# Patient Record
Sex: FEMALE | Race: BLACK OR AFRICAN AMERICAN | NOT HISPANIC OR LATINO | Employment: OTHER | URBAN - METROPOLITAN AREA
[De-identification: names, ages, dates, MRNs, and addresses within clinical notes are randomized per-mention and may not be internally consistent; named-entity substitution may affect disease eponyms.]

---

## 2017-02-01 ENCOUNTER — TRANSCRIBE ORDERS (OUTPATIENT)
Dept: ADMINISTRATIVE | Facility: HOSPITAL | Age: 77
End: 2017-02-01

## 2017-02-01 DIAGNOSIS — Z12.31 OTHER SCREENING MAMMOGRAM: Primary | ICD-10-CM

## 2017-02-11 ENCOUNTER — APPOINTMENT (OUTPATIENT)
Dept: LAB | Facility: HOSPITAL | Age: 77
End: 2017-02-11
Payer: MEDICARE

## 2017-02-11 ENCOUNTER — TRANSCRIBE ORDERS (OUTPATIENT)
Dept: LAB | Facility: HOSPITAL | Age: 77
End: 2017-02-11

## 2017-02-11 DIAGNOSIS — E11.9 DIABETES MELLITUS WITHOUT COMPLICATION (HCC): ICD-10-CM

## 2017-02-11 DIAGNOSIS — I10 UNSPECIFIED ESSENTIAL HYPERTENSION: ICD-10-CM

## 2017-02-11 DIAGNOSIS — E11.9 DIABETES MELLITUS WITHOUT COMPLICATION (HCC): Primary | ICD-10-CM

## 2017-02-11 DIAGNOSIS — E78.5 HYPERLIPIDEMIA, UNSPECIFIED HYPERLIPIDEMIA TYPE: ICD-10-CM

## 2017-02-11 LAB
ALBUMIN SERPL BCP-MCNC: 3.7 G/DL (ref 3.5–5)
ALP SERPL-CCNC: 83 U/L (ref 46–116)
ALT SERPL W P-5'-P-CCNC: 23 U/L (ref 12–78)
ANION GAP SERPL CALCULATED.3IONS-SCNC: 7 MMOL/L (ref 4–13)
AST SERPL W P-5'-P-CCNC: 11 U/L (ref 5–45)
BILIRUB SERPL-MCNC: 0.64 MG/DL (ref 0.2–1)
BUN SERPL-MCNC: 29 MG/DL (ref 5–25)
CALCIUM SERPL-MCNC: 9.3 MG/DL (ref 8.3–10.1)
CHLORIDE SERPL-SCNC: 107 MMOL/L (ref 100–108)
CO2 SERPL-SCNC: 27 MMOL/L (ref 21–32)
CREAT SERPL-MCNC: 1.11 MG/DL (ref 0.6–1.3)
EST. AVERAGE GLUCOSE BLD GHB EST-MCNC: 197 MG/DL
GFR SERPL CREATININE-BSD FRML MDRD: 57.9 ML/MIN/1.73SQ M
GLUCOSE SERPL-MCNC: 162 MG/DL (ref 65–140)
HBA1C MFR BLD: 8.5 % (ref 4.2–6.3)
LDLC SERPL DIRECT ASSAY-MCNC: 66 MG/DL (ref 0–100)
POTASSIUM SERPL-SCNC: 3.4 MMOL/L (ref 3.5–5.3)
PROT SERPL-MCNC: 7.8 G/DL (ref 6.4–8.2)
SODIUM SERPL-SCNC: 141 MMOL/L (ref 136–145)

## 2017-02-11 PROCEDURE — 80053 COMPREHEN METABOLIC PANEL: CPT

## 2017-02-11 PROCEDURE — 36415 COLL VENOUS BLD VENIPUNCTURE: CPT

## 2017-02-11 PROCEDURE — 83036 HEMOGLOBIN GLYCOSYLATED A1C: CPT

## 2017-02-11 PROCEDURE — 83721 ASSAY OF BLOOD LIPOPROTEIN: CPT

## 2017-02-28 ENCOUNTER — HOSPITAL ENCOUNTER (OUTPATIENT)
Dept: RADIOLOGY | Facility: HOSPITAL | Age: 77
Discharge: HOME | End: 2017-02-28
Attending: INTERNAL MEDICINE
Payer: MEDICARE

## 2017-02-28 DIAGNOSIS — Z12.31 OTHER SCREENING MAMMOGRAM: ICD-10-CM

## 2017-02-28 PROCEDURE — G0202 SCR MAMMO BI INCL CAD: HCPCS

## 2017-03-06 ENCOUNTER — ALLSCRIPTS OFFICE VISIT (OUTPATIENT)
Dept: OTHER | Facility: OTHER | Age: 77
End: 2017-03-06

## 2017-03-28 ENCOUNTER — HOSPITAL ENCOUNTER (EMERGENCY)
Facility: HOSPITAL | Age: 77
Discharge: HOME | End: 2017-03-28
Attending: EMERGENCY MEDICINE | Admitting: EMERGENCY MEDICINE
Payer: MEDICARE

## 2017-03-28 VITALS
HEART RATE: 53 BPM | OXYGEN SATURATION: 98 % | WEIGHT: 185 LBS | SYSTOLIC BLOOD PRESSURE: 133 MMHG | RESPIRATION RATE: 18 BRPM | TEMPERATURE: 97.6 F | DIASTOLIC BLOOD PRESSURE: 62 MMHG

## 2017-03-28 DIAGNOSIS — T18.128A FOOD IMPACTION OF ESOPHAGUS: Primary | ICD-10-CM

## 2017-03-28 PROCEDURE — 99283 EMERGENCY DEPT VISIT LOW MDM: CPT

## 2017-03-28 RX ORDER — CLOPIDOGREL BISULFATE 75 MG/1
75 TABLET ORAL DAILY
COMMUNITY
End: 2018-02-16 | Stop reason: SDUPTHER

## 2017-03-28 RX ORDER — ATORVASTATIN CALCIUM 10 MG/1
10 TABLET, FILM COATED ORAL DAILY
COMMUNITY
End: 2018-02-16 | Stop reason: SDUPTHER

## 2017-03-28 RX ORDER — GLIPIZIDE 10 MG/1
10 TABLET ORAL
COMMUNITY
End: 2018-02-16 | Stop reason: ALTCHOICE

## 2017-03-29 ENCOUNTER — ALLSCRIPTS OFFICE VISIT (OUTPATIENT)
Dept: OTHER | Facility: OTHER | Age: 77
End: 2017-03-29

## 2017-03-30 ENCOUNTER — GENERIC CONVERSION - ENCOUNTER (OUTPATIENT)
Dept: OTHER | Facility: OTHER | Age: 77
End: 2017-03-30

## 2017-04-04 RX ORDER — HYDROCHLOROTHIAZIDE 25 MG/1
25 TABLET ORAL DAILY
COMMUNITY
End: 2018-02-16 | Stop reason: SDUPTHER

## 2017-04-04 RX ORDER — AMLODIPINE BESYLATE 5 MG/1
5 TABLET ORAL DAILY
COMMUNITY
End: 2018-02-16 | Stop reason: SDUPTHER

## 2017-04-04 RX ORDER — METOPROLOL SUCCINATE 50 MG/1
50 TABLET, EXTENDED RELEASE ORAL DAILY
COMMUNITY
End: 2018-02-16 | Stop reason: ALTCHOICE

## 2017-04-04 RX ORDER — OXYBUTYNIN CHLORIDE 5 MG/1
5 TABLET ORAL DAILY
COMMUNITY
End: 2018-03-02 | Stop reason: SDUPTHER

## 2017-04-05 ENCOUNTER — HOSPITAL ENCOUNTER (OUTPATIENT)
Facility: HOSPITAL | Age: 77
Setting detail: OUTPATIENT SURGERY
Discharge: HOME | End: 2017-04-05
Attending: INTERNAL MEDICINE | Admitting: INTERNAL MEDICINE
Payer: COMMERCIAL

## 2017-04-05 ENCOUNTER — ANESTHESIA EVENT (OUTPATIENT)
Dept: GASTROENTEROLOGY | Facility: HOSPITAL | Age: 77
End: 2017-04-05
Payer: COMMERCIAL

## 2017-04-05 ENCOUNTER — ANESTHESIA (OUTPATIENT)
Dept: GASTROENTEROLOGY | Facility: HOSPITAL | Age: 77
End: 2017-04-05
Payer: COMMERCIAL

## 2017-04-05 ENCOUNTER — GENERIC CONVERSION - ENCOUNTER (OUTPATIENT)
Dept: OTHER | Facility: OTHER | Age: 77
End: 2017-04-05

## 2017-04-05 VITALS
HEIGHT: 65 IN | DIASTOLIC BLOOD PRESSURE: 67 MMHG | RESPIRATION RATE: 16 BRPM | WEIGHT: 182 LBS | SYSTOLIC BLOOD PRESSURE: 131 MMHG | OXYGEN SATURATION: 97 % | HEART RATE: 73 BPM | BODY MASS INDEX: 30.32 KG/M2 | TEMPERATURE: 97.6 F

## 2017-04-05 DIAGNOSIS — R13.19 ESOPHAGEAL DYSPHAGIA: ICD-10-CM

## 2017-04-05 LAB
GLUCOSE SERPL-MCNC: 47 MG/DL (ref 65–140)
GLUCOSE SERPL-MCNC: 51 MG/DL (ref 65–140)
GLUCOSE SERPL-MCNC: 82 MG/DL (ref 65–140)

## 2017-04-05 PROCEDURE — 82948 REAGENT STRIP/BLOOD GLUCOSE: CPT

## 2017-04-05 PROCEDURE — 88305 TISSUE EXAM BY PATHOLOGIST: CPT | Performed by: INTERNAL MEDICINE

## 2017-04-05 RX ORDER — LIDOCAINE HYDROCHLORIDE 10 MG/ML
INJECTION, SOLUTION EPIDURAL; INFILTRATION; INTRACAUDAL; PERINEURAL AS NEEDED
Status: DISCONTINUED | OUTPATIENT
Start: 2017-04-05 | End: 2017-04-05 | Stop reason: SURG

## 2017-04-05 RX ORDER — PROPOFOL 10 MG/ML
INJECTION, EMULSION INTRAVENOUS AS NEEDED
Status: DISCONTINUED | OUTPATIENT
Start: 2017-04-05 | End: 2017-04-05 | Stop reason: SURG

## 2017-04-05 RX ORDER — DEXTROSE MONOHYDRATE 25 G/50ML
INJECTION, SOLUTION INTRAVENOUS AS NEEDED
Status: DISCONTINUED | OUTPATIENT
Start: 2017-04-05 | End: 2017-04-05 | Stop reason: SURG

## 2017-04-05 RX ORDER — PROPOFOL 10 MG/ML
INJECTION, EMULSION INTRAVENOUS CONTINUOUS PRN
Status: DISCONTINUED | OUTPATIENT
Start: 2017-04-05 | End: 2017-04-05 | Stop reason: SURG

## 2017-04-05 RX ORDER — SODIUM CHLORIDE 9 MG/ML
125 INJECTION, SOLUTION INTRAVENOUS CONTINUOUS
Status: DISCONTINUED | OUTPATIENT
Start: 2017-04-05 | End: 2017-04-05 | Stop reason: HOSPADM

## 2017-04-05 RX ADMIN — SODIUM CHLORIDE 125 ML/HR: 0.9 INJECTION, SOLUTION INTRAVENOUS at 07:44

## 2017-04-05 RX ADMIN — LIDOCAINE HYDROCHLORIDE 50 MG: 10 INJECTION, SOLUTION EPIDURAL; INFILTRATION; INTRACAUDAL; PERINEURAL at 07:49

## 2017-04-05 RX ADMIN — DEXTROSE MONOHYDRATE 15 ML: 500 INJECTION PARENTERAL at 07:46

## 2017-04-05 RX ADMIN — PROPOFOL 120 MCG/KG/MIN: 10 INJECTION, EMULSION INTRAVENOUS at 07:51

## 2017-04-05 RX ADMIN — PROPOFOL 80 MG: 10 INJECTION, EMULSION INTRAVENOUS at 07:50

## 2017-04-06 ENCOUNTER — TRANSCRIBE ORDERS (OUTPATIENT)
Dept: ADMINISTRATIVE | Facility: HOSPITAL | Age: 77
End: 2017-04-06

## 2017-04-06 DIAGNOSIS — R13.14 DYSPHAGIA, PHARYNGOESOPHAGEAL PHASE: ICD-10-CM

## 2017-04-13 ENCOUNTER — HOSPITAL ENCOUNTER (OUTPATIENT)
Dept: RADIOLOGY | Facility: HOSPITAL | Age: 77
Discharge: HOME | End: 2017-04-13
Attending: INTERNAL MEDICINE
Payer: COMMERCIAL

## 2017-04-13 DIAGNOSIS — R13.14 DYSPHAGIA, PHARYNGOESOPHAGEAL PHASE: ICD-10-CM

## 2017-04-13 PROCEDURE — 74220 X-RAY XM ESOPHAGUS 1CNTRST: CPT

## 2017-04-24 ENCOUNTER — GENERIC CONVERSION - ENCOUNTER (OUTPATIENT)
Dept: OTHER | Facility: OTHER | Age: 77
End: 2017-04-24

## 2017-06-12 ENCOUNTER — APPOINTMENT (OUTPATIENT)
Dept: LAB | Facility: HOSPITAL | Age: 77
End: 2017-06-12
Attending: INTERNAL MEDICINE
Payer: COMMERCIAL

## 2017-06-12 ENCOUNTER — TRANSCRIBE ORDERS (OUTPATIENT)
Dept: LAB | Facility: HOSPITAL | Age: 77
End: 2017-06-12

## 2017-06-12 DIAGNOSIS — D64.9 ANEMIA, UNSPECIFIED: ICD-10-CM

## 2017-06-12 DIAGNOSIS — E11.9 TYPE 2 DIABETES MELLITUS WITHOUT COMPLICATION, WITHOUT LONG-TERM CURRENT USE OF INSULIN (HCC): ICD-10-CM

## 2017-06-12 DIAGNOSIS — I10 ESSENTIAL HYPERTENSION, MALIGNANT: ICD-10-CM

## 2017-06-12 DIAGNOSIS — E78.2 MIXED HYPERLIPIDEMIA: ICD-10-CM

## 2017-06-12 DIAGNOSIS — E11.9 TYPE 2 DIABETES MELLITUS WITHOUT COMPLICATION, WITHOUT LONG-TERM CURRENT USE OF INSULIN (HCC): Primary | ICD-10-CM

## 2017-06-12 LAB
ALBUMIN SERPL BCP-MCNC: 3.6 G/DL (ref 3.5–5)
ALP SERPL-CCNC: 86 U/L (ref 46–116)
ALT SERPL W P-5'-P-CCNC: 23 U/L (ref 12–78)
ANION GAP SERPL CALCULATED.3IONS-SCNC: 7 MMOL/L (ref 4–13)
AST SERPL W P-5'-P-CCNC: 12 U/L (ref 5–45)
BASOPHILS # BLD AUTO: 0.03 THOUSANDS/ΜL (ref 0–0.1)
BASOPHILS NFR BLD AUTO: 1 % (ref 0–1)
BILIRUB SERPL-MCNC: 0.75 MG/DL (ref 0.2–1)
BUN SERPL-MCNC: 25 MG/DL (ref 5–25)
CALCIUM SERPL-MCNC: 9.4 MG/DL (ref 8.3–10.1)
CHLORIDE SERPL-SCNC: 105 MMOL/L (ref 100–108)
CO2 SERPL-SCNC: 29 MMOL/L (ref 21–32)
CREAT SERPL-MCNC: 0.99 MG/DL (ref 0.6–1.3)
EOSINOPHIL # BLD AUTO: 0.07 THOUSAND/ΜL (ref 0–0.61)
EOSINOPHIL NFR BLD AUTO: 1 % (ref 0–6)
ERYTHROCYTE [DISTWIDTH] IN BLOOD BY AUTOMATED COUNT: 13.9 % (ref 11.6–15.1)
EST. AVERAGE GLUCOSE BLD GHB EST-MCNC: 183 MG/DL
GFR SERPL CREATININE-BSD FRML MDRD: >60 ML/MIN/1.73SQ M
GLUCOSE P FAST SERPL-MCNC: 173 MG/DL (ref 65–99)
HBA1C MFR BLD: 8 % (ref 4.2–6.3)
HCT VFR BLD AUTO: 35.9 % (ref 34.8–46.1)
HGB BLD-MCNC: 11.4 G/DL (ref 11.5–15.4)
LDLC SERPL DIRECT ASSAY-MCNC: 58 MG/DL (ref 0–100)
LYMPHOCYTES # BLD AUTO: 1.53 THOUSANDS/ΜL (ref 0.6–4.47)
LYMPHOCYTES NFR BLD AUTO: 25 % (ref 14–44)
MCH RBC QN AUTO: 25.3 PG (ref 26.8–34.3)
MCHC RBC AUTO-ENTMCNC: 31.8 G/DL (ref 31.4–37.4)
MCV RBC AUTO: 80 FL (ref 82–98)
MONOCYTES # BLD AUTO: 0.27 THOUSAND/ΜL (ref 0.17–1.22)
MONOCYTES NFR BLD AUTO: 4 % (ref 4–12)
NEUTROPHILS # BLD AUTO: 4.22 THOUSANDS/ΜL (ref 1.85–7.62)
NEUTS SEG NFR BLD AUTO: 69 % (ref 43–75)
NRBC BLD AUTO-RTO: 0 /100 WBCS
PLATELET # BLD AUTO: 199 THOUSANDS/UL (ref 149–390)
PMV BLD AUTO: 11.8 FL (ref 8.9–12.7)
POTASSIUM SERPL-SCNC: 3.4 MMOL/L (ref 3.5–5.3)
PROT SERPL-MCNC: 7.7 G/DL (ref 6.4–8.2)
RBC # BLD AUTO: 4.51 MILLION/UL (ref 3.81–5.12)
SODIUM SERPL-SCNC: 141 MMOL/L (ref 136–145)
TRIGL SERPL-MCNC: 83 MG/DL
WBC # BLD AUTO: 6.13 THOUSAND/UL (ref 4.31–10.16)

## 2017-06-12 PROCEDURE — 83721 ASSAY OF BLOOD LIPOPROTEIN: CPT

## 2017-06-12 PROCEDURE — 83036 HEMOGLOBIN GLYCOSYLATED A1C: CPT

## 2017-06-12 PROCEDURE — 36415 COLL VENOUS BLD VENIPUNCTURE: CPT

## 2017-06-12 PROCEDURE — 84478 ASSAY OF TRIGLYCERIDES: CPT

## 2017-06-12 PROCEDURE — 85025 COMPLETE CBC W/AUTO DIFF WBC: CPT

## 2017-06-12 PROCEDURE — 80053 COMPREHEN METABOLIC PANEL: CPT

## 2017-06-28 ENCOUNTER — ALLSCRIPTS OFFICE VISIT (OUTPATIENT)
Dept: OTHER | Facility: OTHER | Age: 77
End: 2017-06-28

## 2017-07-11 ENCOUNTER — HOSPITAL ENCOUNTER (OUTPATIENT)
Dept: GASTROENTEROLOGY | Facility: HOSPITAL | Age: 77
Discharge: HOME | End: 2017-07-11
Payer: COMMERCIAL

## 2017-07-12 ENCOUNTER — HOSPITAL ENCOUNTER (OUTPATIENT)
Dept: GASTROENTEROLOGY | Facility: HOSPITAL | Age: 77
Discharge: HOME | End: 2017-07-12
Payer: COMMERCIAL

## 2017-07-12 VITALS
TEMPERATURE: 98.4 F | SYSTOLIC BLOOD PRESSURE: 177 MMHG | RESPIRATION RATE: 18 BRPM | BODY MASS INDEX: 31.01 KG/M2 | OXYGEN SATURATION: 98 % | HEART RATE: 60 BPM | HEIGHT: 63 IN | WEIGHT: 175 LBS | DIASTOLIC BLOOD PRESSURE: 98 MMHG

## 2017-07-12 PROCEDURE — 91020 GASTRIC MOTILITY STUDIES: CPT

## 2017-08-21 ENCOUNTER — TRANSCRIBE ORDERS (OUTPATIENT)
Dept: ADMINISTRATIVE | Facility: HOSPITAL | Age: 77
End: 2017-08-21

## 2017-08-21 ENCOUNTER — ALLSCRIPTS OFFICE VISIT (OUTPATIENT)
Dept: OTHER | Facility: OTHER | Age: 77
End: 2017-08-21

## 2017-08-21 DIAGNOSIS — R53.83 OTHER FATIGUE: ICD-10-CM

## 2017-08-21 DIAGNOSIS — R01.1 UNDIAGNOSED CARDIAC MURMURS: Primary | ICD-10-CM

## 2017-08-25 ENCOUNTER — HOSPITAL ENCOUNTER (OUTPATIENT)
Dept: NON INVASIVE DIAGNOSTICS | Facility: HOSPITAL | Age: 77
Discharge: HOME | End: 2017-08-25
Payer: COMMERCIAL

## 2017-08-25 DIAGNOSIS — R01.1 UNDIAGNOSED CARDIAC MURMURS: ICD-10-CM

## 2017-08-25 PROCEDURE — 93306 TTE W/DOPPLER COMPLETE: CPT

## 2017-09-07 ENCOUNTER — GENERIC CONVERSION - ENCOUNTER (OUTPATIENT)
Dept: OTHER | Facility: OTHER | Age: 77
End: 2017-09-07

## 2017-09-21 ENCOUNTER — GENERIC CONVERSION - ENCOUNTER (OUTPATIENT)
Dept: OTHER | Facility: OTHER | Age: 77
End: 2017-09-21

## 2017-09-25 ENCOUNTER — APPOINTMENT (OUTPATIENT)
Dept: LAB | Facility: HOSPITAL | Age: 77
End: 2017-09-25
Payer: COMMERCIAL

## 2017-09-25 ENCOUNTER — TRANSCRIBE ORDERS (OUTPATIENT)
Dept: LAB | Facility: HOSPITAL | Age: 77
End: 2017-09-25

## 2017-09-25 DIAGNOSIS — R53.83 OTHER FATIGUE: ICD-10-CM

## 2017-09-25 DIAGNOSIS — E11.65 TYPE 2 DIABETES MELLITUS WITH HYPERGLYCEMIA (HCC): ICD-10-CM

## 2017-09-25 LAB
ALBUMIN SERPL BCP-MCNC: 3.5 G/DL (ref 3.5–5)
ALP SERPL-CCNC: 83 U/L (ref 46–116)
ALT SERPL W P-5'-P-CCNC: 18 U/L (ref 12–78)
ANION GAP SERPL CALCULATED.3IONS-SCNC: 9 MMOL/L (ref 4–13)
AST SERPL W P-5'-P-CCNC: 15 U/L (ref 5–45)
BASOPHILS # BLD AUTO: 0.02 THOUSANDS/ΜL (ref 0–0.1)
BASOPHILS NFR BLD AUTO: 0 % (ref 0–1)
BILIRUB SERPL-MCNC: 0.57 MG/DL (ref 0.2–1)
BUN SERPL-MCNC: 19 MG/DL (ref 5–25)
CALCIUM SERPL-MCNC: 9.4 MG/DL (ref 8.3–10.1)
CHLORIDE SERPL-SCNC: 104 MMOL/L (ref 100–108)
CO2 SERPL-SCNC: 26 MMOL/L (ref 21–32)
CREAT SERPL-MCNC: 1.08 MG/DL (ref 0.6–1.3)
EOSINOPHIL # BLD AUTO: 0.11 THOUSAND/ΜL (ref 0–0.61)
EOSINOPHIL NFR BLD AUTO: 2 % (ref 0–6)
ERYTHROCYTE [DISTWIDTH] IN BLOOD BY AUTOMATED COUNT: 13.8 % (ref 11.6–15.1)
EST. AVERAGE GLUCOSE BLD GHB EST-MCNC: 212 MG/DL
GFR SERPL CREATININE-BSD FRML MDRD: 57 ML/MIN/1.73SQ M
GLUCOSE SERPL-MCNC: 238 MG/DL (ref 65–140)
HBA1C MFR BLD: 9 % (ref 4.2–6.3)
HCT VFR BLD AUTO: 34.9 % (ref 34.8–46.1)
HGB BLD-MCNC: 11.1 G/DL (ref 11.5–15.4)
LYMPHOCYTES # BLD AUTO: 1.27 THOUSANDS/ΜL (ref 0.6–4.47)
LYMPHOCYTES NFR BLD AUTO: 24 % (ref 14–44)
MCH RBC QN AUTO: 25.3 PG (ref 26.8–34.3)
MCHC RBC AUTO-ENTMCNC: 31.8 G/DL (ref 31.4–37.4)
MCV RBC AUTO: 80 FL (ref 82–98)
MONOCYTES # BLD AUTO: 0.28 THOUSAND/ΜL (ref 0.17–1.22)
MONOCYTES NFR BLD AUTO: 5 % (ref 4–12)
NEUTROPHILS # BLD AUTO: 3.7 THOUSANDS/ΜL (ref 1.85–7.62)
NEUTS SEG NFR BLD AUTO: 69 % (ref 43–75)
NRBC BLD AUTO-RTO: 0 /100 WBCS
PLATELET # BLD AUTO: 196 THOUSANDS/UL (ref 149–390)
PMV BLD AUTO: 11.3 FL (ref 8.9–12.7)
POTASSIUM SERPL-SCNC: 3.5 MMOL/L (ref 3.5–5.3)
PROT SERPL-MCNC: 7.4 G/DL (ref 6.4–8.2)
RBC # BLD AUTO: 4.38 MILLION/UL (ref 3.81–5.12)
SODIUM SERPL-SCNC: 139 MMOL/L (ref 136–145)
TSH SERPL DL<=0.05 MIU/L-ACNC: 0.86 UIU/ML (ref 0.36–3.74)
WBC # BLD AUTO: 5.39 THOUSAND/UL (ref 4.31–10.16)

## 2017-09-25 PROCEDURE — 84443 ASSAY THYROID STIM HORMONE: CPT

## 2017-09-25 PROCEDURE — 83036 HEMOGLOBIN GLYCOSYLATED A1C: CPT

## 2017-09-25 PROCEDURE — 85025 COMPLETE CBC W/AUTO DIFF WBC: CPT

## 2017-09-25 PROCEDURE — 36415 COLL VENOUS BLD VENIPUNCTURE: CPT

## 2017-09-25 PROCEDURE — 80053 COMPREHEN METABOLIC PANEL: CPT

## 2017-10-10 ENCOUNTER — GENERIC CONVERSION - ENCOUNTER (OUTPATIENT)
Dept: OTHER | Facility: OTHER | Age: 77
End: 2017-10-10

## 2017-11-09 ENCOUNTER — GENERIC CONVERSION - ENCOUNTER (OUTPATIENT)
Dept: OTHER | Facility: OTHER | Age: 77
End: 2017-11-09

## 2017-11-29 ENCOUNTER — ALLSCRIPTS OFFICE VISIT (OUTPATIENT)
Dept: OTHER | Facility: OTHER | Age: 77
End: 2017-11-29

## 2017-11-29 DIAGNOSIS — I63.9 CEREBRAL INFARCTION (HCC): ICD-10-CM

## 2017-11-29 DIAGNOSIS — E11.9 TYPE 2 DIABETES MELLITUS WITHOUT COMPLICATIONS (HCC): ICD-10-CM

## 2017-11-29 DIAGNOSIS — R05.9 COUGH: ICD-10-CM

## 2017-11-29 DIAGNOSIS — I35.1 NONRHEUMATIC AORTIC VALVE INSUFFICIENCY: ICD-10-CM

## 2017-11-30 NOTE — CONSULTS
Assessment    1  Benign essential hypertension (401 1) (I10)   2  CVA (cerebrovascular accident) (434 91) (I63 9)   3  Diabetes (250 00) (E11 9)    Plan  Aortic valve regurgitation, nonrheumatic, CVA (cerebrovascular accident), Diabetes    · VAS CAROTID COMPLETE STUDY; SIDE:Bilateral; Status:Hold For - Scheduling;Requested for:50Zjh5529 11:00AM;    Perform:[address]; Due:80Cee3204; Last Updated By:Nancy Avitia; 11/29/2017 12:58:33 PM;Ordered;valve regurgitation, nonrheumatic, CVA (cerebrovascular accident), Diabetes; Ordered By:Shu Kapadia;  CVA (cerebrovascular accident), Diabetes    · Follow-up visit in 6 months Evaluation and Treatment  Follow-up  Status: Hold For -Scheduling  Requested for: 60SHM4868   Ordered;CVA (cerebrovascular accident), Diabetes; Ordered Samantha Dumont Performed:  Due: 70RAV2567    Discussion/Summary  Discussion Summary:   Mrs Amaya is a pleasant 67 yo female seen in consultation for history of stroke, subcortical vs posterior circulation per description  I will try to obtain her [facility]imaging records from 2010Neurologic exam today with evidence of mild peripheral neuropathy otherwise non focalWill obtain CUSC/w Plavix and statin for secondary stroke preventionStroke risk factors: Age,HTN, DM (HgbA1C 9), tobacco use in the past, and history stroke  Discussed risk factor modification in detail  Healthy weight loss/ management as directed per PCP  Discussed s/sx of stroke to call 911 immediately  peripheral neuropathyRemains without falls and uses cane as needed  Discussed importance of fall prevention  up in six months with PA or me, and if she remains neurologically stable with no new concerns, can then see as needed going forward  Counseling Documentation With Imm: The patient was counseled regarding     Stroke Patient Family Education [facility]:  The patient was educated regarding: Goals: Exercise: 30 minutes of moderate-intensity physical exercise most days or supervised therapeutic exercise program if disabled, but-- BMI: 18 5 - 24 9  Patient/Family was also educated regarding: Stroke Diagnosis (TIA,Ischemic Stroke, ICH, SAH),-- Medication Adherence,-- Personal Stroke/Cardiovascular Risk Factors,-- Diabetes Education,-- Diet Education (Low Salt, Low Cholesterol, Diabetic,-- Activity/Exercise Guidelines,-- Weight loss/Management Counseling,-- Signs And Symptoms Of Stroke/Call 911-- and-- Patient/Family Received Education Materials  Chief Complaint  Chief Complaint Free Text Note Form: Patient present for consultation regarding stroke      History of Present Illness  HPI: Ms Shukri Mcpherson is a pleasant 67 yo female with flu and or viral URI today per her, presenting here today per her PCP for history of stroke 2010  States she had a popping sensation in her head followed by balance issues then  States left side weaker  Tells me does not recall the location of her stroke  States as far as she knows she was not given tPA and no intracranial bleeding  Denies known personal or family history of aneurysm  me still has residual balance issues and walks with cane  States no falls  any strokes or TIAs since  Is on Plavix and statin for secondary stroke prevention  States was on ASA prior to the stroke  She tells me she used to see a neurologist with [facility] years ago when she had the stroke but cannot recall their name  Tells me last neuroimaging 2010 but not since  no history of heart disease or irregular heart rhythms or blood clots or CA  States HTN well controlled with medication  HgbA1C 9 0 per most recent blood work  of tobacco abuse stopped years ago in 1980s  no significant memory deficits  Does all ADLs and complicated tasks with no difficulty  me she drives with no difficulties  depression or anxietyby herself and family visits her from time to time  tries to eat a healthy diet however the holidays have been bad in this regard   States she used to exercise on her stationary bike regularly but has not been doing so recently  other concerns at this time  sleep apnea symptoms      Review of Systems  Neurological ROS:  Constitutional: no fever, no chills, no recent weight gain, no recent weight loss, no complaints of feeling tired, no changes in appetite  HEENT:  no sinus problems, not feeling congested, no blurred vision, no dryness of the eyes, no eye pain, no hearing loss, no tinnitus, no mouth sores, no sore throat, no hoarseness, no dysphagia, no masses, no bleeding  Cardiovascular:  no chest pain or pressure, no palpitations present, the heart rate was not rapid or irregular, no swelling in the arms or legs, no poor circulation  Respiratory:  no unusual or persistant cough, no shortness of breath with or without exertion  Gastrointestinal:  no nausea, no vomiting, no diarrhea, no abdominal pain, no changes in bowel habits, no melena, no loss of bowel control  Genitourinary:  no incontinence, no feelings of urinary urgency, no increase in frequency, no urinary hesitancy, no dysuria, no hematuria  Musculoskeletal: head/neck/back pain-- and-- pain while walking  Integumentary  no masses, no rash, no skin lesions, no livedo reticularis  Psychiatric:  no anxiety, no depression, no mood swings, no psychiatric hospitalizations, no sleep problems  Endocrine hair loss or gain  Hematologic/Lymphatic:  no unusual bleeding, no tendency for easy bruising, no clotting skin or lumps  Neurological General:  no headache, no nausea or vomiting, no lightheadedness, no convulsions, no blackouts, no syncope, no trauma, no photopsia, no increased sleepiness, no trouble falling asleep, no snoring, no awakening at night  Neurological Mental Status: memory problems    Neurological Cranial Nerves:  no blurry or double vision, no loss of vision, no face drooping, no facial numbness or weakness, no taste or smell loss/changes, no hearing loss or ringing, no vertigo or dizziness, no dysphagia, no slurred speech  Neurological Motor findings include:  no tremor, no twitching, no cramping(pre/post exercise), no atrophy  Neurological Coordination: balance difficulties  Neurological Sensory:  no numbness, no pain, no tingling, does not fall when eyes closed or taking a shower  Neurological Gait: difficulty walking  ROS Reviewed:   ROS reviewed  Active Problems  1  Aortic valve regurgitation, nonrheumatic (424 1) (I35 1)   2  Benign essential hypertension (401 1) (I10)   3  Bilateral edema of lower extremity (782 3) (R60 0)   4  CVA (cerebrovascular accident) (434 91) (I63 9)   5  Cystocele, midline (618 01) (N81 11)   6  Diabetes mellitus type 2, uncontrolled (250 02) (E11 65)   7  Encounter for pessary maintenance (V53 99) (Z46 89)   8  Esophageal dysphagia (787 20) (R13 10)   9  Fatigue (780 79) (R53 83)   10  Loss of pelvic support (618 00) (N81 9)   11  Need for influenza vaccination (V04 81) (Z23)   12  Non-rheumatic mitral regurgitation (424 0) (I34 0)   13  Tinea capitis (110 0) (B35 0)   14  Upper respiratory infection, acute (465 9) (J06 9)   15  Urinary incontinence (788 30) (R32)   16  Vitamin D insufficiency (268 9) (E55 9)    Past Medical History    1  History of Asthma (493 90) (J45 909)   2  History of  8   3  History of cardiac murmur (V12 59) (Z86 79)   4  History of fatigue (V13 89) (Z87 898)   5  History of urinary frequency (V13 09) (Z87 898)   6  History of urinary tract infection (V13 02) (Z87 440)   7  Personal history of other specified conditions presenting hazards to health (V15 89) (Z91 89)   8  History of Prepatellar bursitis of right knee (726 65) (M70 41)   9  History of Spontaneous  (634 90)   10  History of Stroke Syndrome (436)  Active Problems And Past Medical History Reviewed: The active problems and past medical history were reviewed and updated today  Surgical History  1   Denied: History Of Prior Surgery    Family History  Mother 1  Family history of hypertension (V17 49) (Z82 49)   2  Family history of stroke (V17 1) (Z82 3)  Father    3  Family history of heart disease (V17 49) (Z82 49)  Son    4  Family history of diabetes mellitus (V18 0) (Z83 3)   5  Family history of hypertension (V17 49) (Z82 49)   6  Family history of Rheumatic disease  Sister    9  Family history of breast disorder (V19 8) (Z84 2)   8  Family history of epilepsy (V17 2) (Z82 0)   9  Family history of migraine headaches (V17 2) (Z82 0)   10  Family history of osteoporosis (V17 81) (Z82 62)   11  Family history of thyroid disease (V18 19) (Z83 49)   12  Family history of S/P triple vessel bypass  Maternal Grandmother    13  Family history of osteoporosis (V17 81) (Z82 62)  Family History    14  Family history of Heart Disease (V17 49)  Family History Reviewed: The family history was reviewed and updated today  Social History     · Denied: History of Alcohol Use (History)   · Diet needs improvement   · Does not exercise (V69 0) (Z72 3)   · Denied: History of Drug Use   · Eight children   · Former smoker (V15 82) (Q30 184)   · No caffeine use   · Retired   ·  (V61 07) (Z63 4)  Social History Reviewed: The social history was reviewed and updated today  Current Meds   1  AmLODIPine Besylate 5 MG Oral Tablet; TAKE 1 TABLET DAILY  Requested for: 40MAD4272; Last Rx:19Nbq0656 Ordered   2  Atorvastatin Calcium 40 MG Oral Tablet; TAKE 1 TABLET DAILY AS DIRECTED; Therapy: 13CZE3374 to (PFUIZLDZ:70DJT6732)  Requested for: 21QWI6161; Last Rx:15Yxe3270 Ordered   3  Benzonatate 100 MG Oral Capsule; TAKE 1 CAPSULE 3 TIMES DAILY; Therapy: 56GKF0281 to (Evaluate:99Fkm0371)  Requested for: 66TYO5178; Last Rx:14Nov2017 Ordered   4  Clopidogrel Bisulfate 75 MG Oral Tablet; TAKE 1 TABLET DAILY  Requested for: 80GUY8842; Last Rx:20Nov2017 Ordered   5  Coricidin HBP Congestion/Cough  MG Oral Capsule; TAKE 1 CAPSULE EVERY 4 HOURS AS NEEDED;  Therapy: 60BKB0166 to (Evaluate:16Nov2017)  Requested for: 74SCD6077; Last Rx:09Nov2017 Ordered   6  HydroCHLOROthiazide 25 MG Oral Tablet; TAKE 1 TABLET DAILY  Requested for: 89FJQ8596; Last Rx:77Pqh9635 Ordered   7  Lisinopril 20 MG Oral Tablet; TAKE ONE TABLET BY MOUTH EVERY DAY; Therapy: 97TRB7299 to (Evaluate:08Apr2018)  Requested for: 88VEE7352; Last Rx:10Oct2017 Ordered   8  MetFORMIN HCl - 1000 MG Oral Tablet; Take one tablet in am and 1and 1/2 tablets in pm with food for a totoal of 2500 mg  Requested for: 72OQU1860; Last Rx:79Gvt8592 Ordered   9  Terbinafine HCl - 250 MG Oral Tablet; TAKE 1 TABLET DAILY AS DIRECTED; Therapy: 85Eky9290 to (Evaluate:05Oct2017)  Requested for: 30Drm6699; Last Rx:52Khj3006 Ordered   10  Tradjenta 5 MG Oral Tablet; TAKE 1 TABLET DAILY AS DIRECTED; Therapy: 42VFF8347 to (John Davila)  Requested for: 50AIB2188; Last  Rx:10Oct2017 Ordered   11  Ventolin  (90 Base) MCG/ACT Inhalation Aerosol Solution; inhale 1 to 2 puffs  every 4 to 6 hours if needed; Therapy: 55Qnu8464-74Swa1015  Requested for: 22Nov2017; Last Rx:22Nov2017  Ordered   12  Vitamin D3 2000 UNIT Oral Tablet; take 1 tab po daily; Therapy: 62UGQ0244 to [id number])  Requested for: 40CBA2356; Last  Rx:09Qmb4948 Ordered  Medication List Reviewed: The medication list was reviewed and updated today  Allergies  1  No Known Drug Allergies    2  No Known Environmental Allergies   3  No Known Food Allergies    Vitals  Signs   Recorded: 46UBQ9269 12:21PM   Heart Rate: 74  Respiration: 16  Systolic: 238  Diastolic: 80  Height: 5 ft 3 in  Weight: 180 lb 1 oz  BMI Calculated: 31 9  BSA Calculated: 1 85  O2 Saturation: 99    Physical Exam   Constitutional  General appearance: Abnormal   overweight  Eyes  Ophthalmoscopic examination: Vision is grossly normal  Gross visual field testing by confrontation shows no abnormalities  EOMI in both eyes  Conjunctivae clear   Eyelids normal palpebral fissures equal  Orbits exhibit normal position  No discharge from the eyes  PERRL  Inspection of ears, nose and throat: Abnormal   Rhinorrhea    Neck  Neck and thyroid: Normal to inspection and palpation  Pulmonary  Respiratory effort: Lungs are clear bilaterally  Cardiovascular  Auscultation of heart: Rate is regular  Rhythm is regular  Peripheral vascular exam: Normal pulses throughout, no tenderness, erythema or swelling  Musculoskeletal  Gait and station: Abnormal  -- wide based cautious gait  Muscle strength: Normal strength throughout  Muscle tone: No atrophy, abnormal movements, flaccidity, cogwheeling or spasticity  Range of motion: Normal     Stability: Normal      Neurologic  Orientation to person, place, and time: Normal    Attention span and concentration: Normal thought process and attention span  Language: Names objects, able to repeat phrases and speaks spontaneously  Fund of knowledge: Normal vocabulary with appropriate knowledge of current events and past history  Sensation: Abnormal  -- Mild PP stocking glove distribution sensory loss  Reflexes: Abnormal  -- Diffusely hyporeflexic  Coordination: Cerebellum function intact  No involuntary movement or psychomotor activity  Motor System: No pronator drift  Upper Extremities: Normal to inspection  No tenderness over the upper extremities bilaterally  No instability bilaterally  Strength: Motor strength is 5/5 bilaterally  Normal muscle tone bilaterally  Muscle bulk: Muscle bulk is normal bilaterally  Full ROM bilaterally  Lower Extremities: Normal to inspection and palpation  No tenderness of the lower extremities bilaterally  Exhibits no instability bilaterally  Strength: Motor strength is 5/5 bilaterally  Normal muscle tone bilaterally  Muscle Bulk: Muscle bulk is normal bilaterally  Full ROM bilaterally  Cranial Nerve Exam  II: Normal with no deficit  III,IV, VI: Normal with no deficit  V: Normal with no deficit  VII: Normal with no deficit  VIII: Normal with no deficit  IX: Normal with no deficit  X: Normal with no deficit  XI: Normal with no deficit  XII: Normal with no deficit  Recent and remote memory: Intact      Mood and affect: Normal        Signatures   Electronically signed by : Sloan Adams MD; Nov 29 2017  1:16PM EST                       (Author)

## 2017-12-03 ENCOUNTER — HOSPITAL ENCOUNTER (EMERGENCY)
Facility: HOSPITAL | Age: 77
Discharge: HOME | End: 2017-12-03
Attending: EMERGENCY MEDICINE | Admitting: EMERGENCY MEDICINE
Payer: COMMERCIAL

## 2017-12-03 ENCOUNTER — APPOINTMENT (EMERGENCY)
Dept: RADIOLOGY | Facility: HOSPITAL | Age: 77
End: 2017-12-03
Payer: COMMERCIAL

## 2017-12-03 VITALS
WEIGHT: 180 LBS | RESPIRATION RATE: 18 BRPM | TEMPERATURE: 98.7 F | OXYGEN SATURATION: 96 % | HEART RATE: 59 BPM | BODY MASS INDEX: 31.89 KG/M2 | DIASTOLIC BLOOD PRESSURE: 78 MMHG | SYSTOLIC BLOOD PRESSURE: 141 MMHG

## 2017-12-03 DIAGNOSIS — R05.9 COUGH: Primary | ICD-10-CM

## 2017-12-03 LAB
ANION GAP SERPL CALCULATED.3IONS-SCNC: 8 MMOL/L (ref 4–13)
ATRIAL RATE: 65 BPM
BASOPHILS # BLD AUTO: 0.03 THOUSANDS/ΜL (ref 0–0.1)
BASOPHILS NFR BLD AUTO: 1 % (ref 0–1)
BUN SERPL-MCNC: 32 MG/DL (ref 5–25)
CALCIUM SERPL-MCNC: 9 MG/DL (ref 8.3–10.1)
CHLORIDE SERPL-SCNC: 104 MMOL/L (ref 100–108)
CO2 SERPL-SCNC: 26 MMOL/L (ref 21–32)
CREAT SERPL-MCNC: 1.22 MG/DL (ref 0.6–1.3)
EOSINOPHIL # BLD AUTO: 0.11 THOUSAND/ΜL (ref 0–0.61)
EOSINOPHIL NFR BLD AUTO: 2 % (ref 0–6)
ERYTHROCYTE [DISTWIDTH] IN BLOOD BY AUTOMATED COUNT: 13.6 % (ref 11.6–15.1)
GFR SERPL CREATININE-BSD FRML MDRD: 49 ML/MIN/1.73SQ M
GLUCOSE SERPL-MCNC: 146 MG/DL (ref 65–140)
HCT VFR BLD AUTO: 35 % (ref 34.8–46.1)
HGB BLD-MCNC: 11.5 G/DL (ref 11.5–15.4)
LYMPHOCYTES # BLD AUTO: 1.52 THOUSANDS/ΜL (ref 0.6–4.47)
LYMPHOCYTES NFR BLD AUTO: 26 % (ref 14–44)
MCH RBC QN AUTO: 26.1 PG (ref 26.8–34.3)
MCHC RBC AUTO-ENTMCNC: 32.9 G/DL (ref 31.4–37.4)
MCV RBC AUTO: 80 FL (ref 82–98)
MONOCYTES # BLD AUTO: 0.41 THOUSAND/ΜL (ref 0.17–1.22)
MONOCYTES NFR BLD AUTO: 7 % (ref 4–12)
NEUTROPHILS # BLD AUTO: 3.84 THOUSANDS/ΜL (ref 1.85–7.62)
NEUTS SEG NFR BLD AUTO: 64 % (ref 43–75)
NRBC BLD AUTO-RTO: 0 /100 WBCS
NT-PROBNP SERPL-MCNC: 85 PG/ML
P AXIS: 67 DEGREES
PLATELET # BLD AUTO: 173 THOUSANDS/UL (ref 149–390)
PMV BLD AUTO: 11.8 FL (ref 8.9–12.7)
POTASSIUM SERPL-SCNC: 3.8 MMOL/L (ref 3.5–5.3)
PR INTERVAL: 214 MS
QRS AXIS: -34 DEGREES
QRSD INTERVAL: 86 MS
QT INTERVAL: 406 MS
QTC INTERVAL: 422 MS
RBC # BLD AUTO: 4.4 MILLION/UL (ref 3.81–5.12)
SODIUM SERPL-SCNC: 138 MMOL/L (ref 136–145)
SPECIMEN SOURCE: NORMAL
T WAVE AXIS: 90 DEGREES
TROPONIN I BLD-MCNC: 0.01 NG/ML (ref 0–0.08)
VENTRICULAR RATE: 65 BPM
WBC # BLD AUTO: 5.92 THOUSAND/UL (ref 4.31–10.16)

## 2017-12-03 PROCEDURE — 85025 COMPLETE CBC W/AUTO DIFF WBC: CPT | Performed by: EMERGENCY MEDICINE

## 2017-12-03 PROCEDURE — 71020 HB CHEST X-RAY 2VW FRONTAL&LATL: CPT

## 2017-12-03 PROCEDURE — 83880 ASSAY OF NATRIURETIC PEPTIDE: CPT | Performed by: EMERGENCY MEDICINE

## 2017-12-03 PROCEDURE — 80048 BASIC METABOLIC PNL TOTAL CA: CPT | Performed by: EMERGENCY MEDICINE

## 2017-12-03 PROCEDURE — 36415 COLL VENOUS BLD VENIPUNCTURE: CPT | Performed by: EMERGENCY MEDICINE

## 2017-12-03 PROCEDURE — 84484 ASSAY OF TROPONIN QUANT: CPT

## 2017-12-03 PROCEDURE — 93005 ELECTROCARDIOGRAM TRACING: CPT | Performed by: EMERGENCY MEDICINE

## 2017-12-03 PROCEDURE — 99284 EMERGENCY DEPT VISIT MOD MDM: CPT

## 2017-12-03 NOTE — DISCHARGE INSTRUCTIONS
I am concerned that this cough that your having could be a side effect of your medication  Please follow-up with your primary care provider and specifically discuss if the lisinopril is causing your cough  Please return emergency department give any increase in shortness of breath, swelling in your tongue lips, trouble breathing or any other concerns  Acute Cough   WHAT YOU NEED TO KNOW:   An acute cough can last up to 3 weeks  Common causes of an acute cough include a cold, allergies, or a lung infection  DISCHARGE INSTRUCTIONS:   Return to the emergency department if:   · You have trouble breathing or feel short of breath  · You cough up blood, or you see blood in your mucus  · You faint or feel weak or dizzy  · You have chest pain when you cough or take a deep breath  · You have new wheezing  Contact your healthcare provider if:   · You have a fever  · Your cough lasts longer than 4 weeks  · Your symptoms do not improve with treatment  · You have questions or concerns about your condition or care  Medicines:   · Medicines  may be needed to stop the cough, decrease swelling in your airways, or help open your airways  Medicine may also be given to help you cough up mucus  Ask your healthcare provider what over-the-counter medicines you can take  If you have an infection caused by bacteria, you may need antibiotics  · Take your medicine as directed  Contact your healthcare provider if you think your medicine is not helping or if you have side effects  Tell him or her if you are allergic to any medicine  Keep a list of the medicines, vitamins, and herbs you take  Include the amounts, and when and why you take them  Bring the list or the pill bottles to follow-up visits  Carry your medicine list with you in case of an emergency  Manage your symptoms:   · Do not smoke and stay away from others who smoke    Nicotine and other chemicals in cigarettes and cigars can cause lung damage and make your cough worse  Ask your healthcare provider for information if you currently smoke and need help to quit  E-cigarettes or smokeless tobacco still contain nicotine  Talk to your healthcare provider before you use these products  · Drink extra liquids as directed  Liquids will help thin and loosen mucus so you can cough it up  Liquids will also help prevent dehydration  Examples of good liquids to drink include water, fruit juice, and broth  Do not drink liquids that contain caffeine  Caffeine can increase your risk for dehydration  Ask your healthcare provider how much liquid to drink each day  · Rest as directed  Do not do activities that make your cough worse, such as exercise  · Use a humidifier or vaporizer  Use a cool mist humidifier or a vaporizer to increase air moisture in your home  This may make it easier for you to breathe and help decrease your cough  · Eat 2 to 5 mL of honey 2 times each day  Honey can help thin mucus and decrease your cough  · Use cough drops or lozenges  These can help decrease throat irritation and your cough  Follow up with your healthcare provider as directed:  Write down your questions so you remember to ask them during your visits  © 2017 [facility] Information is for End User's use only and may not be sold, redistributed or otherwise used for commercial purposes  All illustrations and images included in CareNotes® are the copyrighted property of A D A M [employer] Inc  or Joe Mercado  The above information is an  only  It is not intended as medical advice for individual conditions or treatments  Talk to your doctor, nurse or pharmacist before following any medical regimen to see if it is safe and effective for you

## 2017-12-03 NOTE — ED ATTENDING ATTESTATION
Mari Young DO, saw and evaluated the patient  I have discussed the patient with the resident/non-physician practitioner and agree with the resident's/non-physician practitioner's findings, Plan of Care, and MDM as documented in the resident's/non-physician practitioner's note, except where noted  All available labs and Radiology studies were reviewed  At this point I agree with the current assessment done in the Emergency Department  I have conducted an independent evaluation of this patient a history and physical is as follows:    68 yof with cough for two months, starting after lisinopril being started  She is suspicious it is due to her flu shot that she received the same timeframe  No PND, sleeps with three pillows  No Cp  +DUNAWAY    /73   Pulse 70   Temp 98 7 °F (37 1 °C) (Oral)   Resp 18   Wt 81 6 kg (180 lb)   SpO2 99%   BMI 31 89 kg/m²   NAD  CTA, no JVD, no HJR  RRR  abd soft, NT  Legs without CCE  Neuro intact    EKG with NSR and no changes    CBC, BMP, BNP, Trop, CXR      Labs was unremarkable  Chest x-ray without infiltrate  differential diagnosis includes reaction to lisinopril which we advised that she stops taking until she follows up with her family doctor tomorrow which she already has scheduled  Antibiotics not prescribed as there is no infiltrate she is afebrile  No definitive evidence of congestive heart failure  The patient's presentation is consistent with an upper respiratory infection        Diagnosis   URI     discharged to follow up tomorrow with her PCP as scheduled    Critical Care Time  CritCare Time

## 2017-12-03 NOTE — ED PROVIDER NOTES
History  Chief Complaint   Patient presents with    Cough     Pt states that she has had a cough since the 10th of last month  Pt PCP told her to come in and get checked a week ago  Pt also has c/o congestion     HPI  70-year-old woman presents for evaluation of cough  Patient states that she got a flu shot on October 10th, and has increasing cough since that time he claims that on that  At the same time, she also was placed on lisinopril to help with her blood pressure  Patient has appointment see her primary care doctor tomorrow and was told that she needed an x-ray  Patient was told last week to get the x-ray and if symptoms did not get better presented emergency department  Patient denies any chest pain, denies any shortness of breath  Does endorse dyspnea on exertion  Denies any PND, denies orthopnea but does endorse having 3 pillows under her at night  Patient does have diabetes, hypertension denies any other symptoms of cardiac disease  Prior to Admission Medications   Prescriptions Last Dose Informant Patient Reported? Taking?    amLODIPine (NORVASC) 5 mg tablet 12/3/2017 at Unknown time  Yes Yes   Sig: Take 5 mg by mouth daily   atorvastatin (LIPITOR) 10 mg tablet 12/3/2017 at Unknown time  Yes Yes   Sig: Take 10 mg by mouth daily   clopidogrel (PLAVIX) 75 mg tablet 12/3/2017 at Unknown time  Yes Yes   Sig: Take 75 mg by mouth daily   glipiZIDE (GLUCOTROL) 10 mg tablet 12/3/2017 at Unknown time  Yes Yes   Sig: Take 10 mg by mouth 2 (two) times a day before meals   hydrochlorothiazide (HYDRODIURIL) 25 mg tablet 12/3/2017 at Unknown time  Yes Yes   Sig: Take 25 mg by mouth daily   metFORMIN (GLUCOPHAGE) 1000 MG tablet 12/3/2017 at Unknown time  Yes Yes   Sig: Take 1,000 mg by mouth 2 (two) times a day with meals   metoprolol succinate (TOPROL-XL) 50 mg 24 hr tablet 12/3/2017 at Unknown time  Yes Yes   Sig: Take 50 mg by mouth daily   oxybutynin (DITROPAN) 5 mg tablet 12/3/2017 at Unknown time  Yes Yes   Sig: Take 5 mg by mouth daily   sitaGLIPtin (JANUVIA) 50 mg tablet 12/3/2017 at Unknown time  Yes Yes   Sig: Take 50 mg by mouth daily      Facility-Administered Medications: None       Past Medical History:   Diagnosis Date    Asthma     Diabetes mellitus ([facility] )     Esophageal dysphagia     Hyperlipidemia     Hypertension     Stroke ([facility] )     Urinary frequency     UTI (urinary tract infection)        Past Surgical History:   Procedure Laterality Date    SD ESOPHAGOGASTRODUODENOSCOPY TRANSORAL DIAGNOSTIC N/A 4/5/2017    Procedure: ESOPHAGOGASTRODUODENOSCOPY (EGD); Surgeon: Jessica Cintron MD;  Location: [facility]; Service: Gastroenterology       History reviewed  No pertinent family history  I have reviewed and agree with the history as documented  Social History   Substance Use Topics    Smoking status: Former Smoker    Smokeless tobacco: Former User      Comment: quit over 30 yrs ago    Alcohol use No        Review of Systems   Constitutional: Negative  HENT: Negative  Eyes: Negative  Respiratory: Positive for cough and shortness of breath  Cardiovascular: Negative  Gastrointestinal: Negative  Endocrine: Negative  Genitourinary: Negative  Musculoskeletal: Negative  Skin: Negative  Allergic/Immunologic: Negative  Neurological: Negative  Hematological: Negative  Psychiatric/Behavioral: Negative          Physical Exam  ED Triage Vitals [12/03/17 1033]   Temperature Pulse Respirations Blood Pressure SpO2   98 7 °F (37 1 °C) 70 18 151/73 99 %      Temp Source Heart Rate Source Patient Position - Orthostatic VS BP Location FiO2 (%)   Oral Monitor Sitting Right arm --      Pain Score       No Pain           Orthostatic Vital Signs  Vitals:    12/03/17 1033 12/03/17 1151 12/03/17 1301   BP: 151/73 156/81 141/78   Pulse: 70 70 59   Patient Position - Orthostatic VS: Sitting Sitting Lying       Physical Exam   Constitutional: She is oriented to person, place, and time  She appears well-developed and well-nourished  No distress  HENT:   Head: Normocephalic and atraumatic  Right Ear: External ear normal    Left Ear: External ear normal    Mouth/Throat: Oropharynx is clear and moist    Eyes: Conjunctivae and EOM are normal  Pupils are equal, round, and reactive to light  Right eye exhibits no discharge  Left eye exhibits no discharge  No scleral icterus  Neck: Normal range of motion  Neck supple  No tracheal deviation present  No thyromegaly present  Cardiovascular: Normal rate, regular rhythm and intact distal pulses  Exam reveals no gallop and no friction rub  No murmur heard  No lower extremity edema, no JVD   Pulmonary/Chest: Effort normal and breath sounds normal  No stridor  No respiratory distress  She has no wheezes  She has no rales  Abdominal: Soft  Bowel sounds are normal  She exhibits no distension  There is no tenderness  There is no rebound and no guarding  Musculoskeletal: Normal range of motion  She exhibits no edema or deformity  Neurological: She is alert and oriented to person, place, and time  No cranial nerve deficit  Skin: Skin is warm and dry  Capillary refill takes less than 2 seconds  No rash noted  She is not diaphoretic  No erythema  Psychiatric: She has a normal mood and affect  Her behavior is normal  Thought content normal    Nursing note and vitals reviewed        ED Medications  Medications - No data to display    Diagnostic Studies  Results Reviewed     Procedure Component Value Units Date/Time    POCT troponin [[id number]]  (Normal) Collected:  12/03/17 1302    Lab Status:  Final result Updated:  12/03/17 1315     POC Troponin I 0 01 ng/ml      Specimen Type VENOUS    Narrative:         [employer] i-Stat handheld analyzer 99% cutoff is > 0 08ng/mL in network Emergency Departments    o cTnI 99% cutoff is useful only when applied to patients in the clinical setting of myocardial ischemia  o cTnI 99% cutoff should be interpreted in the context of clinical history, ECG findings and possibly cardiac imaging to establish correct diagnosis  o cTnI 99% cutoff may be suggestive but clearly not indicative of a coronary event without the clinical setting of myocardial ischemia  Basic metabolic panel [[id number]]  (Abnormal) Collected:  12/03/17 1145    Lab Status:  Final result Specimen:  Blood from Arm, Left Updated:  12/03/17 1218     Sodium 138 mmol/L      Potassium 3 8 mmol/L      Chloride 104 mmol/L      CO2 26 mmol/L      Anion Gap 8 mmol/L      BUN 32 (H) mg/dL      Creatinine 1 22 mg/dL      Glucose 146 (H) mg/dL      Calcium 9 0 mg/dL      eGFR 49 ml/min/1 73sq m     Narrative:         National Kidney Disease Education Program recommendations are as follows:  GFR calculation is accurate only with a steady state creatinine  Chronic Kidney disease less than 60 ml/min/1 73 sq  meters  Kidney failure less than 15 ml/min/1 73 sq  meters      B-type natriuretic peptide [[id number]]  (Normal) Collected:  12/03/17 1145    Lab Status:  Final result Specimen:  Blood from Arm, Left Updated:  12/03/17 1218     NT-proBNP 85 pg/mL     CBC and differential [[id number]]  (Abnormal) Collected:  12/03/17 1145    Lab Status:  Final result Specimen:  Blood from Arm, Left Updated:  12/03/17 1205     WBC 5 92 Thousand/uL      RBC 4 40 Million/uL      Hemoglobin 11 5 g/dL      Hematocrit 35 0 %      MCV 80 (L) fL      MCH 26 1 (L) pg      MCHC 32 9 g/dL      RDW 13 6 %      MPV 11 8 fL      Platelets 452 Thousands/uL      nRBC 0 /100 WBCs      Neutrophils Relative 64 %      Lymphocytes Relative 26 %      Monocytes Relative 7 %      Eosinophils Relative 2 %      Basophils Relative 1 %      Neutrophils Absolute 3 84 Thousands/µL      Lymphocytes Absolute 1 52 Thousands/µL      Monocytes Absolute 0 41 Thousand/µL      Eosinophils Absolute 0 11 Thousand/µL      Basophils Absolute 0 03 Thousands/µL                  X-ray chest 2 views   ED Interpretation by Yair Barakat DO (12/03 1237)   No obvious abnormality       Final Result by Aneudy Rinaldi MD (12/03 6884)      No active pulmonary disease  Workstation performed: [id number]               Procedures  ECG 12 Lead Documentation  Date/Time: 12/3/2017 11:47 AM  Performed by: Syed Vega  Authorized by: Nino Singh     Indications / Diagnosis:  Cough  ECG reviewed by me, the ED Provider: yes    Patient location:  ED  Previous ECG:     Previous ECG:  Compared to current    Similarity:  No change  Interpretation:     Interpretation: non-specific    Rate:     ECG rate:  65    ECG rate assessment: normal    Rhythm:     Rhythm: sinus rhythm and A-V block    Ectopy:     Ectopy: none    QRS:     QRS axis:  Normal  Conduction:     Conduction: normal    ST segments:     ST segments:  Normal  T waves:     T waves: normal            Phone Consults  ED Phone Contact    ED Course  ED Course       no obvious cause for patient's 6 symptoms on exam   Will have her treat symptomatically and have her follow up with her primary care provider  Return precautions were discussed with patient verbalized understanding patient was discharged  Identification of Seniors at [address] [place] [address] Most Recent Value   (ISAR) Identification of Seniors at Risk   Before the illness or injury that brought you to the Emergency, did you need someone to help you on a regular basis? 0 Filed at: 12/03/2017 1035   In the last 24 hours, have you needed more help than usual?  0 Filed at: 12/03/2017 1035   Have you been hospitalized for one or more nights during the past 6 months? 0 Filed at: 12/03/2017 1035   In general, do you see well?  0 Filed at: 12/03/2017 1035   In general, do you have serious problems with your memory? 0 Filed at: 12/03/2017 1035   Do you take more than three different medications every day?   1 Filed at: 12/03/2017 1035   ISAR Score  1 Filed at: 12/03/2017 1035                          MDM  Number of Diagnoses or Management Options  Cough:   Diagnosis management comments: 75-year-old woman comes in for evaluation of cough  With increased dyspnea on exertion, the fact she has with 3 pillows at night, could be CHF exacerbation however she is not fluid overloaded on exam   She has no JVP, no lower extremity edema, no rales on exam   Will get an EKG, chest x-ray  Will get blood work including BNP and troponin  Could be URI verses lisinopril side effect given that cough started when patient was started on the medication  Will treat based on results of the workup  CritCare Time    Disposition  Final diagnoses:   Cough     Time reflects when diagnosis was documented in both MDM as applicable and the Disposition within this note     Time User Action Codes Description Comment    12/3/2017  1:14 PM Rossy Du Add [R05] Cough       ED Disposition     ED Disposition Condition Comment    Discharge  Extension Jabari Urena discharge to home/self care  Condition at discharge: Stable        Follow-up Information     Follow up With Specialties Details Why Contact Info Additional Information    Dinesh Kay PA-C Internal Medicine  continue  [address] Emergency Department Emergency Medicine Go to If symptoms worsen [address] [place] [address]  [phone]  ED, [address] [place] [address], [facility]        Discharge Medication List as of 12/3/2017  1:28 PM      CONTINUE these medications which have NOT CHANGED    Details   amLODIPine (NORVASC) 5 mg tablet Take 5 mg by mouth daily, Until Discontinued, Historical Med      atorvastatin (LIPITOR) 10 mg tablet Take 10 mg by mouth daily, Until Discontinued, Historical Med      clopidogrel (PLAVIX) 75 mg tablet Take 75 mg by mouth daily, Until Discontinued, Historical Med      glipiZIDE (GLUCOTROL) 10 mg tablet Take 10 mg by mouth 2 (two) times a day before meals, Until Discontinued, Historical Med      hydrochlorothiazide (HYDRODIURIL) 25 mg tablet Take 25 mg by mouth daily, Until Discontinued, Historical Med      metFORMIN (GLUCOPHAGE) 1000 MG tablet Take 1,000 mg by mouth 2 (two) times a day with meals, Until Discontinued, Historical Med      metoprolol succinate (TOPROL-XL) 50 mg 24 hr tablet Take 50 mg by mouth daily, Until Discontinued, Historical Med      oxybutynin (DITROPAN) 5 mg tablet Take 5 mg by mouth daily, Until Discontinued, Historical Med      sitaGLIPtin (JANUVIA) 50 mg tablet Take 50 mg by mouth daily, Until Discontinued, Historical Med           No discharge procedures on file  ED Provider  Attending physically available and evaluated [facility] managed the patient along with the ED Attending      Electronically Signed by         Gini Booth MD  Resident  12/08/17 2206

## 2017-12-04 ENCOUNTER — GENERIC CONVERSION - ENCOUNTER (OUTPATIENT)
Dept: OTHER | Facility: OTHER | Age: 77
End: 2017-12-04

## 2017-12-22 ENCOUNTER — GENERIC CONVERSION - ENCOUNTER (OUTPATIENT)
Dept: OTHER | Facility: OTHER | Age: 77
End: 2017-12-22

## 2017-12-22 ENCOUNTER — HOSPITAL ENCOUNTER (OUTPATIENT)
Dept: NON INVASIVE DIAGNOSTICS | Facility: CLINIC | Age: 77
Discharge: HOME | End: 2017-12-22
Payer: COMMERCIAL

## 2017-12-22 DIAGNOSIS — E11.9 TYPE 2 DIABETES MELLITUS WITHOUT COMPLICATIONS (HCC): ICD-10-CM

## 2017-12-22 DIAGNOSIS — I35.1 NONRHEUMATIC AORTIC VALVE INSUFFICIENCY: ICD-10-CM

## 2017-12-22 DIAGNOSIS — I63.9 CEREBRAL INFARCTION (HCC): ICD-10-CM

## 2017-12-22 PROCEDURE — 93880 EXTRACRANIAL BILAT STUDY: CPT

## 2017-12-29 ENCOUNTER — GENERIC CONVERSION - ENCOUNTER (OUTPATIENT)
Dept: OTHER | Facility: OTHER | Age: 77
End: 2017-12-29

## 2018-01-10 NOTE — PROGRESS NOTES
Plan    1  DSMT/MNT Time Record; Status:Complete;   Done: [id number] 03:12PM    Discussion/Summary    PATIENT EDUCATION RECORD   Healthy Eating:   Discussed general nutrition topics: Method: Instruction  Response: Verbalizes Understanding   Discussed importance of meal timing/consistency: Method: Instruction  Response: Verbalizes UnderstandingResponse: Her current weight is 179 75  Discussed weight management/weight loss: Method: Instruction  Response: Verbalizes Understanding   Being Active:   Discussed Initiating exercise  Method:  Chief Complaint  Patient with T2DM seen for diet follow-up visit  History of Present Illness  Weight at today's visit was 179 3/4 pounds  Weight down 7 25 pounds since her initial visit on 12/3/2015  Patient reports the biggest change made to her diet as portion control  Blood glucose is reported as improved  Food logs reveal patient accurately counting carbohydrates  However, she frequently consumes only two meals a day  She states, "I will stay up late and watch movies which makes me sleep later in the morning  When I wake up I have prayer and I read  Many days I don't eat until 11:00AM  It's hard with the timing of things"  Encouraged patient to do her best to consume 3 meals a day 4-5 hours apart to assist with glycemic control  Patient became tired of keep food logs, but did agreed to keep logs if she decides to schedule a follow-up visit  Lizaby Paez has not started exercising  She is having trouble getting motivated to exercise  Encouraged her to set a small goal and work at that as her initial attempt at exercise  She can always increase her frequency or duration as she builds endurance  RD will remain available for further dietary questions/concerns  This is her follow-up assessment   Present at session: patient    Medical Nutrition Therapy Intervention: Meal timing, Exercise Guidelines, Behavior modification strategies and Food record review      Her comprehension was good   Her motivation was good   Her compliance was good   Goals:  1  45 grams CHO per meal and 15-30 grams per HS snack  2  Consume 3 meals a day 4-5 hours apart  3  Initiate exercise        Vitals  Signs [Data Includes: Current Encounter]   Recorded: [id number] 03:14PM   Weight: 179 lb 12 oz  BMI Calculated: 29 91  BSA Calculated: 1 89    Results/Data  Encounter Results   DSMT/MNT Time Record [id number] 03:12PM Johnnie Ramos     Test Name Result Flag Reference   Date of Service 1/14/2016     Start - Stop Time 11:00-11:30AM     Total MInutes 30 minutes     Group Or Individual Instruction MNT-I       Signatures   Electronically signed by : GERTRUDE Crump; Jan 14 2016  3:46PM EST                       (Author)    Electronically signed by : RAMIRO Ace ; Chepe 15 2016  3:06PM EST

## 2018-01-12 VITALS
OXYGEN SATURATION: 99 % | HEART RATE: 74 BPM | DIASTOLIC BLOOD PRESSURE: 80 MMHG | BODY MASS INDEX: 31.9 KG/M2 | RESPIRATION RATE: 16 BRPM | HEIGHT: 63 IN | SYSTOLIC BLOOD PRESSURE: 138 MMHG | WEIGHT: 180.06 LBS

## 2018-01-12 VITALS
DIASTOLIC BLOOD PRESSURE: 80 MMHG | HEIGHT: 64 IN | WEIGHT: 177.69 LBS | HEART RATE: 82 BPM | SYSTOLIC BLOOD PRESSURE: 148 MMHG | TEMPERATURE: 98.1 F | BODY MASS INDEX: 30.34 KG/M2

## 2018-01-12 VITALS
SYSTOLIC BLOOD PRESSURE: 124 MMHG | DIASTOLIC BLOOD PRESSURE: 82 MMHG | WEIGHT: 185 LBS | BODY MASS INDEX: 31.58 KG/M2 | HEIGHT: 64 IN

## 2018-01-14 VITALS
SYSTOLIC BLOOD PRESSURE: 120 MMHG | BODY MASS INDEX: 30.71 KG/M2 | DIASTOLIC BLOOD PRESSURE: 78 MMHG | HEIGHT: 64 IN | TEMPERATURE: 97.9 F | WEIGHT: 179.9 LBS | HEART RATE: 72 BPM

## 2018-01-15 ENCOUNTER — APPOINTMENT (OUTPATIENT)
Dept: LAB | Facility: CLINIC | Age: 78
End: 2018-01-15
Payer: COMMERCIAL

## 2018-01-15 DIAGNOSIS — E11.65 TYPE 2 DIABETES MELLITUS WITH HYPERGLYCEMIA (HCC): ICD-10-CM

## 2018-01-15 LAB
CHOLEST SERPL-MCNC: 105 MG/DL (ref 50–200)
CREAT UR-MCNC: 49.4 MG/DL
EST. AVERAGE GLUCOSE BLD GHB EST-MCNC: 229 MG/DL
HBA1C MFR BLD: 9.6 % (ref 4.2–6.3)
HDLC SERPL-MCNC: 48 MG/DL (ref 40–60)
LDLC SERPL CALC-MCNC: 46 MG/DL (ref 0–100)
MICROALBUMIN UR-MCNC: 324 MG/L (ref 0–20)
MICROALBUMIN/CREAT 24H UR: 656 MG/G CREATININE (ref 0–30)
TRIGL SERPL-MCNC: 55 MG/DL

## 2018-01-15 PROCEDURE — 82043 UR ALBUMIN QUANTITATIVE: CPT

## 2018-01-15 PROCEDURE — 80061 LIPID PANEL: CPT

## 2018-01-15 PROCEDURE — 36415 COLL VENOUS BLD VENIPUNCTURE: CPT

## 2018-01-15 PROCEDURE — 83036 HEMOGLOBIN GLYCOSYLATED A1C: CPT

## 2018-01-15 PROCEDURE — 82570 ASSAY OF URINE CREATININE: CPT

## 2018-01-17 NOTE — RESULT NOTES
Verified Results  [facility] BARIUM SWALLOW 13Apr2017 09:22AM Ladonna Hickey Order Number: [id number]    - Patient Instructions: To schedule this appointment, please contact Central Scheduling at [phone]  Test Name Result Flag Reference   FL ESOPHAGRAM COMPLETE (Report)     BARIUM SWALLOW-ESOPHAGRAM     INDICATION: Dysphagia     COMPARISON: None     IMAGES: 14     FLUOROSCOPY TIME:  3 50 min flt      TECHNIQUE:   The patient was given effervescent granules and barium by mouth and images of the esophagus were obtained  FINDINGS:     The esophagus is normal in caliber  Mild tertiary waves are seen with swallowing  There is normal emptying of contrast from the esophagus  No mucosal lesion, ulceration or evidence of fold thickening is seen  Gastroesophageal reflux was not observed  There is no hiatal hernia  IMPRESSION:     Mild tertiary waves, suggesting some dysmotility  Otherwise unremarkable esophagram        Workstation performed: [id number]     Signed by:   Viktor Neal MD   4/13/17     (1) TISSUE EXAM 05Apr2017 07:57AM Houstonnnette Ast     Test Name Result Flag Reference   LAB AP CASE REPORT (Report)     Surgical Pathology Report             Case: [id number]                   Authorizing Provider: Chepe Sandoval MD      Collected:      04/05/2017 0757        Ordering Location:   [address] [place] [address]   Received:      04/05/2017 [facility]                               Pathologist:      Mehdi Vásquez MD                              Specimens:  A) - Esophagus, Distal esophagus, Rule out Eosinophilic Esophagitis                  B) - Esophagus, Proximal esophagus, Rule out Eosinophilic Esophagitis   LAB AP FINAL DIAGNOSIS (Report)     A  Distal esophagus:  - Benign squamous mucosa with nonspecific hyperplastic change  - Eosinophils number fewer than 2 cells per high power field in squamous   mucosa    - No goblet cell metaplasia to suggest Denise's esophagus; no columnar   epithelium seen  - No epithelial dysplasia and no evidence of malignancy  B  Proximal esophagus:  - Benign squamous mucosa without specific histopathologic abnormality   - Eosinophils number fewer than 2 cells per high power field in squamous   mucosa  - No goblet cell metaplasia to suggest Densie's esophagus; no columnar   epithelium seen  - No epithelial dysplasia and no evidence of malignancy  Interpretation performed at [facility]  Electronically signed by Loyd Monae MD on 4/7/2017 at 3:31 PM   LAB AP SURGICAL ADDITIONAL INFORMATION (Report)     These tests were developed and their performance characteristics   determined by [facility] or [facility]  They may not be cleared or approved by the [place]  Food and   Drug Administration  The FDA has determined that such clearance or   approval is not necessary  These tests are used for clinical purposes  They should not be regarded as investigational or for research  This   laboratory has been approved by CLIA 88, designated as a high-complexity   laboratory and is qualified to perform these tests  LAB AP GROSS DESCRIPTION (Report)     A  The specimen is received in formalin, labeled with the patient's name   and hospital number, and is designated distal esophagus rule out   eosinophilic esophagitis, are two irregularly shaped fragments of tan   soft tissue measuring 0 3 and 0 2 cm in greatest dimension  Entirely   submitted  One cassette  B  The specimen is received in formalin, labeled with the patient's name   and hospital number, and is designated proximal esophagus, rule out   eosinophilic esophagitis, are two irregularly shaped fragments of tan   soft tissue measuring 0 3 and 0 2 cm in greatest dimension  Entirely   submitted  One cassette    Note: The estimated total formalin fixation time based upon information   provided by the submitting clinician and the standard processing schedule   is 20 5 hours  RLR

## 2018-01-22 ENCOUNTER — ALLSCRIPTS OFFICE VISIT (OUTPATIENT)
Dept: OTHER | Facility: OTHER | Age: 78
End: 2018-01-22

## 2018-01-22 VITALS
BODY MASS INDEX: 29.88 KG/M2 | WEIGHT: 175.04 LBS | HEIGHT: 64 IN | TEMPERATURE: 98.4 F | HEART RATE: 74 BPM | SYSTOLIC BLOOD PRESSURE: 146 MMHG | DIASTOLIC BLOOD PRESSURE: 70 MMHG

## 2018-01-22 VITALS
SYSTOLIC BLOOD PRESSURE: 126 MMHG | TEMPERATURE: 98.1 F | DIASTOLIC BLOOD PRESSURE: 80 MMHG | WEIGHT: 180.78 LBS | HEIGHT: 64 IN | BODY MASS INDEX: 30.86 KG/M2 | HEART RATE: 72 BPM

## 2018-01-22 VITALS
WEIGHT: 176.81 LBS | DIASTOLIC BLOOD PRESSURE: 70 MMHG | BODY MASS INDEX: 31.33 KG/M2 | HEIGHT: 63 IN | SYSTOLIC BLOOD PRESSURE: 138 MMHG | TEMPERATURE: 98 F | HEART RATE: 72 BPM

## 2018-01-22 VITALS
HEART RATE: 60 BPM | TEMPERATURE: 97.5 F | HEIGHT: 64 IN | DIASTOLIC BLOOD PRESSURE: 72 MMHG | SYSTOLIC BLOOD PRESSURE: 164 MMHG

## 2018-01-24 VITALS
TEMPERATURE: 98 F | DIASTOLIC BLOOD PRESSURE: 76 MMHG | BODY MASS INDEX: 31.8 KG/M2 | HEIGHT: 63 IN | SYSTOLIC BLOOD PRESSURE: 146 MMHG | WEIGHT: 179.45 LBS | HEART RATE: 84 BPM

## 2018-01-24 VITALS
TEMPERATURE: 98.3 F | BODY MASS INDEX: 31.95 KG/M2 | WEIGHT: 180.34 LBS | SYSTOLIC BLOOD PRESSURE: 138 MMHG | HEIGHT: 63 IN | HEART RATE: 78 BPM | DIASTOLIC BLOOD PRESSURE: 66 MMHG

## 2018-01-24 NOTE — PROGRESS NOTES
History of Present Illness  HPI: Pt see by [facility] - Diabetes BHS/SW this date  Pt here for medical f/u for her uncontrolled DM  Pt's A1C has risen to 9 1  Pt is considering coming to our February DSMT and had been given our monthly Support group info as well  Pt indicates she uses [facility] for some medical appointments and family appear to help as time as well  Pt has completed the [facility] Profile  Please see scanned document  Supportive counseling and encouragement provided  BHS/SW and the Diabetic Team to follow and assist as indicted  Active Problems    1  Aortic valve regurgitation, nonrheumatic (424 1) (I35 1)   2  Benign essential hypertension (401 1) (I10)   3  Chronic cough (786 2) (R05)   4  Chronic rhinitis (472 0) (J31 0)   5  Cutaneous candidiasis (112 3) (B37 2)   6  CVA (cerebrovascular accident) (434 91) (I63 9)   7  Cystocele, midline (618 01) (N81 11)   8  Diabetes mellitus type 2, uncontrolled (250 02) (E11 65)   9  Encounter for pessary maintenance (V53 99) (Z46 89)   10  Esophageal dysphagia (787 20) (R13 10)   11  Loss of pelvic support (618 00) (N81 9)   12  Microalbuminuria (791 0) (R80 9)   13  Need for influenza vaccination (V04 81) (Z23)   14  Non-rheumatic mitral regurgitation (424 0) (I34 0)   15  Pedal edema (782 3) (R60 0)   16  Tinea capitis (110 0) (B35 0)   17  Urinary incontinence (788 30) (R32)   18  Vitamin D insufficiency (268 9) (E55 9)    Current Meds   1  AmLODIPine Besylate 10 MG Oral Tablet; TAKE 1 TABLET DAILY FOR BLOOD   PRESSURE  Requested for: 86MHI4675; Last Rx:26Rvf8280 Ordered   2  Atorvastatin Calcium 40 MG Oral Tablet; TAKE 1 TABLET DAILY AS DIRECTED; Therapy: 59HIQ7405 to (Evaluate:37Gyy5772)  Requested for: 29Dec2017; Last   Rx:05Mxy8337 Ordered   3  Clopidogrel Bisulfate 75 MG Oral Tablet; TAKE 1 TABLET DAILY  Requested for:   07FDC7244; Last Rx:64Oji7278 Ordered   4   Clotrimazole 1 % External Cream; APPLY SPARINGLY TO AFFECTED AREA(S) TWICE DAILY; Therapy: 83LZB8323 to (52-28-62-17)  Requested for: 80Dxr4072; Last   Rx:35Ddi4242 Ordered   5  HydroCHLOROthiazide 25 MG Oral Tablet; TAKE 1 TABLET DAILY  Requested for:   19YTO0871; Last Rx:25Ekh2653 Ordered   6  MetFORMIN HCl - 1000 MG Oral Tablet; Take one tablet in am and 1and 1/2 tablets in   pm with food for a totoal of 2500 mg  Requested for: 88CPW5121; Last Rx:55Cvd6760   Ordered   7  Tradjenta 5 MG Oral Tablet; TAKE 1 TABLET DAILY AS DIRECTED; Therapy: 40BWK9579 to (Evaluate:08Apr2018)  Requested for: 56Tgm4696; Last   Rx:47Dte1166 Ordered   8  Vitamin D3 2000 UNIT Oral Tablet; take 1 tab po daily; Therapy: 23CXW9412 to (Evaluate:20Mar2018)  Requested for: 25Zdp4892; Last   Rx:32Qxm7368 Ordered    Allergies    1  No Known Environmental Allergies   2  No Known Food Allergies    Vitals  Signs    Temperature: 98 1 F  Heart Rate: 80  Systolic: 617  Diastolic: 70  Height: 5 ft 3 in  Weight: 182 lb 8 64 oz  BMI Calculated: 32 34  BSA Calculated: 1 86    Assessment    1  Benign essential hypertension (401 1) (I10)   2  Diabetes mellitus type 2, uncontrolled (250 02) (E11 65)   3  Microalbuminuria (791 0) (R80 9)   4  Pedal edema (782 3) (R60 0)   5  CVA (cerebrovascular accident) (434 91) (I63 9)   6  Chronic rhinitis (472 0) (J31 0)   7  Chronic cough (786 2) (R05)    Plan  Benign essential hypertension    · Lisinopril 10 MG Oral Tablet; TAKE 1 TABLET DAILY   · AmLODIPine Besylate 5 MG Oral Tablet; TAKE 1 TABLET DAILY   · HydroCHLOROthiazide 25 MG Oral Tablet; TAKE 1 TABLET DAILY  Chronic rhinitis    · Loratadine 10 MG Oral Tablet; TAKE 1 TABLET BY MOUTH EVERY DAY  CVA (cerebrovascular accident)    · Clopidogrel Bisulfate 75 MG Oral Tablet (Plavix); TAKE 1 TABLET DAILY  Diabetes mellitus type 2, uncontrolled    · MetFORMIN HCl - 1000 MG Oral Tablet;  Take one tablet in am and 1and 1/2  tablets in pm with food for a totoal of 2500 mg   · Atorvastatin Calcium 40 MG Oral Tablet (Lipitor); TAKE 1 TABLET DAILY AS  DIRECTED   · Tradjenta 5 MG Oral Tablet; TAKE 1 TABLET DAILY AS DIRECTED   · (1) HEMOGLOBIN A1C; Status:Active;  Requested [facility]:[id number];     Signatures   Electronically signed by : Екатерина Ibarra LCSW; Jan 22 2018 12:22PM EST                       (Author)    Electronically signed by : Dai Soto, [facility]; Jan 22 2018 12:27PM EST                       (Author)    Electronically signed by : Juan M Hathaway DO; Jan 22 2018 12:29PM EST                       (Review) (2) very limited

## 2018-01-31 ENCOUNTER — TRANSCRIBE ORDERS (OUTPATIENT)
Dept: ADMINISTRATIVE | Facility: HOSPITAL | Age: 78
End: 2018-01-31

## 2018-01-31 DIAGNOSIS — R05.9 COUGH: Primary | ICD-10-CM

## 2018-02-05 ENCOUNTER — HOSPITAL ENCOUNTER (OUTPATIENT)
Dept: PULMONOLOGY | Facility: HOSPITAL | Age: 78
Discharge: HOME | End: 2018-02-05
Payer: COMMERCIAL

## 2018-02-05 DIAGNOSIS — R05.9 COUGH: ICD-10-CM

## 2018-02-05 PROCEDURE — 94060 EVALUATION OF WHEEZING: CPT | Performed by: INTERNAL MEDICINE

## 2018-02-05 PROCEDURE — 94060 EVALUATION OF WHEEZING: CPT

## 2018-02-05 PROCEDURE — 94726 PLETHYSMOGRAPHY LUNG VOLUMES: CPT

## 2018-02-05 PROCEDURE — 94726 PLETHYSMOGRAPHY LUNG VOLUMES: CPT | Performed by: INTERNAL MEDICINE

## 2018-02-05 PROCEDURE — 94729 DIFFUSING CAPACITY: CPT | Performed by: INTERNAL MEDICINE

## 2018-02-05 PROCEDURE — 94760 N-INVAS EAR/PLS OXIMETRY 1: CPT

## 2018-02-05 PROCEDURE — 94729 DIFFUSING CAPACITY: CPT

## 2018-02-05 RX ORDER — ALBUTEROL SULFATE 2.5 MG/3ML
2.5 SOLUTION RESPIRATORY (INHALATION) ONCE
Status: COMPLETED | OUTPATIENT
Start: 2018-02-05 | End: 2018-02-05

## 2018-02-05 RX ADMIN — ALBUTEROL SULFATE 2.5 MG: 2.5 SOLUTION RESPIRATORY (INHALATION) at 17:38

## 2018-02-16 ENCOUNTER — OFFICE VISIT (OUTPATIENT)
Dept: INTERNAL MEDICINE CLINIC | Facility: CLINIC | Age: 78
End: 2018-02-16
Payer: COMMERCIAL

## 2018-02-16 VITALS
HEART RATE: 92 BPM | DIASTOLIC BLOOD PRESSURE: 80 MMHG | BODY MASS INDEX: 32.73 KG/M2 | TEMPERATURE: 97.8 F | SYSTOLIC BLOOD PRESSURE: 140 MMHG | HEIGHT: 63 IN | WEIGHT: 184.75 LBS

## 2018-02-16 DIAGNOSIS — N81.11 MIDLINE CYSTOCELE: ICD-10-CM

## 2018-02-16 DIAGNOSIS — R05.9 COUGH: Primary | ICD-10-CM

## 2018-02-16 DIAGNOSIS — R09.82 POST-NASAL DRIP: ICD-10-CM

## 2018-02-16 DIAGNOSIS — R60.0 BILATERAL EDEMA OF LOWER EXTREMITY: ICD-10-CM

## 2018-02-16 DIAGNOSIS — I10 BENIGN ESSENTIAL HYPERTENSION: ICD-10-CM

## 2018-02-16 DIAGNOSIS — I35.1 AORTIC VALVE REGURGITATION, NONRHEUMATIC: ICD-10-CM

## 2018-02-16 PROBLEM — J31.0 CHRONIC RHINITIS: Status: ACTIVE | Noted: 2018-01-22

## 2018-02-16 PROBLEM — T46.4X5A ACE-INHIBITOR COUGH: Status: RESOLVED | Noted: 2017-12-04 | Resolved: 2018-02-16

## 2018-02-16 PROBLEM — R05.8 ACE-INHIBITOR COUGH: Status: ACTIVE | Noted: 2017-12-04

## 2018-02-16 PROBLEM — R80.9 MICROALBUMINURIA: Status: ACTIVE | Noted: 2018-01-22

## 2018-02-16 PROBLEM — I63.9 CVA (CEREBROVASCULAR ACCIDENT) (HCC): Status: ACTIVE | Noted: 2017-06-28

## 2018-02-16 PROBLEM — R05.8 ACE-INHIBITOR COUGH: Status: RESOLVED | Noted: 2017-12-04 | Resolved: 2018-02-16

## 2018-02-16 PROBLEM — T46.4X5A ACE-INHIBITOR COUGH: Status: ACTIVE | Noted: 2017-12-04

## 2018-02-16 PROBLEM — I34.0 NON-RHEUMATIC MITRAL REGURGITATION: Status: ACTIVE | Noted: 2017-08-25

## 2018-02-16 PROBLEM — IMO0002 DIABETES MELLITUS TYPE 2, UNCONTROLLED: Status: ACTIVE | Noted: 2017-06-28

## 2018-02-16 PROCEDURE — 99213 OFFICE O/P EST LOW 20 MIN: CPT | Performed by: PHYSICIAN ASSISTANT

## 2018-02-16 RX ORDER — CLOPIDOGREL BISULFATE 75 MG/1
1 TABLET ORAL DAILY
Status: ON HOLD | COMMUNITY
End: 2018-10-04

## 2018-02-16 RX ORDER — LISINOPRIL 10 MG/1
10 TABLET ORAL DAILY
Qty: 90 TABLET | Refills: 0 | Status: SHIPPED | OUTPATIENT
Start: 2018-02-16 | End: 2018-10-01 | Stop reason: ALTCHOICE

## 2018-02-16 RX ORDER — AZELASTINE 1 MG/ML
1 SPRAY, METERED NASAL 2 TIMES DAILY
Qty: 30 ML | Refills: 5 | Status: SHIPPED | OUTPATIENT
Start: 2018-02-16 | End: 2018-10-01 | Stop reason: ALTCHOICE

## 2018-02-16 RX ORDER — HYDROCHLOROTHIAZIDE 25 MG/1
1 TABLET ORAL DAILY
COMMUNITY
End: 2018-10-01 | Stop reason: ALTCHOICE

## 2018-02-16 RX ORDER — ATORVASTATIN CALCIUM 40 MG/1
1 TABLET, FILM COATED ORAL DAILY
COMMUNITY
Start: 2017-07-17 | End: 2018-11-27 | Stop reason: SDUPTHER

## 2018-02-16 RX ORDER — AMLODIPINE BESYLATE 5 MG/1
1 TABLET ORAL DAILY
COMMUNITY
End: 2018-11-21 | Stop reason: SDUPTHER

## 2018-02-16 RX ORDER — ACETAMINOPHEN 160 MG
1 TABLET,DISINTEGRATING ORAL DAILY
COMMUNITY
Start: 2017-09-21 | End: 2019-10-01 | Stop reason: ALTCHOICE

## 2018-02-16 NOTE — PROGRESS NOTES
Assessment/Plan:  Sched  With cardiologist for ongoing swelling   And routine follow-up  Sched with pulmonologist for chronic cough, CXR and PFT are normal    Please use the nasal spray as well  Your cough may be due to the postnasal drip  No change to diabetic medications at this visit  You have lab slip to follow up on your diabetic labs in April  As noted he may need a change in her blood pressure medications including a change in the diuretic pill to help control the swelling in your legs  If he gets any new symptoms shortness of breath chest pain or your leg swelling continues to get worse please return for an appointment  No problem-specific Assessment & Plan notes found for this encounter  Diagnoses and all orders for this visit:    Cough  -     Ambulatory referral to Pulmonology; Future    Aortic valve regurgitation, nonrheumatic  -     Ambulatory referral to Cardiology; Future    Benign essential hypertension  -     Ambulatory referral to Cardiology; Future  -     lisinopril (ZESTRIL) 10 mg tablet; Take 1 tablet (10 mg total) by mouth daily for 90 days    Midline cystocele    Bilateral edema of lower extremity  -     Ambulatory referral to Cardiology; Future    Post-nasal drip  -     azelastine (ASTELIN) 0 1 % nasal spray; 1 spray into each nostril 2 (two) times a day for 180 days Use in each nostril as directed          Subjective:      Patient ID: Donnie Trujillo is a 68 y o  female  Patient is here to follow up and to go over the results of the PFT that was done in January 2018  Discussed that PFT is normal   Patient continues to report that she has a cough and feels that she is coughing up white phlegm  Patient then reports that she is continuously clearing her throat and this may be the source of the white phlegm  Patient reports did not start on the allergy medications or nasal spray to help with her symptoms  At previous visit patient's medications were adjusted again    As patient's cough continued even off of the lisinopril for a few months we discussed that her cough was not due to the lisinopril  Because of the swelling we reduced her amlodipine back to 5 milligrams from the 10 milligrams and restarted her on lisinopril of 10  Patient reports that the swelling in her legs has not improved with the decrease in the amlodipine back to 5 milligrams  Patient reports she stopped taking the lisinopril 5 days ago as thought that was the pill that was causing her swelling  Patient's metoprolol had been discontinued previously due to concern of her fatigue as well as could mask any hypoglycemic events due to her diabetes  Patient had an echocardiogram completed in August of 2017  Echo revealed mild mitral and aortic regurgitation  There was no evidence of diastolic or systolic heart failure  On exam is patient appears euvolemic no JVD  Patient denies having to sleep sitting up or with multiple pillows  Patient denies shortness of breath just ongoing cough clearing throat  Chest x-rays for cough have been negative for any infection  Suspect cough may be more due to her chronic postnasal drip and clearing her throat  Will have patient follow up with Cardiology due to ongoing worsening pedal edema  Will continue same medications amlodipine 5 lisinopril 10 and hydrochlorothiazide 25  If any evidence of new onset heart failure patient may need to be restarted on a cardioselective beta-blocker  Would also consider changing hydrochlorothiazide to a different diuretic  EKG reveals sinus rhythm with AV block  BNP was normal     Patient has not followed up with GI after she the manometry done in July 2017  Denies any acid reflux    Patient will be going to GYN  in 4 days about bladder prolapse   And advised if still issues after that can be tried on some meds            The following portions of the patient's history were reviewed and updated as appropriate: allergies, current medications, past family history, past medical history, past social history, past surgical history and problem list     Review of Systems   Constitutional: Negative for chills, fatigue and fever  HENT: Positive for congestion and postnasal drip  Eyes: Negative  Respiratory: Positive for cough  Negative for chest tightness, shortness of breath and wheezing  Cardiovascular: Positive for leg swelling  Negative for chest pain and palpitations  Gastrointestinal: Negative  Endocrine: Negative for polydipsia and polyphagia  Genitourinary: Positive for frequency  Neurological: Negative  Negative for dizziness, syncope and headaches  Hematological: Negative  Psychiatric/Behavioral: Negative  Objective:      /80 (BP Location: Left arm, Patient Position: Sitting, Cuff Size: Standard)   Pulse 92   Temp 97 8 °F (36 6 °C) (Oral)   Ht 5' 3" (1 6 m)   Wt 83 8 kg (184 lb 11 9 oz)   BMI 32 73 kg/m²          Physical Exam   Constitutional: She is oriented to person, place, and time  She appears well-developed and well-nourished  HENT:   Head: Normocephalic and atraumatic  Mild post nasal drip   Eyes: Conjunctivae are normal  Pupils are equal, round, and reactive to light  Neck: Normal range of motion  Neck supple  Cardiovascular: S1 normal and S2 normal   An irregular rhythm present  Exam reveals no friction rub  Murmur heard  Positive 3+ pedal edema, neg JVD   Pulmonary/Chest: Effort normal and breath sounds normal  She has no wheezes  She has no rales  Neurological: She is alert and oriented to person, place, and time  Skin: Skin is warm and dry

## 2018-02-28 ENCOUNTER — OFFICE VISIT (OUTPATIENT)
Dept: OBGYN CLINIC | Facility: CLINIC | Age: 78
End: 2018-02-28
Payer: COMMERCIAL

## 2018-02-28 VITALS
BODY MASS INDEX: 32.78 KG/M2 | SYSTOLIC BLOOD PRESSURE: 126 MMHG | DIASTOLIC BLOOD PRESSURE: 72 MMHG | WEIGHT: 185 LBS | HEIGHT: 63 IN

## 2018-02-28 DIAGNOSIS — N81.4 UTEROVAGINAL PROLAPSE: ICD-10-CM

## 2018-02-28 DIAGNOSIS — N81.3 UTERINE PROCIDENTIA: Primary | ICD-10-CM

## 2018-02-28 PROCEDURE — 99203 OFFICE O/P NEW LOW 30 MIN: CPT | Performed by: OBSTETRICS & GYNECOLOGY

## 2018-02-28 PROCEDURE — 3725F SCREEN DEPRESSION PERFORMED: CPT | Performed by: OBSTETRICS & GYNECOLOGY

## 2018-02-28 NOTE — PROGRESS NOTES
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  Discouraged pessary is a given that she has failed these in the past   She is aware that this is a surgical correction  Risks of vaginal mucosa exposure reviewed  All questions answered  I did provide names for urogynecologist today  There are no diagnoses linked to this encounter  Subjective:     Patient ID: Nahid Owens is a 68 y o  female  HPI     This is a 26-year-old female  ( x8) presents complaining of bladder prolapse  She states this has been happening for many years now  She was evaluated by a gynecologist approximately 4 years ago  Various pessaries were used which was unsuccessful  Patient does have a history of hypertension, diabetes, stroke, hypercholesterolemia  She has also had a chronic cough and with persistent coughing has resulted in urinary incontinence and increase in pelvic pressure  She has had lower discomfort  She has been able to reduce the prolapse  She denies any vaginal bleeding or spotting  Patient went through menopause at age 48  She was never on hormone replacement therapy  She does follow up with her primary care physician on a regular basis  Patient denies any problems with urinary retention  Review of Systems   Constitutional: Negative for fatigue, fever and unexpected weight change  Respiratory: Positive for cough  Negative for chest tightness, shortness of breath and wheezing  Cardiovascular: Negative  Negative for chest pain and palpitations  Gastrointestinal: Negative  Negative for abdominal distention, abdominal pain, blood in stool, constipation, diarrhea, nausea and vomiting  Genitourinary: Positive for vaginal pain  Negative for difficulty urinating, dyspareunia, dysuria, flank pain, frequency, genital sores, hematuria, pelvic pain, urgency, vaginal bleeding and vaginal discharge  Skin: Negative for rash  Objective:     Physical Exam  on examination external genitalia is evident of complete procidentia  The bladder and uterus was then placed back into the vagina  Cervix is parous there are no lesions seen  Uterus is normal size midline nontender and there are no palpable adnexal masses  Rectovaginal exam is confirmatory  The vaginal tissue was noted to be atrophic  There are no abrasions seen

## 2018-03-02 DIAGNOSIS — N81.11 MIDLINE CYSTOCELE: ICD-10-CM

## 2018-03-02 DIAGNOSIS — N81.3 UTERINE PROCIDENTIA: Primary | ICD-10-CM

## 2018-03-02 RX ORDER — OXYBUTYNIN CHLORIDE 5 MG/1
5 TABLET ORAL DAILY
Qty: 90 TABLET | Refills: 0 | Status: SHIPPED | OUTPATIENT
Start: 2018-03-02 | End: 2018-10-01 | Stop reason: ALTCHOICE

## 2018-03-02 NOTE — TELEPHONE ENCOUNTER
Please advise patient, I sent refill of oxybutynin, I would need name of foot cream as nothing carried over into new system  I did not change her meds and advised her that she would see the cardiologist who would advise on any changes to her meds  Continue all the same meds

## 2018-03-02 NOTE — TELEPHONE ENCOUNTER
Spoke with patient and informed her to continue same meds  Name of foot cream is : CLOTRIMAZOLE 1%    Patient wants to make sure that the Raudel Abdalla is also called in

## 2018-03-04 DIAGNOSIS — B35.3 TINEA PEDIS OF BOTH FEET: Primary | ICD-10-CM

## 2018-03-04 RX ORDER — CLOTRIMAZOLE 1 %
CREAM (GRAM) TOPICAL 2 TIMES DAILY
Qty: 30 G | Refills: 2 | Status: SHIPPED | OUTPATIENT
Start: 2018-03-04 | End: 2018-07-30 | Stop reason: SDUPTHER

## 2018-03-04 NOTE — TELEPHONE ENCOUNTER
Advise patient the cream was sent  I did not send any more tessalon as her lung studies are normal and will have to keep appointment with pulmonologist to further discuss her cough  Cannot keep taking those pills

## 2018-03-05 ENCOUNTER — TELEPHONE (OUTPATIENT)
Dept: INTERNAL MEDICINE CLINIC | Facility: CLINIC | Age: 78
End: 2018-03-05

## 2018-03-05 DIAGNOSIS — IMO0001 UNCONTROLLED TYPE 2 DIABETES MELLITUS WITHOUT COMPLICATION, WITHOUT LONG-TERM CURRENT USE OF INSULIN: Primary | ICD-10-CM

## 2018-03-05 NOTE — TELEPHONE ENCOUNTER
Patient is calling for a podiatry order  It for her yearly Podiatry appointment for DM shoes  She doesn't have an appointment yet  She is waiting for the order

## 2018-03-23 ENCOUNTER — OFFICE VISIT (OUTPATIENT)
Dept: MULTI SPECIALTY CLINIC | Facility: CLINIC | Age: 78
End: 2018-03-23
Payer: COMMERCIAL

## 2018-03-23 VITALS
WEIGHT: 186.51 LBS | TEMPERATURE: 97.8 F | BODY MASS INDEX: 33.05 KG/M2 | HEIGHT: 63 IN | HEART RATE: 76 BPM | SYSTOLIC BLOOD PRESSURE: 122 MMHG | DIASTOLIC BLOOD PRESSURE: 70 MMHG

## 2018-03-23 DIAGNOSIS — R60.0 BILATERAL EDEMA OF LOWER EXTREMITY: ICD-10-CM

## 2018-03-23 DIAGNOSIS — I10 BENIGN ESSENTIAL HYPERTENSION: ICD-10-CM

## 2018-03-23 DIAGNOSIS — I35.1 AORTIC VALVE REGURGITATION, NONRHEUMATIC: ICD-10-CM

## 2018-03-23 DIAGNOSIS — R06.02 SHORTNESS OF BREATH ON EXERTION: Primary | ICD-10-CM

## 2018-03-23 PROCEDURE — 99202 OFFICE O/P NEW SF 15 MIN: CPT | Performed by: INTERNAL MEDICINE

## 2018-03-23 PROCEDURE — 93000 ELECTROCARDIOGRAM COMPLETE: CPT | Performed by: INTERNAL MEDICINE

## 2018-03-23 RX ORDER — FUROSEMIDE 20 MG/1
20 TABLET ORAL DAILY
Qty: 10 TABLET | Refills: 0 | Status: SHIPPED | OUTPATIENT
Start: 2018-03-23 | End: 2018-04-11

## 2018-03-23 RX ORDER — GLIPIZIDE 10 MG/1
10 TABLET ORAL
COMMUNITY
End: 2018-10-08 | Stop reason: SDUPTHER

## 2018-03-23 NOTE — ASSESSMENT & PLAN NOTE
· Echo images from August reviewed  LV systolic function is normal with mild we increased left ventricular wall thickness, grade 1 diastolic dysfunction  His mild aortic and mitral regurgitation  Trace to mild TR with inadequate TR assessment of PA pressure  · She does have exertional shortness of breath, pedal edema and 9 lb weight gain  · NT-Pro BNP in 12/2017 was normal though  · Cr 1 22,   · Normal TSH - 0 8 in 9/2017  · This could possibly be related to her diastolic dysfunction   Her valvular abnormalities are mild, and unlikely to be causing her symptoms  · Given her mild volume overload, we will try her on low dose furosemide 20 mg for 1 week, and recheck a BMP and NT-Pro-BNP in 1 week  · Hold HCTZ in the interim

## 2018-03-23 NOTE — PROGRESS NOTES
[facility] - Cardiology  [address] [facility], [address]  [phone]                                              Cardiology Follow Up    Patient  Nahid Owens  1940  [id number]    Assessment and Plan  Shortness of breath on exertion  · Echo images from August reviewed  LV systolic function is normal with mild we increased left ventricular wall thickness, grade 1 diastolic dysfunction  His mild aortic and mitral regurgitation  Trace to mild TR with inadequate TR assessment of PA pressure  · She does have exertional shortness of breath, pedal edema and 9 lb weight gain  · NT-Pro BNP in 12/2017 was normal though  · Cr 1 22,   · Normal TSH - 0 8 in 9/2017  · This could possibly be related to her diastolic dysfunction  Her valvular abnormalities are mild, and unlikely to be causing her symptoms  · Given her mild volume overload, we will try her on low dose furosemide 20 mg for 1 week, and recheck a BMP and NT-Pro-BNP in 1 week  · Hold HCTZ in the interim    Bilateral edema of lower extremity  · ?diastolic HF related  · Low dose furosemide trial for 1 week    Benign essential hypertension  · On amlodipine 5, lisinopril 10, HCTZ 25  · BP well controlled  · Does have mild LVH on echo  · Switching to furosemide 20 daily temporarily, to try and help with edema    F/u in 1 month    History of Present Illness    40-year-old female comes in as a new patient  She is referred her for evaluation shortness of breath on bilateral pedal edema  She had been doing well up until October 2017 when she developed low like symptoms  At baseline she was able to climb up 1-2 flights of stairs without any shortness of breath  But gradually over the past 6 months he has been feeling more and more short of breath with shorter distances  Currently she has to stop after every few steps when climbing up stairs  She has also noticed bilateral ankle edema   She has gained about 9 lb over the past 9 months  She was noted to have a murmur on exam in August 2017, at which time she did not have any shortness of breath  She underwent an echo at that point, which did not reveal any significant abnormalities  She has no complains of chest pain, palpitations, dizziness or lightheadedness  She has recurrent bladder prolapse, and is currently awaiting surgical repair for the same  She has a remote smoking history and quit in the 1980s  No h/o alcohol intake  Past Medical History:   Diagnosis Date    Asthma     Diabetes mellitus ([facility] )     Esophageal dysphagia     Hyperlipidemia     Hypertension     Stroke ([facility] )     Urinary frequency     UTI (urinary tract infection)      Past Surgical History:   Procedure Laterality Date    [place] ESOPHAGOGASTRODUODENOSCOPY TRANSORAL DIAGNOSTIC N/A 4/5/2017    Procedure: ESOPHAGOGASTRODUODENOSCOPY (EGD); Surgeon: Kaylin Mittal MD;  Location: [facility];   Service: Gastroenterology       Current Outpatient Prescriptions:     amLODIPine (NORVASC) 5 mg tablet, Take 1 tablet by mouth daily, Disp: , Rfl:     atorvastatin (LIPITOR) 40 mg tablet, Take 1 tablet by mouth daily, Disp: , Rfl:     azelastine (ASTELIN) 0 1 % nasal spray, 1 spray into each nostril 2 (two) times a day for 180 days Use in each nostril as directed, Disp: 30 mL, Rfl: 5    Cholecalciferol (VITAMIN D3) 2000 units capsule, Take 1 tablet by mouth daily, Disp: , Rfl:     clopidogrel (PLAVIX) 75 mg tablet, Take 1 tablet by mouth daily, Disp: , Rfl:     clotrimazole (LOTRIMIN) 1 % cream, Apply topically 2 (two) times a day, Disp: 30 g, Rfl: 2    glipiZIDE (GLUCOTROL) 10 mg tablet, Take 10 mg by mouth 2 (two) times a day before meals, Disp: , Rfl:     hydrochlorothiazide (HYDRODIURIL) 25 mg tablet, Take 1 tablet by mouth daily, Disp: , Rfl:     Linagliptin (TRADJENTA) 5 MG TABS, Take 1 tablet by mouth daily, Disp: , Rfl:     metFORMIN (GLUCOPHAGE) 1000 MG tablet, Take 1,000 mg by mouth 2 (two) times a day with meals, Disp: , Rfl:     oxybutynin (DITROPAN) 5 mg tablet, Take 1 tablet (5 mg total) by mouth daily for 90 days, Disp: 90 tablet, Rfl: 0    ciclopirox (PENLAC) 8 % solution, , Disp: , Rfl:     furosemide (LASIX) 20 mg tablet, Take 1 tablet (20 mg total) by mouth daily for 10 days, Disp: 10 tablet, Rfl: 0    lisinopril (ZESTRIL) 10 mg tablet, Take 1 tablet (10 mg total) by mouth daily for 90 days, Disp: 90 tablet, Rfl: 0  Social History     Social History    Marital status:      Spouse name: N/A    Number of children: N/A    Years of education: N/A     Occupational History    Not on file  Social History Main Topics    Smoking status: Former Smoker    Smokeless tobacco: Former User      Comment: quit over 30 yrs ago    Alcohol use No    Drug use: No    Sexual activity: Not on file     Other Topics Concern    Not on file     Social History Narrative    No narrative on file      History reviewed  No pertinent family history  No Known Allergies    Vitals:    03/23/18 1132   BP: 122/70   BP Location: Left arm   Patient Position: Sitting   Cuff Size: Adult   Pulse: 76   Temp: 97 8 °F (36 6 °C)   TempSrc: Oral   Weight: 84 6 kg (186 lb 8 2 oz)   Height: 5' 3" (1 6 m)       Review of Systems   No c/o chest pain, No c/o palpitations, c/o shortness of breath+, No c/o dizziness or light-headedness  C/o pedal edema+  No c/o abdominal pain    All other systems negative    Physical Exam   Constitutional:  Alert, cooperative, no acute distress   HEENT:    Normocephalic, atraumatic   Neck:  No JVD, No stridor   Lungs:  Clear to auscultation bilaterally, respirations unlabored    Chest Wall:  No tenderness or deformity    Heart::  Regular rate, Regular rhythm, S1 and S2 normal, 3/6 systolic murmur+, rub or gallop    Abdomen:  Soft, non-tender, non-distended   Neurological:  Awake, alert, oriented x3   Extremities:  B/L 1+ pedal edema, No calf tenderness         Labs:  Lab Results   Component Value Date     2017     2015    K 3 8 2017    K 3 6 2015     2017     2015    CO2 26 2017    CO2 27 2015    BUN 32 (H) 2017    BUN 29 (H) 2015    CREATININE 1 22 2017    CREATININE 0 96 2015    GLUCOSE 146 (H) 2017    GLUCOSE 170 (H) 2015    CALCIUM 9 0 2017    CALCIUM 9 3 2015     No results found for: CKTOTAL, CKMB, CKMBINDEX, TROPONINI  Lab Results   Component Value Date    WBC 5 92 2017    WBC 5 84 2015    HGB 11 5 2017    HGB 11 6 2015    HCT 35 0 2017    HCT 35 2 2015    MCV 80 (L) 2017    MCV 79 (L) 2015     2017     2015     Lab Results   Component Value Date    CHOL 105 01/15/2018    TRIG 55 01/15/2018    HDL 48 01/15/2018    LDLDIRECT 58 2017    LDLDIRECT 70 2015       Imaging: No results found  Cardiac testing:   Results for orders placed during the hospital encounter of 17   Echo complete with contrast if indicated    No[facility] [address]   [facility], [address] [place] [address]  [phone]    Transthoracic Echocardiogram  2D, M-mode, Doppler, and Color Doppler    Study date:  25-Aug-2017    Patient: James Garrett  MR number: [id number]  Account number: [de-identified]  : 1940  Age: 68 years  Gender: Female  Status: Outpatient  Location: Echo lab  Height: 64 in  Weight: 178 6 lb  BP: 149/ 66 mmHg    Indications: Murmur    Diagnoses: R01 1 - Cardiac murmur, unspecified    Sonographer:  WILIAN Schroeder  Primary Physician:  Emil Juarez PA-C  Group:  [facility]  Interpreting Physician:  Shabbir Canchola MD    SUMMARY    LEFT VENTRICLE:  Systolic function was normal by visual assessment  Ejection fraction was estimated to be 60 %  There were no regional wall motion abnormalities  Wall thickness was mildly increased    Doppler parameters were consistent with abnormal left ventricular relaxation (grade 1 diastolic dysfunction)  MITRAL VALVE:  There was mild regurgitation  AORTIC VALVE:  There was mild regurgitation  TRICUSPID VALVE:  There was mild regurgitation  HISTORY: PRIOR HISTORY: ASTHMA,TIA,HTN,HLD,DM2,Former Smoker    PROCEDURE: The procedure was performed in the echo lab  This was a routine study  The transthoracic approach was used  The study included complete 2D imaging, M-mode, complete spectral Doppler, and color Doppler  Image quality was good  LEFT VENTRICLE: Size was normal  Systolic function was normal by visual assessment  Ejection fraction was estimated to be 60 %  There were no regional wall motion abnormalities  Wall thickness was mildly increased  DOPPLER: There was an  increased relative contribution of atrial contraction to ventricular filling  Doppler parameters were consistent with abnormal left ventricular relaxation (grade 1 diastolic dysfunction)  RIGHT VENTRICLE: The size was normal  Systolic function was normal  DOPPLER: Systolic pressure was not estimated  LEFT ATRIUM: Size was normal     RIGHT ATRIUM: Size was normal     MITRAL VALVE: Valve structure was normal  There was normal leaflet separation  DOPPLER: The transmitral velocity was within the normal range  There was no evidence for stenosis  There was mild regurgitation  AORTIC VALVE: The valve was trileaflet  Leaflets exhibited normal thickness and normal cuspal separation  DOPPLER: Transaortic velocity was within the normal range  There was no evidence for stenosis  There was mild regurgitation  TRICUSPID VALVE: The valve structure was normal  There was normal leaflet separation  DOPPLER: The transtricuspid velocity was within the normal range  There was no evidence for stenosis  There was mild regurgitation  PULMONIC VALVE: Leaflets exhibited normal thickness, no calcification, and normal cuspal separation   DOPPLER: The transpulmonic velocity was within the normal range  There was no regurgitation  PERICARDIUM: There was no pericardial effusion  AORTA: The root exhibited normal size  SYSTEMIC VEINS: IVC: The inferior vena cava was normal in size and course  Respirophasic changes were normal     SYSTEM MEASUREMENT TABLES    2D  %FS: 32 29 %  Ao Diam: 3 08 cm  EDV(Teich): 79 ml  EF(Cube): 68 95 %  EF(Teich): 60 92 %  ESV(Cube): 23 16 ml  ESV(Teich): 30 88 ml  IVSd: 1 16 cm  LA Area: 17 8 cm2  LA Diam: 4 6 cm  LAAd A2C: 21 77 cm2  LAAd A4C: 19 cm2  LAEDV A-L A2C: 67 81 ml  LAEDV A-L A4C: 57 05 ml  LAEDV Index (A-L): 34 94 ml/m2  LAEDV MOD A2C: 65 39 ml  LAEDV MOD A4C: 53 97 ml  LAEDV(A-L): 65 33 ml  LALd A2C: 5 93 cm  LALd A4C: 5 37 cm  LVIDd: 4 21 cm  LVIDs: 2 85 cm  LVPWd: 1 13 cm  RA Area: 14 25 cm2  SI(Cube): 27 51 ml/m2  SI(Teich): 25 74 ml/m2  SV(Cube): 51 44 ml  SV(Teich): 48 13 ml  rv diam: 3 11 cm    CW  AR Dec Williams: 1 03 m/s2  AR Dec Time: 2937 34 ms  AR PHT: 851 83 ms  AR Vmax: 3 02 m/s  AR maxP 44 mmHg  AV Vmax: 1 53 m/s  AV maxP 34 mmHg    MM  AV Cusp: 1 75 cm  Ao Diam: 3 23 cm  LA Diam: 4 85 cm  LA/Ao: 1 5  TAPSE: 1 95 cm    PW  E': 0 05 m/s  E/E': 11 5  LVOT Vmax: 0 9 m/s  LVOT maxPG: 3 26 mmHg  MV A Dur: 162 66 ms  MV A Kofi: 1 21 m/s  MV Dec Williams: 1 94 m/s2  MV DecT: 292 68 ms  MV E Kofi: 0 57 m/s  MV E/A Ratio: 0 47    [employer] Accredited Echocardi[facility]    Prepared and electronically signed by    Jeff Nichole MD  Signed 25-Aug-2017 14:50:52       No results found for this or any previous visit  No results found for this or any previous visit  No results found for this or any previous visit

## 2018-03-23 NOTE — ASSESSMENT & PLAN NOTE
· On amlodipine 5, lisinopril 10, HCTZ 25  · BP well controlled  · Does have mild LVH on echo  · Switching to furosemide 20 daily temporarily, to try and help with edema

## 2018-03-27 ENCOUNTER — APPOINTMENT (OUTPATIENT)
Dept: LAB | Facility: HOSPITAL | Age: 78
End: 2018-03-27
Payer: COMMERCIAL

## 2018-03-27 DIAGNOSIS — R60.0 BILATERAL EDEMA OF LOWER EXTREMITY: ICD-10-CM

## 2018-03-27 LAB
ANION GAP SERPL CALCULATED.3IONS-SCNC: 8 MMOL/L (ref 4–13)
BUN SERPL-MCNC: 25 MG/DL (ref 5–25)
CALCIUM SERPL-MCNC: 9.3 MG/DL (ref 8.3–10.1)
CHLORIDE SERPL-SCNC: 104 MMOL/L (ref 100–108)
CO2 SERPL-SCNC: 29 MMOL/L (ref 21–32)
CREAT SERPL-MCNC: 1.27 MG/DL (ref 0.6–1.3)
GFR SERPL CREATININE-BSD FRML MDRD: 47 ML/MIN/1.73SQ M
GLUCOSE SERPL-MCNC: 209 MG/DL (ref 65–140)
NT-PROBNP SERPL-MCNC: 164 PG/ML
POTASSIUM SERPL-SCNC: 3.4 MMOL/L (ref 3.5–5.3)
SODIUM SERPL-SCNC: 141 MMOL/L (ref 136–145)

## 2018-03-27 PROCEDURE — 83880 ASSAY OF NATRIURETIC PEPTIDE: CPT

## 2018-03-27 PROCEDURE — 80048 BASIC METABOLIC PNL TOTAL CA: CPT

## 2018-03-27 PROCEDURE — 36415 COLL VENOUS BLD VENIPUNCTURE: CPT

## 2018-04-06 ENCOUNTER — HOSPITAL ENCOUNTER (OUTPATIENT)
Dept: NON INVASIVE DIAGNOSTICS | Facility: HOSPITAL | Age: 78
Discharge: HOME | End: 2018-04-06
Payer: COMMERCIAL

## 2018-04-06 DIAGNOSIS — R60.0 BILATERAL EDEMA OF LOWER EXTREMITY: ICD-10-CM

## 2018-04-06 PROCEDURE — 93306 TTE W/DOPPLER COMPLETE: CPT | Performed by: INTERNAL MEDICINE

## 2018-04-06 PROCEDURE — 93306 TTE W/DOPPLER COMPLETE: CPT

## 2018-04-11 ENCOUNTER — APPOINTMENT (EMERGENCY)
Dept: RADIOLOGY | Facility: HOSPITAL | Age: 78
End: 2018-04-11
Payer: COMMERCIAL

## 2018-04-11 ENCOUNTER — HOSPITAL ENCOUNTER (OUTPATIENT)
Facility: HOSPITAL | Age: 78
Setting detail: OBSERVATION
Discharge: HOME | End: 2018-04-12
Attending: EMERGENCY MEDICINE | Admitting: INTERNAL MEDICINE
Payer: COMMERCIAL

## 2018-04-11 ENCOUNTER — OFFICE VISIT (OUTPATIENT)
Dept: PULMONOLOGY | Facility: CLINIC | Age: 78
End: 2018-04-11
Payer: COMMERCIAL

## 2018-04-11 VITALS
TEMPERATURE: 97.5 F | BODY MASS INDEX: 32.78 KG/M2 | OXYGEN SATURATION: 98 % | WEIGHT: 185 LBS | RESPIRATION RATE: 16 BRPM | HEART RATE: 66 BPM | SYSTOLIC BLOOD PRESSURE: 124 MMHG | DIASTOLIC BLOOD PRESSURE: 72 MMHG | HEIGHT: 63 IN

## 2018-04-11 DIAGNOSIS — I63.9 CEREBROVASCULAR ACCIDENT (CVA), UNSPECIFIED MECHANISM (HCC): ICD-10-CM

## 2018-04-11 DIAGNOSIS — I63.9 CVA (CEREBROVASCULAR ACCIDENT) (HCC): ICD-10-CM

## 2018-04-11 DIAGNOSIS — J31.0 CHRONIC RHINITIS: Primary | ICD-10-CM

## 2018-04-11 DIAGNOSIS — R05.9 COUGH: ICD-10-CM
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
## 2018-04-11 DIAGNOSIS — R07.9 CHEST PAIN: ICD-10-CM

## 2018-04-11 PROBLEM — R79.89 CREATININE ELEVATION: Status: ACTIVE | Noted: 2018-04-11

## 2018-04-11 PROBLEM — R26.2 AMBULATORY DYSFUNCTION: Status: ACTIVE | Noted: 2018-04-11

## 2018-04-11 PROBLEM — IMO0002 CREATININE ELEVATION: Status: ACTIVE | Noted: 2018-04-11

## 2018-04-11 PROBLEM — D50.9 MICROCYTIC ANEMIA: Status: ACTIVE | Noted: 2018-04-11

## 2018-04-11 PROBLEM — E78.5 HYPERLIPIDEMIA: Status: ACTIVE | Noted: 2018-04-11

## 2018-04-11 LAB
ALBUMIN SERPL BCP-MCNC: 3.6 G/DL (ref 3.5–5)
ALP SERPL-CCNC: 112 U/L (ref 46–116)
ALT SERPL W P-5'-P-CCNC: 17 U/L (ref 12–78)
ANION GAP SERPL CALCULATED.3IONS-SCNC: 8 MMOL/L (ref 4–13)
AST SERPL W P-5'-P-CCNC: 17 U/L (ref 5–45)
ATRIAL RATE: 67 BPM
BASOPHILS # BLD AUTO: 0.02 THOUSANDS/ΜL (ref 0–0.1)
BASOPHILS NFR BLD AUTO: 0 % (ref 0–1)
BILIRUB SERPL-MCNC: 0.44 MG/DL (ref 0.2–1)
BUN SERPL-MCNC: 49 MG/DL (ref 5–25)
CALCIUM SERPL-MCNC: 9.6 MG/DL
CHLORIDE SERPL-SCNC: 103 MMOL/L (ref 100–108)
CHOLEST SERPL-MCNC: 106 MG/DL (ref 50–200)
CO2 SERPL-SCNC: 30 MMOL/L (ref 21–32)
CREAT SERPL-MCNC: 1.48 MG/DL (ref 0.6–1.3)
EOSINOPHIL # BLD AUTO: 0.09 THOUSAND/ΜL (ref 0–0.61)
EOSINOPHIL NFR BLD AUTO: 1 % (ref 0–6)
ERYTHROCYTE [DISTWIDTH] IN BLOOD BY AUTOMATED COUNT: 13.9 % (ref 11.6–15.1)
EST. AVERAGE GLUCOSE BLD GHB EST-MCNC: 229 MG/DL
GFR SERPL CREATININE-BSD FRML MDRD: 39 ML/MIN/1.73SQ M
GLUCOSE SERPL-MCNC: 146 MG/DL (ref 65–140)
HBA1C MFR BLD: 9.6 % (ref 4.2–6.3)
HCT VFR BLD AUTO: 35 % (ref 34.8–46.1)
HDLC SERPL-MCNC: 31 MG/DL (ref 40–60)
HGB BLD-MCNC: 11.3 G/DL (ref 11.5–15.4)
LDLC SERPL CALC-MCNC: 45 MG/DL (ref 0–100)
LYMPHOCYTES # BLD AUTO: 1.42 THOUSANDS/ΜL (ref 0.6–4.47)
LYMPHOCYTES NFR BLD AUTO: 20 % (ref 14–44)
MCH RBC QN AUTO: 25.2 PG (ref 26.8–34.3)
MCHC RBC AUTO-ENTMCNC: 32.3 G/DL (ref 31.4–37.4)
MCV RBC AUTO: 78 FL (ref 82–98)
MONOCYTES # BLD AUTO: 0.31 THOUSAND/ΜL (ref 0.17–1.22)
MONOCYTES NFR BLD AUTO: 4 % (ref 4–12)
NEUTROPHILS # BLD AUTO: 5.16 THOUSANDS/ΜL (ref 1.85–7.62)
NEUTS SEG NFR BLD AUTO: 75 % (ref 43–75)
NONHDLC SERPL-MCNC: 75 MG/DL
NRBC BLD AUTO-RTO: 0 /100 WBCS
P AXIS: 49 DEGREES
PLATELET # BLD AUTO: 222 THOUSANDS/UL (ref 149–390)
PLATELET # BLD AUTO: 233 THOUSANDS/UL (ref 149–390)
PMV BLD AUTO: 11.4 FL (ref 8.9–12.7)
PMV BLD AUTO: 11.6 FL (ref 8.9–12.7)
POTASSIUM SERPL-SCNC: 3.5 MMOL/L (ref 3.5–5.3)
PR INTERVAL: 192 MS
PROT SERPL-MCNC: 8.5 G/DL (ref 6.4–8.2)
QRS AXIS: 21 DEGREES
QRSD INTERVAL: 84 MS
QT INTERVAL: 418 MS
QTC INTERVAL: 441 MS
RBC # BLD AUTO: 4.48 MILLION/UL (ref 3.81–5.12)
SODIUM SERPL-SCNC: 141 MMOL/L (ref 136–145)
T WAVE AXIS: 81 DEGREES
TRIGL SERPL-MCNC: 152 MG/DL
TROPONIN I SERPL-MCNC: <0.02 NG/ML
TROPONIN I SERPL-MCNC: <0.02 NG/ML
VENTRICULAR RATE: 67 BPM
WBC # BLD AUTO: 7.01 THOUSAND/UL (ref 4.31–10.16)

## 2018-04-11 PROCEDURE — 1111F DSCHRG MED/CURRENT MED MERGE: CPT | Performed by: INTERNAL MEDICINE

## 2018-04-11 PROCEDURE — 85025 COMPLETE CBC W/AUTO DIFF WBC: CPT | Performed by: EMERGENCY MEDICINE

## 2018-04-11 PROCEDURE — 71046 X-RAY EXAM CHEST 2 VIEWS: CPT

## 2018-04-11 PROCEDURE — 36415 COLL VENOUS BLD VENIPUNCTURE: CPT

## 2018-04-11 PROCEDURE — 99203 OFFICE O/P NEW LOW 30 MIN: CPT | Performed by: INTERNAL MEDICINE

## 2018-04-11 PROCEDURE — 99285 EMERGENCY DEPT VISIT HI MDM: CPT

## 2018-04-11 PROCEDURE — 80061 LIPID PANEL: CPT | Performed by: INTERNAL MEDICINE

## 2018-04-11 PROCEDURE — 93010 ELECTROCARDIOGRAM REPORT: CPT | Performed by: INTERNAL MEDICINE

## 2018-04-11 PROCEDURE — 80053 COMPREHEN METABOLIC PANEL: CPT | Performed by: EMERGENCY MEDICINE

## 2018-04-11 PROCEDURE — 83036 HEMOGLOBIN GLYCOSYLATED A1C: CPT | Performed by: INTERNAL MEDICINE

## 2018-04-11 PROCEDURE — 82948 REAGENT STRIP/BLOOD GLUCOSE: CPT

## 2018-04-11 PROCEDURE — 84484 ASSAY OF TROPONIN QUANT: CPT | Performed by: EMERGENCY MEDICINE

## 2018-04-11 PROCEDURE — 70450 CT HEAD/BRAIN W/O DYE: CPT

## 2018-04-11 PROCEDURE — 99235 HOSP IP/OBS SAME DATE MOD 70: CPT | Performed by: INTERNAL MEDICINE

## 2018-04-11 PROCEDURE — 93005 ELECTROCARDIOGRAM TRACING: CPT | Performed by: EMERGENCY MEDICINE

## 2018-04-11 PROCEDURE — 84484 ASSAY OF TROPONIN QUANT: CPT | Performed by: INTERNAL MEDICINE

## 2018-04-11 PROCEDURE — 73502 X-RAY EXAM HIP UNI 2-3 VIEWS: CPT

## 2018-04-11 PROCEDURE — 85049 AUTOMATED PLATELET COUNT: CPT | Performed by: INTERNAL MEDICINE

## 2018-04-11 RX ORDER — HYDROCHLOROTHIAZIDE 25 MG/1
25 TABLET ORAL DAILY
Status: DISCONTINUED | OUTPATIENT
Start: 2018-04-12 | End: 2018-04-12 | Stop reason: HOSPADM

## 2018-04-11 RX ORDER — HEPARIN SODIUM 5000 [USP'U]/ML
5000 INJECTION, SOLUTION INTRAVENOUS; SUBCUTANEOUS EVERY 8 HOURS SCHEDULED
Status: DISCONTINUED | OUTPATIENT
Start: 2018-04-11 | End: 2018-04-12 | Stop reason: HOSPADM

## 2018-04-11 RX ORDER — AZELASTINE 1 MG/ML
1 SPRAY, METERED NASAL 2 TIMES DAILY
Status: DISCONTINUED | OUTPATIENT
Start: 2018-04-11 | End: 2018-04-12 | Stop reason: HOSPADM

## 2018-04-11 RX ORDER — SODIUM CHLORIDE 9 MG/ML
125 INJECTION, SOLUTION INTRAVENOUS CONTINUOUS
Status: DISCONTINUED | OUTPATIENT
Start: 2018-04-11 | End: 2018-04-12

## 2018-04-11 RX ORDER — ACETAMINOPHEN 325 MG/1
650 TABLET ORAL EVERY 6 HOURS PRN
Status: DISCONTINUED | OUTPATIENT
Start: 2018-04-11 | End: 2018-04-12 | Stop reason: HOSPADM

## 2018-04-11 RX ORDER — ATORVASTATIN CALCIUM 40 MG/1
40 TABLET, FILM COATED ORAL DAILY
Status: DISCONTINUED | OUTPATIENT
Start: 2018-04-12 | End: 2018-04-12 | Stop reason: HOSPADM

## 2018-04-11 RX ORDER — LISINOPRIL 10 MG/1
10 TABLET ORAL DAILY
Status: DISCONTINUED | OUTPATIENT
Start: 2018-04-12 | End: 2018-04-12 | Stop reason: HOSPADM

## 2018-04-11 RX ORDER — AMLODIPINE BESYLATE 5 MG/1
5 TABLET ORAL DAILY
Status: DISCONTINUED | OUTPATIENT
Start: 2018-04-12 | End: 2018-04-12 | Stop reason: HOSPADM

## 2018-04-11 RX ORDER — CLOTRIMAZOLE 1 %
CREAM (GRAM) TOPICAL 2 TIMES DAILY
Status: DISCONTINUED | OUTPATIENT
Start: 2018-04-11 | End: 2018-04-12 | Stop reason: HOSPADM

## 2018-04-11 RX ORDER — MELATONIN
1000 DAILY
Status: DISCONTINUED | OUTPATIENT
Start: 2018-04-12 | End: 2018-04-12 | Stop reason: HOSPADM

## 2018-04-11 RX ORDER — OXYBUTYNIN CHLORIDE 5 MG/1
5 TABLET ORAL DAILY
Status: DISCONTINUED | OUTPATIENT
Start: 2018-04-12 | End: 2018-04-12 | Stop reason: HOSPADM

## 2018-04-11 RX ORDER — CLOPIDOGREL BISULFATE 75 MG/1
75 TABLET ORAL DAILY
Status: DISCONTINUED | OUTPATIENT
Start: 2018-04-12 | End: 2018-04-12 | Stop reason: HOSPADM

## 2018-04-11 RX ADMIN — AZELASTINE HYDROCHLORIDE 1 SPRAY: 137 SPRAY, METERED NASAL at 23:13

## 2018-04-11 RX ADMIN — SODIUM CHLORIDE 125 ML/HR: 0.9 INJECTION, SOLUTION INTRAVENOUS at 23:13

## 2018-04-11 RX ADMIN — CLOTRIMAZOLE: 10 CREAM TOPICAL at 23:13

## 2018-04-11 NOTE — ASSESSMENT & PLAN NOTE
Patient cough is resolved I told her to call back her cough becomes a problem again  I reviewed her pulmonary function tests which were within normal limits  She does not appear to have any tobacco related lung disease or any uncontrolled asthma or COPD    At this point she does not appear to have any acid reflux or rhinitis and will evaluate her as needed

## 2018-04-11 NOTE — PROGRESS NOTES
Assessment/Plan:    Cough  Patient cough is resolved I told her to call back her cough becomes a problem again  I reviewed her pulmonary function tests which were within normal limits  She does not appear to have any tobacco related lung disease or any uncontrolled asthma or COPD  At this point she does not appear to have any acid reflux or rhinitis and will evaluate her as needed       Diagnoses and all orders for this visit:    Chronic rhinitis    Cough  -     Ambulatory referral to Pulmonology    Cerebrovascular accident (CVA), unspecified mechanism ([facility] )          Subjective:      Patient ID: Atilio Barreto is a 68 y o  female  Mrs John Hy is here today for a chronic cough but 6 months worth of cough but it actually stopped last month  During the time the cough she she had no productivity of mucus  She was short of breath with stairs denies any wheezing except when she has an upper respiratory infection  She has no history of chronic bronchitis or pneumonia  She was a smoker but quit 30 years ago  Prior to that cough that she had for 6 months she has no history of asthma emphysema or chronic cough  She denies any history of acid reflux but does get episodes were food gets stuck in her esophagus for which she had an esophagram last year  The following portions of the patient's history were reviewed and updated as appropriate: allergies, current medications, past family history, past medical history, past social history, past surgical history and problem list     Review of Systems   Constitutional: Negative  Negative for unexpected weight change  HENT: Negative  Negative for postnasal drip  Eyes: Negative  Respiratory: Negative  Negative for cough, shortness of breath and wheezing  Cardiovascular: Negative  Negative for chest pain and leg swelling  Gastrointestinal: Negative  Endocrine: Negative  Genitourinary: Negative  Musculoskeletal: Negative      Allergic/Immunologic: Negative  Neurological: Negative  Hematological: Negative  Objective:      /72 (BP Location: Left arm, Patient Position: Sitting)   Pulse 66   Temp 97 5 °F (36 4 °C) (Tympanic)   Resp 16   Ht 5' 3" (1 6 m)   Wt 83 9 kg (185 lb)   SpO2 98%   BMI 32 77 kg/m²          Physical Exam   Constitutional: She is oriented to person, place, and time  She appears well-developed and well-nourished  HENT:   Head: Normocephalic  Eyes: Pupils are equal, round, and reactive to light  Neck: Normal range of motion  Neck supple  Cardiovascular: Normal rate  No murmur heard  Pulmonary/Chest: Effort normal and breath sounds normal  No respiratory distress  She has no wheezes  She has no rales  Abdominal: Soft  Musculoskeletal: Normal range of motion  She exhibits no edema  Neurological: She is alert and oriented to person, place, and time  Skin: Skin is warm and dry

## 2018-04-11 NOTE — ED PROVIDER NOTES
History  Chief Complaint   Patient presents with    Chest Pain     Pt c/o chest pain since last night  This morning patient fell, c/o right hip pain    Fall     55-year-old female with a history of hypertension, hyperlipidemia, diabetes, and stroke presenting to the emergency department after a fall that occurred around 7:00 a m  this morning  She says that she had just gotten up from bed to go to the bathroom and fell over her TV landing on her right side  She denies head injury or loss of consciousness, but is unable to describe the mechanism of her fall  She says that after the fall she was able to get up from the floor without help and make her way to the bathroom  Since the fall she has had some pain on her right hip and buttocks that is non radiating and worsens with ambulation  She notes that last night in the middle the night she had 2 episodes of sharp left-sided chest pain radiating to her shoulder  Each episode was brief all, only lasting seconds, and spontaneously resolved  Neither episode was associated shortness of breath or lightheadedness  She notes that she has had at least 1 other episode of similar pain over the last week but is unable to give any further details  She admits to swelling in her ankles on review of systems  She has received Lasix in the past with resolution of her swelling, but was subsequently taken off the Lasix and the swelling has returned  She denies cough, congestion, fevers or chills, abdominal pain, numbness, or weakness  She recently saw her primary care physician who diagnosed her with a systolic murmur and underwent an echocardiogram   The results of this showed a mild mitral valve regurgitation with left atrial dilation  It also identified class 1 diastolic dysfunction  Her ejection fraction was normal              Prior to Admission Medications   Prescriptions Last Dose Informant Patient Reported? Taking?    Cholecalciferol (VITAMIN D3) 2000 units capsule Past Week at Unknown time Self Yes Yes   Sig: Take 1 tablet by mouth daily   Linagliptin (TRADJENTA) 5 MG TABS 2018 at Unknown time Self Yes Yes   Sig: Take 1 tablet by mouth daily   amLODIPine (NORVASC) 5 mg tablet 2018 at Unknown time Self Yes Yes   Sig: Take 1 tablet by mouth daily   atorvastatin (LIPITOR) 40 mg tablet 2018 at Unknown time Self Yes Yes   Sig: Take 1 tablet by mouth daily   azelastine (ASTELIN) 0 1 % nasal spray 2018 at Unknown time Self No Yes   Si spray into each nostril 2 (two) times a day for 180 days Use in each nostril as directed   ciclopirox (PENLAC) 8 % solution 4/10/2018 at Unknown time Self Yes Yes   clopidogrel (PLAVIX) 75 mg tablet 2018 at Unknown time Self Yes Yes   Sig: Take 1 tablet by mouth daily   clotrimazole (LOTRIMIN) 1 % cream Past Week at Unknown time Self No Yes   Sig: Apply topically 2 (two) times a day   glipiZIDE (GLUCOTROL) 10 mg tablet 2018 at Unknown time Self Yes Yes   Sig: Take 10 mg by mouth 2 (two) times a day before meals   hydrochlorothiazide (HYDRODIURIL) 25 mg tablet 2018 at Unknown time Self Yes Yes   Sig: Take 1 tablet by mouth daily   lisinopril (ZESTRIL) 10 mg tablet 2018 at Unknown time Self No Yes   Sig: Take 1 tablet (10 mg total) by mouth daily for 90 days   metFORMIN (GLUCOPHAGE) 1000 MG tablet 2018 at Unknown time Self Yes Yes   Sig: Take 1,000 mg by mouth 2 (two) times a day with meals   oxybutynin (DITROPAN) 5 mg tablet 2018 at Unknown time Self No Yes   Sig: Take 1 tablet (5 mg total) by mouth daily for 90 days      Facility-Administered Medications: None       Past Medical History:   Diagnosis Date    Asthma     Diabetes mellitus ([facility] )     Esophageal dysphagia     Hyperlipidemia     Hypertension     Stroke ([facility] )     Urinary frequency     UTI (urinary tract infection)        Past Surgical History:   Procedure Laterality Date    AK ESOPHAGOGASTRODUODENOSCOPY TRANSORAL DIAGNOSTIC N/A 4/5/2017    Procedure: ESOPHAGOGASTRODUODENOSCOPY (EGD); Surgeon: Pop Jones MD;  Location: [facility]; Service: Gastroenterology       Family History   Problem Relation Age of Onset    Heart disease Father     No Known Problems Mother      I have reviewed and agree with the history as documented  Social History   Substance Use Topics    Smoking status: Former Smoker    Smokeless tobacco: Former User      Comment: quit over 30 yrs ago    Alcohol use No        Review of Systems   Constitutional: Negative for chills and fever  HENT: Negative for congestion and sore throat  Eyes: Negative for visual disturbance  Respiratory: Positive for shortness of breath (Chronic SOB with excertion  )  Negative for cough  Cardiovascular: Positive for chest pain and leg swelling  Negative for palpitations  Gastrointestinal: Negative for abdominal pain, diarrhea, nausea and vomiting  Genitourinary: Negative for difficulty urinating and dysuria  Musculoskeletal: Negative for myalgias  Skin: Negative for rash  Neurological: Negative for weakness, light-headedness, numbness and headaches  Physical Exam  ED Triage Vitals   Temperature Pulse Respirations Blood Pressure SpO2   04/11/18 2150 04/11/18 1634 04/11/18 1634 04/11/18 1634 04/11/18 1634   98 6 °F (37 °C) 72 18 138/63 99 %      Temp Source Heart Rate Source Patient Position - Orthostatic VS BP Location FiO2 (%)   04/11/18 2150 04/11/18 1634 04/11/18 1634 04/11/18 1634 --   Oral Monitor Sitting Right arm       Pain Score       04/11/18 1634       No Pain           Orthostatic Vital Signs  Vitals:    04/11/18 2300 04/12/18 0300 04/12/18 0730 04/12/18 0812   BP: 122/58 139/66 139/65 128/58   Pulse: 66 60 66    Patient Position - Orthostatic VS: Lying Lying Lying        Physical Exam   Constitutional: She is oriented to person, place, and time  She appears well-developed and well-nourished  No distress     HENT:   Head: Normocephalic and atraumatic  Eyes: EOM are normal  Pupils are equal, round, and reactive to light  Neck: Normal range of motion  Neck supple  Cardiovascular: Normal rate, regular rhythm and intact distal pulses  Murmur (2/6 systolic murmur) heard  Pulmonary/Chest: Effort normal and breath sounds normal  No respiratory distress  Abdominal: Soft  Bowel sounds are normal  There is no tenderness  Musculoskeletal: Normal range of motion  She exhibits no edema or tenderness  There is tenderness to palpation over the left buttocks  There is no midline spinal tenderness or tenderness over the bony prominences on the right side or the over the ribs  She has no edema or tenderness around her right knee  Negative Lachman's test   No pain elicited with movement of the patella  Normal range of motion in the right knee  Normal gait  Neurological: She is alert and oriented to person, place, and time  No cranial nerve deficit or sensory deficit  Coordination normal    Normal sensation in the upper and lower extremities  Normal heel to shin  No pronator drift  Skin: Skin is warm and dry  Capillary refill takes less than 2 seconds  There are no external signs of trauma  Psychiatric: She has a normal mood and affect  Nursing note and vitals reviewed  ED Medications  Medications - No data to display    Diagnostic Studies  Results Reviewed     Procedure Component Value Units Date/Time    Troponin I [[id number]]  (Normal) Collected:  04/12/18 0015    Lab Status:  Final result Specimen:  Blood from Arm, Right Updated:  04/12/18 0041     Troponin I <0 02 ng/mL     Narrative:         [employer] Chemistry analyzer 99% cutoff is > 0 04 ng/mL in network labs    o cTnI 99% cutoff is useful only when applied to patients in the clinical setting of myocardial ischemia  o cTnI 99% cutoff should be interpreted in the context of clinical history, ECG findings and possibly cardiac imaging to establish correct diagnosis    o cTnI 99% cutoff may be suggestive but clearly not indicative of a coronary event without the clinical setting of myocardial ischemia  Troponin I [[id number]]  (Normal) Collected:  04/11/18 2046    Lab Status:  Final result Specimen:  Blood from Arm, Left Updated:  04/11/18 2129     Troponin I <0 02 ng/mL     Narrative:         [employer] Chemistry analyzer 99% cutoff is > 0 04 ng/mL in network labs    o cTnI 99% cutoff is useful only when applied to patients in the clinical setting of myocardial ischemia  o cTnI 99% cutoff should be interpreted in the context of clinical history, ECG findings and possibly cardiac imaging to establish correct diagnosis  o cTnI 99% cutoff may be suggestive but clearly not indicative of a coronary event without the clinical setting of myocardial ischemia  Troponin I [[id number]]  (Normal) Collected:  04/11/18 1436    Lab Status:  Final result Specimen:  Blood from Arm, Right Updated:  04/11/18 1521     Troponin I <0 02 ng/mL     Narrative:         [employer] Chemistry analyzer 99% cutoff is > 0 04 ng/mL in network labs    o cTnI 99% cutoff is useful only when applied to patients in the clinical setting of myocardial ischemia  o cTnI 99% cutoff should be interpreted in the context of clinical history, ECG findings and possibly cardiac imaging to establish correct diagnosis  o cTnI 99% cutoff may be suggestive but clearly not indicative of a coronary event without the clinical setting of myocardial ischemia      Comprehensive metabolic panel [[id number]]  (Abnormal) Collected:  04/11/18 1436    Lab Status:  Final result Specimen:  Blood from Arm, Right Updated:  04/11/18 1516     Sodium 141 mmol/L      Potassium 3 5 mmol/L      Chloride 103 mmol/L      CO2 30 mmol/L      Anion Gap 8 mmol/L      BUN 49 (H) mg/dL      Creatinine 1 48 (H) mg/dL      Glucose 146 (H) mg/dL      Calcium 9 6 mg/dL      AST 17 U/L      ALT 17 U/L      Alkaline Phosphatase 112 U/L      Total Protein 8 5 (H) g/dL      Albumin 3 6 g/dL      Total Bilirubin 0 44 mg/dL      eGFR 39 ml/min/1 73sq m     Narrative:         National Kidney Disease Education Program recommendations are as follows:  GFR calculation is accurate only with a steady state creatinine  Chronic Kidney disease less than 60 ml/min/1 73 sq  meters  Kidney failure less than 15 ml/min/1 73 sq  meters  CBC and differential [[id number]]  (Abnormal) Collected:  04/11/18 1436    Lab Status:  Final result Specimen:  Blood from Arm, Right Updated:  04/11/18 1458     WBC 7 01 Thousand/uL      RBC 4 48 Million/uL      Hemoglobin 11 3 (L) g/dL      Hematocrit 35 0 %      MCV 78 (L) fL      MCH 25 2 (L) pg      MCHC 32 3 g/dL      RDW 13 9 %      MPV 11 6 fL      Platelets 583 Thousands/uL      nRBC 0 /100 WBCs      Neutrophils Relative 75 %      Lymphocytes Relative 20 %      Monocytes Relative 4 %      Eosinophils Relative 1 %      Basophils Relative 0 %      Neutrophils Absolute 5 16 Thousands/µL      Lymphocytes Absolute 1 42 Thousands/µL      Monocytes Absolute 0 31 Thousand/µL      Eosinophils Absolute 0 09 Thousand/µL      Basophils Absolute 0 02 Thousands/µL                  CT head without contrast   Final Result by Chaitanya Nolan MD (04/11 1833)      No acute intracranial abnormality  Workstation performed: [id number]         XR hip/pelv 2-3 vws right   Final Result by Sanjana Sandhu DO (04/11 1813)      No fracture  Mild osteoarthritis both hips  Workstation performed: [id number]         X-ray chest 2 views   Final Result by Sanjana Sandhu DO (04/11 1810)      No acute cardiopulmonary disease              Workstation performed: [id number]               Procedures  ECG 12 Lead Documentation  Date/Time: 4/11/2018 5:13 PM  Performed by: Srini Chakraborty  Authorized by: Sundeep Banda     ECG reviewed by me, the ED Provider: yes    Patient location:  ED  Previous ECG:     Previous ECG:  Compared to current    Similarity:  No change Comparison to cardiac monitor: Yes    Interpretation:     Interpretation: normal    Rate:     ECG rate assessment: normal    Rhythm:     Rhythm: sinus rhythm    QRS:     QRS axis:  Normal    QRS intervals:  Normal  ST segments:     ST segments:  Normal  T waves:     T waves: normal            Phone Consults  ED Phone Contact    ED Course  ED Course          HEART Risk Score    Flowsheet Row Most Recent Value   History  0 Filed at: 04/11/2018 1702   ECG  0 Filed at: 04/11/2018 1702   Age  2 Filed at: 04/11/2018 1702   Risk Factors  2 Filed at: 04/11/2018 1702   Troponin  0 Filed at: 04/11/2018 1702   Heart Score Risk Calculator   History  0 Filed at: 04/11/2018 1702   ECG  0 Filed at: 04/11/2018 1702   Age  2 Filed at: 04/11/2018 1702   Risk Factors  2 Filed at: 04/11/2018 1702   Troponin  0 Filed at: 04/11/2018 1702   HEART Score  4 Filed at: 04/11/2018 1702   HEART Score  4 Filed at: 04/11/2018 1702                            MDM  Number of Diagnoses or Management Options  Chest pain:   Fall, initial encounter:   Diagnosis management comments: 59-year-old female with significant cardiac risk factors and a systolic murmur presenting with a fall after getting up from bed this morning  She is unable to provide a consistent history to rule out the cardiogenic syncope or presyncope  She has had chest pain overnight as well as over the last week  She has a heart score of 4 with a normal cardiac evaluation in the emergency department  Mild tenderness on exam over the right hip  Will obtain hip films and CT of the head  If imaging is normal plan to admit for her cardiac evaluation  CritCare Time    Disposition  Final diagnoses:   Fall, initial encounter   Chest pain   CVA (cerebrovascular accident) [facility])     Time reflects when diagnosis was documented in both MDM as applicable and the Disposition within this note     Time User Action Codes Description Comment    4/11/2018  5:02 PM Cem Limon Add [S89  [place] [facility] Fall, initial encounter     4/11/2018  5:03 PM WinfieldRandy corbin [R07 [id number]] Chest pain     4/12/2018 11:51 AM Ailyn Martínez [[id number]] CVA (cerebrovascular accident) [facility])       ED Disposition     ED Disposition Condition Comment    Admit  Case was discussed with SOD and the patient's admission status was agreed to be Admission Status: observation status to the service of Dr Shelley Smith   Follow-up Information     Follow up With Specialties Details Why Mandy Summers MD Internal Medicine Go on 4/16/2018 Please follow up with Dr Nanci Lehman Monday April 16th at 3:00 pm  [address]  Follow up Bedside commode,cane and seated bath chair   [phone]        Discharge Medication List as of 4/12/2018 12:11 PM      CONTINUE these medications which have NOT CHANGED    Details   amLODIPine (NORVASC) 5 mg tablet Take 1 tablet by mouth daily, Historical Med      atorvastatin (LIPITOR) 40 mg tablet Take 1 tablet by mouth daily, Starting Mon 7/17/2017, Historical Med      azelastine (ASTELIN) 0 1 % nasal spray 1 spray into each nostril 2 (two) times a day for 180 days Use in each nostril as directed, Starting Fri 2/16/2018, Until Wed 8/15/2018, Normal      Cholecalciferol (VITAMIN D3) 2000 units capsule Take 1 tablet by mouth daily, Starting Thu 9/21/2017, Historical Med      ciclopirox (PENLAC) 8 % solution Starting Wed 2/28/2018, Historical Med      clopidogrel (PLAVIX) 75 mg tablet Take 1 tablet by mouth daily, Historical Med      clotrimazole (LOTRIMIN) 1 % cream Apply topically 2 (two) times a day, Starting Sun 3/4/2018, Normal      glipiZIDE (GLUCOTROL) 10 mg tablet Take 10 mg by mouth 2 (two) times a day before meals, Historical Med      hydrochlorothiazide (HYDRODIURIL) 25 mg tablet Take 1 tablet by mouth daily, Historical Med      Linagliptin (TRADJENTA) 5 MG TABS Take 1 tablet by mouth daily, Starting Tue 10/10/2017, Historical Med      lisinopril (ZESTRIL) 10 mg tablet Take 1 tablet (10 mg total) by mouth daily for 90 days, Starting Fri 2/16/2018, Until Thu 5/17/2018, Normal      metFORMIN (GLUCOPHAGE) 1000 MG tablet Take 1,000 mg by mouth 2 (two) times a day with meals, Historical Med      oxybutynin (DITROPAN) 5 mg tablet Take 1 tablet (5 mg total) by mouth daily for 90 days, Starting Fri 3/2/2018, Until Thu 5/31/2018, Normal             Outpatient Discharge Orders  Ambulatory Referral to Home Health   Standing Status: Future  Standing Exp  Date: 10/12/18     Discharge Diet     Activity as tolerated         ED Provider  Attending physically available and evaluated Ellie Mueller I managed the patient along with the ED Attending      Electronically Signed by         Nicolette Yuen MD  04/23/18 7763

## 2018-04-11 NOTE — ED ATTENDING ATTESTATION
Chester Pavon MD, saw and evaluated the patient  I have discussed the patient with the resident/non-physician practitioner and agree with the resident's/non-physician practitioner's findings, Plan of Care, and MDM as documented in the resident's/non-physician practitioner's note, except where noted  All available labs and Radiology studies were reviewed  At this point I agree with the current assessment done in the Emergency Department  I have conducted an independent evaluation of this patient including a focused history and a physical exam     66-year-old female presenting to the emergency department for evaluation of 2 complaints  First and primary complaint is that she had a fall earlier this morning where she states that she had gotten up out of bed, was walking to the bathroom, had walked approximately 4-5 steps, and reports that she lost her balance falling to the right striking her right hip against TV and going down to the ground  Patient denies hitting her head and denies loss of consciousness  Patient complains of pain over the right lateral hip and buttocks  Patient's secondary complaint is that she has been having episodic left upper chest pain radiating to the left shoulder  Patient states that she might of had an episode earlier in the week and then had 2 episodes last night  Patient reports that the chest pain is brief in duration  Ten systems reviewed negative except as noted in the history of present illness  The patient is resting comfortably on a stretcher in no acute respiratory distress  The patient appears nontoxic  HEENT reveals moist mucous membranes  Head is normocephalic and atraumatic  Conjunctiva and sclera are normal  Neck is nontender and supple with full range of motion to flexion, extension, lateral rotation  No meningismus appreciated  No masses are appreciated  Lungs are clear to auscultation bilaterally without any wheezes, rales or rhonchi   Heart is regular rate and rhythm without any murmurs, rubs or gallops  Abdomen is soft and nontender without any rebound or guarding  Extremities appear grossly normal without any significant arthropathy  Patient is awake, alert, and oriented x3  The patient has normal interaction  Motor is 5 out of 5  Mildly tender to palpation over the right lateral hip  Full range of motion  No bony crepitance is appreciated  Assessment and plan:  Radiographs to evaluate for fracture  Metabolic and cardiac workup floor loss of balance  Labs Reviewed   COMPREHENSIVE METABOLIC PANEL - Abnormal        Result Value Ref Range Status    Sodium 141  136 - 145 mmol/L Final    Potassium 3 5  3 5 - 5 3 mmol/L Final    Chloride 103  100 - 108 mmol/L Final    CO2 30  21 - 32 mmol/L Final    Anion Gap 8  4 - 13 mmol/L Final    BUN 49 (*) 5 - 25 mg/dL Final    Creatinine 1 48 (*) 0 60 - 1 30 mg/dL Final    Comment: Standardized to IDMS reference method    Glucose 146 (*) 65 - 140 mg/dL Final    Comment:   If the patient is fasting, the ADA then defines impaired fasting glucose as > 100 mg/dL and diabetes as > or equal to 123 mg/dL  Specimen collection should occur prior to Sulfasalazine administration due to the potential for falsely depressed results  Specimen collection should occur prior to Sulfapyridine administration due to the potential for falsely elevated results  Calcium 9 6  mg/dL Final    AST 17  5 - 45 U/L Final    Comment:   Specimen collection should occur prior to Sulfasalazine administration due to the potential for falsely depressed results  ALT 17  12 - 78 U/L Final    Comment:   Specimen collection should occur prior to Sulfasalazine and/or Sulfapyridine administration due to the potential for falsely depressed results       Alkaline Phosphatase 112  46 - 116 U/L Final    Total Protein 8 5 (*) 6 4 - 8 2 g/dL Final    Albumin 3 6  3 5 - 5 0 g/dL Final    Total Bilirubin 0 44  0 20 - 1 00 mg/dL Final    eGFR 39  ml/min/1 73sq m Final    Narrative:     National Kidney Disease Education Program recommendations are as follows:  GFR calculation is accurate only with a steady state creatinine  Chronic Kidney disease less than 60 ml/min/1 73 sq  meters  Kidney failure less than 15 ml/min/1 73 sq  meters  CBC AND DIFFERENTIAL - Abnormal     WBC 7 01  4 31 - 10 16 Thousand/uL Final    RBC 4 48  3 81 - 5 12 Million/uL Final    Hemoglobin 11 3 (*) 11 5 - 15 4 g/dL Final    Hematocrit 35 0  34 8 - 46 1 % Final    MCV 78 (*) 82 - 98 fL Final    MCH 25 2 (*) 26 8 - 34 3 pg Final    MCHC 32 3  31 4 - 37 4 g/dL Final    RDW 13 9  11 6 - 15 1 % Final    MPV 11 6  8 9 - 12 7 fL Final    Platelets 947  643 - 390 Thousands/uL Final    nRBC 0  /100 WBCs Final    Neutrophils Relative 75  43 - 75 % Final    Lymphocytes Relative 20  14 - 44 % Final    Monocytes Relative 4  4 - 12 % Final    Eosinophils Relative 1  0 - 6 % Final    Basophils Relative 0  0 - 1 % Final    Neutrophils Absolute 5 16  1 85 - 7 62 Thousands/µL Final    Lymphocytes Absolute 1 42  0 60 - 4 47 Thousands/µL Final    Monocytes Absolute 0 31  0 17 - 1 22 Thousand/µL Final    Eosinophils Absolute 0 09  0 00 - 0 61 Thousand/µL Final    Basophils Absolute 0 02  0 00 - 0 10 Thousands/µL Final   TROPONIN I - Normal    Troponin I <0 02  <=0 04 ng/mL Final    Narrative:     [employer] Chemistry analyzer 99% cutoff is > 0 04 ng/mL in network labs    o cTnI 99% cutoff is useful only when applied to patients in the clinical setting of myocardial ischemia  o cTnI 99% cutoff should be interpreted in the context of clinical history, ECG findings and possibly cardiac imaging to establish correct diagnosis  o cTnI 99% cutoff may be suggestive but clearly not indicative of a coronary event without the clinical setting of myocardial ischemia  LAVENDER TOP 7 ML ON HOLD     CT head without contrast   Final Result      No acute intracranial abnormality                    Workstation performed: [id number] XR hip/pelv 2-3 vws right   Final Result      No fracture  Mild osteoarthritis both hips  Workstation performed: [id number]         X-ray chest 2 views   Final Result      No acute cardiopulmonary disease              Workstation performed: [id number]

## 2018-04-12 VITALS
WEIGHT: 194 LBS | DIASTOLIC BLOOD PRESSURE: 58 MMHG | BODY MASS INDEX: 34.38 KG/M2 | HEART RATE: 66 BPM | HEIGHT: 63 IN | OXYGEN SATURATION: 97 % | SYSTOLIC BLOOD PRESSURE: 128 MMHG | RESPIRATION RATE: 18 BRPM | TEMPERATURE: 98.2 F

## 2018-04-12 LAB
ANION GAP SERPL CALCULATED.3IONS-SCNC: 8 MMOL/L (ref 4–13)
ATRIAL RATE: 63 BPM
BUN SERPL-MCNC: 50 MG/DL (ref 5–25)
CALCIUM SERPL-MCNC: 8.7 MG/DL
CHLORIDE SERPL-SCNC: 107 MMOL/L (ref 100–108)
CO2 SERPL-SCNC: 27 MMOL/L (ref 21–32)
CREAT SERPL-MCNC: 1.52 MG/DL (ref 0.6–1.3)
ERYTHROCYTE [DISTWIDTH] IN BLOOD BY AUTOMATED COUNT: 14.1 % (ref 11.6–15.1)
FERRITIN SERPL-MCNC: 322 NG/ML (ref 8–388)
GFR SERPL CREATININE-BSD FRML MDRD: 38 ML/MIN/1.73SQ M
GLUCOSE SERPL-MCNC: 117 MG/DL (ref 65–140)
GLUCOSE SERPL-MCNC: 132 MG/DL (ref 65–140)
GLUCOSE SERPL-MCNC: 323 MG/DL (ref 65–140)
GLUCOSE SERPL-MCNC: 330 MG/DL (ref 65–140)
HCT VFR BLD AUTO: 30.9 % (ref 34.8–46.1)
HGB BLD-MCNC: 9.7 G/DL (ref 11.5–15.4)
IRON SATN MFR SERPL: 20 %
IRON SERPL-MCNC: 52 UG/DL (ref 50–170)
MCH RBC QN AUTO: 25.1 PG (ref 26.8–34.3)
MCHC RBC AUTO-ENTMCNC: 31.4 G/DL (ref 31.4–37.4)
MCV RBC AUTO: 80 FL (ref 82–98)
P AXIS: 23 DEGREES
PLATELET # BLD AUTO: 191 THOUSANDS/UL (ref 149–390)
PMV BLD AUTO: 11.8 FL (ref 8.9–12.7)
POTASSIUM SERPL-SCNC: 3.3 MMOL/L (ref 3.5–5.3)
PR INTERVAL: 200 MS
QRS AXIS: 0 DEGREES
QRSD INTERVAL: 90 MS
QT INTERVAL: 432 MS
QTC INTERVAL: 442 MS
RBC # BLD AUTO: 3.86 MILLION/UL (ref 3.81–5.12)
SODIUM SERPL-SCNC: 142 MMOL/L (ref 136–145)
T WAVE AXIS: 82 DEGREES
TIBC SERPL-MCNC: 265 UG/DL (ref 250–450)
TROPONIN I SERPL-MCNC: <0.02 NG/ML
TROPONIN I SERPL-MCNC: <0.02 NG/ML
VENTRICULAR RATE: 63 BPM
WBC # BLD AUTO: 5.47 THOUSAND/UL (ref 4.31–10.16)

## 2018-04-12 PROCEDURE — 93005 ELECTROCARDIOGRAM TRACING: CPT | Performed by: INTERNAL MEDICINE

## 2018-04-12 PROCEDURE — 84484 ASSAY OF TROPONIN QUANT: CPT | Performed by: INTERNAL MEDICINE

## 2018-04-12 PROCEDURE — 82728 ASSAY OF FERRITIN: CPT | Performed by: INTERNAL MEDICINE

## 2018-04-12 PROCEDURE — 82948 REAGENT STRIP/BLOOD GLUCOSE: CPT

## 2018-04-12 PROCEDURE — 83550 IRON BINDING TEST: CPT | Performed by: INTERNAL MEDICINE

## 2018-04-12 PROCEDURE — 85027 COMPLETE CBC AUTOMATED: CPT | Performed by: INTERNAL MEDICINE

## 2018-04-12 PROCEDURE — 93010 ELECTROCARDIOGRAM REPORT: CPT | Performed by: INTERNAL MEDICINE

## 2018-04-12 PROCEDURE — 80048 BASIC METABOLIC PNL TOTAL CA: CPT | Performed by: INTERNAL MEDICINE

## 2018-04-12 PROCEDURE — 83540 ASSAY OF IRON: CPT | Performed by: INTERNAL MEDICINE

## 2018-04-12 RX ADMIN — ACETAMINOPHEN 650 MG: 325 TABLET, FILM COATED ORAL at 01:28

## 2018-04-12 RX ADMIN — CLOPIDOGREL BISULFATE 75 MG: 75 TABLET ORAL at 08:12

## 2018-04-12 RX ADMIN — INSULIN LISPRO 5 UNITS: 100 INJECTION, SOLUTION INTRAVENOUS; SUBCUTANEOUS at 02:16

## 2018-04-12 RX ADMIN — CLOTRIMAZOLE: 10 CREAM TOPICAL at 08:17

## 2018-04-12 RX ADMIN — INSULIN LISPRO 5 UNITS: 100 INJECTION, SOLUTION INTRAVENOUS; SUBCUTANEOUS at 10:54

## 2018-04-12 RX ADMIN — HYDROCHLOROTHIAZIDE 25 MG: 25 TABLET ORAL at 08:16

## 2018-04-12 RX ADMIN — OXYBUTYNIN CHLORIDE 5 MG: 5 TABLET ORAL at 08:12

## 2018-04-12 RX ADMIN — AMLODIPINE BESYLATE 5 MG: 5 TABLET ORAL at 08:12

## 2018-04-12 RX ADMIN — ATORVASTATIN CALCIUM 40 MG: 40 TABLET, FILM COATED ORAL at 08:12

## 2018-04-12 RX ADMIN — LISINOPRIL 10 MG: 10 TABLET ORAL at 08:12

## 2018-04-12 RX ADMIN — VITAMIN D, TAB 1000IU (100/BT) 1000 UNITS: 25 TAB at 08:12

## 2018-04-12 NOTE — SOCIAL WORK
CM met with patient, and 2 family members, explained CM role with introduction  Patient lives alone in [address] [facility] [address] with 1STE and full flight of stairs between floors  Patient has 5 adult sons who check on her frequently  Patient independent PTA, including driving and household chores  Patient has a cane, is requesting a shower chair, and a bedside commode  [facility] referral sent to [facility] for the same, CM made patient aware there may be a cost for the cane and shower chair  [facility] requested patient to have home PT  CM spoke with patient and she did not want home PT, she wanted to continue with her outpt exercise program  CM made Dr Laurent Ospina aware of patient's request   Patient uses [employer] also for transportation  PCP is DR Con Swan  Patient has prescription plan and uses [address]  Patient has no POA, denies MH or drug and alcohol treatment  Primary contact persons are patient's sons Freemanaleixs Bates [phone] and Franklin Vargas [phone]  CM reviewed d/c planning process including the following: identifying help at home, patient preference for d/c planning needs, Discharge Lounge, [facility] Meds to Bed program, availability of treatment team to discuss questions or concerns patient and/or family may have regarding understanding medications and recognizing signs and symptoms once discharged  CM also encouraged patient to follow up with all recommended appointments after discharge  Patient advised of importance for patient and family to participate in managing patients medical well being  CM will follow for discharge needs

## 2018-04-12 NOTE — SOCIAL WORK
Patient discharged, family at nurses station,inquiring about medical equipment  CM called Nile Hawk from [facility], who reported, the order needs to be processed , order should take about an hour, Nile Hawk will deliver as soon as the processing is completed  CM went to patient's room to talk to patient and family, patient and family were not there  CM looked at elevator, and they were no longer at the

## 2018-04-12 NOTE — DISCHARGE SUMMARY
IMR Discharge Summary - Medical Sandy Dennis 68 y o  female MRN: [id number]    [address] [facility] Room / Bed: [place] [address] [id number] Encounter: [id number]    BRIEF OVERVIEW    Admitting Provider: Pancho Pagan MD  Discharge Provider: Pancho Pagan MD  Primary Care Physician at Discharge: Rex Su DO     Discharge To: Home       Admission Date: 4/11/2018     Discharge Date: 4/12/18    Primary Discharge Diagnosis  Principal Problem:    Chest pain  Active Problems:    Benign essential hypertension    Diabetes mellitus type 2, uncontrolled (HCC)    Ambulatory dysfunction    Hyperlipidemia    Microcytic anemia    Creatinine elevation  Resolved Problems:    * No resolved hospital problems  *      Other Problems Addressed: none     Consulting Providers: none        Therapeutic Operative Procedures Performed: none     Diagnostic Procedures Performed  X-ray Chest 2 Views  Result Date: 4/11/2018  Impression: No acute cardiopulmonary disease  Xr Hip/pelv 2-3 Vws Right  Result Date: 4/11/2018  Impression: No fracture  Mild osteoarthritis both hips  Ct Head Without Contrast  Result Date: 4/11/2018  Impression: No acute intracranial abnormality        Results from last 7 days  Lab Units 04/12/18  0432 04/11/18  1436   SODIUM mmol/L 142 141   POTASSIUM mmol/L 3 3* 3 5   CHLORIDE mmol/L 107 103   CO2 mmol/L 27 30   BUN mg/dL 50* 49*   CREATININE mg/dL 1 52* 1 48*   CALCIUM mg/dL 8 7 9 6   TOTAL PROTEIN g/dL  --  8 5*   BILIRUBIN TOTAL mg/dL  --  0 44   ALK PHOS U/L  --  112   ALT U/L  --  17   AST U/L  --  17   GLUCOSE RANDOM mg/dL 132 146*       Discharge Disposition: Home/Self Care  Discharged With Lines: no    Test Results Pending at Discharge: None     Outpatient Follow-Up  Please follow up at the clinic April 16th with Dr Lindsay Bledsoe at 3:00 pm   Follow up with consulting providers  As scheduled   Active Issues Requiring Follow-up   none    Code Status: Level 1 - Full Code  Advance Directive and Living Will: <no information>  Power of :    POLST:      Medications    Morning Afternoon Evening Bedtime As Needed   amLODIPine 5 mg tablet   Commonly known as: NORVASC   Take 1 tablet by mouth daily   Refills: 0          atorvastatin 40 mg tablet   Commonly known as: LIPITOR   Take 1 tablet by mouth daily   Refills: 0          azelastine 0 1 % nasal spray   Commonly known as: ASTELIN   1 spray into each nostril 2 (two) times a day for 180 days Use in each nostril as directed   Refills: 5           ciclopirox 8 % solution   Commonly known as: PENLAC   Refills: 0         clopidogrel 75 mg tablet   Commonly known as: PLAVIX   Take 1 tablet by mouth daily   Refills: 0          clotrimazole 1 % cream   Commonly known as: LOTRIMIN   Apply topically 2 (two) times a day   Refills: 2           glipiZIDE 10 mg tablet   Commonly known as: GLUCOTROL   Take 10 mg by mouth 2 (two) times a day before meals   Refills: 0           hydrochlorothiazide 25 mg tablet   Commonly known as: HYDRODIURIL   Take 1 tablet by mouth daily   Refills: 0          lisinopril 10 mg tablet   Commonly known as: ZESTRIL   Take 1 tablet (10 mg total) by mouth daily for 90 days   Refills: 0          metFORMIN 1000 MG tablet   Commonly known as: GLUCOPHAGE   For: 1 in am and 1 5 in evening   Take 1,000 mg by mouth 2 (two) times a day with meals   Refills: 0           oxybutynin 5 mg tablet   Commonly known as: DITROPAN   Take 1 tablet (5 mg total) by mouth daily for 90 days   Refills: 0          TRADJENTA 5 MG Tabs   Generic drug: Linagliptin   Take 1 tablet by mouth daily   Refills: 0          Vitamin D3 2000 units capsule   Take 1 tablet by mouth daily   Refills: 0                Allergies  No Known Allergies  Discharge Diet: cardiac diet  Activity restrictions: none    [address] [facility] Course  This is a 68year old female with past medical history of HTN, hld, diabetes, stroke in 2010, who presented to [facility] ED with complaint of left sided chest pain  Pt reported that she initially woke up with left sided sharp localized chest pain then she was able to go to sleep, when he woke up again she had the chest pain sharp again  Pt reports that when she was walking to the bathroom she experienced a mechanical fall which is why she decided to come into the ED  She denied syncope  Hospital course:   Pt was admitted for chest pain rule out  Troponin were negative times 3, she was monitored on tele without any acute events overnight, EKG was normal sinus without any ischemia  CT head and XR hip were negative for acute pathology  Pt was walked by nurse and she did well, she walks with a walker and has support at home with family  Her balance problems are not acute and she has experienced them since her stroke  Pt was asked to have home PT but she refused  She will be discharged home, she is amendable to DC  Presenting Problem/History of Present Illness  Principal Problem:    Chest pain  Active Problems:    Benign essential hypertension    Diabetes mellitus type 2, uncontrolled (HCC)    Ambulatory dysfunction    Hyperlipidemia    Microcytic anemia    Creatinine elevation  Resolved Problems:    * No resolved hospital problems  *    Physical Exam   Constitutional: She is oriented to person, place, and time  She appears well-developed and well-nourished  No distress  HENT:   Head: Normocephalic and atraumatic  Eyes: Conjunctivae are normal  Right eye exhibits no discharge  Left eye exhibits no discharge  Neck: Neck supple  No JVD present  Cardiovascular: Normal rate and regular rhythm  No murmur heard  Pulmonary/Chest: Breath sounds normal  No respiratory distress  She has no wheezes  Abdominal: Soft  She exhibits no distension  There is no tenderness  There is no rebound and no guarding  Musculoskeletal: Normal range of motion  She exhibits no edema     Neurological: She is alert and oriented to person, place, and time  No cranial nerve deficit  Skin: Skin is warm and dry  She is not diaphoretic  No erythema  Nursing note and vitals reviewed  Other Pertinent Test Results: none     Discharge Condition: stable    Discharge  Statement   I spent 30 minutes minutes discharging the patient  This time was spent on the day of discharge  I had direct contact with the patient on the day of discharge  Additional documentation is required if more than 30 minutes were spent on discharge

## 2018-04-12 NOTE — DISCHARGE INSTRUCTIONS
Fall Prevention   WHAT YOU NEED TO KNOW:   Fall prevention includes ways to make your home and other areas safer  It also includes ways you can move more carefully to prevent a fall  Health conditions that cause changes in your blood pressure, vision, or muscle strength and coordination may increase your risk for falls  Medicines may also increase your risk for falls if they make you dizzy, weak, or sleepy  DISCHARGE INSTRUCTIONS:   Call 911 or have someone else call if:   · You have fallen and are unconscious  · You have fallen and cannot move part of your body  Contact your healthcare provider if:   · You have fallen and have pain or a headache  · You have questions or concerns about your condition or care  Fall prevention tips:   · Stand or sit up slowly  This may help you keep your balance and prevent falls  · Use assistive devices as directed  Your healthcare provider may suggest that you use a cane or walker to help you keep your balance  You may need to have grab bars put in your bathroom near the toilet or in the shower  · Wear shoes that fit well and have soles that   Wear shoes both inside and outside  Use slippers with good   Do not wear shoes with high heels  · Wear a personal alarm  This is a device that allows you to call 911 if you fall and need help  Ask your healthcare provider for more information  · Stay active  Exercise can help strengthen your muscles and improve your balance  Your healthcare provider may recommend water aerobics or walking  He or she may also recommend physical therapy to improve your coordination  Never start an exercise program without talking to your healthcare provider first      · Manage your medical conditions  Keep all appointments with your healthcare providers  Visit your eye doctor as directed  Home safety tips:   · Add items to prevent falls in the bathroom    Put nonslip strips on your bath or shower floor to prevent you from slipping  Use a bath mat if you do not have carpet in the bathroom  This will prevent you from falling when you step out of the bath or shower  Use a shower seat so you do not need to stand while you shower  Sit on the toilet or a chair in your bathroom to dry yourself and put on clothing  This will prevent you from losing your balance from drying or dressing yourself while you are standing  · Keep paths clear  Remove books, shoes, and other objects from walkways and stairs  Place cords for telephones and lamps out of the way so that you do not need to walk over them  Tape them down if you cannot move them  Remove small rugs  If you cannot remove a rug, secure it with double-sided tape  This will prevent you from tripping  · Install bright lights in your home  Use night lights to help light paths to the bathroom or kitchen  Always turn on the light before you start walking  · Keep items you use often on shelves within reach  Do not use a step stool to help you reach an item  · Paint or place reflective tape on the edges of your stairs  This will help you see the stairs better  Follow up with your healthcare provider as directed:  Write down your questions so you remember to ask them during your visits  © 2017 [employer] Information is for End User's use only and may not be sold, redistributed or otherwise used for commercial purposes  All illustrations and images included in CareNotes® are the copyrighted property of A D A M , Inc  or Joe Mercado  The above information is an  only  It is not intended as medical advice for individual conditions or treatments  Talk to your doctor, nurse or pharmacist before following any medical regimen to see if it is safe and effective for you

## 2018-04-12 NOTE — PROGRESS NOTES
[address] [facility] Senior Admission Note   Unit/Bed # @DBLINK ([facility],13819)@ Encounter: [id number]  SOD Team A          Eduar Nevans 68 y o  female [id number]       Patient seen and examined  Reviewed H&P per Dr Ruiz Son with the assessment and plan except NA  Assessment/Plan: Principal Problem:    Chest pain  Active Problems:    Benign essential hypertension    Diabetes mellitus type 2, uncontrolled (HCC)    Ambulatory dysfunction    Hyperlipidemia    Microcytic anemia    Creatinine elevation     [age] female past medical history hypertension diabetes CVA who presented to night after an episode of left-sided chest pain the last few sec with radiation to the left arm  EKG on admission was negative for acute acute ischemia and her 1st troponin was negative  Chest x-ray was unremarkable for acute pathology    Plan  Admit for acute coronary syndrome rule out since multiple risk factors  Telemetry monitoring  EKG in the morning  Continue to trend troponins  Sliding scale insulin for diabetes    Last A1c 9  Continue hydrochlorothiazide 25 mg lisinopril 10 mg amlodipine 5 mg for blood pressure  Continue Lipitor  Continue Plavix for history of stroke  Physical therapy evaluation due to recent history of mechanical fall          Disposition:  OBSERVATION    Expected LOS: <2 82 Flora Sharpe DO

## 2018-04-12 NOTE — CASE MANAGEMENT
Initial Clinical Review    Admission: Date/Time/Statement: Observation 4/11 @ 1918    Orders Placed This Encounter   Procedures    Place in Observation (expected length of stay for this patient is less than two midnights)     Standing Status:   Standing     Number of Occurrences:   1     Order Specific Question:   Admitting Physician     Answer:   Kim Spears [[id number]]     Order Specific Question:   Level of Care     Answer:   Med Surg [16]       ED: Date/Time/Mode of Arrival:   ED Arrival Information     Expected Arrival Acuity Means of Arrival Escorted By Service Admission Type    - 4/11/2018 14:20 Emergent Walk-In Self General Medicine Emergency    Arrival Complaint    Chest Pain/Fall          Chief Complaint:   Chief Complaint   Patient presents with    Chest Pain     Pt c/o chest pain since last night  This morning patient fell, c/o right hip pain    Fall       History of Illness: 68 y o  female past medical history significant of hypertension, hyperlipidemia, diabetes, stroke 2010 presented to emergency department complaints of left upper chest pain that occurred last night  Patient states that she woke up last night with some intermittent sharp chest pain that lasted a few seconds  Chest pain radiated to the left arm  No alleviating or exacerbating factors  Patient was also noted to have a fall this morning  Patient states she got up out of bed to the bathroom and fell on her right side  She denied any head injury or loss consciousness  She is unsure why she fell  No previous falls according to patient  She was recently seen by her primary care physician and had an echocardiogram performed 7 days ago because she had systolic murmur  Echocardiogram showed mild mitral valve regurg and grade 1 diastolic dysfunction with normal ejection fraction  Notable labs in the ED included creatinine 1 48, glucose 146, hemoglobin of 11 3      ED Vital Signs:   ED Triage Vitals   Temperature Pulse Respirations Blood Pressure SpO2   04/11/18 2150 04/11/18 1634 04/11/18 1634 04/11/18 1634 04/11/18 1634   98 6 °F (37 °C) 72 18 138/63 99 %      Temp Source Heart Rate Source Patient Position - Orthostatic VS BP Location FiO2 (%)   04/11/18 2150 04/11/18 1634 04/11/18 1634 04/11/18 1634 --   Oral Monitor Sitting Right arm       Pain Score       04/11/18 1634       No Pain        Wt Readings from Last 1 Encounters:   04/11/18 88 kg (194 lb)       Vital Signs (abnormal): WNL    Abnormal Labs:   BUN 5 - 25 mg/dL 50     Creatinine 0 60 - 1 30 mg/dL 1 52        04/11/18 1436    Troponin I <=0 04 ng/mL <0 02       7 01     RBC 3 81 - 5 12 Million/uL 4 48    Hemoglobin 11 5 - 15 4 g/dL 11 3     Hematocrit 34 8 - 46 1 % 35 0       04/12/18 0432    WBC 4 31 - 10 16 Thousand/uL 5 47    RBC 3 81 - 5 12 Million/uL 3 86    Hemoglobin 11 5 - 15 4 g/dL 9 7     Hematocrit 34 8 - 46 1 % 30 9        04/11/18 2316    POC Glucose 65 - 140 mg/dl 330         Diagnostic Test Results: CXR - No acute cardiopulmonary disease  CT Head - No acute intracranial abnormality      ED Treatment:   Medication Administration from 04/11/2018 1420 to 04/11/2018 2134     None          Past Medical/Surgical History:    Active Ambulatory Problems     Diagnosis Date Noted    Aortic valve regurgitation, nonrheumatic 09/21/2017    Benign essential hypertension 06/28/2017    Bilateral edema of lower extremity 08/21/2017    Chronic rhinitis 01/22/2018    [facility]A (cerebrovascular accident) ([facility] [address] ) 06/28/2017    Midline cystocele 11/08/2016    Diabetes mellitus type 2, uncontrolled ([facility] ) 06/28/2017    Microalbuminuria 01/22/2018    Non-rheumatic mitral regurgitation 08/25/2017    Post-nasal drip 02/16/2018    Cough 02/16/2018    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/28/2018    Tinea pedis of both feet 03/04/2018    Shortness of breath on exertion 03/23/2018     Resolved Ambulatory Problems     Diagnosis Date Noted    ACE-inhibitor cough 12/04/2017     Past Medical History: Diagnosis Date    Asthma     Diabetes mellitus ([facility] )     Esophageal dysphagia     Hyperlipidemia     Hypertension     Stroke ([facility] )     Urinary frequency     UTI (urinary tract infection)        Admitting Diagnosis: Chest pain [R07 9]  Injury, unspecified, initial encounter [T14 90XA]    Age/Sex: 68 y o  female    Assessment/Plan:          Problem List      Aortic valve regurgitation, nonrheumatic     Benign essential hypertension     Bilateral edema of lower extremity     Chronic rhinitis     CVA (cerebrovascular accident) ([facility] )     Midline cystocele     Diabetes mellitus type 2, uncontrolled (HCC)     Microalbuminuria     Non-rheumatic mitral regurgitation     Post-nasal drip     Cough     Uterine procidentia     Tinea pedis of both feet     Shortness of breath on exertion          1  Chest pain  Does not appear cardiac in nature as not substernal, not worsened by exertion  Patient with no previous heart attacks or known cor coronary artery disease  But does have significant risk factors include hyperlipidemia, hypertension, stroke, diabetes, previous smoking history    Chest pain most likely musculoskeletal  Patient had recent echocardiogram 7 days ago which showed no regional wall abnormalities, normal EF, mild mitral regurg  Given significant risk factors for coronary artery disease will monitor patient overnight  No current Chest pain  EKG showed no evidence of acute ischemia  Troponin x1 negative, will obtain 2 more troponins  Telemetry monitoring     Type 2 diabetes  Last A1c 9 0 in September 2017  Will hold home meds  Will start Sliding scale insulin     Hypertension  BP WNL  Continue home meds of HCTZ 25 mg, lisinopril 10 mg, amlodipine 5 mg     Hyperlipidemia  Continue atorvastatin     CVA  Reports CVA 2010  Continue home Plavix     Microcytic Anemia  Hemoglobin 11 3 and stable with MCV of 78  Patient with no signs of active bleeding  Given microcytic in nature will check iron panel to rule out iron deficiency     Elevated Creatinine  Creatinine 1 48 on admission, previous 1 27  BUN to creatinine ratio elevated  Appears to be pre renal in origin  Will start patient on maintenance fluids at 1:25 a m  normal saline per hour  Will continue ACE-inhibitor at this time as not true CAROL, but if morning BMP shows worsening kidney function would discontinue lisinopril     Ambulatory dysfunction  Patient reports first-time new fall yesterday  Unsure if this has to do with residual weakness from her previous stroke 8 years ago    Will order consult for PT OT     Code Status: Level 1 - Full Code  VTE Pharmacologic Prophylaxis: Heparin   VTE Mechanical Prophylaxis: sequential compression device      Admission Orders:  Tele monitoring  PT/OT eval and treat    Scheduled Meds:   Current Facility-Administered Medications:  amLODIPine 5 mg Oral Daily   atorvastatin 40 mg Oral Daily   azelastine 1 spray Each Nare BID   cholecalciferol 1,000 Units Oral Daily   clopidogrel 75 mg Oral Daily   clotrimazole  Topical BID   heparin (porcine) 5,000 Units Subcutaneous Q8H [address]   hydrochlorothiazide 25 mg Oral Daily   insulin lispro 1-6 Units Subcutaneous TID AC   insulin lispro 1-6 Units Subcutaneous 0200   lisinopril 10 mg Oral Daily   oxybutynin 5 mg Oral Daily     Continuous Infusions:   sodium chloride 125 mL/hr Last Rate: 125 mL/hr (04/11/18 3668)     PRN Meds:     Acetaminophen po x1

## 2018-04-12 NOTE — H&P
INTERNAL MEDICINE HISTORY AND PHYSICAL  ED [facility]    NAME: Jennifer Amaya  AGE: 68 y o  SEX: female  : 1940   MRN: [id number]  ENCOUNTER: [id number]    DATE: 2018  TIME: 8:40 PM    Primary Care Physician: Dax Westfall MD  Admitting Provider: Dede Vázquez MD    Chief complaint: Chest pain    History of Present Illness     Belen Soliz is a 68 y o  female past medical history significant of hypertension, hyperlipidemia, diabetes, stroke  presented to emergency department complaints of left upper chest pain that occurred last night  Patient states that she woke up last night with some intermittent sharp chest pain that lasted a few seconds  Chest pain radiated to the left arm  No alleviating or exacerbating factors  Not associated with exertion, breathing, movement  Patient was also noted to have a fall this morning  Patient states she got up out of bed to the bathroom and fell on her right side  She denied any head injury or loss consciousness  She is unsure why she fell  No previous falls according to patient  She denies any other symptoms at this time  Denies shortness of breath, lightheadedness, fever, chills, cough, abdominal pain, nausea, vomiting, diarrhea, constipation  She was recently seen by her primary care physician and had an echocardiogram performed 7 days ago because she had systolic murmur  Echocardiogram showed mild mitral valve regurg and grade 1 diastolic dysfunction with normal ejection fraction  VS WNL   Notable labs in the ED included creatinine 1 48, glucose 146, hemoglobin of 11 3  EKG showed no evidence of acute ischemia  Troponin x1 negative  Chest x-ray showed no acute cardiopulmonary disease  She also had a chest x-ray of the hip pelvis showed no fracture, mild osteoarthritis of both hips  Review of Systems   Review of Systems   Constitutional: Negative for activity change, appetite change, chills, fever and unexpected weight change     HENT: Negative for congestion, ear pain, sinus pain and sore throat  Eyes: Negative for photophobia, pain and visual disturbance  Respiratory: Negative for cough, shortness of breath and wheezing  Cardiovascular: Positive for chest pain  Negative for palpitations and leg swelling  Gastrointestinal: Negative for abdominal pain, anal bleeding, constipation, diarrhea, nausea and vomiting  Genitourinary: Negative for decreased urine volume, difficulty urinating, dysuria, flank pain, hematuria and urgency  Musculoskeletal: Negative for arthralgias, back pain and joint swelling  Skin: Negative for color change, pallor and rash  Neurological: Negative for dizziness, seizures, syncope, weakness, light-headedness, numbness and headaches  Psychiatric/Behavioral: Negative  Past Medical History     Past Medical History:   Diagnosis Date    Asthma     Diabetes mellitus ([facility] )     Esophageal dysphagia     Hyperlipidemia     Hypertension     Stroke ([facility] )     Urinary frequency     UTI (urinary tract infection)        Past Surgical History     Past Surgical History:   Procedure Laterality Date    NE ESOPHAGOGASTRODUODENOSCOPY TRANSORAL DIAGNOSTIC N/A 4/5/2017    Procedure: ESOPHAGOGASTRODUODENOSCOPY (EGD); Surgeon: Selena Mtz MD;  Location: [facility]; Service: Gastroenterology       Social History     History   Alcohol Use No     History   Drug Use No     History   Smoking Status    Former Smoker   Smokeless Tobacco    Former User     Comment: quit over 30 yrs ago       Family History   History reviewed  No pertinent family history  Medications Prior to Admission     Prior to Admission medications    Medication Sig Start Date End Date Taking?  Authorizing Provider   amLODIPine (NORVASC) 5 mg tablet Take 1 tablet by mouth daily   Yes Historical Provider, MD   atorvastatin (LIPITOR) 40 mg tablet Take 1 tablet by mouth daily 7/17/17  Yes Historical Provider, MD   azelastine (ASTELIN) 0 1 % nasal spray 1 spray into each nostril 2 (two) times a day for 180 days Use in each nostril as directed 2/16/18 8/15/18 Yes Daryl Milan PA-C   Cholecalciferol (VITAMIN D3) 2000 units capsule Take 1 tablet by mouth daily 9/21/17  Yes Historical Provider, MD   ciclopirox (PENLAC) 8 % solution  2/28/18  Yes Historical Provider, MD   clopidogrel (PLAVIX) 75 mg tablet Take 1 tablet by mouth daily   Yes Historical Provider, MD   clotrimazole (LOTRIMIN) 1 % cream Apply topically 2 (two) times a day 3/4/18  Yes Daryl Milan PA-C   glipiZIDE (GLUCOTROL) 10 mg tablet Take 10 mg by mouth 2 (two) times a day before meals   Yes Historical Provider, MD   hydrochlorothiazide (HYDRODIURIL) 25 mg tablet Take 1 tablet by mouth daily   Yes Historical Provider, MD   Linagliptin (TRADJENTA) 5 MG TABS Take 1 tablet by mouth daily 10/10/17  Yes Historical Provider, MD   lisinopril (ZESTRIL) 10 mg tablet Take 1 tablet (10 mg total) by mouth daily for 90 days 2/16/18 5/17/18 Yes Daryl Milan PA-C   metFORMIN (GLUCOPHAGE) 1000 MG tablet Take 1,000 mg by mouth 2 (two) times a day with meals   Yes Historical Provider, MD   oxybutynin (DITROPAN) 5 mg tablet Take 1 tablet (5 mg total) by mouth daily for 90 days 3/2/18 5/31/18 Yes Daryl Milan PA-C   furosemide (LASIX) 20 mg tablet Take 1 tablet (20 mg total) by mouth daily for 10 days 3/23/18 4/11/18  Rajni Zuluaga MD       Allergies   No Known Allergies    Objective     Vitals:    04/11/18 1634 04/11/18 1820 04/11/18 2000   BP: 138/63 152/68 127/92   BP Location: Right arm  Right arm   Pulse: 72 66 74   Resp: 18 (!) 24 18   SpO2: 99% 100% 96%     There is no height or weight on file to calculate BMI  No intake or output data in the 24 hours ending 04/11/18 2040  Invasive Devices     Peripheral Intravenous Line            Peripheral IV 04/11/18 Left Antecubital less than 1 day                Physical Exam  GENERAL: Appears well-developed and well-nourished    Appears in no acute distress   HEENT: Normocephalic and atraumatic  No scleral icterus  PERRLA  EOMI B/L  No oropharyngeal edema  MM moist    NECK: Neck supple with no lymphadenopathy  Trachea midline  No JVD  CARDIOVASCULAR: S1 and S2 are present  Regular rate and rhythm  No murmurs, rubs, or gallops  RESPIRATORY: CTA B/L, no rales, rhonci or wheezes  Normal respiratory expansion  ABDOMINAL: Bowel sounds present in all 4 quadrants, non-tender, soft, non-distended  No organomegaly, rebound, or guarding  EXTREMITIES: 2+ DP and PT pulses bilaterally; no cyanosis, clubbing, edema  ROM intact  MCCOY x4   MUSCULOSKELETAL: No joint tenderness, deformity or swelling, full range of motion without pain  NEUROLOGIC: Patient is alert and oriented to person, place, and time  No sensory or motor deficits  CN 2-12 intact  Plantars downgoing bilaterally  Speech fluent  SKIN: Skin is warm and dry  No skin lesions are present  No rashes  PSYCHIATRIC: Normal mood and affect     Lab Results: I have personally reviewed pertinent reports      CBC:   Results from last 7 days  Lab Units 04/11/18  1436   WBC Thousand/uL 7 01   RBC Million/uL 4 48   HEMOGLOBIN g/dL 11 3*   HEMATOCRIT % 35 0   MCV fL 78*   MCH pg 25 2*   MCHC g/dL 32 3   RDW % 13 9   MPV fL 11 6   PLATELETS Thousands/uL 222   NRBC AUTO /100 WBCs 0   NEUTROS PCT % 75   LYMPHS PCT % 20   MONOS PCT % 4   EOS PCT % 1   BASOS PCT % 0   NEUTROS ABS Thousands/µL 5 16   LYMPHS ABS Thousands/µL 1 42   MONOS ABS Thousand/µL 0 31   EOS ABS Thousand/µL 0 09   , Chemistry Profile:   Results from last 7 days  Lab Units 04/11/18  1436   SODIUM mmol/L 141   POTASSIUM mmol/L 3 5   CHLORIDE mmol/L 103   CO2 mmol/L 30   ANION GAP mmol/L 8   BUN mg/dL 49*   CREATININE mg/dL 1 48*   GLUCOSE RANDOM mg/dL 146*   CALCIUM mg/dL 9 6   AST U/L 17   ALT U/L 17   ALK PHOS U/L 112   TOTAL PROTEIN g/dL 8 5*   BILIRUBIN TOTAL mg/dL 0 44   EGFR ml/min/1 73sq m 39   , Coagulation Studies:   , Cardiac Studies:   Results from last 7 days  Lab Units 04/11/18  1436   TROPONIN I ng/mL <0 02   , Additional Labs:   , iSTAT CHEM 8:   Results from last 7 days  Lab Units 04/11/18  1436   EGFR ml/min/1 73sq m 39   GLUCOSE RANDOM mg/dL 146*   HEMOGLOBIN g/dL 11 3*   , ABG:   , Toxicology:   , Last A1C/Lipid Panel/Thyroid Panel:   Lab Results   Component Value Date    HGBA1C 9 6 (H) 01/15/2018    HGBA1C 9 0 (H) 09/25/2017    TRIG 55 01/15/2018    TRIG 83 06/12/2017    CHOL 105 01/15/2018    HDL 48 01/15/2018    HDL 42 10/06/2016    LDLCALC 46 01/15/2018    [id number] 0 865 09/25/2017    FREET4 1 07 01/22/2016       Imaging: I have personally reviewed pertinent films in PACS  X-ray Chest 2 Views    Result Date: 4/11/2018  Narrative: CHEST INDICATION:   Chest pain  COMPARISON:  12/3/2017 EXAM PERFORMED/VIEWS:  XR CHEST PA & LATERAL FINDINGS: Cardiomediastinal silhouette appears stable, noting calcified uncoiled thoracic aorta  The lungs are clear  No pneumothorax or pleural effusion  Degenerative changes of the spine  Impression: No acute cardiopulmonary disease  Workstation performed: [id number]     Xr Hip/pelv 2-3 Vws Right    Result Date: 4/11/2018  Narrative: RIGHT HIP INDICATION:   Right lateral hip pain status post fall  COMPARISON:  None VIEWS:  XR HIP/PELV 2-3 VWS RIGHT W PELVIS IF PERFORMED Images: 3 FINDINGS: There is no acute fracture or dislocation  Mild bilateral hip osteoarthritis with joint space narrowing  No lytic or blastic osseous lesions  There are atherosclerotic calcifications  Central pelvic calcifications could represent calcified phleboliths, fibroids or possibly lymph nodes  Degenerative changes visualized lower lumbar spine  Impression: No fracture  Mild osteoarthritis both hips  Workstation performed: [id number]     Ct Head Without Contrast    Result Date: 4/11/2018  Narrative: CT BRAIN - WITHOUT CONTRAST INDICATION:   Headache and head trauma  COMPARISON:  11/21/2010   TECHNIQUE:  CT examination of the brain was performed  In addition to axial images, coronal 2D reformatted images were created and submitted for interpretation  Radiation dose length product (DLP) for this visit:  1035 6 mGy-cm   This examination, like all CT scans performed in the [facility], was performed utilizing techniques to minimize radiation dose exposure, including the use of iterative  reconstruction and automated exposure control  IMAGE QUALITY:  Diagnostic  FINDINGS: PARENCHYMA: Periventricular and subcortical hypoattenuating foci, consistent with microangiopathic disease No intracranial mass, mass effect or midline shift  No CT signs of acute infarction  No acute intracranial hemorrhage  VENTRICLES AND EXTRA-AXIAL SPACES:  Normal for the patient's age  VISUALIZED ORBITS AND PARANASAL SINUSES:  Unremarkable  CALVARIUM AND EXTRACRANIAL SOFT TISSUES:  Normal      Impression: No acute intracranial abnormality  Workstation performed: [id number]       Microbiology: cultures obtained in emergency department include     Urinalysis:       Invalid input(s): URIBILINOGEN     Urine Micro:        EKG, Pathology, and Other Studies: I have personally reviewed pertinent reports  Medications given in Emergency Department       Assessment and Plan     Problem List     Aortic valve regurgitation, nonrheumatic    Benign essential hypertension    Bilateral edema of lower extremity    Chronic rhinitis    CVA [facility]cerebrovascular accident) ([facility] [address] )    Midline cystocele    Diabetes mellitus type 2, uncontrolled (HCC)    Microalbuminuria    Non-rheumatic mitral regurgitation    Post-nasal drip    Cough    Uterine procidentia    Tinea pedis of both feet    Shortness of breath on exertion        1  Chest pain  Does not appear cardiac in nature as not substernal, not worsened by exertion  Patient with no previous heart attacks or known cor coronary artery disease    But does have significant risk factors include hyperlipidemia, hypertension, stroke, diabetes, previous smoking history  Chest pain most likely musculoskeletal  Patient had recent echocardiogram 7 days ago which showed no regional wall abnormalities, normal EF, mild mitral regurg  Given significant risk factors for coronary artery disease will monitor patient overnight  No current Chest pain  EKG showed no evidence of acute ischemia  Troponin x1 negative, will obtain 2 more troponins  Telemetry monitoring    Type 2 diabetes  Last A1c 9 0 in September 2017  Will hold home meds  Will start Sliding scale insulin    Hypertension  BP WNL  Continue home meds of HCTZ 25 mg, lisinopril 10 mg, amlodipine 5 mg    Hyperlipidemia  Continue atorvastatin    CVA  Reports CVA 2010  Continue home Plavix    Microcytic Anemia  Hemoglobin 11 3 and stable with MCV of 78  Patient with no signs of active bleeding  Given microcytic in nature will check iron panel to rule out iron deficiency    Elevated Creatinine  Creatinine 1 48 on admission, previous 1 27  BUN to creatinine ratio elevated  Appears to be pre renal in origin  Will start patient on maintenance fluids at 1:25 a m  normal saline per hour  Will continue ACE-inhibitor at this time as not true CAORL, but if morning BMP shows worsening kidney function would discontinue lisinopril    Ambulatory dysfunction  Patient reports first-time new fall yesterday  Unsure if this has to do with residual weakness from her previous stroke 8 years ago  Will order consult for PT OT    Code Status: Level 1 - Full Code  VTE Pharmacologic Prophylaxis: Heparin   VTE Mechanical Prophylaxis: sequential compression device  Admission Status: OBSERVATION    Admission Time  I spent 1 hour admitting the patient  This involved direct patient contact where I performed a full history and physical, reviewing previous records, and reviewing laboratory and other diagnostic studies      Zoë Koch MD  Internal Medicine  PGY-1

## 2018-04-12 NOTE — PLAN OF CARE
Problem: PAIN - ADULT  Goal: Verbalizes/displays adequate comfort level or baseline comfort level  Interventions:  - Encourage patient to monitor pain and request assistance  - Assess pain using appropriate pain scale  - Administer analgesics based on type and severity of pain and evaluate response  - Implement non-pharmacological measures as appropriate and evaluate response  - Consider cultural and social influences on pain and pain management  - Notify physician/advanced practitioner if interventions unsuccessful or patient reports new pain  Outcome: Progressing      Problem: SAFETY ADULT  Goal: Maintain or return to baseline ADL function  INTERVENTIONS:  -  Assess patient's ability to carry out ADLs; assess patient's baseline for ADL function and identify physical deficits which impact ability to perform ADLs (bathing, care of mouth/teeth, toileting, grooming, dressing, etc )  - Assess/evaluate cause of self-care deficits   - Assess range of motion  - Assess patient's mobility; develop plan if impaired  - Assess patient's need for assistive devices and provide as appropriate  - Encourage maximum independence but intervene and supervise when necessary  ¯ Involve family in performance of ADLs  ¯ Assess for home care needs following discharge   ¯ Request OT consult to assist with ADL evaluation and planning for discharge  ¯ Provide patient education as appropriate  Outcome: Progressing    Goal: Maintain or return mobility status to optimal level  INTERVENTIONS:  - Assess patient's baseline mobility status (ambulation, transfers, stairs, etc )    - Identify cognitive and physical deficits and behaviors that affect mobility  - Identify mobility aids required to assist with transfers and/or ambulation (gait belt, sit-to-stand, lift, walker, cane, etc )  - [place] fall precautions as indicated by assessment  - Record patient progress and toleration of activity level on Mobility SBAR; progress patient to next Phase/Stage  - Instruct patient to call for assistance with activity based on assessment  - Request Rehabilitation consult to assist with strengthening/weightbearing, etc   Outcome: Progressing      Problem: DISCHARGE PLANNING  Goal: Discharge to home or other facility with appropriate resources  INTERVENTIONS:  - Identify barriers to discharge w/patient and caregiver  - Arrange for needed discharge resources and transportation as appropriate  - Identify discharge learning needs (meds, wound care, etc )  - Arrange for interpretive services to assist at discharge as needed  - Refer to Case Management Department for coordinating discharge planning if the patient needs post-hospital services based on physician/advanced practitioner order or complex needs related to functional status, cognitive ability, or social support system  Outcome: Progressing

## 2018-04-16 ENCOUNTER — OFFICE VISIT (OUTPATIENT)
Dept: INTERNAL MEDICINE CLINIC | Facility: CLINIC | Age: 78
End: 2018-04-16
Payer: COMMERCIAL

## 2018-04-16 VITALS
SYSTOLIC BLOOD PRESSURE: 132 MMHG | WEIGHT: 182.1 LBS | DIASTOLIC BLOOD PRESSURE: 70 MMHG | HEART RATE: 80 BPM | TEMPERATURE: 98.1 F | BODY MASS INDEX: 32.27 KG/M2 | HEIGHT: 63 IN

## 2018-04-16 DIAGNOSIS — Z23 NEED FOR TDAP VACCINATION: ICD-10-CM

## 2018-04-16 DIAGNOSIS — E11.8 UNCONTROLLED TYPE 2 DIABETES MELLITUS WITH COMPLICATION, UNSPECIFIED WHETHER LONG TERM INSULIN USE: Primary | ICD-10-CM

## 2018-04-16 DIAGNOSIS — E11.65 UNCONTROLLED TYPE 2 DIABETES MELLITUS WITH COMPLICATION, UNSPECIFIED WHETHER LONG TERM INSULIN USE: Primary | ICD-10-CM

## 2018-04-16 DIAGNOSIS — E11.65 TYPE 2 DIABETES MELLITUS WITH HYPERGLYCEMIA (HCC): ICD-10-CM

## 2018-04-16 PROCEDURE — 99496 TRANSJ CARE MGMT HIGH F2F 7D: CPT | Performed by: INTERNAL MEDICINE

## 2018-04-16 RX ORDER — BLOOD-GLUCOSE METER
KIT MISCELLANEOUS
COMMUNITY
Start: 2018-04-13 | End: 2018-06-07 | Stop reason: SDUPTHER

## 2018-04-16 NOTE — PROGRESS NOTES
INTERNAL MEDICINE FOLLOW-UP OFFICE VISIT  [facility]  [address] [place] [address], [address]      NAME: Jovanny English  AGE: 68 y o  SEX: female    DATE OF ENCOUNTER: 4/16/2018    Assessment and Plan     EMMA Appointment  Pt is a 67 y/o F with a PMH of HTN, HLD, Stroke, DM2 who presents for [facility] appointment  She was hospitalized from 4/11-4/12 with chest pain and ACS rule out  Her chest pain resolved spontaneously (lasting only seconds) and ultimately determined to be non-cardiac in nature, but no clear etiology was determined though possibly musculoskeletal as it was reproducible  Currently she reports no chest pain, SOB, diaphoresis, palpitations  She is pleasant and offer no complaints  · Monitor clinically, no further workup needed  · Continue to follow with Cardiology      DMII  Patient last a1C earlier this month at 9 6%  Takes Metformin 1000/1500 in AM/PM, Glipizide 10mg BID, and Linagliptin 5mg daily  However, patient is a bit noncompliant with medications missing doses 3x/week  Does not check blood sugars at home because she ran out of strips but will get them today she states  Up to date on eye/foot exam  Her diet is poor high in carbohydrates (eats excessive bread, sweets, chocolate)  Denies any hypoglycemic episodes  Currently only exercises 1-2x/month for several minutes ("walking around the block one time")  · Counseled on medication compliance (currently missing a lot of PM doses)  Refusing subcutaneous insulin as an option  · Counseled on diet (cutting down carbohydrates/sugar, increasing fruits/veggies)  · Counseled on exercise (goal is 2x/week for 30mins for now)  · Overall, she realizes she needs to make several changes in her life to improve her BG control and she seems willing, but only time will tell, so will follow up next visit to see her compliance        HM  · Patient refusing TDaP      Diagnoses and all orders for this visit:    Uncontrolled type 2 diabetes mellitus with complication, unspecified whether long term insulin use ([facility] [address] )    Need for Tdap vaccination    Other orders  -     Cancel: Tdap vaccine greater than or equal to 8yo IM  -     Cancel: POCT diabetic eye exam        No orders of the defined types were placed in this encounter       - Counseling Documentation: patient was counseled regarding: diagnostic results, instructions for management, risk factor reductions and prognosis    Chief Complaint     No chief complaint on file  History of Present Illness     Pt is a 69 y/o F with a PMH of HTN, HLD, Stroke, Diabetes who presents for [facility] appointment  She was hospitalized from 4/11-4/12 with chest pain for ACS rule out  Troponin's were negative x3, EKG negative, negative CT head and Xray of hip  Overall, chest pain was determined to be non-cardiac but no clear etiology was determined  The chest pain lasted several seconds and resolved spontaneously  She also suffered a mechanical fall and was offered home Pt, which she denied  She has had chronic balance problems since her CVA in 2010 and she walks with a walker  Today she offers no acute complaints  Denies chest pain, SOB, diaphoresis, palpitations  The following portions of the patient's history were reviewed and updated as appropriate: allergies, current medications, past family history, past medical history, past social history, past surgical history and problem list     Review of Systems     Review of Systems   Constitutional: Negative for activity change, appetite change, chills, diaphoresis, fatigue and fever  Respiratory: Negative for cough, chest tightness and shortness of breath  Cardiovascular: Negative for chest pain, palpitations and leg swelling  Gastrointestinal: Negative for abdominal distention, abdominal pain, anal bleeding, blood in stool, constipation, diarrhea, nausea and vomiting     Endocrine: Negative for cold intolerance, heat intolerance, polyphagia and polyuria  Genitourinary: Negative for dysuria, enuresis, frequency, hematuria and urgency  Skin: Negative for color change, pallor and rash  Neurological: Negative for weakness, light-headedness and numbness  Active Problem List     Patient Active Problem List   Diagnosis    Aortic valve regurgitation, nonrheumatic    Benign essential hypertension    Bilateral edema of lower extremity    Chronic rhinitis    CVA [facility]cerebrovascular accident) ([facility] [address] )    Midline cystocele    Diabetes mellitus type 2, uncontrolled ([facility] )    Microalbuminuria    Non-rheumatic mitral regurgitation    Post-nasal drip    Cough    Uterine procidentia    Tinea pedis of both feet    Shortness of breath on exertion    Chest pain    Ambulatory dysfunction    Hyperlipidemia    Microcytic anemia    Creatinine elevation       Objective     There were no vitals taken for this visit  Physical Exam   Constitutional: She is oriented to person, place, and time  She appears well-developed and well-nourished  No distress  HENT:   Head: Normocephalic and atraumatic  Cardiovascular: Normal rate and regular rhythm  Exam reveals no gallop and no friction rub  No murmur heard  Pulmonary/Chest: Effort normal and breath sounds normal  No respiratory distress  She has no wheezes  She has no rales  She exhibits no tenderness  Abdominal: Soft  Bowel sounds are normal  She exhibits no distension  There is no tenderness  There is no rebound  Musculoskeletal: She exhibits no edema, tenderness or deformity  Neurological: She is alert and oriented to person, place, and time  Skin: Skin is warm and dry  No rash noted  She is not diaphoretic  No erythema  No pallor  Nursing note and vitals reviewed        Pertinent Laboratory/Diagnostic Studies:  CBC:   Lab Results   Component Value Date/Time    WBC 5 47 04/12/2018 04:32 AM    WBC 5 84 05/04/2015 09:56 AM    RBC 3 86 04/12/2018 04:32 AM    RBC 4 44 05/04/2015 09:56 AM    HGB 9 7 (L) 04/12/2018 04:32 AM    HGB 11 6 05/04/2015 09:56 AM    HCT 30 9 (L) 04/12/2018 04:32 AM    HCT 35 2 05/04/2015 09:56 AM    MCV 80 (L) 04/12/2018 04:32 AM    MCV 79 (L) 05/04/2015 09:56 AM    MCH 25 1 (L) 04/12/2018 04:32 AM    MCH 26 1 (L) 05/04/2015 09:56 AM    MCHC 31 4 04/12/2018 04:32 AM    MCHC 33 0 05/04/2015 09:56 AM    RDW 14 1 04/12/2018 04:32 AM    RDW 13 6 05/04/2015 09:56 AM    MPV 11 8 04/12/2018 04:32 AM    MPV 11 3 05/04/2015 09:56 AM     04/12/2018 04:32 AM     05/04/2015 09:56 AM    NRBC 0 04/11/2018 02:36 PM    NEUTOPHILPCT 75 04/11/2018 02:36 PM    NEUTOPHILPCT 63 05/04/2015 09:56 AM    LYMPHOPCT 20 04/11/2018 02:36 PM    LYMPHOPCT 28 05/04/2015 09:56 AM    MONOPCT 4 04/11/2018 02:36 PM    MONOPCT 6 05/04/2015 09:56 AM    EOSPCT 1 04/11/2018 02:36 PM    EOSPCT 2 05/04/2015 09:56 AM    BASOPCT 0 04/11/2018 02:36 PM    BASOPCT 1 05/04/2015 09:56 AM    NEUTROABS 5 16 04/11/2018 02:36 PM    NEUTROABS 3 68 05/04/2015 09:56 AM    LYMPHSABS 1 42 04/11/2018 02:36 PM    LYMPHSABS 1 64 05/04/2015 09:56 AM    MONOSABS 0 31 04/11/2018 02:36 PM    MONOSABS 0 35 05/04/2015 09:56 AM    EOSABS 0 09 04/11/2018 02:36 PM    EOSABS 0 12 05/04/2015 09:56 AM     CBC:   Results from last 6 Months  Lab Units 04/12/18  0432  04/11/18  1436   WBC Thousand/uL 5 47  --  7 01   RBC Million/uL 3 86  --  4 48   HEMOGLOBIN g/dL 9 7*  --  11 3*   HEMATOCRIT % 30 9*  --  35 0   MCV fL 80*  --  78*   MCH pg 25 1*  --  25 2*   MCHC g/dL 31 4  --  32 3   RDW % 14 1  --  13 9   MPV fL 11 8  < > 11 6   PLATELETS Thousands/uL 191  < > 222   NRBC AUTO /100 WBCs  --   --  0   NEUTROS PCT %  --   --  75   LYMPHS PCT %  --   --  20   MONOS PCT %  --   --  4   EOS PCT %  --   --  1   BASOS PCT %  --   --  0   NEUTROS ABS Thousands/µL  --   --  5 16   LYMPHS ABS Thousands/µL  --   --  1 42   MONOS ABS Thousand/µL  --   --  0 31   EOS ABS Thousand/µL  --   --  0 09   < > = values in this interval not displayed      Current Medications     Current Outpatient Prescriptions:     amLODIPine (NORVASC) 5 mg tablet, Take 1 tablet by mouth daily, Disp: , Rfl:     atorvastatin (LIPITOR) 40 mg tablet, Take 1 tablet by mouth daily, Disp: , Rfl:     azelastine (ASTELIN) 0 1 % nasal spray, 1 spray into each nostril 2 (two) times a day for 180 days Use in each nostril as directed, Disp: 30 mL, Rfl: 5    Cholecalciferol (VITAMIN D3) 2000 units capsule, Take 1 tablet by mouth daily, Disp: , Rfl:     ciclopirox (PENLAC) 8 % solution, , Disp: , Rfl:     clopidogrel (PLAVIX) 75 mg tablet, Take 1 tablet by mouth daily, Disp: , Rfl:     clotrimazole (LOTRIMIN) 1 % cream, Apply topically 2 (two) times a day, Disp: 30 g, Rfl: 2    glipiZIDE (GLUCOTROL) 10 mg tablet, Take 10 mg by mouth 2 (two) times a day before meals, Disp: , Rfl:     hydrochlorothiazide (HYDRODIURIL) 25 mg tablet, Take 1 tablet by mouth daily, Disp: , Rfl:     Linagliptin (TRADJENTA) 5 MG TABS, Take 1 tablet by mouth daily, Disp: , Rfl:     lisinopril (ZESTRIL) 10 mg tablet, Take 1 tablet (10 mg total) by mouth daily for 90 days, Disp: 90 tablet, Rfl: 0    metFORMIN (GLUCOPHAGE) 1000 MG tablet, Take 1,000 mg by mouth 2 (two) times a day with meals, Disp: , Rfl:     oxybutynin (DITROPAN) 5 mg tablet, Take 1 tablet (5 mg total) by mouth daily for 90 days, Disp: 90 tablet, Rfl: 0    Health Maintenance     Health Maintenance   Topic Date Due    OPHTHALMOLOGY EXAM  07/25/1950    DTaP,Tdap,and Td Vaccines (1 - Tdap) 07/25/1961    Fall Risk  07/25/2005    Urinary Incontinence Screening  07/25/2005    GLAUCOMA SCREENING 67+ YR  07/25/2007    Diabetic Foot Exam  09/21/2018    URINE MICROALBUMIN  01/15/2019    Depression Screening PHQ-9  02/28/2019    INFLUENZA VACCINE  Completed    PNEUMOCOCCAL POLYSACCHARIDE VACCINE AGE 65 AND OVER  Completed     Immunization History   Administered Date(s) Administered    Influenza 10/10/2017    Influenza Quadrivalent, 6-35 Months IM 10/10/2017    Pneumococcal Polysaccharide PPV23 01/01/2008       RAMIRO Salinas    Internal Medicine PGY-1  4/16/2018 2:08 PM

## 2018-04-16 NOTE — PATIENT INSTRUCTIONS
10% - bad control"> 10% - bad control,Hemoglobin A1c (HbA1c) greater than 10% indicating poor diabetic control,Haemoglobin A1c greater than 10% indicating poor diabetic control">   Diabetes Mellitus Type 2 in Adults, Ambulatory Care   GENERAL INFORMATION:   Diabetes mellitus type 2  is a disease that affects how your body uses glucose (sugar)  Insulin helps move sugar out of the blood so it can be used for energy  Normally, when the blood sugar level increases, the pancreas makes more insulin  Type 2 diabetes develops because either the body cannot make enough insulin, or it cannot use the insulin correctly  After many years, your pancreas may stop making insulin  Common symptoms include the following:   · More hunger or thirst than usual     · Frequent urination     · Weight loss without trying     · Blurred vision  Seek immediate care for the following symptoms:   · Severe abdominal pain, or pain that spreads to your back  You may also be vomiting  · Trouble staying awake or focusing    · Shaking or sweating    · Blurred or double vision    · Breath has a fruity, sweet smell    · Breathing is deep and labored, or rapid and shallow    · Heartbeat is fast and weak  Treatment for diabetes mellitus type 2  includes keeping your blood sugar at a normal level  You must eat the right foods, and exercise regularly  You may also need medicine if you cannot control your blood sugar level with nutrition and exercise  Manage diabetes mellitus type 2:   · Check your blood sugar level  You will be taught how to check a small drop of blood in a glucose monitor  Ask your healthcare provider when and how often to check during the day  Ask your healthcare provider what your blood sugar levels should be when you check them  · Keep track of carbohydrates (sugar and starchy foods)  Your blood sugar level can get too high if you eat too many carbohydrates   Your dietitian will help you plan meals and snacks that have the right amount of carbohydrates  · Eat low-fat foods  Some examples are skinless chicken and low-fat milk  · Eat less sodium (salt)  Some examples of high-sodium foods to limit are soy sauce, potato chips, and soup  Do not add salt to food you cook  Limit your use of table salt  · Eat high-fiber foods  Foods that are a good source of fiber include vegetables, whole grain bread, and beans  · Limit alcohol  Alcohol affects your blood sugar level and can make it harder to manage your diabetes  Women should limit alcohol to 1 drink a day  Men should limit alcohol to 2 drinks a day  A drink of alcohol is 12 ounces of beer, 5 ounces of wine, or 1½ ounces of liquor  · Get regular exercise  Exercise can help keep your blood sugar level steady, decrease your risk of heart disease, and help you lose weight  Exercise for at least 30 minutes, 5 days a week  Include muscle strengthening activities 2 days each week  Work with your healthcare provider to create an exercise plan  · Check your feet each day  for injuries or open sores  Ask your healthcare provider for activities you can do if you have an open sore  · Quit smoking  If you smoke, it is never too late to quit  Smoking can worsen the problems that may occur with diabetes  Ask your healthcare provider for information about how to stop smoking if you are having trouble quitting  · Ask about your weight:  Ask healthcare providers if you need to lose weight, and how much to lose  Ask them to help you with a weight loss program  Even a 10 to 15 pound weight loss can help you manage your blood sugar level  · Carry medical alert identification  Wear medical alert jewelry or carry a card that says you have diabetes  Ask your healthcare provider where to get these items  · Ask about vaccines  Diabetes puts you at risk of serious illness if you get the flu, pneumonia, or hepatitis   Ask your healthcare provider if you should get a flu, pneumonia, or hepatitis B vaccine, and when to get the vaccine  Follow up with your healthcare provider as directed:  Write down your questions so you remember to ask them during your visits  CARE AGREEMENT:   You have the right to help plan your care  Learn about your health condition and how it may be treated  Discuss treatment options with your caregivers to decide what care you want to receive  You always have the right to refuse treatment  The above information is an  only  It is not intended as medical advice for individual conditions or treatments  Talk to your doctor, nurse or pharmacist before following any medical regimen to see if it is safe and effective for you  © 2014 [address] is for End User's use only and may not be sold, redistributed or otherwise used for commercial purposes  All illustrations and images included in CareNotes® are the copyrighted property of A D A M [employer]  or [address]

## 2018-04-17 ENCOUNTER — TELEPHONE (OUTPATIENT)
Dept: INTERNAL MEDICINE CLINIC | Facility: CLINIC | Age: 78
End: 2018-04-17

## 2018-04-17 DIAGNOSIS — IMO0001 UNCONTROLLED TYPE 2 DIABETES MELLITUS WITHOUT COMPLICATION, WITHOUT LONG-TERM CURRENT USE OF INSULIN: Primary | ICD-10-CM

## 2018-04-17 RX ORDER — LANCETS 28 GAUGE
EACH MISCELLANEOUS
Qty: 100 EACH | Refills: 0 | Status: SHIPPED | OUTPATIENT
Start: 2018-04-17

## 2018-04-17 RX ORDER — BLOOD-GLUCOSE METER
KIT MISCELLANEOUS DAILY
Qty: 1 EACH | Refills: 0 | Status: SHIPPED | OUTPATIENT
Start: 2018-04-17 | End: 2022-08-09

## 2018-04-17 NOTE — TELEPHONE ENCOUNTER
I sent a new machine but actually when I look at her chart I actually sent freestyle test strips to her pharmacy on April 13th so she either lost machine or can't find it but I did send a new machine to her pharmacy

## 2018-04-17 NOTE — TELEPHONE ENCOUNTER
Pt  CALLED ASKING IF YOU CAN SEND IN A GLUCOMETER AND SUPPLIES TO HER PHARMACY  SHE SAID WHEN SHE WAS RECENTLY HOSPITALIZED THEY ADVISED HER TO START CHECKING HER BLOOD SUGAR   SHE SAID HER INSURANCE Co  COVERS THE FOLLOWING MACHINES:    FREESTYLE  FREEDOM STYLE  PRECISION EXTRA

## 2018-04-20 ENCOUNTER — TELEPHONE (OUTPATIENT)
Dept: INTERNAL MEDICINE CLINIC | Facility: CLINIC | Age: 78
End: 2018-04-20

## 2018-04-20 DIAGNOSIS — K22.9 ESOPHAGEAL ABNORMALITY: Primary | ICD-10-CM

## 2018-04-20 RX ORDER — PANTOPRAZOLE SODIUM 20 MG/1
20 TABLET, DELAYED RELEASE ORAL DAILY
Qty: 90 TABLET | Refills: 0 | Status: SHIPPED | OUTPATIENT
Start: 2018-04-20 | End: 2018-10-01 | Stop reason: ALTCHOICE

## 2018-04-20 NOTE — TELEPHONE ENCOUNTER
Patient is requesting medication Pantoprazole -sod-dr 40 mg tablets for her acid reflex  She states she has spoken to you about her Acid Reflex before

## 2018-04-20 NOTE — TELEPHONE ENCOUNTER
Please call patient and let her know that I have sent a prescription for pantoprazole  Please let her know that this prescription is for 20 mg and not 40 mg  Based on the studies she had an EGD and a barium swallow and neither 1 demonstrated any inflammation or ulcers  She did have some twisting in the esophagus which could cause some of her symptoms therefore I a m  Sending the medication but at a lower dose  These medications can have long-term side effects and should only be used for a shorter period of time  Based on her scope it is appropriate for her to have this medication but at the 20 mg dose     If she does not have any relief of symptoms at this dose then we can get her referred back in with the gastroenterologist

## 2018-04-23 DIAGNOSIS — R05.9 COUGH: ICD-10-CM

## 2018-04-23 DIAGNOSIS — J31.0 CHRONIC RHINITIS: ICD-10-CM

## 2018-04-23 DIAGNOSIS — R09.89 TICKLE IN THROAT: Primary | ICD-10-CM

## 2018-04-23 NOTE — TELEPHONE ENCOUNTER
Please call patient back, continue the med for her stomach  Based on symptoms, exam and extensive pulmonary evaluation likely allergies  Confirm using her nasal spray every day and to get the allergy blood test  Please also sched her with ENT for further evaluation   We will call her if any abnormalities on blood test

## 2018-04-23 NOTE — TELEPHONE ENCOUNTER
Patient called again to clarify this information  She wants you to know she started the medication 4/21/18  She still has the tickle in the back of the throat and is still coughing  She can't sleep at night because of it  Please advise if there is anything else she can do

## 2018-04-26 ENCOUNTER — OFFICE VISIT (OUTPATIENT)
Dept: OBGYN CLINIC | Facility: CLINIC | Age: 78
End: 2018-04-26
Payer: COMMERCIAL

## 2018-04-26 VITALS
SYSTOLIC BLOOD PRESSURE: 130 MMHG | DIASTOLIC BLOOD PRESSURE: 77 MMHG | WEIGHT: 184 LBS | HEIGHT: 64 IN | BODY MASS INDEX: 31.41 KG/M2 | HEART RATE: 76 BPM

## 2018-04-26 DIAGNOSIS — N95.2 VAGINAL ATROPHY: Primary | ICD-10-CM

## 2018-04-26 DIAGNOSIS — N81.10 POP-Q STAGE 3 CYSTOCELE: Primary | ICD-10-CM

## 2018-04-26 DIAGNOSIS — N39.46 MIXED INCONTINENCE: ICD-10-CM

## 2018-04-26 PROCEDURE — 99214 OFFICE O/P EST MOD 30 MIN: CPT | Performed by: OBSTETRICS & GYNECOLOGY

## 2018-04-26 RX ORDER — ESTRADIOL 0.1 MG/G
CREAM VAGINAL
Qty: 42.5 G | Refills: 1 | Status: SHIPPED | OUTPATIENT
Start: 2018-04-26 | End: 2018-05-03

## 2018-04-26 NOTE — PROGRESS NOTES
Urogynecology - New Patient Visit  Jd Diamond   [id number]    French-speaking: no Phone  used: no    Chief complaint: MATTHEW    HPI: 68 y o  presents for symptoms of MATTHEW daily  Reports urinary urgency/frequency but on bladder diary only voiding 4-5x/day  Hx pelvic organ prolapse was managed with #7 ring pessary by Dr Joe Akers in 2014, has been using since  Cleans and reinserts it weekly, has helped with POP symptoms  Pt denies voiding dysfunction  Is on oxybutynin for OAB but unclear if pt has true OAB  PMH notable for history of stroke, obesity, poorly controlled DM  Referred by gynecologist:  no  History of prolapse surgery: no  Primary care doctor: yes    Prolapse symptoms  Bulge: yes   Pressure: yes  Rubbing on clothing: yes  Stenting for urine: no  Stenting for stool: no  Pessary use: yes Type: ring  Duration: 4 year(s)  Hormonal replacement therapy: no     Urinary symptoms  Urgency: yes  Frequency: no 5 / day  Incontinence with stress (exercise, valsalva, laugh, sneeze, cough): yes  Incontinence with urge: yes  Postural incontinence: no  Nocturia: no but pt reports she wets herself overnight  Dysuria: no  Hematuria:no  Incomplete emptying: yes  Slow stream:no  Hesitancy: no  Straining with urination: no    Defecatory symptoms  Constipation:no  Frequency:no  1/day  Urgency: no  Fecal Incontinence: no  Gas incontinence:  yes  Loose stools:  yes    Gynecologic history  Pap history: Normal  Postmenopausal bleeding if applicable: no  Sexually active: no  Dyspareunia: no     Past Medical History:   Diagnosis Date    Asthma     Diabetes mellitus [facility] [address] [facility]     Esophageal dysphagia     Hyperlipidemia     Hypertension     [facility]     Urinary frequency     UTI (urinary tract infection)        Past Surgical History:   Procedure Laterality Date    NC ESOPHAGOGASTRODUODENOSCOPY TRANSORAL DIAGNOSTIC N/A 4/5/2017    Procedure: ESOPHAGOGASTRODUODENOSCOPY (EGD);   Surgeon: Abdullahi Aranda MD;  Location:  GI LAB; Service: Gastroenterology       OB History    Para Term  AB Living   10 8 8   2     SAB TAB Ectopic Multiple Live Births                  # Outcome Date GA Lbr Reynold/2nd Weight Sex Delivery Anes PTL Lv   10 AB            9 AB            8 Term            7 Term            6 Term            5 Term            4 Term            3 Term            2 Term            1 Term                   Family History   Problem Relation Age of Onset    Heart disease Father     No Known Problems Mother        Social History     Social History    Marital status:       Spouse name: N/A    Number of children: N/A    Years of education: N/A     Social History Main Topics    Smoking status: Former Smoker    Smokeless tobacco: Former User      Comment: quit over 30 yrs ago    Alcohol use No    Drug use: No    Sexual activity: No     Other Topics Concern    None     Social History Narrative    None       Current Outpatient Prescriptions on File Prior to Visit   Medication Sig Dispense Refill    amLODIPine (NORVASC) 5 mg tablet Take 1 tablet by mouth daily      atorvastatin (LIPITOR) 40 mg tablet Take 1 tablet by mouth daily      azelastine (ASTELIN) 0 1 % nasal spray 1 spray into each nostril 2 (two) times a day for 180 days Use in each nostril as directed 30 mL 5    Blood Glucose Monitoring Suppl (FREESTYLE LITE) JAQUELIN by Does not apply route daily 1 each 0    Cholecalciferol (VITAMIN D3) 2000 units capsule Take 1 tablet by mouth daily      ciclopirox (PENLAC) 8 % solution       clotrimazole (LOTRIMIN) 1 % cream Apply topically 2 (two) times a day 30 g 2    FREESTYLE LITE test strip       glipiZIDE (GLUCOTROL) 10 mg tablet Take 10 mg by mouth 2 (two) times a day before meals      Lancets (FREESTYLE) lancets Use as instructed 100 each 0    Linagliptin (TRADJENTA) 5 MG TABS Take 1 tablet by mouth daily      lisinopril (ZESTRIL) 10 mg tablet Take 1 tablet (10 mg total) by mouth daily for 90 days 90 tablet 0    metFORMIN (GLUCOPHAGE) 1000 MG tablet Take 1,000 mg by mouth 2 (two) times a day with meals      oxybutynin (DITROPAN) 5 mg tablet Take 1 tablet (5 mg total) by mouth daily for 90 days 90 tablet 0    pantoprazole (PROTONIX) 20 mg tablet Take 1 tablet (20 mg total) by mouth daily for 90 days 90 tablet 0    clopidogrel (PLAVIX) 75 mg tablet Take 1 tablet by mouth daily      hydrochlorothiazide (HYDRODIURIL) 25 mg tablet Take 1 tablet by mouth daily       No current facility-administered medications on file prior to visit  No Known Allergies    Pertinent items are noted in HPI  Physical exam:  Cardiovascular: regular rate and rhythm  Lungs:  clear to auscultation bilaterally  Abdomen: soft, non-tender, without masses or organomegaly  Pelvic: BUS normal  Introitus normal  Normal appearing vaginal epithelium  Extremities: 1+ pitting edema to ankles bilaterally  Neurologic:  alert, oriented, normal speech, no focal findings or movement disorder noted, DTRs normal and symmetrical Clitoral-Bulbocavernosus reflex reduced    Vulvovaginal atrophy:  yes moderate  Urethral caruncle:  no none    POP-Q   POP Q EXAM      Most Recent Value   POP-Q Exam   Aa  2   Ba  6 [marked distension defect and loss of rugae]   C  -4   GH  6   PB  3   VL  10   Ap  -1   Bp  -1   D  -6          Kegel strength: 2/5    Q-tip test: Resting degree of 0 with straining degree of 40    Posterior wall relaxation: yes     Assessment:  68 y o  with Incontinence  Severity = moderate  Prolapse Stage 3 cystocele     Plan:  Surgery: possibly  Anterior VEC and sling pending urodynamics  However, pt may not be ideal candidate for surgery given multiple medical comorbidities particularly uncontrolled diabetes  Will follow up urodynamic testing to determine best course of action     Topical estrogen: yes Type: estradiol cream  Frequency: Other: twice a week at bedtime  OAB medications: advised to discontinue oxybutynin given cognitive issues in the elderly    OAB counseling (timed voids, avoiding triggers, fluid intake management): yes  Kegel exercise handout: yes  Urodynamics: yes  Pessary yes Size # 7 Type ring with support    Follow up in 1 month(s)  After urodynamics  Preoperative visit: yes  Preoperative clearance: yes    Brandy Adorno MD

## 2018-04-27 ENCOUNTER — APPOINTMENT (OUTPATIENT)
Dept: LAB | Facility: HOSPITAL | Age: 78
End: 2018-04-27
Payer: COMMERCIAL

## 2018-04-27 ENCOUNTER — TRANSCRIBE ORDERS (OUTPATIENT)
Dept: INTERNAL MEDICINE CLINIC | Facility: CLINIC | Age: 78
End: 2018-04-27

## 2018-04-27 DIAGNOSIS — E11.65 UNCONTROLLED TYPE 2 DIABETES MELLITUS WITH COMPLICATION, UNSPECIFIED WHETHER LONG TERM INSULIN USE: Primary | ICD-10-CM

## 2018-04-27 DIAGNOSIS — E11.8 UNCONTROLLED TYPE 2 DIABETES MELLITUS WITH COMPLICATION, UNSPECIFIED WHETHER LONG TERM INSULIN USE: Primary | ICD-10-CM

## 2018-04-27 DIAGNOSIS — E11.65 TYPE 2 DIABETES MELLITUS WITH HYPERGLYCEMIA (HCC): ICD-10-CM

## 2018-04-27 LAB
EST. AVERAGE GLUCOSE BLD GHB EST-MCNC: 220 MG/DL
HBA1C MFR BLD: 9.3 % (ref 4.2–6.3)

## 2018-04-27 PROCEDURE — 86003 ALLG SPEC IGE CRUDE XTRC EA: CPT | Performed by: PHYSICIAN ASSISTANT

## 2018-04-27 PROCEDURE — 36415 COLL VENOUS BLD VENIPUNCTURE: CPT | Performed by: PHYSICIAN ASSISTANT

## 2018-04-27 PROCEDURE — 82785 ASSAY OF IGE: CPT | Performed by: PHYSICIAN ASSISTANT

## 2018-04-27 PROCEDURE — 83036 HEMOGLOBIN GLYCOSYLATED A1C: CPT

## 2018-04-30 PROBLEM — R06.02 SHORTNESS OF BREATH ON EXERTION: Status: RESOLVED | Noted: 2018-03-23 | Resolved: 2018-04-30

## 2018-04-30 PROBLEM — R05.9 COUGH: Status: RESOLVED | Noted: 2018-02-16 | Resolved: 2018-04-30

## 2018-04-30 NOTE — PROGRESS NOTES
Assessment/Plan:  Your cough is related to your allergies please continue your allergy meds daily  You are NOT to be taking the protonix twice a day, it is once a day ONLY  As the Lasix did help with your swelling call the cardiologist back and advise them, also you cannot take both the lasix and the HCTZ it is one or the other  You confirm understanding that you cannot take both of the water pills together and we will call the cardiologist back to advise she would like to remain on the Lasix  Continue follow up with Endo as scheduled and have nutritionist appt on 5/14 I have provided you with a script for follow up labs in 6 months  Sign a release so your eye exams from Dr Reese Ochoa can be sent here for your chart  No problem-specific Assessment & Plan notes found for this encounter  Diagnoses and all orders for this visit:    Uncontrolled diabetes mellitus type 2 without complications, unspecified whether long term insulin use ([facility] )  -     POCT diabetic eye exam  -     Comprehensive metabolic panel; Future  -     HEMOGLOBIN A1C W/ EAG ESTIMATION; Future  -     Lipid Panel with Direct LDL reflex; Future    Benign essential hypertension    Non-rheumatic mitral regurgitation    Mixed hyperlipidemia    Creatinine elevation    Chronic rhinitis  -     loratadine (CLARITIN) 10 mg tablet; Take 1 tablet (10 mg total) by mouth daily for 90 days  -     ketotifen (ZADITOR) 0 025 % ophthalmic solution; Administer 1 drop to both eyes 2 (two) times a day          Subjective:      Patient ID: Jovanny English is a 68 y o  female  Patient coming in for 3 month follow-up  Patient was continuing to complain of cough as well as shortness of breath  Patient had extensive testing and was also referred to the pulmonologist as well as the cardiologist       Patient saw the cardiologist on March 23, 2018  As per the cardiologist's note it was felt the mild valvular abnormalities were not causing any of her symptoms  It was noted she did have slight diastolic dysfunction  Patient was advised to not take her hydrochlorothiazide and to do a trial of Lasix 20 mg daily  Patient was to get follow-up blood testing and advised the cardiologist if any improvement in her swelling and symptoms on the Lasix  It is noted patient is on 5 mg of amlodipine and the cardiologist did not stop this medication  Patient states that she was not told to follow up with cardiology regarding her lasix and that she was not told to call her cardiologist to let them know if the lasix was working  Patient followed up with the pulmonologist on April 11, 2018  It was noted in that visit that patient reported she no longer had any cough or shortness of breath  The pulmonologist did review her pulmonary testing and chest x-rays and nothing abnormal was noted  Patient was advised to schedule an appointment p r n  Please note patient did then call back in April reporting a cough and tickle in her throat  Patient was resumed on her acid reflux medications as it is known that on a previous EGD she did have a tortuous esophagus  It was also requested that patient complete a [place] panel to determine if she is having allergy symptoms and to follow up with ENT as well  it is also noted that patient went to the hospital on April 11th reporting left-sided chest pain that had started the night before  Patient also reported a mechanical fall earlier in that same day prompting her to go to the emergency room  Patient was seen by provider here after this hospitalization and noted that no cardiac source was found  Patient was offered physical therapy for her balance issue secondary to stroke however patient declined  At follow-up visit it was also discussed that her diabetes is not well controlled, frequently misses multiple doses of her medications    It was also discussed with patient as was with me on previous visits that patient really should be considering insulin as her glucose continues to increase  Patient is noted to have poor diet with a lot of simple carbs as well as sweets and declines insulin as well  Patient did see Evelyn Melton gyn  It is noted patient continues to use her pessary with some relief  Patient has been taking oxybutynin although Uro gyn did not definitively feel she had overactive bladder  Based on exam it was felt patient may be amenable to surgery for a lift  It was determined that they would do urinary dynamics 1st because based on her elevated poorly controlled diabetes she would not be an ideal candidate for surgery  Please note that patient had an A1c earlier in April that was 9 6% and then did her follow-up A1c for me later in April that was 9 3%  patient does have an endocrinology appointment on May 2nd  I have had multiple discussions with the patient regarding the sulfonylurea  and how this medication is not helping her diabetes, patient has declined to discontinue this medication  Hopefully with Endocrinology intervention patient will possibly be more amenable to switching her medications  Patient did follow up with the endocrinology department no additional changes to her medications were made as patient did not want to make any changes at that visit and admitted to poor diet  Patient will continue to work on a diet plan as well as decreasing weight  Patient is scheduled to follow up with Endo in December  Patient is checking her blood sugar twice per day  She days that the glucose monitor that she has keeps a log, but that she does not write down her numbers  She states that her sugars have not been under 100 and that the highest that she hs seen is 206  Review of hospital discharge it is noted that patient was taking hydrochlorothiazide and no mention of Lasix on her medication list   Will need to verify which of these medications patient has been taking     No record of follow-up visit nor phone call regarding the Lasix is noted in her chart to the cardiologist      Per the patient she was given a 10 day supply of the Lasix that she took and finished  The lasix improved her leg swelling  She was continuing to take her 25 mg HCTZ at the same time  She has continued to take her HCTZ 25 mg daily  As discussed with patient she will need to call her cardiologist to determine if they want to resume her on her Lasix  I am not comfortable resuming this due to the fact that she continues to take both medications at the same time taking Lasix and HCTZ  Patient has an appointment with a podiatrist tomorrow, Dr Mary Kay Damian  Patient reports that she has the cough, runny nose, post nasal drip, and itchy eyes again  Continuous  Since October  The patient reports that she is using her Astelin nasal spray 3-4 times per day which helps  Patient tried Robitussin for her cough that didn't help at all  She tried 50 mg benadryl yesterday that helped for several hours before the coughing started again  She does not take any other allergy medications  Patient reports that she takes her 20 mg Protonix twice per day for her reflux and that she has been taking it every day since she was prescribed the medication  The following portions of the patient's history were reviewed and updated as appropriate: allergies, current medications, past family history, past medical history, past social history, past surgical history and problem list     Review of Systems   Constitutional: Negative for appetite change, chills, diaphoresis and fever  HENT: Positive for postnasal drip and rhinorrhea  Negative for sore throat and trouble swallowing  Eyes: Positive for itching (water  )  Negative for visual disturbance  Respiratory: Positive for cough  Negative for chest tightness, shortness of breath and wheezing  Cardiovascular: Positive for leg swelling  Negative for chest pain and palpitations     Gastrointestinal: Negative for abdominal pain, constipation, diarrhea, nausea and vomiting  Genitourinary: Negative for difficulty urinating, dysuria and hematuria  Musculoskeletal: Negative for gait problem (Patient uses cane at baseline  Gets around well  )  Neurological: Negative for syncope, numbness and headaches  Psychiatric/Behavioral: Negative  Objective:      /60 (BP Location: Left arm, Patient Position: Sitting, Cuff Size: Adult)   Pulse 76   Temp 98 2 °F (36 8 °C) (Oral)   Ht 5' 4" (1 626 m)   Wt 84 2 kg (185 lb 10 oz)   BMI 31 86 kg/m²          Physical Exam   Constitutional: She is oriented to person, place, and time  She appears well-developed and well-nourished  Non-toxic appearance  She does not appear ill  No distress  HENT:   Head: Normocephalic and atraumatic  Right Ear: Tympanic membrane, external ear and ear canal normal    Left Ear: Tympanic membrane, external ear and ear canal normal    Nose: Rhinorrhea present  Mouth/Throat: Oropharynx is clear and moist and mucous membranes are normal  Mucous membranes are not dry  Eyes: Conjunctivae are normal  No scleral icterus  Neck: Carotid bruit is not present  Cardiovascular: Normal rate, regular rhythm, S1 normal, S2 normal and normal heart sounds  No extrasystoles are present  Exam reveals no gallop, no distant heart sounds and no friction rub  No murmur heard  Patient with decreased pulses secondary to edema  Capillary refill less than 2 seconds bilateral lower extremities  Capillary refill less than 2 seconds bilateral upper extremities  Pulmonary/Chest: Effort normal and breath sounds normal  No accessory muscle usage  No tachypnea  No respiratory distress  She has no decreased breath sounds  She has no wheezes  She has no rhonchi  She has no rales  Musculoskeletal:        Right lower leg: She exhibits edema (2+ pitting)  She exhibits no tenderness  Left lower leg: She exhibits swelling and edema (2+ pitting)   She exhibits no tenderness  Right foot: There is swelling  There is normal capillary refill  Left foot: There is swelling  There is normal capillary refill  Neurological: She is alert and oriented to person, place, and time  Skin: Skin is warm and dry  No rash noted  No cyanosis  Nails show no clubbing  Psychiatric: She has a normal mood and affect   Her speech is normal

## 2018-05-01 LAB
A ALTERNATA IGE QN: <0.1 KUA/I
A FUMIGATUS IGE QN: <0.1 KUA/I
ALLERGEN COMMENT: NORMAL
BERMUDA GRASS IGE QN: <0.1 KUA/I
BOXELDER IGE QN: <0.1 KUA/I
C HERBARUM IGE QN: <0.1 KUA/I
CAT DANDER IGE QN: <0.1 KUA/I
CMN PIGWEED IGE QN: <0.1 KUA/I
COMMON RAGWEED IGE QN: <0.1 KUA/I
COTTONWOOD IGE QN: <0.1 KUA/I
D FARINAE IGE QN: <0.1 KUA/I
D PTERONYSS IGE QN: <0.1 KUA/I
DOG DANDER IGE QN: <0.1 KUA/I
LONDON PLANE IGE QN: <0.1 KUA/I
MOUSE URINE PROT IGE QN: <0.1 KUA/I
MT JUNIPER IGE QN: <0.1 KUA/I
MUGWORT IGE QN: <0.1 KUA/I
P NOTATUM IGE QN: <0.1 KUA/I
ROACH IGE QN: <0.1 KUA/I
SHEEP SORREL IGE QN: <0.1 KUA/I
SILVER BIRCH IGE QN: <0.1 KUA/I
TIMOTHY IGE QN: <0.1 KUA/I
TOTAL IGE SMQN RAST: 30.6 KU/L (ref 0–113)
WALNUT IGE QN: <0.1 KUA/I
WHITE ASH IGE QN: <0.1 KUA/I
WHITE ELM IGE QN: <0.1 KUA/I
WHITE MULBERRY IGE QN: <0.1 KUA/I
WHITE OAK IGE QN: <0.1 KUA/I

## 2018-05-02 ENCOUNTER — OFFICE VISIT (OUTPATIENT)
Dept: MULTI SPECIALTY CLINIC | Facility: CLINIC | Age: 78
End: 2018-05-02
Payer: COMMERCIAL

## 2018-05-02 VITALS
HEART RATE: 72 BPM | SYSTOLIC BLOOD PRESSURE: 140 MMHG | WEIGHT: 185.63 LBS | TEMPERATURE: 97.8 F | BODY MASS INDEX: 31.69 KG/M2 | HEIGHT: 64 IN | DIASTOLIC BLOOD PRESSURE: 70 MMHG

## 2018-05-02 DIAGNOSIS — E11.65 UNCONTROLLED TYPE 2 DIABETES MELLITUS WITH COMPLICATION, UNSPECIFIED WHETHER LONG TERM INSULIN USE: ICD-10-CM

## 2018-05-02 DIAGNOSIS — E11.8 UNCONTROLLED TYPE 2 DIABETES MELLITUS WITH COMPLICATION, UNSPECIFIED WHETHER LONG TERM INSULIN USE: ICD-10-CM

## 2018-05-02 PROBLEM — N18.30 CKD STAGE 3 DUE TO TYPE 2 DIABETES MELLITUS (HCC): Status: ACTIVE | Noted: 2018-05-02

## 2018-05-02 PROBLEM — E11.22 CKD STAGE 3 DUE TO TYPE 2 DIABETES MELLITUS (HCC): Status: ACTIVE | Noted: 2018-05-02

## 2018-05-02 PROCEDURE — 99203 OFFICE O/P NEW LOW 30 MIN: CPT | Performed by: INTERNAL MEDICINE

## 2018-05-02 NOTE — ASSESSMENT & PLAN NOTE
Continue current regimen glipizide 10mg BID, Tradjenta 5mg daily, and Metformin twice daily 1000mg/1500mg  Reports 100% compliance  A1c 9 3  Never on insulin therapy - pt extensively counseled on dietary and lifestyle modifications as this is most likely the source of her issues with controlling BG  Patient will be referred to diabetes nutritional counseling  Patient instructed to increase her activity levels as well  We discussed multiple modalities to achieve this  Patient seems receptive at this time and agrees to plan  Monitor renal fxn closely  May need to d/c Metformin if continues to decline  Continue ACEi for microalbuminuria  UTD with podiatry and Ophthalmology - + retinopathy  May consider once daily insulin regimen if dietary and lifestyle modifications fail to achieve target A1c - goal for this patient would be 7-8 given her advanced age and medical comorbid ites    This was discussed with patient as well, she would like to try to avoid this if possible at this time

## 2018-05-02 NOTE — PROGRESS NOTES
ENDOCRINOLOGY OFFICE VISIT  [address] [address] [address]    NAME: Evens Bardales  AGE: 68 y o  SEX: female    DATE OF ENCOUNTER: 5/2/2018    Assessment and Plan     Problem List Items Addressed This Visit        Endocrine    Diabetes mellitus type 2, uncontrolled ([address] )     Continue current regimen glipizide 10mg BID, Tradjenta 5mg daily, and Metformin twice daily 1000mg/1500mg  Reports 100% compliance  A1c 9 3  Never on insulin therapy - pt extensively counseled on dietary and lifestyle modifications as this is most likely the source of her issues with controlling BG  Patient will be referred to diabetes nutritional counseling  Patient instructed to increase her activity levels as well  We discussed multiple modalities to achieve this  Patient seems receptive at this time and agrees to plan  Monitor renal fxn closely  May need to d/c Metformin if continues to decline  Continue ACEi for microalbuminuria  UTD with podiatry and Ophthalmology - + retinopathy  May consider once daily insulin regimen if dietary and lifestyle modifications fail to achieve target A1c - goal for this patient would be 7-8 given her advanced age and medical comorbid ites    This was discussed with patient as well, she would like to try to avoid this if possible at this time         Relevant Orders    Ambulatory referral to medical nutrition therapy for diabetes          Orders Placed This Encounter   Procedures    Ambulatory referral to medical nutrition therapy for diabetes       - Counseling Documentation: patient was counseled regarding: diagnostic results, instructions for management, risk factor reductions, prognosis, patient and family education, impressions, risks and benefits of treatment options and importance of compliance with treatment  - Counseling Time: counseling time more than 50% of visit: 15 minutes  - Barriers to treatment: Dietary compliance  - Medication Side Effects: Adverse side effects of medications were reviewed with the patient/guardian today  - Self Referrals: No    Chief Complaint     Chief Complaint   Patient presents with    Consult     New patient  DM2       History of Present Illness     Diabetes   She presents for her initial diabetic visit  She has type 2 diabetes mellitus  No MedicAlert identification noted  Her disease course has been improving  There are no hypoglycemic associated symptoms  Associated symptoms include blurred vision and visual change  Pertinent negatives for diabetes include no chest pain, no fatigue, no foot paresthesias, no foot ulcerations, no polydipsia, no polyphagia, no polyuria, no weakness and no weight loss  There are no hypoglycemic complications  Symptoms are stable  Diabetic complications include a CVA, nephropathy and retinopathy  Pertinent negatives for diabetic complications include no autonomic neuropathy  Risk factors for coronary artery disease include dyslipidemia, diabetes mellitus, hypertension, sedentary lifestyle and post-menopausal  Current diabetic treatment includes oral agent (triple therapy) (Glipizide 10mg BID, Tradjenta 5mg daily, Metformin 1000mg in AM/1500mg in evening)  She is compliant with treatment all of the time  Her weight is stable  She is following a generally unhealthy diet  When asked about meal planning, she reported none  She has not had a previous visit with a dietitian  She rarely participates in exercise  There is no change in her home blood glucose trend  Her overall blood glucose range is 180-200 mg/dl  An ACE inhibitor/angiotensin II receptor blocker is being taken  She sees a podiatrist Eye exam is current         The following portions of the patient's history were reviewed and updated as appropriate: allergies, current medications, past family history, past medical history, past social history, past surgical history and problem list     Review of Systems     Review of Systems   Constitutional: Negative for appetite change, fatigue and weight loss  Eyes: Positive for blurred vision  Respiratory: Negative for shortness of breath  Cardiovascular: Positive for leg swelling  Negative for chest pain and palpitations  Gastrointestinal: Negative for abdominal distention, constipation, diarrhea, nausea and vomiting  Endocrine: Negative for polydipsia, polyphagia and polyuria  Genitourinary: Negative  Musculoskeletal: Negative for arthralgias  Allergic/Immunologic: Negative  Neurological: Negative for weakness  Hematological: Negative  Psychiatric/Behavioral: Negative  Active Problem List     Patient Active Problem List   Diagnosis    Aortic valve regurgitation, nonrheumatic    Benign essential hypertension    Bilateral edema of lower extremity    Chronic rhinitis    [facility]A [facility]ce[facility]bro[facility]) [facility]    Midline cystocele    Diabetes mellitus type 2, uncontrolled ([facility] )    Microalbuminuria    Non-rheumatic mitral regurgitation    Post-nasal drip    Uterine procidentia    Tinea pedis of both feet    Chest pain    Ambulatory dysfunction    Hyperlipidemia    Microcytic anemia    Creatinine elevation    Esophageal abnormality    Tickle in throat    CKD stage 3 due to type 2 diabetes mellitus (HCC)       Objective     /70 (BP Location: Right arm, Patient Position: Sitting, Cuff Size: Standard)   Pulse 72   Temp 97 8 °F (36 6 °C) (Oral)   Ht 5' 4" (1 626 m)   Wt 84 2 kg (185 lb 10 oz)   BMI 31 86 kg/m²     Physical Exam   Constitutional: She is oriented to person, place, and time  She appears well-developed and well-nourished  HENT:   Head: Normocephalic and atraumatic  Eyes: Pupils are equal, round, and reactive to light  Neck: Normal range of motion  Cardiovascular: Normal rate and regular rhythm  Exam reveals no gallop and no friction rub  No murmur heard  Pulmonary/Chest: Effort normal  She has no wheezes   She has no rales  Abdominal: Soft  She exhibits no distension  There is no tenderness  Musculoskeletal: Normal range of motion  She exhibits edema  Neurological: She is alert and oriented to person, place, and time  Skin: Skin is warm and dry  Nursing note and vitals reviewed        Pertinent Laboratory/Diagnostic Studies:  CBC:   Lab Results   Component Value Date/Time    WBC 5 47 04/12/2018 04:32 AM    WBC 5 84 05/04/2015 09:56 AM    RBC 3 86 04/12/2018 04:32 AM    RBC 4 44 05/04/2015 09:56 AM    HGB 9 7 (L) 04/12/2018 04:32 AM    HGB 11 6 05/04/2015 09:56 AM    HCT 30 9 (L) 04/12/2018 04:32 AM    HCT 35 2 05/04/2015 09:56 AM    MCV 80 (L) 04/12/2018 04:32 AM    MCV 79 (L) 05/04/2015 09:56 AM    MCH 25 1 (L) 04/12/2018 04:32 AM    MCH 26 1 (L) 05/04/2015 09:56 AM    MCHC 31 4 04/12/2018 04:32 AM    MCHC 33 0 05/04/2015 09:56 AM    RDW 14 1 04/12/2018 04:32 AM    RDW 13 6 05/04/2015 09:56 AM    MPV 11 8 04/12/2018 04:32 AM    MPV 11 3 05/04/2015 09:56 AM     04/12/2018 04:32 AM     05/04/2015 09:56 AM    NRBC 0 04/11/2018 02:36 PM    NEUTOPHILPCT 75 04/11/2018 02:36 PM    NEUTOPHILPCT 63 05/04/2015 09:56 AM    LYMPHOPCT 20 04/11/2018 02:36 PM    LYMPHOPCT 28 05/04/2015 09:56 AM    MONOPCT 4 04/11/2018 02:36 PM    MONOPCT 6 05/04/2015 09:56 AM    EOSPCT 1 04/11/2018 02:36 PM    EOSPCT 2 05/04/2015 09:56 AM    BASOPCT 0 04/11/2018 02:36 PM    BASOPCT 1 05/04/2015 09:56 AM    NEUTROABS 5 16 04/11/2018 02:36 PM    NEUTROABS 3 68 05/04/2015 09:56 AM    LYMPHSABS 1 42 04/11/2018 02:36 PM    LYMPHSABS 1 64 05/04/2015 09:56 AM    MONOSABS 0 31 04/11/2018 02:36 PM    MONOSABS 0 35 05/04/2015 09:56 AM    EOSABS 0 09 04/11/2018 02:36 PM    EOSABS 0 12 05/04/2015 09:56 AM     Chemistry Profile:   Lab Results   Component Value Date/Time     04/12/2018 04:32 AM     09/22/2015 12:25 PM    K 3 3 (L) 04/12/2018 04:32 AM    K 3 6 09/22/2015 12:25 PM     04/12/2018 04:32 AM     09/22/2015 12:25 PM    CO2 27 04/12/2018 04:32 AM    CO2 27 09/22/2015 12:25 PM    ANIONGAP 8 04/12/2018 04:32 AM    ANIONGAP 13 09/22/2015 12:25 PM    BUN 50 (H) 04/12/2018 04:32 AM    BUN 29 (H) 09/22/2015 12:25 PM    CREATININE 1 52 (H) 04/12/2018 04:32 AM    CREATININE 0 96 09/22/2015 12:25 PM    GLUCOSE 132 04/12/2018 04:32 AM    GLUCOSE 170 (H) 09/22/2015 12:25 PM    CALCIUM 8 7 04/12/2018 04:32 AM    CALCIUM 9 3 09/22/2015 12:25 PM    AST 17 04/11/2018 02:36 PM    AST 10 09/22/2015 12:25 PM    ALT 17 04/11/2018 02:36 PM    ALT 23 09/22/2015 12:25 PM    ALKPHOS 112 04/11/2018 02:36 PM    ALKPHOS 94 09/22/2015 12:25 PM    PROT 8 5 (H) 04/11/2018 02:36 PM    PROT 8 1 09/22/2015 12:25 PM    BILITOT 0 44 04/11/2018 02:36 PM    BILITOT 0 80 09/22/2015 12:25 PM    EGFR 38 04/12/2018 04:32 AM     Coagulation Studies: No results found for: PROTIME, INR, PTT  Endocrine Studies:   Lab Results   Component Value Date/Time    HGBA1C 9 3 (H) 04/27/2018 01:24 PM    HGBA1C 9 5 (H) 09/22/2015 12:25 PM    QUK2RNTGMVHI 0 865 09/25/2017 11:53 AM    ESG0OGHZLGTT 0 674 09/23/2014 01:04 PM    FREET4 1 07 01/22/2016 01:38 PM    TRIG 152 (H) 04/11/2018 10:22 PM    CHOL 106 04/11/2018 10:22 PM    HDL 31 (L) 04/11/2018 10:22 PM    LDLCALC 45 04/11/2018 10:22 PM    [place] [facility] 04/11/2018 10:22 PM         Current Medications     Current Outpatient Prescriptions:     amLODIPine (NORVASC) 5 mg tablet, Take 1 tablet by mouth daily, Disp: , Rfl:     atorvastatin (LIPITOR) 40 mg tablet, Take 1 tablet by mouth daily, Disp: , Rfl:     azelastine (ASTELIN) 0 1 % nasal spray, 1 spray into each nostril 2 (two) times a day for 180 days Use in each nostril as directed, Disp: 30 mL, Rfl: 5    Blood Glucose Monitoring Suppl (FREESTYLE LITE) JAQUELIN, by Does not apply route daily, Disp: 1 each, Rfl: 0    Cholecalciferol (VITAMIN D3) 2000 units capsule, Take 1 tablet by mouth daily, Disp: , Rfl:     ciclopirox (PENLAC) 8 % solution, , Disp: , Rfl:     clopidogrel (PLAVIX) 75 mg tablet, Take 1 tablet by mouth daily, Disp: , Rfl:     clotrimazole (LOTRIMIN) 1 % cream, Apply topically 2 (two) times a day, Disp: 30 g, Rfl: 2    estradiol (ESTRACE) 0 1 mg/g vaginal cream, Apply a pea-sized amount to the vagina at bedtime twice a week , Disp: 42 5 g, Rfl: 1    FREESTYLE LITE test strip, , Disp: , Rfl:     glipiZIDE (GLUCOTROL) 10 mg tablet, Take 10 mg by mouth 2 (two) times a day before meals, Disp: , Rfl:     hydrochlorothiazide (HYDRODIURIL) 25 mg tablet, Take 1 tablet by mouth daily, Disp: , Rfl:     Lancets (FREESTYLE) lancets, Use as instructed, Disp: 100 each, Rfl: 0    Linagliptin (TRADJENTA) 5 MG TABS, Take 1 tablet by mouth daily, Disp: , Rfl:     lisinopril (ZESTRIL) 10 mg tablet, Take 1 tablet (10 mg total) by mouth daily for 90 days, Disp: 90 tablet, Rfl: 0    metFORMIN (GLUCOPHAGE) 1000 MG tablet, Take 1,000 mg by mouth 2 (two) times a day with meals, Disp: , Rfl:     oxybutynin (DITROPAN) 5 mg tablet, Take 1 tablet (5 mg total) by mouth daily for 90 days, Disp: 90 tablet, Rfl: 0    pantoprazole (PROTONIX) 20 mg tablet, Take 1 tablet (20 mg total) by mouth daily for 90 days, Disp: 90 tablet, Rfl: 0    Health Maintenance     Health Maintenance   Topic Date Due    OPHTHALMOLOGY EXAM  07/25/1950    Fall Risk  07/25/2005    Urinary Incontinence Screening  07/25/2005    GLAUCOMA SCREENING 67+ YR  07/25/2007    DTaP,Tdap,and Td Vaccines (1 - Tdap) 04/16/2019 (Originally 7/25/1961)    INFLUENZA VACCINE  09/01/2018    Diabetic Foot Exam  09/21/2018    HEMOGLOBIN A1C  10/27/2018    URINE MICROALBUMIN  01/15/2019    Depression Screening PHQ-9  02/28/2019    PNEUMOCOCCAL POLYSACCHARIDE VACCINE AGE 65 AND OVER  Completed     Immunization History   Administered Date(s) Administered    Influenza 10/10/2017    Influenza Quadrivalent, 6-35 Months IM 10/10/2017    Pneumococcal Polysaccharide PPV23 01/01/2008       [facility]  5/2/2018 11:32 AM

## 2018-05-03 ENCOUNTER — OFFICE VISIT (OUTPATIENT)
Dept: INTERNAL MEDICINE CLINIC | Facility: CLINIC | Age: 78
End: 2018-05-03
Payer: COMMERCIAL

## 2018-05-03 ENCOUNTER — DOCUMENTATION (OUTPATIENT)
Dept: INTERNAL MEDICINE CLINIC | Facility: OTHER | Age: 78
End: 2018-05-03

## 2018-05-03 VITALS
DIASTOLIC BLOOD PRESSURE: 60 MMHG | BODY MASS INDEX: 31.69 KG/M2 | SYSTOLIC BLOOD PRESSURE: 128 MMHG | HEART RATE: 76 BPM | HEIGHT: 64 IN | TEMPERATURE: 98.2 F | WEIGHT: 185.63 LBS

## 2018-05-03 DIAGNOSIS — E78.2 MIXED HYPERLIPIDEMIA: ICD-10-CM

## 2018-05-03 DIAGNOSIS — J31.0 CHRONIC RHINITIS: ICD-10-CM

## 2018-05-03 DIAGNOSIS — I10 BENIGN ESSENTIAL HYPERTENSION: ICD-10-CM

## 2018-05-03 DIAGNOSIS — IMO0001 UNCONTROLLED DIABETES MELLITUS TYPE 2 WITHOUT COMPLICATIONS, UNSPECIFIED WHETHER LONG TERM INSULIN USE: Primary | ICD-10-CM

## 2018-05-03 DIAGNOSIS — N95.2 VAGINAL ATROPHY: Primary | ICD-10-CM

## 2018-05-03 DIAGNOSIS — I34.0 NON-RHEUMATIC MITRAL REGURGITATION: ICD-10-CM

## 2018-05-03 DIAGNOSIS — R79.89 CREATININE ELEVATION: ICD-10-CM

## 2018-05-03 PROCEDURE — 99214 OFFICE O/P EST MOD 30 MIN: CPT | Performed by: PHYSICIAN ASSISTANT

## 2018-05-03 RX ORDER — LORATADINE 10 MG/1
10 TABLET ORAL DAILY
Qty: 90 TABLET | Refills: 0 | Status: SHIPPED | OUTPATIENT
Start: 2018-05-03 | End: 2018-06-01

## 2018-05-03 RX ORDER — KETOTIFEN FUMARATE 0.35 MG/ML
1 SOLUTION/ DROPS OPHTHALMIC 2 TIMES DAILY
Qty: 5 ML | Refills: 0 | Status: SHIPPED | OUTPATIENT
Start: 2018-05-03 | End: 2018-05-22 | Stop reason: SDUPTHER

## 2018-05-03 NOTE — PROGRESS NOTES
[facility] - Diabetes:  [facility] has met with pt who has had a recent hospitalization due to chest pain  Pt back at home and remaining independent in [place]  She resides alone but her family ( 5 sons ) help as needed  She drives limited distances, has [employer] an also knows about her [employer] transportation  Pt has recently received a new cane with a seat, commode and shower chair  She relates she is planning on walking more now that is warmer and may rejoin the [facility]  Encouragement provided  Pt also agreed to DSMT classes and schedule for June provided  Pt also invited to our Support Group  Supportive counseling rovided  [facility] and the Diabetic Team to remain available to support and assist as indicated

## 2018-05-04 ENCOUNTER — TELEPHONE (OUTPATIENT)
Dept: INTERNAL MEDICINE CLINIC | Facility: CLINIC | Age: 78
End: 2018-05-04

## 2018-05-04 DIAGNOSIS — N18.9 CKD (CHRONIC KIDNEY DISEASE): Primary | ICD-10-CM

## 2018-05-04 NOTE — TELEPHONE ENCOUNTER
Patient has been informed   She wanted to be transferred to schedule with you ahead of time because she wants to remain on the lasix   Phone call was transferred to the appt line   By the way she did not stop taking HCTZ , she was taking both lasix and HCTZ

## 2018-05-04 NOTE — TELEPHONE ENCOUNTER
If you refer to patients last note from PCP Carri Parker , would you suggest patient resuming her lasix ? ? Patient was taking lasix along with her HCTZ, pcp not comfortable with that   Patient called in to verify whether or not she needs another script or not of the lasix ? ? Please inform me

## 2018-05-11 ENCOUNTER — OFFICE VISIT (OUTPATIENT)
Dept: MULTI SPECIALTY CLINIC | Facility: CLINIC | Age: 78
End: 2018-05-11
Payer: COMMERCIAL

## 2018-05-11 ENCOUNTER — TELEPHONE (OUTPATIENT)
Dept: INTERNAL MEDICINE CLINIC | Facility: CLINIC | Age: 78
End: 2018-05-11

## 2018-05-11 VITALS
HEIGHT: 64 IN | TEMPERATURE: 98.5 F | HEART RATE: 76 BPM | WEIGHT: 184.75 LBS | DIASTOLIC BLOOD PRESSURE: 68 MMHG | SYSTOLIC BLOOD PRESSURE: 124 MMHG | BODY MASS INDEX: 31.54 KG/M2

## 2018-05-11 DIAGNOSIS — D64.9 ANEMIA: ICD-10-CM

## 2018-05-11 DIAGNOSIS — N17.9 AKI (ACUTE KIDNEY INJURY) (HCC): ICD-10-CM

## 2018-05-11 DIAGNOSIS — R60.0 BILATERAL EDEMA OF LOWER EXTREMITY: Primary | ICD-10-CM

## 2018-05-11 DIAGNOSIS — E78.5 HYPERLIPIDEMIA: ICD-10-CM

## 2018-05-11 DIAGNOSIS — I10 BENIGN ESSENTIAL HYPERTENSION: ICD-10-CM

## 2018-05-11 PROCEDURE — 99212 OFFICE O/P EST SF 10 MIN: CPT | Performed by: INTERNAL MEDICINE

## 2018-05-11 NOTE — PROGRESS NOTES
[address] [facility] Cardiology  [facility], [address]  [phone]                                              Cardiology Follow Up  Bill Garcia, 68 y o  female  YOB: 1940  MRN: [id number] Encounter: [id number]      Assessment and Plan  Shortness of breath on exertion  · Repeat echo - 4/6/18 - LVEF 55%, Grade 1 DD, mild LVH, mild MR, trace AI, trace TR - inadequate to evaluate pulmonary pressures  · Normal TSH - 0 8 in 9/2017  · Symptoms did improve mildly after furosemide trial for 10 days, but Cr worsened  · No clear evidence of heart failure on clinical exam  · RV size and function appears normal without any evidence of pulmonary hypertension  · Hold off any further lasix for now  · Repeat BMP  · She does have anemia on last CBC  Hb dropped to 9 7 <-- 11 3  No reported history of active bleeding or black stools    · Will repeat a CBC, and get a fecal occult blood test  · If Hb continues to be down trending on follow up CBC, then this may be contributing to her shortness of breath, and will need to be further evaluated by primary     Bilateral edema of lower extremity  · NT-Pro  prior to lasix trial (3/27/18)  · Cr worsened after furosemide trial (1 27 --> 1 48)  · Her edema had apparently improved after furosemide, and is now back  · No clear evidence of heart failure on clinical exam  · Would hold off on further lasix  · Can continue to HCTZ     Benign essential hypertension  · On amlodipine 5, lisinopril 10, HCTZ 25  · BP well controlled  · Does have mild LVH on echo and grade 1 diastolic dysfunction  · Continue same regimen    CAROL  · Cr 1 4, up from baseline ~1  · This was after taking furosemide for 10 days  · Pro-BNp was 164 at start of diuretic therapy  · Likely due to volume depletion  · She has now been off furosemide for 2 weeks --> Recheck BMP now to confirm improvement in Cr    History of Present Illness   70-year-old female comes in as a new patient  She is referred her for evaluation shortness of breath on bilateral pedal edema  She had been doing well up until October 2017 when she developed low like symptoms  At baseline she was able to climb up 1-2 flights of stairs without any shortness of breath  But gradually over the past 6 months he has been feeling more and more short of breath with shorter distances  Currently she has to stop after every few steps when climbing up stairs  She has also noticed bilateral ankle edema  She has gained about 9 lb over the past 9 months       She was noted to have a murmur on exam in August 2017, at which time she did not have any shortness of breath  She underwent an echo at that point, which did not reveal any significant abnormalities  She has no complains of chest pain, palpitations, dizziness or lightheadedness       She has recurrent bladder prolapse, and is currently awaiting surgical repair for the same      She has a remote smoking history and quit in the 1980s  Had smoked for 20+ years with >2packs/day  No h/o alcohol intake  Interval History: Comes back for follow up  She took lasix + HCTZ for 10 days, and noted improvement in her edema  She remains off lasix for the past 2 weeks though  Her shortness of breath is slightly better  Previously she was having to stop after 4 steps, but currently is able to climb up the entire 14 steps without shortness of breath  Historical Information   Past Medical History:   Diagnosis Date    Asthma     Diabetes mellitus [facility] )     Esophageal dysphagia     Hyperlipidemia     Hypertension     [facility]     Urinary frequency     UTI (urinary tract infection)      Past Surgical History:   Procedure Laterality Date    MS ESOPHAGOGASTRODUODENOSCOPY TRANSORAL DIAGNOSTIC N/A 4/5/2017    Procedure: ESOPHAGOGASTRODUODENOSCOPY (EGD); Surgeon: Chepe Sandoval MD;  Location: [facility];   Service: Gastroenterology     Family History   Problem Relation Age of Onset    Heart disease Father     No Known Problems Mother      Current Outpatient Prescriptions on File Prior to Visit   Medication Sig Dispense Refill    amLODIPine (NORVASC) 5 mg tablet Take 1 tablet by mouth daily      atorvastatin (LIPITOR) 40 mg tablet Take 1 tablet by mouth daily      azelastine (ASTELIN) 0 1 % nasal spray 1 spray into each nostril 2 (two) times a day for 180 days Use in each nostril as directed 30 mL 5    Blood Glucose Monitoring Suppl (FREESTYLE LITE) JAQUELIN by Does not apply route daily 1 each 0    Cholecalciferol (VITAMIN D3) 2000 units capsule Take 1 tablet by mouth daily      ciclopirox (PENLAC) 8 % solution       clopidogrel (PLAVIX) 75 mg tablet Take 1 tablet by mouth daily      clotrimazole (LOTRIMIN) 1 % cream Apply topically 2 (two) times a day 30 g 2    conjugated estrogens (PREMARIN) vaginal cream Apply a pea-sized amount to the inside of the vagina twice a week   42 5 g 0    FREESTYLE LITE test strip       glipiZIDE (GLUCOTROL) 10 mg tablet Take 10 mg by mouth 2 (two) times a day before meals      hydrochlorothiazide (HYDRODIURIL) 25 mg tablet Take 1 tablet by mouth daily      ketotifen (ZADITOR) 0 025 % ophthalmic solution Administer 1 drop to both eyes 2 (two) times a day 5 mL 0    Lancets (FREESTYLE) lancets Use as instructed 100 each 0    Linagliptin (TRADJENTA) 5 MG TABS Take 1 tablet by mouth daily      lisinopril (ZESTRIL) 10 mg tablet Take 1 tablet (10 mg total) by mouth daily for 90 days 90 tablet 0    loratadine (CLARITIN) 10 mg tablet Take 1 tablet (10 mg total) by mouth daily for 90 days 90 tablet 0    metFORMIN (GLUCOPHAGE) 1000 MG tablet Take 1,000 mg by mouth 2 (two) times a day with meals      oxybutynin (DITROPAN) 5 mg tablet Take 1 tablet (5 mg total) by mouth daily for 90 days 90 tablet 0    pantoprazole (PROTONIX) 20 mg tablet Take 1 tablet (20 mg total) by mouth daily for 90 days 90 tablet 0     No current facility-administered medications on file prior to visit  No Known Allergies  Social History     Social History    Marital status:      Spouse name: N/A    Number of children: N/A    Years of education: N/A     Social History Main Topics    Smoking status: Former Smoker    Smokeless tobacco: Former User      Comment: quit over 30 yrs ago    Alcohol use No    Drug use: No    Sexual activity: No     Other Topics Concern    Not on file     Social History Narrative    No narrative on file        Review of Systems  No c/o chest pain, No c/o palpitations, c/o shortness of breath+, No c/o dizziness or light-headedness  All other systems negative    Vitals:  Vitals:    05/11/18 1059   BP: 124/68   BP Location: Left arm   Patient Position: Sitting   Cuff Size: Standard   Pulse: 76   Temp: 98 5 °F (36 9 °C)   Weight: 83 8 kg (184 lb 11 9 oz)   Height: 5' 4" (1 626 m)     BMI - Body mass index is 31 71 kg/m²  Wt Readings from Last 7 Encounters:   05/11/18 83 8 kg (184 lb 11 9 oz)   05/03/18 84 2 kg (185 lb 10 oz)   05/02/18 84 2 kg (185 lb 10 oz)   04/26/18 83 5 kg (184 lb)   04/16/18 82 6 kg (182 lb 1 6 oz)   04/11/18 88 kg (194 lb)   04/11/18 83 9 kg (185 lb)       Physical Exam    Constitutional:  Jennifer Amaya appears well, pleasant and cooperative  Alert and oriented x 3  No acute distress   HEENT:    Normocephalic, atraumatic   Neck:  Supple, no JVD, negative AJR   Lungs:  Respirations unlabored, clear to auscultation bilaterally, no rales, no wheezing  Chest Wall:  No tenderness, no pertinent deformity   Heart::  Regular rate, Regular rhythm, S1 and S2 normal, no murmurs, no rubs, no gallops   Abdomen:  Soft, non-tender, non-distended   Neurological:  Awake, alert, oriented x3   Non-focal exam    Extremities:  trace pedal edema+, no calf tenderness       Labs:  CBC:   Lab Results   Component Value Date    WBC 5 47 04/12/2018    RBC 3 86 04/12/2018    HGB 9 7 (L) 04/12/2018    HCT 30 9 (L) 04/12/2018    MCV 80 (L) 04/12/2018     04/12/2018    RDW 14 1 04/12/2018       CMP:   Lab Results   Component Value Date     04/12/2018    K 3 3 (L) 04/12/2018     04/12/2018    CO2 27 04/12/2018    ANIONGAP 8 04/12/2018    BUN 50 (H) 04/12/2018    CREATININE 1 52 (H) 04/12/2018    EGFR 38 04/12/2018    GLUCOSE 132 04/12/2018    CALCIUM 8 7 04/12/2018    AST 17 04/11/2018    ALT 17 04/11/2018    ALKPHOS 112 04/11/2018    PROT 8 5 (H) 04/11/2018    BILITOT 0 44 04/11/2018       Magnesium:  No results found for: MG    Lipid Profile:   Lab Results   Component Value Date    CHOL 106 04/11/2018    HDL 31 (L) 04/11/2018    TRIG 152 (H) 04/11/2018    LDLCALC 45 04/11/2018       Thyroid Studies:   Lab Results   Component Value Date    [id number] 0 865 09/25/2017    FREET4 1 07 01/22/2016       No components found for: HGA1C    No results found for: XHT5    Imaging: X-ray Chest 2 Views    Result Date: 4/11/2018  Narrative: CHEST INDICATION:   Chest pain  COMPARISON:  12/3/2017 EXAM PERFORMED/VIEWS:  XR CHEST PA & LATERAL FINDINGS: Cardiomediastinal silhouette appears stable, noting calcified uncoiled thoracic aorta  The lungs are clear  No pneumothorax or pleural effusion  Degenerative changes of the spine  Impression: No acute cardiopulmonary disease  Workstation performed: [id number]     Xr Hip/pelv 2-3 Vws Right    Result Date: 4/11/2018  Narrative: RIGHT HIP INDICATION:   Right lateral hip pain status post fall  COMPARISON:  None VIEWS:  XR HIP/PELV 2-3 VWS RIGHT W PELVIS IF PERFORMED Images: 3 FINDINGS: There is no acute fracture or dislocation  Mild bilateral hip osteoarthritis with joint space narrowing  No lytic or blastic osseous lesions  There are atherosclerotic calcifications  Central pelvic calcifications could represent calcified phleboliths, fibroids or possibly lymph nodes  Degenerative changes visualized lower lumbar spine  Impression: No fracture  Mild osteoarthritis both hips   Workstation performed: [id number]     Ct Head Without Contrast    Result Date: 2018  Narrative: CT BRAIN - WITHOUT CONTRAST INDICATION:   Headache and head trauma  COMPARISON:  2010  TECHNIQUE:  CT examination of the brain was performed  In addition to axial images, coronal 2D reformatted images were created and submitted for interpretation  Radiation dose length product (DLP) for this visit:  1035 6 mGy-cm   This examination, like all CT scans performed in the [facility], was performed utilizing techniques to minimize radiation dose exposure, including the use of iterative  reconstruction and automated exposure control  IMAGE QUALITY:  Diagnostic  FINDINGS: PARENCHYMA: Periventricular and subcortical hypoattenuating foci, consistent with microangiopathic disease No intracranial mass, mass effect or midline shift  No CT signs of acute infarction  No acute intracranial hemorrhage  VENTRICLES AND EXTRA-AXIAL SPACES:  Normal for the patient's age  VISUALIZED ORBITS AND PARANASAL SINUSES:  Unremarkable  CALVARIUM AND EXTRACRANIAL SOFT TISSUES:  Normal      Impression: No acute intracranial abnormality   Workstation performed: [id number]       Cardiac testing:   Results for orders placed during the hospital encounter of 18   Echo complete with contrast if indicated    Narrative [facility]  [facility], [address] [facility]  [phone]    Transthoracic Echocardiogram  2D, M-mode, Doppler, and Color Doppler    Study date:  2018    Patient: Bret Tovar  MR number: [id number]  Account number: [de-identified]  : 1940  Age: 68 years  Gender: Female  Status: Outpatient  Location: Echo lab  Height: 63 in  Weight: 186 lb  BP: 122/ 70 mmHg    Indications: Dyspnea    Diagnoses: R06 00 - Dyspnea, unspecified    Sonographer:  WILIAN Yin  Primary Physician:  Alfonso Benitez MD  Referring Physician:  Dr [facility]  Interpreting Physician:  Ramírez Croft MD    SUMMARY    LEFT VENTRICLE:  Systolic function was normal by visual assessment  Ejection fraction was estimated to be 55 %  There were no regional wall motion abnormalities  Wall thickness was mildly increased  Doppler parameters were consistent with abnormal left ventricular relaxation (grade 1 diastolic dysfunction)  LEFT ATRIUM:  The atrium was mildly dilated  ATRIAL SEPTUM:  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  MITRAL VALVE:  There was mild regurgitation  AORTIC VALVE:  There was trace regurgitation  TRICUSPID VALVE:  There was trace regurgitation  HISTORY: PRIOR HISTORY: CVA, HTN, HLD, DM2, Asthma    PROCEDURE: The procedure was performed in the echo lab  This was a routine study  The transthoracic approach was used  The study included complete 2D imaging, M-mode, complete spectral Doppler, and color Doppler  The heart rate was 76 bpm,  at the start of the study  Images were obtained from the parasternal, apical, subcostal, and suprasternal notch acoustic windows  Image quality was adequate  LEFT VENTRICLE: Size was normal  Systolic function was normal by visual assessment  Ejection fraction was estimated to be 55 %  There were no regional wall motion abnormalities  Wall thickness was mildly increased  DOPPLER: There was an  increased relative contribution of atrial contraction to ventricular filling  Doppler parameters were consistent with abnormal left ventricular relaxation (grade 1 diastolic dysfunction)  RIGHT VENTRICLE: The size was normal  Systolic function was normal  DOPPLER: The tricuspid jet envelope definition was inadequate for estimation of RV systolic pressure  There are no indirect findings (abnormal RV volume or geometry,  altered pulmonary flow velocity profile, or leftward septal displacement) which would suggest moderate or severe pulmonary hypertension  LEFT ATRIUM: The atrium was mildly dilated      ATRIAL SEPTUM: 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  RIGHT ATRIUM: Size was normal     MITRAL VALVE: Valve structure was normal  There was normal leaflet separation  DOPPLER: The transmitral velocity was within the normal range  There was no evidence for stenosis  There was mild regurgitation  AORTIC VALVE: The valve was trileaflet  Leaflets exhibited normal thickness and normal cuspal separation  DOPPLER: Transaortic velocity was within the normal range  There was no evidence for stenosis  There was trace regurgitation  TRICUSPID VALVE: The valve structure was normal  There was normal leaflet separation  DOPPLER: The transtricuspid velocity was within the normal range  There was no evidence for stenosis  There was trace regurgitation  PULMONIC VALVE: Leaflets exhibited normal thickness, no calcification, and normal cuspal separation  DOPPLER: The transpulmonic velocity was within the normal range  There was no regurgitation  PERICARDIUM: There was no pericardial effusion  AORTA: The root exhibited normal size  SYSTEMIC VEINS: IVC: The inferior vena cava was normal in size and course   Respirophasic changes were normal     SYSTEM MEASUREMENT TABLES    2D  %FS: 25 67 %  Ao Diam: 3 24 cm  EDV(Teich): 64 32 ml  EF(Cube): 58 93 %  EF(Teich): 51 21 %  ESV(Cube): 23 62 ml  ESV(Teich): 31 38 ml  IVSd: 1 17 cm  LA Area: 21 97 cm2  LA Diam: 4 02 cm  LVEDV MOD A4C: 117 46 ml  LVEF MOD A4C: 56 89 %  LVESV MOD A4C: 50 63 ml  LVIDd: 3 86 cm  LVIDs: 2 87 cm  LVLd A4C: 7 98 cm  LVLs A4C: 6 48 cm  LVPWd: 1 07 cm  RA Area: 14 74 cm2  SV MOD A4C: 66 83 ml  SV(Cube): 33 9 ml  SV(Teich): 32 94 ml  rv diam: 3 51 cm    MM  TAPSE: 1 79 cm    [employer] Accredited Echocardiography Laboratory    Prepared and electronically signed by    Raeann Prader, MD  Signed 06-Apr-2018 10:20:37

## 2018-05-11 NOTE — TELEPHONE ENCOUNTER
Please review the ([employer]) form placed in YOUR BIN   If the form requires review by your attending, please review it with them and sign the form  Please place the form in the [facility]) team folder in the Clinical flow station at the [facility]

## 2018-05-14 ENCOUNTER — APPOINTMENT (OUTPATIENT)
Dept: LAB | Facility: HOSPITAL | Age: 78
End: 2018-05-14
Payer: COMMERCIAL

## 2018-05-14 ENCOUNTER — CLINICAL SUPPORT (OUTPATIENT)
Dept: NUTRITION | Facility: HOSPITAL | Age: 78
End: 2018-05-14
Payer: COMMERCIAL

## 2018-05-14 VITALS — WEIGHT: 183.3 LBS | HEIGHT: 64 IN | BODY MASS INDEX: 31.29 KG/M2

## 2018-05-14 DIAGNOSIS — N17.9 AKI (ACUTE KIDNEY INJURY) (HCC): ICD-10-CM

## 2018-05-14 DIAGNOSIS — D64.9 ANEMIA: ICD-10-CM

## 2018-05-14 DIAGNOSIS — IMO0001 UNCONTROLLED TYPE 2 DIABETES MELLITUS WITHOUT COMPLICATION, WITHOUT LONG-TERM CURRENT USE OF INSULIN: ICD-10-CM

## 2018-05-14 LAB
ANION GAP SERPL CALCULATED.3IONS-SCNC: 5 MMOL/L (ref 4–13)
BUN SERPL-MCNC: 20 MG/DL (ref 5–25)
CALCIUM SERPL-MCNC: 9.2 MG/DL (ref 8.3–10.1)
CHLORIDE SERPL-SCNC: 109 MMOL/L (ref 100–108)
CO2 SERPL-SCNC: 28 MMOL/L (ref 21–32)
CREAT SERPL-MCNC: 1.01 MG/DL (ref 0.6–1.3)
ERYTHROCYTE [DISTWIDTH] IN BLOOD BY AUTOMATED COUNT: 15 % (ref 11.6–15.1)
GFR SERPL CREATININE-BSD FRML MDRD: 62 ML/MIN/1.73SQ M
GLUCOSE P FAST SERPL-MCNC: 148 MG/DL (ref 65–99)
HCT VFR BLD AUTO: 35.7 % (ref 34.8–46.1)
HGB BLD-MCNC: 11 G/DL (ref 11.5–15.4)
MCH RBC QN AUTO: 24.6 PG (ref 26.8–34.3)
MCHC RBC AUTO-ENTMCNC: 30.8 G/DL (ref 31.4–37.4)
MCV RBC AUTO: 80 FL (ref 82–98)
PLATELET # BLD AUTO: 200 THOUSANDS/UL (ref 149–390)
PMV BLD AUTO: 11.7 FL (ref 8.9–12.7)
POTASSIUM SERPL-SCNC: 3.7 MMOL/L (ref 3.5–5.3)
RBC # BLD AUTO: 4.47 MILLION/UL (ref 3.81–5.12)
SODIUM SERPL-SCNC: 142 MMOL/L (ref 136–145)
WBC # BLD AUTO: 5.91 THOUSAND/UL (ref 4.31–10.16)

## 2018-05-14 PROCEDURE — 80048 BASIC METABOLIC PNL TOTAL CA: CPT

## 2018-05-14 PROCEDURE — 97802 MEDICAL NUTRITION INDIV IN: CPT

## 2018-05-14 PROCEDURE — 85027 COMPLETE CBC AUTOMATED: CPT

## 2018-05-14 PROCEDURE — 36415 COLL VENOUS BLD VENIPUNCTURE: CPT

## 2018-05-14 NOTE — PROGRESS NOTES
Initial Nutrition Assessment Form    Patient Name: Lupe Cho    YOB: 1940    Sex: Female     Assessment Date: 5/14/2018  Start Time: 12:00pm Stop Time: 12:30pm Total Minutes: 30min     Data:  Present at session: self   Patient Concerns: "I am taking my medicine and I am walking 3 x day, I try and watch my carbohydrates"   Medical Dx/Reason for Referral: E11 8,E11 65   Past Medical History:   Diagnosis Date    Asthma     Diabetes mellitus ([facility] )     Esophageal dysphagia     Hyperlipidemia     Hypertension     Stroke ([facility] )     Urinary frequency     UTI (urinary tract infection)        Current Outpatient Prescriptions   Medication Sig Dispense Refill    amLODIPine (NORVASC) 5 mg tablet Take 1 tablet by mouth daily      atorvastatin (LIPITOR) 40 mg tablet Take 1 tablet by mouth daily      azelastine (ASTELIN) 0 1 % nasal spray 1 spray into each nostril 2 (two) times a day for 180 days Use in each nostril as directed 30 mL 5    Blood Glucose Monitoring Suppl (FREESTYLE LITE) JAQUELIN by Does not apply route daily 1 each 0    Cholecalciferol (VITAMIN D3) 2000 units capsule Take 1 tablet by mouth daily      ciclopirox (PENLAC) 8 % solution       clopidogrel (PLAVIX) 75 mg tablet Take 1 tablet by mouth daily      clotrimazole (LOTRIMIN) 1 % cream Apply topically 2 (two) times a day 30 g 2    conjugated estrogens (PREMARIN) vaginal cream Apply a pea-sized amount to the inside of the vagina twice a week   42 5 g 0    FREESTYLE LITE test strip       glipiZIDE (GLUCOTROL) 10 mg tablet Take 10 mg by mouth 2 (two) times a day before meals      hydrochlorothiazide (HYDRODIURIL) 25 mg tablet Take 1 tablet by mouth daily      ketotifen (ZADITOR) 0 025 % ophthalmic solution Administer 1 drop to both eyes 2 (two) times a day 5 mL 0    Lancets (FREESTYLE) lancets Use as instructed 100 each 0    Linagliptin (TRADJENTA) 5 MG TABS Take 1 tablet by mouth daily      lisinopril (ZESTRIL) 10 mg tablet Take 1 tablet (10 mg total) by mouth daily for 90 days 90 tablet 0    loratadine (CLARITIN) 10 mg tablet Take 1 tablet (10 mg total) by mouth daily for 90 days 90 tablet 0    metFORMIN (GLUCOPHAGE) 1000 MG tablet Take 1,000 mg by mouth 2 (two) times a day with meals      metFORMIN (GLUCOPHAGE) 500 mg tablet       oxybutynin (DITROPAN) 5 mg tablet Take 1 tablet (5 mg total) by mouth daily for 90 days 90 tablet 0    pantoprazole (PROTONIX) 20 mg tablet Take 1 tablet (20 mg total) by mouth daily for 90 days 90 tablet 0     No current facility-administered medications for this visit  Additional Meds/Supplements: reviewed   Special Learning Needs: None   Height:    Weight: Wt Readings from Last 3 Encounters:   05/11/18 83 8 kg (184 lb 11 9 oz)   05/03/18 84 2 kg (185 lb 10 oz)   05/02/18 84 2 kg (185 lb 10 oz)   Estimated body mass index is 31 46 kg/m² as calculated from the following:    Height as of this encounter: 5' 4" (1 626 m)  Weight as of this encounter: 83 1 kg (183 lb 4 8 oz)  Recent Weight Change: []Yes     [x]No  Amount:       Energy Needs: No calculations performed for this visit   No Known Allergies    History   Alcohol Use No       History   Smoking Status    Former Smoker   Smokeless Tobacco    Former User     Comment: quit over 30 yrs ago       Who shops? patient   Who cooks? patient   Exercise: Walking , stationary bike at home   Prior Counseling?  [x]Yes     []No  When: Diabetes education   Why: 4-5 years ago        Diet Hx:  Breakfast:10:30 am-11:00am Cup Tea or Coffee  2 slice of wheat bread   Jelly and butter   Lunch:4:00pm  [place]         Dinner:8:00 pm     Pasta with meatballs  Vegetables  Bread  Water   Soda diet       Snacks:  Cookies at night  Water            Nutrition Diagnosis:   Self-monitoring deficit  related to Food and nutrition related knowledge deficit concerning self monitoring  as evidenced by Diagnosis associated with self-monitoring (i e  diabetes mellitus, obesity), HgbA1c       Medical Nutrition Therapy Intervention:  [x]Individualized Meal Plan CHO Counting 45-60 gram at meals, 15-30 grm at snacks  []Understanding Lab Values   [x]Basic Pathophysiology of Disease []Food/Medication Interactions   [x]Food Diary-handwritten for carb counting and FSBS []Exercise   []Lifestyle/Behavior Modification Techniques []Medication, Mechanism of Action   [x]Label Reading [x]Self Blood Glucose Monitoring States 2 x day vague reporting from patient states" in am is 100 something"   []Weight/BMI Goals [x]Other - RD suggested keep fasting blood sugars 150 mg dl on average daily to achieve HgbA1c 9 6  Other Notes:patient has DSMT Classes scheduled for month of June 2018        Comprehension: []Excellent  []Very Good  []Good  [x]Fair   []Poor    Receptivity: []Excellent  []Very Good  []Good  [x]Fair   []Poor    Expected Compliance: []Excellent  []Very Good  []Good  [x]Fair   []Poor        Goals: 1  Achieve HgbA1C < or =7 0 mg/dl in 6 months   2  Pt will be able to recite carbohydrate counting plan to RD at next encounter   3  Pt will attend DSMT Classes as scheduled with [facility]   4  Pt will be able to present food journal to RD at next encounter       Pt should schedule a follow up after her attendance at [address] [place] [address] in June 2018  Labs:  [facility]  Lab Results   Component Value Date     04/12/2018    K 3 3 (L) 04/12/2018     04/12/2018    CO2 27 04/12/2018    ANIONGAP 8 04/12/2018    BUN 50 (H) 04/12/2018    CREATININE 1 52 (H) 04/12/2018    GLUCOSE 132 04/12/2018    GLUF 173 (H) 06/12/2017    CALCIUM 8 7 04/12/2018    AST 17 04/11/2018    ALT 17 04/11/2018    ALKPHOS 112 04/11/2018    PROT 8 5 (H) 04/11/2018    BILITOT 0 44 04/11/2018    EGFR 38 04/12/2018       BMP  Lab Results   Component Value Date    GLUCOSE 132 04/12/2018    CALCIUM 8 7 04/12/2018     04/12/2018    K 3 3 (L) 04/12/2018    CO2 27 04/12/2018     04/12/2018    BUN 50 (H) 04/12/2018 CREATININE 1 52 (H) 04/12/2018       Lipids  Lab Results   Component Value Date    CHOL 106 04/11/2018    CHOL 105 01/15/2018     Lab Results   Component Value Date    HDL 31 (L) 04/11/2018    HDL 48 01/15/2018    HDL 42 10/06/2016     Lab Results   Component Value Date    LDLCALC 45 04/11/2018    LDLCALC 46 01/15/2018     Lab Results   Component Value Date    TRIG 152 (H) 04/11/2018    TRIG 55 01/15/2018    TRIG 83 06/12/2017     No components found for: CHOLHDL    Hemoglobin A1C  Lab Results   Component Value Date    HGBA1C 9 3 (H) 04/27/2018       Fasting Glucose  Lab Results   Component Value Date    GLUF 173 (H) 06/12/2017       Insulin     Thyroid  No results found for: TSH, P3OZTSG, C1IVTSF, THYROIDAB    Hepatic Function Panel  Lab Results   Component Value Date    ALT 17 04/11/2018    AST 17 04/11/2018    ALKPHOS 112 04/11/2018    BILITOT 0 44 04/11/2018       Celiac Disease Antibody Panel  No results found for: ENDOMYSIAL IGA, GLIADIN IGA, GLIADIN IGG, IGA, TISSUE TRANSGLUT AB, TTG IGA   Iron  Lab Results   Component Value Date    IRON 52 04/12/2018    TIBC 265 04/12/2018    FERRITIN 322 04/12/2018       Vitamins  No results found for: VITAMIN B2   No results found for: NICOTINAMIDE, NICOTINIC ACID   No results found for: VITAMINB6  Lab Results   Component Value Date    MVGHPJIT64 972 (H) 09/23/2014     No results found for: VITB5  No results found for: S0BUUFQP  No results found for: THYROGLB  No results found for: VITAMIN K   No results found for: 25-HYDROXY VIT D   No results found for: [address],LD[address]  [address] [place] [id number]

## 2018-05-17 ENCOUNTER — TELEPHONE (OUTPATIENT)
Dept: INTERNAL MEDICINE CLINIC | Facility: CLINIC | Age: 78
End: 2018-05-17

## 2018-05-17 DIAGNOSIS — J31.0 CHRONIC RHINITIS: Primary | ICD-10-CM

## 2018-05-17 RX ORDER — GLIPIZIDE 10 MG/1
TABLET, FILM COATED, EXTENDED RELEASE ORAL
COMMUNITY
Start: 2018-05-11 | End: 2018-10-01 | Stop reason: SDUPTHER

## 2018-05-17 NOTE — TELEPHONE ENCOUNTER
Please call patient and let her know that an order was placed for a referral to an allergist   Please advise patient that she will need to contact her insurance company to find an allergist that accepts her insurance  We do not have an allergist within [facility]     Please also remind patient that she also has an upcoming appointment in June to see the [facility] doctor here regarding her symptoms

## 2018-05-18 NOTE — TELEPHONE ENCOUNTER
I spoke with the patient and I will mail her the order for the allergist  Patient is aware of her June appointment with ENT

## 2018-05-18 NOTE — TELEPHONE ENCOUNTER
Please call patient to find out if she saw her podiatrist  I placed an order back in March  We have her form for diabetic shoes but we need the notes from her podiatrist to sign off on the order   Thanks

## 2018-05-21 ENCOUNTER — TRANSCRIBE ORDERS (OUTPATIENT)
Dept: LAB | Facility: HOSPITAL | Age: 78
End: 2018-05-21

## 2018-05-21 ENCOUNTER — APPOINTMENT (OUTPATIENT)
Dept: LAB | Facility: HOSPITAL | Age: 78
End: 2018-05-21
Payer: COMMERCIAL

## 2018-05-21 DIAGNOSIS — N17.9 AKI (ACUTE KIDNEY INJURY) (HCC): ICD-10-CM

## 2018-05-21 DIAGNOSIS — D64.9 ANEMIA: ICD-10-CM

## 2018-05-21 LAB
HEMOCCULT STL QL: NEGATIVE

## 2018-05-21 PROCEDURE — 82272 OCCULT BLD FECES 1-3 TESTS: CPT

## 2018-05-22 ENCOUNTER — TRANSCRIBE ORDERS (OUTPATIENT)
Dept: ADMINISTRATIVE | Facility: HOSPITAL | Age: 78
End: 2018-05-22

## 2018-05-22 DIAGNOSIS — J31.0 CHRONIC RHINITIS: ICD-10-CM

## 2018-05-22 RX ORDER — KETOTIFEN FUMARATE 0.35 MG/ML
SOLUTION/ DROPS OPHTHALMIC
Qty: 5 ML | Refills: 0 | Status: SHIPPED | OUTPATIENT
Start: 2018-05-22 | End: 2018-10-01 | Stop reason: ALTCHOICE

## 2018-05-23 ENCOUNTER — APPOINTMENT (OUTPATIENT)
Dept: LAB | Facility: HOSPITAL | Age: 78
End: 2018-05-23
Payer: COMMERCIAL

## 2018-05-23 ENCOUNTER — TRANSCRIBE ORDERS (OUTPATIENT)
Dept: ADMINISTRATIVE | Facility: HOSPITAL | Age: 78
End: 2018-05-23

## 2018-05-23 ENCOUNTER — TRANSCRIBE ORDERS (OUTPATIENT)
Dept: LAB | Facility: HOSPITAL | Age: 78
End: 2018-05-23

## 2018-05-23 DIAGNOSIS — I10 ESSENTIAL HYPERTENSION, MALIGNANT: ICD-10-CM

## 2018-05-23 DIAGNOSIS — R31.9 ESSENTIAL HEMATURIA: ICD-10-CM

## 2018-05-23 DIAGNOSIS — N18.9 CHRONIC KIDNEY DISEASE, UNSPECIFIED CKD STAGE: Primary | ICD-10-CM

## 2018-05-23 DIAGNOSIS — D64.9 ANEMIA, UNSPECIFIED TYPE: Primary | ICD-10-CM

## 2018-05-23 LAB
25(OH)D3 SERPL-MCNC: 40.1 NG/ML (ref 30–100)
ALBUMIN SERPL BCP-MCNC: 3.8 G/DL (ref 3.5–5)
ALP SERPL-CCNC: 79 U/L (ref 46–116)
ALT SERPL W P-5'-P-CCNC: 20 U/L (ref 12–78)
ANION GAP SERPL CALCULATED.3IONS-SCNC: 5 MMOL/L (ref 4–13)
AST SERPL W P-5'-P-CCNC: 11 U/L (ref 5–45)
BACTERIA UR QL AUTO: ABNORMAL /HPF
BASOPHILS # BLD AUTO: 0.01 THOUSANDS/ΜL (ref 0–0.1)
BASOPHILS NFR BLD AUTO: 0 % (ref 0–1)
BILIRUB SERPL-MCNC: 0.57 MG/DL (ref 0.2–1)
BILIRUB UR QL STRIP: NEGATIVE
BUN SERPL-MCNC: 34 MG/DL (ref 5–25)
CALCIUM SERPL-MCNC: 9.7 MG/DL (ref 8.3–10.1)
CHLORIDE SERPL-SCNC: 106 MMOL/L (ref 100–108)
CHOLEST SERPL-MCNC: 107 MG/DL (ref 50–200)
CLARITY UR: ABNORMAL
CO2 SERPL-SCNC: 28 MMOL/L (ref 21–32)
COLOR UR: YELLOW
CREAT SERPL-MCNC: 1.11 MG/DL (ref 0.6–1.3)
EOSINOPHIL # BLD AUTO: 0.09 THOUSAND/ΜL (ref 0–0.61)
EOSINOPHIL NFR BLD AUTO: 2 % (ref 0–6)
ERYTHROCYTE [DISTWIDTH] IN BLOOD BY AUTOMATED COUNT: 15 % (ref 11.6–15.1)
EST. AVERAGE GLUCOSE BLD GHB EST-MCNC: 209 MG/DL
GFR SERPL CREATININE-BSD FRML MDRD: 55 ML/MIN/1.73SQ M
GLUCOSE P FAST SERPL-MCNC: 157 MG/DL (ref 65–99)
GLUCOSE UR STRIP-MCNC: NEGATIVE MG/DL
HBA1C MFR BLD: 8.9 % (ref 4.2–6.3)
HCT VFR BLD AUTO: 34.7 % (ref 34.8–46.1)
HDLC SERPL-MCNC: 39 MG/DL (ref 40–60)
HGB BLD-MCNC: 11.1 G/DL (ref 11.5–15.4)
HGB UR QL STRIP.AUTO: NEGATIVE
HYALINE CASTS #/AREA URNS LPF: ABNORMAL /LPF
KETONES UR STRIP-MCNC: NEGATIVE MG/DL
LDLC SERPL CALC-MCNC: 52 MG/DL (ref 0–100)
LEUKOCYTE ESTERASE UR QL STRIP: ABNORMAL
LYMPHOCYTES # BLD AUTO: 1.42 THOUSANDS/ΜL (ref 0.6–4.47)
LYMPHOCYTES NFR BLD AUTO: 26 % (ref 14–44)
MCH RBC QN AUTO: 24.8 PG (ref 26.8–34.3)
MCHC RBC AUTO-ENTMCNC: 32 G/DL (ref 31.4–37.4)
MCV RBC AUTO: 78 FL (ref 82–98)
MONOCYTES # BLD AUTO: 0.36 THOUSAND/ΜL (ref 0.17–1.22)
MONOCYTES NFR BLD AUTO: 7 % (ref 4–12)
NEUTROPHILS # BLD AUTO: 3.64 THOUSANDS/ΜL (ref 1.85–7.62)
NEUTS SEG NFR BLD AUTO: 66 % (ref 43–75)
NITRITE UR QL STRIP: POSITIVE
NON-SQ EPI CELLS URNS QL MICRO: ABNORMAL /HPF
NONHDLC SERPL-MCNC: 68 MG/DL
NRBC BLD AUTO-RTO: 0 /100 WBCS
NT-PROBNP SERPL-MCNC: 82 PG/ML
PH UR STRIP.AUTO: 5 [PH] (ref 4.5–8)
PHOSPHATE SERPL-MCNC: 3.2 MG/DL (ref 2.3–4.1)
PLATELET # BLD AUTO: 223 THOUSANDS/UL (ref 149–390)
PMV BLD AUTO: 12.2 FL (ref 8.9–12.7)
POTASSIUM SERPL-SCNC: 4 MMOL/L (ref 3.5–5.3)
PROT SERPL-MCNC: 7.8 G/DL (ref 6.4–8.2)
PROT UR STRIP-MCNC: ABNORMAL MG/DL
PTH-INTACT SERPL-MCNC: 48.4 PG/ML (ref 18.4–80.1)
RBC # BLD AUTO: 4.47 MILLION/UL (ref 3.81–5.12)
RBC #/AREA URNS AUTO: ABNORMAL /HPF
SODIUM SERPL-SCNC: 139 MMOL/L (ref 136–145)
SP GR UR STRIP.AUTO: 1.01 (ref 1–1.03)
TRIGL SERPL-MCNC: 82 MG/DL
TSH SERPL DL<=0.05 MIU/L-ACNC: 0.96 UIU/ML (ref 0.36–3.74)
URATE SERPL-MCNC: 7.8 MG/DL (ref 2–6.8)
UROBILINOGEN UR QL STRIP.AUTO: 0.2 E.U./DL
VIT B12 SERPL-MCNC: 500 PG/ML (ref 100–900)
WBC # BLD AUTO: 5.53 THOUSAND/UL (ref 4.31–10.16)
WBC #/AREA URNS AUTO: ABNORMAL /HPF

## 2018-05-23 PROCEDURE — 83036 HEMOGLOBIN GLYCOSYLATED A1C: CPT

## 2018-05-23 PROCEDURE — 82306 VITAMIN D 25 HYDROXY: CPT

## 2018-05-23 PROCEDURE — 80053 COMPREHEN METABOLIC PANEL: CPT

## 2018-05-23 PROCEDURE — 81001 URINALYSIS AUTO W/SCOPE: CPT

## 2018-05-23 PROCEDURE — 83970 ASSAY OF PARATHORMONE: CPT

## 2018-05-23 PROCEDURE — 84550 ASSAY OF BLOOD/URIC ACID: CPT

## 2018-05-23 PROCEDURE — 87186 SC STD MICRODIL/AGAR DIL: CPT

## 2018-05-23 PROCEDURE — 83880 ASSAY OF NATRIURETIC PEPTIDE: CPT

## 2018-05-23 PROCEDURE — 84100 ASSAY OF PHOSPHORUS: CPT

## 2018-05-23 PROCEDURE — 36415 COLL VENOUS BLD VENIPUNCTURE: CPT

## 2018-05-23 PROCEDURE — 85025 COMPLETE CBC W/AUTO DIFF WBC: CPT

## 2018-05-23 PROCEDURE — 82607 VITAMIN B-12: CPT

## 2018-05-23 PROCEDURE — 87077 CULTURE AEROBIC IDENTIFY: CPT

## 2018-05-23 PROCEDURE — 80061 LIPID PANEL: CPT

## 2018-05-23 PROCEDURE — 84443 ASSAY THYROID STIM HORMONE: CPT

## 2018-05-23 PROCEDURE — 87086 URINE CULTURE/COLONY COUNT: CPT

## 2018-05-23 NOTE — TELEPHONE ENCOUNTER
NEEDS TO BE FILLED OUT AND OFFICE NOTE MUST MATCH EXACTLY TO CONDITION THAT IS CIRCLED   PLACED IN [place] PROVIDER FOLDER

## 2018-05-24 ENCOUNTER — TELEPHONE (OUTPATIENT)
Dept: INTERNAL MEDICINE CLINIC | Facility: CLINIC | Age: 78
End: 2018-05-24

## 2018-05-25 LAB
BACTERIA UR CULT: ABNORMAL
BACTERIA UR CULT: ABNORMAL

## 2018-05-26 ENCOUNTER — APPOINTMENT (OUTPATIENT)
Dept: LAB | Facility: HOSPITAL | Age: 78
End: 2018-05-26
Payer: COMMERCIAL

## 2018-05-26 DIAGNOSIS — D64.9 ANEMIA, UNSPECIFIED TYPE: ICD-10-CM

## 2018-05-26 DIAGNOSIS — I10 ESSENTIAL HYPERTENSION, MALIGNANT: Primary | ICD-10-CM

## 2018-05-26 LAB
PROT 24H UR-MCNC: 240 MG/24 HRS (ref 40–150)
SPECIMEN VOL UR: 1000 ML

## 2018-05-26 PROCEDURE — 84156 ASSAY OF PROTEIN URINE: CPT

## 2018-06-01 ENCOUNTER — TELEPHONE (OUTPATIENT)
Dept: FAMILY MEDICINE CLINIC | Facility: CLINIC | Age: 78
End: 2018-06-01

## 2018-06-01 ENCOUNTER — OFFICE VISIT (OUTPATIENT)
Dept: MULTI SPECIALTY CLINIC | Facility: CLINIC | Age: 78
End: 2018-06-01
Payer: COMMERCIAL

## 2018-06-01 VITALS
BODY MASS INDEX: 31.39 KG/M2 | WEIGHT: 183.86 LBS | HEIGHT: 64 IN | SYSTOLIC BLOOD PRESSURE: 160 MMHG | DIASTOLIC BLOOD PRESSURE: 80 MMHG

## 2018-06-01 DIAGNOSIS — R05.9 COUGH: ICD-10-CM

## 2018-06-01 DIAGNOSIS — J31.0 CHRONIC RHINITIS: ICD-10-CM

## 2018-06-01 DIAGNOSIS — R09.89 TICKLE IN THROAT: ICD-10-CM

## 2018-06-01 DIAGNOSIS — J30.9 ALLERGIC RHINITIS, UNSPECIFIED SEASONALITY, UNSPECIFIED TRIGGER: Primary | ICD-10-CM

## 2018-06-01 PROCEDURE — 99203 OFFICE O/P NEW LOW 30 MIN: CPT | Performed by: OTOLARYNGOLOGY

## 2018-06-01 RX ORDER — FEXOFENADINE HCL 180 MG/1
180 TABLET ORAL DAILY
Qty: 30 TABLET | Refills: 4 | Status: SHIPPED | OUTPATIENT
Start: 2018-06-01 | End: 2018-10-01 | Stop reason: ALTCHOICE

## 2018-06-01 NOTE — PROGRESS NOTES
[facility] Otolaryngology New Patient Visit    Blake Toussaint is a 68 y o  who presents with a chief complaint of cough    Pertinent elements of the history include:  She has been bothered by a cough and PND for the past 3 months  This wakes her up at night  Cough is productive of clear/white mucus  She was taking Lisinopril but stopped 3 weeks ago  She has a history of asthma and has used advair and flovent in the past   Denies wheezing or chest tightness  Some DUNAWAY after walking up 5 steps, although this has improved  Some nasal congestion  She was prescribed Astelin, and this was somewhat helpful  Sense of smell is normal   Has ocular allergy symptoms and sneezing  Was treated with eye drops, which helped  Some hoarseness  Overall, her nasal allergy, ocular allergy and post nasal drip/cough symptoms were present concurrently, and these have improved as her cough has improved as well  She has never been allergy tested but has an appointment for testing  Review of systems 10 point review of systems reviewed as documented in the intake form, scanned into the medical record under the media tab  Results reviewed; images from any scan have been personally reviewed: The past medical, surgical, social and family history have been reviewed as documented in today's record  Physical exam: (abnormal findings appear in bold and supercede any conflicting normal findings listed below)    /80 (BP Location: Right arm, Patient Position: Sitting, Cuff Size: Standard)   Ht 5' 4" (1 626 m)   Wt 83 4 kg (183 lb 13 8 oz)   BMI 31 56 kg/m²     Constitutional:  Well developed, well nourished and groomed, in no acute distress  Eyes:  Extra-ocular movements intact, pupils equally round and reactive to light and accommodation, the lids and conjunctivae are normal in appearance      Head: Atraumatic, normocephalic, no visible scalp lesions, bony palpation unremarkable without stepoffs, parotid and submandibular salivary glands non-tender to palpation and without masses bilaterally  Ears:  Auricles normal in appearance bilaterally, mastoid prominence non-tender, external auditory canals clear bilaterally, tympanic membranes intact bilaterally without evidence of middle ear effusion or masses, normal appearing ossicles  Nose/Sinuses:  External appearance unremarkable, no maxillary or frontal sinus tenderness to palpation bilaterally  Anterior rhinoscopy reveals: mild mucosal edema     Oral Cavity:  Moist mucus membranes, gums and dentition unremarkable, no oral mucosal masses or lesions, floor of mouth soft, tongue mobile without masses or lesions  Oropharynx:  Base of tongue soft and without masses, tonsils bilaterally unremarkable, soft palate mucosa unremarkable, laryngeal mirror exam unrevealing  Neck:  No visible or palpable cervical lesions or lymphadenopathy, thyroid gland is normal in size and symmetry and without masses, normal laryngeal elevation with swallowing  Cardiovascular:  Normal rate and rhythm, no palpable thrills, no jugulovenous distension observed  Respiratory:  Normal respiratory effort without evidence of retractions or use of accessory muscles  Integument:  Normal appearing without observed masses or lesions  Neurologic:  Cranial nerves II-XII intact bilaterally  Psychiatric:  Alert and oriented to time, place and person, normal affect  Procedures      Assessment:   1  Allergic rhinitis, unspecified seasonality, unspecified trigger  fexofenadine (ALLEGRA) 180 MG tablet   2  Tickle in throat  Ambulatory Referral to Otolaryngology   3  Cough  Ambulatory Referral to Otolaryngology   4  Chronic rhinitis  Ambulatory Referral to Otolaryngology       Orders  No orders of the defined types were placed in this encounter  Discussion/Plan:    1  Her symptoms are mostly related to allergic rhinitis    I do not believe this is Ace inhibitor related or asthma related as her symptoms have improved on antihistamine therapy  I recommended that she continue as a last the nasal spray on an as-needed basis prior to bedtime  She does plan to follow up with an allergist soon  Additionally I stopped her loratadine and switched her to Guerraview  Thank you for allowing me to participate in the care of your patient

## 2018-06-06 ENCOUNTER — OFFICE VISIT (OUTPATIENT)
Dept: FAMILY MEDICINE CLINIC | Facility: CLINIC | Age: 78
End: 2018-06-06
Payer: COMMERCIAL

## 2018-06-06 DIAGNOSIS — E11.8 TYPE 2 DIABETES MELLITUS WITH COMPLICATION, WITHOUT LONG-TERM CURRENT USE OF INSULIN (HCC): Primary | ICD-10-CM

## 2018-06-06 PROCEDURE — G0109 DIAB MANAGE TRN IND/GROUP: HCPCS | Performed by: DIETITIAN, REGISTERED

## 2018-06-06 NOTE — Clinical Note
Madeline Bliss went to the wrong address for Class 1 on 6/4/18  The employees at the [facility] did not direct her correctly, so she missed Class 1  However, she participated in Class 2, asked great questions

## 2018-06-07 ENCOUNTER — TELEPHONE (OUTPATIENT)
Dept: INTERNAL MEDICINE CLINIC | Facility: CLINIC | Age: 78
End: 2018-06-07

## 2018-06-07 DIAGNOSIS — E11.8 TYPE 2 DIABETES MELLITUS WITH COMPLICATION, WITHOUT LONG-TERM CURRENT USE OF INSULIN (HCC): Primary | ICD-10-CM

## 2018-06-07 DIAGNOSIS — N18.30 CKD STAGE 3 DUE TO TYPE 2 DIABETES MELLITUS (HCC): Primary | ICD-10-CM

## 2018-06-07 DIAGNOSIS — E11.22 CKD STAGE 3 DUE TO TYPE 2 DIABETES MELLITUS (HCC): Primary | ICD-10-CM

## 2018-06-07 RX ORDER — BLOOD-GLUCOSE METER
KIT MISCELLANEOUS
Qty: 100 EACH | Refills: 1 | Status: SHIPPED | OUTPATIENT
Start: 2018-06-07 | End: 2018-10-18 | Stop reason: SDUPTHER

## 2018-06-07 NOTE — TELEPHONE ENCOUNTER
Patient wasn't happy with Dr Shanti Rubio at [facility]  They sent her for a ct scan  She scheduled it but had to cancel it because [facility] never let them know if it was with contrast or not  She wants to know if Lloyd Leiva can refer her to another Doctor

## 2018-06-07 NOTE — PROGRESS NOTES
Living Well with Diabetes Group Class #2    Belen Soliz attended the Living Well with Diabetes Group Class #2  During class, [facility] was instructed on the following topics: Macronutrients, Carbohydrate sources, What one serving of carbohydrate equals, effects of diet on blood glucose levels, effect of carbohydrates on blood glucose levels, basics of meal planning: balance, portions, meal times, measurements, reading food labels to determine carbohydrates  [facility] participated in group activities of reading labels together and completing the meal planning activity  [facility] will follow up with class #3  Will call with any questions or concerns prior to next session  The patient's history was reviewed and updated as appropriate: allergies, current medications  Lab Results   Component Value Date    HGBA1C 8 9 (H) 05/23/2018       Diabetes Education Record  [facility] was provided Living Well with Diabetes Class #2 book, and "My Plate" handout & list of foods  Patient response to instruction    Comprehensionvery good  Motivationgood  Expected Compliancegood    Begin Time: 1:00  End Time: 3:00  Referring Provider: Ktahleen Grimaldo PA-C  Thank you for referring your patient to [facility], it was a pleasure working with them today  Please feel free to call with any questions or concerns      Kevin Valderrama  [address]  [phone]

## 2018-06-07 NOTE — TELEPHONE ENCOUNTER
Patient has been feeling dizzy the past couple days  She states no other symptoms  She hasn't take her sugars since June 3rd because she hasn't had her test strips  Per verbal communication with Jennifer De La Rosa will check with [facility] as long as patient has no other symptoms

## 2018-06-08 NOTE — TELEPHONE ENCOUNTER
Called patient who stated she will stick it through with Dr Klaudia Aguilera office being that the information she was being asked was in her paperwork and she just didn't realize it  (with or Without Contrast)  Patient called Dr Klaudia Aguilera office and they apparently sent us paperwork we need to sign and send back  I didn't see anything yet  Patient Also asked for a refill on her Freestyle Strips  Please Advice

## 2018-06-11 ENCOUNTER — OFFICE VISIT (OUTPATIENT)
Dept: FAMILY MEDICINE CLINIC | Facility: CLINIC | Age: 78
End: 2018-06-11
Payer: COMMERCIAL

## 2018-06-11 DIAGNOSIS — E11.8 TYPE 2 DIABETES MELLITUS WITH COMPLICATION, WITHOUT LONG-TERM CURRENT USE OF INSULIN (HCC): Primary | ICD-10-CM

## 2018-06-11 PROCEDURE — G0109 DIAB MANAGE TRN IND/GROUP: HCPCS | Performed by: DIETITIAN, REGISTERED

## 2018-06-11 NOTE — TELEPHONE ENCOUNTER
PATIENT RETURNED PHONE CALL AND MADE AWARE , STATES SHE HAS NOT FELT DIZZY THESE LAST COUPLE DAYS   REGARDLESS I ADVISED HER IF HER DIZZINESS IS SIGNIFICANT THEN SHE CAN GO TO THE ER, IF MILD SHE CAN SCHEDULE AN APPT HERE AT THE CLINIC  AWARE OF TEST STRIPS BEING SENT IN

## 2018-06-11 NOTE — PROGRESS NOTES
Living Well with Diabetes Group Class #3    Robert Frazier attended the Living Well with Diabetes Group Class #3  During class, Adeline Zhong was instructed on the following topics: Oral and injectable medications, short term complications of diabetes, long term complications of diabetes, prevention of complications, foot care, sick day management, stress management, and traveling with diabetes  Adelinebonnie Zhong participated in group activities  Adeline Zhong will follow up with class #4  Will call with any questions or concerns prior to next session  The patient's history was reviewed and updated as appropriate: allergies, current medications  Lab Results   Component Value Date    HGBA1C 8 9 (H) 05/23/2018       Diabetes Education Record  Adeline Zhong was provided Living Well with Diabetes Class #3 book      Patient response to instruction    Comprehensiongood  Motivationgood  Expected Compliancegood    Begin Time: 1:00  End Time: 3:00  Referring Provider: Elena Hyatt PA-C  Thank you for referring your patient to [facility], it was a pleasure working with them today  Please feel free to call with any questions or concerns      Kevin Valderrama  [phone]

## 2018-06-13 ENCOUNTER — OFFICE VISIT (OUTPATIENT)
Dept: FAMILY MEDICINE CLINIC | Facility: CLINIC | Age: 78
End: 2018-06-13
Payer: COMMERCIAL

## 2018-06-13 DIAGNOSIS — E11.8 TYPE 2 DIABETES MELLITUS WITH COMPLICATION, WITHOUT LONG-TERM CURRENT USE OF INSULIN (HCC): Primary | ICD-10-CM

## 2018-06-13 PROCEDURE — G0109 DIAB MANAGE TRN IND/GROUP: HCPCS | Performed by: DIETITIAN, REGISTERED

## 2018-06-13 NOTE — PROGRESS NOTES
Living Well with Diabetes Group Class #4    Noreen Green Jose Luis attended the Living Well with Diabetes Group Class #4  During class, Adrienne Ponce was instructed on the following topics: Types of cholesterol, dietary sources of cholesterol, types of fat, types of fiber, reading food labels- in depth, healthy choices when dining out  Adrienne Ponce participated in group activities of reading labels together and completed the dining out activity  Adrienne Ponce will follow up with class 1 (since she missed Class 1)  additional DSMT/MNT as desired  Will call with any questions or concerns prior to next session  The patient's history was reviewed and updated as appropriate: allergies, current medications  Lab Results   Component Value Date    HGBA1C 8 9 (H) 05/23/2018       Diabetes Education Record  Adrienne Ponce was provided Living Well with Diabetes Class #4 book  I also  went with Adrienne Ponce up to [place] & she used her $20 voucher for fresh produce  Encouraged her to come back every Wednesday this summer   Patient states she will think about it  Patient response to instruction    Comprehensiongood  Motivationgood  Expected Compliancegood    Begin Time: 1:00  End Time: 3:00  Referring Provider: CLARK Sanchez    Thank you for referring your patient to [facility], it was a pleasure working with them today  Please feel free to call with any questions or concerns      Kevin Valderrama  [address]  [phone]

## 2018-06-19 ENCOUNTER — APPOINTMENT (OUTPATIENT)
Dept: LAB | Facility: HOSPITAL | Age: 78
End: 2018-06-19
Payer: COMMERCIAL

## 2018-06-19 ENCOUNTER — TRANSCRIBE ORDERS (OUTPATIENT)
Dept: LAB | Facility: HOSPITAL | Age: 78
End: 2018-06-19

## 2018-06-19 DIAGNOSIS — R69 DIAGNOSIS UNKNOWN: ICD-10-CM

## 2018-06-19 DIAGNOSIS — R69 DIAGNOSIS UNKNOWN: Primary | ICD-10-CM

## 2018-06-19 LAB — 25(OH)D3 SERPL-MCNC: 45.6 NG/ML (ref 30–100)

## 2018-06-19 PROCEDURE — 86003 ALLG SPEC IGE CRUDE XTRC EA: CPT

## 2018-06-19 PROCEDURE — 82306 VITAMIN D 25 HYDROXY: CPT

## 2018-06-19 PROCEDURE — 36415 COLL VENOUS BLD VENIPUNCTURE: CPT

## 2018-06-19 PROCEDURE — 82785 ASSAY OF IGE: CPT

## 2018-06-20 LAB
A ALTERNATA IGE QN: <0.1 KUA/I
A FUMIGATUS IGE QN: <0.1 KUA/I
ALLERGEN COMMENT: NORMAL
BERMUDA GRASS IGE QN: <0.1 KUA/I
BOXELDER IGE QN: <0.1 KUA/I
C HERBARUM IGE QN: <0.1 KUA/I
CAT DANDER IGE QN: <0.1 KUA/I
COCKSFOOT IGE QN: <0.1 KUA/I
COMMON RAGWEED IGE QN: <0.1 KUA/I
D FARINAE IGE QN: <0.1 KUA/I
D PTERONYSS IGE QN: <0.1 KUA/I
DOG DANDER IGE QN: <0.1 KUA/I
GOOSEFOOT IGE QN: <0.1 KUA/L
ROACH IGE QN: <0.1 KUA/I
SALTWORT IGE QN: <0.1 KUA/I
SILVER BIRCH IGE QN: <0.1 KUA/I
TIMOTHY IGE QN: <0.1 KUA/I
TOTAL IGE SMQN RAST: 39.5 KU/L (ref 0–113)
WHITE ELM IGE QN: <0.1 KUA/I
WHITE OAK IGE QN: <0.1 KUA/I

## 2018-06-21 LAB
ENGL PLANTAIN IGE QN: <0.1 KU/L
KENT BLUE GRASS IGE QN: <0.1 KU/L

## 2018-06-22 LAB
MISCELLANEOUS LAB TEST RESULT: NORMAL
PER RYE GRASS IGE QN: <0.1 KU/L

## 2018-07-19 ENCOUNTER — TRANSCRIBE ORDERS (OUTPATIENT)
Dept: ADMINISTRATIVE | Facility: HOSPITAL | Age: 78
End: 2018-07-19

## 2018-07-19 DIAGNOSIS — R31.9 HEMATURIA SYNDROME: ICD-10-CM

## 2018-07-19 DIAGNOSIS — N18.9 CHRONIC KIDNEY DISEASE, UNSPECIFIED CKD STAGE: Primary | ICD-10-CM

## 2018-07-28 ENCOUNTER — HOSPITAL ENCOUNTER (OUTPATIENT)
Dept: RADIOLOGY | Facility: HOSPITAL | Age: 78
Discharge: HOME | End: 2018-07-28
Payer: COMMERCIAL

## 2018-07-28 DIAGNOSIS — N18.9 CHRONIC KIDNEY DISEASE, UNSPECIFIED CKD STAGE: ICD-10-CM

## 2018-07-28 DIAGNOSIS — R31.9 HEMATURIA SYNDROME: ICD-10-CM

## 2018-07-28 PROCEDURE — 76700 US EXAM ABDOM COMPLETE: CPT

## 2018-07-30 DIAGNOSIS — B35.3 TINEA PEDIS OF BOTH FEET: ICD-10-CM

## 2018-07-30 RX ORDER — CLOTRIMAZOLE 1 %
CREAM (GRAM) TOPICAL
Qty: 60 G | Refills: 0 | Status: SHIPPED | OUTPATIENT
Start: 2018-07-30 | End: 2018-10-01 | Stop reason: ALTCHOICE

## 2018-08-27 ENCOUNTER — APPOINTMENT (OUTPATIENT)
Dept: LAB | Facility: HOSPITAL | Age: 78
End: 2018-08-27
Payer: COMMERCIAL

## 2018-08-27 ENCOUNTER — TRANSCRIBE ORDERS (OUTPATIENT)
Dept: LAB | Facility: HOSPITAL | Age: 78
End: 2018-08-27

## 2018-08-27 DIAGNOSIS — I10 ESSENTIAL HYPERTENSION, MALIGNANT: Primary | ICD-10-CM

## 2018-08-27 DIAGNOSIS — N18.1 STAGE 1 CHRONIC KIDNEY DISEASE: ICD-10-CM

## 2018-08-27 DIAGNOSIS — I10 ESSENTIAL HYPERTENSION, MALIGNANT: ICD-10-CM

## 2018-08-27 DIAGNOSIS — R31.9 HEMATURIA SYNDROME: ICD-10-CM

## 2018-08-27 LAB
ALBUMIN SERPL BCP-MCNC: 3.6 G/DL (ref 3.5–5)
ALP SERPL-CCNC: 83 U/L (ref 46–116)
ALT SERPL W P-5'-P-CCNC: 22 U/L (ref 12–78)
ANION GAP SERPL CALCULATED.3IONS-SCNC: 9 MMOL/L (ref 4–13)
AST SERPL W P-5'-P-CCNC: 12 U/L (ref 5–45)
BASOPHILS # BLD AUTO: 0.02 THOUSANDS/ΜL (ref 0–0.1)
BASOPHILS NFR BLD AUTO: 0 % (ref 0–1)
BILIRUB SERPL-MCNC: 0.43 MG/DL (ref 0.2–1)
BUN SERPL-MCNC: 26 MG/DL (ref 5–25)
CALCIUM SERPL-MCNC: 9 MG/DL (ref 8.3–10.1)
CHLORIDE SERPL-SCNC: 105 MMOL/L (ref 100–108)
CO2 SERPL-SCNC: 25 MMOL/L (ref 21–32)
CREAT SERPL-MCNC: 1.05 MG/DL (ref 0.6–1.3)
EOSINOPHIL # BLD AUTO: 0.07 THOUSAND/ΜL (ref 0–0.61)
EOSINOPHIL NFR BLD AUTO: 1 % (ref 0–6)
ERYTHROCYTE [DISTWIDTH] IN BLOOD BY AUTOMATED COUNT: 14.4 % (ref 11.6–15.1)
EST. AVERAGE GLUCOSE BLD GHB EST-MCNC: 192 MG/DL
GFR SERPL CREATININE-BSD FRML MDRD: 59 ML/MIN/1.73SQ M
GLUCOSE SERPL-MCNC: 147 MG/DL (ref 65–140)
HBA1C MFR BLD: 8.3 % (ref 4.2–6.3)
HCT VFR BLD AUTO: 36.6 % (ref 34.8–46.1)
HGB BLD-MCNC: 11.3 G/DL (ref 11.5–15.4)
IMM GRANULOCYTES # BLD AUTO: 0.01 THOUSAND/UL (ref 0–0.2)
IMM GRANULOCYTES NFR BLD AUTO: 0 % (ref 0–2)
IRON SERPL-MCNC: 59 UG/DL (ref 50–170)
LYMPHOCYTES # BLD AUTO: 1.25 THOUSANDS/ΜL (ref 0.6–4.47)
LYMPHOCYTES NFR BLD AUTO: 20 % (ref 14–44)
MCH RBC QN AUTO: 24.8 PG (ref 26.8–34.3)
MCHC RBC AUTO-ENTMCNC: 30.9 G/DL (ref 31.4–37.4)
MCV RBC AUTO: 80 FL (ref 82–98)
MONOCYTES # BLD AUTO: 0.43 THOUSAND/ΜL (ref 0.17–1.22)
MONOCYTES NFR BLD AUTO: 7 % (ref 4–12)
NEUTROPHILS # BLD AUTO: 4.42 THOUSANDS/ΜL (ref 1.85–7.62)
NEUTS SEG NFR BLD AUTO: 72 % (ref 43–75)
NRBC BLD AUTO-RTO: 0 /100 WBCS
PLATELET # BLD AUTO: 210 THOUSANDS/UL (ref 149–390)
PMV BLD AUTO: 12.2 FL (ref 8.9–12.7)
POTASSIUM SERPL-SCNC: 3.9 MMOL/L (ref 3.5–5.3)
PROT SERPL-MCNC: 7.8 G/DL (ref 6.4–8.2)
RBC # BLD AUTO: 4.56 MILLION/UL (ref 3.81–5.12)
SODIUM SERPL-SCNC: 139 MMOL/L (ref 136–145)
VIT B12 SERPL-MCNC: 1010 PG/ML (ref 100–900)
WBC # BLD AUTO: 6.2 THOUSAND/UL (ref 4.31–10.16)

## 2018-08-27 PROCEDURE — 83036 HEMOGLOBIN GLYCOSYLATED A1C: CPT

## 2018-08-27 PROCEDURE — 36415 COLL VENOUS BLD VENIPUNCTURE: CPT

## 2018-08-27 PROCEDURE — 83540 ASSAY OF IRON: CPT

## 2018-08-27 PROCEDURE — 85025 COMPLETE CBC W/AUTO DIFF WBC: CPT

## 2018-08-27 PROCEDURE — 80053 COMPREHEN METABOLIC PANEL: CPT

## 2018-08-27 PROCEDURE — 82607 VITAMIN B-12: CPT

## 2018-08-28 ENCOUNTER — APPOINTMENT (OUTPATIENT)
Dept: LAB | Facility: HOSPITAL | Age: 78
End: 2018-08-28
Payer: COMMERCIAL

## 2018-08-28 ENCOUNTER — TRANSCRIBE ORDERS (OUTPATIENT)
Dept: ADMINISTRATIVE | Facility: HOSPITAL | Age: 78
End: 2018-08-28

## 2018-08-28 DIAGNOSIS — N18.9 CHRONIC KIDNEY DISEASE, UNSPECIFIED CKD STAGE: ICD-10-CM

## 2018-08-28 DIAGNOSIS — R31.9 HEMATURIA SYNDROME: ICD-10-CM

## 2018-08-28 DIAGNOSIS — I10 ESSENTIAL HYPERTENSION, MALIGNANT: Primary | ICD-10-CM

## 2018-08-28 DIAGNOSIS — I10 ESSENTIAL HYPERTENSION, MALIGNANT: ICD-10-CM

## 2018-08-28 PROCEDURE — 82570 ASSAY OF URINE CREATININE: CPT

## 2018-08-28 PROCEDURE — 84156 ASSAY OF PROTEIN URINE: CPT

## 2018-08-29 LAB
CREAT 24H UR-MRATE: 1.3 G/24HR (ref 0.6–1.8)
PROT 24H UR-MCNC: 624 MG/24 HRS (ref 40–150)
SPECIMEN VOL UR: 2400 ML
SPECIMEN VOL UR: 2400 ML

## 2018-09-20 ENCOUNTER — TELEPHONE (OUTPATIENT)
Dept: UROLOGY | Facility: CLINIC | Age: 78
End: 2018-09-20

## 2018-09-20 NOTE — TELEPHONE ENCOUNTER
I called pt to inquire about her no show appt for her CMG test this morning  There was no answer so I did l/m asking pt to call our office back to get this rescheduled

## 2018-10-01 ENCOUNTER — APPOINTMENT (EMERGENCY)
Dept: RADIOLOGY | Facility: HOSPITAL | Age: 78
DRG: 093 | End: 2018-10-01
Payer: COMMERCIAL

## 2018-10-01 ENCOUNTER — APPOINTMENT (OUTPATIENT)
Dept: NON INVASIVE DIAGNOSTICS | Facility: HOSPITAL | Age: 78
DRG: 093 | End: 2018-10-01
Payer: COMMERCIAL

## 2018-10-01 ENCOUNTER — HOSPITAL ENCOUNTER (INPATIENT)
Facility: HOSPITAL | Age: 78
LOS: 2 days | DRG: 093 | End: 2018-10-04
Attending: EMERGENCY MEDICINE | Admitting: INTERNAL MEDICINE
Payer: COMMERCIAL

## 2018-10-01 DIAGNOSIS — I63.9 CVA (CEREBROVASCULAR ACCIDENT) (HCC): Primary | ICD-10-CM

## 2018-10-01 DIAGNOSIS — R26.2 AMBULATORY DYSFUNCTION: ICD-10-CM

## 2018-10-01 DIAGNOSIS — R42 LIGHTHEADEDNESS: ICD-10-CM

## 2018-10-01 DIAGNOSIS — I63.9 CEREBROVASCULAR ACCIDENT (CVA), UNSPECIFIED MECHANISM (HCC): ICD-10-CM

## 2018-10-01 DIAGNOSIS — R27.0 ATAXIA: ICD-10-CM

## 2018-10-01 PROBLEM — R29.90 STROKE-LIKE SYMPTOMS: Status: ACTIVE | Noted: 2018-10-01

## 2018-10-01 LAB
ANION GAP SERPL CALCULATED.3IONS-SCNC: 5 MMOL/L (ref 4–13)
APTT PPP: 25 SECONDS (ref 24–36)
ATRIAL RATE: 65 BPM
BASOPHILS # BLD AUTO: 0.04 THOUSANDS/ΜL (ref 0–0.1)
BASOPHILS NFR BLD AUTO: 1 % (ref 0–1)
BUN SERPL-MCNC: 17 MG/DL (ref 5–25)
CALCIUM SERPL-MCNC: 9.1 MG/DL (ref 8.3–10.1)
CHLORIDE SERPL-SCNC: 105 MMOL/L (ref 100–108)
CO2 SERPL-SCNC: 26 MMOL/L (ref 21–32)
CREAT SERPL-MCNC: 1.03 MG/DL (ref 0.6–1.3)
EOSINOPHIL # BLD AUTO: 0.05 THOUSAND/ΜL (ref 0–0.61)
EOSINOPHIL NFR BLD AUTO: 1 % (ref 0–6)
ERYTHROCYTE [DISTWIDTH] IN BLOOD BY AUTOMATED COUNT: 14.5 % (ref 11.6–15.1)
GFR SERPL CREATININE-BSD FRML MDRD: 60 ML/MIN/1.73SQ M
GLUCOSE SERPL-MCNC: 111 MG/DL (ref 65–140)
GLUCOSE SERPL-MCNC: 175 MG/DL (ref 65–140)
GLUCOSE SERPL-MCNC: 75 MG/DL (ref 65–140)
HCT VFR BLD AUTO: 36.4 % (ref 34.8–46.1)
HGB BLD-MCNC: 11.6 G/DL (ref 11.5–15.4)
IMM GRANULOCYTES # BLD AUTO: 0.02 THOUSAND/UL (ref 0–0.2)
IMM GRANULOCYTES NFR BLD AUTO: 0 % (ref 0–2)
INR PPP: 1.03 (ref 0.86–1.17)
LYMPHOCYTES # BLD AUTO: 1.09 THOUSANDS/ΜL (ref 0.6–4.47)
LYMPHOCYTES NFR BLD AUTO: 16 % (ref 14–44)
MCH RBC QN AUTO: 25.8 PG (ref 26.8–34.3)
MCHC RBC AUTO-ENTMCNC: 31.9 G/DL (ref 31.4–37.4)
MCV RBC AUTO: 81 FL (ref 82–98)
MONOCYTES # BLD AUTO: 0.41 THOUSAND/ΜL (ref 0.17–1.22)
MONOCYTES NFR BLD AUTO: 6 % (ref 4–12)
NEUTROPHILS # BLD AUTO: 5.09 THOUSANDS/ΜL (ref 1.85–7.62)
NEUTS SEG NFR BLD AUTO: 76 % (ref 43–75)
NRBC BLD AUTO-RTO: 0 /100 WBCS
P AXIS: 70 DEGREES
PLATELET # BLD AUTO: 193 THOUSANDS/UL (ref 149–390)
PMV BLD AUTO: 11.7 FL (ref 8.9–12.7)
POTASSIUM SERPL-SCNC: 4.6 MMOL/L (ref 3.5–5.3)
PR INTERVAL: 210 MS
PROTHROMBIN TIME: 13.6 SECONDS (ref 11.8–14.2)
QRS AXIS: -1 DEGREES
QRSD INTERVAL: 88 MS
QT INTERVAL: 404 MS
QTC INTERVAL: 420 MS
RBC # BLD AUTO: 4.5 MILLION/UL (ref 3.81–5.12)
SODIUM SERPL-SCNC: 136 MMOL/L (ref 136–145)
T WAVE AXIS: 79 DEGREES
TROPONIN I SERPL-MCNC: <0.02 NG/ML
VENTRICULAR RATE: 65 BPM
WBC # BLD AUTO: 6.7 THOUSAND/UL (ref 4.31–10.16)

## 2018-10-01 PROCEDURE — 82948 REAGENT STRIP/BLOOD GLUCOSE: CPT

## 2018-10-01 PROCEDURE — 93005 ELECTROCARDIOGRAM TRACING: CPT

## 2018-10-01 PROCEDURE — 93306 TTE W/DOPPLER COMPLETE: CPT

## 2018-10-01 PROCEDURE — 99285 EMERGENCY DEPT VISIT HI MDM: CPT

## 2018-10-01 PROCEDURE — 93306 TTE W/DOPPLER COMPLETE: CPT | Performed by: INTERNAL MEDICINE

## 2018-10-01 PROCEDURE — 99219 PR INITIAL OBSERVATION CARE/DAY 50 MINUTES: CPT | Performed by: PSYCHIATRY & NEUROLOGY

## 2018-10-01 PROCEDURE — 84484 ASSAY OF TROPONIN QUANT: CPT | Performed by: EMERGENCY MEDICINE

## 2018-10-01 PROCEDURE — 71045 X-RAY EXAM CHEST 1 VIEW: CPT

## 2018-10-01 PROCEDURE — 70496 CT ANGIOGRAPHY HEAD: CPT

## 2018-10-01 PROCEDURE — 85610 PROTHROMBIN TIME: CPT | Performed by: EMERGENCY MEDICINE

## 2018-10-01 PROCEDURE — 85730 THROMBOPLASTIN TIME PARTIAL: CPT | Performed by: EMERGENCY MEDICINE

## 2018-10-01 PROCEDURE — 85025 COMPLETE CBC W/AUTO DIFF WBC: CPT | Performed by: EMERGENCY MEDICINE

## 2018-10-01 PROCEDURE — 36415 COLL VENOUS BLD VENIPUNCTURE: CPT | Performed by: EMERGENCY MEDICINE

## 2018-10-01 PROCEDURE — 70450 CT HEAD/BRAIN W/O DYE: CPT

## 2018-10-01 PROCEDURE — 70498 CT ANGIOGRAPHY NECK: CPT

## 2018-10-01 PROCEDURE — 93010 ELECTROCARDIOGRAM REPORT: CPT | Performed by: INTERNAL MEDICINE

## 2018-10-01 PROCEDURE — 80048 BASIC METABOLIC PNL TOTAL CA: CPT | Performed by: EMERGENCY MEDICINE

## 2018-10-01 RX ORDER — SENNOSIDES 8.6 MG
1 TABLET ORAL DAILY
Status: DISCONTINUED | OUTPATIENT
Start: 2018-10-01 | End: 2018-10-04 | Stop reason: HOSPADM

## 2018-10-01 RX ORDER — ASPIRIN 81 MG/1
81 TABLET, CHEWABLE ORAL DAILY
Status: DISCONTINUED | OUTPATIENT
Start: 2018-10-01 | End: 2018-10-01

## 2018-10-01 RX ORDER — CLOPIDOGREL BISULFATE 75 MG/1
75 TABLET ORAL DAILY
Status: DISCONTINUED | OUTPATIENT
Start: 2018-10-01 | End: 2018-10-02

## 2018-10-01 RX ORDER — ONDANSETRON 2 MG/ML
4 INJECTION INTRAMUSCULAR; INTRAVENOUS EVERY 6 HOURS PRN
Status: DISCONTINUED | OUTPATIENT
Start: 2018-10-01 | End: 2018-10-01

## 2018-10-01 RX ORDER — ATORVASTATIN CALCIUM 80 MG/1
80 TABLET, FILM COATED ORAL
Status: DISCONTINUED | OUTPATIENT
Start: 2018-10-01 | End: 2018-10-02

## 2018-10-01 RX ORDER — DOCUSATE SODIUM 100 MG/1
100 CAPSULE, LIQUID FILLED ORAL 2 TIMES DAILY
Status: DISCONTINUED | OUTPATIENT
Start: 2018-10-01 | End: 2018-10-04 | Stop reason: HOSPADM

## 2018-10-01 RX ORDER — ASPIRIN 325 MG
325 TABLET ORAL DAILY
Status: DISCONTINUED | OUTPATIENT
Start: 2018-10-01 | End: 2018-10-01

## 2018-10-01 RX ORDER — ACETAMINOPHEN 325 MG/1
650 TABLET ORAL EVERY 6 HOURS PRN
Status: DISCONTINUED | OUTPATIENT
Start: 2018-10-01 | End: 2018-10-04 | Stop reason: HOSPADM

## 2018-10-01 RX ORDER — AMLODIPINE BESYLATE 5 MG/1
5 TABLET ORAL DAILY
Status: DISCONTINUED | OUTPATIENT
Start: 2018-10-01 | End: 2018-10-01

## 2018-10-01 RX ADMIN — ATORVASTATIN CALCIUM 80 MG: 80 TABLET, FILM COATED ORAL at 18:30

## 2018-10-01 RX ADMIN — CLOPIDOGREL BISULFATE 75 MG: 75 TABLET ORAL at 18:30

## 2018-10-01 RX ADMIN — ASPIRIN 325 MG: 325 TABLET ORAL at 13:58

## 2018-10-01 RX ADMIN — ENOXAPARIN SODIUM 40 MG: 40 INJECTION SUBCUTANEOUS at 18:29

## 2018-10-01 RX ADMIN — DOCUSATE SODIUM 100 MG: 100 CAPSULE, LIQUID FILLED ORAL at 18:30

## 2018-10-01 RX ADMIN — IOHEXOL 100 ML: 350 INJECTION, SOLUTION INTRAVENOUS at 13:11

## 2018-10-01 NOTE — ED NOTES
I have attempted to call floor for telemetry box but no answer        Justin Thompson, YANNI  [birth year] [id number]

## 2018-10-01 NOTE — PLAN OF CARE
Activity Intolerance/Impaired Mobility     Mobility/activity is maintained at optimum level for patient Progressing        Communication Impairment     Ability to express needs and understand communication [address] Discharge to home or other facility with appropriate resources Progressing        INFECTION - ADULT     Absence or prevention of progression during hospitalization Progressing     Absence of fever/infection during neutropenic period Progressing        Knowledge Deficit     Patient/family/caregiver demonstrates understanding of disease process, treatment plan, medications, and discharge instructions Progressing        Neurological Deficit     Neurological status is stable or improving Progressing        Nutrition     Nutrition/Hydration status is improving Progressing        PAIN - ADULT     Verbalizes/displays adequate comfort level or baseline comfort level Progressing        Potential for Aspiration     Non-ventilated patient's risk of aspiration is minimized Progressing     Ventilated patient's risk of aspiration is minimized Progressing        Potential for Falls     Patient will remain free of falls Progressing        SAFETY ADULT     Maintain or return to baseline ADL function Progressing     Maintain or return mobility status to optimal level Progressing

## 2018-10-01 NOTE — ED PROVIDER NOTES
History  Chief Complaint   Patient presents with    Dizziness     Patient reports sudden onset of dizziness last night, states it feels similar to when she had a stroke previously, slight left facial droop noted in triage     67 yo F presents to ED for evaluation for new onset ataxia, lightheadedness, and L sided facial droop  Pt reports prior stroke that affected her L side but with no residual deficits  At 7 pm last night, pt says she noticed that her gait became different and unstable  She says she normally ambulates without difficulty but now had to hold on to walls and furniture to keep her balance  She denies any falls  She says she also became lightheaded  No vertigo  This morning she says the gait instability is still present, although the lightheadedness has improved a little bit  Her  noticed a few hours prior to arrival that she now also had a slight L sided facial droop  No speech changes, no vision changes  Pt denies any extremity weakness or sensory changes  Pt reports previously on ASA but was changed to plavix  No chest pain, shortness of breath, heart palpitations, headache, neck pain, fever/chills, or recent illness  Prior to Admission Medications   Prescriptions Last Dose Informant Patient Reported? Taking?    Blood Glucose Monitoring Suppl (FREESTYLE LITE) JAQUELIN 10/1/2018 at Unknown time Self No Yes   Sig: by Does not apply route daily   Cholecalciferol (VITAMIN D3) 2000 units capsule 9/30/2018 at Unknown time Self Yes Yes   Sig: Take 1 tablet by mouth daily   FREESTYLE LITE test strip 10/1/2018 at Unknown time  No Yes   Sig: May check blood sugar twice daily   Lancets (FREESTYLE) lancets 9/30/2018 at Unknown time Self No Yes   Sig: Use as instructed   Linagliptin (TRADJENTA) 5 MG TABS 9/30/2018 at Unknown time Self Yes Yes   Sig: Take 1 tablet by mouth daily   amLODIPine (NORVASC) 5 mg tablet 9/30/2018 at Unknown time Self Yes Yes   Sig: Take 1 tablet by mouth daily atorvastatin (LIPITOR) 40 mg tablet 9/30/2018 at Unknown time Self Yes Yes   Sig: Take 1 tablet by mouth daily   clopidogrel (PLAVIX) 75 mg tablet 9/30/2018 at Unknown time Self Yes Yes   Sig: Take 1 tablet by mouth daily   glipiZIDE (GLUCOTROL) 10 mg tablet 9/30/2018 at Unknown time Self Yes Yes   Sig: Take 10 mg by mouth 2 (two) times a day before meals   metFORMIN (GLUCOPHAGE) 1000 MG tablet 9/30/2018 at Unknown time Self Yes Yes   Sig: Take 1,000 mg by mouth 2 (two) times a day with meals      Facility-Administered Medications: None       Past Medical History:   Diagnosis Date    ACE-inhibitor cough     last assessed - [id number]    Asthma     Bilateral edema of lower extremity     last assessed - [id number]    Cardiac murmur     last assessed - [id number]    Diabetes mellitus [facility])     last assessed - [id number]    Esophageal dysphagia     Fatigue     last assessed - [id number]    Hyperlipidemia     Hypertension     Patellar bursitis of right knee     last assessed - [id number]    Personal history of other specified conditions     presenting hazards to health    Stroke [facility])     Stroke syndrome     Urinary frequency     UTI (urinary tract infection)        Past Surgical History:   Procedure Laterality Date    MI ESOPHAGOGASTRODUODENOSCOPY TRANSORAL DIAGNOSTIC N/A 4/5/2017    Procedure: ESOPHAGOGASTRODUODENOSCOPY (EGD); Surgeon: Sherice No MD;  Location: [facility];   Service: Gastroenterology       Family History   Problem Relation Age of Onset    Heart disease Father     Hypertension Mother    Lauren Poonam Stroke Mother     Other Sister         1)Breast disorder; 2)Epilepsy   Lauren Poonam Migraines Sister         Migraine headaches    Osteoporosis Sister     Thyroid disease Sister     Coronary artery disease Sister         S/P triple vessel bypass    Osteoporosis Maternal Grandmother     Diabetes Son         Diabetes mellitus    Hypertension Son     Rheum arthritis Son         Rheumatic disease    Heart disease Family      I have reviewed and agree with the history as documented  Social History   Substance Use Topics    Smoking status: Former Smoker     Packs/day: 3 00    Smokeless tobacco: Former User      Comment: quit over 30 yrs ago; (quit in the 1980's, had smoked 3PPD for 20 years - per [employer])    Alcohol use No      Comment: Denied history of alcohol use        Review of Systems   Constitutional: Negative for activity change, chills and fever  HENT: Negative for congestion, rhinorrhea, sore throat and trouble swallowing  Eyes: Negative for pain, discharge and visual disturbance  Respiratory: Negative for cough, chest tightness and shortness of breath  Cardiovascular: Negative for chest pain, palpitations and leg swelling  Gastrointestinal: Negative for abdominal pain, nausea and vomiting  Genitourinary: Negative for dysuria, frequency and urgency  Musculoskeletal: Positive for gait problem  Negative for neck pain and neck stiffness  Skin: Negative for color change, rash and wound  Neurological: Positive for facial asymmetry and light-headedness  Negative for dizziness, syncope and headaches  Physical Exam  ED Triage Vitals [10/01/18 1226]   Temperature Pulse Respirations Blood Pressure SpO2   98 2 °F (36 8 °C) 71 18 (!) 174/82 99 %      Temp Source Heart Rate Source Patient Position - Orthostatic VS BP Location FiO2 (%)   Oral Monitor Sitting Left arm --      Pain Score       No Pain           Orthostatic Vital Signs  Vitals:    10/01/18 1226 10/01/18 1313 10/01/18 1328 10/01/18 1343   BP: (!) 174/82 (!) 177/74 (!) 177/77 168/82   Pulse: 71 67 67 65   Patient Position - Orthostatic VS: Sitting  Sitting Sitting       Physical Exam   Constitutional: She is oriented to person, place, and time  She appears well-developed and well-nourished  No distress  HENT:   Head: Normocephalic and atraumatic  Mouth/Throat: Oropharynx is clear and moist  No oropharyngeal exudate  Eyes: Pupils are equal, round, and reactive to light  Conjunctivae and EOM are normal  Right eye exhibits no discharge  Left eye exhibits no discharge  Neck: Normal range of motion  Neck supple  Cardiovascular: Normal rate, regular rhythm and intact distal pulses  Pulmonary/Chest: Effort normal and breath sounds normal  No stridor  No respiratory distress  Abdominal: Soft  There is no tenderness  There is no rebound and no guarding  Musculoskeletal: Normal range of motion  She exhibits no edema or tenderness  Neurological: She is alert and oriented to person, place, and time  She exhibits normal muscle tone  Decreased sensation to light touch in distribution of V2 and V3 on L  Slight L sided facial droop (mild flattening)  Otherwise CN intact bilaterally  Strength 5/5 bilateral upper and lower extremities  Decreased sensation to pin prick over LLE compared to RLE  Normal finger to nose, rapid alternating movement  Difficulty with heel to shin bilaterally   Skin: Skin is warm and dry  Nursing note and vitals reviewed        ED Medications  Medications   aspirin tablet 325 mg (325 mg Oral Given 10/1/18 1358)   atorvastatin (LIPITOR) tablet 80 mg (not administered)   iohexol (OMNIPAQUE) 350 MG/ML injection (MULTI-DOSE) 100 mL (100 mL Intravenous Given 10/1/18 1311)       Diagnostic Studies  Results Reviewed     Procedure Component Value Units Date/Time    Protime-INR [[id number]]  (Normal) Collected:  10/01/18 1331    Lab Status:  Final result Specimen:  Blood from Arm, Right Updated:  10/01/18 1409     Protime 13 6 seconds      INR 1 03    APTT [[id number]]  (Normal) Collected:  10/01/18 1331    Lab Status:  Final result Specimen:  Blood from Arm, Right Updated:  10/01/18 1409     PTT 25 seconds     Troponin I [[id number]]  (Normal) Collected:  10/01/18 1331    Lab Status:  Final result Specimen:  Blood from Arm, Right Updated:  10/01/18 1405     Troponin I <0 02 ng/mL     Basic metabolic panel [[id number]] Collected:  10/01/18 1331    Lab Status:  Final result Specimen:  Blood from Arm, Right Updated:  10/01/18 1402     Sodium 136 mmol/L      Potassium 4 6 mmol/L      Chloride 105 mmol/L      CO2 26 mmol/L      ANION GAP 5 mmol/L      BUN 17 mg/dL      Creatinine 1 03 mg/dL      Glucose 111 mg/dL      Calcium 9 1 mg/dL      eGFR 60 ml/min/1 73sq m     Narrative:         National Kidney Disease Education Program recommendations are as follows:  GFR calculation is accurate only with a steady state creatinine  Chronic Kidney disease less than 60 ml/min/1 73 sq  meters  Kidney failure less than 15 ml/min/1 73 sq  meters  P2Y12 Platelet inhibitor [[id number]]     Lab Status:  No result Specimen:  Blood     CBC and differential [[id number]]  (Abnormal) Collected:  10/01/18 1331    Lab Status:  Final result Specimen:  Blood from Arm, Right Updated:  10/01/18 1351     WBC 6 70 Thousand/uL      RBC 4 50 Million/uL      Hemoglobin 11 6 g/dL      Hematocrit 36 4 %      MCV 81 (L) fL      MCH 25 8 (L) pg      MCHC 31 9 g/dL      RDW 14 5 %      MPV 11 7 fL      Platelets 350 Thousands/uL      nRBC 0 /100 WBCs      Neutrophils Relative 76 (H) %      Immat GRANS % 0 %      Lymphocytes Relative 16 %      Monocytes Relative 6 %      Eosinophils Relative 1 %      Basophils Relative 1 %      Neutrophils Absolute 5 09 Thousands/µL      Immature Grans Absolute 0 02 Thousand/uL      Lymphocytes Absolute 1 09 Thousands/µL      Monocytes Absolute 0 41 Thousand/µL      Eosinophils Absolute 0 05 Thousand/µL      Basophils Absolute 0 04 Thousands/µL     POCT urinalysis dipstick [[id number]]     Lab Status:  No result Specimen:  Urine                  XR stroke alert portable chest   Final Result by Susan Ambriz MD (10/01 1402)      Stable cardiomegaly  No active pulmonary disease  Workstation performed: [id number]         CTA stroke alert (head/neck)   Final Result by Gilbert Garza MD (10/01 1326)         1    No hemodynamically significant stenosis in the major arteries of the neck  2   No intracranial aneurysm or major intracranial arterial stenosis  3   Pulmonary arterial hypertension  I tiger texted this study result  to Dr Eric Gordon on 10/1/2018 at 1:25 PM                Workstation performed: [id number]         CT stroke alert brain   Final Result by Corin Khan MD (10/01 1311)         1  Trace high density in the posterior aspect of the left sylvian fissure possibly trace subarachnoid hemorrhage  Differential diagnosis includes artifact from coapted cortex versus slice selection or perhaps a small vessel  Consider follow-up repeat    head CT in 12 to 24 hours to exclude blooming  2   Mild, chronic microangiopathy elsewhere  Findings were directly discussed with Dr Eric Gordon on 10/1/2018 1:03 PM       Workstation performed: [id number]               Procedures  Procedures      Phone Consults  ED Phone Contact    ED Course           Identification of Seniors at Risk      Most Recent Value   (ISAR) Identification of Seniors at Risk   Before the illness or injury that brought you to the Emergency, did you need someone to help you on a regular basis? 1 Filed at: 10/01/2018 1227   In the last 24 hours, have you needed more help than usual?  0 Filed at: 10/01/2018 1227   Have you been hospitalized for one or more nights during the past 6 months? 1 Filed at: 10/01/2018 1227   In general, do you see well?  0 Filed at: 10/01/2018 1227   In general, do you have serious problems with your memory? 0 Filed at: 10/01/2018 1227   Do you take more than three different medications every day? 1 Filed at: 10/01/2018 1227   ISAR Score  3 Filed at: 10/01/2018 1227                          MDM  Number of Diagnoses or Management Options  Ataxia:   Lightheadedness:   Diagnosis management comments: Stroke alert called  Neuro at bedside to evaluate  L sided facial droop resolving    NIHSS 3  CT radiology report with possible trace SAH  Neurology aware  Will admit to SOD for further stroke pathway workup  CritCare Time    Disposition  Final diagnoses:   Ataxia   Lightheadedness     Time reflects when diagnosis was documented in both MDM as applicable and the Disposition within this note     Time User Action Codes Description Comment    10/1/2018 12:56 PM Luann Denver Add [R27 0] Ataxia     10/1/2018  2:34 PM Luann Denver Add [R42] [facility]       ED Disposition     ED Disposition Condition Comment    Admit  Case was discussed with SOD resident and the patient's admission status was agreed to be Admission Status: observation status to the service of Dr Liz Bearden   Follow-up Information    None         Patient's Medications   Discharge Prescriptions    No medications on file     No discharge procedures on file  ED Provider  Attending physically available and evaluated Saurabh Sahu I managed the patient along with the ED Attending      Electronically Signed by         Juan King MD  10/01/18 9692

## 2018-10-01 NOTE — CONSULTS
Consultation - Neurology   Yang Perez 66 y o  female MRN: [id number]  Unit/Bed#: ED 02 Encounter: [id number]      Assessment/Plan   1)  Ataxia with L sided sensory deficit - will rule out acute CVA    -MRI B pending   -CT H without acute intracranial abonormality   -CTA head and neck without hemodynamically significant stenosis, occlusion, or dissection   -ECHO pending    - x1 then continue plavix 75 mg p o  daily for now    -will increase home dose of Lipitor to 80 mg p o  daily   -permissive hypertension   -P2Y12 pending    -HbA1c, Lipid profile pending    -PT/ OT/ Speech     Stroke Alert: 0104  Neurology at Bedside: 1253  NIHSS: 3  tPA: Not administered 2/2 outside of window  Neurovascular intervention: Not indicated 2/2 no large vessel target      History of Present Illness     Reason for Consult / Principal Problem: Stroke Alert   Hx and PE limited by: None   HPI: Yang Perez is a 66 y o   female with a  PMH of L pontine CVA in 2010, on plavix, HTN, DM2,  who presents with ataxia and falling to the right since 7pm yesterday evening  Of note, the pt was evaluated by Dr Bry Carrizales of Neurology as an outpatient in November, 2017 and at that time noted residual balance difficulties and L sided weakness since prior stroke  The pt was also evaluated by ENT in March, 2017 for "dizziness" x 12 months, with plan for referral to PT  The pt additinoally reports that she had an incident in April, 2018 ago wherein she fell to right and "fell into the TV" and was [facility] in [facility] ED (see Dr Vimal Sutherland note from that date)  Prior to admission, the pt reports that she was in her normal state of health until yesterday evening, when she suddenly experienced a sense of being "off balance" while cooking  She reports that she stumbled against the wall but did not fall    Specifically denies vertigo, dizziness at rest,  fever, chills, CP, SOB, abdominal pain, N/V, difficulty with bowel or bladder, HA , changes in vision, numbness, weakness, slurred speech or difficulty with word finding  When symptoms did not improve this morning, the pt was brought to the ED for evaluation by her sons  On exam today, the patient is polite and cooperative and in no acute distress  She is evaluated as a stroke alert, with initial stroke scale of 3 secondary to bilateral lower extremity ataxia and left lower extremity sensory deficit  CT head was completed and demonstrated no acute intracranial abnormality  CTA demonstrated no large vessel occlusion, dissection or stenosis  TPA was deferred secondary to outside of window  Patient to be admitted to stroke pathway  Inpatient consult to Neurology  Consult performed by: Emmanuel Saeed  Consult ordered by: Mary Griggs          Review of Systems   See HPI     Historical Information   Past Medical History:   Diagnosis Date    ACE-inhibitor cough     last assessed - [id number]    Asthma     Bilateral edema of lower extremity     last assessed - 21Aug2017    Cardiac murmur     last assessed - 21Aug2017    Diabetes mellitus [facility])     last assessed - [id number]    Esophageal dysphagia     Fatigue     last assessed - 21Aug2017    Hyperlipidemia     Hypertension     Patellar bursitis of right knee     last assessed - [id number]    Personal history of other specified conditions     presenting hazards to health    Stroke [facility])     Stroke syndrome     Urinary frequency     UTI (urinary tract infection)      Past Surgical History:   Procedure Laterality Date    NH ESOPHAGOGASTRODUODENOSCOPY TRANSORAL DIAGNOSTIC N/A 4/5/2017    Procedure: ESOPHAGOGASTRODUODENOSCOPY (EGD); Surgeon: Elaine Walker MD;  Location: [facility];   Service: Gastroenterology     Social History   History   Alcohol Use No     Comment: Denied history of alcohol use     History   Drug Use No     Comment: Denied history of drug use     History   Smoking Status    Former Smoker    Packs/day: 3 00 Smokeless Tobacco    Former User     Comment: quit over 30 yrs ago; (quit in the 1980's, had smoked 3PPD for 20 years - per [employer])     Family History: non-contributory    Review of previous medical records was completed  Meds/Allergies   Scheduled Meds:  Continuous Infusions:  No current facility-administered medications for this encounter  PRN Meds:  No Known Allergies    Objective   Vitals:Blood pressure 168/82, pulse 65, temperature 98 2 °F (36 8 °C), temperature source Oral, resp  rate (!) 23, weight 81 2 kg (179 lb 0 2 oz), SpO2 96 %, not currently breastfeeding  ,Body mass index is 30 73 kg/m²  No intake or output data in the 24 hours ending 10/01/18 1347    Invasive Devices: Invasive Devices     Peripheral Intravenous Line            Peripheral IV 10/01/18 Right Antecubital less than 1 day                Physical Exam   Constitutional: She is oriented to person, place, and time  She appears well-developed and well-nourished  No distress  HENT:   Head: Normocephalic and atraumatic  Right Ear: External ear normal    Left Ear: External ear normal    Nose: Nose normal    Mouth/Throat: No oropharyngeal exudate  Eyes: Conjunctivae are normal  Right eye exhibits no discharge  Left eye exhibits no discharge  No scleral icterus  Neck: Normal range of motion  Neck supple  No tracheal deviation present  No thyromegaly present  Cardiovascular: Normal rate, regular rhythm and normal heart sounds  Exam reveals no gallop and no friction rub  No murmur heard  Pulmonary/Chest: Effort normal and breath sounds normal  No respiratory distress  She has no wheezes  She has no rales  She exhibits no tenderness  Abdominal: Soft  Bowel sounds are normal  She exhibits no distension  There is no tenderness  There is no rebound and no guarding  Musculoskeletal: Normal range of motion  She exhibits no edema, tenderness or deformity  Neurological: She is oriented to person, place, and time   She has normal strength  She has a normal Finger-Nose-Finger Test  Heel-to-shin test: Grossly ataxic bilateral lower extremities  Reflex Scores:       Tricep reflexes are 1+ on the right side and 1+ on the left side  Bicep reflexes are 1+ on the right side and 1+ on the left side  Brachioradialis reflexes are 1+ on the right side and 1+ on the left side  Patellar reflexes are 1+ on the right side and 1+ on the left side  Skin: Skin is warm and dry  No rash noted  She is not diaphoretic  No erythema  No pallor  Psychiatric: She has a normal mood and affect  Her behavior is normal    Nursing note and vitals reviewed  Neurologic Exam     Mental Status   Oriented to person, place, and time  Follows 2 step commands  Attention: normal  Concentration: normal    Level of consciousness: alert  Knowledge: good  Able to name object  Able to repeat  Cranial Nerves   Cranial nerves II through XII intact  Motor Exam   Muscle bulk: normal  Overall muscle tone: normal    Strength   Strength 5/5 throughout  Sensory Exam   Light touch normal    Reports sensory deficit to pinprick left lower extremity     Gait, Coordination, and Reflexes     Gait  Gait: (Deferred for safety)    Coordination   Finger to nose coordination: normal  Heel-to-shin test: Grossly ataxic bilateral lower extremities  Tremor   Resting tremor: absent    Reflexes   Right brachioradialis: 1+  Left brachioradialis: 1+  Right biceps: 1+  Left biceps: 1+  Right triceps: 1+  Left triceps: 1+  Right patellar: 1+  Left patellar: 1+  Right plantar: normal  Left plantar: normal  Right ankle clonus: absent  Left ankle clonus: absent      Lab Results: I have personally reviewed pertinent reports  No results found for this or any previous visit (from the past 24 hour(s))  ]      Imaging Studies: I have personally reviewed pertinent reports     and I have personally reviewed pertinent films in PACS  EKG, Pathology, and Other Studies: I have personally reviewed pertinent reports      VTE Prophylaxis: Reason for no pharmacologic prophylaxis in ED    Code Status: Prior  Advance Directive and Living Will:      Power of :    POLST:

## 2018-10-01 NOTE — PROGRESS NOTES
63 [facility] Senior Admission Note   Unit/Bed # @DBLINK (RDV,07021)@ Encounter: [id number]  SOD Team B           Lyn Mcgarry 66 y o  female [id number]     Patient seen and examined  Reviewed H&P per Dr Marin Milner  Agree with the assessment and plan unless otherwise noted  Assessment/Plan: Active Problems:     Aortic valve regurgitation, nonrheumatic    Benign essential hypertension    Bilateral edema of lower extremity    CVA (cerebrovascular accident) ([facility] )    Diabetes mellitus type 2, uncontrolled ([facility] )    Ambulatory dysfunction    Hyperlipidemia    Microcytic anemia    CKD stage 3 due to type 2 diabetes mellitus ([facility] )       Disposition:  OBSERVATION    Expected LOS: <2 9 Flora Kwok DO

## 2018-10-01 NOTE — H&P
INTERNAL MEDICINE HISTORY AND PHYSICAL  ED 02 SOD Team B     NAME: Jennifer Amaya  AGE: 66 y o  SEX: female  : 1940   MRN: [id number]  ENCOUNTER: [id number]    DATE: 10/1/2018  TIME: 3:17 PM    Primary Care Physician: Praveen Decker PA-C  Admitting Provider: Bhumi Lauren MD    Chief complaint: dizziness    History of Present Illness     Nicole Izaguirre is a 66 y o  female with a past medical history of left pontine CVA in  on Plavix, hypertension, dm 2 controlled with oral medication who presented with ataxia and falling to the right side since 7:00 p m  yesterday evening  Patient reports she was walking to her for yesterday evening when she began to experience dizziness and unsteady gait along with a right-sided preference  Patient did not have any falls or head trauma  She also denies any numbness, weakness, tingling, nausea, vomiting, slurred speech, change in vision, headache  Patient had a similar incident in 2018 which he fell to the right and fell into the TV and was evaluated in the [facility] ED  Patient was evaluated by Dr Chester Fernando of Neurology as an outpatient 2017 and at that time noted residual balance difficulties left-sided weakness due to the prior stroke in   Patient does report occasional dizziness at rest   Patient was seen by Neurology in no tPA was given at the patient was outside the window and there is no evidence of any acute intracranial abnormality      Review of Systems   Review of Systems    Past Medical History     Past Medical History:   Diagnosis Date    ACE-inhibitor cough     last assessed - [id number]    Asthma     Bilateral edema of lower extremity     last assessed - [id number]    Cardiac murmur     last assessed - [id number]    Diabetes mellitus [facility])     last assessed - [id number]    Esophageal dysphagia     Fatigue     last assessed - [id number]    Hyperlipidemia     Hypertension     Patellar bursitis of right knee     last assessed - [id number]    Personal history of other specified conditions     presenting hazards to health    Stroke [facility])     Stroke syndrome     Urinary frequency     UTI (urinary tract infection)        Past Surgical History     Past Surgical History:   Procedure Laterality Date    MN ESOPHAGOGASTRODUODENOSCOPY TRANSORAL DIAGNOSTIC N/A 4/5/2017    Procedure: ESOPHAGOGASTRODUODENOSCOPY (EGD); Surgeon: Sherice No MD;  Location:  GI LAB; Service: Gastroenterology       Social History     History   Alcohol Use No     Comment: Denied history of alcohol use     History   Drug Use No     Comment: Denied history of drug use     History   Smoking Status    Former Smoker    Packs/day: 3 00   Smokeless Tobacco    Former User     Comment: quit over 30 yrs ago; (quit in the 1980's, had smoked 3PPD for 20 years - per [employer])       Family History     Family History   Problem Relation Age of Onset    Heart disease Father     Hypertension Mother     Stroke Mother     Other Sister         1)Breast disorder; 2)Epilepsy   Lauren Levin Migraines Sister         Migraine headaches    Osteoporosis Sister     Thyroid disease Sister     Coronary artery disease Sister         S/P triple vessel bypass    Osteoporosis Maternal Grandmother     Diabetes Son         Diabetes mellitus    Hypertension Son     Rheum arthritis Son         Rheumatic disease    Heart disease Family        Medications Prior to Admission     Prior to Admission medications    Medication Sig Start Date End Date Taking?  Authorizing Provider   amLODIPine (NORVASC) 5 mg tablet Take 1 tablet by mouth daily   Yes Historical Provider, MD   atorvastatin (LIPITOR) 40 mg tablet Take 1 tablet by mouth daily 7/17/17  Yes Historical Provider, MD   Blood Glucose Monitoring Suppl (FREESTYLE LITE) JAQUELIN by Does not apply route daily 4/17/18 4/17/19 Yes Radha Vazquez PA-C   Cholecalciferol (VITAMIN D3) 2000 units capsule Take 1 tablet by mouth daily 9/21/17  Yes Historical Provider, MD   clopidogrel (PLAVIX) 75 mg tablet Take 1 tablet by mouth daily   Yes Historical Provider, MD   FREESTYLE LITE test strip May check blood sugar twice daily 6/7/18  Yes Aly Chadwick PA-C   glipiZIDE (GLUCOTROL) 10 mg tablet Take 10 mg by mouth 2 (two) times a day before meals   Yes Historical Provider, MD   Lancets (FREESTYLE) lancets Use as instructed 4/17/18  Yes Aly Chadwick PA-C   Linagliptin (TRADJENTA) 5 MG TABS Take 1 tablet by mouth daily 10/10/17  Yes Historical Provider, MD   metFORMIN (GLUCOPHAGE) 1000 MG tablet Take 1,000 mg by mouth 2 (two) times a day with meals   Yes Historical Provider, MD   azelastine (ASTELIN) 0 1 % nasal spray 1 spray into each nostril 2 (two) times a day for 180 days Use in each nostril as directed 2/16/18 10/1/18  Aly Chadwick PA-C   ciclopirox [facility]) 8 % solution  2/28/18 10/1/18  Historical Provider, MD   clotrimazole (LOTRIMIN) 1 % cream APPLY TOPICALLY TWICE A DAY 7/30/18 10/1/18  PEDRO LUIS Ziegler   conjugated estrogens (PREMARIN) vaginal cream Apply a pea-sized amount to the inside of the vagina twice a week   5/3/18 10/1/18  Augustin Becerril MD   fexofenadine (ALLEGRA) 180 MG tablet Take 1 tablet (180 mg total) by mouth daily 6/1/18 10/1/18  Екатерина Soto MD   GLIPIZIDE XL 10 MG 24 hr tablet  5/11/18 10/1/18  Historical Provider, MD   hydrochlorothiazide (HYDRODIURIL) 25 mg tablet Take 1 tablet by mouth daily  10/1/18  Historical Provider, MD   ketotifen (ZADITOR) 0 025 % ophthalmic solution INSTILL 1 DROP INTO BOTH EYES TWICE A DAY 5/22/18 10/1/18  Aly Chadwick PA-C   lisinopril (ZESTRIL) 10 mg tablet Take 1 tablet (10 mg total) by mouth daily for 90 days 2/16/18 10/1/18  Aly Chadwick PA-C   metFORMIN (GLUCOPHAGE) 500 mg tablet 500 mg 2 (two) times a day   5/8/18 10/1/18  Historical Provider, MD   oxybutynin (DITROPAN) 5 mg tablet Take 1 tablet (5 mg total) by mouth daily for 90 days 3/2/18 10/1/18  Aly Chadwick PA-C pantoprazole (PROTONIX) 20 mg tablet Take 1 tablet (20 mg total) by mouth daily for 90 days 4/20/18 10/1/18  Madiha Hawkins PA-C       Allergies   No Known Allergies    Objective     Vitals:    10/01/18 1328 10/01/18 1343 10/01/18 1454 10/01/18 1500   BP: (!) 177/77 168/82 156/88 160/82   BP Location:       Pulse: 67 65 66 64   Resp: (!) 24 (!) 23 22 (!) 26   Temp:       TempSrc:       SpO2: 98% 96% 97% 97%   Weight:         Body mass index is 30 73 kg/m²  No intake or output data in the 24 hours ending 10/01/18 1517  Invasive Devices     Peripheral Intravenous Line            Peripheral IV 10/01/18 Right Antecubital less than 1 day                Physical Exam  GENERAL: Appears well-developed and well-nourished  Appears in no acute distress   HEENT: Normocephalic and atraumatic  No scleral icterus  PERRLA  EOMI B/L  No oropharyngeal edema  MM moist    NECK: Neck supple with no lymphadenopathy  Trachea midline  No JVD  CARDIOVASCULAR: S1 and S2 are present  Regular rate and rhythm  No murmurs, rubs, or gallops  RESPIRATORY: CTA B/L, no rales, rhonci or wheezes  Normal respiratory expansion  ABDOMINAL: Bowel sounds present in all 4 quadrants, non-tender, soft, non-distended  No organomegaly, rebound, or guarding  EXTREMITIES: 2+ DP and PT pulses bilaterally; no cyanosis, clubbing, edema  ROM intact  MCCOY x4   MUSCULOSKELETAL: No joint tenderness, deformity or swelling, full range of motion without pain  NEUROLOGIC: Patient is alert and oriented to person, place, and time  No sensory or motor deficits  CN 2-12 intact  Plantars downgoing bilaterally  Speech fluent  Unsteady gait with cane    SKIN: Skin is warm and dry  No skin lesions are present  No rashes  PSYCHIATRIC: Normal mood and affect     Lab Results: I have personally reviewed pertinent reports      CBC:   Results from last 7 days  Lab Units 10/01/18  1331   WBC Thousand/uL 6 70   RBC Million/uL 4 50   HEMOGLOBIN g/dL 11 6   HEMATOCRIT % 36 4   MCV fL 81*   MCH pg 25 8*   MCHC g/dL 31 9   RDW % 14 5   MPV fL 11 7   PLATELETS Thousands/uL 193   NRBC AUTO /100 WBCs 0   NEUTROS PCT % 76*   LYMPHS PCT % 16   MONOS PCT % 6   EOS PCT % 1   BASOS PCT % 1   NEUTROS ABS Thousands/µL 5 09   LYMPHS ABS Thousands/µL 1 09   MONOS ABS Thousand/µL 0 41   EOS ABS Thousand/µL 0 05   , Chemistry Profile:   Results from last 7 days  Lab Units 10/01/18  1331   SODIUM mmol/L 136   POTASSIUM mmol/L 4 6   CHLORIDE mmol/L 105   CO2 mmol/L 26   BUN mg/dL 17   CREATININE mg/dL 1 03   CALCIUM mg/dL 9 1   EGFR ml/min/1 73sq m 60   , Coagulation Studies:   Results from last 7 days  Lab Units 10/01/18  1331   PROTIME seconds 13 6   INR  1 03   PTT seconds 25   , Cardiac Studies:   Results from last 7 days  Lab Units 10/01/18  1331   TROPONIN I ng/mL <0 02   , Additional Labs:   , iSTAT CHEM 8:   Results from last 7 days  Lab Units 10/01/18  1331   ANION GAP mmol/L 5   EGFR ml/min/1 73sq m 60   HEMOGLOBIN g/dL 11 6       Imaging: I have personally reviewed pertinent films in PACS  Xr Stroke Alert Portable Chest    Result Date: 10/1/2018  Narrative: CHEST INDICATION:   Ataxia  COMPARISON:  Chest x-ray from 4/11/2018 EXAM PERFORMED/VIEWS:  XR STROKE ALERT PORTABLE CHEST FINDINGS: There is stable cardiomegaly  The lungs are clear  No pneumothorax or pleural effusion  Osseous structures appear within normal limits for patient age  Impression: Stable cardiomegaly  No active pulmonary disease  Workstation performed: [id number]     Ct Stroke Alert Brain    Result Date: 10/1/2018  Narrative: CT BRAIN - STROKE ALERT PROTOCOL INDICATION:   gait instability, prior stroke  COMPARISON:  11/22/2010; 4/11/2018 TECHNIQUE:  CT examination of the brain was performed  In addition to axial images, coronal reformatted images were created and submitted for interpretation  Radiation dose length product (DLP) for this visit:  988 03 mGy-cm     This examination, like all CT scans performed in the [facility], was performed utilizing techniques to minimize radiation dose exposure, including the use of iterative  reconstruction and automated exposure control  IMAGE QUALITY:  Diagnostic  FINDINGS:  PARENCHYMA:  Decreased attenuation is noted in the supratentorial white matter demonstrating an appearance most consistent with mild microangiopathic change  On image 20, series 2, correlating to finding on image 55, series 400 there is a small focus of high density possibly coapted cortex  Trace subarachnoid hemorrhage not excluded  Atherosclerotic calcifications of the cavernous segment of the internal carotid artery are mild  VENTRICLES AND EXTRA-AXIAL SPACES:  Normal for patient's age  VISUALIZED ORBITS AND PARANASAL SINUSES:  Bilateral lens implants noted  CALVARIUM AND EXTRACRANIAL SOFT TISSUES:   Normal      Impression: 1  Trace high density in the posterior aspect of the left sylvian fissure possibly trace subarachnoid hemorrhage  Differential diagnosis includes artifact from coapted cortex versus slice selection or perhaps a small vessel  Consider follow-up repeat head CT in 12 to 24 hours to exclude blooming  2   Mild, chronic microangiopathy elsewhere  Findings were directly discussed with Dr Papi Christie on 10/1/2018 1:03 PM  Workstation performed: [id number]     Cta Stroke Alert (head/neck)    Result Date: 10/1/2018  Narrative: CTA NECK AND BRAIN WITH AND WITHOUT CONTRAST INDICATION: gait instability, prior stroke COMPARISON:   Concurrent head CT TECHNIQUE:  Routine CT imaging of the Brain without contrast   Post contrast imaging was performed after administration of iodinated contrast through the neck and brain  Post contrast axial 0 625 mm images timed to opacify the arterial system  3D rendering was performed on an independent workstation  MIP reconstructions performed  Coronal reconstructions were performed of the noncontrast portion of the brain    Radiation dose length product (DLP) for this visit:  501 07 mGy-cm   This examination, like all CT scans performed in the [facility], was performed utilizing techniques to minimize radiation dose exposure, including the use of iterative  reconstruction and automated exposure control  IV Contrast:  100 mL of iohexol (OMNIPAQUE)  IMAGE QUALITY:   Diagnostic FINDINGS: NONCONTRAST BRAIN: Reported separately CERVICAL VASCULATURE AORTIC ARCH AND GREAT VESSELS:  Mild athersclerotic disease of the arch, proximal great vessels and visualized subclavian vessels  No significant stenosis  The visualized portions of the main pulmonary artery are enlarged  Maximal transverse diameter of the main pulmonary artery is 4 2 cm  Adjacent segment of the ascending aorta is 3 8 cm  RIGHT VERTEBRAL ARTERY CERVICAL SEGMENT:  Mild stenosis at the origin  The vessel is normal in caliber throughout the neck  LEFT VERTEBRAL ARTERY CERVICAL SEGMENT:  Normal origin  The vessel is normal in caliber throughout the neck  RIGHT EXTRACRANIAL CAROTID SEGMENT:  Mild atherosclerotic disease of the distal common carotid artery and proximal cervical internal carotid artery without significant stenosis compared to the more distal ICA  LEFT EXTRACRANIAL CAROTID SEGMENT:  Mild atherosclerotic disease of the distal common carotid artery and proximal cervical internal carotid artery without significant stenosis compared to the more distal ICA  NASCET criteria was used to determine the degree of internal carotid artery diameter stenosis  INTRACRANIAL VASCULATURE INTERNAL CAROTID ARTERIES: Atherosclerotic calcifications of the cavernous segment of the internal carotid artery are mild  Normal ophthalmic artery origins  Normal ICA terminus  ANTERIOR CIRCULATION:  Symmetric A1 segments and anterior cerebral arteries with normal enhancement  Normal anterior communicating artery   MIDDLE CEREBRAL ARTERY CIRCULATION:  M1 segment and middle cerebral artery branches demonstrate normal enhancement bilaterally  DISTAL VERTEBRAL ARTERIES:  Normal distal vertebral arteries  Posterior inferior cerebellar artery origins are normal  Normal vertebral basilar junction  BASILAR ARTERY:  Basilar artery is normal in caliber  Normal superior cerebellar arteries  POSTERIOR CEREBRAL ARTERIES: Both posterior cerebral arteries arises from the basilar tip  Both arteries demonstrate normal enhancement  Normal posterior communicating arteries  DURAL VENOUS SINUSES:  Normal  NON VASCULAR ANATOMY BONY STRUCTURES:  Hyperostosis frontalis interna noted  Mild to moderate scattered spondylotic changes noted  SOFT TISSUES OF THE NECK: Heterogeneous appearing thyroid gland which appears enlarged  Scattered subcentimeter nodules noted  Incidental discovery of one or more thyroid nodule(s) measuring less than 1 5 cm and without suspicious features is noted in this patient who is above 28years old; according to guidelines published in the February 2015 white paper on incidental thyroid nodules in the Journal of the [address] of Radiology [facility]), no further evaluation is recommended  THORACIC INLET:  Unremarkable  Impression: 1  No hemodynamically significant stenosis in the major arteries of the neck  2   No intracranial aneurysm or major intracranial arterial stenosis  3   Pulmonary arterial hypertension  I tiger texted this study result  to Dr Papi Christie on 10/1/2018 at 1:25 PM  Workstation performed: [id number]     EKG, Pathology, and Other Studies: I have personally reviewed pertinent reports        Medications given in Emergency Department     Medication Administration - last 24 hours from 09/30/2018 1517 to 10/01/2018 1517       Date/Time Order Dose Route Action Action by     10/01/2018 1311 iohexol (OMNIPAQUE) 350 MG/ML injection (MULTI-DOSE) 100 mL 100 mL Intravenous Given Joselin Hassan     10/01/2018 1358 aspirin tablet 325 mg 325 mg Oral Given Maye Ceja RN Assessment and Plan     1  Ataxia with right-sided preference:  Patient continues to have unsteady gait however CT head was negative and CTA head neck did not have any significant stenosis, occlusion, dissection  2D echo pending  Patient received aspirin 325 mg and will continue Plavix  NIHSS was 3 on admission and tPA was not administered as patient was outside the window  · MRI brain pending  · Echo pending  · Continue p o  Plavix 75 mg daily  · Continue p o  Lipitor 80 mg daily  · Permissive hypertension  · Hemoglobin A1c, lipid profile pending  · PT/OT/speech  · NPO pending speech eval    2  Hypertension  · Hold amlodipine for permissive hypertension    3  Hyperlipidemia  · Continue Lipitor    4  Type 2 diabetes mellitus  · Insulin sliding scale and Accu-Cheks before meals      Code Status: Level 1 - Full Code  VTE Pharmacologic Prophylaxis: Enoxaparin (Lovenox)   VTE Mechanical Prophylaxis: sequential compression device  Admission Status: OBSERVATION    Admission Time  I spent 30 minutes admitting the patient  This involved direct patient contact where I performed a full history and physical, reviewing previous records, and reviewing laboratory and other diagnostic studies      Arabella Segal MD  Internal Medicine  PGY-1

## 2018-10-01 NOTE — ED ATTENDING ATTESTATION
Landon Wilder DO, saw and evaluated the patient  I have discussed the patient with the resident/non-physician practitioner and agree with the resident's/non-physician practitioner's findings, Plan of Care, and MDM as documented in the resident's/non-physician practitioner's note, except where noted  All available labs and Radiology studies were reviewed  At this point I agree with the current assessment done in the Emergency Department  I have conducted an independent evaluation of this patient a history and physical is as follows:    Patient is a 79-year-old female a presenting for stroke-like symptoms  The patient states he has had ataxia, lightheadedness and the family noted a left-sided facial droop  A stroke alert was called upon patient complaints  Patient states at 7:00 p m  Last night she noticed her gait became unstable and different from a normal gait she feels like she had the a a episodes of being off balance  Denies any vertiginous symptoms  The active she woke this morning and states she still had the difficulty with ambulation  Has a the patient is a previous history of CVA  The usual alcohol labs we do patient has a NIH stroke score of 0 upon arrival to the hospital   The the patient is not a tPA candidate given onset of symptoms greater than 12 hours and a resolution of symptoms  The patient was evaluate by Neurology emergency department  Patient was administered aspirin  A CT head the was reviewed:  1  Trace high density in the posterior aspect of the left sylvian fissure possibly trace subarachnoid hemorrhage  Differential diagnosis includes artifact from coapted cortex versus slice selection or perhaps a small vessel  Consider follow-up repeat    head CT in 12 to 24 hours to exclude blooming  2   Mild, chronic microangiopathy elsewhere  Patient be admitted for further neurological evaluation      Critical Care Time  CritCare Time    Procedures

## 2018-10-02 LAB
ALBUMIN SERPL BCP-MCNC: 3.2 G/DL (ref 3.5–5)
ALP SERPL-CCNC: 88 U/L (ref 46–116)
ALT SERPL W P-5'-P-CCNC: 18 U/L (ref 12–78)
ANION GAP SERPL CALCULATED.3IONS-SCNC: 8 MMOL/L (ref 4–13)
AST SERPL W P-5'-P-CCNC: 13 U/L (ref 5–45)
BACTERIA UR QL AUTO: ABNORMAL /HPF
BILIRUB SERPL-MCNC: 0.76 MG/DL (ref 0.2–1)
BILIRUB UR QL STRIP: NEGATIVE
BUN SERPL-MCNC: 20 MG/DL (ref 5–25)
CALCIUM SERPL-MCNC: 9 MG/DL (ref 8.3–10.1)
CHLORIDE SERPL-SCNC: 105 MMOL/L (ref 100–108)
CHOLEST SERPL-MCNC: 90 MG/DL (ref 50–200)
CLARITY UR: ABNORMAL
CO2 SERPL-SCNC: 26 MMOL/L (ref 21–32)
COLOR UR: YELLOW
CREAT SERPL-MCNC: 1.07 MG/DL (ref 0.6–1.3)
ERYTHROCYTE [DISTWIDTH] IN BLOOD BY AUTOMATED COUNT: 14.3 % (ref 11.6–15.1)
EST. AVERAGE GLUCOSE BLD GHB EST-MCNC: 177 MG/DL
GFR SERPL CREATININE-BSD FRML MDRD: 57 ML/MIN/1.73SQ M
GLUCOSE SERPL-MCNC: 153 MG/DL (ref 65–140)
GLUCOSE SERPL-MCNC: 153 MG/DL (ref 65–140)
GLUCOSE SERPL-MCNC: 156 MG/DL (ref 65–140)
GLUCOSE SERPL-MCNC: 157 MG/DL (ref 65–140)
GLUCOSE SERPL-MCNC: 158 MG/DL (ref 65–140)
GLUCOSE UR STRIP-MCNC: ABNORMAL MG/DL
HBA1C MFR BLD: 7.8 % (ref 4.2–6.3)
HCT VFR BLD AUTO: 35.2 % (ref 34.8–46.1)
HDLC SERPL-MCNC: 37 MG/DL (ref 40–60)
HGB BLD-MCNC: 11 G/DL (ref 11.5–15.4)
HGB UR QL STRIP.AUTO: ABNORMAL
HYALINE CASTS #/AREA URNS LPF: ABNORMAL /LPF
KETONES UR STRIP-MCNC: NEGATIVE MG/DL
LDLC SERPL CALC-MCNC: 39 MG/DL (ref 0–100)
LEUKOCYTE ESTERASE UR QL STRIP: ABNORMAL
MAGNESIUM SERPL-MCNC: 2.2 MG/DL (ref 1.6–2.6)
MCH RBC QN AUTO: 25.3 PG (ref 26.8–34.3)
MCHC RBC AUTO-ENTMCNC: 31.3 G/DL (ref 31.4–37.4)
MCV RBC AUTO: 81 FL (ref 82–98)
NITRITE UR QL STRIP: POSITIVE
NON-SQ EPI CELLS URNS QL MICRO: ABNORMAL /HPF
PA ADP BLD-ACNC: 214 PRU (ref 194–418)
PH UR STRIP.AUTO: 5 [PH] (ref 4.5–8)
PHOSPHATE SERPL-MCNC: 4.1 MG/DL (ref 2.3–4.1)
PLATELET # BLD AUTO: 209 THOUSANDS/UL (ref 149–390)
PMV BLD AUTO: 11.3 FL (ref 8.9–12.7)
POTASSIUM SERPL-SCNC: 3.5 MMOL/L (ref 3.5–5.3)
PROT SERPL-MCNC: 7.5 G/DL (ref 6.4–8.2)
PROT UR STRIP-MCNC: ABNORMAL MG/DL
RBC # BLD AUTO: 4.35 MILLION/UL (ref 3.81–5.12)
RBC #/AREA URNS AUTO: ABNORMAL /HPF
SODIUM SERPL-SCNC: 139 MMOL/L (ref 136–145)
SP GR UR STRIP.AUTO: 1.03 (ref 1–1.03)
TRIGL SERPL-MCNC: 71 MG/DL
UROBILINOGEN UR QL STRIP.AUTO: 0.2 E.U./DL
WBC # BLD AUTO: 6.57 THOUSAND/UL (ref 4.31–10.16)
WBC #/AREA URNS AUTO: ABNORMAL /HPF

## 2018-10-02 PROCEDURE — G8979 MOBILITY GOAL STATUS: HCPCS

## 2018-10-02 PROCEDURE — 99222 1ST HOSP IP/OBS MODERATE 55: CPT | Performed by: NURSE PRACTITIONER

## 2018-10-02 PROCEDURE — 80061 LIPID PANEL: CPT | Performed by: INTERNAL MEDICINE

## 2018-10-02 PROCEDURE — G8978 MOBILITY CURRENT STATUS: HCPCS

## 2018-10-02 PROCEDURE — 99232 SBSQ HOSP IP/OBS MODERATE 35: CPT | Performed by: PSYCHIATRY & NEUROLOGY

## 2018-10-02 PROCEDURE — 83036 HEMOGLOBIN GLYCOSYLATED A1C: CPT | Performed by: INTERNAL MEDICINE

## 2018-10-02 PROCEDURE — 82948 REAGENT STRIP/BLOOD GLUCOSE: CPT

## 2018-10-02 PROCEDURE — 83735 ASSAY OF MAGNESIUM: CPT | Performed by: INTERNAL MEDICINE

## 2018-10-02 PROCEDURE — 84100 ASSAY OF PHOSPHORUS: CPT | Performed by: INTERNAL MEDICINE

## 2018-10-02 PROCEDURE — 81001 URINALYSIS AUTO W/SCOPE: CPT | Performed by: INTERNAL MEDICINE

## 2018-10-02 PROCEDURE — 85027 COMPLETE CBC AUTOMATED: CPT | Performed by: INTERNAL MEDICINE

## 2018-10-02 PROCEDURE — 97167 OT EVAL HIGH COMPLEX 60 MIN: CPT

## 2018-10-02 PROCEDURE — 85576 BLOOD PLATELET AGGREGATION: CPT | Performed by: INTERNAL MEDICINE

## 2018-10-02 PROCEDURE — 97163 PT EVAL HIGH COMPLEX 45 MIN: CPT

## 2018-10-02 PROCEDURE — G8987 SELF CARE CURRENT STATUS: HCPCS

## 2018-10-02 PROCEDURE — G8988 SELF CARE GOAL STATUS: HCPCS

## 2018-10-02 PROCEDURE — 80053 COMPREHEN METABOLIC PANEL: CPT | Performed by: INTERNAL MEDICINE

## 2018-10-02 RX ORDER — ATORVASTATIN CALCIUM 40 MG/1
40 TABLET, FILM COATED ORAL
Status: DISCONTINUED | OUTPATIENT
Start: 2018-10-02 | End: 2018-10-04 | Stop reason: HOSPADM

## 2018-10-02 RX ORDER — CLOPIDOGREL BISULFATE 75 MG/1
150 TABLET ORAL DAILY
Status: DISCONTINUED | OUTPATIENT
Start: 2018-10-03 | End: 2018-10-04 | Stop reason: HOSPADM

## 2018-10-02 RX ADMIN — INSULIN LISPRO 1 UNITS: 100 INJECTION, SOLUTION INTRAVENOUS; SUBCUTANEOUS at 11:44

## 2018-10-02 RX ADMIN — ATORVASTATIN CALCIUM 40 MG: 40 TABLET, FILM COATED ORAL at 17:08

## 2018-10-02 RX ADMIN — INSULIN LISPRO 1 UNITS: 100 INJECTION, SOLUTION INTRAVENOUS; SUBCUTANEOUS at 08:31

## 2018-10-02 RX ADMIN — DOCUSATE SODIUM 100 MG: 100 CAPSULE, LIQUID FILLED ORAL at 17:08

## 2018-10-02 RX ADMIN — DOCUSATE SODIUM 100 MG: 100 CAPSULE, LIQUID FILLED ORAL at 08:32

## 2018-10-02 RX ADMIN — INSULIN LISPRO 1 UNITS: 100 INJECTION, SOLUTION INTRAVENOUS; SUBCUTANEOUS at 17:08

## 2018-10-02 RX ADMIN — CLOPIDOGREL BISULFATE 75 MG: 75 TABLET ORAL at 08:32

## 2018-10-02 NOTE — PLAN OF CARE
Problem: PHYSICAL THERAPY ADULT  Goal: Performs mobility at highest level of function for planned discharge setting  See evaluation for individualized goals  Treatment/Interventions: Functional transfer training, LE strengthening/ROM, Therapeutic exercise, Endurance training, Patient/family training, Equipment eval/education, Bed mobility, Gait training          See flowsheet documentation for full assessment, interventions and recommendations  Prognosis: Fair  Problem List: Decreased strength, Decreased endurance, Impaired balance, Decreased mobility, Decreased coordination, Decreased cognition, Impaired judgement, Decreased safety awareness  Assessment: Pt seen for high complexity physical therapy evaluation  Pt is a 67 y/o female w history/comorbidities of CVA w/ no residual weakness, aortic valve regurgitation, BPH, DM II who is now admitted w/ c/o dizziness, mild L facial droop, ataxia  Dx is r/o CVA  CT head showed trace high density  posterior L sylvian fissure, ? suspicious for SAH  Awaiting further imaging  Due to unclear medical dx, pain, fall risk, note unstable clinical picture  PT consulted to assess mobility, d/c needs  Pt presents w/ decreased functional mob, standing balance, endurance, B LE strength and coordination, barriers at home  will benefit from skilled PT to correct for the above problems  recommend rehab at d/c        Recommendation:  (recommend rehab at d/c)     PT - OK to Discharge: (S) Yes (when stable- currently recommend rehab)    See flowsheet documentation for full assessment

## 2018-10-02 NOTE — PHYSICAL THERAPY NOTE
Physical Therapy Evaluation    Patient's Name: [facility]    Admitting Diagnosis  Dizziness [R42]  Lightheadedness [R42]  Ataxia [R27 0]  Ambulatory dysfunction [R26 2]  Cerebrovascular accident (CVA), unspecified mechanism ([facility] ) [I63 9]    Problem List  Patient Active Problem List   Diagnosis    Aortic valve regurgitation, nonrheumatic    Benign essential hypertension    Bilateral edema of lower extremity    Chronic rhinitis    CVA (cerebrovascular accident) ([facility] )    Midline cystocele    Diabetes mellitus type 2, uncontrolled ([place] [id number] )    Microalbuminuria    Non-rheumatic mitral regurgitation    Post-nasal drip    Uterine procidentia    Tinea pedis of both feet    Chest pain    Ambulatory dysfunction    Hyperlipidemia    Microcytic anemia    Creatinine elevation    Esophageal abnormality    Tickle in throat    CKD stage 3 due to type 2 diabetes mellitus ([place] [address] )    Stroke-like symptoms       Past Medical History  Past Medical History:   Diagnosis Date    ACE-inhibitor cough     last assessed - [id number]    Asthma     Bilateral edema of lower extremity     last assessed - [id number]    Cardiac murmur     last assessed - [id number]    Diabetes mellitus [facility])     last assessed - [id number]    Esophageal dysphagia     Fatigue     last assessed - [id number]    Hyperlipidemia     Hypertension     Patellar bursitis of right knee     last assessed - [id number]    Personal history of other specified conditions     presenting hazards to health    Stroke [facility])     Stroke syndrome     Urinary frequency     UTI (urinary tract infection)        Past Surgical History  Past Surgical History:   Procedure Laterality Date    KS ESOPHAGOGASTRODUODENOSCOPY TRANSORAL DIAGNOSTIC N/A 4/5/2017    Procedure: ESOPHAGOGASTRODUODENOSCOPY (EGD); Surgeon: Sandy Nichole MD;  Location: [facility];   Service: Gastroenterology      10/02/18 1240   Note Type   Note type Eval only   Pain Assessment   Pain Assessment 0-10 Pain Score 4   Pain Type Acute pain   Pain Location Head   Patient's Stated Pain Goal No pain   Hospital Pain Intervention(s) Ambulation/increased activity   Response to Interventions unchanged   Home Living   Type of Home House   Additional Comments Resides w/ son in 2 story home  Normally indep , ambulates w/ cane   Prior Function   Level of [place] Independent with ADLs and functional mobility   Falls in the last 6 months 1 to 4   Restrictions/Precautions   Weight Bearing Precautions Per Order No   Other Precautions Cognitive; Chair Alarm; Bed Alarm;Multiple lines; Fall Risk;Pain   General   Family/Caregiver Present No   Cognition   Overall Cognitive Status WFL   Arousal/Participation Responsive   Attention Attends with cues to redirect   Orientation Level Oriented X4   Memory Unable to assess   Following Commands Follows one step commands without difficulty   RLE Assessment   RLE Assessment (strength grossly 4-/5)   LLE Assessment   LLE Assessment (strength grossly 4-/5)   Coordination   Movements are Fluid and Coordinated 0   Coordination and Movement Description B LE ataxia   Transfers   Sit to Stand 5  Supervision   Additional items Assist x 1   Stand to Sit 5  Supervision   Additional items Assist x 1   Ambulation/Elevation   Gait pattern (significant ataxia, B LE scissoring, mult lateral LOB)   Gait Assistance 4  Minimal assist   Additional items Assist x 1   Assistive Device SPC  (start w  RW)   Distance 80'   Balance   Static Sitting Normal   Dynamic Sitting Good   Static Standing Fair -   Dynamic Standing Poor +   Ambulatory Poor +   Endurance Deficit   Endurance Deficit Yes   Endurance Deficit Description fatigue, weakness, pain   Activity Tolerance   Activity Tolerance Patient limited by fatigue;Treatment limited secondary to medical complications (Comment)   Nurse Made Aware yes   Assessment   Prognosis Fair   Problem List Decreased strength;Decreased endurance; Impaired balance;Decreased mobility; Decreased coordination;Decreased cognition; Impaired judgement;Decreased safety awareness   Assessment Pt seen for high complexity physical therapy evaluation  Pt is a 65 y/o female w history/comorbidities of CVA w/ no residual weakness, aortic valve regurgitation, BPH, DM II who is now admitted w/ c/o dizziness, mild L facial droop, ataxia  Dx is r/o CVA  CT head showed trace high density  posterior L sylvian fissure, ? suspicious for SAH  Awaiting further imaging  Due to unclear medical dx, pain, fall risk, note unstable clinical picture  PT consulted to assess mobility, d/c needs  Pt presents w/ decreased functional mob, standing balance, endurance, B LE strength and coordination, barriers at home  will benefit from skilled PT to correct for the above problems  recommend rehab at d/c   Goals   Patient Goals to go home   STG Expiration Date 10/16/18   Short Term Goal #1 1-2 wks: bed mob and transfers w indep, standing balance to good/normal w/ device, ambulate 200-300 ft w/ RW and mod I, increase B LE strength by 1/2 -1 grade, ambulate 1 flight of stairs w/ rail and mod I   Treatment Day 0   Plan   Treatment/Interventions Functional transfer training;LE strengthening/ROM; Therapeutic exercise; Endurance training;Patient/family training;Equipment eval/education; Bed mobility;Gait training   PT Frequency 5x/wk   Recommendation   Recommendation (recommend rehab at d/c)   PT - OK to Discharge Yes  (when stable- currently recommend rehab)   Modified Arsh Scale   Modified Arsh Scale 4   Barthel Index   Feeding 10   Bathing 0   Grooming Score 5   Dressing Score 5   Bladder Score 10   Bowels Score 10   Toilet Use Score 10   Transfers (Bed/Chair) Score 10   Mobility (Level Surface) Score 0   Stairs Score 0   Barthel Index Score 60           Ana Perry PT, DPT, CSRS

## 2018-10-02 NOTE — PLAN OF CARE
INFECTION - ADULT     Absence of fever/infection during neutropenic period Completed        Potential for Aspiration     Ventilated patient's risk of aspiration is minimized Completed          Activity Intolerance/Impaired Mobility     Mobility/activity is maintained at optimum level for patient Progressing        Communication Impairment     Ability to express needs and understand communication [place]     Discharge to home or other facility with appropriate resources Progressing        INFECTION - ADULT     Absence or prevention of progression during hospitalization Progressing        Knowledge Deficit     Patient/family/caregiver demonstrates understanding of disease process, treatment plan, medications, and discharge instructions Progressing        Neurological Deficit     Neurological status is stable or improving Progressing        Nutrition     Nutrition/Hydration status is improving Progressing        PAIN - ADULT     Verbalizes/displays adequate comfort level or baseline comfort level Progressing        Potential for Aspiration     Non-ventilated patient's risk of aspiration is minimized Progressing        Potential for Falls     Patient will remain free of falls Progressing        SAFETY ADULT     Maintain or return to baseline ADL function Progressing     Maintain or return mobility status to optimal level Progressing

## 2018-10-02 NOTE — SPEECH THERAPY NOTE
Speech Language/Pathology  Speech/Language Pathology Screening    Patient Name: Gogo Lux Date: 10/2/2018     Orders received and chart reviewed  Spoke with pt's nurse and pt seen bedside  No s/s dysphagia or communication impairment per screening  Pt tolerating po and meds whole with water  Full ST evaluation is not warranted at this time  Please re-consult if further concerns should arise  Thank you

## 2018-10-02 NOTE — PROGRESS NOTES
IM Residency Progress Note   Unit/Bed#: [facility] 712-01 Encounter: [id number]  SOD Team B       Aly Alcocer 66 y o  female [id number]    Hospital Stay Days: 0      Assessment/Plan:    Principal Problem:    Stroke-like symptoms  Active Problems: Aortic valve regurgitation, nonrheumatic    Benign essential hypertension    Bilateral edema of lower extremity    CVA (cerebrovascular accident) ([facility] )    Diabetes mellitus type 2, uncontrolled ([facility] )    Ambulatory dysfunction    Hyperlipidemia    Microcytic anemia    CKD stage 3 due to type 2 diabetes mellitus ([facility] )    1  Ataxia with right-sided preference:  Patient continues to have unsteady gait however CT head was negative and CTA head neck did not have any significant stenosis, occlusion, dissection  2D echo pending  Patient received aspirin 325 mg and will continue Plavix  NIHSS was 3 on admission and tPA was not administered as patient was outside the window  · MRI brain pending  · Echo read pending  · Continue p o  Plavix 75 mg daily  · Continue p o  Lipitor 80 mg daily  · Permissive hypertension  · Hemoglobin A1c, lipid profile pending  · PT/OT/speech  · Level 1 Carb Diet     2  Hypertension  · Hold amlodipine for permissive hypertension     3  Hyperlipidemia  · Continue Lipitor     4  Type 2 diabetes mellitus  · Insulin sliding scale and Accu-Cheks before meals    Disposition:   Continue inpatient care     Subjective:   Patient seen and examined this morning  No acute events overnight  Denies fevers, chills, sweats, CP, SOB, abdominal pain, palpitations, wheezing, dysuria, bowel complaints         Vitals: Temp (24hrs), Av 2 °F (36 8 °C), Min:98 °F (36 7 °C), Max:98 4 °F (36 9 °C)  Current: Temperature: 98 4 °F (36 9 °C)  Vitals:    10/01/18 1454 10/01/18 1500 10/01/18 1704 10/01/18 2231   BP: 156/88 160/82 (!) 173/82 140/66   BP Location:   Left arm Left arm   Pulse: 66 64 66 68   Resp: 22 (!)    Temp:   98 °F (36 7 °C) 98 4 °F (36 9 °C)   TempSrc: Oral Oral   SpO2: 97% 97% 97% 96%   Weight:   81 2 kg (179 lb 0 2 oz)    Height:   5' 4" (1 626 m)     Body mass index is 30 73 kg/m²  I/O last 24 hours:   In: 240 [P O :240]  Out: -       Physical Exam:   General Appearance:    Alert, cooperative, no distress, appears stated age  Lungs:     Clear to auscultation bilaterally, respirations unlabored  Heart:    Regular rate and rhythm, S1 and S2 normal, no murmur, rub   or gallop  Abdomen:     Soft, non-tender, bowel sounds active all four quadrants,     no masses, no organomegaly  Extremities:   Extremities normal, atraumatic, no cyanosis or edema  Pulses:   2+ and symmetric all extremities  Skin:   Skin color, texture, turgor normal, no rashes or lesions  Neurologic:   AAO x 3, continues to have unsteady gait      Invasive Devices     Peripheral Intravenous Line            Peripheral IV 10/01/18 Right Antecubital less than 1 day                          Labs:   Recent Results (from the past 24 hour(s))   ECG 12 lead    Collection Time: 10/01/18  1:21 PM   Result Value Ref Range    Ventricular Rate 65 BPM    Atrial Rate 65 BPM    HI Interval 210 ms    QRSD Interval 88 ms    QT Interval 404 ms    QTC Interval 420 ms    P Axis 70 degrees    QRS Axis -1 degrees    T Wave Axis 79 degrees   CBC and differential    Collection Time: 10/01/18  1:31 PM   Result Value Ref Range    WBC 6 70 4 31 - 10 16 Thousand/uL    RBC 4 50 3 81 - 5 12 Million/uL    Hemoglobin 11 6 11 5 - 15 4 g/dL    Hematocrit 36 4 34 8 - 46 1 %    MCV 81 (L) 82 - 98 fL    MCH 25 8 (L) 26 8 - 34 3 pg    MCHC 31 9 31 4 - 37 4 g/dL    RDW 14 5 11 6 - 15 1 %    MPV 11 7 8 9 - 12 7 fL    Platelets 858 390 - 399 Thousands/uL    nRBC 0 /100 WBCs    Neutrophils Relative 76 (H) 43 - 75 %    Immat GRANS % 0 0 - 2 %    Lymphocytes Relative 16 14 - 44 %    Monocytes Relative 6 4 - 12 %    Eosinophils Relative 1 0 - 6 %    Basophils Relative 1 0 - 1 %    Neutrophils Absolute 5 09 1 85 - 7 62 Thousands/µL Immature Grans Absolute 0 02 0 00 - 0 20 Thousand/uL    Lymphocytes Absolute 1 09 0 60 - 4 47 Thousands/µL    Monocytes Absolute 0 41 0 17 - 1 22 Thousand/µL    Eosinophils Absolute 0 05 0 00 - 0 61 Thousand/µL    Basophils Absolute 0 04 0 00 - 0 10 Thousands/µL   Basic metabolic panel    Collection Time: 10/01/18  1:31 PM   Result Value Ref Range    Sodium 136 136 - 145 mmol/L    Potassium 4 6 3 5 - 5 3 mmol/L    Chloride 105 100 - 108 mmol/L    CO2 26 21 - 32 mmol/L    ANION GAP 5 4 - 13 mmol/L    BUN 17 5 - 25 mg/dL    Creatinine 1 03 0 60 - 1 30 mg/dL    Glucose 111 65 - 140 mg/dL    Calcium 9 1 8 3 - 10 1 mg/dL    eGFR 60 ml/min/1 73sq m   Troponin I    Collection Time: 10/01/18  1:31 PM   Result Value Ref Range    Troponin I <0 02 <=0 04 ng/mL   Protime-INR    Collection Time: 10/01/18  1:31 PM   Result Value Ref Range    Protime 13 6 11 8 - 14 2 seconds    INR 1 03 0 86 - 1 17   APTT    Collection Time: 10/01/18  1:31 PM   Result Value Ref Range    PTT 25 24 - 36 seconds   Fingerstick Glucose (POCT)    Collection Time: 10/01/18  4:35 PM   Result Value Ref Range    POC Glucose 75 65 - 140 mg/dl   Fingerstick Glucose (POCT)    Collection Time: 10/01/18  8:54 PM   Result Value Ref Range    POC Glucose 175 (H) 65 - 140 mg/dl   Fingerstick Glucose (POCT)    Collection Time: 10/02/18  6:20 AM   Result Value Ref Range    POC Glucose 153 (H) 65 - 140 mg/dl       Radiology Results: I have personally reviewed pertinent reports  Other Diagnostic Testing:   I have personally reviewed pertinent reports          Active Meds:   Current Facility-Administered Medications   Medication Dose Route Frequency    acetaminophen (TYLENOL) tablet 650 mg  650 mg Oral Q6H PRN    atorvastatin (LIPITOR) tablet 80 mg  80 mg Oral Daily With Dinner    clopidogrel (PLAVIX) tablet 75 mg  75 mg Oral Daily    docusate sodium (COLACE) capsule 100 mg  100 mg Oral BID    insulin lispro (HumaLOG) 100 units/mL subcutaneous injection 1-6 Units  1-6 Units Subcutaneous TID AC    senna (SENOKOT) tablet 8 6 mg  1 tablet Oral Daily         VTE Pharmacologic Prophylaxis: Plavix  VTE Mechanical Prophylaxis: sequential compression device    Garry Mejia MD

## 2018-10-02 NOTE — PLAN OF CARE
Problem: OCCUPATIONAL THERAPY ADULT  Goal: Performs self-care activities at highest level of function for planned discharge setting  See evaluation for individualized goals  Treatment Interventions: ADL retraining, Functional transfer training, UE strengthening/ROM, Endurance training, Patient/family training, Compensatory technique education, Energy conservation, Activityengagement          See flowsheet documentation for full assessment, interventions and recommendations  Limitation: Decreased high-level ADLs, Decreased self-care trans, Decreased endurance, Decreased Safe judgement during ADL, Decreased ADL status     Assessment: Pt is a 66 y o  female seen for OT evaluation s/p admit to [facility] on 10/1/2018 w/ Stroke-like symptoms  She did experience dizziness and unsteady gait along with a right-sided preference  Patient did not have any falls or head trauma  Patient had a similar incident in April 2018 which she fell into her TV  Comorbidities affecting pt's functional performance at time of assessment include: DM, HTN and CKD, aortic valve regurgitation, b/l LE edema, CVA  Personal factors affecting pt at time of IE include:steps to enter environment, difficulty performing ADLS, difficulty performing IADLS , environment and second floor bed and bathroom  Prior to admission, pt was fully independent w self care, mobility, home mangment as well as driving  Upon evaluation: Pt requires min assist for completion of LB self care in stance, noted with loss of balance walking to the bathroom  She presents with the following deficits impacting occupational performance: decreased strength, decreased balance and decreased tolerance  Pt to benefit from continued skilled OT tx while in the hospital to address deficits as defined above and maximize level of functional independence w ADL's and functional mobility   Occupational Performance areas to address include: bathing/shower, toilet hygiene, dressing, functional mobility, clothing management and household maintenance  Pt scored 60 /100 on the barthel index  Based on findings from OT evaluation and functional performance deficits, pt has been identified as a high  complexity evaluation  From OT standpoint, recommendation at time of d/c would be inpatient short term rehab as she is currently functioning below her baseline        OT Discharge Recommendation: Short Term Rehab (vs home w home therapies pending progress)

## 2018-10-02 NOTE — PROGRESS NOTES
Progress Note - Neurology   Fabian Stanford 66 y o  female MRN: [id number]  [facility] Encounter: [id number]    Assessment/ Plan:  1)  B/l ataxia with L sided sensory deficit - will rule out acute CVA vs worsening of chronic ataxia 2/2 pontine CVA in 210              -MRI B pending              -CT H without acute intracranial abonormality              -CTA head and neck without hemodynamically significant stenosis, occlusion, or dissection              -ECHO demonstrates mild left atrial dilation, atrial septal aneurysm  No wall motion abnormality noted  - x1 then continue plavix 75 mg p o  daily for now               -will increase home dose of Lipitor to 80 mg p o  Daily   -HbA1c 7 8, Lipid profile WNL with exception of HLD 37   -Recommend maximization of secondary stroke prevention               -Recommend normotension              -P2Y12 214, indicating insufficient platelet inhibition     -Will increase Plavix to 150mg PO QD              -HbA1c, Lipid profile pending               -PT/ OT/ Speech    -will continue to follow, please monitor exam and notify with changes      Subjective:   No acute events overnight  On exam today, the patient reports that she continues to fall to the left, but is much improved from yesterday  A 12 point review systems was completed and is otherwise negative  The patient demonstrates improved ataxia and bilateral lower extremities, however gait is severely ataxic and patient is falling to the right  Remainder of neurologic exam as detailed below      ROS:  See subjective     Medications:  Scheduled Meds:  Current Facility-Administered Medications:  acetaminophen 650 mg Oral Q6H PRN Jerardo Curling, DO   atorvastatin 40 mg Oral After [employer] Dwayne, DO   clopidogrel 75 mg Oral Daily Jerardo Curling, DO   docusate sodium 100 mg Oral BID Jerardo Curling, DO   insulin lispro 1-6 Units Subcutaneous TID ARLYN Lamb, DO   senna 1 tablet Oral Daily Madina Colder, DO     Continuous Infusions:   PRN Meds:   acetaminophen      Vitals: Blood pressure 128/79, pulse 72, temperature 97 8 °F (36 6 °C), temperature source Oral, resp  rate 18, height 5' 4" (1 626 m), weight 81 2 kg (179 lb 0 2 oz), SpO2 97 %, not currently breastfeeding  ,Body mass index is 30 73 kg/m²  Physical Exam:   Physical Exam   Constitutional: She is oriented to person, place, and time  She appears well-developed and well-nourished  No distress  HENT:   Head: Normocephalic and atraumatic  Right Ear: External ear normal    Left Ear: External ear normal    Mouth/Throat: Oropharynx is clear and moist  No oropharyngeal exudate  Eyes: Conjunctivae are normal  Right eye exhibits no discharge  Left eye exhibits no discharge  No scleral icterus  Neck: Normal range of motion  Neck supple  Pulmonary/Chest: Effort normal  No respiratory distress  Musculoskeletal: Normal range of motion  She exhibits no edema, tenderness or deformity  Neurological: She is oriented to person, place, and time  She has normal strength  She has a normal Finger-Nose-Finger Test  Heel-to-shin test: Mildly ataxic but improved from exam yesterday  Skin: Skin is warm and dry  No rash noted  She is not diaphoretic  No erythema  No pallor  Psychiatric: She has a normal mood and affect  Her speech is normal and behavior is normal    Nursing note and vitals reviewed  Neurologic Exam     Mental Status   Oriented to person, place, and time  Follows 2 step commands  Attention: normal  Concentration: normal    Speech: speech is normal   Level of consciousness: alert  Knowledge: good  Normal comprehension  Cranial Nerves   Cranial nerves II through XII intact  Motor Exam   Muscle bulk: normal  Overall muscle tone: normal    Strength   Strength 5/5 throughout       Sensory Exam   Light touch normal      Gait, Coordination, and Reflexes     Gait  Gait: (Ataxic gait with eat crossing over each other and leaning to the right)    Coordination   Finger to nose coordination: normal  Heel-to-shin test: Mildly ataxic but improved from exam yesterday  Tremor   Resting tremor: absent    Reflexes   Right plantar: normal  Left plantar: normal  Right ankle clonus: absent  Left ankle clonus: absent      Lab, Imaging and other studies: I have personally reviewed pertinent reports       Recent Results (from the past 24 hour(s))   Fingerstick Glucose (POCT)    Collection Time: 10/01/18  4:35 PM   Result Value Ref Range    POC Glucose 75 65 - 140 mg/dl   Fingerstick Glucose (POCT)    Collection Time: 10/01/18  8:54 PM   Result Value Ref Range    POC Glucose 175 (H) 65 - 140 mg/dl   Fingerstick Glucose (POCT)    Collection Time: 10/02/18  6:20 AM   Result Value Ref Range    POC Glucose 153 (H) 65 - 140 mg/dl   P2Y12 Platelet inhibitor    Collection Time: 10/02/18  6:43 AM   Result Value Ref Range    P2Y12 214 194 - 418 PRU   Lipid Panel with Direct LDL reflex    Collection Time: 10/02/18  6:50 AM   Result Value Ref Range    Cholesterol 90 50 - 200 mg/dL    Triglycerides 71 <=150 mg/dL    HDL, Direct 37 (L) 40 - 60 mg/dL    LDL Calculated 39 0 - 100 mg/dL   Comprehensive metabolic panel    Collection Time: 10/02/18  6:50 AM   Result Value Ref Range    Sodium 139 136 - 145 mmol/L    Potassium 3 5 3 5 - 5 3 mmol/L    Chloride 105 100 - 108 mmol/L    CO2 26 21 - 32 mmol/L    ANION GAP 8 4 - 13 mmol/L    BUN 20 5 - 25 mg/dL    Creatinine 1 07 0 60 - 1 30 mg/dL    Glucose 153 (H) 65 - 140 mg/dL    Calcium 9 0 8 3 - 10 1 mg/dL    AST 13 5 - 45 U/L    ALT 18 12 - 78 U/L    Alkaline Phosphatase 88 46 - 116 U/L    Total Protein 7 5 6 4 - 8 2 g/dL    Albumin 3 2 (L) 3 5 - 5 0 g/dL    Total Bilirubin 0 76 0 20 - 1 00 mg/dL    eGFR 57 ml/min/1 73sq m   Magnesium    Collection Time: 10/02/18  6:50 AM   Result Value Ref Range    Magnesium 2 2 1 6 - 2 6 mg/dL   Phosphorus    Collection Time: 10/02/18  6:50 AM   Result Value Ref Range    Phosphorus 4 1 2 3 - 4 1 mg/dL   CBC (With Platelets)    Collection Time: 10/02/18  6:50 AM   Result Value Ref Range    WBC 6 57 4 31 - 10 16 Thousand/uL    RBC 4 35 3 81 - 5 12 Million/uL    Hemoglobin 11 0 (L) 11 5 - 15 4 g/dL    Hematocrit 35 2 34 8 - 46 1 %    MCV 81 (L) 82 - 98 fL    MCH 25 3 (L) 26 8 - 34 3 pg    MCHC 31 3 (L) 31 4 - 37 4 g/dL    RDW 14 3 11 6 - 15 1 %    Platelets 855 548 - 888 Thousands/uL    MPV 11 3 8 9 - 12 7 fL   Hemoglobin A1C    Collection Time: 10/02/18  6:50 AM   Result Value Ref Range    Hemoglobin A1C 7 8 (H) 4 2 - 6 3 %     mg/dl   Fingerstick Glucose (POCT)    Collection Time: 10/02/18 11:08 AM   Result Value Ref Range    POC Glucose 157 (H) 65 - 140 mg/dl   ]    VTE Prophylaxis: Sequential compression device (Venodyne)     Counseling / Coordination of Care  Total time spent today 25 minutes  Greater than 50% of total time was spent with the patient and / or family counseling and / or coordination of care  A description of the counseling / coordination of care: The pt was seen and examined by myself and the attending physician  The chart was reviewed thoroughly, including laboratory values and imaging studies  The pt was counseled in the room

## 2018-10-02 NOTE — CONSULTS
PHYSICAL MEDICINE AND REHABILITATION CONSULT NOTE  Deyanira Harper 66 y o  female MRN: [id number]  [facility] Encounter: [id number]    Requested by (Physician/Service): Taqueria Mann MD  Reason for Consultation:  Assessment of rehabilitation needs    Chief complaint: "my balance has been off"     HPI: Deyanira Harper is a 66 y o  female with a PMH of left pontine CVA in 2010 on plavix, HTN, and DM type 2 who presented on 10/1/2018 with ataxia and falling to the right side  She reported feeling dizziness as well  She denied any falls or LOC  CT of the head was negative and CTA showed no significant stenosis, occlusion or dissection  She will need an MRI, ECHO is pending  She continues on plavix at this time       Subjective: The patient was seen in her room  She reports that she has had problems with her balance prior to admission and had received physical therapy in the past as an outpatient  She also follows with neurology as an outpatient for history of stroke and was last seen 11/2017      Review of Systems: A 10-point review of systems was performed  Negative except as listed above      Assessment:   - Ambulatory dysfunction  - Ataxic gait  - HTN  - hx of CVA  - DM type 2  - HLD  - CKD stage 3    Plan:    - Chart reviewed  - Labs reviewed  - Imaging reviewed  - Continue PT/OT while in hospital  - Therapy evaluations are pending  Will continue to follow functional progress at this time  Thank you for allowing the PM&R service to participate in the care of this patient  We will continue to follow Romana Barb progress with you  Please do not hesitate to call with questions or concerns       PT OT   Pending evaluation Pending evaluation     Physical Exam:  General: alert, no apparent distress, cooperative and comfortable  Head: Normal, normocephalic, atraumatic  Eye: Normal external eye, conjunctiva, lids   Ears: Normal external ears  Nose: Normal external nose, mucus membranes    Pharynx: Dental Hygiene adequate  Normal buccal mucosa  Normal pharynx  Neck / Thyroid: Supple, no masses, nodes, nodules or enlargement  Pulmonary: clear to auscultation bilaterally and no crackles, no wheezes, chest expansion normal  Cardiovascular: normal rate, regular rhythm, normal S1, S2, no murmurs, rubs, clicks or gallops  Abdomen: soft, nontender, nondistended, no masses or organomegaly  Skin/Extremity: no rashes, no erythema, no peripheral edema  Neurologic: ataxia bilateral lower extremities  Psych: Appropriate affect, alert and oriented to person, place and time   Musculoskeletal - Strength:   Right  Left  Site  Right  Left  Site    5 5  S Ab: Shoulder Abductors  5  5  HF: Hip Flexors    5 5  EF: Elbow Flexors  5 5 KF: Knee Flexors    5  5  EE: Elbow Extensors  5  5  KE: Knee Extensors    5  5  WE: Wrist Extensors  5  5  DR: Dorsi Flexors    5  5  FF: Finger Flexors  5  5  PF: Plantar Flexors    5  5  HI: Hand Intrinsics  5  5  EHL: Extensor Hallucis Longus     Jennifer PASCUAL Jose Luis Lives with: lives with their family  She lives in [facility]) single family home  The living area: bedroom on 2nd floor and bathroom on 2nd floor  Equipment in home: Com[employer], Shower Chair and [employer]  There 1 step to enter the home  Patient/family's goals: Return to previous home/apartment    The patient will have 24 hour ARC Supervision/physical assistance: supervision/physical assistance available upon discharge      Allergies:   No Known Allergies     Past Medical History:   Past Surgical History:   Family History:   Social history:   Past Medical History:   Diagnosis Date    ACE-inhibitor cough     last assessed - [id number]    Asthma     Bilateral edema of lower extremity     last assessed - 21Aug2017    Cardiac murmur     last assessed - [id number]    Diabetes mellitus [facility])     last assessed - [id number]    Esophageal dysphagia     Fatigue     last assessed - [id number]    Hyperlipidemia     Hypertension     Patellar bursitis of right knee     last assessed - [id number]    Personal history of other specified conditions     presenting hazards to health    Stroke [facility])     Stroke syndrome     Urinary frequency     UTI (urinary tract infection)     Past Surgical History:   Procedure Laterality Date    AL ESOPHAGOGASTRODUODENOSCOPY TRANSORAL DIAGNOSTIC N/A 4/5/2017    Procedure: ESOPHAGOGASTRODUODENOSCOPY (EGD); Surgeon: Brandy Peres MD;  Location: [facility]; Service: Gastroenterology     Family History   Problem Relation Age of Onset    Heart disease Father     Hypertension Mother    Odell Polio Stroke Mother     Other Sister         1)Breast disorder; 2)Epilepsy   Odell Polio Migraines Sister         Migraine headaches    Osteoporosis Sister     Thyroid disease Sister     Coronary artery disease Sister         S/P triple vessel bypass    Osteoporosis Maternal Grandmother     Diabetes Son         Diabetes mellitus    Hypertension Son     Rheum arthritis Son         Rheumatic disease    Heart disease Family       Social History     Social History    Marital status:       Spouse name: N/A    Number of children: 6    Years of education: N/A     Occupational History    Retired      Social History Main Topics    Smoking status: Former Smoker     Packs/day: 3 00    Smokeless tobacco: Former User      Comment: quit over 30 yrs ago; (quit in the 1980's, had smoked 3PPD for 20 years - per [employer])    Alcohol use No      Comment: Denied history of alcohol use    Drug use: No      Comment: Denied history of drug use    Sexual activity: No     Other Topics Concern    None     Social History Narrative    Diet needs improvement    Does not exercise    No caffeine use              Vital Signs: Reviewed    Temp:  [97 8 °F (36 6 °C)-99 2 °F (37 3 °C)] 97 8 °F (36 6 °C)  HR:  [64-72] 72  Resp:  [18-26] 18  BP: (128-177)/(66-88) 128/79   Intake/Output Summary (Last 24 hours) at 10/02/18 1236  Last data filed at 10/02/18 1134   Gross per 24 hour   Intake             1140 ml   Output              200 ml   Net              940 ml        Laboratory: Reviewed    Lab Results   Component Value Date    HGB 11 0 (L) 10/02/2018    HGB 11 6 05/04/2015    HCT 35 2 10/02/2018    HCT 35 2 05/04/2015    WBC 6 57 10/02/2018    WBC 5 84 05/04/2015     Lab Results   Component Value Date    BUN 20 10/02/2018    BUN 29 (H) 09/22/2015     10/02/2018     09/22/2015    K 3 5 10/02/2018    K 3 6 09/22/2015     10/02/2018     09/22/2015    GLUCOSE 170 (H) 09/22/2015    CREATININE 1 07 10/02/2018    CREATININE 0 96 09/22/2015     Lab Results   Component Value Date    PROTIME 13 6 10/01/2018    INR 1 03 10/01/2018        Imaging: Reviewed  Xr Stroke Alert Portable Chest    Result Date: 10/1/2018  Impression: Stable cardiomegaly  No active pulmonary disease  Workstation performed: [id number]     Ct Stroke Alert Brain    Result Date: 10/1/2018  Impression: 1  Trace high density in the posterior aspect of the left sylvian fissure possibly trace subarachnoid hemorrhage  Differential diagnosis includes artifact from coapted cortex versus slice selection or perhaps a small vessel  Consider follow-up repeat head CT in 12 to 24 hours to exclude blooming  2   Mild, chronic microangiopathy elsewhere  Findings were directly discussed with Dr Sandra Sy on 10/1/2018 1:03 PM  Workstation performed: [id number]     Cta Stroke Alert (head/neck)    Result Date: 10/1/2018  Impression: 1  No hemodynamically significant stenosis in the major arteries of the neck  2   No intracranial aneurysm or major intracranial arterial stenosis  3   Pulmonary arterial hypertension    I tiger texted this study result  to Dr Sandra Sy on 10/1/2018 at 1:25 PM  Workstation performed: [id number]       Current Medications:     Current Facility-Administered Medications:     acetaminophen (TYLENOL) tablet 650 mg, 650 mg, Oral, Q6H PRN, Corrine Camps, DO    atorvastatin (LIPITOR) tablet 40 mg, 40 mg, Oral, After Dinner, Ailyn Martínez,     clopidogrel (PLAVIX) tablet 75 mg, 75 mg, Oral, Daily, Gelene Scottsdale, DO, 75 mg at 10/02/18 8720    docusate sodium (COLACE) capsule 100 mg, 100 mg, Oral, BID, Gelene Scottsdale, DO, 100 mg at 10/02/18 0832    insulin lispro (HumaLOG) 100 units/mL subcutaneous injection 1-6 Units, 1-6 Units, Subcutaneous, TID AC, 1 Units at 10/02/18 1144 **AND** Fingerstick Glucose (POCT), , , TID AC, Gelene Scottsdale, DO    senna (SENOKOT) tablet 8 6 mg, 1 tablet, Oral, Daily, Gelene Scottsdale, DO

## 2018-10-02 NOTE — PROGRESS NOTES
Pt resting comfortably  No complaints at this time  VSS  Assessment congruent wit previous charting  Q2 neurochecks benign, as charted  IV C/D/I and flushed  Will continue to monitor

## 2018-10-02 NOTE — DISCHARGE SUMMARY
[facility] Discharge Summary - Medical Joanna custodial 66 y o  female MRN: [id number]    [address] [facility]  Room / Bed: 99 [facility] 712/[facility] 547-10 Encounter: [id number]    BRIEF OVERVIEW    Admitting Provider: Efraín Rivera MD  Discharge Provider: No att  providers found  Primary Care Physician at Discharge: Efraín Rivera MD    Discharge To: Inpatient rehab    Admission Date: 10/1/2018     Discharge Date: 10/4/2018  1:51 PM    Primary Discharge Diagnosis  Principal Problem:    Stroke-like symptoms  Active Problems: Aortic valve regurgitation, nonrheumatic    Benign essential hypertension    Bilateral edema of lower extremity    CVA (cerebrovascular accident) ([facility] [address] )    Diabetes mellitus type 2, uncontrolled ([facility] )    Ambulatory dysfunction    Hyperlipidemia    Microcytic anemia    CKD stage 3 due to type 2 diabetes mellitus ([facility] )  Resolved Problems:    * No resolved hospital problems   *    Consulting Providers   Neurology       Diagnostic Procedures Performed  Lipid Panel with Direct LDL reflex [[id number]] (Abnormal) Collected: 10/02/18 0650   Lab Status: Final result Specimen: Blood from Arm, Left Updated: 10/02/18 0743    Cholesterol 90 mg/dL     Triglycerides 71 mg/dL     HDL, Direct 37 (L) mg/dL     LDL Calculated 39 mg/dL    Comprehensive metabolic panel [[id number]] (Abnormal) Collected: 10/02/18 0650   Lab Status: Final result Specimen: Blood from Arm, Left Updated: 10/02/18 0743    Sodium 139 mmol/L     Potassium 3 5 mmol/L     Chloride 105 mmol/L     CO2 26 mmol/L     ANION GAP 8 mmol/L     BUN 20 mg/dL     Creatinine 1 07 mg/dL     Glucose 153 (H) mg/dL     Calcium 9 0 mg/dL     AST 13 U/L     ALT 18 U/L     Alkaline Phosphatase 88 U/L     Total Protein 7 5 g/dL     Albumin 3 2 (L) g/dL     Total Bilirubin 0 76 mg/dL     eGFR 57 ml/min/1 73sq m    Narrative:       National Kidney Disease Education Program recommendations are as follows:  GFR calculation is accurate only with a steady state creatinine  Chronic Kidney disease less than 60 ml/min/1 73 sq  meters  Kidney failure less than 15 ml/min/1 73 sq  meters     Magnesium [[id number]] (Normal) Collected: 10/02/18 0650   Lab Status: Final result Specimen: Blood from Arm, Left Updated: 10/02/18 0743    Magnesium 2 2 mg/dL    Phosphorus [[id number]] (Normal) Collected: 10/02/18 0650   Lab Status: Final result Specimen: Blood from Arm, Left Updated: 10/02/18 0743    Phosphorus 4 1 mg/dL    CBC (With Platelets) [[id number]] (Abnormal) Collected: 10/02/18 0650   Lab Status: Final result Specimen: Blood from Arm, Left Updated: 10/02/18 0713    WBC 6 57 Thousand/uL     RBC 4 35 Million/uL     Hemoglobin 11 0 (L) g/dL     Hematocrit 35 2 %     MCV 81 (L) fL     MCH 25 3 (L) pg     MCHC 31 3 (L) g/dL     RDW 14 3 %     Platelets 276 Thousands/uL     MPV 11 3 fL    Hemoglobin A1C [[id number]] (Abnormal) Collected: 10/02/18 0650   Lab Status: Final result Specimen: Blood from Arm, Left Updated: 10/02/18 0721    Hemoglobin A1C 7 8 (H) %      mg/dl    P2Y12 Platelet inhibitor [[id number]] (Normal) Collected: 10/02/18 0643   Lab Status: Final result Specimen: Blood from Arm, Left Updated: 10/02/18 0714    P2Y12 214 PRU    Urinalysis with microscopic [[id number]]    Lab Status: No result Specimen: Urine    CBC and differential [[id number]] (Abnormal) Collected: 10/01/18 1331   Lab Status: Final result Specimen: Blood from Arm, Right Updated: 10/01/18 1351    WBC 6 70 Thousand/uL     RBC 4 50 Million/uL     Hemoglobin 11 6 g/dL     Hematocrit 36 4 %     MCV 81 (L) fL     MCH 25 8 (L) pg     MCHC 31 9 g/dL     RDW 14 5 %     MPV 11 7 fL     Platelets 297 Thousands/uL     nRBC 0 /100 WBCs     Neutrophils Relative 76 (H) %     Immat GRANS % 0 %     Lymphocytes Relative 16 %     Monocytes Relative 6 %     Eosinophils Relative 1 %     Basophils Relative 1 %     Neutrophils Absolute 5 09 Thousands/µL     Immature Grans Absolute 0 02 Thousand/uL     Lymphocytes Absolute 1 09 Thousands/µL     Monocytes Absolute 0 41 Thousand/µL     Eosinophils Absolute 0 05 Thousand/µL     Basophils Absolute 0 04 Thousands/µL    Basic metabolic panel [[id number]] Collected: 10/01/18 1331   Lab Status: Final result Specimen: Blood from Arm, Right Updated: 10/01/18 1402    Sodium 136 mmol/L     Potassium 4 6 mmol/L     Chloride 105 mmol/L     CO2 26 mmol/L     ANION GAP 5 mmol/L     BUN 17 mg/dL     Creatinine 1 03 mg/dL     Glucose 111 mg/dL     Calcium 9 1 mg/dL     eGFR 60 ml/min/1 73sq m    Narrative:       National Kidney Disease Education Program recommendations are as follows:  GFR calculation is accurate only with a steady state creatinine  Chronic Kidney disease less than 60 ml/min/1 73 sq  meters  Kidney failure less than 15 ml/min/1 73 sq  meters     Troponin I [[id number]] (Normal) Collected: 10/01/18 1331   Lab Status: Final result Specimen: Blood from Arm, Right Updated: 10/01/18 1405    Troponin I <0 02 ng/mL    Protime-INR [[id number]] (Normal) Collected: 10/01/18 1331   Lab Status: Final result Specimen: Blood from Arm, Right Updated: 10/01/18 1409    Protime 13 6 seconds     INR 1 03   APTT [[id number]] (Normal) Collected: 10/01/18 1331   Lab Status: Final result Specimen: Blood from Arm, Right Updated: 10/01/18 1409    PTT 25 seconds          [facility]  Discharged With Lines: no      Outpatient Follow-Up  Follow up in clinic in 1-2 weeks  Follow up with consulting providers  none required   Active Issues Requiring Follow-up   none    Code Status: Prior  Advance Directive and Living Will: <no information>  Power of :    POLST:      Medications         Allergies  No Known Allergies  Discharge Diet: diabetic diet  Activity restrictions: none    [address] [facility]  Patient is a 72-year-old female with a past medical history of left pontine CVA in 27 on Plavix, hypertension, type 2 diabetes mellitus on oral medication who presented with ataxia and falling to the right side the evening prior to admission  Patient reports that she was walking to her refrigerator the evening prior to admission when she began to experience and unsteady gait and falling to the right side  She did not have any falls or head trauma  She denied any numbness, weakness, tingling, nausea, vomiting, slurred speech, changes in vision, headache  Patient had similar incident in April 2018 when she fell to the right side and fell into the TV and was evaluated in the [facility]  The patient was seen by Neurology in the emergency room and no tPA was given as the patient was outside the window and CT head showed no evidence of any acute intracranial abnormality  CTA head and neck, echocardiogram, hemoglobin A1c, lipid panel were all normal   Her MRI brain showed no new findings  She was continued on her home Plavix and Lipitor with permissive hypertension parameters  Following her medical management, she was recommended to inpatient rehab and was discharged to inpatient rehab  Presenting Problem/History of Present Illness  Principal Problem:    Stroke-like symptoms  Active Problems: Aortic valve regurgitation, nonrheumatic    Benign essential hypertension    Bilateral edema of lower extremity    CVA (cerebrovascular accident) ([facility] [address] )    Diabetes mellitus type 2, uncontrolled ([address] )    Ambulatory dysfunction    Hyperlipidemia    Microcytic anemia    CKD stage 3 due to type 2 diabetes mellitus ([facility] [address] )  Resolved Problems:    * No resolved hospital problems   *    1   Ataxia with right-sided preference:  Patient continues to have unsteady gait however CT head was negative and CTA head neck did not have any significant stenosis, occlusion, dissection   2D echo pending   Patient received aspirin 325 mg and will continue Plavix   NIHSS was 3 on admission and tPA was not administered as patient was outside the window  · MRI brain normal  · Echo normal  · Increased p o  Plavix 150 mg daily due to low P2Y12 level  · Continue p o  Lipitor 80 mg daily  · Permissive hypertension  · Hemoglobin A1c, lipid profile WNL  · Neuro cleared patient for discharge and concluded likely recrudescence of old deficits given normal MRI  · PT/OT recommended inpatient rehab and currently awaiting acceptance to facility  · Speech cleared  · Level 1 Carb Diet     2   Hypertension  · Hold amlodipine for permissive hypertension     3   Hyperlipidemia  · Continue Lipitor     4   Type 2 diabetes mellitus  · Insulin sliding scale and Accu-Cheks before meals    Discharge Condition: good    Discharge  Statement   I spent 30 minutes minutes discharging the patient  This time was spent on the day of discharge  I had direct contact with the patient on the day of discharge  Additional documentation is required if more than 30 minutes were spent on discharge       Joann Crawley MD  PGY-1 Internal Medicine

## 2018-10-02 NOTE — SOCIAL WORK
Met with pt and explained CM role  Pt lives with her son in a 3 story house with 1 SHA and BR and BD on 2nd floor  Pt was independent PTA and was able to drive  Pt's PCP is Brionna Pedersen and Lisa German  Pt has a cane, shower chair, and commode at home  No hx of HHC or rehab  No hx of mental health issues or drug or alcohol use  Pt has a prescription plan and uses [facility] for her prescriptions  Primary contact is pt's son Darryn Coyne, contact # [phone]  Alternate contact is pt's son Arcelia, contact # [phone]  Pt's son will provide pt with transportation home upon discharge  Pt's POA is her 5 sons  CM reviewed d/c planning process including the following: identifying help at home, patient preference for d/c planning needs, Discharge Lounge, [facility] Meds to Bed program, availability of treatment team to discuss questions or concerns patient and/or family may have regarding understanding medications and recognizing signs and symptoms once discharged  CM also encouraged patient to follow up with all recommended appointments after discharge  Patient advised of importance for patient and family to participate in managing patients medical well being  Patient/caregiver received discharge checklist  Content reviewed  Patient/caregiver encouraged to participate in discharge plan of care prior to discharge home

## 2018-10-02 NOTE — CASE MANAGEMENT
YAMILETH UNABLE TO FIND THIS MEMBER       Initial Clinical Review    Admission: Date/Time/Statement: 10/01/18 1437  OBSERVATION CHANGED TO INPATIENT 10-2-18  0956     Ataxia with right-sided preference:  Patient continues to have unsteady gait however CT head was negative and CTA head neck did not have any significant stenosis, occlusion, dissection  2D echo and MRI pending  10/02/18 0956  Inpatient Admission Once     Transfer Service: General Medicine       Question Answer   Admitting Physician Nick Pruitt   Level of Care Med Surg   Estimated length of stay More than 2 Midnights   Certification I certify that inpatient services are medically necessary for this patient for a duration of greater than two midnights  See H&P and MD Progress Notes for additional information about the patient's course of treatment  ED: Date/Time/Mode of Arrival:   ED Arrival Information     Expected Arrival Acuity Means of Arrival Escorted By Service Admission Type    - 10/1/2018 12:15 Emergent Walk-In Self General Medicine Emergency    Arrival Complaint    dizziness          Chief Complaint:   Chief Complaint   Patient presents with    Dizziness     Patient reports sudden onset of dizziness last night, states it feels similar to when she had a stroke previously, slight left facial droop noted in triage       History of Illness:     Marv Mosher is a 66 y o  female with a past medical history of left pontine CVA in 2010 on Plavix, hypertension, dm 2 controlled with oral medication who presented with ataxia and falling to the right side since 7:00 p m  yesterday evening  Patient reports she was walking yesterday evening when she began to experience dizziness and unsteady gait along with a right-sided preference        ED Triage Vitals [10/01/18 1226]   98 2 °F (36 8 °C) 71 18 (!) 174/82 99 %      No Pain       10/01/18 81 2 kg (179 lb 0 2 oz)       Vital Signs (abnormal):     10/01/18 1704  98 °F (36 7 °C)  66 18   173/82  97 %  None (Room air)   10/01/18 1500  --  64   26  160/82  97 %  --   10/01/18 1343  --  65   23  168/82  96 %  --   10/01/18 1328  --  67   24   177/77  98 %  --   10/01/18 1313  --  67  18   177/74  99 %  None (Room air)       Abnormal Labs/Diagnostic Test Results:     Unsteady gait with cane   NHISS =3     Ct head   Impression: 1  Trace high density in the posterior aspect of the left sylvian fissure possibly trace subarachnoid hemorrhage  Differential diagnosis includes artifact from coapted cortex versus slice selection or perhaps a small vessel    Consider follow-up repeat head CT in 12 to 24 hours to exclude blooming    Cta  Head and neck    No acute finding      ED Treatment:   Medication Administration from 10/01/2018 1215 to 10/01/2018 1531       Date/Time Order Dose Route     10/01/2018 1358 aspirin tablet 325 mg 325 mg Oral       Past Medical/Surgical History:     Diagnosis Date Noted    Aortic valve regurgitation, nonrheumatic 09/21/2017    Benign essential hypertension 06/28/2017    Bilateral edema of lower extremity 08/21/2017    Chronic rhinitis 01/22/2018    CVA [facility]cerebrovascular accident) [facility] [address] [facility] 06/28/2017    Midline cystocele 11/08/2016    Diabetes mellitus type 2, uncontrolled ([address] ) 06/28/2017    Microalbuminuria 01/22/2018    Non-rheumatic mitral regurgitation 08/25/2017    Post-nasal drip 02/16/2018    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/28/2018    Tinea pedis of both feet 03/04/2018    Chest pain 04/11/2018    Ambulatory dysfunction 04/11/2018    Hyperlipidemia 04/11/2018    Microcytic anemia 04/11/2018    Creatinine elevation 04/11/2018    Esophageal abnormality 04/20/2018    Tickle in throat 04/23/2018    CKD stage 3 due to type 2 diabetes mellitus ([facility] [address] ) 05/02/2018       PRINCIPLE DIAGNOSIS  R29 90 OTHER DISORDER OF THE NERVOUS SYSTEM     Admitting Diagnosis: Dizziness [R42]  Lightheadedness [R42]  Ataxia [R27 0]  Ambulatory dysfunction [R26 2]  Cerebrovascular accident (CVA), unspecified mechanism ([facility] [address] ) [I63 9]    Age/Sex: 66 y o  female    Assessment/Plan:     66year old female with history of CVA  Experiencing ongoing ataxia  Ct head  Inconclusive  Possible subarachnoid hemorrhage  Radiology recommends repeat Ct to rule out blooming bleed  Admit for continued monitoring of neurologic symptoms and assessment of functional abilities and safe discharge plan  INTERNAL MEDICINE  Ataxia with right-sided preference:  Patient continues to have unsteady gait however CT head was negative and CTA head neck did not have any significant stenosis, occlusion, dissection  2D echo pending  Patient received aspirin 325 mg and will continue Plavix  NIHSS was 3 on admission and tPA was not administered as patient was outside the window  · MRI brain pending  · Echo pending  · Continue p o  Plavix 75 mg daily  · Continue p o  Lipitor 80 mg daily  · Permissive hypertension  · Hemoglobin A1c, lipid profile pending  · PT/OT/speech  · NPO pending speech eval     2  Hypertension  · Hold amlodipine for permissive hypertension   3  Hyperlipidemia  · Continue Lipitor     4  Type 2 diabetes mellitus  Insulin sliding scale and Accu-Cheks before meals    NEUROLOGY CONSULT  1)   Ataxia with L sided sensory deficit - will rule out acute CVA               -MRI B pending              -CT H without acute intracranial abonormality              -CTA head and neck without hemodynamically significant stenosis, occlusion, or dissection              -ECHO pending               - x1 then continue plavix 75 mg p o  daily for now               -will increase home dose of Lipitor to 80 mg p o  daily              -permissive hypertension              -P2Y12 pending               -HbA1c, Lipid profile pending               -PT/ OT/ Speech      Stroke Alert: 0308  Neurology at Bedside: 1253  NIHSS: 3  tPA: Not administered 2/2 outside of window  Neurovascular intervention: Not indicated 2/2 no large vessel target      Admission Orders:    [address] CHECKS  Q4 HR   Reason for not initiating IV thrombolytic Last known well >4 5 hr  or  unable to determine  PT OT AND SPEECH EVAL AND TREAT   CONSULT PHYSICAL MEDICINE AND REHABILITATION   STROKE EDUCATION   NPO  DYSPHAGIA ASSESSMENT PRIOR TO PO  SEQUENTIAL COMPRESSION DEVICE   FINGER STICK GLUCOSE   ECHO  MRI BRAIN   P2Y12 PLATELET INHIBITOR   LIPID PANEL WITH LDL  HBA1C      Scheduled Meds:     acetaminophen 650 mg Oral Q6H PRN   atorvastatin 80 mg Oral Daily With Dinner   clopidogrel 75 mg Oral Daily   docusate sodium 100 mg Oral BID   insulin lispro 1-6 Units Subcutaneous TID AC   senna 1 tablet Oral Daily

## 2018-10-02 NOTE — SOCIAL WORK
Patient identified as HRR per criteria  Call made to DC appointment hotline with information as required for CM support follow up      Outpt Care [facility] referral made

## 2018-10-02 NOTE — OCCUPATIONAL THERAPY NOTE
Occupational Therapy Evaluation      Marv Mosher    10/2/2018    Patient Active Problem List   Diagnosis    Aortic valve regurgitation, nonrheumatic    Benign essential hypertension    Bilateral edema of lower extremity    Chronic rhinitis    [facility] accident) [facility] )    Midline cystocele    Diabetes mellitus type 2, uncontrolled ([facility] )    Microalbuminuria    Non-rheumatic mitral regurgitation    Post-nasal drip    Uterine procidentia    Tinea pedis of both feet    Chest pain    Ambulatory dysfunction    Hyperlipidemia    Microcytic anemia    Creatinine elevation    Esophageal abnormality    Tickle in throat    CKD stage 3 due to type 2 diabetes mellitus ([facility] )    Stroke-like symptoms       Past Medical History:   Diagnosis Date    ACE-inhibitor cough     last assessed - [id number]    Asthma     Bilateral edema of lower extremity     last assessed - [id number]    Cardiac murmur     last assessed - [id number]    Diabetes mellitus [facility])     last assessed - [id number]    Esophageal dysphagia     Fatigue     last assessed - [id number]    Hyperlipidemia     Hypertension     Patellar bursitis of right knee     last assessed - [id number]    Personal history of other specified conditions     presenting hazards to health    Stroke [facility])     Stroke syndrome     Urinary frequency     UTI (urinary tract infection)        Past Surgical History:   Procedure Laterality Date    DE ESOPHAGOGASTRODUODENOSCOPY TRANSORAL DIAGNOSTIC N/A 4/5/2017    Procedure: ESOPHAGOGASTRODUODENOSCOPY (EGD); Surgeon: Fransisco Melton MD;  Location: [facility];   Service: Gastroenterology      10/02/18 1340   Note Type   Note type Eval only   Restrictions/Precautions   Other Precautions Fall Risk   Pain Assessment   Pain Assessment No/denies pain   Pain Score No Pain   Home Living   Type of Home House   Home Layout Two level;Bed/bath upstairs   Bathroom Shower/Tub Tub/shower unit   Bathroom Toilet Standard Bathroom Equipment Shower chair;Grab bars in [address]   Additional Comments Needs to climb FF stairs to get to bathroom   Prior Function   Level of [place] Independent with ADLs and functional mobility   Lives With Son   ADL Assistance Independent   IADLs Independent   Comments admits to several near falls, able to self correct   Lifestyle   Autonomy fully indpendent w self care, home managment, cooking, baking, shopping and driving   Reciprocal Relationships supportive son  works daytime   Intrinsic Gratification cook and bake, take care of herself, go to [facility]   Psychosocial   Psychosocial (WDL) WDL   Subjective   Subjective "I am doing better, thank you"   ADL   Eating Assistance 7  Independent   Grooming Assistance 7  Independent   Grooming Deficit Setup; Increased time to complete   UB Bathing Assistance 5  Supervision/Setup   LB Bathing Assistance 4  Minimal Assistance   UB Dressing Assistance 5  Supervision/Setup   LB [place]  5  Supervision/Setup   Additional Comments Min assist for completion of self care in stance due to decrased balance   Bed Mobility   Rolling R 7  Independent   Rolling L 7  Independent   Supine to Sit 7  Independent   Sit to Supine 7  Independent   Transfers   Sit to Stand 5  Supervision   Stand to Sit 5  Supervision   Stand pivot 4  Minimal assistance   Toilet transfer 4  Minimal assistance   Functional Mobility   Functional Mobility 4  Minimal assistance   Additional Comments pt with 1 loss of balance walking to the bathroom   needed assist to self  correct   Additional items [facility]   Balance   Static Sitting Good   Dynamic Sitting Good   Static Standing Fair -   Dynamic Standing Poor +   Activity Tolerance   Activity Tolerance Patient tolerated treatment well   Antonino Moreno Assessment WFL   LUE Assessment   LUE Assessment WFL   Hand Function   Gross Motor Coordination Functional   Fine Motor Coordination Functional   Sensation   Light Touch No apparent deficits   Vision - Complex Assessment   Tracking Able to track stimulus in all quads without difficulty   Cognition   Overall Cognitive Status [facility]   Arousal/Participation Alert; Cooperative   Attention Within functional limits   Orientation Level Oriented X4   Memory Within functional limits   Following Commands Follows all commands and directions without difficulty   Assessment   Limitation Decreased high-level ADLs; Decreased self-care trans;Decreased endurance;Decreased Safe judgement during ADL;Decreased ADL status   Assessment Pt is a 66 y o  female seen for OT evaluation s/p admit to [facility] on 10/1/2018 w/ Stroke-like symptoms  She did experience dizziness and unsteady gait along with a right-sided preference  Patient did not have any falls or head trauma  Patient had a similar incident in April 2018 which she fell into her TV  Comorbidities affecting pt's functional performance at time of assessment include: DM, HTN and CKD, aortic valve regurgitation, b/l LE edema, CVA  Personal factors affecting pt at time of IE include:steps to enter environment, difficulty performing ADLS, difficulty performing IADLS , environment and second floor bed and bathroom  Prior to admission, pt was fully independent w self care, mobility, home mangment as well as driving  Upon evaluation: Pt requires min assist for completion of LB self care in stance, noted with loss of balance walking to the bathroom  She presents with the following deficits impacting occupational performance: decreased strength, decreased balance and decreased tolerance  Pt to benefit from continued skilled OT tx while in the hospital to address deficits as defined above and maximize level of functional independence w ADL's and functional mobility   Occupational Performance areas to address include: bathing/shower, toilet hygiene, dressing, functional mobility, clothing management and household maintenance  Pt scored 60 /100 on the barthel index  Based on findings from OT evaluation and functional performance deficits, pt has been identified as a high  complexity evaluation  From OT standpoint, recommendation at time of d/c would be inpatient short term rehab as she is currently functioning below her baseline  Goals   Patient Goals to go home   STG Time Frame 3-5   Short Term Goal #1 pt will complete hygiene indpendenlty without loss of balance   Short Term Goal #2 demonstrate good safety awarenss w self care/simulated home managment skills   LTG Time Frame 7-10   Long Term Goal #1 pt will tolerate standing x 10 min at sinkside for completion of hygiene/simulated home management activites   Long Term Goal #2 good balance and good safety in stance for increased independence w self care/mobility   Functional Transfer Goals   Pt Will Perform All Functional Transfers With mod indep; With good judgment/safety   ADL Goals   Pt Will Perform All ADL's Independently; With good judgment/safety   Plan   Treatment Interventions ADL retraining;Functional transfer training;UE strengthening/ROM; Endurance training;Patient/family training; Compensatory technique education; Energy conservation; Activityengagement   Goal Expiration Date 10/12/18   OT Frequency 3-5x/wk   Recommendation   OT Discharge Recommendation Short Term Rehab  (vs home w home therapies pending progress)   Barthel Index   Feeding 10   Bathing 0   Grooming Score 5   Dressing Score 5   Bladder Score 10   Bowels Score 10   Toilet Use Score 10   Transfers (Bed/Chair) Score 10   Mobility (Level Surface) Score 0   Stairs Score 0   Barthel Index Score 60

## 2018-10-02 NOTE — SOCIAL WORK
Met with pt and her son Ac Vines to inform them of therapy's recommendation for inpt rehab  Discussed acute rehab locations and subacute rehab options and list was provided  Pt stated that she prefers a referral to [facility]  She stated that she will review subacute list with her family  [facility] referral sent to [facility]

## 2018-10-03 ENCOUNTER — APPOINTMENT (INPATIENT)
Dept: RADIOLOGY | Facility: HOSPITAL | Age: 78
DRG: 093 | End: 2018-10-03
Payer: COMMERCIAL

## 2018-10-03 LAB
GLUCOSE SERPL-MCNC: 141 MG/DL (ref 65–140)
GLUCOSE SERPL-MCNC: 201 MG/DL (ref 65–140)
GLUCOSE SERPL-MCNC: 251 MG/DL (ref 65–140)

## 2018-10-03 PROCEDURE — 97535 SELF CARE MNGMENT TRAINING: CPT

## 2018-10-03 PROCEDURE — 70551 MRI BRAIN STEM W/O DYE: CPT

## 2018-10-03 PROCEDURE — 97116 GAIT TRAINING THERAPY: CPT

## 2018-10-03 PROCEDURE — 99232 SBSQ HOSP IP/OBS MODERATE 35: CPT | Performed by: PSYCHIATRY & NEUROLOGY

## 2018-10-03 PROCEDURE — 82948 REAGENT STRIP/BLOOD GLUCOSE: CPT

## 2018-10-03 RX ADMIN — INSULIN LISPRO 3 UNITS: 100 INJECTION, SOLUTION INTRAVENOUS; SUBCUTANEOUS at 11:41

## 2018-10-03 RX ADMIN — DOCUSATE SODIUM 100 MG: 100 CAPSULE, LIQUID FILLED ORAL at 11:40

## 2018-10-03 RX ADMIN — DOCUSATE SODIUM 100 MG: 100 CAPSULE, LIQUID FILLED ORAL at 18:17

## 2018-10-03 RX ADMIN — SENNOSIDES 8.6 MG: 8.6 TABLET, FILM COATED ORAL at 11:41

## 2018-10-03 RX ADMIN — CLOPIDOGREL BISULFATE 150 MG: 75 TABLET ORAL at 11:40

## 2018-10-03 RX ADMIN — ATORVASTATIN CALCIUM 40 MG: 40 TABLET, FILM COATED ORAL at 18:17

## 2018-10-03 RX ADMIN — INSULIN LISPRO 2 UNITS: 100 INJECTION, SOLUTION INTRAVENOUS; SUBCUTANEOUS at 16:54

## 2018-10-03 NOTE — SOCIAL WORK
Received phone call from [facility] liaison who stated that pt's insurance has denied pt for inpt acute rehab and they are requesting MD information to do a peer to peer  CM informed Dr Estee Hernandez of same and she was agreeable to complete the peer to peer  This information was provided to Carol at [facility]

## 2018-10-03 NOTE — PLAN OF CARE
Problem: PHYSICAL THERAPY ADULT  Goal: Performs mobility at highest level of function for planned discharge setting  See evaluation for individualized goals  Treatment/Interventions: Functional transfer training, LE strengthening/ROM, Therapeutic exercise, Endurance training, Patient/family training, Equipment eval/education, Bed mobility, Gait training          See flowsheet documentation for full assessment, interventions and recommendations  Outcome: Progressing  Prognosis: Good  Problem List: Decreased strength, Decreased mobility, Decreased endurance, Impaired balance, Decreased coordination  Assessment: Pt seen for session, gait training x 25 min for distances as above  Note increased SOB, weakness and fatigue w/ dizziness during gait today  able to ambulate an increased distance, but continue to note multiple LOB w/ gait, scissoring and needing increased assist   Will use RW next session to provide more support  continue to recommend rehab at d/c        Recommendation:  (continue to recommend rehab at d/c)     PT - OK to Discharge: (S) Yes (to rehab when stable)    See flowsheet documentation for full assessment

## 2018-10-03 NOTE — SOCIAL WORK
Received phone call from Yahir Hoover, [facility] liaison, who stated that she will submit for insurance auth

## 2018-10-03 NOTE — SOCIAL WORK
Received phone call from [facility][1], who stated that she received a phone call from pt's insurance who stated that the Peer to Peer was completed and denial has been upheld  They feel that her rehab course can be completed at a lower level of care

## 2018-10-03 NOTE — PROGRESS NOTES
Progress Note - Neurology   Yang Perez 66 y o  female MRN: [id number]  Unit/Bed#: [facility] 712-01 Encounter: [id number]    Assessment/ Plan:  1)  B/l ataxia with L sided sensory deficit - likely acute on chronic worsening of baseline ataxia 2/2 pontine CVA in 2010              -MRI B without acute intracranial abnormality               -CT H without acute intracranial abonormality              -CTA head and neck without hemodynamically significant stenosis, occlusion, or dissection              -ECHO demonstrates mild left atrial dilation, atrial septal aneurysm  No wall motion abnormality noted  -Lipitor to 80 mg p o  Daily   -HbA1c 7 8, Lipid profile WNL with exception of HLD 37   -Recommend maximization of secondary stroke prevention               -P2Y12 214, indicating insufficient platelet inhibition     -Will increase Plavix to 150mg PO QD              -HbA1c, Lipid profile pending               -PT/ OT/ Speech - PT recommends STR    -No additional acute neurologic recommendations at this time, please call with questions    -Pt to follow up with Dr Ro Justice as an OP, apt requested       Subjective:   No acute events overnight  MRI B completed and is negative for acute intracranial abnormality  On exam today, the pt is pleasant and polite and in no acute distress  Reports that symptoms of feeling off balance continue  PT recommends STR  A 12 point ROS was completed and is otherwise negative        ROS:  See subjective     Medications:  Scheduled Meds:    Current Facility-Administered Medications:  acetaminophen 650 mg Oral Q6H PRN Boneta Pointer, DO   atorvastatin 40 mg Oral After Lockjuanita Rice, DO   clopidogrel 150 mg Oral Daily Mel Butler PA-C   docusate sodium 100 mg Oral BID Boneta Pointer, DO   insulin lispro 1-6 Units Subcutaneous TID AC Stephanie Lamb, DO   senna 1 tablet Oral Daily Stephanie Lamb,      Continuous Infusions:   PRN Meds:   acetaminophen      Vitals: Blood pressure 158/86, pulse 71, temperature 98 6 °F (37 °C), temperature source Oral, resp  rate 18, height 5' 4" (1 626 m), weight 81 2 kg (179 lb 0 2 oz), SpO2 97 %, not currently breastfeeding  ,Body mass index is 30 73 kg/m²  Physical Exam:   Physical Exam   Constitutional: She is oriented to person, place, and time  She appears well-developed and well-nourished  No distress  HENT:   Head: Normocephalic and atraumatic  Mouth/Throat: No oropharyngeal exudate  Eyes: Conjunctivae are normal    Neck: Normal range of motion  Neck supple  Pulmonary/Chest: Effort normal  No respiratory distress  Musculoskeletal: Normal range of motion  She exhibits no tenderness or deformity  Neurological: She is oriented to person, place, and time  She has normal strength  She has a normal Finger-Nose-Finger Test    Skin: Skin is warm and dry  She is not diaphoretic  Psychiatric: She has a normal mood and affect  Her speech is normal and behavior is normal    Nursing note and vitals reviewed  Neurologic Exam     Mental Status   Oriented to person, place, and time  Follows 2 step commands  Attention: normal  Concentration: normal    Speech: speech is normal   Level of consciousness: alert  Knowledge: good  Normal comprehension  Cranial Nerves   Cranial nerves II through XII intact  Motor Exam   Muscle bulk: normal  Overall muscle tone: normal    Strength   Strength 5/5 throughout  Sensory Exam   Light touch normal      Gait, Coordination, and Reflexes     Gait  Gait: (deferred for safety)    Coordination   Finger to nose coordination: normal    Tremor   Resting tremor: absent    Reflexes   Right plantar: normal  Left plantar: normal  Right ankle clonus: absent  Left ankle clonus: absent      Lab, Imaging and other studies: I have personally reviewed pertinent reports       Recent Results (from the past 24 hour(s))   Fingerstick Glucose (POCT)    Collection Time: 10/02/18  4:08 PM   Result Value Ref Range    POC Glucose 156 (H) 65 - 140 mg/dl   Urinalysis with microscopic    Collection Time: 10/02/18  8:23 PM   Result Value Ref Range    Clarity, UA Cloudy     Color, UA Yellow     Specific Gravity, UA 1 029 1 003 - 1 030    pH, UA 5 0 4 5 - 8 0    Glucose, UA >=1000 (1%) (A) Negative mg/dl    Ketones, UA Negative Negative mg/dl    Blood, UA Small (A) Negative    Protein, UA 30 (1+) (A) Negative mg/dl    Nitrite, UA Positive (A) Negative    Bilirubin, UA Negative Negative    Urobilinogen, UA 0 2 0 2, 1 0 E U /dl E U /dl    Leukocytes, UA (A) Negative     Elevated glucose may cause decreased leukocyte values  See urine microscopic for [facility] result/    WBC, UA 10-20 (A) None Seen, 0-5, 5-55, 5-65 /hpf    RBC, UA 2-4 (A) None Seen, 0-5 /hpf    Hyaline Casts, UA None Seen None Seen /lpf    Bacteria, UA Innumerable (A) None Seen, Occasional /hpf    Epithelial Cells None Seen None Seen, Occasional /hpf   Fingerstick Glucose (POCT)    Collection Time: 10/02/18  9:12 PM   Result Value Ref Range    POC Glucose 158 (H) 65 - 140 mg/dl   Fingerstick Glucose (POCT)    Collection Time: 10/03/18  7:06 AM   Result Value Ref Range    POC Glucose 141 (H) 65 - 140 mg/dl   Fingerstick Glucose (POCT)    Collection Time: 10/03/18 11:11 AM   Result Value Ref Range    POC Glucose 251 (H) 65 - 140 mg/dl   ]    VTE Prophylaxis: Sequential compression device (Venodyne)     Counseling / Coordination of Care  Total time spent today 20 minutes  Greater than 50% of total time was spent with the patient and / or family counseling and / or coordination of care  A description of the counseling / coordination of care: The pt was seen and examined by myself and the attending physician  The chart was reviewed thoroughly, including laboratory values and imaging studies  The pt was counseled in the room   The pt was discussed with the primary team

## 2018-10-03 NOTE — PLAN OF CARE
Problem: OCCUPATIONAL THERAPY ADULT  Goal: Performs self-care activities at highest level of function for planned discharge setting  See evaluation for individualized goals  Treatment Interventions: ADL retraining, Functional transfer training, UE strengthening/ROM, Endurance training, Patient/family training, Compensatory technique education, Energy conservation, Activityengagement          See flowsheet documentation for full assessment, interventions and recommendations  Outcome: Progressing  Limitation: Decreased high-level ADLs, Decreased self-care trans, Decreased endurance, Decreased Safe judgement during ADL, Decreased ADL status     Assessment: Pt participated in occupational therapy with focus on activity tolerance, functional transfers/mob, toileting/toilet transfers and light homemaking  Pt cleared by YANNI/Donna for pt participation in occupational  therapy  Pt received sittingedge of pt bed  Pt pleasant and agreeable to therapy following pt Identifiers confirmed  Pt reported her goal today to get stronger and return to home  Pt requires min A for toilet transfers 2* pt balance deficits  Pt reports does homemaking tasks at home however reports use of a chair  Pt required chair setup and seated rest breaks for pt homemaking tasks this session  Pt will benefit from in-pt rehab to continue to address pt noted deficits with balance and activity tolerance  which currently impair pt ADL and functional mob       OT Discharge Recommendation: Short Term Rehab

## 2018-10-03 NOTE — PLAN OF CARE
Activity Intolerance/Impaired Mobility     Mobility/activity is maintained at optimum level for patient Progressing        Communication Impairment     Ability to express needs and understand communication [place]     Discharge to home or other facility with appropriate resources Progressing        DISCHARGE PLANNING - CARE MANAGEMENT     Discharge to post-acute care or home with appropriate resources Progressing        INFECTION - ADULT     Absence or prevention of progression during hospitalization Progressing        Knowledge Deficit     Patient/family/caregiver demonstrates understanding of disease process, treatment plan, medications, and discharge instructions Progressing        Neurological Deficit     Neurological status is stable or improving Progressing        Nutrition     Nutrition/Hydration status is improving Progressing        PAIN - ADULT     Verbalizes/displays adequate comfort level or baseline comfort level Progressing        Potential for Aspiration     Non-ventilated patient's risk of aspiration is minimized Progressing        Potential for Falls     Patient will remain free of falls Progressing        SAFETY ADULT     Maintain or return to baseline ADL function Progressing     Maintain or return mobility status to optimal level Progressing

## 2018-10-03 NOTE — PHYSICAL THERAPY NOTE
Physical Therapy Treatment Note     10/03/18 0845   Pain Assessment   Pain Assessment 0-10   Pain Score No Pain   Restrictions/Precautions   Weight Bearing Precautions Per Order No   Other Precautions Fall Risk   General   Chart Reviewed Yes   Family/Caregiver Present No   Cognition   Overall Cognitive Status WFL   Arousal/Participation Responsive   Attention Within functional limits   Orientation Level Oriented X4   Memory Unable to assess   Following Commands Follows one step commands without difficulty   Subjective   Subjective states she feels OK, but admits to weakness, fatigue, continued dizziness and unsteadiness w/ gait  Pleasant and cooperative   Bed Mobility   Supine to Sit 5  Supervision   Additional items Assist x 1   Transfers   Sit to Stand 5  Supervision   Additional items Assist x 1  (initial posterior LOB)   Stand to Sit 5  Supervision   Additional items Assist x 1   Ambulation/Elevation   Gait pattern Ataxia;Scissoring  (slow, continued ataxai w/ multiple lateral and posterior LOB)   Gait Assistance 4  Minimal assist   Additional items Assist x 1  (2 LOB needing mod A to correct)   Assistive Device SPC  (tried SPC at pt request- but needed SPC/HHA- try RW next ses)   Distance 100'x1, 80'x1, 50'x2 w/ standing rest in between each trial x 60-90 sec each   Balance   Static Sitting Good   Dynamic Sitting Good   Static Standing Fair   Dynamic Standing Poor +   Ambulatory Poor +   Endurance Deficit   Endurance Deficit Yes   Endurance Deficit Description fatigue, weakness   Activity Tolerance   Activity Tolerance Patient limited by fatigue;Treatment limited secondary to medical complications (Comment)   Nurse Made Aware yes   Assessment   Prognosis Good   Problem List Decreased strength;Decreased mobility; Decreased endurance; Impaired balance;Decreased coordination   Assessment Pt seen for session, gait training x 25 min for distances as above    Note increased SOB, weakness and fatigue w/ dizziness during gait today  able to ambulate an increased distance, but continue to note multiple LOB w/ gait, scissoring and needing increased assist   Will use RW next session to provide more support  continue to recommend rehab at d/c   Goals   Patient Goals to go home   STG Expiration Date 10/16/18   Treatment Day 1   Plan   Treatment/Interventions Functional transfer training;LE strengthening/ROM; Therapeutic exercise; Endurance training;Equipment eval/education; Bed mobility;Gait training;Patient/family training   Progress Progressing toward goals   PT Frequency 5x/wk   Recommendation   Recommendation (continue to recommend rehab at d/c)   Equipment Recommended Kaur Hackett   PT - OK to Discharge Yes  (to rehab when stable)   Rosanne Lucas PT, DPT CSRS

## 2018-10-03 NOTE — SOCIAL WORK
Met with pt and her son Wayne Guillen to inform them of denial from insurance company for [facility]  Informed them that - does not have a bed available and -B is able to accept pt and has a bed available for pt  They were agreeable to -B  Discussed transportation with them to the facility  Discussed [facility] and cost   Pt's son Wayne Guillen stated that pt has 5 sons and one of her sons will transport her to the facility  CM left message for Oral Pontiff, Jose Carlos rodriguezison, that pt is agreeable to -B and inquire about needing a prior auth

## 2018-10-03 NOTE — SOCIAL WORK
CM was informed by [facility] liaison that patient is approved for [facility]  Clinical submitted to her insurance; admission to [facility] is pending insurance authorization

## 2018-10-03 NOTE — RESTORATIVE TECHNICIAN NOTE
Restorative Specialist Mobility Note       Activity: Ambulate in meyer, Ambulate in room, Stand at bedside (Educated/encouraged pt to ambulate with assistance 3-4 x/day  Bed alarm on   Pt callbell, phone/tray within reach )     Assistive Device: Truman Liriano

## 2018-10-03 NOTE — PROGRESS NOTES
IM Residency Progress Note   Unit/Bed#: [facility] 712-01 Encounter: [id number]  SOD Team B       Yang Perez 66 y o  female [id number]    Hospital Stay Days: 1      Assessment/Plan:    Principal Problem:    Stroke-like symptoms  Active Problems: Aortic valve regurgitation, nonrheumatic    Benign essential hypertension    Bilateral edema of lower extremity    CVA (cerebrovascular accident) ([facility] )    Diabetes mellitus type 2, uncontrolled ([facility] )    Ambulatory dysfunction    Hyperlipidemia    Microcytic anemia    CKD stage 3 due to type 2 diabetes mellitus ([facility] )       1   Ataxia with right-sided preference:  Patient continues to have unsteady gait however CT head was negative and CTA head neck did not have any significant stenosis, occlusion, dissection   2D echo pending   Patient received aspirin 325 mg and will continue Plavix   NIHSS was 3 on admission and tPA was not administered as patient was outside the window  · MRI brain pending  · Echo normal  · Increased p o  Plavix 150 mg daily due to low P2Y12 level  · Continue p o  Lipitor 80 mg daily  · Permissive hypertension  · Hemoglobin A1c, lipid profile WNL  · PT/OT recommended inpatient rehab and currently awaiting acceptance to facility  · Speech cleared  · Level 1 Carb Diet     2   Hypertension  · Hold amlodipine for permissive hypertension     3   Hyperlipidemia  · Continue Lipitor     4   Type 2 diabetes mellitus  · Insulin sliding scale and Accu-Cheks before meals       Disposition:  Continue inpatient treatment discharged to inpatient rehab once accepted       Subjective:   Patient unable to be examined this morning as patient had gone down for MRI          Vitals: Temp (24hrs), Av 6 °F (37 °C), Min:97 8 °F (36 6 °C), Max:99 5 °F (37 5 °C)  Current: Temperature: 98 6 °F (37 °C)  Vitals:    10/02/18 1900 10/02/18 2233 10/03/18 0400 10/03/18 0727   BP: 139/68 142/79 144/87 158/86   BP Location: Left arm Right arm Left arm Right arm   Pulse: 64 65 68 71 Resp: 18 18 18 18   Temp: 99 5 °F (37 5 °C) 98 7 °F (37 1 °C) 98 8 °F (37 1 °C) 98 6 °F (37 °C)   TempSrc: Oral Oral Oral Oral   SpO2: 97% 98% 98% 97%   Weight:       Height:        Body mass index is 30 73 kg/m²  I/O last 24 hours: In: 900 [P O :900]  Out: 2000 [Urine:2000]      Physical Exam:  Unable to be performed this patient is not in room    Invasive Devices     Peripheral Intravenous Line            Peripheral IV 10/02/18 Left Antecubital less than 1 day                          Labs:   Recent Results (from the past 24 hour(s))   Fingerstick Glucose (POCT)    Collection Time: 10/02/18 11:08 AM   Result Value Ref Range    POC Glucose 157 (H) 65 - 140 mg/dl   Fingerstick Glucose (POCT)    Collection Time: 10/02/18  4:08 PM   Result Value Ref Range    POC Glucose 156 (H) 65 - 140 mg/dl   Urinalysis with microscopic    Collection Time: 10/02/18  8:23 PM   Result Value Ref Range    Clarity, UA Cloudy     Color, UA Yellow     Specific Gravity, UA 1 029 1 003 - 1 030    pH, UA 5 0 4 5 - 8 0    Glucose, UA >=1000 (1%) (A) Negative mg/dl    Ketones, UA Negative Negative mg/dl    Blood, UA Small (A) Negative    Protein, UA 30 (1+) (A) Negative mg/dl    Nitrite, UA Positive (A) Negative    Bilirubin, UA Negative Negative    Urobilinogen, UA 0 2 0 2, 1 0 E U /dl E U /dl    Leukocytes, UA (A) Negative     Elevated glucose may cause decreased leukocyte values   See urine microscopic for [facility] result/    WBC, UA 10-20 (A) None Seen, 0-5, 5-55, 5-65 /hpf    RBC, UA 2-4 (A) None Seen, 0-5 /hpf    Hyaline Casts, UA None Seen None Seen /lpf    Bacteria, UA Innumerable (A) None Seen, Occasional /hpf    Epithelial Cells None Seen None Seen, Occasional /hpf   Fingerstick Glucose (POCT)    Collection Time: 10/02/18  9:12 PM   Result Value Ref Range    POC Glucose 158 (H) 65 - 140 mg/dl   Fingerstick Glucose (POCT)    Collection Time: 10/03/18  7:06 AM   Result Value Ref Range    POC Glucose 141 (H) 65 - 140 mg/dl Radiology Results: I have personally reviewed pertinent reports  Other Diagnostic Testing:   I have personally reviewed pertinent reports          Active Meds:   Current Facility-Administered Medications   Medication Dose Route Frequency    acetaminophen (TYLENOL) tablet 650 mg  650 mg Oral Q6H PRN    atorvastatin (LIPITOR) tablet 40 mg  40 mg Oral After Dinner    clopidogrel (PLAVIX) tablet 150 mg  150 mg Oral Daily    docusate sodium (COLACE) capsule 100 mg  100 mg Oral BID    insulin lispro (HumaLOG) 100 units/mL subcutaneous injection 1-6 Units  1-6 Units Subcutaneous TID AC    senna (SENOKOT) tablet 8 6 mg  1 tablet Oral Daily         VTE Pharmacologic Prophylaxis: Plavix  VTE Mechanical Prophylaxis: sequential compression device    Claudette Conradi, MD

## 2018-10-03 NOTE — SOCIAL WORK
Met with pt to discuss her discharge plan and follow up on SNF rehab choices  She stated that she reviewed the list and would prefer a referral to [facility] and [facility]   [facility] referrals sent  Pt stated that her 1st choice is [facility]

## 2018-10-03 NOTE — OCCUPATIONAL THERAPY NOTE
Occupational Therapy Treatment Note     10/03/18 1248   Restrictions/Precautions   Weight Bearing Precautions Per Order No   Other Precautions Fall Risk   Lifestyle   Autonomy fully indpendent w self care, home managment, cooking, baking, shopping and driving   Reciprocal Relationships supportive son  works daytime   Intrinsic Gratification cook and bake, take care of herself, go to [employer]   Pain Assessment   Pain Assessment 0-10   Pain Score No Pain   ADL   Where Assessed Standing at sink   Grooming Assistance 5  Supervision/Setup   Toileting Assistance  5  Supervision/Setup   Toileting Deficit Increased time to complete   Light Housekeeping   Light Housekeeping Level Other (Comment)   Light Housekeeping Level of Assistance Moderate assistance   Light Housekeeping making bed    Transfers   Sit to Stand 5  Supervision   Additional items Assist x 1   Stand to Sit 5  Supervision   Toilet transfer 4  Minimal assistance   Functional Mobility   Functional Mobility 4  Minimal assistance   Additional items SPC   Cognition   Overall Cognitive Status WFL   Arousal/Participation Responsive   Attention Within functional limits   Orientation Level Oriented X4   Memory Unable to assess   Following Commands Follows one step commands without difficulty   Assessment   Assessment Pt participated in occupational therapy with focus on activity tolerance, functional transfers/mob, toileting/toilet transfers and light homemaking  Pt cleared by YANNI/Donna for pt participation in occupational  therapy  Pt received sittingedge of pt bed  Pt pleasant and agreeable to therapy following pt Identifiers confirmed  Pt reported her goal today to get stronger and return to home  Pt requires min A for toilet transfers 2* pt balance deficits  Pt reports does homemaking tasks at home however reports use of a chair  Pt required chair setup and seated rest breaks for pt homemaking tasks this session    Pt will benefit from in-pt rehab to continue to address pt noted deficits with balance and activity tolerance  which currently impair pt ADL and functional mob     Plan   Treatment Interventions ADL retraining   Goal Expiration Date 10/12/18   Treatment Day 1   OT Frequency 3-5x/wk   Recommendation   OT Discharge Recommendation Short Term Rehab   Barthel Index   Feeding 10   Bathing 0   Grooming Score 5   Dressing Score 5   Bladder Score 10   Bowels Score 10   Toilet Use Score 10   Transfers (Bed/Chair) Score 10   Mobility (Level Surface) Score 0   Stairs Score 0   Barthel Index Score 60   Modified [place] Scale   Modified [place] Scale 4       Tonny ROMANO/L

## 2018-10-04 ENCOUNTER — TRANSITIONAL CARE MANAGEMENT (OUTPATIENT)
Dept: INTERNAL MEDICINE CLINIC | Facility: CLINIC | Age: 78
End: 2018-10-04

## 2018-10-04 VITALS
TEMPERATURE: 98.8 F | BODY MASS INDEX: 30.56 KG/M2 | DIASTOLIC BLOOD PRESSURE: 61 MMHG | OXYGEN SATURATION: 95 % | SYSTOLIC BLOOD PRESSURE: 139 MMHG | WEIGHT: 179.01 LBS | HEIGHT: 64 IN | RESPIRATION RATE: 18 BRPM | HEART RATE: 232 BPM

## 2018-10-04 LAB
GLUCOSE SERPL-MCNC: 190 MG/DL (ref 65–140)
GLUCOSE SERPL-MCNC: 254 MG/DL (ref 65–140)

## 2018-10-04 PROCEDURE — 82948 REAGENT STRIP/BLOOD GLUCOSE: CPT

## 2018-10-04 PROCEDURE — 97116 GAIT TRAINING THERAPY: CPT

## 2018-10-04 PROCEDURE — TCMNV

## 2018-10-04 PROCEDURE — 97110 THERAPEUTIC EXERCISES: CPT

## 2018-10-04 PROCEDURE — 97530 THERAPEUTIC ACTIVITIES: CPT

## 2018-10-04 RX ORDER — CLOPIDOGREL BISULFATE 75 MG/1
150 TABLET ORAL DAILY
Qty: 60 TABLET | Refills: 0 | Status: SHIPPED | OUTPATIENT
Start: 2018-10-04 | End: 2018-11-12 | Stop reason: SDUPTHER

## 2018-10-04 RX ADMIN — DOCUSATE SODIUM 100 MG: 100 CAPSULE, LIQUID FILLED ORAL at 08:53

## 2018-10-04 RX ADMIN — SENNOSIDES 8.6 MG: 8.6 TABLET, FILM COATED ORAL at 08:53

## 2018-10-04 RX ADMIN — CLOPIDOGREL BISULFATE 150 MG: 75 TABLET ORAL at 08:53

## 2018-10-04 RX ADMIN — INSULIN LISPRO 3 UNITS: 100 INJECTION, SOLUTION INTRAVENOUS; SUBCUTANEOUS at 11:18

## 2018-10-04 RX ADMIN — INSULIN LISPRO 2 UNITS: 100 INJECTION, SOLUTION INTRAVENOUS; SUBCUTANEOUS at 06:33

## 2018-10-04 NOTE — SOCIAL WORK
Received phone call from Kelly Ahmadi , [facility] liaison, who stated that insurance [employer] was received and pt is accepted at [facility] and bed available for pt today  CM informed pt and her son Joao Goodwin of same  She stated that her son Joao Goodwin will provide her with transportation to the facility today at 2 pm   Informed pt's bedside YANNI Sanchez and Kelly Ahmadi , [facility] liaison, of same  Facility Agency transfer form completed and chart copy requested

## 2018-10-04 NOTE — NURSING NOTE
Report called to [facility] and all paperwork faxed to them, dressing intact over old IV site, son will be taking her to facility at 2 pm

## 2018-10-04 NOTE — PROGRESS NOTES
IM Residency Progress Note   Unit/Bed#: [facility] 712-01 Encounter: [id number]  SOD Team B       Davy Flank 66 y o  female [id number]    Hospital Stay Days: 2      Assessment/Plan:    Principal Problem:    Stroke-like symptoms  Active Problems: Aortic valve regurgitation, nonrheumatic    Benign essential hypertension    Bilateral edema of lower extremity    CVA (cerebrovascular accident) ([facility] )    Diabetes mellitus type 2, uncontrolled ([facility] )    Ambulatory dysfunction    Hyperlipidemia    Microcytic anemia    CKD stage 3 due to type 2 diabetes mellitus ([facility] )    1   Ataxia with right-sided preference:  Patient continues to have unsteady gait however CT head was negative and CTA head neck did not have any significant stenosis, occlusion, dissection   2D echo pending   Patient received aspirin 325 mg and will continue Plavix   NIHSS was 3 on admission and tPA was not administered as patient was outside the window  · MRI brain normal  · Echo normal  · Increased p o  Plavix 150 mg daily due to low P2Y12 level  · Continue p o  Lipitor 80 mg daily  · Permissive hypertension  · Hemoglobin A1c, lipid profile WNL  · Neuro cleared patient for discharge and concluded likely recrudescence of old deficits given normal MRI  · PT/OT recommended inpatient rehab and currently awaiting acceptance to facility  · Speech cleared  · Level 1 Carb Diet     2   Hypertension  · Hold amlodipine for permissive hypertension     3   Hyperlipidemia  · Continue Lipitor     4   Type 2 diabetes mellitus  · Insulin sliding scale and Accu-Cheks before meals    Disposition: Awaiting approval for [facility]       Subjective:   Patient seen and examined this morning  No acute events overnight  Denies F/C/S, N/V/D, CP, SOB, AP, dysuria  Wheezing, cough         Vitals: Temp (24hrs), Av 6 °F (37 °C), Min:97 7 °F (36 5 °C), Max:100 2 °F (37 9 °C)  Current: Temperature: 98 8 °F (37 1 °C)  Vitals:    10/03/18 1920 10/03/18 2212 10/04/18 0244 10/04/18 0817   BP: 136/68 158/73 149/78 139/61   BP Location: Left arm Left arm Left arm Right arm   Pulse: 70 70 62 61   Resp: 18 18 18 18   Temp: 97 7 °F (36 5 °C) 98 3 °F (36 8 °C) 100 2 °F (37 9 °C) 98 8 °F (37 1 °C)   TempSrc: Oral Oral Oral Oral   SpO2: 97% 96% 98% 95%   Weight:       Height:        Body mass index is 30 73 kg/m²  I/O last 24 hours: In: 360 [P O :360]  Out: 1400 [Urine:1400]      Physical Exam:  General Appearance:    Alert, cooperative, no distress, appears stated age  Lungs:     Clear to auscultation bilaterally, respirations unlabored  Heart:    Regular rate and rhythm, S1 and S2 normal, no murmur, rub   or gallop  Abdomen:     Soft, non-tender, bowel sounds active all four quadrants,     no masses, no organomegaly  Extremities:   Extremities normal, atraumatic, no cyanosis or edema  Pulses:   2+ and symmetric all extremities  Skin:   Skin color, texture, turgor normal, no rashes or lesions  Neurologic:   AAO x 3, continues to have unsteady gait    Invasive Devices     Peripheral Intravenous Line            Peripheral IV 10/02/18 Left Antecubital 1 day                Labs:   Recent Results (from the past 24 hour(s))   Fingerstick Glucose (POCT)    Collection Time: 10/03/18 11:11 AM   Result Value Ref Range    POC Glucose 251 (H) 65 - 140 mg/dl   Fingerstick Glucose (POCT)    Collection Time: 10/03/18  3:41 PM   Result Value Ref Range    POC Glucose 201 (H) 65 - 140 mg/dl   Fingerstick Glucose (POCT)    Collection Time: 10/04/18  6:30 AM   Result Value Ref Range    POC Glucose 190 (H) 65 - 140 mg/dl       Radiology Results: I have personally reviewed pertinent reports  Other Diagnostic Testing:   I have personally reviewed pertinent reports          Active Meds:   Current Facility-Administered Medications   Medication Dose Route Frequency    acetaminophen (TYLENOL) tablet 650 mg  650 mg Oral Q6H PRN    atorvastatin (LIPITOR) tablet 40 mg  40 mg Oral After Dinner    clopidogrel (PLAVIX) tablet 150 mg  150 mg Oral Daily    docusate sodium (COLACE) capsule 100 mg  100 mg Oral BID    insulin lispro (HumaLOG) 100 units/mL subcutaneous injection 1-6 Units  1-6 Units Subcutaneous TID AC    senna (SENOKOT) tablet 8 6 mg  1 tablet Oral Daily         VTE Pharmacologic Prophylaxis: Plavix  VTE Mechanical Prophylaxis: sequential compression device    Asif Mann MD

## 2018-10-04 NOTE — DISCHARGE INSTRUCTIONS
Ischemic Stroke   AMBULATORY CARE:   An ischemic stroke  occurs when blood flow to a part of your brain is blocked  The blockage is usually caused by a blood clot that gets stuck in a narrow blood vessel  When oxygen cannot get to an area of the brain, tissue in that area may get damaged  The damage can cause loss of body functions controlled by that area of the brain  Warning signs of a stroke: The word F A S T  can help you remember and recognize signs of a stroke:  · F = Face:  One side of the face droops  · A = Arms:  One arm starts to drop when both arms are raised  · S = Speech:  Speech is slurred or sounds different than usual     · T = Time:  A person who is having a stroke needs to be seen immediately  A stroke is a medical emergency that needs immediate treatment  Some medicines and treatments work best if given within a few hours of a stroke  Early treatment can decrease the risk of long-term effects of a stroke  ·        Common symptoms include the following:  Signs and symptoms may begin suddenly and worsen quickly  Any of the following may appear minutes or hours after a stroke, and worsen quickly:  · Severe headache    · Blurred or double vision, or vision loss    · Numbness, tingling, weakness, or paralysis on one side of your body    · Trouble walking or speaking    · Dizziness, confusion, or fainting  Call 911 for any of the following:   ·            · You have any of the following signs of a stroke:      ¨ Numbness or drooping on one side of your face     ¨ Weakness in an arm or leg    ¨ Confusion or difficulty speaking    ¨ Dizziness, a severe headache, or vision loss    · You have a seizure  · You feel lightheaded, short of breath, and have chest pain  · You cough up blood  · You have a severe headache, or loss of balance or coordination  Seek care immediately if:   · Your arm or leg feels warm, tender, and painful  It may look swollen and red      · You have double vision or vision loss  · You have unusual or heavy bleeding  Contact your healthcare provider or neurologist if:   · Your blood pressure is higher or lower than you were told it should be  · You have questions or concerns about your condition or care  Treatment:   · Thrombolytics  help break apart clots  · Antiplatelets , such as aspirin, help prevent blood clots  Take your antiplatelet medicine exactly as directed  These medicines make it more likely for you to bleed or bruise  If you are told to take aspirin, do not take acetaminophen or ibuprofen instead  · Blood thinners    help prevent blood clots  Examples of blood thinners include heparin and warfarin  Clots can cause strokes, heart attacks, and death  The following are general safety guidelines to follow while you are taking a blood thinner:    ¨ Watch for bleeding and bruising while you take blood thinners  Watch for bleeding from your gums or nose  Watch for blood in your urine and bowel movements  Use a soft washcloth on your skin, and a soft toothbrush to brush your teeth  This can keep your skin and gums from bleeding  If you shave, use an electric shaver  Do not play contact sports  ¨ Tell your dentist and other healthcare providers that you take anticoagulants  Wear a bracelet or necklace that says you take this medicine  ¨ Do not start or stop any medicines unless your healthcare provider tells you to  Many medicines cannot be used with blood thinners  ¨ Tell your healthcare provider right away if you forget to take the medicine, or if you take too much  ¨ Warfarin  is a blood thinner that you may need to take  The following are things you should be aware of if you take warfarin  § Foods and medicines can affect the amount of warfarin in your blood  Do not make major changes to your diet while you take warfarin  Warfarin works best when you eat about the same amount of vitamin K every day   Vitamin K is found in green leafy vegetables and certain other foods  Ask for more information about what to eat when you are taking warfarin  § You will need to see your healthcare provider for follow-up visits when you are on warfarin  You will need regular blood tests  These tests are used to decide how much medicine you need  · Other medicines  may be given to treat other health conditions such as high cholesterol, high blood pressure, or diabetes  · Surgery  may be needed to remove the clot  You may also need surgery to widen arteries or to place a filter into a blood vessel  This surgery helps to improve blood flow and prevent clots  Self-care:   · Go to rehabilitation (rehab) as directed  Rehab is an important part of treatment  A speech therapist helps you relearn or improve your ability to talk and swallow  You may start slowly and start doing more difficult tasks over time  Physical therapists can help you gain strength and build endurance  Occupational therapists teach you new ways to do daily activities, such as getting dressed  Therapy can help you improve your ability to walk or keep your balance  Your therapy may include tasks or movements you will need to do for everyday activities  An example is being able to raise or lower yourself from a chair  · Make your home safe  Remove anything you might trip over  Tape electrical cords down  Keep paths clear throughout your home  Make sure your home is well lit  Put nonslip materials on surfaces that might be slippery  An example is your bathtub or shower floor  · Use walking devices as directed  A cane or walker may help you keep your balance as you walk  What you need to know about depression:  Depression can happen after a stroke  Talk to your healthcare provider if you have depression that continues or is getting worse  Your provider may be able to help treat your depression  Your provider can also recommend support groups for you to join   A support group is a place to talk with others who have had a stroke  It may also help to talk to friends and family members about how you are feeling  Tell your family and friends that if they see these signs, to let your healthcare provider know  You may show any of the following signs of depression:  · Extreme sadness    · Avoiding social interaction with family or friends    · A lack of interest in things you once enjoyed    · Irritability    · Trouble sleeping    · Low energy levels    · A change in eating habits or sudden weight gain or loss  Decrease your risk for another stroke:   · Manage health conditions  Take your medicine as directed  Check your blood pressure and blood sugar levels as directed  Keep a record and bring it to your follow-up visits  · Eat a variety of healthy foods  Healthy foods include whole-grain breads, low-fat dairy products, beans, fish, and lean meats  Eat at least 5 servings of fruits and vegetables each day  Choose foods that are low in salt, unhealthy fats (saturated and trans fat), salt, and sugar  Eat foods that are high in potassium, such as potatoes and bananas  Ask your dietitian what other nutrition guidelines you should follow  He or she can help you choose foods that are right for you  · Maintain a healthy weight  Ask your healthcare provider how much you should weigh  Ask him or her to help you create a weight loss plan if you are overweight  Ask about the best exercise plan for you  · Do not drink alcohol  Alcohol can increase your risk for a stroke  Alcohol may also increase your blood pressure or thin your blood  Blood thinning can increase your risk for hemorrhagic stroke  · Do not smoke cigarettes or use illegal drugs  Smoking and drugs increase your risk for a stroke  Nicotine and other chemicals in cigarettes and cigars can also cause lung damage  Ask your healthcare provider for information if you currently smoke or use drugs and need help to quit  E-cigarettes or smokeless tobacco still contain nicotine  Talk to your healthcare provider before you use these products  Follow up with your healthcare provider or neurologist as directed: You may need to come in for regular tests of your brain function  You may also need regular blood tests  Write down your questions so you remember to ask them during your visits  © 2017 [address] [employer] Information is for End User's use only and may not be sold, redistributed or otherwise used for commercial purposes  All illustrations and images included in CareNotes® are the copyrighted property of [employer] , [employer]  or Joe Mercado  The above information is an  only  It is not intended as medical advice for individual conditions or treatments  Talk to your doctor, nurse or pharmacist before following any medical regimen to see if it is safe and effective for you

## 2018-10-04 NOTE — PLAN OF CARE
Problem: PHYSICAL THERAPY ADULT  Goal: Performs mobility at highest level of function for planned discharge setting  See evaluation for individualized goals  Treatment/Interventions: Functional transfer training, LE strengthening/ROM, Therapeutic exercise, Endurance training, Patient/family training, Equipment eval/education, Bed mobility, Gait training          See flowsheet documentation for full assessment, interventions and recommendations  Outcome: Progressing  Prognosis: Fair  Problem List: Decreased strength, Decreased endurance, Impaired balance, Decreased mobility, Decreased coordination, Impaired judgement, Decreased safety awareness  Assessment: Pt is making steady progress with functional mobility  Min assist with use of a straight cane with a R lateral loss of balance  Initially resistive to using a rolling walker however with encouragement agreed to use  Overall improvement noted with use of a rolling walker as pt did not experience a loss of balance  Pt also completed 7 steps with contact guard assist   Unsteady while completing stairs however without loss of balance  Completed seated exercises to conclude session  Pt would benefit from continued physical therapy to maximize functional mobility and safety  Recommendation:  (Rehab)     PT - OK to Discharge: (S) Yes (to rehab when stable)    See flowsheet documentation for full assessment

## 2018-10-04 NOTE — PLAN OF CARE
Activity Intolerance/Impaired Mobility     Mobility/activity is maintained at optimum level for patient Adequate for Discharge        Communication Impairment     Ability to express needs and understand communication Adequate for Discharge        DISCHARGE PLANNING     Discharge to home or other facility with appropriate resources Adequate for Discharge        DISCHARGE PLANNING - CARE MANAGEMENT     Discharge to post-acute care or home with appropriate resources Adequate for Discharge        INFECTION - ADULT     Absence or prevention of progression during hospitalization Adequate for Discharge        Knowledge Deficit     Patient/family/caregiver demonstrates understanding of disease process, treatment plan, medications, and discharge instructions Adequate for Discharge        Neurological Deficit     Neurological status is stable or improving Adequate for Discharge        Nutrition     Nutrition/Hydration status is improving Adequate for Discharge        Nutrition/Hydration-ADULT     Nutrient/Hydration intake appropriate for improving, restoring or maintaining nutritional needs Adequate for Discharge        PAIN - ADULT     Verbalizes/displays adequate comfort level or baseline comfort level Adequate for Discharge        Potential for Aspiration     Non-ventilated patient's risk of aspiration is minimized Adequate for Discharge        Potential for Falls     Patient will remain free of falls Adequate for Discharge        SAFETY ADULT     Maintain or return to baseline ADL function Adequate for Discharge     Maintain or return mobility status to optimal level Adequate for Discharge

## 2018-10-04 NOTE — PLAN OF CARE
Activity Intolerance/Impaired Mobility     Mobility/activity is maintained at optimum level for patient Progressing        Communication Impairment     Ability to express needs and understand communication [place]     Discharge to home or other facility with appropriate resources Progressing        DISCHARGE PLANNING - CARE MANAGEMENT     Discharge to post-acute care or home with appropriate resources Progressing        INFECTION - ADULT     Absence or prevention of progression during hospitalization Progressing        Knowledge Deficit     Patient/family/caregiver demonstrates understanding of disease process, treatment plan, medications, and discharge instructions Progressing        Neurological Deficit     Neurological status is stable or improving Progressing        Nutrition     Nutrition/Hydration status is improving Progressing        Nutrition/Hydration-ADULT     Nutrient/Hydration intake appropriate for improving, restoring or maintaining nutritional needs Progressing        PAIN - ADULT     Verbalizes/displays adequate comfort level or baseline comfort level Progressing        Potential for Aspiration     Non-ventilated patient's risk of aspiration is minimized Progressing        Potential for Falls     Patient will remain free of falls Progressing        SAFETY ADULT     Maintain or return to baseline ADL function Progressing     Maintain or return mobility status to optimal level Progressing

## 2018-10-04 NOTE — RESTORATIVE TECHNICIAN NOTE
Restorative Specialist Mobility Note       Activity: Ambulate in meyer, Ambulate in room, Bathroom privileges, Dangle, Stand at bedside (Educated/encouraged pt to ambulate with assistance 3-4 x's/day   Pt callbell, phone/tray within reach )     Assistive Device: Other (Comment) (HHA x1)    Orquidea OSMAN, Restorative Technician,

## 2018-10-04 NOTE — PHYSICAL THERAPY NOTE
Physical Therapy Progress Note     10/04/18 1132   Pain Assessment   Pain Assessment No/denies pain   Pain Score No Pain   Restrictions/Precautions   Weight Bearing Precautions Per Order No   Other Precautions Chair Alarm; Fall Risk   General   Chart Reviewed Yes   Family/Caregiver Present No   Cognition   Overall Cognitive Status WFL   Arousal/Participation Alert; Responsive   Subjective   Subjective Pt denies pain and agrees to participate  Transfers   Sit to Stand 5  Supervision   Additional items Assist x 1   Stand to Sit 5  Supervision   Additional items Assist x 1   Ambulation/Elevation   Gait pattern Narrow KD;Scissoring   Gait Assistance (Contact Guard assist)   Additional items Assist x 1;Verbal cues   Assistive Device Rolling walker   Distance 145 feet   Stair Management Assistance (Contact Guard Assist)   Additional items Assist x 1;Verbal cues   Stair Management Technique One rail R;Alternating pattern   Number of Stairs 7   Balance   Static Sitting Normal   Dynamic Sitting Good   Static Standing Fair -   Dynamic Standing Poor +   Ambulatory Poor +   Endurance Deficit   Endurance Deficit Yes   Endurance Deficit Description fatigue and weakness   Activity Tolerance   Activity Tolerance Patient limited by fatigue   Nurse [facility] to see per YANNI Cabrera   Exercises   Glute Sets Sitting;15 reps   Hip Flexion Sitting;15 reps;AROM; Bilateral   Knee AROM Long Arc Quad Sitting;15 reps;AROM; Bilateral   Ankle Pumps Sitting;15 reps;AROM; Bilateral   Assessment   Prognosis Fair   Problem List Decreased strength;Decreased endurance; Impaired balance;Decreased mobility; Decreased coordination; Impaired judgement;Decreased safety awareness   Assessment Pt is making steady progress with functional mobility  Min assist with use of a straight cane with a R lateral loss of balance  Initially resistive to using a rolling walker however with encouragement agreed to use    Overall improvement noted with use of a rolling walker as pt did not experience a loss of balance  Pt also completed 7 steps with contact guard assist   Unsteady while completing stairs however without loss of balance  Completed seated exercises to conclude session  Pt would benefit from continued physical therapy to maximize functional mobility and safety  Goals   Patient Goals To go home   STG Expiration Date 10/16/18   Treatment Day 1   Plan   Treatment/Interventions Functional transfer training;LE strengthening/ROM; Elevations; Therapeutic exercise; Endurance training;Patient/family training;Bed mobility;Gait training;Spoke to nursing   Progress Progressing toward goals   PT Frequency 5x/wk   Recommendation   Recommendation (Rehab)   Equipment Recommended [address]  (ELIJAH)     Kayleigh Apodaca, PTA

## 2018-10-05 ENCOUNTER — TELEPHONE (OUTPATIENT)
Dept: NEUROLOGY | Facility: CLINIC | Age: 78
End: 2018-10-05

## 2018-10-05 NOTE — TELEPHONE ENCOUNTER
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

## 2018-10-08 DIAGNOSIS — IMO0002 UNCONTROLLED TYPE 2 DIABETES MELLITUS WITH DIABETIC NEPHROPATHY: Primary | ICD-10-CM

## 2018-10-08 DIAGNOSIS — J30.1 NON-SEASONAL ALLERGIC RHINITIS DUE TO POLLEN: Primary | ICD-10-CM

## 2018-10-08 RX ORDER — MONTELUKAST SODIUM 10 MG/1
10 TABLET ORAL
Qty: 90 TABLET | Refills: 0 | Status: SHIPPED | OUTPATIENT
Start: 2018-10-08 | End: 2019-04-01 | Stop reason: SDUPTHER

## 2018-10-08 RX ORDER — LOSARTAN POTASSIUM 25 MG/1
TABLET ORAL
COMMUNITY
Start: 2018-07-18 | End: 2018-10-08 | Stop reason: ALTCHOICE

## 2018-10-08 RX ORDER — GLIPIZIDE 10 MG/1
10 TABLET ORAL
Qty: 180 TABLET | Refills: 0 | Status: SHIPPED | OUTPATIENT
Start: 2018-10-08 | End: 2018-11-27 | Stop reason: ALTCHOICE

## 2018-10-08 RX ORDER — MONTELUKAST SODIUM 10 MG/1
TABLET ORAL
COMMUNITY
Start: 2018-07-17 | End: 2018-10-08 | Stop reason: SDUPTHER

## 2018-10-10 ENCOUNTER — PROCEDURE VISIT (OUTPATIENT)
Dept: UROLOGY | Facility: CLINIC | Age: 78
End: 2018-10-10
Payer: COMMERCIAL

## 2018-10-10 DIAGNOSIS — R39.15 URGENCY OF MICTURITION: ICD-10-CM

## 2018-10-10 DIAGNOSIS — N39.3 STRESS INCONTINENCE OF URINE: Primary | ICD-10-CM

## 2018-10-10 DIAGNOSIS — N81.6 RECTOCELE: ICD-10-CM

## 2018-10-10 DIAGNOSIS — N81.10 CYSTOCELE WITHOUT UTERINE PROLAPSE: ICD-10-CM

## 2018-10-10 LAB
SL AMB  POCT GLUCOSE, UA: NEGATIVE
SL AMB LEUKOCYTE ESTERASE,UA: NEGATIVE
SL AMB POCT BILIRUBIN,UA: NEGATIVE
SL AMB POCT BLOOD,UA: ABNORMAL
SL AMB POCT CLARITY,UA: ABNORMAL
SL AMB POCT COLOR,UA: YELLOW
SL AMB POCT KETONES,UA: NEGATIVE
SL AMB POCT NITRITE,UA: ABNORMAL
SL AMB POCT PH,UA: 5
SL AMB POCT SPECIFIC GRAVITY,UA: 1.01
SL AMB POCT URINE PROTEIN: NEGATIVE
SL AMB POCT UROBILINOGEN: NEGATIVE

## 2018-10-10 PROCEDURE — 87086 URINE CULTURE/COLONY COUNT: CPT | Performed by: PHYSICIAN ASSISTANT

## 2018-10-10 PROCEDURE — 51728 CYSTOMETROGRAM W/VP: CPT

## 2018-10-10 PROCEDURE — 81002 URINALYSIS NONAUTO W/O SCOPE: CPT

## 2018-10-10 PROCEDURE — 51797 INTRAABDOMINAL PRESSURE TEST: CPT

## 2018-10-10 PROCEDURE — 51784 ANAL/URINARY MUSCLE STUDY: CPT

## 2018-10-10 PROCEDURE — 87077 CULTURE AEROBIC IDENTIFY: CPT | Performed by: PHYSICIAN ASSISTANT

## 2018-10-10 PROCEDURE — 87186 SC STD MICRODIL/AGAR DIL: CPT | Performed by: PHYSICIAN ASSISTANT

## 2018-10-10 RX ORDER — SENNOSIDES 8.6 MG
650 CAPSULE ORAL EVERY 6 HOURS PRN
COMMUNITY

## 2018-10-10 RX ORDER — TRAMADOL HYDROCHLORIDE 50 MG/1
50 TABLET ORAL EVERY 8 HOURS PRN
COMMUNITY
End: 2019-04-01 | Stop reason: ALTCHOICE

## 2018-10-10 NOTE — PROGRESS NOTES
CC: "Urgency to urinate and sometimes dribble on the way"      Patient unable to void pre-study, 90cc in bladder    Cystocele prolapsing around pessary, pessary removed prior to testing, cleaned and reinserted post test     CMG:       Position:  sitting           First urge:          82 ml,     pdet   0         Normal urge:    100 ml,     pdet  0          Must urge:         Never reached            Capacity:           500 ml,     pdet     5         Max pdet during void    41 cm H2O       Voided volume:    439 ml         Bladder stability:   stable           Compliance:  normal         Sphincter function:   100cc/200cc/300cc no leakage provoked with and without reduction of prolapse   400cc:  + leak at 115 cm of H2O   400cc   + leak at 162 cm of H2O with reduction   500cc   + leak at 147 cm of H2O   500cc   + leak at 133 cm of H2O with reduction    EMG activity:       Normal during filling,        normal during voiding    Comments:  Discussed dipstick results with Bijal Fabian prior to testing, patient asymptomatic and ok to proceed with testing  Urine culture sent  Cipro 500mg x1 given in office post test Test results scanned into media       Findings:  Cystocele, rectocele, + MATTHEW, sensory urgency with normal urge, large capacity (never had must urge)

## 2018-10-12 DIAGNOSIS — N39.0 URINARY TRACT INFECTION WITHOUT HEMATURIA, SITE UNSPECIFIED: Primary | ICD-10-CM

## 2018-10-12 LAB — BACTERIA UR CULT: ABNORMAL

## 2018-10-12 RX ORDER — CIPROFLOXACIN 500 MG/1
500 TABLET, FILM COATED ORAL EVERY 12 HOURS SCHEDULED
Qty: 14 TABLET | Refills: 0 | Status: SHIPPED | OUTPATIENT
Start: 2018-10-12 | End: 2018-10-19

## 2018-10-18 DIAGNOSIS — E11.8 TYPE 2 DIABETES MELLITUS WITH COMPLICATION, WITHOUT LONG-TERM CURRENT USE OF INSULIN (HCC): ICD-10-CM

## 2018-10-18 RX ORDER — BLOOD-GLUCOSE METER
KIT MISCELLANEOUS
Qty: 100 EACH | Refills: 3 | Status: SHIPPED | OUTPATIENT
Start: 2018-10-18 | End: 2021-03-01

## 2018-10-18 NOTE — TELEPHONE ENCOUNTER
Pt is testing 3x daily per hospital discharge instruction  Only has enough left for today       Please send ASAP

## 2018-10-19 ENCOUNTER — TELEPHONE (OUTPATIENT)
Dept: INTERNAL MEDICINE CLINIC | Facility: CLINIC | Age: 78
End: 2018-10-19

## 2018-10-19 DIAGNOSIS — E13.319 RETINOPATHY DUE TO SECONDARY DIABETES (HCC): Primary | ICD-10-CM

## 2018-10-19 NOTE — TELEPHONE ENCOUNTER
JACK FROM [facility] IS CALLING STATING SHE IS OUT WITH PT  AND REVIEWING HER MED LIST  PT  HAS [facility], [id number] SHE IS NOT SURE IF SHE IS SUPPOSED TO BE TAKING THEM  THEY ARE NOT ON HER DISCHARGE LIST FROM HER RECENT HOSPITAL STAY BUT SHE WAS THEN TRANSFERRED TO REHAB   CAN YOU ADVISE IF SHE SHOULD BE ON THESE

## 2018-10-22 ENCOUNTER — PATIENT OUTREACH (OUTPATIENT)
Dept: INTERNAL MEDICINE CLINIC | Facility: CLINIC | Age: 78
End: 2018-10-22

## 2018-10-22 ENCOUNTER — TELEPHONE (OUTPATIENT)
Dept: INTERNAL MEDICINE CLINIC | Facility: CLINIC | Age: 78
End: 2018-10-22

## 2018-10-22 NOTE — PROGRESS NOTES
Outpatient Care Management Note:    Pt was referred to outpatient nurse care management by inpatient case management  Pt was in [facility] from 10/1 - 10/4/18 for stroke like symptoms and disharged to [facility] for rehab  Pt is now home with [facility]  I called pt at [phone] to f/u with her  No answer, message left that this is [facility] an outpatient nurse care manager with [facility] and I work closely with her PCP office where she sees Paola Hester  I stated I was calling her since she is home now to see if she has any questions, concerns, or needs at this time  I reminded her of her PCP appt on Thursday 10/25 @ 11 am  I requested a call back at [phone]

## 2018-10-24 RX ORDER — GLIPIZIDE 10 MG/1
TABLET, FILM COATED, EXTENDED RELEASE ORAL
COMMUNITY
Start: 2018-10-13 | End: 2018-11-27 | Stop reason: ALTCHOICE

## 2018-10-24 NOTE — TELEPHONE ENCOUNTER
SPOKE TO JACK, SHE IS GOING TO SEE PT  ON Friday 10/26/18 AND SHE WILL DISCUSS WITH HER IF SHE SEES ANY OTHER PROVIDERS OUTSIDE OF [facility]  ADVISED ALSO TO HAVE PT   MAKE APPT

## 2018-10-25 ENCOUNTER — OFFICE VISIT (OUTPATIENT)
Dept: INTERNAL MEDICINE CLINIC | Facility: CLINIC | Age: 78
End: 2018-10-25
Payer: COMMERCIAL

## 2018-10-25 ENCOUNTER — PATIENT OUTREACH (OUTPATIENT)
Dept: INTERNAL MEDICINE CLINIC | Facility: CLINIC | Age: 78
End: 2018-10-25

## 2018-10-25 VITALS
WEIGHT: 179.01 LBS | DIASTOLIC BLOOD PRESSURE: 80 MMHG | TEMPERATURE: 97.8 F | HEART RATE: 72 BPM | BODY MASS INDEX: 30.56 KG/M2 | SYSTOLIC BLOOD PRESSURE: 128 MMHG | HEIGHT: 64 IN

## 2018-10-25 DIAGNOSIS — E78.2 MIXED HYPERLIPIDEMIA: ICD-10-CM

## 2018-10-25 DIAGNOSIS — I10 BENIGN ESSENTIAL HYPERTENSION: ICD-10-CM

## 2018-10-25 DIAGNOSIS — E11.65 UNCONTROLLED TYPE 2 DIABETES MELLITUS WITH HYPERGLYCEMIA (HCC): Primary | ICD-10-CM

## 2018-10-25 DIAGNOSIS — R29.90 STROKE-LIKE SYMPTOMS: ICD-10-CM

## 2018-10-25 DIAGNOSIS — E11.22 CKD STAGE 3 DUE TO TYPE 2 DIABETES MELLITUS (HCC): ICD-10-CM

## 2018-10-25 DIAGNOSIS — N18.30 CKD STAGE 3 DUE TO TYPE 2 DIABETES MELLITUS (HCC): ICD-10-CM

## 2018-10-25 PROCEDURE — 99214 OFFICE O/P EST MOD 30 MIN: CPT | Performed by: PHYSICIAN ASSISTANT

## 2018-10-25 NOTE — PATIENT INSTRUCTIONS
No change to medications today  You are aware that if after PT and nursing has completed in your home to call us if have additional needs  Our Nurse clinical Manager will contact you again to make sure you have all the services you need  Keep follow up appointments as scheduled with Neurology and Endocrinology      As discussed I will review the medical records from Dr Andra Jordan  And we will call you if there are any additional questions or information we need to provide

## 2018-10-25 NOTE — PROGRESS NOTES
Assessment/Plan:    No problem-specific Assessment & Plan notes found for this encounter  Diagnoses and all orders for this visit:    Uncontrolled type 2 diabetes mellitus with hyperglycemia ([address] )    Benign essential hypertension    CKD stage 3 due to type 2 diabetes mellitus (HCC)    Mixed hyperlipidemia    Stroke-like symptoms          Subjective:      Patient ID: Cali King is a 66 y o  female  Pt here for TCM   Was IP and then transferred to rehab on 10/12  Has been receiving PT and nursing visits in the home which will end next week  Feels some improvement with strength    Seeing Dr Kateryna Lawler who was referred to her by the cardiologist  Unsure what type of Dr she is but states that is who gave her all the new DM meds  States is still taking the glipizide and the metformin as well    States does not need any refills today      Patient states overall she is feeling well  She reports that the episode that sent her to the hospital as that she was sitting at her kitchen table having a conversation on the phone  She reports that when she stood up to hang up the phone is when she became lightheaded  Patient reports that she became off balance even more so secondary to the lightheadedness leaning to her right side and falling  Patient denies any loss of consciousness  Patient denies hitting her head  Patient states she was asked in the hospital if she had vertigo  Patient states she did not know what that was but did not think so however after returning home she did look it up and that she had some lightheadedness but denies that the room was spinning  Patient reports since discharge from the hospital as well as from the rehab on October 12th she has not had any additional episodes of lightheadedness        Discussed with patient what medications she is taking regarding there was some discrepancy in the medication list upon discharge from the hospital and what medications patient had in her home when the visiting nurses were there  This is when additional appointments for Dr Vanessa Thomason was identified  record review shows that consultations from May, August and most recently on October 22nd for a TCM office visit were scanned into patient's electronic medical record  Review of these records shows that patient has been going to this doctor not only for her CKD but also as a neither primary care  Discussed concern with multiple providers following this patient for her safety with attending and agreement was made that we will make a phone call to this provider's office as well as the patient to advise her that for her safety in consistency she should be following with 1 primary care location only  It is note dated on Dr Vanessa Thomason most recent hospital follow-up note from October 22nd that she to noted some discrepancies in medications  Patient was discharged on the hospital with amlodipine 5 mg  This was decreased from the previous 10 mg dose to prevent blood pressure from going too low as well as  reduce the bilateral edema  Patient was discharged with an increase of her Plavix from  mg daily due to low P2Y12 level  It is noted on October 22nd follow-up note with Dr Vanessa Thomason  This was decreased back to the 75 mg dose  As discussed with the attending I am not going to make any additional changes to patient's medication until we are able to get in touch with Dr Roger Marrufo office and clarify with patient which primary care she wishes to follow with  Patient also reports that she was started on Uloric however denies any history of gout  Other consult note reports patient was placed on this medication for hyperuricemia            The following portions of the patient's history were reviewed and updated as appropriate: allergies, current medications, past family history, past medical history, past social history, past surgical history and problem list     Review of Systems Constitutional: Negative for chills, fatigue and fever  HENT: Negative  Respiratory: Negative  Negative for cough, chest tightness and shortness of breath  Cardiovascular: Positive for leg swelling  Negative for chest pain and palpitations  Gastrointestinal: Negative for abdominal pain, constipation and diarrhea  Endocrine: Negative for polydipsia, polyphagia and polyuria  Genitourinary: Negative for frequency and urgency  Musculoskeletal: Positive for back pain and gait problem  Negative for myalgias and neck pain  Skin: Negative for rash  Neurological: Negative for dizziness, tremors, syncope, facial asymmetry, speech difficulty, light-headedness (resolved) and headaches  Psychiatric/Behavioral: Negative  Objective:      /80 (BP Location: Left arm, Patient Position: Sitting, Cuff Size: Standard)   Pulse 72   Temp 97 8 °F (36 6 °C) (Oral)   Ht 5' 4" (1 626 m)   Wt 81 2 kg (179 lb 0 2 oz)   BMI 30 73 kg/m²          Physical Exam   Constitutional: She is oriented to person, place, and time  She appears well-developed and well-nourished  HENT:   Head: Normocephalic and atraumatic  Eyes: Pupils are equal, round, and reactive to light  Neck: Normal range of motion  Neck supple  No JVD present  Cardiovascular: Normal rate, regular rhythm and normal heart sounds  Pulmonary/Chest: Effort normal and breath sounds normal  She has no wheezes  Musculoskeletal: She exhibits edema  Patient has bilateral 1+ pitting edema  Have had discussions in the past with the patient about discontinuing the amlodipine as this is likely contributing to her swelling  Patient has declined to discontinue this medication in the past   Patient reports she does have compression stockings but not wearing them on today's visit  Neurological: She is alert and oriented to person, place, and time  Patient continues to walk with assistance of cane    It is noted on inspection of ambulation when patient 1st gets up from sitting patient slight imbalance to R  It is noted that patient was able to self correct very quickly without stumbling  Pt's previous CVA noted with L sided symptoms in 2010  Bilateral lower extremity strength 5/5 equally  Patient able to [facility] and plantar flex  Negative pronator drift   as above with change in position and change in direction with walking patient will lean to the right  Appears more likely this is because patient has dependence of walking and leaning on the cane to her right side  Skin: Skin is warm and dry  Psychiatric: She has a normal mood and affect  Her behavior is normal    Nursing note and vitals reviewed

## 2018-10-25 NOTE — PROGRESS NOTES
Outpatient Care Management Note:    Pt was referred to outpatient nurse care management by inpatient case management  Pt was in [facility] from 10/1 - 10/4/18 for stroke like symptoms and disharged to [facility] for rehab  Pt  is now home with [facility]  I met with pt at her appt today with PCP JOSE DANIEL Martinez  Pt reports she did receive my calls but did not return them  I explained my role to pt, I gave her my card with contact info, and she reports she does not need further outreach but is agreeable to one more call after visiting nurses end  Pt reports ALYSSA will see her next week and then will stop services  Pt reports she is feeling well, managing well at home and does not have any questions, concerns, or complaints for me  Pt lives in a 2 story home with her son  Pt reports she does have other family in the area for support if needed  Pt reports she is independent with all ADL's  Pt is using a single point cane at this time  Pt reports she has a commode, shower chair, rollator, standard walker, and 4 point cane at home  Pt reports she did drive today to her appt and plans on going to [facility] on Sunday  Pt reports she manages her own medication and knows the names of her meds and what they are for  Pt is requesting a refill on her Vit D and provider made aware  Pt reports she is able to get to pharmacy to get medication but pharmacy offers delivery if needed  Pt has completed Diabetes education classes earlier this year  Pt reports she is able to cook for herself and is choosing healthier options  Pt has my # and PCP office # if needed  Pt is aware of her upcoming appt's Neurology and Endo clinic

## 2018-10-26 ENCOUNTER — TELEPHONE (OUTPATIENT)
Dept: INTERNAL MEDICINE CLINIC | Facility: CLINIC | Age: 78
End: 2018-10-26

## 2018-10-26 NOTE — TELEPHONE ENCOUNTER
ROGERS ASKED ME TO REACH OUT TO THE PT  REGARDING A RECENT VISIT SHE HAD WITH ANOTHER PCP  SHE RECENTLY HAD A VISIT THERE AND THEN ANOTHER HERE  I ASKED THE PT  AND SHE SAID SHE DOES NOT WANT TO SEE THAT OTHER PROVIDER AS PCP (DR IRENA WALLACE, [facility]) PT  STATED SHE WAS UNDER THE IMPRESSION SHE WAS ONLY SEEING HER FOR [address] [facility]  ROGERS  ASKED THAT I HAVE PT  STOP HER GLIPIZIDE AND FOR NOW CONTINUE THE PLAVIX AT 76 MG AND WILL DISCUSS POSSIBLE DOSE CHANGE AT NEXT VISIT IN NOV   I CALLED AND MADE PT  AWARE OF THIS

## 2018-11-09 ENCOUNTER — TRANSCRIBE ORDERS (OUTPATIENT)
Dept: LAB | Facility: HOSPITAL | Age: 78
End: 2018-11-09

## 2018-11-09 ENCOUNTER — APPOINTMENT (OUTPATIENT)
Dept: LAB | Facility: HOSPITAL | Age: 78
End: 2018-11-09
Payer: COMMERCIAL

## 2018-11-09 DIAGNOSIS — N18.9 CHRONIC KIDNEY DISEASE, UNSPECIFIED CKD STAGE: Primary | ICD-10-CM

## 2018-11-09 DIAGNOSIS — N18.9 CHRONIC KIDNEY DISEASE, UNSPECIFIED CKD STAGE: ICD-10-CM

## 2018-11-09 DIAGNOSIS — E66.9 OBESITY, UNSPECIFIED CLASSIFICATION, UNSPECIFIED OBESITY TYPE, UNSPECIFIED WHETHER SERIOUS COMORBIDITY PRESENT: ICD-10-CM

## 2018-11-09 DIAGNOSIS — E11.641 UNCONTROLLED TYPE 2 DIABETES MELLITUS WITH HYPOGLYCEMIA AND COMA (HCC): ICD-10-CM

## 2018-11-09 LAB
ALBUMIN SERPL BCP-MCNC: 3.8 G/DL (ref 3.5–5)
ALP SERPL-CCNC: 93 U/L (ref 46–116)
ALT SERPL W P-5'-P-CCNC: 16 U/L (ref 12–78)
ANION GAP SERPL CALCULATED.3IONS-SCNC: 7 MMOL/L (ref 4–13)
AST SERPL W P-5'-P-CCNC: 8 U/L (ref 5–45)
BILIRUB SERPL-MCNC: 0.64 MG/DL (ref 0.2–1)
BUN SERPL-MCNC: 21 MG/DL (ref 5–25)
CALCIUM SERPL-MCNC: 9.9 MG/DL (ref 8.3–10.1)
CHLORIDE SERPL-SCNC: 104 MMOL/L (ref 100–108)
CO2 SERPL-SCNC: 27 MMOL/L (ref 21–32)
CREAT SERPL-MCNC: 1.07 MG/DL (ref 0.6–1.3)
EST. AVERAGE GLUCOSE BLD GHB EST-MCNC: 180 MG/DL
GFR SERPL CREATININE-BSD FRML MDRD: 57 ML/MIN/1.73SQ M
GLUCOSE P FAST SERPL-MCNC: 127 MG/DL (ref 65–99)
HBA1C MFR BLD: 7.9 % (ref 4.2–6.3)
POTASSIUM SERPL-SCNC: 4.1 MMOL/L (ref 3.5–5.3)
PROT SERPL-MCNC: 8 G/DL (ref 6.4–8.2)
SODIUM SERPL-SCNC: 138 MMOL/L (ref 136–145)
URATE SERPL-MCNC: 5.2 MG/DL (ref 2–6.8)

## 2018-11-09 PROCEDURE — 84550 ASSAY OF BLOOD/URIC ACID: CPT

## 2018-11-09 PROCEDURE — 80053 COMPREHEN METABOLIC PANEL: CPT

## 2018-11-09 PROCEDURE — 83036 HEMOGLOBIN GLYCOSYLATED A1C: CPT

## 2018-11-09 PROCEDURE — 36415 COLL VENOUS BLD VENIPUNCTURE: CPT

## 2018-11-12 ENCOUNTER — OFFICE VISIT (OUTPATIENT)
Dept: NEUROLOGY | Facility: CLINIC | Age: 78
End: 2018-11-12
Payer: COMMERCIAL

## 2018-11-12 VITALS
WEIGHT: 172.8 LBS | BODY MASS INDEX: 29.5 KG/M2 | SYSTOLIC BLOOD PRESSURE: 102 MMHG | HEIGHT: 64 IN | RESPIRATION RATE: 12 BRPM | HEART RATE: 76 BPM | DIASTOLIC BLOOD PRESSURE: 64 MMHG

## 2018-11-12 DIAGNOSIS — I63.50 CEREBROVASCULAR ACCIDENT (CVA) OF PONTINE STRUCTURE (HCC): ICD-10-CM

## 2018-11-12 DIAGNOSIS — I34.0 NON-RHEUMATIC MITRAL REGURGITATION: ICD-10-CM

## 2018-11-12 DIAGNOSIS — E78.2 MIXED HYPERLIPIDEMIA: ICD-10-CM

## 2018-11-12 DIAGNOSIS — I10 BENIGN ESSENTIAL HYPERTENSION: ICD-10-CM

## 2018-11-12 DIAGNOSIS — I69.393 ATAXIA DUE TO OLD CEREBROVASCULAR ACCIDENT: ICD-10-CM

## 2018-11-12 PROCEDURE — 99215 OFFICE O/P EST HI 40 MIN: CPT | Performed by: NURSE PRACTITIONER

## 2018-11-12 RX ORDER — CLOPIDOGREL BISULFATE 75 MG/1
150 TABLET ORAL DAILY
Qty: 180 TABLET | Refills: 2 | Status: SHIPPED | OUTPATIENT
Start: 2018-11-12 | End: 2019-12-20 | Stop reason: SDUPTHER

## 2018-11-12 NOTE — PATIENT INSTRUCTIONS
Increase Plavix to 75 2 tabs daily   continue with Lipitor   HgA1c goal <7 0, good blood sugar control   following with PCP   Will refer to cardiology          and

## 2018-11-12 NOTE — PROGRESS NOTES
Patient ID: Aly Alcocer is a 66 y o  female  Assessment/Plan:  1)  B/l ataxia with L sided sensory deficit - likely acute on chronic worsening of baseline ataxia 2/2 pontine CVA in 2010              -MRI B without acute intracranial abnormality               -CTH without acute intracranial abonormality              -CTA head and neck without hemodynamically significant stenosis, occlusion, or dissection              -ECHO demonstrates mild left atrial dilation, atrial septal aneurysm  No wall motion abnormality noted  - will refer to cardiology                -Lipitor to 80 mg p o  Daily              -HbA1c 7 9, Lipid profile WNL with exception of HLD 37              -Recommend maximization of secondary stroke prevention               -P2Y12 214, indicating insufficient platelet inhibition                           -Will increase Plavix to 150mg PO QD              -HbA1c, Lipid profile normal                  No problem-specific Assessment & Plan notes found for this encounter  Diagnoses and all orders for this visit:    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    Cerebrovascular accident (CVA) of pontine structure ([address] )  -     clopidogrel (PLAVIX) 75 mg tablet; Take 2 tablets (150 mg total) by mouth daily    Benign essential hypertension  -     Ambulatory referral to Cardiology; Future    Mixed hyperlipidemia    Non-rheumatic mitral regurgitation  -     Ambulatory referral to Cardiology; Future           Subjective:    HPI   Aly Alcocer is a 66 y o   female with a  PMH of L pontine CVA in 2010, on plavix, HTN, DM2,  who presented to the hospital 10/1 with ataxia and falling to the right since 7pm yesterday evening  Of note, the pt was evaluated by Dr Holly Ferraro of Neurology as an outpatient in November, 2017 and at that time noted residual balance difficulties and L sided weakness since prior stroke    The pt was also evaluated by ENT in March, 2017 for "dizziness" x 12 months, with plan for referral to PT  The pt additinoally reports that she had an incident in April, 2018  wherein she fell to right and "fell into the TV" and was evaluated in the [facility] ED (see Dr Arnol Lambert note from that date)      according to the patient, she was in her normal state of health until yesterday evening, when she suddenly experienced a sense of being "off balance" while cooking  She reports that she stumbled  off to the right against the wall but did not fall  She described a sense of dizziness, but denied vertigo  At that time, she notes having a horrible holocranial headache  She rated at a10/10,  No associated nausea or vomiting  No changes in vision, numbness, weakness, slurred speech or difficulty with word finding  She did not treat the headache but went to sleep  In the a m , she continued with on going symptoms and subsequently was brought to the ED for evaluation by her sons  initial stroke scale of 3 secondary to bilateral lower extremity ataxia and left lower extremity sensory deficit  CT head was completed and demonstrated no acute intracranial abnormality  CTA demonstrated no large vessel occlusion, dissection or stenosis  TPA was deferred secondary to outside of window  She was seen by neurology  She was on PLavix/statin therapy  She was noted to have an ataxic gait  Her Echo did show left atrial dilation, but no clear evidence of A-fib on telemetry  MRI Brain without vidence of any acute ischemia  Her P2Y12 was non therapeutic and her Plvix was increased to 150mg daily  Her ataxia did improve  She did require acute rehab      she is here today for hospital follow-up  Overall, she states she is doing quite well  She did have home physical therapy twice a week for the past 3 weeks  She feels her gait is better, she has had no reported falls  She does use a cane in order to get about  She does have imbalance  She notes some ongoing mild left-sided chronic weakness  She denies any further headaches, no difficulty with speech or swallowing  Of note, she was seen in the interim by her PCP, in review of her medications, she is only taking Plavix 75 mg daily  She continues on statin therapy  She was seen by [facility]  Her Amlodipine was decreasecd to 5mg, she was contniued on  Losartan  her Victoza was increased, she contniued on Tradjenta  Now off Glipizide  She states her blood sugars at home run anywhere from 130-160  The following portions of the patient's history were reviewed and updated as appropriate: allergies, current medications, past family history, past medical history, past social history, past surgical history and problem list          Objective:    Blood pressure 102/64, pulse 76, resp  rate 12, height 5' 4" (1 626 m), weight 78 4 kg (172 lb 12 8 oz), not currently breastfeeding  Physical Exam   Constitutional: She appears well-developed  HENT:   Head: Normocephalic  Eyes: Pupils are equal, round, and reactive to light  Neck: Normal range of motion  Cardiovascular: Normal rate and regular rhythm  No bruit auscultated   Pulmonary/Chest: Effort normal    Musculoskeletal: Normal range of motion  Neurological: She has normal strength  Coordination normal    Reflex Scores:       Tricep reflexes are 1+ on the right side and 1+ on the left side  Bicep reflexes are 1+ on the right side and 1+ on the left side  Patellar reflexes are 0 on the right side and 0 on the left side  Achilles reflexes are 0 on the right side and 0 on the left side  Psychiatric: She has a normal mood and affect  Her speech is normal and behavior is normal  Judgment and thought content normal        Neurological Exam  Mental Status  Drowsy  Oriented to person, place and time  Speech is normal  Able to name objects, name parts of objects and repeat  Follows two-step commands   Attention and concentration are normal     Cranial Nerves  CN II: Visual fields full to confrontation  CN III, IV, VI: Diminished smooth pursuit  Pupils equal round and reactive to light bilaterally  CN V: Facial sensation is normal   CN VII: Full and symmetric facial movement  CN XI: Shoulder shrug strength is normal   CN XII: Tongue midline without atrophy or fasciculations  Motor  Normal muscle bulk throughout  Normal muscle tone  Strength is 5/5 throughout all four extremities  Sensory  Temperature abnormality:  Distal sensory loss to temperature bilateral lower extremities to mid calf  Vibration abnormality:  Absent vibratory perception distal lower extremities to knees  Reflexes                                           Right                      Left  Biceps                                 1+                         1+  Triceps                                1+                         1+  Patellar                                0                         0  Achilles                                0                         0    Coordination  Finger-to-nose, rapid alternating movements and heel-to-shin normal bilaterally without dysmetria  No drift  Gait  Casual gait: Wide stance  Unable to rise from chair without using arms  Ambulated with a cane, evidence of imbalance and disequilibrium noted  ROS:    Review of Systems   Constitutional: Negative  HENT:        Ringing in the ear    Eyes: Positive for visual disturbance  Double vision    Cardiovascular: Negative  Gastrointestinal: Negative  Endocrine: Negative  Genitourinary: Negative  Musculoskeletal:        Muscle pain, Pain when walking, Difficulty walking    Skin: Negative  Allergic/Immunologic: Negative  Neurological: Negative  Hematological: Negative  Psychiatric/Behavioral:        Depression        CT brain 10/01/2018;1  Trace high density in the posterior aspect of the left sylvian fissure possibly trace subarachnoid hemorrhage    Differential diagnosis includes artifact from coapted cortex versus slice selection or perhaps a small vessel  2   Mild, chronic microangiopathy elsewhere  CTA head/ neck:1  No hemodynamically significant stenosis in the major arteries of the neck  2   No intracranial aneurysm or major intracranial arterial stenosis  3   Pulmonary arterial hypertension  MRI brain without contrast 10/01/2018; No acute infarct seen, No acute intracranial hemorrhage seen  Moderate periventricular and subcortical white matter FLAIR and T2 hyperintensities, stable since the previous study probably due to chronic small vessel ischemic changes   No mass effect or midline shift seen   No susceptibility noted in the sylvian cistern area corresponding to the subarachnoid hemorrhage questioned on recent CT of October 1, 2018    ECHO:  He does shin fraction was estimated to be 55%  Grade 1 diastolic dysfunction  VENTRICULAR SEPTUM: There was dyssynergic motion   RIGHT VENTRICLE: The size was normal  Systolic function was normal   LEFT ATRIUM: The atrium was moderately dilated   ATRIAL SEPTUM: 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  MITRAL VALVE: There was mild regurgitation  AORTIC VALVE: There was mild regurgitation    TRICUSPID VALVE: There was mild regurgitation        Labs; hemoglobin A1c = 7 9,  Cholesterol = 90, triglycerides = 71, HDL = 37, LDL = 39,P2Y12= 214

## 2018-11-16 ENCOUNTER — PATIENT OUTREACH (OUTPATIENT)
Dept: INTERNAL MEDICINE CLINIC | Facility: CLINIC | Age: 78
End: 2018-11-16

## 2018-11-16 NOTE — PROGRESS NOTES
Outpatient Care Management Note:    Called pt to follow up with her  Phone did not ring and went directly to voicemail  I left message stating this is Dago Stafford the outpatient nurse care manager at [facility] where she sees Praveen Decker  I stated I was calling to f/u with her and see how she is feeling and review her medication she is taking  I requested a call back at [phone] and that I am available M-F 8 - 4:30 pm      Pt was also following with 2 PCP's before  Pt does have appt with Miriam on 11/27/18 and Endo clinic on 12/5  Pt saw Neurology on 11/12  Cardiology appt was 11/15 but canceled due to weather and needs to be r/s

## 2018-11-20 ENCOUNTER — PATIENT OUTREACH (OUTPATIENT)
Dept: INTERNAL MEDICINE CLINIC | Facility: CLINIC | Age: 78
End: 2018-11-20

## 2018-11-20 NOTE — PROGRESS NOTES
Outpatient Care Management Note:    Called pt  No answer, message left that this is Alyce Valenzuela the outpatient nurse care manager at [facility] calling to follow up with her  I requested a call back at [phone]

## 2018-11-21 DIAGNOSIS — I10 BENIGN ESSENTIAL HYPERTENSION: Primary | ICD-10-CM

## 2018-11-21 RX ORDER — AMLODIPINE BESYLATE 5 MG/1
5 TABLET ORAL DAILY
Refills: 0 | Status: CANCELLED | OUTPATIENT
Start: 2018-11-21

## 2018-11-21 RX ORDER — AMLODIPINE BESYLATE 5 MG/1
5 TABLET ORAL DAILY
Qty: 90 TABLET | Refills: 1 | Status: SHIPPED | OUTPATIENT
Start: 2018-11-21 | End: 2020-02-19

## 2018-11-27 ENCOUNTER — OFFICE VISIT (OUTPATIENT)
Dept: INTERNAL MEDICINE CLINIC | Facility: CLINIC | Age: 78
End: 2018-11-27
Payer: COMMERCIAL

## 2018-11-27 ENCOUNTER — PATIENT OUTREACH (OUTPATIENT)
Dept: INTERNAL MEDICINE CLINIC | Facility: CLINIC | Age: 78
End: 2018-11-27

## 2018-11-27 VITALS
HEART RATE: 72 BPM | SYSTOLIC BLOOD PRESSURE: 124 MMHG | DIASTOLIC BLOOD PRESSURE: 80 MMHG | TEMPERATURE: 97.5 F | WEIGHT: 169.75 LBS | HEIGHT: 64 IN | BODY MASS INDEX: 28.98 KG/M2

## 2018-11-27 DIAGNOSIS — I35.1 AORTIC VALVE REGURGITATION, NONRHEUMATIC: ICD-10-CM

## 2018-11-27 DIAGNOSIS — E78.2 MIXED HYPERLIPIDEMIA: ICD-10-CM

## 2018-11-27 DIAGNOSIS — M79.89 SWELLING OF RIGHT LOWER EXTREMITY: ICD-10-CM

## 2018-11-27 DIAGNOSIS — E11.22 CKD STAGE 3 DUE TO TYPE 2 DIABETES MELLITUS (HCC): ICD-10-CM

## 2018-11-27 DIAGNOSIS — I34.0 NON-RHEUMATIC MITRAL REGURGITATION: ICD-10-CM

## 2018-11-27 DIAGNOSIS — R09.89 DECREASED PEDAL PULSES: Primary | ICD-10-CM

## 2018-11-27 DIAGNOSIS — I10 BENIGN ESSENTIAL HYPERTENSION: ICD-10-CM

## 2018-11-27 DIAGNOSIS — E11.65 UNCONTROLLED TYPE 2 DIABETES MELLITUS WITH HYPERGLYCEMIA (HCC): ICD-10-CM

## 2018-11-27 DIAGNOSIS — R26.2 AMBULATORY DYSFUNCTION: Primary | ICD-10-CM

## 2018-11-27 DIAGNOSIS — H91.93 DECREASED HEARING, BILATERAL: ICD-10-CM

## 2018-11-27 DIAGNOSIS — N18.30 CKD STAGE 3 DUE TO TYPE 2 DIABETES MELLITUS (HCC): ICD-10-CM

## 2018-11-27 PROBLEM — R09.82 POST-NASAL DRIP: Status: RESOLVED | Noted: 2018-02-16 | Resolved: 2018-11-27

## 2018-11-27 PROBLEM — R29.90 STROKE-LIKE SYMPTOMS: Status: RESOLVED | Noted: 2018-10-01 | Resolved: 2018-11-27

## 2018-11-27 PROBLEM — B35.3 TINEA PEDIS OF BOTH FEET: Status: RESOLVED | Noted: 2018-03-04 | Resolved: 2018-11-27

## 2018-11-27 PROBLEM — R07.9 CHEST PAIN: Status: RESOLVED | Noted: 2018-04-11 | Resolved: 2018-11-27

## 2018-11-27 PROCEDURE — 99214 OFFICE O/P EST MOD 30 MIN: CPT | Performed by: PHYSICIAN ASSISTANT

## 2018-11-27 PROCEDURE — 1160F RVW MEDS BY RX/DR IN RCRD: CPT | Performed by: PHYSICIAN ASSISTANT

## 2018-11-27 PROCEDURE — 3074F SYST BP LT 130 MM HG: CPT | Performed by: PHYSICIAN ASSISTANT

## 2018-11-27 PROCEDURE — 3079F DIAST BP 80-89 MM HG: CPT | Performed by: PHYSICIAN ASSISTANT

## 2018-11-27 PROCEDURE — 3008F BODY MASS INDEX DOCD: CPT | Performed by: PHYSICIAN ASSISTANT

## 2018-11-27 RX ORDER — ATORVASTATIN CALCIUM 40 MG/1
40 TABLET, FILM COATED ORAL DAILY
Qty: 90 TABLET | Refills: 1 | Status: SHIPPED | OUTPATIENT
Start: 2018-11-27 | End: 2020-02-19

## 2018-11-27 RX ORDER — LOSARTAN POTASSIUM 25 MG/1
25 TABLET ORAL DAILY
Qty: 90 TABLET | Refills: 1 | Status: SHIPPED | OUTPATIENT
Start: 2018-11-27 | End: 2020-02-19

## 2018-11-27 RX ORDER — LOSARTAN POTASSIUM 25 MG/1
TABLET ORAL
COMMUNITY
Start: 2018-11-27 | End: 2018-11-27 | Stop reason: SDUPTHER

## 2018-11-27 RX ORDER — EMPAGLIFLOZIN 25 MG/1
25 TABLET, FILM COATED ORAL DAILY
Qty: 90 TABLET | Refills: 0 | Status: SHIPPED | OUTPATIENT
Start: 2018-11-27 | End: 2019-04-01 | Stop reason: SDUPTHER

## 2018-11-27 NOTE — PATIENT INSTRUCTIONS
Continue same meds today, as discussed you confirm you are taking to Plavix together once daily you also confirmed that you are no longer taking the glipizide  Sched the ultrasounds of your legs, as discussed on exam today your right lower extremity especially her foot is more swollen as well as being much more cool to the touch as compared to your left  Sched your hearing test  Keep appts with cardio and Endo as scheduled in December    Please schedule a follow-up appointment with me after you complete the ultrasounds for your legs

## 2018-11-27 NOTE — PROGRESS NOTES
Assessment/Plan:    No problem-specific Assessment & Plan notes found for this encounter  Diagnoses and all orders for this visit:    Decreased pedal pulses  -     VAS lower limb arterial duplex, complete bilateral; Future    Swelling of right lower extremity  -     VAS lower limb venous duplex study, complete bilateral; Future    Benign essential hypertension  -     losartan (COZAAR) 25 mg tablet; Take 1 tablet (25 mg total) by mouth daily for 180 days    Uncontrolled type 2 diabetes mellitus with hyperglycemia (HCC)  -     liraglutide (VICTOZA) injection; Inject 0 2 mL (1 2 mg total) under the skin daily for 30 days  -     metFORMIN (GLUCOPHAGE) 1000 MG tablet; Take 1 tablet (1,000 mg total) by mouth 2 (two) times a day with meals for 180 days  -     Linagliptin (TRADJENTA) 5 MG TABS; Take 5 mg by mouth daily for 180 days  -     JARDIANCE 25 MG TABS; Take 1 tablet (25 mg total) by mouth daily for 90 days    CKD stage 3 due to type 2 diabetes mellitus (HCC)    Non-rheumatic mitral regurgitation    Aortic valve regurgitation, nonrheumatic    Mixed hyperlipidemia  -     atorvastatin (LIPITOR) 40 mg tablet; Take 1 tablet (40 mg total) by mouth daily for 180 days    Decreased hearing, bilateral  -     Comprehensive hearing evaluation; Future    Other orders  -     Discontinue: losartan (COZAAR) 25 mg tablet;           Subjective:      Patient ID: Rosie Zepeda is a 66 y o  female  Here for med review  Discussed with patient as per previous phone call patient did not realize that the doctor she was seeing for kidneys was also a primary care provider and was adjusting her medications  This was realized after her most recent hospital admission when we received a phone call from the [facility] today regarding some medications that were not on her discharge summary    We discussed this with the patient directly and patient chose to remain with our office as primary care and that she may continue with the other office for her CKD as they are a Nephrology group as well  As the other provider did not have access to the patient's hospitalization records the patient was inadvertently decreased back down to Plavix 75 mcg after this was increased to 150 mcg by the neurologist while inpatient  Patient confirm she is now taking the 150 mg dose daily  Patient did follow up with the neurologist on November 12th and reports understood all information and importance of the Plavix at 150 mg daily  Inpatient echocardiogram was completed for evaluation of possible atrial fibrillation  No AFib was noted on cardiac testing, 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  Patient reports she has not yet scheduled the follow-up appointment with her cardiologist   We discussed that this is important and she will do so  Patient also confirm she discontinued taking the glipizide 10 mg tablet after my discussion with her back in October and the explanation of concern over low blood sugars with this particular medication considering the number of other medications she was now taking  Patient also confirms that after discussion she did resume the lower dose of amlodipine at 5 mg and stopped taking the 10 mg dose  As was discussed with her she was having lower extremity swelling as well as lower blood pressure and wanted to make sure her blood pressure was not contributing to her weakness or falls  Patient reports that she feels significantly improved since her hospital admission  Patient however does report some ongoing weakness to her left side  With chronic left sided sensory deficit  Patient is wearing her compression stockings and once removed for visualization and her diabetic foot exam it is noted that her right ankle and foot is significantly more edematous than her left  It is also noted on examination that she has significantly decreased pulse on the right versus left pedal pulses    Bilaterally patient's toes are cool to the touch and once again it is noted that right is more so than left  Patient does admit to her feet feeling cold  She does sometimes have pain in her feet but she does not know if this is from her diabetic neuropathy or something new  Patient also reports that she feels well enough after her hospitalization to schedule her hearing test         The following portions of the patient's history were reviewed and updated as appropriate: allergies, current medications, past family history, past medical history, past social history, past surgical history and problem list     Review of Systems   Constitutional: Negative for chills, fatigue and unexpected weight change  HENT: Negative  Negative for congestion  Eyes: Positive for visual disturbance  Wearing her glasses   Respiratory: Negative  Negative for cough, chest tightness and shortness of breath  Cardiovascular: Positive for leg swelling  Negative for chest pain and palpitations  Gastrointestinal: Negative for abdominal pain, constipation and diarrhea  Endocrine: Negative for cold intolerance and heat intolerance  Genitourinary: Negative  Negative for dysuria and urgency  Musculoskeletal: Positive for arthralgias and myalgias  Pt reports these are generalized no unchanged from baseline   Skin: Positive for color change (Bilateral toes right greater than left  )  Negative for rash and wound  Neurological: Positive for weakness (chronic L)  Negative for dizziness, light-headedness and headaches  Psychiatric/Behavioral: Negative  Objective:      /80 (BP Location: Right arm, Patient Position: Sitting, Cuff Size: Standard)   Pulse 72   Temp 97 5 °F (36 4 °C) (Oral)   Ht 5' 4" (1 626 m)   Wt 77 kg (169 lb 12 1 oz)   BMI 29 14 kg/m²          Physical Exam   Constitutional: She is oriented to person, place, and time  She appears well-developed and well-nourished  No distress     HENT: Head: Normocephalic and atraumatic  Eyes: Pupils are equal, round, and reactive to light  Neck: Neck supple  No thyromegaly present  Cardiovascular: Normal rate  A regularly irregular rhythm present  Pulses are weak pulses  Pulses:       Dorsalis pedis pulses are 0 on the right side, and 1+ on the left side  Distinct pause every 4th beat very brief    R DP non palpable L 1+   Pulmonary/Chest: Effort normal and breath sounds normal  No respiratory distress  She has no wheezes  Abdominal: Soft  Bowel sounds are normal  There is no tenderness  There is no rebound  Musculoskeletal: She exhibits edema  Feet:    Positive non pitting edema R LE for ankle to foot   Feet:   Right Foot:   Skin Integrity: Negative for ulcer, skin breakdown, warmth or callus  Left Foot:   Skin Integrity: Negative for ulcer, skin breakdown, warmth or callus  Neurological: She is alert and oriented to person, place, and time  Skin: Skin is warm and dry  No rash noted  Psychiatric: She has a normal mood and affect  Her behavior is normal        Patient's shoes and socks removed  Right Foot/Ankle   Right Foot Inspection  Skin Exam: skin intact and abnormal color no warmth, no callus, no maceration, no pre-ulcer, no ulcer and no callus                            Sensory   Vibration: absent    Monofilament testing: diminished  Vascular    The right DP pulse is 0  Left Foot/Ankle  Left Foot Inspection  Skin Exam: skin intactno warmth, no maceration, normal color, no pre-ulcer, no ulcer and no callus                                         Sensory   Vibration: absent    Monofilament: diminished  Vascular    The left DP pulse is 1+  Assign Risk Category:  Deformity present;  Loss of protective sensation; Weak pulses       Risk: 2

## 2018-11-27 NOTE — PROGRESS NOTES
Outpatient Care Management Note:    Followed up with pt when she came in for PCP appt  Pt reports she is feeling well and managing well at home  Pt is requesting a commode as her only bathroom is on the 2nd floor and has 14 steps  Pt reports sometimes difficult to make it in time and going steps multiple times a day  Pt reports has a commode but it is an over the toilet one and is requesting to have a bedside commode for downstairs  Pt does use a cane and uses a walker at times  Pt in office today with 4 point cane  Pt is still independent with ADL's and no changes from the last time I spoke with her  Pt is managing her own medication and able to get to pharmacy  Pt is inquiring about 90 day supply for her meds  PCP is aware and pt will request as she is due for refills on her meds  Pt is aware of upcoming appt's and has cardiology appt r/s for 12/6  Pt will f/u with Nubia Giron in April or sooner if needed  I spoke with attending Dr Trina Espinoza and he placed order for beside commode  Script was faxed to [facility] Shaq Hernandez  I will f/u with [facility] to confirm they received order and will go through insurance  Pt made aware

## 2018-11-28 ENCOUNTER — PATIENT OUTREACH (OUTPATIENT)
Dept: INTERNAL MEDICINE CLINIC | Facility: CLINIC | Age: 78
End: 2018-11-28

## 2018-11-28 NOTE — PROGRESS NOTES
Called and spoke with pt  She reports she received message from [employer]  I repeated info to her  She does not have the $75 at this time but was thankful for the attempt and the info  Pt made aware she can try going through the [employer] catalog in the new year and see if she can order one that way or checking at a thrift shop  No other needs at this time  Pt is self managing and will close from outpatient nurse care management at this time

## 2018-11-28 NOTE — PROGRESS NOTES
Outpatient Care Management Note:    Spoke with Puja Cash at [facility]  They confirmed they received order for commode and insurance already paid for one 4/12/18 and will not cover another one  Pt would need to pay $75 and would need to call [employer] and provide a credit card # so it can be processed and shipped to her  [facility] report they called pt and left a message with this info for pt

## 2018-12-04 ENCOUNTER — OFFICE VISIT (OUTPATIENT)
Dept: AUDIOLOGY | Age: 78
End: 2018-12-04
Payer: COMMERCIAL

## 2018-12-04 DIAGNOSIS — H90.5 HIGH FREQUENCY SENSORINEURAL HEARING LOSS OF RIGHT EAR: Primary | ICD-10-CM

## 2018-12-04 DIAGNOSIS — H90.A32 MIXED CONDUCTIVE AND SENSORINEURAL HEARING LOSS OF LEFT EAR WITH RESTRICTED HEARING OF RIGHT EAR: ICD-10-CM

## 2018-12-04 PROCEDURE — 92567 TYMPANOMETRY: CPT | Performed by: AUDIOLOGIST

## 2018-12-04 PROCEDURE — 92557 COMPREHENSIVE HEARING TEST: CPT | Performed by: AUDIOLOGIST

## 2018-12-04 NOTE — LETTER
2018     Aly Chadwick PA-C  [address] [facility]    Patient: Yang Perez   YOB: 1940   Date of Visit: 2018       Dear Dr John Schmid: Thank you for referring Carmenza Mcpherson to me for evaluation  Below are my notes for this consultation  If you have questions, please do not hesitate to call me  I look forward to following your patient along with you  Sincerely,        Carlos Celis        CC: No Recipients  [place]  2018  4:16 PM  Sign at close encounter  HEARING EVALUATION    Name:  Yang Perez  :  1940  Age:  66 y o  Date of Evaluation: 18     History: loud TV, difficulty hearing at [facility]  Reason for visit: Yang Perez is being seen today at the request of Dr John Schmid for an evaluation of hearing  Patient reports her son told her she needed her hearing tested because she turns her TV very loud  She reports not fully understanding what her  says at [facility]  The patient denies history of ear infections or family history of hearing loss  She denies tinnitus and reports she had dizziness in October for which she saw her doctor  EVALUATION:    Otoscopic Evaluation:   Right Ear: Minimum cerumen    Left Ear: Minimum cerumen     Tympanometry:   Right: Type A - normal middle ear pressure and compliance   Left: Type A - normal middle ear pressure and compliance    Audiogram Results:  Right:  Normal to moderate sensorineural hearing loss  Left:  Normal to moderate hearing loss, with a conductive component at [facility] only    *see attached audiogram      RECOMMENDATIONS:  Annual hearing eval, Return to [facility]  for F/U, Hearing Aid Evaluation and Copy to Patient/Caregiver    PATIENT EDUCATION:   Discussed results and recommendations with patient  Questions were addressed and the patient was encouraged to contact our department should concerns arise  Discussed patient's insurance benefit   She is interested in acquiring bilateral hearing aids under her Medicare [place]  Verified benefit with [employer]    Ordered binaural set of Oticon Siya 2 miniRITEs, 2-85L and R, 8 open sarah Luna, Audiology Intern  Marta Griggs , CCC-A  Clinical Audiologist

## 2018-12-04 NOTE — LETTER
2018     Lluvia Ruvalcaba PA-C  [address] [facility]    Patient: Aly Alcocer   YOB: 1940   Date of Visit: 2018       Dear Dr Edythe Schilder: Thank you for referring Yadira Menchaca to me for evaluation  Below are my notes for this consultation  If you have questions, please do not hesitate to call me  I look forward to following your patient along with you  Sincerely,        Aleah Zheng        CC: No Recipients  Aleah Zheng  2018  4:05 PM  Sign at close encounter  HEARING EVALUATION    Name:  Aly Alcocer  :  1940  Age:  66 y o  Date of Evaluation: 18     History: loud TV, difficulty hearing at [facility]  Reason for visit: Aly Alcocer is being seen today at the request of Dr Edythe Schilder for an evaluation of hearing  Patient reports her son told her she needed her hearing tested because she turns her TV very loud  She reports not fully understanding what her  says at [facility]  The patient denies history of ear infections or family history of hearing loss  She denies tinnitus and reports she had dizziness in October for which she saw her doctor  EVALUATION:    Otoscopic Evaluation:   Right Ear: Minimum cerumen    Left Ear: Minimum cerumen     Tympanometry:   Right: Type A - normal middle ear pressure and compliance   Left: Type A - normal middle ear pressure and compliance    Audiogram Results:  Right:  Normal to moderate sensorineural hearing loss  Left:  Normal to moderate hearing loss, with a conductive component at [facility] only    *see attached audiogram      RECOMMENDATIONS:  Annual hearing eval, Return to [facility]  for F/U, Hearing Aid Evaluation and Copy to Patient/Caregiver    PATIENT EDUCATION:   Discussed results and recommendations with patient  Questions were addressed and the patient was encouraged to contact our department should concerns arise  Discussed patient's insurance benefit   She is interested in acquiring bilateral hearing aids under her Medicare [facility]  Verified benefit with [employer]              Krysten Patino, {Letters:[id number]}  Clinical Audiologist

## 2018-12-04 NOTE — PROGRESS NOTES
HEARING EVALUATION    Name:  Miles Vines  :  1940  Age:  66 y o  Date of Evaluation: 18     History: loud TV, difficulty hearing at [facility]  Reason for visit: Miles Vines is being seen today at the request of Dr Ash Galvan for an evaluation of hearing  Patient reports her son told her she needed her hearing tested because she turns her TV very loud  She reports not fully understanding what her  says at [facility]  The patient denies history of ear infections or family history of hearing loss  She denies tinnitus and reports she had dizziness in October for which she saw her doctor  EVALUATION:    Otoscopic Evaluation:   Right Ear: Minimum cerumen    Left Ear: Minimum cerumen     Tympanometry:   Right: Type A - normal middle ear pressure and compliance   Left: Type A - normal middle ear pressure and compliance    Audiogram Results:  Right:  Normal to moderate sensorineural hearing loss  Left:  Normal to moderate hearing loss, with a conductive component at [facility] only    *see attached audiogram      RECOMMENDATIONS:  Annual hearing eval, Return to [facility]  for F/U, Hearing Aid Evaluation and Copy to Patient/Caregiver    PATIENT EDUCATION:   Discussed results and recommendations with patient  Questions were addressed and the patient was encouraged to contact our department should concerns arise  Discussed patient's insurance benefit  She is interested in acquiring bilateral hearing aids under her Medicare [place]  Verified benefit with [employer]    Ordered binaural set of Oticon Siya 2 miniRITEs, 2-85L and R, 8 lori Nicole, Audiology Intern  Reyes Brand, [place] , [facility]-A  Clinical Audiologist

## 2018-12-05 ENCOUNTER — OFFICE VISIT (OUTPATIENT)
Dept: MULTI SPECIALTY CLINIC | Facility: CLINIC | Age: 78
End: 2018-12-05
Payer: COMMERCIAL

## 2018-12-05 VITALS
SYSTOLIC BLOOD PRESSURE: 132 MMHG | HEART RATE: 88 BPM | DIASTOLIC BLOOD PRESSURE: 78 MMHG | BODY MASS INDEX: 28.68 KG/M2 | HEIGHT: 64 IN | WEIGHT: 167.99 LBS | TEMPERATURE: 97.7 F

## 2018-12-05 DIAGNOSIS — IMO0002 UNCONTROLLED TYPE 2 DIABETES MELLITUS WITH COMPLICATION: ICD-10-CM

## 2018-12-05 DIAGNOSIS — E11.65 UNCONTROLLED TYPE 2 DIABETES MELLITUS WITH HYPERGLYCEMIA (HCC): ICD-10-CM

## 2018-12-05 PROCEDURE — 99213 OFFICE O/P EST LOW 20 MIN: CPT | Performed by: INTERNAL MEDICINE

## 2018-12-05 PROCEDURE — 4040F PNEUMOC VAC/ADMIN/RCVD: CPT | Performed by: INTERNAL MEDICINE

## 2018-12-05 NOTE — ASSESSMENT & PLAN NOTE
Lab Results   Component Value Date    HGBA1C 7 9 (H) 11/09/2018       No results for input(s): POCGLU in the last 72 hours  Blood Sugar Average: Last 72 hrs:     Patient is currently on Tradjenta 5 mg daily, metformin 1000 mg b i d , Victoza 1 8 mg and Jardiance 25mg daily  Patient recently was started on Jardiance and Victoza by an outside provider who is an internal medicine doctor and nephrologist   She was referred by her cardiologist to this provider  Patient's A1c was 11 9  She has good glycemic control on current regimen as her A1c prior was 9 6 earlier this year  Last couple of days patient's blood sugars are reported as being in the 200 to usually low mid 100s  Patient states she has forgot to take her pills the past day or so and so this is likely in the setting of medication noncompliance  Stressed compliance  Optho UTD    · Continue current medication regimen  At this time will not make any changes as patient appears to have good glycemic control on this regimen as well as CV benefits  · Stressed compliance with patient  Patient verbalized understanding  Blood glucose logs provided  Patient test x1 daily  No episodes of hypoglycemia reported  · Return to clinic in approximately 6 months    Will be happy to see patient in subsequent encounter to ensure current regimen is sufficient, though patient may not need further endocrinology services beyond another visit as her glycemic index is much improved and she is currently being managed by her PCP in another provider at outside facility  · Microalbumin creatinine ratio prior to next visit  · Podiatry referral

## 2018-12-05 NOTE — PROGRESS NOTES
Endocrinology FOLLOW-UP OFFICE VISIT  [facility]  [address] [place] [address] [address]    NAME: Tonny Kirby  AGE: 66 y o  SEX: female    DATE OF ENCOUNTER: 12/5/2018    Assessment and Plan     Problem List Items Addressed This Visit        Endocrine    Diabetes mellitus type 2, uncontrolled ([address] )     Lab Results   Component Value Date    HGBA1C 7 9 (H) 11/09/2018       No results for input(s): POCGLU in the last 72 hours  Blood Sugar Average: Last 72 hrs:     Patient is currently on Tradjenta 5 mg daily, metformin 1000 mg b i d , Victoza 1 8 mg and Jardiance 25mg daily  Patient recently was started on Jardiance and Victoza by an outside provider who is an internal medicine doctor and nephrologist   She was referred by her cardiologist to this provider  Patient's A1c was 11 9  She has good glycemic control on current regimen as her A1c prior was 9 6 earlier this year  Last couple of days patient's blood sugars are reported as being in the 200 to usually low mid 100s  Patient states she has forgot to take her pills the past day or so and so this is likely in the setting of medication noncompliance  Stressed compliance  Optho UTD    · Continue current medication regimen  At this time will not make any changes as patient appears to have good glycemic control on this regimen as well as CV benefits  · Stressed compliance with patient  Patient verbalized understanding  Blood glucose logs provided  Patient test x1 daily  No episodes of hypoglycemia reported  · Return to clinic in approximately 6 months    Will be happy to see patient in subsequent encounter to ensure current regimen is sufficient, though patient may not need further endocrinology services beyond another visit as her glycemic index is much improved and she is currently being managed by her PCP in another provider at outside facility  · Microalbumin creatinine ratio prior to next visit  · Podiatry referral         Relevant Medications    liraglutide (VICTOZA) injection    Other Relevant Orders    Ambulatory referral to Podiatry    Microalbumin / creatinine urine ratio      Other Visit Diagnoses     Uncontrolled type 2 diabetes mellitus with complication ([facility] [address] )        Relevant Medications    liraglutide (VICTOZA) injection    Other Relevant Orders    Ambulatory referral to Podiatry    Microalbumin / creatinine urine ratio          Orders Placed This Encounter   Procedures    Microalbumin / creatinine urine ratio    Ambulatory referral to Podiatry       - Counseling Documentation: patient was counseled regarding: diagnostic results, instructions for management, risk factor reductions, prognosis, patient and family education, impressions, risks and benefits of treatment options and importance of compliance with treatment  - Counseling Time: counseling time more than 50% of visit: 15 minutes  - Barriers to treatment: Compliance  - Medication Side Effects: Adverse side effects of medications were reviewed with the patient/guardian today  - Self Referrals: No    Chief Complaint     Chief Complaint   Patient presents with    Follow-up       History of Present Illness     Diabetes   She presents for her follow-up diabetic visit  She has type 2 diabetes mellitus  Her disease course has been improving  There are no hypoglycemic associated symptoms  Associated symptoms include blurred vision, fatigue and visual change  Pertinent negatives for diabetes include no chest pain, no foot paresthesias, no foot ulcerations, no polydipsia, no polyphagia, no polyuria, no weakness and no weight loss  There are no hypoglycemic complications  Symptoms are stable  Diabetic complications include heart disease, nephropathy and PVD  Pertinent negatives for diabetic complications include no autonomic neuropathy or peripheral neuropathy   Risk factors for coronary artery disease include diabetes mellitus, dyslipidemia, hypertension, post-menopausal, sedentary lifestyle and obesity  Current diabetic treatment includes oral agent (triple therapy) (See HPI)  She is compliant with treatment most of the time (Patient reports missing diabetes medication past 1- 2 days)  Her weight is stable  She is following a diabetic diet  Meal planning includes carbohydrate counting and avoidance of concentrated sweets  She has had a previous visit with a dietitian  She rarely participates in exercise  Her home blood glucose trend is fluctuating minimally  Her breakfast blood glucose is taken between 8-9 am  Her breakfast blood glucose range is generally 140-180 mg/dl  Her overall blood glucose range is 140-180 mg/dl  An ACE inhibitor/angiotensin II receptor blocker is contraindicated  She sees a podiatrist Eye exam is current  The following portions of the patient's history were reviewed and updated as appropriate: allergies, current medications, past family history, past medical history, past social history, past surgical history and problem list     Review of Systems     Review of Systems   Constitutional: Positive for fatigue  Negative for weight loss  Eyes: Positive for blurred vision  Cardiovascular: Negative for chest pain  Endocrine: Negative for polydipsia, polyphagia and polyuria  Neurological: Negative for weakness         Active Problem List     Patient Active Problem List   Diagnosis    Aortic valve regurgitation, nonrheumatic    Benign essential hypertension    Bilateral edema of lower extremity    Chronic rhinitis    CVA (cerebrovascular accident) ([address] )    Midline cystocele    Diabetes mellitus type 2, uncontrolled ([address] )    Microalbuminuria    Non-rheumatic mitral regurgitation    Uterine procidentia    Ambulatory dysfunction    Hyperlipidemia    Microcytic anemia    Creatinine elevation    Esophageal abnormality    CKD stage 3 due to type 2 diabetes mellitus ([address] )    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   Magdy Link Swelling of right lower extremity    Decreased hearing, bilateral    Decreased pedal pulses       Objective     /78 (BP Location: Right arm, Patient Position: Sitting, Cuff Size: Adult)   Pulse 88   Temp 97 7 °F (36 5 °C) (Oral)   Ht 5' 4" (1 626 m)   Wt 76 2 kg (167 lb 15 9 oz)   BMI 28 84 kg/m²     Physical Exam   Constitutional: She appears well-developed and well-nourished  HENT:   Head: Normocephalic and atraumatic  Eyes: Pupils are equal, round, and reactive to light  Neck: Normal range of motion  Cardiovascular: Normal rate and regular rhythm  Exam reveals no gallop and no friction rub  No murmur heard  Pulmonary/Chest: Effort normal  No respiratory distress  She has no wheezes  She has no rales  Abdominal: Soft  Bowel sounds are normal  She exhibits no distension  There is no tenderness  Obese habitus   Musculoskeletal: She exhibits no edema or tenderness  Skin: Skin is warm and dry  Nursing note and vitals reviewed        Pertinent Laboratory/Diagnostic Studies:  CBC:   Lab Results   Component Value Date/Time    WBC 6 57 10/02/2018 06:50 AM    WBC 5 84 05/04/2015 09:56 AM    RBC 4 35 10/02/2018 06:50 AM    RBC 4 44 05/04/2015 09:56 AM    HGB 11 0 (L) 10/02/2018 06:50 AM    HGB 11 6 05/04/2015 09:56 AM    HCT 35 2 10/02/2018 06:50 AM    HCT 35 2 05/04/2015 09:56 AM    MCV 81 (L) 10/02/2018 06:50 AM    MCV 79 (L) 05/04/2015 09:56 AM    MCH 25 3 (L) 10/02/2018 06:50 AM    MCH 26 1 (L) 05/04/2015 09:56 AM    MCHC 31 3 (L) 10/02/2018 06:50 AM    MCHC 33 0 05/04/2015 09:56 AM    RDW 14 3 10/02/2018 06:50 AM    RDW 13 6 05/04/2015 09:56 AM    MPV 11 3 10/02/2018 06:50 AM    MPV 11 3 05/04/2015 09:56 AM     10/02/2018 06:50 AM     05/04/2015 09:56 AM    NRBC 0 10/01/2018 01:31 PM    NEUTOPHILPCT 76 (H) 10/01/2018 01:31 PM    NEUTOPHILPCT 63 05/04/2015 09:56 AM    LYMPHOPCT 16 10/01/2018 01:31 PM    LYMPHOPCT 28 05/04/2015 09:56 AM    MONOPCT 6 10/01/2018 01:31 PM MONOPCT 6 05/04/2015 09:56 AM    EOSPCT 1 10/01/2018 01:31 PM    EOSPCT 2 05/04/2015 09:56 AM    BASOPCT 1 10/01/2018 01:31 PM    BASOPCT 1 05/04/2015 09:56 AM    NEUTROABS 5 09 10/01/2018 01:31 PM    NEUTROABS 3 68 05/04/2015 09:56 AM    LYMPHSABS 1 09 10/01/2018 01:31 PM    LYMPHSABS 1 64 05/04/2015 09:56 AM    MONOSABS 0 41 10/01/2018 01:31 PM    MONOSABS 0 35 05/04/2015 09:56 AM    EOSABS 0 05 10/01/2018 01:31 PM    EOSABS 0 12 05/04/2015 09:56 AM     Chemistry Profile:   Lab Results   Component Value Date/Time     09/22/2015 12:25 PM    K 4 1 11/09/2018 12:13 PM    K 3 6 09/22/2015 12:25 PM     11/09/2018 12:13 PM     09/22/2015 12:25 PM    CO2 27 11/09/2018 12:13 PM    CO2 27 09/22/2015 12:25 PM    ANIONGAP 13 09/22/2015 12:25 PM    BUN 21 11/09/2018 12:13 PM    BUN 29 (H) 09/22/2015 12:25 PM    CREATININE 1 07 11/09/2018 12:13 PM    CREATININE 0 96 09/22/2015 12:25 PM    GLUC 153 (H) 10/02/2018 06:50 AM    GLUF 127 (H) 11/09/2018 12:13 PM    GLUCOSE 170 (H) 09/22/2015 12:25 PM    CALCIUM 9 9 11/09/2018 12:13 PM    CALCIUM 9 3 09/22/2015 12:25 PM    MG 2 2 10/02/2018 06:50 AM    PHOS 4 1 10/02/2018 06:50 AM    AST 8 11/09/2018 12:13 PM    AST 10 09/22/2015 12:25 PM    ALT 16 11/09/2018 12:13 PM    ALT 23 09/22/2015 12:25 PM    ALKPHOS 93 11/09/2018 12:13 PM    ALKPHOS 94 09/22/2015 12:25 PM    PROT 8 1 09/22/2015 12:25 PM    BILITOT 0 80 09/22/2015 12:25 PM    EGFR 57 11/09/2018 12:13 PM         Current Medications     Current Outpatient Prescriptions:     amLODIPine (NORVASC) 5 mg tablet, Take 1 tablet (5 mg total) by mouth daily for 180 days, Disp: 90 tablet, Rfl: 1    atorvastatin (LIPITOR) 40 mg tablet, Take 1 tablet (40 mg total) by mouth daily for 180 days, Disp: 90 tablet, Rfl: 1    Blood Glucose Monitoring Suppl (FREESTYLE LITE) JAQUELIN, by Does not apply route daily, Disp: 1 each, Rfl: 0    Cholecalciferol (VITAMIN D3) 2000 units capsule, Take 1 tablet by mouth daily, Disp: , Rfl:   clopidogrel (PLAVIX) 75 mg tablet, Take 2 tablets (150 mg total) by mouth daily, Disp: 180 tablet, Rfl: 2    FREESTYLE LITE test strip, May check blood sugar twice daily, Disp: 100 each, Rfl: 3    GLOBAL EASE INJECT PEN NEEDLES 31G X 5 MM MISC, , Disp: , Rfl:     hydrocortisone 2 5 % cream, , Disp: , Rfl:     JARDIANCE 25 MG TABS, Take 1 tablet (25 mg total) by mouth daily for 90 days, Disp: 90 tablet, Rfl: 0    Lancets (FREESTYLE) lancets, Use as instructed, Disp: 100 each, Rfl: 0    Linagliptin (TRADJENTA) 5 MG TABS, Take 5 mg by mouth daily for 180 days, Disp: 90 tablet, Rfl: 1    liraglutide (VICTOZA) injection, Inject 0 3 mL (1 8 mg total) under the skin daily for 30 days, Disp: 6 mL, Rfl: 0    losartan (COZAAR) 25 mg tablet, Take 1 tablet (25 mg total) by mouth daily for 180 days, Disp: 90 tablet, Rfl: 1    metFORMIN (GLUCOPHAGE) 1000 MG tablet, Take 1 tablet (1,000 mg total) by mouth 2 (two) times a day with meals for 180 days, Disp: 180 tablet, Rfl: 1    traMADol (ULTRAM) 50 mg tablet, Take 50 mg by mouth every 8 (eight) hours as needed for moderate pain, Disp: , Rfl:     acetaminophen (TYLENOL) 650 mg CR tablet, Take 650 mg by mouth every 6 (six) hours as needed for mild pain, Disp: , Rfl:     montelukast (SINGULAIR) 10 mg tablet, Take 1 tablet (10 mg total) by mouth daily at bedtime for 90 days (Patient not taking: Reported on 12/5/2018 ), Disp: 90 tablet, Rfl: 0    Health Maintenance     Health Maintenance   Topic Date Due    DTaP,Tdap,and Td Vaccines (1 - Tdap) 04/16/2019 (Originally 7/25/1961)    Pneumococcal PPSV23/PCV13 65+ Years / Low and Medium Risk (2 of 2 - PCV13) 10/25/2019 (Originally 1/1/2009)    INFLUENZA VACCINE  12/05/2019 (Originally 7/1/2018)    URINE MICROALBUMIN  01/15/2019    Depression Screening PHQ  02/28/2019    HEMOGLOBIN A1C  05/09/2019    DM Eye Exam  10/10/2019    Fall Risk  11/27/2019    Urinary Incontinence Screening  11/27/2019    Diabetic Foot Exam 11/27/2019     Immunization History   Administered Date(s) Administered    Influenza 10/10/2017    Influenza Quadrivalent, 6-35 Months IM 10/10/2017    Pneumococcal Polysaccharide PPV23 01/01/2008       Fanny Shannon Cardio  12/5/2018 11:01 AM

## 2018-12-06 ENCOUNTER — OFFICE VISIT (OUTPATIENT)
Dept: CARDIOLOGY CLINIC | Facility: CLINIC | Age: 78
End: 2018-12-06
Payer: COMMERCIAL

## 2018-12-06 VITALS
HEIGHT: 64 IN | HEART RATE: 81 BPM | BODY MASS INDEX: 28.85 KG/M2 | DIASTOLIC BLOOD PRESSURE: 60 MMHG | SYSTOLIC BLOOD PRESSURE: 110 MMHG | WEIGHT: 169 LBS

## 2018-12-06 DIAGNOSIS — I63.02 CEREBROVASCULAR ACCIDENT (CVA) DUE TO THROMBOSIS OF BASILAR ARTERY (HCC): ICD-10-CM

## 2018-12-06 DIAGNOSIS — I34.0 NON-RHEUMATIC MITRAL REGURGITATION: ICD-10-CM

## 2018-12-06 DIAGNOSIS — I10 BENIGN ESSENTIAL HYPERTENSION: Primary | ICD-10-CM

## 2018-12-06 DIAGNOSIS — I28.1 PULMONARY ARTERY ANEURYSM (HCC): ICD-10-CM

## 2018-12-06 DIAGNOSIS — E78.2 MIXED HYPERLIPIDEMIA: ICD-10-CM

## 2018-12-06 DIAGNOSIS — I35.1 AORTIC VALVE REGURGITATION, NONRHEUMATIC: ICD-10-CM

## 2018-12-06 PROCEDURE — 93000 ELECTROCARDIOGRAM COMPLETE: CPT | Performed by: INTERNAL MEDICINE

## 2018-12-06 PROCEDURE — 99203 OFFICE O/P NEW LOW 30 MIN: CPT | Performed by: INTERNAL MEDICINE

## 2018-12-06 NOTE — PROGRESS NOTES
7:35a 12/6/18    New hearing aids arrived  [facility] #[id number] #[id number]  2-85, both ears  8mm open domes  Warranty 1/3/21  I will naheed Ayala to schedule HAP with Monserrat Landeros

## 2018-12-06 NOTE — PROGRESS NOTES
Cardiology Follow Up    Farida Garcia  1940  [id number]  [address] [facility]  [address] [facility] [address] [place] [address] Road    1  Benign essential hypertension  POCT ECG   2  Aortic valve regurgitation, nonrheumatic  NM myocardial perfusion spect (rx stress and/or rest)   3  Cerebrovascular accident (CVA) due to thrombosis of basilar artery (HCC)  NM myocardial perfusion spect (rx stress and/or rest)   4  Non-rheumatic mitral regurgitation  NM myocardial perfusion spect (rx stress and/or rest)   5  Mixed hyperlipidemia     6  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[facility])  CT chest without contrast       Interval History:   Cardiology consultation  Pleasant 17-year-old [place] American female, no previous cardiac history  The patient suffer a pontine CVA in 2011, unclear etiology  She has some residual hemiparesis and some ataxia  Admitted to the hospital recently with worsening ataxia, evaluation suggested no CVA  MRI of the brain revealed microvascular disease but no CVA  CT of the head and neck revealed no obstructive disease  He did undergo an echocardiogram that revealed normal left systolic function with left atrial enlargement and evidence of an atrial septal aneurysm  There is no shunting  The size of the aneurysm was not described  There was mild mitral and aortic insufficiency  There was stage I diastolic dysfunction possible septal dyssynergy although she does have a bundle  She does have history of hypertension longstanding as well as diabetes mellitus, she is on high-dose Plavix therapy, recently increased because of a subtherapeutic effect as assessed by a functional test   His symptoms finding of possible pulmonary hypertension with a dilated pulmonary artery at 42 mm  Ascending aorta was 30 mm    The echocardiogram however does not suggest pulmonary hypertension there is no indirect features suggesting pulmonary hypertension  RV function was normal  The patient complains of dyspnea mild to moderate efforts which is a chronic problem  She denies any chest discomfort     She did smoke in the distant past over 40 pack year history, having quit over 2 decades ago  Denies history of lung disease  Patient Active Problem List   Diagnosis    Aortic valve regurgitation, nonrheumatic    Benign essential hypertension    Bilateral edema of lower extremity    Chronic rhinitis    CVA (cerebrovascular accident) ([facility] )    Midline cystocele    Diabetes mellitus type 2, uncontrolled ([facility] )    Microalbuminuria    Non-rheumatic mitral regurgitation    Uterine procidentia    Ambulatory dysfunction    Hyperlipidemia    Microcytic anemia    Creatinine elevation    Esophageal abnormality    CKD stage 3 due to type 2 diabetes mellitus ([facility] )    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    Swelling of right lower extremity    Decreased hearing, bilateral    Decreased pedal pulses    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HCC)     Past Medical History:   Diagnosis Date    ACE-inhibitor cough     last assessed - [id number]    Asthma     Bilateral edema of lower extremity     last assessed - [id number]    Cardiac murmur     last assessed - [id number]    Diabetes mellitus [facility])     last assessed - [id number]    Esophageal dysphagia     Fatigue     last assessed - [id number]    Hyperlipidemia     Hypertension     Patellar bursitis of right knee     last assessed - [id number]    Personal history of other specified conditions     presenting hazards to health    Stroke [facility])     Stroke syndrome     Urinary frequency     UTI (urinary tract infection)      Social History     Social History    Marital status:       Spouse name: N/A    Number of children: 6    Years of education: N/A     Occupational History    Retired      Social History Main Topics    Smoking status: Former Smoker     Packs/day: 3 00    Smokeless tobacco: Former User      Comment: quit over 30 yrs ago; (quit in the 1980's, had smoked 3PPD for 20 years - per [employer])    Alcohol use No      Comment: Denied history of alcohol use    Drug use: No      Comment: Denied history of drug use    Sexual activity: No     Other Topics Concern    Not on file     Social History Narrative    Diet needs improvement    Does not exercise    No caffeine use          Family History   Problem Relation Age of Onset    Heart disease Father     Hypertension Mother     Stroke Mother     Other Sister         1)Breast disorder; 2)Epilepsy   [employer]ie [employer] Migraines Sister         Migraine headaches    Osteoporosis Sister     Thyroid disease Sister     Coronary artery disease Sister         S/P triple vessel bypass    Osteoporosis Maternal Grandmother     Diabetes Son         Diabetes mellitus    Hypertension Son     Rheum arthritis Son         Rheumatic disease    Heart disease Family      Past Surgical History:   Procedure Laterality Date    [place] ESOPHAGOGASTRODUODENOSCOPY TRANSORAL DIAGNOSTIC N/A 4/5/2017    Procedure: ESOPHAGOGASTRODUODENOSCOPY (EGD); Surgeon: Quynh Perez MD;  Location: [facility];   Service: Gastroenterology       Current Outpatient Prescriptions:     acetaminophen (TYLENOL) 650 mg CR tablet, Take 650 mg by mouth every 6 (six) hours as needed for mild pain, Disp: , Rfl:     amLODIPine (NORVASC) 5 mg tablet, Take 1 tablet (5 mg total) by mouth daily for 180 days, Disp: 90 tablet, Rfl: 1    atorvastatin (LIPITOR) 40 mg tablet, Take 1 tablet (40 mg total) by mouth daily for 180 days, Disp: 90 tablet, Rfl: 1    Blood Glucose Monitoring Suppl (FREESTYLE LITE) JAQUELIN, by Does not apply route daily, Disp: 1 each, Rfl: 0    Cholecalciferol (VITAMIN D3) 2000 units capsule, Take 1 tablet by mouth daily, Disp: , Rfl:     clopidogrel (PLAVIX) 75 mg tablet, Take 2 tablets (150 mg total) by mouth daily, Disp: 180 tablet, Rfl: 2    FREESTYLE LITE test strip, May check blood sugar twice daily, Disp: 100 each, Rfl: 3    GLOBAL EASE INJECT PEN NEEDLES 31G X 5 MM MISC, , Disp: , Rfl:     hydrocortisone 2 5 % cream, , Disp: , Rfl:     JARDIANCE 25 MG TABS, Take 1 tablet (25 mg total) by mouth daily for 90 days, Disp: 90 tablet, Rfl: 0    Lancets (FREESTYLE) lancets, Use as instructed, Disp: 100 each, Rfl: 0    Linagliptin (TRADJENTA) 5 MG TABS, Take 5 mg by mouth daily for 180 days, Disp: 90 tablet, Rfl: 1    liraglutide (VICTOZA) injection, Inject 0 3 mL (1 8 mg total) under the skin daily for 30 days, Disp: 6 mL, Rfl: 0    losartan (COZAAR) 25 mg tablet, Take 1 tablet (25 mg total) by mouth daily for 180 days, Disp: 90 tablet, Rfl: 1    metFORMIN (GLUCOPHAGE) 1000 MG tablet, Take 1 tablet (1,000 mg total) by mouth 2 (two) times a day with meals for 180 days, Disp: 180 tablet, Rfl: 1    traMADol (ULTRAM) 50 mg tablet, Take 50 mg by mouth every 8 (eight) hours as needed for moderate pain, Disp: , Rfl:     montelukast (SINGULAIR) 10 mg tablet, Take 1 tablet (10 mg total) by mouth daily at bedtime for 90 days (Patient not taking: Reported on 12/5/2018 ), Disp: 90 tablet, Rfl: 0  No Known Allergies    Labs:  Appointment on 11/09/2018   Component Date Value    Sodium 11/09/2018 138     Potassium 11/09/2018 4 1     Chloride 11/09/2018 104     CO2 11/09/2018 27     ANION GAP 11/09/2018 7     BUN 11/09/2018 21     Creatinine 11/09/2018 1 07     Glucose, Fasting 11/09/2018 127*    Calcium 11/09/2018 9 9     AST 11/09/2018 8     ALT 11/09/2018 16     Alkaline Phosphatase 11/09/2018 93     Total Protein 11/09/2018 8 0     Albumin 11/09/2018 3 8     Total Bilirubin 11/09/2018 0 64     eGFR 11/09/2018 57     Hemoglobin A1C 11/09/2018 7 9*    EAG 11/09/2018 180     Uric Acid 11/09/2018 5 2    Procedure visit on 10/10/2018   Component Date Value    LEUKOCYTE ESTERASE,UA 10/10/2018 negative     NITRITE,UA 10/10/2018 +     SL AMB POCT UROBILINOGEN 10/10/2018 negative  POCT URINE PROTEIN 10/10/2018 negative      PH,UA 10/10/2018 5     BLOOD,UA 10/10/2018 +     SPECIFIC GRAVITY,UA 10/10/2018 1 015     KETONES,UA 10/10/2018 negative     BILIRUBIN,UA 10/10/2018 negative     GLUCOSE, UA 10/10/2018 negative      COLOR,UA 10/10/2018 yellow     CLARITY,UA 10/10/2018 slightly cloudy     Urine Culture 10/12/2018 >100,000 cfu/ml Escherichia coli*   Admission on 10/01/2018, Discharged on 10/04/2018   Component Date Value    WBC 10/01/2018 6 70     RBC 10/01/2018 4 50     Hemoglobin 10/01/2018 11 6     Hematocrit 10/01/2018 36 4     MCV 10/01/2018 81*    MCH 10/01/2018 25 8*    MCHC 10/01/2018 31 9     RDW 10/01/2018 14 5     MPV 10/01/2018 11 7     Platelets 03/80/9821 193     nRBC 10/01/2018 0     Neutrophils Relative 10/01/2018 76*    Immat GRANS % 10/01/2018 0     Lymphocytes Relative 10/01/2018 16     Monocytes Relative 10/01/2018 6     Eosinophils Relative 10/01/2018 1     Basophils Relative 10/01/2018 1     Neutrophils Absolute 10/01/2018 5 09     Immature Grans Absolute 10/01/2018 0 02     Lymphocytes Absolute 10/01/2018 1 09     Monocytes Absolute 10/01/2018 0 41     Eosinophils Absolute 10/01/2018 0 05     Basophils Absolute 10/01/2018 0 04     Sodium 10/01/2018 136     Potassium 10/01/2018 4 6     Chloride 10/01/2018 105     CO2 10/01/2018 26     ANION GAP 10/01/2018 5     BUN 10/01/2018 17     Creatinine 10/01/2018 1 03     Glucose 10/01/2018 111     Calcium 10/01/2018 9 1     eGFR 10/01/2018 60     Troponin I 10/01/2018 <0 02     Protime 10/01/2018 13 6     INR 10/01/2018 1 03     PTT 10/01/2018 25     Ventricular Rate 10/01/2018 65     Atrial Rate 10/01/2018 65     NC Interval 10/01/2018 210     QRSD Interval 10/01/2018 88     QT Interval 10/01/2018 404     QTC Interval 10/01/2018 420     P Axis 10/01/2018 70     QRS Axis 10/01/2018 -1     T Wave Axis 10/01/2018 79     POC Glucose 10/01/2018 75     Clarity, UA 10/02/2018 Cloudy     Color, UA 10/02/2018 Yellow     Specific Gravity, UA 10/02/2018 1 029     pH, UA 10/02/2018 5 0     Glucose, UA 10/02/2018 >=1000 (1%)*    Ketones, UA 10/02/2018 Negative     Blood, UA 10/02/2018 Small*    Protein, UA 10/02/2018 30 (1+)*    Nitrite, UA 10/02/2018 Positive*    Bilirubin, UA 10/02/2018 Negative     Urobilinogen, UA 10/02/2018 0 2     Leukocytes, UA 10/02/2018 Elevated glucose may cause decreased leukocyte values   See urine microscopic for [facility] result/*    WBC, UA 10/02/2018 10-20*    RBC, UA 10/02/2018 2-4*    Hyaline Casts, UA 10/02/2018 None Seen     Bacteria, UA 10/02/2018 Innumerable*    Epithelial Cells 10/02/2018 None Seen     POC Glucose 10/01/2018 175*    Cholesterol 10/02/2018 90     Triglycerides 10/02/2018 71     HDL, Direct 10/02/2018 37*    LDL Calculated 10/02/2018 39     Sodium 10/02/2018 139     Potassium 10/02/2018 3 5     Chloride 10/02/2018 105     CO2 10/02/2018 26     ANION GAP 10/02/2018 8     BUN 10/02/2018 20     Creatinine 10/02/2018 1 07     Glucose 10/02/2018 153*    Calcium 10/02/2018 9 0     AST 10/02/2018 13     ALT 10/02/2018 18     Alkaline Phosphatase 10/02/2018 88     Total Protein 10/02/2018 7 5     Albumin 10/02/2018 3 2*    Total Bilirubin 10/02/2018 0 76     eGFR 10/02/2018 57     Magnesium 10/02/2018 2 2     Phosphorus 10/02/2018 4 1     WBC 10/02/2018 6 57     RBC 10/02/2018 4 35     Hemoglobin 10/02/2018 11 0*    Hematocrit 10/02/2018 35 2     MCV 10/02/2018 81*    MCH 10/02/2018 25 3*    MCHC 10/02/2018 31 3*    RDW 10/02/2018 14 3     Platelets 62/97/3945 209     MPV 10/02/2018 11 3     Hemoglobin A1C 10/02/2018 7 8*    EAG 10/02/2018 177     P2Y12 10/02/2018 214     POC Glucose 10/02/2018 153*    POC Glucose 10/02/2018 157*    POC Glucose 10/02/2018 156*    POC Glucose 10/02/2018 158*    POC Glucose 10/03/2018 141*    POC Glucose 10/03/2018 251*    POC Glucose 10/03/2018 201*  POC Glucose 10/04/2018 190*    POC Glucose 10/04/2018 254*   Appointment on 08/28/2018   Component Date Value    Creatinine, 24H Ur 08/28/2018 1 3     TOTAL URINE VOLUME 08/28/2018 2400    Appointment on 08/28/2018   Component Date Value    24H Urine Volume 08/28/2018 2400     Protein, 24H Urine 08/28/2018 624*   Appointment on 08/27/2018   Component Date Value    WBC 08/27/2018 6 20     RBC 08/27/2018 4 56     Hemoglobin 08/27/2018 11 3*    Hematocrit 08/27/2018 36 6     MCV 08/27/2018 80*    MCH 08/27/2018 24 8*    MCHC 08/27/2018 30 9*    RDW 08/27/2018 14 4     MPV 08/27/2018 12 2     Platelets 61/59/9476 210     nRBC 08/27/2018 0     Neutrophils Relative 08/27/2018 72     Immat GRANS % 08/27/2018 0     Lymphocytes Relative 08/27/2018 20     Monocytes Relative 08/27/2018 7     Eosinophils Relative 08/27/2018 1     Basophils Relative 08/27/2018 0     Neutrophils Absolute 08/27/2018 4 42     Immature Grans Absolute 08/27/2018 0 01     Lymphocytes Absolute 08/27/2018 1 25     Monocytes Absolute 08/27/2018 0 43     Eosinophils Absolute 08/27/2018 0 07     Basophils Absolute 08/27/2018 0 02     Sodium 08/27/2018 139     Potassium 08/27/2018 3 9     Chloride 08/27/2018 105     CO2 08/27/2018 25     ANION GAP 08/27/2018 9     BUN 08/27/2018 26*    Creatinine 08/27/2018 1 05     Glucose 08/27/2018 147*    Calcium 08/27/2018 9 0     AST 08/27/2018 12     ALT 08/27/2018 22     Alkaline Phosphatase 08/27/2018 83     Total Protein 08/27/2018 7 8     Albumin 08/27/2018 3 6     Total Bilirubin 08/27/2018 0 43     eGFR 08/27/2018 59     Vitamin B-12 08/27/2018 1010*    Iron 08/27/2018 59    Transcribe Orders on 08/27/2018   Component Date Value    Hemoglobin A1C 08/27/2018 8 3*    EAG 08/27/2018 192    Appointment on 06/19/2018   Component Date Value    [place]  06/19/2018 <0 10     Allergen Comment 06/19/2018 See Below     Elm IgE 06/19/2018 <0 10     Allergen Comment 06/19/2018 See Below     Lennon 06/19/2018 <0 10     Allergen Comment 06/19/2018 See Below     Common Silver Zully Lush 06/19/2018 <0 10     Allergen Comment 06/19/2018 See Below    Clifm Ramiro Oneil Clarity Grass 06/19/2018 <0 10     Allergen Comment 06/19/2018 See Below     Blue Grass 06/19/2018 <0 10     Cocksfoot/Orchard Grass 06/19/2018 <0 10     Allergen Comment 06/19/2018 See Below     Common Ragweed 06/19/2018 <0 10     Allergen Comment 06/19/2018 See Below     Plantain 06/19/2018 <0 10     Lamb's Quarter 06/19/2018 <0 10     Allergen Comment 06/19/2018 See Below     SALTW THISTLE 06/19/2018 <0 10     Allergen Comment 06/19/2018 See Below     A  ALTERNATA 06/19/2018 <0 10     Allergen Comment 06/19/2018 See Below     A  FUMIGATUS 06/19/2018 <0 10     Allergen Comment 06/19/2018 See Below     C  HERBARUM 06/19/2018 <0 10     Allergen Comment 06/19/2018 See Below     Cat Epithellium-Dander 06/19/2018 <0 10     Allergen Comment 06/19/2018 See Below     Dog Dander 06/19/2018 <0 10     Allergen Comment 06/19/2018 See Below     Cockroach 06/19/2018 <0 10     Allergen Comment 06/19/2018 See Below     D  farinae 06/19/2018 <0 10     Allergen Comment 06/19/2018 See Below     G005 Dennard, Perennial 06/19/2018 <0 10     IgE 06/19/2018 39 5     D  pteronyssinus 06/19/2018 <0 10     Allergen Comment 06/19/2018 See Below    [place] Ma[place]ol Grass 06/19/2018 <0 10     Allergen Comment 06/19/2018 See Below    Transcribe Orders on 06/19/2018   Component Date Value    Vit D, 25-Hydroxy 06/19/2018 45 6     Miscellaneous Lab Test R* 06/19/2018 SEE WRITTEN REPORT FROM [facility]: No results found  Review of Systems:  Review of Systems   Constitutional: Positive for fatigue  Negative for activity change and unexpected weight change  HENT: Negative for hearing loss and nosebleeds  Eyes: Negative for visual disturbance  Respiratory: Positive for shortness of breath  Negative for apnea, wheezing and stridor  Cardiovascular: Negative for chest pain and leg swelling  Gastrointestinal: Negative for abdominal pain and anal bleeding  Endocrine: Negative for cold intolerance  Genitourinary: Negative for difficulty urinating and hematuria  Musculoskeletal: Positive for arthralgias and gait problem  Negative for myalgias  Skin: Negative for pallor and rash  Neurological: Positive for weakness  Negative for dizziness, facial asymmetry, speech difficulty and numbness  Hematological: Does not bruise/bleed easily  Psychiatric/Behavioral: Negative for sleep disturbance  Physical Exam:  Physical Exam   Constitutional: She is oriented to person, place, and time  No distress  Eyes: Conjunctivae are normal  No scleral icterus  Neck: No JVD present  No tracheal deviation present  No thyromegaly present  Cardiovascular: Normal rate, regular rhythm and intact distal pulses  Exam reveals no gallop and no friction rub  Murmur ( soft systolic ejection murmur at the base) heard  Pulmonary/Chest: Effort normal and breath sounds normal  No stridor  No respiratory distress  She has no wheezes  She has no rales  Neurological: She is alert and oriented to person, place, and time  Skin: Skin is warm and dry  She is not diaphoretic  No erythema  Psychiatric: She has a normal mood and affect  Discussion/Summary:  Exertional dyspnea  EKG is mildly abnormal   Patient has multiple risk factors, will do a pharmacological stress test as she will not be able to walk on a treadmill, she uses a cane  Echocardiogram revealed no significant valvular abnormalities, she does have some mild diastolic dysfunction, possibly normal for age and there is no evidence of pulmonary hypertension, will do a CT scan to assess pulmonary arteries and assess for lung pathology  No medications or changes at the present time    Further recommendations pending the results of the testing, she has noted some ectopy today, will do a Holter monitor as well  Previous CVA of uncertain etiology  She does have an atrial septal aneurysm  Which is associated statistically with cardioembolism but there is no intracardiac shunts  Will continue antiplatelet therapy as is  Recent lipid checks are well control with an LDL of 39 and HDL 37 on statin therapy high-intensity  Blood pressure appears to be well control as well

## 2018-12-07 ENCOUNTER — HOSPITAL ENCOUNTER (OUTPATIENT)
Dept: NON INVASIVE DIAGNOSTICS | Facility: CLINIC | Age: 78
Discharge: HOME | End: 2018-12-07
Payer: COMMERCIAL

## 2018-12-07 DIAGNOSIS — M79.89 SWELLING OF RIGHT LOWER EXTREMITY: ICD-10-CM

## 2018-12-07 PROCEDURE — 93970 EXTREMITY STUDY: CPT

## 2018-12-08 PROCEDURE — 93970 EXTREMITY STUDY: CPT | Performed by: SURGERY

## 2018-12-13 ENCOUNTER — OFFICE VISIT (OUTPATIENT)
Dept: AUDIOLOGY | Age: 78
End: 2018-12-13
Payer: COMMERCIAL

## 2018-12-13 DIAGNOSIS — H90.3 SENSORINEURAL HEARING LOSS, BILATERAL: Primary | ICD-10-CM

## 2018-12-13 PROCEDURE — V5257 HEARING AID, DIGIT, MON, BTE: HCPCS | Performed by: AUDIOLOGIST

## 2018-12-13 PROCEDURE — V5241 DISPENSING FEE, MONAURAL: HCPCS | Performed by: AUDIOLOGIST

## 2018-12-13 NOTE — PROGRESS NOTES
Hearing Aid Fitting    Name:  Davy Pham  :  1940  Age:  66 y o  Date of Evaluation: 18     Davy Pham is being see today to be fit with new hearing aids  Patient is being fit with Jaspreet Gram 2 miniRITE hearing aids  The hearing aid(s) were adjusted based on the patient's most recent audiogram and patient comfort  Patient reported good sound quality  Make [address]   [place] [id number])  [id number] (L)   Color Khadra Ranks   Dome/Mold 8 open   /Length 2-85   Battery 312   Warranty 1/3/2021     Care and cleaning of the hearing aids was reviewed  Batteries were reviewed with the patient  Filters and domes will be reviewed at 2 week follow-up  Insertion and removal of the hearing aids was done  The patient practiced insertion and removal of the devices in the office, they demonstrated good ability to manipulate the hearing aids  Telephone use was reviewed with the patient  The patient expressed satisfaction with the hearing aids  Recommendation:   Follow up in two weeks         Faye Gomez, Audiology Intern  Marta Apple , CCC-A  Clinical Audiologist

## 2018-12-17 ENCOUNTER — APPOINTMENT (OUTPATIENT)
Dept: NON INVASIVE DIAGNOSTICS | Facility: CLINIC | Age: 78
End: 2018-12-17
Payer: COMMERCIAL

## 2018-12-17 ENCOUNTER — HOSPITAL ENCOUNTER (OUTPATIENT)
Dept: NON INVASIVE DIAGNOSTICS | Facility: CLINIC | Age: 78
Discharge: HOME | End: 2018-12-17
Payer: COMMERCIAL

## 2018-12-17 ENCOUNTER — HOSPITAL ENCOUNTER (OUTPATIENT)
Dept: NON INVASIVE DIAGNOSTICS | Facility: CLINIC | Age: 78
End: 2018-12-17
Payer: COMMERCIAL

## 2018-12-17 DIAGNOSIS — I35.1 AORTIC VALVE REGURGITATION, NONRHEUMATIC: ICD-10-CM

## 2018-12-17 DIAGNOSIS — I10 BENIGN ESSENTIAL HYPERTENSION: ICD-10-CM

## 2018-12-17 PROCEDURE — 93226 XTRNL ECG REC<48 HR SCAN A/R: CPT

## 2018-12-17 PROCEDURE — 93225 XTRNL ECG REC<48 HRS REC: CPT

## 2018-12-18 ENCOUNTER — TELEPHONE (OUTPATIENT)
Dept: FAMILY MEDICINE CLINIC | Facility: CLINIC | Age: 78
End: 2018-12-18

## 2018-12-23 PROCEDURE — 93227 XTRNL ECG REC<48 HR R&I: CPT | Performed by: INTERNAL MEDICINE

## 2018-12-26 DIAGNOSIS — I63.89 CEREBROVASCULAR ACCIDENT (CVA) DUE TO OTHER MECHANISM (HCC): Primary | ICD-10-CM

## 2018-12-26 DIAGNOSIS — E11.65 UNCONTROLLED TYPE 2 DIABETES MELLITUS WITH HYPERGLYCEMIA (HCC): ICD-10-CM

## 2018-12-26 DIAGNOSIS — E78.2 MIXED HYPERLIPIDEMIA: ICD-10-CM

## 2018-12-26 RX ORDER — CYANOCOBALAMIN (VITAMIN B-12) 250 MCG
250 TABLET ORAL DAILY
Qty: 90 TABLET | Refills: 1 | Status: SHIPPED | OUTPATIENT
Start: 2018-12-26 | End: 2020-07-23 | Stop reason: ALTCHOICE

## 2018-12-27 RX ORDER — ATORVASTATIN CALCIUM 40 MG/1
40 TABLET, FILM COATED ORAL DAILY
Qty: 90 TABLET | Refills: 0 | OUTPATIENT
Start: 2018-12-27 | End: 2019-06-25

## 2018-12-27 NOTE — TELEPHONE ENCOUNTER
Called patient to make her aware as per note  Patient did not answer, left a voicemail for patient to call back

## 2019-01-10 ENCOUNTER — TELEPHONE (OUTPATIENT)
Dept: INTERNAL MEDICINE CLINIC | Facility: CLINIC | Age: 79
End: 2019-01-10

## 2019-01-10 ENCOUNTER — HOSPITAL ENCOUNTER (OUTPATIENT)
Dept: RADIOLOGY | Facility: HOSPITAL | Age: 79
Discharge: HOME | End: 2019-01-10
Attending: INTERNAL MEDICINE
Payer: COMMERCIAL

## 2019-01-10 ENCOUNTER — TRANSCRIBE ORDERS (OUTPATIENT)
Dept: RADIOLOGY | Facility: HOSPITAL | Age: 79
End: 2019-01-10

## 2019-01-10 DIAGNOSIS — I28.1 PULMONARY ARTERY ANEURYSM (HCC): ICD-10-CM

## 2019-01-10 PROCEDURE — 71250 CT THORAX DX C-: CPT

## 2019-01-10 NOTE — TELEPHONE ENCOUNTER
Pt called, she's tired of pricking her finger to take her blood sugar levels  She would like to be changed to the freestyle lite patches instead of the lancets  Can you please send a script to her pharmacy?

## 2019-01-11 ENCOUNTER — HOSPITAL ENCOUNTER (OUTPATIENT)
Dept: NON INVASIVE DIAGNOSTICS | Facility: CLINIC | Age: 79
Discharge: HOME | End: 2019-01-11
Payer: COMMERCIAL

## 2019-01-11 DIAGNOSIS — I34.0 NON-RHEUMATIC MITRAL REGURGITATION: ICD-10-CM

## 2019-01-11 DIAGNOSIS — I35.1 AORTIC VALVE REGURGITATION, NONRHEUMATIC: ICD-10-CM

## 2019-01-11 DIAGNOSIS — I63.02 CEREBROVASCULAR ACCIDENT (CVA) DUE TO THROMBOSIS OF BASILAR ARTERY (HCC): ICD-10-CM

## 2019-01-11 DIAGNOSIS — E11.65 UNCONTROLLED TYPE 2 DIABETES MELLITUS WITH HYPERGLYCEMIA (HCC): Primary | ICD-10-CM

## 2019-01-11 LAB
ARRHY DURING EX: NORMAL
CHEST PAIN STATEMENT: NORMAL
MAX DIASTOLIC BP: 62 MMHG
MAX HEART RATE: 92 BPM
MAX PREDICTED HEART RATE: 142 BPM
MAX. SYSTOLIC BP: 128 MMHG
PROTOCOL NAME: NORMAL
REASON FOR TERMINATION: NORMAL
TARGET HR FORMULA: NORMAL
TEST INDICATION: NORMAL
TIME IN EXERCISE PHASE: NORMAL

## 2019-01-11 PROCEDURE — 93016 CV STRESS TEST SUPVJ ONLY: CPT | Performed by: INTERNAL MEDICINE

## 2019-01-11 PROCEDURE — 93017 CV STRESS TEST TRACING ONLY: CPT

## 2019-01-11 PROCEDURE — 93018 CV STRESS TEST I&R ONLY: CPT | Performed by: INTERNAL MEDICINE

## 2019-01-11 PROCEDURE — 78452 HT MUSCLE IMAGE SPECT MULT: CPT | Performed by: INTERNAL MEDICINE

## 2019-01-11 PROCEDURE — A9502 TC99M TETROFOSMIN: HCPCS

## 2019-01-11 PROCEDURE — 78452 HT MUSCLE IMAGE SPECT MULT: CPT

## 2019-01-11 RX ORDER — FLASH GLUCOSE SENSOR
KIT MISCELLANEOUS
Qty: 28 EACH | Refills: 1 | Status: SHIPPED | OUTPATIENT
Start: 2019-01-11 | End: 2020-03-26 | Stop reason: ALTCHOICE

## 2019-01-11 RX ORDER — FLASH GLUCOSE SCANNING READER
1 EACH MISCELLANEOUS 3 TIMES DAILY
Qty: 1 DEVICE | Refills: 0 | Status: SHIPPED | OUTPATIENT
Start: 2019-01-11 | End: 2020-01-11

## 2019-01-11 RX ADMIN — REGADENOSON 0.4 MG: 0.08 INJECTION, SOLUTION INTRAVENOUS at 13:45

## 2019-01-15 ENCOUNTER — TELEPHONE (OUTPATIENT)
Dept: FAMILY MEDICINE CLINIC | Facility: CLINIC | Age: 79
End: 2019-01-15

## 2019-02-18 ENCOUNTER — TELEPHONE (OUTPATIENT)
Dept: INTERNAL MEDICINE CLINIC | Facility: CLINIC | Age: 79
End: 2019-02-18

## 2019-02-19 DIAGNOSIS — Z77.011 H/O LEAD EXPOSURE: Primary | ICD-10-CM

## 2019-02-19 NOTE — TELEPHONE ENCOUNTER
ATTEMPTED TO CONTACT PATIENT BUT THERE WAS NO RESPONSE, LEFT VOICEMAIL   WILL ATTEMPT AGAIN AT A LATER TIME

## 2019-02-19 NOTE — TELEPHONE ENCOUNTER
Patient states she had someone come in to test her house  It's in her paint  Around the window bing  She would like to be tested for lead

## 2019-02-25 DIAGNOSIS — E55.9 VITAMIN D DEFICIENCY: Primary | ICD-10-CM

## 2019-03-05 ENCOUNTER — APPOINTMENT (OUTPATIENT)
Dept: LAB | Facility: HOSPITAL | Age: 79
End: 2019-03-05
Payer: COMMERCIAL

## 2019-03-05 ENCOUNTER — TRANSCRIBE ORDERS (OUTPATIENT)
Dept: LAB | Facility: HOSPITAL | Age: 79
End: 2019-03-05

## 2019-03-05 DIAGNOSIS — N18.9 CHRONIC KIDNEY DISEASE, UNSPECIFIED CKD STAGE: Primary | ICD-10-CM

## 2019-03-05 DIAGNOSIS — E79.0 HYPERURICEMIA: ICD-10-CM

## 2019-03-05 DIAGNOSIS — E11.649 UNCONTROLLED TYPE 2 DIABETES MELLITUS WITH HYPOGLYCEMIA, UNSPECIFIED HYPOGLYCEMIA COMA STATUS (HCC): ICD-10-CM

## 2019-03-05 DIAGNOSIS — E66.9 OBESITY, UNSPECIFIED CLASSIFICATION, UNSPECIFIED OBESITY TYPE, UNSPECIFIED WHETHER SERIOUS COMORBIDITY PRESENT: ICD-10-CM

## 2019-03-05 DIAGNOSIS — N18.9 CHRONIC KIDNEY DISEASE, UNSPECIFIED CKD STAGE: ICD-10-CM

## 2019-03-05 DIAGNOSIS — IMO0001 UNCONTROLLED DIABETES MELLITUS TYPE 2 WITHOUT COMPLICATIONS: ICD-10-CM

## 2019-03-05 LAB
25(OH)D3 SERPL-MCNC: 48.9 NG/ML (ref 30–100)
ALBUMIN SERPL BCP-MCNC: 4 G/DL (ref 3.5–5)
ALP SERPL-CCNC: 87 U/L (ref 46–116)
ALT SERPL W P-5'-P-CCNC: 24 U/L (ref 12–78)
ANION GAP SERPL CALCULATED.3IONS-SCNC: 9 MMOL/L (ref 4–13)
AST SERPL W P-5'-P-CCNC: 15 U/L (ref 5–45)
BILIRUB SERPL-MCNC: 0.78 MG/DL (ref 0.2–1)
BUN SERPL-MCNC: 25 MG/DL (ref 5–25)
CALCIUM SERPL-MCNC: 9.5 MG/DL (ref 8.3–10.1)
CHLORIDE SERPL-SCNC: 106 MMOL/L (ref 100–108)
CHOLEST SERPL-MCNC: 117 MG/DL (ref 50–200)
CO2 SERPL-SCNC: 25 MMOL/L (ref 21–32)
CREAT SERPL-MCNC: 1 MG/DL (ref 0.6–1.3)
CREAT UR-MCNC: 48.5 MG/DL
EST. AVERAGE GLUCOSE BLD GHB EST-MCNC: 183 MG/DL
GFR SERPL CREATININE-BSD FRML MDRD: 62 ML/MIN/1.73SQ M
GLUCOSE P FAST SERPL-MCNC: 148 MG/DL (ref 65–99)
HBA1C MFR BLD: 8 % (ref 4.2–6.3)
HDLC SERPL-MCNC: 51 MG/DL (ref 40–60)
LDLC SERPL CALC-MCNC: 50 MG/DL (ref 0–100)
MICROALBUMIN UR-MCNC: 263 MG/L (ref 0–20)
MICROALBUMIN/CREAT 24H UR: 542 MG/G CREATININE (ref 0–30)
POTASSIUM SERPL-SCNC: 4 MMOL/L (ref 3.5–5.3)
PROT SERPL-MCNC: 8.1 G/DL (ref 6.4–8.2)
PTH-INTACT SERPL-MCNC: 47.7 PG/ML (ref 18.4–80.1)
SODIUM SERPL-SCNC: 140 MMOL/L (ref 136–145)
TRIGL SERPL-MCNC: 79 MG/DL
URATE SERPL-MCNC: 4.5 MG/DL (ref 2–6.8)
VIT B12 SERPL-MCNC: 4275 PG/ML (ref 100–900)

## 2019-03-05 PROCEDURE — 82607 VITAMIN B-12: CPT

## 2019-03-05 PROCEDURE — 82570 ASSAY OF URINE CREATININE: CPT

## 2019-03-05 PROCEDURE — 80061 LIPID PANEL: CPT

## 2019-03-05 PROCEDURE — 84550 ASSAY OF BLOOD/URIC ACID: CPT

## 2019-03-05 PROCEDURE — 82043 UR ALBUMIN QUANTITATIVE: CPT

## 2019-03-05 PROCEDURE — 80053 COMPREHEN METABOLIC PANEL: CPT

## 2019-03-05 PROCEDURE — 83970 ASSAY OF PARATHORMONE: CPT

## 2019-03-05 PROCEDURE — 83655 ASSAY OF LEAD: CPT | Performed by: PHYSICIAN ASSISTANT

## 2019-03-05 PROCEDURE — 82306 VITAMIN D 25 HYDROXY: CPT

## 2019-03-05 PROCEDURE — 83036 HEMOGLOBIN GLYCOSYLATED A1C: CPT

## 2019-03-05 PROCEDURE — 36415 COLL VENOUS BLD VENIPUNCTURE: CPT | Performed by: PHYSICIAN ASSISTANT

## 2019-03-06 DIAGNOSIS — I63.50 CEREBROVASCULAR ACCIDENT (CVA) OF PONTINE STRUCTURE (HCC): ICD-10-CM

## 2019-03-06 LAB — LEAD BLD-MCNC: 1 UG/DL (ref 0–4)

## 2019-03-06 RX ORDER — CLOPIDOGREL BISULFATE 75 MG/1
TABLET ORAL
Qty: 90 TABLET | OUTPATIENT
Start: 2019-03-06

## 2019-03-07 ENCOUNTER — OFFICE VISIT (OUTPATIENT)
Dept: OBGYN CLINIC | Facility: CLINIC | Age: 79
End: 2019-03-07

## 2019-03-07 ENCOUNTER — TELEPHONE (OUTPATIENT)
Dept: INTERNAL MEDICINE CLINIC | Facility: CLINIC | Age: 79
End: 2019-03-07

## 2019-03-07 VITALS
WEIGHT: 164.4 LBS | DIASTOLIC BLOOD PRESSURE: 79 MMHG | BODY MASS INDEX: 28.22 KG/M2 | HEART RATE: 73 BPM | SYSTOLIC BLOOD PRESSURE: 145 MMHG

## 2019-03-07 DIAGNOSIS — N32.81 OVERACTIVE BLADDER: Primary | ICD-10-CM

## 2019-03-07 PROCEDURE — 99214 OFFICE O/P EST MOD 30 MIN: CPT | Performed by: OBSTETRICS & GYNECOLOGY

## 2019-03-07 RX ORDER — TROSPIUM CHLORIDE ER 60 MG/1
60 CAPSULE ORAL
Qty: 90 CAPSULE | Refills: 2 | Status: SHIPPED | OUTPATIENT
Start: 2019-03-07 | End: 2020-02-19 | Stop reason: ALTCHOICE

## 2019-03-07 NOTE — PROGRESS NOTES
Urogynecology - [id number] [address] [facility]   [id number]    Chief complaint: I am still leaking all the time    HPI: 66 y o  presents for follow-up for mixed incontinence  Pt c/o urge-dominant incontinence, says that she is able to hold her bladder for several hours but sometimes she doesn't make it upstairs to the bathroom, leaks once a day  Is not using diapers at this time because she doesn't have any  Nocturia 2-3 x but pt has bedside commode so no incontinence  Denies positional incontinence or incontinence with ambulation  Rare MATTHEW with cough/laugh but reports this is not bothersome to her  Has significant anterior compartment prolapse but is managing with a #7 pessary with no complaints  Pt reports she cleans it herself, but reports it has been about 2 months since she last cleaned it  Is not taking vaginal estrogen at this time  Not taking any OAB meds  Prolapse symptoms  Pessary use: yes Type: ring    Hormonal replacement therapy: no      Urinary symptoms  Urgency: yes  Frequency: no 7 / day  Incontinence with stress (exercise, valsalva, laugh, sneeze, cough): yes, only with full bladder (also seen on uros)  Incontinence with urge: yes  Postural incontinence: no  Nocturia:yes 3 / night  Dysuria: no  Hematuria:no  Incomplete emptying: no  Slow stream:no  Hesitancy: no  Straining with urination: no    Defecatory symptoms  Constipation:no    Medical history reviewed, no interval changes  Physical exam:  Vulvovaginal atrophy:  no none  Pessary removed, no vaginal erosions noted  Urethral caruncle:  no none    POP-Q   Not done, pt with known hx prolapse  Negative empty supine CST    Posterior wall relaxation: yes     Urodynamics done 10/10/18 with Dept   Urology  Patient unable to void pre-study, 90cc in bladder     Cystocele prolapsing around pessary, pessary removed prior to testing, cleaned and reinserted post test      CMG:       Position:  sitting           First urge:          82 ml, pdet   0         Normal urge:    100 ml,     pdet  0          Must urge:         Never reached            Capacity:           500 ml,     pdet     5          Max pdet during void    41 cm H2O       Voided volume:    439 ml          Bladder stability:   stable           Compliance:  normal          Sphincter function:              100cc/200cc/300cc no leakage provoked with and without reduction of prolapse              400cc:  + leak at 115 cm of H2O              400cc   + leak at 162 cm of H2O with reduction              500cc   + leak at 147 cm of H2O              500cc   + leak at 133 cm of H2O with reduction     EMG activity:       Normal during filling,        normal during voiding     Comments:  Discussed dipstick results with Stefani Isabel prior to testing, patient asymptomatic and ok to proceed with testing  Urine culture sent  Cipro 500mg x1 given in office post test Test results scanned into media       Findings:  Cystocele, rectocele, + MATTHEW, sensory urgency with normal urge, large capacity (never had must urge)    Assessment:  66 y o  with urge-dominant mixed incontinence and Prolapse Stage 3 cystocele anterior compartment managed with pessary  Plan:  1) OAB: Will start Tropsipium 60mg XR, pt advised to take every day  Also advised timed voiding (2 hr intervals) so her bladder doesn't get too full, and Kegel exercises  Discussed adding another agent if still with urgency in the future, or Botox if really no improvement  2)Prolapse: Pessary removed, cleaned and replaced continue conservative management with pessary given pt having no prolapse complaints otherwise and is able to manage it  Advised to continue Premarin cream 2x/week  3) MATTHEW: Per uros pt only leaking at 400+ capacity with cough, neg empty supine CST  On repeated questioning pt really does not report frequent, bothersome MATTHEW at this time as she rarely coughs/sneezes and does not leak with laughing   Therefore, MATTHEW sx not severe enough to warrant intervention  If pt does require intervention bulking would be better with a pessary  A mid-urethral sling would need to be done with a pelvic reconstructive surgery and given her hx of suboptimally controlled diabetes (A1c = 8), hx stroke on Plavix, she is not the best surgical candidate for an elective procedure  Follow up in 3 month(s) for re-eval of incontinence and pessary miki  Advised pt she needs to have it removed and cleaned a minimum of 3-month intervals      Becca Cevallos MD

## 2019-03-25 ENCOUNTER — TELEPHONE (OUTPATIENT)
Dept: OBGYN CLINIC | Facility: CLINIC | Age: 79
End: 2019-03-25

## 2019-03-28 RX ORDER — GLIPIZIDE 10 MG/1
10 TABLET, FILM COATED, EXTENDED RELEASE ORAL
COMMUNITY
Start: 2012-01-24 | End: 2019-04-01 | Stop reason: ALTCHOICE

## 2019-03-28 RX ORDER — METOPROLOL SUCCINATE 50 MG/1
50 TABLET, EXTENDED RELEASE ORAL
COMMUNITY
Start: 2012-01-24 | End: 2020-02-19 | Stop reason: ALTCHOICE

## 2019-03-28 RX ORDER — HYDROCHLOROTHIAZIDE 25 MG/1
25 TABLET ORAL
COMMUNITY
Start: 2012-01-24 | End: 2019-10-01 | Stop reason: ALTCHOICE

## 2019-03-28 RX ORDER — ALLOPURINOL 100 MG/1
100 TABLET ORAL DAILY
Refills: 2 | COMMUNITY
Start: 2019-02-12 | End: 2020-02-19 | Stop reason: ALTCHOICE

## 2019-03-30 DIAGNOSIS — E78.2 MIXED HYPERLIPIDEMIA: ICD-10-CM

## 2019-04-01 ENCOUNTER — OFFICE VISIT (OUTPATIENT)
Dept: INTERNAL MEDICINE CLINIC | Facility: CLINIC | Age: 79
End: 2019-04-01

## 2019-04-01 VITALS
HEART RATE: 72 BPM | HEIGHT: 64 IN | WEIGHT: 161.82 LBS | BODY MASS INDEX: 27.63 KG/M2 | TEMPERATURE: 98.1 F | DIASTOLIC BLOOD PRESSURE: 70 MMHG | SYSTOLIC BLOOD PRESSURE: 120 MMHG

## 2019-04-01 DIAGNOSIS — N18.30 CKD STAGE 3 DUE TO TYPE 2 DIABETES MELLITUS (HCC): ICD-10-CM

## 2019-04-01 DIAGNOSIS — I35.1 AORTIC VALVE REGURGITATION, NONRHEUMATIC: ICD-10-CM

## 2019-04-01 DIAGNOSIS — E11.22 CKD STAGE 3 DUE TO TYPE 2 DIABETES MELLITUS (HCC): ICD-10-CM

## 2019-04-01 DIAGNOSIS — E11.65 UNCONTROLLED TYPE 2 DIABETES MELLITUS WITH HYPERGLYCEMIA (HCC): ICD-10-CM

## 2019-04-01 DIAGNOSIS — E11.65 UNCONTROLLED TYPE 2 DIABETES MELLITUS WITH HYPERGLYCEMIA (HCC): Primary | ICD-10-CM

## 2019-04-01 DIAGNOSIS — I69.30 HISTORY OF CVA WITH RESIDUAL DEFICIT: Chronic | ICD-10-CM

## 2019-04-01 DIAGNOSIS — I10 BENIGN ESSENTIAL HYPERTENSION: Primary | ICD-10-CM

## 2019-04-01 DIAGNOSIS — H91.93 DECREASED HEARING, BILATERAL: ICD-10-CM

## 2019-04-01 DIAGNOSIS — J30.1 NON-SEASONAL ALLERGIC RHINITIS DUE TO POLLEN: ICD-10-CM

## 2019-04-01 DIAGNOSIS — J06.9 VIRAL UPPER RESPIRATORY TRACT INFECTION: ICD-10-CM

## 2019-04-01 DIAGNOSIS — I69.393 ATAXIA DUE TO OLD CEREBROVASCULAR ACCIDENT: ICD-10-CM

## 2019-04-01 DIAGNOSIS — E78.00 PURE HYPERCHOLESTEROLEMIA: Chronic | ICD-10-CM

## 2019-04-01 PROBLEM — M79.89 SWELLING OF RIGHT LOWER EXTREMITY: Status: RESOLVED | Noted: 2018-11-27 | Resolved: 2019-04-01

## 2019-04-01 PROBLEM — Z77.011 H/O LEAD EXPOSURE: Status: RESOLVED | Noted: 2019-02-19 | Resolved: 2019-04-01

## 2019-04-01 PROBLEM — IMO0002 CREATININE ELEVATION: Status: RESOLVED | Noted: 2018-04-11 | Resolved: 2019-04-01

## 2019-04-01 PROBLEM — R60.0 BILATERAL EDEMA OF LOWER EXTREMITY: Status: RESOLVED | Noted: 2017-08-21 | Resolved: 2019-04-01

## 2019-04-01 PROBLEM — R79.89 CREATININE ELEVATION: Status: RESOLVED | Noted: 2018-04-11 | Resolved: 2019-04-01

## 2019-04-01 PROBLEM — R80.9 MICROALBUMINURIA: Status: RESOLVED | Noted: 2018-01-22 | Resolved: 2019-04-01

## 2019-04-01 PROBLEM — D50.9 MICROCYTIC ANEMIA: Status: RESOLVED | Noted: 2018-04-11 | Resolved: 2019-04-01

## 2019-04-01 PROBLEM — R09.89 DECREASED PEDAL PULSES: Status: RESOLVED | Noted: 2018-11-27 | Resolved: 2019-04-01

## 2019-04-01 PROCEDURE — 99214 OFFICE O/P EST MOD 30 MIN: CPT | Performed by: INTERNAL MEDICINE

## 2019-04-01 PROCEDURE — 3725F SCREEN DEPRESSION PERFORMED: CPT | Performed by: INTERNAL MEDICINE

## 2019-04-01 RX ORDER — ATORVASTATIN CALCIUM 40 MG/1
TABLET, FILM COATED ORAL
Qty: 90 TABLET | Refills: 1 | OUTPATIENT
Start: 2019-04-01

## 2019-04-01 RX ORDER — MONTELUKAST SODIUM 10 MG/1
10 TABLET ORAL
Qty: 90 TABLET | Refills: 0 | Status: SHIPPED | OUTPATIENT
Start: 2019-04-01 | End: 2021-03-04 | Stop reason: SDUPTHER

## 2019-04-01 RX ORDER — EMPAGLIFLOZIN 25 MG/1
25 TABLET, FILM COATED ORAL DAILY
Qty: 90 TABLET | Refills: 0 | Status: SHIPPED | OUTPATIENT
Start: 2019-04-01 | End: 2019-10-02 | Stop reason: SDUPTHER

## 2019-04-02 ENCOUNTER — HOSPITAL ENCOUNTER (EMERGENCY)
Facility: HOSPITAL | Age: 79
Discharge: HOME | End: 2019-04-02
Attending: EMERGENCY MEDICINE
Payer: COMMERCIAL

## 2019-04-02 ENCOUNTER — APPOINTMENT (EMERGENCY)
Dept: RADIOLOGY | Facility: HOSPITAL | Age: 79
End: 2019-04-02
Payer: COMMERCIAL

## 2019-04-02 VITALS
TEMPERATURE: 97 F | RESPIRATION RATE: 18 BRPM | BODY MASS INDEX: 27.64 KG/M2 | OXYGEN SATURATION: 98 % | SYSTOLIC BLOOD PRESSURE: 170 MMHG | DIASTOLIC BLOOD PRESSURE: 79 MMHG | WEIGHT: 161 LBS | HEART RATE: 86 BPM

## 2019-04-02 DIAGNOSIS — M54.2 NECK PAIN: ICD-10-CM

## 2019-04-02 DIAGNOSIS — S16.1XXA STRAIN OF NECK MUSCLE, INITIAL ENCOUNTER: Primary | ICD-10-CM

## 2019-04-02 LAB
ANION GAP SERPL CALCULATED.3IONS-SCNC: 9 MMOL/L (ref 4–13)
BUN SERPL-MCNC: 18 MG/DL (ref 5–25)
CALCIUM SERPL-MCNC: 10 MG/DL (ref 8.3–10.1)
CHLORIDE SERPL-SCNC: 104 MMOL/L (ref 100–108)
CO2 SERPL-SCNC: 24 MMOL/L (ref 21–32)
CREAT SERPL-MCNC: 1.03 MG/DL (ref 0.6–1.3)
GFR SERPL CREATININE-BSD FRML MDRD: 60 ML/MIN/1.73SQ M
GLUCOSE SERPL-MCNC: 102 MG/DL (ref 65–140)
POTASSIUM SERPL-SCNC: 3.7 MMOL/L (ref 3.5–5.3)
SODIUM SERPL-SCNC: 137 MMOL/L (ref 136–145)

## 2019-04-02 PROCEDURE — 70496 CT ANGIOGRAPHY HEAD: CPT

## 2019-04-02 PROCEDURE — 36415 COLL VENOUS BLD VENIPUNCTURE: CPT | Performed by: INTERNAL MEDICINE

## 2019-04-02 PROCEDURE — 70498 CT ANGIOGRAPHY NECK: CPT

## 2019-04-02 PROCEDURE — 99284 EMERGENCY DEPT VISIT MOD MDM: CPT | Performed by: EMERGENCY MEDICINE

## 2019-04-02 PROCEDURE — 80048 BASIC METABOLIC PNL TOTAL CA: CPT | Performed by: INTERNAL MEDICINE

## 2019-04-02 PROCEDURE — 99284 EMERGENCY DEPT VISIT MOD MDM: CPT

## 2019-04-02 PROCEDURE — 20552 NJX 1/MLT TRIGGER POINT 1/2: CPT | Performed by: EMERGENCY MEDICINE

## 2019-04-02 RX ORDER — LIDOCAINE 50 MG/G
1 PATCH TOPICAL ONCE
Status: DISCONTINUED | OUTPATIENT
Start: 2019-04-02 | End: 2019-04-02 | Stop reason: HOSPADM

## 2019-04-02 RX ORDER — LIDOCAINE 50 MG/G
1 PATCH TOPICAL DAILY
Qty: 30 PATCH | Refills: 0 | Status: SHIPPED | OUTPATIENT
Start: 2019-04-02 | End: 2019-10-01 | Stop reason: ALTCHOICE

## 2019-04-02 RX ORDER — LIDOCAINE 50 MG/G
1 PATCH TOPICAL DAILY
Qty: 30 PATCH | Refills: 0 | Status: SHIPPED | OUTPATIENT
Start: 2019-04-02 | End: 2019-04-02 | Stop reason: SDUPTHER

## 2019-04-02 RX ORDER — LIDOCAINE HYDROCHLORIDE 10 MG/ML
10 INJECTION, SOLUTION EPIDURAL; INFILTRATION; INTRACAUDAL; PERINEURAL ONCE
Status: COMPLETED | OUTPATIENT
Start: 2019-04-02 | End: 2019-04-02

## 2019-04-02 RX ADMIN — IOHEXOL 85 ML: 350 INJECTION, SOLUTION INTRAVENOUS at 17:43

## 2019-04-02 RX ADMIN — LIDOCAINE 1 PATCH: 50 PATCH CUTANEOUS at 18:24

## 2019-04-02 RX ADMIN — LIDOCAINE HYDROCHLORIDE 10 ML: 10 INJECTION, SOLUTION EPIDURAL; INFILTRATION; INTRACAUDAL; PERINEURAL at 15:44

## 2019-04-04 ENCOUNTER — OFFICE VISIT (OUTPATIENT)
Dept: INTERNAL MEDICINE CLINIC | Facility: CLINIC | Age: 79
End: 2019-04-04

## 2019-04-04 VITALS — SYSTOLIC BLOOD PRESSURE: 124 MMHG | HEART RATE: 80 BPM | DIASTOLIC BLOOD PRESSURE: 96 MMHG | TEMPERATURE: 97.6 F

## 2019-04-04 DIAGNOSIS — N18.30 CKD STAGE 3 DUE TO TYPE 2 DIABETES MELLITUS (HCC): ICD-10-CM

## 2019-04-04 DIAGNOSIS — I69.30 HISTORY OF CVA WITH RESIDUAL DEFICIT: Chronic | ICD-10-CM

## 2019-04-04 DIAGNOSIS — E11.22 CKD STAGE 3 DUE TO TYPE 2 DIABETES MELLITUS (HCC): ICD-10-CM

## 2019-04-04 DIAGNOSIS — I10 BENIGN ESSENTIAL HYPERTENSION: ICD-10-CM

## 2019-04-04 DIAGNOSIS — M54.2 NECK PAIN, ACUTE: Primary | ICD-10-CM

## 2019-04-04 PROCEDURE — 99213 OFFICE O/P EST LOW 20 MIN: CPT | Performed by: INTERNAL MEDICINE

## 2019-04-04 RX ORDER — NAPROXEN 500 MG/1
500 TABLET ORAL 2 TIMES DAILY WITH MEALS
Qty: 10 TABLET | Refills: 0 | Status: SHIPPED | OUTPATIENT
Start: 2019-04-04 | End: 2019-10-01 | Stop reason: ALTCHOICE

## 2019-04-04 RX ORDER — METHOCARBAMOL 750 MG/1
750 TABLET, FILM COATED ORAL EVERY 8 HOURS SCHEDULED
Qty: 21 TABLET | Refills: 0 | Status: SHIPPED | OUTPATIENT
Start: 2019-04-04 | End: 2019-10-01 | Stop reason: ALTCHOICE

## 2019-04-08 ENCOUNTER — HOSPITAL ENCOUNTER (OUTPATIENT)
Dept: RADIOLOGY | Facility: HOSPITAL | Age: 79
Discharge: HOME | End: 2019-04-08
Attending: ORTHOPAEDIC SURGERY
Payer: COMMERCIAL

## 2019-04-08 ENCOUNTER — OFFICE VISIT (OUTPATIENT)
Dept: OBGYN CLINIC | Facility: HOSPITAL | Age: 79
End: 2019-04-08
Payer: COMMERCIAL

## 2019-04-08 VITALS
WEIGHT: 160 LBS | HEIGHT: 64 IN | BODY MASS INDEX: 27.31 KG/M2 | HEART RATE: 69 BPM | DIASTOLIC BLOOD PRESSURE: 70 MMHG | SYSTOLIC BLOOD PRESSURE: 125 MMHG

## 2019-04-08 DIAGNOSIS — M50.30 DDD (DEGENERATIVE DISC DISEASE), CERVICAL: Primary | ICD-10-CM

## 2019-04-08 DIAGNOSIS — M54.2 NECK PAIN, ACUTE: ICD-10-CM

## 2019-04-08 DIAGNOSIS — M54.12 CERVICAL RADICULOPATHY: ICD-10-CM

## 2019-04-08 PROCEDURE — 99213 OFFICE O/P EST LOW 20 MIN: CPT | Performed by: ORTHOPAEDIC SURGERY

## 2019-04-08 PROCEDURE — 72040 X-RAY EXAM NECK SPINE 2-3 VW: CPT

## 2019-04-16 ENCOUNTER — TELEPHONE (OUTPATIENT)
Dept: FAMILY MEDICINE CLINIC | Facility: CLINIC | Age: 79
End: 2019-04-16

## 2019-04-23 ENCOUNTER — OFFICE VISIT (OUTPATIENT)
Dept: CARDIOLOGY CLINIC | Facility: CLINIC | Age: 79
End: 2019-04-23
Payer: COMMERCIAL

## 2019-04-23 VITALS
BODY MASS INDEX: 28.17 KG/M2 | DIASTOLIC BLOOD PRESSURE: 76 MMHG | WEIGHT: 165 LBS | SYSTOLIC BLOOD PRESSURE: 136 MMHG | HEART RATE: 66 BPM | HEIGHT: 64 IN

## 2019-04-23 DIAGNOSIS — I34.0 NON-RHEUMATIC MITRAL REGURGITATION: ICD-10-CM

## 2019-04-23 DIAGNOSIS — I63.89 CEREBROVASCULAR ACCIDENT (CVA) DUE TO OTHER MECHANISM (HCC): ICD-10-CM

## 2019-04-23 DIAGNOSIS — I35.1 AORTIC VALVE REGURGITATION, NONRHEUMATIC: Primary | ICD-10-CM

## 2019-04-23 DIAGNOSIS — I10 BENIGN ESSENTIAL HYPERTENSION: ICD-10-CM

## 2019-04-23 DIAGNOSIS — I28.1 PULMONARY ARTERY ANEURYSM (HCC): ICD-10-CM

## 2019-04-23 PROCEDURE — 99214 OFFICE O/P EST MOD 30 MIN: CPT | Performed by: INTERNAL MEDICINE

## 2019-04-25 ENCOUNTER — APPOINTMENT (OUTPATIENT)
Dept: LAB | Facility: HOSPITAL | Age: 79
End: 2019-04-25
Attending: INTERNAL MEDICINE
Payer: COMMERCIAL

## 2019-04-25 ENCOUNTER — TRANSCRIBE ORDERS (OUTPATIENT)
Dept: ADMINISTRATIVE | Facility: HOSPITAL | Age: 79
End: 2019-04-25

## 2019-04-25 DIAGNOSIS — E66.9 OBESITY, UNSPECIFIED CLASSIFICATION, UNSPECIFIED OBESITY TYPE, UNSPECIFIED WHETHER SERIOUS COMORBIDITY PRESENT: ICD-10-CM

## 2019-04-25 DIAGNOSIS — N18.9 CHRONIC KIDNEY DISEASE, UNSPECIFIED CKD STAGE: Primary | ICD-10-CM

## 2019-04-25 DIAGNOSIS — E11.649 UNCONTROLLED TYPE 2 DIABETES MELLITUS WITH HYPOGLYCEMIA, UNSPECIFIED HYPOGLYCEMIA COMA STATUS (HCC): ICD-10-CM

## 2019-04-25 DIAGNOSIS — I10 ESSENTIAL HYPERTENSION, MALIGNANT: ICD-10-CM

## 2019-04-25 DIAGNOSIS — N18.9 CHRONIC KIDNEY DISEASE, UNSPECIFIED CKD STAGE: ICD-10-CM

## 2019-04-25 LAB
ALBUMIN SERPL BCP-MCNC: 4.4 G/DL (ref 3–5.2)
ALP SERPL-CCNC: 90 U/L (ref 43–122)
ALT SERPL W P-5'-P-CCNC: 10 U/L (ref 9–52)
ANION GAP SERPL CALCULATED.3IONS-SCNC: 10 MMOL/L (ref 5–14)
AST SERPL W P-5'-P-CCNC: 16 U/L (ref 14–36)
BASOPHILS # BLD AUTO: 0.1 THOUSANDS/ΜL (ref 0–0.1)
BASOPHILS NFR BLD AUTO: 1 % (ref 0–1)
BILIRUB SERPL-MCNC: 0.9 MG/DL
BUN SERPL-MCNC: 19 MG/DL (ref 5–25)
CALCIUM SERPL-MCNC: 10.1 MG/DL (ref 8.4–10.2)
CHLORIDE SERPL-SCNC: 105 MMOL/L (ref 97–108)
CO2 SERPL-SCNC: 28 MMOL/L (ref 22–30)
CREAT SERPL-MCNC: 0.86 MG/DL (ref 0.6–1.2)
EOSINOPHIL # BLD AUTO: 0.1 THOUSAND/ΜL (ref 0–0.4)
EOSINOPHIL NFR BLD AUTO: 1 % (ref 0–6)
ERYTHROCYTE [DISTWIDTH] IN BLOOD BY AUTOMATED COUNT: 16.7 %
EST. AVERAGE GLUCOSE BLD GHB EST-MCNC: 183 MG/DL
GFR SERPL CREATININE-BSD FRML MDRD: 75 ML/MIN/1.73SQ M
GLUCOSE SERPL-MCNC: 138 MG/DL (ref 70–99)
HBA1C MFR BLD: 8 % (ref 4.2–6.3)
HCT VFR BLD AUTO: 38.1 % (ref 36–46)
HGB BLD-MCNC: 12.1 G/DL (ref 12–16)
LYMPHOCYTES # BLD AUTO: 0.8 THOUSANDS/ΜL (ref 0.5–4)
LYMPHOCYTES NFR BLD AUTO: 14 % (ref 25–45)
MCH RBC QN AUTO: 25 PG (ref 26–34)
MCHC RBC AUTO-ENTMCNC: 31.8 G/DL (ref 31–36)
MCV RBC AUTO: 79 FL (ref 80–100)
MICROCYTES BLD QL AUTO: PRESENT
MONOCYTES # BLD AUTO: 0.4 THOUSAND/ΜL (ref 0.2–0.9)
MONOCYTES NFR BLD AUTO: 6 % (ref 1–10)
NEUTROPHILS # BLD AUTO: 4.7 THOUSANDS/ΜL (ref 1.8–7.8)
NEUTS SEG NFR BLD AUTO: 78 % (ref 45–65)
PLATELET # BLD AUTO: 202 THOUSANDS/UL (ref 150–450)
PLATELET BLD QL SMEAR: ADEQUATE
PMV BLD AUTO: 10.2 FL (ref 8.9–12.7)
POTASSIUM SERPL-SCNC: 3.9 MMOL/L (ref 3.6–5)
PROT SERPL-MCNC: 7.8 G/DL (ref 5.9–8.4)
RBC # BLD AUTO: 4.83 MILLION/UL (ref 4–5.2)
RBC MORPH BLD: NORMAL
SODIUM SERPL-SCNC: 143 MMOL/L (ref 137–147)
WBC # BLD AUTO: 6.1 THOUSAND/UL (ref 4.5–11)

## 2019-04-25 PROCEDURE — 85025 COMPLETE CBC W/AUTO DIFF WBC: CPT

## 2019-04-25 PROCEDURE — 36415 COLL VENOUS BLD VENIPUNCTURE: CPT

## 2019-04-25 PROCEDURE — 80053 COMPREHEN METABOLIC PANEL: CPT

## 2019-04-25 PROCEDURE — 83036 HEMOGLOBIN GLYCOSYLATED A1C: CPT

## 2019-05-14 ENCOUNTER — TELEPHONE (OUTPATIENT)
Dept: FAMILY MEDICINE CLINIC | Facility: CLINIC | Age: 79
End: 2019-05-14

## 2019-06-04 ENCOUNTER — TELEPHONE (OUTPATIENT)
Dept: MULTI SPECIALTY CLINIC | Facility: CLINIC | Age: 79
End: 2019-06-04

## 2019-06-04 DIAGNOSIS — E11.65 UNCONTROLLED TYPE 2 DIABETES MELLITUS WITH HYPERGLYCEMIA (HCC): Primary | ICD-10-CM

## 2019-06-05 ENCOUNTER — TELEPHONE (OUTPATIENT)
Dept: FAMILY MEDICINE CLINIC | Facility: CLINIC | Age: 79
End: 2019-06-05

## 2019-06-05 ENCOUNTER — OFFICE VISIT (OUTPATIENT)
Dept: MULTI SPECIALTY CLINIC | Facility: CLINIC | Age: 79
End: 2019-06-05

## 2019-06-05 VITALS
HEART RATE: 80 BPM | DIASTOLIC BLOOD PRESSURE: 72 MMHG | BODY MASS INDEX: 26.67 KG/M2 | HEIGHT: 65 IN | TEMPERATURE: 98.1 F | WEIGHT: 160.05 LBS | SYSTOLIC BLOOD PRESSURE: 122 MMHG

## 2019-06-05 DIAGNOSIS — E11.8 TYPE 2 DIABETES MELLITUS WITH COMPLICATION, WITHOUT LONG-TERM CURRENT USE OF INSULIN (HCC): ICD-10-CM

## 2019-06-05 PROCEDURE — 99213 OFFICE O/P EST LOW 20 MIN: CPT | Performed by: INTERNAL MEDICINE

## 2019-06-10 ENCOUNTER — TELEPHONE (OUTPATIENT)
Dept: NEUROLOGY | Facility: CLINIC | Age: 79
End: 2019-06-10

## 2019-06-12 NOTE — PROGRESS NOTES
Called patient and has been reminded that she have a urine test that need to be completed  Patient understood  No question at this moment

## 2019-06-17 ENCOUNTER — TELEPHONE (OUTPATIENT)
Dept: FAMILY MEDICINE CLINIC | Facility: CLINIC | Age: 79
End: 2019-06-17

## 2019-06-26 ENCOUNTER — APPOINTMENT (OUTPATIENT)
Dept: LAB | Facility: HOSPITAL | Age: 79
End: 2019-06-26
Payer: COMMERCIAL

## 2019-06-26 ENCOUNTER — TRANSCRIBE ORDERS (OUTPATIENT)
Dept: LAB | Facility: HOSPITAL | Age: 79
End: 2019-06-26

## 2019-06-26 DIAGNOSIS — E08.21 DIABETES MELLITUS DUE TO UNDERLYING CONDITION WITH DIABETIC NEPHROPATHY, UNSPECIFIED WHETHER LONG TERM INSULIN USE (HCC): ICD-10-CM

## 2019-06-26 DIAGNOSIS — N18.9 CHRONIC KIDNEY DISEASE, UNSPECIFIED CKD STAGE: ICD-10-CM

## 2019-06-26 DIAGNOSIS — E11.65 UNCONTROLLED TYPE 2 DIABETES MELLITUS WITH HYPERGLYCEMIA (HCC): ICD-10-CM

## 2019-06-26 DIAGNOSIS — IMO0002 UNCONTROLLED TYPE 2 DIABETES MELLITUS WITH COMPLICATION: ICD-10-CM

## 2019-06-26 DIAGNOSIS — E66.9 OBESITY, UNSPECIFIED CLASSIFICATION, UNSPECIFIED OBESITY TYPE, UNSPECIFIED WHETHER SERIOUS COMORBIDITY PRESENT: ICD-10-CM

## 2019-06-26 DIAGNOSIS — N18.9 CHRONIC KIDNEY DISEASE, UNSPECIFIED CKD STAGE: Primary | ICD-10-CM

## 2019-06-26 LAB
25(OH)D3 SERPL-MCNC: 37 NG/ML (ref 30–100)
ALBUMIN SERPL BCP-MCNC: 3.8 G/DL (ref 3.5–5)
ALP SERPL-CCNC: 86 U/L (ref 46–116)
ALT SERPL W P-5'-P-CCNC: 22 U/L (ref 12–78)
ANION GAP SERPL CALCULATED.3IONS-SCNC: 5 MMOL/L (ref 4–13)
AST SERPL W P-5'-P-CCNC: 11 U/L (ref 5–45)
BACTERIA UR QL AUTO: ABNORMAL /HPF
BASOPHILS # BLD AUTO: 0.04 THOUSANDS/ΜL (ref 0–0.1)
BASOPHILS NFR BLD AUTO: 1 % (ref 0–1)
BILIRUB SERPL-MCNC: 0.55 MG/DL (ref 0.2–1)
BILIRUB UR QL STRIP: NEGATIVE
BUN SERPL-MCNC: 20 MG/DL (ref 5–25)
CALCIUM SERPL-MCNC: 9.7 MG/DL (ref 8.3–10.1)
CHLORIDE SERPL-SCNC: 108 MMOL/L (ref 100–108)
CHOLEST SERPL-MCNC: 109 MG/DL (ref 50–200)
CLARITY UR: ABNORMAL
CO2 SERPL-SCNC: 28 MMOL/L (ref 21–32)
COLOR UR: YELLOW
CREAT SERPL-MCNC: 0.93 MG/DL (ref 0.6–1.3)
CREAT UR-MCNC: 52.3 MG/DL
EOSINOPHIL # BLD AUTO: 0.06 THOUSAND/ΜL (ref 0–0.61)
EOSINOPHIL NFR BLD AUTO: 1 % (ref 0–6)
ERYTHROCYTE [DISTWIDTH] IN BLOOD BY AUTOMATED COUNT: 15.7 % (ref 11.6–15.1)
EST. AVERAGE GLUCOSE BLD GHB EST-MCNC: 157 MG/DL
GFR SERPL CREATININE-BSD FRML MDRD: 68 ML/MIN/1.73SQ M
GLUCOSE P FAST SERPL-MCNC: 94 MG/DL (ref 65–99)
GLUCOSE UR STRIP-MCNC: ABNORMAL MG/DL
HBA1C MFR BLD: 7.1 % (ref 4.2–6.3)
HCT VFR BLD AUTO: 38.8 % (ref 34.8–46.1)
HDLC SERPL-MCNC: 54 MG/DL (ref 40–60)
HGB BLD-MCNC: 11.9 G/DL (ref 11.5–15.4)
HGB UR QL STRIP.AUTO: ABNORMAL
IMM GRANULOCYTES # BLD AUTO: 0.01 THOUSAND/UL (ref 0–0.2)
IMM GRANULOCYTES NFR BLD AUTO: 0 % (ref 0–2)
KETONES UR STRIP-MCNC: NEGATIVE MG/DL
LDLC SERPL CALC-MCNC: 42 MG/DL (ref 0–100)
LEUKOCYTE ESTERASE UR QL STRIP: ABNORMAL
LYMPHOCYTES # BLD AUTO: 1.11 THOUSANDS/ΜL (ref 0.6–4.47)
LYMPHOCYTES NFR BLD AUTO: 18 % (ref 14–44)
MCH RBC QN AUTO: 25 PG (ref 26.8–34.3)
MCHC RBC AUTO-ENTMCNC: 30.7 G/DL (ref 31.4–37.4)
MCV RBC AUTO: 82 FL (ref 82–98)
MICROALBUMIN UR-MCNC: 565 MG/L (ref 0–20)
MICROALBUMIN/CREAT 24H UR: 1080 MG/G CREATININE (ref 0–30)
MONOCYTES # BLD AUTO: 0.37 THOUSAND/ΜL (ref 0.17–1.22)
MONOCYTES NFR BLD AUTO: 6 % (ref 4–12)
NEUTROPHILS # BLD AUTO: 4.43 THOUSANDS/ΜL (ref 1.85–7.62)
NEUTS SEG NFR BLD AUTO: 74 % (ref 43–75)
NITRITE UR QL STRIP: NEGATIVE
NON-SQ EPI CELLS URNS QL MICRO: ABNORMAL /HPF
NONHDLC SERPL-MCNC: 55 MG/DL
NRBC BLD AUTO-RTO: 0 /100 WBCS
OTHER STN SPEC: ABNORMAL
PH UR STRIP.AUTO: 6.5 [PH]
PLATELET # BLD AUTO: 209 THOUSANDS/UL (ref 149–390)
PMV BLD AUTO: 12 FL (ref 8.9–12.7)
POTASSIUM SERPL-SCNC: 3.9 MMOL/L (ref 3.5–5.3)
PROT SERPL-MCNC: 7.7 G/DL (ref 6.4–8.2)
PROT UR STRIP-MCNC: ABNORMAL MG/DL
RBC # BLD AUTO: 4.76 MILLION/UL (ref 3.81–5.12)
RBC #/AREA URNS AUTO: ABNORMAL /HPF
SODIUM SERPL-SCNC: 141 MMOL/L (ref 136–145)
SP GR UR STRIP.AUTO: 1.02 (ref 1–1.03)
TRIGL SERPL-MCNC: 65 MG/DL
TSH SERPL DL<=0.05 MIU/L-ACNC: 0.69 UIU/ML (ref 0.36–3.74)
URATE SERPL-MCNC: 4.3 MG/DL (ref 2–6.8)
UROBILINOGEN UR QL STRIP.AUTO: 0.2 E.U./DL
VIT B12 SERPL-MCNC: 2274 PG/ML (ref 100–900)
WBC # BLD AUTO: 6.02 THOUSAND/UL (ref 4.31–10.16)
WBC #/AREA URNS AUTO: ABNORMAL /HPF

## 2019-06-26 PROCEDURE — 82306 VITAMIN D 25 HYDROXY: CPT

## 2019-06-26 PROCEDURE — 82043 UR ALBUMIN QUANTITATIVE: CPT

## 2019-06-26 PROCEDURE — 80053 COMPREHEN METABOLIC PANEL: CPT

## 2019-06-26 PROCEDURE — 87186 SC STD MICRODIL/AGAR DIL: CPT

## 2019-06-26 PROCEDURE — 36415 COLL VENOUS BLD VENIPUNCTURE: CPT

## 2019-06-26 PROCEDURE — 87086 URINE CULTURE/COLONY COUNT: CPT

## 2019-06-26 PROCEDURE — 82607 VITAMIN B-12: CPT

## 2019-06-26 PROCEDURE — 85025 COMPLETE CBC W/AUTO DIFF WBC: CPT

## 2019-06-26 PROCEDURE — 84550 ASSAY OF BLOOD/URIC ACID: CPT

## 2019-06-26 PROCEDURE — 82570 ASSAY OF URINE CREATININE: CPT

## 2019-06-26 PROCEDURE — 87077 CULTURE AEROBIC IDENTIFY: CPT

## 2019-06-26 PROCEDURE — 83036 HEMOGLOBIN GLYCOSYLATED A1C: CPT

## 2019-06-26 PROCEDURE — 87106 FUNGI IDENTIFICATION YEAST: CPT

## 2019-06-26 PROCEDURE — 81001 URINALYSIS AUTO W/SCOPE: CPT

## 2019-06-26 PROCEDURE — 80061 LIPID PANEL: CPT

## 2019-06-26 PROCEDURE — 84443 ASSAY THYROID STIM HORMONE: CPT

## 2019-06-29 LAB
BACTERIA UR CULT: ABNORMAL
BACTERIA UR CULT: ABNORMAL

## 2019-07-05 DIAGNOSIS — E11.22 CKD STAGE 3 DUE TO TYPE 2 DIABETES MELLITUS (HCC): ICD-10-CM

## 2019-07-05 DIAGNOSIS — R80.9 MICROALBUMINURIA: Primary | ICD-10-CM

## 2019-07-05 DIAGNOSIS — E11.65 UNCONTROLLED TYPE 2 DIABETES MELLITUS WITH HYPERGLYCEMIA (HCC): ICD-10-CM

## 2019-07-05 DIAGNOSIS — N18.30 CKD STAGE 3 DUE TO TYPE 2 DIABETES MELLITUS (HCC): ICD-10-CM

## 2019-07-08 ENCOUNTER — TELEPHONE (OUTPATIENT)
Dept: FAMILY MEDICINE CLINIC | Facility: CLINIC | Age: 79
End: 2019-07-08

## 2019-07-08 NOTE — TELEPHONE ENCOUNTER
LM w/ patient re: this [facility] for [facility]  Also was calling patient to update her Goals for the year, and to let her know her A1c has improved  Left my call back # if she has ?s    DDS

## 2019-07-16 ENCOUNTER — TELEPHONE (OUTPATIENT)
Dept: INTERNAL MEDICINE CLINIC | Facility: CLINIC | Age: 79
End: 2019-07-16

## 2019-07-16 DIAGNOSIS — N30.00 ACUTE CYSTITIS WITHOUT HEMATURIA: Primary | ICD-10-CM

## 2019-07-16 RX ORDER — NITROFURANTOIN 25; 75 MG/1; MG/1
100 CAPSULE ORAL 2 TIMES DAILY
Qty: 10 CAPSULE | Refills: 0 | Status: SHIPPED | OUTPATIENT
Start: 2019-07-16 | End: 2019-07-21

## 2019-07-16 NOTE — TELEPHONE ENCOUNTER
Patient returned our call in regards to the lab result notes from 6/26/19  Patient was not sure who ordered the urine test and has not received a call with the results of it  I informed patient per note that she does have a urine infection  Patient verbalized understanding  Patient stated she uses a cream, but cannot remember the name and did not have it accessible at the time of call

## 2019-07-18 ENCOUNTER — CONSULT (OUTPATIENT)
Dept: NEUROLOGY | Facility: CLINIC | Age: 79
End: 2019-07-18
Payer: COMMERCIAL

## 2019-07-18 VITALS
SYSTOLIC BLOOD PRESSURE: 130 MMHG | HEART RATE: 69 BPM | WEIGHT: 161.1 LBS | DIASTOLIC BLOOD PRESSURE: 70 MMHG | BODY MASS INDEX: 26.84 KG/M2 | HEIGHT: 65 IN

## 2019-07-18 DIAGNOSIS — I69.30 HISTORY OF CVA WITH RESIDUAL DEFICIT: Chronic | ICD-10-CM

## 2019-07-18 DIAGNOSIS — I69.393 ATAXIA DUE TO OLD CEREBROVASCULAR ACCIDENT: ICD-10-CM

## 2019-07-18 DIAGNOSIS — I63.9 CEREBROVASCULAR ACCIDENT (CVA), UNSPECIFIED MECHANISM (HCC): Primary | ICD-10-CM

## 2019-07-18 DIAGNOSIS — I10 BENIGN ESSENTIAL HYPERTENSION: ICD-10-CM

## 2019-07-18 PROCEDURE — 99214 OFFICE O/P EST MOD 30 MIN: CPT | Performed by: PSYCHIATRY & NEUROLOGY

## 2019-07-18 NOTE — PROGRESS NOTES
Patient ID: Robert Client is a 66 y o  female  Assessment/Plan: This is a 65 y/o F who is here as a follow up of left pontine stroke in 2010, on plavix, hypertension, DM  No new TIA/CVA like symptoms  She is compliant with her medications  She does use a cane to ambulate for balance issues  PLAN:  -c/w with combination of plavix 150mg PO qdaily and lipitor for secondary stroke prevention  She is on plavix 150mg PO qdaily since her P2y12 assay is subtherapeutic    -will check P2Y12 assay  -Continue with BP goal < 130/80, BP today is at goal currently  She does check her BP daily    -Continue to monitor DM and appropriate Euglycemic control  -Defer to PCP regarding DM and BP management  -recommend PCP check LDL levels, goal LDL <70  -does not smoke  -denies any history of snoring  -I advised patient to avoid using NSAIDs for headaches or other pain and instead just stick to tylenol    -I educated regarding mediterranean style diet and regular exercise regimen of brisk walking atleast 4-5 times a week  -I educated patient and family regarding medication compliance, stroke risk factor modifications  I would like to follow up in 1 year  I would be happy to see the patient sooner if any new questions/concerns arise  Patient/Guardian was advised to the call the office if they have any questions and concerns in the meantime  Patient/Guardian does understand that if they have any new stroke like symptoms such as facial droop on one side, weakness/paralysis on either side, speech trouble, numbness on one side, balance issues, any vision changes, extreme dizziness or any new headache, to call 9-1-1 immediately or to proceed to the nearest ER immediately          Problem List Items Addressed This Visit        Cardiovascular and Mediastinum    Benign essential hypertension    CVA (cerebrovascular accident) ([facility] [address] ) - Primary       Other    History of CVA with residual deficit (Chronic)    Relevant Orders    P2Y12 Platelet inhibitor    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             Subjective:    HPI    This is a 65 y/o F who is here as a follow up for left pontine CVA in 2010, on plavix, hypertension, diabetes  She states that her stroke was in 2011  She does have balance issues as her residual deficit of her stroke  She says that she was recently taken off her B12 injections  She says that she is compliant w/ her medications  She denied any new symptoms (such as stroke like symptoms)  She is on plavix for stroke prevention  She was initially on aspirin and was switched to plavix once she had the stroke  She states that she was also on lipitor 40mg PO qdaily for stroke prevention  She is tolerating her medications well without any side effects  She uses a cane for long distances, and she finished PT two years ago and she has been using a cane since then  She does check her BP at home once daily  The following portions of the patient's history were reviewed and updated as appropriate:   She  has a past medical history of ACE-inhibitor cough, Asthma, Bilateral edema of lower extremity, Cardiac murmur, Diabetes mellitus ([facility] [address] ), Esophageal dysphagia, Fatigue, Hyperlipidemia, Hypertension, Patellar bursitis of right knee, Personal history of other specified conditions, Stroke 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 11/12/2018    CKD stage 3 due to type 2 diabetes mellitus ([facility] [address] ) 05/02/2018    Esophageal abnormality 04/20/2018    Ambulatory dysfunction 04/11/2018    Pure hypercholesterolemia 04/11/2018    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/28/2018    Chronic rhinitis 01/22/2018    Aortic valve regurgitation, nonrheumatic 09/21/2017    Non-rheumatic mitral regurgitation 08/25/2017    Benign essential hypertension 06/28/2017    CVA (cerebrovascular accident) ([facility] [address] ) 06/28/2017    Diabetes mellitus type 2, uncontrolled ([facility] ) 06/28/2017    Midline cystocele 11/08/2016     She  has a past surgical history that includes pr esophagogastroduodenoscopy transoral diagnostic (N/A, 4/5/2017)  Her family history includes Coronary artery disease in her sister; Diabetes in her son; Heart disease in her family and father; Hypertension in her mother and son; Migraines in her sister; Osteoporosis in her maternal grandmother and sister; Other in her sister; Rheum arthritis in her son; Stroke in her mother; Thyroid disease in her sister  She  reports that she has quit smoking  She smoked 3 00 packs per day  She has quit using smokeless tobacco  She reports that she does not drink alcohol or use drugs    Current Outpatient Medications   Medication Sig Dispense Refill    amLODIPine (NORVASC) 5 mg tablet Take 1 tablet (5 mg total) by mouth daily for 180 days 90 tablet 1    atorvastatin (LIPITOR) 40 mg tablet Take 1 tablet (40 mg total) by mouth daily for 180 days 90 tablet 1    Blood Glucose Monitoring Suppl (FREESTYLE LITE) JAQUELIN by Does not apply route daily 1 each 0    Cholecalciferol (VITAMIN D3) 2000 units capsule Take 1 tablet by mouth daily      clopidogrel (PLAVIX) 75 mg tablet Take 2 tablets (150 mg total) by mouth daily 180 tablet 2    conjugated estrogens (PREMARIN) vaginal cream Apply pea-sized amount in vagina two nights a week 30 g 2    Continuous Blood Gluc  (FREESTYLE ADAM 14 DAY READER) JAQUELIN 1 Units by Does not apply route 3 (three) times a day 1 Device 0    Continuous Blood Gluc Sensor (FREESTYLE ADAM 14 DAY SENSOR) [facility] Apply 1 sensor to arm every 14 days to monitor blood sugar 3 times daily 28 each 1    D-2000 MAXIMUM STRENGTH 2000 units TABS TAKE 1 TAB DAILY 90 tablet 1    FREESTYLE LITE test strip May check blood sugar twice daily 100 each 3    GLOBAL EASE INJECT PEN NEEDLES 31G X 5 MM MISC       Lancets (FREESTYLE) lancets Use as instructed 100 each 0    liraglutide (VICTOZA) injection Inject 0 3 mL (1 8 mg total) under the skin daily for 90 days 9 mL 2    losartan (COZAAR) 25 mg tablet Take 1 tablet (25 mg total) by mouth daily for 180 days 90 tablet 1    metFORMIN (GLUCOPHAGE) 1000 MG tablet Take 1 tablet (1,000 mg total) by mouth 2 (two) times a day with meals for 180 days 180 tablet 1    montelukast (SINGULAIR) 10 mg tablet Take 1 tablet (10 mg total) by mouth daily at bedtime for 90 days 90 tablet 0    trospium (SANCTURA XR) 60 mg 24 hr capsule Take 1 capsule (60 mg total) by mouth daily before breakfast 90 capsule 2    acetaminophen (TYLENOL) 650 mg CR tablet Take 650 mg by mouth every 6 (six) hours as needed for mild pain      allopurinol (ZYLOPRIM) 100 mg tablet 100 mg daily  2    diclofenac sodium (VOLTAREN) 1 % Apply 2 g topically 4 (four) times a day 100 g 0    hydrochlorothiazide (HYDRODIURIL) 25 mg tablet Take 25 mg by mouth      hydrocortisone 2 5 % cream       JARDIANCE 25 MG TABS Take 1 tablet (25 mg total) by mouth daily for 90 days 90 tablet 0    lidocaine (LIDODERM) 5 % Apply 1 patch topically daily Remove & Discard patch within 12 hours or as directed by MD (Patient not taking: Reported on 6/5/2019) 30 patch 0    methocarbamol (ROBAXIN) 750 mg tablet Take 1 tablet (750 mg total) by mouth every 8 (eight) hours for 7 days (Patient not taking: Reported on 6/5/2019) 21 tablet 0    metoprolol succinate (TOPROL-XL) 50 mg 24 hr tablet Take 50 mg by mouth      naproxen (EC NAPROSYN) 500 MG EC tablet Take 1 tablet (500 mg total) by mouth 2 (two) times a day with meals for 5 days 10 tablet 0    nitrofurantoin (MACROBID) 100 mg capsule Take 1 capsule (100 mg total) by mouth 2 (two) times a day for 5 days (Patient not taking: Reported on 7/18/2019) 10 capsule 0    vitamin B-12 (CYANOCOBALAMIN) 250 MCG TABS Take 1 tablet (250 mcg total) by mouth daily for 180 days (Patient not taking: Reported on 7/18/2019) 90 tablet 1     No current facility-administered medications for this visit        Current Outpatient Medications on File Prior to Visit   Medication Sig    amLODIPine (NORVASC) 5 mg tablet Take 1 tablet (5 mg total) by mouth daily for 180 days    atorvastatin (LIPITOR) 40 mg tablet Take 1 tablet (40 mg total) by mouth daily for 180 days    Blood Glucose Monitoring Suppl (FREESTYLE LITE) JAQUELIN by Does not apply route daily    Cholecalciferol (VITAMIN D3) 2000 units capsule Take 1 tablet by mouth daily    clopidogrel (PLAVIX) 75 mg tablet Take 2 tablets (150 mg total) by mouth daily    conjugated estrogens (PREMARIN) vaginal cream Apply pea-sized amount in vagina two nights a week    Continuous Blood Gluc  (FREESTYLE ADAM 14 DAY READER) JAQUELIN 1 Units by Does not apply route 3 (three) times a day    Continuous Blood Gluc Sensor (FREESTYLE ADAM 14 DAY SENSOR) [facility] [place] Apply 1 sensor to arm every 14 days to monitor blood sugar 3 times daily    D-2000 MAXIMUM STRENGTH 2000 units TABS TAKE 1 TAB DAILY    FREESTYLE LITE test strip May check blood sugar twice daily    GLOBAL EASE INJECT PEN NEEDLES 31G X 5 MM MISC     Lancets (FREESTYLE) lancets Use as instructed    liraglutide (VICTOZA) injection Inject 0 3 mL (1 8 mg total) under the skin daily for 90 days    losartan (COZAAR) 25 mg tablet Take 1 tablet (25 mg total) by mouth daily for 180 days    metFORMIN (GLUCOPHAGE) 1000 MG tablet Take 1 tablet (1,000 mg total) by mouth 2 (two) times a day with meals for 180 days    montelukast (SINGULAIR) 10 mg tablet Take 1 tablet (10 mg total) by mouth daily at bedtime for 90 days    trospium (SANCTURA XR) 60 mg 24 hr capsule Take 1 capsule (60 mg total) by mouth daily before breakfast    acetaminophen (TYLENOL) 650 mg CR tablet Take 650 mg by mouth every 6 (six) hours as needed for mild pain    allopurinol (ZYLOPRIM) 100 mg tablet 100 mg daily    diclofenac sodium (VOLTAREN) 1 % Apply 2 g topically 4 (four) times a day    hydrochlorothiazide (HYDRODIURIL) 25 mg tablet Take 25 mg by mouth    hydrocortisone 2 5 % cream     JARDIANCE 25 MG TABS Take 1 tablet (25 mg total) by mouth daily for 90 days    lidocaine (LIDODERM) 5 % Apply 1 patch topically daily Remove & Discard patch within 12 hours or as directed by MD (Patient not taking: Reported on 6/5/2019)    methocarbamol (ROBAXIN) 750 mg tablet Take 1 tablet (750 mg total) by mouth every 8 (eight) hours for 7 days (Patient not taking: Reported on 6/5/2019)    metoprolol succinate (TOPROL-XL) 50 mg 24 hr tablet Take 50 mg by mouth    naproxen (EC NAPROSYN) 500 MG EC tablet Take 1 tablet (500 mg total) by mouth 2 (two) times a day with meals for 5 days    nitrofurantoin (MACROBID) 100 mg capsule Take 1 capsule (100 mg total) by mouth 2 (two) times a day for 5 days (Patient not taking: Reported on 7/18/2019)    vitamin B-12 (CYANOCOBALAMIN) 250 MCG TABS Take 1 tablet (250 mcg total) by mouth daily for 180 days (Patient not taking: Reported on 7/18/2019)     No current facility-administered medications on file prior to visit  She has No Known Allergies            Objective:    Blood pressure 130/70, pulse 69, height 5' 5" (1 651 m), weight 73 1 kg (161 lb 1 6 oz), not currently breastfeeding  Physical Exam  General - Patient is alert, awake, follows commands  Speech - no dysarthria noted, no aphasia noted  Neuro:   Cranial nerves: PERRL, EOMI, no facial droop noted, facial sensation intact, tongue midline  Motor: 5/5 throughout  Sensory - intact to soft touch throughout  Coordination - no ataxia/dysmetria noted  Gait - ataxic but ok with a cane       ROS:  I personally reviewed ROS  Review of Systems   Constitutional: Negative  Negative for appetite change and fever  HENT: Negative  Negative for hearing loss, tinnitus, trouble swallowing and voice change  Eyes: Negative  Negative for photophobia and pain  Respiratory: Negative  Negative for shortness of breath  Cardiovascular: Negative  Negative for palpitations  Gastrointestinal: Negative  Negative for nausea and vomiting  Endocrine: Negative  Negative for cold intolerance and heat intolerance  Genitourinary: Negative  Negative for dysuria, frequency and urgency  Loss of bladder control   Musculoskeletal: Positive for gait problem (balance problems)  Negative for myalgias and neck pain  Skin: Negative  Negative for rash  Hair loss   Neurological: Negative for dizziness, tremors, seizures, syncope, facial asymmetry, speech difficulty, weakness, light-headedness, numbness and headaches  Memory problems   Hematological: Negative  Does not bruise/bleed easily  Psychiatric/Behavioral: Negative  Negative for confusion, hallucinations and sleep disturbance

## 2019-07-30 ENCOUNTER — TRANSCRIBE ORDERS (OUTPATIENT)
Dept: LAB | Facility: HOSPITAL | Age: 79
End: 2019-07-30

## 2019-07-30 ENCOUNTER — APPOINTMENT (OUTPATIENT)
Dept: LAB | Facility: HOSPITAL | Age: 79
End: 2019-07-30
Attending: PSYCHIATRY & NEUROLOGY
Payer: COMMERCIAL

## 2019-07-30 DIAGNOSIS — I69.30 HISTORY OF CVA WITH RESIDUAL DEFICIT: Chronic | ICD-10-CM

## 2019-07-30 DIAGNOSIS — I69.30 HISTORY OF CVA WITH RESIDUAL DEFICIT: Primary | ICD-10-CM

## 2019-07-30 PROCEDURE — 36415 COLL VENOUS BLD VENIPUNCTURE: CPT

## 2019-08-01 ENCOUNTER — APPOINTMENT (OUTPATIENT)
Dept: LAB | Facility: HOSPITAL | Age: 79
End: 2019-08-01
Attending: PSYCHIATRY & NEUROLOGY
Payer: COMMERCIAL

## 2019-08-01 LAB — PA ADP BLD-ACNC: 176 PRU (ref 194–418)

## 2019-08-01 PROCEDURE — 85576 BLOOD PLATELET AGGREGATION: CPT | Performed by: PSYCHIATRY & NEUROLOGY

## 2019-08-01 PROCEDURE — 36415 COLL VENOUS BLD VENIPUNCTURE: CPT | Performed by: PSYCHIATRY & NEUROLOGY

## 2019-08-02 ENCOUNTER — TELEPHONE (OUTPATIENT)
Dept: NEUROLOGY | Facility: CLINIC | Age: 79
End: 2019-08-02

## 2019-08-02 NOTE — TELEPHONE ENCOUNTER
----- Message from Se Costello RN sent at 8/2/2019 11:19 AM EDT -----      ----- Message -----  From: Trav Kenny MD  Sent: 8/2/2019  10:34 AM EDT  To: [facility]    Please let patient know to continue to take plavix at the current dose, her range is in the therapeutic range  Thanks!

## 2019-08-06 ENCOUNTER — TELEPHONE (OUTPATIENT)
Dept: FAMILY MEDICINE CLINIC | Facility: CLINIC | Age: 79
End: 2019-08-06

## 2019-08-08 ENCOUNTER — OFFICE VISIT (OUTPATIENT)
Dept: OBGYN CLINIC | Facility: CLINIC | Age: 79
End: 2019-08-08

## 2019-08-08 VITALS
BODY MASS INDEX: 27.45 KG/M2 | DIASTOLIC BLOOD PRESSURE: 80 MMHG | WEIGHT: 160.8 LBS | HEART RATE: 82 BPM | SYSTOLIC BLOOD PRESSURE: 150 MMHG | HEIGHT: 64 IN

## 2019-08-08 DIAGNOSIS — R31.9 HEMATURIA: Primary | ICD-10-CM

## 2019-08-08 LAB
SL AMB  POCT GLUCOSE, UA: 1000
SL AMB LEUKOCYTE ESTERASE,UA: POSITIVE
SL AMB POCT BILIRUBIN,UA: NEGATIVE
SL AMB POCT BLOOD,UA: ABNORMAL
SL AMB POCT CLARITY,UA: ABNORMAL
SL AMB POCT COLOR,UA: YELLOW
SL AMB POCT KETONES,UA: NEGATIVE
SL AMB POCT NITRITE,UA: NEGATIVE
SL AMB POCT PH,UA: 5
SL AMB POCT SPECIFIC GRAVITY,UA: 1.02
SL AMB POCT URINE PROTEIN: NEGATIVE
SL AMB POCT UROBILINOGEN: 0.2

## 2019-08-08 PROCEDURE — 87106 FUNGI IDENTIFICATION YEAST: CPT | Performed by: OBSTETRICS & GYNECOLOGY

## 2019-08-08 PROCEDURE — 81002 URINALYSIS NONAUTO W/O SCOPE: CPT | Performed by: STUDENT IN AN ORGANIZED HEALTH CARE EDUCATION/TRAINING PROGRAM

## 2019-08-08 PROCEDURE — 99214 OFFICE O/P EST MOD 30 MIN: CPT | Performed by: STUDENT IN AN ORGANIZED HEALTH CARE EDUCATION/TRAINING PROGRAM

## 2019-08-08 PROCEDURE — 87086 URINE CULTURE/COLONY COUNT: CPT | Performed by: OBSTETRICS & GYNECOLOGY

## 2019-08-08 PROCEDURE — 81001 URINALYSIS AUTO W/SCOPE: CPT | Performed by: OBSTETRICS & GYNECOLOGY

## 2019-08-08 NOTE — PROGRESS NOTES
Urogynecology - Follow-up clinic note  Reed Jeans   [id number]    Lithuanian-speaking: no Phone  used: no    Chief complaint: Urge Incontinence     HPI: 78 y o  [id number] presents as follow-up for symptoms of Urge incontinence and Prolapse Stage 3 Cystocele managed with a Pessary  Patient complains of urge incontinence  States that she is able to hold her urine with rare episodes of incontinence  States that she uses pads however states that she does not have episodes of incontinence and wears them only at night  Complains of Nocturia 2-3x a night but states that she has a bedside commode and also regularly uses the restroom without episodes of incontinence  Denies any MATTHEW  Since her last visit, patient states that she has started the Trospium 60mg XR however also started to drink more carbonated beverages throughout the day  States that her symptoms are worse at night  States that her symptoms of urge have not worsened since starting the Trospium  Patient also states that she has been using Premarin Cream twice weekly  States that she regularly is able to take her pessary out to clean as well as insert it without difficulty  Uses a #7 ring pessary without difficulty  Patient states that she was started on antibiotics earlier this week for suspected UTI  Prolapse symptoms  Pessary use: yes Type: ring    Hormonal replacement therapy: yes  Duration: 3 month(s)    Urinary symptoms  Urgency: yes    Frequency: yes 4 / day  Incontinence with stress (exercise, valsalva, laugh, sneeze, cough): no   Incontinence with urge: no  Postural incontinence: no   Nocturia:yes 3 / night  Dysuria: yes   Hematuria:no   Incomplete emptying: no   Slow stream:no   Hesitancy: no   Straining with urination: no    Defecatory symptoms  Constipation:no     Medical history and medication list reviewed and no significant changes since last visit   yes      Physical exam:  Abdomen: soft, non-tender, without masses or organomegaly  Vulvovaginal atrophy:  no none  Pessary removed, no vaginal erosions noted  Urethral caruncle:  no none    POP Q (if applicable)  Not done, pt with known hx of prolapse    Assessment:  66 y o  with urge-dominant mixed incontinence and Prolapse Stage 3 cystocele anterior compartment managed with pessary  Plan:  1) OAB: Patient advised to continue Trospium 60mg XR daily  Advised to take medication at night instead of early morning as patients urge commonly occurs at night with nocturia  Patient also advised to avoid bladder irritants as they will contribute to OAB symptoms  Once again stressed importance of bladder training  Discussed adding another agent if still with urgency in the future, or Botox if really no improvement     2)Prolapse: Pessary removed, cleaned and replaced continue conservative management with pessary given pt having no prolapse complaints otherwise and is able to manage it  Advised to continue Premarin cream 2x/week     3) MATTHEW: Patient states that MATTHEW very minimal and rare  States that symptoms are not bothersome to her and therefore does not require intervention  4) Suspected UTI: Clean Catch UA and Ucx obtained today will call with results       Follow up in 3 month(s) for re-eval of incontinence and pessary miki  Advised pt she needs to have it removed and cleaned a minimum of 3-month intervals      Zayra Leslie MD

## 2019-08-09 LAB
BACTERIA UR QL AUTO: ABNORMAL /HPF
BILIRUB UR QL STRIP: NEGATIVE
CLARITY UR: ABNORMAL
COLOR UR: YELLOW
GLUCOSE UR STRIP-MCNC: ABNORMAL MG/DL
HGB UR QL STRIP.AUTO: ABNORMAL
KETONES UR STRIP-MCNC: NEGATIVE MG/DL
LEUKOCYTE ESTERASE UR QL STRIP: ABNORMAL
NITRITE UR QL STRIP: NEGATIVE
NON-SQ EPI CELLS URNS QL MICRO: ABNORMAL /HPF
PH UR STRIP.AUTO: 6 [PH]
PROT UR STRIP-MCNC: ABNORMAL MG/DL
RBC #/AREA URNS AUTO: ABNORMAL /HPF
SP GR UR STRIP.AUTO: 1.02 (ref 1–1.03)
UROBILINOGEN UR QL STRIP.AUTO: 0.2 E.U./DL
WBC #/AREA URNS AUTO: ABNORMAL /HPF

## 2019-08-10 DIAGNOSIS — B37.9 YEAST INFECTION: Primary | ICD-10-CM

## 2019-08-10 RX ORDER — FLUCONAZOLE 150 MG/1
150 TABLET ORAL ONCE
Qty: 1 TABLET | Refills: 0 | Status: SHIPPED | OUTPATIENT
Start: 2019-08-10 | End: 2019-08-10

## 2019-08-10 NOTE — ASSESSMENT & PLAN NOTE
UCx resulted candida  Likely vaginal source, Rx of 150mg Diflucan PO x1 sent to pt's pharmacy  If still symptomatic and positive f/u urine Cx then consider treatment of candidal cystitis with 200mg Diflucan daily for 2 weeks

## 2019-08-11 LAB
BACTERIA UR CULT: ABNORMAL
BACTERIA UR CULT: ABNORMAL

## 2019-08-20 ENCOUNTER — TELEPHONE (OUTPATIENT)
Dept: NEUROLOGY | Facility: CLINIC | Age: 79
End: 2019-08-20

## 2019-08-20 NOTE — TELEPHONE ENCOUNTER
salvador  Received fax from [facility] reporting possible drug interaction: Records indicate pt received the following prescriptions clopidogrel and fluconazole  Concomitant administration of these medications should be avoided, as this combination may result in refused clinical efficacy of clopidrogel and an increased risk of cardiovascular events d/t enzyme CYP 2c19 inhibition  It looks like fluconazole 150 mg was only ordered 1 tab once for 1 dose

## 2019-08-27 ENCOUNTER — TELEPHONE (OUTPATIENT)
Dept: FAMILY MEDICINE CLINIC | Facility: CLINIC | Age: 79
End: 2019-08-27

## 2019-09-06 ENCOUNTER — TRANSCRIBE ORDERS (OUTPATIENT)
Dept: ADMINISTRATIVE | Facility: HOSPITAL | Age: 79
End: 2019-09-06

## 2019-09-06 ENCOUNTER — APPOINTMENT (OUTPATIENT)
Dept: LAB | Facility: HOSPITAL | Age: 79
End: 2019-09-06
Attending: INTERNAL MEDICINE
Payer: COMMERCIAL

## 2019-09-06 DIAGNOSIS — E11.649 UNCONTROLLED TYPE 2 DIABETES MELLITUS WITH HYPOGLYCEMIA, UNSPECIFIED HYPOGLYCEMIA COMA STATUS (HCC): ICD-10-CM

## 2019-09-06 DIAGNOSIS — E78.5 DYSLIPIDEMIA: ICD-10-CM

## 2019-09-06 DIAGNOSIS — I10 HYPERTENSION, UNSPECIFIED TYPE: ICD-10-CM

## 2019-09-06 DIAGNOSIS — E11.649 UNCONTROLLED TYPE 2 DIABETES MELLITUS WITH HYPOGLYCEMIA, UNSPECIFIED HYPOGLYCEMIA COMA STATUS (HCC): Primary | ICD-10-CM

## 2019-09-06 LAB
ALBUMIN SERPL BCP-MCNC: 4.2 G/DL (ref 3–5.2)
ALP SERPL-CCNC: 92 U/L (ref 43–122)
ALT SERPL W P-5'-P-CCNC: 16 U/L (ref 9–52)
ANION GAP SERPL CALCULATED.3IONS-SCNC: 8 MMOL/L (ref 5–14)
AST SERPL W P-5'-P-CCNC: 18 U/L (ref 14–36)
BILIRUB SERPL-MCNC: 0.5 MG/DL
BUN SERPL-MCNC: 24 MG/DL (ref 5–25)
CALCIUM SERPL-MCNC: 9.7 MG/DL (ref 8.4–10.2)
CHLORIDE SERPL-SCNC: 103 MMOL/L (ref 97–108)
CO2 SERPL-SCNC: 28 MMOL/L (ref 22–30)
CREAT SERPL-MCNC: 0.96 MG/DL (ref 0.6–1.2)
EST. AVERAGE GLUCOSE BLD GHB EST-MCNC: 169 MG/DL
GFR SERPL CREATININE-BSD FRML MDRD: 65 ML/MIN/1.73SQ M
GLUCOSE SERPL-MCNC: 156 MG/DL (ref 70–99)
HBA1C MFR BLD: 7.5 % (ref 4.2–6.3)
POTASSIUM SERPL-SCNC: 3.9 MMOL/L (ref 3.6–5)
PROT SERPL-MCNC: 7.6 G/DL (ref 5.9–8.4)
SODIUM SERPL-SCNC: 139 MMOL/L (ref 137–147)
URATE SERPL-MCNC: 5.4 MG/DL (ref 2.7–7.5)

## 2019-09-06 PROCEDURE — 80053 COMPREHEN METABOLIC PANEL: CPT

## 2019-09-06 PROCEDURE — 36415 COLL VENOUS BLD VENIPUNCTURE: CPT | Performed by: INTERNAL MEDICINE

## 2019-09-06 PROCEDURE — 83036 HEMOGLOBIN GLYCOSYLATED A1C: CPT | Performed by: INTERNAL MEDICINE

## 2019-09-06 PROCEDURE — 84550 ASSAY OF BLOOD/URIC ACID: CPT

## 2019-10-01 ENCOUNTER — OFFICE VISIT (OUTPATIENT)
Dept: PODIATRY | Facility: CLINIC | Age: 79
End: 2019-10-01
Payer: COMMERCIAL

## 2019-10-01 VITALS
SYSTOLIC BLOOD PRESSURE: 130 MMHG | DIASTOLIC BLOOD PRESSURE: 70 MMHG | WEIGHT: 162 LBS | HEIGHT: 64 IN | BODY MASS INDEX: 27.66 KG/M2 | HEART RATE: 69 BPM

## 2019-10-01 DIAGNOSIS — E11.40 TYPE 2 DIABETES MELLITUS WITH DIABETIC NEUROPATHY, UNSPECIFIED WHETHER LONG TERM INSULIN USE (HCC): Primary | ICD-10-CM

## 2019-10-01 DIAGNOSIS — I73.9 PERIPHERAL VASCULAR DISEASE, UNSPECIFIED (HCC): ICD-10-CM

## 2019-10-01 PROCEDURE — 99213 OFFICE O/P EST LOW 20 MIN: CPT | Performed by: PODIATRIST

## 2019-10-01 RX ORDER — INSULIN GLARGINE 300 U/ML
INJECTION, SOLUTION SUBCUTANEOUS
Refills: 0 | COMMUNITY
Start: 2019-07-10 | End: 2020-03-11

## 2019-10-01 NOTE — PROGRESS NOTES
PATIENT:  Telma La  1940       ASSESSMENT:     1  Type 2 diabetes mellitus with diabetic neuropathy, unspecified whether long term insulin use ([facility] )     2  Peripheral vascular disease, unspecified ([facility] )               PLAN:  Patient was counseled on the condition and diagnosis  Educated disease prevention and risks related to diabetes  Educated proper daily foot care and exam   Instructed proper skin care / protection and footwear  Instructed to identify any signs of infection and related foot problem  The recent blood work was reviewed and the last HbA1c was 7 5  Discussed proper blood glucose control with diet and exercise  All nails were debrided and recommended periodic foot care  The patient will return in 9 weeks for periodic diabetic foot exam       Subjective:       HPI  The patient presents for diabetic foot evaluation  The blood glucose is under control  The patient denied any diabetic foot ulcer or related foot infection  She has some numbness and paresthesia in her feet  She walks with a cane  She denied any acute pedal disorder  The following portions of the patient's history were reviewed and updated as appropriate: allergies, current medications, past family history, past medical history, past social history, past surgical history and problem list   All pertinent labs and images were reviewed        Past Medical History  Past Medical History:   Diagnosis Date    ACE-inhibitor cough     last assessed - [id number]    Asthma     Bilateral edema of lower extremity     last assessed - [id number]    Cardiac murmur     last assessed - [id number]    Diabetes mellitus [facility])     last assessed - [id number]    Esophageal dysphagia     Fatigue     last assessed - [id number]    Hyperlipidemia     Hypertension     Patellar bursitis of right knee     last assessed - [id number]    Personal history of other specified conditions     presenting hazards to health    Stroke ([facility] [address] )     Stroke syndrome     Urinary frequency     UTI (urinary tract infection)        Past Surgical History  Past Surgical History:   Procedure Laterality Date    ID ESOPHAGOGASTRODUODENOSCOPY TRANSORAL DIAGNOSTIC N/A 4/5/2017    Procedure: ESOPHAGOGASTRODUODENOSCOPY (EGD); Surgeon: Bob Mena MD;  Location:  GI LAB; Service: Gastroenterology        Allergies:  Patient has no known allergies      Medications:  Current Outpatient Medications   Medication Sig Dispense Refill    acetaminophen (TYLENOL) 650 mg CR tablet Take 650 mg by mouth every 6 (six) hours as needed for mild pain      allopurinol (ZYLOPRIM) 100 mg tablet 100 mg daily  2    Cholecalciferol (VITAMIN D3) 2000 units capsule Take 1 tablet by mouth daily      clopidogrel (PLAVIX) 75 mg tablet Take 2 tablets (150 mg total) by mouth daily 180 tablet 2    conjugated estrogens (PREMARIN) vaginal cream Apply pea-sized amount in vagina two nights a week 30 g 2    Continuous Blood Gluc  (FREESTYLE ADAM 14 DAY READER) JAQUELIN 1 Units by Does not apply route 3 (three) times a day 1 Device 0    Continuous Blood Gluc Sensor (FREESTYLE ADAM 14 DAY SENSOR) [facility] [place] [place] Apply 1 sensor to arm every 14 days to monitor blood sugar 3 times daily 28 each 1    diclofenac sodium (VOLTAREN) 1 % Apply 2 g topically 4 (four) times a day 100 g 0    FREESTYLE LITE test strip May check blood sugar twice daily 100 each 3    GLOBAL EASE INJECT PEN NEEDLES 31G X 5 MM MISC       hydrochlorothiazide (HYDRODIURIL) 25 mg tablet Take 25 mg by mouth      hydrocortisone 2 5 % cream       Lancets (FREESTYLE) lancets Use as instructed 100 each 0    trospium (SANCTURA XR) 60 mg 24 hr capsule Take 1 capsule (60 mg total) by mouth daily before breakfast 90 capsule 2    amLODIPine (NORVASC) 5 mg tablet Take 1 tablet (5 mg total) by mouth daily for 180 days 90 tablet 1    atorvastatin (LIPITOR) 40 mg tablet Take 1 tablet (40 mg total) by mouth daily for 180 days 90 tablet 1    Blood Glucose Monitoring Suppl (FREESTYLE LITE) JAQUELIN by Does not apply route daily 1 each 0    D-2000 MAXIMUM STRENGTH 2000 units TABS TAKE 1 TAB DAILY 90 tablet 1    JARDIANCE 25 MG TABS Take 1 tablet (25 mg total) by mouth daily for 90 days 90 tablet 0    lidocaine (LIDODERM) 5 % Apply 1 patch topically daily Remove & Discard patch within 12 hours or as directed by MD (Patient not taking: Reported on 6/5/2019) 30 patch 0    liraglutide (VICTOZA) injection Inject 0 3 mL (1 8 mg total) under the skin daily for 90 days 9 mL 2    losartan (COZAAR) 25 mg tablet Take 1 tablet (25 mg total) by mouth daily for 180 days 90 tablet 1    metFORMIN (GLUCOPHAGE) 1000 MG tablet Take 1 tablet (1,000 mg total) by mouth 2 (two) times a day with meals for 180 days 180 tablet 1    methocarbamol (ROBAXIN) 750 mg tablet Take 1 tablet (750 mg total) by mouth every 8 (eight) hours for 7 days (Patient not taking: Reported on 6/5/2019) 21 tablet 0    metoprolol succinate (TOPROL-XL) 50 mg 24 hr tablet Take 50 mg by mouth      montelukast (SINGULAIR) 10 mg tablet Take 1 tablet (10 mg total) by mouth daily at bedtime for 90 days 90 tablet 0    naproxen (EC NAPROSYN) 500 MG EC tablet Take 1 tablet (500 mg total) by mouth 2 (two) times a day with meals for 5 days 10 tablet 0    vitamin B-12 (CYANOCOBALAMIN) 250 MCG TABS Take 1 tablet (250 mcg total) by mouth daily for 180 days (Patient not taking: Reported on 7/18/2019) 90 tablet 1     No current facility-administered medications for this visit  Social History:  Social History     Socioeconomic History    Marital status:       Spouse name: None    Number of children: 6    Years of education: None    Highest education level: None   Occupational History    Occupation: Retired   Social Needs    Financial resource strain: None    Food insecurity:     Worry: None     Inability: None    Transportation needs:     Medical: None     Non-medical: None Tobacco Use    Smoking status: Former Smoker     Packs/day: 3 00    Smokeless tobacco: Former User    Tobacco comment: quit over 30 yrs ago; (quit in the 1980's, had smoked 3PPD for 20 years - per [employer])   Substance and Sexual Activity    Alcohol use: No     Comment: Denied history of alcohol use    Drug use: No     Comment: Denied history of drug use    Sexual activity: Never   Lifestyle    Physical activity:     Days per week: None     Minutes per session: None    Stress: None   Relationships    Social connections:     Talks on phone: None     Gets together: None     Attends [employer] service: None     Active member of club or organization: None     Attends meetings of clubs or organizations: None     Relationship status: None    Intimate partner violence:     Fear of current or ex partner: None     Emotionally abused: None     Physically abused: None     Forced sexual activity: None   Other Topics Concern    None   Social History Narrative    Diet needs improvement    Does not exercise    No caffeine use          Review of Systems   Constitutional: Negative for chills and fever  Respiratory: Negative for cough and shortness of breath  Cardiovascular: Negative for chest pain  Gastrointestinal: Negative for nausea and vomiting  Skin: Negative for wound  Neurological: Positive for numbness  Objective:      /70 Comment: incorrect reading cuff  Pulse 69   Ht 5' 4" (1 626 m)   Wt 73 5 kg (162 lb)   BMI 27 81 kg/m²          Physical Exam   Constitutional: She is oriented to person, place, and time  She appears well-developed and well-nourished  No distress  Cardiovascular: Normal rate and regular rhythm  Pulses are weak pulses  Pulses:       Dorsalis pedis pulses are 0 on the right side, and 0 on the left side  Posterior tibial pulses are 0 on the right side, and 0 on the left side     Pulmonary/Chest: Effort normal and breath sounds normal  No respiratory distress  Musculoskeletal: She exhibits deformity (Hammertoe and bunion noted  )  She exhibits no edema or tenderness  Feet:   Right Foot:   Skin Integrity: Negative for ulcer, skin breakdown, erythema, warmth, callus or dry skin  Left Foot:   Skin Integrity: Negative for ulcer, skin breakdown, erythema, warmth, callus or dry skin  Neurological: She is alert and oriented to person, place, and time  Skin: Capillary refill takes less than 2 seconds  No rash noted  No erythema  Psychiatric: She has a normal mood and affect  Her behavior is normal    Vitals reviewed  Diabetic Foot Exam    Right Foot/Ankle   Right Foot Inspection  Skin Exam: skin normal and skin intact no dry skin, no warmth, no callus, no erythema, no maceration, no abnormal color, no pre-ulcer, no ulcer and no callus                          Toe Exam: right toe deformityno swelling, no tenderness and erythema  Sensory   Vibration: diminished  Proprioception: intact   Monofilament testing: intact  Vascular  Capillary refills: < 3 seconds  The right DP pulse is 0  The right PT pulse is 0  Left Foot/Ankle  Left Foot Inspection  Skin Exam: skin normal and skin intactno dry skin, no warmth, no erythema, no maceration, normal color, no pre-ulcer, no ulcer and no callus                         Toe Exam: left toe deformityno swelling, no tenderness and no erythema                   Sensory   Vibration: diminished  Proprioception: intact  Monofilament: intact  Vascular  Capillary refills: < 3 seconds  The left DP pulse is 0  The left PT pulse is 0  Assign Risk Category:  Deformity present;  No loss of protective sensation; Weak pulses       Risk: 2

## 2019-10-02 ENCOUNTER — OFFICE VISIT (OUTPATIENT)
Dept: INTERNAL MEDICINE CLINIC | Facility: CLINIC | Age: 79
End: 2019-10-02

## 2019-10-02 VITALS
WEIGHT: 160.5 LBS | HEIGHT: 64 IN | TEMPERATURE: 97.7 F | HEART RATE: 72 BPM | SYSTOLIC BLOOD PRESSURE: 136 MMHG | BODY MASS INDEX: 27.4 KG/M2 | DIASTOLIC BLOOD PRESSURE: 82 MMHG

## 2019-10-02 DIAGNOSIS — E11.40 CONTROLLED TYPE 2 DIABETES MELLITUS WITH DIABETIC NEUROPATHY, WITH LONG-TERM CURRENT USE OF INSULIN (HCC): Primary | Chronic | ICD-10-CM

## 2019-10-02 DIAGNOSIS — E78.00 PURE HYPERCHOLESTEROLEMIA: Chronic | ICD-10-CM

## 2019-10-02 DIAGNOSIS — E11.65 UNCONTROLLED TYPE 2 DIABETES MELLITUS WITH HYPERGLYCEMIA (HCC): ICD-10-CM

## 2019-10-02 DIAGNOSIS — Z23 NEED FOR INFLUENZA VACCINATION: ICD-10-CM

## 2019-10-02 DIAGNOSIS — I69.393 ATAXIA DUE TO OLD CEREBROVASCULAR ACCIDENT: ICD-10-CM

## 2019-10-02 DIAGNOSIS — I69.30 HISTORY OF CVA WITH RESIDUAL DEFICIT: Chronic | ICD-10-CM

## 2019-10-02 DIAGNOSIS — I35.1 AORTIC VALVE REGURGITATION, NONRHEUMATIC: ICD-10-CM

## 2019-10-02 DIAGNOSIS — Z79.4 CONTROLLED TYPE 2 DIABETES MELLITUS WITH DIABETIC NEUROPATHY, WITH LONG-TERM CURRENT USE OF INSULIN (HCC): Primary | Chronic | ICD-10-CM

## 2019-10-02 DIAGNOSIS — I10 BENIGN ESSENTIAL HYPERTENSION: ICD-10-CM

## 2019-10-02 PROBLEM — B37.9 YEAST INFECTION: Status: RESOLVED | Noted: 2019-08-10 | Resolved: 2019-10-02

## 2019-10-02 PROBLEM — R26.2 AMBULATORY DYSFUNCTION: Status: RESOLVED | Noted: 2018-04-11 | Resolved: 2019-10-02

## 2019-10-02 PROBLEM — M54.2 NECK PAIN, ACUTE: Status: RESOLVED | Noted: 2019-04-04 | Resolved: 2019-10-02

## 2019-10-02 PROBLEM — E11.49 CONTROLLED TYPE 2 DIABETES MELLITUS WITH NEUROLOGIC COMPLICATION, WITH LONG-TERM CURRENT USE OF INSULIN (HCC): Chronic | Status: ACTIVE | Noted: 2017-06-28

## 2019-10-02 PROBLEM — J06.9 VIRAL UPPER RESPIRATORY TRACT INFECTION: Status: RESOLVED | Noted: 2019-04-01 | Resolved: 2019-10-02

## 2019-10-02 PROBLEM — N30.00 ACUTE CYSTITIS WITHOUT HEMATURIA: Status: RESOLVED | Noted: 2019-07-16 | Resolved: 2019-10-02

## 2019-10-02 PROCEDURE — 1101F PT FALLS ASSESS-DOCD LE1/YR: CPT | Performed by: PHYSICIAN ASSISTANT

## 2019-10-02 PROCEDURE — 1036F TOBACCO NON-USER: CPT | Performed by: PHYSICIAN ASSISTANT

## 2019-10-02 PROCEDURE — 99214 OFFICE O/P EST MOD 30 MIN: CPT | Performed by: PHYSICIAN ASSISTANT

## 2019-10-02 PROCEDURE — 3075F SYST BP GE 130 - 139MM HG: CPT | Performed by: PHYSICIAN ASSISTANT

## 2019-10-02 PROCEDURE — 3079F DIAST BP 80-89 MM HG: CPT | Performed by: PHYSICIAN ASSISTANT

## 2019-10-02 PROCEDURE — 90662 IIV NO PRSV INCREASED AG IM: CPT | Performed by: PHYSICIAN ASSISTANT

## 2019-10-02 PROCEDURE — G0008 ADMIN INFLUENZA VIRUS VAC: HCPCS | Performed by: PHYSICIAN ASSISTANT

## 2019-10-02 RX ORDER — EMPAGLIFLOZIN 25 MG/1
25 TABLET, FILM COATED ORAL DAILY
Qty: 90 TABLET | Refills: 0 | Status: SHIPPED | OUTPATIENT
Start: 2019-10-02 | End: 2020-01-03

## 2019-10-02 NOTE — PATIENT INSTRUCTIONS
As per discussion no changes to meds today  We did review however that you are supposed to be taking the to Toujeo 15 units once a day only  Please do not continue taking this twice daily  This is per your visit with the endocrinologist   Arianna Soares were continued on same medication metformin and Victoza and Jardiance  We also reviewed as per information to make sure that you stay well hydrated with this medication  Blood pressure is well controlled no changes to your medications  We reviewed that you should be taking both pills of Plavix at the same time not twice a day for 150 mg total once daily  We reviewed on her foot exam that you do have decreased sensation to touch and vibration and the importance of wearing closed toed shoes and checking her feet on a daily basis  You are aware of next scheduled appointments with Endocrinology and Podiatry in December    Neurology in July for prior CVA    Cardiologist will call you for appointment in April 2020    Flu vaccine provided today  At this time you declined pneumonia and tetanus as you do not wish to receive all 3 on the same visit but are open to receiving in the future

## 2019-10-02 NOTE — PROGRESS NOTES
Assessment/Plan:  As per discussion no changes to meds today  We did review however that you are supposed to be taking the to Toujeo 15 units once a day only  Please do not continue taking this twice daily  This is per your visit with the endocrinologist   Nasima Sousa were continued on same medication metformin and Victoza and Jardiance  We also reviewed as per information to make sure that you stay well hydrated with this medication  Blood pressure is well controlled no changes to your medications  We reviewed that you should be taking both pills of Plavix at the same time not twice a day for 150 mg total once daily  We reviewed on her foot exam that you do have decreased sensation to touch and vibration and the importance of wearing closed toed shoes and checking her feet on a daily basis  You are aware of next scheduled appointments with Endocrinology and Podiatry in December    Neurology in July for prior CVA    Cardiologist will call you for appointment in April 2020    Flu vaccine provided today  At this time you declined pneumonia and tetanus as you do not wish to receive all 3 on the same visit but are open to receiving in the future  No problem-specific Assessment & Plan notes found for this encounter  Diagnoses and all orders for this visit:    Controlled type 2 diabetes mellitus with diabetic neuropathy, with long-term current use of insulin ([facility] [address] )  -     Comprehensive metabolic panel; Future  -     Hemoglobin A1C; Future  -     Microalbumin / creatinine urine ratio; Future    Benign essential hypertension    History of CVA with residual deficit    Pure hypercholesterolemia  -     Lipid Panel with Direct LDL reflex; Future    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    Need for influenza vaccination  -     influenza vaccine, [id number], high-dose, PF 0 5 mL (FLUZONE HIGH-DOSE)    Uncontrolled type 2 diabetes mellitus with hyperglycemia (HCC)  -     metFORMIN (GLUCOPHAGE) 1000 MG tablet;  Take 1 tablet (1,000 mg total) by mouth 2 (two) times a day with meals  -     liraglutide (VICTOZA) injection; Inject 0 3 mL (1 8 mg total) under the skin daily  -     JARDIANCE 25 MG TABS; Take 1 tablet (25 mg total) by mouth daily    Aortic valve regurgitation, nonrheumatic    Other orders  -     TOUJEO SOLOSTAR 300 units/mL CONCETRATED U-300 injection pen; INJECT 15 UNITS AT BEDTIME ONCE A DAY (HOLD IF <140)          Subjective:      Patient ID: Vasiliy Stephens is a 78 y o  female  Patient here for follow-up of chronic medical conditions  Patient follows with a number of specialists for her care  Patient follows with endocrinology for her diabetes  Last visit June 2019 was reported as A1c controlled  Patient was advised on same medications and to follow up in 6 months  Appointment is scheduled for December 2019  Patient's A1c did increase from 7 1% to 7 5% however remains at goal for her age below 8%  Patient states taking Toujeo 10 in the am and 15 at bedtime  Reviewed this was not advice by Endo and states once again this was changed by the Dr she is supposed to see for Kidneys ONLY  Advised unsafe for others to make changes we are not aware of  Patient admits she does not check her sugars on a regular basis  She does report however that when she does check him in the morning they are 100-120  Patient denies any low sugar readings but as noted she is not checking routinely  Patient denies any episodes of feeling weak shaking nauseous or presyncopal   Patient also admits that while she requested the freestyle sensor for easier monitoring she has not yet picked that up at her pharmacy  Patient follows yearly with Neurology  Patient is status post CVA 2010  Review of note reports patient was stable at that time had no new symptoms concerning for TIA/CVA  Patient is maintained on Plavix 150 mg due to P2Y12 was subtherapeutic     As patient was stable and reported compliant with medications Neurology advised follow-up in 1 year for routine or sooner if any new symptoms  Patient follows with Cardiology  Last visit April 2019  Patient was maintained on same medications as blood pressure was at goal and well controlled  Patient was advised to continue high dose statin atorvastatin 80 mg tablet daily  Patient did have Holter monitor that was normal sinus rhythm with some frequent PVCs  Patient has non rheumatic mitral valve regurgitation mild  Patient's scanning did note atherosclerosis  Based on scanning imaging and current medications cardiologist advise no additional intervention required at this time  Was advised to follow up in 1 year or as needed  Patient does have 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 and hypertension  Patient was following with an out of network nephrologist   Shayy Gastelum when last visit was last scan note was from October of 2018  Patient has been advised to avoid all nephrotoxic medications including the NSAIDs  Review of patient's labs through 2019 show that all of her BUN and creatinines an EGFR have been in the normal range  Patient follows yearly with Podiatry for diabetic foot exam in care  Patient last seen October 1, 2019  Patient was noted to have nonpalpable pulses, no sensory testing completed per patient  Completed today and decreased touch and vibratory  Reviewed with patient the importance of wearing shoes at all times and especially closed toe shoes at all times  Because we reviewed with decreased sensation to touch and vibration she could have an injury or stepped on something in bed in her foot and she would not know it for a fair amount of time  Patient advised if she wears any open shoes such as for Hindu to make sure that she or someone else checks her feet on a regular basis      Patient is due for diabetic eye exam   States goes to a Dr Sandra Sue in [place] states next appt is November was getting drops to [facility] for bleeding behind the eye but states treatment completed    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  Last evaluated by GI in 2018  Patient reports no difficulty swallowing no choking does not have sensation of food getting stuck  Patient denies abdominal pain or burning  The following portions of the patient's history were reviewed and updated as appropriate: allergies, current medications, past family history, past medical history, past social history, past surgical history and problem list     Review of Systems   Constitutional: Negative  Negative for fever and unexpected weight change  HENT: Negative  Eyes: Negative for visual disturbance  Respiratory: Negative  Negative for cough, chest tightness and shortness of breath  Cardiovascular: Negative  Negative for chest pain, palpitations and leg swelling  Gastrointestinal: Negative  Endocrine: Negative  Negative for cold intolerance, heat intolerance, polydipsia, polyphagia and polyuria  Genitourinary: Negative  Negative for frequency and urgency  States urination at night only if drink a lot before bedtime  Has a commode chair by bed if needed    Denies loss of urine with cough / sneeze       Musculoskeletal: Positive for arthralgias (Does have general OA not change or new concerns)  Skin: Negative  Negative for color change and rash  Neurological: Positive for numbness (feet)  Negative for dizziness, syncope and weakness  Psychiatric/Behavioral: Negative  Objective:      /82 (BP Location: Right arm, Patient Position: Sitting, Cuff Size: Standard)   Pulse 72   Temp 97 7 °F (36 5 °C) (Oral)   Ht 5' 4" (1 626 m)   Wt 72 8 kg (160 lb 7 9 oz)   BMI 27 55 kg/m²          Physical Exam   Constitutional: She appears well-developed and well-nourished  No distress  HENT:   Head: Normocephalic and atraumatic  Mouth/Throat: Oropharynx is clear and moist    Eyes: Pupils are equal, round, and reactive to light  Conjunctivae are normal    Neck: Neck supple  No JVD present  No thyromegaly present  Cardiovascular: Normal rate and regular rhythm  Pulses are weak pulses  Murmur heard  Systolic murmur is present with a grade of 2/6  Pulmonary/Chest: Effort normal and breath sounds normal  No respiratory distress  She has no wheezes  Abdominal: Soft  Bowel sounds are normal  She exhibits no mass  There is no tenderness  Musculoskeletal: She exhibits edema (Minimal 1+ nonpitting edema bilateral ankles)  Feet:    Patient does have some ataxia and ambulates with the assistance of a cane  Patient has a slow but steady gait  No  drifting either direction while walking  Feet:   Right Foot:   Skin Integrity: Positive for dry skin  Negative for ulcer, skin breakdown, erythema, warmth or callus  Left Foot:   Skin Integrity: Positive for dry skin  Negative for ulcer, skin breakdown, erythema, warmth or callus  Neurological: She is alert  A sensory deficit is present  No cranial nerve deficit  She exhibits normal muscle tone  Skin: Skin is warm and dry  Psychiatric: She has a normal mood and affect  Her behavior is normal    Nursing note and vitals reviewed  Patient's shoes and socks removed  Right Foot/Ankle   Right Foot Inspection  Skin Exam: skin normal, skin intact and dry skin no warmth, no callus, no erythema, no maceration, no abnormal color, no pre-ulcer, no ulcer and no callus                            Sensory   Vibration: diminished    Monofilament testing: diminished      Left Foot/Ankle  Left Foot Inspection  Skin Exam: skin normal, skin intact and dry skinno warmth, no erythema, no maceration, normal color, no pre-ulcer, no ulcer and no callus                                         Sensory   Vibration: diminished    Monofilament: diminished    Assign Risk Category:  Deformity present;  Loss of protective sensation; Weak pulses       Risk: 3

## 2019-10-16 ENCOUNTER — TELEPHONE (OUTPATIENT)
Dept: FAMILY MEDICINE CLINIC | Facility: CLINIC | Age: 79
End: 2019-10-16

## 2019-11-06 ENCOUNTER — APPOINTMENT (OUTPATIENT)
Dept: LAB | Facility: HOSPITAL | Age: 79
End: 2019-11-06
Payer: COMMERCIAL

## 2019-11-06 ENCOUNTER — TRANSCRIBE ORDERS (OUTPATIENT)
Dept: LAB | Facility: HOSPITAL | Age: 79
End: 2019-11-06

## 2019-11-06 DIAGNOSIS — N18.6 TYPE 2 DIABETES MELLITUS WITH ESRD (END-STAGE RENAL DISEASE) (HCC): Primary | ICD-10-CM

## 2019-11-06 DIAGNOSIS — N18.6 TYPE 2 DIABETES MELLITUS WITH ESRD (END-STAGE RENAL DISEASE) (HCC): ICD-10-CM

## 2019-11-06 DIAGNOSIS — I10 ESSENTIAL HYPERTENSION, MALIGNANT: ICD-10-CM

## 2019-11-06 DIAGNOSIS — E78.5 DYSLIPIDEMIA: ICD-10-CM

## 2019-11-06 DIAGNOSIS — E11.22 TYPE 2 DIABETES MELLITUS WITH ESRD (END-STAGE RENAL DISEASE) (HCC): ICD-10-CM

## 2019-11-06 DIAGNOSIS — E11.22 TYPE 2 DIABETES MELLITUS WITH ESRD (END-STAGE RENAL DISEASE) (HCC): Primary | ICD-10-CM

## 2019-11-06 DIAGNOSIS — N18.30 CHRONIC KIDNEY DISEASE, STAGE III (MODERATE) (HCC): ICD-10-CM

## 2019-11-06 LAB
ALBUMIN SERPL BCP-MCNC: 3.7 G/DL (ref 3.5–5)
ALP SERPL-CCNC: 92 U/L (ref 46–116)
ALT SERPL W P-5'-P-CCNC: 18 U/L (ref 12–78)
ANION GAP SERPL CALCULATED.3IONS-SCNC: 7 MMOL/L (ref 4–13)
AST SERPL W P-5'-P-CCNC: 11 U/L (ref 5–45)
BASOPHILS # BLD AUTO: 0.04 THOUSANDS/ΜL (ref 0–0.1)
BASOPHILS NFR BLD AUTO: 1 % (ref 0–1)
BILIRUB SERPL-MCNC: 0.65 MG/DL (ref 0.2–1)
BUN SERPL-MCNC: 20 MG/DL (ref 5–25)
CALCIUM SERPL-MCNC: 9.3 MG/DL (ref 8.3–10.1)
CHLORIDE SERPL-SCNC: 108 MMOL/L (ref 100–108)
CHOLEST SERPL-MCNC: 118 MG/DL (ref 50–200)
CO2 SERPL-SCNC: 27 MMOL/L (ref 21–32)
CREAT SERPL-MCNC: 1.01 MG/DL (ref 0.6–1.3)
CREAT UR-MCNC: 70.8 MG/DL
EOSINOPHIL # BLD AUTO: 0.05 THOUSAND/ΜL (ref 0–0.61)
EOSINOPHIL NFR BLD AUTO: 1 % (ref 0–6)
ERYTHROCYTE [DISTWIDTH] IN BLOOD BY AUTOMATED COUNT: 14.4 % (ref 11.6–15.1)
EST. AVERAGE GLUCOSE BLD GHB EST-MCNC: 169 MG/DL
GFR SERPL CREATININE-BSD FRML MDRD: 61 ML/MIN/1.73SQ M
GLUCOSE P FAST SERPL-MCNC: 102 MG/DL (ref 65–99)
HBA1C MFR BLD: 7.5 % (ref 4.2–6.3)
HCT VFR BLD AUTO: 39.6 % (ref 34.8–46.1)
HDLC SERPL-MCNC: 54 MG/DL
HGB BLD-MCNC: 12.3 G/DL (ref 11.5–15.4)
IMM GRANULOCYTES # BLD AUTO: 0.02 THOUSAND/UL (ref 0–0.2)
IMM GRANULOCYTES NFR BLD AUTO: 0 % (ref 0–2)
IRON SERPL-MCNC: 80 UG/DL (ref 50–170)
LDLC SERPL CALC-MCNC: 48 MG/DL (ref 0–100)
LYMPHOCYTES # BLD AUTO: 1.34 THOUSANDS/ΜL (ref 0.6–4.47)
LYMPHOCYTES NFR BLD AUTO: 25 % (ref 14–44)
MCH RBC QN AUTO: 25.7 PG (ref 26.8–34.3)
MCHC RBC AUTO-ENTMCNC: 31.1 G/DL (ref 31.4–37.4)
MCV RBC AUTO: 83 FL (ref 82–98)
MICROALBUMIN UR-MCNC: 170 MG/L (ref 0–20)
MICROALBUMIN/CREAT 24H UR: 240 MG/G CREATININE (ref 0–30)
MONOCYTES # BLD AUTO: 0.37 THOUSAND/ΜL (ref 0.17–1.22)
MONOCYTES NFR BLD AUTO: 7 % (ref 4–12)
NEUTROPHILS # BLD AUTO: 3.59 THOUSANDS/ΜL (ref 1.85–7.62)
NEUTS SEG NFR BLD AUTO: 66 % (ref 43–75)
NONHDLC SERPL-MCNC: 64 MG/DL
NRBC BLD AUTO-RTO: 0 /100 WBCS
PLATELET # BLD AUTO: 189 THOUSANDS/UL (ref 149–390)
PMV BLD AUTO: 12.1 FL (ref 8.9–12.7)
POTASSIUM SERPL-SCNC: 3.5 MMOL/L (ref 3.5–5.3)
PROT SERPL-MCNC: 7.7 G/DL (ref 6.4–8.2)
PTH-INTACT SERPL-MCNC: 62.3 PG/ML (ref 18.4–80.1)
RBC # BLD AUTO: 4.79 MILLION/UL (ref 3.81–5.12)
SODIUM SERPL-SCNC: 142 MMOL/L (ref 136–145)
TRIGL SERPL-MCNC: 79 MG/DL
TSH SERPL DL<=0.05 MIU/L-ACNC: 0.75 UIU/ML (ref 0.36–3.74)
WBC # BLD AUTO: 5.41 THOUSAND/UL (ref 4.31–10.16)

## 2019-11-06 PROCEDURE — 82570 ASSAY OF URINE CREATININE: CPT

## 2019-11-06 PROCEDURE — 82043 UR ALBUMIN QUANTITATIVE: CPT

## 2019-11-06 PROCEDURE — 85025 COMPLETE CBC W/AUTO DIFF WBC: CPT

## 2019-11-06 PROCEDURE — 83540 ASSAY OF IRON: CPT

## 2019-11-06 PROCEDURE — 80061 LIPID PANEL: CPT

## 2019-11-06 PROCEDURE — 83970 ASSAY OF PARATHORMONE: CPT

## 2019-11-06 PROCEDURE — 84443 ASSAY THYROID STIM HORMONE: CPT

## 2019-11-06 PROCEDURE — 80053 COMPREHEN METABOLIC PANEL: CPT

## 2019-11-06 PROCEDURE — 83036 HEMOGLOBIN GLYCOSYLATED A1C: CPT

## 2019-11-06 PROCEDURE — 36415 COLL VENOUS BLD VENIPUNCTURE: CPT

## 2019-11-11 ENCOUNTER — TRANSCRIBE ORDERS (OUTPATIENT)
Dept: ADMINISTRATIVE | Facility: HOSPITAL | Age: 79
End: 2019-11-11

## 2019-11-11 DIAGNOSIS — E55.9 AVITAMINOSIS D: ICD-10-CM

## 2019-11-11 DIAGNOSIS — N18.2 CHRONIC KIDNEY DISEASE, STAGE II (MILD): Primary | ICD-10-CM

## 2019-11-18 ENCOUNTER — TELEPHONE (OUTPATIENT)
Dept: FAMILY MEDICINE CLINIC | Facility: CLINIC | Age: 79
End: 2019-11-18

## 2019-11-18 NOTE — TELEPHONE ENCOUNTER
CORINA w/ patient re: [employer]  & Dm support group this Thursday  Left patient my name & phone # to call back if needed    DDS

## 2019-11-20 ENCOUNTER — TELEPHONE (OUTPATIENT)
Dept: FAMILY MEDICINE CLINIC | Facility: CLINIC | Age: 79
End: 2019-11-20

## 2019-12-04 ENCOUNTER — OFFICE VISIT (OUTPATIENT)
Dept: MULTI SPECIALTY CLINIC | Facility: CLINIC | Age: 79
End: 2019-12-04

## 2019-12-04 VITALS
BODY MASS INDEX: 28.3 KG/M2 | DIASTOLIC BLOOD PRESSURE: 62 MMHG | TEMPERATURE: 97.8 F | HEIGHT: 64 IN | WEIGHT: 165.79 LBS | SYSTOLIC BLOOD PRESSURE: 128 MMHG | HEART RATE: 64 BPM

## 2019-12-04 DIAGNOSIS — Z79.4 TYPE 2 DIABETES MELLITUS WITH HYPERGLYCEMIA, WITH LONG-TERM CURRENT USE OF INSULIN (HCC): Primary | ICD-10-CM

## 2019-12-04 DIAGNOSIS — N18.30 CKD STAGE 3 DUE TO TYPE 2 DIABETES MELLITUS (HCC): ICD-10-CM

## 2019-12-04 DIAGNOSIS — E11.22 CKD STAGE 3 DUE TO TYPE 2 DIABETES MELLITUS (HCC): ICD-10-CM

## 2019-12-04 DIAGNOSIS — I10 BENIGN ESSENTIAL HYPERTENSION: ICD-10-CM

## 2019-12-04 DIAGNOSIS — E11.65 TYPE 2 DIABETES MELLITUS WITH HYPERGLYCEMIA, WITH LONG-TERM CURRENT USE OF INSULIN (HCC): Primary | ICD-10-CM

## 2019-12-04 DIAGNOSIS — E78.2 MIXED HYPERLIPIDEMIA: ICD-10-CM

## 2019-12-04 PROCEDURE — 3074F SYST BP LT 130 MM HG: CPT | Performed by: PHYSICIAN ASSISTANT

## 2019-12-04 PROCEDURE — 99214 OFFICE O/P EST MOD 30 MIN: CPT | Performed by: PHYSICIAN ASSISTANT

## 2019-12-04 PROCEDURE — 3078F DIAST BP <80 MM HG: CPT | Performed by: PHYSICIAN ASSISTANT

## 2019-12-04 NOTE — PATIENT INSTRUCTIONS
Hypoglycemia instructions   Dayanna Claudio  12/4/2019  [id number]    Low Blood Sugar    Steps to treat low blood sugar  1  Test blood sugar if you have symptoms of low blood sugar:   Low Blood Sugar Symptoms:  o Sweaty  o Dizzy  o Rapid heartbeat  o Shaky  o Bad mood  o Hungry      2  Treat blood sugar less than 70 with 15 grams of fast-acting carbohydrate:   Examples of 15 grams Fast-Acting Carbohydrate:  o 4 oz juice  o 4 oz regular soda  o 3-4 glucose tablets (chew)  o 3-4 hard candies (chew)          3  Wait 15 minutes and test your blood sugar again     4   If blood sugar is less than 100, repeat steps 2-3     5  When your blood sugar is 100 or more, eat a snack if it will be longer than one hour until your next meal  The snack should be 15 grams of carbohydrate and a protein:   Examples of snacks:  o ½ sandwich  o 6 crackers with cheese  o Piece of fruit with cheese or peanut butter  o 6 crackers with peanut butter

## 2019-12-04 NOTE — PROGRESS NOTES
Patient Progress Note      CC: DM2      Referring Provider  Carolina Max Pa-c  [address] [facility] [id number] [place] [address] 28, 210 [facility]     History of Present Illness:   Devika Spears is a 78 y o  female with a history of type 2 diabetes with long term use of insulin  Diabetes course has been stable  Complications of DM: retinopathy, CVA, CKD  Denies recent illness or hospitalizations  Denies recent severe hypoglycemic or severe hyperglycemic episodes  Denies any issues with her current regimen  Home glucose monitoring: are performed sporadically    Does not have log or meter today  BG this morning was 116 mg/dl per patient  Current regimen:  Jardiance 25 mg daily, Victoza 1 8 mg daily, metformin 1000 mg twice a day, Toujeo 15 units QHS (patient using BID, states she was told to increase it by her nephrologist couple weeks ago)  compliant most of the time, denies any side effects from medications  Injects in: abdomen  Rotates sites: Yes  Hypoglycemic episodes: No, rare  H/o of hypoglycemia causing hospitalization or Intervention such as glucagon injection  or ambulance call :  No  Hypoglycemia symptoms: cannot recall reading less than 70 mg/dl   Treatment of hypoglycemia: candy    Medic alert tag: recommended: Yes    Diet: 3 meals per day, 1 snack per day  Timing of meals is predictable  Diabetic diet compliance:  noncompliant some of the time  Activity: Daily activity is predictable: Yes  Trying to exercise everyday  Further diabetic ROS: no polyuria or polydipsia, no chest pain, dyspnea, no numbness, tingling or pain in extremities        Ophthamology: sees Dr Lesley Lopez, due to go back this month or next month  Podiatry: foot exam UTD, October 2019    Has hypertension: on ACE inhibitor/ARB, compliant most of the time  Has hyperlipidemia: on statin - tolerating well, no myalgias  compliant most of the time, denies any side effects from medications    Thyroid disorders: No  History of pancreatitis: No    Patient Active Problem List   Diagnosis    Aortic valve regurgitation, nonrheumatic    Benign essential hypertension    Chronic rhinitis    [facility]A [facility]cerebrovascular accident) [facility]    Midline cystocele    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, with long-term current use of insulin ([facility] )    Non-rheumatic mitral regurgitation    Uterine procidentia    Pure hypercholesterolemia    Esophageal abnormality    CKD stage 3 due to type 2 diabetes mellitus ([facility] )    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    Decreased hearing, bilateral    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[facility] )    History of CVA with residual deficit    Mixed hyperlipidemia      Past Medical History:   Diagnosis Date    ACE-inhibitor cough     last assessed - [id number]    Asthma     Bilateral edema of lower extremity     last assessed - [id number]    Cardiac murmur     last assessed - [id number]    Diabetes mellitus [facility])     last assessed - [id number]    Esophageal dysphagia     Fatigue     last assessed - [id number]    Hyperlipidemia     Hypertension     Patellar bursitis of right knee     last assessed - [id number]    Personal history of other specified conditions     presenting hazards to health    Stroke [facility])     Stroke syndrome     Urinary frequency     UTI (urinary tract infection)       Past Surgical History:   Procedure Laterality Date    OR ESOPHAGOGASTRODUODENOSCOPY TRANSORAL DIAGNOSTIC N/A 4/5/2017    Procedure: ESOPHAGOGASTRODUODENOSCOPY (EGD); Surgeon: Mary Jane Justice MD;  Location: [facility];   Service: Gastroenterology      Family History   Problem Relation Age of Onset    Heart disease Father     Hypertension Mother    Emile Naeem Stroke Mother     Other Sister         1)Breast disorder; 2)Epilepsy   Emile Naeem Migraines Sister         Migraine headaches    Osteoporosis Sister     Thyroid disease Sister     Coronary artery disease Sister         S/P triple vessel bypass    Osteoporosis Maternal Grandmother     Diabetes Son         Diabetes mellitus    Hypertension Son     Rheum arthritis Son         Rheumatic disease    Heart disease Family      Social History     Tobacco Use    Smoking status: Former Smoker     Packs/day: 3 00    Smokeless tobacco: Former User    Tobacco comment: quit over 30 yrs ago; (quit in the 1980's, had smoked 3PPD for 20 years - per [employer])   Substance Use Topics    Alcohol use: No     Comment: Denied history of alcohol use     No Known Allergies      Current Outpatient Medications:     amLODIPine (NORVASC) 5 mg tablet, Take 1 tablet (5 mg total) by mouth daily for 180 days, Disp: 90 tablet, Rfl: 1    atorvastatin (LIPITOR) 40 mg tablet, Take 1 tablet (40 mg total) by mouth daily for 180 days, Disp: 90 tablet, Rfl: 1    Blood Glucose Monitoring Suppl (FREESTYLE LITE) JAQUELIN, by Does not apply route daily, Disp: 1 each, Rfl: 0    clopidogrel (PLAVIX) 75 mg tablet, Take 2 tablets (150 mg total) by mouth daily, Disp: 180 tablet, Rfl: 2    conjugated estrogens (PREMARIN) vaginal cream, Apply pea-sized amount in vagina two nights a week, Disp: 30 g, Rfl: 2    Continuous Blood Gluc  (FREESTYLE ADAM 14 DAY READER) JAQUELIN, 1 Units by Does not apply route 3 (three) times a day, Disp: 1 Device, Rfl: 0    Continuous Blood Gluc Sensor (FREESTYLE ADAM 14 DAY SENSOR) [facility], Apply 1 sensor to arm every 14 days to monitor blood sugar 3 times daily, Disp: 28 each, Rfl: 1    D-2000 MAXIMUM STRENGTH 2000 units TABS, TAKE 1 TAB DAILY, Disp: 90 tablet, Rfl: 1    diclofenac sodium (VOLTAREN) 1 %, Apply 2 g topically 4 (four) times a day, Disp: 100 g, Rfl: 0    FREESTYLE LITE test strip, May check blood sugar twice daily, Disp: 100 each, Rfl: 3    GLOBAL EASE INJECT PEN NEEDLES 31G X 5 MM MISC, , Disp: , Rfl:     JARDIANCE 25 MG TABS, Take 1 tablet (25 mg total) by mouth daily, Disp: 90 tablet, Rfl: 0    Lancets (FREESTYLE) lancets, Use as instructed, Disp: 100 each, Rfl: 0    liraglutide (VICTOZA) injection, Inject 0 3 mL (1 8 mg total) under the skin daily, Disp: 9 mL, Rfl: 2    losartan (COZAAR) 25 mg tablet, Take 1 tablet (25 mg total) by mouth daily for 180 days, Disp: 90 tablet, Rfl: 1    metFORMIN (GLUCOPHAGE) 1000 MG tablet, Take 1 tablet (1,000 mg total) by mouth 2 (two) times a day with meals, Disp: 180 tablet, Rfl: 1    montelukast (SINGULAIR) 10 mg tablet, Take 1 tablet (10 mg total) by mouth daily at bedtime for 90 days, Disp: 90 tablet, Rfl: 0    TOUJEO SOLOSTAR 300 units/mL CONCETRATED U-300 injection pen, INJECT 15 UNITS AT BEDTIME ONCE A DAY (HOLD IF <140), Disp: , Rfl: 0    acetaminophen (TYLENOL) 650 mg CR tablet, Take 650 mg by mouth every 6 (six) hours as needed for mild pain, Disp: , Rfl:     allopurinol (ZYLOPRIM) 100 mg tablet, 100 mg daily, Disp: , Rfl: 2    metoprolol succinate (TOPROL-XL) 50 mg 24 hr tablet, Take 50 mg by mouth, Disp: , Rfl:     trospium (SANCTURA XR) 60 mg 24 hr capsule, Take 1 capsule (60 mg total) by mouth daily before breakfast (Patient not taking: Reported on 12/4/2019), Disp: 90 capsule, Rfl: 2    vitamin B-12 (CYANOCOBALAMIN) 250 MCG TABS, Take 1 tablet (250 mcg total) by mouth daily for 180 days (Patient not taking: Reported on 7/18/2019), Disp: 90 tablet, Rfl: 1  Review of Systems   Constitutional: Negative for activity change, appetite change, fatigue and unexpected weight change  HENT: Negative for trouble swallowing  Eyes: Negative for visual disturbance  Respiratory: Negative for shortness of breath  Cardiovascular: Negative for chest pain and palpitations  Gastrointestinal: Negative for constipation and diarrhea  Endocrine: Negative for polydipsia and polyuria  Musculoskeletal: Negative  Skin: Negative for wound  Neurological: Negative for numbness  Psychiatric/Behavioral: Negative  Physical Exam:  Body mass index is 28 46 kg/m²    /62 (BP Location: Right arm, Patient Position: Sitting, Cuff Size: Adult)   Pulse 64   Temp 97 8 °F (36 6 °C) (Oral)   Ht 5' 4" (1 626 m)   Wt 75 2 kg (165 lb 12 6 oz)   BMI 28 46 kg/m²    Wt Readings from Last 3 Encounters:   12/04/19 75 2 kg (165 lb 12 6 oz)   10/02/19 72 8 kg (160 lb 7 9 oz)   10/01/19 73 5 kg (162 lb)       Physical Exam   Constitutional: She appears well-developed and well-nourished  HENT:   Head: Normocephalic  Eyes: Pupils are equal, round, and reactive to light  EOM are normal  No scleral icterus  Neck: Neck supple  No thyromegaly present  Cardiovascular: Normal rate and regular rhythm  No murmur heard  Pulses:       Radial pulses are 2+ on the right side, and 2+ on the left side  Pulmonary/Chest: Effort normal and breath sounds normal  No respiratory distress  She has no wheezes  Neurological: She is alert  Skin: Skin is warm and dry  Psychiatric: She has a normal mood and affect  Nursing note and vitals reviewed  Patient's shoes and socks were not removed        Labs:   Component      Latest Ref Rng & Units 9/6/2019 11/6/2019   Sodium      136 - 145 mmol/L 139 142   Potassium      3 5 - 5 3 mmol/L 3 9 3 5   Chloride      100 - 108 mmol/L 103 108   CO2      21 - 32 mmol/L 28 27   Anion Gap      4 - 13 mmol/L 8 7   BUN      5 - 25 mg/dL 24 20   Creatinine      0 60 - 1 30 mg/dL 0 96 1 01   Glucose, Random      70 - 99 mg/dL 156 (H)    Calcium      8 3 - 10 1 mg/dL 9 7 9 3   AST      5 - 45 U/L 18 11   ALT      12 - 78 U/L 16 18   Alkaline Phosphatase      46 - 116 U/L 92 92   Total Protein      6 4 - 8 2 g/dL 7 6 7 7   Albumin      3 5 - 5 0 g/dL 4 2 3 7   TOTAL BILIRUBIN      0 20 - 1 00 mg/dL 0 50 0 65   eGFR      ml/min/1 73sq m 65 61   GLUCOSE FASTING      65 - 99 mg/dL  102 (H)   Cholesterol      50 - 200 mg/dL  118   Triglycerides      <=150 mg/dL  79   HDL      >=40 mg/dL  54   LDL Direct      0 - 100 mg/dL  48   Non-HDL Cholesterol      mg/dl  64   EXT Creatinine Urine      mg/dL  70 8 MICROALBUM ,U,RANDOM      0 0 - 20 0 mg/L  170 0 (H)   MICROALBUMIN/CREATININE RATIO      0 - 30 mg/g creatinine  240 (H)   Hemoglobin A1C      4 2 - 6 3 % 7 5 (H) 7 5 (H)   EAG      mg/dl 169 169   TSH 3RD GENERATON      0 358 - 3 740 uIU/mL  0 750         Plan:    Erin Amador was seen today for follow-up  Diagnoses and all orders for this visit:    Type 2 diabetes mellitus with hyperglycemia, with long-term current use of insulin ([address] )  HGA1C 7 5%  Remained the Same  Treatment regimen: continue current treatment  Advised to check blood glucose twice a day, before each insulin injection and send log in 1 week  May be able to change Toujeo to once a day dosing  Will request records from nephrologist as changes to treatment were advised at her last visit with nephrologist    Discussed intensive insulin regimen does increase risk for hypoglycemia  Episodes of hypoglycemia can lead to permanent disability and death  Discussed risks/complications associated with uncontrolled diabetes  Advised to adhere to diabetic diet, and recommended staying active/exercising routinely as tolerated  Keep carbohydrates consistent to limit blood glucose fluctuations  Advised to call if blood sugars less than 70 mg/dl or over 300 mg/dl  Check blood glucose 2 times a day  Discussed symptoms and treatment of hypoglycemia  Recommended routine follow-up with podiatry and ophthalmology  Send log in 1 week  Ordered blood work to complete prior to next visit  -     Hemoglobin A1C; Future  -     Basic metabolic panel; Future    Benign essential hypertension  Blood pressure controlled, continue current treatment  -     Basic metabolic panel;  Future    Mixed hyperlipidemia  LDL 48, goal  Continue statin therapy    CKD stage 3 due to type 2 diabetes mellitus (HCC)  Creatinine 1 01 and GFR 61  Continue to monitor renal function  Managed by Dr Shanthi Davis (nephrology)       Discussed with the patient diagnosis and treatment and all questions fully answered  She will call me if any problems arise  Counseled patient on diagnostic results, prognosis, risk and benefit of treatment options, instruction for management, importance of treatment compliance, risk factor reduction and impressions      Jacklyn Lock PA-C      [facility] ENDOCRINOLOGY

## 2019-12-05 ENCOUNTER — HOSPITAL ENCOUNTER (OUTPATIENT)
Dept: RADIOLOGY | Age: 79
Discharge: HOME | End: 2019-12-05
Payer: COMMERCIAL

## 2019-12-05 DIAGNOSIS — N18.2 CHRONIC KIDNEY DISEASE, STAGE II (MILD): ICD-10-CM

## 2019-12-05 DIAGNOSIS — E55.9 AVITAMINOSIS D: ICD-10-CM

## 2019-12-05 PROCEDURE — 76770 US EXAM ABDO BACK WALL COMP: CPT

## 2019-12-05 PROCEDURE — 77080 DXA BONE DENSITY AXIAL: CPT

## 2019-12-10 ENCOUNTER — OFFICE VISIT (OUTPATIENT)
Dept: PODIATRY | Facility: CLINIC | Age: 79
End: 2019-12-10
Payer: COMMERCIAL

## 2019-12-10 VITALS
HEART RATE: 60 BPM | SYSTOLIC BLOOD PRESSURE: 143 MMHG | WEIGHT: 164.6 LBS | BODY MASS INDEX: 28.1 KG/M2 | DIASTOLIC BLOOD PRESSURE: 89 MMHG | HEIGHT: 64 IN

## 2019-12-10 DIAGNOSIS — E11.40 TYPE 2 DIABETES MELLITUS WITH DIABETIC NEUROPATHY, UNSPECIFIED WHETHER LONG TERM INSULIN USE (HCC): ICD-10-CM

## 2019-12-10 DIAGNOSIS — B35.1 ONYCHOMYCOSIS: Primary | ICD-10-CM

## 2019-12-10 DIAGNOSIS — L85.1 ACQUIRED KERATODERMA: ICD-10-CM

## 2019-12-10 DIAGNOSIS — I73.9 PERIPHERAL VASCULAR DISEASE, UNSPECIFIED (HCC): ICD-10-CM

## 2019-12-10 PROCEDURE — 11055 PARING/CUTG B9 HYPRKER LES 1: CPT | Performed by: PODIATRIST

## 2019-12-10 PROCEDURE — 11721 DEBRIDE NAIL 6 OR MORE: CPT | Performed by: PODIATRIST

## 2019-12-10 NOTE — PROGRESS NOTES
PATIENT:  Jules Opitz  1940    ASSESSMENT/PLAN:  1  Onychomycosis  Debridement    ciclopirox (PENLAC) 8 % solution   2  Type 2 diabetes mellitus with diabetic neuropathy, unspecified whether long term insulin use ([facility] )     3  Peripheral vascular disease, unspecified ([facility] )     4  Acquired keratoderma  Lesion Destruction         Orders Placed This Encounter   Procedures    Debridement    Lesion Destruction      Disease prevention and related risk factors of diabetes were identified and discussed  The patient was educated in proper foot wear for diabetics  Also educated in daily foot assessment and routine diabetic foot care  Reviewed the last blood work and the HbA1c was 7 5  Discussed the importance of controlling BS through diet and exercise  The patient will follow up in 9 weeks for further diabetic foot exam and care  Renewed [place] for onychomycosis     PROCEDURE:  All mycotic toenails were reduced and debrided in length, width, and girth using a nail nipper and dremel  All hyperkeratotic skin lesion(s) were sharply pared with a scalpel / forcep with no bleeding or evidence of ulceration  Patient tolerated procedure(s) well without complications       HPI:  Jules Opitz is a 78 y  o year old female seen for diabetic foot exam   The patient has class findings  BS is under control  The patient complained of thick toenails  The patient denied any acute pedal disorder, redness, acute swelling, or recent injury            PAST MEDICAL HISTORY:  Past Medical History:   Diagnosis Date    ACE-inhibitor cough     last assessed - [id number]    Asthma     Bilateral edema of lower extremity     last assessed - [id number]    Cardiac murmur     last assessed - [id number]    Diabetes mellitus [facility])     last assessed - [id number]    Esophageal dysphagia     Fatigue     last assessed - [id number]    Hyperlipidemia     Hypertension     Patellar bursitis of right knee     last assessed - [id number]  Personal history of other specified conditions     presenting hazards to health    Stroke [facility])     Stroke syndrome     Urinary frequency     UTI (urinary tract infection)        PAST SURGICAL HISTORY:  Past Surgical History:   Procedure Laterality Date    NM ESOPHAGOGASTRODUODENOSCOPY TRANSORAL DIAGNOSTIC N/A 4/5/2017    Procedure: ESOPHAGOGASTRODUODENOSCOPY (EGD); Surgeon: Lio Leavitt MD;  Location: [facility]; Service: Gastroenterology        ALLERGIES:  Patient has no known allergies      MEDICATIONS:  Current Outpatient Medications   Medication Sig Dispense Refill    acetaminophen (TYLENOL) 650 mg CR tablet Take 650 mg by mouth every 6 (six) hours as needed for mild pain      allopurinol (ZYLOPRIM) 100 mg tablet 100 mg daily  2    clopidogrel (PLAVIX) 75 mg tablet Take 2 tablets (150 mg total) by mouth daily 180 tablet 2    conjugated estrogens (PREMARIN) vaginal cream Apply pea-sized amount in vagina two nights a week 30 g 2    Continuous Blood Gluc  (FREESTYLE ADAM 14 DAY READER) JAQUELIN 1 Units by Does not apply route 3 (three) times a day 1 Device 0    Continuous Blood Gluc Sensor (FREESTYLE ADAM 14 DAY SENSOR) [facility] Apply 1 sensor to arm every 14 days to monitor blood sugar 3 times daily 28 each 1    diclofenac sodium (VOLTAREN) 1 % Apply 2 g topically 4 (four) times a day 100 g 0    FREESTYLE LITE test strip May check blood sugar twice daily 100 each 3    GLOBAL EASE INJECT PEN NEEDLES 31G X 5 MM MISC       JARDIANCE 25 MG TABS Take 1 tablet (25 mg total) by mouth daily 90 tablet 0    Lancets (FREESTYLE) lancets Use as instructed 100 each 0    liraglutide (VICTOZA) injection Inject 0 3 mL (1 8 mg total) under the skin daily 9 mL 2    metFORMIN (GLUCOPHAGE) 1000 MG tablet Take 1 tablet (1,000 mg total) by mouth 2 (two) times a day with meals 180 tablet 1    TOUJEO SOLOSTAR 300 units/mL CONCETRATED U-300 injection pen INJECT 15 UNITS AT BEDTIME ONCE A DAY (HOLD IF <140)  0    amLODIPine (NORVASC) 5 mg tablet Take 1 tablet (5 mg total) by mouth daily for 180 days 90 tablet 1    atorvastatin (LIPITOR) 40 mg tablet Take 1 tablet (40 mg total) by mouth daily for 180 days 90 tablet 1    Blood Glucose Monitoring Suppl (FREESTYLE LITE) JAQUELIN by Does not apply route daily 1 each 0    ciclopirox (PENLAC) 8 % solution Apply topically daily at bedtime 6 6 mL 5    D-2000 MAXIMUM STRENGTH 2000 units TABS TAKE 1 TAB DAILY 90 tablet 1    losartan (COZAAR) 25 mg tablet Take 1 tablet (25 mg total) by mouth daily for 180 days 90 tablet 1    metoprolol succinate (TOPROL-XL) 50 mg 24 hr tablet Take 50 mg by mouth      montelukast (SINGULAIR) 10 mg tablet Take 1 tablet (10 mg total) by mouth daily at bedtime for 90 days 90 tablet 0    trospium (SANCTURA XR) 60 mg 24 hr capsule Take 1 capsule (60 mg total) by mouth daily before breakfast (Patient not taking: Reported on 12/4/2019) 90 capsule 2    vitamin B-12 (CYANOCOBALAMIN) 250 MCG TABS Take 1 tablet (250 mcg total) by mouth daily for 180 days (Patient not taking: Reported on 7/18/2019) 90 tablet 1     No current facility-administered medications for this visit  SOCIAL HISTORY:  Social History     Socioeconomic History    Marital status:       Spouse name: None    Number of children: 6    Years of education: None    Highest education level: None   Occupational History    Occupation: Retired   Social Needs    Financial resource strain: None    Food insecurity:     Worry: None     Inability: None    Transportation needs:     Medical: None     Non-medical: None   Tobacco Use    Smoking status: Former Smoker     Packs/day: 3 00    Smokeless tobacco: Former User    Tobacco comment: quit over 30 yrs ago; (quit in the 1980's, had smoked 3PPD for 20 years - per [employer])   Substance and Sexual Activity    Alcohol use: No     Comment: Denied history of alcohol use    Drug use: No     Comment: Denied history of drug use    Sexual activity: Never   Lifestyle    Physical activity:     Days per week: None     Minutes per session: None    Stress: None   Relationships    Social connections:     Talks on phone: None     Gets together: None     Attends Muslim service: None     Active member of club or organization: None     Attends meetings of clubs or organizations: None     Relationship status: None    Intimate partner violence:     Fear of current or ex partner: None     Emotionally abused: None     Physically abused: None     Forced sexual activity: None   Other Topics Concern    None   Social History Narrative    Diet needs improvement    Does not exercise    No caffeine use        REVIEW OF SYSTEMS:  GENERAL: NAD, afebrile  HEART: No chest pain, or palpitation  RESPIRATORY:  No acute SOB or cough  GI: No Nausea, vomit or diarrhea  NEUROLOGIC: No syncope or acute weakness    PHYSICAL EXAM:  VASCULAR EXAM  Dorsalis pedis  +1, Posterior tibial artery  absent  The patient has class findings with skin atrophy, lack of digital hair, and nail dystrophy  There is +1 lower extremity edema bilaterally  NEUROLOGIC EXAM  Sensation is intact to light touch  Sensation is intact to 10gm monofilament  Decreased vibratory sensation on her feet  No focal neurologic deficit  DERMATOLOGIC EXAM:   No ulcer or cellulitis noted  The patient has hypertrophic toenails X 7 with discoloration, onycholysis, and subungal debris  No notable skin lesion  Patient has hyperkeratosis in left 5th toe  MUSCULOSKELETAL EXAM:   No acute joint pain, edema, or redness  No acute musculoskeletal problem

## 2019-12-13 LAB
LEFT EYE DIABETIC RETINOPATHY: NORMAL
RIGHT EYE DIABETIC RETINOPATHY: NORMAL

## 2019-12-17 ENCOUNTER — TELEPHONE (OUTPATIENT)
Dept: FAMILY MEDICINE CLINIC | Facility: CLINIC | Age: 79
End: 2019-12-17

## 2019-12-17 NOTE — TELEPHONE ENCOUNTER
Spoke w/ patient re: this week's DM support group meeting   Patient states:"I am not sure, maybe I will be coming "  DDS

## 2019-12-20 DIAGNOSIS — I63.50 CEREBROVASCULAR ACCIDENT (CVA) OF PONTINE STRUCTURE (HCC): ICD-10-CM

## 2019-12-20 RX ORDER — CLOPIDOGREL BISULFATE 75 MG/1
150 TABLET ORAL DAILY
Qty: 180 TABLET | Refills: 0 | Status: SHIPPED | OUTPATIENT
Start: 2019-12-20 | End: 2020-04-09

## 2019-12-20 NOTE — TELEPHONE ENCOUNTER
Patient states she is out of medication  This was last ordered 11/12/18 while inpatient by a cardiologist, but patient denies following up outpatient with cardiologist   She is requesting refill  Patient is aware PCP is not in the office today

## 2019-12-20 NOTE — TELEPHONE ENCOUNTER
Please call pt - cardio yearly, last appt April 2019  In note advised continuation of high dose antiplatelet therapy  Pt does seem to have some noncomopliance based on amount of pills and refills compared to when it was filled but appears to need to continue the medication  Also appears should be prescribed by cardiology   Please notify pt we will send in a 3 month supply for her this time in case she cannot get through but will need to notify cardiology when she needs refills

## 2019-12-26 DIAGNOSIS — J30.1 NON-SEASONAL ALLERGIC RHINITIS DUE TO POLLEN: ICD-10-CM

## 2019-12-26 RX ORDER — MONTELUKAST SODIUM 10 MG/1
TABLET ORAL
Qty: 90 TABLET | Refills: 0 | OUTPATIENT
Start: 2019-12-26

## 2020-01-02 ENCOUNTER — OFFICE VISIT (OUTPATIENT)
Dept: OBGYN CLINIC | Facility: CLINIC | Age: 80
End: 2020-01-02

## 2020-01-02 VITALS — HEIGHT: 64 IN | WEIGHT: 165 LBS | BODY MASS INDEX: 28.17 KG/M2

## 2020-01-02 DIAGNOSIS — N81.3 COMPLETE UTEROVAGINAL PROLAPSE: ICD-10-CM

## 2020-01-02 DIAGNOSIS — N32.81 OAB (OVERACTIVE BLADDER): Primary | ICD-10-CM

## 2020-01-02 PROCEDURE — 99214 OFFICE O/P EST MOD 30 MIN: CPT | Performed by: STUDENT IN AN ORGANIZED HEALTH CARE EDUCATION/TRAINING PROGRAM

## 2020-01-02 NOTE — PROGRESS NOTES
Urogynecology - Follow-up clinic note  Leno De Souaz   [id number]    Congolese-speaking: no Phone  used: no    Chief complaint: Prolapse    HPI: 78 y o  [id number] presents as follow-up for symptoms of Overactive Bladder and Pelvic Organ Prolapse  Patient was previously placed on Trospium 60mg XR due to Overactive Bladder symptoms and Nocturia 3-4x per night  Patient reports that she has not taken the medication for the past 3-4 months because she has not had any refills  She reports   Occasional episodes of incontinence only trying to ambulate with her cane to the commode  Patient reports that she has recently stopped most bladder irritants and now restricts her fluid intake as per her nephrologist  Reports 3-4x urinary frequency throughout the day and 1-2 incontinence  Self maintains her #7 Ring Pessary for POP symptoms and uses Premarin twice weekly  Denies any vaginal discharge, bleeding or pain  Denies any UTI symptoms  Prolapse symptoms  Bulge: yes  Pressure: yes    Rubbing on clothing: no    Stenting for urine: no  Stenting for stool: no  Pessary use: yes Type: ring  Duration: year(s)  Hormonal replacement therapy: yes  Duration: year(s)    Urinary symptoms  Urgency: no    Frequency: yes 3 / day  Incontinence with stress (exercise, valsalva, laugh, sneeze, cough): no   Incontinence with urge: no  Postural incontinence: no   Nocturia:yes 2 / night  Dysuria: no   Hematuria:no   Incomplete emptying: no   Slow stream:no   Hesitancy: no   Straining with urination: no      Medical history and medication list reviewed and no significant changes since last visit   yes      Physical exam:    Abdomen: soft, non-tender, without masses or organomegaly  Pelvic: Rectocele evident, Cystocele evident  Extremities: No edema, No calf tenderness and No venous stasis  Neurologic:  alert, oriented, normal speech, no focal findings or movement disorder noted Clitoral-Bulbocavernosus reflex present    Vulvovaginal atrophy:  yes mild  Urethral caruncle:  no none    POP Q (if applicable)  Complete Pelvic Organ Prolapse      Assessment:  78 y  o  with urge-dominant mixed incontinence and Prolapse Stage 3 cystocele anterior compartment managed with pessary  Plan:  1  OAB- Patient states that she has not used Trospium 60mg XR for the past 2-3 months  Reports that she no longer has worrisome symptoms of urgency incontinence and does not want to continue the OAB medication  2  Prolapse- Pessary removed and cleaned, Vagina without excoriations or atrophy  Will continue current conservative management  Advised to use Premarin Cream 2x weekly and to self manage pessary cleanings  3  MATTHEW- Symptoms occasional according to patient and not bothersome       Follow up in 6 month(s)   Patient advised to call for earlier appointment or medication if OAB symptoms worsen or complaints of pessary use with bleeding discharge or pain    Colby Saeed MD

## 2020-01-03 DIAGNOSIS — E11.65 UNCONTROLLED TYPE 2 DIABETES MELLITUS WITH HYPERGLYCEMIA (HCC): ICD-10-CM

## 2020-01-03 RX ORDER — EMPAGLIFLOZIN 25 MG/1
TABLET, FILM COATED ORAL
Qty: 90 TABLET | Refills: 0 | Status: SHIPPED | OUTPATIENT
Start: 2020-01-03 | End: 2020-04-08 | Stop reason: ALTCHOICE

## 2020-01-09 ENCOUNTER — APPOINTMENT (OUTPATIENT)
Dept: LAB | Facility: HOSPITAL | Age: 80
End: 2020-01-09
Payer: COMMERCIAL

## 2020-01-09 ENCOUNTER — TRANSCRIBE ORDERS (OUTPATIENT)
Dept: LAB | Facility: HOSPITAL | Age: 80
End: 2020-01-09

## 2020-01-09 DIAGNOSIS — I10 ESSENTIAL HYPERTENSION, MALIGNANT: ICD-10-CM

## 2020-01-09 DIAGNOSIS — E66.01 MORBID OBESITY (HCC): ICD-10-CM

## 2020-01-09 DIAGNOSIS — N18.6 TYPE 2 DIABETES MELLITUS WITH ESRD (END-STAGE RENAL DISEASE) (HCC): ICD-10-CM

## 2020-01-09 DIAGNOSIS — E11.649 UNCONTROLLED TYPE 2 DIABETES MELLITUS WITH HYPOGLYCEMIA, UNSPECIFIED HYPOGLYCEMIA COMA STATUS (HCC): ICD-10-CM

## 2020-01-09 DIAGNOSIS — E78.5 HYPERLIPIDEMIA, UNSPECIFIED HYPERLIPIDEMIA TYPE: ICD-10-CM

## 2020-01-09 DIAGNOSIS — N18.2 CHRONIC KIDNEY DISEASE, STAGE II (MILD): ICD-10-CM

## 2020-01-09 DIAGNOSIS — E08.21 DIABETES MELLITUS DUE TO UNDERLYING CONDITION WITH DIABETIC NEPHROPATHY, UNSPECIFIED WHETHER LONG TERM INSULIN USE (HCC): Primary | ICD-10-CM

## 2020-01-09 DIAGNOSIS — E11.22 TYPE 2 DIABETES MELLITUS WITH ESRD (END-STAGE RENAL DISEASE) (HCC): ICD-10-CM

## 2020-01-09 DIAGNOSIS — E08.21 DIABETES MELLITUS DUE TO UNDERLYING CONDITION WITH DIABETIC NEPHROPATHY, UNSPECIFIED WHETHER LONG TERM INSULIN USE (HCC): ICD-10-CM

## 2020-01-09 LAB
25(OH)D3 SERPL-MCNC: 36.6 NG/ML (ref 30–100)
URATE SERPL-MCNC: 6.6 MG/DL (ref 2–6.8)

## 2020-01-09 PROCEDURE — 82306 VITAMIN D 25 HYDROXY: CPT

## 2020-01-09 PROCEDURE — 84550 ASSAY OF BLOOD/URIC ACID: CPT

## 2020-01-09 PROCEDURE — 36415 COLL VENOUS BLD VENIPUNCTURE: CPT

## 2020-01-15 NOTE — MISCELLANEOUS
Message  GI Reminder Recall [facility]:   Date: 03/30/2017   Dear Benedict Helton:     Review of our records shows you are due for the following: EGD and EGD IS SCHEDULED FOR THIS WED 4/5  Please call the following office to schedule your appointment:     We look forward to hearing from you!      Sincerely,     SL GASTRO      Signatures   Electronically signed by : Alice Sahu, ; Mar 30 2017 11:24AM EST                       (Author) Nurse Practitioner Progress note:     HPI:  71 year old female with PMHx of HLD, family Hx of heart disease was evaluated for a cardiology work up. Underwent ECHO EF 65-70%, Carotid duplex and nuclear stress test. Nuclear stress test suggestive of LAD ischemia. Referred for cardiac cath with possible PCI     T(C): 36.7 (01-15-20 @ 07:49), Max: 36.7 (01-15-20 @ 07:49)  HR: 74   BP: 143/73   RR: 18   SpO2: 100%       PHYSICAL EXAM:  NEURO: Non-focal, AxOx3.  No neuro deficits NECK: Supple, No JVD, No LAD  CHEST/LUNG: Clear to auscultation bilaterally; No wheeze  HEART: s1 s2 Regular rate and rhythm; No murmurs, rubs, or gallops  ABDOMEN: Soft, Nontender, Nondistended; Bowel sounds present X 4 quadrants   EXTREMITIES:  2+ Peripheral Pulses, No clubbing, cyanosis, or edema   VASCULAR: Peripheral pulses palpable 2+ bilaterally  PROCEDURE SITE: right groin sheath pulled by RN. Site is without hematoma or bleeding. Sensation and SARAH intact. Distal pulses palpable 2+, capillary refill < 2 seconds. Patient denies pain, numbness, tingling, CP or SOB. Clean dry dressing applied     PROCEDURE RESULTS: full report to follow     ASSESSMENT:   71 year old female with PMHx of HLD, family Hx of heart disease was evaluated for a cardiology work up. Underwent ECHO EF 65-70%, Carotid duplex and nuclear stress test. Nuclear stress test suggestive of LAD ischemia. Referred for cardiac cath with possible PCI    s/p [facility] revealing normal coronary arteries       PLAN:  -VS, diet, activity as per post cath orders  -Encourage PO fluids  -Continue current medications including Aspirin 81m and Simvastatin   -Plan of care discussed with patient and Dr. Bullock   -Post cath instructions reviewed, patient verbalizes and understands instructions  - Patient to be discharged home, recommend following up with cardiologist in 2 weeks

## 2020-01-27 ENCOUNTER — TELEPHONE (OUTPATIENT)
Dept: INTERNAL MEDICINE CLINIC | Facility: CLINIC | Age: 80
End: 2020-01-27

## 2020-02-06 ENCOUNTER — TRANSCRIBE ORDERS (OUTPATIENT)
Dept: ADMINISTRATIVE | Facility: HOSPITAL | Age: 80
End: 2020-02-06

## 2020-02-06 DIAGNOSIS — R93.5 ABNORMAL ABDOMINAL ULTRASOUND: ICD-10-CM

## 2020-02-06 DIAGNOSIS — N85.00 ENDOMETRIAL HYPERPLASIA, UNSPECIFIED: Primary | ICD-10-CM

## 2020-02-14 ENCOUNTER — HOSPITAL ENCOUNTER (OUTPATIENT)
Dept: RADIOLOGY | Facility: HOSPITAL | Age: 80
Discharge: HOME | End: 2020-02-14
Payer: COMMERCIAL

## 2020-02-14 DIAGNOSIS — N85.00 ENDOMETRIAL HYPERPLASIA, UNSPECIFIED: ICD-10-CM

## 2020-02-14 DIAGNOSIS — R93.5 ABNORMAL ABDOMINAL ULTRASOUND: ICD-10-CM

## 2020-02-14 PROCEDURE — 76830 TRANSVAGINAL US NON-OB: CPT

## 2020-02-14 PROCEDURE — 74181 MRI ABDOMEN W/O CONTRAST: CPT

## 2020-02-14 PROCEDURE — 76856 US EXAM PELVIC COMPLETE: CPT

## 2020-02-19 ENCOUNTER — OFFICE VISIT (OUTPATIENT)
Dept: INTERNAL MEDICINE CLINIC | Facility: CLINIC | Age: 80
End: 2020-02-19

## 2020-02-19 VITALS
HEIGHT: 64 IN | HEART RATE: 63 BPM | WEIGHT: 164.02 LBS | SYSTOLIC BLOOD PRESSURE: 136 MMHG | BODY MASS INDEX: 28 KG/M2 | DIASTOLIC BLOOD PRESSURE: 64 MMHG | OXYGEN SATURATION: 98 % | TEMPERATURE: 97.8 F

## 2020-02-19 DIAGNOSIS — Z79.4 CONTROLLED TYPE 2 DIABETES MELLITUS WITH DIABETIC NEUROPATHY, WITH LONG-TERM CURRENT USE OF INSULIN (HCC): Chronic | ICD-10-CM

## 2020-02-19 DIAGNOSIS — Z23 NEED FOR PNEUMOCOCCAL VACCINATION: ICD-10-CM

## 2020-02-19 DIAGNOSIS — I10 BENIGN ESSENTIAL HYPERTENSION: ICD-10-CM

## 2020-02-19 DIAGNOSIS — E11.40 CONTROLLED TYPE 2 DIABETES MELLITUS WITH DIABETIC NEUROPATHY, WITH LONG-TERM CURRENT USE OF INSULIN (HCC): Chronic | ICD-10-CM

## 2020-02-19 DIAGNOSIS — Z00.00 MEDICARE ANNUAL WELLNESS VISIT, INITIAL: Primary | ICD-10-CM

## 2020-02-19 DIAGNOSIS — E78.2 MIXED HYPERLIPIDEMIA: ICD-10-CM

## 2020-02-19 PROBLEM — E78.00 PURE HYPERCHOLESTEROLEMIA: Chronic | Status: RESOLVED | Noted: 2018-04-11 | Resolved: 2020-02-19

## 2020-02-19 PROCEDURE — 4040F PNEUMOC VAC/ADMIN/RCVD: CPT | Performed by: PHYSICIAN ASSISTANT

## 2020-02-19 PROCEDURE — G0438 PPPS, INITIAL VISIT: HCPCS | Performed by: PHYSICIAN ASSISTANT

## 2020-02-19 PROCEDURE — 3078F DIAST BP <80 MM HG: CPT | Performed by: PHYSICIAN ASSISTANT

## 2020-02-19 PROCEDURE — 1125F AMNT PAIN NOTED PAIN PRSNT: CPT | Performed by: PHYSICIAN ASSISTANT

## 2020-02-19 PROCEDURE — 90670 PCV13 VACCINE IM: CPT | Performed by: PHYSICIAN ASSISTANT

## 2020-02-19 PROCEDURE — 1036F TOBACCO NON-USER: CPT | Performed by: PHYSICIAN ASSISTANT

## 2020-02-19 PROCEDURE — G0009 ADMIN PNEUMOCOCCAL VACCINE: HCPCS | Performed by: PHYSICIAN ASSISTANT

## 2020-02-19 PROCEDURE — 3075F SYST BP GE 130 - 139MM HG: CPT | Performed by: PHYSICIAN ASSISTANT

## 2020-02-19 PROCEDURE — 3008F BODY MASS INDEX DOCD: CPT | Performed by: PHYSICIAN ASSISTANT

## 2020-02-19 PROCEDURE — 1160F RVW MEDS BY RX/DR IN RCRD: CPT | Performed by: PHYSICIAN ASSISTANT

## 2020-02-19 PROCEDURE — 1170F FXNL STATUS ASSESSED: CPT | Performed by: PHYSICIAN ASSISTANT

## 2020-02-19 RX ORDER — AMLODIPINE BESYLATE 5 MG/1
5 TABLET ORAL DAILY
Qty: 90 TABLET | Refills: 1 | Status: SHIPPED | OUTPATIENT
Start: 2020-02-19 | End: 2020-08-11

## 2020-02-19 RX ORDER — LOSARTAN POTASSIUM 25 MG/1
25 TABLET ORAL DAILY
Qty: 90 TABLET | Refills: 1 | Status: SHIPPED | OUTPATIENT
Start: 2020-02-19 | End: 2020-08-11 | Stop reason: SDUPTHER

## 2020-02-19 RX ORDER — ATORVASTATIN CALCIUM 40 MG/1
40 TABLET, FILM COATED ORAL DAILY
Qty: 90 TABLET | Refills: 1 | Status: SHIPPED | OUTPATIENT
Start: 2020-02-19 | End: 2020-08-07

## 2020-02-19 NOTE — PATIENT INSTRUCTIONS
Medicare Preventive Visit Patient Instructions  Thank you for completing your Welcome to Medicare Visit or Medicare Annual Wellness Visit today  Your next wellness visit will be due in one year (2/19/2021)  The screening/preventive services that you may require over the next 5-10 years are detailed below  Some tests may not apply to you based off risk factors and/or age  Screening tests ordered at today's visit but not completed yet may show as past due  Also, please note that scanned in results may not display below  Preventive Screenings:  Service Recommendations Previous Testing/Comments   Colorectal Cancer Screening  * Colonoscopy    * Fecal Occult Blood Test (FOBT)/Fecal Immunochemical Test (FIT)  * Fecal DNA/Cologuard Test  * Flexible Sigmoidoscopy Age: 54-65 years old   Colonoscopy: every 10 years (may be performed more frequently if at higher risk)  OR  FOBT/FIT: every 1 year  OR  Cologuard: every 3 years  OR  Sigmoidoscopy: every 5 years  Screening may be recommended earlier than age 48 if at higher risk for colorectal cancer  Also, an individualized decision between you and your healthcare provider will decide whether screening between the ages of 74-80 would be appropriate  Colonoscopy: Not on file  FOBT/FIT: 05/21/2018  Cologuard: Not on file  Sigmoidoscopy: Not on file         Breast Cancer Screening Age: 36 years old  Frequency: every 1-2 years  Not required if history of left and right mastectomy Mammogram: 02/28/2017       Cervical Cancer Screening Between the ages of 21-29, pap smear recommended once every 3 years  Between the ages of 33-67, can perform pap smear with HPV co-testing every 5 years     Recommendations may differ for women with a history of total hysterectomy, cervical cancer, or abnormal pap smears in past  Pap Smear: Not on file       Hepatitis C Screening Once for adults born between [facility]  More frequently in patients at high risk for Hepatitis C Hep C Antibody: Not on file       Diabetes Screening 1-2 times per year if you're at risk for diabetes or have pre-diabetes Fasting glucose: 102 mg/dL   A1C: 7 5 %       Cholesterol Screening Once every 5 years if you don't have a lipid disorder  May order more often based on risk factors  Lipid panel: 11/06/2019         Other Preventive Screenings Covered by Medicare:  1  Abdominal Aortic Aneurysm (AAA) Screening: covered once if your at risk  You're considered to be at risk if you have a family history of AAA  2  Lung Cancer Screening: covers low dose CT scan once per year if you meet all of the following conditions: (1) Age 50-69; (2) No signs or symptoms of lung cancer; (3) Current smoker or have quit smoking within the last 15 years; (4) You have a tobacco smoking history of at least 30 pack years (packs per day multiplied by number of years you smoked); (5) You get a written order from a healthcare provider  3  Glaucoma Screening: covered annually if you're considered high risk: (1) You have diabetes OR (2) Family history of glaucoma OR (3)  aged 48 and older OR (3)  American aged 72 and older  3  Osteoporosis Screening: covered every 2 years if you meet one of the following conditions: (1) You're estrogen deficient and at risk for osteoporosis based off medical history and other findings; (2) Have a vertebral abnormality; (3) On glucocorticoid therapy for more than 3 months; (4) Have primary hyperparathyroidism; (5) On osteoporosis medications and need to assess response to drug therapy  · Last bone density test (DXA Scan): 12/05/2019   5  HIV Screening: covered annually if you're between the age of 15-65  Also covered annually if you are younger than 13 and older than 72 with risk factors for HIV infection  For pregnant patients, it is covered up to 3 times per pregnancy      Immunizations:  Immunization Recommendations   Influenza Vaccine Annual influenza vaccination during flu season is recommended for all persons aged >= 6 months who do not have contraindications   Pneumococcal Vaccine (Prevnar and Pneumovax)  * Prevnar = PCV13  * Pneumovax = PPSV23   Adults 25-60 years old: 1-3 doses may be recommended based on certain risk factors  Adults 72 years old: Prevnar (PCV13) vaccine recommended followed by Pneumovax (PPSV23) vaccine  If already received PPSV23 since turning 65, then PCV13 recommended at least one year after PPSV23 dose  Hepatitis B Vaccine 3 dose series if at intermediate or high risk (ex: diabetes, end stage renal disease, liver disease)   Tetanus (Td) Vaccine - COST NOT COVERED BY MEDICARE PART B Following completion of primary series, a booster dose should be given every 10 years to maintain immunity against tetanus  Td may also be given as tetanus wound prophylaxis  Tdap Vaccine - COST NOT COVERED BY MEDICARE PART B Recommended at least once for all adults  For pregnant patients, recommended with each pregnancy  Shingles Vaccine (Shingrix) - COST NOT COVERED BY MEDICARE PART B  2 shot series recommended in those aged 48 and above     Health Maintenance Due:  There are no preventive care reminders to display for this patient  Immunizations Due:  There are no preventive care reminders to display for this patient  Advance Directives   What are advance directives? Advance directives are legal documents that state your wishes and plans for medical care  These plans are made ahead of time in case you lose your ability to make decisions for yourself  Advance directives can apply to any medical decision, such as the treatments you want, and if you want to donate organs  What are the types of advance directives? There are many types of advance directives, and each state has rules about how to use them  You may choose a combination of any of the following:  · Living will: This is a written record of the treatment you want   You can also choose which treatments you do not want, which to limit, and which to stop at a certain time  This includes surgery, medicine, IV fluid, and tube feedings  · Durable power of  for healthcare [facility]): This is a written record that states who you want to make healthcare choices for you when you are unable to make them for yourself  This person, called a proxy, is usually a family member or a friend  You may choose more than 1 proxy  · Do not resuscitate (DNR) order:  A DNR order is used in case your heart stops beating or you stop breathing  It is a request not to have certain forms of treatment, such as CPR  A DNR order may be included in other types of advance directives  · Medical directive: This covers the care that you want if you are in a coma, near death, or unable to make decisions for yourself  You can list the treatments you want for each condition  Treatment may include pain medicine, surgery, blood transfusions, dialysis, IV or tube feedings, and a ventilator (breathing machine)  · Values history: This document has questions about your views, beliefs, and how you feel and think about life  This information can help others choose the care that you would choose  Why are advance directives important? An advance directive helps you control your care  Although spoken wishes may be used, it is better to have your wishes written down  Spoken wishes can be misunderstood, or not followed  Treatments may be given even if you do not want them  An advance directive may make it easier for your family to make difficult choices about your care  Weight Management   Why it is important to manage your weight:  Being overweight increases your risk of health conditions such as heart disease, high blood pressure, type 2 diabetes, and certain types of cancer  It can also increase your risk for osteoarthritis, sleep apnea, and other respiratory problems  Aim for a slow, steady weight loss   Even a small amount of weight loss can lower your risk of health problems  How to lose weight safely:  A safe and healthy way to lose weight is to eat fewer calories and get regular exercise  You can lose up about 1 pound a week by decreasing the number of calories you eat by 500 calories each day  Healthy meal plan for weight management:  A healthy meal plan includes a variety of foods, contains fewer calories, and helps you stay healthy  A healthy meal plan includes the following:  · Eat whole-grain foods more often  A healthy meal plan should contain fiber  Fiber is the part of grains, fruits, and vegetables that is not broken down by your body  Whole-grain foods are healthy and provide extra fiber in your diet  Some examples of whole-grain foods are whole-wheat breads and pastas, oatmeal, brown rice, and bulgur  · Eat a variety of vegetables every day  Include dark, leafy greens such as spinach, kale, yuridia greens, and mustard greens  Eat yellow and orange vegetables such as carrots, sweet potatoes, and winter squash  · Eat a variety of fruits every day  Choose fresh or canned fruit (canned in its own juice or light syrup) instead of juice  Fruit juice has very little or no fiber  · Eat low-fat dairy foods  Drink fat-free (skim) milk or 1% milk  Eat fat-free yogurt and low-fat cottage cheese  Try low-fat cheeses such as mozzarella and other reduced-fat cheeses  · Choose meat and other protein foods that are low in fat  Choose beans or other legumes such as split peas or lentils  Choose fish, skinless poultry (chicken or turkey), or lean cuts of red meat (beef or pork)  Before you cook meat or poultry, cut off any visible fat  · Use less fat and oil  Try baking foods instead of frying them  Add less fat, such as margarine, sour cream, regular salad dressing and mayonnaise to foods  Eat fewer high-fat foods  Some examples of high-fat foods include french fries, doughnuts, ice cream, and cakes  · Eat fewer sweets    Limit foods and drinks that are high in sugar  This includes candy, cookies, regular soda, and sweetened drinks  Exercise:  Exercise at least 30 minutes per day on most days of the week  Some examples of exercise include walking, biking, dancing, and swimming  You can also fit in more physical activity by taking the stairs instead of the elevator or parking farther away from stores  Ask your healthcare provider about the best exercise plan for you  © Copyright [employer] 2018 Information is for End User's use only and may not be sold, redistributed or otherwise used for commercial purposes  All illustrations and images included in CareNotes® are the copyrighted property of [employer]  or [facility] Preventive Visit Patient Instructions  Thank you for completing your Welcome to Medicare Visit or Medicare Annual Wellness Visit today  Your next wellness visit will be due in one year (2/19/2021)  The screening/preventive services that you may require over the next 5-10 years are detailed below  Some tests may not apply to you based off risk factors and/or age  Screening tests ordered at today's visit but not completed yet may show as past due  Also, please note that scanned in results may not display below  Preventive Screenings:  Service Recommendations Previous Testing/Comments   Colorectal Cancer Screening  * Colonoscopy    * Fecal Occult Blood Test (FOBT)/Fecal Immunochemical Test (FIT)  * Fecal DNA/Cologuard Test  * Flexible Sigmoidoscopy Age: 54-65 years old   Colonoscopy: every 10 years (may be performed more frequently if at higher risk)  OR  FOBT/FIT: every 1 year  OR  Cologuard: every 3 years  OR  Sigmoidoscopy: every 5 years  Screening may be recommended earlier than age 48 if at higher risk for colorectal cancer  Also, an individualized decision between you and your healthcare provider will decide whether screening between the ages of 74-80 would be appropriate   Colonoscopy: Not on file  FOBT/FIT: 05/21/2018  Cologuard: Not on file  Sigmoidoscopy: Not on file         Breast Cancer Screening Age: 36 years old  Frequency: every 1-2 years  Not required if history of left and right mastectomy Mammogram: 02/28/2017       Cervical Cancer Screening Between the ages of 21-29, pap smear recommended once every 3 years  Between the ages of 33-67, can perform pap smear with HPV co-testing every 5 years  Recommendations may differ for women with a history of total hysterectomy, cervical cancer, or abnormal pap smears in past  Pap Smear: Not on file       Hepatitis C Screening Once for adults born between 1945 and 1965  More frequently in patients at high risk for Hepatitis C Hep C Antibody: Not on file       Diabetes Screening 1-2 times per year if you're at risk for diabetes or have pre-diabetes Fasting glucose: 102 mg/dL   A1C: 7 5 %       Cholesterol Screening Once every 5 years if you don't have a lipid disorder  May order more often based on risk factors  Lipid panel: 11/06/2019         Other Preventive Screenings Covered by Medicare:  6  Abdominal Aortic Aneurysm (AAA) Screening: covered once if your at risk  You're considered to be at risk if you have a family history of AAA  7  Lung Cancer Screening: covers low dose CT scan once per year if you meet all of the following conditions: (1) Age 50-69; (2) No signs or symptoms of lung cancer; (3) Current smoker or have quit smoking within the last 15 years; (4) You have a tobacco smoking history of at least 30 pack years (packs per day multiplied by number of years you smoked); (5) You get a written order from a healthcare provider  8  Glaucoma Screening: covered annually if you're considered high risk: (1) You have diabetes OR (2) Family history of glaucoma OR (3)  aged 48 and older OR (3)  American aged 72 and older  5   Osteoporosis Screening: covered every 2 years if you meet one of the following conditions: (1) You're estrogen deficient and at risk for osteoporosis based off medical history and other findings; (2) Have a vertebral abnormality; (3) On glucocorticoid therapy for more than 3 months; (4) Have primary hyperparathyroidism; (5) On osteoporosis medications and need to assess response to drug therapy  · Last bone density test (DXA Scan): 12/05/2019   10  HIV Screening: covered annually if you're between the age of 15-65  Also covered annually if you are younger than 13 and older than 72 with risk factors for HIV infection  For pregnant patients, it is covered up to 3 times per pregnancy  Immunizations:  Immunization Recommendations   Influenza Vaccine Annual influenza vaccination during flu season is recommended for all persons aged >= 6 months who do not have contraindications   Pneumococcal Vaccine (Prevnar and Pneumovax)  * Prevnar = PCV13  * Pneumovax = PPSV23   Adults 25-60 years old: 1-3 doses may be recommended based on certain risk factors  Adults 72 years old: Prevnar (PCV13) vaccine recommended followed by Pneumovax (PPSV23) vaccine  If already received PPSV23 since turning 65, then PCV13 recommended at least one year after PPSV23 dose  Hepatitis B Vaccine 3 dose series if at intermediate or high risk (ex: diabetes, end stage renal disease, liver disease)   Tetanus (Td) Vaccine - COST NOT COVERED BY MEDICARE PART B Following completion of primary series, a booster dose should be given every 10 years to maintain immunity against tetanus  Td may also be given as tetanus wound prophylaxis  Tdap Vaccine - COST NOT COVERED BY MEDICARE PART B Recommended at least once for all adults  For pregnant patients, recommended with each pregnancy  Shingles Vaccine (Shingrix) - COST NOT COVERED BY MEDICARE PART B  2 shot series recommended in those aged 48 and above     Health Maintenance Due:  There are no preventive care reminders to display for this patient    Immunizations Due:  There are no preventive care reminders to display for this patient  Advance Directives   What are advance directives? Advance directives are legal documents that state your wishes and plans for medical care  These plans are made ahead of time in case you lose your ability to make decisions for yourself  Advance directives can apply to any medical decision, such as the treatments you want, and if you want to donate organs  What are the types of advance directives? There are many types of advance directives, and each state has rules about how to use them  You may choose a combination of any of the following:  · Living will: This is a written record of the treatment you want  You can also choose which treatments you do not want, which to limit, and which to stop at a certain time  This includes surgery, medicine, IV fluid, and tube feedings  · Durable power of  for healthcare [facility]): This is a written record that states who you want to make healthcare choices for you when you are unable to make them for yourself  This person, called a proxy, is usually a family member or a friend  You may choose more than 1 proxy  · Do not resuscitate (DNR) order:  A DNR order is used in case your heart stops beating or you stop breathing  It is a request not to have certain forms of treatment, such as CPR  A DNR order may be included in other types of advance directives  · Medical directive: This covers the care that you want if you are in a coma, near death, or unable to make decisions for yourself  You can list the treatments you want for each condition  Treatment may include pain medicine, surgery, blood transfusions, dialysis, IV or tube feedings, and a ventilator (breathing machine)  · Values history: This document has questions about your views, beliefs, and how you feel and think about life  This information can help others choose the care that you would choose  Why are advance directives important?   An advance directive helps you control your care  Although spoken wishes may be used, it is better to have your wishes written down  Spoken wishes can be misunderstood, or not followed  Treatments may be given even if you do not want them  An advance directive may make it easier for your family to make difficult choices about your care  Weight Management   Why it is important to manage your weight:  Being overweight increases your risk of health conditions such as heart disease, high blood pressure, type 2 diabetes, and certain types of cancer  It can also increase your risk for osteoarthritis, sleep apnea, and other respiratory problems  Aim for a slow, steady weight loss  Even a small amount of weight loss can lower your risk of health problems  How to lose weight safely:  A safe and healthy way to lose weight is to eat fewer calories and get regular exercise  You can lose up about 1 pound a week by decreasing the number of calories you eat by 500 calories each day  Healthy meal plan for weight management:  A healthy meal plan includes a variety of foods, contains fewer calories, and helps you stay healthy  A healthy meal plan includes the following:  · Eat whole-grain foods more often  A healthy meal plan should contain fiber  Fiber is the part of grains, fruits, and vegetables that is not broken down by your body  Whole-grain foods are healthy and provide extra fiber in your diet  Some examples of whole-grain foods are whole-wheat breads and pastas, oatmeal, brown rice, and bulgur  · Eat a variety of vegetables every day  Include dark, leafy greens such as spinach, kale, yuridia greens, and mustard greens  Eat yellow and orange vegetables such as carrots, sweet potatoes, and winter squash  · Eat a variety of fruits every day  Choose fresh or canned fruit (canned in its own juice or light syrup) instead of juice  Fruit juice has very little or no fiber  · Eat low-fat dairy foods  Drink fat-free (skim) milk or 1% milk   Eat fat-free yogurt and low-fat cottage cheese  Try low-fat cheeses such as mozzarella and other reduced-fat cheeses  · Choose meat and other protein foods that are low in fat  Choose beans or other legumes such as split peas or lentils  Choose fish, skinless poultry (chicken or turkey), or lean cuts of red meat (beef or pork)  Before you cook meat or poultry, cut off any visible fat  · Use less fat and oil  Try baking foods instead of frying them  Add less fat, such as margarine, sour cream, regular salad dressing and mayonnaise to foods  Eat fewer high-fat foods  Some examples of high-fat foods include french fries, doughnuts, ice cream, and cakes  · Eat fewer sweets  Limit foods and drinks that are high in sugar  This includes candy, cookies, regular soda, and sweetened drinks  Exercise:  Exercise at least 30 minutes per day on most days of the week  Some examples of exercise include walking, biking, dancing, and swimming  You can also fit in more physical activity by taking the stairs instead of the elevator or parking farther away from stores  Ask your healthcare provider about the best exercise plan for you  © Copyright [employer] 2018 Information is for End User's use only and may not be sold, redistributed or otherwise used for commercial purposes   All illustrations and images included in CareNotes® are the copyrighted property of [employer]  or [address] [place] [address] [facility]

## 2020-02-19 NOTE — PROGRESS NOTES
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, with long-term current use of insulin (HCC) (Chronic)       Cardiovascular and Mediastinum    Benign essential hypertension    Relevant Medications    losartan (COZAAR) 25 mg tablet    amLODIPine (NORVASC) 5 mg tablet       Other    Mixed hyperlipidemia    Relevant Medications    atorvastatin (LIPITOR) 40 mg tablet      Other Visit Diagnoses     Medicare annual wellness visit, initial    -  Primary    Need for pneumococcal vaccination        Relevant Orders    PNEUMOCOCCAL CONJUGATE VACCINE 13-VALENT GREATER THAN 6 MONTHS        BMI Counseling: Body mass index is 28 15 kg/m²  The BMI is above normal  Nutrition recommendations include decreasing portion sizes, encouraging healthy choices of fruits and vegetables, limiting drinks that contain sugar, moderation in carbohydrate intake, increasing intake of lean protein, reducing intake of saturated and trans fat and reducing intake of cholesterol  Exercise recommendations include exercising 3-5 times per week  No pharmacotherapy was ordered  Patient referred to PCP due to patient being overweight  Preventive health issues were discussed with patient, and age appropriate screening tests were ordered as noted in patient's After Visit Summary  Personalized health advice and appropriate referrals for health education or preventive services given if needed, as noted in patient's After Visit Summary  History of Present Illness:     Patient presents for Welcome to Medicare visit  Patient Care Team:  Radha Vega PA-C as PCP - General (Internal Medicine)     Review of Systems:     Review of Systems   Constitutional: Negative for appetite change, fever and unexpected weight change  Respiratory: Negative  Negative for cough and shortness of breath  Cardiovascular: Negative    Negative for chest pain and palpitations  Gastrointestinal: Negative for abdominal pain, constipation and diarrhea  Musculoskeletal: Positive for arthralgias and myalgias  Generalized OA   Neurological: Negative for dizziness and light-headedness  Psychiatric/Behavioral: Negative  Problem List:     Patient Active Problem List   Diagnosis    Aortic valve regurgitation, nonrheumatic    Benign essential hypertension    Chronic rhinitis    CVA [facility]cerebrovascular accident) ([facility] )    Midline cystocele    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, with long-term current use of insulin ([facility] )    Non-rheumatic mitral regurgitation    Uterine procidentia    Esophageal abnormality    CKD stage 3 due to type 2 diabetes mellitus ([facility] )    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    Decreased hearing, bilateral    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[facility] )    History of CVA with residual deficit    Mixed hyperlipidemia      Past Medical and Surgical History:     Past Medical History:   Diagnosis Date    ACE-inhibitor cough     last assessed - [id number]    Asthma     Bilateral edema of lower extremity     last assessed - [id number]    Cardiac murmur     last assessed - [id number]    Diabetes mellitus [facility])     last assessed - [id number]    Esophageal dysphagia     Fatigue     last assessed - [id number]    Hyperlipidemia     Hypertension     Patellar bursitis of right knee     last assessed - [id number]    Personal history of other specified conditions     presenting hazards to health    Stroke [facility])     Stroke syndrome     Urinary frequency     UTI (urinary tract infection)      Past Surgical History:   Procedure Laterality Date    SC ESOPHAGOGASTRODUODENOSCOPY TRANSORAL DIAGNOSTIC N/A 4/5/2017    Procedure: ESOPHAGOGASTRODUODENOSCOPY (EGD); Surgeon: Deborah Montemayor MD;  Location: [facility];   Service: Gastroenterology      Family History:     Family History   Problem Relation Age of Onset    Heart disease Father     Hypertension Mother     Stroke Mother     Other Sister         1)Breast disorder; 2)Epilepsy   Gustavo Mcwilliams Migraines Sister         Migraine headaches    Osteoporosis Sister     Thyroid disease Sister     Coronary artery disease Sister         S/P triple vessel bypass    Osteoporosis Maternal Grandmother     Diabetes Son         Diabetes mellitus    Hypertension Son     Rheum arthritis Son         Rheumatic disease    Heart disease Family       Social History:        Social History     Socioeconomic History    Marital status:       Spouse name: None    Number of children: 6    Years of education: None    Highest education level: None   Occupational History    Occupation: Retired   Social Needs    Financial resource strain: None    Food insecurity:     Worry: None     Inability: None    Transportation needs:     Medical: None     Non-medical: None   Tobacco Use    Smoking status: Former Smoker     Packs/day: 3 00     Last attempt to quit:      Years since quittin 1    Smokeless tobacco: Former User    Tobacco comment: quit over 30 yrs ago; (quit in the , had smoked 3PPD for 20 years - per [employer])   Substance and Sexual Activity    Alcohol use: No     Comment: Denied history of alcohol use    Drug use: No     Comment: Denied history of drug use    Sexual activity: Never   Lifestyle    Physical activity:     Days per week: None     Minutes per session: None    Stress: None   Relationships    Social connections:     Talks on phone: None     Gets together: None     Attends Protestant service: None     Active member of club or organization: None     Attends meetings of clubs or organizations: None     Relationship status: None    Intimate partner violence:     Fear of current or ex partner: None     Emotionally abused: None     Physically abused: None     Forced sexual activity: None   Other Topics Concern    None   Social History Narrative    Diet needs improvement Does not exercise    No caffeine use      Medications and Allergies:     Current Outpatient Medications   Medication Sig Dispense Refill    ciclopirox (PENLAC) 8 % solution Apply topically daily at bedtime 6 6 mL 5    clopidogrel (PLAVIX) 75 mg tablet Take 2 tablets (150 mg total) by mouth daily 180 tablet 0    conjugated estrogens (PREMARIN) vaginal cream Apply pea-sized amount in vagina two nights a week 30 g 2    Continuous Blood Gluc Sensor (FREESTYLE ADAM 14 DAY SENSOR) MISC Apply 1 sensor to arm every 14 days to monitor blood sugar 3 times daily 28 each 1    D-2000 MAXIMUM STRENGTH 50 MCG (2000 UT) TABS TAKE 1 TAB EVERY OTHER DAY      diclofenac sodium (VOLTAREN) 1 % Apply 2 g topically 4 (four) times a day 100 g 0    FREESTYLE LITE test strip May check blood sugar twice daily 100 each 3    GLOBAL EASE INJECT PEN NEEDLES 31G X 5 MM MISC       Glucose-Vitamin C-Vitamin D (TRUEPLUS GLUCOSE ON THE GO) CHEW CHEW 2 TABS AS NEEDED FOR BLOOD SUGAR LESS THAN 80      JARDIANCE 25 MG TABS TAKE 1 TABLET DAILY 90 tablet 0    metFORMIN (GLUCOPHAGE) 1000 MG tablet Take 1 tablet (1,000 mg total) by mouth 2 (two) times a day with meals 180 tablet 1    TOUJEO SOLOSTAR 300 units/mL CONCETRATED U-300 injection pen INJECT 15 UNITS AT BEDTIME ONCE A DAY (HOLD IF <140)  0    acetaminophen (TYLENOL) 650 mg CR tablet Take 650 mg by mouth every 6 (six) hours as needed for mild pain      amLODIPine (NORVASC) 5 mg tablet Take 1 tablet (5 mg total) by mouth daily 90 tablet 1    atorvastatin (LIPITOR) 40 mg tablet Take 1 tablet (40 mg total) by mouth daily 90 tablet 1    Blood Glucose Monitoring Suppl (FREESTYLE LITE) JAQUELIN by Does not apply route daily 1 each 0    D-2000 MAXIMUM STRENGTH 2000 units TABS TAKE 1 TAB DAILY 90 tablet 1    Lancets (FREESTYLE) lancets Use as instructed 100 each 0    liraglutide (VICTOZA) injection Inject 0 3 mL (1 8 mg total) under the skin daily 9 mL 2    losartan (COZAAR) 25 mg tablet Take 1 tablet (25 mg total) by mouth daily 90 tablet 1    montelukast (SINGULAIR) 10 mg tablet Take 1 tablet (10 mg total) by mouth daily at bedtime for 90 days 90 tablet 0    vitamin B-12 (CYANOCOBALAMIN) 250 MCG TABS Take 1 tablet (250 mcg total) by mouth daily for 180 days (Patient not taking: Reported on 7/18/2019) 90 tablet 1     No current facility-administered medications for this visit  No Known Allergies   Immunizations:     Immunization History   Administered Date(s) Administered    INFLUENZA 10/10/2017    Influenza Quadrivalent, 6-35 Months IM 10/10/2017    Influenza, high dose seasonal 0 5 mL 10/02/2019    Pneumococcal Polysaccharide PPV23 01/01/2008      Health Maintenance: There are no preventive care reminders to display for this patient  There are no preventive care reminders to display for this patient  Medicare Screening Tests and Risk Assessments:     Mahogany Goncalves is here for her Initial Wellness visit  Health Risk Assessment:   Patient rates overall health as good  Patient feels that their physical health rating is same  Eyesight was rated as same  Hearing was rated as same  Patient feels that their emotional and mental health rating is much better  Pain experienced in the last 7 days has been some  Patient's pain rating has been 3/10  Patient states that she has experienced no weight loss or gain in last 6 months  Fall Risk Screening: In the past year, patient has experienced: no history of falling in past year      Urinary Incontinence Screening:   Has history of urinary incontinence, following with urogyn and is improving but still some if has full bladder    Home Safety:  Patient does not have trouble with stairs inside or outside of their home  Patient has working smoke alarms Home safety hazards include: none  Nutrition:   Current diet is Diabetic  Medications:   Patient is not currently taking any over-the-counter supplements   Patient is able to manage medications  We discussed once again the other provider continues to make medication changes for your heart and DM and you already see those specialists, is potentially harmful to you as we cannot see the changes they make  You may choose between us and [facility] for primary care  Activities of Daily Living (ADLs)/Instrumental Activities of Daily Living (IADLs):   Walk and transfer into and out of bed and chair?: Yes  Dress and groom yourself?: Yes    Bathe or shower yourself?: Yes    Do your laundry/housekeeping?: Yes  Manage your money, pay your bills and track your expenses?: Yes  Make your own meals?: Yes    Do your own shopping?: Yes    ADL comments: States remains independent with ADL    Previous Hospitalizations:   Any hospitalizations or ED visits within the last 12 months?: No      Advance Care Planning:   Living will: No    Durable POA for healthcare: No    Advanced directive: No    Advanced directive counseling given: Yes    Five wishes given: Yes      Comments: Patient is planning to speak with family and aware importance of planning      Cognitive Screening:   Provider or family/friend/caregiver concerned regarding cognition?: No    PREVENTIVE SCREENINGS      Cardiovascular Screening:    General: Screening Not Indicated and History Lipid Disorder      Diabetes Screening:     General: Screening Not Indicated and History Diabetes      Colorectal Cancer Screening:     General: Screening Not Indicated      Breast Cancer Screening:     General: Screening Not Indicated      Cervical Cancer Screening:    General: Screening Not Indicated      Osteoporosis Screening:    General: Screening Current      Abdominal Aortic Aneurysm (AAA) Screening:        General: Screening Not Indicated      Lung Cancer Screening:     General: Screening Not Indicated      Hepatitis C Screening:    General: Risks and Benefits Discussed and Patient Declines      Preventive Screening Comments: Agreeable to prevnar 13 does not want tetanus today but will consider at follow up  Declines Hep C states no risk and not feel necessary    Other Counseling Topics:   Car/seat belt/driving safety, skin self-exam, sunscreen and regular weightbearing exercise and calcium and vitamin D intake  Does not drive but has license      No exam data present     Physical Exam:     /64 (BP Location: Right arm, Patient Position: Sitting, Cuff Size: Adult)   Pulse 63   Temp 97 8 °F (36 6 °C) (Temporal)   Ht 5' 4" (1 626 m)   Wt 74 4 kg (164 lb 0 4 oz)   SpO2 98%   BMI 28 15 kg/m²     Physical Exam   Constitutional: She is oriented to person, place, and time  She appears well-nourished  No distress  HENT:   Head: Normocephalic  Eyes: Conjunctivae are normal    Cardiovascular: Normal rate and regular rhythm  Pulmonary/Chest: Effort normal and breath sounds normal    Musculoskeletal: She exhibits no edema  Neurological: She is alert and oriented to person, place, and time  Skin: Skin is warm and dry  No rash noted  Psychiatric: She has a normal mood and affect   Her behavior is normal  Judgment and thought content normal        Marcelina De La Cruz PA-C

## 2020-02-27 ENCOUNTER — TELEPHONE (OUTPATIENT)
Dept: INTERNAL MEDICINE CLINIC | Facility: CLINIC | Age: 80
End: 2020-02-27

## 2020-02-27 NOTE — TELEPHONE ENCOUNTER
Patient and her son Dayton Alfaro called requesting to speak to Dr Geovanni Mesa regarding patients diabetes  They asked if the patient had an endocrinologist doctor, I said yes and confirmed pending appointment  They are concerned as to why is her diabetes being treated by the nephrology   Can someone please call Kristi Hurley and her son and clarify their concerns/

## 2020-03-02 DIAGNOSIS — E11.65 UNCONTROLLED TYPE 2 DIABETES MELLITUS WITH HYPERGLYCEMIA (HCC): ICD-10-CM

## 2020-03-02 NOTE — TELEPHONE ENCOUNTER
Name of medication, dose, quantity and frequency:    Requested Prescriptions     Pending Prescriptions Disp Refills    liraglutide (VICTOZA) injection 9 mL 2     Sig: Inject 0 3 mL (1 8 mg total) under the skin daily       Number of refills left: 0    Amount of medication left:    Pharmacy verified and updated:  yes    Additional information:    Patient called into [facility] requesting refills

## 2020-03-04 NOTE — TELEPHONE ENCOUNTER
Spoke to [facility], [phone] and asked if she can advise the provider there to only change nephro related medication as it is conflicting with her PCP and endo  She advised she will send a message to the provider

## 2020-03-04 NOTE — TELEPHONE ENCOUNTER
Spoke to patient  She said she only sees [facility] for nephro purposes  I advised her they adjusted some of her medications for other things which is not good since she already has PCP and neuro   I will reach out to [facility] to advise them not to change any meds other than nephro related

## 2020-03-11 ENCOUNTER — OFFICE VISIT (OUTPATIENT)
Dept: INTERNAL MEDICINE CLINIC | Facility: CLINIC | Age: 80
End: 2020-03-11

## 2020-03-11 VITALS
WEIGHT: 164.02 LBS | DIASTOLIC BLOOD PRESSURE: 68 MMHG | OXYGEN SATURATION: 98 % | HEART RATE: 64 BPM | BODY MASS INDEX: 28 KG/M2 | TEMPERATURE: 97.8 F | SYSTOLIC BLOOD PRESSURE: 128 MMHG | HEIGHT: 64 IN

## 2020-03-11 DIAGNOSIS — Z23 NEED FOR TDAP VACCINATION: ICD-10-CM

## 2020-03-11 DIAGNOSIS — E11.40 CONTROLLED TYPE 2 DIABETES MELLITUS WITH DIABETIC NEUROPATHY, WITH LONG-TERM CURRENT USE OF INSULIN (HCC): Chronic | ICD-10-CM

## 2020-03-11 DIAGNOSIS — I10 BENIGN ESSENTIAL HYPERTENSION: ICD-10-CM

## 2020-03-11 DIAGNOSIS — I69.393 ATAXIA DUE TO OLD CEREBROVASCULAR ACCIDENT: ICD-10-CM

## 2020-03-11 DIAGNOSIS — Z79.4 CONTROLLED TYPE 2 DIABETES MELLITUS WITH DIABETIC NEUROPATHY, WITH LONG-TERM CURRENT USE OF INSULIN (HCC): Chronic | ICD-10-CM

## 2020-03-11 DIAGNOSIS — S16.1XXA STRAIN OF NECK MUSCLE, INITIAL ENCOUNTER: Primary | ICD-10-CM

## 2020-03-11 PROCEDURE — 1036F TOBACCO NON-USER: CPT | Performed by: PHYSICIAN ASSISTANT

## 2020-03-11 PROCEDURE — 4040F PNEUMOC VAC/ADMIN/RCVD: CPT | Performed by: PHYSICIAN ASSISTANT

## 2020-03-11 PROCEDURE — 3074F SYST BP LT 130 MM HG: CPT | Performed by: PHYSICIAN ASSISTANT

## 2020-03-11 PROCEDURE — 3078F DIAST BP <80 MM HG: CPT | Performed by: PHYSICIAN ASSISTANT

## 2020-03-11 PROCEDURE — 3008F BODY MASS INDEX DOCD: CPT | Performed by: PHYSICIAN ASSISTANT

## 2020-03-11 PROCEDURE — 90471 IMMUNIZATION ADMIN: CPT | Performed by: PHYSICIAN ASSISTANT

## 2020-03-11 PROCEDURE — 90715 TDAP VACCINE 7 YRS/> IM: CPT | Performed by: PHYSICIAN ASSISTANT

## 2020-03-11 PROCEDURE — 99214 OFFICE O/P EST MOD 30 MIN: CPT | Performed by: PHYSICIAN ASSISTANT

## 2020-03-11 PROCEDURE — 1160F RVW MEDS BY RX/DR IN RCRD: CPT | Performed by: PHYSICIAN ASSISTANT

## 2020-03-11 PROCEDURE — 1170F FXNL STATUS ASSESSED: CPT | Performed by: PHYSICIAN ASSISTANT

## 2020-03-11 RX ORDER — INSULIN GLARGINE 300 U/ML
15 INJECTION, SOLUTION SUBCUTANEOUS
Qty: 3 PEN | Refills: 5
Start: 2020-03-11 | End: 2020-03-26

## 2020-03-11 RX ORDER — CALCIUM CARBONATE/VITAMIN D3 600 MG-20
TABLET ORAL
COMMUNITY
Start: 2020-02-18 | End: 2021-03-04 | Stop reason: SDUPTHER

## 2020-03-11 NOTE — PROGRESS NOTES
Assessment/Plan:  As per our discussion today with herself and your family members you would like to continue with conservative measures at this time  This will include your topical pain medication that you may use 2 or 3 times daily as needed  You are able to take Tylenol once or twice daily not just for the pain now but for arthritis as well  Moist heat and gentle stretches as discussed today  We did review however that if you have no improvement or any worsening or change in her symptoms to contact our office and discuss referral to Spine and Pain Management if needed  We also had extensive discussion today regarding your medications including diabetic medications and goals for treatment appropriate to your age  We reviewed all of her medications and proper usage with herself and her family members today  We also confirm that you are scheduled to see the endocrinologist here in this office in June  Please remember to get the labs completed prior to that visit  We did review that if you have any readings in the morning that are less than 70 to contact our office, we also reviewed that if you have any readings consistently less than 100 to also contact our office  At your and your family's request referral has been provided to establish care with 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    Need for Tdap vaccination  -     TDAP VACCINE GREATER THAN OR EQUAL TO 8YO IM    Other orders  -     [employer] D3 48 MCG (2000 UT) CAPS; TAKE 1 CAPSULE BY MOUTH EVERY OTHER DAY          Subjective:      Patient ID: Leila Jung is a 78 y o  female  Pt here with exacerbation of neck pain and gets pain now radiating into R UE   Started 3-4 weeks ago  As noted patient had a similar but much more significant episode April 2019 however at that time she was complaining mostly of left-sided neck pain radiating into her left upper extremity  Patient denies any acute trauma or injury  Patient does confirm she has had some on off neck pain and stiffness for the past year but was not particularly bothersome until this episode  States using the diclofenac from before and helps for about 3 hours then returns    Also some pain in L hand but reports she is not having pain on the left side of her neck radiating into that arm just some pain in her left hand  Has not been taking any tylenol  Confirms has not been taking any NSAIDs  Patient at this time does not feel referral to orthopedist or Spine and Pain management is warranted  And based on examination I agree with current plan to continue the topical medications, moist heat and she may take Tylenol as well  If however she has no improvement in her symptoms or any worse to contact our office  Both patient and family are agreeable to conservative measures at this time  Patient is also accompanied by her son and daughter enlarged to today's visit for further discussion regarding patient seeing and out of network provider for her kidneys that has also been adjusting her diabetic medications    As noted on previous visits have had conversations with patient that there is no reason for her not to see a nephrologist for her kidney condition with diabetes and hypertension but she needs to choose between that provider and endocrinology whom she is already established with for her diabetic care  Had discussed this with patient that it is always her choice of care but for safety we only want medications being adjusted in 1 location  I then received a phone call from the nephrologist Dr Zambrano who reports that they are both primary care as well as kidney care  She reported she was not aware that the patient already had established with endocrinology through our network  Spoke with patient and family members at great length today about the appropriate treatment for diabetes and goals for her age range and conditions  While I certainly agree that having good control of diabetes as well as blood pressure helps maintain and prevent worsening kidney function it is also important that we are not attempting to over correct her sugar causing her to have hypoglycemic events  Both patient and family confirm she has had hypoglycemic events in the past   At 1 point she reports she had a sugar that was around 40 and has had readings in the morning at 70  Patient and family report this morning her reading was 80  At her age likely should be higher as she is at increased risk for hypoglycemic events overnight  Patient also did not fully understand the use of the medications specifically the insulin and Victoza non insulin and proper use  They were under the impression that they should only use these medicines if in when she had higher sugars  For now will keep patient on her Toujeo at 15 units at bedtime  Patient may continue the Victoza 1 8 daily however patient and family has been instructed that if she has readings below 70 at any time to contact our office and if she consistently has readings below 100 in the morning to also contact our office  Patient already has labs ordered and is scheduled to see the endocrinologist the 1st week of June 2020                 The following portions of the patient's history were reviewed and updated as appropriate: allergies, current medications, past family history, past medical history, past social history, past surgical history and problem list     Review of Systems   Constitutional: Negative for chills, fever and unexpected weight change  Eyes: Negative  Respiratory: Negative  Negative for cough and shortness of breath  Cardiovascular: Negative  Negative for chest pain, palpitations and leg swelling  Gastrointestinal: Negative for abdominal pain  Musculoskeletal: Positive for arthralgias, myalgias and neck pain  Negative for joint swelling and neck stiffness  Skin: Negative  Neurological: Negative for dizziness, syncope, weakness and headaches  Psychiatric/Behavioral: Negative  Objective:      /68 (BP Location: Left arm, Patient Position: Sitting, Cuff Size: Standard)   Pulse 64   Temp 97 8 °F (36 6 °C) (Oral)   Ht 5' 3 75" (1 619 m)   Wt 74 4 kg (164 lb 0 4 oz)   SpO2 98%   BMI 28 38 kg/m²          Physical Exam   Constitutional: She appears well-nourished  No distress  HENT:   Head: Normocephalic  Cardiovascular: Normal rate, regular rhythm and normal heart sounds  No murmur heard  Pulmonary/Chest: Effort normal and breath sounds normal  She has no wheezes  Abdominal: Soft  Bowel sounds are normal  There is no tenderness  Musculoskeletal:        Cervical back: She exhibits tenderness  She exhibits no bony tenderness, no swelling, no edema and no deformity  On examination patient does have slightly decreased flexion of right upper extremity but for her age it is actually quite good  No issues with internal rotation but does note some discomfort in right shoulder as well as biceps with external rotation of right upper extremity  Negative empty can sign  Negative apprehension test    4/5 bilateral upper extremities equally  Sensation to gross touch intact distally      On deep palpation of trapezii and cervical paraspinal muscle patient did have point tenderness but denied any radiation of pain into either upper extremity  Neurological: She is alert  No sensory deficit  She exhibits normal muscle tone  Skin: No rash noted  Nursing note and vitals reviewed

## 2020-03-11 NOTE — PATIENT INSTRUCTIONS
As per our discussion today with herself and your family members you would like to continue with conservative measures at this time  This will include your topical pain medication that you may use 2 or 3 times daily as needed  You are able to take Tylenol once or twice daily not just for the pain now but for arthritis as well  Moist heat and gentle stretches as discussed today  We did review however that if you have no improvement or any worsening or change in her symptoms to contact our office and discuss referral to Spine and Pain Management if needed  We also had extensive discussion today regarding your medications including diabetic medications and goals for treatment appropriate to your age  We reviewed all of her medications and proper usage with herself and her family members today  We also confirm that you are scheduled to see the endocrinologist here in this office in June  Please remember to get the labs completed prior to that visit  We did review that if you have any readings in the morning that are less than 70 to contact our office, we also reviewed that if you have any readings consistently less than 100 to also contact our office  At your and your family's request referral has been provided to establish care with [facility]

## 2020-03-13 ENCOUNTER — TELEPHONE (OUTPATIENT)
Dept: INTERNAL MEDICINE CLINIC | Facility: CLINIC | Age: 80
End: 2020-03-13

## 2020-03-13 DIAGNOSIS — S16.1XXA STRAIN OF NECK MUSCLE, INITIAL ENCOUNTER: ICD-10-CM

## 2020-03-13 NOTE — TELEPHONE ENCOUNTER
----- Message from Deonna Amaya sent at 3/13/2020  3:27 AM EDT -----  Regarding: Pre-Op/Post-Op Question  Contact: [phone]  This is Jennifer's son Melissa Lee   Thank you for your personable demeanor when addressing the health and wellness of my mother; after speaking with you concerning my moms    care and treatment, my entire family can rest assure that our mother is being treated professionally; We appreciate all that you have done, greatly appreciated the clarity you provided my wife and I so we can better help her navigate all her needs

## 2020-03-23 ENCOUNTER — TELEPHONE (OUTPATIENT)
Dept: INTERNAL MEDICINE CLINIC | Facility: CLINIC | Age: 80
End: 2020-03-23

## 2020-03-23 NOTE — TELEPHONE ENCOUNTER
----- Message from Shelley Amaya sent at 3/21/2020 11:07 AM EDT -----  Regarding: Non-Urgent Medical Question  Contact: [phone]  Good morning Henny Hassan  This is Dennie Amble; My mother Orly Boggs takes her blood sugar regularly,  watching what she eats, and recording her blood sugar levels, lately her levels are reading between 73-89 morning testing, and evening testing 157-160 how do we address this in accordance with where you said her blood sugar levels should be at

## 2020-03-26 ENCOUNTER — TELEMEDICINE (OUTPATIENT)
Dept: INTERNAL MEDICINE CLINIC | Facility: CLINIC | Age: 80
End: 2020-03-26

## 2020-03-26 DIAGNOSIS — E11.40 CONTROLLED TYPE 2 DIABETES MELLITUS WITH DIABETIC NEUROPATHY, WITH LONG-TERM CURRENT USE OF INSULIN (HCC): Chronic | ICD-10-CM

## 2020-03-26 DIAGNOSIS — E11.65 UNCONTROLLED TYPE 2 DIABETES MELLITUS WITH HYPERGLYCEMIA (HCC): ICD-10-CM

## 2020-03-26 DIAGNOSIS — Z79.4 CONTROLLED TYPE 2 DIABETES MELLITUS WITH DIABETIC NEUROPATHY, WITH LONG-TERM CURRENT USE OF INSULIN (HCC): Chronic | ICD-10-CM

## 2020-03-26 DIAGNOSIS — E11.41 CONTROLLED TYPE 2 DIABETES MELLITUS WITH DIABETIC MONONEUROPATHY, WITH LONG-TERM CURRENT USE OF INSULIN (HCC): Primary | Chronic | ICD-10-CM

## 2020-03-26 DIAGNOSIS — Z79.4 CONTROLLED TYPE 2 DIABETES MELLITUS WITH DIABETIC MONONEUROPATHY, WITH LONG-TERM CURRENT USE OF INSULIN (HCC): Primary | Chronic | ICD-10-CM

## 2020-03-26 PROCEDURE — G2012 BRIEF CHECK IN BY MD/QHP: HCPCS | Performed by: PHYSICIAN ASSISTANT

## 2020-03-26 PROCEDURE — T1015 CLINIC SERVICE: HCPCS | Performed by: PHYSICIAN ASSISTANT

## 2020-03-26 RX ORDER — INSULIN GLARGINE 300 U/ML
10 INJECTION, SOLUTION SUBCUTANEOUS
Qty: 3 PEN | Refills: 5
Start: 2020-03-26 | End: 2020-07-21 | Stop reason: ALTCHOICE

## 2020-03-26 NOTE — PROGRESS NOTES
Virtual Regular Visit  As per our telephone visit today we reviewed her readings over the past to days  Your numbers are showing improvement with the decrease in dose of your Toujeo and Victoza  Please continue same dose and we will get you scheduled for a video visit in about 3 weeks  However you are aware should you have any additional low readings or if you find that your readings are going  high too quickly to not wait and reach out to our office  You also report that you will be getting assistance from your son to install the video on her phone for a video visit in the future  Problem List Items Addressed This Visit        Endocrine    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, with long-term current use of insulin (HCC) - Primary (Chronic)    Relevant Medications    liraglutide (VICTOZA) injection    TOUJEO SOLOSTAR 300 units/mL CONCETRATED U-300 injection pen (1-unit dial)      Other Visit Diagnoses     Uncontrolled type 2 diabetes mellitus with hyperglycemia (HCC)        Relevant Medications    liraglutide (VICTOZA) injection    TOUJEO SOLOSTAR 300 units/mL CONCETRATED U-300 injection pen (1-unit dial)               Reason for visit is review blood sugars after decreasing her Toujeo and victoza as was reporting low readings  Encounter provider John Delarosa PA-C    Provider located at [address] [place] [id number] [phone]      Recent Visits  Date Type Provider Dept   03/23/20 Telephone Laila Herman LPN  [facility] recent visits within past 7 days and meeting all other requirements     Today's Visits  Date Type Provider Dept   03/26/20 Telemedicine John Delarosa, [facility] today's visits and meeting all other requirements     Future Appointments  No visits were found meeting these conditions     Showing future appointments within next 150 days and meeting all other requirements        After connecting through [employer], the patient was identified by name and date of birth  Lieutenant Medrano was informed that this is a telemedicine visit and that the visit is being conducted through telephone which may not be secure and therefore, might not be HIPAA-compliant  My office door was closed  No one else was in the room  She acknowledged consent and understanding of privacy and security of the video platform  The patient has agreed to participate and understands they can discontinue the visit at any time  Subjective  Lieutenant Medrano is a 78 y o  female being contacted to check her sugars  Patient had called here earlier in the week reporting low sugar numbers both a m  Fasting and in the evening before bedtime  Patient was mostly asymptomatic but did feel more tired than usual   Toujeo was decreased from 15 to 10 and Victoza was decreased from 1 8 to 1 2  Patient did have 2 days worth of readings and on the 24th her lunch time was 112 and her 11:00 p m  Reading was 123  On the 25th her a m  Reading was 95 prior to the adjustment her a m  Readings were in the 80s  Also on the 25th her p m  Reading was 176 but she does admit that she had cheese burger and sweet potato pie it dinner  This morning her a m  Reading was 133 and when she reached checked it right before phone call this morning at 9:30 a m  Her reading was 142  Advised patient for now we will keep her on these lower doses  As noted previously in my notes and telephone conversations patient is followed by endocrinology and due to see them in June however she was seeing an out of network provider regarding her kidneys but that provider was also adjusting her diabetic medications    Also as noted previously after in person visit with patient and family present they have decided to discontinue services from outside provider and continue with our endocrinology and will schedule with our network Nephrology  Per guidelines based on patient's age we do not want her to have such low numbers as this puts her at significant risk for hypoglycemic events  Patient and family understand and after calling with low readings I decreased doses as noted above  As it is only been 2 and half days will continue patient on same doses  Patient continues to record her numbers in her log and advised we will schedule her for a video visit in approximately 3 weeks to review however she should contact our office with any questions or concerns  I do suspect in the future we may be able to discontinue at least some of the diabetic medications  Past Medical History:   Diagnosis Date    ACE-inhibitor cough     last assessed - 29Dec2017    Asthma     Bilateral edema of lower extremity     last assessed - 21Aug2017    Cardiac murmur     last assessed - 21Aug2017    Diabetes mellitus [facility])     last assessed - [id number]    Esophageal dysphagia     Fatigue     last assessed - 21Aug2017    Hyperlipidemia     Hypertension     Patellar bursitis of right knee     last assessed - [id number]    Personal history of other specified conditions     presenting hazards to health    Stroke [facility])     Stroke syndrome     Urinary frequency     UTI (urinary tract infection)        Past Surgical History:   Procedure Laterality Date    NV ESOPHAGOGASTRODUODENOSCOPY TRANSORAL DIAGNOSTIC N/A 4/5/2017    Procedure: ESOPHAGOGASTRODUODENOSCOPY (EGD); Surgeon: Yumiko Jara MD;  Location: [facility];   Service: Gastroenterology       Current Outpatient Medications   Medication Sig Dispense Refill    acetaminophen (TYLENOL) 650 mg CR tablet Take 650 mg by mouth every 6 (six) hours as needed for mild pain      amLODIPine (NORVASC) 5 mg tablet Take 1 tablet (5 mg total) by mouth daily 90 tablet 1    atorvastatin (LIPITOR) 40 mg tablet Take 1 tablet (40 mg total) by mouth daily 90 tablet 1    Blood Glucose Monitoring Suppl (FREESTYLE LITE) JAQUELIN by Does not apply route daily 1 each 0    ciclopirox (PENLAC) 8 % solution Apply topically daily at bedtime 6 6 mL 5    clopidogrel (PLAVIX) 75 mg tablet Take 2 tablets (150 mg total) by mouth daily 180 tablet 0    conjugated estrogens (PREMARIN) vaginal cream Apply pea-sized amount in vagina two nights a week 30 g 2    CVS D3 50 MCG (2000 UT) CAPS TAKE 1 CAPSULE BY MOUTH EVERY OTHER DAY      D-2000 MAXIMUM STRENGTH 2000 units TABS TAKE 1 TAB DAILY 90 tablet 1    D-2000 MAXIMUM STRENGTH 50 MCG (2000 UT) TABS TAKE 1 TAB EVERY OTHER DAY      diclofenac sodium (VOLTAREN) 1 % Apply 2 g topically 4 (four) times a day 100 g 1    FREESTYLE LITE test strip May check blood sugar twice daily 100 each 3    GLOBAL EASE INJECT PEN NEEDLES 31G X 5 MM MISC       Glucose-Vitamin C-Vitamin D (TRUEPLUS GLUCOSE ON THE GO) CHEW CHEW 2 TABS AS NEEDED FOR BLOOD SUGAR LESS THAN 80      JARDIANCE 25 MG TABS TAKE 1 TABLET DAILY 90 tablet 0    Lancets (FREESTYLE) lancets Use as instructed 100 each 0    liraglutide (VICTOZA) injection Inject 0 2 mL (1 2 mg total) under the skin daily 9 mL 2    losartan (COZAAR) 25 mg tablet Take 1 tablet (25 mg total) by mouth daily 90 tablet 1    metFORMIN (GLUCOPHAGE) 1000 MG tablet Take 1 tablet (1,000 mg total) by mouth 2 (two) times a day with meals 180 tablet 1    montelukast (SINGULAIR) 10 mg tablet Take 1 tablet (10 mg total) by mouth daily at bedtime for 90 days 90 tablet 0    TOUJEO SOLOSTAR 300 units/mL CONCETRATED U-300 injection pen (1-unit dial) Inject 10 Units under the skin daily at bedtime 3 pen 5    vitamin B-12 (CYANOCOBALAMIN) 250 MCG TABS Take 1 tablet (250 mcg total) by mouth daily for 180 days (Patient not taking: Reported on 7/18/2019) 90 tablet 1     No current facility-administered medications for this visit  No Known Allergies    Review of Systems   Constitutional: Negative for chills and fever     Respiratory: Negative for cough and shortness of breath  Cardiovascular: Negative for chest pain  Neurological: Negative for syncope, light-headedness and headaches  I spent 15 minutes with the patient during this visit

## 2020-03-26 NOTE — PATIENT INSTRUCTIONS
As per our telephone visit today we reviewed her readings over the past to days  Your numbers are showing improvement with the decrease in dose of your Toujeo and Victoza  Please continue same dose and we will get you scheduled for a video visit in about 3 weeks  However you are aware should you have any additional low readings or if you find that your readings are going  high too quickly to not wait and reach out to our office  You also report that you will be getting assistance from your son to install the video on her phone for a video visit in the future

## 2020-03-31 ENCOUNTER — TELEPHONE (OUTPATIENT)
Dept: INTERNAL MEDICINE CLINIC | Facility: CLINIC | Age: 80
End: 2020-03-31

## 2020-03-31 NOTE — TELEPHONE ENCOUNTER
----- Message from Ann Amaya sent at 3/31/2020  2:53 AM EDT -----  Regarding: Test Results Question  Contact: [phone]  I have a question about MRI ABDOMEN WO CONTRAST resulted on 2/20/20 at 11:00 AM  My concern regarding the renal cyst, how concern should I be regarding this

## 2020-04-01 ENCOUNTER — TELEPHONE (OUTPATIENT)
Dept: INTERNAL MEDICINE CLINIC | Facility: CLINIC | Age: 80
End: 2020-04-01

## 2020-04-02 NOTE — TELEPHONE ENCOUNTER
Patient called back with her son on the line and was made aware of MRI results and that these are not new and appear unchanged  Advised patient to call [facility] Nephrology if that's what they choose to schedule and they will determine if she needs sooner imaging or review  Patient expressed understanding and had no other questions

## 2020-04-08 ENCOUNTER — TELEPHONE (OUTPATIENT)
Dept: INTERNAL MEDICINE CLINIC | Facility: CLINIC | Age: 80
End: 2020-04-08

## 2020-04-09 ENCOUNTER — TELEMEDICINE (OUTPATIENT)
Dept: CARDIOLOGY CLINIC | Facility: CLINIC | Age: 80
End: 2020-04-09
Payer: COMMERCIAL

## 2020-04-09 DIAGNOSIS — I35.1 AORTIC VALVE REGURGITATION, NONRHEUMATIC: Primary | ICD-10-CM

## 2020-04-09 DIAGNOSIS — I28.1 PULMONARY ARTERY ANEURYSM (HCC): ICD-10-CM

## 2020-04-09 DIAGNOSIS — I10 BENIGN ESSENTIAL HYPERTENSION: ICD-10-CM

## 2020-04-09 DIAGNOSIS — I34.0 NON-RHEUMATIC MITRAL REGURGITATION: ICD-10-CM

## 2020-04-09 DIAGNOSIS — I63.9 CEREBROVASCULAR ACCIDENT (CVA), UNSPECIFIED MECHANISM (HCC): ICD-10-CM

## 2020-04-09 DIAGNOSIS — I63.50 CEREBROVASCULAR ACCIDENT (CVA) OF PONTINE STRUCTURE (HCC): ICD-10-CM

## 2020-04-09 DIAGNOSIS — E78.2 MIXED HYPERLIPIDEMIA: ICD-10-CM

## 2020-04-09 PROCEDURE — G2012 BRIEF CHECK IN BY MD/QHP: HCPCS | Performed by: INTERNAL MEDICINE

## 2020-04-09 RX ORDER — CLOPIDOGREL BISULFATE 75 MG/1
TABLET ORAL
Qty: 180 TABLET | Refills: 0 | Status: SHIPPED | OUTPATIENT
Start: 2020-04-09 | End: 2020-09-11

## 2020-04-10 DIAGNOSIS — E11.65 UNCONTROLLED TYPE 2 DIABETES MELLITUS WITH HYPERGLYCEMIA (HCC): ICD-10-CM

## 2020-04-10 RX ORDER — EMPAGLIFLOZIN 25 MG/1
TABLET, FILM COATED ORAL
Qty: 90 TABLET | Refills: 0 | Status: SHIPPED | OUTPATIENT
Start: 2020-04-10 | End: 2020-04-17 | Stop reason: ALTCHOICE

## 2020-04-16 ENCOUNTER — TELEPHONE (OUTPATIENT)
Dept: INTERNAL MEDICINE CLINIC | Facility: CLINIC | Age: 80
End: 2020-04-16

## 2020-04-16 DIAGNOSIS — Z79.4 CONTROLLED TYPE 2 DIABETES MELLITUS WITH DIABETIC NEUROPATHY, WITH LONG-TERM CURRENT USE OF INSULIN (HCC): Primary | Chronic | ICD-10-CM

## 2020-04-16 DIAGNOSIS — E11.40 CONTROLLED TYPE 2 DIABETES MELLITUS WITH DIABETIC NEUROPATHY, WITH LONG-TERM CURRENT USE OF INSULIN (HCC): Primary | Chronic | ICD-10-CM

## 2020-04-16 RX ORDER — FLASH GLUCOSE SENSOR
1 KIT MISCELLANEOUS
Qty: 4 EACH | Refills: 1 | Status: SHIPPED | OUTPATIENT
Start: 2020-04-16 | End: 2020-07-16

## 2020-04-16 RX ORDER — FLASH GLUCOSE SENSOR
1 KIT MISCELLANEOUS 4 TIMES DAILY
Qty: 1 DEVICE | Refills: 0 | Status: SHIPPED | OUTPATIENT
Start: 2020-04-16 | End: 2021-04-16

## 2020-04-17 ENCOUNTER — TELEMEDICINE (OUTPATIENT)
Dept: INTERNAL MEDICINE CLINIC | Facility: CLINIC | Age: 80
End: 2020-04-17

## 2020-04-17 DIAGNOSIS — Z79.4 CONTROLLED TYPE 2 DIABETES MELLITUS WITH DIABETIC NEUROPATHY, WITH LONG-TERM CURRENT USE OF INSULIN (HCC): Primary | Chronic | ICD-10-CM

## 2020-04-17 DIAGNOSIS — E11.40 CONTROLLED TYPE 2 DIABETES MELLITUS WITH DIABETIC NEUROPATHY, WITH LONG-TERM CURRENT USE OF INSULIN (HCC): Primary | Chronic | ICD-10-CM

## 2020-04-17 PROCEDURE — 99214 OFFICE O/P EST MOD 30 MIN: CPT | Performed by: PHYSICIAN ASSISTANT

## 2020-04-20 ENCOUNTER — TELEPHONE (OUTPATIENT)
Dept: INTERNAL MEDICINE CLINIC | Facility: CLINIC | Age: 80
End: 2020-04-20

## 2020-04-24 ENCOUNTER — TELEPHONE (OUTPATIENT)
Dept: INTERNAL MEDICINE CLINIC | Facility: CLINIC | Age: 80
End: 2020-04-24

## 2020-05-01 ENCOUNTER — TELEMEDICINE (OUTPATIENT)
Dept: INTERNAL MEDICINE CLINIC | Facility: CLINIC | Age: 80
End: 2020-05-01

## 2020-05-01 DIAGNOSIS — Z79.4 CONTROLLED TYPE 2 DIABETES MELLITUS WITH DIABETIC NEUROPATHY, WITH LONG-TERM CURRENT USE OF INSULIN (HCC): Primary | Chronic | ICD-10-CM

## 2020-05-01 DIAGNOSIS — E11.40 CONTROLLED TYPE 2 DIABETES MELLITUS WITH DIABETIC NEUROPATHY, WITH LONG-TERM CURRENT USE OF INSULIN (HCC): Primary | Chronic | ICD-10-CM

## 2020-05-01 PROCEDURE — 99213 OFFICE O/P EST LOW 20 MIN: CPT | Performed by: PHYSICIAN ASSISTANT

## 2020-05-04 ENCOUNTER — APPOINTMENT (OUTPATIENT)
Dept: LAB | Facility: HOSPITAL | Age: 80
End: 2020-05-04
Payer: COMMERCIAL

## 2020-05-04 LAB
EST. AVERAGE GLUCOSE BLD GHB EST-MCNC: 140 MG/DL
HBA1C MFR BLD: 6.5 %

## 2020-05-04 PROCEDURE — 36415 COLL VENOUS BLD VENIPUNCTURE: CPT | Performed by: PHYSICIAN ASSISTANT

## 2020-05-04 PROCEDURE — 83036 HEMOGLOBIN GLYCOSYLATED A1C: CPT | Performed by: PHYSICIAN ASSISTANT

## 2020-05-07 ENCOUNTER — HOSPITAL ENCOUNTER (OUTPATIENT)
Dept: NON INVASIVE DIAGNOSTICS | Facility: CLINIC | Age: 80
Discharge: HOME | End: 2020-05-07
Payer: COMMERCIAL

## 2020-05-07 DIAGNOSIS — I10 BENIGN ESSENTIAL HYPERTENSION: ICD-10-CM

## 2020-05-07 DIAGNOSIS — I63.9 CEREBROVASCULAR ACCIDENT (CVA), UNSPECIFIED MECHANISM (HCC): ICD-10-CM

## 2020-05-07 PROCEDURE — 93226 XTRNL ECG REC<48 HR SCAN A/R: CPT

## 2020-05-07 PROCEDURE — 93225 XTRNL ECG REC<48 HRS REC: CPT

## 2020-05-15 PROCEDURE — 93227 XTRNL ECG REC<48 HR R&I: CPT | Performed by: INTERNAL MEDICINE

## 2020-06-01 ENCOUNTER — TELEMEDICINE (OUTPATIENT)
Dept: INTERNAL MEDICINE CLINIC | Facility: CLINIC | Age: 80
End: 2020-06-01

## 2020-06-01 ENCOUNTER — TELEPHONE (OUTPATIENT)
Dept: INTERNAL MEDICINE CLINIC | Facility: CLINIC | Age: 80
End: 2020-06-01

## 2020-06-01 DIAGNOSIS — I69.393 ATAXIA DUE TO OLD CEREBROVASCULAR ACCIDENT: ICD-10-CM

## 2020-06-01 DIAGNOSIS — H91.93 DECREASED HEARING, BILATERAL: Primary | ICD-10-CM

## 2020-06-01 DIAGNOSIS — E78.2 MIXED HYPERLIPIDEMIA: ICD-10-CM

## 2020-06-01 PROCEDURE — 99213 OFFICE O/P EST LOW 20 MIN: CPT | Performed by: PHYSICIAN ASSISTANT

## 2020-06-02 ENCOUNTER — APPOINTMENT (OUTPATIENT)
Dept: LAB | Facility: HOSPITAL | Age: 80
End: 2020-06-02
Payer: COMMERCIAL

## 2020-06-02 ENCOUNTER — TELEPHONE (OUTPATIENT)
Dept: INTERNAL MEDICINE CLINIC | Facility: CLINIC | Age: 80
End: 2020-06-02

## 2020-06-02 DIAGNOSIS — I10 PRIMARY HYPERTENSION: ICD-10-CM

## 2020-06-02 DIAGNOSIS — E11.65 TYPE 2 DIABETES MELLITUS WITH HYPERGLYCEMIA, WITH LONG-TERM CURRENT USE OF INSULIN (HCC): ICD-10-CM

## 2020-06-02 DIAGNOSIS — E11.40 CONTROLLED TYPE 2 DIABETES MELLITUS WITH DIABETIC NEUROPATHY, WITH LONG-TERM CURRENT USE OF INSULIN (HCC): Chronic | ICD-10-CM

## 2020-06-02 DIAGNOSIS — E66.01 OBESITIES, MORBID (HCC): ICD-10-CM

## 2020-06-02 DIAGNOSIS — E08.21 DIABETES MELLITUS DUE TO UNDERLYING CONDITION WITH DIABETIC NEPHROPATHY, UNSPECIFIED WHETHER LONG TERM INSULIN USE (HCC): Primary | ICD-10-CM

## 2020-06-02 DIAGNOSIS — I10 BENIGN ESSENTIAL HYPERTENSION: ICD-10-CM

## 2020-06-02 DIAGNOSIS — Z79.4 CONTROLLED TYPE 2 DIABETES MELLITUS WITH DIABETIC NEUROPATHY, WITH LONG-TERM CURRENT USE OF INSULIN (HCC): Chronic | ICD-10-CM

## 2020-06-02 DIAGNOSIS — Z79.4 TYPE 2 DIABETES MELLITUS WITH HYPERGLYCEMIA, WITH LONG-TERM CURRENT USE OF INSULIN (HCC): ICD-10-CM

## 2020-06-02 DIAGNOSIS — E11.22 TYPE 2 DIABETES MELLITUS WITH ESRD (END-STAGE RENAL DISEASE) (HCC): ICD-10-CM

## 2020-06-02 DIAGNOSIS — N18.6 TYPE 2 DIABETES MELLITUS WITH ESRD (END-STAGE RENAL DISEASE) (HCC): ICD-10-CM

## 2020-06-02 DIAGNOSIS — E78.5 HYPERLIPIDEMIA, UNSPECIFIED HYPERLIPIDEMIA TYPE: ICD-10-CM

## 2020-06-02 LAB
25(OH)D3 SERPL-MCNC: 43.6 NG/ML (ref 30–100)
ALBUMIN SERPL BCP-MCNC: 3.7 G/DL (ref 3.5–5)
ALP SERPL-CCNC: 79 U/L (ref 46–116)
ALT SERPL W P-5'-P-CCNC: 23 U/L (ref 12–78)
ANION GAP SERPL CALCULATED.3IONS-SCNC: 6 MMOL/L (ref 4–13)
AST SERPL W P-5'-P-CCNC: 12 U/L (ref 5–45)
BASOPHILS # BLD AUTO: 0.04 THOUSANDS/ΜL (ref 0–0.1)
BASOPHILS NFR BLD AUTO: 1 % (ref 0–1)
BILIRUB SERPL-MCNC: 0.7 MG/DL (ref 0.2–1)
BUN SERPL-MCNC: 20 MG/DL (ref 5–25)
CALCIUM SERPL-MCNC: 9.3 MG/DL (ref 8.3–10.1)
CHLORIDE SERPL-SCNC: 107 MMOL/L (ref 100–108)
CHOLEST SERPL-MCNC: 136 MG/DL (ref 50–200)
CO2 SERPL-SCNC: 26 MMOL/L (ref 21–32)
CREAT SERPL-MCNC: 0.85 MG/DL (ref 0.6–1.3)
EOSINOPHIL # BLD AUTO: 0.19 THOUSAND/ΜL (ref 0–0.61)
EOSINOPHIL NFR BLD AUTO: 4 % (ref 0–6)
ERYTHROCYTE [DISTWIDTH] IN BLOOD BY AUTOMATED COUNT: 14.2 % (ref 11.6–15.1)
GFR SERPL CREATININE-BSD FRML MDRD: 75 ML/MIN/1.73SQ M
GLUCOSE P FAST SERPL-MCNC: 144 MG/DL (ref 65–99)
HCT VFR BLD AUTO: 37.6 % (ref 34.8–46.1)
HDLC SERPL-MCNC: 53 MG/DL
HGB BLD-MCNC: 11.8 G/DL (ref 11.5–15.4)
IMM GRANULOCYTES # BLD AUTO: 0.01 THOUSAND/UL (ref 0–0.2)
IMM GRANULOCYTES NFR BLD AUTO: 0 % (ref 0–2)
LDLC SERPL CALC-MCNC: 74 MG/DL (ref 0–100)
LYMPHOCYTES # BLD AUTO: 1.16 THOUSANDS/ΜL (ref 0.6–4.47)
LYMPHOCYTES NFR BLD AUTO: 25 % (ref 14–44)
MCH RBC QN AUTO: 25.6 PG (ref 26.8–34.3)
MCHC RBC AUTO-ENTMCNC: 31.4 G/DL (ref 31.4–37.4)
MCV RBC AUTO: 82 FL (ref 82–98)
MONOCYTES # BLD AUTO: 0.28 THOUSAND/ΜL (ref 0.17–1.22)
MONOCYTES NFR BLD AUTO: 6 % (ref 4–12)
NEUTROPHILS # BLD AUTO: 2.92 THOUSANDS/ΜL (ref 1.85–7.62)
NEUTS SEG NFR BLD AUTO: 64 % (ref 43–75)
NONHDLC SERPL-MCNC: 83 MG/DL
NRBC BLD AUTO-RTO: 0 /100 WBCS
PLATELET # BLD AUTO: 185 THOUSANDS/UL (ref 149–390)
PMV BLD AUTO: 11.5 FL (ref 8.9–12.7)
POTASSIUM SERPL-SCNC: 3.7 MMOL/L (ref 3.5–5.3)
PROT SERPL-MCNC: 7.4 G/DL (ref 6.4–8.2)
RBC # BLD AUTO: 4.61 MILLION/UL (ref 3.81–5.12)
SODIUM SERPL-SCNC: 139 MMOL/L (ref 136–145)
TRIGL SERPL-MCNC: 45 MG/DL
URATE SERPL-MCNC: 5.1 MG/DL (ref 2–6.8)
WBC # BLD AUTO: 4.6 THOUSAND/UL (ref 4.31–10.16)

## 2020-06-02 PROCEDURE — 36415 COLL VENOUS BLD VENIPUNCTURE: CPT

## 2020-06-02 PROCEDURE — 80061 LIPID PANEL: CPT

## 2020-06-02 PROCEDURE — 84550 ASSAY OF BLOOD/URIC ACID: CPT

## 2020-06-02 PROCEDURE — 80053 COMPREHEN METABOLIC PANEL: CPT

## 2020-06-02 PROCEDURE — 83036 HEMOGLOBIN GLYCOSYLATED A1C: CPT

## 2020-06-02 PROCEDURE — 82306 VITAMIN D 25 HYDROXY: CPT

## 2020-06-02 PROCEDURE — 85025 COMPLETE CBC W/AUTO DIFF WBC: CPT

## 2020-06-03 ENCOUNTER — TELEPHONE (OUTPATIENT)
Dept: INTERNAL MEDICINE CLINIC | Facility: CLINIC | Age: 80
End: 2020-06-03

## 2020-06-03 ENCOUNTER — OFFICE VISIT (OUTPATIENT)
Dept: MULTI SPECIALTY CLINIC | Facility: CLINIC | Age: 80
End: 2020-06-03

## 2020-06-03 VITALS
BODY MASS INDEX: 27.34 KG/M2 | SYSTOLIC BLOOD PRESSURE: 128 MMHG | DIASTOLIC BLOOD PRESSURE: 68 MMHG | TEMPERATURE: 98 F | HEIGHT: 63 IN | WEIGHT: 154.32 LBS

## 2020-06-03 DIAGNOSIS — E11.40 CONTROLLED TYPE 2 DIABETES MELLITUS WITH DIABETIC NEUROPATHY, WITH LONG-TERM CURRENT USE OF INSULIN (HCC): Primary | Chronic | ICD-10-CM

## 2020-06-03 DIAGNOSIS — N18.30 CKD STAGE 3 DUE TO TYPE 2 DIABETES MELLITUS (HCC): ICD-10-CM

## 2020-06-03 DIAGNOSIS — E11.22 CKD STAGE 3 DUE TO TYPE 2 DIABETES MELLITUS (HCC): ICD-10-CM

## 2020-06-03 DIAGNOSIS — E11.40 CONTROLLED TYPE 2 DIABETES MELLITUS WITH DIABETIC NEUROPATHY, WITHOUT LONG-TERM CURRENT USE OF INSULIN (HCC): Primary | ICD-10-CM

## 2020-06-03 DIAGNOSIS — E11.65 UNCONTROLLED TYPE 2 DIABETES MELLITUS WITH HYPERGLYCEMIA (HCC): ICD-10-CM

## 2020-06-03 DIAGNOSIS — Z79.4 CONTROLLED TYPE 2 DIABETES MELLITUS WITH DIABETIC NEUROPATHY, WITH LONG-TERM CURRENT USE OF INSULIN (HCC): Primary | Chronic | ICD-10-CM

## 2020-06-03 PROBLEM — E11.49 CONTROLLED TYPE 2 DIABETES MELLITUS WITH NEUROLOGIC COMPLICATION, WITHOUT LONG-TERM CURRENT USE OF INSULIN (HCC): Status: ACTIVE | Noted: 2017-06-28

## 2020-06-03 LAB
EST. AVERAGE GLUCOSE BLD GHB EST-MCNC: 160 MG/DL
HBA1C MFR BLD: 7.2 %

## 2020-06-03 PROCEDURE — 99204 OFFICE O/P NEW MOD 45 MIN: CPT | Performed by: INTERNAL MEDICINE

## 2020-06-03 PROCEDURE — 95251 CONT GLUC MNTR ANALYSIS I&R: CPT | Performed by: INTERNAL MEDICINE

## 2020-06-03 PROCEDURE — 1160F RVW MEDS BY RX/DR IN RCRD: CPT | Performed by: INTERNAL MEDICINE

## 2020-06-03 PROCEDURE — 4040F PNEUMOC VAC/ADMIN/RCVD: CPT | Performed by: INTERNAL MEDICINE

## 2020-06-23 ENCOUNTER — TELEPHONE (OUTPATIENT)
Dept: FAMILY MEDICINE CLINIC | Facility: CLINIC | Age: 80
End: 2020-06-23

## 2020-07-13 ENCOUNTER — TRANSCRIBE ORDERS (OUTPATIENT)
Dept: INTERNAL MEDICINE CLINIC | Facility: CLINIC | Age: 80
End: 2020-07-13

## 2020-07-13 DIAGNOSIS — I69.393 ATAXIA DUE TO OLD CEREBROVASCULAR ACCIDENT: Primary | ICD-10-CM

## 2020-07-13 DIAGNOSIS — I63.9 CEREBROVASCULAR ACCIDENT (CVA), UNSPECIFIED MECHANISM (HCC): ICD-10-CM

## 2020-07-15 ENCOUNTER — TELEPHONE (OUTPATIENT)
Dept: NEPHROLOGY | Facility: CLINIC | Age: 80
End: 2020-07-15

## 2020-07-15 NOTE — TELEPHONE ENCOUNTER
Left message to confirm patient appointment and demographics  Please verify insurance   Patient needs covid screening questions and office protocols reviewed

## 2020-07-16 DIAGNOSIS — Z79.4 CONTROLLED TYPE 2 DIABETES MELLITUS WITH DIABETIC NEUROPATHY, WITH LONG-TERM CURRENT USE OF INSULIN (HCC): Chronic | ICD-10-CM

## 2020-07-16 DIAGNOSIS — E11.40 CONTROLLED TYPE 2 DIABETES MELLITUS WITH DIABETIC NEUROPATHY, WITH LONG-TERM CURRENT USE OF INSULIN (HCC): Chronic | ICD-10-CM

## 2020-07-16 RX ORDER — FLASH GLUCOSE SENSOR
KIT MISCELLANEOUS
Qty: 4 EACH | Refills: 1 | Status: SHIPPED | OUTPATIENT
Start: 2020-07-16 | End: 2020-09-17 | Stop reason: SDUPTHER

## 2020-07-21 ENCOUNTER — TELEPHONE (OUTPATIENT)
Dept: NEUROLOGY | Facility: CLINIC | Age: 80
End: 2020-07-21

## 2020-07-21 ENCOUNTER — OFFICE VISIT (OUTPATIENT)
Dept: NEUROLOGY | Facility: CLINIC | Age: 80
End: 2020-07-21
Payer: COMMERCIAL

## 2020-07-21 VITALS
TEMPERATURE: 97.1 F | HEART RATE: 63 BPM | HEIGHT: 63 IN | SYSTOLIC BLOOD PRESSURE: 161 MMHG | BODY MASS INDEX: 28.7 KG/M2 | WEIGHT: 162 LBS | DIASTOLIC BLOOD PRESSURE: 69 MMHG

## 2020-07-21 DIAGNOSIS — I63.9 CEREBROVASCULAR ACCIDENT (CVA), UNSPECIFIED MECHANISM (HCC): ICD-10-CM

## 2020-07-21 DIAGNOSIS — I69.393 ATAXIA DUE TO OLD CEREBROVASCULAR ACCIDENT: ICD-10-CM

## 2020-07-21 PROCEDURE — 1160F RVW MEDS BY RX/DR IN RCRD: CPT | Performed by: PSYCHIATRY & NEUROLOGY

## 2020-07-21 PROCEDURE — 3008F BODY MASS INDEX DOCD: CPT | Performed by: PSYCHIATRY & NEUROLOGY

## 2020-07-21 PROCEDURE — 99213 OFFICE O/P EST LOW 20 MIN: CPT | Performed by: PSYCHIATRY & NEUROLOGY

## 2020-07-21 PROCEDURE — 3078F DIAST BP <80 MM HG: CPT | Performed by: PSYCHIATRY & NEUROLOGY

## 2020-07-21 PROCEDURE — 4040F PNEUMOC VAC/ADMIN/RCVD: CPT | Performed by: PSYCHIATRY & NEUROLOGY

## 2020-07-21 PROCEDURE — 1036F TOBACCO NON-USER: CPT | Performed by: PSYCHIATRY & NEUROLOGY

## 2020-07-21 PROCEDURE — 3077F SYST BP >= 140 MM HG: CPT | Performed by: PSYCHIATRY & NEUROLOGY

## 2020-07-21 NOTE — TELEPHONE ENCOUNTER
Patient stated she noticed some med changes on her AVS  Reviewed these with patient and informed her to disregard the changes  She verbalized understanding

## 2020-07-21 NOTE — PROGRESS NOTES
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  -     Ambulatory referral to Neurology    Cerebrovascular accident (CVA), unspecified mechanism ([facility] )  -c/w with combination of Plavix 150mg Po qdaily and lipitor 40mg PO QHS for secondary stroke prevention  -BP goal < 130/80, BP is at goal    -Defer to PCP regarding DM and BP management  -does not smoke at this time   -denies any history of snoring    -I advised patient to avoid using NSAIDs for headaches or other pain  -Recommend mediterranean diet & regular exercise regimen atleast 4-5 times a week for 20-30 minutes  -I educated patient/family regarding medication compliance    -     Ambulatory referral to Neurology       I would be happy to see the patient sooner if any new questions/concerns arise  Patient/Guardian was advised to the call the office if they have any questions and concerns in the meantime  Patient/Guardian does understand that if they have any new stroke like symptoms such as facial droop on one side, weakness/paralysis on either side, speech trouble, numbness on one side, balance issues, any vision changes, extreme dizziness or any new headache, to call 9-1-1 immediately or to proceed to the nearest ER immediately  Subjective:    HPI    This is a 79 y/o Female who is here as a follow up for history of CVA in 2010  She has been maintained on plavix 150mg PO qdaily and atorvastatin for stroke prevention  She denies any bleeding/bruising att this time  She denies any new TIA/CVA like symptoms  She has no other concerns at this time  She is having some kidney issues and is concerned about that, and wants to get that issue resolved   She does take a walk daily and does ambulate w/ a cane  The following portions of the patient's history were reviewed and updated as appropriate:   She  has a past medical history of ACE-inhibitor cough, Asthma, Bilateral edema of lower extremity, Cardiac murmur, CKD (chronic kidney disease), Diabetes mellitus ([facility] ), Esophageal dysphagia, Fatigue, Hyperlipidemia, Hypertension, Patellar bursitis of right knee, Personal history of other specified conditions, Stroke [facility]), Stroke syndrome, Urinary frequency, and UTI (urinary tract infection)  She   Patient Active Problem List    Diagnosis Date Noted    Mixed hyperlipidemia 12/04/2019    History of CVA with residual deficit 04/01/2019    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 ) 12/06/2018    Decreased hearing, bilateral 11/27/2018    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 11/12/2018    CKD stage 3 due to type 2 diabetes mellitus ([facility] ) 05/02/2018    Esophageal abnormality 04/20/2018    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/28/2018    Chronic rhinitis 01/22/2018    Aortic valve regurgitation, nonrheumatic 09/21/2017    Non-rheumatic mitral regurgitation 08/25/2017    Benign essential hypertension 06/28/2017    CVA (cerebrovascular accident) ([facility] ) 06/28/2017    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, without long-term current use of insulin ([facility] ) 06/28/2017    Midline cystocele 11/08/2016     She  has a past surgical history that includes pr esophagogastroduodenoscopy transoral diagnostic (N/A, 4/5/2017)  Her family history includes Coronary artery disease in her sister; Diabetes in her son; Heart disease in her family and father; Hypertension in her mother and son; Migraines in her sister; Osteoporosis in her maternal grandmother and sister; Other in her sister; Rheum arthritis in her son; Stroke in her mother; Thyroid disease in her sister  She  reports that she quit smoking about 40 years ago  She smoked 3 00 packs per day   She has quit using smokeless tobacco  She reports that she does not drink alcohol or use drugs  Current Outpatient Medications   Medication Sig Dispense Refill    acetaminophen (TYLENOL) 650 mg CR tablet Take 650 mg by mouth every 6 (six) hours as needed for mild pain      amLODIPine (NORVASC) 5 mg tablet Take 1 tablet (5 mg total) by mouth daily 90 tablet 1    atorvastatin (LIPITOR) 40 mg tablet Take 1 tablet (40 mg total) by mouth daily 90 tablet 1    ciclopirox (PENLAC) 8 % solution Apply topically daily at bedtime 6 6 mL 5    clopidogrel (PLAVIX) 75 mg tablet TAKE 2 TAB (150MG TOTAL) DAILY 180 tablet 0    Continuous Blood Gluc  (FREESTYLE ADAM READER) JAQUELIN 1 Device by Does not apply route 4 (four) times a day 1 Device 0    Continuous Blood Gluc Sensor (FREESTYLE ADAM 14 DAY SENSOR) MISC USE 1 SENSOR EVERY 14 (FOURTEEN) DAYS 4 each 1    CVS D3 50 MCG (2000 UT) CAPS TAKE 1 CAPSULE BY MOUTH EVERY OTHER DAY      D-2000 MAXIMUM STRENGTH 50 MCG (2000 UT) TABS TAKE 1 TAB EVERY OTHER DAY      diclofenac sodium (VOLTAREN) 1 % Apply 2 g topically 4 (four) times a day 100 g 1    FREESTYLE LITE test strip May check blood sugar twice daily 100 each 3    GLOBAL EASE INJECT PEN NEEDLES 31G X 5 MM MISC       Glucose-Vitamin C-Vitamin D (TRUEPLUS GLUCOSE ON THE GO) CHEW CHEW 2 TABS AS NEEDED FOR BLOOD SUGAR LESS THAN 80      Lancets (FREESTYLE) lancets Use as instructed 100 each 0    losartan (COZAAR) 25 mg tablet Take 1 tablet (25 mg total) by mouth daily 90 tablet 1    metFORMIN (GLUCOPHAGE) 500 mg tablet Take 1 tablet (500 mg total) by mouth 2 (two) times a day with meals 60 tablet 5    Misc   Devices (QUAD CANE) MISC by Does not apply route daily 1 each 0    montelukast (SINGULAIR) 10 mg tablet Take 1 tablet (10 mg total) by mouth daily at bedtime for 90 days 90 tablet 0    Blood Glucose Monitoring Suppl (FREESTYLE LITE) JAQUELIN by Does not apply route daily 1 each 0    conjugated estrogens (PREMARIN) vaginal cream Apply pea-sized amount in vagina two nights a week (Patient not taking: Reported on 6/3/2020) 30 g 2    vitamin B-12 (CYANOCOBALAMIN) 250 MCG TABS Take 1 tablet (250 mcg total) by mouth daily for 180 days (Patient not taking: Reported on 7/18/2019) 90 tablet 1     No current facility-administered medications for this visit  Current Outpatient Medications on File Prior to Visit   Medication Sig    acetaminophen (TYLENOL) 650 mg CR tablet Take 650 mg by mouth every 6 (six) hours as needed for mild pain    amLODIPine (NORVASC) 5 mg tablet Take 1 tablet (5 mg total) by mouth daily    atorvastatin (LIPITOR) 40 mg tablet Take 1 tablet (40 mg total) by mouth daily    ciclopirox (PENLAC) 8 % solution Apply topically daily at bedtime    clopidogrel (PLAVIX) 75 mg tablet TAKE 2 TAB (150MG TOTAL) DAILY    Continuous Blood Gluc  (FREESTYLE ADAM READER) JAQUELIN 1 Device by Does not apply route 4 (four) times a day    Continuous Blood Gluc Sensor (FREESTYLE ADAM 14 DAY SENSOR) MISC USE 1 SENSOR EVERY 14 (FOURTEEN) DAYS    CVS D3 50 MCG (2000 UT) CAPS TAKE 1 CAPSULE BY MOUTH EVERY OTHER DAY    D-2000 MAXIMUM STRENGTH 50 MCG (2000 UT) TABS TAKE 1 TAB EVERY OTHER DAY    diclofenac sodium (VOLTAREN) 1 % Apply 2 g topically 4 (four) times a day    FREESTYLE LITE test strip May check blood sugar twice daily    GLOBAL EASE INJECT PEN NEEDLES 31G X 5 MM MISC     Glucose-Vitamin C-Vitamin D (TRUEPLUS GLUCOSE ON THE GO) CHEW CHEW 2 TABS AS NEEDED FOR BLOOD SUGAR LESS THAN 80    Lancets (FREESTYLE) lancets Use as instructed    losartan (COZAAR) 25 mg tablet Take 1 tablet (25 mg total) by mouth daily    metFORMIN (GLUCOPHAGE) 500 mg tablet Take 1 tablet (500 mg total) by mouth 2 (two) times a day with meals    Misc   Devices (QUAD CANE) MISC by Does not apply route daily    montelukast (SINGULAIR) 10 mg tablet Take 1 tablet (10 mg total) by mouth daily at bedtime for 90 days    [DISCONTINUED] D-2000 MAXIMUM STRENGTH 2000 units TABS TAKE 1 TAB DAILY    Blood Glucose Monitoring Suppl (FREESTYLE LITE) JAQUELIN by Does not apply route daily    conjugated estrogens (PREMARIN) vaginal cream Apply pea-sized amount in vagina two nights a week (Patient not taking: Reported on 6/3/2020)    vitamin B-12 (CYANOCOBALAMIN) 250 MCG TABS Take 1 tablet (250 mcg total) by mouth daily for 180 days (Patient not taking: Reported on 7/18/2019)    [DISCONTINUED] TOUJEO SOLOSTAR 300 units/mL CONCETRATED U-300 injection pen (1-unit dial) Inject 10 Units under the skin daily at bedtime (Patient not taking: Reported on 6/3/2020)     No current facility-administered medications on file prior to visit  She has No Known Allergies            Objective:    Blood pressure 161/69, pulse 63, temperature (!) 97 1 °F (36 2 °C), temperature source Tympanic, height 5' 3" (1 6 m), weight 73 5 kg (162 lb), not currently breastfeeding  Physical Exam  General - Patient is alert, awake, follows commands  Speech - no dysarthria noted, no aphasia noted  Neck - supple    Neuro:   Cranial nerves: PERRL, EOMI, no facial droop noted, facial sensation intact, tongue midline  Motor: 5/5 throughout, normal tone  Sensory - intact to soft touch throughout  Coordination - no ataxia/dysmetria noted  Gait - ambulates with a cane majority of the time  ROS:  I personally reviewed ROS   Review of Systems   Constitutional: Negative  Negative for appetite change and fever  HENT: Negative  Negative for hearing loss, tinnitus, trouble swallowing and voice change  Eyes: Negative  Negative for photophobia and pain  Respiratory: Negative  Negative for shortness of breath  Cardiovascular: Negative  Negative for palpitations  Gastrointestinal: Negative  Negative for nausea and vomiting  Endocrine: Negative  Negative for cold intolerance  Genitourinary: Negative  Negative for dysuria, frequency and urgency  Musculoskeletal: Negative    Negative for myalgias and neck pain  Skin: Negative  Negative for rash  Neurological: Negative  Negative for dizziness, tremors, seizures, syncope, facial asymmetry, speech difficulty, weakness, light-headedness, numbness and headaches  Hematological: Negative  Does not bruise/bleed easily  Psychiatric/Behavioral: Negative  Negative for confusion, hallucinations and sleep disturbance

## 2020-07-23 ENCOUNTER — OFFICE VISIT (OUTPATIENT)
Dept: INTERNAL MEDICINE CLINIC | Facility: CLINIC | Age: 80
End: 2020-07-23

## 2020-07-23 VITALS
HEIGHT: 63 IN | SYSTOLIC BLOOD PRESSURE: 132 MMHG | TEMPERATURE: 96.5 F | BODY MASS INDEX: 29.06 KG/M2 | OXYGEN SATURATION: 99 % | DIASTOLIC BLOOD PRESSURE: 62 MMHG | HEART RATE: 62 BPM | WEIGHT: 164.02 LBS

## 2020-07-23 DIAGNOSIS — N30.01 ACUTE CYSTITIS WITH HEMATURIA: ICD-10-CM

## 2020-07-23 DIAGNOSIS — R39.9 URINARY SYMPTOM OR SIGN: Primary | ICD-10-CM

## 2020-07-23 LAB
SL AMB  POCT GLUCOSE, UA: NEGATIVE
SL AMB LEUKOCYTE ESTERASE,UA: ABNORMAL
SL AMB POCT BILIRUBIN,UA: NEGATIVE
SL AMB POCT BLOOD,UA: ABNORMAL
SL AMB POCT CLARITY,UA: ABNORMAL
SL AMB POCT COLOR,UA: YELLOW
SL AMB POCT KETONES,UA: NEGATIVE
SL AMB POCT NITRITE,UA: POSITIVE
SL AMB POCT PH,UA: 7.5
SL AMB POCT SPECIFIC GRAVITY,UA: 1.01
SL AMB POCT URINE PROTEIN: 100
SL AMB POCT UROBILINOGEN: 0.2

## 2020-07-23 PROCEDURE — 1160F RVW MEDS BY RX/DR IN RCRD: CPT | Performed by: PHYSICIAN ASSISTANT

## 2020-07-23 PROCEDURE — 4040F PNEUMOC VAC/ADMIN/RCVD: CPT | Performed by: PHYSICIAN ASSISTANT

## 2020-07-23 PROCEDURE — 99213 OFFICE O/P EST LOW 20 MIN: CPT | Performed by: PHYSICIAN ASSISTANT

## 2020-07-23 PROCEDURE — 3008F BODY MASS INDEX DOCD: CPT | Performed by: PHYSICIAN ASSISTANT

## 2020-07-23 PROCEDURE — 81002 URINALYSIS NONAUTO W/O SCOPE: CPT | Performed by: PHYSICIAN ASSISTANT

## 2020-07-23 PROCEDURE — 87086 URINE CULTURE/COLONY COUNT: CPT | Performed by: PHYSICIAN ASSISTANT

## 2020-07-23 PROCEDURE — 3075F SYST BP GE 130 - 139MM HG: CPT | Performed by: PHYSICIAN ASSISTANT

## 2020-07-23 PROCEDURE — 1036F TOBACCO NON-USER: CPT | Performed by: PHYSICIAN ASSISTANT

## 2020-07-23 PROCEDURE — 87077 CULTURE AEROBIC IDENTIFY: CPT | Performed by: PHYSICIAN ASSISTANT

## 2020-07-23 PROCEDURE — 87186 SC STD MICRODIL/AGAR DIL: CPT | Performed by: PHYSICIAN ASSISTANT

## 2020-07-23 PROCEDURE — 3078F DIAST BP <80 MM HG: CPT | Performed by: PHYSICIAN ASSISTANT

## 2020-07-23 RX ORDER — SULFAMETHOXAZOLE AND TRIMETHOPRIM 800; 160 MG/1; MG/1
1 TABLET ORAL 2 TIMES DAILY
Qty: 10 TABLET | Refills: 0 | Status: SHIPPED | OUTPATIENT
Start: 2020-07-23 | End: 2020-07-28

## 2020-07-23 RX ORDER — ALLOPURINOL 100 MG/1
TABLET ORAL
COMMUNITY
Start: 2020-06-12 | End: 2020-12-10

## 2020-07-23 NOTE — PATIENT INSTRUCTIONS
As reviewed during your visit today your urine dip in the office does show that you have a urine infection  Antibiotic has been sent to your pharmacy  One tablet twice a day  We are also sending your urine out to the lab and will contact you next week with the results and advise if we need to adjust your medication

## 2020-07-23 NOTE — PROGRESS NOTES
Assessment/Plan:  As reviewed during your visit today your urine dip in the office does show that you have a urine infection  Antibiotic has been sent to your pharmacy  One tablet twice a day  We are also sending your urine out to the lab and will contact you next week with the results and advise if we need to adjust your medication  No problem-specific Assessment & Plan notes found for this encounter  Diagnoses and all orders for this visit:    Urinary symptom or sign  -     Urine culture; Future  -     Urine culture    Acute cystitis with hematuria  -     Urine culture; Future  -     sulfamethoxazole-trimethoprim (BACTRIM DS) 800-160 mg per tablet; Take 1 tablet by mouth 2 (two) times a day for 5 days  -     Urine culture  -     POCT urine dip    Other orders  -     allopurinol (ZYLOPRIM) 100 mg tablet; TAKE 1 TAB ONCE DAILY          Subjective:      Patient ID: Rivera Singh is a 78 y o  female  Patient states has noticed her urine has been darker for past 2-3 weeks, no pain with urination, no pelvic pain  No fever or chills  No back or flank pain  Also reports constipation but that was only yesterday  Patient's urine dip in the office is positive for leukocytes nitrites proteins and some blood  Negative for glucose or ketones  Based on these results will start patient on antibiotic therapy and will send urine out to lab for culture and will advise patient if we need to adjust her medications  Patient is aware that her urine culture results will likely not be available until next week  The following portions of the patient's history were reviewed and updated as appropriate: allergies, current medications, past family history, past medical history, past social history, past surgical history and problem list     Review of Systems   Constitutional: Negative  Negative for chills and fever  Respiratory: Negative  Negative for cough and shortness of breath      Cardiovascular: Negative for chest pain  Gastrointestinal: Positive for constipation  Negative for abdominal pain, diarrhea, nausea and vomiting  Endocrine: Positive for polydipsia  Negative for polyuria  Genitourinary: Negative for dysuria, frequency and urgency  Musculoskeletal: Negative for back pain  Neurological: Negative  Psychiatric/Behavioral: Negative  Objective:      /62 (BP Location: Left arm, Patient Position: Sitting, Cuff Size: Standard)   Pulse 62   Temp (!) 96 5 °F (35 8 °C) (Temporal)   Ht 5' 3" (1 6 m)   Wt 74 4 kg (164 lb 0 4 oz)   SpO2 99%   BMI 29 06 kg/m²          Physical Exam   Constitutional: She appears well-nourished  No distress  Cardiovascular: Normal rate and regular rhythm  Pulmonary/Chest: Effort normal and breath sounds normal    Abdominal: Soft  Bowel sounds are normal  She exhibits no distension  There is no tenderness  There is no CVA tenderness  Neurological: She is alert  Psychiatric: She has a normal mood and affect  Her behavior is normal    Nursing note and vitals reviewed

## 2020-07-25 LAB — BACTERIA UR CULT: ABNORMAL

## 2020-08-07 DIAGNOSIS — E78.2 MIXED HYPERLIPIDEMIA: ICD-10-CM

## 2020-08-07 RX ORDER — ATORVASTATIN CALCIUM 40 MG/1
40 TABLET, FILM COATED ORAL DAILY
Qty: 90 TABLET | Refills: 1 | Status: SHIPPED | OUTPATIENT
Start: 2020-08-07 | End: 2021-02-11 | Stop reason: SDUPTHER

## 2020-08-10 ENCOUNTER — TELEPHONE (OUTPATIENT)
Dept: NEPHROLOGY | Facility: CLINIC | Age: 80
End: 2020-08-10

## 2020-08-11 ENCOUNTER — CONSULT (OUTPATIENT)
Dept: NEPHROLOGY | Facility: CLINIC | Age: 80
End: 2020-08-11
Payer: COMMERCIAL

## 2020-08-11 VITALS
HEIGHT: 63 IN | WEIGHT: 163.8 LBS | BODY MASS INDEX: 29.02 KG/M2 | SYSTOLIC BLOOD PRESSURE: 132 MMHG | DIASTOLIC BLOOD PRESSURE: 68 MMHG | HEART RATE: 66 BPM

## 2020-08-11 DIAGNOSIS — N18.30 CKD STAGE 3 DUE TO TYPE 2 DIABETES MELLITUS (HCC): ICD-10-CM

## 2020-08-11 DIAGNOSIS — R80.1 PERSISTENT PROTEINURIA: ICD-10-CM

## 2020-08-11 DIAGNOSIS — E11.22 CKD STAGE 3 DUE TO TYPE 2 DIABETES MELLITUS (HCC): ICD-10-CM

## 2020-08-11 DIAGNOSIS — M25.473 ANKLE EDEMA: ICD-10-CM

## 2020-08-11 DIAGNOSIS — N28.1 RENAL CYST: ICD-10-CM

## 2020-08-11 DIAGNOSIS — I10 BENIGN ESSENTIAL HYPERTENSION: ICD-10-CM

## 2020-08-11 DIAGNOSIS — N18.30 BENIGN HYPERTENSION WITH CKD (CHRONIC KIDNEY DISEASE) STAGE III (HCC): Primary | ICD-10-CM

## 2020-08-11 DIAGNOSIS — E11.40 CONTROLLED TYPE 2 DIABETES MELLITUS WITH DIABETIC NEUROPATHY, WITH LONG-TERM CURRENT USE OF INSULIN (HCC): Chronic | ICD-10-CM

## 2020-08-11 DIAGNOSIS — I12.9 BENIGN HYPERTENSION WITH CKD (CHRONIC KIDNEY DISEASE) STAGE III (HCC): Primary | ICD-10-CM

## 2020-08-11 DIAGNOSIS — Z79.4 CONTROLLED TYPE 2 DIABETES MELLITUS WITH DIABETIC NEUROPATHY, WITH LONG-TERM CURRENT USE OF INSULIN (HCC): Chronic | ICD-10-CM

## 2020-08-11 PROCEDURE — 1036F TOBACCO NON-USER: CPT | Performed by: INTERNAL MEDICINE

## 2020-08-11 PROCEDURE — 4040F PNEUMOC VAC/ADMIN/RCVD: CPT | Performed by: INTERNAL MEDICINE

## 2020-08-11 PROCEDURE — 99204 OFFICE O/P NEW MOD 45 MIN: CPT | Performed by: INTERNAL MEDICINE

## 2020-08-11 PROCEDURE — 1160F RVW MEDS BY RX/DR IN RCRD: CPT | Performed by: INTERNAL MEDICINE

## 2020-08-11 RX ORDER — LOSARTAN POTASSIUM 50 MG/1
50 TABLET ORAL DAILY
Qty: 30 TABLET | Refills: 5 | Status: SHIPPED | OUTPATIENT
Start: 2020-08-11 | End: 2020-11-19

## 2020-08-11 NOTE — PROGRESS NOTES
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, baseline creatinine 0 8 to 1 0  -last creatinine 0 8 in June 2020 stable at baseline  -CKD suspected to be secondary to long-term hypertension, diabetes, age-related nephron loss  -avoid nephrotoxins or NSAIDs  -renal ultrasound in late 2019 shows normal size kidneys, normal echogenicity, no hydronephrosis or stones  Bilateral renal cyst on ultrasound  -right kidney showed mid to lower pole cyst with thick septation, no definite vascularity noted  -MRI abdomen in February 2020 shows no suspicious renal mass  Again showed 2 4 cm right mid pole cyst with septation, suboptimally evaluated in the absence of IV contrast   -will eventually do repeat renal ultrasound during next office visit  Proteinuria  -last urine microalbumin/creatinine ratio to 40 mg in November 2019 improved from prior value 1 g    -repeat urine microalbumin/creatinine ratio before next visit  -increase losartan from 25 mg to 50 mg daily    Hypertension  -BP overall acceptable in the office today  -will discontinue amlodipine  -increase losartan as above  -she recently about upper arm BP machine although not checking BP 8  Advised her to check BP on a regular basis and call back if remains persistently greater than 135/85   -bring BP machine during next office visit    Trace ankle edema  -could be in the setting of taking amlodipine  Will discontinue amlodipine as above   -keep legs elevated, compression stockings as tolerated  -prior echo in 2018 showed EF 84%, grade 1 diastolic dysfunction      HISTORY OF PRESENT ILLNESS:  Heidi Suero is a [de-identified]y o  year old female with medical issues of hypertension for 10 years, diabetes for 10 years, CVA in 2011, mild diabetic retinopathy in 2019,? Gout, CKD stage 2 to 3A with baseline creatinine 0 8 to 1 0 who presents for initial consultation for CKD, proteinuria, hypertension management  Patient was being followed by Dr Vanessa Soliz for renal care and now she wants to switch provider  Old medical records were reviewed  Patient's baseline creatinine 0 8 to 1 0 with most recent creatinine stable at baseline  Patient denies any nausea, vomiting or diarrhea  She recently but upper arm BP machine although not checking BP on a regular basis  She remains on stable dose of amlodipine and losartan at home for BP issues  She was recently diagnosed with UTI and was treated with Bactrim for five days in July 2020  This has now resolved  Denies any urinary complaint currently  No recent NSAID use  No obvious history of autoimmune conditions  Denies any prior history of kidney stones  REVIEW OF SYSTEMS:    More than 10 point review of systems were obtained and discussed in length with the patient  Complete review of systems were negative / unremarkable except mentioned in the HPI section  PHYSICAL EXAM:  Vitals:    08/11/20 0958 08/11/20 1043   BP: 128/70 132/68   BP Location: Left arm    Patient Position: Sitting    Cuff Size: Adult    Pulse: 66    Weight: 74 3 kg (163 lb 12 8 oz)    Height: 5' 3" (1 6 m)      Body mass index is 29 02 kg/m²  Physical Exam  Vitals signs reviewed  Constitutional:       Appearance: She is well-developed  HENT:      Head: Normocephalic and atraumatic  Right Ear: External ear normal       Left Ear: External ear normal    Eyes:      Conjunctiva/sclera: Conjunctivae normal       Pupils: Pupils are equal, round, and reactive to light  Neck:      Musculoskeletal: Neck supple  Vascular: No JVD  Cardiovascular:      Rate and Rhythm: Normal rate  Heart sounds: Normal heart sounds  Pulmonary:      Effort: Pulmonary effort is normal       Breath sounds: Normal breath sounds  No wheezing or rales  Abdominal:      General: Bowel sounds are normal  There is no distension  Palpations: Abdomen is soft  Tenderness: There is no abdominal tenderness  Musculoskeletal:         General: No tenderness  Comments: Trace ankle edema bilaterally   Skin:     General: Skin is warm and dry  Findings: No rash  Neurological:      Mental Status: She is alert and oriented to person, place, and time  Psychiatric:         Behavior: Behavior normal          PAST MEDICAL HISTORY:  Past Medical History:   Diagnosis Date    ACE-inhibitor cough     last assessed - [id number]    Asthma     Bilateral edema of lower extremity     last assessed - [id number]    Cardiac murmur     last assessed - [id number]    CKD (chronic kidney disease)     Diabetes mellitus ([facility] )     last assessed - [id number]    Esophageal dysphagia     Fatigue     last assessed - 2017    Hyperlipidemia     Hypertension     Patellar bursitis of right knee     last assessed - [id number]    Personal history of other specified conditions     presenting hazards to health    Stroke [facility])     Stroke syndrome     Urinary frequency     UTI (urinary tract infection)        PAST SURGICAL HISTORY:  Past Surgical History:   Procedure Laterality Date    NH ESOPHAGOGASTRODUODENOSCOPY TRANSORAL DIAGNOSTIC N/A 2017    Procedure: ESOPHAGOGASTRODUODENOSCOPY (EGD); Surgeon: Stephanie Cotto MD;  Location: [facility];   Service: Gastroenterology       ALLERGIES:  No Known Allergies    SOCIAL HISTORY:  Social History     Substance and Sexual Activity   Alcohol Use No    Comment: Denied history of alcohol use     Social History     Substance and Sexual Activity   Drug Use No    Comment: Denied history of drug use     Social History     Tobacco Use   Smoking Status Former Smoker    Packs/day: 3 00    Last attempt to quit: Agueda Segura 39 Years since quittin 6   Smokeless Tobacco Former User   Tobacco Comment    quit over 30 yrs ago; (quit in the 1980's, had smoked 3PPD for 20 years - per [employer])       FAMILY HISTORY:  Family History   Problem Relation Age of Onset    Heart disease Father     Hypertension Mother     Stroke Mother     Other Sister         1)Breast disorder; 2)Epilepsy   [employer] Sister         Migraine headaches    Osteoporosis Sister     Thyroid disease Sister     Coronary artery disease Sister         S/P triple vessel bypass    Osteoporosis Maternal Grandmother     Diabetes Son         Diabetes mellitus    Hypertension Son     Rheum arthritis Son         Rheumatic disease    Heart disease Family        MEDICATIONS:    Current Outpatient Medications:     acetaminophen (TYLENOL) 650 mg CR tablet, Take 650 mg by mouth every 6 (six) hours as needed for mild pain, Disp: , Rfl:     allopurinol (ZYLOPRIM) 100 mg tablet, TAKE 1 TAB ONCE DAILY, Disp: , Rfl:     atorvastatin (LIPITOR) 40 mg tablet, TAKE 1 TABLET (40 MG TOTAL) BY MOUTH DAILY, Disp: 90 tablet, Rfl: 1    Blood Glucose Monitoring Suppl (FREESTYLE LITE) JAQUELIN, by Does not apply route daily, Disp: 1 each, Rfl: 0    clopidogrel (PLAVIX) 75 mg tablet, TAKE 2 TAB (150MG TOTAL) DAILY, Disp: 180 tablet, Rfl: 0    Continuous Blood Gluc  (FREESTYLE ADAM READER) JAQUELIN, 1 Device by Does not apply route 4 (four) times a day, Disp: 1 Device, Rfl: 0    Continuous Blood Gluc Sensor (FREESTYLE ADAM 14 DAY SENSOR) MISC, USE 1 SENSOR EVERY 14 (FOURTEEN) DAYS, Disp: 4 each, Rfl: 1    CVS D3 50 MCG (2000 UT) CAPS, TAKE 1 CAPSULE BY MOUTH EVERY OTHER DAY, Disp: , Rfl:     D-2000 MAXIMUM STRENGTH 50 MCG (2000 UT) TABS, TAKE 1 TAB EVERY OTHER DAY, Disp: , Rfl:     FREESTYLE LITE test strip, May check blood sugar twice daily, Disp: 100 each, Rfl: 3    GLOBAL EASE INJECT PEN NEEDLES 31G X 5 MM MISC, , Disp: , Rfl:     Glucose-Vitamin C-Vitamin D (TRUEPLUS GLUCOSE ON THE GO) CHEW, CHEW 2 TABS AS NEEDED FOR BLOOD SUGAR LESS THAN 80, Disp: , Rfl:    Lancets (FREESTYLE) lancets, Use as instructed, Disp: 100 each, Rfl: 0    losartan (COZAAR) 50 mg tablet, Take 1 tablet (50 mg total) by mouth daily, Disp: 30 tablet, Rfl: 5    metFORMIN (GLUCOPHAGE) 500 mg tablet, Take 1 tablet (500 mg total) by mouth 2 (two) times a day with meals, Disp: 60 tablet, Rfl: 5    Misc  Devices (QUAD CANE) MISC, by Does not apply route daily, Disp: 1 each, Rfl: 0    conjugated estrogens (PREMARIN) vaginal cream, Apply pea-sized amount in vagina two nights a week (Patient not taking: Reported on 6/3/2020), Disp: 30 g, Rfl: 2    montelukast (SINGULAIR) 10 mg tablet, Take 1 tablet (10 mg total) by mouth daily at bedtime for 90 days (Patient not taking: Reported on 8/11/2020), Disp: 90 tablet, Rfl: 0    Lab Results:   Serum creatinine 0 8    Portions of the record may have been created with voice recognition software  Occasional wrong word or "sound a like" substitutions may have occurred due to the inherent limitations of voice recognition software  Read the chart carefully and recognize, using context, where substitutions have occurred

## 2020-08-11 NOTE — PATIENT INSTRUCTIONS
-STOP AMLODIPINE  -INCREASE COZAAR FROM 25 MG TO 50 MG DAILY  -CHECK BLOOD PRESSURE AT HOME AND IF >140/90, PLEASE CALL BACK OFFICE  -BRING BLOOD PRESSURE MACHINE DURING NEXT OFFICE VISIT  -CHECK BLOOD AND URINE TEST IN December BEFORE NEXT OFFICE VISIT  -SALT RESTRICTED DIET    Recommend to follow a healthy lifestyle including following a low-salt diet, avoid smoking, do regular physical activity, avoid anti-inflammatory/NSAIDs medication (no ibuprofen, Motrin, Advil, Naproxen, Aleve, Mobic/Meloxicam, etc ), avoid excessive weight gain and continue with regular follow-up as instructed  Hypertension and diabetes are the two most common causes of chronic kidney disease (CKD), if you have any of them, it is important to have a good blood sugar (hemoglobin A1c less than 7 percent) as well as good blood pressure control to slow down the progression of your CKD

## 2020-08-27 ENCOUNTER — OFFICE VISIT (OUTPATIENT)
Dept: AUDIOLOGY | Age: 80
End: 2020-08-27
Payer: COMMERCIAL

## 2020-08-27 DIAGNOSIS — H90.3 SENSORY HEARING LOSS, BILATERAL: Primary | ICD-10-CM

## 2020-08-27 PROCEDURE — 92557 COMPREHENSIVE HEARING TEST: CPT | Performed by: AUDIOLOGIST-HEARING AID FITTER

## 2020-08-27 PROCEDURE — 92567 TYMPANOMETRY: CPT | Performed by: AUDIOLOGIST-HEARING AID FITTER

## 2020-08-27 NOTE — PROGRESS NOTES
HEARING EVALUATION    Name:  Nishant Burrows  :  1940  Age:  [de-identified] y o  Date of Evaluation: 20     History: Known Hearing Loss binaurally  Reason for visit: Nishant Burrows is being seen today at the request of Dr Omid Yoon for an evaluation of hearing  Patient reports no perceived changes to her hearing sensitivity since her last hearing evaluation  Gibran Noriega reported she lost both hearing aids several months ago  Gibran Hari is due for new hearing aids in 2020  No recent colds or ear infections  EVALUATION:    Otoscopic Evaluation:   Right Ear: Clear and healthy ear canal and tympanic membrane   Left Ear: Clear and healthy ear canal and tympanic membrane    Tympanometry:   Right: Type A - normal middle ear pressure and compliance   Left: Type A - normal middle ear pressure and compliance    Audiogram Results:  Pure tone testing revealed a 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  SRT and PTA are in agreement indicating good test reliability  Word recognition scores were excellent bilaterally  *see attached audiogram      RECOMMENDATIONS:  Annual hearing eval, Return to [facility]  for F/U and Copy to Patient/Caregiver    PATIENT EDUCATION:   Discussed results and recommendations with Gibran Noriega  Hearing loss is stable  We will call insurance company in December for hearing aid benefit  Patient would like chestnut brown hearing aids  Questions were addressed and the patient was encouraged to contact our department should concerns arise        Freida Medeiros, Marta NOEL   Clinical Audiologist

## 2020-09-11 DIAGNOSIS — I63.50 CEREBROVASCULAR ACCIDENT (CVA) OF PONTINE STRUCTURE (HCC): ICD-10-CM

## 2020-09-11 DIAGNOSIS — I10 BENIGN ESSENTIAL HYPERTENSION: ICD-10-CM

## 2020-09-11 RX ORDER — CLOPIDOGREL BISULFATE 75 MG/1
TABLET ORAL
Qty: 180 TABLET | Refills: 1 | Status: SHIPPED | OUTPATIENT
Start: 2020-09-11 | End: 2021-02-11 | Stop reason: SDUPTHER

## 2020-09-11 RX ORDER — LOSARTAN POTASSIUM 25 MG/1
25 TABLET ORAL DAILY
Qty: 90 TABLET | Refills: 1 | OUTPATIENT
Start: 2020-09-11 | End: 2021-03-10

## 2020-09-11 RX ORDER — AMLODIPINE BESYLATE 5 MG/1
5 TABLET ORAL DAILY
Qty: 90 TABLET | Refills: 1 | OUTPATIENT
Start: 2020-09-11 | End: 2021-03-10

## 2020-09-17 DIAGNOSIS — Z79.4 CONTROLLED TYPE 2 DIABETES MELLITUS WITH DIABETIC NEUROPATHY, WITH LONG-TERM CURRENT USE OF INSULIN (HCC): Chronic | ICD-10-CM

## 2020-09-17 DIAGNOSIS — E11.40 CONTROLLED TYPE 2 DIABETES MELLITUS WITH DIABETIC NEUROPATHY, WITH LONG-TERM CURRENT USE OF INSULIN (HCC): Chronic | ICD-10-CM

## 2020-09-17 RX ORDER — FLASH GLUCOSE SENSOR
KIT MISCELLANEOUS
Qty: 4 EACH | Refills: 1 | Status: SHIPPED | OUTPATIENT
Start: 2020-09-17 | End: 2020-11-17 | Stop reason: SDUPTHER

## 2020-09-17 NOTE — TELEPHONE ENCOUNTER
Name of medication, dose, quantity and frequency  Requested Prescriptions     Pending Prescriptions Disp Refills    Continuous Blood Gluc Sensor (FreeStyle Naldo 14 Day Sensor) MISC 4 each 1     Number of refills left:0    Amount of medication left:0    Pharmacy verified and updated    Additional information:  PATIENT SHOULD HAVE CHANGED PATCH ON 9/16    PLEASE SEND SCRIPT AS SOON AS POSSIBLE

## 2020-09-18 ENCOUNTER — DOCUMENTATION (OUTPATIENT)
Dept: AUDIOLOGY | Age: 80
End: 2020-09-18

## 2020-09-18 NOTE — PROGRESS NOTES
Progress Note    Name:  Yelitza Chapin  :  1940  Age:  [de-identified] y o  Date of Evaluation: 20     Scanned in HA chart         Marta Ha , CCC-A  Clinical Audiologist

## 2020-09-29 ENCOUNTER — OFFICE VISIT (OUTPATIENT)
Dept: PODIATRY | Facility: CLINIC | Age: 80
End: 2020-09-29
Payer: COMMERCIAL

## 2020-09-29 VITALS — HEIGHT: 63 IN | BODY MASS INDEX: 29.02 KG/M2 | TEMPERATURE: 97.5 F

## 2020-09-29 DIAGNOSIS — I73.9 PERIPHERAL VASCULAR DISEASE, UNSPECIFIED (HCC): ICD-10-CM

## 2020-09-29 DIAGNOSIS — E11.40 TYPE 2 DIABETES MELLITUS WITH DIABETIC NEUROPATHY, UNSPECIFIED WHETHER LONG TERM INSULIN USE (HCC): Primary | ICD-10-CM

## 2020-09-29 DIAGNOSIS — B35.1 ONYCHOMYCOSIS: ICD-10-CM

## 2020-09-29 PROCEDURE — 11721 DEBRIDE NAIL 6 OR MORE: CPT | Performed by: PODIATRIST

## 2020-09-29 PROCEDURE — 1036F TOBACCO NON-USER: CPT | Performed by: PODIATRIST

## 2020-09-29 PROCEDURE — 99213 OFFICE O/P EST LOW 20 MIN: CPT | Performed by: PODIATRIST

## 2020-09-29 PROCEDURE — 1160F RVW MEDS BY RX/DR IN RCRD: CPT | Performed by: PODIATRIST

## 2020-09-29 NOTE — PROGRESS NOTES
PATIENT:  Rosie Zepeda  1940       ASSESSMENT:     1  Type 2 diabetes mellitus with diabetic neuropathy, unspecified whether long term insulin use ([facility] )     2  Peripheral vascular disease, unspecified ([facility] )     3  Onychomycosis  Debridement             PLAN:  Patient was counseled on the condition and diagnosis  Educated disease prevention and risks related to diabetes  Educated proper daily foot care and exam   Instructed proper skin care / protection and footwear  Instructed to identify any signs of infection and related foot problem  The recent blood work was reviewed and the last HbA1c was 7 2  Discussed proper blood glucose control with diet and exercise  Awaits new DM shoes  The patient will return in 9 weeks for periodic diabetic foot exam     Procedure: All mycotic toenails were reduced and debrided in length, width, and girth using a nail nipper and dremel  Patient tolerated procedure(s) well without complications  Subjective:       HPI  The patient presents for diabetic foot evaluation  The blood glucose is under control  The patient denied any diabetic foot ulcer or related foot infection  She has some numbness and paresthesia in her feet  She walks with a cane  She denied any acute pedal disorder  She complained of thick, elongated toenails with pain  The following portions of the patient's history were reviewed and updated as appropriate: allergies, current medications, past family history, past medical history, past social history, past surgical history and problem list   All pertinent labs and images were reviewed        Past Medical History  Past Medical History:   Diagnosis Date    ACE-inhibitor cough     last assessed - 29Dec2017    Asthma     Bilateral edema of lower extremity     last assessed - 21Aug2017    Cardiac murmur     last assessed - 21Aug2017    CKD (chronic kidney disease)     Diabetes mellitus ([facility] )     last assessed - [id number]   Alonso Molina Esophageal dysphagia     Fatigue     last assessed - [id number]    Hyperlipidemia     Hypertension     Patellar bursitis of right knee     last assessed - [id number]    Personal history of other specified conditions     presenting hazards to health    Stroke [facility])     Stroke syndrome     Urinary frequency     UTI (urinary tract infection)        Past Surgical History  Past Surgical History:   Procedure Laterality Date    DE ESOPHAGOGASTRODUODENOSCOPY TRANSORAL DIAGNOSTIC N/A 4/5/2017    Procedure: ESOPHAGOGASTRODUODENOSCOPY (EGD); Surgeon: Chas Maza MD;  Location: [facility]; Service: Gastroenterology        Allergies:  Patient has no known allergies      Medications:  Current Outpatient Medications   Medication Sig Dispense Refill    acetaminophen (TYLENOL) 650 mg CR tablet Take 650 mg by mouth every 6 (six) hours as needed for mild pain      allopurinol (ZYLOPRIM) 100 mg tablet TAKE 1 TAB ONCE DAILY      atorvastatin (LIPITOR) 40 mg tablet TAKE 1 TABLET (40 MG TOTAL) BY MOUTH DAILY 90 tablet 1    clopidogrel (PLAVIX) 75 mg tablet TAKE 2 TABS DAILY 180 tablet 1    Continuous Blood Gluc  (FREESTYLE ADAM READER) JAQUELIN 1 Device by Does not apply route 4 (four) times a day 1 Device 0    Continuous Blood Gluc Sensor (FreeStyle Adam 14 Day Sensor) MISC Use one sensor every 14 days 4 each 1    CVS D3 50 MCG (2000 UT) CAPS TAKE 1 CAPSULE BY MOUTH EVERY OTHER DAY      D-2000 MAXIMUM STRENGTH 50 MCG (2000 UT) TABS TAKE 1 TAB EVERY OTHER DAY      FREESTYLE LITE test strip May check blood sugar twice daily 100 each 3    GLOBAL EASE INJECT PEN NEEDLES 31G X 5 MM MISC       Glucose-Vitamin C-Vitamin D (TRUEPLUS GLUCOSE ON THE GO) CHEW CHEW 2 TABS AS NEEDED FOR BLOOD SUGAR LESS THAN 80      Lancets (FREESTYLE) lancets Use as instructed 100 each 0    losartan (COZAAR) 50 mg tablet Take 1 tablet (50 mg total) by mouth daily 30 tablet 5    metFORMIN (GLUCOPHAGE) 500 mg tablet Take 1 tablet (500 mg total) by mouth 2 (two) times a day with meals 60 tablet 5    Misc  Devices (QUAD CANE) MISC by Does not apply route daily 1 each 0    Blood Glucose Monitoring Suppl (FREESTYLE LITE) JAQUELIN by Does not apply route daily 1 each 0    conjugated estrogens (PREMARIN) vaginal cream Apply pea-sized amount in vagina two nights a week (Patient not taking: Reported on 6/3/2020) 30 g 2    montelukast (SINGULAIR) 10 mg tablet Take 1 tablet (10 mg total) by mouth daily at bedtime for 90 days (Patient not taking: Reported on 2020) 90 tablet 0     No current facility-administered medications for this visit  Social History:  Social History     Socioeconomic History    Marital status:       Spouse name: None    Number of children: 6    Years of education: None    Highest education level: None   Occupational History    Occupation: Retired   Social Needs    Financial resource strain: None    Food insecurity     Worry: None     Inability: None    Transportation needs     Medical: None     Non-medical: None   Tobacco Use    Smoking status: Former Smoker     Packs/day: 3 00     Last attempt to quit:      Years since quittin 7    Smokeless tobacco: Former User    Tobacco comment: quit over 30 yrs ago; (quit in the , had smoked 3PPD for 20 years - per [employer])   Substance and Sexual Activity    Alcohol use: No     Comment: Denied history of alcohol use    Drug use: No     Comment: Denied history of drug use    Sexual activity: Never   Lifestyle    Physical activity     Days per week: None     Minutes per session: None    Stress: None   Relationships    Social connections     Talks on phone: None     Gets together: None     Attends [employer] service: None     Active member of club or organization: None     Attends meetings of clubs or organizations: None     Relationship status: None    Intimate partner violence     Fear of current or ex partner: None     Emotionally abused: None Physically abused: None     Forced sexual activity: None   Other Topics Concern    None   Social History Narrative    Diet needs improvement    Does not exercise    No caffeine use          Review of Systems   Constitutional: Negative for chills and fever  HENT: Negative for sore throat  Respiratory: Negative for shortness of breath  Cardiovascular: Negative for chest pain  Gastrointestinal: Negative for nausea and vomiting  Skin: Negative for wound  Neurological: Positive for numbness  Objective:      Temp 97 5 °F (36 4 °C)   Ht 5' 3" (1 6 m)   BMI 29 02 kg/m²          Physical Exam  Vitals signs reviewed  Constitutional:       General: She is not in acute distress  Appearance: She is well-developed  Cardiovascular:      Rate and Rhythm: Normal rate and regular rhythm  Pulses: Pulses are weak  Dorsalis pedis pulses are 0 on the right side and 0 on the left side  Posterior tibial pulses are 0 on the right side and 0 on the left side  Pulmonary:      Effort: Pulmonary effort is normal  No respiratory distress  Breath sounds: Normal breath sounds  Musculoskeletal:         General: Deformity (Hammertoe and bunion noted ) present  No tenderness  Feet:      Right foot:      Skin integrity: Dry skin present  No ulcer, skin breakdown, erythema, warmth or callus  Toenail Condition: Right toenails are abnormally thick and long  Fungal disease present  Left foot:      Skin integrity: Dry skin present  No ulcer, skin breakdown, erythema, warmth or callus  Toenail Condition: Left toenails are abnormally thick and long  Fungal disease present  Skin:     Capillary Refill: Capillary refill takes less than 2 seconds  Findings: No erythema or rash  Neurological:      Mental Status: She is alert and oriented to person, place, and time     Psychiatric:         Mood and Affect: Mood normal          Behavior: Behavior normal          Thought Content: Thought content normal          Judgment: Judgment normal            Diabetic Foot Exam    Patient's shoes and socks removed  Right Foot/Ankle   Right Foot Inspection  Skin Exam: skin normal, skin intact and dry skin no warmth, no callus, no erythema, no maceration, no abnormal color, no pre-ulcer, no ulcer and no callus                          Toe Exam: right toe deformityno swelling, no tenderness and erythema  Sensory   Vibration: diminished  Proprioception: intact   Monofilament testing: intact  Vascular  Capillary refills: < 3 seconds  The right DP pulse is 0  The right PT pulse is 0  Left Foot/Ankle  Left Foot Inspection  Skin Exam: skin normal, skin intact and dry skinno warmth, no erythema, no maceration, normal color, no pre-ulcer, no ulcer and no callus                         Toe Exam: left toe deformityno swelling, no tenderness and no erythema                   Sensory   Vibration: diminished  Proprioception: intact  Monofilament: intact  Vascular  Capillary refills: < 3 seconds  The left DP pulse is 0  The left PT pulse is 0  Assign Risk Category:  Deformity present;  No loss of protective sensation; Weak pulses       Risk: 2

## 2020-10-02 LAB
LEFT EYE DIABETIC RETINOPATHY: NORMAL
RIGHT EYE DIABETIC RETINOPATHY: NORMAL

## 2020-10-06 ENCOUNTER — CLINICAL SUPPORT (OUTPATIENT)
Dept: INTERNAL MEDICINE CLINIC | Facility: CLINIC | Age: 80
End: 2020-10-06

## 2020-10-06 DIAGNOSIS — Z23 NEED FOR INFLUENZA VACCINATION: Primary | ICD-10-CM

## 2020-10-06 PROCEDURE — 90662 IIV NO PRSV INCREASED AG IM: CPT | Performed by: INTERNAL MEDICINE

## 2020-10-06 PROCEDURE — G0008 ADMIN INFLUENZA VIRUS VAC: HCPCS | Performed by: INTERNAL MEDICINE

## 2020-10-16 ENCOUNTER — TELEPHONE (OUTPATIENT)
Dept: NEPHROLOGY | Facility: CLINIC | Age: 80
End: 2020-10-16

## 2020-10-19 ENCOUNTER — TELEPHONE (OUTPATIENT)
Dept: INTERNAL MEDICINE CLINIC | Facility: CLINIC | Age: 80
End: 2020-10-19

## 2020-10-22 ENCOUNTER — OFFICE VISIT (OUTPATIENT)
Dept: INTERNAL MEDICINE CLINIC | Facility: CLINIC | Age: 80
End: 2020-10-22

## 2020-10-22 ENCOUNTER — TELEPHONE (OUTPATIENT)
Dept: INTERNAL MEDICINE CLINIC | Facility: CLINIC | Age: 80
End: 2020-10-22

## 2020-10-22 VITALS
HEART RATE: 64 BPM | TEMPERATURE: 97.9 F | BODY MASS INDEX: 30.02 KG/M2 | WEIGHT: 169.4 LBS | SYSTOLIC BLOOD PRESSURE: 144 MMHG | DIASTOLIC BLOOD PRESSURE: 62 MMHG | HEIGHT: 63 IN

## 2020-10-22 DIAGNOSIS — M21.969 DEFORMITY OF FOOT, UNSPECIFIED LATERALITY: Primary | ICD-10-CM

## 2020-10-22 DIAGNOSIS — E11.69 TYPE 2 DIABETES MELLITUS WITH OTHER SPECIFIED COMPLICATION, WITHOUT LONG-TERM CURRENT USE OF INSULIN (HCC): ICD-10-CM

## 2020-10-22 PROCEDURE — 1160F RVW MEDS BY RX/DR IN RCRD: CPT | Performed by: INTERNAL MEDICINE

## 2020-10-22 PROCEDURE — 99213 OFFICE O/P EST LOW 20 MIN: CPT | Performed by: INTERNAL MEDICINE

## 2020-10-29 ENCOUNTER — TELEPHONE (OUTPATIENT)
Dept: INTERNAL MEDICINE CLINIC | Facility: CLINIC | Age: 80
End: 2020-10-29

## 2020-10-29 DIAGNOSIS — E11.40 CONTROLLED TYPE 2 DIABETES MELLITUS WITH DIABETIC NEUROPATHY, WITHOUT LONG-TERM CURRENT USE OF INSULIN (HCC): Primary | ICD-10-CM

## 2020-10-29 DIAGNOSIS — E11.69 TYPE 2 DIABETES MELLITUS WITH OTHER SPECIFIED COMPLICATION, WITHOUT LONG-TERM CURRENT USE OF INSULIN (HCC): Primary | ICD-10-CM

## 2020-10-30 DIAGNOSIS — E11.40 CONTROLLED TYPE 2 DIABETES MELLITUS WITH DIABETIC NEUROPATHY, WITHOUT LONG-TERM CURRENT USE OF INSULIN (HCC): Primary | ICD-10-CM

## 2020-11-02 ENCOUNTER — TELEPHONE (OUTPATIENT)
Dept: INTERNAL MEDICINE CLINIC | Facility: CLINIC | Age: 80
End: 2020-11-02

## 2020-11-02 DIAGNOSIS — E11.41 CONTROLLED TYPE 2 DIABETES MELLITUS WITH DIABETIC MONONEUROPATHY, WITHOUT LONG-TERM CURRENT USE OF INSULIN (HCC): Primary | ICD-10-CM

## 2020-11-04 ENCOUNTER — OFFICE VISIT (OUTPATIENT)
Dept: MULTI SPECIALTY CLINIC | Facility: CLINIC | Age: 80
End: 2020-11-04

## 2020-11-04 VITALS
DIASTOLIC BLOOD PRESSURE: 80 MMHG | TEMPERATURE: 96.6 F | HEIGHT: 63 IN | HEART RATE: 64 BPM | WEIGHT: 172 LBS | OXYGEN SATURATION: 100 % | SYSTOLIC BLOOD PRESSURE: 148 MMHG | BODY MASS INDEX: 30.48 KG/M2

## 2020-11-04 DIAGNOSIS — I10 BENIGN ESSENTIAL HYPERTENSION: ICD-10-CM

## 2020-11-04 DIAGNOSIS — E78.2 MIXED HYPERLIPIDEMIA: ICD-10-CM

## 2020-11-04 DIAGNOSIS — E11.41 CONTROLLED TYPE 2 DIABETES MELLITUS WITH DIABETIC MONONEUROPATHY, WITHOUT LONG-TERM CURRENT USE OF INSULIN (HCC): Primary | ICD-10-CM

## 2020-11-04 PROCEDURE — 1160F RVW MEDS BY RX/DR IN RCRD: CPT | Performed by: PHYSICIAN ASSISTANT

## 2020-11-04 PROCEDURE — 99214 OFFICE O/P EST MOD 30 MIN: CPT | Performed by: PHYSICIAN ASSISTANT

## 2020-11-04 PROCEDURE — 1036F TOBACCO NON-USER: CPT | Performed by: PHYSICIAN ASSISTANT

## 2020-11-10 ENCOUNTER — LAB (OUTPATIENT)
Dept: LAB | Facility: HOSPITAL | Age: 80
End: 2020-11-10
Payer: COMMERCIAL

## 2020-11-10 ENCOUNTER — TELEPHONE (OUTPATIENT)
Dept: NEPHROLOGY | Facility: CLINIC | Age: 80
End: 2020-11-10

## 2020-11-10 DIAGNOSIS — E11.65 UNCONTROLLED TYPE 2 DIABETES MELLITUS WITH HYPERGLYCEMIA (HCC): ICD-10-CM

## 2020-11-10 DIAGNOSIS — E11.41 CONTROLLED TYPE 2 DIABETES MELLITUS WITH DIABETIC MONONEUROPATHY, WITHOUT LONG-TERM CURRENT USE OF INSULIN (HCC): ICD-10-CM

## 2020-11-10 DIAGNOSIS — R80.1 PERSISTENT PROTEINURIA: ICD-10-CM

## 2020-11-10 DIAGNOSIS — E78.2 MIXED HYPERLIPIDEMIA: ICD-10-CM

## 2020-11-10 DIAGNOSIS — I10 BENIGN ESSENTIAL HYPERTENSION: ICD-10-CM

## 2020-11-10 DIAGNOSIS — N18.30 CKD STAGE 3 DUE TO TYPE 2 DIABETES MELLITUS (HCC): ICD-10-CM

## 2020-11-10 DIAGNOSIS — E11.22 CKD STAGE 3 DUE TO TYPE 2 DIABETES MELLITUS (HCC): ICD-10-CM

## 2020-11-10 LAB
ALBUMIN SERPL BCP-MCNC: 3.4 G/DL (ref 3.5–5)
ALP SERPL-CCNC: 84 U/L (ref 46–116)
ALT SERPL W P-5'-P-CCNC: 22 U/L (ref 12–78)
ANION GAP SERPL CALCULATED.3IONS-SCNC: 3 MMOL/L (ref 4–13)
AST SERPL W P-5'-P-CCNC: 12 U/L (ref 5–45)
BACTERIA UR QL AUTO: ABNORMAL /HPF
BILIRUB SERPL-MCNC: 0.46 MG/DL (ref 0.2–1)
BILIRUB UR QL STRIP: NEGATIVE
BUN SERPL-MCNC: 25 MG/DL (ref 5–25)
CALCIUM ALBUM COR SERPL-MCNC: 10 MG/DL (ref 8.3–10.1)
CALCIUM SERPL-MCNC: 9.5 MG/DL (ref 8.3–10.1)
CHLORIDE SERPL-SCNC: 111 MMOL/L (ref 100–108)
CHOLEST SERPL-MCNC: 140 MG/DL (ref 50–200)
CLARITY UR: CLEAR
CO2 SERPL-SCNC: 29 MMOL/L (ref 21–32)
COLOR UR: YELLOW
CREAT SERPL-MCNC: 0.95 MG/DL (ref 0.6–1.3)
CREAT UR-MCNC: 26.9 MG/DL
ERYTHROCYTE [DISTWIDTH] IN BLOOD BY AUTOMATED COUNT: 13 % (ref 11.6–15.1)
EST. AVERAGE GLUCOSE BLD GHB EST-MCNC: 180 MG/DL
GFR SERPL CREATININE-BSD FRML MDRD: 65 ML/MIN/1.73SQ M
GLUCOSE P FAST SERPL-MCNC: 163 MG/DL (ref 65–99)
GLUCOSE UR STRIP-MCNC: NEGATIVE MG/DL
HBA1C MFR BLD: 7.9 %
HCT VFR BLD AUTO: 36.2 % (ref 34.8–46.1)
HDLC SERPL-MCNC: 75 MG/DL
HGB BLD-MCNC: 11.4 G/DL (ref 11.5–15.4)
HGB UR QL STRIP.AUTO: ABNORMAL
HYALINE CASTS #/AREA URNS LPF: ABNORMAL /LPF
KETONES UR STRIP-MCNC: NEGATIVE MG/DL
LDLC SERPL CALC-MCNC: 56 MG/DL (ref 0–100)
LEUKOCYTE ESTERASE UR QL STRIP: ABNORMAL
MAGNESIUM SERPL-MCNC: 1.8 MG/DL (ref 1.6–2.6)
MCH RBC QN AUTO: 26.2 PG (ref 26.8–34.3)
MCHC RBC AUTO-ENTMCNC: 31.5 G/DL (ref 31.4–37.4)
MCV RBC AUTO: 83 FL (ref 82–98)
MICROALBUMIN UR-MCNC: 509 MG/L (ref 0–20)
MICROALBUMIN/CREAT 24H UR: 1892 MG/G CREATININE (ref 0–30)
NITRITE UR QL STRIP: POSITIVE
NON-SQ EPI CELLS URNS QL MICRO: ABNORMAL /HPF
PH UR STRIP.AUTO: 5.5 [PH]
PHOSPHATE SERPL-MCNC: 3.9 MG/DL (ref 2.3–4.1)
PLATELET # BLD AUTO: 175 THOUSANDS/UL (ref 149–390)
PMV BLD AUTO: 11.7 FL (ref 8.9–12.7)
POTASSIUM SERPL-SCNC: 3.8 MMOL/L (ref 3.5–5.3)
PROT SERPL-MCNC: 7.3 G/DL (ref 6.4–8.2)
PROT UR STRIP-MCNC: ABNORMAL MG/DL
PTH-INTACT SERPL-MCNC: 52.4 PG/ML (ref 18.4–80.1)
RBC # BLD AUTO: 4.35 MILLION/UL (ref 3.81–5.12)
RBC #/AREA URNS AUTO: ABNORMAL /HPF
SODIUM SERPL-SCNC: 143 MMOL/L (ref 136–145)
SP GR UR STRIP.AUTO: 1.01 (ref 1–1.03)
TRIGL SERPL-MCNC: 44 MG/DL
UROBILINOGEN UR QL STRIP.AUTO: 0.2 E.U./DL
WBC # BLD AUTO: 6.23 THOUSAND/UL (ref 4.31–10.16)
WBC #/AREA URNS AUTO: ABNORMAL /HPF

## 2020-11-10 PROCEDURE — 83735 ASSAY OF MAGNESIUM: CPT

## 2020-11-10 PROCEDURE — 80053 COMPREHEN METABOLIC PANEL: CPT

## 2020-11-10 PROCEDURE — 84100 ASSAY OF PHOSPHORUS: CPT

## 2020-11-10 PROCEDURE — 82570 ASSAY OF URINE CREATININE: CPT

## 2020-11-10 PROCEDURE — 83036 HEMOGLOBIN GLYCOSYLATED A1C: CPT

## 2020-11-10 PROCEDURE — 83970 ASSAY OF PARATHORMONE: CPT

## 2020-11-10 PROCEDURE — 80061 LIPID PANEL: CPT

## 2020-11-10 PROCEDURE — 81001 URINALYSIS AUTO W/SCOPE: CPT

## 2020-11-10 PROCEDURE — 36415 COLL VENOUS BLD VENIPUNCTURE: CPT

## 2020-11-10 PROCEDURE — 82043 UR ALBUMIN QUANTITATIVE: CPT

## 2020-11-10 PROCEDURE — 85027 COMPLETE CBC AUTOMATED: CPT

## 2020-11-12 ENCOUNTER — TELEPHONE (OUTPATIENT)
Dept: INTERNAL MEDICINE CLINIC | Facility: CLINIC | Age: 80
End: 2020-11-12

## 2020-11-13 ENCOUNTER — TELEPHONE (OUTPATIENT)
Dept: NEPHROLOGY | Facility: CLINIC | Age: 80
End: 2020-11-13

## 2020-11-16 ENCOUNTER — TELEPHONE (OUTPATIENT)
Dept: NEPHROLOGY | Facility: CLINIC | Age: 80
End: 2020-11-16

## 2020-11-17 DIAGNOSIS — Z79.4 CONTROLLED TYPE 2 DIABETES MELLITUS WITH DIABETIC NEUROPATHY, WITH LONG-TERM CURRENT USE OF INSULIN (HCC): Chronic | ICD-10-CM

## 2020-11-17 DIAGNOSIS — E11.40 CONTROLLED TYPE 2 DIABETES MELLITUS WITH DIABETIC NEUROPATHY, WITH LONG-TERM CURRENT USE OF INSULIN (HCC): Chronic | ICD-10-CM

## 2020-11-17 RX ORDER — FLASH GLUCOSE SENSOR
KIT MISCELLANEOUS
Qty: 4 EACH | Refills: 1 | Status: SHIPPED | OUTPATIENT
Start: 2020-11-17 | End: 2020-12-31 | Stop reason: SDUPTHER

## 2020-11-19 ENCOUNTER — OFFICE VISIT (OUTPATIENT)
Dept: NEPHROLOGY | Facility: CLINIC | Age: 80
End: 2020-11-19
Payer: COMMERCIAL

## 2020-11-19 ENCOUNTER — TELEPHONE (OUTPATIENT)
Dept: NEPHROLOGY | Facility: CLINIC | Age: 80
End: 2020-11-19

## 2020-11-19 ENCOUNTER — TELEPHONE (OUTPATIENT)
Dept: INTERNAL MEDICINE CLINIC | Facility: CLINIC | Age: 80
End: 2020-11-19

## 2020-11-19 VITALS
SYSTOLIC BLOOD PRESSURE: 148 MMHG | TEMPERATURE: 97.7 F | WEIGHT: 172.8 LBS | HEIGHT: 63 IN | DIASTOLIC BLOOD PRESSURE: 72 MMHG | BODY MASS INDEX: 30.62 KG/M2

## 2020-11-19 DIAGNOSIS — I10 ESSENTIAL HYPERTENSION: Primary | ICD-10-CM

## 2020-11-19 DIAGNOSIS — E11.41 CONTROLLED TYPE 2 DIABETES MELLITUS WITH DIABETIC MONONEUROPATHY, WITHOUT LONG-TERM CURRENT USE OF INSULIN (HCC): ICD-10-CM

## 2020-11-19 DIAGNOSIS — N28.1 RENAL CYST: ICD-10-CM

## 2020-11-19 DIAGNOSIS — E11.22 CKD STAGE 3 DUE TO TYPE 2 DIABETES MELLITUS (HCC): Primary | ICD-10-CM

## 2020-11-19 DIAGNOSIS — N18.30 CKD STAGE 3 DUE TO TYPE 2 DIABETES MELLITUS (HCC): Primary | ICD-10-CM

## 2020-11-19 DIAGNOSIS — R80.1 PERSISTENT PROTEINURIA: ICD-10-CM

## 2020-11-19 DIAGNOSIS — I10 BENIGN ESSENTIAL HYPERTENSION: ICD-10-CM

## 2020-11-19 DIAGNOSIS — E11.69 TYPE 2 DIABETES MELLITUS WITH OTHER SPECIFIED COMPLICATION, WITHOUT LONG-TERM CURRENT USE OF INSULIN (HCC): Primary | ICD-10-CM

## 2020-11-19 PROCEDURE — 1036F TOBACCO NON-USER: CPT | Performed by: NURSE PRACTITIONER

## 2020-11-19 PROCEDURE — 3078F DIAST BP <80 MM HG: CPT | Performed by: NURSE PRACTITIONER

## 2020-11-19 PROCEDURE — 3077F SYST BP >= 140 MM HG: CPT | Performed by: NURSE PRACTITIONER

## 2020-11-19 PROCEDURE — 1160F RVW MEDS BY RX/DR IN RCRD: CPT | Performed by: NURSE PRACTITIONER

## 2020-11-19 PROCEDURE — 99214 OFFICE O/P EST MOD 30 MIN: CPT | Performed by: NURSE PRACTITIONER

## 2020-11-19 RX ORDER — LOSARTAN POTASSIUM 50 MG/1
50 TABLET ORAL DAILY
Qty: 90 TABLET | Refills: 3 | Status: SHIPPED | OUTPATIENT
Start: 2020-11-19 | End: 2021-01-28

## 2020-11-19 RX ORDER — POTASSIUM BICARBONATE 25 MEQ/1
50 TABLET, EFFERVESCENT ORAL DAILY
COMMUNITY
End: 2021-02-04 | Stop reason: ALTCHOICE

## 2020-11-19 RX ORDER — LOSARTAN POTASSIUM 25 MG/1
25 TABLET ORAL 2 TIMES DAILY
Qty: 14 TABLET | Refills: 0 | Status: SHIPPED | OUTPATIENT
Start: 2020-11-19 | End: 2020-12-10

## 2020-12-04 ENCOUNTER — TELEPHONE (OUTPATIENT)
Dept: FAMILY MEDICINE CLINIC | Facility: CLINIC | Age: 80
End: 2020-12-04

## 2020-12-10 ENCOUNTER — OFFICE VISIT (OUTPATIENT)
Dept: NEPHROLOGY | Facility: CLINIC | Age: 80
End: 2020-12-10
Payer: COMMERCIAL

## 2020-12-10 VITALS
SYSTOLIC BLOOD PRESSURE: 152 MMHG | WEIGHT: 169.2 LBS | BODY MASS INDEX: 29.98 KG/M2 | HEIGHT: 63 IN | DIASTOLIC BLOOD PRESSURE: 68 MMHG

## 2020-12-10 DIAGNOSIS — E11.41 CONTROLLED TYPE 2 DIABETES MELLITUS WITH DIABETIC MONONEUROPATHY, WITHOUT LONG-TERM CURRENT USE OF INSULIN (HCC): ICD-10-CM

## 2020-12-10 DIAGNOSIS — N18.30 CKD STAGE 3 DUE TO TYPE 2 DIABETES MELLITUS (HCC): Primary | ICD-10-CM

## 2020-12-10 DIAGNOSIS — E11.22 CKD STAGE 3 DUE TO TYPE 2 DIABETES MELLITUS (HCC): Primary | ICD-10-CM

## 2020-12-10 DIAGNOSIS — I12.9 HYPERTENSIVE CHRONIC KIDNEY DISEASE WITH STAGE 1 THROUGH STAGE 4 CHRONIC KIDNEY DISEASE, OR UNSPECIFIED CHRONIC KIDNEY DISEASE: ICD-10-CM

## 2020-12-10 PROCEDURE — 1160F RVW MEDS BY RX/DR IN RCRD: CPT | Performed by: PHYSICIAN ASSISTANT

## 2020-12-10 PROCEDURE — 1036F TOBACCO NON-USER: CPT | Performed by: PHYSICIAN ASSISTANT

## 2020-12-10 PROCEDURE — 3078F DIAST BP <80 MM HG: CPT | Performed by: PHYSICIAN ASSISTANT

## 2020-12-10 PROCEDURE — 99213 OFFICE O/P EST LOW 20 MIN: CPT | Performed by: PHYSICIAN ASSISTANT

## 2020-12-10 PROCEDURE — 3077F SYST BP >= 140 MM HG: CPT | Performed by: PHYSICIAN ASSISTANT

## 2020-12-11 ENCOUNTER — TELEPHONE (OUTPATIENT)
Dept: NEPHROLOGY | Facility: CLINIC | Age: 80
End: 2020-12-11

## 2020-12-12 ENCOUNTER — TELEPHONE (OUTPATIENT)
Dept: OTHER | Facility: OTHER | Age: 80
End: 2020-12-12

## 2020-12-14 DIAGNOSIS — E11.22 CKD STAGE 3 DUE TO TYPE 2 DIABETES MELLITUS (HCC): Primary | ICD-10-CM

## 2020-12-14 DIAGNOSIS — N18.30 CKD STAGE 3 DUE TO TYPE 2 DIABETES MELLITUS (HCC): Primary | ICD-10-CM

## 2020-12-22 ENCOUNTER — LAB (OUTPATIENT)
Dept: LAB | Facility: HOSPITAL | Age: 80
End: 2020-12-22
Attending: INTERNAL MEDICINE
Payer: COMMERCIAL

## 2020-12-22 ENCOUNTER — TELEPHONE (OUTPATIENT)
Dept: INTERNAL MEDICINE CLINIC | Facility: CLINIC | Age: 80
End: 2020-12-22

## 2020-12-22 DIAGNOSIS — E11.22 CKD STAGE 3 DUE TO TYPE 2 DIABETES MELLITUS (HCC): ICD-10-CM

## 2020-12-22 DIAGNOSIS — N18.30 CKD STAGE 3 DUE TO TYPE 2 DIABETES MELLITUS (HCC): ICD-10-CM

## 2020-12-22 LAB
ANION GAP SERPL CALCULATED.3IONS-SCNC: 6 MMOL/L (ref 4–13)
BUN SERPL-MCNC: 23 MG/DL (ref 5–25)
CALCIUM SERPL-MCNC: 9.5 MG/DL (ref 8.3–10.1)
CHLORIDE SERPL-SCNC: 105 MMOL/L (ref 100–108)
CO2 SERPL-SCNC: 29 MMOL/L (ref 21–32)
CREAT SERPL-MCNC: 1.03 MG/DL (ref 0.6–1.3)
GFR SERPL CREATININE-BSD FRML MDRD: 59 ML/MIN/1.73SQ M
GLUCOSE SERPL-MCNC: 193 MG/DL (ref 65–140)
POTASSIUM SERPL-SCNC: 3.8 MMOL/L (ref 3.5–5.3)
SODIUM SERPL-SCNC: 140 MMOL/L (ref 136–145)

## 2020-12-22 PROCEDURE — 80048 BASIC METABOLIC PNL TOTAL CA: CPT

## 2020-12-22 PROCEDURE — 36415 COLL VENOUS BLD VENIPUNCTURE: CPT

## 2020-12-22 NOTE — PROGRESS NOTES
Progress Note    Name:  Fabian Stanford  :  1940  Age:  [de-identified] y o    Date of Evaluation: 20     Hearing aids ordered, confirmation # [id number]      Marta Hussein   Clinical Audiologist

## 2020-12-23 ENCOUNTER — TELEPHONE (OUTPATIENT)
Dept: NEPHROLOGY | Facility: CLINIC | Age: 80
End: 2020-12-23

## 2020-12-23 NOTE — TELEPHONE ENCOUNTER
----- Message from Ml Khanna PA-C sent at 12/10/2020 12:04 PM EST -----  I saw your patient today for hypertension follow up but she was telling me that since starting the [address] her blood sugar is often 50-70s at home and she feels very weak  It does improve with eating but just wanted to make you aware in case she needed any med adjustments  Thanks!

## 2020-12-23 NOTE — TELEPHONE ENCOUNTER
Called and spoke with patient  Made her aware of why I was calling  Patient seem confused and surprised as to why I was calling  She states that she has been having low blood sugars, but she unable to tell me how often or when  She reports it can happen any time of the day, but when she eats something she feels better  Patient feels certain the cause is from the [address]  Patient states she is out of her home, but will call us once she is home with more information on dates and readings

## 2020-12-28 NOTE — PROGRESS NOTES
Progress Note    Name:  Davy Pham  :  1940  Age:  [de-identified] y o    Date of Evaluation: 20     Delmis Bird [id number]    [employer]ann[employer] [address] [employer]    SN: [id number]     Warranty end: 24    Marta Hester   Clinical Audiologist

## 2020-12-31 ENCOUNTER — TELEPHONE (OUTPATIENT)
Dept: INTERNAL MEDICINE CLINIC | Facility: CLINIC | Age: 80
End: 2020-12-31

## 2020-12-31 DIAGNOSIS — Z79.4 CONTROLLED TYPE 2 DIABETES MELLITUS WITH DIABETIC NEUROPATHY, WITH LONG-TERM CURRENT USE OF INSULIN (HCC): Chronic | ICD-10-CM

## 2020-12-31 DIAGNOSIS — E11.40 CONTROLLED TYPE 2 DIABETES MELLITUS WITH DIABETIC NEUROPATHY, WITH LONG-TERM CURRENT USE OF INSULIN (HCC): Chronic | ICD-10-CM

## 2020-12-31 RX ORDER — FLASH GLUCOSE SENSOR
KIT MISCELLANEOUS
Qty: 4 EACH | Refills: 1 | Status: SHIPPED | OUTPATIENT
Start: 2020-12-31 | End: 2021-04-01 | Stop reason: SDUPTHER

## 2021-01-07 NOTE — PROGRESS NOTES
Progress Note    Name:  Pau Hurt  :  1940  Age:  [de-identified] y o  Date of Evaluation: 21     Hearing aids returned for credit, Mimi Hart contacted about insurance coverage changing         Marta Mae   Clinical Audiologist

## 2021-01-13 DIAGNOSIS — E11.65 UNCONTROLLED TYPE 2 DIABETES MELLITUS WITH HYPERGLYCEMIA (HCC): ICD-10-CM

## 2021-01-27 ENCOUNTER — TELEPHONE (OUTPATIENT)
Dept: NEPHROLOGY | Facility: CLINIC | Age: 81
End: 2021-01-27

## 2021-01-27 NOTE — TELEPHONE ENCOUNTER
Patient called and left a message on the voicemail asking to specifically speak to [place]  Please call her at [phone]

## 2021-01-28 DIAGNOSIS — R80.1 PERSISTENT PROTEINURIA: ICD-10-CM

## 2021-01-28 DIAGNOSIS — I10 BENIGN ESSENTIAL HYPERTENSION: ICD-10-CM

## 2021-01-28 RX ORDER — LOSARTAN POTASSIUM 100 MG/1
50 TABLET ORAL DAILY
Qty: 30 TABLET | Refills: 5 | Status: SHIPPED | OUTPATIENT
Start: 2021-01-28 | End: 2021-02-04

## 2021-01-28 NOTE — TELEPHONE ENCOUNTER
I spoke with pt  She was requesting a refill on her losartan 100 mg qd  A refill request was sent to Cathie Mccarthy

## 2021-02-01 NOTE — TELEPHONE ENCOUNTER
This patient follows with you for DM management but this refill request came to me - please assess and refill if appropriate, thanks!

## 2021-02-03 DIAGNOSIS — E11.65 UNCONTROLLED TYPE 2 DIABETES MELLITUS WITH HYPERGLYCEMIA (HCC): ICD-10-CM

## 2021-02-04 ENCOUNTER — TELEPHONE (OUTPATIENT)
Dept: NEPHROLOGY | Facility: CLINIC | Age: 81
End: 2021-02-04

## 2021-02-04 ENCOUNTER — TELEMEDICINE (OUTPATIENT)
Dept: NEPHROLOGY | Facility: CLINIC | Age: 81
End: 2021-02-04
Payer: COMMERCIAL

## 2021-02-04 VITALS
HEIGHT: 63 IN | SYSTOLIC BLOOD PRESSURE: 156 MMHG | DIASTOLIC BLOOD PRESSURE: 95 MMHG | RESPIRATION RATE: 16 BRPM | BODY MASS INDEX: 29.97 KG/M2 | HEART RATE: 72 BPM

## 2021-02-04 DIAGNOSIS — N18.30 BENIGN HYPERTENSION WITH CKD (CHRONIC KIDNEY DISEASE) STAGE III (HCC): Primary | ICD-10-CM

## 2021-02-04 DIAGNOSIS — N18.31 STAGE 3A CHRONIC KIDNEY DISEASE (HCC): ICD-10-CM

## 2021-02-04 DIAGNOSIS — N28.1 RENAL CYST: ICD-10-CM

## 2021-02-04 DIAGNOSIS — I12.9 BENIGN HYPERTENSION WITH CKD (CHRONIC KIDNEY DISEASE) STAGE III (HCC): Primary | ICD-10-CM

## 2021-02-04 DIAGNOSIS — R80.1 PERSISTENT PROTEINURIA: ICD-10-CM

## 2021-02-04 PROBLEM — E11.22 CKD STAGE 3 DUE TO TYPE 2 DIABETES MELLITUS (HCC): Status: RESOLVED | Noted: 2018-05-02 | Resolved: 2021-02-04

## 2021-02-04 PROCEDURE — 1036F TOBACCO NON-USER: CPT | Performed by: INTERNAL MEDICINE

## 2021-02-04 PROCEDURE — 1160F RVW MEDS BY RX/DR IN RCRD: CPT | Performed by: INTERNAL MEDICINE

## 2021-02-04 PROCEDURE — 99214 OFFICE O/P EST MOD 30 MIN: CPT | Performed by: INTERNAL MEDICINE

## 2021-02-04 RX ORDER — LOSARTAN POTASSIUM 100 MG/1
100 TABLET ORAL DAILY
Qty: 30 TABLET | Refills: 5 | Status: SHIPPED | OUTPATIENT
Start: 2021-02-04 | End: 2021-06-04 | Stop reason: SDUPTHER

## 2021-02-04 NOTE — TELEPHONE ENCOUNTER
Spoke to patient and explained that she should continue to monitor blood pressure and call back if remains greater than 140/90 persistently  All her questions were answered

## 2021-02-04 NOTE — TELEPHONE ENCOUNTER
Patient called the office stating that in her appointment today you brady discussed her BP  She was wondering if you could please give her a call when you get a chance  She said that she forget the range that her BP should be in   The best call back number is [phone]

## 2021-02-04 NOTE — PROGRESS NOTES
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, baseline creatinine 0 8 to 1 0  -last creatinine 1 0 in December 2020 stable at baseline  -CKD suspected to be secondary to long-term hypertension, diabetes, age-related nephron loss  -avoid nephrotoxins or NSAIDs  -renal ultrasound in late 2019 shows normal size kidneys, normal echogenicity, no hydronephrosis or stones  -UA in November 2020 shows 2+ proteinuria, no RBCs, innumerable bacteria, 4 to 10 WBCs  She denies any urinary complaint   -will do repeat UA, BMP before next visit     Bilateral renal cyst on ultrasound  -right kidney showed mid to lower pole cyst with thick septation, no definite vascularity noted  -MRI abdomen in February 2020 shows no suspicious renal mass  Again showed 2 4 cm right mid pole cyst with septation, suboptimally evaluated in the absence of IV contrast   -will repeat renal ultrasound before next visit      Proteinuria  -last urine microalbumin/creatinine ratio 1 8 g worsened in November 2020  (?  In the setting of asymptomatic bacteriuria)   -repeat urine microalbumin/creatinine ratio before next visit  -she not of losartan as below  She will be restarting this 100 mg daily  Repeat urine microalbumin/creatinine ratio before next visit      Hypertension  -BP above goal at home   She ran out of losartan about 1 to 2 weeks ago and not taking this currently  She will be restarting losartan 100 mg daily which I have prescribed again to her pharmacy  -strongly recommended to continue to monitor blood pressure and call back if remains greater than 135/85 after restarting losartan    -bring BP machine during next office visit     Trace ankle edema  -this seems to have much improved after stopping amlodipine in the past     -keep legs elevated, compression stockings as tolerated  -prior echo in 2018 showed EF 15%, grade 1 diastolic dysfunction      Problem List Items Addressed This Visit        Cardiovascular and Mediastinum    Benign hypertension with CKD (chronic kidney disease) stage III - Primary    Relevant Medications    losartan (COZAAR) 100 MG tablet       Genitourinary    Renal cyst    Relevant Orders    US retroperitoneal complete    Stage 3a chronic kidney disease    Relevant Orders    UA (URINE) with reflex to Scope    Microalbumin / creatinine urine ratio    Basic metabolic panel    CBC    Phosphorus    Magnesium       Other    Persistent proteinuria    Relevant Medications    losartan (COZAAR) 100 MG tablet    Other Relevant Orders    UA (URINE) with reflex to Scope    Microalbumin / creatinine urine ratio      Other Visit Diagnoses     Benign essential hypertension        Relevant Medications    losartan (COZAAR) 100 MG tablet        Reason for visit is   Chief Complaint   Patient presents with    Virtual Regular Visit    Chronic Kidney Disease        Encounter provider Natanael Ashford MD    Provider located at [address] [place] [id number]      Recent Visits  No visits were found meeting these conditions  Showing recent visits within past 7 days and meeting all other requirements     Today's Visits  Date Type Provider Dept   02/04/21 Telemedicine Natanael Ashford MD [facility] 22 today's visits and meeting all other requirements     Future Appointments  No visits were found meeting these conditions  Showing future appointments within next 150 days and meeting all other requirements        The patient was identified by name and date of birth  Aliyah Silva was informed that this is a telemedicine visit and that the visit is being conducted through [facility] and patient was informed that this is a secure, HIPAA-compliant platform  She agrees to proceed     My office door was closed  No one else was in the room  She acknowledged consent and understanding of privacy and security of the video platform   The patient has agreed to participate and understands they can discontinue the visit at any time  Patient is aware this is a billable service  Subjective  Daya Glover is a [de-identified]y o  year old female with medical issues of hypertension for 10 years, diabetes for 10 years, CVA in 2011, mild diabetic retinopathy in 2019,? Gout, CKD stage 2 to 3A with baseline creatinine 0 8 to 1 0 who presents for regular follow up for CKD, proteinuria, hypertension management  Patient's baseline creatinine 0 8 to 1 0 with most recent creatinine stable at baseline  Patient denies any nausea, vomiting or diarrhea  She ran out of losartan about 1 to 2 weeks ago and has not been taking this lately  She will be restarting this after getting it from the pharmacy today  blood pressure remains above goal at home      Denies any urinary complaint currently  No recent NSAID use  No obvious history of autoimmune conditions  Denies any prior history of kidney stones  Past Medical History:   Diagnosis Date    ACE-inhibitor cough     last assessed - [id number]    Asthma     Bilateral edema of lower extremity     last assessed - [id number]    Cardiac murmur     last assessed - [id number]    CKD (chronic kidney disease)     [facility] )     last assessed - [id number]    Esophageal dysphagia     Fatigue     last assessed - 37Jvz5907    Hyperlipidemia     Hypertension     Patellar bursitis of right knee     last assessed - [id number]    Personal history of other specified conditions     presenting hazards to health    Stroke [facility])     Stroke syndrome     Urinary frequency     UTI (urinary tract infection)        Past Surgical History:   Procedure Laterality Date    [place] ESOPHAGOGASTRODUODENOSCOPY TRANSORAL DIAGNOSTIC N/A 4/5/2017    Procedure: ESOPHAGOGASTRODUODENOSCOPY (EGD); Surgeon: Erin Dennis MD;  [facility];   Service: Gastroenterology       Current Outpatient Medications   Medication Sig Dispense Refill    acetaminophen (TYLENOL) 650 mg CR tablet Take 650 mg by mouth every 6 (six) hours as needed for mild pain      clopidogrel (PLAVIX) 75 mg tablet TAKE 2 TABS DAILY 180 tablet 1    Continuous Blood Gluc  (FREESTYLE ADAM READER) JAQUELIN 1 Device by Does not apply route 4 (four) times a day 1 Device 0    Continuous Blood Gluc Sensor (FreeStyle Adam 14 Day Sensor) MISC Use one sensor every 14 days 4 each 1    CVS D3 50 MCG (2000 UT) CAPS TAKE 1 CAPSULE BY MOUTH EVERY OTHER DAY      Glucose-Vitamin C-Vitamin D (TRUEPLUS GLUCOSE ON THE GO) CHEW CHEW 2 TABS AS NEEDED FOR BLOOD SUGAR LESS THAN 80      Lancets (FREESTYLE) lancets Use as instructed 100 each 0    metFORMIN (GLUCOPHAGE) 500 mg tablet TAKE 1 TABLET BY MOUTH TWICE A DAY WITH MEALS 180 tablet 1    Misc  Devices (QUAD CANE) MISC by Does not apply route daily 1 each 0    montelukast (SINGULAIR) 10 mg tablet Take 1 tablet (10 mg total) by mouth daily at bedtime for 90 days 90 tablet 0    sitaGLIPtin (JANUVIA) 100 mg tablet Take 1 tablet (100 mg total) by mouth daily 90 tablet 1    atorvastatin (LIPITOR) 40 mg tablet TAKE 1 TABLET (40 MG TOTAL) BY MOUTH DAILY 90 tablet 1    Blood Glucose Monitoring Suppl (FREESTYLE LITE) JAQUELIN by Does not apply route daily 1 each 0    FREESTYLE LITE test strip May check blood sugar twice daily 100 each 3    GLOBAL EASE INJECT PEN NEEDLES 31G X 5 MM MISC       losartan (COZAAR) 100 MG tablet Take 1 tablet (100 mg total) by mouth daily 30 tablet 5     No current facility-administered medications for this visit  No Known Allergies    Review of system:  More than 10 review of system were obtained and no other pertinent positive findings other than mentioned in the note      Video Exam    Vitals:    02/04/21 1046   BP: 156/95   BP Location: Right arm   Patient Position: Sitting   Cuff Size: Standard   Pulse: 72   Resp: 16   Height: 5' 3" (1 6 m)       Physical Exam  Constitutional:       Appearance: She is well-developed  HENT:      Head: Normocephalic and atraumatic  Right Ear: External ear normal       Left Ear: External ear normal    Eyes:      Extraocular Movements: Extraocular movements intact  Cardiovascular:      Comments: No significant edema in legs  Pulmonary:      Effort: No respiratory distress  Comments: No conversational dyspnea noted, breathing pattern seems to be normal  Abdominal:      General: There is no distension  Musculoskeletal:      Right lower leg: No edema  Left lower leg: No edema  Skin:     Findings: No rash  Neurological:      Mental Status: She is alert and oriented to person, place, and time  Psychiatric:         Behavior: Behavior normal           I spent 25 minutes directly with the patient during this visit      [facility] acknowledges that she has consented to an online visit or consultation  She understands that the online visit is based solely on information provided by her, and that, in the absence of a face-to-face physical evaluation by the physician, the diagnosis she receives is both limited and provisional in terms of accuracy and completeness  This is not intended to replace a full medical face-to-face evaluation by the physician  Frantz Momin understands and accepts these terms

## 2021-02-11 ENCOUNTER — TELEMEDICINE (OUTPATIENT)
Dept: INTERNAL MEDICINE CLINIC | Facility: CLINIC | Age: 81
End: 2021-02-11

## 2021-02-11 DIAGNOSIS — I63.50 CEREBROVASCULAR ACCIDENT (CVA) OF PONTINE STRUCTURE (HCC): ICD-10-CM

## 2021-02-11 DIAGNOSIS — N18.30 BENIGN HYPERTENSION WITH CKD (CHRONIC KIDNEY DISEASE) STAGE III (HCC): Primary | ICD-10-CM

## 2021-02-11 DIAGNOSIS — I12.9 BENIGN HYPERTENSION WITH CKD (CHRONIC KIDNEY DISEASE) STAGE III (HCC): Primary | ICD-10-CM

## 2021-02-11 DIAGNOSIS — E78.2 MIXED HYPERLIPIDEMIA: ICD-10-CM

## 2021-02-11 DIAGNOSIS — E11.40 CONTROLLED TYPE 2 DIABETES MELLITUS WITH DIABETIC NEUROPATHY, WITHOUT LONG-TERM CURRENT USE OF INSULIN (HCC): ICD-10-CM

## 2021-02-11 DIAGNOSIS — Z12.31 VISIT FOR SCREENING MAMMOGRAM: ICD-10-CM

## 2021-02-11 PROCEDURE — 99213 OFFICE O/P EST LOW 20 MIN: CPT | Performed by: PHYSICIAN ASSISTANT

## 2021-02-11 RX ORDER — CLOPIDOGREL BISULFATE 75 MG/1
150 TABLET ORAL DAILY
Qty: 180 TABLET | Refills: 1 | Status: SHIPPED | OUTPATIENT
Start: 2021-02-11 | End: 2021-05-14

## 2021-02-11 RX ORDER — ATORVASTATIN CALCIUM 40 MG/1
40 TABLET, FILM COATED ORAL DAILY
Qty: 90 TABLET | Refills: 1 | Status: SHIPPED | OUTPATIENT
Start: 2021-02-11 | End: 2021-06-16 | Stop reason: SDUPTHER

## 2021-02-11 NOTE — PROGRESS NOTES
Virtual Regular Visit      Assessment/Plan: On your virtual visit today we discussed your medications  We reviewed that your nephrologist increased your losartan to take 100 mg tablet once daily as your blood pressure readings at home remained elevated  We also confirm that the most recent refill prescription you picked up was for 100 mg but instructions stated to take 1/2 tab daily  You are going to clarify this with her nephrologist as I reviewed in your electronic medical record that your prescription was sent to take 1 full tablet daily  We also discussed that you continue to follow-up with the endocrinologist for your diabetes  You report you have been taking metformin 500 mg 1 tablet twice daily however we did review based on your lab review in November of 2020 the  Endocrinologist asked you to increase your metformin for 500 mg in the morning and 1000 mg in the evening  You also confirm that your Januvia 100 mg daily was also restarted  There is an order for your follow-up A1c in March and have an appointment scheduled in April  Refills for your  Atorvastatin and Plavix were sent to your pharmacy today  As per our discussion will have staff call you to discuss how to get set up for your COVID-19 vaccine through your my chart account  Will also get you scheduled for an office visit with me to review your medications in the office    Problem List Items Addressed This Visit        Endocrine    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, without long-term current use of insulin (HCC)       Cardiovascular and Mediastinum    Benign hypertension with CKD (chronic kidney disease) stage III - Primary       Other    Mixed hyperlipidemia    Relevant Medications    atorvastatin (LIPITOR) 40 mg tablet      Other Visit Diagnoses     Cerebrovascular accident (CVA) of pontine structure ([address] )        Relevant Medications    clopidogrel (PLAVIX) 75 mg tablet    Visit for screening mammogram        Relevant Orders    Mammo screening bilateral w cad               Reason for visit is   Chief Complaint   Patient presents with    Virtual Regular Visit        Encounter provider Royce Méndez PA-C    Provider located at [facility]  [address]    [address] [address] [phone]      Recent Visits  No visits were found meeting these conditions  Showing recent visits within past 7 days and meeting all other requirements     Today's Visits  Date Type Provider Dept   02/11/21 Telemedicine Royce Méndez PA-C [address] today's visits and meeting all other requirements     Future Appointments  No visits were found meeting these conditions  Showing future appointments within next 150 days and meeting all other requirements        The patient was identified by name and date of birth  Aliyah Silva was informed that this is a telemedicine visit and that the visit is being conducted through [facility] and patient was informed that this is a secure, HIPAA-compliant platform  She agrees to proceed     My office door was closed  No one else was in the room  She acknowledged consent and understanding of privacy and security of the video platform  The patient has agreed to participate and understands they can discontinue the visit at any time  Patient is aware this is a billable service  Subjective  Aliyah Silva is a [de-identified] y o  female   Patient is scheduled for virtual visit per her request     Patient has past medical history of type 2 diabetes currently managed by endocrinology  She had been on multiple medications and after significant hypoglycemic events all of her previous medications were discontinued except for her metformin    At her last visit with endocrinology in November however she did not bring her meter with her but was asked to provided to the office to be able to download her numbers and was given script for follow-up labs prior to next visit  Patient was also contacted regarding her A1c in November from endocrinology and to increase her metformin to take 500 mg in the morning and 1000 mg in the evening  Follow-up labs are ordered for March 3rd and she has a follow-up appointment scheduled April 7th  Pt states soes not recall that conversation but will now increase dose  Patient also continues to follow with nephrology for CKD  Patient's blood pressure was noted to be above goal both during her endocrinology appointment in November and her Nephrology appointment in December  Nephrology advised patient to monitor her blood pressure at home and provide a blood pressure log  Based on these readings patient's losartan was increased from 50 to 100mg  On her most recent virtual visit with Nephrology she was reporting home blood pressure readings were elevated but had stated she ran out of the losartan 100 mg dose 2 weeks prior  Losartan was refilled by the nephrologist and patient was advised to continue to monitor her blood pressure readings at home  Patient states when picked up her med is states take 1/2 tablet  Script states take 100 daily and that is how was sent to the pharmacy  Patient maintained on simvastatin and will need a refill currently  Lipid profile November 2020 stable                       Past Medical History:   Diagnosis Date    ACE-inhibitor cough     last assessed - [id number]    Asthma     Bilateral edema of lower extremity     last assessed - [id number]    Cardiac murmur     last assessed - 68Mcm2710    CKD (chronic kidney disease)     Diabetes mellitus ([facility] )     last assessed - [id number]    Esophageal dysphagia     Fatigue     last assessed - 56Goz1490    Hyperlipidemia     Hypertension     Patellar bursitis of right knee     last assessed - [id number]    Personal history of other specified conditions     presenting hazards to health    Stroke [facility])  Stroke syndrome     Urinary frequency     UTI (urinary tract infection)        Past Surgical History:   Procedure Laterality Date    [place] ESOPHAGOGASTRODUODENOSCOPY TRANSORAL DIAGNOSTIC N/A 4/5/2017    Procedure: ESOPHAGOGASTRODUODENOSCOPY (EGD); Surgeon: Yoli Cramer MD;  [facility]; Service: Gastroenterology       Current Outpatient Medications   Medication Sig Dispense Refill    acetaminophen (TYLENOL) 650 mg CR tablet Take 650 mg by mouth every 6 (six) hours as needed for mild pain      atorvastatin (LIPITOR) 40 mg tablet Take 1 tablet (40 mg total) by mouth daily 90 tablet 1    Blood Glucose Monitoring Suppl (FREESTYLE LITE) JAQUELIN by Does not apply route daily 1 each 0    clopidogrel (PLAVIX) 75 mg tablet Take 2 tablets (150 mg total) by mouth daily 180 tablet 1    Continuous Blood Gluc  (FREESTYLE ADAM READER) JAQUELIN 1 Device by Does not apply route 4 (four) times a day 1 Device 0    Continuous Blood Gluc Sensor (FreeStyle Adam 14 Day Sensor) MISC Use one sensor every 14 days 4 each 1    CVS D3 50 MCG (2000 UT) CAPS TAKE 1 CAPSULE BY MOUTH EVERY OTHER DAY      FREESTYLE LITE test strip May check blood sugar twice daily 100 each 3    GLOBAL EASE INJECT PEN NEEDLES 31G X 5 MM MISC       Glucose-Vitamin C-Vitamin D (TRUEPLUS GLUCOSE ON THE GO) CHEW CHEW 2 TABS AS NEEDED FOR BLOOD SUGAR LESS THAN 80      Lancets (FREESTYLE) lancets Use as instructed 100 each 0    losartan (COZAAR) 100 MG tablet Take 1 tablet (100 mg total) by mouth daily 30 tablet 5    metFORMIN (GLUCOPHAGE) 500 mg tablet TAKE 1 TABLET BY MOUTH TWICE A DAY WITH MEALS 180 tablet 1    Misc   Devices (QUAD CANE) MISC by Does not apply route daily 1 each 0    montelukast (SINGULAIR) 10 mg tablet Take 1 tablet (10 mg total) by mouth daily at bedtime for 90 days 90 tablet 0    sitaGLIPtin (JANUVIA) 100 mg tablet Take 1 tablet (100 mg total) by mouth daily 90 tablet 1     No current facility-administered medications for this visit  No Known Allergies    Review of Systems   Constitutional: Negative for chills and fever  Respiratory: Negative  Negative for cough and shortness of breath  Cardiovascular: Negative  Negative for chest pain and palpitations  Gastrointestinal: Negative for abdominal pain  Endocrine: Negative for cold intolerance, polydipsia, polyphagia and polyuria  Genitourinary: Negative for dysuria  Neurological: Negative for dizziness, light-headedness and headaches  Psychiatric/Behavioral: Negative  Video Exam    There were no vitals filed for this visit  Physical Exam  Constitutional:       General: She is not in acute distress  Appearance: She is not ill-appearing  HENT:      Head: Normocephalic  Eyes:      Conjunctiva/sclera: Conjunctivae normal    Neck:      Musculoskeletal: Neck supple  Pulmonary:      Effort: Pulmonary effort is normal  No respiratory distress  Neurological:      Mental Status: She is alert  Mental status is at baseline  Psychiatric:         Mood and Affect: Mood normal          Thought Content: Thought content normal           I spent 20 minutes with patient today in which greater than 50% of the time was spent in counseling/coordination of care regarding Medications dosage, appointments with specialists      VIRTUAL VISIT [facility] acknowledges that she has consented to an online visit or consultation  She understands that the online visit is based solely on information provided by her, and that, in the absence of a face-to-face physical evaluation by the physician, the diagnosis she receives is both limited and provisional in terms of accuracy and completeness  This is not intended to replace a full medical face-to-face evaluation by the physician  Malik Leonard understands and accepts these terms

## 2021-02-12 ENCOUNTER — TELEPHONE (OUTPATIENT)
Dept: NEPHROLOGY | Facility: CLINIC | Age: 81
End: 2021-02-12

## 2021-02-12 DIAGNOSIS — Z23 ENCOUNTER FOR IMMUNIZATION: ICD-10-CM

## 2021-02-12 NOTE — PATIENT INSTRUCTIONS
On your virtual visit today we discussed your medications  We reviewed that your nephrologist increased your losartan to take 100 mg tablet once daily as your blood pressure readings at home remained elevated  We also confirm that the most recent refill prescription you picked up was for 100 mg but instructions stated to take 1/2 tab daily  You are going to clarify this with your nephrologist as I reviewed in your electronic medical record that your prescription was sent to take 1 full tablet daily  We also discussed that you continue to follow-up with the endocrinologist for your diabetes  You report you have been taking metformin 500 mg 1 tablet twice daily however we did review based on your lab review in November of 2020 the  Endocrinologist asked you to increase your metformin for 500 mg in the morning and 1000 mg in the evening  You also confirm that your Januvia 100 mg daily was also restarted  There is an order for your follow-up A1c in March and have an appointment scheduled in April  Refills for your  Atorvastatin and Plavix were sent to your pharmacy today  As per our discussion will have staff call you to discuss how to get set up for your COVID-19 vaccine through your my chart account  Will also get you scheduled for an office visit with me to review your medications in the office

## 2021-02-12 NOTE — TELEPHONE ENCOUNTER
Patient sees Lashay Ortega  Patient is calling because her PCP increased some of her medications and she would like to discuss it with Lashay Ortega himself  Please contact patient on 02/15/21 at [phone]

## 2021-02-13 NOTE — TELEPHONE ENCOUNTER
Patient restarted taking losartan but has been taking only 1/2 tablet (50 mg) daily currently  Her BP mostly in 140s/70s with occasional SBP in 160s  Recommended her to increase this to full tablet 100 mg daily  Also this was prescribed to her pharmacy on 2/4/21       She will continue to monitor BP with higher dose of losartan and will call us back if BP still remains above goal

## 2021-02-16 RX ORDER — CLOPIDOGREL BISULFATE 75 MG/1
150 TABLET ORAL DAILY
Qty: 180 TABLET | Refills: 1 | OUTPATIENT
Start: 2021-02-16 | End: 2021-08-15

## 2021-02-16 NOTE — TELEPHONE ENCOUNTER
Called Pharmacy Services through [facility] and a recording states they are having system problems and are unable to process call  Will try again later

## 2021-02-16 NOTE — TELEPHONE ENCOUNTER
Called [place] to follow up on prior auth  They said it was dismissed because the medication is formulary and covered  I called [facility] and asked them to run it through, but they stated they are still getting it kicked back to exceeding dosage amount  I called [place] back, and the representative confirmed again that 2 pills per day is covered  She [facility]  [facility] and informed [facility]  She will call [place] herself and fill for patient, but will call us if she runs into difficulty

## 2021-02-17 ENCOUNTER — TELEPHONE (OUTPATIENT)
Dept: INTERNAL MEDICINE CLINIC | Facility: CLINIC | Age: 81
End: 2021-02-17

## 2021-02-17 NOTE — TELEPHONE ENCOUNTER
Patient is calling regarding the Medication for Plavix  It's not covered by her insurance and it needs a prior authorization  [phone]

## 2021-02-17 NOTE — TELEPHONE ENCOUNTER
Called and spoke to pharmacist, as per pharmacist this medication is already filled and went thru and ready for patient to    I informed patient

## 2021-02-24 ENCOUNTER — TELEPHONE (OUTPATIENT)
Dept: INTERNAL MEDICINE CLINIC | Facility: CLINIC | Age: 81
End: 2021-02-24

## 2021-02-24 NOTE — TELEPHONE ENCOUNTER
Patient is calling to ask to speak to Luis Fernando Herman  She says since Gordy Reyes increased her metformin her blood sugar has been running low       Today   4AM   Censor-46  5AM   Censor-48  7AM   Censor-54  Pricking finger was-98  9:18AM Censor-111  11:00AM Censor-49 pricking finger was 89    Please advise

## 2021-02-25 NOTE — TELEPHONE ENCOUNTER
Called patient and made her aware as per note, patient understood  Patient would like last AVS and lab work papers mailed to her  I mailed all paperwork today  Jennifer GOMES of patients sugars and Debs recommendation

## 2021-03-01 DIAGNOSIS — E11.8 TYPE 2 DIABETES MELLITUS WITH COMPLICATION, WITHOUT LONG-TERM CURRENT USE OF INSULIN (HCC): ICD-10-CM

## 2021-03-01 RX ORDER — BLOOD-GLUCOSE METER
KIT MISCELLANEOUS
Qty: 100 EACH | Refills: 3 | Status: SHIPPED | OUTPATIENT
Start: 2021-03-01

## 2021-03-04 ENCOUNTER — OFFICE VISIT (OUTPATIENT)
Dept: INTERNAL MEDICINE CLINIC | Facility: CLINIC | Age: 81
End: 2021-03-04

## 2021-03-04 VITALS
SYSTOLIC BLOOD PRESSURE: 134 MMHG | BODY MASS INDEX: 29.59 KG/M2 | WEIGHT: 167 LBS | DIASTOLIC BLOOD PRESSURE: 81 MMHG | OXYGEN SATURATION: 98 % | HEART RATE: 56 BPM | TEMPERATURE: 98.1 F | HEIGHT: 63 IN

## 2021-03-04 DIAGNOSIS — E55.9 VITAMIN D DEFICIENCY: ICD-10-CM

## 2021-03-04 DIAGNOSIS — J30.1 NON-SEASONAL ALLERGIC RHINITIS DUE TO POLLEN: ICD-10-CM

## 2021-03-04 DIAGNOSIS — E78.2 MIXED HYPERLIPIDEMIA: Primary | ICD-10-CM

## 2021-03-04 DIAGNOSIS — I35.1 AORTIC VALVE REGURGITATION, NONRHEUMATIC: ICD-10-CM

## 2021-03-04 DIAGNOSIS — I69.30 HISTORY OF CVA WITH RESIDUAL DEFICIT: Chronic | ICD-10-CM

## 2021-03-04 DIAGNOSIS — N18.30 BENIGN HYPERTENSION WITH CKD (CHRONIC KIDNEY DISEASE) STAGE III (HCC): ICD-10-CM

## 2021-03-04 DIAGNOSIS — I12.9 BENIGN HYPERTENSION WITH CKD (CHRONIC KIDNEY DISEASE) STAGE III (HCC): ICD-10-CM

## 2021-03-04 DIAGNOSIS — H91.93 DECREASED HEARING, BILATERAL: ICD-10-CM

## 2021-03-04 DIAGNOSIS — M25.473 ANKLE EDEMA: ICD-10-CM

## 2021-03-04 DIAGNOSIS — E11.40 CONTROLLED TYPE 2 DIABETES MELLITUS WITH DIABETIC NEUROPATHY, WITHOUT LONG-TERM CURRENT USE OF INSULIN (HCC): ICD-10-CM

## 2021-03-04 PROCEDURE — 99214 OFFICE O/P EST MOD 30 MIN: CPT | Performed by: PHYSICIAN ASSISTANT

## 2021-03-04 PROCEDURE — 1160F RVW MEDS BY RX/DR IN RCRD: CPT | Performed by: PHYSICIAN ASSISTANT

## 2021-03-04 PROCEDURE — 1036F TOBACCO NON-USER: CPT | Performed by: PHYSICIAN ASSISTANT

## 2021-03-04 RX ORDER — FLUTICASONE FUROATE AND VILANTEROL TRIFENATATE 100; 25 UG/1; UG/1
POWDER RESPIRATORY (INHALATION)
COMMUNITY
Start: 2021-01-18 | End: 2021-06-16 | Stop reason: SDUPTHER

## 2021-03-04 RX ORDER — MONTELUKAST SODIUM 10 MG/1
10 TABLET ORAL
Qty: 90 TABLET | Refills: 0 | Status: SHIPPED | OUTPATIENT
Start: 2021-03-04 | End: 2021-06-25 | Stop reason: SDUPTHER

## 2021-03-04 RX ORDER — CALCIUM CARBONATE/VITAMIN D3 600 MG-20
2000 TABLET ORAL DAILY
Qty: 90 CAPSULE | Refills: 1 | Status: SHIPPED | OUTPATIENT
Start: 2021-03-04 | End: 2022-01-12 | Stop reason: SDUPTHER

## 2021-03-04 RX ORDER — ALBUTEROL SULFATE 90 UG/1
2 AEROSOL, METERED RESPIRATORY (INHALATION) EVERY 4 HOURS PRN
COMMUNITY
Start: 2021-02-15 | End: 2021-06-01 | Stop reason: SDUPTHER

## 2021-03-04 NOTE — PROGRESS NOTES
Assessment/Plan: On your visit today we reviewed and updated your medication list   Confirmed that you are taking metformin 500 mg twice a day and her Januvia once daily  You are scheduled for follow-up with the endocrinologist in April  You do report may have difficulty attending appointments in the clinic as your ride is not available on Wednesdays  We will contact you with the phone numbers to the endocrinologist office and you may be able to schedule directly for more convenient appointments  Please remember to get your blood test completed prior to appointment with the endocrinologist     Murali Hint continue to follow with the nephrologist for your CKD  You have script for labs that are due in June and will be contacted for results and follow-up appointment  Continue to avoid all NSAIDs  We also confirm that you are taking losartan 100 mg daily as your dose was previously increased due to elevated blood pressure readings and shows improvement on your visit today  Also confirmed your scheduled to follow-up with the cardiologist in April  Script provided today for labs that will be due for me in October than appointment here for review  Vitamin-D refill sent to her pharmacy  Singulair for your allergy and 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 sent to her pharmacy  Confirm to make sure you take this medication at bedtime only as it can cause dizziness and sedation  Also discussed your planning to receive your COVID-19 vaccine through your [employer] but also provided information on registering for [facility]  No problem-specific Assessment & Plan notes found for this encounter  Diagnoses and all orders for this visit:    Mixed hyperlipidemia  -     Comprehensive metabolic panel; Future  -     Lipid Panel with Direct LDL reflex;  Future    Benign hypertension with CKD (chronic kidney disease) stage III    Controlled type 2 diabetes mellitus with diabetic neuropathy, without long-term current use of insulin [facility])  -     Ambulatory referral to Endocrinology; Future    Non-seasonal allergic rhinitis due to pollen  -     montelukast (SINGULAIR) 10 mg tablet; Take 1 tablet (10 mg total) by mouth daily at bedtime    Vitamin D deficiency  -     [facility] D3 50 MCG (2000 UT) CAPS; Take 1 capsule (2,000 Units total) by mouth daily    History of CVA with residual deficit    Aortic valve regurgitation, nonrheumatic    Decreased hearing, bilateral    Ankle edema    Other orders  -     Breo Ellipta 100-25 MCG/INH inhaler  -     albuterol (PROVENTIL HFA,VENTOLIN HFA) 90 mcg/act inhaler; Inhale 2 puffs every 4 (four) hours as needed          Subjective:      Patient ID: Misa Biswas is a [de-identified] y o  female  Patient presents to office today for follow-up chronic medical conditions accompanied by her son  On previous virtual visit last month patient had questions regarding medications  Patient follows with endocrinology for her diabetes as well as nephrology for CKD stage 3  On previous evaluation patient's A1c had increased and therefore her metformin had been increased to 500 in the morning and a 1000 in the evening, patient was still on Januvia  Patient was reporting concerns about lower sugar numbers and was only taking metformin 500 mg twice a day  Patient was advised during that phone call to continue the 500 twice daily but to make sure she follows up with endocrinology  Patient did reach out to the endocrinologist and confirmed continue metformin 500 mg twice daily  For her blood pressure and CKD patient had been reported having elevated blood pressure readings  Was elevated on previous visit and therefore nephrologist had increased her losartan to 100 mg daily from 50 mg  When I spoke with patient however she was only taking 50 mg dose once daily patient was advised to take the full dose  Patient also reached out to the nephrologist who confirmed her losartan 100 mg daily    Initial blood pressure reading elevated in the office today with improvement after resting  Patient also follows with cardiology for non rheumatic aortic and mitral valve regurgitation  Last visit April 2020 and is scheduled for follow-up in April  Patient's cholesterol level remains well controlled on atorvastatin no change to medication dose  Patient also has past medical history of CVA 2011 without focal deficit  Per neurologist she is currently on Plavix 150 mg once daily and confirms taking this  Ambulates with cane for balance, could benefit from balance PT will consider once pandemic levels decrease  Patient requesting refill of her vitamin-D as well as her Singulair  Patient does have past medical history of allergy induced asthma  Patient otherwise reports feeling well offers no medical questions today  Did discuss getting her COVID vaccine  She does report her [employer] is offering this and she is more than welcome to receive it through them but also advised on registering for [facility]  The following portions of the patient's history were reviewed and updated as appropriate: allergies, current medications, past family history, past medical history, past social history, past surgical history and problem list     Review of Systems   Constitutional: Negative  Negative for chills and fever  HENT: Positive for hearing loss (follow with audiology)  Respiratory: Negative  Negative for cough and shortness of breath  Cardiovascular: Positive for leg swelling (sometimes)  Negative for chest pain  Gastrointestinal: Negative  Negative for abdominal pain, constipation and nausea  Endocrine: Negative for polydipsia, polyphagia and polyuria  Genitourinary: Negative for frequency and urgency  Musculoskeletal: Positive for arthralgias (mild stable OA)  Negative for joint swelling  Skin: Negative  Negative for rash  Allergic/Immunologic: Positive for environmental allergies     Neurological: Negative for dizziness, tremors, syncope, light-headedness and headaches  Psychiatric/Behavioral: Negative  Objective:      /81 (BP Location: Right arm, Patient Position: Sitting, Cuff Size: Standard)   Pulse 56   Temp 98 1 °F (36 7 °C) (Temporal)   Ht 5' 3" (1 6 m)   Wt 75 8 kg (167 lb)   SpO2 98%   BMI 29 58 kg/m²          Physical Exam  Vitals signs and nursing note reviewed  Constitutional:       General: She is not in acute distress  Appearance: She is not ill-appearing  HENT:      Head: Normocephalic  Eyes:      Extraocular Movements: Extraocular movements intact  Conjunctiva/sclera: Conjunctivae normal    Cardiovascular:      Rate and Rhythm: Normal rate and regular rhythm  Heart sounds: Murmur present  Systolic murmur present with a grade of 2/6  Comments: Patient has very minimal bilateral nonpitting pedal edema  Patient however was seated for extended period of time prior to visit  Pulmonary:      Effort: Pulmonary effort is normal       Breath sounds: Normal breath sounds  No wheezing  Abdominal:      General: Bowel sounds are normal       Tenderness: There is no abdominal tenderness  Musculoskeletal:      Right lower le+ Edema present  Left lower le+ Edema present  Neurological:      General: No focal deficit present  Mental Status: She is alert  Psychiatric:         Mood and Affect: Mood normal          Thought Content:  Thought content normal

## 2021-03-05 NOTE — PATIENT INSTRUCTIONS
On your visit today we reviewed and updated your medication list   Confirmed that you are taking metformin 500 mg twice a day and her Januvia once daily  You are scheduled for follow-up with the endocrinologist in April  You do report may have difficulty attending appointments in the clinic as your ride is not available on Wednesdays  We will contact you with the phone numbers to the endocrinologist office and you may be able to schedule directly for more convenient appointments  Please remember to get your blood test completed prior to appointment with the endocrinologist     Bertin 23 continue to follow with the nephrologist for your CKD  You have script for labs that are due in June and will be contacted for results and follow-up appointment  Continue to avoid all NSAIDs  We also confirm that you are taking losartan 100 mg daily as your dose was previously increased due to elevated blood pressure readings and shows improvement on your visit today  Also confirmed your scheduled to follow-up with the cardiologist in April  Script provided today for labs that will be due for me in October than appointment here for review  Vitamin-D refill sent to her pharmacy  Singulair for your allergy and 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 sent to her pharmacy  Confirm to make sure you take this medication at bedtime only as it can cause dizziness and sedation  Also discussed your planning to receive your COVID-19 vaccine through your [employer] but also provided information on registering for [facility]

## 2021-03-18 DIAGNOSIS — J30.1 NON-SEASONAL ALLERGIC RHINITIS DUE TO POLLEN: ICD-10-CM

## 2021-03-18 RX ORDER — MONTELUKAST SODIUM 10 MG/1
TABLET ORAL
Qty: 90 TABLET | Refills: 0 | OUTPATIENT
Start: 2021-03-18

## 2021-03-23 ENCOUNTER — TELEPHONE (OUTPATIENT)
Dept: INTERNAL MEDICINE CLINIC | Facility: CLINIC | Age: 81
End: 2021-03-23

## 2021-03-23 ENCOUNTER — HOSPITAL ENCOUNTER (INPATIENT)
Facility: HOSPITAL | Age: 81
LOS: 4 days | Discharge: HOME | DRG: 246 | End: 2021-03-27
Attending: EMERGENCY MEDICINE | Admitting: INTERNAL MEDICINE
Payer: COMMERCIAL

## 2021-03-23 ENCOUNTER — APPOINTMENT (EMERGENCY)
Dept: RADIOLOGY | Facility: HOSPITAL | Age: 81
DRG: 246 | End: 2021-03-23
Payer: COMMERCIAL

## 2021-03-23 DIAGNOSIS — I69.30 HISTORY OF CVA WITH RESIDUAL DEFICIT: Chronic | ICD-10-CM

## 2021-03-23 DIAGNOSIS — I50.9 CHF (CONGESTIVE HEART FAILURE) (HCC): Primary | ICD-10-CM

## 2021-03-23 DIAGNOSIS — R77.8 ELEVATED TROPONIN: ICD-10-CM

## 2021-03-23 DIAGNOSIS — E11.69 TYPE 2 DIABETES MELLITUS WITH OTHER SPECIFIED COMPLICATION, WITHOUT LONG-TERM CURRENT USE OF INSULIN (HCC): ICD-10-CM

## 2021-03-23 DIAGNOSIS — I21.4 NSTEMI (NON-ST ELEVATED MYOCARDIAL INFARCTION) (HCC): ICD-10-CM

## 2021-03-23 LAB
ALBUMIN SERPL BCP-MCNC: 3.6 G/DL (ref 3.5–5)
ALP SERPL-CCNC: 89 U/L (ref 46–116)
ALT SERPL W P-5'-P-CCNC: 28 U/L (ref 12–78)
ANION GAP SERPL CALCULATED.3IONS-SCNC: 7 MMOL/L (ref 4–13)
AST SERPL W P-5'-P-CCNC: 11 U/L (ref 5–45)
BASE EX.OXY STD BLDV CALC-SCNC: 78.4 % (ref 60–80)
BASE EXCESS BLDV CALC-SCNC: -0.3 MMOL/L
BASOPHILS # BLD AUTO: 0.04 THOUSANDS/ΜL (ref 0–0.1)
BASOPHILS NFR BLD AUTO: 1 % (ref 0–1)
BILIRUB SERPL-MCNC: 0.67 MG/DL (ref 0.2–1)
BUN SERPL-MCNC: 25 MG/DL (ref 5–25)
CALCIUM SERPL-MCNC: 9.1 MG/DL (ref 8.3–10.1)
CHLORIDE SERPL-SCNC: 108 MMOL/L (ref 100–108)
CO2 SERPL-SCNC: 26 MMOL/L (ref 21–32)
CREAT SERPL-MCNC: 1.27 MG/DL (ref 0.6–1.3)
EOSINOPHIL # BLD AUTO: 0.04 THOUSAND/ΜL (ref 0–0.61)
EOSINOPHIL NFR BLD AUTO: 1 % (ref 0–6)
ERYTHROCYTE [DISTWIDTH] IN BLOOD BY AUTOMATED COUNT: 14.5 % (ref 11.6–15.1)
FLUAV RNA RESP QL NAA+PROBE: NEGATIVE
FLUBV RNA RESP QL NAA+PROBE: NEGATIVE
GFR SERPL CREATININE-BSD FRML MDRD: 46 ML/MIN/1.73SQ M
GLUCOSE SERPL-MCNC: 122 MG/DL (ref 65–140)
GLUCOSE SERPL-MCNC: 213 MG/DL (ref 65–140)
HCO3 BLDV-SCNC: 25 MMOL/L (ref 24–30)
HCT VFR BLD AUTO: 34.3 % (ref 34.8–46.1)
HGB BLD-MCNC: 10.9 G/DL (ref 11.5–15.4)
IMM GRANULOCYTES # BLD AUTO: 0.01 THOUSAND/UL (ref 0–0.2)
IMM GRANULOCYTES NFR BLD AUTO: 0 % (ref 0–2)
LYMPHOCYTES # BLD AUTO: 0.88 THOUSANDS/ΜL (ref 0.6–4.47)
LYMPHOCYTES NFR BLD AUTO: 15 % (ref 14–44)
MCH RBC QN AUTO: 26.1 PG (ref 26.8–34.3)
MCHC RBC AUTO-ENTMCNC: 31.8 G/DL (ref 31.4–37.4)
MCV RBC AUTO: 82 FL (ref 82–98)
MONOCYTES # BLD AUTO: 0.41 THOUSAND/ΜL (ref 0.17–1.22)
MONOCYTES NFR BLD AUTO: 7 % (ref 4–12)
NEUTROPHILS # BLD AUTO: 4.63 THOUSANDS/ΜL (ref 1.85–7.62)
NEUTS SEG NFR BLD AUTO: 76 % (ref 43–75)
NRBC BLD AUTO-RTO: 0 /100 WBCS
NT-PROBNP SERPL-MCNC: 4131 PG/ML
O2 CT BLDV-SCNC: 12.5 ML/DL
PCO2 BLDV: 43.6 MM HG (ref 42–50)
PH BLDV: 7.38 [PH] (ref 7.3–7.4)
PLATELET # BLD AUTO: 209 THOUSANDS/UL (ref 149–390)
PMV BLD AUTO: 12.1 FL (ref 8.9–12.7)
PO2 BLDV: 46.3 MM HG (ref 35–45)
POTASSIUM SERPL-SCNC: 3.7 MMOL/L (ref 3.5–5.3)
PROT SERPL-MCNC: 7.6 G/DL (ref 6.4–8.2)
RBC # BLD AUTO: 4.17 MILLION/UL (ref 3.81–5.12)
RSV RNA RESP QL NAA+PROBE: NEGATIVE
SARS-COV-2 RNA RESP QL NAA+PROBE: NEGATIVE
SODIUM SERPL-SCNC: 141 MMOL/L (ref 136–145)
TROPONIN I SERPL-MCNC: 0.09 NG/ML
TROPONIN I SERPL-MCNC: 0.1 NG/ML
TROPONIN I SERPL-MCNC: 0.11 NG/ML
WBC # BLD AUTO: 6.01 THOUSAND/UL (ref 4.31–10.16)

## 2021-03-23 PROCEDURE — 36415 COLL VENOUS BLD VENIPUNCTURE: CPT | Performed by: EMERGENCY MEDICINE

## 2021-03-23 PROCEDURE — 83880 ASSAY OF NATRIURETIC PEPTIDE: CPT | Performed by: EMERGENCY MEDICINE

## 2021-03-23 PROCEDURE — 82948 REAGENT STRIP/BLOOD GLUCOSE: CPT

## 2021-03-23 PROCEDURE — 82805 BLOOD GASES W/O2 SATURATION: CPT | Performed by: EMERGENCY MEDICINE

## 2021-03-23 PROCEDURE — 99291 CRITICAL CARE FIRST HOUR: CPT | Performed by: EMERGENCY MEDICINE

## 2021-03-23 PROCEDURE — 96374 THER/PROPH/DIAG INJ IV PUSH: CPT

## 2021-03-23 PROCEDURE — 84484 ASSAY OF TROPONIN QUANT: CPT | Performed by: EMERGENCY MEDICINE

## 2021-03-23 PROCEDURE — 99285 EMERGENCY DEPT VISIT HI MDM: CPT

## 2021-03-23 PROCEDURE — 80053 COMPREHEN METABOLIC PANEL: CPT | Performed by: EMERGENCY MEDICINE

## 2021-03-23 PROCEDURE — 85025 COMPLETE CBC W/AUTO DIFF WBC: CPT | Performed by: EMERGENCY MEDICINE

## 2021-03-23 PROCEDURE — 84484 ASSAY OF TROPONIN QUANT: CPT | Performed by: STUDENT IN AN ORGANIZED HEALTH CARE EDUCATION/TRAINING PROGRAM

## 2021-03-23 PROCEDURE — 93005 ELECTROCARDIOGRAM TRACING: CPT

## 2021-03-23 PROCEDURE — 0241U HB NFCT DS VIR RESP RNA 4 TRGT: CPT | Performed by: EMERGENCY MEDICINE

## 2021-03-23 PROCEDURE — 71045 X-RAY EXAM CHEST 1 VIEW: CPT

## 2021-03-23 RX ORDER — FUROSEMIDE 10 MG/ML
40 INJECTION INTRAMUSCULAR; INTRAVENOUS ONCE
Status: COMPLETED | OUTPATIENT
Start: 2021-03-23 | End: 2021-03-23

## 2021-03-23 RX ORDER — LOSARTAN POTASSIUM 50 MG/1
100 TABLET ORAL DAILY
Status: DISCONTINUED | OUTPATIENT
Start: 2021-03-24 | End: 2021-03-27 | Stop reason: HOSPADM

## 2021-03-23 RX ORDER — ALBUTEROL SULFATE 90 UG/1
2 AEROSOL, METERED RESPIRATORY (INHALATION) EVERY 4 HOURS PRN
Status: DISCONTINUED | OUTPATIENT
Start: 2021-03-23 | End: 2021-03-27 | Stop reason: HOSPADM

## 2021-03-23 RX ORDER — ACETAMINOPHEN 325 MG/1
650 TABLET ORAL EVERY 4 HOURS PRN
Status: DISCONTINUED | OUTPATIENT
Start: 2021-03-23 | End: 2021-03-27 | Stop reason: HOSPADM

## 2021-03-23 RX ORDER — INSULIN GLARGINE 100 [IU]/ML
10 INJECTION, SOLUTION SUBCUTANEOUS
Status: DISCONTINUED | OUTPATIENT
Start: 2021-03-23 | End: 2021-03-27 | Stop reason: HOSPADM

## 2021-03-23 RX ORDER — FUROSEMIDE 10 MG/ML
20 INJECTION INTRAMUSCULAR; INTRAVENOUS
Status: DISCONTINUED | OUTPATIENT
Start: 2021-03-24 | End: 2021-03-24

## 2021-03-23 RX ORDER — ATORVASTATIN CALCIUM 20 MG/1
40 TABLET, FILM COATED ORAL DAILY
Status: DISCONTINUED | OUTPATIENT
Start: 2021-03-24 | End: 2021-03-27 | Stop reason: HOSPADM

## 2021-03-23 RX ORDER — MELATONIN
2000 DAILY
Status: DISCONTINUED | OUTPATIENT
Start: 2021-03-24 | End: 2021-03-27 | Stop reason: HOSPADM

## 2021-03-23 RX ORDER — FLUTICASONE FUROATE AND VILANTEROL 100; 25 UG/1; UG/1
1 POWDER RESPIRATORY (INHALATION) DAILY
Status: DISCONTINUED | OUTPATIENT
Start: 2021-03-24 | End: 2021-03-27 | Stop reason: HOSPADM

## 2021-03-23 RX ORDER — ASPIRIN 81 MG/1
325 TABLET, CHEWABLE ORAL DAILY
Status: DISCONTINUED | OUTPATIENT
Start: 2021-03-23 | End: 2021-03-23

## 2021-03-23 RX ORDER — CLOPIDOGREL BISULFATE 75 MG/1
150 TABLET ORAL DAILY
Status: DISCONTINUED | OUTPATIENT
Start: 2021-03-24 | End: 2021-03-27 | Stop reason: HOSPADM

## 2021-03-23 RX ORDER — OMEGA-3S/DHA/EPA/FISH OIL/D3 300MG-1000
400 CAPSULE ORAL DAILY
Status: DISCONTINUED | OUTPATIENT
Start: 2021-03-24 | End: 2021-03-23

## 2021-03-23 RX ORDER — ASPIRIN 81 MG/1
81 TABLET ORAL DAILY
Status: DISCONTINUED | OUTPATIENT
Start: 2021-03-24 | End: 2021-03-27 | Stop reason: HOSPADM

## 2021-03-23 RX ORDER — POTASSIUM CHLORIDE 20 MEQ/1
40 TABLET, EXTENDED RELEASE ORAL ONCE
Status: COMPLETED | OUTPATIENT
Start: 2021-03-23 | End: 2021-03-23

## 2021-03-23 RX ORDER — MONTELUKAST SODIUM 10 MG/1
10 TABLET ORAL
Status: DISCONTINUED | OUTPATIENT
Start: 2021-03-23 | End: 2021-03-27 | Stop reason: HOSPADM

## 2021-03-23 RX ADMIN — INSULIN GLARGINE 10 UNITS: 100 INJECTION, SOLUTION SUBCUTANEOUS at 22:39

## 2021-03-23 RX ADMIN — FUROSEMIDE 40 MG: 10 INJECTION, SOLUTION INTRAMUSCULAR; INTRAVENOUS at 15:29

## 2021-03-23 RX ADMIN — ASPIRIN 325 MG: 81 TABLET, CHEWABLE ORAL at 15:25

## 2021-03-23 RX ADMIN — POTASSIUM CHLORIDE 40 MEQ: 1500 TABLET, EXTENDED RELEASE ORAL at 20:25

## 2021-03-23 RX ADMIN — MONTELUKAST SODIUM 10 MG: 10 TABLET, FILM COATED ORAL at 22:39

## 2021-03-23 NOTE — ASSESSMENT & PLAN NOTE
Wt Readings from Last 3 Encounters:   03/04/21 75 8 kg (167 lb)   12/10/20 76 7 kg (169 lb 3 2 oz)   11/19/20 78 4 kg (172 lb 12 8 oz)     Patient presents with shortness of breath, orthopnea chest heaviness since Friday  Her son convinced her to come in when her inhalers were not relieving her shortness of breath  She does have a diet with some elevated salt including cold cuts and canned tuna fish  She has audible wheezing and conversational dyspnea  Saturating above 95% on room air  Bilateral lower extremity edema, 1+ pitting bilaterally  Diminished breath sounds globally however crackles and wheezes towards bases      BNP >4,000  CXR demonstrates mild pulmonary edema with trace effusions  Echo pending, will follow-up  IV Lasix 20 mg b i d , monitor electrolytes  And TSH, will follow-up  Salt restricted diet, elevate feet  Continue to monitor daily inputs and outputs, daily weights  Troponin slightly elevated at 0 09, which trended follow-up, likely secondary to heart failure as opposed to myocardial infarction, EKGs unremarkable  Goal K>4, mg >2  Heart failure consult, appreciate recommendations  Nutrition consult place, appreciate recommendations

## 2021-03-23 NOTE — ASSESSMENT & PLAN NOTE
Lab Results   Component Value Date    HGBA1C 7 9 (H) 11/10/2020       No results for input(s): POCGLU in the last 72 hours      Blood Sugar Average: Last 72 hrs:     Patient is compliant with metformin and Januvia at home, follows diabetic diet with limited sugar, sodas and carbohydrates    SSI, adjust accordingly  Goal -180  Carb controlled diet  Last A1c 7 9, at goal for age  Will hold home metformin and Januvia  Continue ACE

## 2021-03-23 NOTE — ED ATTENDING ATTESTATION
3/23/2021  IAna MD, saw and evaluated the patient  I have discussed the patient with the resident/non-physician practitioner and agree with the resident's/non-physician practitioner's findings, Plan of Care, and MDM as documented in the resident's/non-physician practitioner's note, except where noted  All available labs and Radiology studies were reviewed  I was present for key portions of any procedure(s) performed by the resident/non-physician practitioner and I was immediately available to provide assistance  At this point I agree with the current assessment done in the Emergency Department  I have conducted an independent evaluation of this patient a history and physical is as follows:  Bilateral chest pain, shortness of breath, limb swelling  No fevers or chills  Pain is nonpleuritic  Patient states that she has significant orthopnea  Pain is a pressure sensation  Patient has had symmetric bilateral lower extremity swelling, much worse than usual   Review of systems otherwise -12 systems reviewed  On exam the patient is apparently dyspneic  She is sitting forward in the bed with her feet hanging off  The patient has some mild try potting  She has increased work of breathing with respiratory rate in the mid 20s  She has adequate oxygen saturation  The patient has adequate blood pressure, normal mentation, and has good color  On secondary survey, there is no conjunctival pallor  Oropharynx is clear  The patient has some mild JVD  Her heart is regular with a soft S1 and a diastolic murmur  She has bibasilar crackles  Her abdomen is soft and nontender with no masses, rebound, guarding  Extremities are intact, but she has +2 pitting edema bilaterally which is symmetric  She is neurologically intact with normal skin and back exam   ED course:  Patient clinically with evidence of congestive heart failure    Patient's blood pressure is systolic 122, and therefore will not begin nitro drip  Will plan to patient diuretics, will consider positive-pressure ventilation if patient is having ongoing difficulty with breathing  Will provide a cannula oxygen support for comfort, to cardiac evaluation  Reassessment:  Patient with positive troponin  Patient given aspirin  Patient is feeling better from a respiratory standpoint  Will be admitted to the hospital for congestive heart failure    ED Course         Critical Care Time  CriticalCare Time  Performed by: Elvie Rivera MD  Authorized by: Elvie Rivera MD     Critical care provider statement:     Critical care time (minutes):  37    Critical care time was exclusive of:  Separately billable procedures and treating other patients and teaching time    Critical care was necessary to treat or prevent imminent or life-threatening deterioration of the following conditions:  Cardiac failure    Critical care was time spent personally by me on the following activities:  Blood draw for specimens, obtaining history from patient or surrogate, development of treatment plan with patient or surrogate, discussions with consultants, discussions with primary provider, evaluation of patient's response to treatment, examination of patient, review of old charts, re-evaluation of patient's condition, ordering and review of radiographic studies, ordering and review of laboratory studies and ordering and performing treatments and interventions

## 2021-03-23 NOTE — ASSESSMENT & PLAN NOTE
Lab Results   Component Value Date    EGFR 46 03/23/2021    EGFR 59 12/22/2020    EGFR 65 11/10/2020    CREATININE 1 27 03/23/2021    CREATININE 1 03 12/22/2020    CREATININE 0 95 11/10/2020       Baseline creatinine 1, currently 1 27  Will watch for elevations of BUN and creatinine in setting of IV Lasix  Will give K-Dur 40 mEq  Continue losartan  Avoid any contrast studies if possible along with NSAIDs, or nephrotoxic agents

## 2021-03-23 NOTE — ED PROVIDER NOTES
History  Chief Complaint   Patient presents with    Chest Pain     reprots chest pressure since friday with SOB and b/l leg swelling  reports symptoms progressively worse prompting her to notify her family and come to ED for eval today     [de-identified] yo F with h/o DM, CKD, HLD/HTN, presenting for evaluation of increased SOB at home since 3/19, gradually worsening  Reports that she has never experienced anything like this before  Feels like she is being "suffocated" when laying flat  She has had trouble sleeping because of this  She has also had worsened DUNAWAY  Reports some SOB at rest that seems very positional  She has also had some intermittent left sided chest pain/pressure  She currently doesn't have any  Does report that the pain is slightly exertional and gets better with rest  Increased lower extremity edema for the past week as well, even under compression stockings  Has had a mild cough  Otherwise, denies any HA, changes in vision, abdominal pain, n/v/d  History provided by:  Patient   used: No        Prior to Admission Medications   Prescriptions Last Dose Informant Patient Reported? Taking?    Blood Glucose Monitoring Suppl (FREESTYLE LITE) JAQUELIN  Self No No   Sig: by Does not apply route daily   Breo Ellipta 100-25 MCG/INH inhaler 3/23/2021 at Unknown time  Yes Yes   [facility]S D3 50 MCG ( [place]) CAPS 3/22/2021 at Unknown time  No Yes   Sig: Take 1 capsule (2,000 Units total) by mouth daily   Continuous Blood Gluc  (FREESTYLE ADAM READER) JAQUELIN 3/23/2021 at Unknown time  No Yes   Si Device by Does not apply route 4 (four) times a day   Continuous Blood Gluc Sensor (FreeStyle Adam 14 Day Sensor) MISC 3/23/2021 at Unknown time  No Yes   Sig: Use one sensor every 14 days   GLOBAL EASE INJECT PEN NEEDLES 31G X 5 MM MISC 3/23/2021 at Unknown time Self Yes Yes   Glucose-Vitamin C-Vitamin D (TRUEPLUS GLUCOSE ON THE GO) CHEW Not Taking at Unknown time Self Yes No   Sig: CHEW 2 TABS AS NEEDED FOR BLOOD SUGAR LESS THAN 80   Lancets (FREESTYLE) lancets 3/23/2021 at Unknown time Self No Yes   Sig: Use as instructed   Misc   Devices (QUAD CANE) MISC 3/23/2021 at Unknown time  No Yes   Sig: by Does not apply route daily   acetaminophen (TYLENOL) 650 mg CR tablet Past Week at Unknown time Self Yes Yes   Sig: Take 650 mg by mouth every 6 (six) hours as needed for mild pain   albuterol (PROVENTIL HFA,VENTOLIN HFA) 90 mcg/act inhaler 3/23/2021 at Unknown time  Yes Yes   Sig: Inhale 2 puffs every 4 (four) hours as needed   atorvastatin (LIPITOR) 40 mg tablet 3/23/2021 at Unknown time  No Yes   Sig: Take 1 tablet (40 mg total) by mouth daily   clopidogrel (PLAVIX) 75 mg tablet 3/23/2021 at Unknown time  No Yes   Sig: Take 2 tablets (150 mg total) by mouth daily   glucose blood (FREESTYLE LITE) test strip 3/23/2021 at Unknown time  No Yes   Sig: TEST AS DIRECTED UP TO FOUR TIMES A DAY   losartan (COZAAR) 100 MG tablet 3/22/2021 at Unknown time  No Yes   Sig: Take 1 tablet (100 mg total) by mouth daily   metFORMIN (GLUCOPHAGE) 500 mg tablet 3/23/2021 at Unknown time  No Yes   Sig: TAKE 1 TABLET BY MOUTH TWICE A DAY WITH MEALS   montelukast (SINGULAIR) 10 mg tablet 3/22/2021 at Unknown time  No Yes   Sig: Take 1 tablet (10 mg total) by mouth daily at bedtime   sitaGLIPtin (JANUVIA) 100 mg tablet 3/22/2021 at Unknown time  No Yes   Sig: Take 1 tablet (100 mg total) by mouth daily      Facility-Administered Medications: None       Past Medical History:   Diagnosis Date    ACE-inhibitor cough     last assessed - [id number]    Asthma     Bilateral edema of lower extremity     last assessed - [id number]    Cardiac murmur     last assessed - [id number]    CKD (chronic kidney disease)     Diabetes mellitus ([facility] [address] )     last assessed - [id number]    Esophageal dysphagia     Fatigue     last assessed - [id number]    Hyperlipidemia     Hypertension     Patellar bursitis of right knee     last assessed - [id number]   Deisy Personal history of other specified conditions     presenting hazards to health    Stroke [facility])     Stroke syndrome     Urinary frequency     UTI (urinary tract infection)        Past Surgical History:   Procedure Laterality Date    NC ESOPHAGOGASTRODUODENOSCOPY TRANSORAL DIAGNOSTIC N/A 2017    Procedure: ESOPHAGOGASTRODUODENOSCOPY (EGD); Surgeon: Daisy Steinberg MD;  Location:  GI LAB; Service: Gastroenterology       Family History   Problem Relation Age of Onset    Heart disease Father     Hypertension Mother     Stroke Mother     Other Sister         1)Breast disorder; 2)Epilepsy   Cathlene Salon Migraines Sister         Migraine headaches    Osteoporosis Sister     Thyroid disease Sister     Coronary artery disease Sister         S/P triple vessel bypass    Osteoporosis Maternal Grandmother     Diabetes Son         Diabetes mellitus    Hypertension Son     Rheum arthritis Son         Rheumatic disease    Heart disease Family      I have reviewed and agree with the history as documented  E-Cigarette/Vaping    E-Cigarette Use Never User      E-Cigarette/Vaping Substances    Nicotine No     THC No     CBD No     Flavoring No     Other No     Unknown No      Social History     Tobacco Use    Smoking status: Former Smoker     Packs/day: 3 00     Quit date:      Years since quittin 2    Smokeless tobacco: Former User    Tobacco comment: quit over 30 yrs ago; (quit in the , had smoked 3PPD for 20 years - per [employer])   Substance Use Topics    Alcohol use: Never     Frequency: Never     Comment: Denied history of alcohol use    Drug use: No     Comment: Denied history of drug use        Review of Systems   Constitutional: Negative for chills, fatigue and fever  HENT: Negative for congestion and sore throat  Eyes: Negative for pain and visual disturbance  Respiratory: Positive for cough and shortness of breath  Negative for wheezing      Cardiovascular: Positive for chest pain and leg swelling  Negative for palpitations  Gastrointestinal: Negative for abdominal pain, diarrhea, nausea and vomiting  Genitourinary: Negative for dysuria and hematuria  Musculoskeletal: Negative for arthralgias and back pain  Skin: Negative for color change and rash  Neurological: Negative for dizziness, seizures, syncope and light-headedness  Psychiatric/Behavioral: Negative for confusion  The patient is not nervous/anxious  All other systems reviewed and are negative  Physical Exam  ED Triage Vitals   Temperature Pulse Respirations Blood Pressure SpO2   03/23/21 1425 03/23/21 1425 03/23/21 1425 03/23/21 1425 03/23/21 1425   98 4 °F (36 9 °C) 92 22 146/86 97 %      Temp Source Heart Rate Source Patient Position - Orthostatic VS BP Location FiO2 (%)   03/23/21 1949 03/23/21 1744 03/23/21 1744 -- --   Oral Monitor Lying        Pain Score       03/23/21 1425       6             Orthostatic Vital Signs  Vitals:    03/25/21 2137 03/25/21 2352 03/26/21 0312 03/26/21 0629   BP: 124/88 127/83 127/68 140/78   Pulse: 60 64 59 66   Patient Position - Orthostatic VS:           Physical Exam  Vitals signs and nursing note reviewed  Constitutional:       General: She is not in acute distress  Appearance: She is well-developed  She is ill-appearing  She is not diaphoretic  Comments: Patient sitting up straight in the bed   HENT:      Head: Normocephalic and atraumatic  Eyes:      Conjunctiva/sclera: Conjunctivae normal    Neck:      Musculoskeletal: Neck supple  Cardiovascular:      Rate and Rhythm: Normal rate and regular rhythm  Heart sounds: No murmur  Pulmonary:      Effort: Pulmonary effort is normal  Tachypnea present  No accessory muscle usage or respiratory distress  Breath sounds: Examination of the right-lower field reveals rales  Examination of the left-lower field reveals rales  Rales present  No wheezing or rhonchi     Abdominal:      Palpations: Abdomen is soft       Tenderness: There is no abdominal tenderness  Musculoskeletal:      Right lower leg: She exhibits no tenderness  Edema present  Left lower leg: She exhibits no tenderness  Edema present  Skin:     General: Skin is warm and dry  Neurological:      General: No focal deficit present  Mental Status: She is alert     Psychiatric:         Mood and Affect: Mood normal          ED Medications  Medications   montelukast (SINGULAIR) tablet 10 mg (10 mg Oral Given 3/25/21 2157)   losartan (COZAAR) tablet 100 mg (100 mg Oral Given 3/25/21 0839)   clopidogrel (PLAVIX) tablet 150 mg (150 mg Oral Given 3/25/21 0839)   fluticasone-vilanterol (BREO ELLIPTA) 100-25 mcg/inh inhaler 1 puff (1 puff Inhalation Given 3/25/21 0843)   atorvastatin (LIPITOR) tablet 40 mg (40 mg Oral Given 3/25/21 0838)   albuterol (PROVENTIL HFA,VENTOLIN HFA) inhaler 2 puff (2 puffs Inhalation Given 3/24/21 0905)   acetaminophen (TYLENOL) tablet 650 mg (650 mg Oral Given 3/24/21 0122)   enoxaparin (LOVENOX) subcutaneous injection 40 mg (40 mg Subcutaneous Given 3/25/21 0842)   aspirin (ECOTRIN LOW STRENGTH) EC tablet 81 mg (81 mg Oral Given 3/25/21 0838)   insulin lispro (HumaLOG) 100 units/mL subcutaneous injection 7 Units (7 Units Subcutaneous Not Given 3/25/21 2151)   insulin glargine (LANTUS) subcutaneous injection 10 Units 0 1 mL (10 Units Subcutaneous Given 3/25/21 2157)   insulin lispro (HumaLOG) 100 units/mL subcutaneous injection 1-6 Units (1 Units Subcutaneous Not Given 3/26/21 0723)   insulin lispro (HumaLOG) 100 units/mL subcutaneous injection 1-5 Units (1 Units Subcutaneous Given 3/25/21 2159)   cholecalciferol (VITAMIN D3) tablet 2,000 Units (2,000 Units Oral Given 3/25/21 0837)   melatonin tablet 3 mg (3 mg Oral Given 3/25/21 2157)   furosemide (LASIX) injection 40 mg (40 mg Intravenous Given 3/25/21 1717)   furosemide (LASIX) injection 40 mg (40 mg Intravenous Given 3/23/21 1529)   potassium chloride (K-DUR,KLOR-CON) CR tablet 40 mEq (40 mEq Oral Given 3/23/21 2025)       Diagnostic Studies  Results Reviewed     Procedure Component Value Units Date/Time    CBC and differential [[id number]]  (Abnormal) Collected: 03/24/21 0450    Lab Status: Final result Specimen: Blood from Arm, Right Updated: 03/24/21 0555     WBC 5 11 Thousand/uL      RBC 3 99 Million/uL      Hemoglobin 10 4 g/dL      Hematocrit 32 8 %      MCV 82 fL      MCH 26 1 pg      MCHC 31 7 g/dL      RDW 14 5 %      MPV 12 5 fL      Platelets 289 Thousands/uL      nRBC 0 /100 WBCs      Neutrophils Relative 63 %      Immat GRANS % 0 %      Lymphocytes Relative 27 %      Monocytes Relative 8 %      Eosinophils Relative 2 %      Basophils Relative 0 %      Neutrophils Absolute 3 21 Thousands/µL      Immature Grans Absolute 0 01 Thousand/uL      Lymphocytes Absolute 1 38 Thousands/µL      Monocytes Absolute 0 41 Thousand/µL      Eosinophils Absolute 0 08 Thousand/µL      Basophils Absolute 0 02 Thousands/µL     Hemoglobin A1c w/EAG Estimation (Orders if not completed within the last 90 days) [[id number]]  (Abnormal) Collected: 03/24/21 0450    Lab Status: Final result Specimen: Blood from Arm, Right Updated: 03/24/21 0540     Hemoglobin A1C 6 7 %       mg/dl     Troponin I [[id number]]  (Abnormal) Collected: 03/23/21 2131    Lab Status: Final result Specimen: Blood from Arm, Right Updated: 03/23/21 2157     Troponin I 0 10 ng/mL     Troponin I [[id number]]  (Abnormal) Collected: 03/23/21 1838    Lab Status: Final result Specimen: Blood from Arm, Left Updated: 03/23/21 1916     Troponin I 0 11 ng/mL     COVID19, Influenza A/B, RSV PCR, [facility] [[id number]]  (Normal) Collected: 03/23/21 1513    Lab Status: Final result Specimen: Nares from Nasopharyngeal Swab Updated: 03/23/21 1636     SARS-CoV-2 Negative     INFLUENZA A PCR Negative     INFLUENZA B PCR Negative     RSV PCR Negative    Narrative:        This test has been authorized by FDA under an EUA (Emergency Use Assay) for use by authorized laboratories  Clinical caution and judgement should be used with the interpretation of these results with consideration of the clinical impression and other laboratory testing  Testing reported as "Positive" or "Negative" has been proven to be accurate according to standard laboratory validation requirements  All testing is performed with control materials showing appropriate reactivity at standard intervals      Blood gas, venous [[id number]]  (Abnormal) Collected: 03/23/21 1513    Lab Status: Final result Specimen: Blood from Arm, Left Updated: 03/23/21 1526     pH, Antoni 7 376     pCO2, Antoni 43 6 mm Hg      pO2, Antoni 46 3 mm Hg      HCO3, Antoni 25 0 mmol/L      Base Excess, Antoni -0 3 mmol/L      O2 Content, Antoni 12 5 ml/dL      O2 HGB, VENOUS 78 4 %     Troponin I [[id number]]  (Abnormal) Collected: 03/23/21 1439    Lab Status: Final result Specimen: Blood from Arm, Left Updated: 03/23/21 1515     Troponin I 0 09 ng/mL     Comprehensive metabolic panel [[id number]]  (Abnormal) Collected: 03/23/21 1434    Lab Status: Final result Specimen: Blood from Arm, Left Updated: 03/23/21 1508     Sodium 141 mmol/L      Potassium 3 7 mmol/L      Chloride 108 mmol/L      CO2 26 mmol/L      ANION GAP 7 mmol/L      BUN 25 mg/dL      Creatinine 1 27 mg/dL      Glucose 213 mg/dL      Calcium 9 1 mg/dL      AST 11 U/L      ALT 28 U/L      Alkaline Phosphatase 89 U/L      Total Protein 7 6 g/dL      Albumin 3 6 g/dL      Total Bilirubin 0 67 mg/dL      eGFR 46 ml/min/1 73sq m     Narrative:      Nicole guidelines for Chronic Kidney Disease (CKD):     Stage 1 with normal or high GFR (GFR > 90 mL/min/1 73 square meters)    Stage 2 Mild CKD (GFR = 60-89 mL/min/1 73 square meters)    Stage 3A Moderate CKD (GFR = 45-59 mL/min/1 73 square meters)    Stage 3B Moderate CKD (GFR = 30-44 mL/min/1 73 square meters)    Stage 4 Severe CKD (GFR = 15-29 mL/min/1 73 square meters)    Stage 5 End Stage CKD (GFR <15 mL/min/1 73 square meters)  Note: GFR calculation is accurate only with a steady state creatinine    NT-BNP PRO [[id number]]  (Abnormal) Collected: 03/23/21 1434    Lab Status: Final result Specimen: Blood from Arm, Left Updated: 03/23/21 1508     NT-proBNP 4,131 pg/mL     CBC and differential [[id number]]  (Abnormal) Collected: 03/23/21 1434    Lab Status: Final result Specimen: Blood from Arm, Left Updated: 03/23/21 1443     WBC 6 01 Thousand/uL      RBC 4 17 Million/uL      Hemoglobin 10 9 g/dL      Hematocrit 34 3 %      MCV 82 fL      MCH 26 1 pg      MCHC 31 8 g/dL      RDW 14 5 %      MPV 12 1 fL      Platelets 775 Thousands/uL      nRBC 0 /100 WBCs      Neutrophils Relative 76 %      Immat GRANS % 0 %      Lymphocytes Relative 15 %      Monocytes Relative 7 %      Eosinophils Relative 1 %      Basophils Relative 1 %      Neutrophils Absolute 4 63 Thousands/µL      Immature Grans Absolute 0 01 Thousand/uL      Lymphocytes Absolute 0 88 Thousands/µL      Monocytes Absolute 0 41 Thousand/µL      Eosinophils Absolute 0 04 Thousand/µL      Basophils Absolute 0 04 Thousands/µL                  XR chest portable   ED Interpretation by Lashawn Ruelas MD (03/23 1539)   Pulmonary edema      Final Result by Rock Rick MD (03/23 1648)      Mild pulmonary edema with trace effusions                  Workstation performed: [id number]               Procedures  ECG 12 Lead Documentation Only    Date/Time: 3/23/2021 3:50 PM  Performed by: Lashawn Ruelas MD  Authorized by: Lashawn Ruelas MD     Indications / Diagnosis:  CP, acute CHF  Patient location:  ED  Previous ECG:     Previous ECG:  Compared to current    Similarity:  Changes noted  Interpretation:     Interpretation: abnormal    Rate:     ECG rate assessment: normal    Rhythm:     Rhythm: sinus rhythm    Ectopy:     Ectopy: PVCs    QRS:     QRS axis:  Normal  Conduction:     Conduction: normal    ST segments:     ST segments:  Non-specific    Elevation: V3  T waves:     T waves: inverted      Inverted:  V3  Comments:      NSR, slight ST elevation with TW flattening/inversion in V3  PVCs           ED Course  ED Course as of Mar 26 0753   Tue Mar 23, 2021   1516 Troponin I(!): 0 09   1537 Patient's breathing is slightly improved at this time  Discussed results of labwork with her  Patient given full dose chewable ASA  1630 SOD to admit                HEART Risk Score      Most Recent Value   Heart Score Risk Calculator   History  2 Filed at: 03/23/2021 1523   ECG  1 Filed at: 03/23/2021 1523   Age  2 Filed at: 03/23/2021 1523   Risk Factors  2 Filed at: 03/23/2021 1523   Troponin  1 Filed at: 03/23/2021 1523   HEART Score  8 Filed at: 03/23/2021 1523        Identification of Seniors at Risk      Most Recent Value   (ISAR) Identification of Seniors at Risk   Before the illness or injury that brought you to the Emergency, did you need someone to help you on a regular basis? 0 Filed at: 03/23/2021 1425   In the last 24 hours, have you needed more help than usual?  1 Filed at: 03/23/2021 1425   Have you been hospitalized for one or more nights during the past 6 months? 0 Filed at: 03/23/2021 1425   In general, do you see well?  0 Filed at: 03/23/2021 1425   In general, do you have serious problems with your memory? 0 Filed at: 03/23/2021 1425   Do you take more than three different medications every day? 1 Filed at: 03/23/2021 1425   ISAR Score  2 Filed at: 03/23/2021 1425                              MDM  Number of Diagnoses or Management Options  CHF (congestive heart failure) [facility]):   Elevated troponin:   NSTEMI (non-ST elevated myocardial infarction) [facility]):   Diagnosis management comments: 51-year-old female presenting with acute CHF exacerbation  Patient with significant orthopnea, dyspnea on exertion, increased lower extremity edema  Currently do maintaining respiratory status, however very tenuous  Patient on room air  VBG is reassuring    Will diurese with 40 of IV Lasix, does not take any diuretics at home  In addition, complaining of intermittent chest pain at home  Some slight changes on EKG in V3, no evidence of STEMI currently  Recurrent EKG in the emergency department is unchanged from initial   Troponin is mildly elevated at 0 09  Will give full-dose aspirin  Will require admission for formal echo, diuresis, and NSTEMI  Admitted in stable condition  Disposition  Final diagnoses:   CHF (congestive heart failure) ([facility])   NSTEMI (non-ST elevated myocardial infarction) ([facility])   Elevated troponin     Time reflects when diagnosis was documented in both MDM as applicable and the Disposition within this note     Time User Action Codes Description Comment    3/23/2021  4:37 PM Maged Vang Add [I50 9] CHF (congestive heart failure) ([facility] )     3/23/2021  4:37 PM Maged Vang Add [I21 4] NSTEMI (non-ST elevated myocardial infarction) ([facility] )     3/23/2021  4:37 PM Maged Vang Add [R77 8] Elevated troponin       ED Disposition     ED Disposition Condition Date/Time Comment    Admit Stable Tue Mar 23, 2021  4:37 PM Case was discussed with admitting resident and the patient's admission status was agreed to be Admission Status: inpatient status to the service of Dr Rita Robledo  Follow-up Information    None         Current Discharge Medication List      CONTINUE these medications which have NOT CHANGED    Details   acetaminophen (TYLENOL) 650 mg CR tablet Take 650 mg by mouth every 6 (six) hours as needed for mild pain      albuterol (PROVENTIL HFA,VENTOLIN HFA) 90 mcg/act inhaler Inhale 2 puffs every 4 (four) hours as needed    Comments: Substitution to a formulary equivalent within the same pharmaceutical class is authorized        atorvastatin (LIPITOR) 40 mg tablet Take 1 tablet (40 mg total) by mouth daily  Qty: 90 tablet, Refills: 1    Associated Diagnoses: Mixed hyperlipidemia      Breo Ellipta 100-25 MCG/INH inhaler       clopidogrel (PLAVIX) 75 mg tablet Take 2 tablets (150 mg total) by mouth daily  Qty: 180 tablet, Refills: 1    Associated Diagnoses: Cerebrovascular accident (CVA) of pontine structure ([address] )      Continuous Blood Gluc  (FREESTYLE ADAM READER) JAQUELIN 1 Device by Does not apply route 4 (four) times a day  Qty: 1 Device, Refills: 0    Associated Diagnoses: Controlled type 2 diabetes mellitus with diabetic neuropathy, with long-term current use of insulin ([facility])      Continuous Blood Gluc Sensor (FreeStyle Adam 14 Day Sensor) MISC Use one sensor every 14 days  Qty: 4 each, Refills: 1    Associated Diagnoses: Controlled type 2 diabetes mellitus with diabetic neuropathy, with long-term current use of insulin ([facility])      CVS D3 50 MCG ([address]) CAPS Take 1 capsule (2,000 Units total) by mouth daily  Qty: 90 capsule, Refills: 1    Associated Diagnoses: Vitamin D deficiency      GLOBAL EASE INJECT PEN NEEDLES 31G X 5 MM MISC       glucose blood (FREESTYLE LITE) test strip TEST AS DIRECTED UP TO FOUR TIMES A DAY  Qty: 100 each, Refills: 3    Associated Diagnoses: Type 2 diabetes mellitus with complication, without long-term current use of insulin ([facility])      Lancets (FREESTYLE) lancets Use as instructed  Qty: 100 each, Refills: 0    Associated Diagnoses: Uncontrolled type 2 diabetes mellitus without complication, without long-term current use of insulin      losartan (COZAAR) 100 MG tablet Take 1 tablet (100 mg total) by mouth daily  Qty: 30 tablet, Refills: 5    Associated Diagnoses: Persistent proteinuria      metFORMIN (GLUCOPHAGE) 500 mg tablet TAKE 1 TABLET BY MOUTH TWICE A DAY WITH MEALS  Qty: 180 tablet, Refills: 1    Associated Diagnoses: Uncontrolled type 2 diabetes mellitus with hyperglycemia ([facility])      Misc   Devices (QUAD CANE) MISC by Does not apply route daily  Qty: 1 each, Refills: 0    Associated Diagnoses: 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      montelukast (SINGULAIR) 10 mg tablet Take 1 tablet (10 mg total) by mouth daily at bedtime  Qty: 90 tablet, Refills: 0    Associated Diagnoses: Non-seasonal allergic rhinitis due to pollen      sitaGLIPtin (JANUVIA) 100 mg tablet Take 1 tablet (100 mg total) by mouth daily  Qty: 90 tablet, Refills: 1    Associated Diagnoses: Type 2 diabetes mellitus with other specified complication, without long-term current use of insulin ([facility])      Blood Glucose Monitoring Suppl (FREESTYLE LITE) JAQUELIN by Does not apply route daily  Qty: 1 each, Refills: 0    Associated Diagnoses: Uncontrolled type 2 diabetes mellitus without complication, without long-term current use of insulin      Glucose-Vitamin C-Vitamin D (TRUEPLUS GLUCOSE ON THE GO) CHEW CHEW 2 TABS AS NEEDED FOR BLOOD SUGAR LESS THAN 80           No discharge procedures on file  PDMP Review     None           ED Provider  Attending physically available and evaluated Evelyn Shirley I managed the patient along with the ED Attending      Electronically Signed by         Julio Higgins MD  03/26/21 4774

## 2021-03-23 NOTE — TELEPHONE ENCOUNTER
Pt called c/o having chest pain and heaviness, sob with wheezing, b/l leg swelling off and on since friday       Call transferred to [address] [place], [place] for triage

## 2021-03-23 NOTE — H&P
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, without long-term current use of insulin ([facility] )    History of CVA with residual deficit    Stage 3a chronic kidney disease      Stage 3a chronic kidney disease  Assessment & Plan  Lab Results   Component Value Date    EGFR 46 03/23/2021    EGFR 59 12/22/2020    EGFR 65 11/10/2020    CREATININE 1 27 03/23/2021    CREATININE 1 03 12/22/2020    CREATININE 0 95 11/10/2020       Baseline creatinine 1, currently 1 27  Will watch for elevations of BUN and creatinine in setting of IV Lasix  Will give K-Dur 40 mEq  Continue losartan  Avoid any contrast studies if possible along with NSAIDs, or nephrotoxic agents    History of CVA with residual deficit  Assessment & Plan  Patient has CVA 2011 currently compliant on Plavix at home some residual left-sided deficits and weakness (4/5) however ambulates well with quad cane at home    Continue Plavix 150 qAM  Ambulate OOB with assistance    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, without long-term current use of insulin ([facility])  Assessment & Plan  Lab Results   Component Value Date    HGBA1C 7 9 (H) 11/10/2020       No results for input(s): POCGLU in the last 72 hours      Blood Sugar Average: Last 72 hrs:     Patient is compliant with metformin and Januvia at home, follows diabetic diet with limited sugar, sodas and carbohydrates    SSI, adjust accordingly  Goal -180  Carb controlled diet  Last A1c 7 9, at goal for age  Will hold home metformin and Januvia  Continue ACE      * Acute heart failure [facility])  Assessment & Plan  Wt Readings from Last 3 Encounters:   03/04/21 75 8 kg (167 lb)   12/10/20 76 7 kg (169 lb 3 2 oz)   11/19/20 78 4 kg (172 lb 12 8 oz)     Patient presents with shortness of breath, orthopnea chest heaviness since Friday  Her son convinced her to come in when her inhalers were not relieving her shortness of breath  She does have a diet with some elevated salt including cold cuts and canned tuna fish  She has audible wheezing and conversational dyspnea  Saturating above 95% on room air  Bilateral lower extremity edema, 1+ pitting bilaterally  Diminished breath sounds globally however crackles and wheezes towards bases  BNP >4,000  CXR demonstrates mild pulmonary edema with trace effusions  Echo pending, will follow-up  IV Lasix 20 mg b i d , monitor electrolytes  And TSH, will follow-up  Salt restricted diet, elevate feet  Continue to monitor daily inputs and outputs, daily weights  Troponin slightly elevated at 0 09, which trended follow-up, likely secondary to heart failure as opposed to myocardial infarction, EKGs unremarkable  Goal K>4, mg >2  Heart failure consult, appreciate recommendations  Nutrition consult place, appreciate recommendations        VTE Pharmacologic Prophylaxis: Enoxaparin (Lovenox)  VTE Mechanical Prophylaxis: sequential compression device    CHIEF COMPLAINT     Chief Complaint   Patient presents with    Chest Pain     reprots chest pressure since friday with SOB and b/l leg swelling  reports symptoms progressively worse prompting her to notify her family and come to ED for eval today      HISTORY OF PRESENT ILLNESS     Patient Is an 54-year-old female past medical history Asthma, DM2 non-insulin, R kidney cyst, CKD stage IIIA, stroke (2011, plavix) with some residual left-sided deficits,  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 with chest pain, shortness of breath and wheezing since Friday    Patient states that she has been coughing and wheezing, and unable to lay down flat  This has never happened to her before  She tried taking her albuterol and Breo inhaler stridor relieved the wheezing and cough however this did not help  She was able to feel better if she sat forward  Her son convinced her to come in  Patient denies any fevers, chills, palpitations, headaches or dizziness  However she does endorse Chest heaviness and leg swelling particularly in last 2 days  Typical meals for her consist of:  Breakfast oatmeal, rice cakes, tea, once in a while turkey cárdenas and eggs (2 days ago)  Lunch: sandwich (tuna fish), turkey/salami --last eaten Monday  grahm crackers and jello for snacks  Dinner chicken string beans, minimal rice  No fast food or canned soups    Patient does have history of tobacco abuse up for over 40 years smoking 3 packs per day, she quit in [facility], patient does not have alcohol or drug history    Upon presentation to the ED, patient was afebrile, slight tachypnea with respiratory rate of 24-26 and blood pressure of 146/86 saturating above 95% on room air  Chest x-ray demonstrated mild pulmonary edema with trace effusions  NT proBNP elevated at over 4000  Son at bedside and confirmed history and medication compliance  Patient is very pleasant and cooperative    REVIEW OF SYSTEMS     Review of Systems   Constitutional: Negative for chills and fever  Respiratory: Positive for cough, chest tightness, shortness of breath and wheezing  Cardiovascular: Negative for chest pain and palpitations  Gastrointestinal: Positive for abdominal pain, nausea and vomiting  Negative for blood in stool, constipation and diarrhea  Neurological: Negative for dizziness, weakness, light-headedness and numbness       OBJECTIVE     Vitals:    03/23/21 1425 03/23/21 1515 03/23/21 1744   BP: 146/86 122/62 148/69   Pulse: 92 80 70   Resp: 22 (!) 26 (!) 24   Temp: 98 4 °F (36 9 °C)     SpO2: 97% 100% 99% Temperature:   Temp (24hrs), Av 4 °F (36 9 °C), Min:98 4 °F (36 9 °C), Max:98 4 °F (36 9 °C)    Temperature: 98 4 °F (36 9 °C)  Intake & Output:  I/O     None        Weights: There is no height or weight on file to calculate BMI  Weight (last 2 days)     None        Physical Exam  Constitutional:       Appearance: Normal appearance  She is not ill-appearing  HENT:      Mouth/Throat:      Mouth: Mucous membranes are moist    Eyes:      Conjunctiva/sclera: Conjunctivae normal    Neck:      Comments: No JVD appreciated at 90°  Pulmonary:      Breath sounds: No stridor  Wheezing (Mid lobes bilaterally) and rales (Bilateral bases) present  No rhonchi  Comments: Conversationally dyspneic with audible expiratory wheezes, diminished breath sounds globally  Abdominal:      General: Bowel sounds are normal       Palpations: Abdomen is soft  Tenderness: There is no abdominal tenderness  There is no guarding  Musculoskeletal:      Right lower leg: Edema (2+ pitting) present  Left lower leg: Edema (2+ pitting) present  Skin:     General: Skin is warm and dry  Findings: No erythema  Neurological:      Mental Status: She is alert and oriented to person, place, and time  Psychiatric:         Mood and Affect: Mood normal          Behavior: Behavior normal          Thought Content:  Thought content normal          Judgment: Judgment normal        PAST MEDICAL HISTORY     Past Medical History:   Diagnosis Date    ACE-inhibitor cough     last assessed - [id number]    Asthma     Bilateral edema of lower extremity     last assessed - [id number]    Cardiac murmur     last assessed - [id number]    CKD (chronic kidney disease)     Diabetes mellitus ([facility] )     last assessed - [id number]    Esophageal dysphagia     Fatigue     last assessed - [id number]    Hyperlipidemia     Hypertension     Patellar bursitis of right knee     last assessed - [id number]    Personal history of other specified conditions     presenting hazards to health    Stroke [facility])     Stroke syndrome     Urinary frequency     UTI (urinary tract infection)      PAST SURGICAL HISTORY     Past Surgical History:   Procedure Laterality Date    CA ESOPHAGOGASTRODUODENOSCOPY TRANSORAL DIAGNOSTIC N/A 2017    Procedure: ESOPHAGOGASTRODUODENOSCOPY (EGD); Surgeon: Yoli Cramer MD;  Location:  GI LAB; Service: Gastroenterology     SOCIAL & FAMILY HISTORY     Social History     Substance and Sexual Activity   Alcohol Use No    Comment: Denied history of alcohol use     Substance and Sexual Activity   Alcohol Use No    Comment: Denied history of alcohol use        Substance and Sexual Activity   Drug Use No    Comment: Denied history of drug use     Social History     Tobacco Use   Smoking Status Former Smoker    Packs/day: 3 00    Quit date: 36    Years since quittin 2   Smokeless Tobacco Former User   Tobacco Comment    quit over 30 yrs ago; (quit in the , had smoked 3PPD for 20 years - per [employer])     Family History   Problem Relation Age of Onset    Heart disease Father     Hypertension Mother     Stroke Mother     Other Sister         1)Breast disorder; 2)Epilepsy   Chanell [place] Migraines Sister         Migraine headaches    Osteoporosis Sister     Thyroid disease Sister     Coronary artery disease Sister         S/P triple vessel bypass    Osteoporosis Maternal Grandmother     Diabetes Son         Diabetes mellitus    Hypertension Son     Rheum arthritis Son         Rheumatic disease    Heart disease Family      LABORATORY DATA     Labs: I have personally reviewed pertinent reports      Results from last 7 days   Lab Units 21  1434   WBC Thousand/uL 6 01   HEMOGLOBIN g/dL 10 9*   HEMATOCRIT % 34 3*   PLATELETS Thousands/uL 209   NEUTROS PCT % 76*   MONOS PCT % 7      Results from last 7 days   Lab Units 21  1434   POTASSIUM mmol/L 3 7   CHLORIDE mmol/L 108   CO2 mmol/L 26   BUN mg/dL 25 CREATININE mg/dL 1 27   CALCIUM mg/dL 9 1   ALK PHOS U/L 89   ALT U/L 28   AST U/L 11                      Results from last 7 days   Lab Units 03/23/21  1838 03/23/21  1439   TROPONIN I ng/mL 0 11* 0 09*     Micro:  Lab Results   Component Value Date    URINECX >100,000 cfu/ml Escherichia coli (A) 07/23/2020    URINECX 80,000-89,000 cfu/ml Candida tropicalis (A) 08/08/2019    URINECX <10,000 cfu/ml  08/08/2019     IMAGING & DIAGNOSTIC TESTS     Imaging: I have personally reviewed pertinent reports  Xr Chest Portable    Result Date: 3/23/2021  Impression: Mild pulmonary edema with trace effusions  Workstation performed: [id number]     EKG, Pathology, and Other Studies: I have personally reviewed pertinent reports  ALLERGIES   No Known Allergies  MEDICATIONS PRIOR TO ARRIVAL     Prior to Admission medications    Medication Sig Start Date End Date Taking?  Authorizing Provider   acetaminophen (TYLENOL) 650 mg CR tablet Take 650 mg by mouth every 6 (six) hours as needed for mild pain    Historical Provider, MD   albuterol (PROVENTIL HFA,VENTOLIN HFA) 90 mcg/act inhaler Inhale 2 puffs every 4 (four) hours as needed 2/15/21   Historical Provider, MD   atorvastatin (LIPITOR) 40 mg tablet Take 1 tablet (40 mg total) by mouth daily 2/11/21 8/10/21  Royce Méndez PA-C   Blood Glucose Monitoring Suppl (FREESTYLE LITE) JAQUELIN by Does not apply route daily 4/17/18 12/10/20  Royce Méndez PA-C   Breo Ellipta 100-25 MCG/INH inhaler  1/18/21   Historical Provider, MD   clopidogrel (PLAVIX) 75 mg tablet Take 2 tablets (150 mg total) by mouth daily 2/11/21 8/10/21  Royce Méndez PA-C   Continuous Blood Gluc  (FREESTYLE NALDO READER) JAQUELIN 1 Device by Does not apply route 4 (four) times a day 4/16/20 4/16/21  Royce Méndez PA-C   Continuous Blood Gluc Sensor (FreeStyle Naldo 14 Day Sensor) MISC Use one sensor every 14 days 12/31/20   Yisel Carpenter DO   CVS D3 50 MCG (2000 UT) CAPS Take 1 capsule (2,000 Units total) by mouth daily 3/4/21   Edyta Alcantara PA-C   GLOBAL EASE INJECT PEN NEEDLES 31G X 5 MM MISC  8/28/18   Historical Provider, MD   glucose blood (FREESTYLE LITE) test strip TEST AS DIRECTED UP TO FOUR TIMES A DAY 3/1/21   Edyta Alcantara PA-C   Glucose-Vitamin C-Vitamin D (TRUEPLUS GLUCOSE ON THE GO) CHEW CHEW 2 TABS AS NEEDED FOR BLOOD SUGAR LESS THAN 80 2/7/20   Historical Provider, MD   Lancets (FREESTYLE) lancets Use as instructed 4/17/18   Edyta Alcantara PA-C   losartan (COZAAR) 100 MG tablet Take 1 tablet (100 mg total) by mouth daily 2/4/21   Tiffani Tipton MD   metFORMIN (GLUCOPHAGE) 500 mg tablet TAKE 1 TABLET BY MOUTH TWICE A DAY WITH MEALS 2/4/21   Rosa Welsh, DO   Misc   Devices (QUAD CANE) MISC by Does not apply route daily 6/1/20   Edyta Alcantara PA-C   montelukast (SINGULAIR) 10 mg tablet Take 1 tablet (10 mg total) by mouth daily at bedtime 3/4/21   Edyta Alcantara PA-C   sitaGLIPtin (JANUVIA) 100 mg tablet Take 1 tablet (100 mg total) by mouth daily 11/24/20   Jacklyn Lock PA-C     MEDICATIONS ADMINISTERED IN LAST 24 HOURS     Medication Administration - last 24 hours from 03/22/2021 1938 to 03/23/2021 1938       Date/Time Order Dose Route Action Action by     03/23/2021 1525 aspirin chewable tablet 325 mg 325 mg Oral Given Hema Lepe RN     03/23/2021 1529 furosemide (LASIX) injection 40 mg 40 mg Intravenous Given Hema Lepe RN        CURRENT MEDICATIONS     Current Facility-Administered Medications   Medication Dose Route Frequency Provider Last Rate    acetaminophen  650 mg Oral Q4H PRN Franca Llanes DO      albuterol  2 puff Inhalation Q4H PRN Franca Llanes DO      [START ON 3/24/2021] aspirin  81 mg Oral Daily Marlysergey Vyas DO      [START ON 3/24/2021] atorvastatin  40 mg Oral Daily Franca Llanes DO      [START ON 3/24/2021] cholecalciferol  400 Units Oral Daily Franca Llanes DO      [START ON 3/24/2021] clopidogrel  150 mg Oral Daily DO Stacy Benjamin ON 3/24/2021] enoxaparin  40 mg Subcutaneous Daily Danny Gouty, DO      [START ON 3/24/2021] fluticasone-vilanterol  1 puff Inhalation Daily Danny Gouty, DO      [START ON 3/24/2021] furosemide  20 mg Intravenous BID (diuretic) Danny Gouty, DO      insulin glargine  10 Units Subcutaneous HS Sakshi Pu, DO      insulin lispro  1-5 Units Subcutaneous HS Sakshi Pu, DO      [START ON 3/24/2021] insulin lispro  1-6 Units Subcutaneous TID [facility] Sakshi Pu, DO      [START ON 3/24/2021] insulin lispro  7 Units Subcutaneous TID With Meals Sakshi Pu, DO      [START ON 3/24/2021] losartan  100 mg Oral Daily Danny Gouty, DO      montelukast  10 mg Oral HS Danny Gouty, DO      potassium chloride  40 mEq Oral Once Danny Gouty, DO          acetaminophen, 650 mg, Q4H PRN  albuterol, 2 puff, Q4H PRN        Admission Time  I spent 30 minutes admitting the patient  This involved direct patient contact where I performed a full history and physical, reviewing previous records, and reviewing laboratory and other diagnostic studies  Portions of the record may have been created with voice recognition software  Occasional wrong word or "sound a like" substitutions may have occurred due to the inherent limitations of voice recognition software    Read the chart carefully and recognize, using context, where substitutions have occurred     ==  Danny Salmon, [facility]  Internal Medicine Residency PGY-1

## 2021-03-23 NOTE — ASSESSMENT & PLAN NOTE
Patient has CVA 2011 currently compliant on Plavix at home some residual left-sided deficits and weakness (4/5) however ambulates well with quad cane at home    Continue Plavix 150 qAM  Ambulate OOB with assistance

## 2021-03-24 LAB
ANION GAP SERPL CALCULATED.3IONS-SCNC: 6 MMOL/L (ref 4–13)
ATRIAL RATE: 66 BPM
ATRIAL RATE: 68 BPM
ATRIAL RATE: 69 BPM
ATRIAL RATE: 72 BPM
ATRIAL RATE: 86 BPM
BASOPHILS # BLD AUTO: 0.02 THOUSANDS/ΜL (ref 0–0.1)
BASOPHILS NFR BLD AUTO: 0 % (ref 0–1)
BUN SERPL-MCNC: 23 MG/DL (ref 5–25)
CALCIUM SERPL-MCNC: 9.2 MG/DL (ref 8.3–10.1)
CHLORIDE SERPL-SCNC: 109 MMOL/L (ref 100–108)
CO2 SERPL-SCNC: 26 MMOL/L (ref 21–32)
CREAT SERPL-MCNC: 1.07 MG/DL (ref 0.6–1.3)
EOSINOPHIL # BLD AUTO: 0.08 THOUSAND/ΜL (ref 0–0.61)
EOSINOPHIL NFR BLD AUTO: 2 % (ref 0–6)
ERYTHROCYTE [DISTWIDTH] IN BLOOD BY AUTOMATED COUNT: 14.5 % (ref 11.6–15.1)
EST. AVERAGE GLUCOSE BLD GHB EST-MCNC: 146 MG/DL
GFR SERPL CREATININE-BSD FRML MDRD: 57 ML/MIN/1.73SQ M
GLUCOSE SERPL-MCNC: 107 MG/DL (ref 65–140)
GLUCOSE SERPL-MCNC: 112 MG/DL (ref 65–140)
GLUCOSE SERPL-MCNC: 113 MG/DL (ref 65–140)
GLUCOSE SERPL-MCNC: 152 MG/DL (ref 65–140)
GLUCOSE SERPL-MCNC: 176 MG/DL (ref 65–140)
HBA1C MFR BLD: 6.7 %
HCT VFR BLD AUTO: 32.8 % (ref 34.8–46.1)
HGB BLD-MCNC: 10.4 G/DL (ref 11.5–15.4)
IMM GRANULOCYTES # BLD AUTO: 0.01 THOUSAND/UL (ref 0–0.2)
IMM GRANULOCYTES NFR BLD AUTO: 0 % (ref 0–2)
LYMPHOCYTES # BLD AUTO: 1.38 THOUSANDS/ΜL (ref 0.6–4.47)
LYMPHOCYTES NFR BLD AUTO: 27 % (ref 14–44)
MAGNESIUM SERPL-MCNC: 1.9 MG/DL (ref 1.6–2.6)
MCH RBC QN AUTO: 26.1 PG (ref 26.8–34.3)
MCHC RBC AUTO-ENTMCNC: 31.7 G/DL (ref 31.4–37.4)
MCV RBC AUTO: 82 FL (ref 82–98)
MONOCYTES # BLD AUTO: 0.41 THOUSAND/ΜL (ref 0.17–1.22)
MONOCYTES NFR BLD AUTO: 8 % (ref 4–12)
NEUTROPHILS # BLD AUTO: 3.21 THOUSANDS/ΜL (ref 1.85–7.62)
NEUTS SEG NFR BLD AUTO: 63 % (ref 43–75)
NRBC BLD AUTO-RTO: 0 /100 WBCS
P AXIS: 62 DEGREES
P AXIS: 64 DEGREES
P AXIS: 64 DEGREES
P AXIS: 65 DEGREES
P AXIS: 65 DEGREES
PLATELET # BLD AUTO: 200 THOUSANDS/UL (ref 149–390)
PMV BLD AUTO: 12.5 FL (ref 8.9–12.7)
POTASSIUM SERPL-SCNC: 3.8 MMOL/L (ref 3.5–5.3)
PR INTERVAL: 194 MS
PR INTERVAL: 196 MS
PR INTERVAL: 200 MS
PR INTERVAL: 204 MS
PR INTERVAL: 204 MS
QRS AXIS: 14 DEGREES
QRS AXIS: 14 DEGREES
QRS AXIS: 15 DEGREES
QRS AXIS: 25 DEGREES
QRS AXIS: 7 DEGREES
QRSD INTERVAL: 82 MS
QRSD INTERVAL: 84 MS
QRSD INTERVAL: 88 MS
QT INTERVAL: 354 MS
QT INTERVAL: 404 MS
QT INTERVAL: 426 MS
QT INTERVAL: 442 MS
QT INTERVAL: 448 MS
QTC INTERVAL: 423 MS
QTC INTERVAL: 442 MS
QTC INTERVAL: 456 MS
QTC INTERVAL: 469 MS
QTC INTERVAL: 469 MS
RBC # BLD AUTO: 3.99 MILLION/UL (ref 3.81–5.12)
SODIUM SERPL-SCNC: 141 MMOL/L (ref 136–145)
T WAVE AXIS: 81 DEGREES
T WAVE AXIS: 87 DEGREES
T WAVE AXIS: 88 DEGREES
T WAVE AXIS: 89 DEGREES
T WAVE AXIS: 92 DEGREES
TSH SERPL DL<=0.05 MIU/L-ACNC: 1.95 UIU/ML (ref 0.36–3.74)
VENTRICULAR RATE: 66 BPM
VENTRICULAR RATE: 68 BPM
VENTRICULAR RATE: 69 BPM
VENTRICULAR RATE: 72 BPM
VENTRICULAR RATE: 86 BPM
WBC # BLD AUTO: 5.11 THOUSAND/UL (ref 4.31–10.16)

## 2021-03-24 PROCEDURE — 85025 COMPLETE CBC W/AUTO DIFF WBC: CPT | Performed by: STUDENT IN AN ORGANIZED HEALTH CARE EDUCATION/TRAINING PROGRAM

## 2021-03-24 PROCEDURE — 83735 ASSAY OF MAGNESIUM: CPT | Performed by: INTERNAL MEDICINE

## 2021-03-24 PROCEDURE — 83036 HEMOGLOBIN GLYCOSYLATED A1C: CPT | Performed by: INTERNAL MEDICINE

## 2021-03-24 PROCEDURE — 99222 1ST HOSP IP/OBS MODERATE 55: CPT | Performed by: INTERNAL MEDICINE

## 2021-03-24 PROCEDURE — 84443 ASSAY THYROID STIM HORMONE: CPT | Performed by: INTERNAL MEDICINE

## 2021-03-24 PROCEDURE — 82948 REAGENT STRIP/BLOOD GLUCOSE: CPT

## 2021-03-24 PROCEDURE — 80048 BASIC METABOLIC PNL TOTAL CA: CPT | Performed by: INTERNAL MEDICINE

## 2021-03-24 PROCEDURE — 93005 ELECTROCARDIOGRAM TRACING: CPT

## 2021-03-24 PROCEDURE — 93010 ELECTROCARDIOGRAM REPORT: CPT | Performed by: INTERNAL MEDICINE

## 2021-03-24 RX ORDER — FUROSEMIDE 10 MG/ML
40 INJECTION INTRAMUSCULAR; INTRAVENOUS
Status: DISCONTINUED | OUTPATIENT
Start: 2021-03-24 | End: 2021-03-26

## 2021-03-24 RX ORDER — LANOLIN ALCOHOL/MO/W.PET/CERES
3 CREAM (GRAM) TOPICAL
Status: DISCONTINUED | OUTPATIENT
Start: 2021-03-24 | End: 2021-03-27 | Stop reason: HOSPADM

## 2021-03-24 RX ADMIN — ENOXAPARIN SODIUM 40 MG: 40 INJECTION SUBCUTANEOUS at 08:49

## 2021-03-24 RX ADMIN — FLUTICASONE FUROATE AND VILANTEROL TRIFENATATE 1 PUFF: 100; 25 POWDER RESPIRATORY (INHALATION) at 09:01

## 2021-03-24 RX ADMIN — MONTELUKAST SODIUM 10 MG: 10 TABLET, FILM COATED ORAL at 22:37

## 2021-03-24 RX ADMIN — Medication 2000 UNITS: at 08:49

## 2021-03-24 RX ADMIN — LOSARTAN POTASSIUM 100 MG: 50 TABLET, FILM COATED ORAL at 08:49

## 2021-03-24 RX ADMIN — ATORVASTATIN CALCIUM 40 MG: 20 TABLET, FILM COATED ORAL at 08:49

## 2021-03-24 RX ADMIN — ALBUTEROL SULFATE 2 PUFF: 90 AEROSOL, METERED RESPIRATORY (INHALATION) at 00:00

## 2021-03-24 RX ADMIN — CLOPIDOGREL BISULFATE 150 MG: 75 TABLET, FILM COATED ORAL at 08:49

## 2021-03-24 RX ADMIN — ASPIRIN 81 MG: 81 TABLET, COATED ORAL at 08:49

## 2021-03-24 RX ADMIN — FUROSEMIDE 20 MG: 10 INJECTION, SOLUTION INTRAMUSCULAR; INTRAVENOUS at 09:25

## 2021-03-24 RX ADMIN — INSULIN GLARGINE 10 UNITS: 100 INJECTION, SOLUTION SUBCUTANEOUS at 22:37

## 2021-03-24 RX ADMIN — INSULIN LISPRO 1 UNITS: 100 INJECTION, SOLUTION INTRAVENOUS; SUBCUTANEOUS at 22:36

## 2021-03-24 RX ADMIN — ALBUTEROL SULFATE 2 PUFF: 90 AEROSOL, METERED RESPIRATORY (INHALATION) at 09:05

## 2021-03-24 RX ADMIN — MELATONIN TAB 3 MG 3 MG: 3 TAB at 01:28

## 2021-03-24 RX ADMIN — INSULIN LISPRO 7 UNITS: 100 INJECTION, SOLUTION INTRAVENOUS; SUBCUTANEOUS at 12:26

## 2021-03-24 RX ADMIN — INSULIN LISPRO 7 UNITS: 100 INJECTION, SOLUTION INTRAVENOUS; SUBCUTANEOUS at 18:05

## 2021-03-24 RX ADMIN — INSULIN LISPRO 7 UNITS: 100 INJECTION, SOLUTION INTRAVENOUS; SUBCUTANEOUS at 09:00

## 2021-03-24 RX ADMIN — ACETAMINOPHEN 650 MG: 325 TABLET, FILM COATED ORAL at 01:22

## 2021-03-24 RX ADMIN — FUROSEMIDE 40 MG: 10 INJECTION, SOLUTION INTRAVENOUS at 17:45

## 2021-03-24 RX ADMIN — INSULIN LISPRO 1 UNITS: 100 INJECTION, SOLUTION INTRAVENOUS; SUBCUTANEOUS at 12:27

## 2021-03-24 RX ADMIN — MELATONIN TAB 3 MG 3 MG: 3 TAB at 22:37

## 2021-03-24 RX ADMIN — FUROSEMIDE 40 MG: 10 INJECTION, SOLUTION INTRAVENOUS at 11:19

## 2021-03-24 NOTE — PROGRESS NOTES
[facility] Senior Admission Note   Unit/Bed # @DBLINK (JOSE,20968)@ Encounter: [id number]  SOD Team Vipul Amaya [de-identified] y o  female [id number]       Patient seen and examined  Reviewed H&P per Dr Sly Lam  Patient is a 35-year-old female past medical history significant for type 2 diabetes without current long-term use of insulin, history of cerebral vascular accident, who presented to [facility] 03/23/2020 secondary to shortness of breath  Patient on presentation was noted to have a BNP significantly elevated, mild pulmonary edema with trace effusion, patient ended to increasing shortness of breath on exertion as well as chest pain  Her last ischemic workup was 1 year ago, she admits to orthopnea, paroxysmal nocturnal dyspnea  Appears to be new onset heart failure feel less likely to be ACS  Will obtain echocardiogram, trend troponins, Cardiology consult, Lasix IV 20 mg b i d  As patient is Lasix naive, prophylactics potassium supplementation, daily weights, sodium and fluid restricted diet, telemetry      Assessment/Plan: Principal Problem:    Acute heart failure (HCC)  Active Problems:    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

## 2021-03-24 NOTE — PLAN OF CARE
Problem: SAFETY ADULT  Goal: Patient will remain free of falls  Description: INTERVENTIONS:  - Assess patient frequently for physical needs  -  Identify cognitive and physical deficits and behaviors that affect risk of falls    -  [place] fall precautions as indicated by assessment   - Educate patient/family on patient safety including physical limitations  - Instruct patient to call for assistance with activity based on assessment  - Modify environment to reduce risk of injury  - Consider OT/PT consult to assist with strengthening/mobility  Outcome: Progressing  Goal: Maintain or return to baseline ADL function  Description: INTERVENTIONS:  -  Assess patient's ability to carry out ADLs; assess patient's baseline for ADL function and identify physical deficits which impact ability to perform ADLs (bathing, care of mouth/teeth, toileting, grooming, dressing, etc )  - Assess/evaluate cause of self-care deficits   - Assess range of motion  - Assess patient's mobility; develop plan if impaired  - Assess patient's need for assistive devices and provide as appropriate  - Encourage maximum independence but intervene and supervise when necessary  - Involve family in performance of ADLs  - Assess for home care needs following discharge   - Consider OT consult to assist with ADL evaluation and planning for discharge  - Provide patient education as appropriate  Outcome: Progressing  Goal: Maintain or return mobility status to optimal level  Description: INTERVENTIONS:  - Assess patient's baseline mobility status (ambulation, transfers, stairs, etc )    - Identify cognitive and physical deficits and behaviors that affect mobility  - Identify mobility aids required to assist with transfers and/or ambulation (gait belt, sit-to-stand, lift, walker, cane, etc )  - [place] fall precautions as indicated by assessment  - Record patient progress and toleration of activity level on Mobility SBAR; progress patient to next Phase/Stage  - Instruct patient to call for assistance with activity based on assessment  - Consider rehabilitation consult to assist with strengthening/weightbearing, etc   Outcome: Progressing     Problem: NEUROSENSORY - ADULT  Goal: Achieves stable or improved neurological status  Description: INTERVENTIONS  - Monitor and report changes in neurological status  - Monitor vital signs such as temperature, blood pressure, glucose, and any other labs ordered   - Initiate measures to prevent increased intracranial pressure  - Monitor for seizure activity and implement precautions if appropriate      Outcome: Progressing  Goal: Remains free of injury related to seizures activity  Description: INTERVENTIONS  - Maintain airway, patient safety  and administer oxygen as ordered  - Monitor patient for seizure activity, document and report duration and description of seizure to physician/advanced practitioner  - If seizure occurs,  ensure patient safety during seizure  - Reorient patient post seizure  - Seizure pads on all 4 side rails  - Instruct patient/family to notify RN of any seizure activity including if an aura is experienced  - Instruct patient/family to call for assistance with activity based on nursing assessment  - Administer anti-seizure medications if ordered    Outcome: Progressing  Goal: Achieves maximal functionality and self care  Description: INTERVENTIONS  - Monitor swallowing and airway patency with patient fatigue and changes in neurological status  - Encourage and assist patient to increase activity and self care     - Encourage visually impaired, hearing impaired and aphasic patients to use assistive/communication devices  Outcome: Progressing     Problem: CARDIOVASCULAR - ADULT  Goal: Maintains optimal cardiac output and hemodynamic stability  Description: INTERVENTIONS:  - Monitor I/O, vital signs and rhythm  - Monitor for S/S and trends of decreased cardiac output  - Administer and titrate ordered vasoactive medications to optimize hemodynamic stability  - Assess quality of pulses, skin color and temperature  - Assess for signs of decreased coronary artery perfusion  - Instruct patient to report change in severity of symptoms  Outcome: Progressing  Goal: Absence of cardiac dysrhythmias or at baseline rhythm  Description: INTERVENTIONS:  - Continuous cardiac monitoring, vital signs, obtain 12 lead EKG if ordered  - Administer antiarrhythmic and heart rate control medications as ordered  - Monitor electrolytes and administer replacement therapy as ordered  Outcome: Progressing     Problem: RESPIRATORY - ADULT  Goal: Achieves optimal ventilation and oxygenation  Description: INTERVENTIONS:  - Assess for changes in respiratory status  - Assess for changes in mentation and behavior  - Position to facilitate oxygenation and minimize respiratory effort  - Oxygen administered by appropriate delivery if ordered  - Initiate smoking cessation education as indicated  - Encourage broncho-pulmonary hygiene including cough, deep breathe, Incentive Spirometry  - Assess the need for suctioning and aspirate as needed  - Assess and instruct to report SOB or any respiratory difficulty  - Respiratory Therapy support as indicated  Outcome: Progressing     Problem: GENITOURINARY - ADULT  Goal: Maintains or returns to baseline urinary function  Description: INTERVENTIONS:  - Assess urinary function  - Encourage oral fluids to ensure adequate hydration if ordered  - Administer IV fluids as ordered to ensure adequate hydration  - Administer ordered medications as needed  - Offer frequent toileting  - Follow urinary retention protocol if ordered  Outcome: Progressing  Goal: Absence of urinary retention  Description: INTERVENTIONS:  - Assess patients ability to void and empty bladder  - Monitor I/O  - Bladder scan as needed  - Discuss with physician/AP medications to alleviate retention as needed  - Discuss catheterization for long term situations as appropriate  Outcome: Progressing  Goal: Urinary catheter remains patent  Description: INTERVENTIONS:  - Assess patency of urinary catheter  - If patient has a chronic novak, consider changing catheter if non-functioning  - Follow guidelines for intermittent irrigation of non-functioning urinary catheter  Outcome: Progressing     Problem: METABOLIC, FLUID AND ELECTROLYTES - ADULT  Goal: Electrolytes maintained within normal limits  Description: INTERVENTIONS:  - Monitor labs and assess patient for signs and symptoms of electrolyte imbalances  - Administer electrolyte replacement as ordered  - Monitor response to electrolyte replacements, including repeat lab results as appropriate  - Instruct patient on fluid and nutrition as appropriate  Outcome: Progressing  Goal: Fluid balance maintained  Description: INTERVENTIONS:  - Monitor labs   - Monitor I/O and WT  - Instruct patient on fluid and nutrition as appropriate  - Assess for signs & symptoms of volume excess or deficit  Outcome: Progressing  Goal: Glucose maintained within target range  Description: INTERVENTIONS:  - Monitor Blood Glucose as ordered  - Assess for signs and symptoms of hyperglycemia and hypoglycemia  - Administer ordered medications to maintain glucose within target range  - Assess nutritional intake and initiate nutrition service referral as needed  Outcome: Progressing

## 2021-03-24 NOTE — PROGRESS NOTES
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, without long-term current use of insulin (HCC)    History of CVA with residual deficit    Stage 3a chronic kidney disease    Assessment/Plan:    1  Acute heart failure - New onset CHF with elevated BNP, evidence of pulmonary vascular congestion, improving with lasix dosage, considerations for ischemic vs  Non ischemic  Will obtain ECHO and perform evaluation for etiologies from there  ·  Pending echo to see etiology of aHF  Satting well, and she is tolerating 20 IV BID of lasix with good urine output - I and O are not accurate from yesterday  Will monitor K/Mg, I/O, weight; 0 010 troponin most likely 2/2 aHF rather than MI, but will continue to monitor  · Pending cards  Consult  · Monitor on tele  · Sodium restricted diet  · Daily weights  2   CKD stage 3a  ·  Continue losartan 100mg  3  Asthma   ·  On room air, no wheezing; Continue albuterol (2 puffs), montelukast 10mg, breo (1 puff)  4  T2DM  ·  Last glucose 113 + A1c 6 7, compliant with her diabetes medications and checks her blood sugars at least once each morning; Continue humalog 7 units, lantus 10u  5  History of CVA with residual deficit  ·  Continue lovenox 40mg, plavix 150mg, atorvastatin 40mg, aspirin 81  6  Hypertension - at goal BP  ·  Cont  Losartan  7  HLD  - Continue atorvastatin    8  Chest pain  - Minimal discomfort, not changing since admission with no radiation/change with exertion; most likely 2/2 to NSTEMI type II from [facility]; continue to monitor troponins,     9   Mild anemia  - Came slightly decreased from baseline of 11 (10 9 on admission, today 10 4); appears clinically stable, but if anemia worsens, will need to see PCP and possibly GI for colonoscopy     Subjective     80 f pmh asthma, dm2 non-insulin (dx 10 years ago, stepped down from taking insulin + metformin to just metformin), ckd 3a, stroke 2011 plavix with some left sided deficits, pulmonary artery aneurysm presenting with sob, orthopnea, chest heaviness since Friday  Came to the ED when inhalers (albuterol, breo) did not relieve SOB  She never had these symptoms before  She has not had any fevers, chills, palpitations, headaches, or dizziness  Endorses chest heaviness and swelling in the past 2 days  She endorses improvements in chest heaviness when she sits forward, and had trouble sleeping last night until she propped the bed up at 70-80 degrees  She had no CP, SOB, was on room air, and was comfortable + not in acute distress  Objective     Vitals: Temp (24hrs), Av 1 °F (36 7 °C), Min:97 3 °F (36 3 °C), Max:98 7 °F (37 1 °C)  Current: Temperature: 98 7 °F (37 1 °C)  Patient Vitals for the past 24 hrs:   BP Temp Temp src Pulse Resp SpO2 Height Weight   21 1034 134/74 98 7 °F (37 1 °C) -- 61 -- 98 % -- --   21 0720 138/75 (!) 97 3 °F (36 3 °C) -- 63 18 97 % -- --   21 0600 -- -- -- -- -- -- -- 76 3 kg (168 lb 3 4 oz)   21 0520 -- -- -- -- -- -- -- 76 3 kg (168 lb 3 4 oz)   21 0247 124/68 97 5 °F (36 4 °C) -- 67 18 94 % -- --   21 0119 137/74 -- -- 68 -- 97 % -- --   21 2253 136/73 97 9 °F (36 6 °C) -- 67 15 96 % -- --   21 1949 143/65 98 6 °F (37 °C) Oral 72 22 97 % 5' 4" (1 626 m) --   21 1906 -- -- -- -- -- -- -- 76 7 kg (169 lb)   21 1744 148/69 -- -- 70 (!) 24 99 % -- --   21 1515 122/62 -- -- 80 (!) 26 100 % -- --   21 1425 146/86 98 4 °F (36 9 °C) -- 92 22 97 % -- --    Body mass index is 28 87 kg/m²    Physical Exam:  GENERAL: NAD  HEENT:  NC/AT, PERRL, EOMI, no scleral icterus  CARDIAC:  RRR, +S1/S2, no S3/S4 heard, no m/g/r; JVD appreciated below right ear  PULMONARY:  CTA B/L, slight wheezing, mary on base  ABDOMEN:  Soft, slight discomfort on light palpitations (Upper quadrants); good bowel sounds  Extremities:  No edema, cyanosis, or clubbing; +1 on bilateral lower extremities   NEUROLOGIC: Grossly intact     SKIN:  No rashes or erythema noted on exposed skin  Psych: Normal affect    Invasive Devices     Peripheral Intravenous Line            Peripheral IV 03/23/21 Left Antecubital less than 1 day                    Labs:   Results from last 7 days   Lab Units 03/24/21 0450 03/23/21  1434   WBC Thousand/uL 5 11 6 01   HEMOGLOBIN g/dL 10 4* 10 9*   HEMATOCRIT % 32 8* 34 3*   PLATELETS Thousands/uL 200 209   NEUTROS PCT % 63 76*   MONOS PCT % 8 7      Results from last 7 days   Lab Units 03/24/21 0450 03/23/21 2131 03/23/21 1838 03/23/21  1439 03/23/21  1434   SODIUM mmol/L 141  --   --   --  141   POTASSIUM mmol/L 3 8  --   --   --  3 7   CHLORIDE mmol/L 109*  --   --   --  108   CO2 mmol/L 26  --   --   --  26   BUN mg/dL 23  --   --   --  25   CREATININE mg/dL 1 07  --   --   --  1 27   CALCIUM mg/dL 9 2  --   --   --  9 1   ALK PHOS U/L  --   --   --   --  89   ALT U/L  --   --   --   --  28   AST U/L  --   --   --   --  11   TROPONIN I ng/mL  --  0 10* 0 11* 0 09*  --    MAGNESIUM mg/dL 1 9  --   --   --   --    EGFR ml/min/1 73sq m 57  --   --   --  46             Results from last 7 days   Lab Units 03/23/21 2131 03/23/21 1838 03/23/21  1439   TROPONIN I ng/mL 0 10* 0 11* 0 09*     No results found for: PHART, XNU8KML, PO2ART, YOW1TEZ, Y4XOFXGV, BEART, SOURCE  No components found for: HIV1X2  No results found for: HAV, HEPAIGM, HEPBIGM, HEPBCAB, HBEAG, HEPCAB  No results found for: SPEP, UPEP   Lab Results   Component Value Date    HGBA1C 6 7 (H) 03/24/2021    HGBA1C 7 9 (H) 11/10/2020    HGBA1C 7 2 (H) 06/02/2020     No results found for: CHOL   Lab Results   Component Value Date    HDL 75 11/10/2020    HDL 53 06/02/2020    HDL 54 11/06/2019      Lab Results   Component Value Date    LDLCALC 56 11/10/2020    LDLCALC 74 06/02/2020    LDLCALC 48 11/06/2019      Lab Results   Component Value Date    TRIG 44 11/10/2020    TRIG 45 06/02/2020    TRIG 79 11/06/2019     No components found for: PROCAL      Micro:      Urinalysis:  No results found for: AMPHETUR, BDZUR, COCAINEUR, OPIATEUR, PCPUR, THCUR, ETOH, ACTMNPHEN, SALICYLATE       Invalid input(s): URIBILINOGEN        Intake and Outputs:  I/O       03/22 0701 - 03/23 0700 03/23 0701 - 03/24 0700 03/24 0701 - 03/25 0700    P  O   960 180    Total Intake(mL/kg)  960 (12 6) 180 (2 4)    Urine (mL/kg/hr)  1150     Total Output  1150     Net  -190 +180               Nutrition:  Diet Momo/CHO Controlled; Consistent Carbohydrate Diet Level 2 (5 carb servings/75 grams CHO/meal); Sodium 2 GM  Radiology Results:   XR chest portable   ED Interpretation by Saundra Porter MD (03/23 1539)   Pulmonary edema      Final Result by Armand Plascencia MD (03/23 1648)      Mild pulmonary edema with trace effusions                  Workstation performed: [id number]           Scheduled Medications:  aspirin, 81 mg, Daily  atorvastatin, 40 mg, Daily  cholecalciferol, 2,000 Units, Daily  clopidogrel, 150 mg, Daily  enoxaparin, 40 mg, Daily  fluticasone-vilanterol, 1 puff, Daily  furosemide, 40 mg, BID (diuretic)  insulin glargine, 10 Units, HS  insulin lispro, 1-5 Units, HS  insulin lispro, 1-6 Units, TID AC  insulin lispro, 7 Units, TID With Meals  losartan, 100 mg, Daily  melatonin, 3 mg, HS  montelukast, 10 mg, HS      PRN MEDS:  acetaminophen, 650 mg, Q4H PRN  albuterol, 2 puff, Q4H PRN      Last 24 Hour Meds: :   Medication Administration - last 24 hours from 03/23/2021 1036 to 03/24/2021 1036       Date/Time Order Dose Route Action Action by     03/23/2021 1525 aspirin chewable tablet 325 mg 325 mg Oral Given Florencia Teran RN     03/23/2021 1529 furosemide (LASIX) injection 40 mg 40 mg Intravenous Given Pearle Glass Bryant Falcon, RN     03/23/2021 2239 montelukast (SINGULAIR) tablet 10 mg 10 mg Oral Given Aneudy Norton     03/24/2021 0849 losartan (COZAAR) tablet 100 mg 100 mg Oral Given Jennifer Lima, YANNI     03/24/2021 9485 clopidogrel (PLAVIX) tablet 150 mg 150 mg Oral Given Jennifer Lima, RN     03/24/2021 0901 fluticasone-vilanterol (BREO ELLIPTA) 100-25 mcg/inh inhaler 1 puff 1 puff Inhalation Given Jennifer Lima, YANNI     03/24/2021 0849 atorvastatin (LIPITOR) tablet 40 mg 40 mg Oral Given Jennifer Lima, RN     03/24/2021 0905 albuterol (PROVENTIL HFA,VENTOLIN HFA) inhaler 2 puff 2 puff Inhalation Given Jennifer Lima, YANNI     03/24/2021 0000 albuterol (PROVENTIL HFA,VENTOLIN HFA) inhaler 2 puff 2 puff Inhalation Given Aneudy Norton     03/24/2021 0122 acetaminophen (TYLENOL) tablet 650 mg 650 mg Oral Given Aneudy Norton     03/23/2021 2025 potassium chloride (K-DUR,KLOR-CON) CR tablet 40 mEq 40 mEq Oral Given Aneudy Norton     03/24/2021 0849 enoxaparin (LOVENOX) subcutaneous injection 40 mg 40 mg Subcutaneous Given Jennifer Lima RN     03/24/2021 0925 furosemide (LASIX) injection 20 mg 20 mg Intravenous Given Jennifer Lima RN     03/24/2021 0849 aspirin (ECOTRIN LOW STRENGTH) EC tablet 81 mg 81 mg Oral Given Jennifer Lima, YANNI     03/24/2021 0900 insulin lispro (HumaLOG) 100 units/mL subcutaneous injection 7 Units 7 Units Subcutaneous Given Jennifer Lima RN     03/23/2021 2239 insulin glargine (LANTUS) subcutaneous injection 10 Units 0 1 mL 10 Units Subcutaneous Given Aneudy Norton     03/24/2021 0848 insulin lispro (HumaLOG) 100 units/mL subcutaneous injection 1-6 Units 1 Units Subcutaneous Not Given Jennifer Lima RN     03/23/2021 2239 insulin lispro (HumaLOG) 100 units/mL subcutaneous injection 1-5 Units 1 Units Subcutaneous Not Given Aneudy Norton     03/24/2021 0849 cholecalciferol (VITAMIN D3) tablet 2,000 Units 2,000 Units Oral Given Jennifer Lima RN     03/24/2021 0128 melatonin tablet 3 mg 3 mg Oral Given Aneudy Norton        VTE Pharmacologic Prophylaxis: None  VTE Mechanical Prophylaxis: None  Deleta Forbes    PLEASE NOTE:  This encounter was completed utilizing the [employer] Direct Speech Voice Recognition Software  Grammatical errors, random word insertions, pronoun errors and incomplete sentences are occasional consequences of the system due to software limitations, ambient noise and hardware issues  These may be missed by proof reading prior to affixing electronic signature  Any questions or concerns about the content, text or information contained within the body of this dictation should be directly addressed to the physician for clarification  Please do not hesitate to call me directly if you have any any questions or concerns

## 2021-03-24 NOTE — CONSULTS
Consultation - Cardiology Team One  Vasiliy Stephens [de-identified] y o  female MRN: [id number]  Unit/Bed#: -01 Encounter: [id number]    Consults    Physician Requesting Consult: Roddy Desir MD  Reason for Consult / Principal Problem: CHF    Assessment:    1  Acute diastolic CHF:  Presents with chest tightness, orthopnea, dyspnea, lower extremity edema likely in the setting of increased sodium in the last week  NT proBNP 4131  CXR mild pulmonary edema with trace effusions  Received 40 mg IV Lasix in the ED and started on 20 IV BID  · Echocardiogram 10/01/2018:  Normal RV systolic function EF 26%, no WMA, G1DD, mild concentric LVH  Normal RV size and function  Moderate LA dilatation  Atrial septal aneurysm  No significant valvular abnormality  · Net output -10 mL  2  Non MI troponin elevation:  In the setting of CHF exacerbation  EKG with no acute ischemic change  Nuclear stress test 02/2019 with no diagnostic perfusion abnormality  3  Essential hypertension:  Average /72 on losartan 100 mg daily  4  Hyperlipidemia:  Lipid panel 11/2020 , TG 44, HDL 75, LDL 56 on atorvastatin 40 mg daily  5  Type 2 DM:  Hemoglobin A1c 7 9 in 11/2020  Management per primary team  6  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  Maintained on Plavix and statin  Plan/Recommendations:  · Follow-up on echocardiogram  · Continue IV diuresis with Lasix 40 mg BID  · Monitor I&Os, renal function, electrolytes and standing weight  · Low-sodium (<2 g), fluid-restricted (<1800 mL) diet  · Maintain potassium >4 and magnesium >2  · Continue remaining medications    ____________________________________________________________    CC:  Chest pain      History of Present Illness   HPI: Vasiliy Stephens is a [de-identified]y o  year old female who has essential hypertension, hyperlipidemia, type 2 DM, CVA in 2011 with residual left-sided deficits who follows with cardiologist Dr Lorena Pettit    Patient had a tele visit on 04/09/2020 for patient denied any cardiac complaints  She is maintained on high dose Plavix therapy because of subtherapeutic affect per P2Y12 testing and denies any adverse bleeding  No medication changes were made  She was advised to continue atorvastatin 40 mg daily, Plavix 150 mg daily, losartan 100 mg daily  She presented to the emergency room at [facility] with chest tightness, shortness of breath, orthopnea and lower extremity edema that started on Friday and hasd progressively worsened  Her family convinced her to seek evaluation  On arrival to the ED patient's vital signs were stable with oxygen saturation 97% on RA  Weight 169 lb  In the ED EKG revealed sinus rhythm with PVCs  CXR revealed mild pulmonary edema with trace effusions  Labs revealed stable CMP, hemoglobin 10 9 otherwise stable CBC, NT proBNP 4131, troponin 0 09--> 0 11--> 0 10  Negative COVID-19/RSV/influenza  Patient received 324 mg of aspirin and 40 mg IV Lasix in the ED and admitted with CHF exacerbation  She was continue on IV Lasix 20 mg BID  An echocardiogram was ordered and cardiology has been consulted for further evaluation management of acute heart failure  Patient resting in bed during consultation with some conversive dyspnea  She states that her symptoms started on Friday with shortness of breath and chest tightness  Over the weekend she started developed some orthopnea and noted lower extremity edema yesterday  Currently her breathing feels a little better but is not quite at baseline  She denies any fever, chills  She continues with orthopnea  Over the last week she has eat saltier food with lunch meats, canned soup and canned tuna  24 hour Holter monitor 05/07/2020:  Sinus rhythm with average heart rate 62 bpm, occasional PVCs, rare PACs, no high degree AVB or pauses  No symptom diary attached  Nuclear stress test 01/11/2019:  Likely normal study after vasodilatation    Image artifact without diagnostic evidence perfusion abnormality  LV function 54%  Echocardiogram 10/01/2018:  Normal RV systolic function EF 99%, no WMA, G1DD, mild concentric LVH  Normal RV size and function  Moderate LA dilatation  Atrial septal aneurysm  No significant valvular abnormality  EKG reviewed personally: 3/24/2021-sinus rhythm at a rate of 66 beats per minute with normal axis, first-degree AVB, nonspecific T-wave abnormality  When compared to prior EKG from 03/23/2021 PVCs were noted otherwise no significant change  Telemetry reviewed personally:  Sinus rhythm        Review of Systems   Constitution: Negative  Negative for chills  Cardiovascular: Positive for leg swelling and orthopnea  Negative for chest pain, dyspnea on exertion, near-syncope, palpitations, paroxysmal nocturnal dyspnea and syncope  Respiratory: Positive for cough and shortness of breath  Negative for wheezing  Endocrine: Negative  Hematologic/Lymphatic: Negative  Skin: Negative  Musculoskeletal: Negative  Gastrointestinal: Negative  Negative for diarrhea, nausea and vomiting  Neurological: Negative for dizziness, light-headedness and weakness  Psychiatric/Behavioral: Negative  Negative for altered mental status  All other systems reviewed and are negative      Historical Information   Past Medical History:   Diagnosis Date    ACE-inhibitor cough     last assessed - [id number]    Asthma     Bilateral edema of lower extremity     last assessed - [id number]    Cardiac murmur     last assessed - [id number]    CKD (chronic kidney disease)     Diabetes mellitus ([facility] [address] )     last assessed - [id number]    Esophageal dysphagia     Fatigue     last assessed - [id number]    Hyperlipidemia     Hypertension     Patellar bursitis of right knee     last assessed - [id number]    Personal history of other specified conditions     presenting hazards to health    Stroke [facility])     Stroke syndrome     Urinary frequency  UTI (urinary tract infection)      Past Surgical History:   Procedure Laterality Date    ID ESOPHAGOGASTRODUODENOSCOPY TRANSORAL DIAGNOSTIC N/A 2017    Procedure: ESOPHAGOGASTRODUODENOSCOPY (EGD); Surgeon: Luda Saldana MD;  Location: [facility];   Service: Gastroenterology     Social History     Substance and Sexual Activity   Alcohol Use Never    Frequency: Never    Comment: Denied history of alcohol use     Social History     Substance and Sexual Activity   Drug Use No    Comment: Denied history of drug use     Social History     Tobacco Use   Smoking Status Former Smoker    Packs/day: 3 00    Quit date: 36    Years since quittin 2   Smokeless Tobacco Former User   Tobacco Comment    quit over 30 yrs ago; (quit in the , had smoked 3PPD for 20 years - per [employer])     Family History:   Family History   Problem Relation Age of Onset    Heart disease Father     Hypertension Mother     Stroke Mother     Other Sister         1)Breast disorder; 2)Epilepsy   Maybeury Santhosh Migraines Sister         Migraine headaches    Osteoporosis Sister     Thyroid disease Sister     Coronary artery disease Sister         S/P triple vessel bypass    Osteoporosis Maternal Grandmother     Diabetes Son         Diabetes mellitus    Hypertension Son     Rheum arthritis Son         Rheumatic disease    Heart disease Family        Meds/Allergies   all current active meds have been reviewed, current meds:   Current Facility-Administered Medications   Medication Dose Route Frequency    acetaminophen (TYLENOL) tablet 650 mg  650 mg Oral Q4H PRN    albuterol (PROVENTIL HFA,VENTOLIN HFA) inhaler 2 puff  2 puff Inhalation Q4H PRN    aspirin (ECOTRIN LOW STRENGTH) EC tablet 81 mg  81 mg Oral Daily    atorvastatin (LIPITOR) tablet 40 mg  40 mg Oral Daily    cholecalciferol (VITAMIN D3) tablet 2,000 Units  2,000 Units Oral Daily    clopidogrel (PLAVIX) tablet 150 mg  150 mg Oral Daily    enoxaparin (LOVENOX) subcutaneous injection 40 mg  40 mg Subcutaneous Daily    fluticasone-vilanterol (BREO ELLIPTA) 100-25 mcg/inh inhaler 1 puff  1 puff Inhalation Daily    furosemide (LASIX) injection 20 mg  20 mg Intravenous BID (diuretic)    insulin glargine (LANTUS) subcutaneous injection 10 Units 0 1 mL  10 Units Subcutaneous HS    insulin lispro (HumaLOG) 100 units/mL subcutaneous injection 1-5 Units  1-5 Units Subcutaneous HS    insulin lispro (HumaLOG) 100 units/mL subcutaneous injection 1-6 Units  1-6 Units Subcutaneous TID AC    insulin lispro (HumaLOG) 100 units/mL subcutaneous injection 7 Units  7 Units Subcutaneous TID With Meals    losartan (COZAAR) tablet 100 mg  100 mg Oral Daily    melatonin tablet 3 mg  3 mg Oral HS    montelukast (SINGULAIR) tablet 10 mg  10 mg Oral HS    and PTA meds:   Prior to Admission Medications   Prescriptions Last Dose Informant Patient Reported? Taking? Blood Glucose Monitoring Suppl (FREESTYLE LITE) JAQUELIN  Self No No   Sig: by Does not apply route daily   Breo Ellipta 100-25 MCG/INH inhaler 3/23/2021 at Unknown time  Yes Yes   [employer] D3 50 MCG ( [place]) CAPS 3/22/2021 at Unknown time  No Yes   Sig: Take 1 capsule (2,000 Units total) by mouth daily   Continuous Blood Gluc  (FREESTYLE ADAM READER) JAQUELIN 3/23/2021 at Unknown time  No Yes   Si Device by Does not apply route 4 (four) times a day   Continuous Blood Gluc Sensor (FreeStyle Adam 14 Day Sensor) MISC 3/23/2021 at Unknown time  No Yes   Sig: Use one sensor every 14 days   GLOBAL EASE INJECT PEN NEEDLES 31G X 5 MM MISC 3/23/2021 at Unknown time Self Yes Yes   Glucose-Vitamin C-Vitamin D (TRUEPLUS GLUCOSE ON THE GO) CHEW Not Taking at Unknown time Self Yes No   Sig: CHEW 2 TABS AS NEEDED FOR BLOOD SUGAR LESS THAN 80   Lancets (FREESTYLE) lancets 3/23/2021 at Unknown time Self No Yes   Sig: Use as instructed   Misc   Devices (QUAD CANE) MISC 3/23/2021 at Unknown time  No Yes   Sig: by Does not apply route daily acetaminophen (TYLENOL) 650 mg CR tablet Past Week at Unknown time Self Yes Yes   Sig: Take 650 mg by mouth every 6 (six) hours as needed for mild pain   albuterol (PROVENTIL HFA,VENTOLIN HFA) 90 mcg/act inhaler 3/23/2021 at Unknown time  Yes Yes   Sig: Inhale 2 puffs every 4 (four) hours as needed   atorvastatin (LIPITOR) 40 mg tablet 3/23/2021 at Unknown time  No Yes   Sig: Take 1 tablet (40 mg total) by mouth daily   clopidogrel (PLAVIX) 75 mg tablet 3/23/2021 at Unknown time  No Yes   Sig: Take 2 tablets (150 mg total) by mouth daily   glucose blood (FREESTYLE LITE) test strip 3/23/2021 at Unknown time  No Yes   Sig: TEST AS DIRECTED UP TO FOUR TIMES A DAY   losartan (COZAAR) 100 MG tablet 3/22/2021 at Unknown time  No Yes   Sig: Take 1 tablet (100 mg total) by mouth daily   metFORMIN (GLUCOPHAGE) 500 mg tablet 3/23/2021 at Unknown time  No Yes   Sig: TAKE 1 TABLET BY MOUTH TWICE A DAY WITH MEALS   montelukast (SINGULAIR) 10 mg tablet 3/22/2021 at Unknown time  No Yes   Sig: Take 1 tablet (10 mg total) by mouth daily at bedtime   sitaGLIPtin (JANUVIA) 100 mg tablet 3/22/2021 at Unknown time  No Yes   Sig: Take 1 tablet (100 mg total) by mouth daily      Facility-Administered Medications: None          No Known Allergies    Objective   Vitals: Blood pressure 138/75, pulse 63, temperature (!) 97 3 °F (36 3 °C), resp  rate 18, height 5' 4" (1 626 m), weight 76 3 kg (168 lb 3 4 oz), SpO2 97 %, not currently breastfeeding ,     Body mass index is 28 87 kg/m²  ,     Systolic (97OKS), DUQ:059 , Min:122 , RYN:740     Diastolic (40TRJ), ZNT:99, Min:62, Max:86    Wt Readings from Last 3 Encounters:   03/24/21 76 3 kg (168 lb 3 4 oz)   03/04/21 75 8 kg (167 lb)   12/10/20 76 7 kg (169 lb 3 2 oz)      Lab Results   Component Value Date    CREATININE 1 07 03/24/2021    CREATININE 1 27 03/23/2021    CREATININE 1 03 12/22/2020             Intake/Output Summary (Last 24 hours) at 3/24/2021 5376  Last data filed at 3/24/2021 8359  Gross per 24 hour   Intake 1140 ml   Output 1150 ml   Net -10 ml     Weight (last 2 days)     Date/Time   Weight    03/24/21 0600   76 3 (168 21)    03/24/21 0520   76 3 (168 21)    03/23/21 1906   76 7 (169)            Invasive Devices     Peripheral Intravenous Line            Peripheral IV 03/23/21 Left Antecubital less than 1 day                  Physical Exam  Vitals signs and nursing note reviewed  Constitutional:       General: She is not in acute distress  Appearance: She is well-developed  Comments: On RA with some conversive dyspnea in NAD   HENT:      Head: Normocephalic and atraumatic  Neck:      Musculoskeletal: Normal range of motion and neck supple  Vascular: No JVD  Cardiovascular:      Rate and Rhythm: Normal rate and regular rhythm  Heart sounds: Normal heart sounds  No murmur  No friction rub  Pulmonary:      Effort: Pulmonary effort is normal  No respiratory distress  Breath sounds: Rales present  No wheezing  Comments: Bibasilar rales  Abdominal:      General: Bowel sounds are normal  There is no distension  Palpations: Abdomen is soft  Tenderness: There is no abdominal tenderness  Musculoskeletal: Normal range of motion  General: No tenderness  Right lower leg: Edema present  Left lower leg: Edema present  Skin:     General: Skin is warm and dry  Findings: No erythema  Neurological:      Mental Status: She is alert and oriented to person, place, and time  Psychiatric:         Mood and Affect: Mood normal          Behavior: Behavior normal          Thought Content:  Thought content normal          Judgment: Judgment normal            LABORATORY RESULTS:  Results from last 7 days   Lab Units 03/23/21  2131 03/23/21  1838 03/23/21  1439   TROPONIN I ng/mL 0 10* 0 11* 0 09*     CBC with diff:   Results from last 7 days   Lab Units 03/24/21  0450 03/23/21  1434   WBC Thousand/uL 5 11 6 01   HEMOGLOBIN g/dL 10 4* 10 9* HEMATOCRIT % 32 8* 34 3*   MCV fL 82 82   PLATELETS Thousands/uL 200 209   MCH pg 26 1* 26 1*   MCHC g/dL 31 7 31 8   RDW % 14 5 14 5   MPV fL 12 5 12 1   NRBC AUTO /100 WBCs 0 0       CMP:  Results from last 7 days   Lab Units 21  0450 21  1434   POTASSIUM mmol/L 3 8 3 7   CHLORIDE mmol/L 109* 108   CO2 mmol/L 26 26   BUN mg/dL 23 25   CREATININE mg/dL 1 07 1 27   CALCIUM mg/dL 9 2 9 1   AST U/L  --  11   ALT U/L  --  28   ALK PHOS U/L  --  89   EGFR ml/min/1 73sq m 57 46       BMP:  Results from last 7 days   Lab Units 21  0450 21  1434   POTASSIUM mmol/L 3 8 3 7   CHLORIDE mmol/L 109* 108   CO2 mmol/L 26 26   BUN mg/dL 23 25   CREATININE mg/dL 1 07 1 27   CALCIUM mg/dL 9 2 9 1          Lab Results   Component Value Date    NTBNP 4,131 (H) 2021    NTBNP 82 2018    NTBNP 164 2018            Results from last 7 days   Lab Units 21  0450   MAGNESIUM mg/dL 1 9          Results from last 7 days   Lab Units 21  0450   HEMOGLOBIN A1C % 6 7*          Results from last 7 days   Lab Units 21  0450   TSH 3RD GENERATON uIU/mL 1 950           Lipid Profile:   No results found for: CHOL  Lab Results   Component Value Date    HDL 75 11/10/2020    HDL 53 2020    HDL 54 2019     Lab Results   Component Value Date    LDLCALC 56 11/10/2020    LDLCALC 74 2020    LDLCALC 48 2019     Lab Results   Component Value Date    TRIG 44 11/10/2020    TRIG 45 2020    TRIG 79 2019         Cardiac testing:   Results for orders placed during the hospital encounter of 10/01/18   Echo complete with contrast if indicated    Noel Flowers [address]  [facility], [address] [place] [address]  [phone]    Transthoracic Echocardiogram  2D, M-mode, Doppler, and Color Doppler    Study date:  01-Oct-2018    Patient: Paula Dove  MR number: [id number]  Account number: [de-identified]  : 1940  Age: 66 years  Gender: Female  Status: Outpatient  Location: Bedside  Height: 64 in  Weight: 179 lb  BP: 160/ 82 mmHg    Indications: Evaluate for suspected cardiac source of embolism  Diagnoses: G45 9 - Transient cerebral ischemic attack, unspecified    Sonographer:  WILIAN Fung  Primary Physician:  Suzanne Rivas PA-C  Referring Physician:  Jenny Caba DO  Group:  [id number]  Interpreting Physician:  Jonnathan Birch DO    SUMMARY    LEFT VENTRICLE:  Systolic function was normal  Ejection fraction was estimated to be 55 %  There were no regional wall motion abnormalities  Wall thickness was mildly to moderately increased  There was mild concentric hypertrophy  Doppler parameters were consistent with abnormal left ventricular relaxation (grade 1 diastolic dysfunction)  VENTRICULAR SEPTUM:  There was dyssynergic motion  RIGHT VENTRICLE:  The size was normal   Systolic function was normal     LEFT ATRIUM:  The atrium was moderately dilated  ATRIAL SEPTUM:  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  MITRAL VALVE:  There was mild regurgitation  AORTIC VALVE:  There was mild regurgitation  TRICUSPID VALVE:  There was mild regurgitation  HISTORY: PRIOR HISTORY: CVA; Diabetes; Edema; Aortic regurgitation; Mitral regurgitation; Hypertension; Hyperlipidemia; Chronic kidney disease    PROCEDURE: The procedure was performed at the bedside  This was a routine study  The transthoracic approach was used  The study included complete 2D imaging, M-mode, complete spectral Doppler, and color Doppler  The heart rate was 59 bpm,  at the start of the study  Images were obtained from the parasternal, apical, subcostal, and suprasternal notch acoustic windows  Echocardiographic views were limited due to lung interference  Image quality was adequate  LEFT VENTRICLE: Size was normal  Systolic function was normal  Ejection fraction was estimated to be 55 %  There were no regional wall motion abnormalities   Wall thickness was mildly to moderately increased  There was mild concentric  hypertrophy  DOPPLER: Doppler parameters were consistent with abnormal left ventricular relaxation (grade 1 diastolic dysfunction)  VENTRICULAR SEPTUM: There was dyssynergic motion  RIGHT VENTRICLE: The size was normal  Systolic function was normal  Wall thickness was normal     LEFT ATRIUM: The atrium was moderately dilated  No mass was present  ATRIAL SEPTUM: 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  RIGHT ATRIUM: Size was normal     MITRAL VALVE: Valve structure was normal  There was normal leaflet separation  DOPPLER: The transmitral velocity was within the normal range  There was no evidence for stenosis  There was mild regurgitation  AORTIC VALVE: The valve was trileaflet  Leaflets exhibited normal cuspal separation and sclerosis  DOPPLER: Transaortic velocity was within the normal range  There was no evidence for stenosis  There was mild regurgitation  TRICUSPID VALVE: The valve structure was normal  There was normal leaflet separation  DOPPLER: The transtricuspid velocity was within the normal range  There was no evidence for stenosis  There was mild regurgitation  The tricuspid jet  envelope definition was inadequate for estimation of RV systolic pressure  There are no indirect findings (abnormal RV volume or geometry, altered pulmonary flow velocity profile, or leftward septal displacement) which would suggest  moderate or severe pulmonary hypertension  PULMONIC VALVE: Leaflets exhibited normal thickness, no calcification, and normal cuspal separation  DOPPLER: The transpulmonic velocity was within the normal range  There was trace regurgitation  PERICARDIUM: There was no pericardial effusion  The pericardium was normal in appearance  AORTA: The root exhibited normal size  SYSTEMIC VEINS: IVC: The inferior vena cava was normal in size and course   Respirophasic changes were normal     SYSTEM MEASUREMENT TABLES    2D  %FS: 21 08 %  Ao Diam: 3 44 cm  EDV(Teich): 59 39 ml  EF(Teich): 43 61 %  ESV(Teich): 33 49 ml  IVSd: 1 42 cm  LA Area: 30 05 cm2  LA Diam: 3 94 cm  LVEDV MOD A4C: 135 45 ml  LVEF MOD A4C: 42 71 %  LVESV MOD A4C: 77 6 ml  LVIDd: 3 73 cm  LVIDs: 2 95 cm  LVLd A4C: 8 3 cm  LVLs A4C: 7 39 cm  LVPWd: 1 44 cm  RA Area: 14 79 cm2  RVIDd: 4 08 cm  SV MOD A4C: 57 85 ml  SV(Teich): 25 9 ml    MM  TAPSE: 1 95 cm    PW  E': 0 02 m/s  E/E': 23 11  MV A Kofi: 0 79 m/s  MV Dec [place]: 2 65 m/s2  MV DecT: 193 84 ms  MV E Kofi: 0 51 m/s  MV E/A Ratio: 0 65  MV PHT: 56 21 ms  MVA By PHT: 3 91 cm2    [facility] Accredited [facility]ch[facility]    Prepared and electronically signed by    Luis Fernando Das DO  Signed 02-Oct-2018 08:25:16       No results found for this or any previous visit  No procedure found  No results found for this or any previous visit  Imaging: I have personally reviewed pertinent reports  Xr Chest Portable    Result Date: 3/23/2021  Narrative: CHEST INDICATION:   cp  COMPARISON:  Chest radiograph from 10/1/2018 and chest CT from 1/10/2019  EXAM PERFORMED/VIEWS:  XR CHEST PORTABLE  FINDINGS: Mild cardiomegaly  Mild pulmonary edema  Trace effusions  No pneumothorax  Osseous structures normal for age  Impression: Mild pulmonary edema with trace effusions  Workstation performed: [id number]       Counseling / Coordination of Care  Total floor / unit time spent today 45 minutes  Greater than 50% of total time was spent with the patient and / or family counseling and / or coordination of care  A description of the counseling / coordination of care: Review of history, current assessment, development of a plan  Code Status: Level 1 - Full Code    ** Please Note: Dragon 360 Dictation voice to text software may have been used in the creation of this document   **

## 2021-03-25 ENCOUNTER — APPOINTMENT (INPATIENT)
Dept: NON INVASIVE DIAGNOSTICS | Facility: HOSPITAL | Age: 81
DRG: 246 | End: 2021-03-25
Payer: COMMERCIAL

## 2021-03-25 LAB
ALBUMIN SERPL BCP-MCNC: 3.4 G/DL (ref 3.5–5)
ALP SERPL-CCNC: 78 U/L (ref 46–116)
ALT SERPL W P-5'-P-CCNC: 28 U/L (ref 12–78)
ANION GAP SERPL CALCULATED.3IONS-SCNC: 4 MMOL/L (ref 4–13)
AST SERPL W P-5'-P-CCNC: 30 U/L (ref 5–45)
BASOPHILS # BLD AUTO: 0.04 THOUSANDS/ΜL (ref 0–0.1)
BASOPHILS NFR BLD AUTO: 1 % (ref 0–1)
BILIRUB SERPL-MCNC: 0.68 MG/DL (ref 0.2–1)
BUN SERPL-MCNC: 33 MG/DL (ref 5–25)
CALCIUM ALBUM COR SERPL-MCNC: 9.8 MG/DL (ref 8.3–10.1)
CALCIUM SERPL-MCNC: 9.3 MG/DL (ref 8.3–10.1)
CHLORIDE SERPL-SCNC: 107 MMOL/L (ref 100–108)
CO2 SERPL-SCNC: 28 MMOL/L (ref 21–32)
CREAT SERPL-MCNC: 1.15 MG/DL (ref 0.6–1.3)
EOSINOPHIL # BLD AUTO: 0.09 THOUSAND/ΜL (ref 0–0.61)
EOSINOPHIL NFR BLD AUTO: 2 % (ref 0–6)
ERYTHROCYTE [DISTWIDTH] IN BLOOD BY AUTOMATED COUNT: 14.5 % (ref 11.6–15.1)
GFR SERPL CREATININE-BSD FRML MDRD: 52 ML/MIN/1.73SQ M
GLUCOSE SERPL-MCNC: 102 MG/DL (ref 65–140)
GLUCOSE SERPL-MCNC: 118 MG/DL (ref 65–140)
GLUCOSE SERPL-MCNC: 150 MG/DL (ref 65–140)
GLUCOSE SERPL-MCNC: 152 MG/DL (ref 65–140)
GLUCOSE SERPL-MCNC: 63 MG/DL (ref 65–140)
HCT VFR BLD AUTO: 34.3 % (ref 34.8–46.1)
HGB BLD-MCNC: 10.8 G/DL (ref 11.5–15.4)
IMM GRANULOCYTES # BLD AUTO: 0.02 THOUSAND/UL (ref 0–0.2)
IMM GRANULOCYTES NFR BLD AUTO: 0 % (ref 0–2)
LYMPHOCYTES # BLD AUTO: 1.13 THOUSANDS/ΜL (ref 0.6–4.47)
LYMPHOCYTES NFR BLD AUTO: 19 % (ref 14–44)
MCH RBC QN AUTO: 25.8 PG (ref 26.8–34.3)
MCHC RBC AUTO-ENTMCNC: 31.5 G/DL (ref 31.4–37.4)
MCV RBC AUTO: 82 FL (ref 82–98)
MONOCYTES # BLD AUTO: 0.45 THOUSAND/ΜL (ref 0.17–1.22)
MONOCYTES NFR BLD AUTO: 8 % (ref 4–12)
NEUTROPHILS # BLD AUTO: 4.15 THOUSANDS/ΜL (ref 1.85–7.62)
NEUTS SEG NFR BLD AUTO: 70 % (ref 43–75)
NRBC BLD AUTO-RTO: 0 /100 WBCS
PLATELET # BLD AUTO: 206 THOUSANDS/UL (ref 149–390)
PMV BLD AUTO: 12.3 FL (ref 8.9–12.7)
POTASSIUM SERPL-SCNC: 4.2 MMOL/L (ref 3.5–5.3)
PROT SERPL-MCNC: 7.4 G/DL (ref 6.4–8.2)
RBC # BLD AUTO: 4.18 MILLION/UL (ref 3.81–5.12)
SODIUM SERPL-SCNC: 139 MMOL/L (ref 136–145)
WBC # BLD AUTO: 5.88 THOUSAND/UL (ref 4.31–10.16)

## 2021-03-25 PROCEDURE — 82948 REAGENT STRIP/BLOOD GLUCOSE: CPT

## 2021-03-25 PROCEDURE — 80053 COMPREHEN METABOLIC PANEL: CPT | Performed by: INTERNAL MEDICINE

## 2021-03-25 PROCEDURE — 93356 MYOCRD STRAIN IMG SPCKL TRCK: CPT

## 2021-03-25 PROCEDURE — 93306 TTE W/DOPPLER COMPLETE: CPT

## 2021-03-25 PROCEDURE — 93306 TTE W/DOPPLER COMPLETE: CPT | Performed by: INTERNAL MEDICINE

## 2021-03-25 PROCEDURE — 99232 SBSQ HOSP IP/OBS MODERATE 35: CPT | Performed by: INTERNAL MEDICINE

## 2021-03-25 PROCEDURE — 85025 COMPLETE CBC W/AUTO DIFF WBC: CPT | Performed by: INTERNAL MEDICINE

## 2021-03-25 RX ADMIN — ASPIRIN 81 MG: 81 TABLET, COATED ORAL at 08:38

## 2021-03-25 RX ADMIN — INSULIN GLARGINE 10 UNITS: 100 INJECTION, SOLUTION SUBCUTANEOUS at 21:57

## 2021-03-25 RX ADMIN — CLOPIDOGREL BISULFATE 150 MG: 75 TABLET, FILM COATED ORAL at 08:39

## 2021-03-25 RX ADMIN — MONTELUKAST SODIUM 10 MG: 10 TABLET, FILM COATED ORAL at 21:57

## 2021-03-25 RX ADMIN — FLUTICASONE FUROATE AND VILANTEROL TRIFENATATE 1 PUFF: 100; 25 POWDER RESPIRATORY (INHALATION) at 08:43

## 2021-03-25 RX ADMIN — ATORVASTATIN CALCIUM 40 MG: 20 TABLET, FILM COATED ORAL at 08:38

## 2021-03-25 RX ADMIN — FUROSEMIDE 40 MG: 10 INJECTION, SOLUTION INTRAVENOUS at 08:43

## 2021-03-25 RX ADMIN — INSULIN LISPRO 7 UNITS: 100 INJECTION, SOLUTION INTRAVENOUS; SUBCUTANEOUS at 12:10

## 2021-03-25 RX ADMIN — INSULIN LISPRO 1 UNITS: 100 INJECTION, SOLUTION INTRAVENOUS; SUBCUTANEOUS at 12:10

## 2021-03-25 RX ADMIN — ENOXAPARIN SODIUM 40 MG: 40 INJECTION SUBCUTANEOUS at 08:42

## 2021-03-25 RX ADMIN — INSULIN LISPRO 7 UNITS: 100 INJECTION, SOLUTION INTRAVENOUS; SUBCUTANEOUS at 08:44

## 2021-03-25 RX ADMIN — Medication 2000 UNITS: at 08:37

## 2021-03-25 RX ADMIN — LOSARTAN POTASSIUM 100 MG: 50 TABLET, FILM COATED ORAL at 08:39

## 2021-03-25 RX ADMIN — FUROSEMIDE 40 MG: 10 INJECTION, SOLUTION INTRAVENOUS at 17:17

## 2021-03-25 RX ADMIN — INSULIN LISPRO 1 UNITS: 100 INJECTION, SOLUTION INTRAVENOUS; SUBCUTANEOUS at 21:59

## 2021-03-25 RX ADMIN — MELATONIN TAB 3 MG 3 MG: 3 TAB at 21:57

## 2021-03-25 NOTE — PROGRESS NOTES
General Cardiology   Progress Note -  Team One   Vasiliy Stephens [de-identified] y o  female MRN: [id number]    Unit/Bed#: -01 Encounter: [id number]    Assessment:    1  Acute diastolic CHF:  Presents with chest tightness, orthopnea, dyspnea, lower extremity edema likely in the setting of increased sodium in the last week  NT proBNP 4131  CXR mild pulmonary edema with trace effusions  Currently on IV Lasix 40 mg BID  · Echocardiogram 10/01/2018:  Normal RV systolic function EF 26%, no WMA, G1DD, mild concentric LVH  Normal RV size and function  Moderate LA dilatation  Atrial septal aneurysm  No significant valvular abnormality  · Weight 169 lb on admission  · Output 24 hours -532 mL (likely inaccurate)  Net output -1 5 L  · Weight 165 lb today  · Continues to appear volume overloaded on exam with edema bibasilar rales  2  Non MI troponin elevation:  In the setting of CHF exacerbation  EKG with no acute ischemic change  Nuclear stress test 02/2019 with no diagnostic perfusion abnormality  3  Essential hypertension:  Average /69 on losartan 100 mg daily and IV Lasix  4  Hyperlipidemia:  Lipid panel 11/2020 , TG 44, HDL 75, LDL 56 on atorvastatin 40 mg daily  5  Type 2 DM:  Hemoglobin A1c 7 9 in 11/2020  Management per primary team  6  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  Maintained on Plavix and statin           Plan/Recommendations:  · Continue IV diuresis with Lasix 40 mg BID today and re-evaluate tomorrow  · Monitor I&Os, renal function, electrolytes and standing weight  · Low-sodium (<2 g), fluid-restricted (<1800 mL) diet  · Maintain potassium >4 and magnesium >2  · Continue remaining medications  · Follow-up on echocardiogram  ____________________________________________________________    Subjective    Patient seen and examined  No acute events overnight  Her breathing feels much better today though still not at baseline  Her edema has also improved    She denies any chest pain or palpitations  Review of Systems   Constitution: Negative  Negative for chills  Cardiovascular: Positive for leg swelling  Negative for chest pain, dyspnea on exertion, near-syncope, orthopnea, palpitations, paroxysmal nocturnal dyspnea and syncope  Respiratory: Positive for shortness of breath  Negative for cough and wheezing  Endocrine: Negative  Hematologic/Lymphatic: Negative  Skin: Negative  Musculoskeletal: Negative  Gastrointestinal: Negative  Negative for diarrhea, nausea and vomiting  Neurological: Negative for dizziness, light-headedness and weakness  Psychiatric/Behavioral: Negative  Negative for altered mental status  All other systems reviewed and are negative  Objective:   Vitals: Blood pressure 152/80, pulse 61, temperature 97 9 °F (36 6 °C), temperature source Oral, resp  rate 18, height 5' 4" (1 626 m), weight 75 kg (165 lb 5 5 oz), SpO2 95 %, not currently breastfeeding ,     Wt Readings from Last 3 Encounters:   03/25/21 75 kg (165 lb 5 5 oz)   03/04/21 75 8 kg (167 lb)   12/10/20 76 7 kg (169 lb 3 2 oz)        Lab Results   Component Value Date    CREATININE 1 15 03/25/2021    CREATININE 1 07 03/24/2021    CREATININE 1 27 03/23/2021         Body mass index is 28 38 kg/m²  ,     Systolic (72SJS), AMY:782 , Min:124 , OPN:664     Diastolic (68JAZ), CBN:99, Min:60, Max:80          Intake/Output Summary (Last 24 hours) at 3/25/2021 1010  Last data filed at 3/25/2021 0735  Gross per 24 hour   Intake 478 ml   Output 2000 ml   Net -1522 ml     Weight (last 2 days)     Date/Time   Weight    03/25/21 0600   75 (165 35)    03/24/21 0600   76 3 (168 21)    03/24/21 0520   76 3 (168 21)    03/23/21 1906   76 7 (169)                Telemetry Review: No significant arrhythmias seen on telemetry review  Physical Exam  Vitals signs and nursing note reviewed  Constitutional:       General: She is not in acute distress  Appearance: She is well-developed  Comments: On RA in NAD   HENT:      Head: Normocephalic and atraumatic  Neck:      Musculoskeletal: Normal range of motion and neck supple  Vascular: JVD present  Cardiovascular:      Rate and Rhythm: Normal rate and regular rhythm  Heart sounds: Normal heart sounds  No murmur  No friction rub  Pulmonary:      Effort: Pulmonary effort is normal  No respiratory distress  Breath sounds: No wheezing or rales  Comments: Fine bibasilar rales  Abdominal:      General: Bowel sounds are normal  There is no distension  Palpations: Abdomen is soft  Tenderness: There is no abdominal tenderness  Musculoskeletal: Normal range of motion  General: No tenderness  Right lower leg: Edema present  Left lower leg: Edema present  Skin:     General: Skin is warm and dry  Findings: No erythema  Neurological:      Mental Status: She is alert and oriented to person, place, and time  Psychiatric:         Mood and Affect: Mood normal          Behavior: Behavior normal          Thought Content:  Thought content normal          Judgment: Judgment normal          LABORATORY RESULTS  Results from last 7 days   Lab Units 03/23/21  2131 03/23/21  1838 03/23/21  1439   TROPONIN I ng/mL 0 10* 0 11* 0 09*     CBC with diff:   Results from last 7 days   Lab Units 03/25/21  0511 03/24/21  0450 03/23/21  1434   WBC Thousand/uL 5 88 5 11 6 01   HEMOGLOBIN g/dL 10 8* 10 4* 10 9*   HEMATOCRIT % 34 3* 32 8* 34 3*   MCV fL 82 82 82   PLATELETS Thousands/uL 206 200 209   MCH pg 25 8* 26 1* 26 1*   MCHC g/dL 31 5 31 7 31 8   RDW % 14 5 14 5 14 5   MPV fL 12 3 12 5 12 1   NRBC AUTO /100 WBCs 0 0 0       CMP:  Results from last 7 days   Lab Units 03/25/21  0511 03/24/21  0450 03/23/21  1434   POTASSIUM mmol/L 4 2 3 8 3 7   CHLORIDE mmol/L 107 109* 108   CO2 mmol/L 28 26 26   BUN mg/dL 33* 23 25   CREATININE mg/dL 1 15 1 07 1 27   CALCIUM mg/dL 9 3 9 2 9 1   AST U/L 30  --  11   ALT U/L 28  --  28 ALK PHOS U/L 78  --  89   EGFR ml/min/1 73sq m 52 57 46       BMP:  Results from last 7 days   Lab Units 21  0511 21  0450 21  1434   POTASSIUM mmol/L 4 2 3 8 3 7   CHLORIDE mmol/L 107 109* 108   CO2 mmol/L 28 26 26   BUN mg/dL 33* 23 25   CREATININE mg/dL 1 15 1 07 1 27   CALCIUM mg/dL 9 3 9 2 9 1       Lab Results   Component Value Date    NTBNP 4,131 (H) 2021    NTBNP 82 2018    NTBNP 164 2018             Results from last 7 days   Lab Units 21  0450   MAGNESIUM mg/dL 1 9          Results from last 7 days   Lab Units 21  0450   HEMOGLOBIN A1C % 6 7*          Results from last 7 days   Lab Units 21  0450   TSH 3RD GENERATON uIU/mL 1 950             Lipid Profile:   No results found for: CHOL  Lab Results   Component Value Date    HDL 75 11/10/2020    HDL 53 2020    HDL 54 2019     Lab Results   Component Value Date    LDLCALC 56 11/10/2020    LDLCALC 74 2020    LDLCALC 48 2019     Lab Results   Component Value Date    TRIG 44 11/10/2020    TRIG 45 2020    TRIG 79 2019       Cardiac testing:   Results for orders placed during the hospital encounter of 10/01/18   Echo complete with contrast if indicated    Narrative Kristie 175  [address] [facility], [address] [place] [address]  [phone]    Transthoracic Echocardiogram  2D, M-mode, Doppler, and Color Doppler    Study date:  01-Oct-2018    Patient: Stella Martinez  MR number: [id number]  Account number: [de-identified]  : 1940  Age: 66 years  Gender: Female  Status: Outpatient  Location: Bedside  Height: 64 in  Weight: 179 lb  BP: 160/ 82 mmHg    Indications: Evaluate for suspected cardiac source of embolism      Diagnoses: G45 9 - Transient cerebral ischemic attack, unspecified    Sonographer:  WILIAN Minor  Primary Physician:  Marcelina De La Cruz PA-C  Referring Physician:  Rosales Verma DO  Group:  [facility]  Interpreting Physician:  Elicia Najjar, DO SUMMARY    LEFT VENTRICLE:  Systolic function was normal  Ejection fraction was estimated to be 55 %  There were no regional wall motion abnormalities  Wall thickness was mildly to moderately increased  There was mild concentric hypertrophy  Doppler parameters were consistent with abnormal left ventricular relaxation (grade 1 diastolic dysfunction)  VENTRICULAR SEPTUM:  There was dyssynergic motion  RIGHT VENTRICLE:  The size was normal   Systolic function was normal     LEFT ATRIUM:  The atrium was moderately dilated  ATRIAL SEPTUM:  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  MITRAL VALVE:  There was mild regurgitation  AORTIC VALVE:  There was mild regurgitation  TRICUSPID VALVE:  There was mild regurgitation  HISTORY: PRIOR HISTORY: CVA; Diabetes; Edema; Aortic regurgitation; Mitral regurgitation; Hypertension; Hyperlipidemia; Chronic kidney disease    PROCEDURE: The procedure was performed at the bedside  This was a routine study  The transthoracic approach was used  The study included complete 2D imaging, M-mode, complete spectral Doppler, and color Doppler  The heart rate was 59 bpm,  at the start of the study  Images were obtained from the parasternal, apical, subcostal, and suprasternal notch acoustic windows  Echocardiographic views were limited due to lung interference  Image quality was adequate  LEFT VENTRICLE: Size was normal  Systolic function was normal  Ejection fraction was estimated to be 55 %  There were no regional wall motion abnormalities  Wall thickness was mildly to moderately increased  There was mild concentric  hypertrophy  DOPPLER: Doppler parameters were consistent with abnormal left ventricular relaxation (grade 1 diastolic dysfunction)  VENTRICULAR SEPTUM: There was dyssynergic motion  RIGHT VENTRICLE: The size was normal  Systolic function was normal  Wall thickness was normal     LEFT ATRIUM: The atrium was moderately dilated   No mass was present  ATRIAL SEPTUM: 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  RIGHT ATRIUM: Size was normal     MITRAL VALVE: Valve structure was normal  There was normal leaflet separation  DOPPLER: The transmitral velocity was within the normal range  There was no evidence for stenosis  There was mild regurgitation  AORTIC VALVE: The valve was trileaflet  Leaflets exhibited normal cuspal separation and sclerosis  DOPPLER: Transaortic velocity was within the normal range  There was no evidence for stenosis  There was mild regurgitation  TRICUSPID VALVE: The valve structure was normal  There was normal leaflet separation  DOPPLER: The transtricuspid velocity was within the normal range  There was no evidence for stenosis  There was mild regurgitation  The tricuspid jet  envelope definition was inadequate for estimation of RV systolic pressure  There are no indirect findings (abnormal RV volume or geometry, altered pulmonary flow velocity profile, or leftward septal displacement) which would suggest  moderate or severe pulmonary hypertension  PULMONIC VALVE: Leaflets exhibited normal thickness, no calcification, and normal cuspal separation  DOPPLER: The transpulmonic velocity was within the normal range  There was trace regurgitation  PERICARDIUM: There was no pericardial effusion  The pericardium was normal in appearance  AORTA: The root exhibited normal size  SYSTEMIC VEINS: IVC: The inferior vena cava was normal in size and course   Respirophasic changes were normal     SYSTEM MEASUREMENT TABLES    2D  %FS: 21 08 %  Ao Diam: 3 44 cm  EDV(Teich): 59 39 ml  EF(Teich): 43 61 %  ESV(Teich): 33 49 ml  IVSd: 1 42 cm  LA Area: 30 05 cm2  LA Diam: 3 94 cm  LVEDV MOD A4C: 135 45 ml  LVEF MOD A4C: 42 71 %  LVESV MOD A4C: 77 6 ml  LVIDd: 3 73 cm  LVIDs: 2 95 cm  LVLd A4C: 8 3 cm  LVLs A4C: 7 39 cm  LVPWd: 1 44 cm  RA Area: 14 79 cm2  RVIDd: 4 08 cm  SV MOD A4C: 57 85 ml  SV(Teich): 25 9 ml    MM  TAPSE: 1 95 cm    PW  E': 0 02 m/s  E/E': 23 11  MV A Kofi: 0 79 m/s  MV Dec Pasco: 2 65 m/s2  MV DecT: 193 84 ms  MV E Kofi: 0 51 m/s  MV E/A Ratio: 0 65  MV PHT: 56 21 ms  MVA By PHT: 3 91 cm2    [employer] Accredited Echocardi[facility]    Prepared and electronically signed by    Payal Mishra DO  Signed 02-Oct-2018 08:25:16       No results found for this or any previous visit  No results found for this or any previous visit  No procedure found  No results found for this or any previous visit        Meds/Allergies   all current active meds have been reviewed, current meds:   Current Facility-Administered Medications   Medication Dose Route Frequency    acetaminophen (TYLENOL) tablet 650 mg  650 mg Oral Q4H PRN    albuterol (PROVENTIL HFA,VENTOLIN HFA) inhaler 2 puff  2 puff Inhalation Q4H PRN    aspirin (ECOTRIN LOW STRENGTH) EC tablet 81 mg  81 mg Oral Daily    atorvastatin (LIPITOR) tablet 40 mg  40 mg Oral Daily    cholecalciferol (VITAMIN D3) tablet 2,000 Units  2,000 Units Oral Daily    clopidogrel (PLAVIX) tablet 150 mg  150 mg Oral Daily    enoxaparin (LOVENOX) subcutaneous injection 40 mg  40 mg Subcutaneous Daily    fluticasone-vilanterol (BREO ELLIPTA) 100-25 mcg/inh inhaler 1 puff  1 puff Inhalation Daily    furosemide (LASIX) injection 40 mg  40 mg Intravenous BID (diuretic)    insulin glargine (LANTUS) subcutaneous injection 10 Units 0 1 mL  10 Units Subcutaneous HS    insulin lispro (HumaLOG) 100 units/mL subcutaneous injection 1-5 Units  1-5 Units Subcutaneous HS    insulin lispro (HumaLOG) 100 units/mL subcutaneous injection 1-6 Units  1-6 Units Subcutaneous TID AC    insulin lispro (HumaLOG) 100 units/mL subcutaneous injection 7 Units  7 Units Subcutaneous TID With Meals    losartan (COZAAR) tablet 100 mg  100 mg Oral Daily    melatonin tablet 3 mg  3 mg Oral HS    montelukast (SINGULAIR) tablet 10 mg  10 mg Oral HS    and PTA meds:   Prior to Admission Medications   Prescriptions Last Dose Informant Patient Reported? Taking? Blood Glucose Monitoring Suppl (FREESTYLE LITE) JAQUELIN  Self No No   Sig: by Does not apply route daily   Breo Ellipta 100-25 MCG/INH inhaler 3/23/2021 at Unknown time  Yes Yes   [employer]S D3 50 MCG (2000 UT) CAPS 3/22/2021 at Unknown time  No Yes   Sig: Take 1 capsule (2,000 Units total) by mouth daily   Continuous Blood Gluc  (FREESTYLE ADAM READER) JAQUELIN 3/23/2021 at Unknown time  No Yes   Si Device by Does not apply route 4 (four) times a day   Continuous Blood Gluc Sensor (FreeStyle Adam 14 Day Sensor) MISC 3/23/2021 at Unknown time  No Yes   Sig: Use one sensor every 14 days   GLOBAL EASE INJECT PEN NEEDLES 31G X 5 MM MISC 3/23/2021 at Unknown time Self Yes Yes   Glucose-Vitamin C-Vitamin D (TRUEPLUS GLUCOSE ON THE GO) CHEW Not Taking at Unknown time Self Yes No   Sig: CHEW 2 TABS AS NEEDED FOR BLOOD SUGAR LESS THAN 80   Lancets (FREESTYLE) lancets 3/23/2021 at Unknown time Self No Yes   Sig: Use as instructed   Misc   Devices (QUAD CANE) MISC 3/23/2021 at Unknown time  No Yes   Sig: by Does not apply route daily   acetaminophen (TYLENOL) 650 mg CR tablet Past Week at Unknown time Self Yes Yes   Sig: Take 650 mg by mouth every 6 (six) hours as needed for mild pain   albuterol (PROVENTIL HFA,VENTOLIN HFA) 90 mcg/act inhaler 3/23/2021 at Unknown time  Yes Yes   Sig: Inhale 2 puffs every 4 (four) hours as needed   atorvastatin (LIPITOR) 40 mg tablet 3/23/2021 at Unknown time  No Yes   Sig: Take 1 tablet (40 mg total) by mouth daily   clopidogrel (PLAVIX) 75 mg tablet 3/23/2021 at Unknown time  No Yes   Sig: Take 2 tablets (150 mg total) by mouth daily   glucose blood (FREESTYLE LITE) test strip 3/23/2021 at Unknown time  No Yes   Sig: TEST AS DIRECTED UP TO FOUR TIMES A DAY   losartan (COZAAR) 100 MG tablet 3/22/2021 at Unknown time  No Yes   Sig: Take 1 tablet (100 mg total) by mouth daily   metFORMIN (GLUCOPHAGE) 500 mg tablet 3/23/2021 at Unknown time  No Yes Sig: TAKE 1 TABLET BY MOUTH TWICE A DAY WITH MEALS   montelukast (SINGULAIR) 10 mg tablet 3/22/2021 at Unknown time  No Yes   Sig: Take 1 tablet (10 mg total) by mouth daily at bedtime   sitaGLIPtin (JANUVIA) 100 mg tablet 3/22/2021 at Unknown time  No Yes   Sig: Take 1 tablet (100 mg total) by mouth daily      Facility-Administered Medications: None     Medications Prior to Admission   Medication    acetaminophen (TYLENOL) 650 mg CR tablet    albuterol (PROVENTIL HFA,VENTOLIN HFA) 90 mcg/act inhaler    atorvastatin (LIPITOR) 40 mg tablet    Breo Ellipta 100-25 MCG/INH inhaler    clopidogrel (PLAVIX) 75 mg tablet    Continuous Blood Gluc  (FREESTYLE ADAM READER) JAQUELIN    Continuous Blood Gluc Sensor (FreeStyle Aadm 14 Day Sensor) MISC    CVS D3 50 MCG (2000 UT) CAPS    GLOBAL EASE INJECT PEN NEEDLES 31G X 5 MM MISC    glucose blood (FREESTYLE LITE) test strip    Lancets (FREESTYLE) lancets    losartan (COZAAR) 100 MG tablet    metFORMIN (GLUCOPHAGE) 500 mg tablet    Misc  Devices (QUAD CANE) MISC    montelukast (SINGULAIR) 10 mg tablet    sitaGLIPtin (JANUVIA) 100 mg tablet    Blood Glucose Monitoring Suppl (FREESTYLE LITE) JAQUELIN    Glucose-Vitamin C-Vitamin D (TRUEPLUS GLUCOSE ON THE GO) CHEW            Counseling / Coordination of Care  Total floor / unit time spent today 20 minutes  Greater than 50% of total time was spent with the patient and / or family counseling and / or coordination of care  ** Please Note: Dragon 360 Dictation voice to text software may have been used in the creation of this document   **

## 2021-03-25 NOTE — UTILIZATION REVIEW
Notification of Inpatient Admission/Inpatient Authorization Request   This is a Notification of Inpatient Admission for [address]  Be advised that this patient was admitted to our facility under Inpatient Status  Contact Spencer Saravia at [phone] for additional admission information  [facility] DEPT  DEDICATED [phone]  Patient Name:   Galilea Luna   YOB: 1940       [address]   [address]:   [place]  Tax ID: [id number]  NPI: [id number] Attending Provider/NPI:  Phone:  Address: Vladimir Kuo [[id number]]   [phone]  Same as Facility   Place of Service Code: 24 Place of Service Name:  [address] [place] [address]   Start Date: 3/23/21 1642 Discharge Date & Time: No discharge date for patient encounter  Type of Admission: Inpatient Status Discharge Disposition (if discharged): Home/Self Care   Patient Diagnoses: CHF (congestive heart failure) ([facility] [place] [address] ) [I50 9]  Chest pain [R07 9]  Elevated troponin [R77 8]  NSTEMI (non-ST elevated myocardial infarction) [facility]) [I21 4]     Orders: Admission Orders (From admission, onward)     Ordered        03/23/21 1642  Inpatient Admission  Once                    Assigned Utilization Review Contact: Spencer Saravia  Utilization   Network Utilization Review Department  Phone: [phone]; Fax [phone]  Email: [email or web address]   ATTENTION PAYERS: Please call the assigned Utilization  directly with any questions or concerns ALL voicemails in the department are confidential  Send all requests for admission clinical reviews, approved or denied determinations and any other requests to dedicated fax number belonging to the campus where the patient is receiving treatment

## 2021-03-25 NOTE — UTILIZATION REVIEW
Initial Clinical Review    Admission: Date/Time/Statement:   Admission Orders (From admission, onward)     Ordered        03/23/21 1642  Inpatient Admission  Once                   Orders Placed This Encounter   Procedures    Inpatient Admission     Standing Status:   Standing     Number of Occurrences:   1     Order Specific Question:   Level of Care     Answer:   Med Surg [16]     Order Specific Question:   Estimated length of stay     Answer:   More than 2 Midnights     Order Specific Question:   Certification     Answer:   I certify that inpatient services are medically necessary for this patient for a duration of greater than two midnights  See H&P and MD Progress Notes for additional information about the patient's course of treatment  ED Arrival Information     Expected Arrival Acuity Means of Arrival Escorted By Service Admission Type    - 3/23/2021 14:12 Emergent Walk-In Family Member General Medicine Emergency    Arrival Complaint    Chest Pain/Swollen feet        Chief Complaint   Patient presents with    Chest Pain     reprots chest pressure since friday with SOB and b/l leg swelling  reports symptoms progressively worse prompting her to notify her family and come to ED for eval today     Assessment/Plan: [de-identified]year old female, presented to the ED @ [facility] from home via walk in  Admitted as Inpatient due to Acute heart Failure  C/o chest pain, SOB and wheezing since Friday  Unable to lay down flat  Worse the last 2 days with chest heaviness and leg swelling   + for cough, chest pain, SOB, wheezing  abd pain, nausea, vomiting  Afebrile, slight tachypnea with respiratory rate of 24-26 and blood pressure of 146/86 saturating above 95% on room air  Chest x-ray demonstrated mild pulmonary edema with trace effusions  NT proBNP elevated at over 4000  IV lasix, monitor electrolytes, Goal K>4, Mg >2     Salt restricted diet / fld restriction 1800ml  Elevate legs    Initial trop elevate, trend trops   Monitor on telemetry  Consult Heart Failure  VTE Pharmacologic Prophylaxis: Enoxaparin (Lovenox)  VTE Mechanical Prophylaxis: sequential compression device    03/24/2021  Consult Cardio/Heart Failure:  1  Acute diastolic CHF:  Presents with chest tightness, orthopnea, dyspnea, lower extremity edema likely in the setting of increased sodium in the last week  NT proBNP 4131  CXR mild pulmonary edema with trace effusions  Received 40 mg IV Lasix in the ED and started on 20 IV BID - continue diuresis  Echocardiogram 10/01/2018:  Normal RV systolic function EF 55%, no WMA, G1DD, mild concentric LVH  Normal RV size and function  Moderate LA dilatation  Atrial septal aneurysm  No significant valvular abnormality  Net output -10 mL  Obtain ECHO  Monitor I&O, Standing castelan  2  Non MI troponin elevation:  In the setting of CHF exacerbation  EKG with no acute ischemic change  Nuclear stress test 02/2019 with no diagnostic perfusion abnormality  3  Essential hypertension:  Average /72 on losartan 100 mg daily  4  Hyperlipidemia:  Lipid panel 11/2020 , TG 44, HDL 75, LDL 56 on atorvastatin 40 mg daily  5  Type 2 DM:  Hemoglobin A1c 7 9 in 11/2020   6  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  Maintained on Plavix and statin    03/25/2021  Continue IV diuresis  Monitor I&O, renal function, and standing weight        ED Triage Vitals   Temperature Pulse Respirations Blood Pressure SpO2   03/23/21 1425 03/23/21 1425 03/23/21 1425 03/23/21 1425 03/23/21 1425   98 4 °F (36 9 °C) 92 22 146/86 97 %      Temp Source Heart Rate Source Patient Position - Orthostatic VS BP Location FiO2 (%)   03/23/21 1949 03/23/21 1744 03/23/21 1744 -- --   Oral Monitor Lying        Pain Score       03/23/21 1425       6          Wt Readings from Last 1 Encounters:   03/25/21 75 kg (165 lb 5 5 oz)     Additional Vital Signs:   Date/Time  Temp  Pulse  Resp  BP  MAP (mmHg)  SpO2  O2 Device Patient Position - Orthostatic VS   21 1053  98 °F (36 7 °C)  60  19  148/74  --  96 %  None (Room air)  --   21 0900  --  --  --  --  --  --  None (Room air)  --   21 07:19:21  97 9 °F (36 6 °C)  61  --  152/80  104  95 %  None (Room air)  --   21 02:44:39  97 8 °F (36 6 °C)  67  18  150/78  102  95 %  --  --   21 23:25:43  97 9 °F (36 6 °C)  71  18  128/60  83  96 %  --  --   21 19:57:10  98 °F (36 7 °C)  75  20  138/65  89  97 %  --  --   21 15:46:46  97 8 °F (36 6 °C)  68  --  124/62  83  95 %  --  --   21 15:46:39  97 8 °F (36 6 °C)  68  --  124/62  83  95 %  --  --   21 10:34:12  98 7 °F (37 1 °C)  61  --  134/74  94  98 %  --  --   21 0845  --  --  --  --  --  --  None (Room air)  --   21 07:20:19  97 3 °F (36 3 °C)Abnormal   63  18  138/75  96  97 %  --  --   21 02:47:42  97 5 °F (36 4 °C)  67  18  124/68  87  94 %  --  --   21 01:19:09  --  68  --  137/74  95  97 %  --  --   21 22:53:38  97 9 °F (36 6 °C)  67  15  136/73  94  96 %  --  --   21 19:49:30  98 6 °F (37 °C)  72  22  143/65  91  97 %  --  --   21 1744  --  70  24Abnormal   148/69  --  99 %  --  Lying   21 1536  --  --  --  --  --  --  None (Room air)  --   21 1515  --  80  26Abnormal   122/62  89  100 %  None (Room air)  --     2021 @ 1648  Chest X:  Mild pulmonary edema with trace effusions       2021 @ 0107  EC, NSR    Pertinent Labs/Diagnostic Test Results:   Results from last 7 days   Lab Units 21  1513   SARS-COV-2  Negative     Results from last 7 days   Lab Units 21  0511 21  0450 21  1434   WBC Thousand/uL 5 88 5 11 6 01   HEMOGLOBIN g/dL 10 8* 10 4* 10 9*   HEMATOCRIT % 34 3* 32 8* 34 3*   PLATELETS Thousands/uL 206 200 209   NEUTROS ABS Thousands/µL 4 15 3 21 4 63     Results from last 7 days   Lab Units 21  0511 21  0450 21  1434   SODIUM mmol/L 139 141 141   POTASSIUM mmol/L 4 2 3 8 3 7   CHLORIDE mmol/L 107 109* 108   CO2 mmol/L 28 26 26   ANION GAP mmol/L 4 6 7   BUN mg/dL 33* 23 25   CREATININE mg/dL 1 15 1 07 1 27   EGFR ml/min/1 73sq m 52 57 46   CALCIUM mg/dL 9 3 9 2 9 1   MAGNESIUM mg/dL  --  1 9  --      Results from last 7 days   Lab Units 03/25/21  0511 03/23/21  1434   AST U/L 30 11   ALT U/L 28 28   ALK PHOS U/L 78 89   TOTAL PROTEIN g/dL 7 4 7 6   ALBUMIN g/dL 3 4* 3 6   TOTAL BILIRUBIN mg/dL 0 68 0 67     Results from last 7 days   Lab Units 03/25/21  0618 03/24/21  2121 03/24/21  1544 03/24/21  1035 03/24/21  0611 03/23/21  2055   POC GLUCOSE mg/dl 118 176* 107 152* 112 122     Results from last 7 days   Lab Units 03/25/21  0511 03/24/21  0450 03/23/21  1434   GLUCOSE RANDOM mg/dL 102 113 213*     Results from last 7 days   Lab Units 03/24/21  0450   HEMOGLOBIN A1C % 6 7*   EAG mg/dl 146     Results from last 7 days   Lab Units 03/23/21  1513   PH RAMIRO  7 376   PCO2 RAMIRO mm Hg 43 6   PO2 RAMIRO mm Hg 46 3*   HCO3 RAMIRO mmol/L 25 0   BASE EXC RAMIRO mmol/L -0 3   O2 CONTENT RAMIRO ml/dL 12 5   O2 HGB, VENOUS % 78 4     Results from last 7 days   Lab Units 03/23/21  2131 03/23/21  1838 03/23/21  1439   TROPONIN I ng/mL 0 10* 0 11* 0 09*     Results from last 7 days   Lab Units 03/24/21  0450   TSH 3RD GENERATON uIU/mL 1 950     Results from last 7 days   Lab Units 03/23/21  1434   NT-PRO BNP pg/mL 4,131*     Results from last 7 days   Lab Units 03/23/21  1513   INFLUENZA A PCR  Negative   INFLUENZA B PCR  Negative   RSV PCR  Negative     ED Treatment:   Medication Administration from 03/23/2021 1411 to 03/23/2021 1935       Date/Time Order Dose Route Action     03/23/2021 1525 aspirin chewable tablet 325 mg 325 mg Oral Given     03/23/2021 1529 furosemide (LASIX) injection 40 mg 40 mg Intravenous Given        Past Medical History:   Diagnosis Date    ACE-inhibitor cough     last assessed - [id number]    Asthma     Bilateral edema of lower extremity     last assessed - 21Aug2017  Cardiac murmur     last assessed - 21Aug2017    CKD (chronic kidney disease)     [facility]     last assessed - 29Nov2017    Esophageal dysphagia     Fatigue     last assessed - 21Aug2017    Hyperlipidemia     Hypertension     Patellar bursitis of right knee     last assessed - [id number]    Personal history of other specified conditions     presenting hazards [facility]    Stroke 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 ) [I21 4]  Age/Sex: [de-identified] y o  female  Admission Orders:  Scheduled Medications:  aspirin, 81 mg, Oral, Daily  atorvastatin, 40 mg, Oral, Daily  cholecalciferol, 2,000 Units, Oral, Daily  clopidogrel, 150 mg, Oral, Daily  enoxaparin, 40 mg, Subcutaneous, Daily  fluticasone-vilanterol, 1 puff, Inhalation, Daily  furosemide, 40 mg, Intravenous, BID (diuretic)  insulin glargine, 10 Units, Subcutaneous, HS  insulin lispro, 1-5 Units, Subcutaneous, HS  insulin lispro, 1-6 Units, Subcutaneous, TID AC  insulin lispro, 7 Units, Subcutaneous, TID With Meals  losartan, 100 mg, Oral, Daily  melatonin, 3 mg, Oral, HS  montelukast, 10 mg, Oral, HS      Continuous IV Infusions:     PRN Meds:  acetaminophen, 650 mg, Oral, Q4H PRN  albuterol, 2 puff, Inhalation, Q4H PRN      Telemetry  [place] [facility]  IP CONSULT TO NUTRITION SERVICES  IP CONSULT TO CARDIOLOGY  IP CONSULT TO CASE MANAGEMENT    Network Utilization Review Department  ATTENTION: Please call with any questions or concerns to [phone] and carefully listen to the prompts so that you are directed to the right person   All voicemails are confidential   Chas Raymond all requests for admission clinical reviews, approved or denied determinations and any other requests to dedicated fax number below belonging to the campus where the patient is receiving treatment   List of dedicated fax numbers for the Facilities:  [address] DENIALS (Administrative/Medical Necessity) [phone]   [address] (Maternity/NICU/Pediatrics) [phone]   [address] Reny Muse 1645 (  Hawk Richards "Jennifer" 103) [address] [address] Elliot Loco 1481 [address] [place] [address] [phone]

## 2021-03-25 NOTE — PROGRESS NOTES
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 Appreciate cards for their recs  ? Pending echo to see etiology of aHF  Satting well, and she is tolerating 20 IV BID of lasix with good urine output - I and O are -190, -342, and -1000 the past 3 days of hospitalization alongside some clinical improvements  Weight has decreased from 169 on admission to 165 lb 5 5 oz   ? Will monitor K/Mg (>4, >2), I/O, weight; 0 010 troponin most likely 2/2 aHF rather than MI, but will continue to monitor  ? Monitor on tele  ? Sodium restricted diet   ? Daily weights     2  Hypertension: Most recent vitals were from most recent: 148/74, 152/80, 150/78  Patient does not report any symptoms from this morning  ?  Cont  Losartan  ? Will continue to monitor; if persists, will look into diet options, medication options     3  Troponin 0 10 most likely 2/2 CHF exacerbation; EKG with no acute ischemic changes, nuclear stress test 02/2019 with no diagnostic perfusion abnormalities    4  CKD stage 3a  ? Continue losartan 100mg    5  Asthma         ? On room air, no wheezing; Continue albuterol (2 puffs), montelukast 10mg, breo (1 puff)  6  T2DM  ?   Last glucose 118 + A1c 6 7, compliant with her diabetes medications and checks her blood sugars at least once each morning; Continue humalog (7U TID with meals, 1-6U TID AC), lantus 10U HS  7  History of CVA with residual deficit  ? Continue lovenox 40mg, plavix 150mg, atorvastatin 40mg, aspirin 81     8  HLD  - Continue atorvastatin     9  Chest pain  - Minimal discomfort, not changing since admission with no radiation/change with exertion; most likely 2/2 to NSTEMI type II from [facility]; continue to monitor troponins,      10  Mild anemia  - Came slightly decreased from baseline of 11 (10 9 on admission, today 10 8); appears clinically stable, but if anemia worsens, will need to see PCP and possibly GI for colonoscopy       Subjective     Patient had no complaints this morning or from overnight  There were no acute events  She says her symptoms of chest heaviness, shortness of breath, and leg swelling have decreased and improved substantially since admission  Was told that her blood pressure was elevated this morning in 3 readings (from most recent: 148/74, 152/80, 150/78); she reported no headaches, vision changes, chest pain, worsening SOB)  Is continuing to tolerate the lasix well, and urinating with no difficulties  When asked about her echo that was performed in 2018 (EF of 55%) because of similar symptoms, she does not recall  Denies chest pain, nausea, vomiting, fevers, or chills     Objective     Vitals: Temp (24hrs), Av 9 °F (36 6 °C), Min:97 8 °F (36 6 °C), Max:98 °F (36 7 °C)  Current: Temperature: 97 9 °F (36 6 °C)  Patient Vitals for the past 24 hrs:   BP Temp Temp src Pulse Resp SpO2 Weight   21 0719 152/80 97 9 °F (36 6 °C) Oral 61 -- 95 % --   21 0600 -- -- -- -- -- -- 75 kg (165 lb 5 5 oz)   21 0244 150/78 97 8 °F (36 6 °C) -- 67 18 95 % --   21 2325 128/60 97 9 °F (36 6 °C) -- 71 18 96 % --   21 1957 138/65 98 °F (36 7 °C) -- 75 20 97 % --   21 97 8 °F (36 6 °C) -- 68 -- 95 % --   21 124 97 8 °F (36 6 °C) -- 68 -- 95 % --    Body mass index is 28 38 kg/m²  Physical Exam:  GENERAL: NAD  HEENT:  NC/AT, PERRL, EOMI, no scleral icterus  CARDIAC:  RRR, no murmurs, no friction rubes, JVD present  PULMONARY:  Fine bibasilar rales  ABDOMEN:  Soft, NT/ND,no rebound/guarding/rigidity  Extremities:  +1 edema present on lower extremities and feet  NEUROLOGIC: Grossly intact  SKIN:  No rashes or erythema noted on exposed skin     Psych: Normal affect    Invasive Devices     Peripheral Intravenous Line            Peripheral IV 03/23/21 Left Antecubital 1 day                    Labs:   Results from last 7 days   Lab Units 03/25/21 0511 03/24/21 0450 03/23/21  1434   WBC Thousand/uL 5 88 5 11 6 01   HEMOGLOBIN g/dL 10 8* 10 4* 10 9*   HEMATOCRIT % 34 3* 32 8* 34 3*   PLATELETS Thousands/uL 206 200 209   NEUTROS PCT % 70 63 76*   MONOS PCT % 8 8 7      Results from last 7 days   Lab Units 03/25/21 0511 03/24/21 0450 03/23/21 2131 03/23/21 1838 03/23/21  1439 03/23/21  1434   SODIUM mmol/L 139 141  --   --   --  141   POTASSIUM mmol/L 4 2 3 8  --   --   --  3 7   CHLORIDE mmol/L 107 109*  --   --   --  108   CO2 mmol/L 28 26  --   --   --  26   BUN mg/dL 33* 23  --   --   --  25   CREATININE mg/dL 1 15 1 07  --   --   --  1 27   CALCIUM mg/dL 9 3 9 2  --   --   --  9 1   ALK PHOS U/L 78  --   --   --   --  89   ALT U/L 28  --   --   --   --  28   AST U/L 30  --   --   --   --  11   TROPONIN I ng/mL  --   --  0 10* 0 11* 0 09*  --    MAGNESIUM mg/dL  --  1 9  --   --   --   --    EGFR ml/min/1 73sq m 52 57  --   --   --  46             Results from last 7 days   Lab Units 03/23/21 2131 03/23/21 1838 03/23/21  1439   TROPONIN I ng/mL 0 10* 0 11* 0 09*     No results found for: PHART, VAB5KGC, PO2ART, EJH9DEP, E5NGQOUE, BEART, SOURCE  No components found for: HIV1X2  No results found for: HAV, HEPAIGM, HEPBIGM, HEPBCAB, HBEAG, HEPCAB  No results found for: SPEP, UPEP   Lab Results   Component Value Date    HGBA1C 6 7 (H) 03/24/2021 HGBA1C 7 9 (H) 11/10/2020    HGBA1C 7 2 (H) 06/02/2020     No results found for: CHOL   Lab Results   Component Value Date    HDL 75 11/10/2020    HDL 53 06/02/2020    HDL 54 11/06/2019      Lab Results   Component Value Date    LDLCALC 56 11/10/2020    LDLCALC 74 06/02/2020    LDLCALC 48 11/06/2019      Lab Results   Component Value Date    TRIG 44 11/10/2020    TRIG 45 06/02/2020    TRIG 79 11/06/2019     No components found for: PROCAL      Micro:      Urinalysis:  No results found for: AMPHETUR, BDZUR, COCAINEUR, OPIATEUR, PCPUR, THCUR, ETOH, ACTMNPHEN, SALICYLATE       Invalid input(s): URIBILINOGEN        Intake and Outputs:  I/O       03/23 0701 - 03/24 0700 03/24 0701 - 03/25 0700 03/25 0701 - 03/26 0700    P  O  960 658     Total Intake(mL/kg) 960 (12 6) 658 (8 8)     Urine (mL/kg/hr) 1150 1000 (0 6) 1000 (3 6)    Total Output 1150 1000 1000    Net -190 -342 -1000           Unmeasured Urine Occurrence  1 x         Nutrition:  Diet Momo/CHO Controlled; Consistent Carbohydrate Diet Level 2 (5 carb servings/75 grams CHO/meal); Sodium 2 GM  Radiology Results:   XR chest portable   ED Interpretation by Emmy Zelaya MD (03/23 1539)   Pulmonary edema      Final Result by Luis Mosher MD (03/23 1648)      Mild pulmonary edema with trace effusions                  Workstation performed: [id number]           Scheduled Medications:  aspirin, 81 mg, Daily  atorvastatin, 40 mg, Daily  cholecalciferol, 2,000 Units, Daily  clopidogrel, 150 mg, Daily  enoxaparin, 40 mg, Daily  fluticasone-vilanterol, 1 puff, Daily  furosemide, 40 mg, BID (diuretic)  insulin glargine, 10 Units, HS  insulin lispro, 1-5 Units, HS  insulin lispro, 1-6 Units, TID AC  insulin lispro, 7 Units, TID With Meals  losartan, 100 mg, Daily  melatonin, 3 mg, HS  montelukast, 10 mg, HS      PRN MEDS:  acetaminophen, 650 mg, Q4H PRN  albuterol, 2 puff, Q4H PRN      Last 24 Hour Meds: :   Medication Administration - last 24 hours from 03/24/2021 1042 to 03/25/2021 1042       Date/Time Order Dose Route Action Action by     03/24/2021 2237 montelukast (SINGULAIR) tablet 10 mg 10 mg Oral Given Jerlyn Leyden     03/25/2021 0839 losartan (COZAAR) tablet 100 mg 100 mg Oral Given Pamela Benitez RN     03/25/2021 2254 clopidogrel (PLAVIX) tablet 150 mg 150 mg Oral Given Pamela Benitez RN     03/25/2021 0843 fluticasone-vilanterol (BREO ELLIPTA) 100-25 mcg/inh inhaler 1 puff 1 puff Inhalation Given Pamela Benitez RN     03/25/2021 0838 atorvastatin (LIPITOR) tablet 40 mg 40 mg Oral Given Pamela Benitez RN     03/25/2021 0842 enoxaparin (LOVENOX) subcutaneous injection 40 mg 40 mg Subcutaneous Given Pamela Benitez RN     03/25/2021 5130 aspirin (ECOTRIN LOW STRENGTH) EC tablet 81 mg 81 mg Oral Given Pamela Benitez RN     03/25/2021 0844 insulin lispro (HumaLOG) 100 units/mL subcutaneous injection 7 Units 7 Units Subcutaneous Given Pamela Benitez RN     03/24/2021 1805 insulin lispro (HumaLOG) 100 units/mL subcutaneous injection 7 Units 7 Units Subcutaneous Given Isidro Thorpe RN     03/24/2021 1226 insulin lispro (HumaLOG) 100 units/mL subcutaneous injection 7 Units 7 Units Subcutaneous Given Isidro Thorpe RN     03/24/2021 2237 insulin glargine (LANTUS) subcutaneous injection 10 Units 0 1 mL 10 Units Subcutaneous Given Jerlyn Leyden     03/25/2021 0837 insulin lispro (HumaLOG) 100 units/mL subcutaneous injection 1-6 Units 1 Units Subcutaneous Not Given Pamela Benitez RN     03/24/2021 1744 insulin lispro (HumaLOG) 100 units/mL subcutaneous injection 1-6 Units 1 Units Subcutaneous Not Given Isidro Thorpe RN     03/24/2021 1227 insulin lispro (HumaLOG) 100 units/mL subcutaneous injection 1-6 Units 1 Units Subcutaneous Given Isidro Thorpe RN     03/24/2021 2236 insulin lispro (HumaLOG) 100 units/mL subcutaneous injection 1-5 Units 1 Units Subcutaneous Given Jerlyn Leyden     03/25/2021 0358 cholecalciferol (VITAMIN D3) tablet 2,000 Units 2,000 Units Oral Given Kasandra Overall, RN     03/24/2021 2237 melatonin tablet 3 mg 3 mg Oral Given Tc Reeve     03/25/2021 0843 furosemide (LASIX) injection 40 mg 40 mg Intravenous Given Kasandra Overall, RN     03/24/2021 1745 furosemide (LASIX) injection 40 mg 40 mg Intravenous Given Dewayne Medina RN     03/24/2021 1119 furosemide (LASIX) injection 40 mg 40 mg Intravenous Given Dewayne Medina, YANNI        Dolton Jakob    PLEASE NOTE:  This encounter was completed utilizing the [employer] network/[employer] Direct Speech Voice Recognition Software  Grammatical errors, random word insertions, pronoun errors and incomplete sentences are occasional consequences of the system due to software limitations, ambient noise and hardware issues  These may be missed by proof reading prior to affixing electronic signature  Any questions or concerns about the content, text or information contained within the body of this dictation should be directly addressed to the physician for clarification  Please do not hesitate to call me directly if you have any any questions or concerns

## 2021-03-25 NOTE — CASE MANAGEMENT
Pt is not a <30 day readmission or a bundle  Risk of unplanned readmission score is 13 (green)  Pt admitted due to acute heart failure  Pt documented as alert and oriented x 4 but appeared forgetful during assessment  CM spoke with pt to introduce CM role and begin discharge planning  Pt lives alone in a 2 story house that has 1 SHA and 14 steps to 2nd floor  Pt states that her son assists with her care as needed  Pt reports being independent with ADLs PTA, pt uses a cane for ambulation and has access to a rolling walker, shower chair, and bedside commode  Pt reports  history of VNA unsure of agency, history of STR at [facility],, no reported history of inpatient [address] treatment or D/A treatment  Pt's children assist with transportation or pt uses [employer]  Pt's family to assist with transportation at time of discharge  CM department to remain available for discharge concerns or needs  CM reviewed d/c planning process including the following: identifying help at home, patient preference for d/c planning needs, Discharge Lounge, [facility] Meds to Bed program, availability of treatment team to discuss questions or concerns patient and/or family may have regarding understanding medications and recognizing signs and symptoms once discharged  CM also encouraged patient to follow up with all recommended appointments after discharge  Patient advised of importance for patient and family to participate in managing patients medical well being

## 2021-03-26 ENCOUNTER — APPOINTMENT (INPATIENT)
Dept: NON INVASIVE DIAGNOSTICS | Facility: HOSPITAL | Age: 81
DRG: 246 | End: 2021-03-26
Payer: COMMERCIAL

## 2021-03-26 LAB
ANION GAP SERPL CALCULATED.3IONS-SCNC: 3 MMOL/L (ref 4–13)
ATRIAL RATE: 58 BPM
BASOPHILS # BLD AUTO: 0.03 THOUSANDS/ΜL (ref 0–0.1)
BASOPHILS NFR BLD AUTO: 1 % (ref 0–1)
BUN SERPL-MCNC: 35 MG/DL (ref 5–25)
CALCIUM SERPL-MCNC: 9.3 MG/DL (ref 8.3–10.1)
CHLORIDE SERPL-SCNC: 107 MMOL/L (ref 100–108)
CO2 SERPL-SCNC: 29 MMOL/L (ref 21–32)
CREAT SERPL-MCNC: 1.18 MG/DL (ref 0.6–1.3)
EOSINOPHIL # BLD AUTO: 0.1 THOUSAND/ΜL (ref 0–0.61)
EOSINOPHIL NFR BLD AUTO: 2 % (ref 0–6)
ERYTHROCYTE [DISTWIDTH] IN BLOOD BY AUTOMATED COUNT: 14.3 % (ref 11.6–15.1)
GFR SERPL CREATININE-BSD FRML MDRD: 50 ML/MIN/1.73SQ M
GLUCOSE SERPL-MCNC: 178 MG/DL (ref 65–140)
GLUCOSE SERPL-MCNC: 217 MG/DL (ref 65–140)
GLUCOSE SERPL-MCNC: 62 MG/DL (ref 65–140)
GLUCOSE SERPL-MCNC: 91 MG/DL (ref 65–140)
GLUCOSE SERPL-MCNC: 97 MG/DL (ref 65–140)
HCT VFR BLD AUTO: 34.5 % (ref 34.8–46.1)
HGB BLD-MCNC: 10.9 G/DL (ref 11.5–15.4)
IMM GRANULOCYTES # BLD AUTO: 0.01 THOUSAND/UL (ref 0–0.2)
IMM GRANULOCYTES NFR BLD AUTO: 0 % (ref 0–2)
KCT BLD-ACNC: 231 SEC (ref 89–137)
LYMPHOCYTES # BLD AUTO: 1.18 THOUSANDS/ΜL (ref 0.6–4.47)
LYMPHOCYTES NFR BLD AUTO: 24 % (ref 14–44)
MCH RBC QN AUTO: 25.6 PG (ref 26.8–34.3)
MCHC RBC AUTO-ENTMCNC: 31.6 G/DL (ref 31.4–37.4)
MCV RBC AUTO: 81 FL (ref 82–98)
MONOCYTES # BLD AUTO: 0.46 THOUSAND/ΜL (ref 0.17–1.22)
MONOCYTES NFR BLD AUTO: 9 % (ref 4–12)
NEUTROPHILS # BLD AUTO: 3.24 THOUSANDS/ΜL (ref 1.85–7.62)
NEUTS SEG NFR BLD AUTO: 64 % (ref 43–75)
NRBC BLD AUTO-RTO: 0 /100 WBCS
P AXIS: 57 DEGREES
PLATELET # BLD AUTO: 213 THOUSANDS/UL (ref 149–390)
PMV BLD AUTO: 11.7 FL (ref 8.9–12.7)
POTASSIUM SERPL-SCNC: 3.7 MMOL/L (ref 3.5–5.3)
PR INTERVAL: 218 MS
QRS AXIS: -5 DEGREES
QRSD INTERVAL: 78 MS
QT INTERVAL: 480 MS
QTC INTERVAL: 471 MS
RBC # BLD AUTO: 4.25 MILLION/UL (ref 3.81–5.12)
SODIUM SERPL-SCNC: 139 MMOL/L (ref 136–145)
SPECIMEN SOURCE: ABNORMAL
T WAVE AXIS: 80 DEGREES
VENTRICULAR RATE: 58 BPM
WBC # BLD AUTO: 5.02 THOUSAND/UL (ref 4.31–10.16)

## 2021-03-26 PROCEDURE — 99153 MOD SED SAME PHYS/QHP EA: CPT | Performed by: PHYSICIAN ASSISTANT

## 2021-03-26 PROCEDURE — 027034Z DILATION OF CORONARY ARTERY, ONE ARTERY WITH DRUG-ELUTING INTRALUMINAL DEVICE, PERCUTANEOUS APPROACH: ICD-10-PCS | Performed by: INTERNAL MEDICINE

## 2021-03-26 PROCEDURE — 93454 CORONARY ARTERY ANGIO S&I: CPT | Performed by: INTERNAL MEDICINE

## 2021-03-26 PROCEDURE — 99152 MOD SED SAME PHYS/QHP 5/>YRS: CPT | Performed by: PHYSICIAN ASSISTANT

## 2021-03-26 PROCEDURE — C1769 GUIDE WIRE: HCPCS | Performed by: PHYSICIAN ASSISTANT

## 2021-03-26 PROCEDURE — 85347 COAGULATION TIME ACTIVATED: CPT

## 2021-03-26 PROCEDURE — C1887 CATHETER, GUIDING: HCPCS | Performed by: PHYSICIAN ASSISTANT

## 2021-03-26 PROCEDURE — C1725 CATH, TRANSLUMIN NON-LASER: HCPCS | Performed by: PHYSICIAN ASSISTANT

## 2021-03-26 PROCEDURE — C9600 PERC DRUG-EL COR STENT SING: HCPCS | Performed by: PHYSICIAN ASSISTANT

## 2021-03-26 PROCEDURE — 99232 SBSQ HOSP IP/OBS MODERATE 35: CPT | Performed by: INTERNAL MEDICINE

## 2021-03-26 PROCEDURE — 85025 COMPLETE CBC W/AUTO DIFF WBC: CPT | Performed by: INTERNAL MEDICINE

## 2021-03-26 PROCEDURE — 82948 REAGENT STRIP/BLOOD GLUCOSE: CPT

## 2021-03-26 PROCEDURE — B2111ZZ FLUOROSCOPY OF MULTIPLE CORONARY ARTERIES USING LOW OSMOLAR CONTRAST: ICD-10-PCS | Performed by: INTERNAL MEDICINE

## 2021-03-26 PROCEDURE — C1894 INTRO/SHEATH, NON-LASER: HCPCS | Performed by: PHYSICIAN ASSISTANT

## 2021-03-26 PROCEDURE — C1874 STENT, COATED/COV W/DEL SYS: HCPCS

## 2021-03-26 PROCEDURE — 93005 ELECTROCARDIOGRAM TRACING: CPT

## 2021-03-26 PROCEDURE — 93010 ELECTROCARDIOGRAM REPORT: CPT | Performed by: INTERNAL MEDICINE

## 2021-03-26 PROCEDURE — 92928 PRQ TCAT PLMT NTRAC ST 1 LES: CPT | Performed by: INTERNAL MEDICINE

## 2021-03-26 PROCEDURE — 93454 CORONARY ARTERY ANGIO S&I: CPT | Performed by: PHYSICIAN ASSISTANT

## 2021-03-26 PROCEDURE — 99152 MOD SED SAME PHYS/QHP 5/>YRS: CPT | Performed by: INTERNAL MEDICINE

## 2021-03-26 PROCEDURE — 80048 BASIC METABOLIC PNL TOTAL CA: CPT | Performed by: INTERNAL MEDICINE

## 2021-03-26 RX ORDER — HEPARIN SODIUM 1000 [USP'U]/ML
INJECTION, SOLUTION INTRAVENOUS; SUBCUTANEOUS CODE/TRAUMA/SEDATION MEDICATION
Status: COMPLETED | OUTPATIENT
Start: 2021-03-26 | End: 2021-03-26

## 2021-03-26 RX ORDER — SODIUM CHLORIDE 9 MG/ML
75 INJECTION, SOLUTION INTRAVENOUS CONTINUOUS
Status: DISPENSED | OUTPATIENT
Start: 2021-03-26 | End: 2021-03-26

## 2021-03-26 RX ORDER — LIDOCAINE HYDROCHLORIDE 10 MG/ML
INJECTION, SOLUTION EPIDURAL; INFILTRATION; INTRACAUDAL; PERINEURAL CODE/TRAUMA/SEDATION MEDICATION
Status: COMPLETED | OUTPATIENT
Start: 2021-03-26 | End: 2021-03-26

## 2021-03-26 RX ORDER — MIDAZOLAM HYDROCHLORIDE 2 MG/2ML
INJECTION, SOLUTION INTRAMUSCULAR; INTRAVENOUS CODE/TRAUMA/SEDATION MEDICATION
Status: COMPLETED | OUTPATIENT
Start: 2021-03-26 | End: 2021-03-26

## 2021-03-26 RX ORDER — ONDANSETRON 2 MG/ML
4 INJECTION INTRAMUSCULAR; INTRAVENOUS EVERY 6 HOURS PRN
Status: DISCONTINUED | OUTPATIENT
Start: 2021-03-26 | End: 2021-03-27 | Stop reason: HOSPADM

## 2021-03-26 RX ORDER — FUROSEMIDE 40 MG/1
40 TABLET ORAL
Status: DISCONTINUED | OUTPATIENT
Start: 2021-03-27 | End: 2021-03-27 | Stop reason: HOSPADM

## 2021-03-26 RX ORDER — ASPIRIN 81 MG/1
324 TABLET, CHEWABLE ORAL ONCE
Status: COMPLETED | OUTPATIENT
Start: 2021-03-26 | End: 2021-03-26

## 2021-03-26 RX ORDER — NITROGLYCERIN 20 MG/100ML
INJECTION INTRAVENOUS CODE/TRAUMA/SEDATION MEDICATION
Status: COMPLETED | OUTPATIENT
Start: 2021-03-26 | End: 2021-03-26

## 2021-03-26 RX ORDER — VERAPAMIL HYDROCHLORIDE 2.5 MG/ML
INJECTION, SOLUTION INTRAVENOUS CODE/TRAUMA/SEDATION MEDICATION
Status: COMPLETED | OUTPATIENT
Start: 2021-03-26 | End: 2021-03-26

## 2021-03-26 RX ORDER — FENTANYL CITRATE 50 UG/ML
INJECTION, SOLUTION INTRAMUSCULAR; INTRAVENOUS CODE/TRAUMA/SEDATION MEDICATION
Status: COMPLETED | OUTPATIENT
Start: 2021-03-26 | End: 2021-03-26

## 2021-03-26 RX ADMIN — FUROSEMIDE 40 MG: 10 INJECTION, SOLUTION INTRAVENOUS at 08:52

## 2021-03-26 RX ADMIN — Medication 2000 UNITS: at 08:51

## 2021-03-26 RX ADMIN — ENOXAPARIN SODIUM 40 MG: 40 INJECTION SUBCUTANEOUS at 08:47

## 2021-03-26 RX ADMIN — ASPIRIN 324 MG: 81 TABLET, CHEWABLE ORAL at 12:21

## 2021-03-26 RX ADMIN — INSULIN LISPRO 7 UNITS: 100 INJECTION, SOLUTION INTRAVENOUS; SUBCUTANEOUS at 08:45

## 2021-03-26 RX ADMIN — IOHEXOL 100 ML: 350 INJECTION, SOLUTION INTRAVENOUS at 14:02

## 2021-03-26 RX ADMIN — INSULIN LISPRO 1 UNITS: 100 INJECTION, SOLUTION INTRAVENOUS; SUBCUTANEOUS at 22:02

## 2021-03-26 RX ADMIN — MONTELUKAST SODIUM 10 MG: 10 TABLET, FILM COATED ORAL at 22:02

## 2021-03-26 RX ADMIN — FLUTICASONE FUROATE AND VILANTEROL TRIFENATATE 1 PUFF: 100; 25 POWDER RESPIRATORY (INHALATION) at 08:51

## 2021-03-26 RX ADMIN — NITROGLYCERIN 200 MCG: 20 INJECTION INTRAVENOUS at 13:23

## 2021-03-26 RX ADMIN — LIDOCAINE HYDROCHLORIDE 1 ML: 10 INJECTION, SOLUTION EPIDURAL; INFILTRATION; INTRACAUDAL; PERINEURAL at 13:17

## 2021-03-26 RX ADMIN — HEPARIN SODIUM 2000 UNITS: 1000 INJECTION INTRAVENOUS; SUBCUTANEOUS at 13:55

## 2021-03-26 RX ADMIN — FENTANYL CITRATE 50 MCG: 50 INJECTION INTRAMUSCULAR; INTRAVENOUS at 13:13

## 2021-03-26 RX ADMIN — MIDAZOLAM 1 MG: 1 INJECTION INTRAMUSCULAR; INTRAVENOUS at 13:14

## 2021-03-26 RX ADMIN — HEPARIN SODIUM 4000 UNITS: 1000 INJECTION INTRAVENOUS; SUBCUTANEOUS at 13:23

## 2021-03-26 RX ADMIN — ASPIRIN 81 MG: 81 TABLET, COATED ORAL at 08:49

## 2021-03-26 RX ADMIN — VERAPAMIL HYDROCHLORIDE 2.5 MG: 2.5 INJECTION, SOLUTION INTRAVENOUS at 13:23

## 2021-03-26 RX ADMIN — ATORVASTATIN CALCIUM 40 MG: 20 TABLET, FILM COATED ORAL at 08:49

## 2021-03-26 RX ADMIN — MELATONIN TAB 3 MG 3 MG: 3 TAB at 22:02

## 2021-03-26 RX ADMIN — INSULIN GLARGINE 10 UNITS: 100 INJECTION, SOLUTION SUBCUTANEOUS at 22:02

## 2021-03-26 RX ADMIN — CLOPIDOGREL BISULFATE 150 MG: 75 TABLET, FILM COATED ORAL at 08:46

## 2021-03-26 RX ADMIN — LOSARTAN POTASSIUM 100 MG: 50 TABLET, FILM COATED ORAL at 08:49

## 2021-03-26 RX ADMIN — SODIUM CHLORIDE 75 ML/HR: 0.9 INJECTION, SOLUTION INTRAVENOUS at 15:38

## 2021-03-26 NOTE — PROGRESS NOTES
General Cardiology   Progress Note -  Team One   Frantz Momin [de-identified] y o  female MRN: [id number]    Unit/Bed#: -01 Encounter: [id number]    Assessment:    1  Acute diastolic CHF:  Presents with chest tightness, orthopnea, dyspnea, lower extremity edema likely in the setting of increased sodium in the last week   NT proBNP 4131  CXR mild pulmonary edema with trace effusions   Currently on IV Lasix 40 mg BID  · Echocardiogram 3/25:  EF 50%, moderate mid and apical hypokinesis, G1DD, mild concentric LVH   Hypokinesis of the free apical RV wall  Moderate LA dilatation  Mild-moderate MR  · Weight 169 lb on admission  · Output 24 hours - 2 7  Net output -3 2 L  · Weight 162 lb today, down 7 lb since admission  · Renal function electrolytes remained stable  2  Non MI troponin elevation:  In the setting of CHF exacerbation   EKG with no acute ischemic change   Nuclear stress test 02/2019 with no diagnostic perfusion abnormality  3  Essential hypertension:  Average /75 on losartan 100 mg daily and IV Lasix  4  Hyperlipidemia:  Lipid panel 11/2020 TC 140, TG 44, HDL 75, LDL 56 on atorvastatin 40 mg daily  5  Type 2 DM:  Hemoglobin A1c 7 9 in 11/2020   Management per primary team  6  History of CVA:  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-sided deficits   Maintained on Plavix and statin           Plan/Recommendations:  · Given new wall motion abnormality would recommend ischemic evaluation with cardiac catheterization today  · The procedure was explained to the patient along with the risks versus benefits after all questions concerns were addressed patient agreeable to proceed    · Transition to oral Lasix 40 mg BID starting tomorrow  · Monitor I&Os, renal function, electrolytes and standing weight  · Low-sodium (<2 g), fluid-restricted (<1800 mL) diet  · Maintain potassium >4 and magnesium >2  · Continue remaining medications    ____________________________________________________________    Subjective    Patient seen and examined  No acute events overnight  Review of Systems   Constitution: Negative  Negative for chills  Cardiovascular: Positive for leg swelling  Negative for chest pain, dyspnea on exertion, near-syncope, orthopnea, palpitations and paroxysmal nocturnal dyspnea  Respiratory: Negative  Negative for shortness of breath  Gastrointestinal: Negative for diarrhea, nausea and vomiting  Neurological: Negative for weakness  Psychiatric/Behavioral: Negative for altered mental status  All other systems reviewed and are negative  Objective:   Vitals: Blood pressure 140/78, pulse 66, temperature 98 °F (36 7 °C), resp  rate 18, height 5' 4" (1 626 m), weight 73 6 kg (162 lb 4 1 oz), SpO2 91 %, not currently breastfeeding ,     Wt Readings from Last 3 Encounters:   03/26/21 73 6 kg (162 lb 4 1 oz)   03/04/21 75 8 kg (167 lb)   12/10/20 76 7 kg (169 lb 3 2 oz)        Lab Results   Component Value Date    CREATININE 1 18 03/26/2021    CREATININE 1 15 03/25/2021    CREATININE 1 07 03/24/2021         Body mass index is 27 85 kg/m²  ,     Systolic (76VUO), UBL:508 , Min:101 , JKF:139     Diastolic (98JQJ), XTK:66, Min:66, Max:88          Intake/Output Summary (Last 24 hours) at 3/26/2021 0843  Last data filed at 3/25/2021 2100  Gross per 24 hour   Intake 350 ml   Output 2050 ml   Net -1700 ml     Weight (last 2 days)     Date/Time   Weight    03/26/21 0557   73 6 (162 26)    03/25/21 0600   75 (165 35)    03/24/21 0600   76 3 (168 21)    03/24/21 0520   76 3 (168 21)              Telemetry Review: No significant arrhythmias seen on telemetry review  Physical Exam  Vitals signs and nursing note reviewed  Constitutional:       General: She is not in acute distress  Appearance: She is well-developed  Comments: On RA in NAD   HENT:      Head: Normocephalic and atraumatic  Neck:      Musculoskeletal: Normal range of motion and neck supple  Vascular: No JVD     Cardiovascular:      Rate and Rhythm: Normal rate and regular rhythm  Heart sounds: Normal heart sounds  No murmur  No friction rub  Pulmonary:      Effort: Pulmonary effort is normal  No respiratory distress  Breath sounds: Normal breath sounds  No wheezing or rales  Abdominal:      General: Bowel sounds are normal  There is no distension  Palpations: Abdomen is soft  Tenderness: There is no abdominal tenderness  Musculoskeletal: Normal range of motion  General: No tenderness  Right lower leg: Edema present  Left lower leg: Edema present  Skin:     General: Skin is warm and dry  Findings: No erythema  Neurological:      Mental Status: She is alert and oriented to person, place, and time  Psychiatric:         Behavior: Behavior normal          Thought Content:  Thought content normal          Judgment: Judgment normal          LABORATORY RESULTS  Results from last 7 days   Lab Units 03/23/21  2131 03/23/21  1838 03/23/21  1439   TROPONIN I ng/mL 0 10* 0 11* 0 09*     CBC with diff:   Results from last 7 days   Lab Units 03/26/21  0454 03/25/21  0511 03/24/21  0450 03/23/21  1434   WBC Thousand/uL 5 02 5 88 5 11 6 01   HEMOGLOBIN g/dL 10 9* 10 8* 10 4* 10 9*   HEMATOCRIT % 34 5* 34 3* 32 8* 34 3*   MCV fL 81* 82 82 82   PLATELETS Thousands/uL 213 206 200 209   MCH pg 25 6* 25 8* 26 1* 26 1*   MCHC g/dL 31 6 31 5 31 7 31 8   RDW % 14 3 14 5 14 5 14 5   MPV fL 11 7 12 3 12 5 12 1   NRBC AUTO /100 WBCs 0 0 0 0       CMP:  Results from last 7 days   Lab Units 03/26/21  0454 03/25/21  0511 03/24/21  0450 03/23/21  1434   POTASSIUM mmol/L 3 7 4 2 3 8 3 7   CHLORIDE mmol/L 107 107 109* 108   CO2 mmol/L 29 28 26 26   BUN mg/dL 35* 33* 23 25   CREATININE mg/dL 1 18 1 15 1 07 1 27   CALCIUM mg/dL 9 3 9 3 9 2 9 1   AST U/L  --  30  --  11   ALT U/L  --  28  --  28   ALK PHOS U/L  --  78  --  89   EGFR ml/min/1 73sq m 50 52 57 46       BMP:  Results from last 7 days   Lab Units 03/26/21  0454 21  0511 21  0450 21  1434   POTASSIUM mmol/L 3 7 4 2 3 8 3 7   CHLORIDE mmol/L 107 107 109* 108   CO2 mmol/L 29 28 26 26   BUN mg/dL 35* 33* 23 25   CREATININE mg/dL 1 18 1 15 1 07 1 27   CALCIUM mg/dL 9 3 9 3 9 2 9 1       Lab Results   Component Value Date    NTBNP 4,131 (H) 2021    NTBNP 82 2018    NTBNP 164 2018             Results from last 7 days   Lab Units 21  0450   MAGNESIUM mg/dL 1 9          Results from last 7 days   Lab Units 21  0450   HEMOGLOBIN A1C % 6 7*          Results from last 7 days   Lab Units 21  0450   TSH 3RD GENERATON uIU/mL 1 950             Lipid Profile:   No results found for: CHOL  Lab Results   Component Value Date    HDL 75 11/10/2020    HDL 53 2020    HDL 54 2019     Lab Results   Component Value Date    LDLCALC 56 11/10/2020    LDLCALC 74 2020    LDLCALC 48 2019     Lab Results   Component Value Date    TRIG 44 11/10/2020    TRIG 45 2020    TRIG 79 2019       Cardiac testing:   Results for orders placed during the hospital encounter of 21   Echo complete with contrast if indicated    Narrative [facility]  [facility], [address] [place] [address]  [phone]    Transthoracic Echocardiogram  2D, M-mode, Doppler, and Color Doppler    Study date:  25-Mar-2021    Patient: Corey Santiago  MR number: [id number]  Account number: [de-identified]  : 1940  Age: [de-identified] years  Gender: Female  Status: Inpatient  Location: Bedside  Height: 64 in  Weight: 167 6 lb  BP: 148/ 74 mmHg    Indications: heart failure  Diagnoses: I50 9 - Heart failure, unspecified    Sonographer:  Daina Foley RDCS  Referring Physician:  Chico Lopez DO  Group:  [id number]  Cardiology Fellow:  Sirena Baum MD  Interpreting Physician:  Chelle Muller MD    SUMMARY    PROCEDURE INFORMATION:  This was a technically difficult study      LEFT VENTRICLE:  Systolic function was at the lower limits of normal  Ejection fraction was estimated to be 50 %  Global longitudinal strain was reduced at -14 5%  There was moderate hypokinesis of the most mid and apical segments of the left ventricle  Findings suggest biventricular stress cardiomyopathy if coronary artery disease is excluded  Wall thickness was mildly increased  There was mild concentric hypertrophy  Doppler parameters were consistent with abnormal left ventricular relaxation (grade 1 diastolic dysfunction)  RIGHT VENTRICLE:  There is hypokinesis of the apical free wall  LEFT ATRIUM:  The atrium was moderately dilated  ATRIAL SEPTUM:  The septum bows from left to right, consistent with increased left atrial pressure  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, predominantly within the right atrial cavity  No thrombus was identified within the aneurysm  The maximal prolapse extent was 15 mm  No defect or patent foramen ovale was identified  RIGHT ATRIUM:  The atrium was mildly dilated  MITRAL VALVE:  There was mild annular calcification  There was mild to moderate regurgitation  AORTIC VALVE:  There was mild regurgitation  TRICUSPID VALVE:  There was trace regurgitation  Estimated peak PA pressure was 50 mmHg  The findings suggest moderate pulmonary hypertension  HISTORY: PRIOR HISTORY: cerebrovascular accident, diabetes mellitus type 2, chronic kidney disease stage 3, hypertension, hyperlipidemia, asthma, former smoker  PROCEDURE: The procedure was performed at the bedside  This was a routine study  The transthoracic approach was used  The study included complete 2D imaging, M-mode, complete spectral Doppler, and color Doppler  The heart rate was 63 bpm,  at the start of the study  This was a technically difficult study  LEFT VENTRICLE: Size was normal  Systolic function was at the lower limits of normal  Ejection fraction was estimated to be 50 %   Global longitudinal strain was reduced at -14  5%  There was moderate hypokinesis of the most mid and apical  segments of the left ventricle  Findings suggest biventricular stress cardiomyopathy if coronary artery disease is excluded  Wall thickness was mildly increased  There was mild concentric hypertrophy  DOPPLER: Doppler parameters were  consistent with abnormal left ventricular relaxation (grade 1 diastolic dysfunction)  RIGHT VENTRICLE: The size was normal  Systolic function was normal  There is hypokinesis of the apical free wall  LEFT ATRIUM: The atrium was moderately dilated  ATRIAL SEPTUM: The septum bows from left to right, consistent with increased left atrial pressure  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, predominantly within the right atrial cavity  No thrombus was identified within the aneurysm  The  maximal prolapse extent was 15 mm  No defect or patent foramen ovale was identified  RIGHT ATRIUM: The atrium was mildly dilated  MITRAL VALVE: There was mild annular calcification  There was normal leaflet separation  DOPPLER: The transmitral velocity was within the normal range  There was no evidence for stenosis  There was mild to moderate regurgitation  AORTIC VALVE: The valve was not well visualized  DOPPLER: Transaortic velocity was within the normal range  There was no evidence for stenosis  There was mild regurgitation  TRICUSPID VALVE: The valve structure was normal  There was normal leaflet separation  DOPPLER: The transtricuspid velocity was within the normal range  There was no evidence for stenosis  There was trace regurgitation  Estimated peak PA  pressure was 50 mmHg  The findings suggest moderate pulmonary hypertension  PULMONIC VALVE: DOPPLER: The transpulmonic velocity was within the normal range  There was no significant regurgitation  PERICARDIUM: There was no pericardial effusion  The pericardium was normal in appearance  AORTA: The root exhibited normal size      SYSTEMIC VEINS: IVC: The inferior vena cava was normal in size and course  Respirophasic changes were normal     SYSTEM MEASUREMENT TABLES    2D  %FS: 28 88 %  Ao Diam: 3 59 cm  EDV(Teich): 123 72 ml  EF(Teich): 55 23 %  ESV(Teich): 55 39 ml  IVSd: 1 05 cm  LA Diam: 3 97 cm  LAAs A2C: 29 63 cm2  LAAs A4C: 32 34 cm2  LAESV A-L A2C: 108 02 ml  LAESV A-L A4C: 138 17 ml  LAESV Index (A-L): 69 56 ml/m2  LAESV MOD A2C: 101 24 ml  LAESV MOD A4C: 128 82 ml  LAESV(A-L): 126 6 ml  LAESV(MOD BP): 118 23 ml  LALs A2C: 6 9 cm  LALs A4C: 6 43 cm  LVEDV MOD A4C: 96 83 ml  LVEF MOD A4C: 49 87 %  LVESV MOD A4C: 48 55 ml  LVIDd: 5 1 cm  LVIDs: 3 63 cm  LVLd A4C: 8 12 cm  LVLs A4C: 7 4 cm  LVPWd: 1 04 cm  RAESV A-L: 37 66 ml  RAESV MOD: 37 45 ml  RALs: 5 86 cm  RVIDd: 2 58 cm  SV MOD A4C: 48 29 ml  SV(Teich): 68 33 ml    CF  MR Rad: 0 24 cm    CW  AR Dec Clinch: 2 97 m/s2  AR Dec Time: 1386 37 ms  AR PHT: 402 05 ms  AR Vmax: 4 08 m/s  AR maxP 64 mmHg  TR Vmax: 3 26 m/s  TR maxP 55 mmHg    MM  TAPSE: 1 77 cm    PW  AVC: 368 55 ms  NEIDA: 364 97 ms  E' Av 07 m/s  E' Lat: 0 08 m/s  E' Sept: 0 06 m/s  E/E' Av  E/E' Lat: 10 58  E/E' Sept: 13 86  MV A Kofi: 0 81 m/s  MV Dec Clinch: 3 91 m/s2  MV DecT: 204 33 ms  MV E Kofi: 0 8 m/s  MV E/A Ratio: 0 99    [employer] Accredited Echocardiography Laboratory    Prepared and electronically signed by    Michael Valero MD  Signed 25-Mar-2021 16:26:39       No results found for this or any previous visit  No results found for this or any previous visit  No procedure found  No results found for this or any previous visit        Meds/Allergies   all current active meds have been reviewed, current meds:   Current Facility-Administered Medications   Medication Dose Route Frequency    acetaminophen (TYLENOL) tablet 650 mg  650 mg Oral Q4H PRN    albuterol (PROVENTIL HFA,VENTOLIN HFA) inhaler 2 puff  2 puff Inhalation Q4H PRN    aspirin (ECOTRIN LOW STRENGTH) EC tablet 81 mg  81 mg Oral Daily    atorvastatin (LIPITOR) tablet 40 mg  40 mg Oral Daily    cholecalciferol (VITAMIN D3) tablet 2,000 Units  2,000 Units Oral Daily    clopidogrel (PLAVIX) tablet 150 mg  150 mg Oral Daily    enoxaparin (LOVENOX) subcutaneous injection 40 mg  40 mg Subcutaneous Daily    fluticasone-vilanterol (BREO ELLIPTA) 100-25 mcg/inh inhaler 1 puff  1 puff Inhalation Daily    furosemide (LASIX) injection 40 mg  40 mg Intravenous BID (diuretic)    insulin glargine (LANTUS) subcutaneous injection 10 Units 0 1 mL  10 Units Subcutaneous HS    insulin lispro (HumaLOG) 100 units/mL subcutaneous injection 1-5 Units  1-5 Units Subcutaneous HS    insulin lispro (HumaLOG) 100 units/mL subcutaneous injection 1-6 Units  1-6 Units Subcutaneous TID AC    insulin lispro (HumaLOG) 100 units/mL subcutaneous injection 7 Units  7 Units Subcutaneous TID With Meals    losartan (COZAAR) tablet 100 mg  100 mg Oral Daily    melatonin tablet 3 mg  3 mg Oral HS    montelukast (SINGULAIR) tablet 10 mg  10 mg Oral HS    and PTA meds:   Prior to Admission Medications   Prescriptions Last Dose Informant Patient Reported? Taking?    Blood Glucose Monitoring Suppl (FREESTYLE LITE) JAQUELIN  Self No No   Sig: by Does not apply route daily   Breo Ellipta 100-25 MCG/INH inhaler 3/23/2021 at Unknown time  Yes Yes   [employer]S D3 50 MCG ( UT) CAPS 3/22/2021 at Unknown time  No Yes   Sig: Take 1 capsule (2,000 Units total) by mouth daily   Continuous Blood Gluc  (FREESTYLE ADAM READER) JAQUELIN 3/23/2021 at Unknown time  No Yes   Si Device by Does not apply route 4 (four) times a day   Continuous Blood Gluc Sensor (FreeStyle Adam 14 Day Sensor) MISC 3/23/2021 at Unknown time  No Yes   Sig: Use one sensor every 14 days   GLOBAL EASE INJECT PEN NEEDLES 31G X 5 MM MISC 3/23/2021 at Unknown time Self Yes Yes   Glucose-Vitamin C-Vitamin D (TRUEPLUS GLUCOSE ON THE GO) CHEW Not Taking at Unknown time Self Yes No   Sig: CHEW 2 TABS AS NEEDED FOR BLOOD SUGAR LESS THAN 80   Lancets (FREESTYLE) lancets 3/23/2021 at Unknown time Self No Yes   Sig: Use as instructed   Misc   Devices (QUAD CANE) MISC 3/23/2021 at Unknown time  No Yes   Sig: by Does not apply route daily   acetaminophen (TYLENOL) 650 mg CR tablet Past Week at Unknown time Self Yes Yes   Sig: Take 650 mg by mouth every 6 (six) hours as needed for mild pain   albuterol (PROVENTIL HFA,VENTOLIN HFA) 90 mcg/act inhaler 3/23/2021 at Unknown time  Yes Yes   Sig: Inhale 2 puffs every 4 (four) hours as needed   atorvastatin (LIPITOR) 40 mg tablet 3/23/2021 at Unknown time  No Yes   Sig: Take 1 tablet (40 mg total) by mouth daily   clopidogrel (PLAVIX) 75 mg tablet 3/23/2021 at Unknown time  No Yes   Sig: Take 2 tablets (150 mg total) by mouth daily   glucose blood (FREESTYLE LITE) test strip 3/23/2021 at Unknown time  No Yes   Sig: TEST AS DIRECTED UP TO FOUR TIMES A DAY   losartan (COZAAR) 100 MG tablet 3/22/2021 at Unknown time  No Yes   Sig: Take 1 tablet (100 mg total) by mouth daily   metFORMIN (GLUCOPHAGE) 500 mg tablet 3/23/2021 at Unknown time  No Yes   Sig: TAKE 1 TABLET BY MOUTH TWICE A DAY WITH MEALS   montelukast (SINGULAIR) 10 mg tablet 3/22/2021 at Unknown time  No Yes   Sig: Take 1 tablet (10 mg total) by mouth daily at bedtime   sitaGLIPtin (JANUVIA) 100 mg tablet 3/22/2021 at Unknown time  No Yes   Sig: Take 1 tablet (100 mg total) by mouth daily      Facility-Administered Medications: None     Medications Prior to Admission   Medication    acetaminophen (TYLENOL) 650 mg CR tablet    albuterol (PROVENTIL HFA,VENTOLIN HFA) 90 mcg/act inhaler    atorvastatin (LIPITOR) 40 mg tablet    Breo Ellipta 100-25 MCG/INH inhaler    clopidogrel (PLAVIX) 75 mg tablet    Continuous Blood Gluc  (FREESTYLE ADAM READER) JAQUELIN    Continuous Blood Gluc Sensor (FreeStyle Adam 14 Day Sensor) MISC    CVS D3 50 MCG (2000 UT) CAPS    GLOBAL EASE INJECT PEN NEEDLES 31G X 5 MM MISC    glucose blood (FREESTYLE LITE) test strip    Lancets (FREESTYLE) lancets    losartan (COZAAR) 100 MG tablet    metFORMIN (GLUCOPHAGE) 500 mg tablet    Misc  Devices (QUAD CANE) MISC    montelukast (SINGULAIR) 10 mg tablet    sitaGLIPtin (JANUVIA) 100 mg tablet    Blood Glucose Monitoring Suppl (FREESTYLE LITE) JAQUELIN    Glucose-Vitamin C-Vitamin D (TRUEPLUS GLUCOSE ON THE GO) CHEW            Counseling / Coordination of Care  Total floor / unit time spent today 20 minutes  Greater than 50% of total time was spent with the patient and / or family counseling and / or coordination of care  ** Please Note: Dragon 360 Dictation voice to text software may have been used in the creation of this document   **

## 2021-03-26 NOTE — PROGRESS NOTES
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  Appreciate cards for their recs  ? Per echo: EF 50% with moderate hypokinesis of most mid and apical segments of left ventricle suggestive of biventricular stress cardiomyopathy if CAD is excluded; also PA pressure 50mm Hg suggestive of pulmonary hypertension with mild aortic, mitral regurgitations   ? Will get cath because of concerns for ischemic changes   ? Transition to 20 oral BID lasix  ? Satting well, good urine output - I and O are -342, -2300, and -350 the past 3 days of hospitalization alongside some clinical improvements  Weight has decreased from 169 on admission to 162 lb 4 1 oz (-7)  ? Will monitor K/Mg (>4, >2), I/O, weight; 0 010 troponin most likely 2/2 aHF rather than MI, but will continue to monitor  ? Monitor on tele  ? Sodium restricted diet   ? Daily weights      2  Hypertension:   ?  Most recent vitals were from most recent:140/78   Patient does not report any symptoms from this morning  ? Cont  Losartan  ? Will continue to monitor; if persists, will look into diet options, medication options      3   Troponin elevations  -0 10 most likely 2/2 CHF exacerbation  - EKG with no acute ischemic changes, nuclear stress test 02/2019 with no diagnostic perfusion abnormalities     4   CKD stage 3a  ?  Continue losartan 100mg     5  Asthma         ?  On room air, no wheezing; Continue albuterol (2 puffs), montelukast 10mg, breo (1 puff)    6  T2DM  ?  Last glucose 91 + A1c 6 7, compliant with her diabetes medications and checks her blood sugars at least once each morning; Continue humalog (7U TID with meals, 1-6U TID AC), lantus 10U HS    7  History of CVA with residual deficit  ?  Continue lovenox 40mg, plavix 150mg, atorvastatin 40mg, aspirin 81     8  HLD  - Continue atorvastatin     9  Chest pain  - Minimal discomfort, not changing since admission with no radiation/change with exertion; most likely 2/2 to NSTEMI type II from [place] [facility]  - Last troponin change was from 0 11 to 0 10     10  Mild anemia  - Came slightly decreased from baseline of 11 (10 9 on admission, today 10 9); appears clinically stable, but if anemia worsens, will need to see PCP and possibly GI for colonoscopy     Subjective     No acute events overnight  Patient says her shortness of breath and chest heaviness are gone, and her leg/feet swelling are much better than they were during admission  Resting comfortably with no issues sleeping overnight  Is tolerating her diet well with no nausea/vomiting  Objective     Vitals: Temp (24hrs), Av 8 °F (36 6 °C), Min:97 3 °F (36 3 °C), Max:98 °F (36 7 °C)  Current: Temperature: 98 °F (36 7 °C)  Patient Vitals for the past 24 hrs:   BP Temp Temp src Pulse Resp SpO2 Weight   21 0629 140/78 98 °F (36 7 °C) -- 66 -- 91 % --   21 0557 -- -- -- -- -- -- 73 6 kg (162 lb 4 1 oz)   21 0312 127/68 -- -- 59 -- 91 % --   21 2352 127/83 (!) 97 3 °F (36 3 °C) -- 64 -- 98 % --   21 2137 124/88 97 9 °F (36 6 °C) -- 60 18 98 % --   21 1712 115/69 -- -- 68 -- (!) 89 % --   21 1614 101/66 98 °F (36 7 °C) -- 64 -- 96 % --   21 1053 148/74 98 °F (36 7 °C) Oral 60 19 96 % --    Body mass index is 27 85 kg/m²    Physical Exam:  GENERAL: NAD  HEENT:  NC/AT, PERRL, EOMI, no scleral icterus  CARDIAC:  RRR, +S1/S2, no S3/S4 heard, no m/g/r; no JVD  PULMONARY:  CTA B/L, no wheezing/rales/rhonci, non-labored breathing  ABDOMEN:  Soft, NT/ND,no rebound/guarding/rigidity  Extremities:  +1 bilateral leg edema; +2 bilateral feet edema  NEUROLOGIC: Grossly intact  SKIN:  No rashes or erythema noted on exposed skin     Psych: Normal affect    Invasive Devices     Peripheral Intravenous Line            Peripheral IV 03/23/21 Left Antecubital 2 days                    Labs:   Results from last 7 days   Lab Units 03/26/21  0454 03/25/21  0511 03/24/21  0450 03/23/21  1434   WBC Thousand/uL 5 02 5 88 5 11 6 01   HEMOGLOBIN g/dL 10 9* 10 8* 10 4* 10 9*   HEMATOCRIT % 34 5* 34 3* 32 8* 34 3*   PLATELETS Thousands/uL 213 206 200 209   NEUTROS PCT % 64 70 63 76*   MONOS PCT % 9 8 8 7      Results from last 7 days   Lab Units 03/26/21  0454 03/25/21  0511 03/24/21  0450 03/23/21  2131 03/23/21  1838 03/23/21  1439 03/23/21  1434   SODIUM mmol/L 139 139 141  --   --   --  141   POTASSIUM mmol/L 3 7 4 2 3 8  --   --   --  3 7   CHLORIDE mmol/L 107 107 109*  --   --   --  108   CO2 mmol/L 29 28 26  --   --   --  26   BUN mg/dL 35* 33* 23  --   --   --  25   CREATININE mg/dL 1 18 1 15 1 07  --   --   --  1 27   CALCIUM mg/dL 9 3 9 3 9 2  --   --   --  9 1   ALK PHOS U/L  --  78  --   --   --   --  89   ALT U/L  --  28  --   --   --   --  28   AST U/L  --  30  --   --   --   --  11   TROPONIN I ng/mL  --   --   --  0 10* 0 11* 0 09*  --    MAGNESIUM mg/dL  --   --  1 9  --   --   --   --    EGFR ml/min/1 73sq m 50 52 57  --   --   --  46             Results from last 7 days   Lab Units 03/23/21  2131 03/23/21  1838 03/23/21  1439   TROPONIN I ng/mL 0 10* 0 11* 0 09*     No results found for: PHART, AGI3KGI, PO2ART, UMW3XXW, V0RKSWEG, BEART, SOURCE  No components found for: HIV1X2  No results found for: HAV, HEPAIGM, HEPBIGM, HEPBCAB, HBEAG, HEPCAB  No results found for: SPEP, UPEP   Lab Results   Component Value Date    HGBA1C 6 7 (H) 03/24/2021    HGBA1C 7 9 (H) 11/10/2020    HGBA1C 7 2 (H) 06/02/2020     No results found for: CHOL   Lab Results   Component Value Date    HDL 75 11/10/2020    HDL 53 06/02/2020    HDL 54 11/06/2019      Lab Results   Component Value Date    LDLCALC 56 11/10/2020    LDLCALC 74 06/02/2020    LDLCALC 48 11/06/2019      Lab Results   Component Value Date    TRIG 44 11/10/2020    TRIG 45 06/02/2020    TRIG 79 11/06/2019     No components found for: PROCAL      Micro:      Urinalysis:  No results found for: AMPHETUR, BDZUR, COCAINEUR, OPIATEUR, PCPUR, THCUR, ETOH, ACTMNPHEN, SALICYLATE       Invalid input(s): URIBILINOGEN        Intake and Outputs:  I/O       03/24 0701 - 03/25 0700 03/25 0701 - 03/26 0700 03/26 0701 - 03/27 0700    P  O  658 350     Total Intake(mL/kg) 658 (8 8) 350 (4 8)     Urine (mL/kg/hr) 1000 (0 6) 3050 (1 7) 350 (1 4)    Total Output 1000 3050 350    Net -342 -2700 -350           Unmeasured Urine Occurrence 1 x          Nutrition:  Diet Momo/CHO Controlled; Consistent Carbohydrate Diet Level 2 (5 carb servings/75 grams CHO/meal); Sodium 2 GM  Radiology Results:   XR chest portable   ED Interpretation by Jaiden Fitzgerald MD (03/23 1539)   Pulmonary edema      Final Result by Nano Hussein MD (03/23 1648)      Mild pulmonary edema with trace effusions                  Workstation performed: [id number]           Scheduled Medications:  aspirin, 81 mg, Daily  atorvastatin, 40 mg, Daily  cholecalciferol, 2,000 Units, Daily  clopidogrel, 150 mg, Daily  enoxaparin, 40 mg, Daily  fluticasone-vilanterol, 1 puff, Daily  furosemide, 40 mg, BID (diuretic)  insulin glargine, 10 Units, HS  insulin lispro, 1-5 Units, HS  insulin lispro, 1-6 Units, TID AC  insulin lispro, 5 Units, TID With Meals  losartan, 100 mg, Daily  melatonin, 3 mg, HS  montelukast, 10 mg, HS      PRN MEDS:  acetaminophen, 650 mg, Q4H PRN  albuterol, 2 puff, Q4H PRN      Last 24 Hour Meds: :   Medication Administration - last 24 hours from 03/25/2021 1032 to 03/26/2021 1032       Date/Time Order Dose Route Action Action by     03/25/2021 2157 montelukast (SINGULAIR) tablet 10 mg 10 mg [facility] 03/26/2021 0849 losartan (COZAAR) tablet 100 mg 100 mg Oral Given Randell Koroma RN     03/26/2021 0846 clopidogrel (PLAVIX) tablet 150 mg 150 mg Oral Given Randell Kormoa RN     03/26/2021 0851 fluticasone-vilanterol (BREO ELLIPTA) 100-25 mcg/inh inhaler 1 puff 1 puff Inhalation Given Randell Koroma RN     03/26/2021 0849 atorvastatin (LIPITOR) tablet 40 mg 40 mg Oral Given Randell Koroma RN     03/26/2021 0847 enoxaparin (LOVENOX) subcutaneous injection 40 mg 40 mg Subcutaneous Given Randell Koroma RN     03/26/2021 0849 aspirin (ECOTRIN LOW STRENGTH) EC tablet 81 mg 81 mg Oral Given Randell Koroma RN     03/26/2021 0845 insulin lispro (HumaLOG) 100 units/mL subcutaneous injection 7 Units 7 Units Subcutaneous Given Randell Koroma RN     03/25/2021 2151 insulin lispro (HumaLOG) 100 units/mL subcutaneous injection 7 Units 7 Units Subcutaneous Not [facility] 03/25/2021 1210 insulin lispro (HumaLOG) 100 units/mL subcutaneous injection 7 Units 7 Units Subcutaneous Given Brandy Nunn RN     03/25/2021 2157 insulin glargine (LANTUS) subcutaneous injection 10 Units 0 1 mL 10 Units Subcutaneous [facility] 03/26/2021 0723 insulin lispro (HumaLOG) 100 units/mL subcutaneous injection 1-6 Units 1 Units Subcutaneous Not Given Randell Koroma RN     03/25/2021 1630 insulin lispro (HumaLOG) 100 units/mL subcutaneous injection 1-6 Units 1 Units Subcutaneous Not Given [facility] 03/25/2021 1210 insulin lispro (HumaLOG) 100 units/mL subcutaneous injection 1-6 Units 1 Units Subcutaneous Given Brandy Nunn RN     03/25/2021 2159 insulin lispro (HumaLOG) 100 units/mL subcutaneous injection 1-5 Units 1 Units Subcutaneous Given Vel[facility]     03/26/2021 0796 cholecalciferol (VITAMIN D3) tablet 2,000 Units 2,000 Units Oral Given Eleanor Mcwilliams RN     03/25/2021 2157 melatonin tablet 3 mg 3 mg Oral Given Josr Prom     03/26/2021 0852 furosemide (LASIX) injection 40 mg 40 mg Intravenous Given Eleanor Mcwilliams RN     03/25/2021 1717 furosemide (LASIX) injection 40 mg 40 mg Intravenous Given Josr Prom          PLEASE NOTE:  This encounter was completed utilizing the [employer] Direct Speech Voice Recognition Software  Grammatical errors, random word insertions, pronoun errors and incomplete sentences are occasional consequences of the system due to software limitations, ambient noise and hardware issues  These may be missed by proof reading prior to affixing electronic signature  Any questions or concerns about the content, text or information contained within the body of this dictation should be directly addressed to the physician for clarification  Please do not hesitate to call me directly if you have any any questions or concerns

## 2021-03-26 NOTE — PLAN OF CARE
Problem: SAFETY ADULT  Goal: Patient will remain free of falls  Description: INTERVENTIONS:  - Assess patient frequently for physical needs  -  Identify cognitive and physical deficits and behaviors that affect risk of falls    -  [place] fall precautions as indicated by assessment   - Educate patient/family on patient safety including physical limitations  - Instruct patient to call for assistance with activity based on assessment  - Modify environment to reduce risk of injury  - Consider OT/PT consult to assist with strengthening/mobility  Outcome: Progressing  Goal: Maintain or return to baseline ADL function  Description: INTERVENTIONS:  -  Assess patient's ability to carry out ADLs; assess patient's baseline for ADL function and identify physical deficits which impact ability to perform ADLs (bathing, care of mouth/teeth, toileting, grooming, dressing, etc )  - Assess/evaluate cause of self-care deficits   - Assess range of motion  - Assess patient's mobility; develop plan if impaired  - Assess patient's need for assistive devices and provide as appropriate  - Encourage maximum independence but intervene and supervise when necessary  - Involve family in performance of ADLs  - Assess for home care needs following discharge   - Consider OT consult to assist with ADL evaluation and planning for discharge  - Provide patient education as appropriate  Outcome: Progressing  Goal: Maintain or return mobility status to optimal level  Description: INTERVENTIONS:  - Assess patient's baseline mobility status (ambulation, transfers, stairs, etc )    - Identify cognitive and physical deficits and behaviors that affect mobility  - Identify mobility aids required to assist with transfers and/or ambulation (gait belt, sit-to-stand, lift, walker, cane, etc )  - [place] fall precautions as indicated by assessment  - Record patient progress and toleration of activity level on Mobility SBAR; progress patient to next Phase/Stage  - Instruct patient to call for assistance with activity based on assessment  - Consider rehabilitation consult to assist with strengthening/weightbearing, etc   Outcome: Progressing     Problem: NEUROSENSORY - ADULT  Goal: Achieves stable or improved neurological status  Description: INTERVENTIONS  - Monitor and report changes in neurological status  - Monitor vital signs such as temperature, blood pressure, glucose, and any other labs ordered   - Initiate measures to prevent increased intracranial pressure  - Monitor for seizure activity and implement precautions if appropriate      Outcome: Progressing  Goal: Remains free of injury related to seizures activity  Description: INTERVENTIONS  - Maintain airway, patient safety  and administer oxygen as ordered  - Monitor patient for seizure activity, document and report duration and description of seizure to physician/advanced practitioner  - If seizure occurs,  ensure patient safety during seizure  - Reorient patient post seizure  - Seizure pads on all 4 side rails  - Instruct patient/family to notify RN of any seizure activity including if an aura is experienced  - Instruct patient/family to call for assistance with activity based on nursing assessment  - Administer anti-seizure medications if ordered    Outcome: Progressing  Goal: Achieves maximal functionality and self care  Description: INTERVENTIONS  - Monitor swallowing and airway patency with patient fatigue and changes in neurological status  - Encourage and assist patient to increase activity and self care     - Encourage visually impaired, hearing impaired and aphasic patients to use assistive/communication devices  Outcome: Progressing     Problem: CARDIOVASCULAR - ADULT  Goal: Maintains optimal cardiac output and hemodynamic stability  Description: INTERVENTIONS:  - Monitor I/O, vital signs and rhythm  - Monitor for S/S and trends of decreased cardiac output  - Administer and titrate ordered vasoactive medications to optimize hemodynamic stability  - Assess quality of pulses, skin color and temperature  - Assess for signs of decreased coronary artery perfusion  - Instruct patient to report change in severity of symptoms  Outcome: Progressing  Goal: Absence of cardiac dysrhythmias or at baseline rhythm  Description: INTERVENTIONS:  - Continuous cardiac monitoring, vital signs, obtain 12 lead EKG if ordered  - Administer antiarrhythmic and heart rate control medications as ordered  - Monitor electrolytes and administer replacement therapy as ordered  Outcome: Progressing     Problem: RESPIRATORY - ADULT  Goal: Achieves optimal ventilation and oxygenation  Description: INTERVENTIONS:  - Assess for changes in respiratory status  - Assess for changes in mentation and behavior  - Position to facilitate oxygenation and minimize respiratory effort  - Oxygen administered by appropriate delivery if ordered  - Initiate smoking cessation education as indicated  - Encourage broncho-pulmonary hygiene including cough, deep breathe, Incentive Spirometry  - Assess the need for suctioning and aspirate as needed  - Assess and instruct to report SOB or any respiratory difficulty  - Respiratory Therapy support as indicated  Outcome: Progressing     Problem: GENITOURINARY - ADULT  Goal: Maintains or returns to baseline urinary function  Description: INTERVENTIONS:  - Assess urinary function  - Encourage oral fluids to ensure adequate hydration if ordered  - Administer IV fluids as ordered to ensure adequate hydration  - Administer ordered medications as needed  - Offer frequent toileting  - Follow urinary retention protocol if ordered  Outcome: Progressing  Goal: Absence of urinary retention  Description: INTERVENTIONS:  - Assess patients ability to void and empty bladder  - Monitor I/O  - Bladder scan as needed  - Discuss with physician/AP medications to alleviate retention as needed  - Discuss catheterization for long term situations as appropriate  Outcome: Progressing  Goal: Urinary catheter remains patent  Description: INTERVENTIONS:  - Assess patency of urinary catheter  - If patient has a chronic novak, consider changing catheter if non-functioning  - Follow guidelines for intermittent irrigation of non-functioning urinary catheter  Outcome: Progressing     Problem: METABOLIC, FLUID AND ELECTROLYTES - ADULT  Goal: Electrolytes maintained within normal limits  Description: INTERVENTIONS:  - Monitor labs and assess patient for signs and symptoms of electrolyte imbalances  - Administer electrolyte replacement as ordered  - Monitor response to electrolyte replacements, including repeat lab results as appropriate  - Instruct patient on fluid and nutrition as appropriate  Outcome: Progressing  Goal: Fluid balance maintained  Description: INTERVENTIONS:  - Monitor labs   - Monitor I/O and WT  - Instruct patient on fluid and nutrition as appropriate  - Assess for signs & symptoms of volume excess or deficit  Outcome: Progressing  Goal: Glucose maintained within target range  Description: INTERVENTIONS:  - Monitor Blood Glucose as ordered  - Assess for signs and symptoms of hyperglycemia and hypoglycemia  - Administer ordered medications to maintain glucose within target range  - Assess nutritional intake and initiate nutrition service referral as needed  Outcome: Progressing     Problem: Potential for Falls  Goal: Patient will remain free of falls  Description: INTERVENTIONS:  - Assess patient frequently for physical needs  -  Identify cognitive and physical deficits and behaviors that affect risk of falls    -  [place] fall precautions as indicated by assessment   - Educate patient/family on patient safety including physical limitations  - Instruct patient to call for assistance with activity based on assessment  - Modify environment to reduce risk of injury  - Consider OT/PT consult to assist with strengthening/mobility  Outcome: Progressing

## 2021-03-27 VITALS
OXYGEN SATURATION: 98 % | BODY MASS INDEX: 27.48 KG/M2 | DIASTOLIC BLOOD PRESSURE: 69 MMHG | HEART RATE: 63 BPM | WEIGHT: 160.94 LBS | RESPIRATION RATE: 14 BRPM | SYSTOLIC BLOOD PRESSURE: 126 MMHG | HEIGHT: 64 IN | TEMPERATURE: 98.2 F

## 2021-03-27 LAB
ANION GAP SERPL CALCULATED.3IONS-SCNC: 6 MMOL/L (ref 4–13)
BASOPHILS # BLD AUTO: 0.04 THOUSANDS/ΜL (ref 0–0.1)
BASOPHILS NFR BLD AUTO: 1 % (ref 0–1)
BUN SERPL-MCNC: 33 MG/DL (ref 5–25)
CALCIUM SERPL-MCNC: 9.2 MG/DL (ref 8.3–10.1)
CHLORIDE SERPL-SCNC: 106 MMOL/L (ref 100–108)
CO2 SERPL-SCNC: 28 MMOL/L (ref 21–32)
CREAT SERPL-MCNC: 1.05 MG/DL (ref 0.6–1.3)
EOSINOPHIL # BLD AUTO: 0.09 THOUSAND/ΜL (ref 0–0.61)
EOSINOPHIL NFR BLD AUTO: 2 % (ref 0–6)
ERYTHROCYTE [DISTWIDTH] IN BLOOD BY AUTOMATED COUNT: 14.2 % (ref 11.6–15.1)
GFR SERPL CREATININE-BSD FRML MDRD: 58 ML/MIN/1.73SQ M
GLUCOSE SERPL-MCNC: 106 MG/DL (ref 65–140)
GLUCOSE SERPL-MCNC: 108 MG/DL (ref 65–140)
GLUCOSE SERPL-MCNC: 80 MG/DL (ref 65–140)
HCT VFR BLD AUTO: 33.9 % (ref 34.8–46.1)
HGB BLD-MCNC: 10.5 G/DL (ref 11.5–15.4)
IMM GRANULOCYTES # BLD AUTO: 0.01 THOUSAND/UL (ref 0–0.2)
IMM GRANULOCYTES NFR BLD AUTO: 0 % (ref 0–2)
LYMPHOCYTES # BLD AUTO: 1.12 THOUSANDS/ΜL (ref 0.6–4.47)
LYMPHOCYTES NFR BLD AUTO: 20 % (ref 14–44)
MCH RBC QN AUTO: 25.4 PG (ref 26.8–34.3)
MCHC RBC AUTO-ENTMCNC: 31 G/DL (ref 31.4–37.4)
MCV RBC AUTO: 82 FL (ref 82–98)
MONOCYTES # BLD AUTO: 0.58 THOUSAND/ΜL (ref 0.17–1.22)
MONOCYTES NFR BLD AUTO: 10 % (ref 4–12)
NEUTROPHILS # BLD AUTO: 3.91 THOUSANDS/ΜL (ref 1.85–7.62)
NEUTS SEG NFR BLD AUTO: 67 % (ref 43–75)
NRBC BLD AUTO-RTO: 0 /100 WBCS
PLATELET # BLD AUTO: 205 THOUSANDS/UL (ref 149–390)
PMV BLD AUTO: 12.3 FL (ref 8.9–12.7)
POTASSIUM SERPL-SCNC: 3.9 MMOL/L (ref 3.5–5.3)
RBC # BLD AUTO: 4.13 MILLION/UL (ref 3.81–5.12)
SODIUM SERPL-SCNC: 140 MMOL/L (ref 136–145)
WBC # BLD AUTO: 5.75 THOUSAND/UL (ref 4.31–10.16)

## 2021-03-27 PROCEDURE — 99238 HOSP IP/OBS DSCHRG MGMT 30/<: CPT | Performed by: INTERNAL MEDICINE

## 2021-03-27 PROCEDURE — 85025 COMPLETE CBC W/AUTO DIFF WBC: CPT | Performed by: INTERNAL MEDICINE

## 2021-03-27 PROCEDURE — 99232 SBSQ HOSP IP/OBS MODERATE 35: CPT | Performed by: INTERNAL MEDICINE

## 2021-03-27 PROCEDURE — 82948 REAGENT STRIP/BLOOD GLUCOSE: CPT

## 2021-03-27 PROCEDURE — 80048 BASIC METABOLIC PNL TOTAL CA: CPT | Performed by: INTERNAL MEDICINE

## 2021-03-27 RX ORDER — CARVEDILOL 3.12 MG/1
3.12 TABLET ORAL 2 TIMES DAILY WITH MEALS
Status: DISCONTINUED | OUTPATIENT
Start: 2021-03-27 | End: 2021-03-27 | Stop reason: HOSPADM

## 2021-03-27 RX ORDER — ASPIRIN 81 MG/1
81 TABLET ORAL DAILY
Qty: 90 TABLET | Refills: 0 | Status: SHIPPED | OUTPATIENT
Start: 2021-03-28 | End: 2021-06-25 | Stop reason: SDUPTHER

## 2021-03-27 RX ORDER — CARVEDILOL 3.12 MG/1
3.12 TABLET ORAL 2 TIMES DAILY WITH MEALS
Qty: 180 TABLET | Refills: 0 | Status: SHIPPED | OUTPATIENT
Start: 2021-03-27 | End: 2021-05-26 | Stop reason: HOSPADM

## 2021-03-27 RX ORDER — FUROSEMIDE 40 MG/1
40 TABLET ORAL 2 TIMES DAILY
Qty: 180 TABLET | Refills: 0 | Status: SHIPPED | OUTPATIENT
Start: 2021-03-27 | End: 2021-06-25 | Stop reason: SDUPTHER

## 2021-03-27 RX ADMIN — ATORVASTATIN CALCIUM 40 MG: 20 TABLET, FILM COATED ORAL at 08:12

## 2021-03-27 RX ADMIN — ENOXAPARIN SODIUM 40 MG: 40 INJECTION SUBCUTANEOUS at 08:12

## 2021-03-27 RX ADMIN — ASPIRIN 81 MG: 81 TABLET, COATED ORAL at 08:10

## 2021-03-27 RX ADMIN — FUROSEMIDE 40 MG: 40 TABLET ORAL at 08:11

## 2021-03-27 RX ADMIN — Medication 2000 UNITS: at 08:11

## 2021-03-27 RX ADMIN — FLUTICASONE FUROATE AND VILANTEROL TRIFENATATE 1 PUFF: 100; 25 POWDER RESPIRATORY (INHALATION) at 08:15

## 2021-03-27 RX ADMIN — LOSARTAN POTASSIUM 100 MG: 50 TABLET, FILM COATED ORAL at 08:11

## 2021-03-27 RX ADMIN — CLOPIDOGREL BISULFATE 150 MG: 75 TABLET, FILM COATED ORAL at 08:11

## 2021-03-27 NOTE — DISCHARGE SUMMARY
[facility] Discharge Summary - Medical Elisabell Shirley [de-identified] y o  female MRN: [id number]    [place] [address] Room / Bed: 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, without long-term current use of insulin (HCC)    History of CVA with residual deficit    Stage 3a chronic kidney disease  Resolved Problems:    * No resolved hospital problems  *      Other Problems Addressed:     Consulting Providers      cardiology  Therapeutic Operative Procedures Performed      Diagnostic Procedures Performed  labs: see labc rec    Discharge Disposition: Home/Self Care  Discharged With Lines: no    Test Results Pending at Discharge: Outpatient Follow-Up  Cardiology and IM  Code Status: Level 1 - Full Code  Advance Directive and Living Will: <no information>  Power of :    POLST:      Medications   See after visit summary for reconciled discharge medications provided to patient and family  Allergies  No Known Allergies  Discharge Diet: cardiac diet      [address] [facility]  This is a 27-year-old female past medical history significant for previous cerebrovascular accident chronically on Plavix 150 mg daily, type 2 diabetes, hyperlipidemia hypertension who presented to [facility]/23/[facility], secondary to new onset orthopnea, proximal nocturnal dyspnea, as well as dyspnea on exertion  Patient troponin was mildly elevated on admission but plateaued    Patient was admitted for new onset heart failure due to elevated BNP, elevated JVP, as well as pulmonary edema on exam   Patient had echocardiogram which revealed regional wall motion abnormalities and EF of 50%  Patient subsequently underwent a cardiac catheterization which was notable for 99% stenosis of the LAD which was subsequently stented  Patient otherwise was hemodynamically stable, afebrile, not on any oxygen  She was started on a beta-blocker, continued on her Plavix dosage per her neurologist, was discharged on aspirin as well as losartan  Patient was given appropriate follow-up with Cardiology  Patient was ambulated on the day of discharge and was able to walk around without difficulty  Patient has good family support at home  All questions were answered  Patient was discharged in stable condition  Physical Exam  HENT:      Head: Normocephalic  Eyes:      Conjunctiva/sclera: Conjunctivae normal    Cardiovascular:      Rate and Rhythm: Normal rate  Pulmonary:      Effort: Pulmonary effort is normal    Abdominal:      General: There is no distension  Skin:     General: Skin is warm  Neurological:      Mental Status: She is alert  Psychiatric:         Mood and Affect: Mood normal            Presenting Problem/History of Present Illness  Principal Problem:    Acute heart failure (HCC)  Active Problems:    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

## 2021-03-27 NOTE — NURSING NOTE
Karl Herrera, patient's son at bedside, expressed concerns regarding his mother's forgetfulness, requesting for home health care to be setup at discharge  Joeece Sue with [facility] made aware

## 2021-03-27 NOTE — QUICK NOTE
Paged by nurse that son does have some concern regarding forgetfulness  While patient does exhibit some forgetfulness, she is A and O X 4, understands her condition and asks appropriate questions  She is able to have a conversation, but does seem to have some mild short term memory loss  She will be further followed up with outpatient, home health will be set up for her to help with administering medications and education  Also, clinic complex care nurse at [facility] aware of patient and also will follow up with patient

## 2021-03-27 NOTE — DISCHARGE INSTRUCTIONS
1  Please see the post cardiac catheterization dishcarge instructions  No heavy lifting, greater than 10 lbs  or strenuous  activity for 48 hrs  2 Remove band aid tomorrow  Shower and wash area- wrist gently with soap and water- beginning tomorrow  Rinse and pat dry  Apply new water seal band aid  Repeat this process for 5 days  No powders, creams lotions or antibiotic ointments  for 5 days  No tub baths, hot tubs or swimming for 5 days  3  Please call our office ([phone]) if you have any fever, redness, swelling, discharge from your wrist access site  4 No driving for 2 days    5  Stent Booklet      Coronary Artery Disease in [address] [facility], Sakina Kaminski al: AACVPR/ACC/AHA 2007 performance measures on cardiac rehabilitation for referral to and delivery of cardiac rehabilitation/secondary prevention services endorsed by the [employer] of Chest Physicians, [employer] of Sports Medicine, American Physical Therapy Association, [address] [facility],  Association for Cardiovascular Prevention and Rehabilitation, [address] [place] [address] of Clinical Nurse Specialists, Preventive Cardiovascular Nurses Association, and the Society of Thoracic Surgeons  J Am Antonietta Cardiol 2007; 50(14):4486-2517  [employer]io[employer]  org: Women and heart disease  [employer] of Cardiology [facility])  Jason Jacobsen  2011  Available from URL: https://meyer info/  aspx?br=0061  As accessed 2012-08-15  [facility] and [address] [place] [address] ([address]): The healthy heart handbook for women  National Heart, Lung, and [address] [place] [address] ([address])  MD Betty  2007  Available from URL: https://www CashYou/  As accessed 2012-08-15    [facility] Nasima PABON, Wilder TRISTAN, et al: AHA/ACCF secondary prevention and risk reduction therapy for patients with coronary and other atherosclerotic vascular disease: 2011 update: a guideline from the [address] [place] [address] and [facility] endorsed by the [place] and the Preventive Cardiovascular Nurses Association  J Am Antonietta Cardiol 2011; 58(23):0100-3845  [place]  Food and Drug Administration (FDA): Heart disease in women  [place]  Food and Drug Administration (FDA)  Brayan John MD  2012  Available from URL: [employer]  As accessed 2012-08-15  Brayan Kincaid [place] et al: Noninvasive diagnostic techniques for coronary disease in women  Clin Cardiol 2012; 35(3):149-155  © 2014 [address] is for End User's use only and may not be sold, redistributed or otherwise used for commercial purposes  All illustrations and images included in CareNotes® are the copyrighted property of A D A M , Inc  or Joe Mercado  The above information is an  only  It is not intended as medical advice for individual conditions or treatments  Talk to your doctor, nurse or pharmacist before following any medical regimen to see if it is safe and effective for you  Coronary Intravascular Stent Placement   WHAT YOU SHOULD KNOW:   Coronary intravascular stent placement is a procedure to place a stent in an artery of your heart that has plaque buildup  Plaque is a mixture of fat and cholesterol  A stent is a small mesh tube made of metal that helps keep your artery open  Your caregiver may place a bare metal stent or a drug-eluting stent (KOJO) in your artery  A KOJO is coated with medicine that is slowly released and helps prevent more plaque buildup in the area where the stent is placed  The stent remains in your artery for life  You may need more than one stent  CARE AGREEMENT:   You have the right to help plan your care  Learn about your health condition and how it may be treated   Discuss treatment options with your caregivers to decide what care you want to receive  You always have the right to refuse treatment  RISKS:   · You may develop a hematoma (swelling caused by collection of blood) or bleed more than expected from your catheter site  The dye used during this procedure may cause an allergic reaction or kidney problems  You may develop an infection  · Your artery may be injured when the catheter is inserted  During or after surgery, blood clots may form, or plaque may break off  The blood clot or plaque may block your artery and cause a heart attack or stroke  The stent could collapse, or a clot could form on the stent  This could cause the artery to become blocked again  You may need another procedure to open your artery  If you do not have this procedure, plaque may continue to build up in your arteries  This can cause a heart attack or stroke  WHILE YOU ARE HERE:   Before the procedure:   · Informed consent  is a legal document that explains the tests, treatments, or procedures that you may need  Informed consent means you understand what will be done and can make decisions about what you want  You give your permission when you sign the consent form  You can have someone sign this form for you if you are not able to sign it  You have the right to understand your medical care in words you know  Before you sign the consent form, understand the risks and benefits of what will be done  Make sure all your questions are answered  · Blood tests  are done to give caregivers information about how your body is working  The blood may be taken from your hand, arm, or IV  · Heart monitoring  is also called an ECG or EKG  Sticky pads placed on your skin record your heart's electrical activity  · An IV  is a small tube placed in your vein that is used to give you medicine or liquids  · A sedative  will be given to you right before the procedure   This medicine may make you feel sleepy and more relaxed  · Blood thinner medicine  will be given to prevent clots from forming during and after the procedure  During the procedure:   · You will receive local anesthesia that will numb the area where the catheter will be placed  You will be awake during the procedure so that your caregivers can give you instructions  You may be asked to cough or hold your breath during the procedure  · A catheter (long, thin tube) is put into an artery, usually in your groin  The catheter is gently guided through this artery to your heart and into the narrowed or blocked artery  Caregivers will use x-rays and dye to find the area where the stent needs to be placed  You may feel warm as the dye is put into the catheter  A guidewire is then placed into the catheter  The balloon catheter is guided into the narrow or blocked artery using the guidewire  Caregivers inflate the balloon several times for short periods  The inflated balloon pushes the plaque against the artery walls  This opens them and allows more blood flow to your heart  Another balloon catheter with a stent is then inserted into the artery  The balloon is inflated  This expands the stent and pushes it into place against the artery wall  The stent will be left in your artery to help keep it open  After the procedure: The catheter and guidewire will be taken out of the artery and a pressure bandage will be put on the area  Your caregiver may apply a collagen plug or other closure device to stop the bleeding  Caregivers will put pressure on the bandaged area to help stop bleeding  They may use their fingers or a mechanical device to apply the pressure  You will be taken to a room to rest  Caregivers will monitor you closely for any problems  When your caregiver sees that you are okay, you will be taken to your hospital room  You may need to lie still and flat for 3 to 6 hours after the procedure to prevent bleeding   Do not get out of bed  until your caregiver says it is okay  © 2014 [address] is for End User's use only and may not be sold, redistributed or otherwise used for commercial purposes  All illustrations and images included in CareNotes® are the copyrighted property of A D A M , Inc  or Joe Mercado  The above information is an  only  It is not intended as medical advice for individual conditions or treatments  Talk to your doctor, nurse or pharmacist before following any medical regimen to see if it is safe and effective for you

## 2021-03-27 NOTE — PROGRESS NOTES
Cardiology Progress Note - Lynette Morrow [de-identified] y o  female MRN: [id number]    Unit/Bed#: -01 Encounter: [id number]      Assessment/Recommendations:  1  Acute diastolic heart failure:  Volume status much improved  Ttransitioned Lasix to 40 mg p o  BID that he she will continue as outpatient as well  Continue on beta-blocker  2  Elevated troponin:  Likely related to myocardial infarction  Although has significant coronary disease and status post drug-eluting stent, the heart failure exacerbation is likely reason for the elevated troponin at this time  3  CAD:  Status post drug-eluting stent to LAD  Continue on aspirin, Plavix, statin, beta-blocker  4  Hypertension:  Continued on ARB and beta-blocker  5  Hyperlipidemia:  Continue on statin  6  Type 2 diabetes mellitus:  Management per primary team   7  History of CVA:  With residual left-sided deficits  Continued on aspirin, Plavix, and statin  8  Stable from cardiac standpoint for discharge home today with outpatient follow-up within 1 week in the Cardiology office  Subjective:   Patient seen and examined  Status post cardiac catheterization overnight   ; pertinent negatives - chest pain, chest pressure/discomfort, dyspnea, irregular heart beat, lower extremity edema and palpitations  Objective:     Vitals: Blood pressure 126/69, pulse 63, temperature 98 2 °F (36 8 °C), resp  rate 14, height 5' 4" (1 626 m), weight 73 kg (160 lb 15 oz), SpO2 98 %, not currently breastfeeding , Body mass index is 27 62 kg/m² ,   Orthostatic Blood Pressures      Most Recent Value   Blood Pressure  126/69 filed at 03/27/2021 0313   Patient Position - Orthostatic VS  Lying filed at 03/23/2021 1744            Intake/Output Summary (Last 24 hours) at 3/27/2021 1016  Last data filed at 3/27/2021 0501  Gross per 24 hour   Intake 577 5 ml   Output 1500 ml   Net -922 5 ml       TELE: No significant arrhythmias seen      Physical Exam:    GEN: Jennifer PASCUAL Jose Luis appears well, alert and oriented x 3, pleasant and cooperative   HEENT: pupils equal, round, and reactive to light; extraocular muscles intact  NECK: supple, no carotid bruits   HEART: regular rhythm, normal S1 and S2, no murmurs, clicks, gallops or rubs   LUNGS: clear to auscultation bilaterally; no wheezes, rales, or rhonchi   ABDOMEN: normal bowel sounds, soft, no tenderness, no distention  EXTREMITIES: peripheral pulses normal; no clubbing, cyanosis, or edema  NEURO: no focal findings   SKIN: normal without suspicious lesions on exposed skin    Medications:      Current Facility-Administered Medications:     acetaminophen (TYLENOL) tablet 650 mg, 650 mg, Oral, Q4H PRN, Porfirio Muscotah, DO, 650 mg at 03/24/21 0122    albuterol (PROVENTIL HFA,VENTOLIN HFA) inhaler 2 puff, 2 puff, Inhalation, Q4H PRN, Porfirio Muscotah, DO, 2 puff at 03/24/21 0905    aspirin (ECOTRIN LOW STRENGTH) EC tablet 81 mg, 81 mg, Oral, Daily, Mona Kill, DO, 81 mg at 03/27/21 0810    atorvastatin (LIPITOR) tablet 40 mg, 40 mg, Oral, Daily, Porfirio Jori, DO, 40 mg at 03/27/21 0293    cholecalciferol (VITAMIN D3) tablet 2,000 Units, 2,000 Units, Oral, Daily, Porfirio Jori, DO, 2,000 Units at 03/27/21 6169    clopidogrel (PLAVIX) tablet 150 mg, 150 mg, Oral, Daily, Porfirio Muscotah, DO, 150 mg at 03/27/21 0811    enoxaparin (LOVENOX) subcutaneous injection 40 mg, 40 mg, Subcutaneous, Daily, Porfirio Muscotah, DO, 40 mg at 03/27/21 5494    fluticasone-vilanterol (BREO ELLIPTA) 100-25 mcg/inh inhaler 1 puff, 1 puff, Inhalation, Daily, Porfirio Jori, DO, 1 puff at 03/27/21 0815    furosemide (LASIX) tablet 40 mg, 40 mg, Oral, BID (diuretic), Abdullahi Castillo PA-C, 40 mg at 03/27/21 0811    insulin glargine (LANTUS) subcutaneous injection 10 Units 0 1 mL, 10 Units, Subcutaneous, Mona POLANCO DO, 10 Units at 03/26/21 2202    insulin lispro (HumaLOG) 100 units/mL subcutaneous injection 1-5 Units, 1-5 Units, Subcutaneous, Mona POLANCO DO, 1 Units at 03/26/21 2202    insulin lispro (HumaLOG) 100 units/mL subcutaneous injection 1-6 Units, 1-6 Units, Subcutaneous, TID AC, 1 Units at 03/25/21 1210 **AND** Fingerstick Glucose (POCT), , , TID AC, Alesia Hicks DO    insulin lispro (HumaLOG) 100 units/mL subcutaneous injection 5 Units, 5 Units, Subcutaneous, TID With Meals, Alesia Hicks DO, 5 Units at 03/27/21 3136    losartan (COZAAR) tablet 100 mg, 100 mg, Oral, Daily, Aiden Reynoso DO, 100 mg at 03/27/21 7170    melatonin tablet 3 mg, 3 mg, Oral, HS, Edwige Cooper MD, 3 mg at 03/26/21 2202    montelukast (SINGULAIR) tablet 10 mg, 10 mg, Oral, HS, Aiden Reynoso DO, 10 mg at 03/26/21 2202    ondansetron (ZOFRAN) injection 4 mg, 4 mg, Intravenous, Q6H PRN, Dante Alcantara DO     Labs & Results:    Results from last 7 days   Lab Units 03/23/21  2131 03/23/21  1838 03/23/21  1439   TROPONIN I ng/mL 0 10* 0 11* 0 09*     Results from last 7 days   Lab Units 03/27/21  0502 03/26/21  0454 03/25/21  0511   WBC Thousand/uL 5 75 5 02 5 88   HEMOGLOBIN g/dL 10 5* 10 9* 10 8*   HEMATOCRIT % 33 9* 34 5* 34 3*   PLATELETS Thousands/uL 205 213 206         Results from last 7 days   Lab Units 03/27/21  0502 03/26/21  0454 03/25/21  0511  03/23/21  1434   POTASSIUM mmol/L 3 9 3 7 4 2   < > 3 7   CHLORIDE mmol/L 106 107 107   < > 108   CO2 mmol/L 28 29 28   < > 26   BUN mg/dL 33* 35* 33*   < > 25   CREATININE mg/dL 1 05 1 18 1 15   < > 1 27   CALCIUM mg/dL 9 2 9 3 9 3   < > 9 1   ALK PHOS U/L  --   --  78  --  89   ALT U/L  --   --  28  --  28   AST U/L  --   --  30  --  11    < > = values in this interval not displayed  Results from last 7 days   Lab Units 03/24/21  0450   MAGNESIUM mg/dL 1 9       Echo: personally reviewed - EF 50% with moderate hypokinesis of apical segments  Grade 1 diastolic dysfunction  Atrial septal aneurysm  Mild-to-moderate mitral regurgitation  Mild aortic regurgitation  Biatrial enlargement    Moderate pulmonary hypertension      EKG personally reviewed by Raimundo Nice MD

## 2021-03-29 ENCOUNTER — TRANSITIONAL CARE MANAGEMENT (OUTPATIENT)
Dept: INTERNAL MEDICINE CLINIC | Facility: CLINIC | Age: 81
End: 2021-03-29

## 2021-03-29 NOTE — UTILIZATION REVIEW
Notification of Discharge  This is a Notification of Discharge from our facility [address]  Please be advised that this patient has been discharge from our facility  Below you will find the admission and discharge date and time including the patients disposition  PRESENTATION DATE: 3/23/2021  2:19 PM  OBS ADMISSION DATE:   IP ADMISSION DATE: 3/23/21 1642   DISCHARGE DATE: 3/27/2021  1:36 PM  DISPOSITION: Home/Self Care Home/Self Care   Admission Orders listed below:  Admission Orders (From admission, onward)     Ordered        03/23/21 1642  Inpatient Admission  Once                   Please contact the UR Department if additional information is required to close this patient's authorization/case  [id number] [employer] Utilization Review Department  Main: [phone] x carefully listen to the prompts  All voicemails are confidential   [email or web address]  Send all requests for admission clinical reviews, approved or denied determinations and any other requests to dedicated fax number below belonging to the campus where the patient is receiving treatment   List of dedicated fax numbers:  [address] DENIALS (Administrative/Medical Necessity) [phone]   [address] (Maternity/NICU/Pediatrics) [phone]   Rosmery List [phone]   Carlena Purchase [phone]   Marya Danny [phone]   [facility] [address] [facility] [phone]   [address] [facility] [phone]   [address] [facility] [address] [facility]  [address] [facility] [address] [facility] [phone]

## 2021-03-30 ENCOUNTER — IMMUNIZATIONS (OUTPATIENT)
Dept: FAMILY MEDICINE CLINIC | Facility: HOSPITAL | Age: 81
End: 2021-03-30

## 2021-03-30 DIAGNOSIS — Z23 ENCOUNTER FOR IMMUNIZATION: Primary | ICD-10-CM

## 2021-03-30 PROCEDURE — 91300 SARS-COV-2 / COVID-19 MRNA VACCINE (PFIZER-BIONTECH) 30 MCG: CPT

## 2021-03-30 PROCEDURE — 0001A SARS-COV-2 / COVID-19 MRNA VACCINE (PFIZER-BIONTECH) 30 MCG: CPT

## 2021-04-01 ENCOUNTER — OFFICE VISIT (OUTPATIENT)
Dept: INTERNAL MEDICINE CLINIC | Facility: CLINIC | Age: 81
End: 2021-04-01

## 2021-04-01 VITALS
WEIGHT: 161 LBS | DIASTOLIC BLOOD PRESSURE: 63 MMHG | HEART RATE: 69 BPM | BODY MASS INDEX: 28.53 KG/M2 | SYSTOLIC BLOOD PRESSURE: 134 MMHG | HEIGHT: 63 IN | TEMPERATURE: 97.6 F | OXYGEN SATURATION: 97 %

## 2021-04-01 DIAGNOSIS — I50.21 ACUTE SYSTOLIC CONGESTIVE HEART FAILURE (HCC): ICD-10-CM

## 2021-04-01 DIAGNOSIS — I12.9 BENIGN HYPERTENSION WITH CKD (CHRONIC KIDNEY DISEASE) STAGE III (HCC): ICD-10-CM

## 2021-04-01 DIAGNOSIS — I69.30 HISTORY OF CVA WITH RESIDUAL DEFICIT: Chronic | ICD-10-CM

## 2021-04-01 DIAGNOSIS — N18.30 BENIGN HYPERTENSION WITH CKD (CHRONIC KIDNEY DISEASE) STAGE III (HCC): ICD-10-CM

## 2021-04-01 DIAGNOSIS — Z09 HOSPITAL DISCHARGE FOLLOW-UP: Primary | ICD-10-CM

## 2021-04-01 DIAGNOSIS — Z79.4 CONTROLLED TYPE 2 DIABETES MELLITUS WITH DIABETIC NEUROPATHY, WITH LONG-TERM CURRENT USE OF INSULIN (HCC): Chronic | ICD-10-CM

## 2021-04-01 DIAGNOSIS — E11.40 CONTROLLED TYPE 2 DIABETES MELLITUS WITH DIABETIC NEUROPATHY, WITH LONG-TERM CURRENT USE OF INSULIN (HCC): Chronic | ICD-10-CM

## 2021-04-01 DIAGNOSIS — Z95.5 STATUS POST INSERTION OF DRUG-ELUTING STENT INTO LEFT ANTERIOR DESCENDING (LAD) ARTERY: ICD-10-CM

## 2021-04-01 PROBLEM — I63.9 CVA (CEREBROVASCULAR ACCIDENT) (HCC): Status: RESOLVED | Noted: 2017-06-28 | Resolved: 2021-04-01

## 2021-04-01 PROCEDURE — 99496 TRANSJ CARE MGMT HIGH F2F 7D: CPT | Performed by: PHYSICIAN ASSISTANT

## 2021-04-01 PROCEDURE — 1111F DSCHRG MED/CURRENT MED MERGE: CPT | Performed by: PHYSICIAN ASSISTANT

## 2021-04-01 RX ORDER — FLASH GLUCOSE SENSOR
KIT MISCELLANEOUS
Qty: 4 EACH | Refills: 3 | Status: SHIPPED | OUTPATIENT
Start: 2021-04-01 | End: 2021-10-27

## 2021-04-01 NOTE — PATIENT INSTRUCTIONS
On your visit today we reviewed your recent hospitalization  We reviewed that your testing confirm that you did have volume overload but did not have a heart attack  You are aware that you had a cardiac catheterization which indicated a 99% blockage in the left anterior descending artery and that a stent with medication was placed  We reviewed her medications you were able to confirm verbally that you are taking them all appropriately  You also admit you are feeling significantly improved  No longer having shortness of breath no chest pressure are back with sleeping with 1 pillow only and no swelling in your legs  We did review the warning signs that normally you sleep with 1 pillow and if you feel the need to reach for a 2nd pillow that is a warning sign  Also if you notice that your legs are getting more swollen that is also an indication that fluid may be building up again  We discussed the importance of checking your weight every day if you gain more than 3 lb in 1 day or 5 lb in 3 days to contact the cardiologist's office for advice regarding your furosemide which is the water pill  We confirmed your upcoming appointments including endocrinologist on April 7th script reprinted to get your labs completed prior to that appointment  Cardiologist is scheduled for April 8th  Refills for your continuous glucose monitoring device was sent to your pharmacy  Also as per your request the advanced directives in 5 wishes paperwork has been provided once again and will be scanned into her medical record when returned

## 2021-04-01 NOTE — PROGRESS NOTES
Assessment/Plan: On your visit today we reviewed your recent hospitalization  We reviewed that your testing confirm that you did have volume overload but did not have a heart attack  You are aware that you had a cardiac catheterization which indicated a 99% blockage in the left anterior descending artery and that a stent with medication was placed  We reviewed her medications you were able to confirm verbally that you are taking them all appropriately  You also admit you are feeling significantly improved  No longer having shortness of breath no chest pressure are back with sleeping with 1 pillow only and no swelling in your legs  We did review the warning signs that normally you sleep with 1 pillow and if you feel the need to reach for a 2nd pillow that is a warning sign  Also if you notice that your legs are getting more swollen that is also an indication that fluid may be building up again  We discussed the importance of checking your weight every day if you gain more than 3 lb in 1 day or 5 lb in 3 days to contact the cardiologist's office for advice regarding your furosemide which is the water pill  We confirmed your upcoming appointments including endocrinologist on April 7th script reprinted to get your labs completed prior to that appointment  Cardiologist is scheduled for April 8th  Refills for your continuous glucose monitoring device was sent to your pharmacy  Also as per your request the advanced directives in [employer] wishes paperwork has been provided once again and will be scanned into her medical record when returned  No problem-specific Assessment & Plan notes found for this encounter         Diagnoses and all orders for this visit:    Hospital discharge follow-up    Controlled type 2 diabetes mellitus with diabetic neuropathy, with long-term current use of insulin (HCC)  -     Continuous Blood Gluc Sensor (FreeStyle Naldo 14 Day Sensor) MISC; Use one sensor every 14 days    Benign hypertension with CKD (chronic kidney disease) stage III    Status post insertion of drug-eluting stent into left anterior descending (LAD) artery    Acute systolic congestive heart failure (HCC)    History of CVA with residual deficit          Subjective:      Patient ID: Frank Martinez is a [de-identified] y o  female  Patient presents today for hospital follow-up  Patient was admitted from March 23rd through March 27th  Patient had presented to the emergency room with chief complaint of new onset shortness of breath especially when lying down  As well as worsening of shortness of breath with walking  On evaluation in the emergency room patient was noted to have elevated BNP and JVP as well as pulmonary edema on examination  Patient did have echocardiogram which showed ejection fraction of 50%  With associated regional wall motion abnormalities  Patient was under consultation with Cardiology and underwent a cardiac catheterization which was notable for a 99% stenosis of the left anterior descending and stented  Patient was continued on her Plavix aspirin and losartan  Patient also on beta-blocker  Patient was euvolemic at the time of discharge and stable to return home  Patient admits feeling a lot better, eating and sleeping well  Can lie down without SOB  Patient states symptoms had started the evening of 3/19, was up to 4 pillows due to PND normally 1 pillow  States did not notice leg swelling until day before ER  Patient was able to confirm taking all of her medications including losartan, Plavix, aspirin 81 mg, Coreg and the furosemide twice daily  Patient does admit that she has been urinating much more frequently  Patient however has not been checking her weight daily  Reviewed upcoming appointments  April 7th with her endocrinologist, April 8th with the cardiologist     Patient otherwise reports feeling significantly improved and offers no new medical concerns today      Patient has not had any falls but she does feel a little bit more unsteady without imbalance  Patient would likely benefit from strength training physical therapy but with multiple appointments upcoming this month will discuss at follow-up  Patient received her 1st COVID vaccine and is scheduled for her 2nd later this month  The following portions of the patient's history were reviewed and updated as appropriate: allergies, current medications, past family history, past medical history, past social history, past surgical history and problem list     Review of Systems   Constitutional: Negative for chills and fever  Respiratory: Negative  Negative for cough, chest tightness and shortness of breath  Cardiovascular: Negative  Negative for chest pain and leg swelling  Gastrointestinal: Negative for abdominal pain  Endocrine: Negative for polydipsia, polyphagia and polyuria  Genitourinary: Positive for frequency  Negative for dysuria and urgency  Musculoskeletal: Positive for arthralgias (known OA)  Skin: Negative for rash  Neurological: Negative  Negative for dizziness, light-headedness and headaches  Psychiatric/Behavioral: Negative  Objective:      /63 (BP Location: Left arm, Patient Position: Sitting, Cuff Size: Standard)   Pulse 69   Temp 97 6 °F (36 4 °C) (Temporal)   Ht 5' 3" (1 6 m)   Wt 73 kg (161 lb)   SpO2 97%   BMI 28 52 kg/m²          Physical Exam  Vitals signs and nursing note reviewed  Constitutional:       General: She is not in acute distress  Appearance: She is not diaphoretic  HENT:      Head: Normocephalic  Eyes:      Conjunctiva/sclera: Conjunctivae normal    Cardiovascular:      Rate and Rhythm: Normal rate and regular rhythm  Heart sounds: Normal heart sounds  Comments: Neg JVD  Pulmonary:      Effort: Pulmonary effort is normal       Breath sounds: Normal breath sounds  No wheezing or rales     Abdominal:      General: Bowel sounds are normal       Tenderness: There is no abdominal tenderness  Musculoskeletal:      Right lower leg: No edema  Left lower leg: No edema  Neurological:      Mental Status: She is alert  Mental status is at baseline  Psychiatric:         Mood and Affect: Mood normal          Thought Content: Thought content normal          BMI Counseling: Body mass index is 28 52 kg/m²  The BMI is above normal  Nutrition recommendations include decreasing overall calorie intake, 3-5 servings of fruits/vegetables daily, moderation in carbohydrate intake, reducing intake of saturated fat and trans fat and reducing intake of cholesterol  Exercise recommendations include strength training exercises

## 2021-04-03 ENCOUNTER — LAB (OUTPATIENT)
Dept: LAB | Facility: HOSPITAL | Age: 81
End: 2021-04-03
Payer: COMMERCIAL

## 2021-04-03 DIAGNOSIS — E11.41 CONTROLLED TYPE 2 DIABETES MELLITUS WITH DIABETIC MONONEUROPATHY, WITHOUT LONG-TERM CURRENT USE OF INSULIN (HCC): ICD-10-CM

## 2021-04-03 DIAGNOSIS — I10 BENIGN ESSENTIAL HYPERTENSION: ICD-10-CM

## 2021-04-03 LAB
ANION GAP SERPL CALCULATED.3IONS-SCNC: 4 MMOL/L (ref 4–13)
BUN SERPL-MCNC: 56 MG/DL (ref 5–25)
CALCIUM SERPL-MCNC: 9.5 MG/DL (ref 8.3–10.1)
CHLORIDE SERPL-SCNC: 108 MMOL/L (ref 100–108)
CO2 SERPL-SCNC: 28 MMOL/L (ref 21–32)
CREAT SERPL-MCNC: 1.47 MG/DL (ref 0.6–1.3)
EST. AVERAGE GLUCOSE BLD GHB EST-MCNC: 146 MG/DL
GFR SERPL CREATININE-BSD FRML MDRD: 39 ML/MIN/1.73SQ M
GLUCOSE P FAST SERPL-MCNC: 141 MG/DL (ref 65–99)
HBA1C MFR BLD: 6.7 %
POTASSIUM SERPL-SCNC: 4 MMOL/L (ref 3.5–5.3)
SODIUM SERPL-SCNC: 140 MMOL/L (ref 136–145)

## 2021-04-03 PROCEDURE — 36415 COLL VENOUS BLD VENIPUNCTURE: CPT

## 2021-04-03 PROCEDURE — 83036 HEMOGLOBIN GLYCOSYLATED A1C: CPT

## 2021-04-03 PROCEDURE — 80048 BASIC METABOLIC PNL TOTAL CA: CPT

## 2021-04-07 ENCOUNTER — OFFICE VISIT (OUTPATIENT)
Dept: MULTI SPECIALTY CLINIC | Facility: CLINIC | Age: 81
End: 2021-04-07

## 2021-04-07 VITALS
HEART RATE: 54 BPM | WEIGHT: 161 LBS | SYSTOLIC BLOOD PRESSURE: 149 MMHG | HEIGHT: 63 IN | DIASTOLIC BLOOD PRESSURE: 76 MMHG | BODY MASS INDEX: 28.53 KG/M2

## 2021-04-07 DIAGNOSIS — E78.2 MIXED HYPERLIPIDEMIA: ICD-10-CM

## 2021-04-07 DIAGNOSIS — E11.40 CONTROLLED TYPE 2 DIABETES MELLITUS WITH DIABETIC NEUROPATHY, WITHOUT LONG-TERM CURRENT USE OF INSULIN (HCC): ICD-10-CM

## 2021-04-07 DIAGNOSIS — N18.30 BENIGN HYPERTENSION WITH CKD (CHRONIC KIDNEY DISEASE) STAGE III (HCC): Primary | ICD-10-CM

## 2021-04-07 DIAGNOSIS — I12.9 BENIGN HYPERTENSION WITH CKD (CHRONIC KIDNEY DISEASE) STAGE III (HCC): Primary | ICD-10-CM

## 2021-04-07 PROCEDURE — 1160F RVW MEDS BY RX/DR IN RCRD: CPT | Performed by: INTERNAL MEDICINE

## 2021-04-07 PROCEDURE — 99213 OFFICE O/P EST LOW 20 MIN: CPT | Performed by: INTERNAL MEDICINE

## 2021-04-07 PROCEDURE — 1111F DSCHRG MED/CURRENT MED MERGE: CPT | Performed by: INTERNAL MEDICINE

## 2021-04-07 PROCEDURE — 1036F TOBACCO NON-USER: CPT | Performed by: INTERNAL MEDICINE

## 2021-04-07 NOTE — PROGRESS NOTES
Follow Up - Frank Martinez [de-identified] y o  female MRN: [id number] @ Encounter: [id number]      Assessment/Plan     Assessment: This is a [de-identified]y o -year-old female with type 2 diabetes with hypertension, hyperlipidemia and vitamin-D deficiency  Plan:  1  Type 2 diabetes:  Her most recent hemoglobin A1c is 6 7  By her report her blood sugars appear to be relatively controlled with some episodes of hypoglycemia over the past few months in the 60s and 70s  After her recent hospitalization, her most recent GFR is 39  after being somewhat stable in the recent past  For now, we will discontinue her Januvia  She will be following up with Nephrology next month, by her report  She is continuing to follow up with Ophthalmology and podiatry  I have asked her to continue checking her blood sugars regularly utilizing the Dympol  Check hemoglobin A1c prior to next visit  2     Hypertension:  Continue current regimen  Check comprehensive metabolic panel prior to next visit  Continue to follow up with Cardiology and Nephrology  3     Hyperlipidemia: Continue atorvastatin  4     Vitamin-D deficiency:  Continue supplementation  CC: Type 2 Diabetes Follow Up    History of Present Illness     HPI: [de-identified] y o  female presents in the office today for follow up of Type 2 Diabetes  she  states he has been diagnosed with Type 2 Diabetes for 30 years and is checking blood glucose readings 3-4 times daily utilizing a freestyle Naldo  She states her blood sugars are typically in the 100 to 160 range throughout the day  Unfortunately, she forgot her glucometer at home today  she denies episodes of polydipsia, polyphagia and polyuria  She does note a couple of recent episodes of hypoglycemia over the past few months with blood sugar readings in the 60s and 70s    Lab Results   Component Value Date    HGBA1C 6 7 (H) 04/03/2021   Current Diabetic medication regimen is as follows:  Metformin 500 mg twice daily  Januvia 100 mg daily  she states she is up to date with his annual diabetic eye exam    She has a 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 and has received laser treatments and injections regularly for treatment  she has  Numbness in her feet associated with diabetic neuropathy and does follow podiatry for regular diabetic foot care  For her hypertension, she is treated with carvedilol 3 125 mg twice daily and losartan 100 mg daily  Her hyperlipidemia is treated with atorvastatin 40 mg daily  For her vitamin-D deficiency, she is supplementing with 2000 units of vitamin D3 daily  Consults    Review of Systems   Constitutional: Positive for fatigue (  At times)  Negative for chills and fever  HENT: Negative  Negative for trouble swallowing and voice change  Eyes: Positive for visual disturbance ( blurry vision to right eye)  Negative for photophobia, pain, discharge, redness and itching  Respiratory: Positive for shortness of breath ( dyspnea on exertion)  Negative for chest tightness  Cardiovascular: Negative  Negative for chest pain  Gastrointestinal: Negative  Negative for abdominal pain, constipation, diarrhea and vomiting  Endocrine: Positive for polyuria ( attributed to diuretics)  Negative for cold intolerance, heat intolerance, polydipsia and polyphagia  Genitourinary: Negative  Musculoskeletal: Positive for arthralgias and gait problem ( utilizes cane)  Skin: Negative  Allergic/Immunologic: Negative  Neurological: Negative for dizziness, syncope, light-headedness and headaches  Hematological: Negative  Psychiatric/Behavioral: Negative  All other systems reviewed and are negative        Historical Information   Past Medical History:   Diagnosis Date    ACE-inhibitor cough     last assessed - 29Dec2017    Asthma     Bilateral edema of lower extremity     last assessed - 21Aug2017    Cardiac murmur     last assessed - 21Aug2017    CKD (chronic kidney disease)     Diabetes mellitus ([facility] [address] )     last assessed - [id number]    Esophageal dysphagia     Fatigue     last assessed - [id number]    Hyperlipidemia     Hypertension     Patellar bursitis of right knee     last assessed - [id number]    Personal history of other specified conditions     presenting hazards to health    Stroke [facility])     Stroke syndrome     Urinary frequency     UTI (urinary tract infection)      Past Surgical History:   Procedure Laterality Date    [place] ESOPHAGOGASTRODUODENOSCOPY TRANSORAL DIAGNOSTIC N/A 2017    Procedure: ESOPHAGOGASTRODUODENOSCOPY (EGD); Surgeon: Stephanie Cotto MD;  Location[facility];   Service: Gastroenterology     Social History   Social History     Substance and Sexual Activity   Alcohol Use Never    Frequency: Never    Binge frequency: Never     Social History     Substance and Sexual Activity   Drug Use Never     Social History     Tobacco Use   Smoking Status Former Smoker    Packs/day: 3 00    Quit date: 36    Years since quittin 2   Smokeless Tobacco Former User   Tobacco Comment    quit over 30 yrs ago; (quit in the , had smoked 3PPD for 20 years - per [employer])     Family History:   Family History   Problem Relation Age of Onset    Heart disease Father     Hypertension Mother     Stroke Mother     Other Sister         1)Breast disorder; 2)Epilepsy   [place]liv[place] Migraines Sister         Migraine headaches    Osteoporosis Sister     Thyroid disease Sister     Coronary artery disease Sister         S/P triple vessel bypass    Osteoporosis Maternal Grandmother     Diabetes Son         Diabetes mellitus    Hypertension Son     Rheum arthritis Son         Rheumatic disease    Heart disease Family        Meds/Allergies   Current Outpatient Medications   Medication Sig Dispense Refill    acetaminophen (TYLENOL) 650 mg CR tablet Take 650 mg by mouth every 6 (six) hours as needed for mild pain      albuterol (PROVENTIL HFA,VENTOLIN HFA) 90 mcg/act inhaler Inhale 2 puffs every 4 (four) hours as needed      aspirin (ECOTRIN LOW STRENGTH) 81 mg EC tablet Take 1 tablet (81 mg total) by mouth daily 90 tablet 0    atorvastatin (LIPITOR) 40 mg tablet Take 1 tablet (40 mg total) by mouth daily 90 tablet 1    Blood Glucose Monitoring Suppl (FREESTYLE LITE) JAQUELIN by Does not apply route daily 1 each 0    Breo Ellipta 100-25 MCG/INH inhaler       carvedilol (COREG) 3 125 mg tablet Take 1 tablet (3 125 mg total) by mouth 2 (two) times a day with meals 180 tablet 0    clopidogrel (PLAVIX) 75 mg tablet Take 2 tablets (150 mg total) by mouth daily 180 tablet 1    Continuous Blood Gluc  (FREESTYLE ADAM READER) JAQUELIN 1 Device by Does not apply route 4 (four) times a day 1 Device 0    Continuous Blood Gluc Sensor (FreeStyle Adam 14 Day Sensor) MISC Use one sensor every 14 days 4 each 3    CVS D3 50 MCG (2000 UT) CAPS Take 1 capsule (2,000 Units total) by mouth daily 90 capsule 1    furosemide (LASIX) 40 mg tablet Take 1 tablet (40 mg total) by mouth 2 (two) times a day 180 tablet 0    GLOBAL EASE INJECT PEN NEEDLES 31G X 5 MM MISC       glucose blood (FREESTYLE LITE) test strip TEST AS DIRECTED UP TO FOUR TIMES A  each 3    Glucose-Vitamin C-Vitamin D (TRUEPLUS GLUCOSE ON THE GO) CHEW CHEW 2 TABS AS NEEDED FOR BLOOD SUGAR LESS THAN 80      Lancets (FREESTYLE) lancets Use as instructed 100 each 0    losartan (COZAAR) 100 MG tablet Take 1 tablet (100 mg total) by mouth daily 30 tablet 5    metFORMIN (GLUCOPHAGE) 500 mg tablet TAKE 1 TABLET BY MOUTH TWICE A DAY WITH MEALS 180 tablet 1    Misc  Devices (QUAD CANE) MISC by Does not apply route daily 1 each 0    montelukast (SINGULAIR) 10 mg tablet Take 1 tablet (10 mg total) by mouth daily at bedtime 90 tablet 0    sitaGLIPtin (JANUVIA) 100 mg tablet Take 1 tablet (100 mg total) by mouth daily 90 tablet 1     No current facility-administered medications for this visit        No Known Allergies    Objective Vitals: Blood pressure 149/76, pulse (!) 54, height 5' 3" (1 6 m), weight 73 kg (161 lb), not currently breastfeeding  Invasive Devices     None                 Physical Exam  Vitals signs reviewed  Constitutional:       Appearance: She is well-developed  HENT:      Head: Normocephalic and atraumatic  Eyes:      Conjunctiva/sclera: Conjunctivae normal       Pupils: Pupils are equal, round, and reactive to light  Neck:      Musculoskeletal: Normal range of motion and neck supple  Cardiovascular:      Rate and Rhythm: Normal rate and regular rhythm  Heart sounds: Normal heart sounds  Pulmonary:      Effort: Pulmonary effort is normal       Breath sounds: Normal breath sounds  Abdominal:      General: Bowel sounds are normal       Palpations: Abdomen is soft  Musculoskeletal: Normal range of motion  Skin:     General: Skin is warm and dry  Neurological:      Mental Status: She is alert and oriented to person, place, and time  Coordination: Coordination abnormal ( utilizes cane)  Psychiatric:         Behavior: Behavior normal          Thought Content:  Thought content normal          Judgment: Judgment normal            Lab Results:   Results from last 7 days   Lab Units 04/03/21  0844   HEMOGLOBIN A1C % 6 7*     Lab Results   Component Value Date    WBC 5 75 03/27/2021    HGB 10 5 (L) 03/27/2021    HCT 33 9 (L) 03/27/2021    MCV 82 03/27/2021     03/27/2021     Lab Results   Component Value Date/Time    BUN 56 (H) 04/03/2021 08:44 AM    BUN 29 (H) 09/22/2015 12:25 PM     09/22/2015 12:25 PM    K 4 0 04/03/2021 08:44 AM    K 3 6 09/22/2015 12:25 PM     04/03/2021 08:44 AM     09/22/2015 12:25 PM    CO2 28 04/03/2021 08:44 AM    CO2 27 09/22/2015 12:25 PM    CREATININE 1 47 (H) 04/03/2021 08:44 AM    CREATININE 0 96 09/22/2015 12:25 PM    AST 30 03/25/2021 05:11 AM    AST 10 09/22/2015 12:25 PM    ALT 28 03/25/2021 05:11 AM    ALT 23 09/22/2015 12:25 PM    ALB 3 4 (L) 03/25/2021 05:11 AM    ALB 3 8 09/22/2015 12:25 PM     No results for input(s): CHOL, HDL, LDL, TRIG, VLDL in the last 72 hours  No results found for: Brayan Shipman  POC Glucose (mg/dl)   Date Value   03/27/2021 106   03/27/2021 108   03/26/2021 178 (H)   03/26/2021 62 (L)   03/26/2021 217 (H)   03/26/2021 97   03/25/2021 152 (H)   03/25/2021 63 (L)   03/25/2021 150 (H)   03/25/2021 118     Portions of the record may have been created with voice recognition software

## 2021-04-07 NOTE — PATIENT INSTRUCTIONS
Be mindful of diet  Stay active and stay hydrated  For now, continue metformin at current dose  Please discontinue Mart Paredes, as discussed  Continue to check your blood sugars regularly with the Freestyle Naldo/Glucometer  Keep a logbook of your blood sugars  Continue carvedilol and losartan for treatment of your hypertension  Continue atorvastatin  Continue Vitamin D3 daily  Continue to follow up with nephrology

## 2021-04-08 ENCOUNTER — OFFICE VISIT (OUTPATIENT)
Dept: CARDIOLOGY CLINIC | Facility: CLINIC | Age: 81
End: 2021-04-08
Payer: COMMERCIAL

## 2021-04-08 VITALS
WEIGHT: 162 LBS | DIASTOLIC BLOOD PRESSURE: 76 MMHG | HEIGHT: 63 IN | HEART RATE: 66 BPM | SYSTOLIC BLOOD PRESSURE: 128 MMHG | BODY MASS INDEX: 28.7 KG/M2

## 2021-04-08 DIAGNOSIS — I34.0 NON-RHEUMATIC MITRAL REGURGITATION: Primary | ICD-10-CM

## 2021-04-08 DIAGNOSIS — Z95.5 STATUS POST INSERTION OF DRUG-ELUTING STENT INTO LEFT ANTERIOR DESCENDING (LAD) ARTERY: ICD-10-CM

## 2021-04-08 DIAGNOSIS — E78.2 MIXED HYPERLIPIDEMIA: ICD-10-CM

## 2021-04-08 DIAGNOSIS — I25.10 CORONARY ARTERY DISEASE INVOLVING NATIVE CORONARY ARTERY OF NATIVE HEART WITHOUT ANGINA PECTORIS: ICD-10-CM

## 2021-04-08 DIAGNOSIS — I50.31 ACUTE DIASTOLIC CONGESTIVE HEART FAILURE (HCC): ICD-10-CM

## 2021-04-08 DIAGNOSIS — I35.1 AORTIC VALVE REGURGITATION, NONRHEUMATIC: ICD-10-CM

## 2021-04-08 PROCEDURE — 1111F DSCHRG MED/CURRENT MED MERGE: CPT | Performed by: INTERNAL MEDICINE

## 2021-04-08 PROCEDURE — 99214 OFFICE O/P EST MOD 30 MIN: CPT | Performed by: INTERNAL MEDICINE

## 2021-04-08 PROCEDURE — 1160F RVW MEDS BY RX/DR IN RCRD: CPT | Performed by: INTERNAL MEDICINE

## 2021-04-08 NOTE — PROGRESS NOTES
Cardiology Follow Up    Tsering Ledesma  1940  [id number]  [address] [facility]  [facility]  [address]  [phone]    1  Non-rheumatic mitral regurgitation     2  Aortic valve regurgitation, nonrheumatic     3  Mixed hyperlipidemia     4  Status post insertion of drug-eluting stent into left anterior descending (LAD) artery     5  Coronary artery disease involving native coronary artery of native heart without angina pectoris     6  Acute diastolic congestive heart failure (HCC)         Interval History:  Cardiology follow-up  Patient was recent hospitalization with acute congestive failure and new wall motion abnormalities on the echocardiogram, it catheterization personally reviewed revealed severe disease in the LAD underwent complex PCI of the LAD and PTCA only of the mid LAD  Heavily calcified coronary arteries  Since discharge, she has been feeling better, she is compliant low-sodium diet, denies any orthopnea or PND, denies any chest pain, compliant with medications  Denies any syncope or presyncope  Compliant with low-sodium diet as well  She ambulates slow pace with a cane, he does have residual hemiparesis from her CVA  She has not certain she wants to do cardiac rehabilitation as she also has transportation problems      Patient Active Problem List   Diagnosis    Aortic valve regurgitation, nonrheumatic    Benign hypertension with CKD (chronic kidney disease) stage III    Chronic rhinitis    Midline cystocele    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, without long-term current use of insulin ([facility])    Non-rheumatic mitral regurgitation    Uterine procidentia    Esophageal abnormality    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    Decreased hearing, bilateral    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[address] )    History of CVA with residual deficit    Mixed hyperlipidemia  Persistent proteinuria    Renal cyst    Ankle edema    Stage 3a chronic kidney disease    Acute diastolic congestive heart failure (HCC)    Status post insertion of drug-eluting stent into left anterior descending (LAD) artery    Coronary artery disease involving native coronary artery of native heart without angina pectoris     Past Medical History:   Diagnosis Date    ACE-inhibitor cough     last assessed - [id number]    Asthma     Bilateral edema of lower extremity     last assessed - [id number]    Cardiac murmur     last assessed - [id number]    CKD (chronic kidney disease)     Diabetes mellitus ([facility] [address] )     last assessed - [id number]    Esophageal dysphagia     Fatigue     last assessed - [id number]    Hyperlipidemia     Hypertension     Patellar bursitis of right knee     last assessed - [id number]    Personal history of other specified conditions     presenting hazards to health    Stroke [facility])     Stroke syndrome     Urinary frequency     UTI (urinary tract infection)      Social History     Socioeconomic History    Marital status:       Spouse name: Not on file    Number of children: 6    Years of education: Not on file    Highest education level: Not on file   Occupational History    Occupation: Retired   Social Needs    Financial resource strain: Not hard at all   [employer] insecurity     Worry: Never true     Inability: Never true   [place] needs     Medical: No     Non-medical: No   Tobacco Use    Smoking status: Former Smoker     Packs/day: 3 00     Quit date:      Years since quittin 2    Smokeless tobacco: Former User    Tobacco comment: quit over 30 yrs ago; (quit in the , had smoked 3PPD for 20 years - per [employer])   Substance and Sexual Activity    Alcohol use: Never     Frequency: Never     Binge frequency: Never    Drug use: Never    Sexual activity: Not Currently   Lifestyle    Physical activity     Days per week: 0 days     Minutes per session: 0 min    Stress: Not at all   Relationships    Social connections     Talks on phone: Once a week     Gets together: Once a week     Attends [employer] service: 1 to 4 times per year     Active member of club or organization: No     Attends meetings of clubs or organizations: Never     Relationship status:     Intimate partner violence     Fear of current or ex partner: No     Emotionally abused: No     Physically abused: No     Forced sexual activity: No   Other Topics Concern    Not on file   Social History Narrative    Diet needs improvement    Does not exercise    No caffeine use      Family History   Problem Relation Age of Onset    Heart disease Father     Hypertension Mother     Stroke Mother     Other Sister         1)Breast disorder; 2)Epilepsy   [place] Migraines Sister         Migraine headaches    Osteoporosis Sister     Thyroid disease Sister     Coronary artery disease Sister         S/P triple vessel bypass    Osteoporosis Maternal Grandmother     Diabetes Son         Diabetes mellitus    Hypertension Son     Rheum arthritis Son         Rheumatic disease    Heart disease Family      Past Surgical History:   Procedure Laterality Date    [place] ESOPHAGOGASTRODUODENOSCOPY TRANSORAL DIAGNOSTIC N/A 4/5/2017    Procedure: ESOPHAGOGASTRODUODENOSCOPY (EGD); Surgeon: Arnulfo Negrete MD;  Location: [facility];   Service: Gastroenterology       Current Outpatient Medications:     acetaminophen (TYLENOL) 650 mg CR tablet, Take 650 mg by mouth every 6 (six) hours as needed for mild pain, Disp: , Rfl:     albuterol (PROVENTIL HFA,VENTOLIN HFA) 90 mcg/act inhaler, Inhale 2 puffs every 4 (four) hours as needed, Disp: , Rfl:     aspirin (ECOTRIN LOW STRENGTH) 81 mg EC tablet, Take 1 tablet (81 mg total) by mouth daily, Disp: 90 tablet, Rfl: 0    atorvastatin (LIPITOR) 40 mg tablet, Take 1 tablet (40 mg total) by mouth daily, Disp: 90 tablet, Rfl: 1    Breo Ellipta 100-25 MCG/INH inhaler, , Disp: , Rfl:     carvedilol (COREG) 3 125 mg tablet, Take 1 tablet (3 125 mg total) by mouth 2 (two) times a day with meals, Disp: 180 tablet, Rfl: 0    clopidogrel (PLAVIX) 75 mg tablet, Take 2 tablets (150 mg total) by mouth daily, Disp: 180 tablet, Rfl: 1    Continuous Blood Gluc  (FREESTYLE ADAM READER) JAQUELIN, 1 Device by Does not apply route 4 (four) times a day, Disp: 1 Device, Rfl: 0    Continuous Blood Gluc Sensor (FreeStyle Adam 14 Day Sensor) MISC, Use one sensor every 14 days, Disp: 4 each, Rfl: 3    CVS D3 50 MCG (2000 UT) CAPS, Take 1 capsule (2,000 Units total) by mouth daily, Disp: 90 capsule, Rfl: 1    furosemide (LASIX) 40 mg tablet, Take 1 tablet (40 mg total) by mouth 2 (two) times a day, Disp: 180 tablet, Rfl: 0    GLOBAL EASE INJECT PEN NEEDLES 31G X 5 MM MISC, , Disp: , Rfl:     glucose blood (FREESTYLE LITE) test strip, TEST AS DIRECTED UP TO FOUR TIMES A DAY, Disp: 100 each, Rfl: 3    Glucose-Vitamin C-Vitamin D (TRUEPLUS GLUCOSE ON THE GO) CHEW, CHEW 2 TABS AS NEEDED FOR BLOOD SUGAR LESS THAN 80, Disp: , Rfl:     Lancets (FREESTYLE) lancets, Use as instructed, Disp: 100 each, Rfl: 0    losartan (COZAAR) 100 MG tablet, Take 1 tablet (100 mg total) by mouth daily, Disp: 30 tablet, Rfl: 5    metFORMIN (GLUCOPHAGE) 500 mg tablet, TAKE 1 TABLET BY MOUTH TWICE A DAY WITH MEALS, Disp: 180 tablet, Rfl: 1    Misc   Devices (QUAD CANE) MISC, by Does not apply route daily, Disp: 1 each, Rfl: 0    montelukast (SINGULAIR) 10 mg tablet, Take 1 tablet (10 mg total) by mouth daily at bedtime, Disp: 90 tablet, Rfl: 0    Blood Glucose Monitoring Suppl (FREESTYLE LITE) JAQUELIN, by Does not apply route daily, Disp: 1 each, Rfl: 0    sitaGLIPtin (JANUVIA) 100 mg tablet, Take 1 tablet (100 mg total) by mouth daily, Disp: 90 tablet, Rfl: 1  No Known Allergies    Labs:  Lab on 04/03/2021   Component Date Value    Hemoglobin A1C 04/03/2021 6 7*    EAG 04/03/2021 146     Sodium 04/03/2021 140  Potassium 04/03/2021 4 0     Chloride 04/03/2021 108     CO2 04/03/2021 28     ANION GAP 04/03/2021 4     BUN 04/03/2021 56*    Creatinine 04/03/2021 1 47*    Glucose, Fasting 04/03/2021 141*    Calcium 04/03/2021 9 5     eGFR 04/03/2021 39    No results displayed because visit has over 200 results  Imaging: Xr Chest Portable    Result Date: 3/23/2021  Narrative: CHEST INDICATION:   cp  COMPARISON:  Chest radiograph from 10/1/2018 and chest CT from 1/10/2019  EXAM PERFORMED/VIEWS:  XR CHEST PORTABLE  FINDINGS: Mild cardiomegaly  Mild pulmonary edema  Trace effusions  No pneumothorax  Osseous structures normal for age  Impression: Mild pulmonary edema with trace effusions  Workstation performed: [id number]       Review of Systems:  Review of Systems   Constitutional: Positive for activity change  Negative for fatigue  HENT: Negative for hearing loss and nosebleeds  Eyes: Negative for visual disturbance  Respiratory: Negative for apnea, shortness of breath, wheezing and stridor  Cardiovascular: Negative for chest pain, palpitations and leg swelling  Gastrointestinal: Negative for abdominal pain, anal bleeding and blood in stool  Endocrine: Negative for cold intolerance  Genitourinary: Negative for hematuria and urgency  Musculoskeletal: Positive for arthralgias and gait problem  Negative for myalgias  Allergic/Immunologic: Negative for immunocompromised state  Neurological: Positive for weakness  Negative for dizziness, syncope and numbness  Hematological: Does not bruise/bleed easily  Psychiatric/Behavioral: Negative for sleep disturbance  The patient is not nervous/anxious  Physical Exam:  Physical Exam  Vitals signs reviewed  Constitutional:       Appearance: Normal appearance  Eyes:      General: No scleral icterus  Neck:      Vascular: No carotid bruit  Cardiovascular:      Rate and Rhythm: Normal rate and regular rhythm  Pulses: Normal pulses  Heart sounds: Normal heart sounds  No murmur  No friction rub  No gallop  Pulmonary:      Effort: Pulmonary effort is normal  No respiratory distress  Breath sounds: Normal breath sounds  No stridor  No wheezing, rhonchi or rales  Skin:     General: Skin is warm and dry  Capillary Refill: Capillary refill takes less than 2 seconds  Findings: No bruising or erythema  Neurological:      Mental Status: She is alert and oriented to person, place, and time  Psychiatric:         Mood and Affect: Mood normal          Discussion/Summary: coronary disease, recent revascularization of the LAD as described above, interestingly so she had a stress test a year ago that suggested breast attenuation artifact but no ischemia  Echocardiogram that time was normal   Recent echocardiogram anterior wall motion abnormalities but overall normal systolic function  Recent admission with acute CHF likely secondary to ischemia  Continue current medications, including dual antiplatelet therapy,  Lipids last check last year total cholesterol 141 HDL 75 and LDL 56, adequate control on high-intensity statin therapy  status post CVA, cryptogenic, left long time  CTA at that time revealed no obstructive arterial disease, pulmonary artery was reported normal, CT 2 years ago revealed pulmonary artery measurements of 42 mm and extensive coronary calcifications  This note was completed in part utilizing [employer] direct voice recognition software  Grammatical errors, random word insertion, spelling mistakes, and incomplete sentences may be an occasional consequence of the system secondary to software limitations, ambient noise and hardware issues  At the time of dictation, efforts were made to edit, clarify and /or correct errors  Please read the chart carefully and recognize, using context, where substitutions have occurred    If you have any questions or concerns about the context, text or information contained within the body of this dictation, please contact myself, the provider, for further clarification

## 2021-04-12 ENCOUNTER — TELEPHONE (OUTPATIENT)
Dept: NEPHROLOGY | Facility: CLINIC | Age: 81
End: 2021-04-12

## 2021-04-12 NOTE — TELEPHONE ENCOUNTER
I called and spoke to patient and tried to get her schedule for her next follow up with Valdez Birmingham in the [place] office but she stated she will call back to schedule  Called patient from the recall list  Please schedule when she returns the call back

## 2021-04-13 ENCOUNTER — TELEPHONE (OUTPATIENT)
Dept: INTERNAL MEDICINE CLINIC | Facility: CLINIC | Age: 81
End: 2021-04-13

## 2021-04-13 NOTE — TELEPHONE ENCOUNTER
Folder Color- blue    Name of [address] [place] [address] Certificate of Medical Necessity    Form to be filled out by- Britt Lorenzo    Form to be Faxed to [phone]

## 2021-04-15 ENCOUNTER — TELEPHONE (OUTPATIENT)
Dept: INTERNAL MEDICINE CLINIC | Facility: CLINIC | Age: 81
End: 2021-04-15

## 2021-04-15 NOTE — TELEPHONE ENCOUNTER
Patient called requesting "conjugated estrogens (PREMARIN) vaginal cream" looks like it was discontinued  Patient states she needs this cream to "help insert donut into self"   Please review

## 2021-04-16 ENCOUNTER — TELEPHONE (OUTPATIENT)
Dept: OBGYN CLINIC | Facility: CLINIC | Age: 81
End: 2021-04-16

## 2021-04-16 NOTE — TELEPHONE ENCOUNTER
Patient made aware, she doesn't remember the name of the doctor that prescribed it but she has the number so she will reach out to them

## 2021-04-16 NOTE — TELEPHONE ENCOUNTER
Patient states she needs a refill on a cream she uses when she needs to put her bladder in position and before doing this she needs to put the cream

## 2021-04-21 ENCOUNTER — IMMUNIZATIONS (OUTPATIENT)
Dept: FAMILY MEDICINE CLINIC | Facility: HOSPITAL | Age: 81
End: 2021-04-21

## 2021-04-21 DIAGNOSIS — Z23 ENCOUNTER FOR IMMUNIZATION: Primary | ICD-10-CM

## 2021-04-21 PROCEDURE — 91300 SARS-COV-2 / COVID-19 MRNA VACCINE (PFIZER-BIONTECH) 30 MCG: CPT

## 2021-04-21 PROCEDURE — 0002A SARS-COV-2 / COVID-19 MRNA VACCINE (PFIZER-BIONTECH) 30 MCG: CPT

## 2021-04-22 ENCOUNTER — OFFICE VISIT (OUTPATIENT)
Dept: PODIATRY | Facility: CLINIC | Age: 81
End: 2021-04-22
Payer: COMMERCIAL

## 2021-04-22 VITALS
BODY MASS INDEX: 28.7 KG/M2 | HEIGHT: 63 IN | DIASTOLIC BLOOD PRESSURE: 78 MMHG | HEART RATE: 52 BPM | SYSTOLIC BLOOD PRESSURE: 167 MMHG

## 2021-04-22 DIAGNOSIS — E11.40 TYPE 2 DIABETES MELLITUS WITH DIABETIC NEUROPATHY, UNSPECIFIED WHETHER LONG TERM INSULIN USE (HCC): Primary | ICD-10-CM

## 2021-04-22 DIAGNOSIS — B35.1 ONYCHOMYCOSIS: ICD-10-CM

## 2021-04-22 DIAGNOSIS — I73.9 PERIPHERAL VASCULAR DISEASE, UNSPECIFIED (HCC): ICD-10-CM

## 2021-04-22 PROCEDURE — 11721 DEBRIDE NAIL 6 OR MORE: CPT | Performed by: PODIATRIST

## 2021-04-22 PROCEDURE — 1111F DSCHRG MED/CURRENT MED MERGE: CPT | Performed by: PODIATRIST

## 2021-04-22 PROCEDURE — 99213 OFFICE O/P EST LOW 20 MIN: CPT | Performed by: PODIATRIST

## 2021-04-22 PROCEDURE — 1036F TOBACCO NON-USER: CPT | Performed by: PODIATRIST

## 2021-04-22 PROCEDURE — 1160F RVW MEDS BY RX/DR IN RCRD: CPT | Performed by: PODIATRIST

## 2021-04-22 NOTE — PATIENT INSTRUCTIONS
Foot Care for People with Diabetes   AMBULATORY CARE:   What you need to know about foot care:   · Foot care helps protect your feet and prevent foot ulcers or sores  Long-term high blood sugar levels can damage the blood vessels and nerves in your legs and feet  This damage makes it hard to feel pressure, pain, temperature, and touch  You may not be able to feel a cut or sore, or shoes that are too tight  Foot care is needed to prevent serious problems, such as an infection or amputation  · Diabetes may cause your toes to become crooked or curved under  These changes may affect the way you walk and can lead to increased pressure on your foot  The pressure can decrease blood flow to your feet  Lack of blood flow increases your risk for a foot ulcer  Do not ignore small problems, such as dry skin or small wounds  These can become life-threatening over time without proper care  Call your care team provider if:   · Your feet become numb, weak, or hard to move  · You have pus draining from a sore on your foot  · You have a wound on your foot that gets bigger, deeper, or does not heal      · You see blisters, cuts, scratches, calluses, or sores on your foot  · You have a fever, and your feet become red, warm, and swollen  · Your toenails become thick, curled, or yellow  · You find it hard to check your feet because your vision is poor  · You have questions or concerns about your condition or care  How to care for your feet:   · Check your feet each day  Look at your whole foot, including the bottom, and between and under your toes  Check for wounds, corns, and calluses  Use a mirror to see the bottom of your feet  The skin on your feet may be shiny, tight, or darker than normal  Your feet may also be cold and pale  Feel your feet by running your hands along the tops, bottoms, sides, and between your toes   Redness, swelling, and warmth are signs of blood flow problems that can lead to a foot ulcer  Do not try to remove corns or calluses yourself  · Wash your feet each day with soap and warm water  Do not use hot water, because this can injure your foot  Dry your feet gently with a towel after you wash them  Dry between and under your toes  · Apply lotion or a moisturizer on your dry feet  Ask your care team provider what lotions are best to use  Do not put lotion or moisturizer between your toes  Moisture between your toes could lead to skin breakdown  · Cut your toenails correctly  File or cut your toenails straight across  Use a soft brush to clean around your toenails  If your toenails are very thick, you may need to have a care team provider or specialist cut them  · Protect your feet  Do not walk barefoot or wear your shoes without socks  Check your shoes for rocks or other objects that can hurt your feet  Wear cotton socks to help keep your feet dry  Wear socks without toe seams, or wear them with the seams inside out  Change your socks each day  Do not wear socks that are dirty or damp  · Wear shoes that fit well  Wear shoes that do not rub against any area of your feet  Your shoes should be ½ to ¾ inch (1 to 2 centimeters) longer than your feet  Your shoes should also have extra space around the widest part of your feet  Walking or athletic shoes with laces or straps that adjust are best  Ask your care team provider for help to choose shoes that fit you best  Ask him or her if you need to wear an insert, orthotic, or bandage on your feet  · Go to your follow-up visits  Your care team provider will do a foot exam at least once a year  You may need a foot exam more often if you have nerve damage, foot deformities, or ulcers  He will check for nerve damage and how well you can feel your feet  He will check your shoes to see if they fit well  · Do not smoke  Smoking can damage your blood vessels and put you at increased risk for foot ulcers   Ask your care team provider for information if you currently smoke and need help to quit  E-cigarettes or smokeless tobacco still contain nicotine  Talk to your care team provider before you use these products  Follow up with your diabetes care team provider or foot specialist as directed: You will need to have your feet checked at least once a year  You may need a foot exam more often if you have nerve damage, foot deformities, or ulcers  Write down your questions so you remember to ask them during your visits  © Copyright [address] Information is for End User's use only and may not be sold, redistributed or otherwise used for commercial purposes  All illustrations and images included in CareNotes® are the copyrighted property of A D A M , Inc  or [address] [place], [place] [address]ebony   The above information is an  only  It is not intended as medical advice for individual conditions or treatments  Talk to your doctor, nurse or pharmacist before following any medical regimen to see if it is safe and effective for you

## 2021-04-22 NOTE — PROGRESS NOTES
PATIENT:  Nishant Burrows  1940    ASSESSMENT/PLAN:  1  Type 2 diabetes mellitus with diabetic neuropathy, unspecified whether long term insulin use (HCC)  VAS lower limb arterial duplex, complete bilateral   2  Peripheral vascular disease, unspecified (HCC)  VAS lower limb arterial duplex, complete bilateral   3  Onychomycosis  Debridement         Orders Placed This Encounter   Procedures    Debridement        Disease prevention and related risk factors of diabetes were identified and discussed  The patient was educated in proper foot wear for diabetics  Also educated in daily foot assessment and routine diabetic foot care  Reviewed the last blood work and the HbA1c was 6 7  Discussed the importance of controlling BS through diet and exercise  Arterial study was ordered in 2018, but she never went  Scheduled her for arterial study concerning decreased pedal pulses  The patient will follow up in 9 weeks for further diabetic foot exam and care  PROCEDURE:  All mycotic toenails were reduced and debrided in length, width, and girth using a nail nipper and dremel  Patient tolerated procedure(s) well without complications       HPI:  Nishant Burrows is a [de-identified] y  o year old female seen for diabetic foot exam   BS is under control  The patient complained of thick toenails  She complained some night cramps in her legs  No foot ulcer  The patient denied any acute pedal disorder           PAST MEDICAL HISTORY:  Past Medical History:   Diagnosis Date    ACE-inhibitor cough     last assessed - [id number]    Asthma     Bilateral edema of lower extremity     last assessed - [id number]    Cardiac murmur     last assessed - [id number]    CKD (chronic kidney disease)     Diabetes mellitus ([facility] [address] )     last assessed - [id number]    Esophageal dysphagia     Fatigue     last assessed - [id number]    Hyperlipidemia     Hypertension     Patellar bursitis of right knee     last assessed - [id number]   [facility] Personal history of other specified conditions     presenting hazards to health    Stroke [facility])     Stroke syndrome     Urinary frequency     UTI (urinary tract infection)        PAST SURGICAL HISTORY:  Past Surgical History:   Procedure Laterality Date    NC ESOPHAGOGASTRODUODENOSCOPY TRANSORAL DIAGNOSTIC N/A 4/5/2017    Procedure: ESOPHAGOGASTRODUODENOSCOPY (EGD); Surgeon: Chas Maza MD;  Location: [facility]; Service: Gastroenterology        ALLERGIES:  Patient has no known allergies      MEDICATIONS:  Current Outpatient Medications   Medication Sig Dispense Refill    acetaminophen (TYLENOL) 650 mg CR tablet Take 650 mg by mouth every 6 (six) hours as needed for mild pain      albuterol (PROVENTIL HFA,VENTOLIN HFA) 90 mcg/act inhaler Inhale 2 puffs every 4 (four) hours as needed      aspirin (ECOTRIN LOW STRENGTH) 81 mg EC tablet Take 1 tablet (81 mg total) by mouth daily 90 tablet 0    atorvastatin (LIPITOR) 40 mg tablet Take 1 tablet (40 mg total) by mouth daily 90 tablet 1    Breo Ellipta 100-25 MCG/INH inhaler       carvedilol (COREG) 3 125 mg tablet Take 1 tablet (3 125 mg total) by mouth 2 (two) times a day with meals 180 tablet 0    clopidogrel (PLAVIX) 75 mg tablet Take 2 tablets (150 mg total) by mouth daily 180 tablet 1    Continuous Blood Gluc Sensor (FreeStyle Naldo 14 Day Sensor) MISC Use one sensor every 14 days 4 each 3    CVS D3 50 MCG (2000 UT) CAPS Take 1 capsule (2,000 Units total) by mouth daily 90 capsule 1    furosemide (LASIX) 40 mg tablet Take 1 tablet (40 mg total) by mouth 2 (two) times a day 180 tablet 0    glucose blood (FREESTYLE LITE) test strip TEST AS DIRECTED UP TO FOUR TIMES A  each 3    Glucose-Vitamin C-Vitamin D (TRUEPLUS GLUCOSE ON THE GO) CHEW CHEW 2 TABS AS NEEDED FOR BLOOD SUGAR LESS THAN 80      Lancets (FREESTYLE) lancets Use as instructed 100 each 0    losartan (COZAAR) 100 MG tablet Take 1 tablet (100 mg total) by mouth daily 30 tablet 5    metFORMIN (GLUCOPHAGE) 500 mg tablet TAKE 1 TABLET BY MOUTH TWICE A DAY WITH MEALS 180 tablet 1    Misc  Devices (QUAD CANE) MISC by Does not apply route daily 1 each 0    montelukast (SINGULAIR) 10 mg tablet Take 1 tablet (10 mg total) by mouth daily at bedtime 90 tablet 0    Blood Glucose Monitoring Suppl (FREESTYLE LITE) JAQUELIN by Does not apply route daily 1 each 0    GLOBAL EASE INJECT PEN NEEDLES 31G X 5 MM MISC       sitaGLIPtin (JANUVIA) 100 mg tablet Take 1 tablet (100 mg total) by mouth daily 90 tablet 1     No current facility-administered medications for this visit  SOCIAL HISTORY:  Social History     Socioeconomic History    Marital status:      Spouse name: None    Number of children: 6    Years of education: None    Highest education level: None   Occupational History    Occupation: Retired   Social Needs    Financial resource strain: Not hard at all   Zenitum insecurity     Worry: Never true     Inability: Never true   Found[employer] needs     Medical: No     Non-medical: No   Tobacco Use    Smoking status: Former Smoker     Packs/day: 3 00     Quit date:      Years since quittin 3    Smokeless tobacco: Former User    Tobacco comment: quit over 30 yrs ago; (quit in the , had smoked 3PPD for 20 years - per [employer])   Substance and Sexual Activity    Alcohol use: Never     Frequency: Never     Binge frequency: Never    Drug use: Never    Sexual activity: Not Currently   Lifestyle    Physical activity     Days per week: 0 days     Minutes per session: 0 min    Stress: Not at all   Relationships    Social connections     Talks on phone: Once a week     Gets together: Once a week     Attends Christian service: 1 to 4 times per year     Active member of club or organization: No     Attends meetings of clubs or organizations: Never     Relationship status:     Intimate partner violence     Fear of current or ex partner: No     Emotionally abused:  No Physically abused: No     Forced sexual activity: No   Other Topics Concern    None   Social History Narrative    Diet needs improvement    Does not exercise    No caffeine use        REVIEW OF SYSTEMS:  GENERAL: NAD, afebrile  HEART: No chest pain, or palpitation  RESPIRATORY:  No acute SOB or cough  GI: No Nausea, vomit or diarrhea  NEUROLOGIC: No syncope or acute weakness    PHYSICAL EXAM:  VASCULAR EXAM  Dorsalis pedis  absent, Posterior tibial artery  absent  The patient has class findings with skin atrophy, lack of digital hair, and nail dystrophy  There is +1 lower extremity edema bilaterally  NEUROLOGIC EXAM  Sensation is intact to light touch  Sensation is intact to 10gm monofilament  Decreased vibratory sensation on her feet  No focal neurologic deficit  DERMATOLOGIC EXAM:   No ulcer or cellulitis noted  The patient has hypertrophic toenails X 7 with discoloration, onycholysis, and subungal debris  No notable skin lesion  MUSCULOSKELETAL EXAM:   No acute joint pain, edema, or redness  No acute musculoskeletal problem  Shirae noted  Diabetic Foot Exam    Patient's shoes and socks removed  Right Foot/Ankle   Right Foot Inspection  Skin Exam: skin intact and dry skin no callus, no erythema, no maceration, no abnormal color, no pre-ulcer, no ulcer and no callus                          Toe Exam: right toe deformityno swelling, no tenderness and erythema  Sensory   Vibration: diminished  Proprioception: intact   Monofilament testing: intact  Vascular  Capillary refills: < 3 seconds  The right DP pulse is 0  The right PT pulse is 0       Left Foot/Ankle  Left Foot Inspection  Skin Exam: skin intact and dry skinno erythema, no maceration, normal color, no pre-ulcer, no ulcer and no callus                         Toe Exam: left toe deformityno swelling, no tenderness and no erythema                   Sensory   Vibration: diminished  Proprioception: intact  Monofilament: intact  Vascular  Capillary refills: < 3 seconds  The left DP pulse is 0  The left PT pulse is 0  Assign Risk Category:  Deformity present;  No loss of protective sensation; Weak pulses       Risk: 2

## 2021-05-10 ENCOUNTER — TRANSCRIBE ORDERS (OUTPATIENT)
Dept: CARDIAC REHAB | Age: 81
End: 2021-05-10

## 2021-05-10 DIAGNOSIS — Z95.5 STENTED CORONARY ARTERY: Primary | ICD-10-CM

## 2021-05-11 ENCOUNTER — CLINICAL SUPPORT (OUTPATIENT)
Dept: CARDIAC REHAB | Age: 81
End: 2021-05-11
Payer: COMMERCIAL

## 2021-05-11 DIAGNOSIS — Z95.5 S/P CORONARY ARTERY STENT PLACEMENT: ICD-10-CM

## 2021-05-11 DIAGNOSIS — Z95.5 STENTED CORONARY ARTERY: ICD-10-CM

## 2021-05-11 DIAGNOSIS — E11.69 TYPE 2 DIABETES MELLITUS WITH OTHER SPECIFIED COMPLICATION, WITHOUT LONG-TERM CURRENT USE OF INSULIN (HCC): ICD-10-CM

## 2021-05-11 PROCEDURE — 93797 PHYS/QHP OP CAR RHAB WO ECG: CPT

## 2021-05-11 NOTE — PROGRESS NOTES
Cardiac Rehabilitation Plan of Care   Initial Care Plan          Today's date: 2021   # of Exercise Sessions Completed: 1- initial eval  Patient name: Yang Perez      : 1940  Age: [de-identified] y o  MRN: [id number]  Referring Physician: Keily Zepeda DO  Cardiologist: Jaun Farias MD  Provider: Carlos Davis  Clinician: Aparna Freedman MS, ACSM CEP    Dx:   Encounter Diagnosis   Name Primary?  Type 2 diabetes mellitus with other specified complication, without long-term current use of insulin [facility])      Date of onset: 3/22/21      SUMMARY OF PROGRESS:  Today is Mrs Amaya's initial evaluation to begin Cardiac Rehab now 7 weeks post KOJO to mid LAD  The patient does not currently follow a formal exercise program at home  She has not resumed all ADLs reporting dyspnea with activity and reporting weakness and fatigue  Depression screening using the PHQ-9 interprets the patient's score 1-4 = Minimal Depression  SHANTHI-7 screening tool for anxiety suggests 0-4  = Not anxious  When addressed, the patient denies having depression/anxiety  Patient reports excellent social/emotional support  Information to begin using [employer] was provided as well as contact information for counseling through [employer]  PHQ-9 score will be reassessed in 30 days  The patient is a former smoker (quit 40 years ago)  Patient admits to 100% medication compliance  Patient reports the following physical limitations: right leg fatigue and weakness  The patient completed an initial submaximal NuStep ETT  The patient completed 6 minutes (2 4 METs) with test termination of RPE 6  Resting  /62 with appropriate hemodynamic response to exercise reaching 166/70  Patient denied symptoms during exercise  Telemetry revealed sinus bradycardia at rest, no ectopy    Patient was counseled on exercise guidelines to achieve a minimum of 150 mins/wk of moderate intensity (RPE 4-6) exercise and encouraged to add 1-2 days of exercise on opposite days of cardiac rehab as tolerated  We discussed current dietary habits and goals of heart healthy eating for lipid management and diabetes management The patient monitors home BG 4-5 times per day reporting average FBG 130s  Patient's goals include: be able to walk around the block, walk up a flight of stairs, not have to rely on her son's so much  The patient's CAD risk factors include:  inactivity, obesity/overweight, hypertension and diabetes  Her education will focus on lifestyle modification/education specific to Her needs  Patient will attend group education classes on heart healthy eating, reading food labels, stress management, risk factor reduction, understanding heart disease and common heart medications    Patient will attend 35 monitored exercise sessions, 3x/wk for 12-18 weeks beginning  at 2pm        Medication compliance: Yes   Comments: Pt reports to be compliant with medications  Fall Risk: Moderate   Comments: Patient uses walking assist device (walker/cane/rollator)    EKG Interpretation: sinus bradycardia at rest, no ectopy      EXERCISE ASSESSMENT and PLAN    Current Exercise Program in Rehab:       Frequency: 3 days/week Supplement with home exercise 2+ days/wk as tolerated       Minutes: 30-40         METS: 1 7-2 2            HR: RHR + 30bpm    RPE: 4-5         Modalities: UBE, NuStep and Recumbent bike      Exercise Progression 30 Day Goals :    Frequency: 3 days/week of cardiac rehab     Supplement with home exercise 2+ days/wk as tolerated    Minutes: 40                            >150 mins/wk of moderate intensity exercise   METS: 2 0-2 5   HR: RHR +30bpm    RPE: 4-6   Modalities: UBE, NuStep, Recumbent bike and Room walking    Strength trainin-3 days / week  12-15 repetitions  1-2 sets per modality   Will be added following 2-3 weeks of monitored exercise sessions   Modalities: Arm Curl, Sit to [employer] and [employer]    Home Exercise: none    Goals: 10% improvement in functional capacity - based on max METs achieved in fitness assessment, Reduced dyspnea with physical activity  0-10, improved DASI score by 10%, Exercise 5 days/wk, >150mins/wk of moderate intensity exercise, Resume ADLs with increased strength, Attend Rehab regularly and Decrease sitting time    Progression Toward Goals:  Reviewed Pt goals and determined plan of care    Education: benefit of exercise for CAD risk factors, AHA guidelines to achieve >150 mins/wk of moderate exercise and RPE scale   Plan:education on home exercise guidelines, home exercise 30+ mins 2 days opposite CR and Education class: Risk Factors for Heart Disease  Readiness to change: Contemplation:  (Acknowledging that there is a problem but not yet ready or sure of wanting to make a change)      NUTRITION ASSESSMENT AND PLAN    Weight control:    Starting weight: 161 2   Current weight:     Waist circumference:    Startin   Current:      Diabetes: T2D, Patient monitors BS 4 times/day, Patient reported fasting BS 130s  A1c: 6 7    last measured: 4/3/2021    Lipid management: Discussed diet and lipid management and Last lipid profile 11/10/2020  Chol 140  TRG 44  HDL 75  LDL 56    Goals:reduced BMI to < 25, reduced waist circumference <35 inches (F), improved A1c  < 7 0%, Improved Rate Your Plate score  >31, increase intake of fish, shellfish, increase intake of meatless meals, use low fat dairy, reduce cheese intake or use reduced-fat, Eat 4-5 cups of fruits and vegetables daily, choose low sodium processed foods, use fat-free dressings/[place] or seldom use, Increase intake of nuts and seeds, seldom eat or choose low fat ice-cream, fruit juice bars or frozen yogurt  and eliminate or choose low-fat sweets    Progression Toward Goals: Reviewed Pt goals and determined plan of care    Education: heart healthy eating  low sodium diet  nutrition for Improved BG control  exercise and diabetes management Plan: Education class: Reading Food Labels, Education Class: Heart Healthy Eating, switch to low fat cheeses, replace butter with soft spreads made with olive oil, canola or yogurt, replace unhealthy snacks with fruits & vegs, switch to skim or 1% milk, eat fewer desserts and sweets, will replace sugar sweetened cereals with whole grain or oatmeal, learn how to read food labels, replace sugar with stevia or truvia and keep added daily sugar <25g/day  Readiness to change: Preparation:  (Getting ready to change)       PSYCHOSOCIAL ASSESSMENT AND PLAN    Emotional:  Depression assessment:  PHQ-9 = 1-4 = Minimal Depression            Anxiety measure:  SHANTHI-7 = 0-4  = Not anxious  Self-reported stress level:  4  Social support: Excellent    Goals:  Reduce perceived stress to 1-3/10, Physical Fitness in [place] Score < 3, Daily Activity in [place] Score < 3, Pain in [place] Score < 3, Overall Health in [place] Score < 3, improved sleep and increased energy    Progression Toward Goals: Reviewed Pt goals and determined plan of care    Education: benefits of a positive support system and depression and CAD  Plan: Class: Stress and Your Health, Class: Relaxation, Spend time outdoors, Enjoy a hobby and Reduce dependence  on family  Readiness to change: Action:  (Changing behavior)      OTHER CORE COMPONENTS     Tobacco:   Social History     Tobacco Use   Smoking Status Former Smoker    Packs/day: 3 00    Quit date: 36    Years since quittin 3   Smokeless Tobacco Former User   Tobacco Comment    quit over 30 yrs ago; (quit in the , had smoked 3PPD for 20 years - per [employer])       Tobacco Use Intervention:   Pt quit 40 years ago   and has abstained    Anginal Symptoms:  chest pressure and DUNAWAY   NTG use: No prescription    Blood pressure:    Restin/62   Exercise: 166/70    Goals: consistent BP < 130/80, reduced dietary sodium <2300mg, moderate intensity exercise >150 mins/wk and reduce number of medications  needed for BP control    Progression Toward Goals: Reviewed Pt goals and determined plan of care    Education:  understanding high blood pressure and it's relationship to CAD and low sodium diet and HTN  Plan: Class: Understanding Heart Disease, Class: Common Heart Medications, Avoid Processed foods, engage in regular exercise, use salt substitutes and check labels for sodium content  Readiness to change: Preparation:  (Getting ready to change)

## 2021-05-11 NOTE — PROGRESS NOTES
CARDIAC REHAB ASSESSMENT    Today's date: May 11, 2021  Patient name: Clide Moritz     : 1940       MRN: [id number]  PCP: Lola Madrigal PA-C  Referring Physician: Hao Leong DO  Cardiologist: Jacek Askew MD  Surgeon: Per uZluaga DO  Dx:   Encounter Diagnosis   Name Primary?     Type 2 diabetes mellitus with other specified complication, without long-term current use of insulin ([facility] )        Date of onset: 3/22/2021  Cultural needs: none    Height:    Wt Readings from Last 1 Encounters:   21 73 5 kg (162 lb)      Weight:   Ht Readings from Last 1 Encounters:   21 5' 3" (1 6 m)     Medical History:   Past Medical History:   Diagnosis Date    ACE-inhibitor cough     last assessed - 2017    Asthma     Bilateral edema of lower extremity     last assessed - 2017    Cardiac murmur     last assessed - 2017    CKD (chronic kidney disease)     [facility] )     last assessed - 2017    Esophageal dysphagia     Fatigue     last assessed - 2017    Hyperlipidemia     Hypertension     Patellar bursitis of right knee     last assessed -     Personal history of other specified conditions     presenting hazards to health    Stroke [facility])     Stroke syndrome     Urinary frequency     UTI (urinary tract infection)          Physical Limitations: Right leg fatigue/ weakness    Fall Risk: Moderate   Comments: Patient uses walking assist device (walker/cane/rollator)    Anginal Equivalent: Chest Pressure, Dyspnea and Chest Pain   NTG use: No prescription    Risk Factors   Cholesterol: No  Smoking: Former user, quit in   HTN: Yes  DM: Type 2   average FBG 130s  Obesity: Yes   Inactivity: Yes  Stress:  perceived  stress: 4/10   Stressors: COVID and not being able to see people, her health and not being georgi to walk   Goals for Stress Management:Read, Keep a positive mindset, Enjoy a hobby and Spend time with family    Family History:  Family History   Problem Relation Age of Onset    Heart disease Father     Hypertension Mother    Amanda Qagan Tayagungin Stroke Mother     Other Sister         1)Breast disorder; 2)Epilepsy   Amanda Qagan Tay[place]ungin Migraines Sister         Migraine headaches    Osteoporosis Sister     Thyroid disease Sister     Coronary artery disease Sister         S/P triple vessel bypass    Osteoporosis Maternal Grandmother     Diabetes Son         Diabetes mellitus    Hypertension Son     Rheum arthritis Son         Rheumatic disease    Heart disease Family        Allergies: Patient has no known allergies    ETOH:   Social History     Substance and Sexual Activity   Alcohol Use Never    Frequency: Never    Binge frequency: Never         Current Medications:   Current Outpatient Medications   Medication Sig Dispense Refill    acetaminophen (TYLENOL) 650 mg CR tablet Take 650 mg by mouth every 6 (six) hours as needed for mild pain      albuterol (PROVENTIL HFA,VENTOLIN HFA) 90 mcg/act inhaler Inhale 2 puffs every 4 (four) hours as needed      aspirin (ECOTRIN LOW STRENGTH) 81 mg EC tablet Take 1 tablet (81 mg total) by mouth daily 90 tablet 0    atorvastatin (LIPITOR) 40 mg tablet Take 1 tablet (40 mg total) by mouth daily 90 tablet 1    Blood Glucose Monitoring Suppl (FREESTYLE LITE) JAQUEILN by Does not apply route daily 1 each 0    Breo Ellipta 100-25 MCG/INH inhaler       carvedilol (COREG) 3 125 mg tablet Take 1 tablet (3 125 mg total) by mouth 2 (two) times a day with meals 180 tablet 0    clopidogrel (PLAVIX) 75 mg tablet Take 2 tablets (150 mg total) by mouth daily 180 tablet 1    Continuous Blood Gluc Sensor (HivelyStyle Naldo 14 Day Sensor) MISC Use one sensor every 14 days 4 each 3    CVS D3 50 MCG (2000 UT) CAPS Take 1 capsule (2,000 Units total) by mouth daily 90 capsule 1    furosemide (LASIX) 40 mg tablet Take 1 tablet (40 mg total) by mouth 2 (two) times a day 180 tablet 0    GLOBAL EASE INJECT PEN NEEDLES 31G X 5 MM MISC       glucose blood (FREESTYLE LITE) test strip TEST AS DIRECTED UP TO FOUR TIMES A  each 3    Glucose-Vitamin C-Vitamin D (TRUEPLUS GLUCOSE ON THE GO) CHEW CHEW 2 TABS AS NEEDED FOR BLOOD SUGAR LESS THAN 80      Lancets (FREESTYLE) lancets Use as instructed 100 each 0    losartan (COZAAR) 100 MG tablet Take 1 tablet (100 mg total) by mouth daily 30 tablet 5    metFORMIN (GLUCOPHAGE) 500 mg tablet TAKE 1 TABLET BY MOUTH TWICE A DAY WITH MEALS 180 tablet 1    Misc  Devices (QUAD CANE) MISC by Does not apply route daily 1 each 0    montelukast (SINGULAIR) 10 mg tablet Take 1 tablet (10 mg total) by mouth daily at bedtime 90 tablet 0    sitaGLIPtin (JANUVIA) 100 mg tablet Take 1 tablet (100 mg total) by mouth daily 90 tablet 1     No current facility-administered medications for this visit  Functional Status Prior to Diagnosis for Treatment   Occupation: retired  Recreation: read  ADLs: able to perform light to moderate  [place]: Capable of performing light to moderate ADLs  Exercise: none    Current Functional Status  Occupation: retired  Recreation: read  ADLs:Capable of performing light ADLs only limited by fatigue  [place]: Capable of performing light ADLs only  Exercise: none      Patient Specific Goals:  Improve strength and stamina, not have to rely on her sons so much, walk around block, walk up a flight of stairs     Short Term Program Goals: dietary modifications increased strength improved energy/stamina with ADLs exercise 120-150 mins/wk    Long Term Goals: increased maximal walking duration  increased intial training workload  Improved [facility] Activity Status score  Improved functional capacity  Improved Quality of Life - [facility] score reduced  Improved A1c    Ability to reach goals/rehabilitation potential:  Very Good     Projected return to function: 8-12 weeks  Objective tests: sub-max NuStep ETT      Nutritional   Reviewed details of Rate your Plate   Discussed key elements of heart healthy eating  Reviewed patient goals for dietary modifications and their clinical implications  Reviewed most recent lipid profile       Goals for dietary modification: choose lean cuts of meat  eliminate processed meats  more meatless meals  low fat dairy   reduced fat cheese  increase fruits and vegetables  improved snack choices  more nuts/seeds  reduce sweets/frozen desserts      Emotional/Social  Patient reports he/she is coping well with good social support and denies depression or anxiety    Marital status:     Domestic Violence Screening: No

## 2021-05-13 ENCOUNTER — TELEPHONE (OUTPATIENT)
Dept: CARDIAC REHAB | Age: 81
End: 2021-05-13

## 2021-05-13 ENCOUNTER — TELEPHONE (OUTPATIENT)
Dept: OBGYN CLINIC | Facility: CLINIC | Age: 81
End: 2021-05-13

## 2021-05-14 DIAGNOSIS — S16.1XXA STRAIN OF NECK MUSCLE, INITIAL ENCOUNTER: ICD-10-CM

## 2021-05-14 DIAGNOSIS — I63.50 CEREBROVASCULAR ACCIDENT (CVA) OF PONTINE STRUCTURE (HCC): ICD-10-CM

## 2021-05-14 DIAGNOSIS — N95.2 ATROPHIC VAGINITIS: Primary | ICD-10-CM

## 2021-05-14 RX ORDER — CHOLECALCIFEROL (VITAMIN D3) 50 MCG
TABLET ORAL
Qty: 30 TABLET | OUTPATIENT
Start: 2021-05-14

## 2021-05-14 RX ORDER — CLOPIDOGREL BISULFATE 75 MG/1
TABLET ORAL
Qty: 180 TABLET | Refills: 1 | Status: SHIPPED | OUTPATIENT
Start: 2021-05-14 | End: 2021-11-22

## 2021-05-17 ENCOUNTER — APPOINTMENT (OUTPATIENT)
Dept: CARDIAC REHAB | Age: 81
End: 2021-05-17
Payer: COMMERCIAL

## 2021-05-17 DIAGNOSIS — E11.65 UNCONTROLLED TYPE 2 DIABETES MELLITUS WITH HYPERGLYCEMIA (HCC): ICD-10-CM

## 2021-05-17 NOTE — TELEPHONE ENCOUNTER
I filled this for the patient in June 2020 with endocrinology, please refill if appropriate  Thanks!

## 2021-05-19 ENCOUNTER — CLINICAL SUPPORT (OUTPATIENT)
Dept: CARDIAC REHAB | Age: 81
End: 2021-05-19
Payer: COMMERCIAL

## 2021-05-19 DIAGNOSIS — Z95.5 S/P CORONARY ARTERY STENT PLACEMENT: ICD-10-CM

## 2021-05-19 PROCEDURE — 93798 PHYS/QHP OP CAR RHAB W/ECG: CPT

## 2021-05-21 ENCOUNTER — CLINICAL SUPPORT (OUTPATIENT)
Dept: CARDIAC REHAB | Age: 81
End: 2021-05-21
Payer: COMMERCIAL

## 2021-05-21 DIAGNOSIS — Z95.5 STENTED CORONARY ARTERY: ICD-10-CM

## 2021-05-21 PROCEDURE — 93798 PHYS/QHP OP CAR RHAB W/ECG: CPT

## 2021-05-24 ENCOUNTER — HOSPITAL ENCOUNTER (INPATIENT)
Facility: HOSPITAL | Age: 81
LOS: 2 days | Discharge: HOME | DRG: 251 | End: 2021-05-26
Attending: EMERGENCY MEDICINE | Admitting: HOSPITALIST
Payer: COMMERCIAL

## 2021-05-24 ENCOUNTER — TELEPHONE (OUTPATIENT)
Dept: NON INVASIVE DIAGNOSTICS | Facility: HOSPITAL | Age: 81
End: 2021-05-24

## 2021-05-24 ENCOUNTER — APPOINTMENT (INPATIENT)
Dept: NON INVASIVE DIAGNOSTICS | Facility: HOSPITAL | Age: 81
DRG: 251 | End: 2021-05-24
Payer: COMMERCIAL

## 2021-05-24 ENCOUNTER — OFFICE VISIT (OUTPATIENT)
Dept: INTERNAL MEDICINE CLINIC | Facility: CLINIC | Age: 81
End: 2021-05-24

## 2021-05-24 ENCOUNTER — APPOINTMENT (OUTPATIENT)
Dept: CARDIAC REHAB | Age: 81
End: 2021-05-24
Payer: COMMERCIAL

## 2021-05-24 ENCOUNTER — APPOINTMENT (EMERGENCY)
Dept: RADIOLOGY | Facility: HOSPITAL | Age: 81
DRG: 251 | End: 2021-05-24
Payer: COMMERCIAL

## 2021-05-24 VITALS
DIASTOLIC BLOOD PRESSURE: 59 MMHG | TEMPERATURE: 97.2 F | SYSTOLIC BLOOD PRESSURE: 118 MMHG | HEART RATE: 75 BPM | WEIGHT: 168.4 LBS | OXYGEN SATURATION: 98 % | BODY MASS INDEX: 29.83 KG/M2

## 2021-05-24 DIAGNOSIS — I25.10 CORONARY ARTERY DISEASE: ICD-10-CM

## 2021-05-24 DIAGNOSIS — R07.89 OTHER CHEST PAIN: Primary | ICD-10-CM

## 2021-05-24 DIAGNOSIS — N17.9 AKI (ACUTE KIDNEY INJURY) (HCC): ICD-10-CM

## 2021-05-24 DIAGNOSIS — I50.40 COMBINED SYSTOLIC AND DIASTOLIC CONGESTIVE HEART FAILURE, UNSPECIFIED HF CHRONICITY (HCC): ICD-10-CM

## 2021-05-24 DIAGNOSIS — R77.8 ELEVATED TROPONIN I LEVEL: ICD-10-CM

## 2021-05-24 DIAGNOSIS — Z95.5 STATUS POST INSERTION OF DRUG-ELUTING STENT INTO LEFT ANTERIOR DESCENDING (LAD) ARTERY FOR CORONARY ARTERY DISEASE: ICD-10-CM

## 2021-05-24 DIAGNOSIS — I21.4 NSTEMI (NON-ST ELEVATED MYOCARDIAL INFARCTION) (HCC): ICD-10-CM

## 2021-05-24 DIAGNOSIS — R07.2 SUBSTERNAL CHEST PAIN: ICD-10-CM

## 2021-05-24 DIAGNOSIS — R94.31 ABNORMAL ELECTROCARDIOGRAM (ECG) (EKG): Primary | ICD-10-CM

## 2021-05-24 PROBLEM — J45.909 ASTHMA: Status: ACTIVE | Noted: 2021-05-24

## 2021-05-24 PROBLEM — D64.9 ANEMIA: Status: ACTIVE | Noted: 2018-04-11

## 2021-05-24 PROBLEM — I10 ESSENTIAL HYPERTENSION: Status: ACTIVE | Noted: 2021-05-24

## 2021-05-24 LAB
ANION GAP SERPL CALCULATED.3IONS-SCNC: 10 MMOL/L (ref 4–13)
APTT PPP: 28 SECONDS (ref 23–37)
ATRIAL RATE: 55 BPM
ATRIAL RATE: 66 BPM
BASOPHILS # BLD AUTO: 0.04 THOUSANDS/ΜL (ref 0–0.1)
BASOPHILS NFR BLD AUTO: 1 % (ref 0–1)
BUN SERPL-MCNC: 50 MG/DL (ref 5–25)
CALCIUM SERPL-MCNC: 9.8 MG/DL (ref 8.3–10.1)
CHLORIDE SERPL-SCNC: 105 MMOL/L (ref 100–108)
CO2 SERPL-SCNC: 26 MMOL/L (ref 21–32)
CREAT SERPL-MCNC: 1.33 MG/DL (ref 0.6–1.3)
EOSINOPHIL # BLD AUTO: 0.06 THOUSAND/ΜL (ref 0–0.61)
EOSINOPHIL NFR BLD AUTO: 1 % (ref 0–6)
ERYTHROCYTE [DISTWIDTH] IN BLOOD BY AUTOMATED COUNT: 14 % (ref 11.6–15.1)
FERRITIN SERPL-MCNC: 295 NG/ML (ref 8–388)
GFR SERPL CREATININE-BSD FRML MDRD: 44 ML/MIN/1.73SQ M
GLUCOSE SERPL-MCNC: 191 MG/DL (ref 65–140)
GLUCOSE SERPL-MCNC: 273 MG/DL (ref 65–140)
GLUCOSE SERPL-MCNC: 99 MG/DL (ref 65–140)
HCT VFR BLD AUTO: 32.2 % (ref 34.8–46.1)
HGB BLD-MCNC: 10.2 G/DL (ref 11.5–15.4)
IMM GRANULOCYTES # BLD AUTO: 0.02 THOUSAND/UL (ref 0–0.2)
IMM GRANULOCYTES NFR BLD AUTO: 0 % (ref 0–2)
INR PPP: 0.97 (ref 0.84–1.19)
IRON SATN MFR SERPL: 10 %
IRON SERPL-MCNC: 31 UG/DL (ref 50–170)
KCT BLD-ACNC: 225 SEC (ref 89–137)
LYMPHOCYTES # BLD AUTO: 1.25 THOUSANDS/ΜL (ref 0.6–4.47)
LYMPHOCYTES NFR BLD AUTO: 18 % (ref 14–44)
MAGNESIUM SERPL-MCNC: 1.9 MG/DL (ref 1.6–2.6)
MCH RBC QN AUTO: 26.4 PG (ref 26.8–34.3)
MCHC RBC AUTO-ENTMCNC: 31.7 G/DL (ref 31.4–37.4)
MCV RBC AUTO: 83 FL (ref 82–98)
MONOCYTES # BLD AUTO: 0.59 THOUSAND/ΜL (ref 0.17–1.22)
MONOCYTES NFR BLD AUTO: 8 % (ref 4–12)
NEUTROPHILS # BLD AUTO: 5.09 THOUSANDS/ΜL (ref 1.85–7.62)
NEUTS SEG NFR BLD AUTO: 72 % (ref 43–75)
NRBC BLD AUTO-RTO: 0 /100 WBCS
P AXIS: 62 DEGREES
P AXIS: 64 DEGREES
PLATELET # BLD AUTO: 173 THOUSANDS/UL (ref 149–390)
PMV BLD AUTO: 12.3 FL (ref 8.9–12.7)
POTASSIUM SERPL-SCNC: 3.6 MMOL/L (ref 3.5–5.3)
PR INTERVAL: 186 MS
PR INTERVAL: 202 MS
PROTHROMBIN TIME: 12.9 SECONDS (ref 11.6–14.5)
QRS AXIS: -14 DEGREES
QRS AXIS: -9 DEGREES
QRSD INTERVAL: 84 MS
QRSD INTERVAL: 90 MS
QT INTERVAL: 382 MS
QT INTERVAL: 438 MS
QTC INTERVAL: 400 MS
QTC INTERVAL: 419 MS
RBC # BLD AUTO: 3.87 MILLION/UL (ref 3.81–5.12)
SODIUM SERPL-SCNC: 141 MMOL/L (ref 136–145)
SPECIMEN SOURCE: ABNORMAL
T WAVE AXIS: 100 DEGREES
T WAVE AXIS: 95 DEGREES
TIBC SERPL-MCNC: 316 UG/DL (ref 250–450)
TROPONIN I SERPL-MCNC: 11.5 NG/ML
TROPONIN I SERPL-MCNC: 13.6 NG/ML
TROPONIN I SERPL-MCNC: 5.21 NG/ML
TROPONIN I SERPL-MCNC: 5.58 NG/ML
VENTRICULAR RATE: 55 BPM
VENTRICULAR RATE: 66 BPM
WBC # BLD AUTO: 7.05 THOUSAND/UL (ref 4.31–10.16)

## 2021-05-24 PROCEDURE — 85730 THROMBOPLASTIN TIME PARTIAL: CPT | Performed by: EMERGENCY MEDICINE

## 2021-05-24 PROCEDURE — NC001 PR NO CHARGE: Performed by: STUDENT IN AN ORGANIZED HEALTH CARE EDUCATION/TRAINING PROGRAM

## 2021-05-24 PROCEDURE — 83735 ASSAY OF MAGNESIUM: CPT | Performed by: STUDENT IN AN ORGANIZED HEALTH CARE EDUCATION/TRAINING PROGRAM

## 2021-05-24 PROCEDURE — C1725 CATH, TRANSLUMIN NON-LASER: HCPCS

## 2021-05-24 PROCEDURE — 84484 ASSAY OF TROPONIN QUANT: CPT | Performed by: STUDENT IN AN ORGANIZED HEALTH CARE EDUCATION/TRAINING PROGRAM

## 2021-05-24 PROCEDURE — 93010 ELECTROCARDIOGRAM REPORT: CPT | Performed by: INTERNAL MEDICINE

## 2021-05-24 PROCEDURE — 93454 CORONARY ARTERY ANGIO S&I: CPT

## 2021-05-24 PROCEDURE — 99214 OFFICE O/P EST MOD 30 MIN: CPT | Performed by: INTERNAL MEDICINE

## 2021-05-24 PROCEDURE — 71046 X-RAY EXAM CHEST 2 VIEWS: CPT

## 2021-05-24 PROCEDURE — 83550 IRON BINDING TEST: CPT | Performed by: STUDENT IN AN ORGANIZED HEALTH CARE EDUCATION/TRAINING PROGRAM

## 2021-05-24 PROCEDURE — 82728 ASSAY OF FERRITIN: CPT | Performed by: STUDENT IN AN ORGANIZED HEALTH CARE EDUCATION/TRAINING PROGRAM

## 2021-05-24 PROCEDURE — 36415 COLL VENOUS BLD VENIPUNCTURE: CPT | Performed by: EMERGENCY MEDICINE

## 2021-05-24 PROCEDURE — NC001 PR NO CHARGE: Performed by: HOSPITALIST

## 2021-05-24 PROCEDURE — 82948 REAGENT STRIP/BLOOD GLUCOSE: CPT

## 2021-05-24 PROCEDURE — 99152 MOD SED SAME PHYS/QHP 5/>YRS: CPT | Performed by: INTERNAL MEDICINE

## 2021-05-24 PROCEDURE — 80048 BASIC METABOLIC PNL TOTAL CA: CPT | Performed by: EMERGENCY MEDICINE

## 2021-05-24 PROCEDURE — C1769 GUIDE WIRE: HCPCS

## 2021-05-24 PROCEDURE — 92920 PRQ TRLUML C ANGIOP 1ART&/BR: CPT

## 2021-05-24 PROCEDURE — 99222 1ST HOSP IP/OBS MODERATE 55: CPT | Performed by: THORACIC SURGERY (CARDIOTHORACIC VASCULAR SURGERY)

## 2021-05-24 PROCEDURE — 02703ZZ DILATION OF CORONARY ARTERY, ONE ARTERY, PERCUTANEOUS APPROACH: ICD-10-PCS | Performed by: INTERNAL MEDICINE

## 2021-05-24 PROCEDURE — B2111ZZ FLUOROSCOPY OF MULTIPLE CORONARY ARTERIES USING LOW OSMOLAR CONTRAST: ICD-10-PCS | Performed by: INTERNAL MEDICINE

## 2021-05-24 PROCEDURE — 99152 MOD SED SAME PHYS/QHP 5/>YRS: CPT

## 2021-05-24 PROCEDURE — 99285 EMERGENCY DEPT VISIT HI MDM: CPT | Performed by: EMERGENCY MEDICINE

## 2021-05-24 PROCEDURE — 99153 MOD SED SAME PHYS/QHP EA: CPT

## 2021-05-24 PROCEDURE — 84484 ASSAY OF TROPONIN QUANT: CPT | Performed by: EMERGENCY MEDICINE

## 2021-05-24 PROCEDURE — 85025 COMPLETE CBC W/AUTO DIFF WBC: CPT | Performed by: EMERGENCY MEDICINE

## 2021-05-24 PROCEDURE — 93454 CORONARY ARTERY ANGIO S&I: CPT | Performed by: INTERNAL MEDICINE

## 2021-05-24 PROCEDURE — C1887 CATHETER, GUIDING: HCPCS

## 2021-05-24 PROCEDURE — 85347 COAGULATION TIME ACTIVATED: CPT

## 2021-05-24 PROCEDURE — 83540 ASSAY OF IRON: CPT | Performed by: STUDENT IN AN ORGANIZED HEALTH CARE EDUCATION/TRAINING PROGRAM

## 2021-05-24 PROCEDURE — 85610 PROTHROMBIN TIME: CPT | Performed by: EMERGENCY MEDICINE

## 2021-05-24 PROCEDURE — 93005 ELECTROCARDIOGRAM TRACING: CPT

## 2021-05-24 PROCEDURE — 92920 PRQ TRLUML C ANGIOP 1ART&/BR: CPT | Performed by: INTERNAL MEDICINE

## 2021-05-24 PROCEDURE — 99285 EMERGENCY DEPT VISIT HI MDM: CPT

## 2021-05-24 PROCEDURE — C1894 INTRO/SHEATH, NON-LASER: HCPCS

## 2021-05-24 RX ORDER — CLOPIDOGREL BISULFATE 75 MG/1
150 TABLET ORAL DAILY
Status: DISCONTINUED | OUTPATIENT
Start: 2021-05-24 | End: 2021-05-26 | Stop reason: HOSPADM

## 2021-05-24 RX ORDER — SODIUM CHLORIDE 9 MG/ML
75 INJECTION, SOLUTION INTRAVENOUS CONTINUOUS
Status: DISCONTINUED | OUTPATIENT
Start: 2021-05-24 | End: 2021-05-25

## 2021-05-24 RX ORDER — VERAPAMIL HYDROCHLORIDE 2.5 MG/ML
INJECTION, SOLUTION INTRAVENOUS CODE/TRAUMA/SEDATION MEDICATION
Status: COMPLETED | OUTPATIENT
Start: 2021-05-24 | End: 2021-05-24

## 2021-05-24 RX ORDER — ASPIRIN 81 MG/1
81 TABLET ORAL DAILY
Status: DISCONTINUED | OUTPATIENT
Start: 2021-05-24 | End: 2021-05-26 | Stop reason: HOSPADM

## 2021-05-24 RX ORDER — FLUTICASONE FUROATE AND VILANTEROL 100; 25 UG/1; UG/1
1 POWDER RESPIRATORY (INHALATION) DAILY
Status: DISCONTINUED | OUTPATIENT
Start: 2021-05-24 | End: 2021-05-26 | Stop reason: HOSPADM

## 2021-05-24 RX ORDER — HEPARIN SODIUM 1000 [USP'U]/ML
4000 INJECTION, SOLUTION INTRAVENOUS; SUBCUTANEOUS
Status: DISCONTINUED | OUTPATIENT
Start: 2021-05-24 | End: 2021-05-24

## 2021-05-24 RX ORDER — HEPARIN SODIUM 1000 [USP'U]/ML
4000 INJECTION, SOLUTION INTRAVENOUS; SUBCUTANEOUS ONCE
Status: COMPLETED | OUTPATIENT
Start: 2021-05-24 | End: 2021-05-24

## 2021-05-24 RX ORDER — ALBUTEROL SULFATE 90 UG/1
2 AEROSOL, METERED RESPIRATORY (INHALATION) EVERY 4 HOURS PRN
Status: DISCONTINUED | OUTPATIENT
Start: 2021-05-24 | End: 2021-05-26 | Stop reason: HOSPADM

## 2021-05-24 RX ORDER — ATORVASTATIN CALCIUM 20 MG/1
40 TABLET, FILM COATED ORAL DAILY
Status: DISCONTINUED | OUTPATIENT
Start: 2021-05-24 | End: 2021-05-26 | Stop reason: HOSPADM

## 2021-05-24 RX ORDER — LIDOCAINE HYDROCHLORIDE 10 MG/ML
INJECTION, SOLUTION EPIDURAL; INFILTRATION; INTRACAUDAL; PERINEURAL CODE/TRAUMA/SEDATION MEDICATION
Status: COMPLETED | OUTPATIENT
Start: 2021-05-24 | End: 2021-05-24

## 2021-05-24 RX ORDER — MAGNESIUM SULFATE HEPTAHYDRATE 40 MG/ML
2 INJECTION, SOLUTION INTRAVENOUS ONCE
Status: COMPLETED | OUTPATIENT
Start: 2021-05-24 | End: 2021-05-25

## 2021-05-24 RX ORDER — SODIUM CHLORIDE 9 MG/ML
75 INJECTION, SOLUTION INTRAVENOUS CONTINUOUS
Status: DISCONTINUED | OUTPATIENT
Start: 2021-05-24 | End: 2021-05-24

## 2021-05-24 RX ORDER — HEPARIN SODIUM 1000 [USP'U]/ML
2000 INJECTION, SOLUTION INTRAVENOUS; SUBCUTANEOUS
Status: DISCONTINUED | OUTPATIENT
Start: 2021-05-24 | End: 2021-05-25

## 2021-05-24 RX ORDER — FUROSEMIDE 40 MG/1
40 TABLET ORAL 2 TIMES DAILY
Status: DISCONTINUED | OUTPATIENT
Start: 2021-05-24 | End: 2021-05-26 | Stop reason: HOSPADM

## 2021-05-24 RX ORDER — NITROGLYCERIN 20 MG/100ML
INJECTION INTRAVENOUS CODE/TRAUMA/SEDATION MEDICATION
Status: COMPLETED | OUTPATIENT
Start: 2021-05-24 | End: 2021-05-24

## 2021-05-24 RX ORDER — HEPARIN SODIUM 1000 [USP'U]/ML
4000 INJECTION, SOLUTION INTRAVENOUS; SUBCUTANEOUS
Status: DISCONTINUED | OUTPATIENT
Start: 2021-05-24 | End: 2021-05-25

## 2021-05-24 RX ORDER — ASPIRIN 81 MG/1
243 TABLET, CHEWABLE ORAL ONCE
Status: COMPLETED | OUTPATIENT
Start: 2021-05-24 | End: 2021-05-24

## 2021-05-24 RX ORDER — HEPARIN SODIUM 1000 [USP'U]/ML
INJECTION, SOLUTION INTRAVENOUS; SUBCUTANEOUS CODE/TRAUMA/SEDATION MEDICATION
Status: COMPLETED | OUTPATIENT
Start: 2021-05-24 | End: 2021-05-24

## 2021-05-24 RX ORDER — POTASSIUM CHLORIDE 20 MEQ/1
40 TABLET, EXTENDED RELEASE ORAL ONCE
Status: COMPLETED | OUTPATIENT
Start: 2021-05-24 | End: 2021-05-24

## 2021-05-24 RX ORDER — HEPARIN SODIUM 1000 [USP'U]/ML
2000 INJECTION, SOLUTION INTRAVENOUS; SUBCUTANEOUS
Status: DISCONTINUED | OUTPATIENT
Start: 2021-05-24 | End: 2021-05-24

## 2021-05-24 RX ORDER — HEPARIN SODIUM 10000 [USP'U]/100ML
3-20 INJECTION, SOLUTION INTRAVENOUS
Status: DISCONTINUED | OUTPATIENT
Start: 2021-05-24 | End: 2021-05-24

## 2021-05-24 RX ORDER — ASPIRIN 81 MG/1
81 TABLET, CHEWABLE ORAL DAILY
Status: CANCELLED | OUTPATIENT
Start: 2021-05-25

## 2021-05-24 RX ORDER — ONDANSETRON 2 MG/ML
4 INJECTION INTRAMUSCULAR; INTRAVENOUS EVERY 8 HOURS PRN
Status: CANCELLED | OUTPATIENT
Start: 2021-05-24

## 2021-05-24 RX ORDER — HEPARIN SODIUM 10000 [USP'U]/100ML
3-20 INJECTION, SOLUTION INTRAVENOUS
Status: DISCONTINUED | OUTPATIENT
Start: 2021-05-24 | End: 2021-05-25

## 2021-05-24 RX ORDER — FENTANYL CITRATE 50 UG/ML
INJECTION, SOLUTION INTRAMUSCULAR; INTRAVENOUS CODE/TRAUMA/SEDATION MEDICATION
Status: COMPLETED | OUTPATIENT
Start: 2021-05-24 | End: 2021-05-24

## 2021-05-24 RX ORDER — ACETAMINOPHEN 325 MG/1
650 TABLET ORAL EVERY 6 HOURS PRN
Status: CANCELLED | OUTPATIENT
Start: 2021-05-24

## 2021-05-24 RX ORDER — MIDAZOLAM HYDROCHLORIDE 2 MG/2ML
INJECTION, SOLUTION INTRAMUSCULAR; INTRAVENOUS CODE/TRAUMA/SEDATION MEDICATION
Status: COMPLETED | OUTPATIENT
Start: 2021-05-24 | End: 2021-05-24

## 2021-05-24 RX ORDER — LOSARTAN POTASSIUM 50 MG/1
100 TABLET ORAL DAILY
Status: DISCONTINUED | OUTPATIENT
Start: 2021-05-24 | End: 2021-05-24

## 2021-05-24 RX ORDER — NITROGLYCERIN 0.4 MG/1
0.4 TABLET SUBLINGUAL
Status: CANCELLED | OUTPATIENT
Start: 2021-05-24

## 2021-05-24 RX ORDER — CARVEDILOL 3.12 MG/1
3.12 TABLET ORAL 2 TIMES DAILY WITH MEALS
Status: DISCONTINUED | OUTPATIENT
Start: 2021-05-24 | End: 2021-05-25

## 2021-05-24 RX ORDER — ATORVASTATIN CALCIUM 80 MG/1
80 TABLET, FILM COATED ORAL EVERY EVENING
Status: CANCELLED | OUTPATIENT
Start: 2021-05-24

## 2021-05-24 RX ORDER — ACETAMINOPHEN 325 MG/1
650 TABLET ORAL EVERY 6 HOURS PRN
Status: DISCONTINUED | OUTPATIENT
Start: 2021-05-24 | End: 2021-05-26 | Stop reason: HOSPADM

## 2021-05-24 RX ORDER — NITROGLYCERIN 0.4 MG/1
0.4 TABLET SUBLINGUAL ONCE
Status: COMPLETED | OUTPATIENT
Start: 2021-05-24 | End: 2021-05-24

## 2021-05-24 RX ORDER — MONTELUKAST SODIUM 10 MG/1
10 TABLET ORAL
Status: DISCONTINUED | OUTPATIENT
Start: 2021-05-24 | End: 2021-05-26 | Stop reason: HOSPADM

## 2021-05-24 RX ADMIN — NITROGLYCERIN 0.4 MG: 0.4 TABLET SUBLINGUAL at 09:42

## 2021-05-24 RX ADMIN — ASPIRIN 243 MG: 81 TABLET, CHEWABLE ORAL at 10:20

## 2021-05-24 RX ADMIN — HEPARIN SODIUM 4000 UNITS: 1000 INJECTION INTRAVENOUS; SUBCUTANEOUS at 16:09

## 2021-05-24 RX ADMIN — MIDAZOLAM 2 MG: 1 INJECTION INTRAMUSCULAR; INTRAVENOUS at 16:04

## 2021-05-24 RX ADMIN — SODIUM CHLORIDE 75 ML/HR: 0.9 INJECTION, SOLUTION INTRAVENOUS at 11:20

## 2021-05-24 RX ADMIN — SODIUM CHLORIDE 75 ML/HR: 0.9 INJECTION, SOLUTION INTRAVENOUS at 17:21

## 2021-05-24 RX ADMIN — LIDOCAINE HYDROCHLORIDE 1 ML: 10 INJECTION, SOLUTION EPIDURAL; INFILTRATION; INTRACAUDAL; PERINEURAL at 16:07

## 2021-05-24 RX ADMIN — POTASSIUM CHLORIDE 40 MEQ: 1500 TABLET, EXTENDED RELEASE ORAL at 22:32

## 2021-05-24 RX ADMIN — MAGNESIUM SULFATE HEPTAHYDRATE 2 G: 40 INJECTION, SOLUTION INTRAVENOUS at 22:22

## 2021-05-24 RX ADMIN — MONTELUKAST 10 MG: 10 TABLET, FILM COATED ORAL at 22:33

## 2021-05-24 RX ADMIN — HEPARIN SODIUM 12 UNITS/KG/HR: 10000 INJECTION, SOLUTION INTRAVENOUS at 11:03

## 2021-05-24 RX ADMIN — CARVEDILOL 3.12 MG: 3.12 TABLET, FILM COATED ORAL at 16:54

## 2021-05-24 RX ADMIN — VERAPAMIL HYDROCHLORIDE 2.5 MG: 2.5 INJECTION, SOLUTION INTRAVENOUS at 16:09

## 2021-05-24 RX ADMIN — NITROGLYCERIN 200 MCG: 20 INJECTION INTRAVENOUS at 16:09

## 2021-05-24 RX ADMIN — FUROSEMIDE 40 MG: 40 TABLET ORAL at 17:21

## 2021-05-24 RX ADMIN — INSULIN LISPRO 1 UNITS: 100 INJECTION, SOLUTION INTRAVENOUS; SUBCUTANEOUS at 22:32

## 2021-05-24 RX ADMIN — POTASSIUM CHLORIDE 40 MEQ: 1500 TABLET, EXTENDED RELEASE ORAL at 16:53

## 2021-05-24 RX ADMIN — HEPARIN SODIUM 4000 UNITS: 1000 INJECTION INTRAVENOUS; SUBCUTANEOUS at 11:02

## 2021-05-24 RX ADMIN — HEPARIN SODIUM 12 UNITS/KG/HR: 10000 INJECTION, SOLUTION INTRAVENOUS at 22:30

## 2021-05-24 RX ADMIN — SODIUM CHLORIDE 225.5 ML: 0.9 INJECTION, SOLUTION INTRAVENOUS at 16:57

## 2021-05-24 RX ADMIN — FENTANYL CITRATE 50 MCG: 50 INJECTION INTRAMUSCULAR; INTRAVENOUS at 16:03

## 2021-05-24 RX ADMIN — IOHEXOL 70 ML: 350 INJECTION, SOLUTION INTRAVENOUS at 16:27

## 2021-05-24 NOTE — CONSULTS
Consultation - Cardiology   Donnie Trujillo [de-identified] y o  female MRN: [id number]  Unit/Bed#: ED 02 Encounter: [id number]      Assessment and Plan:    NSTEMI  -recent left heart catheterization on 03/26/2021 which showed a mid LAD stent 99% stenosis status post KOJO x1  -now presenting with chest pain, troponin elevation and nonspecific EKG changes  -will plan for repeat left heart catheterization this afternoon  -keep NPO, continue with heparin drip  -loaded with aspirin, and took home Plavix this morning  -currently mildly elevated, will give gentle hydration pre cath    Acute diastolic congestive heart failure (HCC)  -low normal EF on echo  -continue with Coreg 3 125 mg b i d , losartan 100 mg daily,  -home diuretic regimen 40 mg p o  B i d , currently appears euvolemic recommend continue with current regiment after left heart catheterization    CAD s/p KOJO x1 to LAD  -continue with aspirin 81 mg daily, Plavix 75 mg daily    Aortic valve regurgitation, nonrheumatic  -TTE 05/23/2021 showed an EF of 50%, with mild AI, mild-to-moderate MR    Benign hypertension with CKD (chronic kidney disease) stage III    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, without long-term current use of insulin (HCC)  -hemoglobin A1c 6 7 on 04/03/2021    History of CVA with residual deficit          History of Present Illness   Physician Requesting Consult: Linda Hsieh MD  Reason for Consult / Principal Problem: NSTEMI  HPI: Donnie Trujillo is a [de-identified]y o  year old female who presents with chest pain  She has past medical history of hypertension, type 2 diabetes, CKD stage 3, and CAD with prior PCI to LAD in   She was recently hospitalized at the end of March 2021 for a heart failure exacerbation  During which time she had an echo showing an EF of 50% and a left heart catheterization showing 99% stenosis of the LAD which was stented    Over the last several days, she reports having intermittent chest pain at rest and with activity  The pain has become more constant since last night  She describes the pain as a discomfort in the middle chest that does not move around radiate  It is 5 or 6/10 in intensity  Due to the persistent chest discomfort, she presented to the emergency department  Initial EKG shows sinus rhythm with nonspecific ST T wave changes  Initial troponins were elevated to 5  21  Currently she also acknowledges a mild headache that is improving, but otherwise denies any other symptoms  Denies any shortness of breath, fever, chills, abdominal pain, nausea, vomiting, lower extremity swelling, orthopnea, or other cardiac symptoms  Denies missing any doses of her medications including aspirin and Plavix  Consults    Review of Systems:  Review of Systems   Constitutional: Negative for chills, diaphoresis, fatigue and fever  HENT: Negative for congestion  Eyes: Negative for photophobia and visual disturbance  Respiratory: Negative for chest tightness and shortness of breath  Cardiovascular: Positive for chest pain  Negative for palpitations and leg swelling  Gastrointestinal: Negative for abdominal distention, nausea and vomiting  Genitourinary: Negative for difficulty urinating  Musculoskeletal: Negative for back pain and joint swelling  Skin: Negative for pallor and rash  Neurological: Positive for headaches  Negative for dizziness and syncope  Psychiatric/Behavioral: Negative for confusion           Historical Information   Past Medical History:   Diagnosis Date    ACE-inhibitor cough     last assessed - [id number]    Asthma     Bilateral edema of lower extremity     last assessed - [id number]    Cardiac murmur     last assessed - [id number]    CKD (chronic kidney disease)     Diabetes mellitus ([email or web address] )     last assessed - [id number]    Esophageal dysphagia     Fatigue     last assessed - [id number]    Hyperlipidemia     Hypertension     Patellar bursitis of right knee     last assessed - [id number]    Personal history of other specified conditions     presenting hazards to health    Stroke [facility])     Stroke syndrome     Urinary frequency     UTI (urinary tract infection)      Past Surgical History:   Procedure Laterality Date    OK ESOPHAGOGASTRODUODENOSCOPY TRANSORAL DIAGNOSTIC N/A 2017    Procedure: ESOPHAGOGASTRODUODENOSCOPY (EGD); Surgeon: Edy Sotomayor MD;  Location: [facility]; Service: Gastroenterology     Social History     Substance and Sexual Activity   Alcohol Use Never    Frequency: Never    Binge frequency: Never     Social History     Substance and Sexual Activity   Drug Use Never     Social History     Tobacco Use   Smoking Status Former Smoker    Packs/day: 3 00    Quit date: 36    Years since quittin 4   Smokeless Tobacco Former User   Tobacco Comment    quit over 30 yrs ago; (quit in the , had smoked 3PPD for 20 years - per [employer])     Family History: non-contributory    Meds/Allergies   all current active meds have been reviewed and current meds:   Current Facility-Administered Medications   Medication Dose Route Frequency    heparin (porcine) 25,000 units in 0 45% NaCl 250 mL infusion (premix)  3-20 Units/kg/hr (Order-Specific) Intravenous Titrated    heparin (porcine) injection 2,000 Units  2,000 Units Intravenous Q1H PRN    heparin (porcine) injection 4,000 Units  4,000 Units Intravenous Q1H PRN     No Known Allergies    Objective   Vitals: Blood pressure 139/67, pulse 79, temperature 98 2 °F (36 8 °C), temperature source Oral, resp   rate 18, height 5' 3" (1 6 m), weight 75 5 kg (166 lb 7 2 oz), SpO2 100 %, not currently breastfeeding , Body mass index is 29 48 kg/m² ,   Orthostatic Blood Pressures      Most Recent Value   Blood Pressure  139/67 filed at 2021 0930   Patient Position - Orthostatic VS  Lying filed at 2021 0930          No intake or output data in the 24 hours ending 21 1055    Invasive Devices Peripheral Intravenous Line            Peripheral IV 05/24/21 Right Forearm less than 1 day                    Physical Exam:  Physical Exam  Constitutional:       General: She is not in acute distress  Appearance: She is well-developed  She is not diaphoretic  Eyes:      Conjunctiva/sclera: Conjunctivae normal       Pupils: Pupils are equal, round, and reactive to light  Neck:      Vascular: No JVD  Cardiovascular:      Rate and Rhythm: Normal rate and regular rhythm  Pulses: Normal pulses  Heart sounds: Murmur present  No friction rub  Pulmonary:      Effort: Pulmonary effort is normal  No respiratory distress  Breath sounds: Normal breath sounds  No wheezing  Abdominal:      General: Bowel sounds are normal  There is no distension  Palpations: Abdomen is soft  Tenderness: There is no abdominal tenderness  There is no guarding  Musculoskeletal: Normal range of motion  Skin:     General: Skin is warm and dry  Neurological:      Mental Status: She is alert and oriented to person, place, and time     Psychiatric:         Behavior: Behavior normal            Lab Results:     Lab Results   Component Value Date    TROPONINI 5 21 (H) 05/24/2021    TROPONINI 0 10 (H) 03/23/2021    TROPONINI 0 11 (H) 03/23/2021       Lab Results   Component Value Date    GLUCOSE 170 (H) 09/22/2015    CALCIUM 9 8 05/24/2021     09/22/2015    K 3 6 05/24/2021    CO2 26 05/24/2021     05/24/2021    BUN 50 (H) 05/24/2021    CREATININE 1 33 (H) 05/24/2021       Lab Results   Component Value Date    WBC 7 05 05/24/2021    HGB 10 2 (L) 05/24/2021    HCT 32 2 (L) 05/24/2021    MCV 83 05/24/2021     05/24/2021       No results found for: CHOL  Lab Results   Component Value Date    HDL 75 11/10/2020    HDL 53 06/02/2020    HDL 54 11/06/2019     Lab Results   Component Value Date    LDLCALC 56 11/10/2020    LDLCALC 74 06/02/2020    LDLCALC 48 11/06/2019     Lab Results   Component Value Date    TRIG 44 11/10/2020    TRIG 45 06/02/2020    TRIG 79 11/06/2019       Lab Results   Component Value Date    ALT 28 03/25/2021    AST 30 03/25/2021               Imaging: I have personally reviewed pertinent reports        EKG: NSR nonspecific ST T wave changes

## 2021-05-24 NOTE — ASSESSMENT & PLAN NOTE
Patient presenting from [facility] [facility] for complaint of chest pain for 2-3 days  Reports that pain is substernal and nagging without radiation  States no associated dizziness, lightheadedness, shortness of breath, nausea, vomiting, leg swelling, or orthopnea  Of note, patient s/p KOJO to mid LAD for 99% stenosis 3/26/21 with peak troponin of 0 11 at that time  At this time, she also underwent balloon angioplasty only to distal LAD stenosis due to small size(80% with improvement to <50%) She reports compliance with all discharge medications at that time  Troponin on presentation 5 21 with EKG significant for mild ST elevations in V2 and V3  CXR at time of presentation with no acute disease  Plan:  · Received SL Nitro in ED with mild improvement  · At time of evaluation pain is resolved s/p Aspirin loading and initiation of heparin drip  · Serial troponins with correlated EKGs  · Cardiology consulted - appreciate recommendations  · Plan for cardiac cath this afternoon  · NPO at this time  · Cardiac diet when patient able to eat  5/25- per discussion w/ Cards and CTS- Pt would like to pursue medical management at this time which includes 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

## 2021-05-24 NOTE — ASSESSMENT & PLAN NOTE
Hx of CVA in 2011 and on Plavix at home   Some residual left-sided deficits (4/5) with mild ataxia, however, ambulates well with cane    Plan:  · Continue plavix 150mg QAM  · Ambulate OOB with assistance

## 2021-05-24 NOTE — ASSESSMENT & PLAN NOTE
Patient managed on Losartan 100mg daily, furosemide 40mg BID, and Carvedilol 3 125 BID at home   Reports took today's dose at 11AM     Plan:  · Continue Losartan and Carvedilol  · Will hold furosemide until after cath  · Cardiology consulted 2/2 NSTEMI - recs appreciated  · Monitor BP closely

## 2021-05-24 NOTE — CONSULTS
Consultation - Cardiothoracic Surgery   Joanna Wilson [de-identified] y o  female MRN: [id number]  Unit/Bed#: -01 Encounter: [id number]    Physician Requesting Consult: Sweetie Escalante MD    Reason for Consult / Principal Problem: CAD    Inpatient consult to Cardiothoracic Surgery  Consult performed by: Samantha Guzman PA-C  Consult ordered by: PEDRO LUIS Ayala        History of Present Illness: Joanna Wilson is a [de-identified]y o  year old female with a PMH of CAD s/p recent stent to LAD March 2021  She was at her PCP office for routine follow-up and mentioned she had been having 2-3 days of intermittent mid chest pain at rest and with activity  She denies radiation of the pain, but reported it was improved with prayer  She denies SOB, nausea, vomiting, LE edema, syncope, palpitations or radiation of the pain  Upon presentation to the ER, her troponin was 5 58 and her EKG showed NSR with nonspecific ST-T wave changes  She underwent cardiac cath, which revealed 100% LAD stenosis at the previous stent  She underwent balloon angioplasty  We have been consulted for cardiac surgical consideration  The patient reports her father had cardiac disease  She quit smoking in 1980 and denies alcohol or drug use       Past Medical History:  Past Medical History:   Diagnosis Date    ACE-inhibitor cough     last assessed - [id number]    Asthma     Bilateral edema of lower extremity     last assessed - [id number]    Cardiac murmur     last assessed - [id number]    CKD (chronic kidney disease)     Diabetes mellitus ([facility] [address] )     last assessed - [id number]    Esophageal dysphagia     Fatigue     last assessed - [id number]    Hyperlipidemia     Hypertension     Patellar bursitis of right knee     last assessed - [id number]    Personal history of other specified conditions     presenting hazards to health    Stroke [facility])     Stroke syndrome     Urinary frequency     UTI (urinary tract infection)          Past Surgical History:   Past Surgical History:   Procedure Laterality Date    VA ESOPHAGOGASTRODUODENOSCOPY TRANSORAL DIAGNOSTIC N/A 2017    Procedure: ESOPHAGOGASTRODUODENOSCOPY (EGD); Surgeon: Mehrdad Alva MD;  Location: [facility]; Service: Gastroenterology         Family History:  Family History   Problem Relation Age of Onset    Heart disease Father     Hypertension Mother    Amanda Tarrant Stroke Mother     Other Sister         1)Breast disorder; 2)Epilepsy   [place] Tarrant Migraines Sister         Migraine headaches    Osteoporosis Sister     Thyroid disease Sister     Coronary artery disease Sister         S/P triple vessel bypass    Osteoporosis Maternal Grandmother     Diabetes Son         Diabetes mellitus    Hypertension Son     Rheum arthritis Son         Rheumatic disease    Heart disease Family          Social History:  Social History     Substance and Sexual Activity   Alcohol Use Never    Frequency: Never    Binge frequency: Never     Social History     Substance and Sexual Activity   Drug Use Never     Social History     Tobacco Use   Smoking Status Former Smoker    Packs/day: 3 00    Quit date: 36    Years since quittin 4   Smokeless Tobacco Former User   Tobacco Comment    quit over 30 yrs ago; (quit in the , had smoked 3PPD for 20 years - per [employer])     Marital Status:       Home Medications:   Prior to Admission medications    Medication Sig Start Date End Date Taking?  Authorizing Provider   acetaminophen (TYLENOL) 650 mg CR tablet Take 650 mg by mouth every 6 (six) hours as needed for mild pain   Yes Historical Provider, MD   albuterol (PROVENTIL HFA,VENTOLIN HFA) 90 mcg/act inhaler Inhale 2 puffs every 4 (four) hours as needed 2/15/21  Yes Historical Provider, MD   aspirin (ECOTRIN LOW STRENGTH) 81 mg EC tablet Take 1 tablet (81 mg total) by mouth daily 3/28/21  Yes Javon Woo DO   atorvastatin (LIPITOR) 40 mg tablet Take 1 tablet (40 mg total) by mouth daily 2/11/21 8/10/21 Yes Bebeto Mas Lina Méndez PA-C   Blood Glucose Monitoring Suppl (FREESTYLE LITE) JAQUELIN by Does not apply route daily 4/17/18 5/24/21 Yes Brad Mancera PA-C   Breo Ellipta 100-25 MCG/INH inhaler  1/18/21  Yes Historical Provider, MD   carvedilol (COREG) 3 125 mg tablet Take 1 tablet (3 125 mg total) by mouth 2 (two) times a day with meals 3/27/21  Yes Galindo Moore DO   clopidogrel (PLAVIX) 75 mg tablet TAKE 2 TABS DAILY 5/14/21  Yes Brad Mancera PA-C   conjugated estrogens (PREMARIN) vaginal cream Insert 0 5 g into the vagina 2 (two) times a week Insert twice weekly at bedtime 5/17/21  Yes Herrera Preciado MD   Continuous Blood Gluc Sensor (FreeStyle Naldo 14 Day Sensor) MISC Use one sensor every 14 days 4/1/21  Yes Brad Mancera PA-C   [employer] D3 50 MCG (2000 UT) CAPS Take 1 capsule (2,000 Units total) by mouth daily 3/4/21  Yes Brad Mancera PA-C   furosemide (LASIX) 40 mg tablet Take 1 tablet (40 mg total) by mouth 2 (two) times a day 3/27/21  Yes Galindo Moore DO   glucose blood (FREESTYLE LITE) test strip TEST AS DIRECTED UP TO FOUR TIMES A DAY 3/1/21  Yes Brad Mancera PA-C   Lancets (FREESTYLE) lancets Use as instructed 4/17/18  Yes Brad Mancera PA-C   losartan (COZAAR) 100 MG tablet Take 1 tablet (100 mg total) by mouth daily 2/4/21  Yes Haylee Lacy MD   metFORMIN (GLUCOPHAGE) 500 mg tablet TAKE 1 TABLET (500 MG TOTAL) BY MOUTH 2 (TWO) TIMES A DAY WITH MEALS 5/17/21  Yes Brad Mancera PA-C   Misc   Devices (QUAD CANE) MISC by Does not apply route daily 6/1/20  Yes Brad Mancera PA-C   montelukast (SINGULAIR) 10 mg tablet Take 1 tablet (10 mg total) by mouth daily at bedtime 3/4/21  Yes Brad Mancera PA-C   GLOBAL EASE INJECT PEN NEEDLES 31G X 5 MM MISC  8/28/18   Historical Provider, MD   Glucose-Vitamin C-Vitamin D (TRUEPLUS GLUCOSE ON THE GO) CHEW CHEW 2 TABS AS NEEDED FOR BLOOD SUGAR LESS THAN 80 2/7/20   Historical Provider, MD   sitaGLIPtin (JANUVIA) 100 mg tablet Take 1 tablet (100 mg total) by mouth daily  Patient not taking: Reported on 5/24/2021 11/24/20   Andra Dao PA-C       Inpatient Medications:  Scheduled Meds:   Current Facility-Administered Medications   Medication Dose Route Frequency Provider Last Rate    acetaminophen  650 mg Oral Q6H PRN Alicia Martines MD      albuterol  2 puff Inhalation Q4H PRN Alicia Martines MD      aspirin  81 mg Oral Daily Alicia Martines MD      atorvastatin  40 mg Oral Daily Alicia Martines MD      carvedilol  3 125 mg Oral BID With Meals Alicia Martines MD      clopidogrel  150 mg Oral Daily Alicia Martines MD      fluticasone-vilanterol  1 puff Inhalation Daily Alicia Martines MD      furosemide  40 mg Oral BID Alicia Martines MD      heparin (porcine)  3-20 Units/kg/hr (Order-Specific) Intravenous Titrated Carole Melgar, CRNP      heparin (porcine)  2,000 Units Intravenous Q1H PRN Carole Melgar, CRNP      heparin (porcine)  4,000 Units Intravenous Q1H PRN Carole Melgar, PEDRO LUIS      insulin lispro  1-5 Units Subcutaneous TID AC Verlon Gifford, DO      insulin lispro  1-5 Units Subcutaneous HS Verlon Gifford, DO      montelukast  10 mg Oral HS Alicia Martines MD      sodium chloride  3 mL/kg Intravenous Once Carole Melgar CRNP 225 5 mL (05/24/21 1657)    Followed by   Max Forrester sodium chloride  75 mL/hr Intravenous Continuous BRYSON LanierNP 75 mL/hr (05/24/21 1721)     Continuous Infusions: heparin (porcine), 3-20 Units/kg/hr (Order-Specific)  sodium chloride, 75 mL/hr, Last Rate: 75 mL/hr (05/24/21 1721)      PRN Meds:  acetaminophen, 650 mg, Q6H PRN  albuterol, 2 puff, Q4H PRN  heparin (porcine), 2,000 Units, Q1H PRN  heparin (porcine), 4,000 Units, Q1H PRN        Allergies:  No Known Allergies    Review of Systems:  Review of Systems   Constitutional: Negative  HENT: Negative for dental problem, facial swelling, sinus pressure, sneezing and trouble swallowing  Eyes: Negative  Respiratory: Negative for chest tightness and shortness of breath      Cardiovascular: Positive for chest pain  Negative for palpitations and leg swelling  Gastrointestinal: Negative  Negative for abdominal pain  Endocrine: Negative  Genitourinary: Negative  Musculoskeletal: Negative  Allergic/Immunologic: Negative  Neurological: Negative  Hematological: Negative for adenopathy  Does not bruise/bleed easily  Psychiatric/Behavioral: Negative  All other systems reviewed and are negative  Vital Signs:     Vitals:    05/24/21 1330 05/24/21 1407 05/24/21 1705 05/24/21 1726   BP: 118/58 122/57 124/81 132/63   BP Location: Left arm      Pulse: 58 58  61   Resp: 18 18 18 18   Temp:  98 7 °F (37 1 °C) 97 8 °F (36 6 °C)    TempSrc:       SpO2: 100% 100% 100% 93%   Weight:       Height:         Invasive Devices     Peripheral Intravenous Line            Peripheral IV 05/24/21 Right Forearm less than 1 day                Physical Exam:  Physical Exam  Vitals signs and nursing note reviewed  Constitutional:       General: She is not in acute distress  Appearance: Normal appearance  She is well-developed and normal weight  She is not diaphoretic  HENT:      Head: Normocephalic and atraumatic  Right Ear: External ear normal       Left Ear: External ear normal       Nose: Nose normal       Mouth/Throat:      Pharynx: No oropharyngeal exudate  Eyes:      General: No scleral icterus  Right eye: No discharge  Left eye: No discharge  Conjunctiva/sclera: Conjunctivae normal       Pupils: Pupils are equal, round, and reactive to light  Neck:      Musculoskeletal: Normal range of motion and neck supple  Vascular: No JVD  Trachea: No tracheal deviation  Cardiovascular:      Rate and Rhythm: Normal rate and regular rhythm  Heart sounds: Normal heart sounds  No murmur  No friction rub  No gallop  Pulmonary:      Effort: Pulmonary effort is normal  No respiratory distress  Breath sounds: Normal breath sounds  No stridor  No wheezing or rales  Abdominal:      General: Bowel sounds are normal  There is no distension  Palpations: Abdomen is soft  Tenderness: There is no abdominal tenderness  There is no guarding or rebound  Musculoskeletal: Normal range of motion  General: No tenderness or deformity  Skin:     General: Skin is warm and dry  Coloration: Skin is not pale  Findings: No erythema or rash  Neurological:      Mental Status: She is alert and oriented to person, place, and time  Cranial Nerves: No cranial nerve deficit  Sensory: No sensory deficit  Motor: No abnormal muscle tone  Coordination: Coordination normal       Deep Tendon Reflexes: Reflexes normal    Psychiatric:         Behavior: Behavior normal          Thought Content: Thought content normal          Judgment: Judgment normal          Lab Results:     Results from last 7 days   Lab Units 05/24/21  0947   WBC Thousand/uL 7 05   HEMOGLOBIN g/dL 10 2*   HEMATOCRIT % 32 2*   PLATELETS Thousands/uL 173     Results from last 7 days   Lab Units 05/24/21  0947   POTASSIUM mmol/L 3 6   CHLORIDE mmol/L 105   CO2 mmol/L 26   BUN mg/dL 50*   CREATININE mg/dL 1 33*   CALCIUM mg/dL 9 8     Results from last 7 days   Lab Units 05/24/21  1044   INR  0 97   PTT seconds 28     Lab Results   Component Value Date    HGBA1C 6 7 (H) 04/03/2021     Lab Results   Component Value Date    TROPONINI 5 58 (H) 05/24/2021       Imaging Studies:     Cardiac Catheterization:    CORONARY CIRCULATION:  Mid LAD: There was a 100 % stenosis      1ST LESION INTERVENTIONS:  A balloon angioplasty procedure was performed on the 100 % lesion in the mid LAD  Echocardiogram: pending    I have personally reviewed pertinent reports  and I have personally reviewed pertinent films in PACS    Assessment:  Principal Problem:    NSTEMI (non-ST elevated myocardial infarction) [facility])  Active Problems:     Aortic valve regurgitation, nonrheumatic    Benign hypertension with CKD (chronic kidney disease) stage III    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, without long-term current use of insulin (HCC)    Anemia    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HCC)    History of CVA with residual deficit    Renal cyst    Coronary artery disease involving native coronary artery of native heart without angina pectoris    Essential hypertension    Asthma    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HCC)    Severe coronary artery disease; Ongoing CABG workup    Plan:  Risks and benefits of coronary artery bypass grafting were discussed in detail today with the patient and her family members  They understand and wish to  Consider her options regarding medical management versus surgical intervention  Pending her decision, we will then order routine preoperative laboratory and vascular studies  Navarroroxie Connelly was comfortable with our recommendations, and their questions were answered to their satisfaction  We will continue to evaluate the patient daily with further recommendations as work up is completed  Thank you for allowing us to participate in the care of this patient  Charline Pantoja  DATE: May 24, 2021  TIME: 5:40 PM    * This note was completed in part utilizing [employer] direct voice recognition software  Grammatical errors, random word insertion, spelling mistakes, and incomplete sentences may be an occasional consequence of the system secondary to software limitations, ambient noise and hardware issues  At the time of dictation, efforts were made to edit, clarify and /or correct errors  Please read the chart carefully and recognize, using context, where substitutions have occurred  If you have any questions or concerns about the context, text or information contained within the body of this dictation, please contact myself, the provider, for further clarification

## 2021-05-24 NOTE — ASSESSMENT & PLAN NOTE
Lab Results   Component Value Date    EGFR 44 05/24/2021    EGFR 39 04/03/2021    EGFR 58 03/27/2021    CREATININE 1 33 (H) 05/24/2021    CREATININE 1 47 (H) 04/03/2021    CREATININE 1 05 03/27/2021

## 2021-05-24 NOTE — H&P
INTERNAL MEDICINE RESIDENCY ADMISSION H&P     Name: Tsering Ledesma   Age & Sex: [de-identified] y o  female   MRN: [id number]  Unit/Bed#: ED 02   Encounter: [id number]  Primary Care Provider: Allen Chaidez PA-C    Code Status: Prior  Admission Status: INPATIENT   Disposition: Patient requires Med/Surg with Telemetry    Admit to team: SOD Team A    ASSESSMENT/PLAN     Principal Problem:    NSTEMI (non-ST elevated myocardial infarction) 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, without long-term current use of insulin (HCC)    Anemia    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HCC)    History of CVA with residual deficit    Renal cyst    Coronary artery disease involving native coronary artery of native heart without angina pectoris    Essential hypertension    Asthma    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HCC)      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HCC)  Assessment & Plan  Wt Readings from Last 3 Encounters:   05/24/21 75 5 kg (166 lb 7 2 oz)   05/24/21 76 4 kg (168 lb 6 4 oz)   04/08/21 73 5 kg (162 lb)     Echo done 3/25/21 with EF 14%, grade 1 diastolic dysfunction with mild hypokinesis of mid and apical segments  Cath showed 99% occlusion of mid LAD, now s/p KOJO  Plan:  Patient presenting with chest pain - cardiology consulted, plan for cath today  Continue home carvedilol and losartan  Restart home furosemide 40mg BID after cath per cards recs  Monitor I/O's and daily weights  Fluid and salt restriction once given diet        Asthma  Assessment & Plan  Continue home inhalers  Essential hypertension  Assessment & Plan  Patient managed on Losartan 100mg daily, furosemide 40mg BID, and Carvedilol 3 125 BID at home   Reports took today's dose at 11AM     Plan:  Continue Losartan and Carvedilol  Will hold furosemide until after cath  Cardiology consulted 2/2 NSTEMI - recs appreciated  Monitor BP closely    Coronary artery disease involving native coronary artery of native heart without angina pectoris  Assessment & Plan  Patient presented with chest heaviness, leg swelling, and orthopnea 3/23/21 and was found to have 99% stenosis of mid LAD now s/p stent placement as well as balloon angioplasty to distal lesion in LAD with improvement from 80% stenosis to <50% stenosis  Presenting with chest pain x2-3 days, described as nagging, with no associated symptoms including shortness of breath, dizziness, lightheadedness, nausea, vomiting, diaphoresis  See plan per NSTEMI  History of CVA with residual deficit  Assessment & Plan  Hx of CVA in 2011 and on Plavix at home  Some residual left-sided deficits (4/5) with mild ataxia, however, ambulates well with cane    Plan:  Continue plavix 150mg QAM  Ambulate OOB with assistance    Anemia  Assessment & Plan  Patient with baseline Hgb 10 5  Currently 10 2  MCV 83  Previously has hx of microcytic anemia  Plan:  No S/S of active bleeding  Will order iron panel  Monitor CBC daily and transfuse Hgb 7 0    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, without long-term current use of insulin ([facility])  Assessment & Plan  Lab Results   Component Value Date    HGBA1C 6 7 (H) 04/03/2021       No results for input(s): POCGLU in the last 72 hours  Blood Sugar Average: Last 72 hrs:     Patient managed on Metformin 500mg BID at home      Plan:  SSC Algorithm 2 ordered  POC Glucose checks TID with meals and at bedtime  Carb control diet    Benign hypertension with CKD (chronic kidney disease) stage III  Assessment & Plan  Lab Results   Component Value Date    EGFR 44 05/24/2021    EGFR 39 04/03/2021    EGFR 58 03/27/2021    CREATININE 1 33 (H) 05/24/2021    CREATININE 1 47 (H) 04/03/2021    CREATININE 1 05 03/27/2021       * NSTEMI (non-ST elevated myocardial infarction) [facility])  Assessment & Plan  Patient presenting from [address] [facility] for complaint of chest pain for 2-3 days  Reports that pain is substernal and nagging without radiation  States no associated dizziness, lightheadedness, shortness of breath, nausea, vomiting, leg swelling, or orthopnea  Of note, patient s/p KOJO to mid LAD for 99% stenosis 3/26/21 with peak troponin of 0 11 at that time  At this time, she also underwent balloon angioplasty only to distal LAD stenosis due to small size(80% with improvement to <50%) She reports compliance with all discharge medications at that time  Troponin on presentation 5 21 with EKG significant for mild ST elevations in V2 and V3  CXR at time of presentation with no acute disease  Plan:  Received SL Nitro in ED with mild improvement  At time of evaluation pain is resolved s/p Aspirin loading and initiation of heparin drip  Serial troponins with correlated EKGs  Cardiology consulted - appreciate recommendations  Plan for cardiac cath this afternoon  NPO at this time  Cardiac diet when patient able to eat      VTE Pharmacologic Prophylaxis: heparin drip  VTE Mechanical Prophylaxis: sequential compression device    CHIEF COMPLAINT     Chief Complaint   Patient presents with    Chest Pain     Patient reports chest pain starting last night; also reports some DUNAWAY; reports that she didn't sleep well      Fabián Chacon     Ms Amaya is an [de-identified] female with PMH significant for asthma, CAD s/p KOJO 3/26/21 to mid LAD, renal cyst, pulmonary artery aneurysm, hx CVA with mild residual L sided deficit, hypertension, combined CHF with EF 50%, anemia, and aortic valve regurg who presents to [facility] today for concern of 2-3 days of substernal chest pain  Patient describes chest pain as nagging sensation without radiation  She denies any other associated symptoms including dizziness, lightheadedness, shortness of breath, diaphoresis, nausea, vomiting   She reports her current symptoms are different from presentation in March  She states that at that time, she was suffering from orthopnea and leg swelling only with mild chest heaviness, but this pain felt different  She states she has been eating/drinking well  In ED, patient afebrile with pulse of 79, RR 18, /67, and SpO2 100% on room air  Labs significant for troponin 5 21 with mild elevations in V2 and V3  Patient was given nitro, aspirin, and started on heparin drip  Cardiology consulted and plan for cardiac cath today  REVIEW OF SYSTEMS     Review of Systems   Constitutional: Negative for activity change, chills, diaphoresis and fever  HENT: Negative for ear pain, rhinorrhea, sore throat and trouble swallowing  Eyes: Negative for pain and visual disturbance  Respiratory: Negative for cough and shortness of breath  Cardiovascular: Positive for chest pain (resolved at time of my evaluation)  Negative for palpitations and leg swelling  Gastrointestinal: Negative for abdominal pain, constipation, diarrhea, nausea and vomiting  Endocrine: Negative for polydipsia and polyuria  Genitourinary: Negative for difficulty urinating and dysuria  Musculoskeletal: Negative for arthralgias and back pain  Neurological: Positive for weakness (2/2 prior CVA)  Negative for dizziness, light-headedness and headaches  Psychiatric/Behavioral: Negative for confusion  The patient is not nervous/anxious  OBJECTIVE     Vitals:    21 0930 21 1139   BP: 139/67 106/66   BP Location: Right arm Left arm   Pulse: 79 66   Resp: 18 18   Temp: 98 2 °F (36 8 °C)    TempSrc: Oral    SpO2: 100% 100%   Weight: 75 5 kg (166 lb 7 2 oz)    Height: 5' 3" (1 6 m)       Temperature:   Temp (24hrs), Av 7 °F (36 5 °C), Min:97 2 °F (36 2 °C), Max:98 2 °F (36 8 °C)    Temperature: 98 2 °F (36 8 °C)  Intake & Output:  I/O     None        Weights:   IBW (Ideal Body Weight): 52 4 kg    Body mass index is 29 48 kg/m²    Weight (last 2 days)     Date/Time Weight    05/24/21 0930   75 5 (166 45)            Physical Exam  Vitals signs reviewed  Constitutional:       General: She is not in acute distress  Appearance: Normal appearance  She is not ill-appearing, toxic-appearing or diaphoretic  HENT:      Head: Normocephalic and atraumatic  Right Ear: External ear normal       Left Ear: External ear normal       Nose: Nose normal       Mouth/Throat:      Mouth: Mucous membranes are moist       Pharynx: Oropharynx is clear  Eyes:      General: No scleral icterus  Right eye: No discharge  Left eye: No discharge  Conjunctiva/sclera: Conjunctivae normal    Neck:      Musculoskeletal: Normal range of motion  Cardiovascular:      Rate and Rhythm: Normal rate and regular rhythm  Heart sounds: Normal heart sounds  No murmur  Pulmonary:      Effort: Pulmonary effort is normal  No respiratory distress  Breath sounds: Normal breath sounds  No wheezing  Abdominal:      General: Bowel sounds are normal  There is no distension  Palpations: Abdomen is soft  Tenderness: There is no abdominal tenderness  Musculoskeletal: Normal range of motion  General: No swelling or tenderness  Right lower leg: No edema  Left lower leg: No edema  Skin:     General: Skin is warm and dry  Coloration: Skin is not jaundiced  Findings: No bruising  Neurological:      General: No focal deficit present  Mental Status: She is alert and oriented to person, place, and time  Cranial Nerves: No cranial nerve deficit  Motor: Weakness (4/5 L side, 5/5 R side) present  Psychiatric:         Mood and Affect: Mood normal          Behavior: Behavior normal          Thought Content:  Thought content normal        PAST MEDICAL HISTORY     Past Medical History:   Diagnosis Date    ACE-inhibitor cough     last assessed - [id number]    Asthma     Bilateral edema of lower extremity     last assessed - [id number]   Amanda De Los Santos Cardiac murmur     last assessed - [id number]    CKD (chronic kidney disease)     Diabetes mellitus ([facility] [address] )     last assessed - [id number]    Esophageal dysphagia     Fatigue     last assessed - [id number]    Hyperlipidemia     Hypertension     Patellar bursitis of right knee     last assessed - [id number]    Personal history of other specified conditions     presenting hazards to health    Stroke [facility])     Stroke syndrome     Urinary frequency     UTI (urinary tract infection)      PAST SURGICAL HISTORY     Past Surgical History:   Procedure Laterality Date    ND ESOPHAGOGASTRODUODENOSCOPY TRANSORAL DIAGNOSTIC N/A 2017    Procedure: ESOPHAGOGASTRODUODENOSCOPY (EGD); Surgeon: Monica Hodge MD;  Location: [facility]; Service: Gastroenterology     SOCIAL & FAMILY HISTORY     Social History     Substance and Sexual Activity   Alcohol Use Never    Frequency: Never    Binge frequency: Never       Social History     Substance and Sexual Activity   Drug Use Never     Social History     Tobacco Use   Smoking Status Former Smoker    Packs/day: 3 00    Quit date: 36    Years since quittin 4   Smokeless Tobacco Former User   Tobacco Comment    quit over 30 yrs ago; (quit in the , had smoked 3PPD for 20 years - per [employer])     Family History   Problem Relation Age of Onset    Heart disease Father     Hypertension Mother     Stroke Mother     Other Sister         1)Breast disorder; 2)Epilepsy   Mary Jane Mare Migraines Sister         Migraine headaches    Osteoporosis Sister     Thyroid disease Sister     Coronary artery disease Sister         S/P triple vessel bypass    Osteoporosis Maternal Grandmother     Diabetes Son         Diabetes mellitus    Hypertension Son     Rheum arthritis Son         Rheumatic disease    Heart disease Family      LABORATORY DATA     Labs: I have personally reviewed pertinent reports      Results from last 7 days   Lab Units 21  0947   WBC Thousand/uL 7 05 HEMOGLOBIN g/dL 10 2*   HEMATOCRIT % 32 2*   PLATELETS Thousands/uL 173   NEUTROS PCT % 72   MONOS PCT % 8      Results from last 7 days   Lab Units 05/24/21  0947   POTASSIUM mmol/L 3 6   CHLORIDE mmol/L 105   CO2 mmol/L 26   BUN mg/dL 50*   CREATININE mg/dL 1 33*   CALCIUM mg/dL 9 8              Results from last 7 days   Lab Units 05/24/21  1044   INR  0 97   PTT seconds 28         Results from last 7 days   Lab Units 05/24/21  0947   TROPONIN I ng/mL 5 21*     Micro:  Lab Results   Component Value Date    URINECX >100,000 cfu/ml Escherichia coli (A) 07/23/2020    URINECX 80,000-89,000 cfu/ml Candida tropicalis (A) 08/08/2019    URINECX <10,000 cfu/ml  08/08/2019     IMAGING & DIAGNOSTIC TESTS     Imaging: I have personally reviewed pertinent reports  Xr Chest 2 Views    Result Date: 5/24/2021  Impression: No acute cardiopulmonary disease  Workstation performed: [id number]     EKG, Pathology, and Other Studies: I have personally reviewed pertinent reports  ALLERGIES   No Known Allergies  MEDICATIONS PRIOR TO ARRIVAL     Prior to Admission medications    Medication Sig Start Date End Date Taking?  Authorizing Provider   acetaminophen (TYLENOL) 650 mg CR tablet Take 650 mg by mouth every 6 (six) hours as needed for mild pain   Yes Historical Provider, MD   albuterol (PROVENTIL HFA,VENTOLIN HFA) 90 mcg/act inhaler Inhale 2 puffs every 4 (four) hours as needed 2/15/21  Yes Historical Provider, MD   aspirin (ECOTRIN LOW STRENGTH) 81 mg EC tablet Take 1 tablet (81 mg total) by mouth daily 3/28/21  Yes Rashi Caballero DO   atorvastatin (LIPITOR) 40 mg tablet Take 1 tablet (40 mg total) by mouth daily 2/11/21 8/10/21 Yes Paola Hester PA-C   Blood Glucose Monitoring Suppl (FREESTYLE LITE) JAQUELIN by Does not apply route daily 4/17/18 5/24/21 Yes Paola Hester PA-C   Breo Ellipta 100-25 MCG/INH inhaler  1/18/21  Yes Historical Provider, MD   carvedilol (COREG) 3 125 mg tablet Take 1 tablet (3 125 mg total) by mouth 2 (two) times a day with meals 3/27/21  Yes Mary Mosley DO   clopidogrel (PLAVIX) 75 mg tablet TAKE 2 TABS DAILY 5/14/21  Yes Alexandrea Pappas PA-C   conjugated estrogens (PREMARIN) vaginal cream Insert 0 5 g into the vagina 2 (two) times a week Insert twice weekly at bedtime 5/17/21  Yes Radha Palacios MD   Continuous Blood Gluc Sensor (FreeStyle Naldo 14 Day Sensor) MISC Use one sensor every 14 days 4/1/21  Yes Alexandrea Pappas PA-C   [employer] D3 50 MCG (2000 UT) CAPS Take 1 capsule (2,000 Units total) by mouth daily 3/4/21  Yes Alexandrea Pappas PA-C   furosemide (LASIX) 40 mg tablet Take 1 tablet (40 mg total) by mouth 2 (two) times a day 3/27/21  Yes Mary Mosley DO   glucose blood (FREESTYLE LITE) test strip TEST AS DIRECTED UP TO FOUR TIMES A DAY 3/1/21  Yes Alexandrea Pappas PA-C   Glucose-Vitamin C-Vitamin D (TRUEPLUS GLUCOSE ON THE GO) CHEW CHEW 2 TABS AS NEEDED FOR BLOOD SUGAR LESS THAN 80 2/7/20  Yes Historical Provider, MD   Lancets (FREESTYLE) lancets Use as instructed 4/17/18  Yes Alexandrea Pappas PA-C   losartan (COZAAR) 100 MG tablet Take 1 tablet (100 mg total) by mouth daily 2/4/21  Yes Leno Umanzor MD   metFORMIN (GLUCOPHAGE) 500 mg tablet TAKE 1 TABLET (500 MG TOTAL) BY MOUTH 2 (TWO) TIMES A DAY WITH MEALS 5/17/21  Yes Alexandrea Pappas PA-C   Misc   Devices (QUAD CANE) MISC by Does not apply route daily 6/1/20  Yes Alexandrea Pappas PA-C   montelukast (SINGULAIR) 10 mg tablet Take 1 tablet (10 mg total) by mouth daily at bedtime 3/4/21  Yes Alexandrea Pappas PA-C   GLOBAL EASE INJECT PEN NEEDLES 31G X 5 MM MISC  8/28/18   Historical Provider, MD   sitaGLIPtin (JANUVIA) 100 mg tablet Take 1 tablet (100 mg total) by mouth daily  Patient not taking: Reported on 5/24/2021 11/24/20   Jacklyn Lock PA-C     MEDICATIONS ADMINISTERED IN LAST 24 HOURS     Medication Administration - last 24 hours from 05/23/2021 1206 to 05/24/2021 1206       Date/Time Order Dose Route Action Action by     05/24/2021 8795 nitroglycerin (NITROSTAT) SL tablet 0 4 mg 0 4 mg Sublingual Given Charna Boas, RN     05/24/2021 1020 aspirin chewable tablet 243 mg 243 mg Oral Given Charna Boas, RN     05/24/2021 1106 heparin (ACS LOW) 0  Intravenous Hold Charna Boas, RN     05/24/2021 1102 heparin (porcine) injection 4,000 Units 4,000 Units Intravenous Given Charna Boas, RN     05/24/2021 1103 heparin (porcine) 25,000 units in 0 45% NaCl 250 mL infusion (premix) 12 Units/kg/hr Intravenous [address] Charna Boas, RN     05/24/2021 1120 sodium chloride 0 9 % infusion 75 mL/hr Intravenous New Bag Charna Boas, RN        CURRENT MEDICATIONS     Current Facility-Administered Medications   Medication Dose Route Frequency Provider Last Rate    heparin (porcine)  3-20 Units/kg/hr (Order-Specific) Intravenous Titrated Geradine Mily, DO 12 Units/kg/hr (05/24/21 1103)    heparin (porcine)  2,000 Units Intravenous Q1H PRN Geradine Mily, DO      heparin (porcine)  4,000 Units Intravenous Q1H PRN Geradine Mily, DO      sodium chloride  75 mL/hr Intravenous Continuous Argelia Hernandez MD 75 mL/hr (05/24/21 1120)     heparin (porcine), 3-20 Units/kg/hr (Order-Specific), Last Rate: 12 Units/kg/hr (05/24/21 1103)  sodium chloride, 75 mL/hr, Last Rate: 75 mL/hr (05/24/21 1120)      heparin (porcine), 2,000 Units, Q1H PRN  heparin (porcine), 4,000 Units, Q1H PRN        Admission Time  I spent 45 minutes admitting the patient  This involved direct patient contact where I performed a full history and physical, reviewing previous records, and reviewing laboratory and other diagnostic studies  Portions of the record may have been created with voice recognition software  Occasional wrong word or "sound a like" substitutions may have occurred due to the inherent limitations of voice recognition software    Read the chart carefully and recognize, using context, where substitutions have occurred     ==  Nicole Wilkinson, DO [facility]  Internal Medicine Residency PGY-1

## 2021-05-24 NOTE — ASSESSMENT & PLAN NOTE
Patient presented with chest heaviness, leg swelling, and orthopnea 3/23/21 and was found to have 99% stenosis of mid LAD now s/p stent placement as well as balloon angioplasty to distal lesion in LAD with improvement from 80% stenosis to <50% stenosis  Presenting with chest pain x2-3 days, described as nagging, with no associated symptoms including shortness of breath, dizziness, lightheadedness, nausea, vomiting, diaphoresis  See plan per NSTEMI

## 2021-05-24 NOTE — ASSESSMENT & PLAN NOTE
Patient with baseline Hgb 10 5  Currently 10 2  MCV 83  Previously has hx of microcytic anemia      Plan:  · No S/S of active bleeding  · Will order iron panel  · Monitor CBC daily and transfuse Hgb 7 0

## 2021-05-24 NOTE — ED NOTES
Messaged pharmacy about daily medications; asked to re-time since patient took all of them today already     Shmuel Soriano RN  05/24/21 3069

## 2021-05-24 NOTE — PROGRESS NOTES
INTERNAL MEDICINE RESIDENCY  SENIOR ADMISSION NOTE     Unit/Bed#: ED 02   Encounter: [id number]  SOD Team A    PATIENT INFORMATION     Tonny Kirby   [de-identified] y o  female   MRN: [id number]  Hospital Stay Days: 0    HISTORY OF PRESENT ILLNESS     Pt is an [de-identified] F with a sig PMH CAD status post stenting in March 2021 (on aspirin statin Plavix 150mg daily), asthma, CKD 3a, diabetes, hyperlipidemia, hypertension, CVA who is presenting with CP, dyspnea  Admit for ACS/ NSTEMI  Cardiology planning for cardiac cath later today  VSS  Labs grossly unremarkable except for hgb 10 2, MC 83,  Bun 50 Cr 1 33 (baseline Cr 1 0-1 1),   on BMP, trop 5 21   CXR wnl    ASSESSMENT/PLAN:   NSTEMI  Trop elevated 5 21--5 58  EKG showing ST elevations in V2-V3 without reciprocal changes   -aspirin and Plavix load given in ED  -hep ggt    -NPO   -tele  -monitor electrolytes: goal K >4, mag >2, Phos >3   -K 3  6  will give 36 Kdur and check Mag level  -cardiology following  -trend trops and ekg  -cardiac cath later today, gentle fluids prior to cath and for mild CAROL with Cr 1 33 (baseline 1 0-1 1)  -ASA, statin, Plavix, BB, ARB daily (hx of acei induced cough)    Reviewed H&P per Dr Mello Lozoya  Agree with the assessment and plan except as noted below  ASSESSMENT/PLAN     Principal Problem:    NSTEMI (non-ST elevated myocardial infarction) [facility])  Active Problems:     Aortic valve regurgitation, nonrheumatic    Benign hypertension with CKD (chronic kidney disease) stage III    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, without long-term current use of insulin (HCC)    Anemia    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HCC)    History of CVA with residual deficit    Renal cyst    Coronary artery disease involving native coronary artery of native heart without angina pectoris    Essential hypertension    Asthma    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[facility] [address] )         Management of remaining chronic medical issues as detailed in H&P by Dr Ruslan Alcocer      Code Status: full coded  Diet: npo  VTE Prophylaxis: hep ggt    Disposition: INPATIENT   Expected LOS: >2 Midnights    ==  Liseth Malin MD  Resident, PGY-2  [facility] Internal Medicine Residency

## 2021-05-24 NOTE — ED ATTENDING ATTESTATION
Final Diagnosis:  1  Abnormal electrocardiogram (ECG) (EKG)    2  Coronary artery disease    3  Status post insertion of drug-eluting stent into left anterior descending (LAD) artery for coronary artery disease    4  Elevated troponin I level    5  Substernal chest pain      ED Course as of May 25 2033   Mon May 24, 2021   1040 Troponin I(!): 5 21       I, Amparo Harrison MD, saw and evaluated the patient  All available labs and X-rays were ordered by me or the resident and have been reviewed by myself  I discussed the patient with the resident / non-physician and agree with the resident's / non-physician practitioner's findings and plan as documented in the resident's / non-physician practicitioner's note, except where noted  At this point, I agree with the current assessment done in the ED  I was present during key portions of all procedures performed unless otherwise stated  Chief Complaint   Patient presents with    Chest Pain     Patient reports chest pain starting last night; also reports some DUNAWAY; reports that she didn't sleep well     This is a [de-identified] y o  female presenting for evaluation of chest pain, dyspnea  The patient at the end of March had a catheterization with stent deployment  She is compliant with all her medications  She says that she has been noticing that since yesterday or maybe day before she has been feeling very short of breath whenever she is exerting herself  She is using a pillow to sleep now, it does not really help her symptoms but maybe her symptoms are a little bit worse when lying flat? Denies any lower extremity edema that is new  No falls no trauma  NTG (given prior to my evaluation) didn't change her symptoms       PMH:   has a past medical history of ACE-inhibitor cough, Asthma, Bilateral edema of lower extremity, Cardiac murmur, CKD (chronic kidney disease), Diabetes mellitus ([facility] [address] ), Esophageal dysphagia, Fatigue, Hyperlipidemia, Hypertension, Patellar bursitis of right knee, Personal history of other specified conditions, Stroke [facility]), Stroke syndrome, Urinary frequency, and UTI (urinary tract infection)  PSH:   has a past surgical history that includes pr esophagogastroduodenoscopy transoral diagnostic (N/A, 2017)  Social:  Social History     Substance and Sexual Activity   Alcohol Use Never    Frequency: Never    Binge frequency: Never     Social History     Tobacco Use   Smoking Status Former Smoker    Packs/day: 3 00    Quit date: 36    Years since quittin 4   Smokeless Tobacco Former Lucia    quit over 30 yrs ago; (quit in the s, had smoked 3PPD for 20 years - per [employer])     Social History     Substance and Sexual Activity   Drug Use Never     PE:  Vitals:    21 0400 21 0844 21 0846 21 1515   BP: 125/69 126/68 126/68 104/54   BP Location:    Left arm   Pulse: 66  67 64   Resp:    18   Temp: 98 2 °F (36 8 °C)   98 °F (36 7 °C)   TempSrc:    Axillary   SpO2: 98%  98% 98%   Weight:       Height:       General: VSS, NAD, awake, alert  Well-nourished, well-developed  Appears stated age  Head: Normocephalic, atraumatic, nontender  Eyes: PERRL, EOM-I  No diplopia  No hyphema  No subconjunctival hemorrhages  Symmetrical lids  ENTAtraumatic external nose and ears  MMM  No stridor  Normal phonation  No drooling  Base of mouth is soft  No mastoid tenderness  Neck: Symmetric, trachea midline  No JVD  CV: Peripheral pulses +2 throughout  No chest wall tenderness  Lungs:   Unlabored   No retractions  No crepitus  No tachypnea  No paradoxical motion  Abd: +BS, soft, NT/ND    MSK:   FROM   No lower extremity edema  Back:   No CVAT  Skin: Dry, intact  Neuro: AAOx3, GCS 15, CN II-XII grossly intact  Motor grossly intact    Psychiatric/Behavioral: Appropriate mood and affect   Exam: deferred    POCUS Heart:  - only one view, couldn't get PSL/PSS   - Obvious RWMA apical/mid-septal region RWMA  - EF looks depressed (40-50%)  Not severe, but not normal    - no pericardial effusion    A:  - Abnormal EKG  - CP/DUNAWAY  - RWMA  P:  - Reviewing previous echo, she had a similar abnormality which I guess is good that its description is stable  - EKG abnormal ? discuss with cardiology  - Cardiac workup  - Admit given story  - ASA  - NTG      - 13 point ROS was performed and all are normal unless stated in the history above  - Nursing note reviewed  Vitals reviewed  - Orders placed by myself and/or advanced practitioner / resident     - Previous chart was reviewed  - No language barrier    - History obtained from patient  - There are no limitations to the history obtained  - Critical care time: 44 minutes  - Critical care time was exclusive of seperately bilable procedures and treating other patients as well as teaching time     - Critical care was necessary to treat or prevent imminent or life-threatening deterioration of the following condition: concern for STEMI  - Critical care time was spent personally by me on the following activities as well as the above as per the ED course and rest of chart: blood draw for specimens, obtaining history from patient / surrogate, developement of a treatment plan, discussions with consultants, evaluation of patient's response to the treatment, examination of the patient, ordering/performing treatements and interventions, re-evaluation of the patient's condition, review of old charts, ordering/reviewing laboratory studies, ordering/reviewing of radiographic studies    Code Status: Level 1 - Full Code  Advance Directive and Living Will:      Power of :    POLST:      Medications   albuterol (PROVENTIL HFA,VENTOLIN HFA) inhaler 2 puff (has no administration in time range)   aspirin (ECOTRIN LOW STRENGTH) EC tablet 81 mg (81 mg Oral Given 5/25/21 0844)   atorvastatin (LIPITOR) tablet 40 mg (40 mg Oral Given 5/25/21 0844) fluticasone-vilanterol (BREO ELLIPTA) 100-25 mcg/inh inhaler 1 puff (1 puff Inhalation Given 5/25/21 0843)   furosemide (LASIX) tablet 40 mg (40 mg Oral Given 5/25/21 1720)   montelukast (SINGULAIR) tablet 10 mg (10 mg Oral Given 5/24/21 2233)   clopidogrel (PLAVIX) tablet 150 mg (150 mg Oral Given 5/25/21 0844)   acetaminophen (TYLENOL) tablet 650 mg (has no administration in time range)   insulin lispro (HumaLOG) 100 units/mL subcutaneous injection 1-5 Units (1 Units Subcutaneous Given 5/25/21 1721)   insulin lispro (HumaLOG) 100 units/mL subcutaneous injection 1-5 Units (1 Units Subcutaneous Given 5/24/21 2232)   carvedilol (COREG) tablet 6 25 mg (6 25 mg Oral Given 5/25/21 1720)   rivaroxaban (XARELTO) tablet 2 5 mg (2 5 mg Oral Given 5/25/21 1722)   nitroglycerin (NITROSTAT) SL tablet 0 4 mg (0 4 mg Sublingual Given 5/24/21 0942)   aspirin chewable tablet 243 mg (243 mg Oral Given 5/24/21 1020)   heparin (porcine) injection 4,000 Units (4,000 Units Intravenous Given 5/24/21 1102)   potassium chloride (K-DUR,KLOR-CON) CR tablet 40 mEq (40 mEq Oral Given 5/24/21 1653)   fentanyl citrate (PF) 100 MCG/2ML (50 mcg Intravenous Given 5/24/21 1603)   midazolam (VERSED) injection (2 mg Intravenous Given 5/24/21 1604)   lidocaine (PF) (XYLOCAINE-MPF) 1 % injection (1 mL Infiltration Given 5/24/21 1607)   nitroGLYcerin (TRIDIL) 50 mg in 250 mL infusion (premix) (200 mcg Intra-arterial Given 5/24/21 1609)   verapamil (ISOPTIN) injection (2 5 mg Intravenous Given 5/24/21 1609)   heparin (porcine) injection (4,000 Units Intravenous Given 5/24/21 1609)   iohexol (OMNIPAQUE) 350 MG/ML injection (SINGLE-DOSE) (70 mL Intravenous Given 5/24/21 1627)   sodium chloride 0 9 % bolus 226 5 mL (225 5 mL Intravenous New Bag 5/24/21 1657)   magnesium sulfate 2 g/50 mL IVPB (premix) 2 g (2 g Intravenous New Bag 5/24/21 2222)   potassium chloride (K-DUR,KLOR-CON) CR tablet 40 mEq (40 mEq Oral Given 5/24/21 2232)     XR chest 2 views   ED Interpretation   No pna      Final Result      No acute cardiopulmonary disease  Workstation performed: [id number]           Orders Placed This Encounter   Procedures    XR chest 2 views    CBC and differential    Basic metabolic panel    APTT six (6) hours after Heparin bolus/drip initiation or dosing change    Protime-INR    Basic metabolic panel    CBC and differential    Iron Saturation %    Ferritin    Magnesium    APTT    Troponin I    APTT    Magnesium    Magnesium    CBC and differential    Basic metabolic panel    Magnesium    Diet Cardiovascular; Cardiac; Consistent Carbohydrate Diet Level 3 (6 carb servings/90 grams CHO/meal)    Heparin: ACS (low) or Straight Infusion Protocol Administration Instructions    Heparin: ACS (Low) or Straight  Infusion Protocol Notify Physician If:    Heparin Infusion and Platelet Monitoring Per Protocol    Vital Signs per unit routine    Notify physician (specify)    Insert peripheral IV    Maintain IV access    I/O    Continuous Pulse Oximetry    Apply SCD or Foot pumps    Continuous ST Segment Monitoring    Provide Patient with Acute Myocardial Infarction Education    Provide patient education materials    Nursing communication ECG 12 lead with chest pain or ST segment change and notify MD  Place an ECG order if performed      48 Hour Telemetry Monitoring    Insulin Subcutaneous Notify Physician    Insulin Subcutaneous Instruction    Hypoglycemia Protocol    Fingerstick Glucose (POCT)    Vital signs    Pulse Oximetry,Spot    Notify Cardiologist (specify)    Head of bed 30 degrees or less    Continuous ST segment monitoring    Intake and Output    Check Cath Site and Distal Pulses With Vital Signs    Puncture site care    Radial Band Management: Remove One ml of Air Every    Nursing communication    Heparin: ACS (low) or Straight Infusion Protocol Administration Instructions    Heparin: ACS (Low) or Straight Infusion Protocol Notify Physician If:    Heparin Infusion and Platelet Monitoring Per Protocol    Level 1-Full Code: all life saving measures are indicated    Consult to cardiology    Inpatient consult to Case Management    Inpatient consult to Cardiothoracic Surgery    OT eval and treat    PT eval and treat    EKG RESULTS    ECG 12 lead    ECG 12 lead with 2nd troponin    ECG 12 lead with 3rd troponin    ECG 12 lead    EKG 12 lead    EKG 12 lead with any chest pain or ST segment change and call Cardiologist    ECG 12 lead    ECG 12 lead    Echo complete with contrast if indicated    Inpatient Admission     Labs Reviewed   CBC AND DIFFERENTIAL - Abnormal       Result Value Ref Range Status    WBC 7 05  4 31 - 10 16 Thousand/uL Final    RBC 3 87  3 81 - 5 12 Million/uL Final    Hemoglobin 10 2 (*) 11 5 - 15 4 g/dL Final    Hematocrit 32 2 (*) 34 8 - 46 1 % Final    MCV 83  82 - 98 fL Final    MCH 26 4 (*) 26 8 - 34 3 pg Final    MCHC 31 7  31 4 - 37 4 g/dL Final    RDW 14 0  11 6 - 15 1 % Final    MPV 12 3  8 9 - 12 7 fL Final    Platelets 610  060 - 390 Thousands/uL Final    nRBC 0  /100 WBCs Final    Neutrophils Relative 72  43 - 75 % Final    Immat GRANS % 0  0 - 2 % Final    Lymphocytes Relative 18  14 - 44 % Final    Monocytes Relative 8  4 - 12 % Final    Eosinophils Relative 1  0 - 6 % Final    Basophils Relative 1  0 - 1 % Final    Neutrophils Absolute 5 09  1 85 - 7 62 Thousands/µL Final    Immature Grans Absolute 0 02  0 00 - 0 20 Thousand/uL Final    Lymphocytes Absolute 1 25  0 60 - 4 47 Thousands/µL Final    Monocytes Absolute 0 59  0 17 - 1 22 Thousand/µL Final    Eosinophils Absolute 0 06  0 00 - 0 61 Thousand/µL Final    Basophils Absolute 0 04  0 00 - 0 10 Thousands/µL Final   BASIC METABOLIC PANEL - Abnormal    Sodium 141  136 - 145 mmol/L Final    Potassium 3 6  3 5 - 5 3 mmol/L Final    Chloride 105  100 - 108 mmol/L Final    CO2 26  21 - 32 mmol/L Final    ANION GAP 10  4 - 13 mmol/L Final    BUN 50 (*) 5 - 25 mg/dL Final    Creatinine 1 33 (*) 0 60 - 1 30 mg/dL Final    Comment: Standardized to IDMS reference method    Glucose 273 (*) 65 - 140 mg/dL Final    Comment: If the patient is fasting, the ADA then defines impaired fasting glucose as > 100 mg/dL and diabetes as > or equal to 123 mg/dL  Specimen collection should occur prior to Sulfasalazine administration due to the potential for falsely depressed results  Specimen collection should occur prior to Sulfapyridine administration due to the potential for falsely elevated results  Calcium 9 8  8 3 - 10 1 mg/dL Final    eGFR 44  ml/min/1 73sq m Final    Narrative:     [place] guidelines for Chronic Kidney Disease (CKD):     Stage 1 with normal or high GFR (GFR > 90 mL/min/1 73 square meters)    Stage 2 Mild CKD (GFR = 60-89 mL/min/1 73 square meters)    Stage 3A Moderate CKD (GFR = 45-59 mL/min/1 73 square meters)    Stage 3B Moderate CKD (GFR = 30-44 mL/min/1 73 square meters)    Stage 4 Severe CKD (GFR = 15-29 mL/min/1 73 square meters)    Stage 5 End Stage CKD (GFR <15 mL/min/1 73 square meters)  Note: GFR calculation is accurate only with a steady state creatinine   TROPONIN I - Abnormal    Troponin I 5 21 (*) <=0 04 ng/mL Final    Comment: Siemens Chemistry analyzer 99% cutoff is > 0 04 ng/mL in network labs     o cTnI 99% cutoff is useful only when applied to patients in the clinical setting of myocardial ischemia   o cTnI 99% cutoff should be interpreted in the context of clinical history, ECG findings and possibly cardiac imaging to establish correct diagnosis  o cTnI 99% cutoff may be suggestive but clearly not indicative of a coronary event without the clinical setting of myocardial ischemia       TROPONIN I - Abnormal    Troponin I 5 58 (*) <=0 04 ng/mL Final    Comment: Siemens Chemistry analyzer 99% cutoff is > 0 04 ng/mL in network labs     o cTnI 99% cutoff is useful only when applied to patients in the clinical setting of myocardial ischemia   o cTnI 99% cutoff should be interpreted in the context of clinical history, ECG findings and possibly cardiac imaging to establish correct diagnosis  o cTnI 99% cutoff may be suggestive but clearly not indicative of a coronary event without the clinical setting of myocardial ischemia  APTT - Normal    PTT 28  23 - 37 seconds Final    Comment: Therapeutic Heparin Range =  60-90 seconds   PROTIME-INR - Normal    Protime 12 9  11 6 - 14 5 seconds Final    INR 0 97  0 84 - 1 19 Final     Time reflects when diagnosis was documented in both MDM as applicable and the Disposition within this note     Time User Action Codes Description Comment    5/24/2021 10:07 AM Cherry Pickles Add [R94 31] Abnormal electrocardiogram (ECG) (EKG)     5/24/2021 10:07 AM Cherry Pickles Add [I25 10] Coronary artery disease     5/24/2021 10:07 AM Cherry Pickles Add [Z95 5] Status post insertion of drug-eluting stent into left anterior descending (LAD) artery for coronary artery disease     5/24/2021 10:30 AM Cherry Pickles Add [R77 8] Elevated troponin I level     5/24/2021 10:30 AM Cherry Pickles Add [R07 2] Substernal chest pain       ED Disposition     None      Follow-up Information    None       Current Discharge Medication List      CONTINUE these medications which have NOT CHANGED    Details   acetaminophen (TYLENOL) 650 mg CR tablet Take 650 mg by mouth every 6 (six) hours as needed for mild pain      albuterol (PROVENTIL HFA,VENTOLIN HFA) 90 mcg/act inhaler Inhale 2 puffs every 4 (four) hours as needed    Comments: Substitution to a formulary equivalent within the same pharmaceutical class is authorized        aspirin (ECOTRIN LOW STRENGTH) 81 mg EC tablet Take 1 tablet (81 mg total) by mouth daily  Qty: 90 tablet, Refills: 0    Associated Diagnoses: NSTEMI (non-ST elevated myocardial infarction) ([facility])      atorvastatin (LIPITOR) 40 mg tablet Take 1 tablet (40 mg total) by mouth daily  Qty: 90 tablet, Refills: 1    Associated Diagnoses: Mixed hyperlipidemia      Blood Glucose Monitoring Suppl (FREESTYLE LITE) JAQUELIN by Does not apply route daily  Qty: 1 each, Refills: 0    Associated Diagnoses: Uncontrolled type 2 diabetes mellitus without complication, without long-term current use of insulin      Breo Ellipta 100-25 MCG/INH inhaler       carvedilol (COREG) 3 125 mg tablet Take 1 tablet (3 125 mg total) by mouth 2 (two) times a day with meals  Qty: 180 tablet, Refills: 0    Associated Diagnoses: NSTEMI (non-ST elevated myocardial infarction) ([facility])      clopidogrel (PLAVIX) 75 mg tablet TAKE 2 TABS DAILY  Qty: 180 tablet, Refills: 1    Associated Diagnoses: Cerebrovascular accident (CVA) of pontine structure ([facility])      conjugated estrogens (PREMARIN) vaginal cream Insert 0 5 g into the vagina 2 (two) times a week Insert twice weekly at bedtime  Qty: 30 g, Refills: 1    Associated Diagnoses: Atrophic vaginitis      Continuous Blood Gluc Sensor (FreeStyle Naldo 14 Day Sensor) MISC Use one sensor every 14 days  Qty: 4 each, Refills: 3    Associated Diagnoses: Controlled type 2 diabetes mellitus with diabetic neuropathy, with long-term current use of insulin ([facility])      CVS D3 50 MCG (2000 UT) CAPS Take 1 capsule (2,000 Units total) by mouth daily  Qty: 90 capsule, Refills: 1    Associated Diagnoses: Vitamin D deficiency      furosemide (LASIX) 40 mg tablet Take 1 tablet (40 mg total) by mouth 2 (two) times a day  Qty: 180 tablet, Refills: 0    Associated Diagnoses: CHF (congestive heart failure) ([facility])      glucose blood (FREESTYLE LITE) test strip TEST AS DIRECTED UP TO FOUR TIMES A DAY  Qty: 100 each, Refills: 3    Associated Diagnoses: Type 2 diabetes mellitus with complication, without long-term current use of insulin ([facility])      Lancets (FREESTYLE) lancets Use as instructed  Qty: 100 each, Refills: 0    Associated Diagnoses: Uncontrolled type 2 diabetes mellitus without complication, without long-term current use of insulin      losartan (COZAAR) 100 MG tablet Take 1 tablet (100 mg total) by mouth daily  Qty: 30 tablet, Refills: 5    Associated Diagnoses: Persistent proteinuria      metFORMIN (GLUCOPHAGE) 500 mg tablet TAKE 1 TABLET (500 MG TOTAL) BY MOUTH 2 (TWO) TIMES A DAY WITH MEALS  Qty: 180 tablet, Refills: 1    Associated Diagnoses: Uncontrolled type 2 diabetes mellitus with hyperglycemia (HCC)      Misc  Devices (QUAD CANE) MISC by Does not apply route daily  Qty: 1 each, Refills: 0    Associated Diagnoses: 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      montelukast (SINGULAIR) 10 mg tablet Take 1 tablet (10 mg total) by mouth daily at bedtime  Qty: 90 tablet, Refills: 0    Associated Diagnoses: Non-seasonal allergic rhinitis due to pollen      GLOBAL EASE INJECT PEN NEEDLES 31G X 5 MM MISC       Glucose-Vitamin C-Vitamin D (TRUEPLUS GLUCOSE ON THE GO) CHEW CHEW 2 TABS AS NEEDED FOR BLOOD SUGAR LESS THAN 80      sitaGLIPtin (JANUVIA) 100 mg tablet Take 1 tablet (100 mg total) by mouth daily  Qty: 90 tablet, Refills: 1    Associated Diagnoses: Type 2 diabetes mellitus with other specified complication, without long-term current use of insulin (HCC)           No discharge procedures on file  Prior to Admission Medications   Prescriptions Last Dose Informant Patient Reported? Taking?    Blood Glucose Monitoring Suppl (FREESTYLE LITE) JAQUELIN 5/24/2021 at Unknown time Self No Yes   Sig: by Does not apply route daily   Breo Ellipta 100-25 MCG/INH inhaler 5/24/2021 at Unknown time Self Yes Yes   CVS D3 50 MCG (2000 UT) CAPS 5/23/2021 at Unknown time Self No Yes   Sig: Take 1 capsule (2,000 Units total) by mouth daily   Continuous Blood Gluc Sensor (FreeStyle Naldo 14 Day Sensor) MISC 5/24/2021 at Unknown time Self No Yes   Sig: Use one sensor every 14 days   GLOBAL EASE INJECT PEN NEEDLES 31G X 5 MM MISC  Self Yes No   Glucose-Vitamin C-Vitamin D (TRUEPLUS GLUCOSE ON THE GO) CHEW Unknown at Unknown time Self Yes No   Sig: CHEW 2 TABS AS NEEDED FOR BLOOD SUGAR LESS THAN 80   Lancets (FREESTYLE) lancets 5/24/2021 at Unknown time Self No Yes   Sig: Use as instructed   Misc   Devices (QUAD CANE) MISC 5/24/2021 at Unknown time Self No Yes   Sig: by Does not apply route daily   acetaminophen (TYLENOL) 650 mg CR tablet Past Week at Unknown time Self Yes Yes   Sig: Take 650 mg by mouth every 6 (six) hours as needed for mild pain   albuterol (PROVENTIL HFA,VENTOLIN HFA) 90 mcg/act inhaler Past Week at Unknown time Self Yes Yes   Sig: Inhale 2 puffs every 4 (four) hours as needed   aspirin (ECOTRIN LOW STRENGTH) 81 mg EC tablet 5/24/2021 at Unknown time Self No Yes   Sig: Take 1 tablet (81 mg total) by mouth daily   atorvastatin (LIPITOR) 40 mg tablet 5/24/2021 at Unknown time Self No Yes   Sig: Take 1 tablet (40 mg total) by mouth daily   carvedilol (COREG) 3 125 mg tablet 5/24/2021 at Unknown time Self No Yes   Sig: Take 1 tablet (3 125 mg total) by mouth 2 (two) times a day with meals   clopidogrel (PLAVIX) 75 mg tablet 5/24/2021 at Unknown time  No Yes   Sig: TAKE 2 TABS DAILY   conjugated estrogens (PREMARIN) vaginal cream Past Week at Unknown time  No Yes   Sig: Insert 0 5 g into the vagina 2 (two) times a week Insert twice weekly at bedtime   furosemide (LASIX) 40 mg tablet 5/24/2021 at Unknown time Self No Yes   Sig: Take 1 tablet (40 mg total) by mouth 2 (two) times a day   glucose blood (FREESTYLE LITE) test strip 5/24/2021 at Unknown time Self No Yes   Sig: TEST AS DIRECTED UP TO FOUR TIMES A DAY   losartan (COZAAR) 100 MG tablet 5/24/2021 at Unknown time Self No Yes   Sig: Take 1 tablet (100 mg total) by mouth daily   metFORMIN (GLUCOPHAGE) 500 mg tablet 5/24/2021 at Unknown time  No Yes   Sig: TAKE 1 TABLET (500 MG TOTAL) BY MOUTH 2 (TWO) TIMES A DAY WITH MEALS   montelukast (SINGULAIR) 10 mg tablet 5/23/2021 at Unknown time Self No Yes   Sig: Take 1 tablet (10 mg total) by mouth daily at bedtime   sitaGLIPtin (JANUVIA) 100 mg tablet  Self No No   Sig: Take 1 tablet (100 mg total) by mouth daily   Patient not taking: Reported on 5/24/2021      Facility-Administered Medications: None       Portions of the record may have been created with voice recognition software  Occasional wrong word or "sound a like" substitutions may have occurred due to the inherent limitations of voice recognition software  Read the chart carefully and recognize, using context, where substitutions have occurred      Electronically signed by:  Tj Huerta

## 2021-05-24 NOTE — PROGRESS NOTES
INTERNAL MEDICINE FOLLOW-UP OFFICE VISIT  [facility]  [address] [place] [address], [address]    NAME: Aly Alcocer  AGE: [de-identified] y o  SEX: female    DATE OF ENCOUNTER: 5/24/2021    Assessment and Plan     Chest pain patient has a history of CAD s/p stent on 03/21  She complained of  Sudden onset substernal chest pain of 2-3 days duration associated with dyspnea on exertion  She states is intermittent, could not describe the pain rather  reports pain is "just annoying", pain is relieved with prayers, no aggravating factor, patient states she has not had similar pain in the past and has not taking any medication for her chest pain  She denies headaches, lightheadedness, diaphoresis, leg swelling  Patient admits to medication/diet compliance  On examination patient had normal heart sounds, no  leg swelling, no JVD  Pt  Initially planed with son to go to the ED from the clinic  I encouraged her to go for further workup for her symptoms  she  Reports no chest pain at this time and looks stable  - Will hold off on further work up here in the clinic as she is going to the ED now  History of CVA with residual deficit  Patient has CVA 2011 currently compliant on Plavix at home some residual left-sided deficits and weakness (4/5) however ambulates well with quad cane  Plan   Continue Plavix 150 qAM      - Counseling Documentation: patient was counseled regarding: importance of compliance with treatment    Chief Complaint     Chief Complaint   Patient presents with    Follow-up     needs diabetic shoes      BMI Counseling: Body mass index is 29 83 kg/m²  The BMI is above normal  Nutrition recommendations include reducing portion sizes, decreasing overall calorie intake and 3-5 servings of fruits/vegetables daily      History of Present Illness     HPI    patient is an  59-year-old female past medical history significant for previous cerebrovascular accident chronically on Plavix 150 mg daily, type 2 diabetes, hyperlipidemia hypertension,CHF, CAD s/p stent 3/21  Is here for follow-up of her form for diabetes  Shoes  Upon review of system patient  Admits to  Sudden onset substernal chest pain of 2-3 days duration associated with dyspnea on exertion  She states is intermittent, could not describe the pain rather  reports pain is "just annoying", pain is relieved with prayers, no aggravating factor, patient states she has not had similar pain in the past and has not taking any medication for her chest pain  She denies headaches, lightheadedness, diaphoresis, leg swelling  Patient admits to medication/diet compliance  Patient is here with her son  Who said  They   Discussed last night and have made plans to go to the ED from the clinic  All further workup here in the clinic is held as patient is heading to the  ED  For further evaluation of her symptoms  Patient looks stable on room air, blood pressure 118/59, she  Reports no chest pain at this time      The following portions of the patient's history were reviewed and updated as appropriate: allergies, current medications, past family history, past medical history, past social history, past surgical history and problem list     Review of Systems     Review of Systems  Twelve point review of system remarkable except that listed in my HPI above    Active Problem List     Patient Active Problem List   Diagnosis    Aortic valve regurgitation, nonrheumatic    Benign hypertension with CKD (chronic kidney disease) stage III    Chronic rhinitis    Midline cystocele    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, without long-term current use of insulin ([address] )    Non-rheumatic mitral regurgitation    Uterine procidentia    Esophageal abnormality    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    Decreased hearing, bilateral    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[address] )    History of CVA with residual deficit    Mixed hyperlipidemia  Persistent proteinuria    Renal cyst    Ankle edema    Stage 3a chronic kidney disease (HCC)    Acute diastolic congestive heart failure (HCC)    Status post insertion of drug-eluting stent into left anterior descending (LAD) artery    Coronary artery disease involving native coronary artery of native heart without angina pectoris       Objective     /59 (BP Location: Right arm, Patient Position: Sitting, Cuff Size: Standard)   Pulse 75   Temp (!) 97 2 °F (36 2 °C) (Temporal)   Wt 76 4 kg (168 lb 6 4 oz)   SpO2 98%   BMI 29 83 kg/m²     Physical Exam  Constitutional:       General: She is not in acute distress  Appearance: Normal appearance  HENT:      Head: Normocephalic and atraumatic  Mouth/Throat:      Mouth: Mucous membranes are moist    Eyes:      Extraocular Movements: Extraocular movements intact  Neck:      Musculoskeletal: Normal range of motion and neck supple  No neck rigidity or muscular tenderness  Cardiovascular:      Rate and Rhythm: Normal rate and regular rhythm  Pulses: Normal pulses  Heart sounds: Normal heart sounds  Comments: No JVD  Pulmonary:      Effort: Pulmonary effort is normal  No respiratory distress  Breath sounds: Normal breath sounds  No stridor  No wheezing  Abdominal:      General: Bowel sounds are normal  There is no distension  Palpations: Abdomen is soft  Musculoskeletal:         General: No swelling or tenderness  Skin:     General: Skin is warm and dry  Capillary Refill: Capillary refill takes less than 2 seconds  Neurological:      General: No focal deficit present  Mental Status: She is alert and oriented to person, place, and time  Current Medications   No current facility-administered medications for this visit       Current Outpatient Medications:     acetaminophen (TYLENOL) 650 mg CR tablet, Take 650 mg by mouth every 6 (six) hours as needed for mild pain, Disp: , Rfl:     albuterol (PROVENTIL HFA,VENTOLIN HFA) 90 mcg/act inhaler, Inhale 2 puffs every 4 (four) hours as needed, Disp: , Rfl:     aspirin (ECOTRIN LOW STRENGTH) 81 mg EC tablet, Take 1 tablet (81 mg total) by mouth daily, Disp: 90 tablet, Rfl: 0    atorvastatin (LIPITOR) 40 mg tablet, Take 1 tablet (40 mg total) by mouth daily, Disp: 90 tablet, Rfl: 1    Blood Glucose Monitoring Suppl (FREESTYLE LITE) JAQUELIN, by Does not apply route daily, Disp: 1 each, Rfl: 0    Breo Ellipta 100-25 MCG/INH inhaler, , Disp: , Rfl:     carvedilol (COREG) 3 125 mg tablet, Take 1 tablet (3 125 mg total) by mouth 2 (two) times a day with meals, Disp: 180 tablet, Rfl: 0    clopidogrel (PLAVIX) 75 mg tablet, TAKE 2 TABS DAILY, Disp: 180 tablet, Rfl: 1    conjugated estrogens (PREMARIN) vaginal cream, Insert 0 5 g into the vagina 2 (two) times a week Insert twice weekly at bedtime, Disp: 30 g, Rfl: 1    Continuous Blood Gluc Sensor (FreeStyle Naldo 14 Day Sensor) MISC, Use one sensor every 14 days, Disp: 4 each, Rfl: 3    CVS D3 50 MCG (2000 UT) CAPS, Take 1 capsule (2,000 Units total) by mouth daily, Disp: 90 capsule, Rfl: 1    furosemide (LASIX) 40 mg tablet, Take 1 tablet (40 mg total) by mouth 2 (two) times a day, Disp: 180 tablet, Rfl: 0    glucose blood (FREESTYLE LITE) test strip, TEST AS DIRECTED UP TO FOUR TIMES A DAY, Disp: 100 each, Rfl: 3    Glucose-Vitamin C-Vitamin D (TRUEPLUS GLUCOSE ON THE GO) CHEW, CHEW 2 TABS AS NEEDED FOR BLOOD SUGAR LESS THAN 80, Disp: , Rfl:     Lancets (FREESTYLE) lancets, Use as instructed, Disp: 100 each, Rfl: 0    losartan (COZAAR) 100 MG tablet, Take 1 tablet (100 mg total) by mouth daily, Disp: 30 tablet, Rfl: 5    metFORMIN (GLUCOPHAGE) 500 mg tablet, TAKE 1 TABLET (500 MG TOTAL) BY MOUTH 2 (TWO) TIMES A DAY WITH MEALS, Disp: 180 tablet, Rfl: 1    Misc   Devices (QUAD CANE) MISC, by Does not apply route daily, Disp: 1 each, Rfl: 0    montelukast (SINGULAIR) 10 mg tablet, Take 1 tablet (10 mg total) by mouth daily at bedtime, Disp: 90 tablet, Rfl: 0    GLOBAL EASE INJECT PEN NEEDLES 31G X 5 MM MISC, , Disp: , Rfl:     sitaGLIPtin (JANUVIA) 100 mg tablet, Take 1 tablet (100 mg total) by mouth daily (Patient not taking: Reported on 5/24/2021), Disp: 90 tablet, Rfl: 1    Health Maintenance     Health Maintenance   Topic Date Due    Fall Risk  02/19/2021   [facility] [employer] Annual Wellness Visit (AWV)  02/19/2021    Depression Screening PHQ  04/17/2021    DM Eye Exam  10/02/2021    HEMOGLOBIN A1C  10/03/2021    BMI: Followup Plan  04/01/2022    Diabetic Foot Exam  04/22/2022    BMI: Adult  05/24/2022    DTaP,Tdap,and Td Vaccines (2 - Td) 03/11/2030    Pneumococcal Vaccine: 65+ Years  Completed    Influenza Vaccine  Completed    COVID-19 Vaccine  Completed    HIB Vaccine  Aged Out    Hepatitis B Vaccine  Aged Out    IPV Vaccine  Aged Out    Hepatitis A Vaccine  Aged Out    Meningococcal ACWY Vaccine  Aged Out    HPV Vaccine  Aged Out     Immunization History   Administered Date(s) Administered    INFLUENZA 10/10/2017    Influenza Quadrivalent, 6-35 Months IM 10/10/2017    Influenza, high dose seasonal 0 7 mL 10/02/2019, 10/06/2020    Pneumococcal Conjugate 13-Valent 02/19/2020    Pneumococcal Polysaccharide PPV23 01/01/2008    SARS-CoV-2 / COVID-19 mRNA IM (Pfizer-BioNTech) 03/30/2021, 04/21/2021    Tdap 03/11/2020       Yuval LO    Internal Medicine PGY-2  5/24/2021 10:36 AM

## 2021-05-24 NOTE — ED PROVIDER NOTES
History  Chief Complaint   Patient presents with    Chest Pain     Patient reports chest pain starting last night; also reports some DUNAWAY; reports that she didn't sleep well     The patient is an 80-year-old female with past medical history of coronary artery disease, recent stent placement on 03/26 KOJO to LAD, type 2 diabetes mellitus, hypertension, hyperlipidemia, CHF with an EF of 50%, who presents the ED for evaluation of chest pain that has been off and on since last night  Her pain occurred at rest, describes it is a dull annoying" sensation that is substernal   Feels similar to prior episode of chest pain  She denies any exacerbating or alleviating factors  She is short of breath as well that is exertional and new for her, denies cough, fever, chills, no lower extremity edema or calf pain  No nausea, vomiting, diaphoresis  Pain is nonradiating, has been intermittent since onset but became constant at around 2:00 a m  This morning  She took 81 mg of aspirin this morning, and she took her dose of Plavix this morning as well  She has been compliant with all of her medications  No history of pulmonary embolism  History provided by:  Patient   used: No    Chest Pain  Pain location:  Substernal area  Pain radiates to:  Does not radiate  Pain radiates to the back: no    Onset quality:  Sudden  Timing:  Constant  Progression:  Unchanged  Chronicity:  Recurrent  Context: at rest    Context: not breathing    Relieved by:  Nothing  Worsened by:  Nothing tried  Associated symptoms: shortness of breath    Associated symptoms: no abdominal pain, no cough, no fever, no nausea and not vomiting        Prior to Admission Medications   Prescriptions Last Dose Informant Patient Reported? Taking?    Blood Glucose Monitoring Suppl (FREESTYLE LITE) JAQUELIN 5/24/2021 at Unknown time Self No Yes   Sig: by Does not apply route daily   Breo Ellipta 100-25 MCG/INH inhaler 5/24/2021 at Unknown time Self Yes Yes   [facility]S D3 50 MCG (2000 UT) CAPS 5/23/2021 at Unknown time Self No Yes   Sig: Take 1 capsule (2,000 Units total) by mouth daily   Continuous Blood Gluc Sensor (FreeStyle Naldo 14 Day Sensor) MISC 5/24/2021 at Unknown time Self No Yes   Sig: Use one sensor every 14 days   GLOBAL EASE INJECT PEN NEEDLES 31G X 5 MM MISC  Self Yes No   Glucose-Vitamin C-Vitamin D (TRUEPLUS GLUCOSE ON THE GO) CHEW Unknown at Unknown time Self Yes No   Sig: CHEW 2 TABS AS NEEDED FOR BLOOD SUGAR LESS THAN 80   Lancets (FREESTYLE) lancets 5/24/2021 at Unknown time Self No Yes   Sig: Use as instructed   Misc   Devices (QUAD CANE) MISC 5/24/2021 at Unknown time Self No Yes   Sig: by Does not apply route daily   acetaminophen (TYLENOL) 650 mg CR tablet Past Week at Unknown time Self Yes Yes   Sig: Take 650 mg by mouth every 6 (six) hours as needed for mild pain   albuterol (PROVENTIL HFA,VENTOLIN HFA) 90 mcg/act inhaler Past Week at Unknown time Self Yes Yes   Sig: Inhale 2 puffs every 4 (four) hours as needed   aspirin (ECOTRIN LOW STRENGTH) 81 mg EC tablet 5/24/2021 at Unknown time Self No Yes   Sig: Take 1 tablet (81 mg total) by mouth daily   atorvastatin (LIPITOR) 40 mg tablet 5/24/2021 at Unknown time Self No Yes   Sig: Take 1 tablet (40 mg total) by mouth daily   carvedilol (COREG) 3 125 mg tablet 5/24/2021 at Unknown time Self No Yes   Sig: Take 1 tablet (3 125 mg total) by mouth 2 (two) times a day with meals   clopidogrel (PLAVIX) 75 mg tablet 5/24/2021 at Unknown time  No Yes   Sig: TAKE 2 TABS DAILY   conjugated estrogens (PREMARIN) vaginal cream Past Week at Unknown time  No Yes   Sig: Insert 0 5 g into the vagina 2 (two) times a week Insert twice weekly at bedtime   furosemide (LASIX) 40 mg tablet 5/24/2021 at Unknown time Self No Yes   Sig: Take 1 tablet (40 mg total) by mouth 2 (two) times a day   glucose blood (FREESTYLE LITE) test strip 5/24/2021 at Unknown time Self No Yes   Sig: TEST AS DIRECTED UP TO FOUR TIMES A DAY losartan (COZAAR) 100 MG tablet 5/24/2021 at Unknown time Self No Yes   Sig: Take 1 tablet (100 mg total) by mouth daily   metFORMIN (GLUCOPHAGE) 500 mg tablet 5/24/2021 at Unknown time  No Yes   Sig: TAKE 1 TABLET (500 MG TOTAL) BY MOUTH 2 (TWO) TIMES A DAY WITH MEALS   montelukast (SINGULAIR) 10 mg tablet 5/23/2021 at Unknown time Self No Yes   Sig: Take 1 tablet (10 mg total) by mouth daily at bedtime   sitaGLIPtin (JANUVIA) 100 mg tablet  Self No No   Sig: Take 1 tablet (100 mg total) by mouth daily   Patient not taking: Reported on 5/24/2021      Facility-Administered Medications: None       Past Medical History:   Diagnosis Date    ACE-inhibitor cough     last assessed - [id number]    Asthma     Bilateral edema of lower extremity     last assessed - [id number]    Cardiac murmur     last assessed - [id number]    CKD (chronic kidney disease)     Diabetes mellitus ([facility] [address] )     last assessed - [id number]    Esophageal dysphagia     Fatigue     last assessed - [id number]    Hyperlipidemia     Hypertension     Patellar bursitis of right knee     last assessed - [id number]    Personal history of other specified conditions     presenting hazards to health    Stroke [facility])     Stroke syndrome     Urinary frequency     UTI (urinary tract infection)        Past Surgical History:   Procedure Laterality Date    [place] ESOPHAGOGASTRODUODENOSCOPY TRANSORAL DIAGNOSTIC N/A 4/5/2017    Procedure: ESOPHAGOGASTRODUODENOSCOPY (EGD); Surgeon: Edy Sotomayor MD;  Location: [facility];   Service: Gastroenterology       Family History   Problem Relation Age of Onset    Heart disease Father     Hypertension Mother    Magdy Mcclureclaus Stroke Mother     Other Sister         1)Breast disorder; 2)Epilepsy   Magdy Link Migraines Sister         Migraine headaches    Osteoporosis Sister     Thyroid disease Sister     Coronary artery disease Sister         S/P triple vessel bypass    Osteoporosis Maternal Grandmother     Diabetes Son         Diabetes mellitus    Hypertension Son     Rheum arthritis Son         Rheumatic disease    Heart disease Family      I have reviewed and agree with the history as documented  E-Cigarette/Vaping    E-Cigarette Use Never User      E-Cigarette/Vaping Substances    Nicotine No     THC No     CBD No     Flavoring No     Other No     Unknown No      Social History     Tobacco Use    Smoking status: Former Smoker     Packs/day: 3 00     Quit date:      Years since quittin 4    Smokeless tobacco: Former User    Tobacco comment: quit over 30 yrs ago; (quit in the , had smoked 3PPD for 20 years - per [employer])   Substance Use Topics    Alcohol use: Never     Frequency: Never     Binge frequency: Never    Drug use: Never        Review of Systems   Constitutional: Negative  Negative for chills and fever  Respiratory: Positive for shortness of breath  Negative for cough  Cardiovascular: Positive for chest pain  Negative for leg swelling  Gastrointestinal: Negative  Negative for abdominal pain, blood in stool, nausea and vomiting  All other systems reviewed and are negative  Physical Exam  ED Triage Vitals [21 0930]   Temperature Pulse Respirations Blood Pressure SpO2   98 2 °F (36 8 °C) 79 18 139/67 100 %      Temp Source Heart Rate Source Patient Position - Orthostatic VS BP Location FiO2 (%)   Oral Monitor Lying Right arm --      Pain Score       2             Orthostatic Vital Signs  Vitals:    21 0930 21 1139 21 1330   BP: 139/67 106/66 118/58   Pulse: 79 66 58   Patient Position - Orthostatic VS: Lying Lying Lying       Physical Exam  Vitals signs reviewed  Constitutional:       Appearance: She is well-developed  She is not diaphoretic  Comments: Conversationally dyspneic   HENT:      Head: Normocephalic and atraumatic        Right Ear: External ear normal       Left Ear: External ear normal       Nose: Nose normal    Eyes:      General: No scleral icterus  Conjunctiva/sclera: Conjunctivae normal    Neck:      Musculoskeletal: Normal range of motion  Vascular: No JVD  Trachea: No tracheal deviation  Cardiovascular:      Rate and Rhythm: Normal rate and regular rhythm  Heart sounds: Normal heart sounds  No murmur  Pulmonary:      Effort: Pulmonary effort is normal  No respiratory distress  Breath sounds: Examination of the left-lower field reveals rales  Rales present  Chest:      Chest wall: No swelling, tenderness or crepitus  Abdominal:      General: Bowel sounds are normal  There is no distension  Palpations: Abdomen is soft  Tenderness: There is no abdominal tenderness  There is no guarding  Musculoskeletal: Normal range of motion  Skin:     General: Skin is warm and dry  Capillary Refill: Capillary refill takes less than 2 seconds  Neurological:      Mental Status: She is alert and oriented to person, place, and time  Motor: No abnormal muscle tone  Psychiatric:         Mood and Affect: Mood normal          Behavior: Behavior normal          Thought Content:  Thought content normal          Judgment: Judgment normal          ED Medications  Medications   heparin (porcine) 25,000 units in 0 45% NaCl 250 mL infusion (premix) (12 Units/kg/hr × 75 kg (Order-Specific) Intravenous New Bag 5/24/21 1103)   heparin (porcine) injection 4,000 Units (has no administration in time range)   heparin (porcine) injection 2,000 Units (has no administration in time range)   albuterol (PROVENTIL HFA,VENTOLIN HFA) inhaler 2 puff (has no administration in time range)   aspirin (ECOTRIN LOW STRENGTH) EC tablet 81 mg (0 mg Oral Hold 5/24/21 1324)   atorvastatin (LIPITOR) tablet 40 mg (0 mg Oral Hold 5/24/21 1324)   fluticasone-vilanterol (BREO ELLIPTA) 100-25 mcg/inh inhaler 1 puff (0 puffs Inhalation Hold 5/24/21 1324)   carvedilol (COREG) tablet 3 125 mg (has no administration in time range)   losartan (COZAAR) tablet 100 mg (0 mg Oral Hold 5/24/21 1324)   furosemide (LASIX) tablet 40 mg (0 mg Oral Hold 5/24/21 1324)   montelukast (SINGULAIR) tablet 10 mg (has no administration in time range)   clopidogrel (PLAVIX) tablet 150 mg (0 mg Oral Hold 5/24/21 1324)   acetaminophen (TYLENOL) tablet 650 mg (has no administration in time range)   sodium chloride 0 9 % infusion (75 mL/hr Intravenous New Bag 5/24/21 1120)   insulin lispro (HumaLOG) 100 units/mL subcutaneous injection 1-5 Units (0 Units Subcutaneous Hold 5/24/21 1325)   insulin lispro (HumaLOG) 100 units/mL subcutaneous injection 1-5 Units (has no administration in time range)   nitroglycerin (NITROSTAT) SL tablet 0 4 mg (0 4 mg Sublingual Given 5/24/21 0942)   aspirin chewable tablet 243 mg (243 mg Oral Given 5/24/21 1020)   heparin (porcine) injection 4,000 Units (4,000 Units Intravenous Given 5/24/21 1102)       Diagnostic Studies  Results Reviewed     Procedure Component Value Units Date/Time    Ferritin [[id number]] Collected: 05/24/21 0947    Lab Status: In process Specimen: Blood from Arm, Right Updated: 05/24/21 1347    Iron Saturation % [[id number]] Collected: 05/24/21 0947    Lab Status:  In process Specimen: Blood from Arm, Right Updated: 05/24/21 1347    Troponin I [[id number]]  (Abnormal) Collected: 05/24/21 1243    Lab Status: Final result Specimen: Blood from Arm, Right Updated: 05/24/21 1314     Troponin I 5 58 ng/mL     Troponin I [[id number]]     Lab Status: No result Specimen: Blood     APTT six (6) hours after Heparin bolus/drip initiation or dosing change [[id number]]  (Normal) Collected: 05/24/21 1044    Lab Status: Final result Specimen: Blood from Arm, Right Updated: 05/24/21 1115     PTT 28 seconds     Protime-INR [[id number]]  (Normal) Collected: 05/24/21 1044    Lab Status: Final result Specimen: Blood from Arm, Right Updated: 05/24/21 1115     Protime 12 9 seconds      INR 0 97    Troponin I [[id number]]  (Abnormal) Collected: 05/24/21 0983    Lab Status: Final result Specimen: Blood from Arm, Right Updated: 05/24/21 1021     Troponin I 5 21 ng/mL     Basic metabolic panel [[id number]]  (Abnormal) Collected: 05/24/21 0947    Lab Status: Final result Specimen: Blood from Arm, Right Updated: 05/24/21 1018     Sodium 141 mmol/L      Potassium 3 6 mmol/L      Chloride 105 mmol/L      CO2 26 mmol/L      ANION GAP 10 mmol/L      BUN 50 mg/dL      Creatinine 1 33 mg/dL      Glucose 273 mg/dL      Calcium 9 8 mg/dL      eGFR 44 ml/min/1 73sq m     Narrative:      [facility] guidelines for Chronic Kidney Disease (CKD):     Stage 1 with normal or high GFR (GFR > 90 mL/min/1 73 square meters)    Stage 2 Mild CKD (GFR = 60-89 mL/min/1 73 square meters)    Stage 3A Moderate CKD (GFR = 45-59 mL/min/1 73 square meters)    Stage 3B Moderate CKD (GFR = 30-44 mL/min/1 73 square meters)    Stage 4 Severe CKD (GFR = 15-29 mL/min/1 73 square meters)    Stage 5 End Stage CKD (GFR <15 mL/min/1 73 square meters)  Note: GFR calculation is accurate only with a steady state creatinine    CBC and differential [[id number]]  (Abnormal) Collected: 05/24/21 0947    Lab Status: Final result Specimen: Blood from Arm, Right Updated: 05/24/21 0955     WBC 7 05 Thousand/uL      RBC 3 87 Million/uL      Hemoglobin 10 2 g/dL      Hematocrit 32 2 %      MCV 83 fL      MCH 26 4 pg      MCHC 31 7 g/dL      RDW 14 0 %      MPV 12 3 fL      Platelets 748 Thousands/uL      nRBC 0 /100 WBCs      Neutrophils Relative 72 %      Immat GRANS % 0 %      Lymphocytes Relative 18 %      Monocytes Relative 8 %      Eosinophils Relative 1 %      Basophils Relative 1 %      Neutrophils Absolute 5 09 Thousands/µL      Immature Grans Absolute 0 02 Thousand/uL      Lymphocytes Absolute 1 25 Thousands/µL      Monocytes Absolute 0 59 Thousand/µL      Eosinophils Absolute 0 06 Thousand/µL      Basophils Absolute 0 04 Thousands/µL                  XR chest 2 views   ED Interpretation by [facility] Parul Lane MD (05/24 1051)   No pna      Final Result by Antoni Marx MD (05/24 1019)      No acute cardiopulmonary disease  Workstation performed: [id number]               Procedures  Procedures      ED Course  ED Course as of May 24 1400   Mon May 24, 2021   9152 TT Dr Chasity Posada Necessary reviewed ekg, would not activate cath lab now  Will admit, serial ekg/trop      0947 Procedure Note: EKG  Date/Time: 05/24/21 9:47 AM   Interpreted by: Antonella Melissa DO  Indications / Diagnosis: CP  ECG reviewed by me, the ED Physician: yes   The EKG demonstrates:  Rhythm: rate 66, normal sinus  Intervals: normal intervals  Axis: normal axis  QRS/Blocks: normal QRS  ST Changes: SHA V2/V3  No reciprocal changes  1242 INR: 0 97             HEART Risk Score      Most Recent Value   Heart Score Risk Calculator   History  2 Filed at: 05/24/2021 1349   ECG  2 Filed at: 05/24/2021 1349   Age  2 Filed at: 05/24/2021 1349   Risk Factors  2 Filed at: 05/24/2021 1349   Troponin  2 Filed at: 05/24/2021 1349   HEART Score  10 Filed at: 05/24/2021 1349        Identification of Seniors at Risk      Most Recent Value   (ISAR) Identification of Seniors at Risk   Before the illness or injury that brought you to the Emergency, did you need someone to help you on a regular basis? 0 Filed at: 05/24/2021 0932   In the last 24 hours, have you needed more help than usual?  0 Filed at: 05/24/2021 3152   Have you been hospitalized for one or more nights during the past 6 months? 0 Filed at: 05/24/2021 0932   In general, do you see well?  0 Filed at: 05/24/2021 0932   In general, do you have serious problems with your memory? 0 Filed at: 05/24/2021 0932   Do you take more than three different medications every day?   1 Filed at: 05/24/2021 0932   ISAR Score  1 Filed at: 05/24/2021 0932                    SBIRT 22yo+      Most Recent Value   SBIRT (25 yo +)   In order to provide better care to our patients, we are screening all of our patients for alcohol and drug use  Would it be okay to ask you these screening questions? No Filed at: 05/24/2021 5570                [facility]  Number of Diagnoses or Management Options  Abnormal electrocardiogram (ECG) (EKG): new and requires workup  Coronary artery disease: new and does not require workup  Elevated troponin I level: new and requires workup  Status post insertion of drug-eluting stent into left anterior descending (LAD) artery for coronary artery disease: new and does not require workup  Substernal chest pain: new and requires workup  Diagnosis management comments: 68-year-old female with past medical history of coronary artery disease, recent stent placement in March who presents the ED for sudden onset substernal chest pressure/pain  Her EKG reveals ST elevation in V2 and V3, inverted T-waves  EKG was sent to interventional cardiologist for review, no plan for cath lab at this time  Troponin resulted at greater than 5, tiger text Interventional Cardiology who sent cardiology fellow to evaluate the patient  Plan for cardiac catheterization later today    Given sublingual nitro, total of 324 mg of aspirin, heparin, admit to Medicine       Amount and/or Complexity of Data Reviewed  Clinical lab tests: ordered and reviewed  Tests in the radiology section of CPT®: ordered and reviewed  Review and summarize past medical records: yes  Discuss the patient with other providers: yes  Independent visualization of images, tracings, or specimens: yes        Disposition  Final diagnoses:   Abnormal electrocardiogram (ECG) (EKG)   Coronary artery disease   Status post insertion of drug-eluting stent into left anterior descending (LAD) artery for coronary artery disease   Elevated troponin I level   Substernal chest pain     Time reflects when diagnosis was documented in both MDM as applicable and the Disposition within this note     Time User Action Codes Description Comment    5/24/2021 10:07 AM Carolyn Brantley Add [R94 31] Abnormal electrocardiogram (ECG) (EKG)     5/24/2021 10:07 AM Carolyn Brantley Add [I25 10] Coronary artery disease     5/24/2021 10:07 AM Carolyn Brantley Add [Z95 5] Status post insertion of drug-eluting stent into left anterior descending (LAD) artery for coronary artery disease     5/24/2021 10:30 AM Carolyn Brantley Add [R77 8] Elevated troponin I level     5/24/2021 10:30 AM Carolyn Brantley Add [R07 2] Substernal chest pain       ED Disposition     None      Follow-up Information    None         Current Discharge Medication List      CONTINUE these medications which have NOT CHANGED    Details   acetaminophen (TYLENOL) 650 mg CR tablet Take 650 mg by mouth every 6 (six) hours as needed for mild pain      albuterol (PROVENTIL HFA,VENTOLIN HFA) 90 mcg/act inhaler Inhale 2 puffs every 4 (four) hours as needed    Comments: Substitution to a formulary equivalent within the same pharmaceutical class is authorized        aspirin (ECOTRIN LOW STRENGTH) 81 mg EC tablet Take 1 tablet (81 mg total) by mouth daily  Qty: 90 tablet, Refills: 0    Associated Diagnoses: NSTEMI (non-ST elevated myocardial infarction) (HCC)      atorvastatin (LIPITOR) 40 mg tablet Take 1 tablet (40 mg total) by mouth daily  Qty: 90 tablet, Refills: 1    Associated Diagnoses: Mixed hyperlipidemia      Blood Glucose Monitoring Suppl (FREESTYLE LITE) JAQUELIN by Does not apply route daily  Qty: 1 each, Refills: 0    Associated Diagnoses: Uncontrolled type 2 diabetes mellitus without complication, without long-term current use of insulin      Breo Ellipta 100-25 MCG/INH inhaler       carvedilol (COREG) 3 125 mg tablet Take 1 tablet (3 125 mg total) by mouth 2 (two) times a day with meals  Qty: 180 tablet, Refills: 0    Associated Diagnoses: NSTEMI (non-ST elevated myocardial infarction) (HCC)      clopidogrel (PLAVIX) 75 mg tablet TAKE 2 TABS DAILY  Qty: 180 tablet, Refills: 1    Associated Diagnoses: Cerebrovascular accident (CVA) of pontine structure ([address] )      conjugated estrogens (PREMARIN) vaginal cream Insert 0 5 g into the vagina 2 (two) times a week Insert twice weekly at bedtime  Qty: 30 g, Refills: 1    Associated Diagnoses: Atrophic vaginitis      Continuous Blood Gluc Sensor (FreeStyle Naldo 14 Day Sensor) MISC Use one sensor every 14 days  Qty: 4 each, Refills: 3    Associated Diagnoses: Controlled type 2 diabetes mellitus with diabetic neuropathy, with long-term current use of insulin ([facility])      CVS D3 50 MCG (2000 UT) CAPS Take 1 capsule (2,000 Units total) by mouth daily  Qty: 90 capsule, Refills: 1    Associated Diagnoses: Vitamin D deficiency      furosemide (LASIX) 40 mg tablet Take 1 tablet (40 mg total) by mouth 2 (two) times a day  Qty: 180 tablet, Refills: 0    Associated Diagnoses: CHF (congestive heart failure) ([facility])      glucose blood (FREESTYLE LITE) test strip TEST AS DIRECTED UP TO FOUR TIMES A DAY  Qty: 100 each, Refills: 3    Associated Diagnoses: Type 2 diabetes mellitus with complication, without long-term current use of insulin ([facility])      Lancets (FREESTYLE) lancets Use as instructed  Qty: 100 each, Refills: 0    Associated Diagnoses: Uncontrolled type 2 diabetes mellitus without complication, without long-term current use of insulin      losartan (COZAAR) 100 MG tablet Take 1 tablet (100 mg total) by mouth daily  Qty: 30 tablet, Refills: 5    Associated Diagnoses: Persistent proteinuria      metFORMIN (GLUCOPHAGE) 500 mg tablet TAKE 1 TABLET (500 MG TOTAL) BY MOUTH 2 (TWO) TIMES A DAY WITH MEALS  Qty: 180 tablet, Refills: 1    Associated Diagnoses: Uncontrolled type 2 diabetes mellitus with hyperglycemia ([facility])      Misc   Devices (QUAD CANE) MISC by Does not apply route daily  Qty: 1 each, Refills: 0    Associated Diagnoses: 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      montelukast (SINGULAIR) 10 mg tablet Take 1 tablet (10 mg total) by mouth daily at bedtime  Qty: 90 tablet, Refills: 0 Associated Diagnoses: Non-seasonal allergic rhinitis due to pollen      GLOBAL EASE INJECT PEN NEEDLES 31G X 5 MM MISC       Glucose-Vitamin C-Vitamin D (TRUEPLUS GLUCOSE ON THE GO) CHEW CHEW 2 TABS AS NEEDED FOR BLOOD SUGAR LESS THAN 80      sitaGLIPtin (JANUVIA) 100 mg tablet Take 1 tablet (100 mg total) by mouth daily  Qty: 90 tablet, Refills: 1    Associated Diagnoses: Type 2 diabetes mellitus with other specified complication, without long-term current use of insulin (HCC)           No discharge procedures on file  PDMP Review     None           ED Provider  Attending physically available and evaluated Samuel Heredia I managed the patient along with the ED Attending      Electronically Signed by         Suleiman Caputo DO  05/24/21 1400

## 2021-05-24 NOTE — ASSESSMENT & PLAN NOTE
Wt Readings from Last 3 Encounters:   05/24/21 75 5 kg (166 lb 7 2 oz)   05/24/21 76 4 kg (168 lb 6 4 oz)   04/08/21 73 5 kg (162 lb)     Echo done 3/25/21 with EF 29%, grade 1 diastolic dysfunction with mild hypokinesis of mid and apical segments  Cath showed 99% occlusion of mid LAD, now s/p KOJO  -5/24 Repeat Echo EF 55% moderate MR, Mild AR   Apical dyskinesis           Clinically euvolemic  Plan:  · Patient presenting with chest pain - cardiology consulted, plan for cath today  · Continue home carvedilol and losartan  · Restart home furosemide 40mg BID after cath per cards recs  · Monitor I/O's and daily weights  · Fluid and salt restriction once given diet

## 2021-05-24 NOTE — ASSESSMENT & PLAN NOTE
Lab Results   Component Value Date    HGBA1C 6 7 (H) 04/03/2021       No results for input(s): POCGLU in the last 72 hours  Blood Sugar Average: Last 72 hrs:     Patient managed on Metformin 500mg BID at home      Plan:  · SSC Algorithm 2 ordered  · POC Glucose checks TID with meals and at bedtime  · Carb control diet

## 2021-05-25 ENCOUNTER — APPOINTMENT (INPATIENT)
Dept: NON INVASIVE DIAGNOSTICS | Facility: HOSPITAL | Age: 81
DRG: 251 | End: 2021-05-25
Payer: COMMERCIAL

## 2021-05-25 LAB
ANION GAP SERPL CALCULATED.3IONS-SCNC: 5 MMOL/L (ref 4–13)
APTT PPP: 55 SECONDS (ref 23–37)
APTT PPP: 60 SECONDS (ref 23–37)
BASOPHILS # BLD AUTO: 0.03 THOUSANDS/ΜL (ref 0–0.1)
BASOPHILS NFR BLD AUTO: 1 % (ref 0–1)
BUN SERPL-MCNC: 42 MG/DL (ref 5–25)
CALCIUM SERPL-MCNC: 9.5 MG/DL (ref 8.3–10.1)
CHLORIDE SERPL-SCNC: 111 MMOL/L (ref 100–108)
CO2 SERPL-SCNC: 25 MMOL/L (ref 21–32)
CREAT SERPL-MCNC: 1.14 MG/DL (ref 0.6–1.3)
EOSINOPHIL # BLD AUTO: 0.08 THOUSAND/ΜL (ref 0–0.61)
EOSINOPHIL NFR BLD AUTO: 1 % (ref 0–6)
ERYTHROCYTE [DISTWIDTH] IN BLOOD BY AUTOMATED COUNT: 14.2 % (ref 11.6–15.1)
GFR SERPL CREATININE-BSD FRML MDRD: 52 ML/MIN/1.73SQ M
GLUCOSE SERPL-MCNC: 126 MG/DL (ref 65–140)
GLUCOSE SERPL-MCNC: 126 MG/DL (ref 65–140)
GLUCOSE SERPL-MCNC: 191 MG/DL (ref 65–140)
GLUCOSE SERPL-MCNC: 240 MG/DL (ref 65–140)
GLUCOSE SERPL-MCNC: 314 MG/DL (ref 65–140)
HCT VFR BLD AUTO: 32.1 % (ref 34.8–46.1)
HGB BLD-MCNC: 9.9 G/DL (ref 11.5–15.4)
IMM GRANULOCYTES # BLD AUTO: 0.01 THOUSAND/UL (ref 0–0.2)
IMM GRANULOCYTES NFR BLD AUTO: 0 % (ref 0–2)
LYMPHOCYTES # BLD AUTO: 1.29 THOUSANDS/ΜL (ref 0.6–4.47)
LYMPHOCYTES NFR BLD AUTO: 22 % (ref 14–44)
MAGNESIUM SERPL-MCNC: 2.8 MG/DL (ref 1.6–2.6)
MCH RBC QN AUTO: 25.6 PG (ref 26.8–34.3)
MCHC RBC AUTO-ENTMCNC: 30.8 G/DL (ref 31.4–37.4)
MCV RBC AUTO: 83 FL (ref 82–98)
MONOCYTES # BLD AUTO: 0.51 THOUSAND/ΜL (ref 0.17–1.22)
MONOCYTES NFR BLD AUTO: 9 % (ref 4–12)
NEUTROPHILS # BLD AUTO: 3.84 THOUSANDS/ΜL (ref 1.85–7.62)
NEUTS SEG NFR BLD AUTO: 67 % (ref 43–75)
NRBC BLD AUTO-RTO: 0 /100 WBCS
PLATELET # BLD AUTO: 156 THOUSANDS/UL (ref 149–390)
PMV BLD AUTO: 12.5 FL (ref 8.9–12.7)
POTASSIUM SERPL-SCNC: 4.3 MMOL/L (ref 3.5–5.3)
RBC # BLD AUTO: 3.86 MILLION/UL (ref 3.81–5.12)
SODIUM SERPL-SCNC: 141 MMOL/L (ref 136–145)
TROPONIN I SERPL-MCNC: 10.3 NG/ML
WBC # BLD AUTO: 5.76 THOUSAND/UL (ref 4.31–10.16)

## 2021-05-25 PROCEDURE — 99223 1ST HOSP IP/OBS HIGH 75: CPT | Performed by: HOSPITALIST

## 2021-05-25 PROCEDURE — 85025 COMPLETE CBC W/AUTO DIFF WBC: CPT | Performed by: STUDENT IN AN ORGANIZED HEALTH CARE EDUCATION/TRAINING PROGRAM

## 2021-05-25 PROCEDURE — 83735 ASSAY OF MAGNESIUM: CPT | Performed by: STUDENT IN AN ORGANIZED HEALTH CARE EDUCATION/TRAINING PROGRAM

## 2021-05-25 PROCEDURE — 84484 ASSAY OF TROPONIN QUANT: CPT | Performed by: STUDENT IN AN ORGANIZED HEALTH CARE EDUCATION/TRAINING PROGRAM

## 2021-05-25 PROCEDURE — 80048 BASIC METABOLIC PNL TOTAL CA: CPT | Performed by: STUDENT IN AN ORGANIZED HEALTH CARE EDUCATION/TRAINING PROGRAM

## 2021-05-25 PROCEDURE — 93306 TTE W/DOPPLER COMPLETE: CPT | Performed by: INTERNAL MEDICINE

## 2021-05-25 PROCEDURE — 93306 TTE W/DOPPLER COMPLETE: CPT

## 2021-05-25 PROCEDURE — 85730 THROMBOPLASTIN TIME PARTIAL: CPT | Performed by: HOSPITALIST

## 2021-05-25 PROCEDURE — 99232 SBSQ HOSP IP/OBS MODERATE 35: CPT | Performed by: INTERNAL MEDICINE

## 2021-05-25 PROCEDURE — 82948 REAGENT STRIP/BLOOD GLUCOSE: CPT

## 2021-05-25 PROCEDURE — NC001 PR NO CHARGE: Performed by: HOSPITALIST

## 2021-05-25 RX ORDER — CARVEDILOL 6.25 MG/1
6.25 TABLET ORAL 2 TIMES DAILY WITH MEALS
Status: DISCONTINUED | OUTPATIENT
Start: 2021-05-25 | End: 2021-05-26 | Stop reason: HOSPADM

## 2021-05-25 RX ADMIN — INSULIN LISPRO 2 UNITS: 100 INJECTION, SOLUTION INTRAVENOUS; SUBCUTANEOUS at 21:25

## 2021-05-25 RX ADMIN — CLOPIDOGREL BISULFATE 150 MG: 75 TABLET ORAL at 08:44

## 2021-05-25 RX ADMIN — CARVEDILOL 3.12 MG: 3.12 TABLET, FILM COATED ORAL at 08:44

## 2021-05-25 RX ADMIN — RIVAROXABAN 2.5 MG: 2.5 TABLET, FILM COATED ORAL at 17:22

## 2021-05-25 RX ADMIN — CARVEDILOL 6.25 MG: 6.25 TABLET, FILM COATED ORAL at 17:20

## 2021-05-25 RX ADMIN — ATORVASTATIN CALCIUM 40 MG: 20 TABLET, FILM COATED ORAL at 08:44

## 2021-05-25 RX ADMIN — SODIUM CHLORIDE 75 ML/HR: 0.9 INJECTION, SOLUTION INTRAVENOUS at 09:18

## 2021-05-25 RX ADMIN — INSULIN LISPRO 3 UNITS: 100 INJECTION, SOLUTION INTRAVENOUS; SUBCUTANEOUS at 13:21

## 2021-05-25 RX ADMIN — RIVAROXABAN 2.5 MG: 2.5 TABLET, FILM COATED ORAL at 13:21

## 2021-05-25 RX ADMIN — FUROSEMIDE 40 MG: 40 TABLET ORAL at 17:20

## 2021-05-25 RX ADMIN — FLUTICASONE FUROATE AND VILANTEROL TRIFENATATE 1 PUFF: 100; 25 POWDER RESPIRATORY (INHALATION) at 08:43

## 2021-05-25 RX ADMIN — ASPIRIN 81 MG: 81 TABLET, COATED ORAL at 08:44

## 2021-05-25 RX ADMIN — INSULIN LISPRO 1 UNITS: 100 INJECTION, SOLUTION INTRAVENOUS; SUBCUTANEOUS at 17:21

## 2021-05-25 RX ADMIN — MONTELUKAST 10 MG: 10 TABLET, FILM COATED ORAL at 21:25

## 2021-05-25 RX ADMIN — FUROSEMIDE 40 MG: 40 TABLET ORAL at 08:44

## 2021-05-25 NOTE — CASE MANAGEMENT
Pt is not a <30 day readmission or current bundle  Risk of unplanned readmission score is 17 (green)  Pt admitted due to NSTEMI  CM met with pt at bedside to introduce CM role and begin discharge planning  Pt lives alone in a 2 story house that has 1 SHA and 14 steps to 2nd floor  Pt reports that her son, Kole Eckert ([phone]) is staying with her indefinitely  Pt reports being independent with ADLs PTA, pt uses a single point cane for ambulation and has access to a rolling walker, shower chair, and bedside commode  Pt reports history of VNA through [facility] in 2018, history of STR at [facility]  No indicated history of inpatient MH treatment or D/A treatment  Pt's family provides transportation or sometimes pt reports taking [employer] Leep, pt is retired  PCP is JANETH Chaidez ([phone]) and pt uses [employer] in [facility] for prescriptions  Pt's family to assist with transportation at time of discharge  CM department to remain available for discharge concerns or needs  CM reviewed d/c planning process including the following: identifying help at home, patient preference for d/c planning needs, Discharge Lounge, [facility] Meds to Bed program, availability of treatment team to discuss questions or concerns patient and/or family may have regarding understanding medications and recognizing signs and symptoms once discharged  CM also encouraged patient to follow up with all recommended appointments after discharge  Patient advised of importance for patient and family to participate in managing patients medical well being

## 2021-05-25 NOTE — PROGRESS NOTES
General Cardiology Progress Note   Maria Teresa Porter [de-identified] y o  female MRN: [id number]  Unit/Bed#: -01 Encounter: [id number]      Assessment:  Principal Problem:    NSTEMI (non-ST elevated myocardial infarction) 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, without long-term current use of insulin (HCC)    Anemia    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HCC)    History of CVA with residual deficit    Renal cyst    Coronary artery disease involving native coronary artery of native heart without angina pectoris    Essential hypertension    Asthma    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HCC)      Impression:     Anterior wall NSTEMI  o In stent thrombosis/stenosis despite aspirin/clopidogrel 150 mg daily  o High risk for reocclusion  o CT surgery advised against LIMA to LAD  o EF 55% with anterior wall hypokinesis   Chronic diastolic CHF   Hypertension  o Blood pressure is controlled   Hyperlipidemia  o Controlled on high-intensity atorvastatin   Type 2 diabetes  o Controlled with an A1c of 6 7   History of CVA with left-sided deficits, ambulates with a cane    Plan:     Discussed with her primary cardiologist   Effient in place of clopidogrel is an option, but does have the black box warning of contraindication in patients with prior stroke and advanced age  Dee Dee Hernandez Per JUAN, low-dose Xarelto is an option, but clearly with a small increased risk of bleeding   Discussed with the patient and family in detail   Discussed with interventional cardiology and CT surgery as well   Will discontinue heparin drip and initiate Xarelto 2 5 mg p o  B i d  I have discussed her case in detail with both her and her son, the difficulty in our current situation with her recurrent thrombosis despite aspirin and Xarelto 150 mg daily    Bleeding risk was discussed with the current escalation in medical therapy as well   Both her and her son are in agreement   Will continue inpatient observation for another 24 hours to assess symptoms before deciding on appropriateness of discharge    Subjective:   Patient seen and examined  She has no complaints for me today  She has been minimally ambulatory around the room  She does walk with a cane  She denies palpitations  Her blood pressure is stable  She continues on a heparin drip currently  Review of Systems   Constitution: Negative for diaphoresis, fever, malaise/fatigue and night sweats  Cardiovascular: Negative for chest pain, dyspnea on exertion, leg swelling, orthopnea, palpitations and syncope  Respiratory: Negative for cough, shortness of breath, sputum production and wheezing  Skin: Negative for itching and rash  Gastrointestinal: Negative for abdominal pain, change in bowel habit and diarrhea  Neurological: Negative for dizziness, focal weakness, light-headedness and weakness  All other systems reviewed and are negative  Objective   Vitals: Blood pressure 126/68, pulse 67, temperature 98 2 °F (36 8 °C), resp  rate 18, height 5' 3" (1 6 m), weight 75 5 kg (166 lb 7 2 oz), SpO2 98 %, not currently breastfeeding , Body mass index is 29 48 kg/m² , I/O last 3 completed shifts: In: 994 [P O :240; I V :387 5; IV Piggyback:225 5]  Out: 200 [Urine:200]  I/O this shift:  In: 250 [P O :240; I V :10]  Out: 0   Wt Readings from Last 3 Encounters:   05/24/21 75 5 kg (166 lb 7 2 oz)   05/24/21 76 4 kg (168 lb 6 4 oz)   04/08/21 73 5 kg (162 lb)       Intake/Output Summary (Last 24 hours) at 5/25/2021 0917  Last data filed at 5/25/2021 0848  Gross per 24 hour   Intake 1103 ml   Output 200 ml   Net 903 ml     I/O last 3 completed shifts: In: 399 [P O :240; I V :387 5; IV Piggyback:225 5]  Out: 200 [Urine:200]    No significant arrhythmias seen on telemetry review  Physical Exam  Vitals signs reviewed  Constitutional:       General: She is not in acute distress  Appearance: She is well-developed  She is not diaphoretic  HENT:      Head: Normocephalic and atraumatic  Nose: Nose normal    Eyes:      General: No scleral icterus  Conjunctiva/sclera: Conjunctivae normal       Pupils: Pupils are equal, round, and reactive to light  Neck:      Musculoskeletal: Normal range of motion and neck supple  Thyroid: No thyromegaly  Vascular: No JVD  Cardiovascular:      Rate and Rhythm: Normal rate and regular rhythm  Heart sounds: Normal heart sounds  No murmur  No gallop  Pulmonary:      Effort: Pulmonary effort is normal  No respiratory distress  Breath sounds: Normal breath sounds  No wheezing or rales  Abdominal:      General: Bowel sounds are normal  There is no distension  Palpations: Abdomen is soft  Tenderness: There is no abdominal tenderness  Musculoskeletal: Normal range of motion  General: No tenderness or deformity  Right lower leg: No edema  Left lower leg: No edema  Skin:     General: Skin is warm and dry  Capillary Refill: Capillary refill takes less than 2 seconds  Findings: No erythema or rash  Neurological:      General: No focal deficit present  Mental Status: She is alert and oriented to person, place, and time  Cranial Nerves: No cranial nerve deficit        Coordination: Coordination normal    Psychiatric:         Behavior: Behavior normal          Judgment: Judgment normal          Meds/Allergies   No Known Allergies    Current Facility-Administered Medications:     acetaminophen (TYLENOL) tablet 650 mg, 650 mg, Oral, Q6H PRN, Anastasia Olivas MD    albuterol (PROVENTIL HFA,VENTOLIN HFA) inhaler 2 puff, 2 puff, Inhalation, Q4H PRN, Anastasia Olivas MD    aspirin (ECOTRIN LOW STRENGTH) EC tablet 81 mg, 81 mg, Oral, Daily, Anastasia Olivas MD, 81 mg at 05/25/21 0844    atorvastatin (LIPITOR) tablet 40 mg, 40 mg, Oral, Daily, Marcio Coley MD, 40 mg at 05/25/21 0844    carvedilol (COREG) tablet 6 25 mg, 6 25 mg, Oral, BID With Meals, Obey Pavon MD    clopidogrel (PLAVIX) tablet 150 mg, 150 mg, Oral, Daily, Marcio Coley MD, 150 mg at 05/25/21 0844    fluticasone-vilanterol (BREO ELLIPTA) 100-25 mcg/inh inhaler 1 puff, 1 puff, Inhalation, Daily, Marcio Coley MD, 1 puff at 05/25/21 0843    furosemide (LASIX) tablet 40 mg, 40 mg, Oral, BID, Marcio Coley MD, 40 mg at 05/25/21 0844    heparin (porcine) 25,000 units in 0 45% NaCl 250 mL infusion (premix), 3-20 Units/kg/hr (Order-Specific), Intravenous, Titrated, PEDRO LUIS Barron, Last Rate: 9 mL/hr at 05/24/21 2230, 12 Units/kg/hr at 05/24/21 2230    heparin (porcine) injection 2,000 Units, 2,000 Units, Intravenous, Q1H PRN, PEDRO LUIS Barron    heparin (porcine) injection 4,000 Units, 4,000 Units, Intravenous, Q1H PRN, PEDRO LUIS Barron    insulin lispro (HumaLOG) 100 units/mL subcutaneous injection 1-5 Units, 1-5 Units, Subcutaneous, TID AC, Stopped at 05/24/21 1325 **AND** Fingerstick Glucose (POCT), , , TID AC, Denton Peralta DO    insulin lispro (HumaLOG) 100 units/mL subcutaneous injection 1-5 Units, 1-5 Units, Subcutaneous, HS, Denton Peralta DO, 1 Units at 05/24/21 2232    montelukast (SINGULAIR) tablet 10 mg, 10 mg, Oral, HS, Marcio Coley MD, 10 mg at 05/24/21 2233    [COMPLETED] sodium chloride 0 9 % bolus 226 5 mL, 3 mL/kg, Intravenous, Once, Last Rate: 113 3 mL/hr at 05/24/21 1657, 225 5 mL at 05/24/21 1657 **FOLLOWED BY** sodium chloride 0 9 % infusion, 75 mL/hr, Intravenous, Continuous, Marrie Miami, CRNP, Last Rate: 75 mL/hr at 05/24/21 1721, 75 mL/hr at 05/24/21 1721    Laboratory Results:  Results from last 7 days   Lab Units 05/25/21  0448 05/24/21  2313 05/24/21  1839   TROPONIN I ng/mL 10 30* 13 60* 11 50*       CBC with diff:   Results from last 7 days   Lab Units 05/25/21  0448 05/24/21  0947   WBC Thousand/uL 5 76 7 05   HEMOGLOBIN g/dL 9 9* 10 2*   HEMATOCRIT % 32 1* 32 2*   MCV fL 83 83   PLATELETS Thousands/uL 156 173   MCH pg 25 6* 26 4*   MCHC g/dL 30 8* 31 7   RDW % 14 2 14 0   MPV fL 12 5 12 3   NRBC AUTO /100 WBCs 0 0       CMP:  Results from last 7 days   Lab Units 05/25/21  0448 05/24/21  0947   SODIUM mmol/L 141 141   POTASSIUM mmol/L 4 3 3 6   CHLORIDE mmol/L 111* 105   CO2 mmol/L 25 26   BUN mg/dL 42* 50*   CREATININE mg/dL 1 14 1 33*   CALCIUM mg/dL 9 5 9 8   EGFR ml/min/1 73sq m 52 44       BMP:  Results from last 7 days   Lab Units 05/25/21  0448 05/24/21  0947   SODIUM mmol/L 141 141   POTASSIUM mmol/L 4 3 3 6   CHLORIDE mmol/L 111* 105   CO2 mmol/L 25 26   BUN mg/dL 42* 50*   CREATININE mg/dL 1 14 1 33*   CALCIUM mg/dL 9 5 9 8       NT-proBNP: No results for input(s): NTBNP in the last 72 hours       Magnesium:   Results from last 7 days   Lab Units 05/25/21  0448 05/24/21  0947   MAGNESIUM mg/dL 2 8* 1 9       Coags:   Results from last 7 days   Lab Units 05/25/21  0448 05/24/21  1044   PTT seconds 60* 28   INR   --  0 97       TSH:        Hemoglobin A1C )      Lipid Profile:   No results found for: CHOL  Lab Results   Component Value Date    HDL 75 11/10/2020    HDL 53 06/02/2020    HDL 54 11/06/2019     Lab Results   Component Value Date    LDLCALC 56 11/10/2020    LDLCALC 74 06/02/2020    LDLCALC 48 11/06/2019     Lab Results   Component Value Date    LDLDIRECT 58 06/12/2017    LDLDIRECT 66 02/11/2017    LDLDIRECT 56 10/06/2016     Lab Results   Component Value Date    TRIG 44 11/10/2020    TRIG 45 06/02/2020    TRIG 79 11/06/2019       Cardiac testing:   EKG personally reviewed by Everett Johnson MD      Results for orders placed during the hospital encounter of 05/24/21   Echo complete with contrast if indicated    Narrative [address]  [address]  [phone]    Transthoracic Echocardiogram  2D, M-mode, Doppler, and Color Doppler    Study date:  25-May-2021    Patient: Ana Merino SAMARA JOHNSON  MR number: [id number]  Account number: [de-identified]  : 1940  Age: [de-identified] years  Gender: Female  Status: Inpatient  Location: Bedside  Height: 63 in  Weight: 166 lb  BP: 125/ 69 mmHg    Indications: CAD  Diagnoses: I25 119 - Atherosclerotic heart disease of native coronary artery with unspecified angina pectoris    Sonographer:  Camryn Saini RDCS  Primary Physician:  Ilene Zamorano PA-C  Referring Physician:  PEDRO LUIS Matthews  Group:  [facility]  Interpreting Physician:  Lucetta Bosworth, MD    SUMMARY    LEFT VENTRICLE:  Systolic function was normal  Ejection fraction was estimated to be 55 %  GLS was noted to be significantly decreased at -10 9%  There was dyskinesis of the apical anterior, mid inferoseptal, apical septal, apical lateral, and apical wall(s)  Features were consistent with a pseudonormal left ventricular filling pattern, with concomitant abnormal relaxation and increased filling pressure (grade 2 diastolic dysfunction)  Doppler parameters were consistent with high ventricular filling pressure  LEFT ATRIUM:  The atrium was dilated  ATRIAL SEPTUM:  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, predominantly within the right atrial cavity  RIGHT ATRIUM:  The atrium was dilated  MITRAL VALVE:  There was moderate regurgitation  AORTIC VALVE:  There was mild regurgitation  TRICUSPID VALVE:  There was mild regurgitation  Estimated peak PA pressure was 55 mmHg  The findings suggest moderate pulmonary hypertension  HISTORY: PRIOR HISTORY: CVA  Hypertension  CKD  Diabetes  NSTEMI  Asthma  Former smoker  HLD  PROCEDURE: The procedure was performed at the bedside  This was a routine study  The transthoracic approach was used  The study included complete 2D imaging, M-mode, complete spectral Doppler, and color Doppler  The heart rate was 68 bpm,  at the start of the study   Images were obtained from the parasternal, apical, subcostal, and suprasternal notch acoustic windows  Echocardiographic views were limited due to poor acoustic window availability and lung interference  This was  a technically difficult study  LEFT VENTRICLE: Size was normal  Systolic function was normal  Ejection fraction was estimated to be 55 %  GLS was noted to be significantly decreased at -10 9%  There was dyskinesis of the apical anterior, mid inferoseptal, apical septal,  apical lateral, and apical wall(s)  Wall thickness was normal  DOPPLER: The transmitral flow pattern was normal  Features were consistent with a pseudonormal left ventricular filling pattern, with concomitant abnormal relaxation and  increased filling pressure (grade 2 diastolic dysfunction)  Doppler parameters were consistent with high ventricular filling pressure  RIGHT VENTRICLE: The size was normal  Systolic function was normal  Wall thickness was normal     LEFT ATRIUM: The atrium was dilated  ATRIAL SEPTUM: 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, predominantly within the right atrial cavity  RIGHT ATRIUM: The atrium was dilated  MITRAL VALVE: Valve structure was normal  There was normal leaflet separation  DOPPLER: The transmitral velocity was within the normal range  There was no evidence for stenosis  There was moderate regurgitation  AORTIC VALVE: The valve was trileaflet  Leaflets exhibited normal thickness and normal cuspal separation  DOPPLER: Transaortic velocity was within the normal range  There was no evidence for stenosis  There was mild regurgitation  TRICUSPID VALVE: The valve structure was normal  There was normal leaflet separation  DOPPLER: The transtricuspid velocity was within the normal range  There was no evidence for stenosis  There was mild regurgitation  Estimated peak PA  pressure was 55 mmHg  The findings suggest moderate pulmonary hypertension  PULMONIC VALVE: Leaflets exhibited normal thickness, no calcification, and normal cuspal separation   DOPPLER: The transpulmonic velocity was within the normal range  There was trace regurgitation  PERICARDIUM: There was no pericardial effusion  The pericardium was normal in appearance  AORTA: The root exhibited normal size  SYSTEMIC VEINS: IVC: The inferior vena cava was normal in size  Respirophasic changes were normal     SYSTEM MEASUREMENT TABLES    2D  %FS: 26 99 %  AVC: 459 17 ms  Ao Diam: 2 42 cm  EDV(Teich): 79 6 ml  EF(Teich): 52 99 %  ESV(Teich): 37 42 ml  HR_2Ch_Q: 63 38 BPM  IVSd: 1 34 cm  LA Area: 29 42 cm2  LA Diam: 4 05 cm  LVCO_2Ch_Q: 5 14 L/min  LVEDV MOD A4C: 129 77 ml  LVEF MOD A4C: 50 73 %  LVEF_2Ch_Q: 53 06 %  LVESV MOD A4C: 63 94 ml  LVIDd: 4 22 cm  LVIDs: 3 08 cm  LVLd A4C: 8 77 cm  LVLd_2Ch_Q: 8 32 cm  LVLs A4C: 8 14 cm  LVLs_2Ch_Q: 7 4 cm  LVPWd: 1 18 cm  LVSV_2Ch_Q: 81 13 ml  LVVED_2Ch_Q: 152 92 ml  LVVES_2Ch_Q: 71 79 ml  RA Area: 20 99 cm2  RVIDd: 3 71 cm  SV MOD A4C: 65 83 ml  SV(Teich): 42 17 ml    CW  AR Dec [place]: 2 2 m/s2  AR Dec Time: 1639 55 ms  AR PHT: 475 47 ms  AR Vmax: 3 6 m/s  AR maxP 87 mmHg  AV Env  Ti: 312 08 ms  AV VTI: 34 42 cm  AV Vmax: 1 46 m/s  AV Vmean: 1 1 m/s  AV maxP 58 mmHg  AV meanP 34 mmHg  TR Vmax: 3 7 m/s  TR maxP 75 mmHg    MM  TAPSE: 2 04 cm    PW  E' Sept: 0 06 m/s  E/E' Sept: 16 45  LVOT Env  Ti: 430 07 ms  LVOT VTI: 22 09 cm  LVOT Vmax: 0 87 m/s  LVOT Vmean: 0 51 m/s  LVOT maxPG: 3 03 mmHg  LVOT meanP 32 mmHg  MV A Kofi: 0 67 m/s  MV Dec [place]: 7 12 m/s2  MV DecT: 144 92 ms  MV E Kofi: 1 03 m/s  MV E/A Ratio: 1 54    [facility] Accredited Echocardiography Laboratory    Prepared and electronically signed by    Artem Ventura MD  Signed [id number] 08:57:40       No results found for this or any previous visit    Results for orders placed during the hospital encounter of 21   Cardiac catheterization    [address] [address]  [phone]    [facility]    Invasive Cardiovascular Lab Complete Report    Patient: Jolynn Darnell  MR number: [id number]  Account number: [de-identified]  Study date: 2021  Gender: Female  : 1940  Height: 63 in  Weight: 166 1 lb  BSA: 1 79 mï¾²    Allergies: NO KNOWN ALLERGIES    Diagnostic Cardiologist:  Klever Nolasco DO  Interventional Cardiologist:  Klever Nolasco DO  Primary Physician:  Dai Soto PA-C    SUMMARY    CORONARY CIRCULATION:  Mid LAD: There was a 100 % stenosis  1ST LESION INTERVENTIONS:  A balloon angioplasty procedure was performed on the 100 % lesion in the mid LAD  INDICATIONS:  --  elev t    PROCEDURES PERFORMED    --  Left coronary angiography  --  Right coronary angiography  --  Inpatient  --  Mod Sedation Same Physician Initial 15min  --  Coronary Catheterization (w/o [facility])  --  Mod Sedation Same Physician Add 15min  --  PTCA, Single Coronary Artery  --  Intervention on mid LAD: balloon angioplasty  PROCEDURE: The risks and alternatives of the procedures and conscious sedation were explained to the patient and informed consent was obtained  The patient was brought to the cath lab and placed on the table  The planned puncture sites  were prepped and draped in the usual sterile fashion     --  Left coronary artery angiography  A catheter was advanced over a guidewire into the aorta and positioned in the left coronary artery ostium under fluoroscopic guidance  Angiography was performed  --  Right coronary artery angiography  A catheter was advanced over a guidewire into the aorta and positioned in the right coronary artery ostium under fluoroscopic guidance  Angiography was performed  --  Inpatient  --  Mod Sedation Same Physician Initial 15min  --  Coronary Catheterization (w/o [facility])  --  Mod Sedation Same Physician Add 15min      LESION INTERVENTION: A balloon angioplasty procedure was performed on the 100 % lesion in the mid LAD  There was MISBAH 3 flow before the procedure and MISBAH 3 flow after the procedure  --  Vessel setup was performed  A 6Fr  Launcher Ebu 3 5 guiding catheter was used to cannulate the vessel  --  Vessel setup was performed  A Runthrough NS 180cm wire was used to cross the lesion  --  Balloon angioplasty was performed, using a Mini Trek Rx 2 0 x 15mm balloon, with 3 inflations and a maximum inflation pressure of 12 raj  INTERVENTIONS:  --  PTCA, Single Coronary Artery  PROCEDURE COMPLETION: The patient tolerated the procedure well and was discharged from the cath lab  TIMING: Test started at 16:07  Test concluded at 16:30  HEMOSTASIS: The sheath was removed  The site was compressed with a Hemoband  device  Hemostasis was obtained  MEDICATIONS GIVEN: Fentanyl (1OOmcg/2 ml), 50 mcg, IV, at 16:03  Versed (2mg/2ml), 2 mg, IV, at 16:03  1% Lidocaine, 1 ml, subcutaneously, at 16:07  Nitroglycerin (200mcg/ml), 200 mcg, at 16:08  Verapamil  (5mg/2ml), 2 5 mg, IV, at 16:08  Heparin 1000 units/ml, 4,000 units, IV, at 16:08  Heparin 1000 units/ml, 4,000 units, IV, at 16:13  CONTRAST GIVEN: 70 ml Omnipaque (350mg I /ml)  RADIATION EXPOSURE: Fluoroscopy time: 7 72 min  CORONARY VESSELS:   --  Mid LAD: There was a 100 % stenosis  IMPRESSIONS:  Occluded stent  Disease beyond stented area into diagonal and mid LAD not amenable to PCI    RECOMMENDATIONS  cabg consult, if not candidate med rx  DISPOSITION:  The patient left the catheterization laboratory in stable condition  Prepared and signed by    Juana Olsen DO  Signed 05/24/2021 16:57:47    Study diagram    Angiographic findings  Native coronary lesions:  ï¾·Mid LAD: Lesion 1: 100 % stenosis  Intervention results  Native coronary lesions:  ï¾·balloon angioplasty of the 100 % stenosis in mid LAD      Hemodynamic tables    Pressures:  Baseline  Pressures:  - HR: 60  Pressures:  - Rhythm:  Pressures:  -- Aortic Pressure (S/D/M): 116/53/77    Outputs:  Baseline  Outputs:  -- CALCULATIONS: [age]  Outputs:  -- CALCULATIONS: Body Surface Area: 1 79  Outputs:  -- CALCULATIONS: Height in cm: 160 00  Outputs:  -- CALCULATIONS: Sex: Female  Outputs:  -- CALCULATIONS: Weight in k 50       No results found for this or any previous visit  No results found for this or any previous visit  Results for orders placed during the hospital encounter of 19   NM myocardial perfusion spect (rx stress and/or rest)    [facility]  [facility] [place] [facility]  [phone]    Stress Gated SPECT Myocardial Perfusion Imaging after Regadenoson    Patient: Elsie Stone  MR number: [id number]  Account number: [de-identified]  : 1940  Age: 66 years  Gender: Female  Status: Outpatient  Location: [address] [place] [address] [facility] [place]  Height: 64 in  Weight: 167 lb  BP: 128/ 62 mmHg    Allergies: NO KNOWN ALLERGIES    Diagnosis: I35 1 - Nonrheumatic aortic (valve) insufficiency    Interpreting Physician:  Lobito Bush MD  Referring Physician:  Steffany Lipscomb MD  Primary Physician:  Leeann Jonas PA-C  Technician:  RUTH ANN Duarte  RN:  Surekha Beckett RN  Group:  [facility]  Cardiology Fellow:  Harish Yu MD  Report Prepared by[de-identified]  Surekha Beckett RN    INDICATIONS: Detection for coronary artery disease  HISTORY: The patient is a 66year old  female  Patient using cane to ambulate due to ataxia Other symptoms: dyspnea  Coronary artery disease risk factors: dyslipidemia, hypertension, former smoking, diabetes mellitus, and post-menopausal state  Cardiovascular history: congestive heart  failure and stroke  Co-morbidity: history of lung disease (asthma) Medications: a calcium channel blocker, an ARB, clopidogrel, a lipid lowering agent, and diabetic medications  PHYSICAL EXAM: Baseline physical exam screening: no wheezes audible      REST ECG: Normal sinus rhythm  Normal baseline ECG  PROCEDURE: The study was performed in the the [address] [facility]  A sitting regadenoson infusion pharmacologic stress test was performed  Gated SPECT myocardial perfusion imaging was performed during stress  Systolic blood  pressure was 128 mmHg, at the start of the study  Diastolic blood pressure was 62 mmHg, at the start of the study  The heart rate was 61 bpm, at the start of the study  IV double checked  Regadenoson protocol:  HR bpm SBP mmHg DBP mmHg Symptoms  Baseline 61 128 62 none  Immediate 92 110 62 mild dyspnea, nausea  1 min 79 128 60 subsiding    STRESS SUMMARY: Duration of pharmacologic stress was 3 min  Functional capacity could not be adequately assessed  Maximal heart rate during stress was 92 bpm  There was normal resting blood pressure with an appropriate response to stress  The rate-pressure product for the peak heart rate and blood pressure was 03650  There was no chest pain during stress  The stress test was terminated due to protocol completion  Pre oxygen saturation: 98 %  Peak oxygen saturation: 99 %  The stress ECG was negative for ischemia and normal  Arrhythmia during stress: isolated atrial premature beats and isolated premature ventricular beats  ISOTOPE ADMINISTRATION:  Resting isotope administration Stress isotope administration  Agent Tetrofosmin Tetrofosmin  Dose 10 13 mCi 31 1 mCi  Date 01/11/2019 01/11/2019  Injection time 12:30 13:45  Imaging time 13:10 14:38  Injection-image interval 40 min 53 min    The radiopharmaceutical was injected at the peak effect of pharmacologic stress  MYOCARDIAL PERFUSION IMAGING:  The image quality was fair  Rotating projection images reveal mild breast attenuation  Left ventricular size was normal  The TID ratio was 0 95      PERFUSION DEFECTS:  -  There was a small, mildly severe, partially reversible myocardial perfusion defect of the basal anterolateral wall likely due to attenuation from breast tissue  There was normal thickening and motion in the area of perfusion defect  PERFUSION QUANTITATION:  The summed perfusion score measured 8 during stress, 2 at rest, with a difference of 6  GATED SPECT:  The calculated left ventricular ejection fraction was 54 %  Left ventricular ejection fraction was within normal limits by visual estimate  There was no left ventricular regional abnormality  SUMMARY:  -  Stress results: There was no chest pain during stress  -  ECG conclusions: The stress ECG was negative for ischemia and normal   -  Perfusion imaging: There was a small, mildly severe, partially reversible myocardial perfusion defect of the basal anterolateral wall likely due to attenuation from breast tissue  There was normal thickening and motion in the area of  perfusion defect  -  Gated SPECT: The calculated left ventricular ejection fraction was 54 %  Left ventricular ejection fraction was within normal limits by visual estimate  There was no left ventricular regional abnormality  IMPRESSIONS: Likely Normal study after pharmacologic vasodilation  There was image artifact, without diagnostic evidence for perfusion abnormality  Left ventricular systolic function was normal     Prepared and signed by    Betty Villalpando MD  Signed 01/11/2019 15:50:53             Josiane Floyd MD    Portions of the record may have been created with voice recognition software  Occasional wrong word or "sound a like" substitutions may have occurred due to the inherent limitations of voice recognition software  Read the chart carefully and recognize, using context, where substitutions have occurred

## 2021-05-25 NOTE — PLAN OF CARE
Problem: Potential for Falls  Goal: Patient will remain free of falls  Description: INTERVENTIONS:  - Assess patient frequently for physical needs  -  Identify cognitive and physical deficits and behaviors that affect risk of falls    -  [place] fall precautions as indicated by assessment   - Educate patient/family on patient safety including physical limitations  - Instruct patient to call for assistance with activity based on assessment  - Modify environment to reduce risk of injury  - Consider OT/PT consult to assist with strengthening/mobility  Outcome: Progressing     Problem: CARDIOVASCULAR - ADULT  Goal: Maintains optimal cardiac output and hemodynamic stability  Description: INTERVENTIONS:  - Monitor I/O, vital signs and rhythm  - Monitor for S/S and trends of decreased cardiac output  - Administer and titrate ordered vasoactive medications to optimize hemodynamic stability  - Assess quality of pulses, skin color and temperature  - Assess for signs of decreased coronary artery perfusion  - Instruct patient to report change in severity of symptoms  Outcome: Progressing  Goal: Absence of cardiac dysrhythmias or at baseline rhythm  Description: INTERVENTIONS:  - Continuous cardiac monitoring, vital signs, obtain 12 lead EKG if ordered  - Administer antiarrhythmic and heart rate control medications as ordered  - Monitor electrolytes and administer replacement therapy as ordered  Outcome: Progressing     Problem: RESPIRATORY - ADULT  Goal: Achieves optimal ventilation and oxygenation  Description: INTERVENTIONS:  - Assess for changes in respiratory status  - Assess for changes in mentation and behavior  - Position to facilitate oxygenation and minimize respiratory effort  - Oxygen administered by appropriate delivery if ordered  - Initiate smoking cessation education as indicated  - Encourage broncho-pulmonary hygiene including cough, deep breathe, Incentive Spirometry  - Assess the need for suctioning and aspirate as needed  - Assess and instruct to report SOB or any respiratory difficulty  - Respiratory Therapy support as indicated  Outcome: Progressing     Problem: METABOLIC, FLUID AND ELECTROLYTES - ADULT  Goal: Electrolytes maintained within normal limits  Description: INTERVENTIONS:  - Monitor labs and assess patient for signs and symptoms of electrolyte imbalances  - Administer electrolyte replacement as ordered  - Monitor response to electrolyte replacements, including repeat lab results as appropriate  - Instruct patient on fluid and nutrition as appropriate  Outcome: Progressing  Goal: Glucose maintained within target range  Description: INTERVENTIONS:  - Monitor Blood Glucose as ordered  - Assess for signs and symptoms of hyperglycemia and hypoglycemia  - Administer ordered medications to maintain glucose within target range  - Assess nutritional intake and initiate nutrition service referral as needed  Outcome: Progressing     Problem: SKIN/TISSUE INTEGRITY - ADULT  Goal: Incision(s), wounds(s) or drain site(s) healing without S/S of infection  Description: INTERVENTIONS  - Assess and document risk factors for skin impairment   - Assess and document dressing, incision, wound bed, drain sites and surrounding tissue  - Consider nutrition services referral as needed  - Oral mucous membranes remain intact  - Provide patient/ family education  Outcome: Progressing

## 2021-05-25 NOTE — PROGRESS NOTES
INTERNAL MEDICINE RESIDENCY PROGRESS NOTE     Name: Letitia Dwyer   Age & Sex: [de-identified] y o  female   MRN: [id number]  Unit/Bed#: -01   Encounter: [id number]  Team: SOD Team A    PATIENT INFORMATION     Name: Letitia Dwyer   Age & Sex: [de-identified] y o  female   MRN: [id number]  Hospital Stay Days: 1    ASSESSMENT/PLAN     Principal Problem:    NSTEMI (non-ST elevated myocardial infarction) 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, without long-term current use of insulin (HCC)    Anemia    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HCC)    History of CVA with residual deficit    Renal cyst    Coronary artery disease involving native coronary artery of native heart without angina pectoris    Essential hypertension    Asthma    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HCC)      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HCC)  Assessment & Plan  Wt Readings from Last 3 Encounters:   05/24/21 75 5 kg (166 lb 7 2 oz)   05/24/21 76 4 kg (168 lb 6 4 oz)   04/08/21 73 5 kg (162 lb)     Echo done 3/25/21 with EF 26%, grade 1 diastolic dysfunction with mild hypokinesis of mid and apical segments  Cath showed 99% occlusion of mid LAD, now s/p KOJO  -5/24 Repeat Echo EF 55% moderate MR, Mild AR  Apical dyskinesis           Clinically euvolemic  Plan:  Patient presenting with chest pain - cardiology consulted, plan for cath today  Continue home carvedilol and losartan  Restart home furosemide 40mg BID after cath per cards recs  Monitor I/O's and daily weights  Fluid and salt restriction once given diet        Asthma  Assessment & Plan  Continue home inhalers  Essential hypertension  Assessment & Plan  Patient managed on Losartan 100mg daily, furosemide 40mg BID, and Carvedilol 3 125 BID at home   Reports took today's dose at 11AM     Plan:  Continue Losartan and Carvedilol  Will hold furosemide until after cath  Cardiology consulted 2/2 NSTEMI - recs appreciated  Monitor BP closely    Coronary artery disease involving native coronary artery of native heart without angina pectoris  Assessment & Plan  Patient presented with chest heaviness, leg swelling, and orthopnea 3/23/21 and was found to have 99% stenosis of mid LAD now s/p stent placement as well as balloon angioplasty to distal lesion in LAD with improvement from 80% stenosis to <50% stenosis  Presenting with chest pain x2-3 days, described as nagging, with no associated symptoms including shortness of breath, dizziness, lightheadedness, nausea, vomiting, diaphoresis  See plan per NSTEMI  History of CVA with residual deficit  Assessment & Plan  Hx of CVA in 2011 and on Plavix at home  Some residual left-sided deficits (4/5) with mild ataxia, however, ambulates well with cane    Plan:  Continue plavix 150mg QAM  Ambulate OOB with assistance    Anemia  Assessment & Plan  Patient with baseline Hgb 10 5  Currently 10 2  MCV 83  Previously has hx of microcytic anemia  Plan:  No S/S of active bleeding  Will order iron panel  Monitor CBC daily and transfuse Hgb 7 0    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, without long-term current use of insulin (HCC)  Assessment & Plan  Lab Results   Component Value Date    HGBA1C 6 7 (H) 04/03/2021       No results for input(s): POCGLU in the last 72 hours  Blood Sugar Average: Last 72 hrs:     Patient managed on Metformin 500mg BID at home      Plan:  SSC Algorithm 2 ordered  POC Glucose checks TID with meals and at bedtime  Carb control diet    Benign hypertension with CKD (chronic kidney disease) stage III  Assessment & Plan  Lab Results   Component Value Date    EGFR 44 05/24/2021    EGFR 39 04/03/2021    EGFR 58 03/27/2021    CREATININE 1 33 (H) 05/24/2021    CREATININE 1 47 (H) 04/03/2021    CREATININE 1 05 03/27/2021       * NSTEMI (non-ST elevated myocardial infarction) [facility])  Assessment & Plan  Patient presenting from [address] for complaint of chest pain for 2-3 days  Reports that pain is substernal and nagging without radiation  States no associated dizziness, lightheadedness, shortness of breath, nausea, vomiting, leg swelling, or orthopnea  Of note, patient s/p KOJO to mid LAD for 99% stenosis 3/26/21 with peak troponin of 0 11 at that time  At this time, she also underwent balloon angioplasty only to distal LAD stenosis due to small size(80% with improvement to <50%) She reports compliance with all discharge medications at that time  Troponin on presentation  with EKG significant for mild ST elevations in V2 and V3  CXR at time of presentation with no acute disease  Plan:  Received SL Nitro in ED with mild improvement  At time of evaluation pain is resolved s/p Aspirin loading and initiation of heparin drip  Serial troponins with correlated EKGs  Cardiology consulted - appreciate recommendations  Plan for cardiac cath this afternoon  NPO at this time  Cardiac diet when patient able to eat  - per discussion w/ Cards and CTS- Pt would like to pursue medical management at this time which includes 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  24 hr obs then d/c likely  Disposition: 24 Hr observation then D/C likely    SUBJECTIVE     Patient seen and examined  Had a 13 beat Vtach  Asymptomatics  Denies any chest pain, SOB, abdominal pain, nausea, vomiting, fever or chills  Slept comfortably  Tolerating Po diet      OBJECTIVE     Vitals:    21 2223 21 0400 21 0844 21 0846   BP: 124/72 125/69 126/68 126/68   BP Location:       Pulse: 70 66  67   Resp: 18      Temp: 99 1 °F (37 3 °C) 98 2 °F (36 8 °C)     TempSrc:       SpO2: 98% 98%  98%   Weight:       Height:          Temperature:   Temp (24hrs), Av 3 °F (36 8 °C), Min:97 8 °F (36 6 °C), Max:99 1 °F (37 3 °C)    Temperature: 98 2 °F (36 8 °C)  Intake & Output:  I/O       05/23 0701 - 05/24 0700 05/24 0701 - 05/25 0700 05/25 0701 - 05/26 0700    P  O   240 240    I V  (mL/kg)  387 5 (5 1) 10 (0 1)    IV Piggyback  225 5     Total Intake(mL/kg)  853 (11 3) 250 (3 3)    Urine (mL/kg/hr)  200 0 (0)    Total Output  200 0    Net  +653 +250           Unmeasured Urine Occurrence  2 x 1 x    Unmeasured Stool Occurrence  1 x         Weights:   IBW (Ideal Body Weight): 52 4 kg    Body mass index is 29 48 kg/m²  Weight (last 2 days)     Date/Time   Weight    05/24/21 0930   75 5 (166 45)            Physical Exam  Constitutional:       General: She is not in acute distress  Appearance: She is obese  She is not ill-appearing, toxic-appearing or diaphoretic  HENT:      Head: Normocephalic and atraumatic  Nose: No congestion or rhinorrhea  Cardiovascular:      Rate and Rhythm: Normal rate and regular rhythm  Pulses: Normal pulses  Heart sounds: Normal heart sounds  No murmur  No friction rub  No gallop  Pulmonary:      Effort: Pulmonary effort is normal  No respiratory distress  Breath sounds: Normal breath sounds  No stridor  No wheezing, rhonchi or rales  Chest:      Chest wall: No tenderness  Abdominal:      General: Abdomen is flat  Bowel sounds are normal  There is no distension  Palpations: Abdomen is soft  There is no mass  Tenderness: There is no abdominal tenderness  There is no right CVA tenderness, left CVA tenderness, guarding or rebound  Hernia: No hernia is present  Musculoskeletal:      Right lower leg: No edema  Left lower leg: No edema  Neurological:      General: No focal deficit present  Mental Status: She is alert  Psychiatric:         Mood and Affect: Mood normal          Behavior: Behavior normal        LABORATORY DATA     Labs: I have personally reviewed pertinent reports    Results from last 7 days   Lab Units 05/25/21  8353 05/24/21  0947   WBC Thousand/uL 5 76 7 05   HEMOGLOBIN g/dL 9 9* 10 2*   HEMATOCRIT % 32 1* 32 2*   PLATELETS Thousands/uL 156 173   NEUTROS PCT % 67 72   MONOS PCT % 9 8      Results from last 7 days   Lab Units 05/25/21  0448 05/24/21  0947   POTASSIUM mmol/L 4 3 3 6   CHLORIDE mmol/L 111* 105   CO2 mmol/L 25 26   BUN mg/dL 42* 50*   CREATININE mg/dL 1 14 1 33*   CALCIUM mg/dL 9 5 9 8     Results from last 7 days   Lab Units 05/25/21  0448 05/24/21  0947   MAGNESIUM mg/dL 2 8* 1 9          Results from last 7 days   Lab Units 05/25/21  0448 05/24/21  1044   INR   --  0 97   PTT seconds 60* 28         Results from last 7 days   Lab Units 05/25/21  0448 05/24/21  2313 05/24/21  1839   TROPONIN I ng/mL 10 30* 13 60* 11 50*       IMAGING & DIAGNOSTIC TESTING     Radiology Results: I have personally reviewed pertinent reports  Xr Chest 2 Views    Result Date: 5/24/2021  Impression: No acute cardiopulmonary disease  Workstation performed: [id number]     Other Diagnostic Testing: I have personally reviewed pertinent reports      ACTIVE MEDICATIONS     Current Facility-Administered Medications   Medication Dose Route Frequency    acetaminophen (TYLENOL) tablet 650 mg  650 mg Oral Q6H PRN    albuterol (PROVENTIL HFA,VENTOLIN HFA) inhaler 2 puff  2 puff Inhalation Q4H PRN    aspirin (ECOTRIN LOW STRENGTH) EC tablet 81 mg  81 mg Oral Daily    atorvastatin (LIPITOR) tablet 40 mg  40 mg Oral Daily    carvedilol (COREG) tablet 6 25 mg  6 25 mg Oral BID With Meals    clopidogrel (PLAVIX) tablet 150 mg  150 mg Oral Daily    fluticasone-vilanterol (BREO ELLIPTA) 100-25 mcg/inh inhaler 1 puff  1 puff Inhalation Daily    furosemide (LASIX) tablet 40 mg  40 mg Oral BID    insulin lispro (HumaLOG) 100 units/mL subcutaneous injection 1-5 Units  1-5 Units Subcutaneous TID AC    insulin lispro (HumaLOG) 100 units/mL subcutaneous injection 1-5 Units  1-5 Units Subcutaneous HS    montelukast (SINGULAIR) tablet 10 mg  10 mg Oral HS    rivaroxaban (XARELTO) tablet 2 5 mg  2 5 mg Oral BID With Meals       VTE Pharmacologic Prophylaxis: Xarelto   VTE Mechanical Prophylaxis: sequential compression device    Portions of the record may have been created with voice recognition software  Occasional wrong word or "sound a like" substitutions may have occurred due to the inherent limitations of voice recognition software    Read the chart carefully and recognize, using context, where substitutions have occurred   ==  Carolina Garza MD  [address]  Internal Medicine Residency PGY-1

## 2021-05-26 ENCOUNTER — TRANSITIONAL CARE MANAGEMENT (OUTPATIENT)
Dept: INTERNAL MEDICINE CLINIC | Facility: CLINIC | Age: 81
End: 2021-05-26

## 2021-05-26 ENCOUNTER — APPOINTMENT (OUTPATIENT)
Dept: CARDIAC REHAB | Age: 81
End: 2021-05-26
Payer: COMMERCIAL

## 2021-05-26 VITALS
SYSTOLIC BLOOD PRESSURE: 114 MMHG | OXYGEN SATURATION: 99 % | RESPIRATION RATE: 23 BRPM | BODY MASS INDEX: 29.49 KG/M2 | WEIGHT: 166.45 LBS | DIASTOLIC BLOOD PRESSURE: 59 MMHG | HEART RATE: 54 BPM | TEMPERATURE: 96.8 F | HEIGHT: 63 IN

## 2021-05-26 LAB
ANION GAP SERPL CALCULATED.3IONS-SCNC: 5 MMOL/L (ref 4–13)
BASOPHILS # BLD AUTO: 0.03 THOUSANDS/ΜL (ref 0–0.1)
BASOPHILS NFR BLD AUTO: 1 % (ref 0–1)
BUN SERPL-MCNC: 46 MG/DL (ref 5–25)
CALCIUM SERPL-MCNC: 9.2 MG/DL (ref 8.3–10.1)
CHLORIDE SERPL-SCNC: 107 MMOL/L (ref 100–108)
CO2 SERPL-SCNC: 27 MMOL/L (ref 21–32)
CREAT SERPL-MCNC: 1.18 MG/DL (ref 0.6–1.3)
EOSINOPHIL # BLD AUTO: 0.09 THOUSAND/ΜL (ref 0–0.61)
EOSINOPHIL NFR BLD AUTO: 2 % (ref 0–6)
ERYTHROCYTE [DISTWIDTH] IN BLOOD BY AUTOMATED COUNT: 14.3 % (ref 11.6–15.1)
GFR SERPL CREATININE-BSD FRML MDRD: 50 ML/MIN/1.73SQ M
GLUCOSE SERPL-MCNC: 153 MG/DL (ref 65–140)
GLUCOSE SERPL-MCNC: 164 MG/DL (ref 65–140)
GLUCOSE SERPL-MCNC: 164 MG/DL (ref 65–140)
GLUCOSE SERPL-MCNC: 260 MG/DL (ref 65–140)
HCT VFR BLD AUTO: 30.1 % (ref 34.8–46.1)
HGB BLD-MCNC: 9.4 G/DL (ref 11.5–15.4)
IMM GRANULOCYTES # BLD AUTO: 0.01 THOUSAND/UL (ref 0–0.2)
IMM GRANULOCYTES NFR BLD AUTO: 0 % (ref 0–2)
LYMPHOCYTES # BLD AUTO: 1.23 THOUSANDS/ΜL (ref 0.6–4.47)
LYMPHOCYTES NFR BLD AUTO: 25 % (ref 14–44)
MAGNESIUM SERPL-MCNC: 2.5 MG/DL (ref 1.6–2.6)
MCH RBC QN AUTO: 25.6 PG (ref 26.8–34.3)
MCHC RBC AUTO-ENTMCNC: 31.2 G/DL (ref 31.4–37.4)
MCV RBC AUTO: 82 FL (ref 82–98)
MONOCYTES # BLD AUTO: 0.42 THOUSAND/ΜL (ref 0.17–1.22)
MONOCYTES NFR BLD AUTO: 9 % (ref 4–12)
NEUTROPHILS # BLD AUTO: 3.15 THOUSANDS/ΜL (ref 1.85–7.62)
NEUTS SEG NFR BLD AUTO: 63 % (ref 43–75)
NRBC BLD AUTO-RTO: 0 /100 WBCS
PLATELET # BLD AUTO: 144 THOUSANDS/UL (ref 149–390)
PMV BLD AUTO: 12.6 FL (ref 8.9–12.7)
POTASSIUM SERPL-SCNC: 4.3 MMOL/L (ref 3.5–5.3)
RBC # BLD AUTO: 3.67 MILLION/UL (ref 3.81–5.12)
SODIUM SERPL-SCNC: 139 MMOL/L (ref 136–145)
WBC # BLD AUTO: 4.93 THOUSAND/UL (ref 4.31–10.16)

## 2021-05-26 PROCEDURE — 97166 OT EVAL MOD COMPLEX 45 MIN: CPT

## 2021-05-26 PROCEDURE — 82948 REAGENT STRIP/BLOOD GLUCOSE: CPT

## 2021-05-26 PROCEDURE — 99238 HOSP IP/OBS DSCHRG MGMT 30/<: CPT | Performed by: HOSPITALIST

## 2021-05-26 PROCEDURE — 97163 PT EVAL HIGH COMPLEX 45 MIN: CPT

## 2021-05-26 PROCEDURE — 83735 ASSAY OF MAGNESIUM: CPT | Performed by: STUDENT IN AN ORGANIZED HEALTH CARE EDUCATION/TRAINING PROGRAM

## 2021-05-26 PROCEDURE — 99232 SBSQ HOSP IP/OBS MODERATE 35: CPT | Performed by: INTERNAL MEDICINE

## 2021-05-26 PROCEDURE — NC001 PR NO CHARGE: Performed by: HOSPITALIST

## 2021-05-26 PROCEDURE — 80048 BASIC METABOLIC PNL TOTAL CA: CPT | Performed by: STUDENT IN AN ORGANIZED HEALTH CARE EDUCATION/TRAINING PROGRAM

## 2021-05-26 PROCEDURE — 85025 COMPLETE CBC W/AUTO DIFF WBC: CPT | Performed by: STUDENT IN AN ORGANIZED HEALTH CARE EDUCATION/TRAINING PROGRAM

## 2021-05-26 RX ORDER — RANOLAZINE 500 MG/1
500 TABLET, EXTENDED RELEASE ORAL EVERY 12 HOURS SCHEDULED
Status: DISCONTINUED | OUTPATIENT
Start: 2021-05-26 | End: 2021-05-26 | Stop reason: HOSPADM

## 2021-05-26 RX ORDER — RANOLAZINE 500 MG/1
500 TABLET, EXTENDED RELEASE ORAL EVERY 12 HOURS SCHEDULED
Qty: 60 TABLET | Refills: 0 | Status: SHIPPED | OUTPATIENT
Start: 2021-05-26 | End: 2022-08-09

## 2021-05-26 RX ORDER — CARVEDILOL 6.25 MG/1
6.25 TABLET ORAL 2 TIMES DAILY WITH MEALS
Qty: 60 TABLET | Refills: 0 | Status: SHIPPED | OUTPATIENT
Start: 2021-05-26 | End: 2021-06-29 | Stop reason: SDUPTHER

## 2021-05-26 RX ADMIN — FLUTICASONE FUROATE AND VILANTEROL TRIFENATATE 1 PUFF: 100; 25 POWDER RESPIRATORY (INHALATION) at 08:48

## 2021-05-26 RX ADMIN — INSULIN LISPRO 1 UNITS: 100 INJECTION, SOLUTION INTRAVENOUS; SUBCUTANEOUS at 03:20

## 2021-05-26 RX ADMIN — FUROSEMIDE 40 MG: 40 TABLET ORAL at 08:47

## 2021-05-26 RX ADMIN — CLOPIDOGREL BISULFATE 150 MG: 75 TABLET ORAL at 08:46

## 2021-05-26 RX ADMIN — RIVAROXABAN 2.5 MG: 2.5 TABLET, FILM COATED ORAL at 08:46

## 2021-05-26 RX ADMIN — ATORVASTATIN CALCIUM 40 MG: 20 TABLET, FILM COATED ORAL at 08:47

## 2021-05-26 RX ADMIN — INSULIN LISPRO 3 UNITS: 100 INJECTION, SOLUTION INTRAVENOUS; SUBCUTANEOUS at 12:09

## 2021-05-26 RX ADMIN — ASPIRIN 81 MG: 81 TABLET, COATED ORAL at 08:47

## 2021-05-26 RX ADMIN — INSULIN LISPRO 1 UNITS: 100 INJECTION, SOLUTION INTRAVENOUS; SUBCUTANEOUS at 06:36

## 2021-05-26 RX ADMIN — CARVEDILOL 6.25 MG: 6.25 TABLET, FILM COATED ORAL at 08:48

## 2021-05-26 NOTE — PROGRESS NOTES
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, without long-term current use of insulin (HCC)    Anemia    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HCC)    History of CVA with residual deficit    Renal cyst    Coronary artery disease involving native coronary artery of native heart without angina pectoris    Essential hypertension    Asthma    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HCC)      Impression:     Anterior wall NSTEMI  o In stent thrombosis/stenosis despite aspirin/clopidogrel 150 mg daily  o High risk for reocclusion  o CT surgery advised against LIMA to LAD at the current time considering lack of any symptoms and preserved LV function  o EF 55% with anterior wall hypokinesis   Chronic diastolic CHF  o Euvolemic   Hypertension  o Blood pressure is controlled   Hyperlipidemia  o On appropriate high-intensity statin   Type 2 diabetes  o Controlled A1c of 6 7   History of CVA with left-sided deficits, ambulates with a cane    Plan:     Trial of medical management status post angioplasty of in stent restenosis, with the addition of Xarelto 2 5 mg p o  B i d    Would also empirically add Ranexa 500 mg p o  B i d    If significant anginal symptoms recur, could reinvestigate Ace appropriateness of CABG, but with preserved left ventricular function, and only single-vessel disease of the LAD   Stable for discharge from my standpoint   She was in agreement with my plan as above    Subjective:   Patient seen and examined        She offers me no cardiac complaints today  She is ambulatory around the room  No arrhythmias  Blood pressure stayed      Review of Systems   Constitution: Negative for diaphoresis, fever, malaise/fatigue and night sweats  Cardiovascular: Negative for chest pain, dyspnea on exertion, leg swelling, orthopnea, palpitations and syncope  Respiratory: Negative for cough, shortness of breath, sputum production and wheezing  Skin: Negative for itching and rash  Gastrointestinal: Negative for abdominal pain, change in bowel habit and diarrhea  Neurological: Negative for dizziness, focal weakness, light-headedness and weakness  Objective   Vitals: Blood pressure 113/58, pulse 67, temperature (!) 97 4 °F (36 3 °C), resp  rate 15, height 5' 3" (1 6 m), weight 75 5 kg (166 lb 7 2 oz), SpO2 98 %, not currently breastfeeding , Body mass index is 29 48 kg/m² , I/O last 3 completed shifts: In: 1070 8 [P O :720; I V :350 8]  Out: 1350 [Urine:1350]  I/O this shift:  In: -   Out: 800 [Urine:800]  Wt Readings from Last 3 Encounters:   05/24/21 75 5 kg (166 lb 7 2 oz)   05/24/21 76 4 kg (168 lb 6 4 oz)   04/08/21 73 5 kg (162 lb)       Intake/Output Summary (Last 24 hours) at 5/26/2021 0933  Last data filed at 5/26/2021 0916  Gross per 24 hour   Intake 520 8 ml   Output 2150 ml   Net -1629 2 ml     I/O last 3 completed shifts: In: 1070 8 [P O :720; I V :350 8]  Out: 1350 [Urine:1350]    No significant arrhythmias on telemetry    Physical Exam  Constitutional:       General: She is not in acute distress  Appearance: She is not diaphoretic  HENT:      Head: Normocephalic  Eyes:      Conjunctiva/sclera: Conjunctivae normal    Neck:      Musculoskeletal: Normal range of motion and neck supple  Vascular: No JVD  Cardiovascular:      Rate and Rhythm: Normal rate and regular rhythm  Heart sounds: Normal heart sounds  No murmur  No gallop  Pulmonary:      Effort: Pulmonary effort is normal  No respiratory distress  Breath sounds: Normal breath sounds  No wheezing or rales     Abdominal:      General: Bowel sounds are normal  There is no distension  Palpations: Abdomen is soft  Tenderness: There is no abdominal tenderness  Musculoskeletal: Normal range of motion  Right lower leg: No edema  Left lower leg: No edema  Skin:     General: Skin is warm and dry  Neurological:      Mental Status: She is alert and oriented to person, place, and time           Meds/Allergies   No Known Allergies    Current Facility-Administered Medications:     acetaminophen (TYLENOL) tablet 650 mg, 650 mg, Oral, Q6H PRN, Marion Beltre MD    albuterol (PROVENTIL HFA,VENTOLIN HFA) inhaler 2 puff, 2 puff, Inhalation, Q4H PRN, Marion Beltre MD    aspirin (ECOTRIN LOW STRENGTH) EC tablet 81 mg, 81 mg, Oral, Daily, Marion Beltre MD, 81 mg at 05/26/21 0847    atorvastatin (LIPITOR) tablet 40 mg, 40 mg, Oral, Daily, Marion Beltre MD, 40 mg at 05/26/21 0847    carvedilol (COREG) tablet 6 25 mg, 6 25 mg, Oral, BID With Meals, Estephania Blanco MD, 6 25 mg at 05/26/21 0848    clopidogrel (PLAVIX) tablet 150 mg, 150 mg, Oral, Daily, Marion Beltre MD, 150 mg at 05/26/21 0846    fluticasone-vilanterol (BREO ELLIPTA) 100-25 mcg/inh inhaler 1 puff, 1 puff, Inhalation, Daily, Marion Beltre MD, 1 puff at 05/26/21 0848    furosemide (LASIX) tablet 40 mg, 40 mg, Oral, BID, Marion Beltre MD, 40 mg at 05/26/21 0847    insulin lispro (HumaLOG) 100 units/mL subcutaneous injection 1-5 Units, 1-5 Units, Subcutaneous, 0200, Aylin Mishra MD, 1 Units at 05/26/21 0320    insulin lispro (HumaLOG) 100 units/mL subcutaneous injection 1-6 Units, 1-6 Units, Subcutaneous, TID AC, 1 Units at 05/26/21 0636 **AND** Fingerstick Glucose (POCT), , , TID AC, Aylin Mishra MD    insulin lispro (HumaLOG) 100 units/mL subcutaneous injection 1-6 Units, 1-6 Units, Subcutaneous, HS, Aylin Mishra MD    montelukast (SINGULAIR) tablet 10 mg, 10 mg, Oral, HS, Marion Beltre MD, 10 mg at 05/25/21 2125    rivaroxaban (XARELTO) tablet 2 5 mg, 2 5 mg, Oral, BID With Meals, Nabil Hastings MD, 2 5 mg at 05/26/21 0998    Laboratory Results:  Results from last 7 days   Lab Units 05/25/21  0448 05/24/21  2313 05/24/21  1839   TROPONIN I ng/mL 10 30* 13 60* 11 50*       CBC with diff:   Results from last 7 days   Lab Units 05/26/21 0453 05/25/21  0448 05/24/21  0947   WBC Thousand/uL 4 93 5 76 7 05   HEMOGLOBIN g/dL 9 4* 9 9* 10 2*   HEMATOCRIT % 30 1* 32 1* 32 2*   MCV fL 82 83 83   PLATELETS Thousands/uL 144* 156 173   MCH pg 25 6* 25 6* 26 4*   MCHC g/dL 31 2* 30 8* 31 7   RDW % 14 3 14 2 14 0   MPV fL 12 6 12 5 12 3   NRBC AUTO /100 WBCs 0 0 0       CMP:  Results from last 7 days   Lab Units 05/26/21 0453 05/25/21  0448 05/24/21  0947   SODIUM mmol/L 139 141 141   POTASSIUM mmol/L 4 3 4 3 3 6   CHLORIDE mmol/L 107 111* 105   CO2 mmol/L 27 25 26   BUN mg/dL 46* 42* 50*   CREATININE mg/dL 1 18 1 14 1 33*   CALCIUM mg/dL 9 2 9 5 9 8   EGFR ml/min/1 73sq m 50 52 44       BMP:  Results from last 7 days   Lab Units 05/26/21 0453 05/25/21  0448 05/24/21  0947   SODIUM mmol/L 139 141 141   POTASSIUM mmol/L 4 3 4 3 3 6   CHLORIDE mmol/L 107 111* 105   CO2 mmol/L 27 25 26   BUN mg/dL 46* 42* 50*   CREATININE mg/dL 1 18 1 14 1 33*   CALCIUM mg/dL 9 2 9 5 9 8       NT-proBNP: No results for input(s): NTBNP in the last 72 hours       Magnesium:   Results from last 7 days   Lab Units 05/26/21 0453 05/25/21  0448 05/24/21  0947   MAGNESIUM mg/dL 2 5 2 8* 1 9       Coags:   Results from last 7 days   Lab Units 05/25/21  1152 05/25/21  0448 05/24/21  1044   PTT seconds 55* 60* 28   INR   --   --  0 97       TSH:        Hemoglobin A1C )      Lipid Profile:   No results found for: CHOL  Lab Results   Component Value Date    HDL 75 11/10/2020    HDL 53 06/02/2020    HDL 54 11/06/2019     Lab Results   Component Value Date    LDLCALC 56 11/10/2020    LDLCALC 74 06/02/2020    LDLCALC 48 11/06/2019     Lab Results   Component Value Date    LDLDIRECT 58 06/12/2017    LDLDIRECT 66 02/11/2017    LDLDIRECT 56 10/06/2016     Lab Results   Component Value Date    TRIG 44 11/10/2020    TRIG 45 2020    TRIG 79 2019       Cardiac testing:   EKG personally reviewed by Kim Can MD      Results for orders placed during the hospital encounter of 21   Echo complete with contrast if indicated    Noel Flowers [address]  [facility], [address] [facility]  [phone]    Transthoracic Echocardiogram  2D, M-mode, Doppler, and Color Doppler    Study date:  25-May-2021    Patient: Harvinder Livingston  MR number: [id number]  Account number: [de-identified]  : 1940  Age: [de-identified] years  Gender: Female  Status: Inpatient  Location: Bedside  Height: 63 in  Weight: 166 lb  BP: 125/ 69 mmHg    Indications: CAD  Diagnoses: I25 119 - Atherosclerotic heart disease of native coronary artery with unspecified angina pectoris    Sonographer:  Lev Landin RDCS  Primary Physician:  Bhavna Cho PA-C  Referring Physician:  PEDRO LUIS Medrano  Group:  [facility]  Interpreting Physician:  Justin Robles MD    SUMMARY    LEFT VENTRICLE:  Systolic function was normal  Ejection fraction was estimated to be 55 %  GLS was noted to be significantly decreased at -10 9%  There was dyskinesis of the apical anterior, mid inferoseptal, apical septal, apical lateral, and apical wall(s)  Features were consistent with a pseudonormal left ventricular filling pattern, with concomitant abnormal relaxation and increased filling pressure (grade 2 diastolic dysfunction)  Doppler parameters were consistent with high ventricular filling pressure  LEFT ATRIUM:  The atrium was dilated  ATRIAL SEPTUM:  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, predominantly within the right atrial cavity  RIGHT ATRIUM:  The atrium was dilated  MITRAL VALVE:  There was moderate regurgitation  AORTIC VALVE:  There was mild regurgitation  TRICUSPID VALVE:  There was mild regurgitation    Estimated peak PA pressure was 55 mmHg  The findings suggest moderate pulmonary hypertension  HISTORY: PRIOR HISTORY: CVA  Hypertension  CKD  Diabetes  NSTEMI  Asthma  Former smoker  HLD  PROCEDURE: The procedure was performed at the bedside  This was a routine study  The transthoracic approach was used  The study included complete 2D imaging, M-mode, complete spectral Doppler, and color Doppler  The heart rate was 68 bpm,  at the start of the study  Images were obtained from the parasternal, apical, subcostal, and suprasternal notch acoustic windows  Echocardiographic views were limited due to poor acoustic window availability and lung interference  This was  a technically difficult study  LEFT VENTRICLE: Size was normal  Systolic function was normal  Ejection fraction was estimated to be 55 %  GLS was noted to be significantly decreased at -10 9%  There was dyskinesis of the apical anterior, mid inferoseptal, apical septal,  apical lateral, and apical wall(s)  Wall thickness was normal  DOPPLER: The transmitral flow pattern was normal  Features were consistent with a pseudonormal left ventricular filling pattern, with concomitant abnormal relaxation and  increased filling pressure (grade 2 diastolic dysfunction)  Doppler parameters were consistent with high ventricular filling pressure  RIGHT VENTRICLE: The size was normal  Systolic function was normal  Wall thickness was normal     LEFT ATRIUM: The atrium was dilated  ATRIAL SEPTUM: 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, predominantly within the right atrial cavity  RIGHT ATRIUM: The atrium was dilated  MITRAL VALVE: Valve structure was normal  There was normal leaflet separation  DOPPLER: The transmitral velocity was within the normal range  There was no evidence for stenosis  There was moderate regurgitation  AORTIC VALVE: The valve was trileaflet  Leaflets exhibited normal thickness and normal cuspal separation   DOPPLER: Transaortic velocity was within the normal range  There was no evidence for stenosis  There was mild regurgitation  TRICUSPID VALVE: The valve structure was normal  There was normal leaflet separation  DOPPLER: The transtricuspid velocity was within the normal range  There was no evidence for stenosis  There was mild regurgitation  Estimated peak PA  pressure was 55 mmHg  The findings suggest moderate pulmonary hypertension  PULMONIC VALVE: Leaflets exhibited normal thickness, no calcification, and normal cuspal separation  DOPPLER: The transpulmonic velocity was within the normal range  There was trace regurgitation  PERICARDIUM: There was no pericardial effusion  The pericardium was normal in appearance  AORTA: The root exhibited normal size  SYSTEMIC VEINS: IVC: The inferior vena cava was normal in size  Respirophasic changes were normal     SYSTEM MEASUREMENT TABLES    2D  %FS: 26 99 %  AVC: 459 17 ms  Ao Diam: 2 42 cm  EDV(Teich): 79 6 ml  EF(Teich): 52 99 %  ESV(Teich): 37 42 ml  HR_2Ch_Q: 63 38 BPM  IVSd: 1 34 cm  LA Area: 29 42 cm2  LA Diam: 4 05 cm  LVCO_2Ch_Q: 5 14 L/min  LVEDV MOD A4C: 129 77 ml  LVEF MOD A4C: 50 73 %  LVEF_2Ch_Q: 53 06 %  LVESV MOD A4C: 63 94 ml  LVIDd: 4 22 cm  LVIDs: 3 08 cm  LVLd A4C: 8 77 cm  LVLd_2Ch_Q: 8 32 cm  LVLs A4C: 8 14 cm  LVLs_2Ch_Q: 7 4 cm  LVPWd: 1 18 cm  LVSV_2Ch_Q: 81 13 ml  LVVED_2Ch_Q: 152 92 ml  LVVES_2Ch_Q: 71 79 ml  RA Area: 20 99 cm2  RVIDd: 3 71 cm  SV MOD A4C: 65 83 ml  SV(Teich): 42 17 ml    CW  AR Dec Stanton: 2 2 m/s2  AR Dec Time: 1639 55 ms  AR PHT: 475 47 ms  AR Vmax: 3 6 m/s  AR maxP 87 mmHg  AV Env  Ti: 312 08 ms  AV VTI: 34 42 cm  AV Vmax: 1 46 m/s  AV Vmean: 1 1 m/s  AV maxP 58 mmHg  AV meanP 34 mmHg  TR Vmax: 3 7 m/s  TR maxP 75 mmHg    MM  TAPSE: 2 04 cm    PW  E' Sept: 0 06 m/s  E/E' Sept: 16 45  LVOT Env  Ti: 430 07 ms  LVOT VTI: 22 09 cm  LVOT Vmax: 0 87 m/s  LVOT Vmean: 0 51 m/s  LVOT maxPG: 3 03 mmHg  LVOT meanP 32 mmHg  MV A Kofi: 0 67 m/s  MV Dec Archuleta: 7 12 m/s2  MV DecT: 144 92 ms  MV E Kofi: 1 03 m/s  MV E/A Ratio: 1 54    [facility] Accredited Echocardiography Laboratory    Prepared and electronically signed by    Nazario Davila MD  Signed [id number] 08:57:40       No results found for this or any previous visit  Results for orders placed during the hospital encounter of 21   Cardiac catheterization    Narrative [address]  [phone]    [facility]    Invasive Cardiovascular Lab Complete Report    Patient: Helio Cruz  MR number: [id number]  Account number: [de-identified]  Study date: 2021  Gender: Female  : 1940  Height: 63 in  Weight: 166 1 lb  BSA: 1 79 mï¾²    Allergies: NO KNOWN ALLERGIES    Diagnostic Cardiologist:  Nava Rush DO  Interventional Cardiologist:  Nava Rush DO  Primary Physician:  Thresa Gosselin, PA-C    SUMMARY    CORONARY CIRCULATION:  Mid LAD: There was a 100 % stenosis  1ST LESION INTERVENTIONS:  A balloon angioplasty procedure was performed on the 100 % lesion in the mid LAD  INDICATIONS:  --  elev t    PROCEDURES PERFORMED    --  Left coronary angiography  --  Right coronary angiography  --  Inpatient  --  Mod Sedation Same Physician Initial 15min  --  Coronary Catheterization (w/o [facility])  --  Mod Sedation Same Physician Add 15min  --  PTCA, Single Coronary Artery  --  Intervention on mid LAD: balloon angioplasty  PROCEDURE: The risks and alternatives of the procedures and conscious sedation were explained to the patient and informed consent was obtained  The patient was brought to the cath lab and placed on the table  The planned puncture sites  were prepped and draped in the usual sterile fashion     --  Left coronary artery angiography  A catheter was advanced over a guidewire into the aorta and positioned in the left coronary artery ostium under fluoroscopic guidance   Angiography was performed  --  Right coronary artery angiography  A catheter was advanced over a guidewire into the aorta and positioned in the right coronary artery ostium under fluoroscopic guidance  Angiography was performed  --  Inpatient  --  Mod Sedation Same Physician Initial 15min  --  Coronary Catheterization (w/o [facility])  --  Mod Sedation Same Physician Add 15min  LESION INTERVENTION: A balloon angioplasty procedure was performed on the 100 % lesion in the mid LAD  There was MISBAH 3 flow before the procedure and MISBAH 3 flow after the procedure  --  Vessel setup was performed  A 6Fr  Launcher Ebu 3 5 guiding catheter was used to cannulate the vessel  --  Vessel setup was performed  A Runthrough NS 180cm wire was used to cross the lesion  --  Balloon angioplasty was performed, using a Mini Trek Rx 2 0 x 15mm balloon, with 3 inflations and a maximum inflation pressure of 12 raj  INTERVENTIONS:  --  PTCA, Single Coronary Artery  PROCEDURE COMPLETION: The patient tolerated the procedure well and was discharged from the cath lab  TIMING: Test started at 16:07  Test concluded at 16:30  HEMOSTASIS: The sheath was removed  The site was compressed with a Hemoband  device  Hemostasis was obtained  MEDICATIONS GIVEN: Fentanyl (1OOmcg/2 ml), 50 mcg, IV, at 16:03  Versed (2mg/2ml), 2 mg, IV, at 16:03  1% Lidocaine, 1 ml, subcutaneously, at 16:07  Nitroglycerin (200mcg/ml), 200 mcg, at 16:08  Verapamil  (5mg/2ml), 2 5 mg, IV, at 16:08  Heparin 1000 units/ml, 4,000 units, IV, at 16:08  Heparin 1000 units/ml, 4,000 units, IV, at 16:13  CONTRAST GIVEN: 70 ml Omnipaque (350mg I /ml)  RADIATION EXPOSURE: Fluoroscopy time: 7 72 min  CORONARY VESSELS:   --  Mid LAD: There was a 100 % stenosis  IMPRESSIONS:  Occluded stent  Disease beyond stented area into diagonal and mid LAD not amenable to PCI    RECOMMENDATIONS  cabg consult, if not candidate med rx      DISPOSITION:  The patient left the catheterization laboratory in stable condition  Prepared and signed by    Destiny Coppola DO  Signed 2021 16:57:47    Study diagram    Angiographic findings  Native coronary lesions:  ï¾·Mid LAD: Lesion 1: 100 % stenosis  Intervention results  Native coronary lesions:  ï¾·balloon angioplasty of the 100 % stenosis in mid LAD  Hemodynamic tables    Pressures:  Baseline  Pressures:  - HR: 60  Pressures:  - Rhythm:  Pressures:  -- Aortic Pressure (S/D/M): 116/53/77    Outputs:  Baseline  Outputs:  -- CALCULATIONS: [age]  Outputs:  -- CALCULATIONS: Body Surface Area: 1 79  Outputs:  -- CALCULATIONS: Height in cm: 160 00  Outputs:  -- CALCULATIONS: Sex: Female  Outputs:  -- CALCULATIONS: Weight in k 50       No results found for this or any previous visit  No results found for this or any previous visit  Results for orders placed during the hospital encounter of 19   NM myocardial perfusion spect (rx stress and/or rest)    [facility] [address]  [address]  [phone]    Stress Gated SPECT Myocardial Perfusion Imaging after Regadenoson    Patient: Mary Jaimes  MR number: [id number]  Account number: [de-identified]  : 1940  Age: 66 years  Gender: Female  Status: Outpatient  Location: [address] [place] [address] [facility] [place]  Height: 64 in  Weight: 167 lb  BP: 128/ 62 mmHg    Allergies: NO KNOWN ALLERGIES    Diagnosis: I35 1 - Nonrheumatic aortic (valve) insufficiency    Interpreting Physician:  Hardik Easley MD  Referring Physician:  Jenean Osler, MD  Primary Physician:  Denise Mcnulty PA-C  Technician:  RUTH ANN Willis  RN:  Cynthia Miles RN  Group:  [facility]  Cardiology Fellow:  Meg Zafar MD  Report Prepared by[de-identified]  Cynthia Miles RN    INDICATIONS: Detection for coronary artery disease  HISTORY: The patient is a 66year old  female    Patient using cane to ambulate due to ataxia Other symptoms: dyspnea  Coronary artery disease risk factors: dyslipidemia, hypertension, former smoking, diabetes mellitus, and post-menopausal state  Cardiovascular history: congestive heart  failure and stroke  Co-morbidity: history of lung disease (asthma) Medications: a calcium channel blocker, an ARB, clopidogrel, a lipid lowering agent, and diabetic medications  PHYSICAL EXAM: Baseline physical exam screening: no wheezes audible  REST ECG: Normal sinus rhythm  Normal baseline ECG  PROCEDURE: The study was performed in the the [address] [facility]  A sitting regadenoson infusion pharmacologic stress test was performed  Gated SPECT myocardial perfusion imaging was performed during stress  Systolic blood  pressure was 128 mmHg, at the start of the study  Diastolic blood pressure was 62 mmHg, at the start of the study  The heart rate was 61 bpm, at the start of the study  IV double checked  Regadenoson protocol:  HR bpm SBP mmHg DBP mmHg Symptoms  Baseline 61 128 62 none  Immediate 92 110 62 mild dyspnea, nausea  1 min 79 128 60 subsiding    STRESS SUMMARY: Duration of pharmacologic stress was 3 min  Functional capacity could not be adequately assessed  Maximal heart rate during stress was 92 bpm  There was normal resting blood pressure with an appropriate response to stress  The rate-pressure product for the peak heart rate and blood pressure was 23289  There was no chest pain during stress  The stress test was terminated due to protocol completion  Pre oxygen saturation: 98 %  Peak oxygen saturation: 99 %  The stress ECG was negative for ischemia and normal  Arrhythmia during stress: isolated atrial premature beats and isolated premature ventricular beats      ISOTOPE ADMINISTRATION:  Resting isotope administration Stress isotope administration  Agent Tetrofosmin Tetrofosmin  Dose 10 13 mCi 31 1 mCi  Date 01/11/2019 01/11/2019  Injection time 12:30 13:45  Imaging time 13:10 14: 38  Injection-image interval 40 min 53 min    The radiopharmaceutical was injected at the peak effect of pharmacologic stress  MYOCARDIAL PERFUSION IMAGING:  The image quality was fair  Rotating projection images reveal mild breast attenuation  Left ventricular size was normal  The TID ratio was 0 95  PERFUSION DEFECTS:  -  There was a small, mildly severe, partially reversible myocardial perfusion defect of the basal anterolateral wall likely due to attenuation from breast tissue  There was normal thickening and motion in the area of perfusion defect  PERFUSION QUANTITATION:  The summed perfusion score measured 8 during stress, 2 at rest, with a difference of 6  GATED SPECT:  The calculated left ventricular ejection fraction was 54 %  Left ventricular ejection fraction was within normal limits by visual estimate  There was no left ventricular regional abnormality  SUMMARY:  -  Stress results: There was no chest pain during stress  -  ECG conclusions: The stress ECG was negative for ischemia and normal   -  Perfusion imaging: There was a small, mildly severe, partially reversible myocardial perfusion defect of the basal anterolateral wall likely due to attenuation from breast tissue  There was normal thickening and motion in the area of  perfusion defect  -  Gated SPECT: The calculated left ventricular ejection fraction was 54 %  Left ventricular ejection fraction was within normal limits by visual estimate  There was no left ventricular regional abnormality  IMPRESSIONS: Likely Normal study after pharmacologic vasodilation  There was image artifact, without diagnostic evidence for perfusion abnormality  Left ventricular systolic function was normal     Prepared and signed by    Geronimo Cantu MD  Signed 01/11/2019 15:50:53             Robert Bergeron MD    Portions of the record may have been created with voice recognition software    Occasional wrong word or "sound a like" substitutions may have occurred due to the inherent limitations of voice recognition software  Read the chart carefully and recognize, using context, where substitutions have occurred

## 2021-05-26 NOTE — PHYSICAL THERAPY NOTE
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, without long-term current use of insulin ([facility])    Non-rheumatic mitral regurgitation    Uterine procidentia    Anemia    Esophageal abnormality    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    Decreased hearing, bilateral    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[facility] [address] )    History of CVA with residual deficit    Mixed hyperlipidemia    Persistent proteinuria    Renal cyst    Ankle edema    Stage 3a chronic kidney disease (HCC)    Acute diastolic congestive heart failure ([facility] [address] )    Status post insertion of drug-eluting stent into left anterior descending (LAD) artery    Coronary artery disease involving native coronary artery of native heart without angina pectoris    NSTEMI (non-ST elevated myocardial infarction) ([facility] [address] )    Essential hypertension    Asthma    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[address] )       Past Medical History  Past Medical History:   Diagnosis Date    ACE-inhibitor cough     last assessed - [id number]    Asthma     Bilateral edema of lower extremity     last assessed - [id number]    Cardiac murmur     last assessed - [id number]    CKD (chronic kidney disease)     Diabetes mellitus ([facility] [address] [facility] )     last assessed - [id number]    Esophageal dysphagia     Fatigue     last assessed - [id number]    Hyperlipidemia     Hypertension  Patellar bursitis of right knee     last assessed - [id number]    Personal history of other specified conditions     presenting hazards to health    Stroke [facility])     Stroke syndrome     Urinary frequency     UTI (urinary tract infection)        Past Surgical History  Past Surgical History:   Procedure Laterality Date    [place] ESOPHAGOGASTRODUODENOSCOPY TRANSORAL DIAGNOSTIC N/A 4/5/2017    Procedure: ESOPHAGOGASTRODUODENOSCOPY (EGD); Surgeon: Keyur Zamorano MD;  [facility]; Service: Gastroenterology          05/26/21 1020   PT Last Visit   PT Visit Date 05/26/21   Note Type   Note type Evaluation   Pain Assessment   Pain Assessment Tool 0-10   Pain Score 2   Pain Location/Orientation Location: Generalized   Patient's Stated Pain Goal No pain   Hospital Pain Intervention(s) Ambulation/increased activity   Home Living   Type of Home House   Additional Comments Resides alone in 2 story home, 1 SHA  Son is staying w/ pt indefinetly  Indep self care, ambulates w/ cane   Prior Function   Level of [place] Independent with ADLs and functional mobility   Restrictions/Precautions   Weight Bearing Precautions Per Order No   Other Precautions Multiple lines; Fall Risk   General   Family/Caregiver Present No   Cognition   Overall Cognitive Status WFL   Arousal/Participation Responsive   Orientation Level Oriented X4   Memory Unable to assess   Following Commands Follows one step commands without difficulty   Transfers   Sit to Stand 5  Supervision   Stand to Sit 5  Supervision   Ambulation/Elevation   Gait pattern   (slow, mild ataxia)   Gait Assistance 5  Supervision   Additional items Assist x 1   Assistive Device [facility]   Distance 120'   Balance   Static Sitting Normal   Dynamic Sitting Good   Static Standing Fair +   Dynamic Standing Fair   Ambulatory Fair   Endurance Deficit   Endurance Deficit No   Activity Tolerance   Activity Tolerance Patient tolerated treatment well   Nurse Made Aware yes   Assessment Prognosis Good   Assessment Pt seen for high complexity physical therapy evaluation  Pt is an [de-identified] y/o female w/ history/comorbidities of asthma, CAD, CVA w/ mild L residual weakness, HTN, CHF, who is now admitted w/ c/o chest pain x 2-3 days, DUNAWAY  Dx is NSTEMI  Due to acute medical issues, pain, fall risk, note unstable clinical picture  PT consulted to assess mobility, d/c needs  At present time, despite acute medical issues, note pt to be functioning close to or at mobility baseline  No continued skilled acute care PT needs identified  will sign off    Pt may mobilize w/ nursing or restirative while here, and may d/c home when stable   Goals   PT Treatment Day 0   Plan   PT Frequency   (d/c PT)   Recommendation   PT Discharge Recommendation No rehabilitation needs   PT - OK to Discharge Yes   AM-PAC Basic Mobility Inpatient   Turning in Bed Without Bedrails 4   Lying on Back to Sitting on Edge of Flat Bed 4   Moving Bed to Chair 4   Standing Up From Chair 4   Walk in Room 3   Climb 3-5 Stairs 3   Basic Mobility Inpatient Raw Score 22   Basic Mobility Standardized Score 47 4         Krystal Carlos PT, DPT, CSRS

## 2021-05-26 NOTE — DISCHARGE INSTRUCTIONS
1  Please see the post cardiac catheterization dishcarge instructions  No heavy lifting, greater than 10 lbs  or strenuous  activity for 48 hrs  2 Remove band aid tomorrow  Shower and wash area- wrist gently with soap and water- beginning tomorrow  Rinse and pat dry  Apply new water seal band aid  Repeat this process for 5 days  No powders, creams lotions or antibiotic ointments  for 5 days  No tub baths, hot tubs or swimming for 5 days  3  Please call our office ([phone]) if you have any fever, redness, swelling, discharge from your wrist access site  4 No driving for 1 day    5  Please make an appointment with your Cardiologist within 1 week of discharge  Chest Pain   WHAT YOU NEED TO KNOW:   Chest pain can be caused by a range of conditions, from not serious to life-threatening  Chest pain can be a symptom of a digestive problem, such as acid reflux or a stomach ulcer  An anxiety attack or a strong emotion, such as anger, can also cause chest pain  Infection, inflammation, or a fracture in the bones or cartilage in your chest can cause pain or discomfort  Sometimes chest pain or pressure is caused by poor blood flow to your heart (angina)  Chest pain may also be caused by life-threatening conditions such as a heart attack or blood clot in your lungs  DISCHARGE INSTRUCTIONS:   Call your local emergency number (911 in the [address] [place] [address],[address]) or have someone call if:   · You have any of the following signs of a heart attack:      ? Squeezing, pressure, or pain in your chest    ? You may  also have any of the following:     § Discomfort or pain in your back, neck, jaw, stomach, or arm    § Shortness of breath    § Nausea or vomiting    § Lightheadedness or a sudden cold sweat      Return to the emergency department if:   · You have chest discomfort that gets worse, even with medicine  · You cough or vomit blood  · Your bowel movements are black or bloody      · You cannot stop vomiting, or it hurts to swallow  Call your doctor if:   · You have questions or concerns about your condition or care  Medicines:   · Medicines  may be given to treat the cause of your chest pain  Examples include pain medicine, anxiety medicine, or medicines to increase blood flow to your heart  · Do not take certain medicines without asking your healthcare provider first   These include NSAIDs, herbal or vitamin supplements, or hormones (estrogen or progestin)  · Take your medicine as directed  Contact your healthcare provider if you think your medicine is not helping or if you have side effects  Tell him or her if you are allergic to any medicine  Keep a list of the medicines, vitamins, and herbs you take  Include the amounts, and when and why you take them  Bring the list or the pill bottles to follow-up visits  Carry your medicine list with you in case of an emergency  Healthy living tips: The following are general healthy guidelines  If the cause of your chest pain is known, your healthcare provider will give you specific guidelines to follow  · Do not smoke  Nicotine and other chemicals in cigarettes and cigars can cause lung and heart damage  Ask your healthcare provider for information if you currently smoke and need help to quit  E-cigarettes or smokeless tobacco still contain nicotine  Talk to your healthcare provider before you use these products  · Choose a variety of healthy foods as often as possible  Include fresh, frozen, or canned fruits and vegetables  Also include low-fat dairy products, fish, chicken (without skin), and lean meats  Your healthcare provider or a dietitian can help you create meal plans  You may need to avoid certain foods or drinks if your pain is caused by a digestion problem  · Lower your sodium (salt) intake  Limit foods that are high in sodium, such as canned foods, salty snacks, and cold cuts  If you add salt when you cook food, do not add more at the table   Choose low-sodium canned foods as much as possible  · Drink plenty of water every day  Water helps your body to control your temperature and blood pressure  Ask your healthcare provider how much water you should drink every day  · Ask about activity  Your healthcare provider will tell you which activities to limit or avoid  Ask when you can drive, return to work, and have sex  Ask about the best exercise plan for you  · Maintain a healthy weight  Ask your healthcare provider what a healthy weight is for you  Ask him or her to help you create a safe weight loss plan if you are overweight  · Ask about vaccines you may need  Get the influenza (flu) vaccine every year as soon as recommended, usually in September or October  You may also need a pneumococcal vaccine to prevent pneumonia  The vaccine is usually given every 5 years, starting at age 72  Your healthcare provider can tell you if should get other vaccines, and when to get them  Follow up with your healthcare provider within 72 hours, or as directed: You may need to return for more tests to find the cause of your chest pain  You may be referred to a specialist, such as a cardiologist or gastroenterologist  Write down your questions so you remember to ask them during your visits  © Copyright [employer] 2021 Information is for End User's use only and may not be sold, redistributed or otherwise used for commercial purposes  All illustrations and images included in CareNotes® are the copyrighted property of [employer]  or Juliette Diaz  The above information is an  only  It is not intended as medical advice for individual conditions or treatments  Talk to your doctor, nurse or pharmacist before following any medical regimen to see if it is safe and effective for you

## 2021-05-26 NOTE — OCCUPATIONAL THERAPY NOTE
Occupational Therapy Evaluation     Patient Name: Khang Soares Date: 5/26/2021  Problem List  Principal Problem:    NSTEMI (non-ST elevated myocardial infarction) 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)    Past Medical History  Past Medical History:   Diagnosis Date    ACE-inhibitor cough     last assessed - [id number]    Asthma     Bilateral edema of lower extremity     last assessed - [id number]    Cardiac murmur     last assessed - [id number]    CKD (chronic kidney disease)     Diabetes mellitus ([facility] [address] )     last assessed - 50WYR3220    Esophageal dysphagia     Fatigue     last assessed - 76Kya0130    Hyperlipidemia     Hypertension     Patellar bursitis of right knee     last assessed - 36KLB9386    Personal history of other specified conditions     presenting hazards to health    Stroke [facility])     Stroke syndrome     Urinary frequency     UTI (urinary tract infection)      Past Surgical History  Past Surgical History:   Procedure Laterality Date    [place] ESOPHAGOGASTRODUODENOSCOPY TRANSORAL DIAGNOSTIC N/A 4/5/2017    Procedure: ESOPHAGOGASTRODUODENOSCOPY (EGD); Surgeon: Rony Armenta MD;  [facility]; Service: Gastroenterology        05/26/21 1325   OT Last Visit   OT Visit Date 05/26/21   Note Type   Note type Evaluation   Restrictions/Precautions   Weight Bearing Precautions Per Order No   Other Precautions Chair Alarm;Telemetry; Fall Risk   Pain Assessment   Pain Assessment Tool 0-10   Pain Score No Pain   Home Living   Type of Home House   Home Layout Two level   Bathroom Shower/Tub Tub/shower unit   Bathroom Toilet Standard   Bathroom Equipment Grab bars in shower; Shower chair;Commode   Bathroom Accessibility Accessible   Home Equipment Walker;Cane  (use of SPC PTA;)   Additional Comments Pt resides in a [facility] w/ 2STE  Pt reports use of a commode downstairs to avoid having to use stairs upon needing to use the bathroom, a shower chair when showering, and a SPC for functional mobility  Prior Function   Level of [place] Independent with ADLs and functional mobility   Lives With Alone  (reports son is staying w/ her- does not know how long)   Receives Help From Family   ADL Assistance Independent   IADLs Independent   Falls in the last 6 months 0   Vocational Retired   Lifestyle   Autonomy Pt reports IND in all ADL/IADL tasks PTA  Pt reports her son provides transportation as needed and she completes tasks  Pt was attending Cardiac Rehab PTA  Reciprocal Relationships Pt resides alone  Pt reports son will be staying w/ her upon d/c  Unclear how long he will be staying w/ her  Service to Others Retired   Intrinsic Gratification Pt enjoys word searches and her prayer warrior group   Psychosocial   Psychosocial (WDL) WDL   Subjective   Subjective "My walking has been like this since my stroke"   ADL   Where Assessed Chair   Eating Assistance 7  [address] 5  Supervision/Setup   [address] 5  [address] 5  Supervision/Setup    [facility] 5  [address]  5  [address] [facility] 5  Supervision/Setup   Bed Mobility   Additional Comments Pt seated in bedside chair upon OT arrival  Pt left in bedside chair w/ alarm activated and all needed items within reach     Transfers   Sit to Stand 5  Supervision   Additional items Increased time required   Stand to Sit 5  Supervision   Additional items Increased time required;Verbal cues   Additional Comments Pt completed functional sit<>stand transfers w/ [facility] w/ S  Pt benefits from increased time to complete and VC for safe sequencing t/o  Functional Mobility   Functional Mobility 4  Minimal assistance   Additional Comments Pt completed long-distance functional mobility using SPC w/ Min A  Pt demonstrated ataxic movements of LE t/o functional mobility and would take standing rest breaks as needed using the wall and SPC as support  Pt stated that this is how she typically performs this task and not a change from baseline  Additional items SPC   Balance   Static Sitting Good   Dynamic Sitting Fair +   Static Standing Fair   Dynamic Standing Fair -   Ambulatory Poor +   Activity Tolerance   Activity Tolerance Patient tolerated treatment well; Other (Comment)  (SOB;)   Medical Staff Made Aware OT B[place];   Nurse Made Aware RN cleared pt for OT evaluation   RUE Assessment   RUE Assessment WFL   LUE Assessment   LUE Assessment X  (LUE residual weakness s/p past CVA)   Hand Function   Gross Motor Coordination Impaired  (LUE residual weakness s/p past CVA)   Fine Motor Coordination Impaired  (LUE residual weakness s/p past CVA)   Cognition   Overall Cognitive Status WFL   Arousal/Participation Cooperative   Attention Within functional limits   Orientation Level Oriented X4   Memory Decreased recall of precautions   Following Commands Follows one step commands with increased time or repetition   Comments Pt cooperative and agreeable to OT evaluation  Pt demonstrates good safety awareness and took rest breaks as needed t/o session  Pt benefits from increased time to process/retrieve information and one-step direction      Assessment   Limitation Decreased UE strength;Decreased endurance;Decreased high-level ADLs   Prognosis Good   Assessment Pt is a [de-identified] y o  female who participated in an OT evaluation on 5/26/2021 after being admitted to [facility] on 5/24/2021 w/ NSTEMI  Pt has a past medical history of ACE-inhibitor cough, Asthma, Bilateral edema of lower extremity, Cardiac murmur, CKD (chronic kidney disease), Diabetes mellitus ([address] ), Esophageal dysphagia, Fatigue, Hyperlipidemia, Hypertension, Patellar bursitis of right knee, Stroke [facility]), Stroke syndrome, Urinary frequency, and UTI (urinary tract infection)  Pt with active OT orders and activity as tolerated orders  Pt resides in a [facility] w/ 2STE alone  Pt reports son will be staying w/ her upon d/c  Unclear of how long son will be staying  Pt was I w/ ADL's and IADL's, does not drive, & required the use of a SPC PTA  Currently pt completes UB and LB ADL tasks w/ S/set-up assistance  Pt completed functional transfers using [facility] w/ S and functional mobility using SPC w/ Min Ax1 and standing rest breaks as needed  Pt is limited at this time 2' endurance, activity tolerance, balance and ataxia  Pt reports she is currently attending Cardiac Rehab and that there has been no change to her functional status PTA  The patient's raw score on the AM-PAC Daily Activity inpatient short form is 21, standardized score is 44 27, greater than 39 4  Patients at this level are likely to benefit from discharge to home w/ increased social supports as needed  Please refer to the recommendation of the Occupational Therapist for safe discharge planning ) Based on the aforementioned OT evaluation, functional performance deficits, and assessments; pt has been identified as moderate complexity evaluation  From OT standpoint, anticipate d/c home w/ increased social supports as needed  Pt functioning close to baseline and no further OT needs indicated at this time, d/c OT  Please re-consult as found appropriate  Pt will benefit from continued participation in meaningful daily tasks w/ hospital staff t/o stay     Goals   Patient Goals To feel better   Recommendation   OT Discharge Recommendation No rehabilitation needs  (home w/ increased supports)   OT - OK to Discharge Yes  (home w/ increased supports when medically cleared)   AM-PAC Daily Activity Inpatient   Lower Body Dressing 3   Bathing 3   Toileting 3   Upper Body Dressing 4   Grooming 4   Eating 4   Daily Activity Raw Score 21   Daily Activity Standardized Score (Calc for Raw Score >=11) 44 27   AM-PAC Applied Cognition Inpatient   Following a Speech/Presentation 4   Understanding Ordinary Conversation 4   Taking Medications 3   Remembering Where Things Are Placed or Put Away 3   Remembering List of 4-5 Errands 3   Taking Care of Complicated Tasks 2   Applied Cognition Raw Score 19   Applied Cognition Standardized Score 39 77   Modified [place] Scale   Modified [place] Scale 4          Bonnie Harper, OTS

## 2021-05-26 NOTE — PROGRESS NOTES
INTERNAL MEDICINE RESIDENCY PROGRESS NOTE     Name: Ivan Hall   Age & Sex: [de-identified] y o  female   MRN: [id number]  Unit/Bed#: -01   Encounter: [id number]  Team: SOD Team A    PATIENT INFORMATION     Name: Ivan Hall   Age & Sex: [de-identified] y o  female   MRN: [id number]  Hospital Stay Days: 2    ASSESSMENT/PLAN     Principal Problem:    NSTEMI (non-ST elevated myocardial infarction) 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, without long-term current use of insulin (HCC)    Anemia    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HCC)    History of CVA with residual deficit    Renal cyst    Coronary artery disease involving native coronary artery of native heart without angina pectoris    Essential hypertension    Asthma    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HCC)      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HCC)  Assessment & Plan  Wt Readings from Last 3 Encounters:   05/24/21 75 5 kg (166 lb 7 2 oz)   05/24/21 76 4 kg (168 lb 6 4 oz)   04/08/21 73 5 kg (162 lb)     Echo done 3/25/21 with EF 14%, grade 1 diastolic dysfunction with mild hypokinesis of mid and apical segments  Cath showed 99% occlusion of mid LAD, now s/p KOJO  -5/24 Repeat Echo EF 55% moderate MR, Mild AR  Apical dyskinesis           Clinically euvolemic  Plan:  Patient presenting with chest pain - cardiology consulted, plan for cath today  Continue home carvedilol and losartan  Restart home furosemide 40mg BID after cath per cards recs  Monitor I/O's and daily weights  Fluid and salt restriction once given diet        Asthma  Assessment & Plan  Continue home inhalers  Essential hypertension  Assessment & Plan  Patient managed on Losartan 100mg daily, furosemide 40mg BID, and Carvedilol 3 125 BID at home   Reports took today's dose at 11AM     Plan:  Continue Losartan and Carvedilol  Will hold furosemide until after cath  Cardiology consulted 2/2 NSTEMI - recs appreciated  Monitor BP closely    Coronary artery disease involving native coronary artery of native heart without angina pectoris  Assessment & Plan  Patient presented with chest heaviness, leg swelling, and orthopnea 3/23/21 and was found to have 99% stenosis of mid LAD now s/p stent placement as well as balloon angioplasty to distal lesion in LAD with improvement from 80% stenosis to <50% stenosis  Presenting with chest pain x2-3 days, described as nagging, with no associated symptoms including shortness of breath, dizziness, lightheadedness, nausea, vomiting, diaphoresis  See plan per NSTEMI  History of CVA with residual deficit  Assessment & Plan  Hx of CVA in 2011 and on Plavix at home  Some residual left-sided deficits (4/5) with mild ataxia, however, ambulates well with cane    Plan:  Continue plavix 150mg QAM  Ambulate OOB with assistance    Anemia  Assessment & Plan  Patient with baseline Hgb 10 5  Currently 10 2  MCV 83  Previously has hx of microcytic anemia  Plan:  No S/S of active bleeding  Will order iron panel  Monitor CBC daily and transfuse Hgb 7 0    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, without long-term current use of insulin (HCC)  Assessment & Plan  Lab Results   Component Value Date    HGBA1C 6 7 (H) 04/03/2021       No results for input(s): POCGLU in the last 72 hours  Blood Sugar Average: Last 72 hrs:     Patient managed on Metformin 500mg BID at home      Plan:  SSC Algorithm 2 ordered  POC Glucose checks TID with meals and at bedtime  Carb control diet    Benign hypertension with CKD (chronic kidney disease) stage III  Assessment & Plan  Lab Results   Component Value Date    EGFR 44 05/24/2021    EGFR 39 04/03/2021    EGFR 58 03/27/2021    CREATININE 1 33 (H) 05/24/2021    CREATININE 1 47 (H) 04/03/2021    CREATININE 1 05 03/27/2021       * NSTEMI (non-ST elevated myocardial infarction) [facility])  Assessment & Plan  Patient presenting from [address] for complaint of chest pain for 2-3 days  Reports that pain is substernal and nagging without radiation  States no associated dizziness, lightheadedness, shortness of breath, nausea, vomiting, leg swelling, or orthopnea  Of note, patient s/p KOJO to mid LAD for 99% stenosis 3/26/21 with peak troponin of 0 11 at that time  At this time, she also underwent balloon angioplasty only to distal LAD stenosis due to small size(80% with improvement to <50%) She reports compliance with all discharge medications at that time  Troponin on presentation  with EKG significant for mild ST elevations in V2 and V3  CXR at time of presentation with no acute disease  Plan:  Received SL Nitro in ED with mild improvement  At time of evaluation pain is resolved s/p Aspirin loading and initiation of heparin drip  Serial troponins with correlated EKGs  Cardiology consulted - appreciate recommendations  Plan for cardiac cath this afternoon  NPO at this time  Cardiac diet when patient able to eat  - per discussion w/ Cards and CTS- Pt would like to pursue medical management at this time which includes 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  24 hr obs then d/c likely  21- No events overnight per TELE, no chest pain / SOB  DC today  Disposition: D/C today     SUBJECTIVE     Patient seen and examined  No acute events overnight  Denies any chest pain, SOB, nausea, vomiting, fever / chills  Tolerating PO diet       OBJECTIVE     Vitals:    21 0246 21 0247 21 0810 21 1145   BP: 110/57 110/57 113/58 114/59   BP Location:    Left arm   Pulse: 61 65 67 (!) 54   Resp: 17 18 15 (!) 23   Temp:   (!) 97 4 °F (36 3 °C) (!) 96 8 °F (36 °C)   TempSrc:    Oral   SpO2: 99% 97% 98% 99%   Weight:       Height:          Temperature:   Temp (24hrs), Av 6 °F (36 4 °C), Min:96 8 °F (36 °C), Max:98 2 °F (36 8 °C)    Temperature: (!) 96 8 °F (36 °C)(96 8)  Intake & Output:  I/O       05/24 0701 - 05/25 0700 05/25 0701 - 05/26 0700 05/26 0701 - 05/27 0700    P  O  240 720     I V  (mL/kg) 387 5 (5 1) 350 8 (4 6)     IV Piggyback 225 5      Total Intake(mL/kg) 853 (11 3) 1070 8 (14 2)     Urine (mL/kg/hr) 200 1350 (0 7) 800 (1 9)    Total Output 200 1350 800    Net +653 -279 2 -800           Unmeasured Urine Occurrence 2 x 1 x     Unmeasured Stool Occurrence 1 x          Weights:   IBW (Ideal Body Weight): 52 4 kg    Body mass index is 29 48 kg/m²  Weight (last 2 days)     Date/Time   Weight    05/24/21 0930   75 5 (166 45)            Physical Exam  Constitutional:       General: She is not in acute distress  Appearance: She is not ill-appearing, toxic-appearing or diaphoretic  HENT:      Head: Normocephalic and atraumatic  Nose: No congestion or rhinorrhea  Cardiovascular:      Rate and Rhythm: Normal rate and regular rhythm  Pulses: Normal pulses  Heart sounds: Normal heart sounds  No murmur  No friction rub  No gallop  Pulmonary:      Effort: Pulmonary effort is normal  No respiratory distress  Breath sounds: Normal breath sounds  No stridor  No wheezing, rhonchi or rales  Chest:      Chest wall: No tenderness  Abdominal:      General: Bowel sounds are normal  There is no distension  Palpations: Abdomen is soft  There is no mass  Tenderness: There is no abdominal tenderness  There is no right CVA tenderness, left CVA tenderness, guarding or rebound  Hernia: No hernia is present  Musculoskeletal:      Right lower leg: No edema  Left lower leg: No edema  Skin:     General: Skin is warm and dry  Findings: No bruising  Neurological:      General: No focal deficit present  Mental Status: She is alert     Psychiatric:         Mood and Affect: Mood normal          Behavior: Behavior normal        LABORATORY DATA Labs: I have personally reviewed pertinent reports  Results from last 7 days   Lab Units 05/26/21  0453 05/25/21  0448 05/24/21  0947   WBC Thousand/uL 4 93 5 76 7 05   HEMOGLOBIN g/dL 9 4* 9 9* 10 2*   HEMATOCRIT % 30 1* 32 1* 32 2*   PLATELETS Thousands/uL 144* 156 173   NEUTROS PCT % 63 67 72   MONOS PCT % 9 9 8      Results from last 7 days   Lab Units 05/26/21  0453 05/25/21  0448 05/24/21  0947   POTASSIUM mmol/L 4 3 4 3 3 6   CHLORIDE mmol/L 107 111* 105   CO2 mmol/L 27 25 26   BUN mg/dL 46* 42* 50*   CREATININE mg/dL 1 18 1 14 1 33*   CALCIUM mg/dL 9 2 9 5 9 8     Results from last 7 days   Lab Units 05/26/21  0453 05/25/21  0448 05/24/21  0947   MAGNESIUM mg/dL 2 5 2 8* 1 9          Results from last 7 days   Lab Units 05/25/21  1152 05/25/21  0448 05/24/21  1044   INR   --   --  0 97   PTT seconds 55* 60* 28         Results from last 7 days   Lab Units 05/25/21  0448 05/24/21  2313 05/24/21  1839   TROPONIN I ng/mL 10 30* 13 60* 11 50*       IMAGING & DIAGNOSTIC TESTING     Radiology Results: I have personally reviewed pertinent reports  Xr Chest 2 Views    Result Date: 5/24/2021  Impression: No acute cardiopulmonary disease  Workstation performed: [id number]     Other Diagnostic Testing: I have personally reviewed pertinent reports      ACTIVE MEDICATIONS     Current Facility-Administered Medications   Medication Dose Route Frequency    acetaminophen (TYLENOL) tablet 650 mg  650 mg Oral Q6H PRN    albuterol (PROVENTIL HFA,VENTOLIN HFA) inhaler 2 puff  2 puff Inhalation Q4H PRN    aspirin (ECOTRIN LOW STRENGTH) EC tablet 81 mg  81 mg Oral Daily    atorvastatin (LIPITOR) tablet 40 mg  40 mg Oral Daily    carvedilol (COREG) tablet 6 25 mg  6 25 mg Oral BID With Meals    clopidogrel (PLAVIX) tablet 150 mg  150 mg Oral Daily    fluticasone-vilanterol (BREO ELLIPTA) 100-25 mcg/inh inhaler 1 puff  1 puff Inhalation Daily    furosemide (LASIX) tablet 40 mg  40 mg Oral BID    insulin lispro (HumaLOG) 100 units/mL subcutaneous injection 1-5 Units  1-5 Units Subcutaneous 0200    insulin lispro (HumaLOG) 100 units/mL subcutaneous injection 1-6 Units  1-6 Units Subcutaneous TID AC    insulin lispro (HumaLOG) 100 units/mL subcutaneous injection 1-6 Units  1-6 Units Subcutaneous HS    montelukast (SINGULAIR) tablet 10 mg  10 mg Oral HS    ranolazine (RANEXA) 12 hr tablet 500 mg  500 mg Oral Q12H [address]    rivaroxaban (XARELTO) tablet 2 5 mg  2 5 mg Oral BID With Meals       VTE Pharmacologic Prophylaxis: Xarelto  VTE Mechanical Prophylaxis: sequential compression device    Portions of the record may have been created with voice recognition software  Occasional wrong word or "sound a like" substitutions may have occurred due to the inherent limitations of voice recognition software    Read the chart carefully and recognize, using context, where substitutions have occurred   ==  Nancy Parker MD  [address]  Internal Medicine Residency PGY-1

## 2021-05-26 NOTE — DISCHARGE SUMMARY
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, without long-term current use of insulin (HCC)    Anemia    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HCC)    History of CVA with residual deficit    Renal cyst    Coronary artery disease involving native coronary artery of native heart without angina pectoris    Essential hypertension    Asthma    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HCC)      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HCC)  Assessment & Plan  Wt Readings from Last 3 Encounters:   05/24/21 75 5 kg (166 lb 7 2 oz)   05/24/21 76 4 kg (168 lb 6 4 oz)   04/08/21 73 5 kg (162 lb)     Echo done 3/25/21 with EF 38%, grade 1 diastolic dysfunction with mild hypokinesis of mid and apical segments  Cath showed 99% occlusion of mid LAD, now s/p KOJO  -5/24 Repeat Echo EF 55% moderate MR, Mild AR  Apical dyskinesis           Clinically euvolemic  Plan:  Patient presenting with chest pain - cardiology consulted, plan for cath today  Continue home carvedilol and losartan  Restart home furosemide 40mg BID after cath per cards recs  Monitor I/O's and daily weights  Fluid and salt restriction once given diet        Asthma  Assessment & Plan  Continue home inhalers  Essential hypertension  Assessment & Plan  Patient managed on Losartan 100mg daily, furosemide 40mg BID, and Carvedilol 3 125 BID at home   Reports took today's dose at 11AM     Plan:  Continue Losartan and Carvedilol  Will hold furosemide until after cath  Cardiology consulted 2/2 NSTEMI - recs appreciated  Monitor BP closely    Coronary artery disease involving native coronary artery of native heart without angina pectoris  Assessment & Plan  Patient presented with chest heaviness, leg swelling, and orthopnea 3/23/21 and was found to have 99% stenosis of mid LAD now s/p stent placement as well as balloon angioplasty to distal lesion in LAD with improvement from 80% stenosis to <50% stenosis  Presenting with chest pain x2-3 days, described as nagging, with no associated symptoms including shortness of breath, dizziness, lightheadedness, nausea, vomiting, diaphoresis  See plan per NSTEMI  History of CVA with residual deficit  Assessment & Plan  Hx of CVA in 2011 and on Plavix at home  Some residual left-sided deficits (4/5) with mild ataxia, however, ambulates well with cane    Plan:  Continue plavix 150mg QAM  Ambulate OOB with assistance    Anemia  Assessment & Plan  Patient with baseline Hgb 10 5  Currently 10 2  MCV 83  Previously has hx of microcytic anemia  Plan:  No S/S of active bleeding  Will order iron panel  Monitor CBC daily and transfuse Hgb 7 0    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, without long-term current use of insulin ([facility])  Assessment & Plan  Lab Results   Component Value Date    HGBA1C 6 7 (H) 04/03/2021       No results for input(s): POCGLU in the last 72 hours  Blood Sugar Average: Last 72 hrs:     Patient managed on Metformin 500mg BID at home      Plan:  SSC Algorithm 2 ordered  POC Glucose checks TID with meals and at bedtime  Carb control diet    Benign hypertension with CKD (chronic kidney disease) stage III  Assessment & Plan  Lab Results   Component Value Date    EGFR 44 05/24/2021    EGFR 39 04/03/2021    EGFR 58 03/27/2021    CREATININE 1 33 (H) 05/24/2021    CREATININE 1 47 (H) 04/03/2021    CREATININE 1 05 03/27/2021       * NSTEMI (non-ST elevated myocardial infarction) [facility])  Assessment & Plan  Patient presenting from [place] [address] [facility] for complaint of chest pain for 2-3 days  Reports that pain is substernal and nagging without radiation  States no associated dizziness, lightheadedness, shortness of breath, nausea, vomiting, leg swelling, or orthopnea  Of note, patient s/p KOJO to mid LAD for 99% stenosis 3/26/21 with peak troponin of 0 11 at that time  At this time, she also underwent balloon angioplasty only to distal LAD stenosis due to small size(80% with improvement to <50%) She reports compliance with all discharge medications at that time  Troponin on presentation 5 21 with EKG significant for mild ST elevations in V2 and V3  CXR at time of presentation with no acute disease  Plan:  Received SL Nitro in ED with mild improvement  At time of evaluation pain is resolved s/p Aspirin loading and initiation of heparin drip  Serial troponins with correlated EKGs  Cardiology consulted - appreciate recommendations  Plan for cardiac cath this afternoon  NPO at this time  Cardiac diet when patient able to eat  5/25- per discussion w/ Cards and CTS- Pt would like to pursue medical management at this time which includes 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  [address]        Ms Amaya is an [de-identified] female with PMH significant for asthma, CAD s/p KOJO 3/26/21 to mid LAD, renal cyst, pulmonary artery aneurysm, hx CVA with mild residual L sided deficit, hypertension, combined CHF with EF 50%, anemia, and aortic valve regurg who presents to B today for concern of 2-3 days of substernal chest pain  Patient describes chest pain as nagging sensation without radiation  She denies any other associated symptoms including dizziness, lightheadedness, shortness of breath, diaphoresis, nausea, vomiting  She reports her current symptoms are different from presentation in March   She states that at that time, she was suffering from orthopnea and leg swelling only with mild chest heaviness, but this pain felt different  She states she has been eating/drinking well       In ED, patient afebrile with pulse of 79, RR 18, /67, and SpO2 100% on room air  Labs significant for troponin 5 21 with mild elevations in V2 and V3  Patient was given nitro, aspirin, and started on heparin drip  Trops increased to 13 and trended down afterwards  Cath demonstrated demonstrated 100% stenosis of stent in the mid lad s/p balloon angioplasty  Echo showed EF of 55% dyskinesis of the apical anterior, mid inferoseptal, apical septal, apical lateral, and apical wall    Recommended CABG consult  Per discussion between the patient and CTS / Cardiology CABG is deferred and will proceed with medical management  Recommended Xarelto 2 5mg BID and Ranexa 500mg BID per ATLAS ACS xarelto research  The risk and benefits were discussed with the patient regarding a potential major bleed w/ being on Dual antiplatelet therapy  During the hospitalization she did have an episode of nonsustained v-tach and recommended 24 hour observation before discharge  She did not have any subsequent events on tele, remained pain free, denied any respiratory distress  PT/OT evaluated and recommended home discharge      1) Recommended to follow up in clinic with us in a week  2) Follow up with primary cardiologist  3) BMP within a week for the CAROL  4) Compliance with meds and diet    DISCHARGE INFORMATION     PCP at Discharge: Cely Frederick    Admitting Provider: Greta Vieira MD  Admission Date: 5/24/2021    Discharge Provider: Greta Vieira MD  Discharge Date: 5/26/2021    Discharge Disposition: Home/Self Care  Discharge Condition: fair  Discharge with Lines: no    Discharge Diet: cardiac diet  Activity Restrictions: as tolerated  Test Results Pending at Discharge: none    Discharge Diagnoses:  Principal Problem:    NSTEMI (non-ST elevated myocardial infarction) [facility])  Active Problems: Aortic valve regurgitation, nonrheumatic    Benign hypertension with CKD (chronic kidney disease) stage III    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, without long-term current use of insulin (HCC)    Anemia    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HCC)    History of CVA with residual deficit    Renal cyst    Coronary artery disease involving native coronary artery of native heart without angina pectoris    Essential hypertension    Asthma    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[address] )  Resolved Problems:    * No resolved hospital problems  *      Consulting Providers:      Diagnostic & Therapeutic Procedures Performed:  Xr Chest 2 Views    Result Date: 5/24/2021  Impression: No acute cardiopulmonary disease  Workstation performed: [id number]       Code Status: Level 1 - Full Code  Advance Directive & Living Will: <no information>  Power of :    POLST:      Medications:  Current Discharge Medication List        Current Discharge Medication List        Current Discharge Medication List      CONTINUE these medications which have NOT CHANGED    Details   acetaminophen (TYLENOL) 650 mg CR tablet Take 650 mg by mouth every 6 (six) hours as needed for mild pain      albuterol (PROVENTIL HFA,VENTOLIN HFA) 90 mcg/act inhaler Inhale 2 puffs every 4 (four) hours as needed    Comments: Substitution to a formulary equivalent within the same pharmaceutical class is authorized        aspirin (ECOTRIN LOW STRENGTH) 81 mg EC tablet Take 1 tablet (81 mg total) by mouth daily  Qty: 90 tablet, Refills: 0    Associated Diagnoses: NSTEMI (non-ST elevated myocardial infarction) ([facility])      atorvastatin (LIPITOR) 40 mg tablet Take 1 tablet (40 mg total) by mouth daily  Qty: 90 tablet, Refills: 1    Associated Diagnoses: Mixed hyperlipidemia      Blood Glucose Monitoring Suppl (FREESTYLE LITE) JAQUELIN by Does not apply route daily  Qty: 1 each, Refills: 0    Associated Diagnoses: Uncontrolled type 2 diabetes mellitus without complication, without long-term current use of insulin      Breo Ellipta 100-25 MCG/INH inhaler       carvedilol (COREG) 3 125 mg tablet Take 1 tablet (3 125 mg total) by mouth 2 (two) times a day with meals  Qty: 180 tablet, Refills: 0    Associated Diagnoses: NSTEMI (non-ST elevated myocardial infarction) ([facility])      clopidogrel (PLAVIX) 75 mg tablet TAKE 2 TABS DAILY  Qty: 180 tablet, Refills: 1    Associated Diagnoses: Cerebrovascular accident (CVA) of pontine structure ([address] )      conjugated estrogens (PREMARIN) vaginal cream Insert 0 5 g into the vagina 2 (two) times a week Insert twice weekly at bedtime  Qty: 30 g, Refills: 1    Associated Diagnoses: Atrophic vaginitis      Continuous Blood Gluc Sensor (FreeStyle Naldo 14 Day Sensor) MISC Use one sensor every 14 days  Qty: 4 each, Refills: 3    Associated Diagnoses: Controlled type 2 diabetes mellitus with diabetic neuropathy, with long-term current use of insulin ([facility])      CVS D3 50 MCG (2000 UT) CAPS Take 1 capsule (2,000 Units total) by mouth daily  Qty: 90 capsule, Refills: 1    Associated Diagnoses: Vitamin D deficiency      furosemide (LASIX) 40 mg tablet Take 1 tablet (40 mg total) by mouth 2 (two) times a day  Qty: 180 tablet, Refills: 0    Associated Diagnoses: CHF (congestive heart failure) ([facility])      glucose blood (FREESTYLE LITE) test strip TEST AS DIRECTED UP TO FOUR TIMES A DAY  Qty: 100 each, Refills: 3    Associated Diagnoses: Type 2 diabetes mellitus with complication, without long-term current use of insulin ([facility])      Lancets (FREESTYLE) lancets Use as instructed  Qty: 100 each, Refills: 0    Associated Diagnoses: Uncontrolled type 2 diabetes mellitus without complication, without long-term current use of insulin      losartan (COZAAR) 100 MG tablet Take 1 tablet (100 mg total) by mouth daily  Qty: 30 tablet, Refills: 5    Associated Diagnoses: Persistent proteinuria      metFORMIN (GLUCOPHAGE) 500 mg tablet TAKE 1 TABLET (500 MG TOTAL) BY MOUTH 2 (TWO) TIMES A DAY WITH MEALS  Qty: 180 tablet, Refills: 1    Associated Diagnoses: Uncontrolled type 2 diabetes mellitus with hyperglycemia (HCC)      Misc  Devices (QUAD CANE) MISC by Does not apply route daily  Qty: 1 each, Refills: 0    Associated Diagnoses: 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      montelukast (SINGULAIR) 10 mg tablet Take 1 tablet (10 mg total) by mouth daily at bedtime  Qty: 90 tablet, Refills: 0    Associated Diagnoses: Non-seasonal allergic rhinitis due to pollen      GLOBAL EASE INJECT PEN NEEDLES 31G X 5 MM MISC       Glucose-Vitamin C-Vitamin D (TRUEPLUS GLUCOSE ON THE GO) CHEW CHEW 2 TABS AS NEEDED FOR BLOOD SUGAR LESS THAN 80      sitaGLIPtin (JANUVIA) 100 mg tablet Take 1 tablet (100 mg total) by mouth daily  Qty: 90 tablet, Refills: 1    Associated Diagnoses: Type 2 diabetes mellitus with other specified complication, without long-term current use of insulin (HCC)             Allergies:  No Known Allergies    FOLLOW-UP     PCP Outpatient Follow-up:  yes      Follow up: 1 week    Consulting Providers Follow-up:  yes      Physician name: Saundra      Active Issues Requiring Follow-up:   none    Discharge Statement:   I spent 30 minutes minutes discharging the patient  This time was spent on the day of discharge  I had direct contact with the patient on the day of discharge  Additional documentation is required if more than 30 minutes were spent on discharge  Portions of the record may have been created with voice recognition software  Occasional wrong word or "sound a like" substitutions may have occurred due to the inherent limitations of voice recognition software    Read the chart carefully and recognize, using context, where substitutions have occurred     ==  Dixon Peralta MD  [address]  Internal Medicine Resident PGY-1

## 2021-05-26 NOTE — PLAN OF CARE
Problem: Potential for Falls  Goal: Patient will remain free of falls  Description: INTERVENTIONS:  - Assess patient frequently for physical needs  -  Identify cognitive and physical deficits and behaviors that affect risk of falls    -  [place] fall precautions as indicated by assessment   - Educate patient/family on patient safety including physical limitations  - Instruct patient to call for assistance with activity based on assessment  - Modify environment to reduce risk of injury  - Consider OT/PT consult to assist with strengthening/mobility  Outcome: Progressing

## 2021-05-27 ENCOUNTER — TELEPHONE (OUTPATIENT)
Dept: INTERNAL MEDICINE CLINIC | Facility: CLINIC | Age: 81
End: 2021-05-27

## 2021-05-27 ENCOUNTER — PATIENT OUTREACH (OUTPATIENT)
Dept: INTERNAL MEDICINE CLINIC | Facility: CLINIC | Age: 81
End: 2021-05-27

## 2021-05-27 LAB
ATRIAL RATE: 61 BPM
P AXIS: 57 DEGREES
PR INTERVAL: 200 MS
QRS AXIS: -11 DEGREES
QRSD INTERVAL: 90 MS
QT INTERVAL: 418 MS
QTC INTERVAL: 420 MS
T WAVE AXIS: 97 DEGREES
VENTRICULAR RATE: 61 BPM

## 2021-05-27 PROCEDURE — 93010 ELECTROCARDIOGRAM REPORT: CPT | Performed by: INTERNAL MEDICINE

## 2021-05-27 NOTE — TELEPHONE ENCOUNTER
Received a prior auth request from [facility] stating the Ranolozine  mg is not covered under insurance  You saw her for appt 2 days ago then she went to the ER and was given this at discharge  It was given by Dr Raymon Cat and you are covering his tasks this week  Would you like to try to change it to something else before we start a prio auth?

## 2021-05-27 NOTE — UTILIZATION REVIEW
Initial Clinical Review    Admission: Date/Time/Statement:   Admission Orders (From admission, onward)     Ordered        05/24/21 1054  Inpatient Admission  Once                   Orders Placed This Encounter   Procedures    Inpatient Admission     Standing Status:   Standing     Number of Occurrences:   1     Order Specific Question:   Level of Care     Answer:   Med Surg [16]     Order Specific Question:   Estimated length of stay     Answer:   More than 2 Midnights     Order Specific Question:   Certification     Answer:   I certify that inpatient services are medically necessary for this patient for a duration of greater than two midnights  See H&P and MD Progress Notes for additional information about the patient's course of treatment  ED Arrival Information     Expected Arrival Acuity Means of Arrival Escorted By Service Admission Type    - 5/24/2021 09:23 Urgent Walk-In Family Member General Medicine Urgent    Arrival Complaint    chest pain        Chief Complaint   Patient presents with    Chest Pain     Patient reports chest pain starting last night; also reports some DUNAWAY; reports that she didn't sleep well       Initial Presentation:    [de-identified] yo female,  To ER from home,  Admitted IP status to  [facility] level of care for treatment of  Anterior Wall  NSTEMI (In stent thrombosis/stenosis despite aspirin/clopidogrel 150 mg daily)    H/o CAD s/p Stent on 3/21  Pt went to PCP today w/c/o 2-3 days intermittent substernal CP assoc w/DUNAWAY;  Pain more constant since 0200 today  Compliant w/ASA And plavix  PCP sent pt to ER for further evaluation  IN ER,  Conversationally dyspneic  Lung sounds w/Rales in left lower field  Troponin on presentation 5 21 with EKG significant for mild ST elevations in V2 and V3  CXR at time of presentation with no acute disease  Received SL Nitro in ED with mild improvement,  Given ASA And started Heparin gtt    Will trend trops w/EKGs;  Consult cardiology;  Plan for heart cath this afternoon;  Keep NPO    5/24   She underwent cardiac cath, which revealed 100% LAD stenosis at the previous stent  She underwent balloon angioplasty  Consult Cardiothoracic surgery to discuss option   5/25  CARDIOTHORACIC discussed option w/patient:  surgery with LIMA to LAD, or to proceed with her daily activities and determine if she  develops any angina with this occluded vessel  She is anginal free in her daily activities, medical management (Xarelto BID)   would be sufficient     5/25  CARDIOLOGY:   Discussed case w/Interventional cardiology,  CT Surgery,  Patient  Ok to dc heparin gtt at this time w/initiation of Xarelto   Will observe for another 24 hrs to assess for any symptoms of re-stenosis of stent and/or bleeding       5/26  Dc home       ED Triage Vitals [05/24/21 0930]   Temperature Pulse Respirations Blood Pressure SpO2   98 2 °F (36 8 °C) 79 18 139/67 100 %      Temp Source Heart Rate Source Patient Position - Orthostatic VS BP Location FiO2 (%)   Oral Monitor Lying Right arm --      Pain Score       2          Wt Readings from Last 1 Encounters:   05/24/21 75 5 kg (166 lb 7 2 oz)     Additional Vital Signs:   05/24/21 17:05:33  97 8 °F (36 6 °C)  --  18  124/81  95  100 %  --  --  --  --   05/24/21 16:05:46  --  --  --  --  --  --  28  2 L/min   Nasal cannula  --   Nasal Cannula O2 Flow Rate (L/min): with sedation at 05/24/21 1605   05/24/21 14:07:59  98 7 °F (37 1 °C)  58  18  122/57  79  100 %  --  --  --  --   05/24/21 1330  --  58  18  118/58  83  100 %               Pertinent Labs/Diagnostic Test Results:   5/24  CXR -  nad        Results from last 7 days   Lab Units 05/26/21 0453 05/25/21  0448 05/24/21  0947   WBC Thousand/uL 4 93 5 76 7 05   HEMOGLOBIN g/dL 9 4* 9 9* 10 2*   HEMATOCRIT % 30 1* 32 1* 32 2*   PLATELETS Thousands/uL 144* 156 173   NEUTROS ABS Thousands/µL 3 15 3 84 5 09         Results from last 7 days   Lab Units 05/26/21 0453 05/25/21 0448 05/24/21  0947   SODIUM mmol/L 139 141 141   POTASSIUM mmol/L 4 3 4 3 3 6   CHLORIDE mmol/L 107 111* 105   CO2 mmol/L 27 25 26   ANION GAP mmol/L 5 5 10   BUN mg/dL 46* 42* 50*   CREATININE mg/dL 1 18 1 14 1 33*   EGFR ml/min/1 73sq m 50 52 44   CALCIUM mg/dL 9 2 9 5 9 8   MAGNESIUM mg/dL 2 5 2 8* 1 9         Results from last 7 days   Lab Units 05/26/21  1141 05/26/21  0620 05/26/21  0247 05/25/21  2048 05/25/21  1612 05/25/21  1112 05/25/21  0612 05/24/21  2118 05/24/21  1706   POC GLUCOSE mg/dl 260* 164* 164* 240* 191* 314* 126 191* 99     Results from last 7 days   Lab Units 05/26/21  0453 05/25/21  0448 05/24/21  0947   GLUCOSE RANDOM mg/dL 153* 126 273*     Results from last 7 days   Lab Units 05/25/21  0448 05/24/21  2313 05/24/21  1839 05/24/21  1243 05/24/21  0947   TROPONIN I ng/mL 10 30* 13 60* 11 50* 5 58* 5 21*         Results from last 7 days   Lab Units 05/25/21  1152 05/25/21  0448 05/24/21  1044   PROTIME seconds  --   --  12 9   INR   --   --  0 97   PTT seconds 55* 60* 28     Results from last 7 days   Lab Units 05/24/21  0947   FERRITIN ng/mL 295     ED Treatment:   Medication Administration from 05/24/2021 0923 to 05/24/2021 1354       Date/Time Order Dose Route Action     05/24/2021 0942 nitroglycerin (NITROSTAT) SL tablet 0 4 mg 0 4 mg Sublingual Given     05/24/2021 1020 aspirin chewable tablet 243 mg 243 mg Oral Given     05/24/2021 1102 heparin (porcine) injection 4,000 Units 4,000 Units Intravenous Given     05/24/2021 1103 heparin (porcine) 25,000 units in 0 45% NaCl 250 mL infusion (premix) 12 Units/kg/hr Intravenous New Bag     05/24/2021 1120 sodium chloride 0 9 % infusion 75 mL/hr Intravenous New Bag        Past Medical History:   Diagnosis Date    ACE-inhibitor cough     last assessed - 29Dec2017    Asthma     Bilateral edema of lower extremity     last assessed - 21Aug2017    Cardiac murmur     last assessed - 21Aug2017    CKD (chronic kidney disease)     Diabetes mellitus [facility])     last assessed - [id number]    Esophageal dysphagia     Fatigue     last assessed - [id number]    Hyperlipidemia     Hypertension     Patellar bursitis of right knee     last assessed - [id number]    Personal history of other specified conditions     presenting hazards to health    Stroke [facility])     Stroke syndrome     Urinary frequency     UTI (urinary tract infection)      Present on Admission:   Aortic valve regurgitation, nonrheumatic   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, without long-term current use of insulin ([address] )   Coronary artery disease involving native coronary artery of native heart without angina pectoris   NSTEMI (non-ST elevated myocardial infarction) ([facility])   Benign hypertension with CKD (chronic kidney disease) stage III   Essential hypertension   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HCC)   Asthma   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HCC)   Anemia      Admitting Diagnosis: Coronary artery disease [I25 10]  Chest pain [R07 9]  Substernal chest pain [R07 2]  Elevated troponin I level [R77 8]  Status post insertion of drug-eluting stent into left anterior descending (LAD) artery for coronary artery disease [Z95 5]  Abnormal electrocardiogram (ECG) (EKG) [R94 31]  Age/Sex: [de-identified] y o  female  Admission Orders:  NPO:  IV Heparin gtt; Serial trops w/EKG's;  Consult cardiology and cardiothoracic surgery      Network Utilization Review Department  ATTENTION: Please call with any questions or concerns to [phone] and carefully listen to the prompts so that you are directed to the right person  All voicemails are confidential   Sophie Juan Alberto all requests for admission clinical reviews, approved or denied determinations and any other requests to dedicated fax number below belonging to the campus where the patient is receiving treatment   List of dedicated fax numbers for the Facilities:  FACILITY NAME UR FAX NUMBER   ADMISSION DENIALS (Administrative/Medical Necessity) [phone]   [address] (Maternity/NICU/Pediatrics) [address] [facility] [facility] [address] [place] [address]  [phone]   Jason Muse [id number] [place] [address] [place] [address] [facility] [address] [facility] [phone]

## 2021-05-27 NOTE — PROGRESS NOTES
Outpatient Care Management Note:    Patient was at [facility] from 5/24-5/26/21 for NSTEMI  Patient was started on Xarelto 2 5 mg twice a day and Ranexa 500 mg every 12 hours  Patient's Januvia was stopped  Patient has CKD3 and was identified for the SOD CKD/CAROL   Patient's creatinine on admission was 1 33 and at discharged was 1 18  Patient was ordered to have BMP checked post discharge  Leonor Patel RN at PCP office spoke with patient yesterday and did TCM call  Patient was managing well and declined further outreach from [facility]  Patient has TCM appointment with PCP on 6/1 and will see Nephrology on 6/30

## 2021-05-28 ENCOUNTER — APPOINTMENT (OUTPATIENT)
Dept: CARDIAC REHAB | Age: 81
End: 2021-05-28
Payer: COMMERCIAL

## 2021-05-28 NOTE — TELEPHONE ENCOUNTER
Good afternoon Dr Juan Woodard, I believe Ms Amaya is a patient of yours and I wanted to send this to you to see if you have an alternative that she should try  She has a hospital follow up in our office next week but I wanted to see if this is something you wanted to address with the patient  She will be seeing Dr Licha Carlson if you want to discuss with him as well as cc'd him on this message   Thank you

## 2021-05-28 NOTE — TELEPHONE ENCOUNTER
Pt  had restenosis of her stent S/P cardiac cath few days ago  Please inform patient to schedule an appointment with cardiology to determine best alternative  She was started empirically with Ranolazine during her recent admission  Thanks

## 2021-06-01 ENCOUNTER — TELEPHONE (OUTPATIENT)
Dept: FAMILY MEDICINE CLINIC | Facility: CLINIC | Age: 81
End: 2021-06-01

## 2021-06-01 ENCOUNTER — OFFICE VISIT (OUTPATIENT)
Dept: INTERNAL MEDICINE CLINIC | Facility: CLINIC | Age: 81
End: 2021-06-01

## 2021-06-01 VITALS
DIASTOLIC BLOOD PRESSURE: 63 MMHG | SYSTOLIC BLOOD PRESSURE: 130 MMHG | WEIGHT: 164.2 LBS | HEART RATE: 64 BPM | TEMPERATURE: 96.9 F | BODY MASS INDEX: 29.09 KG/M2

## 2021-06-01 DIAGNOSIS — J30.1 NON-SEASONAL ALLERGIC RHINITIS DUE TO POLLEN: ICD-10-CM

## 2021-06-01 DIAGNOSIS — Z76.89 ENCOUNTER FOR SUPPORT AND COORDINATION OF TRANSITION OF CARE: Primary | ICD-10-CM

## 2021-06-01 DIAGNOSIS — N18.31 STAGE 3A CHRONIC KIDNEY DISEASE (HCC): ICD-10-CM

## 2021-06-01 DIAGNOSIS — E11.40 CONTROLLED TYPE 2 DIABETES MELLITUS WITH DIABETIC NEUROPATHY, WITHOUT LONG-TERM CURRENT USE OF INSULIN (HCC): ICD-10-CM

## 2021-06-01 DIAGNOSIS — D50.9 IRON DEFICIENCY ANEMIA, UNSPECIFIED IRON DEFICIENCY ANEMIA TYPE: ICD-10-CM

## 2021-06-01 DIAGNOSIS — I50.40 COMBINED SYSTOLIC AND DIASTOLIC CONGESTIVE HEART FAILURE, UNSPECIFIED HF CHRONICITY (HCC): ICD-10-CM

## 2021-06-01 DIAGNOSIS — I21.4 NSTEMI (NON-ST ELEVATED MYOCARDIAL INFARCTION) (HCC): ICD-10-CM

## 2021-06-01 DIAGNOSIS — I10 ESSENTIAL HYPERTENSION: ICD-10-CM

## 2021-06-01 PROCEDURE — 99496 TRANSJ CARE MGMT HIGH F2F 7D: CPT | Performed by: INTERNAL MEDICINE

## 2021-06-01 PROCEDURE — 1111F DSCHRG MED/CURRENT MED MERGE: CPT | Performed by: INTERNAL MEDICINE

## 2021-06-01 RX ORDER — ALBUTEROL SULFATE 90 UG/1
2 AEROSOL, METERED RESPIRATORY (INHALATION) EVERY 4 HOURS PRN
Qty: 6.7 G | Refills: 1 | Status: SHIPPED | OUTPATIENT
Start: 2021-06-01 | End: 2021-12-07

## 2021-06-01 RX ORDER — FERROUS SULFATE TAB EC 324 MG (65 MG FE EQUIVALENT) 324 (65 FE) MG
324 TABLET DELAYED RESPONSE ORAL
Qty: 60 TABLET | Refills: 2 | Status: SHIPPED | OUTPATIENT
Start: 2021-06-01

## 2021-06-01 NOTE — PROGRESS NOTES
INTERNAL MEDICINE FOLLOW-UP OFFICE VISIT  [facility]  [address] [place] [address], [address]    NAME: Yang Perez  AGE: [de-identified] y o  SEX: female    DATE OF ENCOUNTER: 6/1/2021    Assessment and Plan     1  Encounter for support and coordination of transition of care  Recent hospitalization discharged on 05/26 for NSTEMI with occluded stent  Management as described below   Follow-up in 3 months for annual wellness visit  2  NSTEMI (non-ST elevated myocardial infarction) [facility])    Recent hospitalization found to have occlusion of her LAD stent placed in March of 2021  Reported compliance with aspirin and Plavix at the time  Given anatomy she was offered a CABG which she elected not to have in favor of medical therapy  She was discharged on triple therapy including aspirin, Plavix, Xarelto which she is compliant with  No episodes of bleeding currently  · Continue aspirin, Plavix, Xarelto  · Follow-up with cardiology   · Continue cardiac rehabilitation   · Management as heart failure as described below    3  Combined systolic and diastolic congestive heart failure, unspecified HF chronicity (HCC)   ejection fraction 55%, ischemic cardiomyopathy   Euvolemic on exam today   · Continue goal-directed medical therapy with Coreg, Cozaar  · Continue Lasix 40 mg b i d   · Follow-up with cardiology    4  Iron deficiency anemia, unspecified iron deficiency anemia type   hemoglobin of 9 4, normocytic   no overt signs of bleeding currently   May be related to her chronic kidney disease,  Had previous colonoscpy in 1990s, no report available the patient states she had polyps at the time  · Instructed patient to take iron supplementation, with ferrous sulfate b i d  with meals  · She should inform office with any bleeding at home  Given  High risk of bleeding   · Continue to monitor with CBC  - ferrous sulfate 324 (65 Fe) mg;  Take 1 tablet (324 mg total) by mouth 2 (two) times a day before meals  Dispense: 60 tablet; Refill: 2  - CBC and Platelet; Future    5  Stage 3a chronic kidney disease (HCC)   GFR currently 50  ·  follow-up nephrology    6  Essential hypertension   stable   · Continue medications as described above    7  Controlled type 2 diabetes mellitus with diabetic neuropathy, without long-term current use of insulin ([facility])   A1c of 6 7   · Continue metformin  · Continue Lipitor  ·  follow-up at next visit    8  Non-seasonal allergic rhinitis due to pollen   refill for albuterol inhaler given  - albuterol (PROVENTIL HFA,VENTOLIN HFA) 90 mcg/act inhaler; Inhale 2 puffs every 4 (four) hours as needed for wheezing  Dispense: 6 7 g; Refill: 1      Fall Risk Assesment      Most Recent Value   Fall Risk   One or more falls in the last year? No   How many times? --   Feels unsteady when standing or walking? No   Worried about falling? No        Orders Placed This Encounter   Procedures    CBC and Platelet       TCM Call (since 5/1/2021)     Date and time call was made  5/26/2021  3:42 PM    Hospital care reviewed  Records reviewed    Patient was hospitialized at  [facility]        Date of Admission  05/24/21    Date of discharge  05/26/21    Diagnosis  NSTEMI (non-ST elevated myocardial infarction) I21 4    Disposition  Home    Were the patients medications reviewed and updated  Yes    Current Symptoms  None      TCM Call (since 5/1/2021)     Post hospital issues  None    Should patient be enrolled in anticoag monitoring? No    Scheduled for follow up? Yes (Comment)  6/1/21 with Dr Scott Kennedy    Patients specialists  Cardiologist    Cardiologist name  [facility]    Cardiologist contact #  [phone]    Did you obtain your prescribed medications  No    Why were you unable to obtain your medications  Patient was discharged today, she will be going to the pharmacy this afternoon      Do you need help managing your prescriptions or medications  No    Is transportation to your appointment needed  No    I have advised the patient to call PCP with any new or worsening symptoms  Ignacio Buchanan RN    Are you recieving any outpatient services  No    Are you recieving home care services  No    Are you using any community resources  No    Current waiver services  No    Have you fallen in the last 12 months  No    Interperter language line needed  No    Counseling  Patient    Counseling topics  instructions for management; patient and family education; Importance of RX compliance          Chief Complaint     Chief Complaint   Patient presents with    Transition of Care Management       History of Present Illness       51-year-old female with past medical history of CAD status post LAD stent placed in March of 2021 on aspirin Plavix, diabetes mellitus type 2, asthma, CKD 3, systolic and diastolic heart failure, CVA in 2011 who is presenting to office for transition of care  She was recently hospitalized for chest pain  /NSTEMI found to have occluded LAD stent  She reports compliance with her medications  She was evaluated for CABG but elected to pursue medical therapy at this time  She was placed on Xarelto 2 5 mg b i d  At discharge on 5/26  She reports no chest pain since discharge  She also reports no shortness of breath, lower extremity edema, orthopnea  She is independent in all of her ADLs and ambulates with a cane  She has not been able to obtain ranolazine due to insurance issues  She reports no episodes of bleeding including melena, hematochezia, hematemesis, coffee-ground emesis,  Hematuria  The following portions of the patient's history were reviewed and updated as appropriate: allergies, current medications, past family history, past medical history, past social history, past surgical history and problem list     Review of Systems       ROS  CONSTITUTIONAL: Denies any fever, chills, rigors, and weight loss  HEENT: No earache or tinnitus  Denies hearing loss  CARDIOVASCULAR: No chest pain or palpitations  RESPIRATORY: Denies any cough, hemoptysis, shortness of breath or dyspnea on exertion  GASTROINTESTINAL: Denies abdominal pain, nausea, vomitng   NEUROLOGIC: No dizziness or vertigo, denies headaches  MUSCULOSKELETAL: Denies any muscle or joint pain  SKIN: Denies skin rashes or itching  All other systems reviewed and were negative  Medical History     Past Medical History:   Diagnosis Date    ACE-inhibitor cough     last assessed - [id number]    Asthma     Bilateral edema of lower extremity     last assessed - [id number]    Cardiac murmur     last assessed - [id number]    CKD (chronic kidney disease)     Diabetes mellitus ([facility] [address] )     last assessed - [id number]    Esophageal dysphagia     Fatigue     last assessed - [id number]    Hyperlipidemia     Hypertension     Patellar bursitis of right knee     last assessed - [id number]    Personal history of other specified conditions     presenting hazards to health    Stroke [facility])     Stroke syndrome     Urinary frequency     UTI (urinary tract infection)      Past Surgical History:   Procedure Laterality Date    SC ESOPHAGOGASTRODUODENOSCOPY TRANSORAL DIAGNOSTIC N/A 4/5/2017    Procedure: ESOPHAGOGASTRODUODENOSCOPY (EGD); Surgeon: Rony Armenta MD;  Location: [facility];   Service: Gastroenterology       Current Outpatient Medications:     acetaminophen (TYLENOL) 650 mg CR tablet, Take 650 mg by mouth every 6 (six) hours as needed for mild pain, Disp: , Rfl:     albuterol (PROVENTIL HFA,VENTOLIN HFA) 90 mcg/act inhaler, Inhale 2 puffs every 4 (four) hours as needed for wheezing, Disp: 6 7 g, Rfl: 1    aspirin (ECOTRIN LOW STRENGTH) 81 mg EC tablet, Take 1 tablet (81 mg total) by mouth daily, Disp: 90 tablet, Rfl: 0    atorvastatin (LIPITOR) 40 mg tablet, Take 1 tablet (40 mg total) by mouth daily, Disp: 90 tablet, Rfl: 1    Blood Glucose Monitoring Suppl (FREESTYLE LITE) JAQUELIN by Does not apply route daily, Disp: 1 each, Rfl: 0    Breo Ellipta 100-25 MCG/INH inhaler, , Disp: , Rfl:     carvedilol (COREG) 6 25 mg tablet, Take 1 tablet (6 25 mg total) by mouth 2 (two) times a day with meals, Disp: 60 tablet, Rfl: 0    clopidogrel (PLAVIX) 75 mg tablet, TAKE 2 TABS DAILY, Disp: 180 tablet, Rfl: 1    conjugated estrogens (PREMARIN) vaginal cream, Insert 0 5 g into the vagina 2 (two) times a week Insert twice weekly at bedtime, Disp: 30 g, Rfl: 1    Continuous Blood Gluc Sensor (FreeStyle Naldo 14 Day Sensor) MISC, Use one sensor every 14 days, Disp: 4 each, Rfl: 3    CVS D3 50 MCG (2000 UT) CAPS, Take 1 capsule (2,000 Units total) by mouth daily, Disp: 90 capsule, Rfl: 1    furosemide (LASIX) 40 mg tablet, Take 1 tablet (40 mg total) by mouth 2 (two) times a day, Disp: 180 tablet, Rfl: 0    glucose blood (FREESTYLE LITE) test strip, TEST AS DIRECTED UP TO FOUR TIMES A DAY, Disp: 100 each, Rfl: 3    Lancets (FREESTYLE) lancets, Use as instructed, Disp: 100 each, Rfl: 0    losartan (COZAAR) 100 MG tablet, Take 1 tablet (100 mg total) by mouth daily, Disp: 30 tablet, Rfl: 5    metFORMIN (GLUCOPHAGE) 500 mg tablet, TAKE 1 TABLET (500 MG TOTAL) BY MOUTH 2 (TWO) TIMES A DAY WITH MEALS, Disp: 180 tablet, Rfl: 1    Misc   Devices (QUAD CANE) MISC, by Does not apply route daily, Disp: 1 each, Rfl: 0    montelukast (SINGULAIR) 10 mg tablet, Take 1 tablet (10 mg total) by mouth daily at bedtime, Disp: 90 tablet, Rfl: 0    ranolazine (RANEXA) 500 mg 12 hr tablet, Take 1 tablet (500 mg total) by mouth every 12 (twelve) hours, Disp: 60 tablet, Rfl: 0    rivaroxaban (XARELTO) 2 5 mg tablet, Take 1 tablet (2 5 mg total) by mouth 2 (two) times a day with meals, Disp: 60 tablet, Rfl: 0    ferrous sulfate 324 (65 Fe) mg, Take 1 tablet (324 mg total) by mouth 2 (two) times a day before meals, Disp: 60 tablet, Rfl: 2    Glucose-Vitamin C-Vitamin D (TRUEPLUS GLUCOSE ON THE GO) CHEW, CHEW 2 TABS AS NEEDED FOR BLOOD SUGAR LESS THAN [de-identified], Disp: , Rfl:    Family History   Problem Relation Age of Onset    Heart disease Father     Hypertension Mother     Stroke Mother     Other Sister         1)Breast disorder; 2)Epilepsy   [facility] Migraines Sister         Migraine headaches    Osteoporosis Sister     Thyroid disease Sister     Coronary artery disease Sister         S/P triple vessel bypass    Osteoporosis Maternal Grandmother     Diabetes Son         Diabetes mellitus    Hypertension Son     Rheum arthritis Son         Rheumatic disease    Heart disease Family       Social History     Socioeconomic History    Marital status:      Spouse name: None    Number of children: 6    Years of education: None    Highest education level: None   Occupational History    Occupation: Retired   Social Needs    Financial resource strain: Not hard at all   its learning insecurity     Worry: Never true     Inability: Never true   dooub needs     Medical: No     Non-medical: No   Tobacco Use    Smoking status: Former Smoker     Packs/day: 3 00     Quit date:      Years since quittin 4    Smokeless tobacco: Former User    Tobacco comment: quit over 30 yrs ago; (quit in the , had smoked 3PPD for 20 years - per [employer])   Substance and Sexual Activity    Alcohol use: Never     Frequency: Never     Binge frequency: Never    Drug use: Never    Sexual activity: Not Currently   Lifestyle    Physical activity     Days per week: 0 days     Minutes per session: 0 min    Stress: Not at all   Relationships    Social connections     Talks on phone: Once a week     Gets together: Once a week     Attends Taoism service: 1 to 4 times per year     Active member of club or organization: No     Attends meetings of clubs or organizations: Never     Relationship status:     Intimate partner violence     Fear of current or ex partner: No     Emotionally abused: No     Physically abused:  No Forced sexual activity: No   Other Topics Concern    None   Social History Narrative    Diet needs improvement    Does not exercise    No caffeine use      No Known Allergies     Active Problem List     Patient Active Problem List   Diagnosis    Aortic valve regurgitation, nonrheumatic    Benign hypertension with CKD (chronic kidney disease) stage III    Chronic rhinitis    Midline cystocele    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, without long-term current use of insulin ([facility])    Non-rheumatic mitral regurgitation    Uterine procidentia    Anemia    Esophageal abnormality    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    Decreased hearing, bilateral    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[address] )    History of CVA with residual deficit    Mixed hyperlipidemia    Persistent proteinuria    Renal cyst    Ankle edema    Stage 3a chronic kidney disease (HCC)    Acute diastolic congestive heart failure ([facility])    Status post insertion of drug-eluting stent into left anterior descending (LAD) artery    Coronary artery disease involving native coronary artery of native heart without angina pectoris    NSTEMI (non-ST elevated myocardial infarction) ([facility])    Essential hypertension    Asthma    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[facility])       Objective     /63 (BP Location: Right arm, Patient Position: Sitting, Cuff Size: Standard)   Pulse 64   Temp (!) 96 9 °F (36 1 °C) (Temporal)   Wt 74 5 kg (164 lb 3 2 oz)   BMI 29 09 kg/m²     Physical Exam  Constitutional:       General: She is not in acute distress  Appearance: Normal appearance  She is not ill-appearing  HENT:      Head: Normocephalic and atraumatic  Mouth/Throat:      Mouth: Mucous membranes are moist    Eyes:      Extraocular Movements: Extraocular movements intact  Conjunctiva/sclera: Conjunctivae normal       Pupils: Pupils are equal, round, and reactive to light     Cardiovascular: Rate and Rhythm: Normal rate and regular rhythm  Pulses: Normal pulses  Heart sounds: Murmur present  No friction rub  No gallop  Pulmonary:      Effort: Pulmonary effort is normal  No respiratory distress  Breath sounds: Normal breath sounds  No wheezing or rales  Abdominal:      General: Abdomen is flat  Bowel sounds are normal  There is no distension  Palpations: Abdomen is soft  Tenderness: There is no abdominal tenderness  There is no guarding  Musculoskeletal:      Right lower leg: No edema  Left lower leg: No edema  Skin:     General: Skin is warm and dry  Neurological:      General: No focal deficit present  Mental Status: She is alert and oriented to person, place, and time     Psychiatric:         Mood and Affect: Mood normal          Behavior: Behavior normal           Pertinent Laboratory/Diagnostic Studies:  CBC:   Lab Results   Component Value Date/Time    WBC 4 93 05/26/2021 04:53 AM    WBC 5 84 05/04/2015 09:56 AM    RBC 3 67 (L) 05/26/2021 04:53 AM    RBC 4 44 05/04/2015 09:56 AM    HGB 9 4 (L) 05/26/2021 04:53 AM    HGB 11 6 05/04/2015 09:56 AM    HCT 30 1 (L) 05/26/2021 04:53 AM    HCT 35 2 05/04/2015 09:56 AM    MCV 82 05/26/2021 04:53 AM    MCV 79 (L) 05/04/2015 09:56 AM    MCH 25 6 (L) 05/26/2021 04:53 AM    MCH 26 1 (L) 05/04/2015 09:56 AM    MCHC 31 2 (L) 05/26/2021 04:53 AM    MCHC 33 0 05/04/2015 09:56 AM    RDW 14 3 05/26/2021 04:53 AM    RDW 13 6 05/04/2015 09:56 AM    MPV 12 6 05/26/2021 04:53 AM    MPV 11 3 05/04/2015 09:56 AM     (L) 05/26/2021 04:53 AM     05/04/2015 09:56 AM    NRBC 0 05/26/2021 04:53 AM    NEUTOPHILPCT 63 05/26/2021 04:53 AM    NEUTOPHILPCT 63 05/04/2015 09:56 AM    LYMPHOPCT 25 05/26/2021 04:53 AM    LYMPHOPCT 28 05/04/2015 09:56 AM    MONOPCT 9 05/26/2021 04:53 AM    MONOPCT 6 05/04/2015 09:56 AM    EOSPCT 2 05/26/2021 04:53 AM    EOSPCT 2 05/04/2015 09:56 AM    BASOPCT 1 05/26/2021 04:53 AM BASOPCT 1 05/04/2015 09:56 AM    NEUTROABS 3 15 05/26/2021 04:53 AM    NEUTROABS 3 68 05/04/2015 09:56 AM    LYMPHSABS 1 23 05/26/2021 04:53 AM    LYMPHSABS 1 64 05/04/2015 09:56 AM    MONOSABS 0 42 05/26/2021 04:53 AM    MONOSABS 0 35 05/04/2015 09:56 AM    EOSABS 0 09 05/26/2021 04:53 AM    EOSABS 0 12 05/04/2015 09:56 AM     Chemistry Profile:   Lab Results   Component Value Date/Time     09/22/2015 12:25 PM    K 4 3 05/26/2021 04:53 AM    K 3 6 09/22/2015 12:25 PM     05/26/2021 04:53 AM     09/22/2015 12:25 PM    CO2 27 05/26/2021 04:53 AM    CO2 27 09/22/2015 12:25 PM    ANIONGAP 13 09/22/2015 12:25 PM    BUN 46 (H) 05/26/2021 04:53 AM    BUN 29 (H) 09/22/2015 12:25 PM    CREATININE 1 18 05/26/2021 04:53 AM    CREATININE 0 96 09/22/2015 12:25 PM    GLUC 153 (H) 05/26/2021 04:53 AM    GLUF 141 (H) 04/03/2021 08:44 AM    GLUCOSE 170 (H) 09/22/2015 12:25 PM    CALCIUM 9 2 05/26/2021 04:53 AM    CALCIUM 9 3 09/22/2015 12:25 PM    CORRECTEDCA 9 8 03/25/2021 05:11 AM    MG 2 5 05/26/2021 04:53 AM    PHOS 3 9 11/10/2020 08:23 AM    AST 30 03/25/2021 05:11 AM    AST 10 09/22/2015 12:25 PM    ALT 28 03/25/2021 05:11 AM    ALT 23 09/22/2015 12:25 PM    ALKPHOS 78 03/25/2021 05:11 AM    ALKPHOS 94 09/22/2015 12:25 PM    PROT 8 1 09/22/2015 12:25 PM    BILITOT 0 80 09/22/2015 12:25 PM    EGFR 50 05/26/2021 04:53 AM     Endocrine Studies:   Lab Results   Component Value Date/Time    HGBA1C 6 7 (H) 04/03/2021 08:44 AM    HGBA1C 9 5 (H) 09/22/2015 12:25 PM    YUI5SNVUJBXH 1 950 03/24/2021 04:50 AM    IVH0DSICTTZU 0 674 09/23/2014 01:04 PM    FREET4 1 07 01/22/2016 01:38 PM    TRIG 44 11/10/2020 08:23 AM    CHOLESTEROL 140 11/10/2020 08:23 AM    HDL 75 11/10/2020 08:23 AM    LDLCALC 56 11/10/2020 08:23 AM    LDLDIRECT 58 06/12/2017 11:12 AM    LDLDIRECT 70 09/22/2015 12:25 PM    [place] [address] 06/02/2020 12:18 PM    [id number] 43 6 06/02/2020 12:18 PM    PTH 52 4 11/10/2020 08:23 AM       Current Medications Current Outpatient Medications:     acetaminophen (TYLENOL) 650 mg CR tablet, Take 650 mg by mouth every 6 (six) hours as needed for mild pain, Disp: , Rfl:     albuterol (PROVENTIL HFA,VENTOLIN HFA) 90 mcg/act inhaler, Inhale 2 puffs every 4 (four) hours as needed for wheezing, Disp: 6 7 g, Rfl: 1    aspirin (ECOTRIN LOW STRENGTH) 81 mg EC tablet, Take 1 tablet (81 mg total) by mouth daily, Disp: 90 tablet, Rfl: 0    atorvastatin (LIPITOR) 40 mg tablet, Take 1 tablet (40 mg total) by mouth daily, Disp: 90 tablet, Rfl: 1    Blood Glucose Monitoring Suppl (FREESTYLE LITE) JAQUELIN, by Does not apply route daily, Disp: 1 each, Rfl: 0    Breo Ellipta 100-25 MCG/INH inhaler, , Disp: , Rfl:     carvedilol (COREG) 6 25 mg tablet, Take 1 tablet (6 25 mg total) by mouth 2 (two) times a day with meals, Disp: 60 tablet, Rfl: 0    clopidogrel (PLAVIX) 75 mg tablet, TAKE 2 TABS DAILY, Disp: 180 tablet, Rfl: 1    conjugated estrogens (PREMARIN) vaginal cream, Insert 0 5 g into the vagina 2 (two) times a week Insert twice weekly at bedtime, Disp: 30 g, Rfl: 1    Continuous Blood Gluc Sensor (FreeStyle Naldo 14 Day Sensor) MISC, Use one sensor every 14 days, Disp: 4 each, Rfl: 3    CVS D3 50 MCG (2000 UT) CAPS, Take 1 capsule (2,000 Units total) by mouth daily, Disp: 90 capsule, Rfl: 1    furosemide (LASIX) 40 mg tablet, Take 1 tablet (40 mg total) by mouth 2 (two) times a day, Disp: 180 tablet, Rfl: 0    glucose blood (FREESTYLE LITE) test strip, TEST AS DIRECTED UP TO FOUR TIMES A DAY, Disp: 100 each, Rfl: 3    Lancets (FREESTYLE) lancets, Use as instructed, Disp: 100 each, Rfl: 0    losartan (COZAAR) 100 MG tablet, Take 1 tablet (100 mg total) by mouth daily, Disp: 30 tablet, Rfl: 5    metFORMIN (GLUCOPHAGE) 500 mg tablet, TAKE 1 TABLET (500 MG TOTAL) BY MOUTH 2 (TWO) TIMES A DAY WITH MEALS, Disp: 180 tablet, Rfl: 1    Misc   Devices (QUAD CANE) MISC, by Does not apply route daily, Disp: 1 each, Rfl: 0   montelukast (SINGULAIR) 10 mg tablet, Take 1 tablet (10 mg total) by mouth daily at bedtime, Disp: 90 tablet, Rfl: 0    ranolazine (RANEXA) 500 mg 12 hr tablet, Take 1 tablet (500 mg total) by mouth every 12 (twelve) hours, Disp: 60 tablet, Rfl: 0    rivaroxaban (XARELTO) 2 5 mg tablet, Take 1 tablet (2 5 mg total) by mouth 2 (two) times a day with meals, Disp: 60 tablet, Rfl: 0    ferrous sulfate 324 (65 Fe) mg, Take 1 tablet (324 mg total) by mouth 2 (two) times a day before meals, Disp: 60 tablet, Rfl: 2    Glucose-Vitamin C-Vitamin D (TRUEPLUS GLUCOSE ON THE GO) CHEW, CHEW 2 TABS AS NEEDED FOR BLOOD SUGAR LESS THAN 80, Disp: , Rfl:     Health Maintenance     Health Maintenance   Topic Date Due    Medicare Annual Wellness Visit (AWV)  02/19/2021    Depression Screening PHQ  04/17/2021    DM Eye Exam  10/02/2021    HEMOGLOBIN A1C  10/03/2021    Diabetic Foot Exam  04/22/2022    BMI: Followup Plan  05/24/2022    Fall Risk  06/01/2022    BMI: Adult  06/01/2022    DTaP,Tdap,and Td Vaccines (2 - Td) 03/11/2030    Pneumococcal Vaccine: 65+ Years  Completed    Influenza Vaccine  Completed    COVID-19 Vaccine  Completed    HIB Vaccine  Aged Out    Hepatitis B Vaccine  Aged Out    IPV Vaccine  Aged Out    Hepatitis A Vaccine  Aged Out    Meningococcal ACWY Vaccine  Aged Out    HPV Vaccine  Aged Out     Immunization History   Administered Date(s) Administered    INFLUENZA 10/10/2017    Influenza Quadrivalent, 6-35 Months IM 10/10/2017    Influenza, high dose seasonal 0 7 mL 10/02/2019, 10/06/2020    Pneumococcal Conjugate 13-Valent 02/19/2020    Pneumococcal Polysaccharide PPV23 01/01/2008    SARS-CoV-2 / COVID-19 mRNA IM (Pfizer-BioNTech) 03/30/2021, 04/21/2021    Tdap 03/11/2020       Christian Ariana LO    Internal Medicine PGY-2  6/1/2021 3:41 PM

## 2021-06-01 NOTE — PATIENT INSTRUCTIONS
Follow-up with cardiology, Nephrology  Take iron supplement  Please report if he developed any bloody stools or any other signs of bleeding  Follow-up in 3 months for Medicare annual wellness visit  Healthy Lifestyle After a Heart Attack   AMBULATORY CARE:   A healthy lifestyle  after a heart attack can help you recover and also prevent another heart attack  A healthy lifestyle includes managing health conditions that increase your risk for another heart attack  It also includes eating healthy foods and exercising to give you more energy to do the things you enjoy in your life  Go to cardiac rehabilitation as directed: This is a program run by specialists who will help you safely strengthen your heart and prevent more heart disease  This plan includes exercise, relaxation, stress management, and heart-healthy nutrition  Healthcare providers will also check to make sure any medicines you are taking are working  The plan may also include instructions for when you can drive, return to work, and do other normal daily activities  Do not smoke:  Nicotine and other chemicals in cigarettes and cigars can cause lung and heart damage  Ask your healthcare provider for information if you currently smoke and need help to quit  E-cigarettes or smokeless tobacco still contain nicotine  Talk to your healthcare provider before you use these products  Manage other medical conditions:  Diabetes and high cholesterol increases your risk for another heart attack and stroke  Talk to your healthcare provider about your management plan  He or she will make a plan that helps you manage your conditions  Follow a heart-healthy diet:  A heart-healthy diet is an eating plan low in total fat, unhealthy fats, and sodium (salt)  A heart-healthy diet helps decrease your risk for heart disease and stroke  Limit the amount of fat you eat to 25% to 35% of your total daily calories   Your healthcare provider may recommend the DASH (Dietary Approaches to Stop Hypertension) Eating Plan to help lower high blood pressure and LDL (bad) cholesterol  The plan is low in sodium, sugar, unhealthy fats, and total fat  It is high in potassium, calcium, magnesium, and fiber  Ask for more information about this plan  Limit sodium (salt) as directed: Too much sodium can affect your fluid balance  Check labels to find low-sodium or no-salt-added foods  Some low-sodium foods use potassium salts for flavor  Too much potassium can also cause health problems  Your healthcare provider will tell you how much sodium and potassium are safe for you to have in a day  He or she may recommend that you limit sodium to 2,000 to 2,300 mg a day  Exercise as directed:  Ask your healthcare provider about the best exercise plan for you  Exercise makes your heart stronger, lowers blood pressure, and helps prevent a heart attack  The goal is 30 to 60 minutes a day, 5 to 7 days a week  You may have to work up to this goal  Healthcare providers can help you reach this goal, starting in cardiac rehab sessions  Maintain a healthy weight:  Extra weight puts stress on your heart and makes it work harder  Ask your healthcare provider how much you should weigh  He or she can help you create a safe weight loss plan if you are overweight  Manage stress:  Stress may increase your risk for another heart attack  Learn ways to control stress, such as relaxation, deep breathing, and music  Talk to someone about things that upset you  Check your blood pressure at home:  A high blood pressure can increase your risk for another heart attack  Sit and rest for 5 minutes before you take your blood pressure  Extend your arm and support it on a flat surface  Your arm should be at the same level as your heart  Follow the directions that came with your monitor  If possible, take at least 2 readings each time   Take your blood pressure at least 2 times each day at the same times, such as mornings and evenings  Keep a record of your readings and bring it to your follow-up visits  Ask your healthcare provider what your blood pressure should be  Get a flu vaccine every year as soon as it is available: The vaccine will help prevent the flu  A heart attack will make it harder for you to fight off the flu virus on your own  The flu may also be worse for you than for a person who has not had a heart attack  Ask about other vaccinations you may need  Do not take any medicines without talking to your healthcare provider first:  Some medicines, such as NSAIDs and hormones, may cause problems if taken with your heart medicines  Always ask your healthcare provider before you take any other medicines  Follow up with your doctor or cardiologist as directed:  Write down your questions so you remember to ask them during your visits  © Copyright [address] Information is for End User's use only and may not be sold, redistributed or otherwise used for commercial purposes  All illustrations and images included in CareNotes® are the copyrighted property of A D A M , Inc  or [facility] Millicent Rosa   The above information is an  only  It is not intended as medical advice for individual conditions or treatments  Talk to your doctor, nurse or pharmacist before following any medical regimen to see if it is safe and effective for you  Damage to your kidney increases your chance of going back to the hospital for any reason  You have recently experienced acute kidney injury or have chronic kidney disease  Please read the following recommendations from your doctor  (1) Monitor and manage your other health conditions, including diabetes, high blood pressure or heart failure  Check your weights and blood pressure daily   Please call if you notice a change in your weight or if you have 2 or more blood pressure readings in a day less than 130/80       (2) Talk to your doctor before taking over-the-counter medicine, including pain medicine (NSAIDs): Ibuprofen (Advil or Motrin), and Naproxen (Aleve)  (3) Review all your prescribed medications with your health care provider including blood pressure medicines and diuretics "water pills" and do not take more than directed  (4) Tell other health care providers if you have had Acute Kidney Injury, especially before you get contrast/dye for x-ray or CT scan  (5) Follow up with healthcare provider(s) as directed

## 2021-06-01 NOTE — TELEPHONE ENCOUNTER
[place] w/ patient about this season's [employer] Voucher program   Kevin Valderrama, DESEANN, LDN, Charisma Ryan

## 2021-06-02 ENCOUNTER — APPOINTMENT (OUTPATIENT)
Dept: CARDIAC REHAB | Age: 81
End: 2021-06-02
Payer: COMMERCIAL

## 2021-06-02 ENCOUNTER — TELEPHONE (OUTPATIENT)
Dept: CARDIAC REHAB | Age: 81
End: 2021-06-02

## 2021-06-03 ENCOUNTER — APPOINTMENT (OUTPATIENT)
Dept: LAB | Facility: HOSPITAL | Age: 81
End: 2021-06-03
Attending: INTERNAL MEDICINE
Payer: COMMERCIAL

## 2021-06-03 ENCOUNTER — TELEPHONE (OUTPATIENT)
Dept: INTERNAL MEDICINE CLINIC | Facility: CLINIC | Age: 81
End: 2021-06-03

## 2021-06-03 DIAGNOSIS — R80.1 PERSISTENT PROTEINURIA: ICD-10-CM

## 2021-06-03 DIAGNOSIS — N17.9 AKI (ACUTE KIDNEY INJURY) (HCC): ICD-10-CM

## 2021-06-03 DIAGNOSIS — I21.4 NSTEMI (NON-ST ELEVATED MYOCARDIAL INFARCTION) (HCC): Primary | ICD-10-CM

## 2021-06-03 DIAGNOSIS — D50.9 IRON DEFICIENCY ANEMIA, UNSPECIFIED IRON DEFICIENCY ANEMIA TYPE: ICD-10-CM

## 2021-06-03 DIAGNOSIS — N18.31 STAGE 3A CHRONIC KIDNEY DISEASE (HCC): ICD-10-CM

## 2021-06-03 LAB
ANION GAP SERPL CALCULATED.3IONS-SCNC: 7 MMOL/L (ref 4–13)
BUN SERPL-MCNC: 78 MG/DL (ref 5–25)
CALCIUM SERPL-MCNC: 10.1 MG/DL (ref 8.3–10.1)
CHLORIDE SERPL-SCNC: 104 MMOL/L (ref 100–108)
CO2 SERPL-SCNC: 28 MMOL/L (ref 21–32)
CREAT SERPL-MCNC: 1.76 MG/DL (ref 0.6–1.3)
ERYTHROCYTE [DISTWIDTH] IN BLOOD BY AUTOMATED COUNT: 13.9 % (ref 11.6–15.1)
GFR SERPL CREATININE-BSD FRML MDRD: 31 ML/MIN/1.73SQ M
GLUCOSE P FAST SERPL-MCNC: 135 MG/DL (ref 65–99)
HCT VFR BLD AUTO: 34.2 % (ref 34.8–46.1)
HGB BLD-MCNC: 10.6 G/DL (ref 11.5–15.4)
MAGNESIUM SERPL-MCNC: 2.5 MG/DL (ref 1.6–2.6)
MCH RBC QN AUTO: 25.5 PG (ref 26.8–34.3)
MCHC RBC AUTO-ENTMCNC: 31 G/DL (ref 31.4–37.4)
MCV RBC AUTO: 82 FL (ref 82–98)
PHOSPHATE SERPL-MCNC: 3.8 MG/DL (ref 2.3–4.1)
PLATELET # BLD AUTO: 218 THOUSANDS/UL (ref 149–390)
PMV BLD AUTO: 12.3 FL (ref 8.9–12.7)
POTASSIUM SERPL-SCNC: 3.6 MMOL/L (ref 3.5–5.3)
RBC # BLD AUTO: 4.16 MILLION/UL (ref 3.81–5.12)
SODIUM SERPL-SCNC: 139 MMOL/L (ref 136–145)
WBC # BLD AUTO: 5.61 THOUSAND/UL (ref 4.31–10.16)

## 2021-06-03 PROCEDURE — 84100 ASSAY OF PHOSPHORUS: CPT

## 2021-06-03 PROCEDURE — 80048 BASIC METABOLIC PNL TOTAL CA: CPT

## 2021-06-03 PROCEDURE — 36415 COLL VENOUS BLD VENIPUNCTURE: CPT

## 2021-06-03 PROCEDURE — 85027 COMPLETE CBC AUTOMATED: CPT

## 2021-06-03 PROCEDURE — 83735 ASSAY OF MAGNESIUM: CPT

## 2021-06-03 RX ORDER — NITROGLYCERIN 0.4 MG/1
0.4 TABLET SUBLINGUAL
Qty: 3 TABLET | Refills: 0 | Status: SHIPPED | OUTPATIENT
Start: 2021-06-03 | End: 2021-12-07

## 2021-06-03 NOTE — TELEPHONE ENCOUNTER
Insurance is declining Ranexa at this time  Would like the pt to try 2 anti anginal for atleast 2 months before approval  Will add PRN NTG and re-evaluate in few weeks

## 2021-06-03 NOTE — TELEPHONE ENCOUNTER
Received a physician response required form for Dr Iraj Wadsworth to complete  After reviewing form, he stated patient has not tried any other medications  (see other task placed by Dr Iraj Wadsworth) Dr Iraj Wadsworth placed patient on nitroglycerin and we will trial and see how patient does on this first and reevaluate in a few months  I called patient and advised her, patient verbalized understanding  No further questions at this time

## 2021-06-04 ENCOUNTER — TELEPHONE (OUTPATIENT)
Dept: OTHER | Facility: HOSPITAL | Age: 81
End: 2021-06-04

## 2021-06-04 ENCOUNTER — APPOINTMENT (OUTPATIENT)
Dept: CARDIAC REHAB | Age: 81
End: 2021-06-04
Payer: COMMERCIAL

## 2021-06-04 DIAGNOSIS — R80.1 PERSISTENT PROTEINURIA: ICD-10-CM

## 2021-06-04 RX ORDER — LOSARTAN POTASSIUM 100 MG/1
50 TABLET ORAL DAILY
Qty: 30 TABLET | Refills: 0 | Status: SHIPPED | OUTPATIENT
Start: 2021-06-04 | End: 2021-10-11 | Stop reason: SDUPTHER

## 2021-06-04 NOTE — TELEPHONE ENCOUNTER
Please let her know that creatinine has significantly worsened up to 1 7  She had cardiac catheterization on 5/24/21 although creatinine was stable two days after  Please have her reduce losartan from 100 mg to 50 mg daily  She should have repeat BMP early next week  Drink adequate water to stay hydrated

## 2021-06-04 NOTE — TELEPHONE ENCOUNTER
Left a very detailed message for pt with the above instructions, asked that she calls office to confirm that message was received and if any questions

## 2021-06-07 ENCOUNTER — TELEPHONE (OUTPATIENT)
Dept: OTHER | Facility: HOSPITAL | Age: 81
End: 2021-06-07

## 2021-06-07 ENCOUNTER — CLINICAL SUPPORT (OUTPATIENT)
Dept: CARDIAC REHAB | Age: 81
End: 2021-06-07
Payer: COMMERCIAL

## 2021-06-07 ENCOUNTER — APPOINTMENT (OUTPATIENT)
Dept: CARDIAC REHAB | Age: 81
End: 2021-06-07
Payer: COMMERCIAL

## 2021-06-07 ENCOUNTER — APPOINTMENT (OUTPATIENT)
Dept: LAB | Facility: HOSPITAL | Age: 81
End: 2021-06-07
Attending: INTERNAL MEDICINE
Payer: COMMERCIAL

## 2021-06-07 DIAGNOSIS — I21.4 NSTEMI (NON-ST ELEVATED MYOCARDIAL INFARCTION) (HCC): Primary | ICD-10-CM

## 2021-06-07 DIAGNOSIS — R80.1 PERSISTENT PROTEINURIA: ICD-10-CM

## 2021-06-07 DIAGNOSIS — Z95.5 STENTED CORONARY ARTERY: ICD-10-CM

## 2021-06-07 LAB
ANION GAP SERPL CALCULATED.3IONS-SCNC: 4 MMOL/L (ref 4–13)
BACTERIA UR QL AUTO: ABNORMAL /HPF
BILIRUB UR QL STRIP: NEGATIVE
BUN SERPL-MCNC: 62 MG/DL (ref 5–25)
CALCIUM SERPL-MCNC: 9.7 MG/DL (ref 8.3–10.1)
CHLORIDE SERPL-SCNC: 106 MMOL/L (ref 100–108)
CLARITY UR: ABNORMAL
CO2 SERPL-SCNC: 29 MMOL/L (ref 21–32)
COLOR UR: YELLOW
CREAT SERPL-MCNC: 1.51 MG/DL (ref 0.6–1.3)
CREAT UR-MCNC: 33.5 MG/DL
GFR SERPL CREATININE-BSD FRML MDRD: 37 ML/MIN/1.73SQ M
GLUCOSE SERPL-MCNC: 135 MG/DL (ref 65–140)
GLUCOSE UR STRIP-MCNC: NEGATIVE MG/DL
HGB UR QL STRIP.AUTO: ABNORMAL
HYALINE CASTS #/AREA URNS LPF: ABNORMAL /LPF
KETONES UR STRIP-MCNC: NEGATIVE MG/DL
LEUKOCYTE ESTERASE UR QL STRIP: ABNORMAL
MICROALBUMIN UR-MCNC: 33.7 MG/L (ref 0–20)
MICROALBUMIN/CREAT 24H UR: 101 MG/G CREATININE (ref 0–30)
NITRITE UR QL STRIP: NEGATIVE
NON-SQ EPI CELLS URNS QL MICRO: ABNORMAL /HPF
PH UR STRIP.AUTO: 7.5 [PH]
POTASSIUM SERPL-SCNC: 4.1 MMOL/L (ref 3.5–5.3)
PROT UR STRIP-MCNC: NEGATIVE MG/DL
RBC #/AREA URNS AUTO: ABNORMAL /HPF
SODIUM SERPL-SCNC: 139 MMOL/L (ref 136–145)
SP GR UR STRIP.AUTO: 1.01 (ref 1–1.03)
UROBILINOGEN UR QL STRIP.AUTO: 0.2 E.U./DL
WBC #/AREA URNS AUTO: ABNORMAL /HPF

## 2021-06-07 PROCEDURE — 82570 ASSAY OF URINE CREATININE: CPT

## 2021-06-07 PROCEDURE — 80048 BASIC METABOLIC PNL TOTAL CA: CPT

## 2021-06-07 PROCEDURE — 93798 PHYS/QHP OP CAR RHAB W/ECG: CPT

## 2021-06-07 PROCEDURE — 36415 COLL VENOUS BLD VENIPUNCTURE: CPT

## 2021-06-07 PROCEDURE — 81001 URINALYSIS AUTO W/SCOPE: CPT

## 2021-06-07 PROCEDURE — 82043 UR ALBUMIN QUANTITATIVE: CPT

## 2021-06-07 NOTE — TELEPHONE ENCOUNTER
Creatinine slightly improved 1 5  Still not back to baseline  Please have her do repeat BMP in 10 days  UA shows signs of infection  Does patient have fever, chills, lower abdominal pain, urinary symptoms like dysuria, urinary frequency/urgency, foul smell in urine, cloudy urine, etc ? If has no symptoms, will monitor without any need for antibiotics

## 2021-06-07 NOTE — PROGRESS NOTES
Cardiac Rehabilitation Plan of Care   30 Day Reassessment           Today's date: 2021   # of Exercise Sessions Completed: 2 sessions following initial evaluation  Patient name: Yang Perez      : 1940  Age: [de-identified] y o  MRN: [id number]  Referring Physician: Keily Zepeda DO  Cardiologist: Jaun Farias MD  Provider: Jessica  Clinician: Mary Mendoza, MS, CEP, CCRP      Dx:   Encounter Diagnoses   Name Primary?  NSTEMI (non-ST elevated myocardial infarction) ([facility] [address] ) Yes    Stented coronary artery      Date of onset: 3/22/21      SUMMARY OF PROGRESS:  Darion Fernandez completed 2 exercise sessions prior to a hospitalization on 21 with NSTEMI  She plans to return to cardiac rehab  today, 2021 and has a f/u ov with Cardiology on [address]  In her two sessions prior to her hospitalization,  She tolerated 28-30 mins at 2 3-3 0 METs  Resting BP  118/68 with appropriate response to exercise reaching 122/60  NSR on tele with occ PVCs observed  RHR 69 ExHR 84  Jennifer's initial goals include: be able to walk around the block, walk up a flight of stairs, not have to rely on her son's so much  The patient's CAD risk factors include:  inactivity, obesity/overweight, hypertension and diabetes  Her education will focus on lifestyle modification/education specific to Her needs  Patient will attend group education classes on heart healthy eating, reading food labels, stress management, risk factor reduction, understanding heart disease and common heart medications    Patient will attend 33 monitored exercise sessions, 3x/wk for 12-18 weeks beginning  at 2pm           Medication compliance: Yes   Comments: Pt reports to be compliant with medications  Fall Risk: Moderate   Comments: Patient uses walking assist device (walker/cane/rollator)    EKG Interpretation: sinus bradycardia at rest, no ectopy      EXERCISE ASSESSMENT and PLAN    Current Exercise Program in Rehab:       Frequency: 3 days/week Supplement with home exercise 2+ days/wk as tolerated       Minutes: 30-40         METS: 1 7-2 2            HR: RHR + 30bpm    RPE: 4-5         Modalities: UBE, NuStep and Recumbent bike      Exercise Progression 30 Day Goals :    Frequency: 3 days/week of cardiac rehab     Supplement with home exercise 2+ days/wk as tolerated    Minutes: 40                            >150 mins/wk of moderate intensity exercise   METS: 2 0-2 5   HR: RHR +30bpm    RPE: 4-6   Modalities: UBE, NuStep, Recumbent bike and Room walking    Strength trainin-3 days / week  12-15 repetitions  1-2 sets per modality   Will be added following 2-3 weeks of monitored exercise sessions   Modalities: Arm Curl, Sit to [employer] and Shoulder Shrugs    Home Exercise: none    Goals: 10% improvement in functional capacity - based on max METs achieved in fitness assessment, Reduced dyspnea with physical activity  0-1/10, improved DASI score by 10%, Exercise 5 days/wk, >150mins/wk of moderate intensity exercise, Resume ADLs with increased strength, Attend Rehab regularly and Decrease sitting time    Progression Toward Goals:  Reviewed Pt goals and determined plan of care    Education: benefit of exercise for CAD risk factors, AHA guidelines to achieve >150 mins/wk of moderate exercise and RPE scale   Plan:education on home exercise guidelines, home exercise 30+ mins 2 days opposite CR and Education class: Risk Factors for Heart Disease  Readiness to change: Contemplation:  (Acknowledging that there is a problem but not yet ready or sure of wanting to make a change)      NUTRITION ASSESSMENT AND PLAN    Weight control:    Starting weight: 161 2   Current weight:     Waist circumference:    Startin   Current:      Diabetes: T2D, Patient monitors BS 4 times/day, Patient reported fasting BS 130s  A1c: 6 7    last measured: 4/3/2021    Lipid management: Discussed diet and lipid management and Last lipid profile 11/10/2020  Chol 140  TRG 44  HDL 75  LDL 56    Goals:reduced BMI to < 25, reduced waist circumference <35 inches (F), improved A1c  < 7 0%, Improved Rate Your Plate score  >74, increase intake of fish, shellfish, increase intake of meatless meals, use low fat dairy, reduce cheese intake or use reduced-fat, Eat 4-5 cups of fruits and vegetables daily, choose low sodium processed foods, use fat-free dressings/[place] or seldom use, Increase intake of nuts and seeds, seldom eat or choose low fat ice-cream, fruit juice bars or frozen yogurt  and eliminate or choose low-fat sweets    Progression Toward Goals: Reviewed Pt goals and determined plan of care    Education: heart healthy eating  low sodium diet  nutrition for Improved BG control  exercise and diabetes management   Plan: Education class: Reading Food Labels, Education Class: Heart Healthy Eating, switch to low fat cheeses, replace butter with soft spreads made with olive oil, canola or yogurt, replace unhealthy snacks with fruits & vegs, switch to skim or 1% milk, eat fewer desserts and sweets, will replace sugar sweetened cereals with whole grain or oatmeal, learn how to read food labels, replace sugar with stevia or truvia and keep added daily sugar <25g/day  Readiness to change: Preparation:  (Getting ready to change)       PSYCHOSOCIAL ASSESSMENT AND PLAN    Emotional:  Depression assessment:  PHQ-9 = 1-4 = Minimal Depression            Anxiety measure:  SHANTHI-7 = 0-4  = Not anxious  Self-reported stress level:  4  Social support: Excellent    Goals:  Reduce perceived stress to 1-3/10, Physical Fitness in [facility] Score < 3, Daily Activity in [facility] Score < 3, Pain in [place] Score < 3, Overall Health in [facility] Score < 3, improved sleep and increased energy    Progression Toward Goals: Reviewed Pt goals and determined plan of care    Education: benefits of a positive support system and depression and CAD  Plan: Class: Stress and Your Health, Class: Relaxation, Spend time outdoors, Enjoy a hobby and Reduce dependence  on family  Readiness to change: Action:  (Changing behavior)      OTHER CORE COMPONENTS     Tobacco:   Social History     Tobacco Use   Smoking Status Former Smoker    Packs/day: 3 00    Quit date: 36    Years since quittin 4   Smokeless Tobacco Former User   Tobacco Comment    quit over 30 yrs ago; (quit in the , had smoked 3PPD for 20 years - per [employer])       Tobacco Use Intervention:   Pt quit 40 years ago   and has abstained    Anginal Symptoms:  chest pressure and DUNAWAY   NTG use: No prescription    Blood pressure:    Restin/62   Exercise: 166/70    Goals: consistent BP < 130/80, reduced dietary sodium <2300mg, moderate intensity exercise >150 mins/wk and reduce number of medications  needed for BP control    Progression Toward Goals: Reviewed Pt goals and determined plan of care    Education:  understanding high blood pressure and it's relationship to CAD and low sodium diet and HTN  Plan: Class: Understanding Heart Disease, Class: Common Heart Medications, Avoid Processed foods, engage in regular exercise, use salt substitutes and check labels for sodium content  Readiness to change: Preparation:  (Getting ready to change)

## 2021-06-07 NOTE — PROGRESS NOTES
Cardiac Rehabilitation Plan of Care   30 Day Reassessment           Today's date: 2021   # of Exercise Sessions Completed: 2 sessions following initial evaluation  Patient name: Ivan Hall      : 1940  Age: [de-identified] y o  MRN: [id number]  Referring Physician: Dyana Burnett DO  Cardiologist: Nikos Manrique MD  Provider: Sonal Vargas  Clinician: Aryan Dodd, MS, CEP, CCRP      Dx:   Encounter Diagnoses   Name Primary?  NSTEMI (non-ST elevated myocardial infarction) ([facility] [address] ) Yes    Stented coronary artery      Date of onset: 3/22/21      SUMMARY OF PROGRESS:  [facility] completed 2 exercise sessions prior to a hospitalization on 21 with NSTEMI  She plans to return to cardiac rehab  today, 2021 and has a f/u ov with Cardiology on [address]  In her two sessions prior to her hospitalization,  She tolerated 28-30 mins at 2 3-3 0 METs  Resting BP  118/68 with appropriate response to exercise reaching 122/60  NSR on tele with occ PVCs observed  RHR 69 ExHR 84  Jennifer's initial goals include: be able to walk around the block, walk up a flight of stairs, not have to rely on her son's so much  The patient's CAD risk factors include:  inactivity, obesity/overweight, hypertension and diabetes  Her education will focus on lifestyle modification/education specific to Her needs  Patient will attend group education classes on heart healthy eating, reading food labels, stress management, risk factor reduction, understanding heart disease and common heart medications    Patient will attend 33 monitored exercise sessions, 3x/wk for 12-18 weeks beginning  at 2pm           Medication compliance: Yes   Comments: Pt reports to be compliant with medications  Fall Risk: Moderate   Comments: Patient uses walking assist device (walker/cane/rollator)    EKG Interpretation: sinus bradycardia at rest, no ectopy      EXERCISE ASSESSMENT and PLAN    Current Exercise Program in Rehab:       Frequency: 3 days/week Supplement with home exercise 2+ days/wk as tolerated       Minutes: 30-40         METS: 1 7-2 2            HR: RHR + 30bpm    RPE: 4-5         Modalities: UBE, NuStep and Recumbent bike      Exercise Progression 30 Day Goals :    Frequency: 3 days/week of cardiac rehab     Supplement with home exercise 2+ days/wk as tolerated    Minutes: 40                            >150 mins/wk of moderate intensity exercise   METS: 2 0-2 5   HR: RHR +30bpm    RPE: 4-6   Modalities: UBE, NuStep, Recumbent bike and Room walking    Strength trainin-3 days / week  12-15 repetitions  1-2 sets per modality   Will be added following 2-3 weeks of monitored exercise sessions   Modalities: Arm Curl, Sit to [employer], [employer] and Shoulder Shrugs    Home Exercise: none    Goals: 10% improvement in functional capacity - based on max METs achieved in fitness assessment, Reduced dyspnea with physical activity  0-1/10, improved DASI score by 10%, Exercise 5 days/wk, >150mins/wk of moderate intensity exercise, Resume ADLs with increased strength, Attend Rehab regularly and Decrease sitting time    Progression Toward Goals:  Reviewed Pt goals and determined plan of care    Education: benefit of exercise for CAD risk factors, AHA guidelines to achieve >150 mins/wk of moderate exercise and RPE scale   Plan:education on home exercise guidelines, home exercise 30+ mins 2 days opposite CR and Education class: Risk Factors for Heart Disease  Readiness to change: Contemplation:  (Acknowledging that there is a problem but not yet ready or sure of wanting to make a change)      NUTRITION ASSESSMENT AND PLAN    Weight control:    Starting weight: 161 2   Current weight:     Waist circumference:    Startin   Current:      Diabetes: T2D, Patient monitors BS 4 times/day, Patient reported fasting BS 130s  A1c: 6 7    last measured: 4/3/2021    Lipid management: Discussed diet and lipid management and Last lipid profile 11/10/2020  Chol 140  TRG 44  HDL 75  LDL 56    Goals:reduced BMI to < 25, reduced waist circumference <35 inches (F), improved A1c  < 7 0%, Improved Rate Your Plate score  >07, increase intake of fish, shellfish, increase intake of meatless meals, use low fat dairy, reduce cheese intake or use reduced-fat, Eat 4-5 cups of fruits and vegetables daily, choose low sodium processed foods, use fat-free dressings/[place] or seldom use, Increase intake of nuts and seeds, seldom eat or choose low fat ice-cream, fruit juice bars or frozen yogurt  and eliminate or choose low-fat sweets    Progression Toward Goals: Reviewed Pt goals and determined plan of care    Education: heart healthy eating  low sodium diet  nutrition for Improved BG control  exercise and diabetes management   Plan: Education class: Reading Food Labels, Education Class: Heart Healthy Eating, switch to low fat cheeses, replace butter with soft spreads made with olive oil, canola or yogurt, replace unhealthy snacks with fruits & vegs, switch to skim or 1% milk, eat fewer desserts and sweets, will replace sugar sweetened cereals with whole grain or oatmeal, learn how to read food labels, replace sugar with stevia or truvia and keep added daily sugar <25g/day  Readiness to change: Preparation:  (Getting ready to change)       PSYCHOSOCIAL ASSESSMENT AND PLAN    Emotional:  Depression assessment:  PHQ-9 = 1-4 = Minimal Depression            Anxiety measure:  SHANTHI-7 = 0-4  = Not anxious  Self-reported stress level:  4  Social support: Excellent    Goals:  Reduce perceived stress to 1-3/10, Physical Fitness in [facility] Score < 3, Daily Activity in [facility] Score < 3, Pain in [facility] Score < 3, Overall Health in [facility] Score < 3, improved sleep and increased energy    Progression Toward Goals: Reviewed Pt goals and determined plan of care    Education: benefits of a positive support system and depression and CAD  Plan: Class: Stress and Your Health, Class: Relaxation, Spend time outdoors, Enjoy a hobby and Reduce dependence  on family  Readiness to change: Action:  (Changing behavior)      OTHER CORE COMPONENTS     Tobacco:   Social History     Tobacco Use   Smoking Status Former Smoker    Packs/day: 3 00    Quit date: 36    Years since quittin 4   Smokeless Tobacco Former User   Tobacco Comment    quit over 30 yrs ago; (quit in the , had smoked 3PPD for 20 years - per [employer])       Tobacco Use Intervention:   Pt quit 40 years ago   and has abstained    Anginal Symptoms:  chest pressure and DUNAWAY   NTG use: No prescription    Blood pressure:    Restin/62   Exercise: 166/70    Goals: consistent BP < 130/80, reduced dietary sodium <2300mg, moderate intensity exercise >150 mins/wk and reduce number of medications  needed for BP control    Progression Toward Goals: Reviewed Pt goals and determined plan of care    Education:  understanding high blood pressure and it's relationship to CAD and low sodium diet and HTN  Plan: Class: Understanding Heart Disease, Class: Common Heart Medications, Avoid Processed foods, engage in regular exercise, use salt substitutes and check labels for sodium content  Readiness to change: Preparation:  (Getting ready to change)

## 2021-06-08 NOTE — TELEPHONE ENCOUNTER
I left detailed message for patient asking to call our office back regarding the following:  Creatinine slightly improved 1 5  Still not back to baseline  Please repeat BMP in 10 days  UA shows signs of infection   Does patient have fever, chills, lower abdominal pain, urinary symptoms like dysuria, urinary frequency/urgency, foul smell in urine, cloudy urine, etc ? If has no symptoms, will monitor without any need for antibiotics

## 2021-06-09 ENCOUNTER — CLINICAL SUPPORT (OUTPATIENT)
Dept: CARDIAC REHAB | Age: 81
End: 2021-06-09
Payer: COMMERCIAL

## 2021-06-09 ENCOUNTER — APPOINTMENT (OUTPATIENT)
Dept: CARDIAC REHAB | Age: 81
End: 2021-06-09
Payer: COMMERCIAL

## 2021-06-09 DIAGNOSIS — Z95.5 STENTED CORONARY ARTERY: ICD-10-CM

## 2021-06-09 PROCEDURE — 93798 PHYS/QHP OP CAR RHAB W/ECG: CPT

## 2021-06-10 ENCOUNTER — TELEPHONE (OUTPATIENT)
Dept: OTHER | Facility: OTHER | Age: 81
End: 2021-06-10

## 2021-06-10 NOTE — TELEPHONE ENCOUNTER
Forms sent back to be cosigned by attending Dr Stephie Henley highlighted      Folder Color- red    Name of [address] [place] [address] Certificate of Medical Necessity    Form to be filled out by- Joe    Form to be Faxed to [phone]

## 2021-06-10 NOTE — TELEPHONE ENCOUNTER
Patient needs to have an appointment for diabetic foot exam with a RESIDENT  We will keep form in RED clinical folder until appt    Form cannot be signed off on by Dr Maurisio Zepeda as this note is a podiatry office visit   I spoke with [employer] at [facility] and she said the patient will then need to come in for another appt to have foot exam

## 2021-06-11 ENCOUNTER — CLINICAL SUPPORT (OUTPATIENT)
Dept: CARDIAC REHAB | Age: 81
End: 2021-06-11
Payer: COMMERCIAL

## 2021-06-11 ENCOUNTER — APPOINTMENT (OUTPATIENT)
Dept: CARDIAC REHAB | Age: 81
End: 2021-06-11
Payer: COMMERCIAL

## 2021-06-11 DIAGNOSIS — I21.4 NSTEMI (NON-ST ELEVATED MYOCARDIAL INFARCTION) (HCC): ICD-10-CM

## 2021-06-11 PROCEDURE — 99283 EMERGENCY DEPT VISIT LOW MDM: CPT

## 2021-06-11 PROCEDURE — 99284 EMERGENCY DEPT VISIT MOD MDM: CPT | Performed by: EMERGENCY MEDICINE

## 2021-06-11 PROCEDURE — 93798 PHYS/QHP OP CAR RHAB W/ECG: CPT

## 2021-06-12 ENCOUNTER — HOSPITAL ENCOUNTER (EMERGENCY)
Facility: HOSPITAL | Age: 81
Discharge: HOME | End: 2021-06-12
Attending: EMERGENCY MEDICINE
Payer: COMMERCIAL

## 2021-06-12 VITALS
RESPIRATION RATE: 18 BRPM | WEIGHT: 163 LBS | SYSTOLIC BLOOD PRESSURE: 136 MMHG | TEMPERATURE: 98.2 F | OXYGEN SATURATION: 100 % | BODY MASS INDEX: 28.87 KG/M2 | HEART RATE: 58 BPM | DIASTOLIC BLOOD PRESSURE: 61 MMHG

## 2021-06-12 DIAGNOSIS — N30.91 HEMORRHAGIC CYSTITIS: Primary | ICD-10-CM

## 2021-06-12 LAB
BACTERIA UR QL AUTO: ABNORMAL /HPF
BILIRUB UR QL STRIP: NEGATIVE
CLARITY UR: CLEAR
COLOR UR: YELLOW
GLUCOSE UR STRIP-MCNC: NEGATIVE MG/DL
HGB UR QL STRIP.AUTO: ABNORMAL
HYALINE CASTS #/AREA URNS LPF: ABNORMAL /LPF
KETONES UR STRIP-MCNC: NEGATIVE MG/DL
LEUKOCYTE ESTERASE UR QL STRIP: ABNORMAL
NITRITE UR QL STRIP: NEGATIVE
NON-SQ EPI CELLS URNS QL MICRO: ABNORMAL /HPF
PH UR STRIP.AUTO: 7.5 [PH] (ref 4.5–8)
PROT UR STRIP-MCNC: NEGATIVE MG/DL
RBC #/AREA URNS AUTO: ABNORMAL /HPF
SP GR UR STRIP.AUTO: 1.02 (ref 1–1.03)
UROBILINOGEN UR QL STRIP.AUTO: 0.2 E.U./DL
WBC #/AREA URNS AUTO: ABNORMAL /HPF

## 2021-06-12 PROCEDURE — 87186 SC STD MICRODIL/AGAR DIL: CPT

## 2021-06-12 PROCEDURE — 87077 CULTURE AEROBIC IDENTIFY: CPT

## 2021-06-12 PROCEDURE — 87086 URINE CULTURE/COLONY COUNT: CPT

## 2021-06-12 PROCEDURE — 81001 URINALYSIS AUTO W/SCOPE: CPT

## 2021-06-12 RX ORDER — CEPHALEXIN 250 MG/1
500 CAPSULE ORAL ONCE
Status: COMPLETED | OUTPATIENT
Start: 2021-06-12 | End: 2021-06-12

## 2021-06-12 RX ORDER — CEPHALEXIN 500 MG/1
500 CAPSULE ORAL EVERY 8 HOURS SCHEDULED
Qty: 15 CAPSULE | Refills: 0 | Status: SHIPPED | OUTPATIENT
Start: 2021-06-12 | End: 2021-06-17

## 2021-06-12 RX ADMIN — CEPHALEXIN 500 MG: 250 CAPSULE ORAL at 04:06

## 2021-06-12 NOTE — ED PROVIDER NOTES
Final Diagnosis:  1  Hemorrhagic cystitis        Chief Complaint   Patient presents with    Blood in Urine     Pt report bright red urine for 2 hours     HPI  Patient presents with hematuria for 2 hours  No clots  No difficulty urinating  No other urinary symptoms  No fevers  No nausea  No flank pain  She's on xarelto  Distant but no recent smoking hisotry  No current flank pain  Yesterday started iron supplementation for anemia discovered 2 weeks ago on admission      - No language barrier    - History obtained from patient  - There are no limitations to the history obtained  - Previous charting underwent limited review with attention to labs, ekgs, and prior imaging  PMH:   has a past medical history of ACE-inhibitor cough, Asthma, Bilateral edema of lower extremity, Cardiac murmur, CKD (chronic kidney disease), Diabetes mellitus ([facility] [address] ), Esophageal dysphagia, Fatigue, Hyperlipidemia, Hypertension, Patellar bursitis of right knee, Personal history of other specified conditions, Stroke [facility]), Stroke syndrome, Urinary frequency, and UTI (urinary tract infection)  PSH:   has a past surgical history that includes pr esophagogastroduodenoscopy transoral diagnostic (N/A, 4/5/2017)  ROS:  Review of Systems   Constitutional: Negative for activity change, appetite change, chills, diaphoresis, fatigue and fever  HENT: Negative for congestion, facial swelling, mouth sores, rhinorrhea, sinus pain, sneezing, sore throat, trouble swallowing and voice change  Eyes: Negative for photophobia and visual disturbance  Respiratory: Negative for cough, chest tightness, shortness of breath, wheezing and stridor  Cardiovascular: Negative for chest pain, palpitations and leg swelling  Gastrointestinal: Negative for abdominal distention, abdominal pain, blood in stool, constipation, diarrhea, nausea and vomiting  Genitourinary: Positive for hematuria   Negative for decreased urine volume, difficulty urinating, dysuria, flank pain, frequency, menstrual problem, pelvic pain, vaginal bleeding and vaginal discharge  Musculoskeletal: Negative for arthralgias, gait problem, neck pain and neck stiffness  Skin: Negative for color change, rash and wound  Neurological: Negative for dizziness, syncope, facial asymmetry, speech difficulty, weakness, light-headedness, numbness and headaches  Psychiatric/Behavioral: Negative for confusion, self-injury and suicidal ideas  The patient is not nervous/anxious  PE:   Vitals:    06/11/21 2238 06/12/21 0408   BP: 147/76 136/61   BP Location:  Right arm   Pulse: 58 58   Resp: 18 18   Temp: 98 2 °F (36 8 °C)    TempSrc: Tympanic    SpO2: 99% 100%   Weight: 73 9 kg (163 lb)      Vitals reviewed by me  Physical Exam  Vitals signs and nursing note reviewed  Constitutional:       General: She is not in acute distress  Appearance: She is well-developed  She is not diaphoretic  HENT:      Head: Normocephalic and atraumatic  Right Ear: External ear normal       Left Ear: External ear normal       Nose: Nose normal    Eyes:      General: No scleral icterus  Conjunctiva/sclera: Conjunctivae normal       Pupils: Pupils are equal, round, and reactive to light  Neck:      Musculoskeletal: Normal range of motion and neck supple  Cardiovascular:      Rate and Rhythm: Normal rate and regular rhythm  Heart sounds: Normal heart sounds  No murmur  Pulmonary:      Effort: Pulmonary effort is normal  No respiratory distress  Breath sounds: Normal breath sounds  No stridor  No wheezing or rales  Abdominal:      General: Bowel sounds are normal  There is no distension  Palpations: Abdomen is soft  There is no mass  Tenderness: There is no abdominal tenderness  There is no guarding or rebound  Musculoskeletal: Normal range of motion  General: No tenderness or deformity  Lymphadenopathy:      Cervical: No cervical adenopathy     Skin: General: Skin is warm and dry  Capillary Refill: Capillary refill takes less than 2 seconds  Findings: No rash  Neurological:      Mental Status: She is alert and oriented to person, place, and time  Mental status is at baseline  Cranial Nerves: No cranial nerve deficit  Sensory: No sensory deficit  Psychiatric:         Behavior: Behavior normal          Thought Content: Thought content normal          Judgment: Judgment normal           A:  - Nursing note reviewed  Ddx  Iron supplementation discoloring urine  Hemorrhagic cystitis  assessment                      No orders to display     Orders Placed This Encounter   Procedures    Urine culture    Urine Microscopic    Urine dip analyzer     Labs Reviewed   URINE MICROSCOPIC - Abnormal       Result Value Ref Range Status    RBC, UA 2-4  None Seen, 2-4 /hpf Final    WBC, UA Innumerable (*) None Seen, 2-4 /hpf Final    Epithelial Cells None Seen  None Seen, Occasional /hpf Final    Bacteria, UA Innumerable (*) None Seen, Occasional /hpf Final    Hyaline Casts, UA None Seen  None Seen /lpf Final   URINE MACROSCOPIC, POC - Abnormal    Color, UA Yellow   Final    Clarity, UA Clear   Final    pH, UA 7 5  4 5 - 8 0 Final    Leukocytes, UA Moderate (*) Negative Final    Nitrite, UA Negative  Negative Final    Protein, UA Negative  Negative mg/dl Final    Glucose, UA Negative  Negative mg/dl Final    Ketones, UA Negative  Negative mg/dl Final    Urobilinogen, UA 0 2  0 2, 1 0 E U /dl E U /dl Final    Bilirubin, UA Negative  Negative Final    Blood, UA Small (*) Negative Final    Specific Gravity, UA 1 020  1 003 - 1 030 Final    Narrative:     CLINITEK RESULT   URINE CULTURE       Final Diagnosis:  1   Hemorrhagic cystitis        P:  - hospital tx includes keflex  - home tx should include keflex  - patient to follow withPCP    Medications   cephalexin (KEFLEX) capsule 500 mg (500 mg Oral Given 6/12/21 7326)     Time reflects when diagnosis was documented in both MDM as applicable and the Disposition within this note     Time User Action Codes Description Comment    6/12/2021  3:59 AM Hattie Westbrook Add [[address]] Hemorrhagic cystitis       ED Disposition     ED Disposition Condition Date/Time Comment    Discharge Stable Sat Jun 12, 2021  3:59 AM Aly Alcocer discharge to home/self care  Follow-up Information     Follow up With Specialties Details Why Contact Info    Lluvia Ruvalcaba PA-C Internal Medicine, Physician Assistant Schedule an appointment as soon as possible for a visit   [phone] [address]   [address] [facility]  [phone]          Discharge Medication List as of 6/12/2021  3:59 AM      START taking these medications    Details   cephalexin (KEFLEX) 500 mg capsule Take 1 capsule (500 mg total) by mouth every 8 (eight) hours for 5 days, Starting Sat 6/12/2021, Until Thu 6/17/2021, Print         CONTINUE these medications which have NOT CHANGED    Details   acetaminophen (TYLENOL) 650 mg CR tablet Take 650 mg by mouth every 6 (six) hours as needed for mild pain, Historical Med      albuterol (PROVENTIL HFA,VENTOLIN HFA) 90 mcg/act inhaler Inhale 2 puffs every 4 (four) hours as needed for wheezing, Starting Tue 6/1/2021, Normal      aspirin (ECOTRIN LOW STRENGTH) 81 mg EC tablet Take 1 tablet (81 mg total) by mouth daily, Starting Sun 3/28/2021, Normal      atorvastatin (LIPITOR) 40 mg tablet Take 1 tablet (40 mg total) by mouth daily, Starting Thu 2/11/2021, Until Tue 8/10/2021, Normal      Blood Glucose Monitoring Suppl (FREESTYLE LITE) JAQUELIN by Does not apply route daily, Starting Tue 4/17/2018, Until Tue 6/1/2021, Normal      Breo Ellipta 100-25 MCG/INH inhaler Starting Mon 1/18/2021, Historical Med      carvedilol (COREG) 6 25 mg tablet Take 1 tablet (6 25 mg total) by mouth 2 (two) times a day with meals, Starting Wed 5/26/2021, Until Fri 6/25/2021, Normal      clopidogrel (PLAVIX) 75 mg tablet TAKE 2 TABS DAILY, Normal      conjugated estrogens (PREMARIN) vaginal cream Insert 0 5 g into the vagina 2 (two) times a week Insert twice weekly at bedtime, Starting Mon 5/17/2021, Normal      Continuous Blood Gluc Sensor (FreeStyle Naldo 14 Day Sensor) MISC Use one sensor every 14 days, Normal      CVS D3 50 MCG (2000 UT) CAPS Take 1 capsule (2,000 Units total) by mouth daily, Starting Thu 3/4/2021, Normal      ferrous sulfate 324 (65 Fe) mg Take 1 tablet (324 mg total) by mouth 2 (two) times a day before meals, Starting Tue 6/1/2021, Normal      furosemide (LASIX) 40 mg tablet Take 1 tablet (40 mg total) by mouth 2 (two) times a day, Starting Sat 3/27/2021, Normal      glucose blood (FREESTYLE LITE) test strip TEST AS DIRECTED UP TO FOUR TIMES A DAY, Normal      Glucose-Vitamin C-Vitamin D (TRUEPLUS GLUCOSE ON THE GO) CHEW CHEW 2 TABS AS NEEDED FOR BLOOD SUGAR LESS THAN 80, Historical Med      Lancets (FREESTYLE) lancets Use as instructed, Normal      losartan (COZAAR) 100 MG tablet Take 0 5 tablets (50 mg total) by mouth daily, Starting Fri 6/4/2021, Print      metFORMIN (GLUCOPHAGE) 500 mg tablet TAKE 1 TABLET (500 MG TOTAL) BY MOUTH 2 (TWO) TIMES A DAY WITH MEALS, Normal      Misc  Devices (QUAD CANE) MISC by Does not apply route daily, Starting Mon 6/1/2020, Print      montelukast (SINGULAIR) 10 mg tablet Take 1 tablet (10 mg total) by mouth daily at bedtime, Starting Thu 3/4/2021, Normal      nitroglycerin (NITROSTAT) 0 4 mg SL tablet Place 1 tablet (0 4 mg total) under the tongue every 5 (five) minutes as needed for chest pain, Starting Thu 6/3/2021, Normal      ranolazine (RANEXA) 500 mg 12 hr tablet Take 1 tablet (500 mg total) by mouth every 12 (twelve) hours, Starting Wed 5/26/2021, Until Fri 6/25/2021, Normal      rivaroxaban (XARELTO) 2 5 mg tablet Take 1 tablet (2 5 mg total) by mouth 2 (two) times a day with meals, Starting Wed 5/26/2021, Until Fri 6/25/2021, Normal           No discharge procedures on file    Prior to Admission Medications   Prescriptions Last Dose Informant Patient Reported? Taking? Blood Glucose Monitoring Suppl (FREESTYLE LITE) JAQUELIN  Self No No   Sig: by Does not apply route daily   Breo Ellipta 100-25 MCG/INH inhaler  Self Yes No   CVS D3 50 MCG (2000 UT) CAPS  Self No No   Sig: Take 1 capsule (2,000 Units total) by mouth daily   Continuous Blood Gluc Sensor (FreeStyle Naldo 14 Day Sensor) MISC  Self No No   Sig: Use one sensor every 14 days   Glucose-Vitamin C-Vitamin D (TRUEPLUS GLUCOSE ON THE GO) CHEW  Self Yes No   Sig: CHEW 2 TABS AS NEEDED FOR BLOOD SUGAR LESS THAN 80   Lancets (FREESTYLE) lancets  Self No No   Sig: Use as instructed   Misc   Devices (QUAD CANE) MISC  Self No No   Sig: by Does not apply route daily   acetaminophen (TYLENOL) 650 mg CR tablet  Self Yes No   Sig: Take 650 mg by mouth every 6 (six) hours as needed for mild pain   albuterol (PROVENTIL HFA,VENTOLIN HFA) 90 mcg/act inhaler   No No   Sig: Inhale 2 puffs every 4 (four) hours as needed for wheezing   aspirin (ECOTRIN LOW STRENGTH) 81 mg EC tablet  Self No No   Sig: Take 1 tablet (81 mg total) by mouth daily   atorvastatin (LIPITOR) 40 mg tablet  Self No No   Sig: Take 1 tablet (40 mg total) by mouth daily   carvedilol (COREG) 6 25 mg tablet   No No   Sig: Take 1 tablet (6 25 mg total) by mouth 2 (two) times a day with meals   clopidogrel (PLAVIX) 75 mg tablet   No No   Sig: TAKE 2 TABS DAILY   conjugated estrogens (PREMARIN) vaginal cream   No No   Sig: Insert 0 5 g into the vagina 2 (two) times a week Insert twice weekly at bedtime   ferrous sulfate 324 (65 Fe) mg   No No   Sig: Take 1 tablet (324 mg total) by mouth 2 (two) times a day before meals   furosemide (LASIX) 40 mg tablet  Self No No   Sig: Take 1 tablet (40 mg total) by mouth 2 (two) times a day   glucose blood (FREESTYLE LITE) test strip  Self No No   Sig: TEST AS DIRECTED UP TO FOUR TIMES A DAY   losartan (COZAAR) 100 MG tablet   No No   Sig: Take 0 5 tablets (50 mg total) by mouth daily   metFORMIN (GLUCOPHAGE) 500 mg tablet   No No   Sig: TAKE 1 TABLET (500 MG TOTAL) BY MOUTH 2 (TWO) TIMES A DAY WITH MEALS   montelukast (SINGULAIR) 10 mg tablet  Self No No   Sig: Take 1 tablet (10 mg total) by mouth daily at bedtime   nitroglycerin (NITROSTAT) 0 4 mg SL tablet   No No   Sig: Place 1 tablet (0 4 mg total) under the tongue every 5 (five) minutes as needed for chest pain   ranolazine (RANEXA) 500 mg 12 hr tablet   No No   Sig: Take 1 tablet (500 mg total) by mouth every 12 (twelve) hours   rivaroxaban (XARELTO) 2 5 mg tablet   No No   Sig: Take 1 tablet (2 5 mg total) by mouth 2 (two) times a day with meals      Facility-Administered Medications: None       Portions of the record may have been created with voice recognition software  Occasional wrong word or "sound a like" substitutions may have occurred due to the inherent limitations of voice recognition software  Read the chart carefully and recognize, using context, where substitutions have occurred      Electronically signed by:  MD Marisa Fournier MD  06/12/21 8315

## 2021-06-12 NOTE — ED ATTENDING ATTESTATION
6/11/2021  IMechelle DO, saw and evaluated the patient  I have discussed the patient with the resident/non-physician practitioner and agree with the resident's/non-physician practitioner's findings, Plan of Care, and MDM as documented in the resident's/non-physician practitioner's note, except where noted  All available labs and Radiology studies were reviewed  I was present for key portions of any procedure(s) performed by the resident/non-physician practitioner and I was immediately available to provide assistance  At this point I agree with the current assessment done in the Emergency Department  I have conducted an independent evaluation of this patient a history and physical is as follows:    Patient is an 63-year-old female accompanied by her son  She is currently on Xarelto for previous strokes, on the evening of June 11th, several hours ago, she noticed some bright red urine, no flank pain, no abdominal pain no fever, no chills, occasional trace dysuria  No clots  No trauma  Otherwise feeling okay    General:  Patient is well-appearing  Head:  Atraumatic  Eyes:  Conjunctiva pink  ENT:  Mucous membranes are moist  Neck:  Supple  Cardiac:  S1-S2, without murmurs  Lungs:  Clear to auscultation bilaterally  Abdomen:  Soft, nontender, normal bowel sounds, no CVA tenderness, no tympany, no rigidity, no guarding  Extremities:  Normal range of motion  Neurologic:  Awake, fluent speech, normal comprehension  AAOx3     Skin:  Pink warm and dry  Psychiatric:  Alert, pleasant, cooperative            ED Course     Labs Reviewed   URINE MICROSCOPIC - Abnormal       Result Value Ref Range Status    RBC, UA 2-4  None Seen, 2-4 /hpf Final    WBC, UA Innumerable (*) None Seen, 2-4 /hpf Final    Epithelial Cells None Seen  None Seen, Occasional /hpf Final    Bacteria, UA Innumerable (*) None Seen, Occasional /hpf Final    Hyaline Casts, UA None Seen  None Seen /lpf Final   URINE MACROSCOPIC, POC - Abnormal Color, UA Yellow   Final    Clarity, UA Clear   Final    pH, UA 7 5  4 5 - 8 0 Final    Leukocytes, UA Moderate (*) Negative Final    Nitrite, UA Negative  Negative Final    Protein, UA Negative  Negative mg/dl Final    Glucose, UA Negative  Negative mg/dl Final    Ketones, UA Negative  Negative mg/dl Final    Urobilinogen, UA 0 2  0 2, 1 0 E U /dl E U /dl Final    Bilirubin, UA Negative  Negative Final    Blood, UA Small (*) Negative Final    Specific Gravity, UA 1 020  1 003 - 1 030 Final    Narrative:     CLINITEK RESULT   URINE CULTURE       Urine culture pending  Suspect patient has a mild hemorrhagic cystitis  Will treat with Keflex  Supportive care, importance of follow-up and return precautions were discussed with the patient, who expressed understanding        Critical Care Time  Procedures

## 2021-06-14 ENCOUNTER — APPOINTMENT (OUTPATIENT)
Dept: CARDIAC REHAB | Age: 81
End: 2021-06-14
Payer: COMMERCIAL

## 2021-06-14 ENCOUNTER — CLINICAL SUPPORT (OUTPATIENT)
Dept: CARDIAC REHAB | Age: 81
End: 2021-06-14
Payer: COMMERCIAL

## 2021-06-14 ENCOUNTER — TELEPHONE (OUTPATIENT)
Dept: INTERNAL MEDICINE CLINIC | Facility: CLINIC | Age: 81
End: 2021-06-14

## 2021-06-14 DIAGNOSIS — I21.4 NSTEMI (NON-ST ELEVATED MYOCARDIAL INFARCTION) (HCC): ICD-10-CM

## 2021-06-14 LAB — BACTERIA UR CULT: ABNORMAL

## 2021-06-14 PROCEDURE — 93798 PHYS/QHP OP CAR RHAB W/ECG: CPT

## 2021-06-14 NOTE — TELEPHONE ENCOUNTER
Folder Color-Blue     Name of Form-[facility]    Form to be filled out by-Chanel Ennis    Form to be Faxed [phone]    Patient made aware of 10 business day policy

## 2021-06-15 NOTE — RESULT ENCOUNTER NOTE
Spoke with patient she is completely asymptomatic  The cephalexin is intermediate and does appear to be working  At this point will not be changed because her basically resolution of symptoms    She is advised to fill up with family doctor as planned return worsening symptoms she verbalized understanding she also stated that she did not want another antibiotic

## 2021-06-16 ENCOUNTER — CLINICAL SUPPORT (OUTPATIENT)
Dept: CARDIAC REHAB | Age: 81
End: 2021-06-16
Payer: COMMERCIAL

## 2021-06-16 ENCOUNTER — OFFICE VISIT (OUTPATIENT)
Dept: INTERNAL MEDICINE CLINIC | Facility: CLINIC | Age: 81
End: 2021-06-16

## 2021-06-16 ENCOUNTER — APPOINTMENT (OUTPATIENT)
Dept: CARDIAC REHAB | Age: 81
End: 2021-06-16
Payer: COMMERCIAL

## 2021-06-16 VITALS
HEART RATE: 62 BPM | WEIGHT: 165 LBS | SYSTOLIC BLOOD PRESSURE: 114 MMHG | TEMPERATURE: 97.9 F | DIASTOLIC BLOOD PRESSURE: 66 MMHG | BODY MASS INDEX: 29.23 KG/M2

## 2021-06-16 DIAGNOSIS — I10 ESSENTIAL HYPERTENSION: ICD-10-CM

## 2021-06-16 DIAGNOSIS — I21.4 NSTEMI (NON-ST ELEVATED MYOCARDIAL INFARCTION) (HCC): ICD-10-CM

## 2021-06-16 DIAGNOSIS — N30.91 CYSTITIS WITH HEMATURIA: Primary | ICD-10-CM

## 2021-06-16 DIAGNOSIS — E78.2 MIXED HYPERLIPIDEMIA: ICD-10-CM

## 2021-06-16 DIAGNOSIS — J45.30 MILD PERSISTENT ASTHMA WITHOUT COMPLICATION: ICD-10-CM

## 2021-06-16 PROCEDURE — 3074F SYST BP LT 130 MM HG: CPT | Performed by: PHYSICIAN ASSISTANT

## 2021-06-16 PROCEDURE — 1111F DSCHRG MED/CURRENT MED MERGE: CPT | Performed by: PHYSICIAN ASSISTANT

## 2021-06-16 PROCEDURE — 3078F DIAST BP <80 MM HG: CPT | Performed by: PHYSICIAN ASSISTANT

## 2021-06-16 PROCEDURE — 99213 OFFICE O/P EST LOW 20 MIN: CPT | Performed by: PHYSICIAN ASSISTANT

## 2021-06-16 PROCEDURE — 1160F RVW MEDS BY RX/DR IN RCRD: CPT | Performed by: PHYSICIAN ASSISTANT

## 2021-06-16 PROCEDURE — 1036F TOBACCO NON-USER: CPT | Performed by: PHYSICIAN ASSISTANT

## 2021-06-16 PROCEDURE — 93798 PHYS/QHP OP CAR RHAB W/ECG: CPT

## 2021-06-16 RX ORDER — FLUTICASONE FUROATE AND VILANTEROL TRIFENATATE 100; 25 UG/1; UG/1
1 POWDER RESPIRATORY (INHALATION) DAILY
Qty: 60 BLISTER | Refills: 5 | Status: SHIPPED | OUTPATIENT
Start: 2021-06-16

## 2021-06-16 RX ORDER — ATORVASTATIN CALCIUM 40 MG/1
40 TABLET, FILM COATED ORAL DAILY
Qty: 90 TABLET | Refills: 1 | Status: SHIPPED | OUTPATIENT
Start: 2021-06-16 | End: 2021-12-21

## 2021-06-16 NOTE — PROGRESS NOTES
Assessment/Plan: On her visit today we reviewed your recent emergency room visit  Your urine test did show positive for infection as well as some blood in her urine  You do confirm that you are no longer noticing any urinary symptoms and no blood or darker urine at this time  We did review however that you have only been taking 1 antibiotic tablet daily and should be taking this 3 times a day  You will take the remainder of the prescription as prescribed  I provided you with a script to get a follow-up urine test next week to make sure that the infection is cleared and also that you no longer have any blood in your urine  You deny any chest pain today and are following up with cardiac rehab as scheduled  You are also scheduled to follow-up with the nephrologist for your chronic kidney disease as well on 6/30  Appt with Endocrinology for your DM 7/8  No problem-specific Assessment & Plan notes found for this encounter  Diagnoses and all orders for this visit:    Cystitis with hematuria  -     Urinalysis with microscopic; Future  -     Urine culture; Future    Essential hypertension    Mixed hyperlipidemia  -     atorvastatin (LIPITOR) 40 mg tablet; Take 1 tablet (40 mg total) by mouth daily    Mild persistent asthma without complication  -     Breo Ellipta 100-25 MCG/INH inhaler; Inhale 1 puff daily          Subjective:      Patient ID: Cali King is a [de-identified] y o  female  Patient presents today for ER follow-up  Patient presented to the emergency room on June 12th with complaint of bright red blood in her urine that had started right before visit  Patient was denying any urinary symptoms but is on Xarelto and concern for bleeding  Patient however was found to have positive urine infection  Was sent home on Keflex  Patient was contacted by emergency room after urine culture was completed as Keflex was not resistant but may not fully treat      Per note in chart and discussion with patient she was advised of this information but as she was asymptomatic was advised to complete the full course of antibiotics  States no longer having pain when urinate  States urine is no longer dark in color    When asked patient about taking the antibiotic she states she is taking it but when clarified she has only been taking 1 pill a day instead of 3 times daily  Patient however does admit that the pharmacist told her to take it 3 times daily  Unsure why she is only taking it once a day  Reviewed with patient that I want her to complete the full course but she has to take an additional 2 doses today and then 3 times a day after that  Was already planning on getting a follow-up test to make sure that both the infection had resolved and no were blood in her urine as there was a trace amount but will definitely need the follow-up urine test as she did not take the antibiotic course as directed initially  On today's visit patient reports that she has not experienced any chest pain  She is attending cardiac rehab as scheduled  Patient reports cardiology is working with her insurance attempting to get the Ranexa approved  Is still on Plavix and asa and Xarelto at this time      As noted patient is on Xarelto 2 5 mg twice a day but denies any adr abnormal bleeding no blood in her stool no bleeding from her nose  Patient does admit since she has started the iron tablets her stool is a bit darker but has never had blood in her stool previously  per patient's recent hospitalization anemia felt to be likely secondary to CKD and is taking iron supplements now  Patient is scheduled for follow-up with nephrologist June 30th  Patient is also scheduled to follow-up with the endocrinologist July 8th for her diabetes                      The following portions of the patient's history were reviewed and updated as appropriate: allergies, current medications, past family history, past medical history, past social history, past surgical history and problem list     Review of Systems   Constitutional: Negative for chills and fever  Respiratory: Negative for cough, chest tightness and shortness of breath  Cardiovascular: Negative  Negative for chest pain  Gastrointestinal: Negative for blood in stool, constipation and diarrhea  Genitourinary: Negative for decreased urine volume, dysuria, frequency, hematuria and urgency  Skin: Negative for rash and wound  Neurological: Negative for headaches  Hematological: Bruises/bleeds easily  Objective:      /66 (BP Location: Right arm, Patient Position: Sitting, Cuff Size: Large)   Pulse 62   Temp 97 9 °F (36 6 °C) (Temporal)   Wt 74 8 kg (165 lb)   BMI 29 23 kg/m²          Physical Exam  Vitals and nursing note reviewed  Constitutional:       General: She is not in acute distress  Appearance: Normal appearance  HENT:      Head: Normocephalic  Nose: Nose normal       Mouth/Throat:      Mouth: Mucous membranes are moist       Pharynx: Oropharynx is clear  Eyes:      Conjunctiva/sclera: Conjunctivae normal    Cardiovascular:      Rate and Rhythm: Normal rate and regular rhythm  Heart sounds: Normal heart sounds  Pulmonary:      Effort: Pulmonary effort is normal       Breath sounds: Normal breath sounds  Abdominal:      General: Bowel sounds are normal    Musculoskeletal:      Right lower leg: No edema  Left lower leg: No edema  Neurological:      Mental Status: She is alert

## 2021-06-18 ENCOUNTER — CLINICAL SUPPORT (OUTPATIENT)
Dept: CARDIAC REHAB | Age: 81
End: 2021-06-18
Payer: COMMERCIAL

## 2021-06-18 ENCOUNTER — APPOINTMENT (OUTPATIENT)
Dept: CARDIAC REHAB | Age: 81
End: 2021-06-18
Payer: COMMERCIAL

## 2021-06-18 DIAGNOSIS — I21.4 NSTEMI (NON-ST ELEVATED MYOCARDIAL INFARCTION) (HCC): ICD-10-CM

## 2021-06-18 PROCEDURE — 93798 PHYS/QHP OP CAR RHAB W/ECG: CPT

## 2021-06-21 ENCOUNTER — APPOINTMENT (OUTPATIENT)
Dept: CARDIAC REHAB | Age: 81
End: 2021-06-21
Payer: COMMERCIAL

## 2021-06-21 ENCOUNTER — CLINICAL SUPPORT (OUTPATIENT)
Dept: CARDIAC REHAB | Age: 81
End: 2021-06-21
Payer: COMMERCIAL

## 2021-06-21 DIAGNOSIS — I21.4 NSTEMI (NON-ST ELEVATED MYOCARDIAL INFARCTION) (HCC): ICD-10-CM

## 2021-06-21 PROCEDURE — 93798 PHYS/QHP OP CAR RHAB W/ECG: CPT

## 2021-06-23 ENCOUNTER — CLINICAL SUPPORT (OUTPATIENT)
Dept: CARDIAC REHAB | Age: 81
End: 2021-06-23
Payer: COMMERCIAL

## 2021-06-23 ENCOUNTER — APPOINTMENT (OUTPATIENT)
Dept: CARDIAC REHAB | Age: 81
End: 2021-06-23
Payer: COMMERCIAL

## 2021-06-23 DIAGNOSIS — I50.40 COMBINED SYSTOLIC AND DIASTOLIC CONGESTIVE HEART FAILURE, UNSPECIFIED HF CHRONICITY (HCC): ICD-10-CM

## 2021-06-23 DIAGNOSIS — I21.4 NSTEMI (NON-ST ELEVATED MYOCARDIAL INFARCTION) (HCC): ICD-10-CM

## 2021-06-23 PROCEDURE — 93798 PHYS/QHP OP CAR RHAB W/ECG: CPT

## 2021-06-23 NOTE — TELEPHONE ENCOUNTER
Form completed by Fauzia Johnson and signed by attending Dr Donna Gotti  Placed in [place] [id number] clerical folder

## 2021-06-25 ENCOUNTER — APPOINTMENT (OUTPATIENT)
Dept: CARDIAC REHAB | Age: 81
End: 2021-06-25
Payer: COMMERCIAL

## 2021-06-25 ENCOUNTER — CLINICAL SUPPORT (OUTPATIENT)
Dept: CARDIAC REHAB | Age: 81
End: 2021-06-25
Payer: COMMERCIAL

## 2021-06-25 DIAGNOSIS — I21.4 NSTEMI (NON-ST ELEVATED MYOCARDIAL INFARCTION) (HCC): ICD-10-CM

## 2021-06-25 DIAGNOSIS — J30.1 NON-SEASONAL ALLERGIC RHINITIS DUE TO POLLEN: ICD-10-CM

## 2021-06-25 DIAGNOSIS — I50.9 CHF (CONGESTIVE HEART FAILURE) (HCC): ICD-10-CM

## 2021-06-25 PROCEDURE — 93798 PHYS/QHP OP CAR RHAB W/ECG: CPT

## 2021-06-26 ENCOUNTER — APPOINTMENT (OUTPATIENT)
Dept: LAB | Facility: HOSPITAL | Age: 81
End: 2021-06-26
Payer: COMMERCIAL

## 2021-06-26 DIAGNOSIS — E11.40 CONTROLLED TYPE 2 DIABETES MELLITUS WITH DIABETIC NEUROPATHY, WITHOUT LONG-TERM CURRENT USE OF INSULIN (HCC): ICD-10-CM

## 2021-06-26 DIAGNOSIS — N30.91 CYSTITIS WITH HEMATURIA: ICD-10-CM

## 2021-06-26 DIAGNOSIS — N18.30 BENIGN HYPERTENSION WITH CKD (CHRONIC KIDNEY DISEASE) STAGE III (HCC): ICD-10-CM

## 2021-06-26 DIAGNOSIS — I12.9 BENIGN HYPERTENSION WITH CKD (CHRONIC KIDNEY DISEASE) STAGE III (HCC): ICD-10-CM

## 2021-06-26 LAB
ALBUMIN SERPL BCP-MCNC: 3.7 G/DL (ref 3.5–5)
ALP SERPL-CCNC: 74 U/L (ref 46–116)
ALT SERPL W P-5'-P-CCNC: 32 U/L (ref 12–78)
ANION GAP SERPL CALCULATED.3IONS-SCNC: 7 MMOL/L (ref 4–13)
AST SERPL W P-5'-P-CCNC: 16 U/L (ref 5–45)
BACTERIA UR QL AUTO: ABNORMAL /HPF
BILIRUB SERPL-MCNC: 0.6 MG/DL (ref 0.2–1)
BILIRUB UR QL STRIP: NEGATIVE
BUN SERPL-MCNC: 51 MG/DL (ref 5–25)
CALCIUM SERPL-MCNC: 10 MG/DL (ref 8.3–10.1)
CHLORIDE SERPL-SCNC: 105 MMOL/L (ref 100–108)
CLARITY UR: CLEAR
CO2 SERPL-SCNC: 27 MMOL/L (ref 21–32)
COLOR UR: YELLOW
CREAT SERPL-MCNC: 1.47 MG/DL (ref 0.6–1.3)
EST. AVERAGE GLUCOSE BLD GHB EST-MCNC: 192 MG/DL
GFR SERPL CREATININE-BSD FRML MDRD: 39 ML/MIN/1.73SQ M
GLUCOSE SERPL-MCNC: 154 MG/DL (ref 65–140)
GLUCOSE UR STRIP-MCNC: NEGATIVE MG/DL
HBA1C MFR BLD: 8.3 %
HGB UR QL STRIP.AUTO: NEGATIVE
HYALINE CASTS #/AREA URNS LPF: ABNORMAL /LPF
KETONES UR STRIP-MCNC: NEGATIVE MG/DL
LEUKOCYTE ESTERASE UR QL STRIP: ABNORMAL
NITRITE UR QL STRIP: NEGATIVE
NON-SQ EPI CELLS URNS QL MICRO: ABNORMAL /HPF
PH UR STRIP.AUTO: 6.5 [PH]
POTASSIUM SERPL-SCNC: 4.1 MMOL/L (ref 3.5–5.3)
PROT SERPL-MCNC: 7.8 G/DL (ref 6.4–8.2)
PROT UR STRIP-MCNC: NEGATIVE MG/DL
RBC #/AREA URNS AUTO: ABNORMAL /HPF
SODIUM SERPL-SCNC: 139 MMOL/L (ref 136–145)
SP GR UR STRIP.AUTO: 1.01 (ref 1–1.03)
UROBILINOGEN UR QL STRIP.AUTO: 0.2 E.U./DL
WBC #/AREA URNS AUTO: ABNORMAL /HPF

## 2021-06-26 PROCEDURE — 83036 HEMOGLOBIN GLYCOSYLATED A1C: CPT

## 2021-06-26 PROCEDURE — 80053 COMPREHEN METABOLIC PANEL: CPT

## 2021-06-26 PROCEDURE — 87186 SC STD MICRODIL/AGAR DIL: CPT

## 2021-06-26 PROCEDURE — 87077 CULTURE AEROBIC IDENTIFY: CPT

## 2021-06-26 PROCEDURE — 36415 COLL VENOUS BLD VENIPUNCTURE: CPT

## 2021-06-26 PROCEDURE — 81001 URINALYSIS AUTO W/SCOPE: CPT

## 2021-06-26 PROCEDURE — 87086 URINE CULTURE/COLONY COUNT: CPT

## 2021-06-28 ENCOUNTER — CLINICAL SUPPORT (OUTPATIENT)
Dept: CARDIAC REHAB | Age: 81
End: 2021-06-28
Payer: COMMERCIAL

## 2021-06-28 ENCOUNTER — APPOINTMENT (OUTPATIENT)
Dept: CARDIAC REHAB | Age: 81
End: 2021-06-28
Payer: COMMERCIAL

## 2021-06-28 DIAGNOSIS — I21.4 NSTEMI (NON-ST ELEVATED MYOCARDIAL INFARCTION) (HCC): ICD-10-CM

## 2021-06-28 PROCEDURE — 93798 PHYS/QHP OP CAR RHAB W/ECG: CPT

## 2021-06-28 RX ORDER — MONTELUKAST SODIUM 10 MG/1
10 TABLET ORAL
Qty: 90 TABLET | Refills: 1 | Status: SHIPPED | OUTPATIENT
Start: 2021-06-28 | End: 2022-01-10

## 2021-06-28 RX ORDER — FUROSEMIDE 40 MG/1
40 TABLET ORAL 2 TIMES DAILY
Qty: 180 TABLET | Refills: 0 | Status: SHIPPED | OUTPATIENT
Start: 2021-06-28 | End: 2021-10-05

## 2021-06-28 RX ORDER — ASPIRIN 81 MG/1
81 TABLET ORAL DAILY
Qty: 90 TABLET | Refills: 0 | Status: SHIPPED | OUTPATIENT
Start: 2021-06-28

## 2021-06-29 DIAGNOSIS — I21.4 NSTEMI (NON-ST ELEVATED MYOCARDIAL INFARCTION) (HCC): ICD-10-CM

## 2021-06-29 LAB
BACTERIA UR CULT: ABNORMAL
BACTERIA UR CULT: ABNORMAL

## 2021-06-29 NOTE — TELEPHONE ENCOUNTER
Name of medication, dose, quantity and frequency    Requested Prescriptions     Pending Prescriptions Disp Refills    carvedilol (COREG) 6 25 mg tablet 60 tablet 0     Sig: Take 1 tablet (6 25 mg total) by mouth 2 (two) times a day with meals     Number of refills left:  0    Amount of medication left:  0    Pharmacy verified and updated  yes    Additional information:    Medication was prescribed while admitted in the hospital 5/24/21 to 5/26/21

## 2021-06-30 ENCOUNTER — OFFICE VISIT (OUTPATIENT)
Dept: NEPHROLOGY | Facility: CLINIC | Age: 81
End: 2021-06-30
Payer: COMMERCIAL

## 2021-06-30 ENCOUNTER — APPOINTMENT (OUTPATIENT)
Dept: CARDIAC REHAB | Age: 81
End: 2021-06-30
Payer: COMMERCIAL

## 2021-06-30 VITALS
HEIGHT: 63 IN | WEIGHT: 163 LBS | DIASTOLIC BLOOD PRESSURE: 68 MMHG | RESPIRATION RATE: 18 BRPM | HEART RATE: 58 BPM | BODY MASS INDEX: 28.88 KG/M2 | SYSTOLIC BLOOD PRESSURE: 132 MMHG

## 2021-06-30 DIAGNOSIS — N18.30 BENIGN HYPERTENSION WITH CKD (CHRONIC KIDNEY DISEASE) STAGE III (HCC): ICD-10-CM

## 2021-06-30 DIAGNOSIS — R80.1 PERSISTENT PROTEINURIA: ICD-10-CM

## 2021-06-30 DIAGNOSIS — I12.9 BENIGN HYPERTENSION WITH CKD (CHRONIC KIDNEY DISEASE) STAGE III (HCC): ICD-10-CM

## 2021-06-30 DIAGNOSIS — N28.1 RENAL CYST: ICD-10-CM

## 2021-06-30 DIAGNOSIS — N18.31 STAGE 3A CHRONIC KIDNEY DISEASE (HCC): ICD-10-CM

## 2021-06-30 DIAGNOSIS — N17.9 AKI (ACUTE KIDNEY INJURY) (HCC): Primary | ICD-10-CM

## 2021-06-30 PROCEDURE — 1160F RVW MEDS BY RX/DR IN RCRD: CPT | Performed by: INTERNAL MEDICINE

## 2021-06-30 PROCEDURE — 1036F TOBACCO NON-USER: CPT | Performed by: INTERNAL MEDICINE

## 2021-06-30 PROCEDURE — 99214 OFFICE O/P EST MOD 30 MIN: CPT | Performed by: INTERNAL MEDICINE

## 2021-06-30 RX ORDER — CARVEDILOL 6.25 MG/1
6.25 TABLET ORAL 2 TIMES DAILY WITH MEALS
Qty: 60 TABLET | Refills: 0 | Status: SHIPPED | OUTPATIENT
Start: 2021-06-30 | End: 2021-06-30 | Stop reason: SDUPTHER

## 2021-06-30 RX ORDER — CARVEDILOL 6.25 MG/1
6.25 TABLET ORAL 2 TIMES DAILY WITH MEALS
Qty: 60 TABLET | Refills: 5 | Status: SHIPPED | OUTPATIENT
Start: 2021-06-30 | End: 2022-03-08

## 2021-06-30 NOTE — PROGRESS NOTES
NEPHROLOGY OUTPATIENT PROGRESS NOTE   Aly Alcocer [de-identified] y o  female MRN: [id number]  DATE: 6/30/2021  Reason for visit:   Chief Complaint   Patient presents with    Follow-up    Chronic Kidney Disease    Acute Renal Failure     ASSESSMENT and PLAN:  CAROL on CKD stage 3A, baseline creatinine 1 to 1 2 since March 2021, prior 0 8 to 1 0  -recently had an episode of CAROL with peak creatinine 1 7 now seems to be improving to 1 4 on most recent labs  -?  Exact etiology for CAROL, losartan was recently reduced from 100 mg to 50 mg daily  -repeat BMP in one month  If creatinine worsening further, may consider reducing Lasix/losartan  -CKD suspected to be secondary to long-term hypertension, diabetes, age-related nephron loss  -avoid nephrotoxins or NSAIDs  -renal ultrasound in late 2019 shows normal size kidneys, normal echogenicity, no hydronephrosis or stones  -UA in June 2021 shows no proteinuria or hematuria, bland      Bilateral renal cyst on ultrasound  -right kidney showed mid to lower pole cyst with thick septation, no definite vascularity noted  -MRI abdomen in February 2020 shows no suspicious renal mass   Again showed 2 4 cm right mid pole cyst with septation, suboptimally evaluated in the absence of IV contrast   -she has not done repeat renal ultrasound prescribed during last office visit  Check renal ultrasound      Proteinuria  -last urine microalbumin/creatinine ratio  101 mg in June 2021   -repeat urine microalbumin/creatinine ratio before next visit  -currently on losartan as above  -suspect in the setting of uncontrolled diabetes, hemoglobin A1c 8 3 in June 2021      Hypertension  -blood pressure overall acceptable in the office today  Continue losartan 50 mg daily, Coreg, Lasix   -she has not brought BP machine to the office today   -salt restricted diet recommended     Trace ankle edema, resolved  -currently seems euvolemic  Remains on Lasix 40 mg b i d     Weight seems to be stable   -echo in May 2021 shows EF 17%, grade 2 diastolic dysfunction, moderate pulmonary hypertension  Diagnoses and all orders for this visit:    Benign hypertension with CKD (chronic kidney disease) stage III  -     carvedilol (COREG) 6 25 mg tablet; Take 1 tablet (6 25 mg total) by mouth 2 (two) times a day with meals  -     Basic metabolic panel; Future  -     CBC; Future    CAROL (acute kidney injury) [facility])  -     Ambulatory referral to Nephrology  -     Basic metabolic panel; Future  -     Basic metabolic panel; Future    Stage 3a chronic kidney disease (HCC)  -     Basic metabolic panel; Future  -     Basic metabolic panel; Future  -     CBC; Future  -     Microalbumin / creatinine urine ratio; Future  -     Phosphorus; Future  -     PTH, intact; Future    Persistent proteinuria  -     Microalbumin / creatinine urine ratio; Future    Renal cyst    NSTEMI (non-ST elevated myocardial infarction) ([facility] [address] )        SUBJECTIVE / HPI:  Nickie SHULTZ 89 y o  year old female with medical issues of hypertension for 10 years, diabetes for 10 years, CVA in 2011, mild diabetic retinopathy in 2019,?  Gout, CKD stage 3A with baseline 1 to 1 2 since March 2021, prior 0 8 to 1 0 who presents for regular follow up for CKD, proteinuria, hypertension management   Recently had an episode of CAROL with peak creatinine 1 7 when losartan was reduced to 50 mg daily and since then creatinine has improved 1 4 on most recent labs  Patient recently visited ER earlier this month with episode of cystitis and was given Keflex  Currently denies any urinary complaint except occasional irritation  denies any nausea, vomiting or diarrhea   No recent NSAID use  No obvious history of autoimmune conditions   Denies any prior history of kidney stones  REVIEW OF SYSTEMS:  More than 10 point review of systems were obtained and discussed in length with the patient  Complete review of systems were negative / unremarkable except mentioned above       PHYSICAL EXAM:  Vitals:    06/30/21 1422 06/30/21 1444   BP: 140/74 132/68   BP Location: Right arm    Patient Position: Sitting    Cuff Size: Standard    Pulse: 58    Resp: 18    Weight: 73 9 kg (163 lb)    Height: 5' 3" (1 6 m)      Body mass index is 28 87 kg/m²  Physical Exam  Vitals reviewed  Constitutional:       Appearance: She is well-developed  HENT:      Head: Normocephalic and atraumatic  Right Ear: External ear normal       Left Ear: External ear normal    Eyes:      Conjunctiva/sclera: Conjunctivae normal    Cardiovascular:      Comments: S1, S2 present  Pulmonary:      Effort: Pulmonary effort is normal       Breath sounds: Normal breath sounds  No wheezing or rales  Abdominal:      General: Bowel sounds are normal  There is no distension  Palpations: Abdomen is soft  Tenderness: There is no abdominal tenderness  Musculoskeletal:         General: No tenderness  Right lower leg: No edema  Left lower leg: No edema  Lymphadenopathy:      Cervical: No cervical adenopathy  Skin:     Findings: No rash  Neurological:      Mental Status: She is alert and oriented to person, place, and time     Psychiatric:         Behavior: Behavior normal          PAST MEDICAL HISTORY:  Past Medical History:   Diagnosis Date    ACE-inhibitor cough     last assessed - [id number]    Asthma     Bilateral edema of lower extremity     last assessed - [id number]    Cardiac murmur     last assessed - [id number]    CKD (chronic kidney disease)     Diabetes mellitus ([facility] [address] )     last assessed - [id number]    Esophageal dysphagia     Fatigue     last assessed - [id number]    Hyperlipidemia     Hypertension     Patellar bursitis of right knee     last assessed - [id number]    Personal history of other specified conditions     presenting hazards to health    Stroke [facility])     Stroke syndrome     Urinary frequency     UTI (urinary tract infection)        PAST SURGICAL HISTORY:  Past Surgical History: Procedure Laterality Date    NM ESOPHAGOGASTRODUODENOSCOPY TRANSORAL DIAGNOSTIC N/A 2017    Procedure: ESOPHAGOGASTRODUODENOSCOPY (EGD); Surgeon: Lilli Busby MD;  Location: [facility];   Service: Gastroenterology       SOCIAL HISTORY:  Social History     Substance and Sexual Activity   Alcohol Use Never     Social History     Substance and Sexual Activity   Drug Use Never     Social History     Tobacco Use   Smoking Status Former Smoker    Packs/day: 3 00    Quit date: 36    Years since quittin 5   Smokeless Tobacco Former User   Tobacco Comment    quit over 30 yrs ago; (quit in the , had smoked 3PPD for 20 years - per [employer])       FAMILY HISTORY:  Family History   Problem Relation Age of Onset    Heart disease Father     Hypertension Mother     Stroke Mother     Other Sister         1)Breast disorder; 2)Epilepsy   Mavis Cousin Migraines Sister         Migraine headaches    Osteoporosis Sister     Thyroid disease Sister     Coronary artery disease Sister         S/P triple vessel bypass    Cancer Sister         breast CA    Osteoporosis Maternal Grandmother     Diabetes Son         Diabetes mellitus    Hypertension Son     Rheum arthritis Son         Rheumatic disease    Heart disease Family        MEDICATIONS:    Current Outpatient Medications:     acetaminophen (TYLENOL) 650 mg CR tablet, Take 650 mg by mouth every 6 (six) hours as needed for mild pain, Disp: , Rfl:     albuterol (PROVENTIL HFA,VENTOLIN HFA) 90 mcg/act inhaler, Inhale 2 puffs every 4 (four) hours as needed for wheezing, Disp: 6 7 g, Rfl: 1    aspirin (ECOTRIN LOW STRENGTH) 81 mg EC tablet, Take 1 tablet (81 mg total) by mouth daily, Disp: 90 tablet, Rfl: 0    atorvastatin (LIPITOR) 40 mg tablet, Take 1 tablet (40 mg total) by mouth daily, Disp: 90 tablet, Rfl: 1    Blood Glucose Monitoring Suppl (FREESTYLE LITE) JAQUELIN, by Does not apply route daily, Disp: 1 each, Rfl: 0    Breo Ellipta 100-25 MCG/INH inhaler, Inhale 1 puff daily, Disp: 60 blister, Rfl: 5    carvedilol (COREG) 6 25 mg tablet, Take 1 tablet (6 25 mg total) by mouth 2 (two) times a day with meals, Disp: 60 tablet, Rfl: 5    clopidogrel (PLAVIX) 75 mg tablet, TAKE 2 TABS DAILY, Disp: 180 tablet, Rfl: 1    conjugated estrogens (PREMARIN) vaginal cream, Insert 0 5 g into the vagina 2 (two) times a week Insert twice weekly at bedtime, Disp: 30 g, Rfl: 1    Continuous Blood Gluc Sensor (FreeStyle Naldo 14 Day Sensor) MISC, Use one sensor every 14 days, Disp: 4 each, Rfl: 3    CVS D3 50 MCG (2000 UT) CAPS, Take 1 capsule (2,000 Units total) by mouth daily, Disp: 90 capsule, Rfl: 1    ferrous sulfate 324 (65 Fe) mg, Take 1 tablet (324 mg total) by mouth 2 (two) times a day before meals, Disp: 60 tablet, Rfl: 2    furosemide (LASIX) 40 mg tablet, Take 1 tablet (40 mg total) by mouth 2 (two) times a day, Disp: 180 tablet, Rfl: 0    glucose blood (FREESTYLE LITE) test strip, TEST AS DIRECTED UP TO FOUR TIMES A DAY, Disp: 100 each, Rfl: 3    Lancets (FREESTYLE) lancets, Use as instructed, Disp: 100 each, Rfl: 0    losartan (COZAAR) 100 MG tablet, Take 0 5 tablets (50 mg total) by mouth daily, Disp: 30 tablet, Rfl: 0    metFORMIN (GLUCOPHAGE) 500 mg tablet, TAKE 1 TABLET (500 MG TOTAL) BY MOUTH 2 (TWO) TIMES A DAY WITH MEALS, Disp: 180 tablet, Rfl: 1    Misc   Devices (QUAD CANE) MISC, by Does not apply route daily, Disp: 1 each, Rfl: 0    montelukast (SINGULAIR) 10 mg tablet, Take 1 tablet (10 mg total) by mouth daily at bedtime, Disp: 90 tablet, Rfl: 1    nitroglycerin (NITROSTAT) 0 4 mg SL tablet, Place 1 tablet (0 4 mg total) under the tongue every 5 (five) minutes as needed for chest pain, Disp: 3 tablet, Rfl: 0    rivaroxaban (XARELTO) 2 5 mg tablet, Take 1 tablet (2 5 mg total) by mouth 2 (two) times a day with meals, Disp: 60 tablet, Rfl: 0    Glucose-Vitamin C-Vitamin D (TRUEPLUS GLUCOSE ON THE GO) CHEW, CHEW 2 TABS AS NEEDED FOR BLOOD SUGAR LESS THAN 80 (Patient not taking: Reported on 6/30/2021), Disp: , Rfl:     ranolazine (RANEXA) 500 mg 12 hr tablet, Take 1 tablet (500 mg total) by mouth every 12 (twelve) hours (Patient not taking: Reported on 6/16/2021), Disp: 60 tablet, Rfl: 0    Lab Results:   Creatinine 1 4 in June 2021

## 2021-07-01 ENCOUNTER — TELEPHONE (OUTPATIENT)
Dept: INTERNAL MEDICINE CLINIC | Facility: CLINIC | Age: 81
End: 2021-07-01

## 2021-07-01 NOTE — TELEPHONE ENCOUNTER
Folder Color-Blue    Name of [facility] [address]    Form to be filled out by-Chanel Swartz Jj    Form to be Faxed [phone]    Patient made aware of 10 business day policy

## 2021-07-02 ENCOUNTER — APPOINTMENT (OUTPATIENT)
Dept: CARDIAC REHAB | Age: 81
End: 2021-07-02
Payer: COMMERCIAL

## 2021-07-02 ENCOUNTER — CLINICAL SUPPORT (OUTPATIENT)
Dept: CARDIAC REHAB | Age: 81
End: 2021-07-02
Payer: COMMERCIAL

## 2021-07-02 DIAGNOSIS — I21.4 NSTEMI (NON-ST ELEVATED MYOCARDIAL INFARCTION) (HCC): ICD-10-CM

## 2021-07-02 PROCEDURE — 93798 PHYS/QHP OP CAR RHAB W/ECG: CPT

## 2021-07-05 ENCOUNTER — APPOINTMENT (OUTPATIENT)
Dept: CARDIAC REHAB | Age: 81
End: 2021-07-05
Payer: COMMERCIAL

## 2021-07-06 ENCOUNTER — CLINICAL SUPPORT (OUTPATIENT)
Dept: CARDIAC REHAB | Age: 81
End: 2021-07-06
Payer: COMMERCIAL

## 2021-07-06 ENCOUNTER — HOSPITAL ENCOUNTER (OUTPATIENT)
Dept: RADIOLOGY | Facility: HOSPITAL | Age: 81
Discharge: HOME | End: 2021-07-06
Attending: INTERNAL MEDICINE
Payer: COMMERCIAL

## 2021-07-06 DIAGNOSIS — N28.1 RENAL CYST: ICD-10-CM

## 2021-07-06 DIAGNOSIS — I21.4 NSTEMI (NON-ST ELEVATED MYOCARDIAL INFARCTION) (HCC): ICD-10-CM

## 2021-07-06 PROCEDURE — 93798 PHYS/QHP OP CAR RHAB W/ECG: CPT

## 2021-07-06 PROCEDURE — 76770 US EXAM ABDO BACK WALL COMP: CPT

## 2021-07-06 NOTE — PROGRESS NOTES
Cardiac Rehabilitation Plan of Care   60 Day Reassessment          Today's date: 2021   # of Exercise Sessions Completed: 15  Patient name: Robert Frazier      : 1940  Age: [address] y o  MRN: [id number]  Referring Physician: Cordell Sanchez DO  Cardiologist: [place] MD  Provider: [facility]  Clinician: Anastasiia Pruitt, RN    Dx:   Encounter Diagnosis   Name Primary?  NSTEMI (non-ST elevated myocardial infarction) [facility])      Date of onset: 3/22/21      SUMMARY OF PROGRESS:  Adeline Zhong is compliant attending cardiac rehab exercise sessions 3x/wk  She tolerates 36 mins at 2 2 - 3 0 METs  Resting BP always well controlled, 102/62 - 132/64 with appropriate response to exercise reaching 126/60- 138/70  NSR on tele with rare PVC observed  RHR 51 - 72 ExHR 79 - 92  She is tolerating progression of intensity levels to maintain RPE 4-6  No cardiac complaints  She is not progressing toward wt loss goals with a gain of 4 pounds  Patient has been working on  dietary modifications with the goal of rare red/processed meats, low fat dairy, reduced added sugars and refined flours  The patient monitors BG 3-4 times per day and reports avg  FBG of   Depression screening using the PHQ-9 was reassessed  The patient's score was 0 =No Depression showing an IMPROVEMENT   SHANTHI-7 was reassessed  The patient's score was 0-4  = Not anxious showing an IMPROVEMENT  When addressed, the patient denies  feelings of depression/anxiety  Patient reports excellent social/emotional support  Patient attends group educational classes on cardiac risk factor modification  She has not added home exercise  Her exercise program will be progressed as tolerated to maintain RPE 4-6  Pt will continue to be educated on lifestyle modifications and encouraged to supplement with a home exercise program to reach the following goals: start home walking program, smaller food portions, healthier snack choices in the next 30 days        Medication compliance: Yes   Comments: Pt reports to be compliant with medications  Fall Risk: Moderate   Comments: Patient uses walking assist device (walker/cane/rollator)    EKG Interpretation: NSR, rare PVC      EXERCISE ASSESSMENT and PLAN    Current Exercise Program in Rehab:       Frequency: 3 days/week Supplement with home exercise 2+ days/wk as tolerated       Minutes: 30-36        METS: 2 2-3 0           HR: RHR + 30bpm    RPE: 4-5         Modalities: UBE, NuStep and Recumbent bike      Exercise Progression 30 Day Goals :    Frequency: 3 days/week of cardiac rehab     Supplement with home exercise 2+ days/wk as tolerated    Minutes: 40                            >150 mins/wk of moderate intensity exercise   METS: 2 5-3 5   HR: RHR +30bpm    RPE: 4-6   Modalities: UBE, NuStep, Recumbent bike and Room walking    Strength trainin-3 days / week  12-15 repetitions  1-2 sets per modality   Will be added following 2-3 weeks of monitored exercise sessions   Modalities: Arm Curl, Sit to [employer] and Shoulder Shrugs    Home Exercise: none    Goals: 10% improvement in functional capacity - based on max METs achieved in fitness assessment, Reduced dyspnea with physical activity  0-1/10, improved DASI score by 10%, Exercise 5 days/wk, >150mins/wk of moderate intensity exercise, Resume ADLs with increased strength, Attend Rehab regularly and Decrease sitting time    Progression Toward Goals:  Pt is progressing and showing improvement  toward the following goals:  Exercise 5 days/wk, >150mins/wk of moderate intensity exercise, Resume ADLs with increased strength, Attend Rehab regularly and Decrease sitting time    , Pt has not made progress toward the following goals: 10% improvement in functional capacity - based on max METs achieved in fitness assessment, Reduced dyspnea with physical activity  0-1/10, improved DASI score by 10%, Exercise 5 days/wk, >150mins/wk of moderate intensity exercise, Resume ADLs with increased strength, Attend Rehab regularly and Decrease sitting time  , Patient will add home walking, move more- more steps in home with cane in the next 30 days, Will continue to educate and progress as tolerated      Education: benefit of exercise for CAD risk factors, AHA guidelines to achieve >150 mins/wk of moderate exercise and RPE scale   Plan:education on home exercise guidelines, home exercise 30+ mins 2 days opposite CR and Education class: Risk Factors for Heart Disease  Readiness to change: Contemplation:  (Acknowledging that there is a problem but not yet ready or sure of wanting to make a change)      NUTRITION ASSESSMENT AND PLAN    Weight control:    Starting weight: 161 2   Current weight:   165  Waist circumference:    Startin   Current:      Diabetes: T2D, Patient monitors BS 4 times/day, Patient reported fasting BS   A1c: 6 7    last measured: 4/3/2021    Lipid management: Discussed diet and lipid management and Last lipid profile 11/10/2020  Chol 140  TRG 44  HDL 75  LDL 56    Goals:reduced BMI to < 25, reduced waist circumference <35 inches (F), improved A1c  < 7 0%, Improved Rate Your Plate score  >68, increase intake of fish, shellfish, increase intake of meatless meals, use low fat dairy, reduce cheese intake or use reduced-fat, Eat 4-5 cups of fruits and vegetables daily, choose low sodium processed foods, use fat-free dressings/[place] or seldom use, Increase intake of nuts and seeds, seldom eat or choose low fat ice-cream, fruit juice bars or frozen yogurt  and eliminate or choose low-fat sweets    Progression Toward Goals: Pt is progressing and showing improvement  toward the following goals:  reduced BMI to < 25, reduced waist circumference <35 inches (F), improved A1c  < 7 0%, Improved Rate Your Plate score  >92, increase intake of fish, shellfish, increase intake of meatless meals, use low fat dairy, reduce cheese intake or use reduced-fat, Eat 4-5 cups of fruits and vegetables daily, choose low sodium processed foods, use fat-free dressings/[place] or seldom use, Increase intake of nuts and seeds, seldom eat or choose low fat ice-cream, fruit juice bars or frozen yogurt  and eliminate or choose low-fat sweets  , Patient will make healthier snack choices in the next 30 days, Will continue to educate and progress as tolerated  Education: heart healthy eating  low sodium diet  nutrition for Improved BG control  exercise and diabetes management   Plan: Education class: Reading Food Labels, Education Class: Heart Healthy Eating, switch to low fat cheeses, replace butter with soft spreads made with olive oil, canola or yogurt, replace unhealthy snacks with fruits & vegs, switch to skim or 1% milk, eat fewer desserts and sweets, will replace sugar sweetened cereals with whole grain or oatmeal, learn how to read food labels, replace sugar with stevia or truvia and keep added daily sugar <25g/day  Readiness to change: Preparation:  (Getting ready to change)       PSYCHOSOCIAL ASSESSMENT AND PLAN    Emotional:  Depression assessment:  PHQ-9 = 0 =No Depression            Anxiety measure:  SHANTHI-7 = 0-4  = Not anxious  Self-reported stress level:  4  Social support: Excellent    Goals:  Reduce perceived stress to 1-3/10, Physical Fitness in [place] Score < 3, Daily Activity in [place] Score < 3, Pain in [place] Score < 3, Overall Health in [place] Score < 3, improved sleep and increased energy    Progression Toward Goals: Pt is progressing and showing improvement  toward the following goals:  Reduce perceived stress to 1-3/10, Physical Fitness in [place] Score < 3, Daily Activity in [place] Score < 3, Pain in [place] Score < 3, Overall Health in [place] Score < 3, improved sleep and increased energy  , Patient will attend Stress and relaxation class in the next 30 days, Will continue to educate and progress as tolerated      Education: benefits of a positive support system and depression and CAD  Plan: Class: Stress and Your Health, Class: Relaxation, Spend time outdoors, Enjoy a hobby and Reduce dependence  on family  Readiness to change: Action:  (Changing behavior)      OTHER CORE COMPONENTS     Tobacco:   Social History     Tobacco Use   Smoking Status Former Smoker    Packs/day: 3 00    Quit date: 36    Years since quittin 5   Smokeless Tobacco Former User   Tobacco Comment    quit over 30 yrs ago; (quit in the , had smoked 3PPD for 20 years - per [employer])       Tobacco Use Intervention:   Pt quit 40 years ago   and has abstained    Anginal Symptoms:  chest pressure and DUNAWAY   NTG use: No prescription    Blood pressure:    Restin/62 - 132/64   Exercise: 126/60- 138/70    Goals: consistent BP < 130/80, reduced dietary sodium <2300mg, moderate intensity exercise >150 mins/wk and reduce number of medications  needed for BP control    Progression Toward Goals: Pt is progressing and showing improvement  toward the following goals:  consistent BP < 130/80, reduced dietary sodium <2300mg, moderate intensity exercise >150 mins/wk and reduce number of medications  needed for BP control   , Patient will continue low sodium diet, medication compliance in the next 30 days, Will continue to educate and progress as tolerated      Education:  understanding high blood pressure and it's relationship to CAD and low sodium diet and HTN  Plan: Class: Understanding Heart Disease, Class: Common Heart Medications, Avoid Processed foods, engage in regular exercise, use salt substitutes and check labels for sodium content  Readiness to change: Preparation:  (Getting ready to change)

## 2021-07-07 ENCOUNTER — CLINICAL SUPPORT (OUTPATIENT)
Dept: CARDIAC REHAB | Age: 81
End: 2021-07-07
Payer: COMMERCIAL

## 2021-07-07 ENCOUNTER — APPOINTMENT (OUTPATIENT)
Dept: CARDIAC REHAB | Age: 81
End: 2021-07-07
Payer: COMMERCIAL

## 2021-07-07 DIAGNOSIS — I21.4 NSTEMI (NON-ST ELEVATED MYOCARDIAL INFARCTION) (HCC): ICD-10-CM

## 2021-07-07 PROCEDURE — 93798 PHYS/QHP OP CAR RHAB W/ECG: CPT

## 2021-07-08 ENCOUNTER — TELEPHONE (OUTPATIENT)
Dept: INTERNAL MEDICINE CLINIC | Facility: CLINIC | Age: 81
End: 2021-07-08

## 2021-07-08 NOTE — TELEPHONE ENCOUNTER
Folder Color-Red    Name of [address] [place] [address]     Form to be filled out by-Dr Kaleb Correia    Form to be Faxed [phone]    Patient made aware of 10 business day policy

## 2021-07-09 ENCOUNTER — APPOINTMENT (OUTPATIENT)
Dept: CARDIAC REHAB | Age: 81
End: 2021-07-09
Payer: COMMERCIAL

## 2021-07-12 ENCOUNTER — APPOINTMENT (OUTPATIENT)
Dept: CARDIAC REHAB | Age: 81
End: 2021-07-12
Payer: COMMERCIAL

## 2021-07-14 ENCOUNTER — TELEPHONE (OUTPATIENT)
Dept: INTERNAL MEDICINE CLINIC | Facility: CLINIC | Age: 81
End: 2021-07-14

## 2021-07-14 ENCOUNTER — APPOINTMENT (OUTPATIENT)
Dept: CARDIAC REHAB | Age: 81
End: 2021-07-14
Payer: COMMERCIAL

## 2021-07-14 NOTE — TELEPHONE ENCOUNTER
Patient called requesting to speak w/ Bronwyn or MA about the theresa horses she's been getting on her arm  Patient is concerned & in pain  She has an appt with Dr Logan Soares tomorrow but also wants to see Johnnie Handy at the same time  I told her I can schedule another appt another day w/ Johnnie Handy but she refused  She's requesting a call back as soon as possible regarding this matter as far as what she should do   Please review

## 2021-07-15 ENCOUNTER — APPOINTMENT (OUTPATIENT)
Dept: LAB | Facility: HOSPITAL | Age: 81
End: 2021-07-15
Payer: COMMERCIAL

## 2021-07-15 ENCOUNTER — OFFICE VISIT (OUTPATIENT)
Dept: INTERNAL MEDICINE CLINIC | Facility: CLINIC | Age: 81
End: 2021-07-15

## 2021-07-15 VITALS
HEIGHT: 63 IN | TEMPERATURE: 97.1 F | DIASTOLIC BLOOD PRESSURE: 67 MMHG | HEART RATE: 66 BPM | BODY MASS INDEX: 29.59 KG/M2 | WEIGHT: 167 LBS | SYSTOLIC BLOOD PRESSURE: 106 MMHG

## 2021-07-15 DIAGNOSIS — N18.31 STAGE 3A CHRONIC KIDNEY DISEASE (HCC): ICD-10-CM

## 2021-07-15 DIAGNOSIS — N17.9 AKI (ACUTE KIDNEY INJURY) (HCC): ICD-10-CM

## 2021-07-15 DIAGNOSIS — E11.40 CONTROLLED TYPE 2 DIABETES MELLITUS WITH DIABETIC NEUROPATHY, WITHOUT LONG-TERM CURRENT USE OF INSULIN (HCC): Primary | ICD-10-CM

## 2021-07-15 DIAGNOSIS — E11.40 CONTROLLED TYPE 2 DIABETES MELLITUS WITH DIABETIC NEUROPATHY, WITHOUT LONG-TERM CURRENT USE OF INSULIN (HCC): ICD-10-CM

## 2021-07-15 LAB
ANION GAP SERPL CALCULATED.3IONS-SCNC: 8 MMOL/L (ref 4–13)
BUN SERPL-MCNC: 48 MG/DL (ref 5–25)
CALCIUM SERPL-MCNC: 9.5 MG/DL (ref 8.3–10.1)
CHLORIDE SERPL-SCNC: 106 MMOL/L (ref 100–108)
CO2 SERPL-SCNC: 25 MMOL/L (ref 21–32)
CREAT SERPL-MCNC: 1.35 MG/DL (ref 0.6–1.3)
GFR SERPL CREATININE-BSD FRML MDRD: 43 ML/MIN/1.73SQ M
GLUCOSE SERPL-MCNC: 199 MG/DL (ref 65–140)
POTASSIUM SERPL-SCNC: 3.7 MMOL/L (ref 3.5–5.3)
SODIUM SERPL-SCNC: 139 MMOL/L (ref 136–145)

## 2021-07-15 PROCEDURE — 3078F DIAST BP <80 MM HG: CPT | Performed by: INTERNAL MEDICINE

## 2021-07-15 PROCEDURE — 1036F TOBACCO NON-USER: CPT | Performed by: INTERNAL MEDICINE

## 2021-07-15 PROCEDURE — 1160F RVW MEDS BY RX/DR IN RCRD: CPT | Performed by: INTERNAL MEDICINE

## 2021-07-15 PROCEDURE — 99213 OFFICE O/P EST LOW 20 MIN: CPT | Performed by: INTERNAL MEDICINE

## 2021-07-15 PROCEDURE — 3074F SYST BP LT 130 MM HG: CPT | Performed by: INTERNAL MEDICINE

## 2021-07-15 PROCEDURE — 36415 COLL VENOUS BLD VENIPUNCTURE: CPT

## 2021-07-15 PROCEDURE — 80048 BASIC METABOLIC PNL TOTAL CA: CPT

## 2021-07-15 NOTE — TELEPHONE ENCOUNTER
Just seeing this message now, Patient has appointment today at 11:00am, will address with patient at that time

## 2021-07-15 NOTE — PROGRESS NOTES
Patient's shoes and socks removed  Right Foot/Ankle   Right Foot Inspection  Skin Exam: skin normal, skin intact and warmth no dry skin, no callus, no erythema, no maceration, no abnormal color, no pre-ulcer, no ulcer and no callus                          Toe Exam: ROM and strength within normal limits, swelling and right toe deformity  Sensory   Vibration: diminished  Proprioception: diminished   Monofilament testing: absent  Vascular  Capillary refills: < 3 seconds  The right DP pulse is 2+  The right PT pulse is 2+  Left Foot/Ankle  Left Foot Inspection  Skin Exam: skin normal, skin intact and warmthno dry skin, no erythema, no maceration, normal color, no pre-ulcer, no ulcer and no callus                         Toe Exam: ROM and strength within normal limits, swelling and left toe deformity                   Sensory   Vibration: diminished  Proprioception: diminished  Monofilament: diminished  Vascular  Capillary refills: < 3 seconds  The left DP pulse is 2+  The left PT pulse is 2+  Assign Risk Category:  Deformity present; ;        Risk: 1                [address] [facility]  INTERNAL MEDICINE OFFICE VISIT     PATIENT INFORMATION     Sushila Dominguez   [de-identified] y o  female   MRN: [id number]    ASSESSMENT/PLAN       1  Diabetic foot exam for eligibility for diabetic shoes/sneakers:    diabetic foot exam was perform in the clinic today  Patient has bilateral foot deformity:  Charcot foot present on both sides due to her diabetes  No visible ulcers although bilateral erythema present without signs of infection  decreased sensation to light touch in bilateral lower extremity  Patient's eligibility form was filled out and will fax to appropriate place  2    Type 2 diabetes   recent increase in her A1c from close to 8 3  Given recent decrease in GFR, will order BNP  If her GFR improves then will increase the dose of metformin and consider adding Januvia      Patient agrees with plan  Patient was asked to get her BMP lab work done today  Diagnoses and all orders for this visit:    Controlled type 2 diabetes mellitus with diabetic neuropathy, without long-term current use of insulin ([facility] [address] )  -     Basic metabolic panel; Future      Schedule a follow-up appointment in   Two month follow-up for annual wellness visit  HEALTH MAINTENANCE     Immunization History   Administered Date(s) Administered    INFLUENZA 10/10/2017    Influenza Quadrivalent, 6-35 Months IM 10/10/2017    Influenza, high dose seasonal 0 7 mL 10/02/2019, 10/06/2020    Pneumococcal Conjugate 13-Valent 02/19/2020    Pneumococcal Polysaccharide PPV23 01/01/2008    SARS-CoV-2 / COVID-19 mRNA IM (Pfizer-BioNTech) 03/30/2021, 04/21/2021    Tdap 03/11/2020     CHIEF COMPLAINT     Chief Complaint   Patient presents with    Follow-up     needs diabetic foot exam and paperwork completed  patient also complainging of right arm pain after falling out of bed      HISTORY OF PRESENT ILLNESS       This is [address] year female with past medical history of CAD, recent NSTEMI,  hyperlipidemia, type 2 diabetes, aortic regurgitation, mitral regurgitation, CVA, asthma, heart failure preserved EF is here to fill out form for Eligibility for her diabetic Shoes  Form was filled out and will fax to the appropriate place  Patient currently has no active complaints  Patient recently had a NSTEMI status post balloon angioplasty  Patient currently going through cardiac rehab  Activity denies any chest pain or shortness of breath  She had a recent increase in her hemoglobin A1c to 8 3  REVIEW OF SYSTEMS     Review of Systems   Constitutional: Negative for activity change, appetite change, diaphoresis, fatigue and fever  HENT: Negative for congestion, sinus pressure, sinus pain and sore throat  Respiratory: Negative for apnea, chest tightness, shortness of breath and stridor      Cardiovascular: Negative for chest pain, palpitations and leg swelling  Gastrointestinal: Negative for abdominal pain, constipation and diarrhea  Endocrine: Negative for cold intolerance, heat intolerance, polydipsia and polyphagia  OBJECTIVE     Vitals:    07/15/21 1120   BP: 106/67   BP Location: Right arm   Patient Position: Sitting   Cuff Size: Large   Pulse: 66   Temp: (!) 97 1 °F (36 2 °C)   TempSrc: Temporal   Weight: 75 8 kg (167 lb)   Height: 5' 3" (1 6 m)     Physical Exam  Vitals reviewed  Constitutional:       General: She is not in acute distress  Appearance: She is well-developed  She is not diaphoretic  Cardiovascular:      Rate and Rhythm: Normal rate and regular rhythm  Pulses:           Dorsalis pedis pulses are 2+ on the right side and 2+ on the left side  Posterior tibial pulses are 2+ on the right side and 2+ on the left side  Heart sounds: Normal heart sounds  No murmur heard  No friction rub  Pulmonary:      Effort: Pulmonary effort is normal  No respiratory distress  Breath sounds: Normal breath sounds  No stridor  Abdominal:      General: Bowel sounds are normal  There is no distension  Palpations: Abdomen is soft  Tenderness: There is no abdominal tenderness  There is no rebound  Musculoskeletal:        Feet:       Comments: Bilateral Charcot's  Foot deformity present  Feet:      Right foot:      Skin integrity: Warmth present  No ulcer, skin breakdown, erythema, callus or dry skin  Left foot:      Skin integrity: Warmth present  No ulcer, skin breakdown, erythema, callus or dry skin  Psychiatric:         Behavior: Behavior normal          Thought Content:  Thought content normal        CURRENT MEDICATIONS     Current Outpatient Medications:     acetaminophen (TYLENOL) 650 mg CR tablet, Take 650 mg by mouth every 6 (six) hours as needed for mild pain, Disp: , Rfl:     albuterol (PROVENTIL HFA,VENTOLIN HFA) 90 mcg/act inhaler, Inhale 2 puffs every 4 (four) hours as needed for wheezing, Disp: 6 7 g, Rfl: 1    aspirin (ECOTRIN LOW STRENGTH) 81 mg EC tablet, Take 1 tablet (81 mg total) by mouth daily, Disp: 90 tablet, Rfl: 0    atorvastatin (LIPITOR) 40 mg tablet, Take 1 tablet (40 mg total) by mouth daily, Disp: 90 tablet, Rfl: 1    Breo Ellipta 100-25 MCG/INH inhaler, Inhale 1 puff daily, Disp: 60 blister, Rfl: 5    carvedilol (COREG) 6 25 mg tablet, Take 1 tablet (6 25 mg total) by mouth 2 (two) times a day with meals, Disp: 60 tablet, Rfl: 5    clopidogrel (PLAVIX) 75 mg tablet, TAKE 2 TABS DAILY, Disp: 180 tablet, Rfl: 1    conjugated estrogens (PREMARIN) vaginal cream, Insert 0 5 g into the vagina 2 (two) times a week Insert twice weekly at bedtime, Disp: 30 g, Rfl: 1    Continuous Blood Gluc Sensor (griddigyle Naldo 14 Day Sensor) MISC, Use one sensor every 14 days, Disp: 4 each, Rfl: 3    CVS D3 50 MCG (2000 UT) CAPS, Take 1 capsule (2,000 Units total) by mouth daily, Disp: 90 capsule, Rfl: 1    ferrous sulfate 324 (65 Fe) mg, Take 1 tablet (324 mg total) by mouth 2 (two) times a day before meals, Disp: 60 tablet, Rfl: 2    furosemide (LASIX) 40 mg tablet, Take 1 tablet (40 mg total) by mouth 2 (two) times a day, Disp: 180 tablet, Rfl: 0    metFORMIN (GLUCOPHAGE) 500 mg tablet, TAKE 1 TABLET (500 MG TOTAL) BY MOUTH 2 (TWO) TIMES A DAY WITH MEALS, Disp: 180 tablet, Rfl: 1    Misc   Devices (QUAD CANE) MISC, by Does not apply route daily, Disp: 1 each, Rfl: 0    montelukast (SINGULAIR) 10 mg tablet, Take 1 tablet (10 mg total) by mouth daily at bedtime, Disp: 90 tablet, Rfl: 1    nitroglycerin (NITROSTAT) 0 4 mg SL tablet, Place 1 tablet (0 4 mg total) under the tongue every 5 (five) minutes as needed for chest pain, Disp: 3 tablet, Rfl: 0    rivaroxaban (XARELTO) 2 5 mg tablet, Take 1 tablet (2 5 mg total) by mouth 2 (two) times a day with meals, Disp: 60 tablet, Rfl: 0    Blood Glucose Monitoring Suppl (FREESTYLE LITE) JAQUELIN, by Does not apply route daily (Patient not taking: Reported on 7/15/2021), Disp: 1 each, Rfl: 0    glucose blood (FREESTYLE LITE) test strip, TEST AS DIRECTED UP TO FOUR TIMES A DAY (Patient not taking: Reported on 7/15/2021), Disp: 100 each, Rfl: 3    Glucose-Vitamin C-Vitamin D (TRUEPLUS GLUCOSE ON THE GO) CHEW, CHEW 2 TABS AS NEEDED FOR BLOOD SUGAR LESS THAN 80 (Patient not taking: Reported on 6/30/2021), Disp: , Rfl:     Lancets (FREESTYLE) lancets, Use as instructed (Patient not taking: Reported on 7/15/2021), Disp: 100 each, Rfl: 0    losartan (COZAAR) 100 MG tablet, Take 0 5 tablets (50 mg total) by mouth daily (Patient not taking: Reported on 7/15/2021), Disp: 30 tablet, Rfl: 0    ranolazine (RANEXA) 500 mg 12 hr tablet, Take 1 tablet (500 mg total) by mouth every 12 (twelve) hours (Patient not taking: Reported on 6/16/2021), Disp: 60 tablet, Rfl: 0    PAST MEDICAL & SURGICAL HISTORY     Past Medical History:   Diagnosis Date    ACE-inhibitor cough     last assessed - [id number]    Asthma     Bilateral edema of lower extremity     last assessed - [id number]    Cardiac murmur     last assessed - [id number]    CKD (chronic kidney disease)     Diabetes mellitus ([facility] [address] )     last assessed - [id number]    Esophageal dysphagia     Fatigue     last assessed - [id number]    Hyperlipidemia     Hypertension     Patellar bursitis of right knee     last assessed - [id number]    Personal history of other specified conditions     presenting hazards to health    Stroke [facility])     Stroke syndrome     Urinary frequency     UTI (urinary tract infection)      Past Surgical History:   Procedure Laterality Date    UT ESOPHAGOGASTRODUODENOSCOPY TRANSORAL DIAGNOSTIC N/A 4/5/2017    Procedure: ESOPHAGOGASTRODUODENOSCOPY (EGD); Surgeon: Anita Bustamante MD;  Location: [facility]; Service: Gastroenterology     SOCIAL & FAMILY HISTORY     Social History     Socioeconomic History    Marital status:       Spouse name: Not on file    Number of children: 6    Years of education: Not on file    Highest education level: Not on file   Occupational History    Occupation: Retired   Tobacco Use    Smoking status: Former Smoker     Packs/day: 3 00     Quit date:      Years since quittin 5    Smokeless tobacco: Former User    Tobacco comment: quit over 30 yrs ago; (quit in the , had smoked 3PPD for 20 years - per [employer])   Vaping Use    Vaping Use: Never used   Substance and Sexual Activity    Alcohol use: Never    Drug use: Never    Sexual activity: Not Currently   Other Topics Concern    Not on file   Social History Narrative    Diet needs improvement    Does not exercise    No caffeine use     Social Determinants of Health     Financial Resource Strain: Low Risk     Difficulty of Paying Living Expenses: Not hard at all   Food Insecurity: No Food Insecurity    Worried About [address] [employer] in the Last Year: Never true    [address]UNIT[facility] in the Last Year: Never true   Transportation Needs: No Transportation Needs    Lack of Transportation (Medical): No    Lack of Transportation (Non-Medical): No   Physical Activity: Inactive    Days of Exercise per Week: 0 days    Minutes of Exercise per Session: 0 min   Stress: No Stress Concern Present    Feeling of Stress : Not at all   Social Connections: Socially Isolated    Frequency of Communication with Friends and Family: Once a week    Frequency of Social Gatherings with Friends and Family: Once a week    Attends Judaism Services: 1 to 4 times per year    Active Member of [employer] or Organizations: No    Attends Club or Organization Meetings: Never    Marital Status:     Intimate Partner Violence: Not At Risk    Fear of Current or Ex-Partner: No    Emotionally Abused: No    Physically Abused: No    Sexually Abused: No     Social History     Substance and Sexual Activity   Alcohol Use Never     Social History     Substance and Sexual Activity   Drug Use Never Social History     Tobacco Use   Smoking Status Former Smoker    Packs/day: 3 00    Quit date: 36    Years since quittin 5   Smokeless Tobacco Former User   Tobacco Comment    quit over 30 yrs ago; (quit in the , had smoked 3PPD for 20 years - per [employer])     Family History   Problem Relation Age of Onset    Heart disease Father     Hypertension Mother     Stroke Mother     Other Sister         1)Breast disorder; 2)Epilepsy   Deadra Davis Migraines Sister         Migraine headaches    Osteoporosis Sister     Thyroid disease Sister     Coronary artery disease Sister         S/P triple vessel bypass    Cancer Sister         breast CA    Osteoporosis Maternal Grandmother     Diabetes Son         Diabetes mellitus    Hypertension Son     Rheum arthritis Son         Rheumatic disease    Heart disease Family      ==  Jalen Christiansen,   PGY-3  [facility] [address]  [address]   [facility] , [facility] [address] [facility] [address], [address] [facility]  Office: [phone]  Fax: [phone]

## 2021-07-16 ENCOUNTER — APPOINTMENT (OUTPATIENT)
Dept: CARDIAC REHAB | Age: 81
End: 2021-07-16
Payer: COMMERCIAL

## 2021-07-16 NOTE — TELEPHONE ENCOUNTER
Form completed by Dr Coleman Lundberg and Dr Yocasta Diaz  Form placed in RED clerical folder   To be faxed along with office notes   Please fax

## 2021-07-17 ENCOUNTER — TELEPHONE (OUTPATIENT)
Dept: NEPHROLOGY | Facility: CLINIC | Age: 81
End: 2021-07-17

## 2021-07-19 ENCOUNTER — APPOINTMENT (OUTPATIENT)
Dept: CARDIAC REHAB | Age: 81
End: 2021-07-19
Payer: COMMERCIAL

## 2021-07-21 ENCOUNTER — CLINICAL SUPPORT (OUTPATIENT)
Dept: CARDIAC REHAB | Age: 81
End: 2021-07-21
Payer: COMMERCIAL

## 2021-07-21 ENCOUNTER — APPOINTMENT (OUTPATIENT)
Dept: CARDIAC REHAB | Age: 81
End: 2021-07-21
Payer: COMMERCIAL

## 2021-07-21 DIAGNOSIS — I21.4 NSTEMI (NON-ST ELEVATED MYOCARDIAL INFARCTION) (HCC): ICD-10-CM

## 2021-07-21 PROCEDURE — 93798 PHYS/QHP OP CAR RHAB W/ECG: CPT

## 2021-07-23 ENCOUNTER — CLINICAL SUPPORT (OUTPATIENT)
Dept: CARDIAC REHAB | Age: 81
End: 2021-07-23
Payer: COMMERCIAL

## 2021-07-23 ENCOUNTER — APPOINTMENT (OUTPATIENT)
Dept: CARDIAC REHAB | Age: 81
End: 2021-07-23
Payer: COMMERCIAL

## 2021-07-23 DIAGNOSIS — I21.4 NSTEMI (NON-ST ELEVATED MYOCARDIAL INFARCTION) (HCC): ICD-10-CM

## 2021-07-23 PROCEDURE — 93798 PHYS/QHP OP CAR RHAB W/ECG: CPT

## 2021-07-26 ENCOUNTER — APPOINTMENT (OUTPATIENT)
Dept: CARDIAC REHAB | Age: 81
End: 2021-07-26
Payer: COMMERCIAL

## 2021-07-26 ENCOUNTER — TELEPHONE (OUTPATIENT)
Dept: CARDIAC REHAB | Age: 81
End: 2021-07-26

## 2021-07-28 ENCOUNTER — APPOINTMENT (OUTPATIENT)
Dept: CARDIAC REHAB | Age: 81
End: 2021-07-28
Payer: COMMERCIAL

## 2021-07-28 ENCOUNTER — CLINICAL SUPPORT (OUTPATIENT)
Dept: CARDIAC REHAB | Age: 81
End: 2021-07-28
Payer: COMMERCIAL

## 2021-07-28 DIAGNOSIS — I21.4 NSTEMI (NON-ST ELEVATED MYOCARDIAL INFARCTION) (HCC): ICD-10-CM

## 2021-07-28 PROCEDURE — 93798 PHYS/QHP OP CAR RHAB W/ECG: CPT

## 2021-07-30 ENCOUNTER — CLINICAL SUPPORT (OUTPATIENT)
Dept: CARDIAC REHAB | Age: 81
End: 2021-07-30
Payer: COMMERCIAL

## 2021-07-30 ENCOUNTER — APPOINTMENT (OUTPATIENT)
Dept: CARDIAC REHAB | Age: 81
End: 2021-07-30
Payer: COMMERCIAL

## 2021-07-30 DIAGNOSIS — I21.4 NSTEMI (NON-ST ELEVATED MYOCARDIAL INFARCTION) (HCC): ICD-10-CM

## 2021-07-30 PROCEDURE — 93798 PHYS/QHP OP CAR RHAB W/ECG: CPT

## 2021-08-02 ENCOUNTER — APPOINTMENT (OUTPATIENT)
Dept: CARDIAC REHAB | Age: 81
End: 2021-08-02
Payer: COMMERCIAL

## 2021-08-04 ENCOUNTER — APPOINTMENT (OUTPATIENT)
Dept: CARDIAC REHAB | Age: 81
End: 2021-08-04
Payer: COMMERCIAL

## 2021-08-04 ENCOUNTER — CLINICAL SUPPORT (OUTPATIENT)
Dept: CARDIAC REHAB | Age: 81
End: 2021-08-04
Payer: COMMERCIAL

## 2021-08-04 DIAGNOSIS — I21.4 NSTEMI (NON-ST ELEVATED MYOCARDIAL INFARCTION) (HCC): ICD-10-CM

## 2021-08-04 PROCEDURE — 93798 PHYS/QHP OP CAR RHAB W/ECG: CPT

## 2021-08-04 NOTE — PROGRESS NOTES
Cardiac Rehabilitation Plan of Care   90 Day Reassessment          Today's date: 2021   # of Exercise Sessions Completed: 20  Patient name: Viry Freeman      : 1940  Age: 80 y o  MRN: [id number]  Referring Physician: Leonor Guzman DO  Cardiologist: Flora Feldman MD  Provider: Nader  Clinician: Rylie Stafford, MS, CEP, CCRP      Dx:   Encounter Diagnosis   Name Primary?  NSTEMI (non-ST elevated myocardial infarction) [facility])      Date of onset: 3/22/21      SUMMARY OF PROGRESS:  Iman Bush has missed a few sessions this past 30 day period d/t a family death  She  Completed 5 sessions since 60 day ITP  She tolerates 36 mins at 2 2 - 3 0 METs  Resting BP always well controlled, 106/64 - 124/62 with appropriate response to exercise reaching 122/60 - 130/70  NSR on tele with rare PVC observed  RHR 51 - 72 ExHR 79 - 92  Progression of intensity has been slow d/t pt's absence and intolerance to higher workloads  Iman Bush does not feel improvement in her balance or energy with ADLs  No cardiac complaints in cardiac rehab  However, she reports that she takes NTG a couple times per week at home which resolves her pain  She is not progressing toward wt loss goals with a gain of 11 pounds since her initial eval on May 11  Patient states she has been working on  dietary modifications with the goal of rare red/processed meats, low fat dairy, reduced added sugars and refined flours  Heart healthy eating was reinforced  The patient monitors BG 3-4 times per day and reports avg  FBG of 120-130  Iman Bush denies  feelings of depression/anxiety  Patient reports excellent social/emotional support  Patient attends group educational classes on cardiac risk factor modification  She has not added home exercise but states she moves around the house throughout the day  She has tolerated short [facility] block walks [facility]     Her exercise program will be progressed as tolerated to maintain RPE 4-6    Pt will continue to be educated on lifestyle modifications and encouraged to supplement with a home exercise program (move more and sit less during the day)  to reach the following goals: start home walking program, smaller food portions, healthier snack choices in the next 30 days  Duration of exercise will be increased to 40 mins then intensity will be progressed as tolerated  Medication compliance: Yes   Comments: Pt reports to be compliant with medications  Fall Risk: Moderate   Comments: Patient uses walking assist device (walker/cane/rollator)    EKG Interpretation: NSR, rare PVC      EXERCISE ASSESSMENT and PLAN    Current Exercise Program in Rehab:       Frequency: 3 days/week Supplement with home exercise 2+ days/wk as tolerated       Minutes: 30-36        METS: 2 2-3 0           HR: RHR + 30bpm    RPE: 4-5         Modalities: UBE, NuStep and Recumbent bike      Exercise Progression 30 Day Goals :    Frequency: 3 days/week of cardiac rehab     Supplement with home exercise 2+ days/wk as tolerated    Minutes: 40                            >150 mins/wk of moderate intensity exercise   METS: 2 5-3 5   HR: RHR +30bpm    RPE: 4-6   Modalities: UBE, NuStep, Recumbent bike and Room walking    Strength trainin-3 days / week  12-15 repetitions  1-2 sets per modality   was provided instruction for home DB exercises     Modalities: Arm Curl, Sit to [employer] and Shoulder Shrugs, wall push-ups    Home Exercise: none    Goals: 10% improvement in functional capacity - based on max METs achieved in fitness assessment, Reduced dyspnea with physical activity  0-1/10, improved DASI score by 10%, Exercise 5 days/wk, >150mins/wk of moderate intensity exercise, Resume ADLs with increased strength, Attend Rehab regularly and Decrease sitting time    Progression Toward Goals:  Pt is progressing and showing improvement  toward the following goals:  Exercise 5 days/wk, >150mins/wk of moderate intensity exercise, Resume ADLs with increased strength, Attend Rehab regularly and Decrease sitting time  , Pt has not made progress toward the following goals: 10% improvement in functional capacity - based on max METs achieved in fitness assessment, Reduced dyspnea with physical activity  0-1/10, improved DASI score by 10%, Exercise 5 days/wk, >150mins/wk of moderate intensity exercise, Resume ADLs with increased strength, Attend Rehab regularly and Decrease sitting time  , Patient will add home walking, move more- more steps in home with cane in the next 30 days, Will continue to educate and progress as tolerated      Education: benefit of exercise for CAD risk factors, AHA guidelines to achieve >150 mins/wk of moderate exercise and RPE scale   Plan:education on home exercise guidelines, home exercise 30+ mins 2 days opposite CR and Education class: Risk Factors for Heart Disease  Readiness to change: Contemplation:  (Acknowledging that there is a problem but not yet ready or sure of wanting to make a change)      NUTRITION ASSESSMENT AND PLAN    Weight control:    Starting weight: 161 2   Current weight:    Waist circumference:    Startin   Current:      Diabetes: T2D, Patient monitors BS 4 times/day, Patient reported fasting -130  A1c: 6 7    last measured: 4/3/2021    Lipid management: Discussed diet and lipid management and Last lipid profile 11/10/2020  Chol 140  TRG 44  HDL 75  LDL 56    Goals:reduced BMI to < 25, reduced waist circumference <35 inches (F), improved A1c  < 7 0%, Improved Rate Your Plate score  >27, increase intake of fish, shellfish, increase intake of meatless meals, use low fat dairy, reduce cheese intake or use reduced-fat, Eat 4-5 cups of fruits and vegetables daily, choose low sodium processed foods, use fat-free dressings/[place] or seldom use, Increase intake of nuts and seeds, seldom eat or choose low fat ice-cream, fruit juice bars or frozen yogurt  and eliminate or choose low-fat sweets    Progression Toward Goals: Pt is progressing and showing improvement  toward the following goals:  reduced BMI to < 25, reduced waist circumference <35 inches (F), improved A1c  < 7 0%, Improved Rate Your Plate score  >32, increase intake of fish, shellfish, increase intake of meatless meals, use low fat dairy, reduce cheese intake or use reduced-fat, Eat 4-5 cups of fruits and vegetables daily, choose low sodium processed foods, use fat-free dressings/[place] or seldom use, Increase intake of nuts and seeds, seldom eat or choose low fat ice-cream, fruit juice bars or frozen yogurt  and eliminate or choose low-fat sweets  , Patient will make healthier snack choices in the next 30 days, Will continue to educate and progress as tolerated      Education: heart healthy eating  low sodium diet  nutrition for Improved BG control  exercise and diabetes management   Plan: Education class: Reading Food Labels, Education Class: Heart Healthy Eating, switch to low fat cheeses, replace butter with soft spreads made with olive oil, canola or yogurt, replace unhealthy snacks with fruits & vegs, switch to skim or 1% milk, eat fewer desserts and sweets, will replace sugar sweetened cereals with whole grain or oatmeal, learn how to read food labels, replace sugar with stevia or truvia and keep added daily sugar <25g/day  Readiness to change: Preparation:  (Getting ready to change)       PSYCHOSOCIAL ASSESSMENT AND PLAN    Emotional:  Depression assessment:  PHQ-9 = 0 =No Depression            Anxiety measure:  SHANTHI-7 = 0-4  = Not anxious  Self-reported stress level:  4  Social support: Excellent    Goals:  Reduce perceived stress to 1-3/10, Physical Fitness in [place] Score < 3, Daily Activity in [place] Score < 3, Pain in [place] Score < 3, [facility] in [facility] Score < 3, improved sleep and increased energy    Progression Toward Goals: Pt is progressing and showing improvement  toward the following goals:  Reduce perceived stress to 1-3/10, Physical Fitness in [facility] Score < 3, Daily Activity in [facility] Score < 3, Pain in [facility] Score < 3, Overall Health in [facility] Score < 3, improved sleep and increased energy  , Patient will attend Stress and relaxation class in the next 30 days, Will continue to educate and progress as tolerated  Education: benefits of a positive support system and depression and CAD  Plan: Class: Stress and Your Health, Class: Relaxation, Spend time outdoors, Enjoy a hobby and Reduce dependence  on family  Readiness to change: Action:  (Changing behavior)      OTHER CORE COMPONENTS     Tobacco:   Social History     Tobacco Use   Smoking Status Former Smoker    Packs/day: 3 00    Quit date: 36    Years since quittin 6   Smokeless Tobacco Former User   Tobacco Comment    quit over 30 yrs ago; (quit in the , had smoked 3PPD for 20 years - per [employer])       Tobacco Use Intervention:   Pt quit 40 years ago   and has abstained    Anginal Symptoms:  chest pressure and DUNAWAY   NTG use: No prescription    Blood pressure:    Restin/64 - 124/62   Exercise: 122/60 - 130/70    Goals: consistent BP < 130/80, reduced dietary sodium <2300mg, moderate intensity exercise >150 mins/wk and reduce number of medications  needed for BP control    Progression Toward Goals: Pt is progressing and showing improvement  toward the following goals:  consistent BP < 130/80, reduced dietary sodium <2300mg, moderate intensity exercise >150 mins/wk and reduce number of medications  needed for BP control   , Patient will continue low sodium diet, medication compliance in the next 30 days, Will continue to educate and progress as tolerated      Education:  understanding high blood pressure and it's relationship to CAD and low sodium diet and HTN  Plan: Class: Understanding Heart Disease, Class: Common Heart Medications, Avoid Processed foods, engage in regular exercise, use salt substitutes and check labels for sodium content  Readiness to change: Preparation:  (Getting ready to change)

## 2021-08-06 ENCOUNTER — APPOINTMENT (OUTPATIENT)
Dept: CARDIAC REHAB | Age: 81
End: 2021-08-06
Payer: COMMERCIAL

## 2021-08-09 ENCOUNTER — APPOINTMENT (OUTPATIENT)
Dept: CARDIAC REHAB | Age: 81
End: 2021-08-09
Payer: COMMERCIAL

## 2021-08-10 ENCOUNTER — OFFICE VISIT (OUTPATIENT)
Dept: MULTI SPECIALTY CLINIC | Facility: CLINIC | Age: 81
End: 2021-08-10

## 2021-08-10 VITALS
HEART RATE: 61 BPM | HEIGHT: 63 IN | BODY MASS INDEX: 29.77 KG/M2 | TEMPERATURE: 97.9 F | DIASTOLIC BLOOD PRESSURE: 67 MMHG | WEIGHT: 168 LBS | SYSTOLIC BLOOD PRESSURE: 110 MMHG

## 2021-08-10 DIAGNOSIS — E78.2 MIXED HYPERLIPIDEMIA: ICD-10-CM

## 2021-08-10 DIAGNOSIS — I12.9 BENIGN HYPERTENSION WITH CKD (CHRONIC KIDNEY DISEASE) STAGE III (HCC): ICD-10-CM

## 2021-08-10 DIAGNOSIS — E11.65 UNCONTROLLED TYPE 2 DIABETES MELLITUS WITH HYPERGLYCEMIA (HCC): Primary | ICD-10-CM

## 2021-08-10 DIAGNOSIS — N18.30 BENIGN HYPERTENSION WITH CKD (CHRONIC KIDNEY DISEASE) STAGE III (HCC): ICD-10-CM

## 2021-08-10 PROCEDURE — 99214 OFFICE O/P EST MOD 30 MIN: CPT | Performed by: PHYSICIAN ASSISTANT

## 2021-08-10 NOTE — PATIENT INSTRUCTIONS
Hypoglycemia instructions   Blake Toussaint  8/10/2021  [id number]    Low Blood Sugar    Steps to treat low blood sugar  1  Test blood sugar if you have symptoms of low blood sugar:   Low Blood Sugar Symptoms:  o Sweaty  o Dizzy  o Rapid heartbeat  o Shaky  o Bad mood  o Hungry      2  Treat blood sugar less than 70 with 15 grams of fast-acting carbohydrate:   Examples of 15 grams Fast-Acting Carbohydrate:  o 4 oz juice  o 4 oz regular soda  o 3-4 glucose tablets (chew)  o 3-4 hard candies (chew)          3  Wait 15 minutes and test your blood sugar again     4   If blood sugar is less than 100, repeat steps 2-3     5  When your blood sugar is 100 or more, eat a snack if it will be longer than one hour until your next meal  The snack should be 15 grams of carbohydrate and a protein:   Examples of snacks:  o ½ sandwich  o 6 crackers with cheese  o Piece of fruit with cheese or peanut butter  o 6 crackers with peanut butter

## 2021-08-10 NOTE — PROGRESS NOTES
Patient Progress Note      Chief Complaint   Patient presents with    Follow-up      Referring Provider  Justin Malik Pa-c  [address] [facility] [id number] [place] [address] 28,  210 [facility]     History of Present Illness:   Yarely Angelo is a 80 y o  female with a history of type 2 diabetes with long term use of insulin  Diabetes course has been stable  Complications of DM: retinopathy, CVA, CKD  Denies recent illness or hospitalizations  Denies recent severe hypoglycemic or severe hyperglycemic episodes  Denies any issues with her current regimen  Home glucose monitoring: are performed regularly  Using [place]     She is wearing a [place] but forgot to bring her  to download  She reports this morning it was 178 mg/dl after a cup of coffee and a muffin  She is unable to provide a range  Current regimen: Metformin 500 mg twice a day, Tradjenta 5 mg daily (reports she just recently restarted it)  Berl Demetrio and Toujeo were stopped by her PCP in the past per patient  compliant most of the time, denies any side effects from medications  Hypoglycemic episodes: No, rare  H/o of hypoglycemia causing hospitalization or Intervention such as glucagon injection or ambulance call : No  Hypoglycemia symptoms: cannot recall reading less than 70 mg/dl   Treatment of hypoglycemia: glucose tablets     Medic alert tag: recommended: Yes     Diet: 3 meals per day, 1 snack per day  Timing of meals is predictable  Diabetic diet compliance: she tries to follow a diet sometimes  Activity: Daily activity is predictable: Yes  She goes for cardiac rehab 3 times week     Ophthamology: October 2020  Podiatry: foot exam UTD, July 2021     Has hypertension: on ACE inhibitor/ARB, compliant most of the time  Has hyperlipidemia: on statin - tolerating well, no myalgias  compliant most of the time, denies any side effects from medications    Thyroid disorders: No  History of pancreatitis: No    Patient Active Problem List   Diagnosis    Aortic valve regurgitation, nonrheumatic    Benign hypertension with CKD (chronic kidney disease) stage III    Chronic rhinitis    Midline cystocele    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, without long-term current use of insulin ([facility])    Non-rheumatic mitral regurgitation    Uterine procidentia    Anemia    Esophageal abnormality    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    Decreased hearing, bilateral    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[facility] )    History of CVA with residual deficit    Mixed hyperlipidemia    Persistent proteinuria    Renal cyst    Ankle edema    Stage 3a chronic kidney disease (HCC)    Acute diastolic congestive heart failure ([facility])    Status post insertion of drug-eluting stent into left anterior descending (LAD) artery    Coronary artery disease involving native coronary artery of native heart without angina pectoris    NSTEMI (non-ST elevated myocardial infarction) ([facility] )    Essential hypertension    Asthma    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[facility] )    Uncontrolled type 2 diabetes mellitus with hyperglycemia ([facility] )      Past Medical History:   Diagnosis Date    ACE-inhibitor cough     last assessed - [id number]    Asthma     Bilateral edema of lower extremity     last assessed - [id number]    Cardiac murmur     last assessed - [id number]    CKD (chronic kidney disease)     Diabetes mellitus ([facility] )     last assessed - [id number]    Esophageal dysphagia     Fatigue     last assessed - [id number]    Hyperlipidemia     Hypertension     Patellar bursitis of right knee     last assessed - [id number]    Personal history of other specified conditions     presenting hazards to health    Stroke [facility])     Stroke syndrome     Urinary frequency     UTI (urinary tract infection)       Past Surgical History:   Procedure Laterality Date    CT ESOPHAGOGASTRODUODENOSCOPY TRANSORAL DIAGNOSTIC N/A 4/5/2017 Procedure: ESOPHAGOGASTRODUODENOSCOPY (EGD); Surgeon: Lissette Armstrong MD;  Location: [facility];   Service: Gastroenterology      Family History   Problem Relation Age of Onset    Heart disease Father     Hypertension Mother     Stroke Mother     Other Sister         1)Breast disorder; 2)Epilepsy   Tejal Graff Migraines Sister         Migraine headaches    Osteoporosis Sister     Thyroid disease Sister     Coronary artery disease Sister         S/P triple vessel bypass    Cancer Sister         breast CA    Osteoporosis Maternal Grandmother     Diabetes Son         Diabetes mellitus    Hypertension Son     Rheum arthritis Son         Rheumatic disease    Heart disease Family      Social History     Tobacco Use    Smoking status: Former Smoker     Packs/day: 3 00     Quit date:      Years since quittin 6    Smokeless tobacco: Former User    Tobacco comment: quit over 30 yrs ago; (quit in the , had smoked 3PPD for 20 years - per [employer])   Substance Use Topics    Alcohol use: Never     No Known Allergies      Current Outpatient Medications:     acetaminophen (TYLENOL) 650 mg CR tablet, Take 650 mg by mouth every 6 (six) hours as needed for mild pain, Disp: , Rfl:     albuterol (PROVENTIL HFA,VENTOLIN HFA) 90 mcg/act inhaler, Inhale 2 puffs every 4 (four) hours as needed for wheezing, Disp: 6 7 g, Rfl: 1    aspirin (ECOTRIN LOW STRENGTH) 81 mg EC tablet, Take 1 tablet (81 mg total) by mouth daily, Disp: 90 tablet, Rfl: 0    Breo Ellipta 100-25 MCG/INH inhaler, Inhale 1 puff daily, Disp: 60 blister, Rfl: 5    carvedilol (COREG) 6 25 mg tablet, Take 1 tablet (6 25 mg total) by mouth 2 (two) times a day with meals, Disp: 60 tablet, Rfl: 5    clopidogrel (PLAVIX) 75 mg tablet, TAKE 2 TABS DAILY, Disp: 180 tablet, Rfl: 1    conjugated estrogens (PREMARIN) vaginal cream, Insert 0 5 g into the vagina 2 (two) times a week Insert twice weekly at bedtime, Disp: 30 g, Rfl: 1    Continuous Blood Gluc Sensor (FreeStyle Naldo 14 Day Sensor) MISC, Use one sensor every 14 days, Disp: 4 each, Rfl: 3    CVS D3 50 MCG (2000 UT) CAPS, Take 1 capsule (2,000 Units total) by mouth daily, Disp: 90 capsule, Rfl: 1    ferrous sulfate 324 (65 Fe) mg, Take 1 tablet (324 mg total) by mouth 2 (two) times a day before meals, Disp: 60 tablet, Rfl: 2    furosemide (LASIX) 40 mg tablet, Take 1 tablet (40 mg total) by mouth 2 (two) times a day, Disp: 180 tablet, Rfl: 0    glucose blood (FREESTYLE LITE) test strip, TEST AS DIRECTED UP TO FOUR TIMES A DAY, Disp: 100 each, Rfl: 3    Glucose-Vitamin C-Vitamin D (TRUEPLUS GLUCOSE ON THE GO) CHEW, CHEW 2 TABS AS NEEDED FOR BLOOD SUGAR LESS THAN 80, Disp: , Rfl:     Lancets (FREESTYLE) lancets, Use as instructed, Disp: 100 each, Rfl: 0    losartan (COZAAR) 100 MG tablet, Take 0 5 tablets (50 mg total) by mouth daily, Disp: 30 tablet, Rfl: 0    metFORMIN (GLUCOPHAGE) 500 mg tablet, TAKE 1 TABLET (500 MG TOTAL) BY MOUTH 2 (TWO) TIMES A DAY WITH MEALS, Disp: 180 tablet, Rfl: 1    Misc   Devices (QUAD CANE) MISC, by Does not apply route daily, Disp: 1 each, Rfl: 0    montelukast (SINGULAIR) 10 mg tablet, Take 1 tablet (10 mg total) by mouth daily at bedtime, Disp: 90 tablet, Rfl: 1    nitroglycerin (NITROSTAT) 0 4 mg SL tablet, Place 1 tablet (0 4 mg total) under the tongue every 5 (five) minutes as needed for chest pain, Disp: 3 tablet, Rfl: 0    rivaroxaban (XARELTO) 2 5 mg tablet, Take 1 tablet (2 5 mg total) by mouth 2 (two) times a day with meals, Disp: 60 tablet, Rfl: 0    atorvastatin (LIPITOR) 40 mg tablet, Take 1 tablet (40 mg total) by mouth daily, Disp: 90 tablet, Rfl: 1    Blood Glucose Monitoring Suppl (FREESTYLE LITE) JAQUELIN, by Does not apply route daily (Patient not taking: Reported on 7/15/2021), Disp: 1 each, Rfl: 0    linaGLIPtin 5 MG TABS, Take 5 mg by mouth daily, Disp: 60 tablet, Rfl: 0    ranolazine (RANEXA) 500 mg 12 hr tablet, Take 1 tablet (500 mg total) by mouth every 12 (twelve) hours (Patient not taking: Reported on 6/16/2021), Disp: 60 tablet, Rfl: 0  Review of Systems   Constitutional: Negative for activity change, appetite change, fatigue and unexpected weight change  HENT: Negative for trouble swallowing  Eyes: Negative for visual disturbance  Respiratory: Positive for shortness of breath  Cardiovascular: Negative for chest pain and palpitations  Gastrointestinal: Negative for constipation and diarrhea  Endocrine: Negative for polydipsia and polyuria  Musculoskeletal: Negative  Skin: Negative for wound  Neurological: Negative for numbness  Psychiatric/Behavioral: Negative  Physical Exam:  Body mass index is 29 76 kg/m²  /67 (BP Location: Left arm, Patient Position: Sitting, Cuff Size: Adult)   Pulse 61   Temp 97 9 °F (36 6 °C) (Temporal)   Ht 5' 3" (1 6 m)   Wt 76 2 kg (168 lb)   BMI 29 76 kg/m²    Wt Readings from Last 3 Encounters:   08/10/21 76 2 kg (168 lb)   07/15/21 75 8 kg (167 lb)   06/30/21 73 9 kg (163 lb)       Physical Exam  Vitals and nursing note reviewed  Constitutional:       Appearance: She is well-developed  HENT:      Head: Normocephalic  Eyes:      General: No scleral icterus  Pupils: Pupils are equal, round, and reactive to light  Neck:      Thyroid: No thyromegaly  Cardiovascular:      Rate and Rhythm: Normal rate and regular rhythm  Pulses:           Radial pulses are 2+ on the right side and 2+ on the left side  Heart sounds: No murmur heard  Pulmonary:      Effort: Pulmonary effort is normal  No respiratory distress  Breath sounds: Normal breath sounds  No wheezing  Musculoskeletal:      Cervical back: Neck supple  Skin:     General: Skin is warm and dry  Neurological:      Mental Status: She is alert  Patient's shoes and socks were not removed        Labs:   Component      Latest Ref Rng & Units 4/3/2021 6/7/2021 6/26/2021   Sodium      136 - 145 mmol/L 140 139 139   Potassium      3 5 - 5 3 mmol/L 4 0 4 1 4 1   Chloride      100 - 108 mmol/L 108 106 105   CO2      21 - 32 mmol/L 28 29 27   Anion Gap      4 - 13 mmol/L 4 4 7   BUN      5 - 25 mg/dL 56 (H) 62 (H) 51 (H)   Creatinine      0 60 - 1 30 mg/dL 1 47 (H) 1 51 (H) 1 47 (H)   Glucose, Random      65 - 140 mg/dL  135 154 (H)   Calcium      8 3 - 10 1 mg/dL 9 5 9 7 10 0   AST      5 - 45 U/L   16   ALT      12 - 78 U/L   32   Alkaline Phosphatase      46 - 116 U/L   74   Total Protein      6 4 - 8 2 g/dL   7 8   Albumin      3 5 - 5 0 g/dL   3 7   TOTAL BILIRUBIN      0 20 - 1 00 mg/dL   0 60   eGFR      ml/min/1 73sq m 39 37 39   GLUCOSE FASTING      65 - 99 mg/dL 141 (H)     EXT Creatinine Urine      mg/dL  33 5    MICROALBUM ,U,RANDOM      0 0 - 20 0 mg/L  33 7 (H)    MICROALBUMIN/CREATININE RATIO      0 - 30 mg/g creatinine  101 (H)    Hemoglobin A1C      Normal 3 8-5 6%; PreDiabetic 5 7-6 4%; Diabetic >=6 5%; Glycemic control for adults with diabetes <7 0% % 6 7 (H)  8 3 (H)   eAG, EST AVG Glucose      mg/dl 146  192     Component      Latest Ref Rng & Units 7/15/2021   Sodium      136 - 145 mmol/L 139   Potassium      3 5 - 5 3 mmol/L 3 7   Chloride      100 - 108 mmol/L 106   CO2      21 - 32 mmol/L 25   Anion Gap      4 - 13 mmol/L 8   BUN      5 - 25 mg/dL 48 (H)   Creatinine      0 60 - 1 30 mg/dL 1 35 (H)   Glucose, Random      65 - 140 mg/dL 199 (H)   Calcium      8 3 - 10 1 mg/dL 9 5   AST      5 - 45 U/L    ALT      12 - 78 U/L    Alkaline Phosphatase      46 - 116 U/L    Total Protein      6 4 - 8 2 g/dL    Albumin      3 5 - 5 0 g/dL    TOTAL BILIRUBIN      0 20 - 1 00 mg/dL    eGFR      ml/min/1 73sq m 43   GLUCOSE FASTING      65 - 99 mg/dL    EXT Creatinine Urine      mg/dL    MICROALBUM ,U,RANDOM      0 0 - 20 0 mg/L    MICROALBUMIN/CREATININE RATIO      0 - 30 mg/g creatinine    Hemoglobin A1C      Normal 3 8-5 6%; PreDiabetic 5 7-6 4%;  Diabetic >=6 5%; Glycemic control for adults with diabetes <7 0% %    eAG, EST AVG Glucose      mg/dl      Plan:    Nikolas Elias was seen today for follow-up  Diagnoses and all orders for this visit:    Uncontrolled type 2 diabetes mellitus with hyperglycemia ([address] )  HGA1C 8 3%  Worsened  Treatment regimen: A1C likely increased as Januvia was previously stopped  Dr Mckeon Chickasha started Halina Gallery about 1 month ago  Continue current treatment   Discussed risks/complications associated with uncontrolled diabetes  Advised to adhere to diabetic diet, and recommended staying active/exercising routinely as tolerated  Keep carbohydrates consistent to limit blood glucose fluctuations  Advised to call if blood sugars less than 70 mg/dl or over 300 mg/dl  Check blood glucose 1-2 times a day  Discussed symptoms and treatment of hypoglycemia  Recommended routine follow-up with podiatry and ophthalmology  Advised to stop by with CGM  so it can be downloaded and reviewed  Ordered blood work to complete prior to next visit  -     Hemoglobin A1C; Future  -     Basic metabolic panel; Future    Benign hypertension with CKD (chronic kidney disease) stage III (HCC)  Blood pressure well controlled, continue current treatment  -     Basic metabolic panel; Future    Mixed hyperlipidemia  Continue statin therapy   LDL previously 56  Managed by PCP      Discussed with the patient diagnosis and treatment and all questions fully answered  She will call me if any problems arise  Counseled patient on diagnostic results, prognosis, risk and benefit of treatment options, instruction for management, importance of treatment compliance, risk factor reduction and impressions      Jacklyn Lock PA-C      [facility] ENDOCRINOLOGY

## 2021-08-11 ENCOUNTER — CLINICAL SUPPORT (OUTPATIENT)
Dept: CARDIAC REHAB | Age: 81
End: 2021-08-11
Payer: COMMERCIAL

## 2021-08-11 DIAGNOSIS — I21.4 NSTEMI (NON-ST ELEVATED MYOCARDIAL INFARCTION) (HCC): ICD-10-CM

## 2021-08-11 PROCEDURE — 93798 PHYS/QHP OP CAR RHAB W/ECG: CPT

## 2021-08-13 ENCOUNTER — CLINICAL SUPPORT (OUTPATIENT)
Dept: CARDIAC REHAB | Age: 81
End: 2021-08-13
Payer: COMMERCIAL

## 2021-08-13 DIAGNOSIS — I21.4 NSTEMI (NON-ST ELEVATED MYOCARDIAL INFARCTION) (HCC): ICD-10-CM

## 2021-08-13 PROCEDURE — 93798 PHYS/QHP OP CAR RHAB W/ECG: CPT

## 2021-08-16 ENCOUNTER — CLINICAL SUPPORT (OUTPATIENT)
Dept: CARDIAC REHAB | Age: 81
End: 2021-08-16
Payer: COMMERCIAL

## 2021-08-16 DIAGNOSIS — I21.4 NSTEMI (NON-ST ELEVATED MYOCARDIAL INFARCTION) (HCC): ICD-10-CM

## 2021-08-16 PROCEDURE — 93798 PHYS/QHP OP CAR RHAB W/ECG: CPT

## 2021-08-18 ENCOUNTER — CLINICAL SUPPORT (OUTPATIENT)
Dept: CARDIAC REHAB | Age: 81
End: 2021-08-18
Payer: COMMERCIAL

## 2021-08-18 DIAGNOSIS — I21.4 NSTEMI (NON-ST ELEVATED MYOCARDIAL INFARCTION) (HCC): ICD-10-CM

## 2021-08-18 PROCEDURE — 93798 PHYS/QHP OP CAR RHAB W/ECG: CPT

## 2021-08-20 ENCOUNTER — CLINICAL SUPPORT (OUTPATIENT)
Dept: CARDIAC REHAB | Age: 81
End: 2021-08-20
Payer: COMMERCIAL

## 2021-08-20 DIAGNOSIS — I21.4 NSTEMI (NON-ST ELEVATED MYOCARDIAL INFARCTION) (HCC): ICD-10-CM

## 2021-08-20 PROCEDURE — 93798 PHYS/QHP OP CAR RHAB W/ECG: CPT

## 2021-08-23 ENCOUNTER — CLINICAL SUPPORT (OUTPATIENT)
Dept: CARDIAC REHAB | Age: 81
End: 2021-08-23
Payer: COMMERCIAL

## 2021-08-23 DIAGNOSIS — I21.4 NSTEMI (NON-ST ELEVATED MYOCARDIAL INFARCTION) (HCC): ICD-10-CM

## 2021-08-23 PROCEDURE — 93798 PHYS/QHP OP CAR RHAB W/ECG: CPT

## 2021-08-24 ENCOUNTER — CLINICAL SUPPORT (OUTPATIENT)
Dept: CARDIAC REHAB | Age: 81
End: 2021-08-24
Payer: COMMERCIAL

## 2021-08-24 DIAGNOSIS — I21.4 NSTEMI (NON-ST ELEVATED MYOCARDIAL INFARCTION) (HCC): ICD-10-CM

## 2021-08-24 PROCEDURE — 93798 PHYS/QHP OP CAR RHAB W/ECG: CPT

## 2021-08-26 ENCOUNTER — APPOINTMENT (OUTPATIENT)
Dept: CARDIAC REHAB | Age: 81
End: 2021-08-26
Payer: COMMERCIAL

## 2021-08-30 ENCOUNTER — CLINICAL SUPPORT (OUTPATIENT)
Dept: CARDIAC REHAB | Age: 81
End: 2021-08-30
Payer: COMMERCIAL

## 2021-08-30 DIAGNOSIS — I21.4 NSTEMI (NON-ST ELEVATED MYOCARDIAL INFARCTION) (HCC): ICD-10-CM

## 2021-08-30 PROCEDURE — 93798 PHYS/QHP OP CAR RHAB W/ECG: CPT

## 2021-08-31 ENCOUNTER — TELEPHONE (OUTPATIENT)
Dept: INTERNAL MEDICINE CLINIC | Facility: CLINIC | Age: 81
End: 2021-08-31

## 2021-08-31 NOTE — TELEPHONE ENCOUNTER
Patient called requesting an order for a Lalitha Joe chair  Her care coordinator said we had to order for it her  She will be using [employer]  She asks that we call her when we fax over the order

## 2021-09-01 ENCOUNTER — CLINICAL SUPPORT (OUTPATIENT)
Dept: CARDIAC REHAB | Age: 81
End: 2021-09-01
Payer: COMMERCIAL

## 2021-09-01 DIAGNOSIS — I21.4 NSTEMI (NON-ST ELEVATED MYOCARDIAL INFARCTION) (HCC): ICD-10-CM

## 2021-09-01 PROCEDURE — 93798 PHYS/QHP OP CAR RHAB W/ECG: CPT

## 2021-09-01 NOTE — TELEPHONE ENCOUNTER
DME order signed by Dr FELIPE [facility] and faxed to [facility] on 9/1/2021  Called patient- left a detailed message advising the patient that the script has been sent to [facility] pharmacy on 9/1/21  Patient is to call our office if anything additional is needed

## 2021-09-01 NOTE — TELEPHONE ENCOUNTER
PA's cannot sign for DME  I placed order in pt chart for shower chair   Please have attending print and sign order

## 2021-09-03 NOTE — PROGRESS NOTES
Cardiac Rehabilitation Plan of Care   120 Day Reassessment          Today's date: 9/3/2021   # of Exercise Sessions Completed: 30  Patient name: Jv Hutchison      : 1940  Age: 80 y o  MRN: [id number]  Referring Physician: Darcy Caceres DO  Cardiologist: Reina Pope MD  Provider: Nader  Clinician: Eliel Calixto RN      Dx:   Encounter Diagnosis   Name Primary?  NSTEMI (non-ST elevated myocardial infarction) [facility])      Date of onset: 3/22/21      SUMMARY OF PROGRESS:  Benson Good attends CR 3x/week  She tolerates 36-40 mins at 1 8 - 3 0 METs  Resting BP always well controlled, 92/60 - 122/70 with appropriate response to exercise reaching 114/60 - 136/80  NSR on tele with occ PVC observed  RHR 60 - 70 ExHR 96 - 97  Progression of intensity has been slow d/t intolerance to higher workloads  Benson Good does not feel improvement in her balance or energy with ADLs  No cardiac complaints in cardiac rehab  She is not progressing toward wt loss goals with a gain of 8 pounds since her initial eval on May 11  Patient states she has been working on  dietary modifications with the goal of rare red/processed meats, low fat dairy, reduced added sugars and refined flours  Heart healthy eating was reinforced  The patient monitors BG 3-4 times per day and reports avg  FBG of 120-130  Benson Good denies  feelings of depression/anxiety  Patient reports excellent social/emotional support  Patient does not attend group educational classes on cardiac risk factor modification   She has not added home exercise but states she moves around the house throughout the day  She has tolerated short 1 block walks to [facility]     Her exercise program will be progressed as tolerated to maintain RPE 4-6    Pt will continue to be educated on lifestyle modifications and encouraged to supplement with a home exercise program (move more and sit less during the day)  to reach the following goals: start home walking program, smaller food portions, healthier snack choices in the next 30 days  Intensity will be progressed as tolerated  Medication compliance: Yes   Comments: Pt reports to be compliant with medications  Fall Risk: Moderate   Comments: Patient uses walking assist device (walker/cane/rollator)    EKG Interpretation: NSR, occ PVC      EXERCISE ASSESSMENT and PLAN    Current Exercise Program in Rehab:       Frequency: 3 days/week Supplement with home exercise 2+ days/wk as tolerated       Minutes: 38-40        METS: 1 8-3 0           HR: RHR + 30bpm    RPE: 4-5         Modalities: UBE, NuStep and Recumbent bike      Exercise Progression 30 Day Goals :    Frequency: 3 days/week of cardiac rehab     Supplement with home exercise 2+ days/wk as tolerated    Minutes: 40                            >150 mins/wk of moderate intensity exercise   METS: 2 3-3 5   HR: RHR +30bpm    RPE: 4-6   Modalities: UBE, NuStep, Recumbent bike and Room walking    Strength trainin-3 days / week  12-15 repetitions  1-2 sets per modality   was provided instruction for home DB exercises  Modalities: Arm Curl, Sit to [employer] and Shoulder Shrugs, wall push-ups    Home Exercise: none    Goals: 10% improvement in functional capacity - based on max METs achieved in fitness assessment, Reduced dyspnea with physical activity  0-1/10, improved DASI score by 10%, Exercise 5 days/wk, >150mins/wk of moderate intensity exercise, Resume ADLs with increased strength, Attend Rehab regularly and Decrease sitting time    Progression Toward Goals:  Pt is progressing and showing improvement  toward the following goals:  Exercise 5 days/wk, >150mins/wk of moderate intensity exercise, Resume ADLs with increased strength, Attend Rehab regularly and Decrease sitting time    , Pt has not made progress toward the following goals: 10% improvement in functional capacity - based on max METs achieved in fitness assessment, Reduced dyspnea with physical activity  0-1/10, improved DASI score by 10%, Exercise 5 days/wk, >150mins/wk of moderate intensity exercise, Resume ADLs with increased strength, Attend Rehab regularly and Decrease sitting time  , Patient will add home walking, move more- more steps in home with cane in the next 30 days, Will continue to educate and progress as tolerated      Education: benefit of exercise for CAD risk factors, AHA guidelines to achieve >150 mins/wk of moderate exercise and RPE scale   Plan:education on home exercise guidelines, home exercise 30+ mins 2 days opposite CR and Education class: Risk Factors for Heart Disease  Readiness to change: Contemplation:  (Acknowledging that there is a problem but not yet ready or sure of wanting to make a change)      NUTRITION ASSESSMENT AND PLAN    Weight control:    Starting weight: 161 2   Current weight:  169  Waist circumference:    Startin   Current:      Diabetes: T2D, Patient monitors BS 4 times/day, Patient reported fasting -130  A1c: 6 7    last measured: 4/3/2021    Lipid management: Discussed diet and lipid management and Last lipid profile 11/10/2020  Chol 140  TRG 44  HDL 75  LDL 56    Goals:reduced BMI to < 25, reduced waist circumference <35 inches (F), improved A1c  < 7 0%, Improved Rate Your Plate score  >09, increase intake of fish, shellfish, increase intake of meatless meals, use low fat dairy, reduce cheese intake or use reduced-fat, Eat 4-5 cups of fruits and vegetables daily, choose low sodium processed foods, use fat-free dressings/[place] or seldom use, Increase intake of nuts and seeds, seldom eat or choose low fat ice-cream, fruit juice bars or frozen yogurt  and eliminate or choose low-fat sweets    Progression Toward Goals: Pt is progressing and showing improvement  toward the following goals:  reduced BMI to < 25, reduced waist circumference <35 inches (F), improved A1c  < 7 0%, Improved Rate Your Plate score  >99, increase intake of fish, shellfish, increase intake of meatless meals, use low fat dairy, reduce cheese intake or use reduced-fat, Eat 4-5 cups of fruits and vegetables daily, choose low sodium processed foods, use fat-free dressings/[place] or seldom use, Increase intake of nuts and seeds, seldom eat or choose low fat ice-cream, fruit juice bars or frozen yogurt  and eliminate or choose low-fat sweets  , Patient will make healthier snack choices in the next 30 days, Will continue to educate and progress as tolerated      Education: heart healthy eating  low sodium diet  nutrition for Improved BG control  exercise and diabetes management   Plan: Education class: Reading Food Labels, Education Class: Heart Healthy Eating, switch to low fat cheeses, replace butter with soft spreads made with olive oil, canola or yogurt, replace unhealthy snacks with fruits & vegs, switch to skim or 1% milk, eat fewer desserts and sweets, will replace sugar sweetened cereals with whole grain or oatmeal, learn how to read food labels, replace sugar with stevia or truvia and keep added daily sugar <25g/day  Readiness to change: Preparation:  (Getting ready to change)       PSYCHOSOCIAL ASSESSMENT AND PLAN    Emotional:  Depression assessment:  PHQ-9 = 0 =No Depression  9/3/21 Not assessed today, pt did not show for appt            Anxiety measure:  SHANTHI-7 = 0-4  = Not anxious  9/3/21  Not assessed today, pt did not show for appt  Self-reported stress level:  4  Social support: Excellent    Goals:  Reduce perceived stress to 1-3/10, Physical Fitness in [place] Score < 3, Daily Activity in [place] Score < 3, Pain in [place] Score < 3, Overall Health in [place] Score < 3, improved sleep and increased energy    Progression Toward Goals: Pt is progressing and showing improvement  toward the following goals:  Reduce perceived stress to 1-3/10, Physical Fitness in [place] Score < 3, Daily Activity in [place] Score < 3, Pain in [place] Score < 3, Overall Health in [place] Score < 3, improved sleep and increased energy  , Patient will attend Stress and relaxation class in the next 30 days, Will continue to educate and progress as tolerated  Education: benefits of a positive support system and depression and CAD  Plan: Class: Stress and Your Health, Class: Relaxation, Spend time outdoors, Enjoy a hobby and Reduce dependence  on family  Readiness to change: Action:  (Changing behavior)      OTHER CORE COMPONENTS     Tobacco:   Social History     Tobacco Use   Smoking Status Former Smoker    Packs/day: 3 00    Quit date: 36    Years since quittin 7   Smokeless Tobacco Former User   Tobacco Comment    quit over 30 yrs ago; (quit in the , had smoked 3PPD for 20 years - per [employer])       Tobacco Use Intervention:   Pt quit 40 years ago   and has abstained    Anginal Symptoms:  chest pressure and DUNAWAY   NTG use: No prescription    Blood pressure:    Restin/60 - 122/70   Exercise: 114/60 - 136/80    Goals: consistent BP < 130/80, reduced dietary sodium <2300mg, moderate intensity exercise >150 mins/wk and reduce number of medications  needed for BP control    Progression Toward Goals: Pt is progressing and showing improvement  toward the following goals:  consistent BP < 130/80, reduced dietary sodium <2300mg, moderate intensity exercise >150 mins/wk and reduce number of medications  needed for BP control   , Patient will continue low sodium diet, medication compliance in the next 30 days, Will continue to educate and progress as tolerated      Education:  understanding high blood pressure and it's relationship to CAD, low sodium diet and HTN and Education class: Understanding Heart Disease  Plan: Class: Common Heart Medications, Avoid Processed foods, engage in regular exercise, use salt substitutes and check labels for sodium content  Readiness to change: Preparation:  (Getting ready to change)

## 2021-09-08 ENCOUNTER — CLINICAL SUPPORT (OUTPATIENT)
Dept: CARDIAC REHAB | Age: 81
End: 2021-09-08
Payer: COMMERCIAL

## 2021-09-08 DIAGNOSIS — I21.4 NSTEMI (NON-ST ELEVATED MYOCARDIAL INFARCTION) (HCC): ICD-10-CM

## 2021-09-08 PROCEDURE — 93798 PHYS/QHP OP CAR RHAB W/ECG: CPT

## 2021-09-10 ENCOUNTER — CLINICAL SUPPORT (OUTPATIENT)
Dept: CARDIAC REHAB | Age: 81
End: 2021-09-10
Payer: COMMERCIAL

## 2021-09-10 DIAGNOSIS — I21.4 NSTEMI (NON-ST ELEVATED MYOCARDIAL INFARCTION) (HCC): ICD-10-CM

## 2021-09-10 PROCEDURE — 93798 PHYS/QHP OP CAR RHAB W/ECG: CPT

## 2021-09-13 ENCOUNTER — CLINICAL SUPPORT (OUTPATIENT)
Dept: CARDIAC REHAB | Age: 81
End: 2021-09-13
Payer: COMMERCIAL

## 2021-09-13 DIAGNOSIS — I21.4 NSTEMI (NON-ST ELEVATED MYOCARDIAL INFARCTION) (HCC): ICD-10-CM

## 2021-09-13 PROCEDURE — 93798 PHYS/QHP OP CAR RHAB W/ECG: CPT

## 2021-09-15 ENCOUNTER — CLINICAL SUPPORT (OUTPATIENT)
Dept: CARDIAC REHAB | Age: 81
End: 2021-09-15
Payer: COMMERCIAL

## 2021-09-15 DIAGNOSIS — I21.4 NSTEMI (NON-ST ELEVATED MYOCARDIAL INFARCTION) (HCC): ICD-10-CM

## 2021-09-15 PROCEDURE — 93798 PHYS/QHP OP CAR RHAB W/ECG: CPT

## 2021-09-16 ENCOUNTER — OFFICE VISIT (OUTPATIENT)
Dept: INTERNAL MEDICINE CLINIC | Facility: CLINIC | Age: 81
End: 2021-09-16

## 2021-09-16 ENCOUNTER — TELEPHONE (OUTPATIENT)
Dept: INTERNAL MEDICINE CLINIC | Facility: CLINIC | Age: 81
End: 2021-09-16

## 2021-09-16 VITALS
SYSTOLIC BLOOD PRESSURE: 101 MMHG | BODY MASS INDEX: 29.84 KG/M2 | HEART RATE: 64 BPM | OXYGEN SATURATION: 96 % | DIASTOLIC BLOOD PRESSURE: 62 MMHG | TEMPERATURE: 97.6 F | HEIGHT: 63 IN | WEIGHT: 168.4 LBS

## 2021-09-16 DIAGNOSIS — M81.0 OSTEOPOROSIS, POSTMENOPAUSAL: ICD-10-CM

## 2021-09-16 DIAGNOSIS — N18.30 BENIGN HYPERTENSION WITH CKD (CHRONIC KIDNEY DISEASE) STAGE III (HCC): ICD-10-CM

## 2021-09-16 DIAGNOSIS — E78.2 MIXED HYPERLIPIDEMIA: ICD-10-CM

## 2021-09-16 DIAGNOSIS — Z95.5 STATUS POST INSERTION OF DRUG-ELUTING STENT INTO LEFT ANTERIOR DESCENDING (LAD) ARTERY: ICD-10-CM

## 2021-09-16 DIAGNOSIS — E11.65 UNCONTROLLED TYPE 2 DIABETES MELLITUS WITH HYPERGLYCEMIA (HCC): ICD-10-CM

## 2021-09-16 DIAGNOSIS — I10 ESSENTIAL HYPERTENSION: ICD-10-CM

## 2021-09-16 DIAGNOSIS — Z00.00 MEDICARE ANNUAL WELLNESS VISIT, SUBSEQUENT: Primary | ICD-10-CM

## 2021-09-16 DIAGNOSIS — I12.9 BENIGN HYPERTENSION WITH CKD (CHRONIC KIDNEY DISEASE) STAGE III (HCC): ICD-10-CM

## 2021-09-16 PROCEDURE — 3288F FALL RISK ASSESSMENT DOCD: CPT | Performed by: PHYSICIAN ASSISTANT

## 2021-09-16 PROCEDURE — 1160F RVW MEDS BY RX/DR IN RCRD: CPT | Performed by: PHYSICIAN ASSISTANT

## 2021-09-16 PROCEDURE — 1125F AMNT PAIN NOTED PAIN PRSNT: CPT | Performed by: PHYSICIAN ASSISTANT

## 2021-09-16 PROCEDURE — 1036F TOBACCO NON-USER: CPT | Performed by: PHYSICIAN ASSISTANT

## 2021-09-16 PROCEDURE — G0439 PPPS, SUBSEQ VISIT: HCPCS | Performed by: PHYSICIAN ASSISTANT

## 2021-09-16 PROCEDURE — 3725F SCREEN DEPRESSION PERFORMED: CPT | Performed by: PHYSICIAN ASSISTANT

## 2021-09-16 PROCEDURE — 3074F SYST BP LT 130 MM HG: CPT | Performed by: PHYSICIAN ASSISTANT

## 2021-09-16 PROCEDURE — 1170F FXNL STATUS ASSESSED: CPT | Performed by: PHYSICIAN ASSISTANT

## 2021-09-16 PROCEDURE — 3078F DIAST BP <80 MM HG: CPT | Performed by: PHYSICIAN ASSISTANT

## 2021-09-16 RX ORDER — LATANOPROST 50 UG/ML
SOLUTION/ DROPS OPHTHALMIC
COMMUNITY
Start: 2021-09-14

## 2021-09-16 RX ORDER — LOSARTAN POTASSIUM 50 MG/1
TABLET ORAL
COMMUNITY
Start: 2021-07-08 | End: 2021-10-11

## 2021-09-16 NOTE — PATIENT INSTRUCTIONS
On your visit today we completed your Medicare wellness visit and updated your health prevention  You are currently up-to-date with immunizations other than receiving your flu vaccine this fall  You are aware once decision is made will be contacted regarding scheduling for a COVID booster shot  Script provided today to call and schedule your DEXA bone scan for follow-up on your osteoporosis  We did review your upcoming labs in appointments as scheduled  Labs for me for cholesterol due in October you have your script we will call you with that results  You have labs due in December both for the endocrinologist for diabetes and the nephrologist for your kidney function  You do have an appointment with your cardiologist in November  Endocrinology is in December  You will be contacted by your Nephrology/ kidney doctor once labs are completed in December to schedule your appointment  You have 1 or 2 more appointments to complete your cardiac rehabilitation and once that has been completed will look to get you scheduled for strength and balance training to help maintain your independence  Continue heart healthy diet and activities of daily living as tolerated  Medicare Preventive Visit Patient Instructions  Thank you for completing your Welcome to Medicare Visit or Medicare Annual Wellness Visit today  Your next wellness visit will be due in one year (9/17/2022)  The screening/preventive services that you may require over the next 5-10 years are detailed below  Some tests may not apply to you based off risk factors and/or age  Screening tests ordered at today's visit but not completed yet may show as past due  Also, please note that scanned in results may not display below    Preventive Screenings:  Service Recommendations Previous Testing/Comments   Colorectal Cancer Screening  * Colonoscopy    * Fecal Occult Blood Test (FOBT)/Fecal Immunochemical Test (FIT)  * Fecal DNA/Cologuard Test  * Flexible Sigmoidoscopy Age: 54-65 years old   Colonoscopy: every 10 years (may be performed more frequently if at higher risk)  OR  FOBT/FIT: every 1 year  OR  Cologuard: every 3 years  OR  Sigmoidoscopy: every 5 years  Screening may be recommended earlier than age 48 if at higher risk for colorectal cancer  Also, an individualized decision between you and your healthcare provider will decide whether screening between the ages of 74-80 would be appropriate  Colonoscopy: Not on file  FOBT/FIT: Not on file  Cologuard: Not on file  Sigmoidoscopy: Not on file          Breast Cancer Screening Age: 36 years old  Frequency: every 1-2 years  Not required if history of left and right mastectomy Mammogram: 02/28/2017        Cervical Cancer Screening Between the ages of 21-29, pap smear recommended once every 3 years  Between the ages of 33-67, can perform pap smear with HPV co-testing every 5 years  Recommendations may differ for women with a history of total hysterectomy, cervical cancer, or abnormal pap smears in past  Pap Smear: Not on file    Screening Not Indicated   Hepatitis C Screening Once for adults born between 1945 and 1965  More frequently in patients at high risk for Hepatitis C Hep C Antibody: Not on file        Diabetes Screening 1-2 times per year if you're at risk for diabetes or have pre-diabetes Fasting glucose: 135 mg/dL   A1C: 8 3 %    Screening Not Indicated  History Diabetes   Cholesterol Screening Once every 5 years if you don't have a lipid disorder  May order more often based on risk factors  Lipid panel: 11/10/2020    Screening Not Indicated  History Lipid Disorder     Other Preventive Screenings Covered by Medicare:  1  Abdominal Aortic Aneurysm (AAA) Screening: covered once if your at risk  You're considered to be at risk if you have a family history of AAA    2  Lung Cancer Screening: covers low dose CT scan once per year if you meet all of the following conditions: (1) Age 50-69; (2) No signs or symptoms of lung cancer; (3) Current smoker or have quit smoking within the last 15 years; (4) You have a tobacco smoking history of at least 30 pack years (packs per day multiplied by number of years you smoked); (5) You get a written order from a healthcare provider  3  Glaucoma Screening: covered annually if you're considered high risk: (1) You have diabetes OR (2) Family history of glaucoma OR (3)  aged 48 and older OR (3)  American aged 72 and older  3  Osteoporosis Screening: covered every 2 years if you meet one of the following conditions: (1) You're estrogen deficient and at risk for osteoporosis based off medical history and other findings; (2) Have a vertebral abnormality; (3) On glucocorticoid therapy for more than 3 months; (4) Have primary hyperparathyroidism; (5) On osteoporosis medications and need to assess response to drug therapy  · Last bone density test (DXA Scan): 12/05/2019   5  HIV Screening: covered annually if you're between the age of 15-65  Also covered annually if you are younger than 13 and older than 72 with risk factors for HIV infection  For pregnant patients, it is covered up to 3 times per pregnancy  Immunizations:  Immunization Recommendations   Influenza Vaccine Annual influenza vaccination during flu season is recommended for all persons aged >= 6 months who do not have contraindications   Pneumococcal Vaccine (Prevnar and Pneumovax)  * Prevnar = PCV13  * Pneumovax = PPSV23   Adults 25-60 years old: 1-3 doses may be recommended based on certain risk factors  Adults 72 years old: Prevnar (PCV13) vaccine recommended followed by Pneumovax (PPSV23) vaccine  If already received PPSV23 since turning 65, then PCV13 recommended at least one year after PPSV23 dose     Hepatitis B Vaccine 3 dose series if at intermediate or high risk (ex: diabetes, end stage renal disease, liver disease)   Tetanus (Td) Vaccine - COST NOT COVERED BY MEDICARE PART B Following completion of primary series, a booster dose should be given every 10 years to maintain immunity against tetanus  Td may also be given as tetanus wound prophylaxis  Tdap Vaccine - COST NOT COVERED BY MEDICARE PART B Recommended at least once for all adults  For pregnant patients, recommended with each pregnancy  Shingles Vaccine (Shingrix) - COST NOT COVERED BY MEDICARE PART B  2 shot series recommended in those aged 48 and above     Health Maintenance Due:  There are no preventive care reminders to display for this patient  Immunizations Due:      Topic Date Due    Influenza Vaccine (1) 09/01/2021     Advance Directives   What are advance directives? Advance directives are legal documents that state your wishes and plans for medical care  These plans are made ahead of time in case you lose your ability to make decisions for yourself  Advance directives can apply to any medical decision, such as the treatments you want, and if you want to donate organs  What are the types of advance directives? There are many types of advance directives, and each state has rules about how to use them  You may choose a combination of any of the following:  · Living will: This is a written record of the treatment you want  You can also choose which treatments you do not want, which to limit, and which to stop at a certain time  This includes surgery, medicine, IV fluid, and tube feedings  · Durable power of  for healthcare [facility]): This is a written record that states who you want to make healthcare choices for you when you are unable to make them for yourself  This person, called a proxy, is usually a family member or a friend  You may choose more than 1 proxy  · Do not resuscitate (DNR) order:  A DNR order is used in case your heart stops beating or you stop breathing  It is a request not to have certain forms of treatment, such as CPR   A DNR order may be included in other types of advance directives  · Medical directive: This covers the care that you want if you are in a coma, near death, or unable to make decisions for yourself  You can list the treatments you want for each condition  Treatment may include pain medicine, surgery, blood transfusions, dialysis, IV or tube feedings, and a ventilator (breathing machine)  · Values history: This document has questions about your views, beliefs, and how you feel and think about life  This information can help others choose the care that you would choose  Why are advance directives important? An advance directive helps you control your care  Although spoken wishes may be used, it is better to have your wishes written down  Spoken wishes can be misunderstood, or not followed  Treatments may be given even if you do not want them  An advance directive may make it easier for your family to make difficult choices about your care  Weight Management   Why it is important to manage your weight:  Being overweight increases your risk of health conditions such as heart disease, high blood pressure, type 2 diabetes, and certain types of cancer  It can also increase your risk for osteoarthritis, sleep apnea, and other respiratory problems  Aim for a slow, steady weight loss  Even a small amount of weight loss can lower your risk of health problems  How to lose weight safely:  A safe and healthy way to lose weight is to eat fewer calories and get regular exercise  You can lose up about 1 pound a week by decreasing the number of calories you eat by 500 calories each day  Healthy meal plan for weight management:  A healthy meal plan includes a variety of foods, contains fewer calories, and helps you stay healthy  A healthy meal plan includes the following:  · Eat whole-grain foods more often  A healthy meal plan should contain fiber  Fiber is the part of grains, fruits, and vegetables that is not broken down by your body   Whole-grain foods are healthy and provide extra fiber in your diet  Some examples of whole-grain foods are whole-wheat breads and pastas, oatmeal, brown rice, and bulgur  · Eat a variety of vegetables every day  Include dark, leafy greens such as spinach, kale, yuridia greens, and mustard greens  Eat yellow and orange vegetables such as carrots, sweet potatoes, and winter squash  · Eat a variety of fruits every day  Choose fresh or canned fruit (canned in its own juice or light syrup) instead of juice  Fruit juice has very little or no fiber  · Eat low-fat dairy foods  Drink fat-free (skim) milk or 1% milk  Eat fat-free yogurt and low-fat cottage cheese  Try low-fat cheeses such as mozzarella and other reduced-fat cheeses  · Choose meat and other protein foods that are low in fat  Choose beans or other legumes such as split peas or lentils  Choose fish, skinless poultry (chicken or turkey), or lean cuts of red meat (beef or pork)  Before you cook meat or poultry, cut off any visible fat  · Use less fat and oil  Try baking foods instead of frying them  Add less fat, such as margarine, sour cream, regular salad dressing and mayonnaise to foods  Eat fewer high-fat foods  Some examples of high-fat foods include french fries, doughnuts, ice cream, and cakes  · Eat fewer sweets  Limit foods and drinks that are high in sugar  This includes candy, cookies, regular soda, and sweetened drinks  Exercise:  Exercise at least 30 minutes per day on most days of the week  Some examples of exercise include walking, biking, dancing, and swimming  You can also fit in more physical activity by taking the stairs instead of the elevator or parking farther away from stores  Ask your healthcare provider about the best exercise plan for you  © Copyright [address] 2018 Information is for End User's use only and may not be sold, redistributed or otherwise used for commercial purposes   All illustrations and images included in CareNotes® are the copyrighted property of A D A M , Inc  or [address] [place], [place] [address]

## 2021-09-16 NOTE — PROGRESS NOTES
Assessment and Plan: On your visit today we completed your Medicare wellness visit and updated your health prevention  You are currently up-to-date with immunizations other than receiving your flu vaccine this fall  You are aware once decision is made will be contacted regarding scheduling for a COVID booster shot  Script provided today to call and schedule your DEXA bone scan for follow-up on your osteoporosis  We did review your upcoming labs in appointments as scheduled  Labs for me for cholesterol due in October you have your script we will call you with that results  You have labs due in December both for the endocrinologist for diabetes and the nephrologist for your kidney function  You do have an appointment with your cardiologist in November  Endocrinology is in December  You will be contacted by your Nephrology/ kidney doctor once labs are completed in December to schedule your appointment  You have 1 or 2 more appointments to complete your cardiac rehabilitation and once that has been completed will look to get you scheduled for strength and balance training to help maintain your independence  Continue heart healthy diet and activities of daily living as tolerated      Problem List Items Addressed This Visit        Endocrine    Uncontrolled type 2 diabetes mellitus with hyperglycemia ([facility] [address] )       Cardiovascular and Mediastinum    Benign hypertension with CKD (chronic kidney disease) stage III    Relevant Medications    losartan (COZAAR) 50 mg tablet    Essential hypertension    Relevant Medications    losartan (COZAAR) 50 mg tablet       Other    Mixed hyperlipidemia    Status post insertion of drug-eluting stent into left anterior descending (LAD) artery      Other Visit Diagnoses     Medicare annual wellness visit, subsequent    -  Primary    Osteoporosis, postmenopausal        Relevant Orders    DXA bone density spine hip and pelvis          Depression Screening and Follow-up Plan:   Patient was screened for depression during today's encounter  They screened negative with a PHQ-2 score of 0  Preventive health issues were discussed with patient, and age appropriate screening tests were ordered as noted in patient's After Visit Summary  Personalized health advice and appropriate referrals for health education or preventive services given if needed, as noted in patient's After Visit Summary  History of Present Illness:     Patient presents for Welcome to Medicare visit  Patient Care Team:  Justin Malik PA-C as PCP - General (Internal Medicine)     Review of Systems:     Review of Systems   Constitutional: Negative  Respiratory: Negative  Cardiovascular: Negative  Gastrointestinal: Negative for abdominal pain  Endocrine: Negative for polydipsia, polyphagia and polyuria  Neurological: Negative for dizziness, light-headedness and headaches        Problem List:     Patient Active Problem List   Diagnosis    Aortic valve regurgitation, nonrheumatic    Benign hypertension with CKD (chronic kidney disease) stage III    Chronic rhinitis    Midline cystocele    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, without long-term current use of insulin (HCC)    Non-rheumatic mitral regurgitation    Uterine procidentia    Anemia    Esophageal abnormality    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    Decreased hearing, bilateral    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[facility] [address] )    History of CVA with residual deficit    Mixed hyperlipidemia    Persistent proteinuria    Renal cyst    Ankle edema    Stage 3a chronic kidney disease (HCC)    Acute diastolic congestive heart failure (HCC)    Status post insertion of drug-eluting stent into left anterior descending (LAD) artery    Coronary artery disease involving native coronary artery of native heart without angina pectoris    NSTEMI (non-ST elevated myocardial infarction) ([facility] [address] )    Essential hypertension    Asthma    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HCC)    Uncontrolled type 2 diabetes mellitus with hyperglycemia [facility])      Past Medical and Surgical History:     Past Medical History:   Diagnosis Date    ACE-inhibitor cough     last assessed - 29Dec2017    Asthma     Bilateral edema of lower extremity     last assessed - 21Aug2017    Cardiac murmur     last assessed - 21Aug2017    CKD (chronic kidney disease)     Diabetes mellitus ([address] )     last assessed - [id number]    Esophageal dysphagia     Fatigue     last assessed - 21Aug2017    Hyperlipidemia     Hypertension     Patellar bursitis of right knee     last assessed - [id number]    Personal history of other specified conditions     presenting hazards to health    Stroke [facility])     Stroke syndrome     Urinary frequency     UTI (urinary tract infection)      Past Surgical History:   Procedure Laterality Date    [place] ESOPHAGOGASTRODUODENOSCOPY TRANSORAL DIAGNOSTIC N/A 4/5/2017    Procedure: ESOPHAGOGASTRODUODENOSCOPY (EGD); Surgeon: Pop Jones MD;  Location: [facility]; Service: Gastroenterology      Family History:     Family History   Problem Relation Age of Onset    Heart disease Father     Hypertension Mother    Harjinder Nichole Stroke Mother     Other Sister         1)Breast disorder; 2)Epilepsy   Harjinder Nichole Migraines Sister         Migraine headaches    Osteoporosis Sister     Thyroid disease Sister     Coronary artery disease Sister         S/P triple vessel bypass    Cancer Sister         breast CA    Osteoporosis Maternal Grandmother     Diabetes Son         Diabetes mellitus    Hypertension Son     Rheum arthritis Son         Rheumatic disease    Heart disease Family       Social History:     Social History     Socioeconomic History    Marital status:       Spouse name: None    Number of children: 8    Years of education: None    Highest education level: None   Occupational History    Occupation: Retired   Tobacco Use    Smoking status: Former Smoker     Packs/day: 3 00     Quit date:      Years since quittin 7    Smokeless tobacco: Former User    Tobacco comment: quit over 30 yrs ago; (quit in the , had smoked 3PPD for 20 years - per [employer])   Vaping Use    Vaping Use: Never used   Substance and Sexual Activity    Alcohol use: Never    Drug use: Never    Sexual activity: Not Currently   Other Topics Concern    None   Social History Narrative    Diet needs improvement    Does not exercise    No caffeine use     Social Determinants of Health     Financial Resource Strain: Low Risk     Difficulty of Paying Living Expenses: Not hard at all   Food Insecurity: No Food Insecurity    Worried About [address] [employer] in the Last Year: Never true    [employer] in the Last Year: Never true   Transportation Needs: No Transportation Needs    Lack of Transportation (Medical): No    Lack of Transportation (Non-Medical): No   Physical Activity: Inactive    Days of Exercise per Week: 0 days    Minutes of Exercise per Session: 0 min   Stress: No Stress Concern Present    Feeling of Stress : Not at all   Social Connections: Socially Isolated    Frequency of Communication with Friends and Family: Once a week    Frequency of Social Gatherings with Friends and Family: Once a week    Attends Anglican Services: 1 to 4 times per year    Active Member of [employer] or Organizations: No    Attends Club or Organization Meetings: Never    Marital Status:     Intimate Partner Violence: Not At Risk    Fear of Current or Ex-Partner: No    Emotionally Abused: No    Physically Abused: No    Sexually Abused: No      Medications and Allergies:     Current Outpatient Medications   Medication Sig Dispense Refill    acetaminophen (TYLENOL) 650 mg CR tablet Take 650 mg by mouth every 6 (six) hours as needed for mild pain      albuterol (PROVENTIL HFA,VENTOLIN HFA) 90 mcg/act inhaler Inhale 2 puffs every 4 (four) hours as needed for wheezing 6 7 g 1    aspirin (ECOTRIN LOW STRENGTH) 81 mg EC tablet Take 1 tablet (81 mg total) by mouth daily 90 tablet 0    atorvastatin (LIPITOR) 40 mg tablet Take 1 tablet (40 mg total) by mouth daily 90 tablet 1    Blood Glucose Monitoring Suppl (FREESTYLE LITE) JAQUELIN by Does not apply route daily 1 each 0    Breo Ellipta 100-25 MCG/INH inhaler Inhale 1 puff daily 60 blister 5    carvedilol (COREG) 6 25 mg tablet Take 1 tablet (6 25 mg total) by mouth 2 (two) times a day with meals 60 tablet 5    clopidogrel (PLAVIX) 75 mg tablet TAKE 2 TABS DAILY 180 tablet 1    conjugated estrogens (PREMARIN) vaginal cream Insert 0 5 g into the vagina 2 (two) times a week Insert twice weekly at bedtime 30 g 1    Continuous Blood Gluc Sensor (FreeStyle Naldo 14 Day Sensor) MISC Use one sensor every 14 days 4 each 3    CVS D3 50 MCG (2000 UT) CAPS Take 1 capsule (2,000 Units total) by mouth daily 90 capsule 1    ferrous sulfate 324 (65 Fe) mg Take 1 tablet (324 mg total) by mouth 2 (two) times a day before meals 60 tablet 2    furosemide (LASIX) 40 mg tablet Take 1 tablet (40 mg total) by mouth 2 (two) times a day 180 tablet 0    glucose blood (FREESTYLE LITE) test strip TEST AS DIRECTED UP TO FOUR TIMES A  each 3    Glucose-Vitamin C-Vitamin D (TRUEPLUS GLUCOSE ON THE GO) CHEW CHEW 2 TABS AS NEEDED FOR BLOOD SUGAR LESS THAN 80      Lancets (FREESTYLE) lancets Use as instructed 100 each 0    latanoprost (XALATAN) 0 005 % ophthalmic solution       linaGLIPtin 5 MG TABS Take 5 mg by mouth daily 60 tablet 0    metFORMIN (GLUCOPHAGE) 500 mg tablet TAKE 1 TABLET (500 MG TOTAL) BY MOUTH 2 (TWO) TIMES A DAY WITH MEALS 180 tablet 1    Misc   Devices (QUAD CANE) MISC by Does not apply route daily 1 each 0    montelukast (SINGULAIR) 10 mg tablet Take 1 tablet (10 mg total) by mouth daily at bedtime 90 tablet 1    nitroglycerin (NITROSTAT) 0 4 mg SL tablet Place 1 tablet (0 4 mg total) under the tongue every 5 (five) minutes as needed for chest pain 3 tablet 0    ranolazine (RANEXA) 500 mg 12 hr tablet Take 1 tablet (500 mg total) by mouth every 12 (twelve) hours 60 tablet 0    rivaroxaban (XARELTO) 2 5 mg tablet Take 1 tablet (2 5 mg total) by mouth 2 (two) times a day with meals 60 tablet 0    losartan (COZAAR) 100 MG tablet Take 0 5 tablets (50 mg total) by mouth daily 30 tablet 0    losartan (COZAAR) 50 mg tablet        No current facility-administered medications for this visit  No Known Allergies   Immunizations:     Immunization History   Administered Date(s) Administered    INFLUENZA 10/10/2017    Influenza Quadrivalent, 6-35 Months IM 10/10/2017    Influenza, high dose seasonal 0 7 mL 10/02/2019, 10/06/2020    Pneumococcal Conjugate 13-Valent 02/19/2020    Pneumococcal Polysaccharide PPV23 01/01/2008    SARS-CoV-2 / COVID-19 mRNA IM (Pfizer-BioNTech) 03/30/2021, 04/21/2021    Tdap 03/11/2020      Health Maintenance: There are no preventive care reminders to display for this patient  Topic Date Due    Influenza Vaccine (1) 09/01/2021      Medicare Screening Tests and Risk Assessments:     Sharri Mejia is here for her Subsequent Wellness visit  Last Medicare Wellness visit information reviewed, patient interviewed and updates made to the record today  Health Risk Assessment:   Patient rates overall health as good  Patient feels that their physical health rating is same  Patient is very satisfied with their life  Eyesight was rated as slightly worse  Hearing was rated as slightly worse  Patient feels that their emotional and mental health rating is same  Patients states they are never, rarely angry  Patient states they are never, rarely unusually tired/fatigued  Pain experienced in the last 7 days has been some  Patient's pain rating has been 5/10  Patient states that she has experienced no weight loss or gain in last 6 months   Reports saw  [facility] for DM and was advised start with glaucoma and placed on eye drops  Depression Screening:   PHQ-2 Score: 0      Fall Risk Screening: In the past year, patient has experienced: no history of falling in past year      Urinary Incontinence Screening:   Patient has not leaked urine accidently in the last six months  Home Safety:  Patient does not have trouble with stairs inside or outside of their home  Patient has working smoke alarms and has working carbon monoxide detector  Home safety hazards include: none  Patient lives with family but is independent with her ADL  Has shower chair and grab bars    No carpet or loose rugs in the home  Nutrition:   Current diet is Regular  Medications:   Patient is currently taking over-the-counter supplements  OTC medications include: see medication list  Patient is able to manage medications  Activities of Daily Living (ADLs)/Instrumental Activities of Daily Living (IADLs):   Walk and transfer into and out of bed and chair?: Yes  Dress and groom yourself?: Yes    Bathe or shower yourself?: Yes    Feed yourself? Yes  Do your laundry/housekeeping?: Yes  Manage your money, pay your bills and track your expenses?: Yes  Make your own meals?: Yes    Do your own shopping?: Yes    Previous Hospitalizations:   Any hospitalizations or ED visits within the last 12 months?: Yes    How many hospitalizations have you had in the last year?: 3-4    Hospitalization Comments:   Please also see chart for full description  Most significantly patient was admitted in March for exacerbation of congestive heart failure mild to moderate responded well to treatment  More significant may 2021 for non-STEMI with balloon and stent to LAD placed  Patient has been attending cardiac rehabilitation since that event and does note improvement  Patient denies chest pain she does have increased physical abilities        And most recently in June patient was in ER for UTI   Responded to antibiotics  Advance Care Planning:   Living will: Yes    Advanced directive: Yes    Five wishes given: No      Cognitive Screening:   Provider or family/friend/caregiver concerned regarding cognition?: No    PREVENTIVE SCREENINGS      Cardiovascular Screening:    General: Screening Not Indicated and History Lipid Disorder      Diabetes Screening:     General: Screening Not Indicated and History Diabetes      Colorectal Cancer Screening:     General: Screening Not Indicated      Cervical Cancer Screening:    General: Screening Not Indicated      Osteoporosis Screening:    General: Risks and Benefits Discussed and History Osteoporosis    Due for: DXA Axial      Abdominal Aortic Aneurysm (AAA) Screening:        General: Screening Not Indicated      Lung Cancer Screening:     General: Screening Not Indicated      Hepatitis C Screening:    General: Risks and Benefits Discussed and Patient Declines      Preventive Screening Comments:   Patient is currently up-to-date with most of her health maintenance  She does however need to schedule her DEXA bone scan as it is due this year script provided today  Patient has received full series of COVID vaccine will get her flu vaccine this fall other immunizations are up-to-date  Patient continues to follow with her eye doctor for routine care as well as diabetes and now early stages of glaucoma  Did discuss with patient that fortunately she has not had any falls but she does admit to some imbalance  Once patient completes the cardiac rehab we did review possible referral to strength and balance training as well  Screening, Brief Intervention, and Referral to Treatment (SBIRT)    Screening  Typical number of drinks in a day: 0  Typical number of drinks in a week: 0  Interpretation: Low risk drinking behavior      Single Item Drug Screening:  How often have you used an illegal drug (including marijuana) or a prescription medication for non-medical reasons in the past year? never    Single Item Drug Screen Score: 0  Interpretation: Negative screen for possible drug use disorder    Brief Intervention  Alcohol & drug use screenings were reviewed  No concerns regarding substance use disorder identified  Other Counseling Topics:   Car/seat belt/driving safety, skin self-exam, sunscreen and calcium and vitamin D intake and regular weightbearing exercise  No exam data present     Physical Exam:     /62 (BP Location: Left arm, Patient Position: Sitting, Cuff Size: Standard)   Pulse 64   Temp 97 6 °F (36 4 °C) (Temporal)   Ht 5' 3" (1 6 m)   Wt 76 4 kg (168 lb 6 4 oz)   SpO2 96%   BMI 29 83 kg/m²     Physical Exam  Vitals and nursing note reviewed  Constitutional:       General: She is not in acute distress  Cardiovascular:      Rate and Rhythm: Normal rate and regular rhythm  Heart sounds: Normal heart sounds  Pulmonary:      Effort: Pulmonary effort is normal       Breath sounds: Normal breath sounds  Musculoskeletal:      Right lower leg: No edema  Left lower leg: No edema  Comments: Patient does have antalgic gait combination of previous deficit on left side from a previous CVA and osteoarthritis  Neurological:      Mental Status: She is alert  Mental status is at baseline     Psychiatric:         Mood and Affect: Mood normal           Eddie Calvo PA-C

## 2021-09-17 ENCOUNTER — CLINICAL SUPPORT (OUTPATIENT)
Dept: CARDIAC REHAB | Age: 81
End: 2021-09-17
Payer: COMMERCIAL

## 2021-09-17 DIAGNOSIS — I21.4 NSTEMI (NON-ST ELEVATED MYOCARDIAL INFARCTION) (HCC): ICD-10-CM

## 2021-09-17 PROCEDURE — 93798 PHYS/QHP OP CAR RHAB W/ECG: CPT

## 2021-09-20 ENCOUNTER — CLINICAL SUPPORT (OUTPATIENT)
Dept: CARDIAC REHAB | Age: 81
End: 2021-09-20
Payer: COMMERCIAL

## 2021-09-20 DIAGNOSIS — I21.4 NSTEMI (NON-ST ELEVATED MYOCARDIAL INFARCTION) (HCC): Primary | ICD-10-CM

## 2021-09-20 PROCEDURE — 93798 PHYS/QHP OP CAR RHAB W/ECG: CPT

## 2021-09-21 NOTE — PROGRESS NOTES
Cardiac Rehabilitation Plan of Care   Discharge          Today's date: 2021   # of Exercise Sessions Completed: 36  Patient name: Donna Peacock      : 1940  Age: 80 y o  MRN: [id number]  Referring Physician: Cachorro Barton DO  Cardiologist: Danae Franks MD  Provider: Jessica  Clinician:   Montana Harper, MS, CEP, CCRP    Dx:   Encounter Diagnosis   Name Primary?  NSTEMI (non-ST elevated [facility] infarction) [facility] ) Yes     Date of onset: 3/22/21      SUMMARY OF PROGRESS:  Discharge note for Rafal Meredith  She had 17% improvement in functional capacity increasing Her max METs in the submaximal NuStep ETT increased from 2 4 to 2 8 with test termination of RPE 6  Her max METs in tolerated in cardiac rehab did not increase remaining at 2 4  She had a 40% improvement in the DUKE activity estimated MET level with ADLs and physical activity  Her [facility] QOL improved by 13%  PHQ-9 score decreased from 4 to 1  SHANTHI-7 decreased from 2 to 1  Her weight increased by 6 pounds  Waist circumference increased by 1 inches  Rate Your Plate score improved from 53 to 61  She reports avg FBG of 126  Rafal Meredith has not been supplementing CR sessions with home exercise but reports that she tries to remain active at home  She reports increased stamina, strength and reduced SOB with activity  Jennifer tolerates 40 mins at 2 0 - 2 4 METs  NSR on telemetry with occ PVCs  RHR 63 - 85, ExHR 90 - 105  Resting /68 - 124/68 with appropriate response to exercise reaching 128/58 - 140/60  All group education classes on cardiac risk factor modification were attended by the patient  Discharge plans include frequent short walks in her home, making an effort to move more during the day and decrease sitting time, home DB training  Encouraged Pt to continue exercise  Frequency: 4-6 days/wk, Intensity: RPE 4-5, Time: 40-50 mins daily, 150-200 mins/wk  Pt was encouraged to continue eating heart healthy    Pt was encouraged to remain compliant with medications and f/u with cardiologist with any cardiac symptoms, medication management and updated lipid profile  Marcela Blount has orders for updated lipid profile in EPIC and plans to have blood work completed in early October  Medication compliance: Yes   Comments: Pt reports to be compliant with medications  Fall Risk: Moderate   Comments: Patient uses walking assist device (walker/cane/rollator)    EKG Interpretation: NSR, occ PVC      EXERCISE ASSESSMENT and PLAN    Current Exercise Program in Rehab:       Frequency: 3 days/week Supplement with home exercise 2+ days/wk as tolerated       Minutes: 38-40        METS: 1 8-3 0           HR: RHR + 30bpm    RPE: 4-5         Modalities: UBE, NuStep and Recumbent bike      Exercise Goals :    Frequency: 4-6 days/week     Minutes: 40                            >150 mins/wk of moderate intensity exercise   METS: 2 3-3 5   HR: RHR +30bpm    RPE: 4-6   Modalities: home walking    Strength trainin-3 days / week  12-15 repetitions  1-2 sets per modality   was provided instruction for home DB exercises  Modalities: Arm Curl, Sit to [employer] and Shoulder Shrugs, wall push-ups    Home Exercise: none    Goals:  Exercise 5 days/wk, >150mins/wk of moderate intensity exercise, Decrease sitting time    Progression Toward Goals:  Goals not met: Exercise 5 days/wk, >150mins/wk of moderate intensity exercise,   , Goals met: 10% improvement in functional capacity - based on max METs achieved in fitness assessment, Reduced dyspnea with physical activity  0-1/10, improved DASI score by 10%, Resume ADLs with increased strength, Attend Rehab regularly and Decrease sitting time Patient will be encouraged to focus on lifestyle modifications following discharge      Education: benefit of exercise for CAD risk factors, home exercise guidelines, AHA guidelines to achieve >150 mins/wk of moderate exercise, RPE scale, class: Risk Factors for Heart Disease and exercise instructions/guidelines for discharge    Plan: move more and sit less at home, frequent daily walks in the home, DB exercises  Readiness to change: Action:  (Changing behavior) and Maintenance: (Maintaining the behavior change)      NUTRITION ASSESSMENT AND PLAN    Weight control:    Starting weight: 161 2   Current weight:  167  Waist circumference:    Startin   Current:  39    Diabetes: T2D, Patient reported fasting   using freestyle meter  A1c: 6 7    last measured: 4/3/2021    Lipid management: Discussed diet and lipid management and Last lipid profile 11/10/2020  Chol 140  TRG 44  HDL 75  LDL 56    Goals:reduced BMI to < 25, reduced waist circumference <35 inches (F), improved A1c  < 7 0%, Improved Rate Your Plate score  >37, increase intake of fish, shellfish, increase intake of meatless meals, use low fat dairy, reduce cheese intake or use reduced-fat, Eat 4-5 cups of fruits and vegetables daily, choose low sodium processed foods, use fat-free dressings/[place] or seldom use, Increase intake of nuts and seeds, seldom eat or choose low fat ice-cream, fruit juice bars or frozen yogurt  and eliminate or choose low-fat sweets    Progression Toward Goals: Goals not met: reduced BMI to < 25, reduced waist circumference <35 inches (F), improved A1c  < 7 0%,    , Goals met:  Improved Rate Your Plate score  >33, increase intake of fish, shellfish, increase intake of meatless meals, use low fat dairy, reduce cheese intake or use reduced-fat, Eat 4-5 cups of fruits and vegetables daily, choose low sodium processed foods, use fat-free dressings/[place] or seldom use, Increase intake of nuts and seeds, seldom eat or choose low fat ice-cream, fruit juice bars or frozen yogurt  and eliminate or choose low-fat sweets  , Patient will be encouraged to focus on lifestyle modifications following discharge      Education: heart healthy eating  low sodium diet  nutrition for  lipid management  nutrition for Improved BG Restin/60 - 122/70   Exercise: 114/60 - 136/80    Goals: consistent BP < 130/80, reduced dietary sodium <2300mg, moderate intensity exercise >150 mins/wk and reduce number of medications  needed for BP control    Progression Toward Goals: Goals met: consistent BP < 130/80, reduced dietary sodium <2300mg, moderate intensity exercise >150 mins/wk and reduce number of medications  needed for BP control , Patient will be encouraged to focus on lifestyle modifications following discharge      Education:  understanding high blood pressure and it's relationship to CAD, low sodium diet and HTN, Education class:  Common Heart Medications and Education class: Understanding Heart Disease  Plan: Avoid Processed foods, engage in regular exercise, use salt substitutes and check labels for sodium content  Readiness to change: Action:  (Changing behavior)

## 2021-09-24 DIAGNOSIS — E11.40 CONTROLLED TYPE 2 DIABETES MELLITUS WITH DIABETIC NEUROPATHY, WITHOUT LONG-TERM CURRENT USE OF INSULIN (HCC): ICD-10-CM

## 2021-09-24 RX ORDER — LINAGLIPTIN 5 MG/1
TABLET, FILM COATED ORAL
Qty: 60 TABLET | Refills: 0 | Status: SHIPPED | OUTPATIENT
Start: 2021-09-24 | End: 2021-11-29

## 2021-10-01 DIAGNOSIS — I50.9 CHF (CONGESTIVE HEART FAILURE) (HCC): ICD-10-CM

## 2021-10-05 RX ORDER — FUROSEMIDE 40 MG/1
40 TABLET ORAL 2 TIMES DAILY
Qty: 180 TABLET | Refills: 0 | Status: SHIPPED | OUTPATIENT
Start: 2021-10-05 | End: 2022-01-10

## 2021-10-20 ENCOUNTER — TELEPHONE (OUTPATIENT)
Dept: NEPHROLOGY | Facility: CLINIC | Age: 81
End: 2021-10-20

## 2021-10-22 ENCOUNTER — OFFICE VISIT (OUTPATIENT)
Dept: OBGYN CLINIC | Facility: CLINIC | Age: 81
End: 2021-10-22

## 2021-10-22 VITALS
DIASTOLIC BLOOD PRESSURE: 78 MMHG | BODY MASS INDEX: 29.58 KG/M2 | SYSTOLIC BLOOD PRESSURE: 142 MMHG | WEIGHT: 167 LBS | HEART RATE: 72 BPM

## 2021-10-22 DIAGNOSIS — N81.3 UTERINE PROCIDENTIA: ICD-10-CM

## 2021-10-22 DIAGNOSIS — R35.1 NOCTURIA: ICD-10-CM

## 2021-10-22 DIAGNOSIS — Z46.89 ENCOUNTER FOR PESSARY MAINTENANCE: ICD-10-CM

## 2021-10-22 DIAGNOSIS — N95.2 ATROPHIC VAGINITIS: ICD-10-CM

## 2021-10-22 DIAGNOSIS — N39.41 URGE URINARY INCONTINENCE: ICD-10-CM

## 2021-10-22 DIAGNOSIS — N81.11 MIDLINE CYSTOCELE: Primary | ICD-10-CM

## 2021-10-22 PROCEDURE — 99213 OFFICE O/P EST LOW 20 MIN: CPT | Performed by: OBSTETRICS & GYNECOLOGY

## 2021-10-26 DIAGNOSIS — Z79.4 CONTROLLED TYPE 2 DIABETES MELLITUS WITH DIABETIC NEUROPATHY, WITH LONG-TERM CURRENT USE OF INSULIN (HCC): Chronic | ICD-10-CM

## 2021-10-26 DIAGNOSIS — E11.40 CONTROLLED TYPE 2 DIABETES MELLITUS WITH DIABETIC NEUROPATHY, WITH LONG-TERM CURRENT USE OF INSULIN (HCC): Chronic | ICD-10-CM

## 2021-10-27 RX ORDER — FLASH GLUCOSE SENSOR
KIT MISCELLANEOUS
Qty: 4 EACH | Refills: 3 | Status: SHIPPED | OUTPATIENT
Start: 2021-10-27 | End: 2022-04-12 | Stop reason: SDUPTHER

## 2021-11-02 ENCOUNTER — APPOINTMENT (OUTPATIENT)
Dept: LAB | Facility: CLINIC | Age: 81
End: 2021-11-02
Payer: COMMERCIAL

## 2021-11-02 ENCOUNTER — OFFICE VISIT (OUTPATIENT)
Dept: INTERNAL MEDICINE CLINIC | Facility: CLINIC | Age: 81
End: 2021-11-02

## 2021-11-02 VITALS
BODY MASS INDEX: 29.59 KG/M2 | WEIGHT: 167 LBS | HEART RATE: 83 BPM | DIASTOLIC BLOOD PRESSURE: 77 MMHG | HEIGHT: 63 IN | OXYGEN SATURATION: 99 % | SYSTOLIC BLOOD PRESSURE: 132 MMHG | TEMPERATURE: 97.8 F

## 2021-11-02 DIAGNOSIS — I12.9 BENIGN HYPERTENSION WITH CKD (CHRONIC KIDNEY DISEASE) STAGE III (HCC): ICD-10-CM

## 2021-11-02 DIAGNOSIS — E78.2 MIXED HYPERLIPIDEMIA: ICD-10-CM

## 2021-11-02 DIAGNOSIS — N18.30 BENIGN HYPERTENSION WITH CKD (CHRONIC KIDNEY DISEASE) STAGE III (HCC): ICD-10-CM

## 2021-11-02 DIAGNOSIS — H91.93 DECREASED HEARING, BILATERAL: Primary | ICD-10-CM

## 2021-11-02 DIAGNOSIS — Z23 NEED FOR INFLUENZA VACCINATION: ICD-10-CM

## 2021-11-02 LAB
ALBUMIN SERPL BCP-MCNC: 3.7 G/DL (ref 3.5–5)
ALP SERPL-CCNC: 71 U/L (ref 46–116)
ALT SERPL W P-5'-P-CCNC: 33 U/L (ref 12–78)
ANION GAP SERPL CALCULATED.3IONS-SCNC: 3 MMOL/L (ref 4–13)
AST SERPL W P-5'-P-CCNC: 16 U/L (ref 5–45)
BILIRUB SERPL-MCNC: 0.44 MG/DL (ref 0.2–1)
BUN SERPL-MCNC: 52 MG/DL (ref 5–25)
CALCIUM SERPL-MCNC: 9.8 MG/DL (ref 8.3–10.1)
CHLORIDE SERPL-SCNC: 107 MMOL/L (ref 100–108)
CHOLEST SERPL-MCNC: 126 MG/DL (ref 50–200)
CO2 SERPL-SCNC: 30 MMOL/L (ref 21–32)
CREAT SERPL-MCNC: 1.49 MG/DL (ref 0.6–1.3)
GFR SERPL CREATININE-BSD FRML MDRD: 38 ML/MIN/1.73SQ M
GLUCOSE P FAST SERPL-MCNC: 161 MG/DL (ref 65–99)
HDLC SERPL-MCNC: 56 MG/DL
LDLC SERPL CALC-MCNC: 56 MG/DL (ref 0–100)
POTASSIUM SERPL-SCNC: 3.7 MMOL/L (ref 3.5–5.3)
PROT SERPL-MCNC: 8.1 G/DL (ref 6.4–8.2)
SODIUM SERPL-SCNC: 140 MMOL/L (ref 136–145)
TRIGL SERPL-MCNC: 70 MG/DL

## 2021-11-02 PROCEDURE — 1160F RVW MEDS BY RX/DR IN RCRD: CPT | Performed by: PHYSICIAN ASSISTANT

## 2021-11-02 PROCEDURE — 1036F TOBACCO NON-USER: CPT | Performed by: PHYSICIAN ASSISTANT

## 2021-11-02 PROCEDURE — 36415 COLL VENOUS BLD VENIPUNCTURE: CPT

## 2021-11-02 PROCEDURE — G0008 ADMIN INFLUENZA VIRUS VAC: HCPCS | Performed by: PHYSICIAN ASSISTANT

## 2021-11-02 PROCEDURE — 80061 LIPID PANEL: CPT

## 2021-11-02 PROCEDURE — 99213 OFFICE O/P EST LOW 20 MIN: CPT | Performed by: PHYSICIAN ASSISTANT

## 2021-11-02 PROCEDURE — 90662 IIV NO PRSV INCREASED AG IM: CPT | Performed by: PHYSICIAN ASSISTANT

## 2021-11-02 PROCEDURE — 80053 COMPREHEN METABOLIC PANEL: CPT

## 2021-11-18 ENCOUNTER — OFFICE VISIT (OUTPATIENT)
Dept: PODIATRY | Facility: CLINIC | Age: 81
End: 2021-11-18
Payer: COMMERCIAL

## 2021-11-18 ENCOUNTER — TELEPHONE (OUTPATIENT)
Dept: PODIATRY | Facility: CLINIC | Age: 81
End: 2021-11-18

## 2021-11-18 VITALS
HEIGHT: 63 IN | WEIGHT: 173 LBS | SYSTOLIC BLOOD PRESSURE: 135 MMHG | HEART RATE: 68 BPM | BODY MASS INDEX: 30.65 KG/M2 | DIASTOLIC BLOOD PRESSURE: 69 MMHG

## 2021-11-18 DIAGNOSIS — B35.1 ONYCHOMYCOSIS: ICD-10-CM

## 2021-11-18 DIAGNOSIS — E11.40 TYPE 2 DIABETES MELLITUS WITH DIABETIC NEUROPATHY, UNSPECIFIED WHETHER LONG TERM INSULIN USE (HCC): Primary | ICD-10-CM

## 2021-11-18 DIAGNOSIS — I73.9 PERIPHERAL VASCULAR DISEASE, UNSPECIFIED (HCC): ICD-10-CM

## 2021-11-18 PROCEDURE — 11721 DEBRIDE NAIL 6 OR MORE: CPT | Performed by: PODIATRIST

## 2021-11-18 PROCEDURE — 99213 OFFICE O/P EST LOW 20 MIN: CPT | Performed by: PODIATRIST

## 2021-11-18 PROCEDURE — 3075F SYST BP GE 130 - 139MM HG: CPT | Performed by: PODIATRIST

## 2021-11-18 PROCEDURE — 1160F RVW MEDS BY RX/DR IN RCRD: CPT | Performed by: PODIATRIST

## 2021-11-18 PROCEDURE — 3078F DIAST BP <80 MM HG: CPT | Performed by: PODIATRIST

## 2021-11-22 ENCOUNTER — OFFICE VISIT (OUTPATIENT)
Dept: CARDIOLOGY CLINIC | Facility: CLINIC | Age: 81
End: 2021-11-22
Payer: COMMERCIAL

## 2021-11-22 VITALS
BODY MASS INDEX: 30.33 KG/M2 | HEART RATE: 72 BPM | WEIGHT: 171.2 LBS | SYSTOLIC BLOOD PRESSURE: 130 MMHG | DIASTOLIC BLOOD PRESSURE: 68 MMHG | OXYGEN SATURATION: 100 % | HEIGHT: 63 IN

## 2021-11-22 DIAGNOSIS — I25.10 CORONARY ARTERY DISEASE INVOLVING NATIVE CORONARY ARTERY OF NATIVE HEART WITHOUT ANGINA PECTORIS: ICD-10-CM

## 2021-11-22 DIAGNOSIS — I12.9 BENIGN HYPERTENSION WITH CKD (CHRONIC KIDNEY DISEASE) STAGE III (HCC): ICD-10-CM

## 2021-11-22 DIAGNOSIS — E78.2 MIXED HYPERLIPIDEMIA: ICD-10-CM

## 2021-11-22 DIAGNOSIS — I35.1 AORTIC VALVE REGURGITATION, NONRHEUMATIC: Primary | ICD-10-CM

## 2021-11-22 DIAGNOSIS — N18.30 BENIGN HYPERTENSION WITH CKD (CHRONIC KIDNEY DISEASE) STAGE III (HCC): ICD-10-CM

## 2021-11-22 DIAGNOSIS — I34.0 NON-RHEUMATIC MITRAL REGURGITATION: ICD-10-CM

## 2021-11-22 PROCEDURE — 1160F RVW MEDS BY RX/DR IN RCRD: CPT | Performed by: INTERNAL MEDICINE

## 2021-11-22 PROCEDURE — 1036F TOBACCO NON-USER: CPT | Performed by: INTERNAL MEDICINE

## 2021-11-22 PROCEDURE — 99214 OFFICE O/P EST MOD 30 MIN: CPT | Performed by: INTERNAL MEDICINE

## 2021-11-29 ENCOUNTER — LAB (OUTPATIENT)
Dept: LAB | Facility: HOSPITAL | Age: 81
End: 2021-11-29
Attending: INTERNAL MEDICINE
Payer: COMMERCIAL

## 2021-11-29 DIAGNOSIS — E11.65 UNCONTROLLED TYPE 2 DIABETES MELLITUS WITH HYPERGLYCEMIA (HCC): ICD-10-CM

## 2021-11-29 DIAGNOSIS — I12.9 BENIGN HYPERTENSION WITH CKD (CHRONIC KIDNEY DISEASE) STAGE III (HCC): ICD-10-CM

## 2021-11-29 DIAGNOSIS — N17.9 AKI (ACUTE KIDNEY INJURY) (HCC): ICD-10-CM

## 2021-11-29 DIAGNOSIS — N18.30 BENIGN HYPERTENSION WITH CKD (CHRONIC KIDNEY DISEASE) STAGE III (HCC): ICD-10-CM

## 2021-11-29 DIAGNOSIS — R80.1 PERSISTENT PROTEINURIA: ICD-10-CM

## 2021-11-29 DIAGNOSIS — N18.31 STAGE 3A CHRONIC KIDNEY DISEASE (HCC): ICD-10-CM

## 2021-11-29 LAB
ANION GAP SERPL CALCULATED.3IONS-SCNC: 9 MMOL/L (ref 4–13)
BUN SERPL-MCNC: 44 MG/DL (ref 5–25)
CALCIUM SERPL-MCNC: 9.4 MG/DL (ref 8.3–10.1)
CHLORIDE SERPL-SCNC: 107 MMOL/L (ref 100–108)
CO2 SERPL-SCNC: 25 MMOL/L (ref 21–32)
CREAT SERPL-MCNC: 1.45 MG/DL (ref 0.6–1.3)
ERYTHROCYTE [DISTWIDTH] IN BLOOD BY AUTOMATED COUNT: 14.3 % (ref 11.6–15.1)
EST. AVERAGE GLUCOSE BLD GHB EST-MCNC: 192 MG/DL
GFR SERPL CREATININE-BSD FRML MDRD: 39 ML/MIN/1.73SQ M
GLUCOSE P FAST SERPL-MCNC: 201 MG/DL (ref 65–99)
HBA1C MFR BLD: 8.3 %
HCT VFR BLD AUTO: 33.2 % (ref 34.8–46.1)
HGB BLD-MCNC: 10.2 G/DL (ref 11.5–15.4)
MCH RBC QN AUTO: 25.2 PG (ref 26.8–34.3)
MCHC RBC AUTO-ENTMCNC: 30.7 G/DL (ref 31.4–37.4)
MCV RBC AUTO: 82 FL (ref 82–98)
PHOSPHATE SERPL-MCNC: 3.6 MG/DL (ref 2.3–4.1)
PLATELET # BLD AUTO: 193 THOUSANDS/UL (ref 149–390)
PMV BLD AUTO: 12.5 FL (ref 8.9–12.7)
POTASSIUM SERPL-SCNC: 3.7 MMOL/L (ref 3.5–5.3)
PTH-INTACT SERPL-MCNC: 166.4 PG/ML (ref 18.4–80.1)
RBC # BLD AUTO: 4.05 MILLION/UL (ref 3.81–5.12)
SODIUM SERPL-SCNC: 141 MMOL/L (ref 136–145)
WBC # BLD AUTO: 4.51 THOUSAND/UL (ref 4.31–10.16)

## 2021-11-29 PROCEDURE — 84100 ASSAY OF PHOSPHORUS: CPT

## 2021-11-29 PROCEDURE — 83036 HEMOGLOBIN GLYCOSYLATED A1C: CPT

## 2021-11-29 PROCEDURE — 85027 COMPLETE CBC AUTOMATED: CPT

## 2021-11-29 PROCEDURE — 83970 ASSAY OF PARATHORMONE: CPT

## 2021-11-29 PROCEDURE — 36415 COLL VENOUS BLD VENIPUNCTURE: CPT

## 2021-11-29 PROCEDURE — 80048 BASIC METABOLIC PNL TOTAL CA: CPT

## 2021-12-04 ENCOUNTER — TELEPHONE (OUTPATIENT)
Dept: NEPHROLOGY | Facility: CLINIC | Age: 81
End: 2021-12-04

## 2021-12-07 DIAGNOSIS — I21.4 NSTEMI (NON-ST ELEVATED MYOCARDIAL INFARCTION) (HCC): ICD-10-CM

## 2021-12-07 RX ORDER — NITROGLYCERIN 0.4 MG/1
0.4 TABLET SUBLINGUAL
Qty: 25 TABLET | Refills: 1 | Status: SHIPPED | OUTPATIENT
Start: 2021-12-07

## 2021-12-14 ENCOUNTER — OFFICE VISIT (OUTPATIENT)
Dept: MULTI SPECIALTY CLINIC | Facility: CLINIC | Age: 81
End: 2021-12-14

## 2021-12-14 VITALS
SYSTOLIC BLOOD PRESSURE: 128 MMHG | BODY MASS INDEX: 29.95 KG/M2 | DIASTOLIC BLOOD PRESSURE: 72 MMHG | WEIGHT: 169 LBS | HEART RATE: 79 BPM | HEIGHT: 63 IN | TEMPERATURE: 97.5 F

## 2021-12-14 DIAGNOSIS — E11.65 UNCONTROLLED TYPE 2 DIABETES MELLITUS WITH HYPERGLYCEMIA (HCC): Primary | ICD-10-CM

## 2021-12-14 DIAGNOSIS — N18.32 STAGE 3B CHRONIC KIDNEY DISEASE (HCC): ICD-10-CM

## 2021-12-14 DIAGNOSIS — I10 ESSENTIAL HYPERTENSION: ICD-10-CM

## 2021-12-14 DIAGNOSIS — E78.2 MIXED HYPERLIPIDEMIA: ICD-10-CM

## 2021-12-14 PROCEDURE — 3078F DIAST BP <80 MM HG: CPT | Performed by: PHYSICIAN ASSISTANT

## 2021-12-14 PROCEDURE — 1036F TOBACCO NON-USER: CPT | Performed by: PHYSICIAN ASSISTANT

## 2021-12-14 PROCEDURE — 3074F SYST BP LT 130 MM HG: CPT | Performed by: PHYSICIAN ASSISTANT

## 2021-12-14 PROCEDURE — 99214 OFFICE O/P EST MOD 30 MIN: CPT | Performed by: PHYSICIAN ASSISTANT

## 2021-12-14 PROCEDURE — 1160F RVW MEDS BY RX/DR IN RCRD: CPT | Performed by: PHYSICIAN ASSISTANT

## 2021-12-17 ENCOUNTER — HOSPITAL ENCOUNTER (OUTPATIENT)
Dept: NON INVASIVE DIAGNOSTICS | Facility: CLINIC | Age: 81
Discharge: HOME | End: 2021-12-17
Payer: COMMERCIAL

## 2021-12-17 DIAGNOSIS — E11.40 TYPE 2 DIABETES MELLITUS WITH DIABETIC NEUROPATHY, UNSPECIFIED WHETHER LONG TERM INSULIN USE (HCC): ICD-10-CM

## 2021-12-17 DIAGNOSIS — I73.9 PERIPHERAL VASCULAR DISEASE, UNSPECIFIED (HCC): ICD-10-CM

## 2021-12-17 PROCEDURE — 93922 UPR/L XTREMITY ART 2 LEVELS: CPT | Performed by: SURGERY

## 2021-12-17 PROCEDURE — 93925 LOWER EXTREMITY STUDY: CPT

## 2021-12-17 PROCEDURE — 93923 UPR/LXTR ART STDY 3+ LVLS: CPT

## 2021-12-17 PROCEDURE — 93925 LOWER EXTREMITY STUDY: CPT | Performed by: SURGERY

## 2021-12-21 DIAGNOSIS — E78.2 MIXED HYPERLIPIDEMIA: ICD-10-CM

## 2021-12-21 RX ORDER — ATORVASTATIN CALCIUM 40 MG/1
40 TABLET, FILM COATED ORAL DAILY
Qty: 90 TABLET | Refills: 1 | Status: SHIPPED | OUTPATIENT
Start: 2021-12-21 | End: 2022-08-09

## 2022-01-12 ENCOUNTER — OFFICE VISIT (OUTPATIENT)
Dept: NEPHROLOGY | Facility: CLINIC | Age: 82
End: 2022-01-12
Payer: COMMERCIAL

## 2022-01-12 VITALS
BODY MASS INDEX: 30.12 KG/M2 | SYSTOLIC BLOOD PRESSURE: 128 MMHG | WEIGHT: 170 LBS | HEIGHT: 63 IN | HEART RATE: 69 BPM | DIASTOLIC BLOOD PRESSURE: 74 MMHG

## 2022-01-12 DIAGNOSIS — R80.1 PERSISTENT PROTEINURIA: ICD-10-CM

## 2022-01-12 DIAGNOSIS — N18.31 STAGE 3A CHRONIC KIDNEY DISEASE (HCC): ICD-10-CM

## 2022-01-12 DIAGNOSIS — I12.9 BENIGN HYPERTENSION WITH CKD (CHRONIC KIDNEY DISEASE) STAGE III (HCC): Primary | ICD-10-CM

## 2022-01-12 DIAGNOSIS — N18.30 BENIGN HYPERTENSION WITH CKD (CHRONIC KIDNEY DISEASE) STAGE III (HCC): Primary | ICD-10-CM

## 2022-01-12 DIAGNOSIS — N28.1 RENAL CYST: ICD-10-CM

## 2022-01-12 PROCEDURE — 99214 OFFICE O/P EST MOD 30 MIN: CPT | Performed by: INTERNAL MEDICINE

## 2022-01-12 NOTE — PROGRESS NOTES
NEPHROLOGY OUTPATIENT PROGRESS NOTE   Heidi Suero 80 y o  female MRN: [id number]  DATE: 1/12/2022  Reason for visit:   Chief Complaint   Patient presents with    Follow-up    Chronic Kidney Disease     ASSESSMENT and PLAN:  CKD stage 3, baseline creatinine lately 1 4 to 1 5 since June 2021, 1 to 1 2 since March 2021, prior 0 8 to 1 0  -last serum creatinine 1 4 relatively stable in November 2021  Suspect some progression of CKD  -CKD suspected to be secondary to long-term hypertension, uncontrolled diabetes, age-related nephron loss  -avoid nephrotoxins or NSAIDs  -renal ultrasound in 2021 shows normal size kidneys, normal echogenicity, no hydronephrosis or stones  -UA in June 2021 shows no proteinuria or hematuria, bland      Bilateral renal cyst on ultrasound  -right kidney showed mid to lower pole cyst with thick septation, no definite vascularity noted  -MRI abdomen in February 2020 shows no suspicious renal mass  Deborha Aamir showed 2 4 cm right mid pole cyst with septation, suboptimally evaluated in the absence of IV contrast   -repeat renal ultrasound in July 2021 shows stable right-sided 2 cm septated cyst, stable simple cyst in left kidney  Continue to monitor, repeat renal ultrasound during next visit      Proteinuria  -last urine microalbumin/creatinine ratio  101 mg in June 2021  She has not done repeat urine studies since then  -repeat urine microalbumin/creatinine ratio before next visit  -currently on losartan as below    -suspect in the setting of uncontrolled diabetes, hemoglobin A1c 8 3 in November 2021  Needs better control of blood glucose   -also has mild diabetic retinopathy in September 2021  Secondary hyperparathyroidism, last  in November 2021  Recently vitamin-D supplements increase from 2000 to 4000 units daily  Check vitamin-D, PTH level before next visit      Hypertension  -blood pressure overall acceptable in the office today    Continue losartan 50 mg daily, Coreg, Lasix   -she has not brought BP machine to the office again today   -salt restricted diet recommended     Trace ankle edema, resolved  -currently seems euvolemic  Remains on Lasix 40 mg b i d  She has gained about 7 lb since last visit  -echo in May 2021 shows EF 54%, grade 2 diastolic dysfunction, moderate pulmonary hypertension  Diagnoses and all orders for this visit:    Benign hypertension with CKD (chronic kidney disease) stage III  -     Basic metabolic panel; Future  -     CBC; Future  -     Phosphorus; Future  -     PTH, intact; Future  -     Microalbumin / creatinine urine ratio; Future  -     Vitamin D 25 hydroxy; Future  -     Magnesium; Future    Stage 3a chronic kidney disease (HCC)  -     Basic metabolic panel; Future  -     CBC; Future  -     Phosphorus; Future  -     PTH, intact; Future  -     Microalbumin / creatinine urine ratio; Future  -     Vitamin D 25 hydroxy; Future  -     Magnesium; Future    Renal cyst    Persistent proteinuria  -     Microalbumin / creatinine urine ratio; Future          SUBJECTIVE / HPI:  Clau POTTER 72 y o  year old female with medical issues of hypertension for 11 years, diabetes for 11 years, CVA in 2011, mild diabetic retinopathy,?  Gout, CKD stage 3 with baseline  lately 1 4 to 1 5 since mid 2021, 1 to 1 2 since March 2021, prior 0 8 to 1 0 who presents for regular follow up for CKD, proteinuria, hypertension management   Last serum creatinine overall stable at baseline although has overall progression of CKD  Currently denies any urinary complaint  denies any nausea, vomiting or diarrhea   No recent NSAID use  She has gained about 7 lb since last visit although denies any dyspnea  No obvious history of autoimmune conditions   Denies any prior history of kidney stones  REVIEW OF SYSTEMS:  More than 10 point review of systems were obtained and discussed in length with the patient   Complete review of systems were negative / unremarkable except mentioned above  PHYSICAL EXAM:  Vitals:    01/12/22 1427 01/12/22 1449   BP: 140/70 128/74   BP Location: Left arm    Patient Position: Sitting    Cuff Size: Adult    Pulse: 69    Weight: 77 1 kg (170 lb)    Height: 5' 3" (1 6 m)      Body mass index is 30 11 kg/m²  Physical Exam  Vitals reviewed  Constitutional:       Appearance: She is well-developed  HENT:      Head: Normocephalic and atraumatic  Right Ear: External ear normal       Left Ear: External ear normal    Eyes:      Comments: Mild conjunctival pallor present   Cardiovascular:      Comments: S1, S2 present  Pulmonary:      Effort: Pulmonary effort is normal       Breath sounds: Normal breath sounds  No wheezing or rales  Abdominal:      General: Bowel sounds are normal  There is no distension  Palpations: Abdomen is soft  Tenderness: There is no abdominal tenderness  Musculoskeletal:         General: No tenderness  Right lower leg: No edema  Left lower leg: No edema  Lymphadenopathy:      Cervical: No cervical adenopathy  Skin:     Findings: No rash  Neurological:      Mental Status: She is alert and oriented to person, place, and time     Psychiatric:         Behavior: Behavior normal          PAST MEDICAL HISTORY:  Past Medical History:   Diagnosis Date    ACE-inhibitor cough     last assessed - [id number]    Asthma     Bilateral edema of lower extremity     last assessed - [id number]    Cardiac murmur     last assessed - [id number]    CKD (chronic kidney disease)     Diabetes mellitus ([facility] [address] )     last assessed - [id number]    Esophageal dysphagia     Fatigue     last assessed - [id number]    Hyperlipidemia     Hypertension     Patellar bursitis of right knee     last assessed - [id number]    Personal history of other specified conditions     presenting hazards to health    Stroke [facility])     Stroke syndrome     Urinary frequency     UTI (urinary tract infection)        PAST SURGICAL HISTORY:  Past Surgical History:   Procedure Laterality Date    MO ESOPHAGOGASTRODUODENOSCOPY TRANSORAL DIAGNOSTIC N/A 2017    Procedure: ESOPHAGOGASTRODUODENOSCOPY (EGD); Surgeon: Emily Romero MD;  Location: [facility];   Service: Gastroenterology       SOCIAL HISTORY:  Social History     Substance and Sexual Activity   Alcohol Use Never     Social History     Substance and Sexual Activity   Drug Use Never     Social History     Tobacco Use   Smoking Status Former Smoker    Packs/day: 3 00    Quit date: 36    Years since quittin 0   Smokeless Tobacco Former User   Tobacco Comment    quit over 30 yrs ago; (quit in the , had smoked 3PPD for 20 years - per [employer])       FAMILY HISTORY:  Family History   Problem Relation Age of Onset    Heart disease Father     Hypertension Mother     Stroke Mother     Other Sister         1)Breast disorder; 2)Epilepsy   Romina Leisure Migraines Sister         Migraine headaches    Osteoporosis Sister     Thyroid disease Sister     Coronary artery disease Sister         S/P triple vessel bypass    Cancer Sister         breast CA    Osteoporosis Maternal Grandmother     Diabetes Son         Diabetes mellitus    Hypertension Son     Rheum arthritis Son         Rheumatic disease    Heart disease Family        MEDICATIONS:    Current Outpatient Medications:     acetaminophen (TYLENOL) 650 mg CR tablet, Take 650 mg by mouth every 6 (six) hours as needed for mild pain, Disp: , Rfl:     albuterol (PROVENTIL HFA,VENTOLIN HFA) 90 mcg/act inhaler, INHALE 2 PUFFS EVERY 4 (FOUR) HOURS AS NEEDED FOR WHEEZING, Disp: 8 5 g, Rfl: 5    aspirin (ECOTRIN LOW STRENGTH) 81 mg EC tablet, Take 1 tablet (81 mg total) by mouth daily, Disp: 90 tablet, Rfl: 0    atorvastatin (LIPITOR) 40 mg tablet, TAKE 1 TABLET (40 MG TOTAL) BY MOUTH DAILY, Disp: 90 tablet, Rfl: 1    Blood Glucose Monitoring Suppl (FREESTYLE LITE) JAQUELIN, by Does not apply route daily, Disp: 1 each, Rfl: 0    Breo Ellipta 100-25 MCG/INH inhaler, Inhale 1 puff daily, Disp: 60 blister, Rfl: 5    carvedilol (COREG) 6 25 mg tablet, Take 1 tablet (6 25 mg total) by mouth 2 (two) times a day with meals, Disp: 60 tablet, Rfl: 5    Cholecalciferol (D3) 50 MCG (2000 UT) TABS, Take 2 tablets (4,000 Units total) by mouth daily, Disp: 180 tablet, Rfl: 0    clopidogrel (PLAVIX) 75 mg tablet, TAKE 2 TABS DAILY, Disp: 180 tablet, Rfl: 3    conjugated estrogens (PREMARIN) vaginal cream, Insert 0 5 g into the vagina 2 (two) times a week Insert twice weekly at bedtime, Disp: 30 g, Rfl: 1    Continuous Blood Gluc Sensor (FreeStyle Naldo 14 Day Sensor) MISC, USE ONE SENSOR EVERY 14 DAYS, Disp: 4 each, Rfl: 3    ferrous sulfate 324 (65 Fe) mg, Take 1 tablet (324 mg total) by mouth 2 (two) times a day before meals, Disp: 60 tablet, Rfl: 2    furosemide (LASIX) 40 mg tablet, TAKE 1 TABLET (40 MG TOTAL) BY MOUTH 2 (TWO) TIMES A DAY, Disp: 180 tablet, Rfl: 3    glucose blood (FREESTYLE LITE) test strip, TEST AS DIRECTED UP TO FOUR TIMES A DAY, Disp: 100 each, Rfl: 3    Lancets (FREESTYLE) lancets, Use as instructed, Disp: 100 each, Rfl: 0    latanoprost (XALATAN) 0 005 % ophthalmic solution, , Disp: , Rfl:     losartan (COZAAR) 50 mg tablet, TAKE 1 TAB DAILY, Disp: 90 tablet, Rfl: 1    metFORMIN (GLUCOPHAGE) 500 mg tablet, TAKE 1 TABLET (500 MG TOTAL) BY MOUTH 2 (TWO) TIMES A DAY WITH MEALS, Disp: 180 tablet, Rfl: 0    Misc   Devices (QUAD CANE) MISC, by Does not apply route daily, Disp: 1 each, Rfl: 0    montelukast (SINGULAIR) 10 mg tablet, TAKE 1 TABLET (10 MG TOTAL) BY MOUTH DAILY AT BEDTIME, Disp: 90 tablet, Rfl: 3    nitroglycerin (NITROSTAT) 0 4 mg SL tablet, PLACE 1 TABLET (0 4 MG TOTAL) UNDER THE TONGUE EVERY 5 (FIVE) MINUTES AS NEEDED FOR CHEST PAIN, Disp: 25 tablet, Rfl: 1    Tradjenta 5 MG TABS, TAKE 1 TABLET (5 MG) BY MOUTH DAILY, Disp: 90 tablet, Rfl: 1    Xarelto 2 5 MG tablet, TAKE 1 TABLET (2 5 MG TOTAL) BY MOUTH 2 (TWO) TIMES A DAY WITH MEALS, Disp: 180 tablet, Rfl: 1    Glucose-Vitamin C-Vitamin D (TRUEPLUS GLUCOSE ON THE GO) CHEW, CHEW 2 TABS AS NEEDED FOR BLOOD SUGAR LESS THAN 80 (Patient not taking: Reported on 11/22/2021), Disp: , Rfl:     ranolazine (RANEXA) 500 mg 12 hr tablet, Take 1 tablet (500 mg total) by mouth every 12 (twelve) hours (Patient not taking: Reported on 11/22/2021 ), Disp: 60 tablet, Rfl: 0    Lab Results:   Results for orders placed or performed in visit on 21/85/11   Basic metabolic panel   Result Value Ref Range    Sodium 141 136 - 145 mmol/L    Potassium 3 7 3 5 - 5 3 mmol/L    Chloride 107 100 - 108 mmol/L    CO2 25 21 - 32 mmol/L    ANION GAP 9 4 - 13 mmol/L    BUN 44 (H) 5 - 25 mg/dL    Creatinine 1 45 (H) 0 60 - 1 30 mg/dL    Glucose, Fasting 201 (H) 65 - 99 mg/dL    Calcium 9 4 8 3 - 10 1 mg/dL    eGFR 39 ml/min/1 73sq m   CBC   Result Value Ref Range    WBC 4 51 4 31 - 10 16 Thousand/uL    RBC 4 05 3 81 - 5 12 Million/uL    Hemoglobin 10 2 (L) 11 5 - 15 4 g/dL    Hematocrit 33 2 (L) 34 8 - 46 1 %    MCV 82 82 - 98 fL    MCH 25 2 (L) 26 8 - 34 3 pg    MCHC 30 7 (L) 31 4 - 37 4 g/dL    RDW 14 3 11 6 - 15 1 %    Platelets 534 853 - 735 Thousands/uL    MPV 12 5 8 9 - 12 7 fL   Phosphorus   Result Value Ref Range    Phosphorus 3 6 2 3 - 4 1 mg/dL   PTH, intact   Result Value Ref Range     4 (H) 18 4 - 80 1 pg/mL   Hemoglobin A1C   Result Value Ref Range    Hemoglobin A1C 8 3 (H) Normal 3 8-5 6%; PreDiabetic 5 7-6 4%;  Diabetic >=6 5%; Glycemic control for adults with diabetes <7 0% %     mg/dl

## 2022-02-07 ENCOUNTER — HOSPITAL ENCOUNTER (OUTPATIENT)
Dept: RADIOLOGY | Age: 82
Discharge: HOME | End: 2022-02-07
Payer: COMMERCIAL

## 2022-02-07 VITALS — BODY MASS INDEX: 30.12 KG/M2 | HEIGHT: 63 IN | WEIGHT: 170 LBS

## 2022-02-07 DIAGNOSIS — Z12.31 VISIT FOR SCREENING MAMMOGRAM: ICD-10-CM

## 2022-02-07 DIAGNOSIS — M81.0 OSTEOPOROSIS, POSTMENOPAUSAL: ICD-10-CM

## 2022-02-07 PROCEDURE — 77067 SCR MAMMO BI INCL CAD: CPT

## 2022-02-07 PROCEDURE — 77063 BREAST TOMOSYNTHESIS BI: CPT

## 2022-02-07 PROCEDURE — 77080 DXA BONE DENSITY AXIAL: CPT

## 2022-03-07 DIAGNOSIS — N18.30 BENIGN HYPERTENSION WITH CKD (CHRONIC KIDNEY DISEASE) STAGE III (HCC): ICD-10-CM

## 2022-03-07 DIAGNOSIS — I12.9 BENIGN HYPERTENSION WITH CKD (CHRONIC KIDNEY DISEASE) STAGE III (HCC): ICD-10-CM

## 2022-03-08 RX ORDER — CARVEDILOL 6.25 MG/1
6.25 TABLET ORAL 2 TIMES DAILY WITH MEALS
Qty: 60 TABLET | Refills: 5 | Status: SHIPPED | OUTPATIENT
Start: 2022-03-08 | End: 2022-09-04

## 2022-03-18 ENCOUNTER — LAB (OUTPATIENT)
Dept: LAB | Facility: HOSPITAL | Age: 82
End: 2022-03-18
Payer: MEDICARE

## 2022-03-18 DIAGNOSIS — E11.65 UNCONTROLLED TYPE 2 DIABETES MELLITUS WITH HYPERGLYCEMIA (HCC): ICD-10-CM

## 2022-03-18 DIAGNOSIS — I10 ESSENTIAL HYPERTENSION: ICD-10-CM

## 2022-03-18 DIAGNOSIS — N18.32 STAGE 3B CHRONIC KIDNEY DISEASE (HCC): ICD-10-CM

## 2022-03-18 LAB
ANION GAP SERPL CALCULATED.3IONS-SCNC: 5 MMOL/L (ref 4–13)
BUN SERPL-MCNC: 38 MG/DL (ref 5–25)
CALCIUM SERPL-MCNC: 9.5 MG/DL (ref 8.3–10.1)
CHLORIDE SERPL-SCNC: 108 MMOL/L (ref 100–108)
CO2 SERPL-SCNC: 28 MMOL/L (ref 21–32)
CREAT SERPL-MCNC: 1.37 MG/DL (ref 0.6–1.3)
EST. AVERAGE GLUCOSE BLD GHB EST-MCNC: 177 MG/DL
GFR SERPL CREATININE-BSD FRML MDRD: 36 ML/MIN/1.73SQ M
GLUCOSE P FAST SERPL-MCNC: 180 MG/DL (ref 65–99)
HBA1C MFR BLD: 7.8 %
POTASSIUM SERPL-SCNC: 3.8 MMOL/L (ref 3.5–5.3)
SODIUM SERPL-SCNC: 141 MMOL/L (ref 136–145)

## 2022-03-18 PROCEDURE — 83036 HEMOGLOBIN GLYCOSYLATED A1C: CPT

## 2022-03-18 PROCEDURE — 36415 COLL VENOUS BLD VENIPUNCTURE: CPT

## 2022-03-18 PROCEDURE — 80048 BASIC METABOLIC PNL TOTAL CA: CPT

## 2022-03-25 ENCOUNTER — OFFICE VISIT (OUTPATIENT)
Dept: CARDIOLOGY CLINIC | Facility: CLINIC | Age: 82
End: 2022-03-25
Payer: MEDICARE

## 2022-03-25 VITALS
HEART RATE: 77 BPM | OXYGEN SATURATION: 98 % | WEIGHT: 175.9 LBS | BODY MASS INDEX: 31.17 KG/M2 | HEIGHT: 63 IN | DIASTOLIC BLOOD PRESSURE: 76 MMHG | SYSTOLIC BLOOD PRESSURE: 130 MMHG

## 2022-03-25 DIAGNOSIS — I25.10 CORONARY ARTERY DISEASE INVOLVING NATIVE CORONARY ARTERY OF NATIVE HEART WITHOUT ANGINA PECTORIS: ICD-10-CM

## 2022-03-25 DIAGNOSIS — I10 ESSENTIAL HYPERTENSION: ICD-10-CM

## 2022-03-25 DIAGNOSIS — I50.42 CHRONIC COMBINED SYSTOLIC AND DIASTOLIC CONGESTIVE HEART FAILURE (HCC): ICD-10-CM

## 2022-03-25 DIAGNOSIS — I34.0 NON-RHEUMATIC MITRAL REGURGITATION: Primary | ICD-10-CM

## 2022-03-25 DIAGNOSIS — I35.1 AORTIC VALVE REGURGITATION, NONRHEUMATIC: ICD-10-CM

## 2022-03-25 PROCEDURE — 99214 OFFICE O/P EST MOD 30 MIN: CPT | Performed by: INTERNAL MEDICINE

## 2022-04-01 ENCOUNTER — OFFICE VISIT (OUTPATIENT)
Dept: OBGYN CLINIC | Facility: CLINIC | Age: 82
End: 2022-04-01

## 2022-04-01 VITALS
SYSTOLIC BLOOD PRESSURE: 116 MMHG | WEIGHT: 172.6 LBS | HEART RATE: 62 BPM | BODY MASS INDEX: 30.57 KG/M2 | DIASTOLIC BLOOD PRESSURE: 69 MMHG

## 2022-04-01 DIAGNOSIS — E11.40 TYPE 2 DIABETES MELLITUS WITH DIABETIC NEUROPATHY, UNSPECIFIED WHETHER LONG TERM INSULIN USE (HCC): ICD-10-CM

## 2022-04-01 DIAGNOSIS — N81.11 CYSTOCELE, MIDLINE: ICD-10-CM

## 2022-04-01 DIAGNOSIS — N95.2 VAGINAL ATROPHY: ICD-10-CM

## 2022-04-01 DIAGNOSIS — N30.90 CYSTITIS: Primary | ICD-10-CM

## 2022-04-01 PROCEDURE — 87186 SC STD MICRODIL/AGAR DIL: CPT | Performed by: OBSTETRICS & GYNECOLOGY

## 2022-04-01 PROCEDURE — 99213 OFFICE O/P EST LOW 20 MIN: CPT | Performed by: OBSTETRICS & GYNECOLOGY

## 2022-04-01 PROCEDURE — 87077 CULTURE AEROBIC IDENTIFY: CPT | Performed by: OBSTETRICS & GYNECOLOGY

## 2022-04-01 PROCEDURE — 87086 URINE CULTURE/COLONY COUNT: CPT | Performed by: OBSTETRICS & GYNECOLOGY

## 2022-04-01 RX ORDER — SULFAMETHOXAZOLE AND TRIMETHOPRIM 800; 160 MG/1; MG/1
1 TABLET ORAL EVERY 12 HOURS SCHEDULED
Qty: 6 TABLET | Refills: 0 | Status: SHIPPED | OUTPATIENT
Start: 2022-04-01 | End: 2022-04-04

## 2022-04-01 RX ORDER — ESTRADIOL 0.1 MG/G
2 CREAM VAGINAL 2 TIMES WEEKLY
Qty: 42.5 G | Refills: 3 | Status: SHIPPED | OUTPATIENT
Start: 2022-04-04

## 2022-04-01 NOTE — PROGRESS NOTES
Urogynecology - Follow-up clinic note  Jennifer Aranda   [id number]     Kazakh-speaking: no Phone  used: no     Chief complaint: Prolapse     HPI:     78 y o   presents as follow-up for symptoms of Pelvic Organ Prolapse  In addition patient with history of CKD stage II, CHF, CHTN, Hx NSTEMI,  asthma, Hx stroke with ataxia  Patient last seen 10/2021 and ring 7 was replaced with cube 7 due to prolapse coming around the ring on exam  She notes she removed the cube after a few days form last appointment as it was uncomfortable  She replaced her old ring #7 herself  She is currently wearing it  She is not interested in surgical correction  Today has some dysuria x 1 week, denies frequency or urgency  Had previous spotting that she is unsure if she placed the pessary wrong  She does manage her pessary at home herself  Patient was previously placed on Trospium 60mg XR due to Overactive Bladder symptoms and Nocturia 3-4x per night  She reports currently she is voiding around 6 times day time, denies nocturia, denies stress incontinence symptoms but states sometimes if she wait to much to go to the restroom she can have episodes of urine loss  She uses premarin twice weekly  Denies any vaginal bleeding or pain, denies constipation currently       Prolapse symptoms  Bulge: yes  Pressure: yes    Rubbing on clothing: no    Stenting for urine: no  Stenting for stool: no  Pessary use: yes Type: ring  Duration: 2 year(s)  Hormonal replacement therapy: yes, premarin   Duration: 2+ year(s)     Urinary symptoms  Urgency: no    Frequency: no   Incontinence with stress (exercise, valsalva, laugh, sneeze, cough): no   Incontinence with urge: no  Postural incontinence: no   Nocturia no  Dysuria: yes   Hematuria:no   Incomplete emptying: no   Slow stream:no   Hesitancy: no   Straining with urination: no     Medical history and medication list reviewed and no significant changes since last visit   yes        Physical exam:  Vitals:    04/01/22 1238   BP: 116/69   Pulse: 62   Weight: 78 3 kg (172 lb 9 6 oz)     Gen: AAOX3, NAD  Pelvic: Rectocele evident mild, Enlarged genital hiatus, Cystocele and uterine vaginal prolapse stage 3 without valsalva evident  Evidence of cystocele below level of ring pessary  No ulcerations or erosions noted  Extremities: No edema, No calf tenderness   Neurologic:  alert, oriented, normal speech, no focal findings, noted Clitoral-Bulbocavernosus reflex present  Vulvovaginal atrophy:  yes mild  Urethral caruncle:  no       Urine dip - leuks+     Assessment:  78 y  o  with incomplete uterovaginal prolapse Stage 3 cystocele anterior compartment managed with ring pessary #7 and urge-dominant mixed incontinence (now resolved), with acute cystitis       Plan:  1  Previous diagnosis of OAB- Patient states that she has not used Trospium 60mg XR  Reports that she no longer has worrisome symptoms of urgency incontinence , neither stress incontinence  2  Pelvic organ Prolapse- Pessary removed and cleaned, vagina without excoriations or atrophy  Premarin Cream 2x weekly and to self manage pessary cleanings as she has been doing    -Return back in 6 months for follow up as she self manages pessary  -Discussed that her cystocele comes in front of prolapse but still within vagina and she is asymptomatic, feel she empties adequately and vagina without erosions  -Discussed r/b of using bigger pessary or different type of pessary  -Patient not interested in surgical correction  3  Cystitis  -Rx for Bactrim BID based on symptoms and urine dip  -Urine cx sent    Patient advised to call for earlier appointment or medication if OAB symptoms worsen or complaints of pessary use with bleeding discharge or pain  All questions answered to her satisfaction        Jimena Olivarez MD  [facility] [address] [place] [address] for Female Pelvic Medicine

## 2022-04-03 LAB — BACTERIA UR CULT: ABNORMAL

## 2022-04-05 ENCOUNTER — TELEPHONE (OUTPATIENT)
Dept: OBGYN CLINIC | Facility: CLINIC | Age: 82
End: 2022-04-05

## 2022-04-05 NOTE — TELEPHONE ENCOUNTER
----- Message from Suzan Bell MD sent at 4/3/2022 11:31 PM EDT -----  Regarding: pos uti  Please let patient know that she had a UTI and please confirm she took the prescribed Bactrim antibiotic BID x 3 days? If she is not improved by end of the week please ask her to let us know as well  Thanks!   AN  ----- Message -----  From: Lab, Background User  Sent: 4/2/2022   4:40 PM EDT  To: Suzan Bell MD

## 2022-04-06 ENCOUNTER — TELEPHONE (OUTPATIENT)
Dept: INTERNAL MEDICINE CLINIC | Facility: CLINIC | Age: 82
End: 2022-04-06

## 2022-04-06 DIAGNOSIS — E55.9 VITAMIN D DEFICIENCY: ICD-10-CM

## 2022-04-06 DIAGNOSIS — E11.65 UNCONTROLLED TYPE 2 DIABETES MELLITUS WITH HYPERGLYCEMIA (HCC): ICD-10-CM

## 2022-04-06 RX ORDER — CHOLECALCIFEROL (VITAMIN D3) 50 MCG
2 TABLET ORAL DAILY
Qty: 180 TABLET | Refills: 0 | Status: SHIPPED | OUTPATIENT
Start: 2022-04-06 | End: 2022-07-20

## 2022-04-06 NOTE — TELEPHONE ENCOUNTER
Pt is scheduled to see [address] on 4/12/22  for a Follow Up appt  Can you please put in a new order?     Dx- E11 40 (ICD-10-CM) - Controlled type 2 diabetes mellitus with diabetic neuropathy, without long-term current use of insulin ([facility] )

## 2022-04-07 DIAGNOSIS — E11.40 CONTROLLED TYPE 2 DIABETES MELLITUS WITH DIABETIC NEUROPATHY, WITHOUT LONG-TERM CURRENT USE OF INSULIN (HCC): Primary | ICD-10-CM

## 2022-04-12 ENCOUNTER — OFFICE VISIT (OUTPATIENT)
Dept: MULTI SPECIALTY CLINIC | Facility: CLINIC | Age: 82
End: 2022-04-12

## 2022-04-12 ENCOUNTER — OFFICE VISIT (OUTPATIENT)
Dept: INTERNAL MEDICINE CLINIC | Facility: CLINIC | Age: 82
End: 2022-04-12

## 2022-04-12 VITALS
HEART RATE: 67 BPM | WEIGHT: 172.2 LBS | BODY MASS INDEX: 30.5 KG/M2 | OXYGEN SATURATION: 98 % | SYSTOLIC BLOOD PRESSURE: 134 MMHG | TEMPERATURE: 97.3 F | DIASTOLIC BLOOD PRESSURE: 73 MMHG

## 2022-04-12 VITALS
HEIGHT: 63 IN | SYSTOLIC BLOOD PRESSURE: 134 MMHG | HEART RATE: 67 BPM | BODY MASS INDEX: 30.48 KG/M2 | DIASTOLIC BLOOD PRESSURE: 73 MMHG | WEIGHT: 172 LBS | TEMPERATURE: 97.3 F

## 2022-04-12 DIAGNOSIS — E11.65 TYPE 2 DIABETES MELLITUS WITH HYPERGLYCEMIA, WITHOUT LONG-TERM CURRENT USE OF INSULIN (HCC): Primary | ICD-10-CM

## 2022-04-12 DIAGNOSIS — I10 ESSENTIAL HYPERTENSION: ICD-10-CM

## 2022-04-12 DIAGNOSIS — E78.2 MIXED HYPERLIPIDEMIA: ICD-10-CM

## 2022-04-12 DIAGNOSIS — E11.40 CONTROLLED TYPE 2 DIABETES MELLITUS WITH DIABETIC NEUROPATHY, WITHOUT LONG-TERM CURRENT USE OF INSULIN (HCC): Primary | ICD-10-CM

## 2022-04-12 PROCEDURE — 99213 OFFICE O/P EST LOW 20 MIN: CPT | Performed by: INTERNAL MEDICINE

## 2022-04-12 PROCEDURE — 99214 OFFICE O/P EST MOD 30 MIN: CPT | Performed by: PHYSICIAN ASSISTANT

## 2022-04-12 RX ORDER — FLASH GLUCOSE SENSOR
KIT MISCELLANEOUS
Qty: 4 EACH | Refills: 3 | Status: SHIPPED | OUTPATIENT
Start: 2022-04-12 | End: 2022-08-09 | Stop reason: SDUPTHER

## 2022-04-12 NOTE — PROGRESS NOTES
Patient Progress Note      Chief Complaint   Patient presents with    Consult      Referring Provider  Dai Leblanc Pa-c  No address on file     History of Present Illness:   Lizzy Clark is a 80 y o  female with a history of type 2 diabetes with long term use of insulin  Diabetes course has been stable  Complications of DM: retinopathy, CVA, CKD  Denies recent illness or hospitalizations  Denies recent severe hypoglycemic or severe hyperglycemic episodes  Denies any issues with her current regimen  Home glucose monitoring: are performed sporadically with glucometer  She is out of her sensor so she needs a new script  The last BG check she can recall was 145 mg/dl in the morning  Current regimen: Metformin 500 mg twice a day, Tradjenta 5 mg daily   Jardiance, Victoza and Toujeo were stopped by her PCP in the past per patient  compliant most of the time, denies any side effects from medications  Hypoglycemic episodes: No, rare  H/o of hypoglycemia causing hospitalization or Intervention such as glucagon injection or ambulance call : No  Hypoglycemia symptoms: cannot recall reading less than 70 mg/dl   Treatment of hypoglycemia: glucose tablets     Medic alert tag: recommended: Yes     Diet: 3 meals per day, 0-1 snack per day  Timing of meals is predictable  Diabetic diet compliance: she tries to follow a diet  Activity: Daily activity is predictable: Yes  No routine exercise but she does stay active at home  Ophthamology: September 2021  Podiatry: foot exam UTD, November 2021     Has hypertension: on ACE inhibitor/ARB, compliant most of the time  Has hyperlipidemia: on statin - tolerating well, no myalgias  compliant most of the time, denies any side effects from medications    Thyroid disorders: No  History of pancreatitis: No    Patient Active Problem List   Diagnosis    Aortic valve regurgitation, nonrheumatic    Benign hypertension with CKD (chronic kidney disease) stage III    Chronic rhinitis    Midline cystocele    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, without long-term current use of insulin ([facility])    Non-rheumatic mitral regurgitation    Uterine procidentia    Anemia    Esophageal abnormality    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    Decreased hearing, bilateral    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[facility])    History of CVA with residual deficit    Mixed hyperlipidemia    Persistent proteinuria    Renal cyst    Ankle edema    Stage 3a chronic kidney disease ([facility])    Acute diastolic congestive heart failure ([facility])    Status post insertion of drug-eluting stent into left anterior descending (LAD) artery    Coronary artery disease involving native coronary artery of native heart without angina pectoris    [facility]STEM[facility]non-ST elevated [facility] infarction) [facility]    Essential hypertension    Asthma    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[facility])    Uncontrolled type 2 diabetes mellitus with hyperglycemia ([facility])    Urge urinary incontinence    Nocturia      Past Medical History:   Diagnosis Date    ACE-inhibitor cough     last assessed - [id number]    Asthma     Bilateral edema of lower extremity     last assessed - [id number]    Cardiac murmur     last assessed - [id number]    CKD (chronic kidney disease)     Diabetes mellitus ([facility] )     last assessed - [id number]    Esophageal dysphagia     Fatigue     last assessed - [id number]    Hyperlipidemia     Hypertension     Patellar bursitis of right knee     last assessed - [id number]    Personal history of other specified conditions     presenting hazards to health    Stroke [facility])     Stroke syndrome     Urinary frequency     UTI (urinary tract infection)       Past Surgical History:   Procedure Laterality Date    RI ESOPHAGOGASTRODUODENOSCOPY TRANSORAL DIAGNOSTIC N/A 4/5/2017    Procedure: ESOPHAGOGASTRODUODENOSCOPY (EGD);   Surgeon: Sussy Wick MD;  Location: [facility]; Service: Gastroenterology      Family History   Problem Relation Age of Onset    Heart disease Father     Hypertension Mother     Stroke Mother     Other Sister         1)Breast disorder; 2)Epilepsy   [facility] Migra[facility] Sister         Migraine headaches    Osteoporosis Sister     Thyroid disease Sister     Coronary artery disease Sister         S/P triple vessel bypass    Osteoporosis Maternal Grandmother     Diabetes Son         Diabetes mellitus    Hypertension Son     Rheum arthritis Son         Rheumatic disease    Heart disease Family     No Known Problems Maternal Grandfather     No Known Problems Paternal Grandmother     No Known Problems Paternal Grandfather     Breast cancer Sister [de-identified]    No Known Problems Sister     No Known Problems Sister     No Known Problems Maternal Aunt     No Known Problems Maternal Aunt     No Known Problems Maternal Aunt     No Known Problems Paternal Aunt     No Known Problems Paternal Aunt     No Known Problems Son      Social History     Tobacco Use    Smoking status: Former Smoker     Packs/day: 3 00     Quit date:      Years since quittin 3    Smokeless tobacco: Former User    Tobacco comment: quit over 30 yrs ago; (quit in the , had smoked 3PPD for 20 years - per [employer])   Substance Use Topics    Alcohol use: Never     No Known Allergies      Current Outpatient Medications:     acetaminophen (TYLENOL) 650 mg CR tablet, Take 650 mg by mouth every 6 (six) hours as needed for mild pain, Disp: , Rfl:     albuterol (PROVENTIL HFA,VENTOLIN HFA) 90 mcg/act inhaler, INHALE 2 PUFFS EVERY 4 (FOUR) HOURS AS NEEDED FOR WHEEZING, Disp: 8 5 g, Rfl: 5    aspirin (ECOTRIN LOW STRENGTH) 81 mg EC tablet, Take 1 tablet (81 mg total) by mouth daily, Disp: 90 tablet, Rfl: 0    atorvastatin (LIPITOR) 40 mg tablet, TAKE 1 TABLET (40 MG TOTAL) BY MOUTH DAILY, Disp: 90 tablet, Rfl: 1    Blood Glucose Monitoring Suppl (FREESTYLE LITE) JAQUELIN, by Does not apply route daily, Disp: 1 each, Rfl: 0    Breo Ellipta 100-25 MCG/INH inhaler, Inhale 1 puff daily, Disp: 60 blister, Rfl: 5    carvedilol (COREG) 6 25 mg tablet, TAKE 1 TABLET (6 25 MG TOTAL) BY MOUTH 2 (TWO) TIMES A DAY WITH MEALS, Disp: 60 tablet, Rfl: 5    clopidogrel (PLAVIX) 75 mg tablet, TAKE 2 TABS DAILY, Disp: 180 tablet, Rfl: 3    conjugated estrogens (PREMARIN) vaginal cream, Insert 0 5 g into the vagina 2 (two) times a week Insert twice weekly at bedtime, Disp: 30 g, Rfl: 1    Continuous Blood Gluc Sensor (FreeStyle Naldo 14 Day Sensor) MISC, Use one sensor every 14 days, Disp: 4 each, Rfl: 3    D3 50 MCG (2000 UT) TABS, TAKE 2 TABLETS (4,000 UNITS TOTAL) BY MOUTH DAILY, Disp: 180 tablet, Rfl: 0    ferrous sulfate 324 (65 Fe) mg, Take 1 tablet (324 mg total) by mouth 2 (two) times a day before meals, Disp: 60 tablet, Rfl: 2    furosemide (LASIX) 40 mg tablet, TAKE 1 TABLET (40 MG TOTAL) BY MOUTH 2 (TWO) TIMES A DAY, Disp: 180 tablet, Rfl: 3    glucose blood (FREESTYLE LITE) test strip, TEST AS DIRECTED UP TO FOUR TIMES A DAY, Disp: 100 each, Rfl: 3    Lancets (FREESTYLE) lancets, Use as instructed, Disp: 100 each, Rfl: 0    latanoprost (XALATAN) 0 005 % ophthalmic solution, , Disp: , Rfl:     losartan (COZAAR) 50 mg tablet, TAKE 1 TAB DAILY, Disp: 90 tablet, Rfl: 1    metFORMIN (GLUCOPHAGE) 500 mg tablet, TAKE 1 TABLET (500 MG TOTAL) BY MOUTH 2 (TWO) TIMES A DAY WITH MEALS, Disp: 180 tablet, Rfl: 0    Misc   Devices (QUAD CANE) MISC, by Does not apply route daily, Disp: 1 each, Rfl: 0    montelukast (SINGULAIR) 10 mg tablet, TAKE 1 TABLET (10 MG TOTAL) BY MOUTH DAILY AT BEDTIME, Disp: 90 tablet, Rfl: 3    nitroglycerin (NITROSTAT) 0 4 mg SL tablet, PLACE 1 TABLET (0 4 MG TOTAL) UNDER THE TONGUE EVERY 5 (FIVE) MINUTES AS NEEDED FOR CHEST PAIN, Disp: 25 tablet, Rfl: 1    Tradjenta 5 MG TABS, TAKE 1 TABLET (5 MG) BY MOUTH DAILY, Disp: 90 tablet, Rfl: 1    Xarelto 2 5 MG tablet, TAKE 1 TABLET (2 5 MG TOTAL) BY MOUTH 2 (TWO) TIMES A DAY WITH MEALS, Disp: 180 tablet, Rfl: 1    estradiol (ESTRACE VAGINAL) 0 1 mg/g vaginal cream, Insert 2 g into the vagina 2 (two) times a week, Disp: 42 5 g, Rfl: 3    Glucose-Vitamin C-Vitamin D (TRUEPLUS GLUCOSE ON THE GO) CHEW, CHEW 2 TABS AS NEEDED FOR BLOOD SUGAR LESS THAN 80 (Patient not taking: Reported on 11/22/2021), Disp: , Rfl:     ranolazine (RANEXA) 500 mg 12 hr tablet, Take 1 tablet (500 mg total) by mouth every 12 (twelve) hours (Patient not taking: Reported on 11/22/2021 ), Disp: 60 tablet, Rfl: 0  Review of Systems   Constitutional: Negative for activity change, appetite change, fatigue and unexpected weight change  HENT: Negative for trouble swallowing  Eyes: Negative for visual disturbance  Respiratory: Negative for shortness of breath  Cardiovascular: Negative for chest pain and palpitations  Gastrointestinal: Negative for constipation and diarrhea  Endocrine: Negative for polydipsia and polyuria  Musculoskeletal: Negative  Skin: Negative for wound  Neurological: Negative for numbness  Psychiatric/Behavioral: Negative  Physical Exam:  Body mass index is 30 5 kg/m²  /73 (BP Location: Right arm, Patient Position: Sitting, Cuff Size: Standard)   Pulse 67   Temp (!) 97 3 °F (36 3 °C) (Temporal)   Wt 78 1 kg (172 lb 3 2 oz)   SpO2 98%   BMI 30 50 kg/m²    Wt Readings from Last 3 Encounters:   04/12/22 78 1 kg (172 lb 3 2 oz)   04/01/22 78 3 kg (172 lb 9 6 oz)   03/25/22 79 8 kg (175 lb 14 4 oz)       Physical Exam  Vitals and nursing note reviewed  Constitutional:       Appearance: She is well-developed  HENT:      Head: Normocephalic  Eyes:      General: No scleral icterus  Pupils: Pupils are equal, round, and reactive to light  Neck:      Thyroid: No thyromegaly  Cardiovascular:      Rate and Rhythm: Normal rate and regular rhythm        Pulses:           Radial pulses are 2+ on the right side and 2+ on the left side  Heart sounds: Murmur heard  Pulmonary:      Effort: Pulmonary effort is normal  No respiratory distress  Breath sounds: Normal breath sounds  No wheezing  Musculoskeletal:      Cervical back: Neck supple  Skin:     General: Skin is warm and dry  Neurological:      Mental Status: She is alert  Patient's shoes and socks were not removed  Labs:   Component      Latest Ref Rng & Units 11/2/2021 11/29/2021 3/18/2022   Sodium      136 - 145 mmol/L 140 141 141   Potassium      3 5 - 5 3 mmol/L 3 7 3 7 3 8   Chloride      100 - 108 mmol/L 107 107 108   CO2      21 - 32 mmol/L 30 25 28   Anion Gap      4 - 13 mmol/L 3 (L) 9 5   BUN      5 - 25 mg/dL 52 (H) 44 (H) 38 (H)   Creatinine      0 60 - 1 30 mg/dL 1 49 (H) 1 45 (H) 1 37 (H)   GLUCOSE FASTING      65 - 99 mg/dL 161 (H) 201 (H) 180 (H)   Calcium      8 3 - 10 1 mg/dL 9 8 9 4 9 5   AST      5 - 45 U/L 16     ALT      12 - 78 U/L 33     Alkaline Phosphatase      46 - 116 U/L 71     Total Protein      6 4 - 8 2 g/dL 8 1     Albumin      3 5 - 5 0 g/dL 3 7     TOTAL BILIRUBIN      0 20 - 1 00 mg/dL 0 44     eGFR      ml/min/1 73sq m 38 39 36   Cholesterol      50 - 200 mg/dL 126     Triglycerides      <=150 mg/dL 70     HDL      >=40 mg/dL 56     LDL Calculated      0 - 100 mg/dL 56     Hemoglobin A1C      Normal 3 8-5 6%; PreDiabetic 5 7-6 4%; Diabetic >=6 5%; Glycemic control for adults with diabetes <7 0% %  8 3 (H) 7 8 (H)   eAG, EST AVG Glucose      mg/dl  192 177       Plan:    Erin Amador was seen today for consult  Diagnoses and all orders for this visit:    Type 2 diabetes mellitus with hyperglycemia, without long-term current use of insulin (HCC)  HGA1C 7 8%  Improved  Treatment regimen: continue current treatment  Download  in 2 weeks  Discussed intensive insulin regimen does increase risk for hypoglycemia  Episodes of hypoglycemia can lead to permanent disability and death    Discussed risks/complications associated with uncontrolled diabetes  Advised to adhere to diabetic diet, and recommended staying active/exercising routinely  Keep carbohydrates consistent to limit blood glucose fluctuations  Advised to call if blood sugars less than 70 mg/dl or over 300 mg/dl  Check blood glucose 1-2 times a day  Discussed symptoms and treatment of hypoglycemia  Discussed use of CGM to collect additional blood glucose data to reveal trends and patterns that can be used to optimize treatment plan  Recommended routine follow-up with podiatry and ophthalmology  Send log in 2 weeks  Ordered blood work to complete prior to next visit  -     Hemoglobin A1C; Future  -     Basic metabolic panel; Future  -     Continuous Blood Gluc Sensor (FreeStyle Naldo 14 Day Sensor) MISC; Use one sensor every 14 days    Essential hypertension  Blood pressure well controlled, continue current treatment   -     Basic metabolic panel; Future    Mixed hyperlipidemia  LDL previously 56  Continue statin therapy         Discussed with the patient diagnosis and treatment and all questions fully answered  She will call me if any problems arise  Counseled patient on diagnostic results, prognosis, risk and benefit of treatment options, instruction for management, importance of treatment compliance, risk factor reduction and impressions      Jacklyn Lock PA-C      [facility] ENDOCRINOLOGY

## 2022-04-12 NOTE — PATIENT INSTRUCTIONS
Hypoglycemia instructions   Clifford Rose  4/12/2022  [id number]    Low Blood Sugar    Steps to treat low blood sugar  1  Test blood sugar if you have symptoms of low blood sugar:   Low Blood Sugar Symptoms:  o Sweaty  o Dizzy  o Rapid heartbeat  o Shaky  o Bad mood  o Hungry      2  Treat blood sugar less than 70 with 15 grams of fast-acting carbohydrate:   Examples of 15 grams Fast-Acting Carbohydrate:  o 4 oz juice  o 4 oz regular soda  o 3-4 glucose tablets (chew)  o 3-4 hard candies (chew)          3  Wait 15 minutes and test your blood sugar again     4   If blood sugar is less than 100, repeat steps 2-3     5  When your blood sugar is 100 or more, eat a snack if it will be longer than one hour until your next meal  The snack should be 15 grams of carbohydrate and a protein:   Examples of snacks:  o ½ sandwich  o 6 crackers with cheese  o Piece of fruit with cheese or peanut butter  o 6 crackers with peanut butter

## 2022-04-12 NOTE — PROGRESS NOTES
[facility]  INTERNAL MEDICINE OFFICE VISIT     PATIENT INFORMATION     Frantz Momin   80 y o  female   MRN: [id number]    ASSESSMENT/PLAN     Diagnoses and all orders for this visit:    Controlled type 2 diabetes mellitus with diabetic neuropathy, without long-term current use of insulin ([address] )  Very well controlled  A1c 7 3 percent Follows with Endocrinology  Linagliptin 5 milligram and metformin 500 milligram   She is out of her blood glucose sensor monitoring strips  She states that previously her glucose readings have been in the 140s  Denies any episodes of hypoglycemia  Continue current regimen  Continue follow-up with endocrinology    Essential hypertension  On losartan 50 milligram, Lasix 40 milligram b i d  she has blood pressure cuff at home but states she would not regularly check her blood pressure  Blood pressure today 134/73  Will not make any adjustments to her medications at this time  Denies chest pain, shortness a breath, lower extremity edema, headaches  Continue current antihypertensive regimen as stated above  Follow-up in 6 months  Blood pressure logs from home at least once a week      Health Maintenance:  Patient is up-to-date on her mammography, colon cancer screening, cervical cancer screening  Immunizations up-to-date        Schedule a follow-up appointment in 6 months      Immunization History   Administered Date(s) Administered    COVID-19 PFIZER VACCINE 0 3 ML IM 03/30/2021, 04/21/2021, 01/03/2022    INFLUENZA 10/10/2017    Influenza Quadrivalent, 6-35 Months IM 10/10/2017    Influenza, high dose seasonal 0 7 mL 10/02/2019, 10/06/2020, 11/02/2021    Pneumococcal Conjugate 13-Valent 02/19/2020    Pneumococcal Polysaccharide PPV23 01/01/2008    Tdap 03/11/2020       CHIEF COMPLAINT     Chief Complaint   Patient presents with    Follow-up     htn      HISTORY OF PRESENT ILLNESS     Frantz Momin is a 80 y o  female with past medical history of CAD, diabetes, hypertension, CKD 3, combined systolic diastolic heart failure, history of CVA who presents for blood pressure check  Patient follows with Cardiology, Nephrology, Endocrinology in [facility] very regularly  Her A1c is 7 8 percent  She denies any episodes of hypoglycemia  She is up-to-date on her immunizations and cancer screenings  She has no acute complaints at this time  She denies any episodes of chest pain, dizziness, shortness of breath, headache, lower extremity edema  The following portions of the patient's history were reviewed and updated as appropriate: allergies, current medications, past family history, past medical history, past social history, past surgical history and problem list        REVIEW OF SYSTEMS     Review of Systems   Constitutional: Negative for chills and fever  HENT: Negative for ear pain and sore throat  Eyes: Negative for pain and visual disturbance  Respiratory: Negative for cough and shortness of breath  Cardiovascular: Negative for chest pain and palpitations  Gastrointestinal: Negative for abdominal pain and vomiting  Genitourinary: Negative for dysuria and hematuria  Musculoskeletal: Negative for arthralgias and back pain  Skin: Negative for color change and rash  Neurological: Negative for seizures and syncope  All other systems reviewed and are negative  OBJECTIVE     Vitals:    04/12/22 1529   BP: 134/73   BP Location: Left arm   Patient Position: Sitting   Cuff Size: Large   Pulse: 67   Temp: (!) 97 3 °F (36 3 °C)   TempSrc: Temporal   Weight: 78 kg (172 lb)   Height: 5' 3" (1 6 m)     Physical Exam  Vitals and nursing note reviewed  Constitutional:       General: She is not in acute distress  Appearance: She is well-developed  Comments: Ambulates with cane   HENT:      Head: Normocephalic and atraumatic     Eyes:      Conjunctiva/sclera: Conjunctivae normal    Cardiovascular:      Rate and Rhythm: Normal rate and regular rhythm  Heart sounds: No murmur heard  Pulmonary:      Effort: Pulmonary effort is normal  No respiratory distress  Breath sounds: Normal breath sounds  Abdominal:      Palpations: Abdomen is soft  Tenderness: There is no abdominal tenderness  Musculoskeletal:      Cervical back: Neck supple  Skin:     General: Skin is warm and dry  Neurological:      Mental Status: She is alert         CURRENT MEDICATIONS     Current Outpatient Medications:     acetaminophen (TYLENOL) 650 mg CR tablet, Take 650 mg by mouth every 6 (six) hours as needed for mild pain, Disp: , Rfl:     albuterol (PROVENTIL HFA,VENTOLIN HFA) 90 mcg/act inhaler, INHALE 2 PUFFS EVERY 4 (FOUR) HOURS AS NEEDED FOR WHEEZING, Disp: 8 5 g, Rfl: 5    aspirin (ECOTRIN LOW STRENGTH) 81 mg EC tablet, Take 1 tablet (81 mg total) by mouth daily, Disp: 90 tablet, Rfl: 0    atorvastatin (LIPITOR) 40 mg tablet, TAKE 1 TABLET (40 MG TOTAL) BY MOUTH DAILY, Disp: 90 tablet, Rfl: 1    Blood Glucose Monitoring Suppl (FREESTYLE LITE) JAQUELIN, by Does not apply route daily, Disp: 1 each, Rfl: 0    Breo Ellipta 100-25 MCG/INH inhaler, Inhale 1 puff daily, Disp: 60 blister, Rfl: 5    carvedilol (COREG) 6 25 mg tablet, TAKE 1 TABLET (6 25 MG TOTAL) BY MOUTH 2 (TWO) TIMES A DAY WITH MEALS, Disp: 60 tablet, Rfl: 5    clopidogrel (PLAVIX) 75 mg tablet, TAKE 2 TABS DAILY, Disp: 180 tablet, Rfl: 3    conjugated estrogens (PREMARIN) vaginal cream, Insert 0 5 g into the vagina 2 (two) times a week Insert twice weekly at bedtime, Disp: 30 g, Rfl: 1    Continuous Blood Gluc Sensor (PageLeverStyle Naldo 14 Day Sensor) MISC, Use one sensor every 14 days, Disp: 4 each, Rfl: 3    D3 50 MCG (2000 UT) TABS, TAKE 2 TABLETS (4,000 UNITS TOTAL) BY MOUTH DAILY, Disp: 180 tablet, Rfl: 0    estradiol (ESTRACE VAGINAL) 0 1 mg/g vaginal cream, Insert 2 g into the vagina 2 (two) times a week, Disp: 42 5 g, Rfl: 3    ferrous sulfate 324 (65 Fe) mg, Take 1 tablet (324 mg total) by mouth 2 (two) times a day before meals, Disp: 60 tablet, Rfl: 2    furosemide (LASIX) 40 mg tablet, TAKE 1 TABLET (40 MG TOTAL) BY MOUTH 2 (TWO) TIMES A DAY, Disp: 180 tablet, Rfl: 3    glucose blood (FREESTYLE LITE) test strip, TEST AS DIRECTED UP TO FOUR TIMES A DAY, Disp: 100 each, Rfl: 3    Lancets (FREESTYLE) lancets, Use as instructed, Disp: 100 each, Rfl: 0    latanoprost (XALATAN) 0 005 % ophthalmic solution, , Disp: , Rfl:     losartan (COZAAR) 50 mg tablet, TAKE 1 TAB DAILY, Disp: 90 tablet, Rfl: 1    metFORMIN (GLUCOPHAGE) 500 mg tablet, TAKE 1 TABLET (500 MG TOTAL) BY MOUTH 2 (TWO) TIMES A DAY WITH MEALS, Disp: 180 tablet, Rfl: 0    Misc   Devices (QUAD CANE) MISC, by Does not apply route daily, Disp: 1 each, Rfl: 0    montelukast (SINGULAIR) 10 mg tablet, TAKE 1 TABLET (10 MG TOTAL) BY MOUTH DAILY AT BEDTIME, Disp: 90 tablet, Rfl: 3    nitroglycerin (NITROSTAT) 0 4 mg SL tablet, PLACE 1 TABLET (0 4 MG TOTAL) UNDER THE TONGUE EVERY 5 (FIVE) MINUTES AS NEEDED FOR CHEST PAIN, Disp: 25 tablet, Rfl: 1    ranolazine (RANEXA) 500 mg 12 hr tablet, Take 1 tablet (500 mg total) by mouth every 12 (twelve) hours, Disp: 60 tablet, Rfl: 0    Tradjenta 5 MG TABS, TAKE 1 TABLET (5 MG) BY MOUTH DAILY, Disp: 90 tablet, Rfl: 1    Xarelto 2 5 MG tablet, TAKE 1 TABLET (2 5 MG TOTAL) BY MOUTH 2 (TWO) TIMES A DAY WITH MEALS, Disp: 180 tablet, Rfl: 1    Glucose-Vitamin C-Vitamin D (TRUEPLUS GLUCOSE ON THE GO) CHEW, CHEW 2 TABS AS NEEDED FOR BLOOD SUGAR LESS THAN 80 (Patient not taking: Reported on 11/22/2021), Disp: , Rfl:     PAST MEDICAL & SURGICAL HISTORY     Past Medical History:   Diagnosis Date    ACE-inhibitor cough     last assessed - [id number]    Asthma     Bilateral edema of lower extremity     last assessed - [id number]    Cardiac murmur     last assessed - [id number]    CKD (chronic kidney disease)     [facility]     last assessed - [id number]    Esophageal dysphagia     Fatigue     last assessed - [id number]    Hyperlipidemia     Hypertension     Patellar bursitis of right knee     last assessed - [id number]    Personal history of other specified conditions     presenting hazards to health    Stroke [facility])     Stroke syndrome     Urinary frequency     UTI (urinary tract infection)      Past Surgical History:   Procedure Laterality Date    GA ESOPHAGOGASTRODUODENOSCOPY TRANSORAL DIAGNOSTIC N/A 2017    Procedure: ESOPHAGOGASTRODUODENOSCOPY (EGD); Surgeon: Ami Soliman MD;  Location: [facility]; Service: Gastroenterology     SOCIAL & FAMILY HISTORY     Social History     Socioeconomic History    Marital status:      Spouse name: Not on file    Number of children: 6    Years of education: Not on file    Highest education level: Not on file   Occupational History    Occupation: Retired   Tobacco Use    Smoking status: Former Smoker     Packs/day: 3 00     Quit date:      Years since quittin 3    Smokeless tobacco: Former User    Tobacco comment: quit over 30 yrs ago; (quit in the , had smoked 3PPD for 20 years - per [employer])   Vaping Use    Vaping Use: Never used   Substance and Sexual Activity    Alcohol use: Never    Drug use: Never    Sexual activity: Not Currently   Other Topics Concern    Not on file   Social History Narrative    Diet needs improvement    Does not exercise    No caffeine use     Social Determinants of Health     Financial Resource Strain: Low Risk     Difficulty of Paying Living Expenses: Not hard at all   Food Insecurity: No Food Insecurity    Worried About [address] in the Last Year: Never true    [address] [facility] [address] in the Last Year: Never true   Transportation Needs: No Transportation Needs    Lack of Transportation (Medical): No    Lack of Transportation (Non-Medical):  No   Physical Activity: Not on file   Stress: Not on file   Social Connections: Not on file   Intimate Partner Violence: Not on file   Housing Stability: Not on file     Social History     Substance and Sexual Activity   Alcohol Use Never     Social History     Substance and Sexual Activity   Drug Use Never     Social History     Tobacco Use   Smoking Status Former Smoker    Packs/day: 3 00    Quit date: 36    Years since quittin 3   Smokeless Tobacco Former User   Tobacco Comment    quit over 30 yrs ago; (quit in the , had smoked 3PPD for 20 years - per [employer])     Family History   Problem Relation Age of Onset    Heart disease Father     Hypertension Mother     Stroke Mother     Other Sister         1)Breast disorder; 2)Epilepsy   Criselda Sang Migraines Sister         Migraine headaches    Osteoporosis Sister     Thyroid disease Sister     Coronary artery disease Sister         S/P triple vessel bypass    Osteoporosis Maternal Grandmother     Diabetes Son         Diabetes mellitus    Hypertension Son     Rheum arthritis Son         Rheumatic disease    Heart disease Family     No Known Problems Maternal Grandfather     No Known Problems Paternal Grandmother     No Known Problems Paternal Grandfather     Breast cancer Sister [de-identified]    No Known Problems Sister     No Known Problems Sister     No Known Problems Maternal Aunt     No Known Problems Maternal Aunt     No Known Problems Maternal Aunt     No Known Problems Paternal Aunt     No Known Problems Paternal Aunt     No Known Problems Son        Global Time for Encounter: 20 minutes  ==  Donna Spring MS, DO PGY-1    [address] , [address] [address] [facility], [facility]  Office: [phone]  Fax: [phone]

## 2022-05-05 ENCOUNTER — TELEPHONE (OUTPATIENT)
Dept: INTERNAL MEDICINE CLINIC | Facility: CLINIC | Age: 82
End: 2022-05-05

## 2022-05-05 NOTE — TELEPHONE ENCOUNTER
Patient is calling in because she spoke to her insurance and she is eligible for a new freestyle glucometer  She sates she would just need a script sent to her pharmacy for   Please send if appropriate

## 2022-05-06 NOTE — TELEPHONE ENCOUNTER
Patient left message on medline stating she already has the 14 day sensor, but will need the freestyle device sent to her pharmacy please send as Doc of the day

## 2022-05-09 DIAGNOSIS — E11.65 UNCONTROLLED TYPE 2 DIABETES MELLITUS WITH HYPERGLYCEMIA (HCC): Primary | ICD-10-CM

## 2022-05-09 RX ORDER — BLOOD-GLUCOSE METER
1 KIT MISCELLANEOUS AS NEEDED
Qty: 1 EACH | Refills: 1 | Status: SHIPPED | OUTPATIENT
Start: 2022-05-09

## 2022-05-09 RX ORDER — FLASH GLUCOSE SCANNING READER
EACH MISCELLANEOUS
Qty: 1 EACH | Refills: 0 | Status: SHIPPED | OUTPATIENT
Start: 2022-05-09

## 2022-05-09 NOTE — TELEPHONE ENCOUNTER
Please let the patient know that a freestyle glucose monitor was ordered and sent to her pharmacy  Thank you

## 2022-05-09 NOTE — TELEPHONE ENCOUNTER
Incorrect order was placed  Please place order for continuous Blood gluc [facility] kelsie 14 Day [place]) JAQUELIN For a quantity of 1 and refills 0  Please place order as I am unsure if provider has to be medicare enrolled

## 2022-05-09 NOTE — TELEPHONE ENCOUNTER
Order placed by Dr Nidhi Decker and confirmed with Darya Martin from patient's pharmacy that order went through  Also called patient and informed  Patient had no questions at this time

## 2022-05-18 ENCOUNTER — OFFICE VISIT (OUTPATIENT)
Dept: DERMATOLOGY | Facility: CLINIC | Age: 82
End: 2022-05-18
Payer: MEDICARE

## 2022-05-18 VITALS — TEMPERATURE: 98.2 F | BODY MASS INDEX: 30.48 KG/M2 | HEIGHT: 63 IN | WEIGHT: 172 LBS

## 2022-05-18 DIAGNOSIS — L29.9 PRURITUS OF SCALP: Primary | ICD-10-CM

## 2022-05-18 PROCEDURE — 99203 OFFICE O/P NEW LOW 30 MIN: CPT | Performed by: DERMATOLOGY

## 2022-05-18 RX ORDER — KETOCONAZOLE 20 MG/ML
SHAMPOO TOPICAL
Qty: 120 ML | Refills: 0 | Status: SHIPPED | OUTPATIENT
Start: 2022-05-18 | End: 2022-08-04

## 2022-05-18 NOTE — PATIENT INSTRUCTIONS
1  PRURITUS OF SCALP     Assessment and Plan:  Based on a thorough discussion of this condition and the management approach to it (including a comprehensive discussion of the known risks, side effects and potential benefits of treatment), the patient (family) agrees to implement the following specific plan:  Ketoconazole Shampoo 2% Apply topically to the scalp once every other week   Please let us know if no improvement     What is pruritus? Pruritus is the medical term for itch  Itch is an unpleasant sensation on the skin that provokes the desire to rub or scratch the area to obtain relief  Itch can cause discomfort and frustration; in severe cases it can lead to disturbed sleep, anxiety and depression  Constant scratching to obtain relief can damage the skin (excoriation, lichenification) and reduce its effectiveness as a major protective barrier  Pruritus is often a symptom of an underlying disease process such as a skin problem, a systemic disease, or abnormal nerve impulses  What are skin signs of pruritus? There are no specific skin signs associated with pruritus, apart from scratch marks (excoriations) and signs of the underlying condition  Persistent scratching over a period of time may lead to:  Lichenification (thickened skin, lichen simplex)   Prurigo papules and nodules  Who gets pruritus? The epidemiology of pruritus depends on its underlying cause or causes  However, in general, the incidence of chronic pruritus increases with age, it is more common in women, and in those of  background  Mechanisms underlying pruritus  Itch, like pain, can originate anywhere along the neural itch pathway, from the central nervous system (brain and spinal cord) to the peripheral nervous system and the skin  Mechanisms underlying pruritus are complex    The itch signal is transmitted mainly through small, itch-selective C-fibers in the skin in addition to histamine-triggered and non-histaminergic neurons  These connect with secondary neurons which cross the opposite side of the spinothalamic tract and ascend to parts of the brain involved in sensation, emotion, reward and memory  These areas overlap with those activated by pain  Patients with chronic pruritus usually have both peripheral and central hypersensitization (heightened reaction) which means they tend to overreact to noxious stimuli which normally inhibit itch (such as heat and scratching) and also misinterpret non-noxious stimuli as an itch (eg, light touch)    The way scratching stops itching has been explained by an interaction with pain pathways within the dorsal horn of the spinal cord  Localized pruritus  Localized pruritus is pruritus that is confined to a certain part of the body  It can occur in association with a primary rash (eg dermatitis) or may occur because of hypersensitive nerves in the skin (neuropathic pruritus)  Neuropathic pruritus is due to compression or degeneration of nerves in the skin, on route to the spine or in the spine itself  Neuropathic itch is sometimes associated with reduced or absent sweating in the affected area of skin  Typical causes of localized itchy rashes  Scalp: seborrhoeic dermatitis, head lice   Back: [place] disease   Hands: pompholyx, irritant and/or allergic contact dermatitis   Genitals: vulvovaginal Candida albicans infection, lichen sclerosus   Legs: venous eczema   Feet: tinea pedis    Neuropathic causes of localized pruritus without primary rash  Face: trigeminal trophic syndrome   Hand: cheiralgia paraesthetica   Arm: brachioradial pruritus   Back: notalgia paraesthetica/topics/brachioradial-pruritus/   Genital: pruritus vulvae, pruritus ani   Dermatomal: herpes zoster (shingles) during recovery phase  Scratching a localised itch may lead to lichen simplex, prurigo or prurigo nodularis  Systemic causes of pruritus  Sytemic diseases may cause generalised pruritus   This is sometimes called metabolic itch  There is nothing wrong with the skin itself, at least until it's been scratched  Metabolic disorders include chronic renal failure (dialysis) and liver disease (with or without cholestasis)  Uraemic pruritus arises in patients undergoing dialysis is due to a combination of xerosis (dry skin), secondary hyperparathyroidism, peripheral neuropathy (nerve changes) and inflammation  Secondary hyperparathyroidism which also occurs in dialysis patients leads to microprecipitation (deposition) of calcium and magnesium salts in the skin, triggering mast cell degeneration, releasing serotonin and histamine  Once chronic pruritus has occurred, there may be secondary changes in the nerves in the skin and central nervous system which heighten the sensation of itch  Hepatogenic pruritus is more common in intrahepatic than extrahepatic cholestasis  Examples of intrahepatic cholestasis is associated with chronic viral hepatitis, primary biliary cirrhosis, pregnancy-related cholestasis  Extra-hepatic cholestasis is associated with pressure on the bile ducts, eg from pancreatic tumours or pseudocysts  Cholestasis is thought to release toxic substances from the liver, which stimulates neural itch fibres in the skin  Characteristically, cholestatic pruritus is most severe at night; it tends to affect the hands, feet and areas where clothes are rubbing on the skin  Haematological disorders include iron deficiency anaemia and polycythaemia vera  Generalized pruritus along with glossitis (tongue inflammation) and angular cheilitis (inflammation of mouth corners) are seen in iron deficiency anaemia  In polycythaemia vera, itch is usually precipitated by contact with water (aquagenic pruritus), eg after a shower  This is thought to be mediated by the effect of platelets, serotonin and prostaglandins  Endocrine disorders include thyroid disease and diabetes mellitus    In Graves' disease (thyrotoxicosis), increased blood flow, skin temperature and decreased itch threshold mediated by the increase in thyroid hormones, lead to the itch  Pruritus associated with myxoedema and hypothyroidism is rare, and if present, is more likely the result of xerosis (dry skin)  In diabetes mellitus, localized itch tends to occur in the perianal/genital region usually due to Candida albicans or dermatophyte infections  It is unclear if metabolic abnormalities such as renal impairment, autonomic failure or diabetic neuropathy contribute to this  Paraneoplastic itch is associated with lymphoma, especially Hodgkin lymphoma, leukemia or a solid organ tumor (eg lung, colon, brain)  In Hodgkin lymphoma, pruritus is thought to be caused by histamine release, which may be related to eosinophilia  Infections causing itch include human immunodeficiency virus infection (HIV) and hepatitis C virus  Patients with HIV commonly complain of itch  This may be associated with skin infections/infestations, dry skin, drug reactions, hyperoesinophilia (increased eosinophil levels) and cutaneous T cell lymphoma  There is a possible correlation between intractable pruritus and increased HIV viral load  In chronic hepatitis C infection, the mechanisms responsible for itch remain unclear  In the absence of cholestasis, pruritus may be related to antiviral therapy; it has been noted to occur in patients treated with combination therapy (interferon lg and ribavirin)  Pruritic skin diseases  Pruritus is often a symptom of many skin diseases   Some of these are included in the following list   Allergic contact dermatitis   Dry skin   Urticaria   Psoriasis   Atopic dermatitis   Folliculitis   Dermatitis herpetiformis   Lichen simplex   Lichen planus   Bullous pemphigoid   Lice   Scabies   Miliaria   Sunburn   Pityriasis rosea   Mycosis fungoides    Exposure-related pruritus  Pruritus may arise as a result of exposure to certain external factors  Allergens or irritants   Cold, which can cause 'winter itch'   A physical urticaria, such as dermographism   Aquagenic pruritus (itch on exposure to water)   Insects and infestations, eg scabies   Medications (topical or systemic) eg opioids, aspirin    Hormonal reasons for pruritus  About 2% of pregnant women have pruritus without any obvious dermatological cause  In some cases the itch is due to cholestasis (pooling of bile in the gall bladder and liver)  It usually occurs in the 3rd trimester and is relieved after giving birth  Generalized itch is also a common symptom of menopause  How is pruritus diagnosed? The first steps of evaluation of an itchy patient are medical history and examination  A thorough history can identify constitutional symptoms that may point towards an underlying systemic disease  Drug triggers such as opioids may be identified, especially if the commencement of the drug relates to the itch  A careful examination can identify dermatological causes for the itch (eg scabies, lichen simplex, pemphigoid) or evidence of chronic skin changes related to the itch  In dermatological causes of pruritus, primary skin lesions will usually suggest the diagnosis  Patients without primary skin lesions and little evidence of chronic scratching should be investigated for systemic, neuropathic and psychogenic causes  The panel of investigations could include:  Full/complete blood count   Creatinine and renal function tests   Liver function tests   Thyroid function tests   Erythrocyte sedimentation rate   Chest radiography   HIV serology    What treatment is available for itch? The management of pruritus relies on establishing the cause and then either removing or treating the cause to prevent further itching  In many cases, tests are necessary to determine the cause; while these are in progress, treatment to provide symptomatic relief of pruritus may be given      Topical treatments  In addition to specific therapy for any underlying skin or internal disease, topical treatment may include:  Wet dressings or tepid shower to cool the skin   Calamine lotion (contains phenol, which cools the skin): avoid on dry skin and limit use to a few days   Menthol/camphor lotion: gives a chilling sensation   Local anesthetics, such as pramoxine (also called pramocaine), applied to small itchy spots such as insect bites   Regular use of emollients, especially if skin is dry   Mild topical corticosteroids for short periods   Topical calcineurin inhibitors are also used to reduce itch associated with inflammatory skin conditions   Topical doxepin, a tricyclic antidepressant and antihistamine, is an antipruritic used in eczema  Other measures that can be useful in preventing pruritus include avoiding precipitating factors such as rough clothing or fabrics, overheating, and vasodilators if they provoke itching (eg, caffeine, alcohol, spices)  Fingernails should be kept short and clean  If the urge to scratch is irresistible then rub the area with your palm  Topical antihistamines should not be used for chronic itch, as they may sensitize the skin and result in allergic contact dermatitis  Systemic therapy  If pruritus is severe and sleep is disturbed, then treatment with oral medication may be necessary  Some drugs may help to relieve the itch whilst others are given solely for their sedative effects  Antihistamines are most useful in urticaria, in which histamine is released  Use for other pruritic conditions is not supported by randomized control trials  Sedating antihistamines may be used for their sedative effects  Doxepin and amitriptyline are tricyclic antidepressants have antipruritic action and act on the central and peripheral nervous systems     Tetracyclic antidepressants such as mirtazepine and selective serotonin reuptake inhibitors (paroxetine, sertraline, fluoxetine) may also help some patients with severe itch including when it is caused by cholestasis, T-cell lymphoma, malignancy or a neuropathic condition  Anti-epileptic drugs such as sodium valproate, gabapentin and pregabalin may also be of benefit to some patients, e g , those with itch associated with renal failure or neuropathic itch  The mechanism of action is uncertain  Opioid antagonists such as butorphanol intranasal spray, naltrexone tablets, and naloxone have been effective in patients suffering from intractable pruritus in association with liver disease, atopic eczema and chronic urticaria  Nalfurafine, which is a kappa-opioid agonist has also been studied and shown to reduce itch associated with chronic renal impairment, however it is not widely available  Aspirin is sometimes effective if pruritus is mediated by kinins or prostaglandins and is noted to be effective in patients with pruritus due to polycythaemia vera  Note: aspirin may cause or aggravate itch in some patients  Thalidomide has been successful in treating nodular prurigo and chronic pruritus of various kinds but is rarely used because of serious adverse effects and expense  Rifampicin is effective for patients with pruritus associated with cholestasis (some forms of liver disease)  Isolated case reports in severe itch associated with malignancy have reported success with the NKR1 antagonist, aprepitant (normally used short-term for postoperative or chemotherapy-induced nausea)  This is under investigation for neuropathic itch and nodular prurigo  Phototherapy  Broadband ultraviolet B or narrow-band UVB phototherapy alone, or in conjunction with UVA, has been shown to be helpful for pruritus associated with chronic kidney disease, psoriasis, atopic eczema and cutaneous T-cell lymphoma      Behavioral therapy  Behavioral therapy may be used in conjunction with pharmacotherapy to modify behaviours such as coping mechanisms and stress reduction, which help interrupt the itch-scratch cycle  One randomized controlled trial showed short-term benefits in reduction in itch frequency and scratching as well as improvement in coping mechanisms  What is the outcome for pruritus? The management of chronic severe itch is difficult and often requires the use of combination therapy over a long period of time  Identification and treatment of underlying conditions causing pruritus may help in this process  The symptom may quickly disappear or persist for long periods of time

## 2022-05-18 NOTE — PROGRESS NOTES
[facility] Note     Patient Name: Aliyah Silva  Encounter Date: 05/18/2022     Have you been cared for by a [facility] Dermatologist in the last 3 years and, if so, which one? No    · Have you traveled outside of the [address] [place] [address] in the past 3 months or outside of the [facility] area in the last 2 weeks? No     May we call your Preferred Phone number to discuss your specific medical information? Yes     May we leave a detailed message that includes your specific medical information? Yes      Today's Chief Concerns:   Concern #1:  Itchy Scalp       Past Medical History:  Have you personally ever had or currently have any of the following? · Skin cancer (such as Melanoma, Basal Cell Carcinoma, Squamous Cell Carcinoma? (If Yes, please provide more detail)- No  · Eczema: No  · Psoriasis: No  · HIV/AIDS: No  · Hepatitis B or C: No  · Tuberculosis: No  · Systemic Immunosuppression such as Diabetes, Biologic or Immunotherapy, Chemotherapy, Organ Transplantation, Bone Marrow Transplantation (If YES, please provide more detail): No  · Radiation Treatment (If YES, please provide more detail): No  · Any other major medical conditions/concerns? (If Yes, which types)- No    Social History:     What is/was your primary occupation? Retired      What are your hobbies/past-times? Weather dependant     Family History:  Have any of your "first degree relatives" (parent, brother, sister, or child) had any of the following       · Skin cancer such as Melanoma or Merkel Cell Carcinoma or Pancreatic Cancer? No  · Eczema, Asthma, Hay Fever or Seasonal Allergies: YES, Asthma   · Psoriasis or Psoriatic Arthritis: No  · Do any other medical conditions seem to run in your family? If Yes, what condition and which relatives?   No    Current Medications:       Current Outpatient Medications:     acetaminophen (TYLENOL) 650 mg CR tablet, Take 650 mg by mouth every 6 (six) hours as needed for mild pain, Disp: , Rfl:     albuterol (PROVENTIL HFA,VENTOLIN HFA) 90 mcg/act inhaler, INHALE 2 PUFFS EVERY 4 (FOUR) HOURS AS NEEDED FOR WHEEZING, Disp: 8 5 g, Rfl: 5    aspirin (ECOTRIN LOW STRENGTH) 81 mg EC tablet, Take 1 tablet (81 mg total) by mouth daily, Disp: 90 tablet, Rfl: 0    atorvastatin (LIPITOR) 40 mg tablet, TAKE 1 TABLET (40 MG TOTAL) BY MOUTH DAILY, Disp: 90 tablet, Rfl: 1    Blood Glucose Monitoring Suppl (FREESTYLE LITE) JAQUELIN, by Does not apply route daily, Disp: 1 each, Rfl: 0    Breo Ellipta 100-25 MCG/INH inhaler, Inhale 1 puff daily, Disp: 60 blister, Rfl: 5    carvedilol (COREG) 6 25 mg tablet, TAKE 1 TABLET (6 25 MG TOTAL) BY MOUTH 2 (TWO) TIMES A DAY WITH MEALS, Disp: 60 tablet, Rfl: 5    clopidogrel (PLAVIX) 75 mg tablet, TAKE 2 TABS DAILY, Disp: 180 tablet, Rfl: 3    conjugated estrogens (PREMARIN) vaginal cream, Insert 0 5 g into the vagina 2 (two) times a week Insert twice weekly at bedtime, Disp: 30 g, Rfl: 1    Continuous Blood Gluc  (FreeStyle Naldo 14 Day [employer]) JAQUELIN, Use to record blood glucose 4 times daily   Once fasting and three times daily before meals, Disp: 1 each, Rfl: 0    Continuous Blood Gluc Sensor (FreeStyle Naldo 14 Day Sensor) MISC, Use one sensor every 14 days, Disp: 4 each, Rfl: 3    D3 50 MCG (2000 UT) TABS, TAKE 2 TABLETS (4,000 UNITS TOTAL) BY MOUTH DAILY, Disp: 180 tablet, Rfl: 0    estradiol (ESTRACE VAGINAL) 0 1 mg/g vaginal cream, Insert 2 g into the vagina 2 (two) times a week, Disp: 42 5 g, Rfl: 3    ferrous sulfate 324 (65 Fe) mg, Take 1 tablet (324 mg total) by mouth 2 (two) times a day before meals, Disp: 60 tablet, Rfl: 2    furosemide (LASIX) 40 mg tablet, TAKE 1 TABLET (40 MG TOTAL) BY MOUTH 2 (TWO) TIMES A DAY, Disp: 180 tablet, Rfl: 3    glucose blood (FREESTYLE LITE) test strip, TEST AS DIRECTED UP TO FOUR TIMES A DAY, Disp: 100 each, Rfl: 3    glucose monitoring kit (FREESTYLE) monitoring kit, Use 1 each as needed (Three time daily) Please check blood sugar 3 times daily  , Disp: 1 each, Rfl: 1    Glucose-Vitamin C-Vitamin D (TRUEPLUS GLUCOSE ON THE GO) CHEW, CHEW 2 TABS AS NEEDED FOR BLOOD SUGAR LESS THAN 80, Disp: , Rfl:     Lancets (FREESTYLE) lancets, Use as instructed, Disp: 100 each, Rfl: 0    latanoprost (XALATAN) 0 005 % ophthalmic solution, , Disp: , Rfl:     losartan (COZAAR) 50 mg tablet, TAKE 1 TAB DAILY, Disp: 90 tablet, Rfl: 1    metFORMIN (GLUCOPHAGE) 500 mg tablet, TAKE 1 TABLET (500 MG TOTAL) BY MOUTH 2 (TWO) TIMES A DAY WITH MEALS, Disp: 180 tablet, Rfl: 0    Misc  Devices (QUAD CANE) MISC, by Does not apply route daily, Disp: 1 each, Rfl: 0    montelukast (SINGULAIR) 10 mg tablet, TAKE 1 TABLET (10 MG TOTAL) BY MOUTH DAILY AT BEDTIME, Disp: 90 tablet, Rfl: 3    nitroglycerin (NITROSTAT) 0 4 mg SL tablet, PLACE 1 TABLET (0 4 MG TOTAL) UNDER THE TONGUE EVERY 5 (FIVE) MINUTES AS NEEDED FOR CHEST PAIN, Disp: 25 tablet, Rfl: 1    ranolazine (RANEXA) 500 mg 12 hr tablet, Take 1 tablet (500 mg total) by mouth every 12 (twelve) hours, Disp: 60 tablet, Rfl: 0    Tradjenta 5 MG TABS, TAKE 1 TABLET (5 MG) BY MOUTH DAILY, Disp: 90 tablet, Rfl: 1    Xarelto 2 5 MG tablet, TAKE 1 TABLET (2 5 MG TOTAL) BY MOUTH 2 (TWO) TIMES A DAY WITH MEALS, Disp: 180 tablet, Rfl: 1      Review of Systems:  Have you recently had or currently have any of the following? If YES, what are you doing for the problem? · Fever, chills or unintended weight loss: No  · Sudden loss or change in your vision: YES,   · Nausea, vomiting or blood in your stool: No  · Painful or swollen joints: YES,   · Wheezing or cough: No  · Changing mole or non-healing wound: No  · Nosebleeds: No  · Excessive sweating: No  · Easy or prolonged bleeding? No  · Over the last 2 weeks, how often have you been bothered by the following problems?   · Taking little interest or pleasure in doing things: 1 - Not at All  · Feeling down, depressed, or hopeless: 1 - Not at All  · Rapid heartbeat with epinephrine:  No    · FEMALES ONLY:    · Are you pregnant or planning to become pregnant? No  · Are you currently or planning to be nursing or breast feeding? No    · Any known allergies? No Known Allergies      Physical Exam:     Was a chaperone (Derm Clinical Assistant) present throughout the entire Physical Exam? Yes     Did the Dermatology Team specifically  the patient on the importance of a Full Skin Exam to be sure that nothing is missed clinically? Yes}  o Did the patient ultimately request or accept a Full Skin Exam?  NO  o Did the patient specifically refuse to have the areas "under-the-bra" examined by the Dermatologist? No  o Did the patient specifically refuse to have the areas "under-the-underwear" examined by the Dermatologist? No    CONSTITUTIONAL:   Vitals:    05/18/22 1452   Temp: 98 2 °F (36 8 °C)   TempSrc: Temporal   Weight: 78 kg (172 lb)   Height: 5' 3" (1 6 m)         PSYCH: Normal mood and affect  EYES: Normal conjunctiva  ENT: Normal lips and oral mucosa  CARDIOVASCULAR: No edema  RESPIRATORY: Normal respirations  HEME/LYMPH/IMMUNO:  No regional lymphadenopathy except as noted below in "ASSESSMENT AND PLAN BY DIAGNOSIS"       Assessment and Plan by Diagnosis:    History of Present Condition:     Duration:  How long has this been an issue for you?    o  Few months    Location Affected:  Where on the body is this affecting you?    o  Scalp    Quality:  Is there any bleeding, pain, itch, burning/irritation, or redness associated with the skin lesion?    o  Itching    Severity:  Describe any bleeding, pain, itch, burning/irritation, or redness on a scale of 1 to 10 (with 10 being the worst)    o  Denies   Timing:  Does this condition seem to be there pretty constantly or do you notice it more at specific times throughout the day?    o  Denies   Context:  Have you ever noticed that this condition seems to be associated with specific activities you do?    o  Denies   Modifying Factors:    o Anything that seems to make the condition worse?    -  Denies  o What have you tried to do to make the condition better?    -  Head and Shoulders shampoo    Associated Signs and Symptoms:  Does this skin lesion seem to be associated with any of the following:  o  SL AMB DERM SIGNS AND SYMPTOMS: Itching and Scratching       1  PRURITUS OF SCALP     Physical Exam:   Anatomic Location Affected:  Scalp    Morphological Description:  See photographs    Pertinent Positives:   Pertinent Negatives:  No erythema, scale, or cicatrix today  Additional History of Present Condition:  Located on scalp  Presents for a few months  Patient has tried head and shoulders with no improvement  Patient reports scalp is itchy  Assessment and Plan:  Based on a thorough discussion of this condition and the management approach to it (including a comprehensive discussion of the known risks, side effects and potential benefits of treatment), the patient (family) agrees to implement the following specific plan:   Ketoconazole Shampoo 2% Apply topically to the scalp once every other week    Please let us know if no improvement     What is pruritus? Pruritus is the medical term for itch  Itch is an unpleasant sensation on the skin that provokes the desire to rub or scratch the area to obtain relief  Itch can cause discomfort and frustration; in severe cases it can lead to disturbed sleep, anxiety and depression  Constant scratching to obtain relief can damage the skin (excoriation, lichenification) and reduce its effectiveness as a major protective barrier  Pruritus is often a symptom of an underlying disease process such as a skin problem, a systemic disease, or abnormal nerve impulses  What are skin signs of pruritus? There are no specific skin signs associated with pruritus, apart from scratch marks (excoriations) and signs of the underlying condition  Persistent scratching over a period of time may lead to:   Lichenification (thickened skin, lichen simplex)    Prurigo papules and nodules  Who gets pruritus? The epidemiology of pruritus depends on its underlying cause or causes  However, in general, the incidence of chronic pruritus increases with age, it is more common in women, and in those of  background  Mechanisms underlying pruritus  Itch, like pain, can originate anywhere along the neural itch pathway, from the central nervous system (brain and spinal cord) to the peripheral nervous system and the skin  Mechanisms underlying pruritus are complex   The itch signal is transmitted mainly through small, itch-selective C-fibers in the skin in addition to histamine-triggered and non-histaminergic neurons   These connect with secondary neurons which cross the opposite side of the spinothalamic tract and ascend to parts of the brain involved in sensation, emotion, reward and memory  These areas overlap with those activated by pain   Patients with chronic pruritus usually have both peripheral and central hypersensitization (heightened reaction) which means they tend to overreact to noxious stimuli which normally inhibit itch (such as heat and scratching) and also misinterpret non-noxious stimuli as an itch (eg, light touch)    The way scratching stops itching has been explained by an interaction with pain pathways within the dorsal horn of the spinal cord  Localized pruritus  Localized pruritus is pruritus that is confined to a certain part of the body  It can occur in association with a primary rash (eg dermatitis) or may occur because of hypersensitive nerves in the skin (neuropathic pruritus)  Neuropathic pruritus is due to compression or degeneration of nerves in the skin, on route to the spine or in the spine itself  Neuropathic itch is sometimes associated with reduced or absent sweating in the affected area of skin    Typical causes of localized itchy rashes   Scalp: seborrhoeic dermatitis, head lice    Back: Frandy disease    Hands: pompholyx, irritant and/or allergic contact dermatitis    Genitals: vulvovaginal Candida albicans infection, lichen sclerosus    Legs: venous eczema    Feet: tinea pedis    Neuropathic causes of localized pruritus without primary rash   Face: trigeminal trophic syndrome    Hand: cheiralgia paraesthetica    Arm: brachioradial pruritus    Back: notalgia paraesthetica/topics/brachioradial-pruritus/    Genital: pruritus vulvae, pruritus ani    Dermatomal: herpes zoster (shingles) during recovery phase  Scratching a localised itch may lead to lichen simplex, prurigo or prurigo nodularis  Systemic causes of pruritus  Sytemic diseases may cause generalised pruritus  This is sometimes called metabolic itch  There is nothing wrong with the skin itself, at least until it's been scratched  Metabolic disorders include chronic renal failure (dialysis) and liver disease (with or without cholestasis)  Uraemic pruritus arises in patients undergoing dialysis is due to a combination of xerosis (dry skin), secondary hyperparathyroidism, peripheral neuropathy (nerve changes) and inflammation   Secondary hyperparathyroidism which also occurs in dialysis patients leads to microprecipitation (deposition) of calcium and magnesium salts in the skin, triggering mast cell degeneration, releasing serotonin and histamine   Once chronic pruritus has occurred, there may be secondary changes in the nerves in the skin and central nervous system which heighten the sensation of itch  Hepatogenic pruritus is more common in intrahepatic than extrahepatic cholestasis  Examples of intrahepatic cholestasis is associated with chronic viral hepatitis, primary biliary cirrhosis, pregnancy-related cholestasis  Extra-hepatic cholestasis is associated with pressure on the bile ducts, eg from pancreatic tumours or pseudocysts   Cholestasis is thought to release toxic substances from the liver, which stimulates neural itch fibres in the skin   Characteristically, cholestatic pruritus is most severe at night; it tends to affect the hands, feet and areas where clothes are rubbing on the skin  Haematological disorders include iron deficiency anaemia and polycythaemia vera   Generalized pruritus along with glossitis (tongue inflammation) and angular cheilitis (inflammation of mouth corners) are seen in iron deficiency anaemia   In polycythaemia vera, itch is usually precipitated by contact with water (aquagenic pruritus), eg after a shower  This is thought to be mediated by the effect of platelets, serotonin and prostaglandins  Endocrine disorders include thyroid disease and diabetes mellitus   In Graves' disease (thyrotoxicosis), increased blood flow, skin temperature and decreased itch threshold mediated by the increase in thyroid hormones, lead to the itch  Pruritus associated with myxoedema and hypothyroidism is rare, and if present, is more likely the result of xerosis (dry skin)   In diabetes mellitus, localized itch tends to occur in the perianal/genital region usually due to Candida albicans or dermatophyte infections  It is unclear if metabolic abnormalities such as renal impairment, autonomic failure or diabetic neuropathy contribute to this  Paraneoplastic itch is associated with lymphoma, especially Hodgkin lymphoma, leukemia or a solid organ tumor (eg lung, colon, brain)   In Hodgkin lymphoma, pruritus is thought to be caused by histamine release, which may be related to eosinophilia  Infections causing itch include human immunodeficiency virus infection (HIV) and hepatitis C virus   Patients with HIV commonly complain of itch  This may be associated with skin infections/infestations, dry skin, drug reactions, hyperoesinophilia (increased eosinophil levels) and cutaneous T cell lymphoma   There is a possible correlation between intractable pruritus and increased HIV viral load   In chronic hepatitis C infection, the mechanisms responsible for itch remain unclear  In the absence of cholestasis, pruritus may be related to antiviral therapy; it has been noted to occur in patients treated with combination therapy (interferon lg and ribavirin)  Pruritic skin diseases  Pruritus is often a symptom of many skin diseases  Some of these are included in the following list    Allergic contact dermatitis    Dry skin    Urticaria    Psoriasis    Atopic dermatitis    Folliculitis    Dermatitis herpetiformis    Lichen simplex    Lichen planus    Bullous pemphigoid    Lice    Scabies    Miliaria    Sunburn    Pityriasis rosea    Mycosis fungoides    Exposure-related pruritus  Pruritus may arise as a result of exposure to certain external factors   Allergens or irritants    Cold, which can cause 'winter itch'    A physical urticaria, such as dermographism    Aquagenic pruritus (itch on exposure to water)    Insects and infestations, eg scabies    Medications (topical or systemic) eg opioids, aspirin    Hormonal reasons for pruritus  About 2% of pregnant women have pruritus without any obvious dermatological cause  In some cases the itch is due to cholestasis (pooling of bile in the gall bladder and liver)  It usually occurs in the 3rd trimester and is relieved after giving birth  Generalized itch is also a common symptom of menopause  How is pruritus diagnosed? The first steps of evaluation of an itchy patient are medical history and examination  A thorough history can identify constitutional symptoms that may point towards an underlying systemic disease  Drug triggers such as opioids may be identified, especially if the commencement of the drug relates to the itch      A careful examination can identify dermatological causes for the itch (eg scabies, lichen simplex, pemphigoid) or evidence of chronic skin changes related to the itch  In dermatological causes of pruritus, primary skin lesions will usually suggest the diagnosis  Patients without primary skin lesions and little evidence of chronic scratching should be investigated for systemic, neuropathic and psychogenic causes  The panel of investigations could include:   Full/complete blood count    Creatinine and renal function tests    Liver function tests    Thyroid function tests    Erythrocyte sedimentation rate    Chest radiography    HIV serology    What treatment is available for itch? The management of pruritus relies on establishing the cause and then either removing or treating the cause to prevent further itching  In many cases, tests are necessary to determine the cause; while these are in progress, treatment to provide symptomatic relief of pruritus may be given  Topical treatments  In addition to specific therapy for any underlying skin or internal disease, topical treatment may include:   Wet dressings or tepid shower to cool the skin    Calamine lotion (contains phenol, which cools the skin): avoid on dry skin and limit use to a few days    Menthol/camphor lotion: gives a chilling sensation    Local anesthetics, such as pramoxine (also called pramocaine), applied to small itchy spots such as insect bites    Regular use of emollients, especially if skin is dry    Mild topical corticosteroids for short periods    Topical calcineurin inhibitors are also used to reduce itch associated with inflammatory skin conditions    Topical doxepin, a tricyclic antidepressant and antihistamine, is an antipruritic used in eczema  Other measures that can be useful in preventing pruritus include avoiding precipitating factors such as rough clothing or fabrics, overheating, and vasodilators if they provoke itching (eg, caffeine, alcohol, spices)  Fingernails should be kept short and clean   If the urge to scratch is irresistible then rub the area with your palm  Topical antihistamines should not be used for chronic itch, as they may sensitize the skin and result in allergic contact dermatitis  Systemic therapy  If pruritus is severe and sleep is disturbed, then treatment with oral medication may be necessary  Some drugs may help to relieve the itch whilst others are given solely for their sedative effects   Antihistamines are most useful in urticaria, in which histamine is released  Use for other pruritic conditions is not supported by randomized control trials  Sedating antihistamines may be used for their sedative effects   Doxepin and amitriptyline are tricyclic antidepressants have antipruritic action and act on the central and peripheral nervous systems   Tetracyclic antidepressants such as mirtazepine and selective serotonin reuptake inhibitors (paroxetine, sertraline, fluoxetine) may also help some patients with severe itch including when it is caused by cholestasis, T-cell lymphoma, malignancy or a neuropathic condition   Anti-epileptic drugs such as sodium valproate, gabapentin and pregabalin may also be of benefit to some patients, e g , those with itch associated with renal failure or neuropathic itch  The mechanism of action is uncertain   Opioid antagonists such as butorphanol intranasal spray, naltrexone tablets, and naloxone have been effective in patients suffering from intractable pruritus in association with liver disease, atopic eczema and chronic urticaria  Nalfurafine, which is a kappa-opioid agonist has also been studied and shown to reduce itch associated with chronic renal impairment, however it is not widely available   Aspirin is sometimes effective if pruritus is mediated by kinins or prostaglandins and is noted to be effective in patients with pruritus due to polycythaemia vera  Note: aspirin may cause or aggravate itch in some patients      Thalidomide has been successful in treating nodular prurigo and chronic pruritus of various kinds but is rarely used because of serious adverse effects and expense   Rifampicin is effective for patients with pruritus associated with cholestasis (some forms of liver disease)  Isolated case reports in severe itch associated with malignancy have reported success with the NKR1 antagonist, aprepitant (normally used short-term for postoperative or chemotherapy-induced nausea)  This is under investigation for neuropathic itch and nodular prurigo  Phototherapy  Broadband ultraviolet B or narrow-band UVB phototherapy alone, or in conjunction with UVA, has been shown to be helpful for pruritus associated with chronic kidney disease, psoriasis, atopic eczema and cutaneous T-cell lymphoma  Behavioral therapy  Behavioral therapy may be used in conjunction with pharmacotherapy to modify behaviours such as coping mechanisms and stress reduction, which help interrupt the itch-scratch cycle  One randomized controlled trial showed short-term benefits in reduction in itch frequency and scratching as well as improvement in coping mechanisms  What is the outcome for pruritus? The management of chronic severe itch is difficult and often requires the use of combination therapy over a long period of time  Identification and treatment of underlying conditions causing pruritus may help in this process  The symptom may quickly disappear or persist for long periods of time                              Scribe Attestation    I,:  Randall Arellano am acting as a scribe while in the presence of the attending physician :       I,:  Mellissa Simpson MD personally performed the services described in this documentation    as scribed in my presence :

## 2022-05-20 DIAGNOSIS — I21.4 NSTEMI (NON-ST ELEVATED MYOCARDIAL INFARCTION) (HCC): ICD-10-CM

## 2022-05-20 DIAGNOSIS — E11.40 CONTROLLED TYPE 2 DIABETES MELLITUS WITH DIABETIC NEUROPATHY, WITHOUT LONG-TERM CURRENT USE OF INSULIN (HCC): ICD-10-CM

## 2022-05-20 DIAGNOSIS — I10 ESSENTIAL HYPERTENSION: ICD-10-CM

## 2022-05-20 RX ORDER — LINAGLIPTIN 5 MG/1
TABLET, FILM COATED ORAL
Qty: 90 TABLET | Refills: 1 | Status: SHIPPED | OUTPATIENT
Start: 2022-05-20

## 2022-05-20 RX ORDER — LOSARTAN POTASSIUM 50 MG/1
TABLET ORAL
Qty: 90 TABLET | Refills: 1 | Status: SHIPPED | OUTPATIENT
Start: 2022-05-20

## 2022-05-20 RX ORDER — RIVAROXABAN 2.5 MG/1
TABLET, FILM COATED ORAL
Qty: 180 TABLET | Refills: 1 | Status: SHIPPED | OUTPATIENT
Start: 2022-05-20

## 2022-06-08 ENCOUNTER — TELEPHONE (OUTPATIENT)
Dept: NEPHROLOGY | Facility: CLINIC | Age: 82
End: 2022-06-08

## 2022-06-08 NOTE — TELEPHONE ENCOUNTER
Tried to call patient to schedule a follow up with Dr Kateryna Denis but could not get through to patient  This is the first attempt

## 2022-06-21 NOTE — TELEPHONE ENCOUNTER
Spoke with Patient and schedule appointment for 7/1 with Dr Andrae Alfonso in the [place] location  Patient requests that her lab work date be made earlier so that she can fulfill it before her appt date  She also requests a call back when that is done

## 2022-06-23 ENCOUNTER — TELEPHONE (OUTPATIENT)
Dept: NEPHROLOGY | Facility: CLINIC | Age: 82
End: 2022-06-23

## 2022-06-23 NOTE — TELEPHONE ENCOUNTER
Left message requesting a call back to switch appt time on 7/1 from 2:30 to 2:10  Condensing schedule

## 2022-06-23 NOTE — TELEPHONE ENCOUNTER
Reschedule Appointment   Person speaking to  Patient   Date of original appointment 7/1 2:30    New appointment date 7/1 2:10   Patient on dialysis no   Location [facility]    Provider Dr Kerri Vargas    Additional Information

## 2022-06-29 ENCOUNTER — APPOINTMENT (OUTPATIENT)
Dept: LAB | Facility: HOSPITAL | Age: 82
End: 2022-06-29
Attending: INTERNAL MEDICINE
Payer: MEDICARE

## 2022-06-29 DIAGNOSIS — N18.31 STAGE 3A CHRONIC KIDNEY DISEASE (HCC): ICD-10-CM

## 2022-06-29 DIAGNOSIS — I12.9 BENIGN HYPERTENSION WITH CKD (CHRONIC KIDNEY DISEASE) STAGE III (HCC): ICD-10-CM

## 2022-06-29 DIAGNOSIS — N18.30 BENIGN HYPERTENSION WITH CKD (CHRONIC KIDNEY DISEASE) STAGE III (HCC): ICD-10-CM

## 2022-06-29 DIAGNOSIS — R80.1 PERSISTENT PROTEINURIA: ICD-10-CM

## 2022-06-29 LAB
25(OH)D3 SERPL-MCNC: 54.1 NG/ML (ref 30–100)
ANION GAP SERPL CALCULATED.3IONS-SCNC: 9 MMOL/L (ref 4–13)
BUN SERPL-MCNC: 53 MG/DL (ref 5–25)
CALCIUM SERPL-MCNC: 9.2 MG/DL (ref 8.3–10.1)
CHLORIDE SERPL-SCNC: 107 MMOL/L (ref 100–108)
CO2 SERPL-SCNC: 24 MMOL/L (ref 21–32)
CREAT SERPL-MCNC: 1.45 MG/DL (ref 0.6–1.3)
CREAT UR-MCNC: 54 MG/DL
ERYTHROCYTE [DISTWIDTH] IN BLOOD BY AUTOMATED COUNT: 14.6 % (ref 11.6–15.1)
GFR SERPL CREATININE-BSD FRML MDRD: 33 ML/MIN/1.73SQ M
GLUCOSE SERPL-MCNC: 255 MG/DL (ref 65–140)
HCT VFR BLD AUTO: 34.7 % (ref 34.8–46.1)
HGB BLD-MCNC: 10.7 G/DL (ref 11.5–15.4)
MAGNESIUM SERPL-MCNC: 2.5 MG/DL (ref 1.6–2.6)
MCH RBC QN AUTO: 25.9 PG (ref 26.8–34.3)
MCHC RBC AUTO-ENTMCNC: 30.8 G/DL (ref 31.4–37.4)
MCV RBC AUTO: 84 FL (ref 82–98)
MICROALBUMIN UR-MCNC: 134 MG/L (ref 0–20)
MICROALBUMIN/CREAT 24H UR: 248 MG/G CREATININE (ref 0–30)
PHOSPHATE SERPL-MCNC: 3 MG/DL (ref 2.3–4.1)
PLATELET # BLD AUTO: 171 THOUSANDS/UL (ref 149–390)
PMV BLD AUTO: 12.2 FL (ref 8.9–12.7)
POTASSIUM SERPL-SCNC: 3.8 MMOL/L (ref 3.5–5.3)
PTH-INTACT SERPL-MCNC: 122.1 PG/ML (ref 18.4–80.1)
RBC # BLD AUTO: 4.13 MILLION/UL (ref 3.81–5.12)
SODIUM SERPL-SCNC: 140 MMOL/L (ref 136–145)
WBC # BLD AUTO: 4.68 THOUSAND/UL (ref 4.31–10.16)

## 2022-06-29 PROCEDURE — 83970 ASSAY OF PARATHORMONE: CPT

## 2022-06-29 PROCEDURE — 85027 COMPLETE CBC AUTOMATED: CPT

## 2022-06-29 PROCEDURE — 84100 ASSAY OF PHOSPHORUS: CPT

## 2022-06-29 PROCEDURE — 82043 UR ALBUMIN QUANTITATIVE: CPT

## 2022-06-29 PROCEDURE — 82306 VITAMIN D 25 HYDROXY: CPT

## 2022-06-29 PROCEDURE — 82570 ASSAY OF URINE CREATININE: CPT

## 2022-06-29 PROCEDURE — 80048 BASIC METABOLIC PNL TOTAL CA: CPT

## 2022-06-29 PROCEDURE — 83735 ASSAY OF MAGNESIUM: CPT

## 2022-06-29 PROCEDURE — 36415 COLL VENOUS BLD VENIPUNCTURE: CPT

## 2022-07-01 ENCOUNTER — OFFICE VISIT (OUTPATIENT)
Dept: NEPHROLOGY | Facility: CLINIC | Age: 82
End: 2022-07-01
Payer: MEDICARE

## 2022-07-01 VITALS
BODY MASS INDEX: 30.44 KG/M2 | HEIGHT: 63 IN | OXYGEN SATURATION: 99 % | SYSTOLIC BLOOD PRESSURE: 122 MMHG | DIASTOLIC BLOOD PRESSURE: 64 MMHG | WEIGHT: 171.8 LBS | HEART RATE: 58 BPM

## 2022-07-01 DIAGNOSIS — I12.9 BENIGN HYPERTENSION WITH CKD (CHRONIC KIDNEY DISEASE) STAGE III (HCC): Primary | ICD-10-CM

## 2022-07-01 DIAGNOSIS — N18.31 STAGE 3A CHRONIC KIDNEY DISEASE (HCC): ICD-10-CM

## 2022-07-01 DIAGNOSIS — R80.1 PERSISTENT PROTEINURIA: ICD-10-CM

## 2022-07-01 DIAGNOSIS — N28.1 RENAL CYST: ICD-10-CM

## 2022-07-01 DIAGNOSIS — N18.30 BENIGN HYPERTENSION WITH CKD (CHRONIC KIDNEY DISEASE) STAGE III (HCC): Primary | ICD-10-CM

## 2022-07-01 PROCEDURE — 99214 OFFICE O/P EST MOD 30 MIN: CPT | Performed by: INTERNAL MEDICINE

## 2022-07-01 NOTE — PROGRESS NOTES
NEPHROLOGY OUTPATIENT PROGRESS NOTE   Yelitza Chapin 80 y o  female MRN: [id number]  DATE: 7/1/2022  Reason for visit:   Chief Complaint   Patient presents with    Follow-up    Chronic Kidney Disease     ASSESSMENT and PLAN:  CKD stage 3, baseline creatinine lately 1 4 to 1 5 since June 2021, 1 to 1 2 since March 2021, prior 0 8 to 1 0  -last serum creatinine 1 4 stable in June 2022    -CKD suspected to be secondary to long-term hypertension, uncontrolled diabetes, age-related nephron loss  -avoid nephrotoxins or NSAIDs  -renal ultrasound in 2021 shows normal size kidneys, normal echogenicity, no hydronephrosis or stones  -UA in June 2021 shows no proteinuria or hematuria, bland      Bilateral renal cyst on ultrasound  -right kidney showed mid to lower pole cyst with thick septation, no definite vascularity noted  -MRI abdomen in February 2020 shows no suspicious renal mass  Valentino Lewandowsky showed 2 4 cm right mid pole cyst with septation, suboptimally evaluated in the absence of IV contrast   -renal ultrasound in July 2021 shows stable right-sided 2 cm septated cyst, stable simple cyst in left kidney  -repeat renal ultrasound before next visit     Proteinuria  -last urine microalbumin/creatinine ratio 248 mg in June 2022     -repeat urine microalbumin/creatinine ratio before next visit  -currently on losartan as below    -suspect in the setting of long-term diabetes, hemoglobin A1c improved to 7 8 in March 2022    -also has mild diabetic retinopathy in September 2021      Secondary hyperparathyroidism, last  in June 2022  continue vitamin-D 4000 units daily  Vitamin-D level 54  Check vitamin-D, PTH level before next visit      Hypertension  -blood pressure overall acceptable in the office today   Continue losartan 50 mg daily, Coreg, Lasix   -she has not brought BP machine to the office again today   -salt restricted diet recommended     Trace ankle edema  -still has stable ankle edema    Weight seems to be stable  -Remains on Lasix 40 mg b i d  -echo in May 2021 shows EF 43%, grade 2 diastolic dysfunction, moderate pulmonary hypertension      Diagnoses and all orders for this visit:    Benign hypertension with CKD (chronic kidney disease) stage III ([facility] )  -     Basic metabolic panel; Future  -     CBC; Future  -     Phosphorus; Future  -     PTH, intact; Future  -     Microalbumin / creatinine urine ratio; Future  -     Vitamin D 25 hydroxy; Future  -     Magnesium; Future    Stage 3a chronic kidney disease ([facility] )  -     US kidney and bladder; Future  -     Basic metabolic panel; Future  -     CBC; Future  -     Phosphorus; Future  -     PTH, intact; Future  -     Microalbumin / creatinine urine ratio; Future  -     Vitamin D 25 hydroxy; Future  -     Magnesium; Future    Renal cyst  -     US kidney and bladder; Future    Persistent proteinuria  -     Microalbumin / creatinine urine ratio; Future          SUBJECTIVE / HPI:  Lon LO 50 y o  year old female with medical issues of hypertension for 11 years, diabetes for 11 years, CVA in 2011, mild diabetic retinopathy,?  Gout, CKD stage 3 with baseline  lately 1 4 to 1 5 since mid 2021, 1 to 1 2 since March 2021, prior 0 8 to 1 0 who presents for regular follow up for CKD, proteinuria, hypertension management   Last serum creatinine overall stable at baseline  Currently denies any urinary complaint  denies any nausea, vomiting or diarrhea   No recent NSAID use  denies any worsening dyspnea  Weight seems to be overall stable  No obvious history of autoimmune conditions   Denies any prior history of kidney stones      REVIEW OF SYSTEMS:  More than 10 point review of systems were obtained and discussed in length with the patient  Complete review of systems were negative / unremarkable except mentioned above       PHYSICAL EXAM:  Vitals:    07/01/22 1554 07/01/22 1620   BP: 128/64 122/64   BP Location: Left arm    Patient Position: Sitting    Cuff Size: Large    Pulse: 58    SpO2: 99%    Weight: 77 9 kg (171 lb 12 8 oz)    Height: 5' 3" (1 6 m)      Body mass index is 30 43 kg/m²  Physical Exam  Vitals reviewed  Constitutional:       Appearance: She is well-developed  HENT:      Head: Normocephalic and atraumatic  Right Ear: External ear normal       Left Ear: External ear normal    Eyes:      Conjunctiva/sclera: Conjunctivae normal    Cardiovascular:      Comments: S1, S2 present  Pulmonary:      Effort: Pulmonary effort is normal       Breath sounds: Normal breath sounds  No wheezing or rales  Abdominal:      General: Bowel sounds are normal  There is no distension  Palpations: Abdomen is soft  Tenderness: There is no abdominal tenderness  Musculoskeletal:         General: No deformity  Right lower leg: Edema present  Left lower leg: Edema present  Comments: Trace edema in both ankles   Lymphadenopathy:      Cervical: No cervical adenopathy  Skin:     Findings: No rash  Neurological:      Mental Status: She is alert and oriented to person, place, and time     Psychiatric:         Behavior: Behavior normal          PAST MEDICAL HISTORY:  Past Medical History:   Diagnosis Date    ACE-inhibitor cough     last assessed - [id number]    Asthma     Bilateral edema of lower extremity     last assessed - [id number]    Cardiac murmur     last assessed - [id number]    CKD (chronic kidney disease)     Diabetes mellitus ([facility] )     last assessed - [id number]    Esophageal dysphagia     Fatigue     last assessed - [id number]    Hyperlipidemia     Hypertension     Patellar bursitis of right knee     last assessed - [id number]    Personal history of other specified conditions     presenting hazards to health    Stroke [facility])     Stroke syndrome     Urinary frequency     UTI (urinary tract infection)        PAST SURGICAL HISTORY:  Past Surgical History:   Procedure Laterality Date    TN ESOPHAGOGASTRODUODENOSCOPY TRANSORAL DIAGNOSTIC N/A 2017    Procedure: ESOPHAGOGASTRODUODENOSCOPY (EGD); Surgeon: Randall Lyn MD;  Location: [facility];   Service: Gastroenterology       SOCIAL HISTORY:  Social History     Substance and Sexual Activity   Alcohol Use Never     Social History     Substance and Sexual Activity   Drug Use Never     Social History     Tobacco Use   Smoking Status Former Smoker    Packs/day: 3 00    Quit date: 36    Years since quittin 5   Smokeless Tobacco Former User   Tobacco Comment    quit over 30 yrs ago; (quit in the , had smoked 3PPD for 20 years - per [employer])       FAMILY HISTORY:  Family History   Problem Relation Age of Onset    Heart disease Father     Hypertension Mother     Stroke Mother     Other Sister         1)Breast disorder; 2)Epilepsy   [facility] Migraines Sister         Migraine headaches    Osteoporosis Sister     Thyroid disease Sister     Coronary artery disease Sister         S/P triple vessel bypass    Osteoporosis Maternal Grandmother     Diabetes Son         Diabetes mellitus    Hypertension Son     Rheum arthritis Son         Rheumatic disease    Heart disease Family     No Known Problems Maternal Grandfather     No Known Problems Paternal Grandmother     No Known Problems Paternal Grandfather     Breast cancer Sister [de-identified]    No Known Problems Sister     No Known Problems Sister     No Known Problems Maternal Aunt     No Known Problems Maternal Aunt     No Known Problems Maternal Aunt     No Known Problems Paternal Aunt     No Known Problems Paternal Aunt     No Known Problems Son        MEDICATIONS:    Current Outpatient Medications:     acetaminophen (TYLENOL) 650 mg CR tablet, Take 650 mg by mouth every 6 (six) hours as needed for mild pain, Disp: , Rfl:     albuterol (PROVENTIL HFA,VENTOLIN HFA) 90 mcg/act inhaler, INHALE 2 PUFFS EVERY 4 (FOUR) HOURS AS NEEDED FOR WHEEZING, Disp: 8 5 g, Rfl: 5    aspirin (ECOTRIN LOW STRENGTH) 81 mg EC tablet, Take 1 tablet (81 mg total) by mouth daily, Disp: 90 tablet, Rfl: 0    atorvastatin (LIPITOR) 40 mg tablet, TAKE 1 TABLET (40 MG TOTAL) BY MOUTH DAILY, Disp: 90 tablet, Rfl: 1    Blood Glucose Monitoring Suppl (FREESTYLE LITE) JAQUELIN, by Does not apply route daily, Disp: 1 each, Rfl: 0    Breo Ellipta 100-25 MCG/INH inhaler, Inhale 1 puff daily, Disp: 60 blister, Rfl: 5    carvedilol (COREG) 6 25 mg tablet, TAKE 1 TABLET (6 25 MG TOTAL) BY MOUTH 2 (TWO) TIMES A DAY WITH MEALS, Disp: 60 tablet, Rfl: 5    clopidogrel (PLAVIX) 75 mg tablet, TAKE 2 TABS DAILY, Disp: 180 tablet, Rfl: 3    conjugated estrogens (PREMARIN) vaginal cream, Insert 0 5 g into the vagina 2 (two) times a week Insert twice weekly at bedtime, Disp: 30 g, Rfl: 1    Continuous Blood Gluc  (FreeStyle Naldo 14 Day [place]) JAQUELIN, Use to record blood glucose 4 times daily  Once fasting and three times daily before meals, Disp: 1 each, Rfl: 0    Continuous Blood Gluc Sensor (FreeStyle Naldo 14 Day Sensor) MISC, Use one sensor every 14 days, Disp: 4 each, Rfl: 3    D3 50 MCG (2000 UT) TABS, TAKE 2 TABLETS (4,000 UNITS TOTAL) BY MOUTH DAILY, Disp: 180 tablet, Rfl: 0    ferrous sulfate 324 (65 Fe) mg, Take 1 tablet (324 mg total) by mouth 2 (two) times a day before meals, Disp: 60 tablet, Rfl: 2    furosemide (LASIX) 40 mg tablet, TAKE 1 TABLET (40 MG TOTAL) BY MOUTH 2 (TWO) TIMES A DAY, Disp: 180 tablet, Rfl: 3    glucose blood (FREESTYLE LITE) test strip, TEST AS DIRECTED UP TO FOUR TIMES A DAY, Disp: 100 each, Rfl: 3    glucose monitoring kit (FREESTYLE) monitoring kit, Use 1 each as needed (Three time daily) Please check blood sugar 3 times daily  , Disp: 1 each, Rfl: 1    Glucose-Vitamin C-Vitamin D (TRUEPLUS GLUCOSE ON THE GO) CHEW, CHEW 2 TABS AS NEEDED FOR BLOOD SUGAR LESS THAN 80, Disp: , Rfl:     ketoconazole (NIZORAL) 2 % shampoo, Apply topically to the scalp once every other week, Disp: 120 mL, Rfl: 0    Lancets (FREESTYLE) lancets, Use as instructed, Disp: 100 each, Rfl: 0    latanoprost (XALATAN) 0 005 % ophthalmic solution, , Disp: , Rfl:     losartan (COZAAR) 50 mg tablet, TAKE 1 TABLET DAILY, Disp: 90 tablet, Rfl: 1    metFORMIN (GLUCOPHAGE) 500 mg tablet, TAKE 1 TABLET (500 MG TOTAL) BY MOUTH 2 (TWO) TIMES A DAY WITH MEALS, Disp: 180 tablet, Rfl: 0    Misc   Devices (QUAD CANE) MISC, by Does not apply route daily, Disp: 1 each, Rfl: 0    montelukast (SINGULAIR) 10 mg tablet, TAKE 1 TABLET (10 MG TOTAL) BY MOUTH DAILY AT BEDTIME, Disp: 90 tablet, Rfl: 3    nitroglycerin (NITROSTAT) 0 4 mg SL tablet, PLACE 1 TABLET (0 4 MG TOTAL) UNDER THE TONGUE EVERY 5 (FIVE) MINUTES AS NEEDED FOR CHEST PAIN, Disp: 25 tablet, Rfl: 1    ranolazine (RANEXA) 500 mg 12 hr tablet, Take 1 tablet (500 mg total) by mouth every 12 (twelve) hours, Disp: 60 tablet, Rfl: 0    Tradjenta 5 MG TABS, TAKE 1 TABLET (5 MG) BY MOUTH DAILY, Disp: 90 tablet, Rfl: 1    Xarelto 2 5 MG tablet, TAKE 1 TABLET (2 5 MG TOTAL) BY MOUTH 2 (TWO) TIMES A DAY WITH MEALS, Disp: 180 tablet, Rfl: 1    estradiol (ESTRACE VAGINAL) 0 1 mg/g vaginal cream, Insert 2 g into the vagina 2 (two) times a week (Patient not taking: Reported on 7/1/2022), Disp: 42 5 g, Rfl: 3    Lab Results:   Results for orders placed or performed in visit on 29/22/35   Basic metabolic panel   Result Value Ref Range    Sodium 140 136 - 145 mmol/L    Potassium 3 8 3 5 - 5 3 mmol/L    Chloride 107 100 - 108 mmol/L    CO2 24 21 - 32 mmol/L    ANION GAP 9 4 - 13 mmol/L    BUN 53 (H) 5 - 25 mg/dL    Creatinine 1 45 (H) 0 60 - 1 30 mg/dL    Glucose 255 (H) 65 - 140 mg/dL    Calcium 9 2 8 3 - 10 1 mg/dL    eGFR 33 ml/min/1 73sq m   CBC   Result Value Ref Range    WBC 4 68 4 31 - 10 16 Thousand/uL    RBC 4 13 3 81 - 5 12 Million/uL    Hemoglobin 10 7 (L) 11 5 - 15 4 g/dL    Hematocrit 34 7 (L) 34 8 - 46 1 %    MCV 84 82 - 98 fL    MCH 25 9 (L) 26 8 - 34 3 pg    MCHC 30 8 (L) 31 4 - 37 4 g/dL    RDW 14 6 11 6 - 15 1 % Platelets 774 794 - 092 Thousands/uL    MPV 12 2 8 9 - 12 7 fL   Phosphorus   Result Value Ref Range    Phosphorus 3 0 2 3 - 4 1 mg/dL   PTH, intact   Result Value Ref Range     1 (H) 18 4 - 80 1 pg/mL   Vitamin D 25 hydroxy   Result Value Ref Range    Vit D, 25-Hydroxy 54 1 30 0 - 100 0 ng/mL   Magnesium   Result Value Ref Range    Magnesium 2 5 1 6 - 2 6 mg/dL

## 2022-07-19 DIAGNOSIS — E55.9 VITAMIN D DEFICIENCY: ICD-10-CM

## 2022-07-19 DIAGNOSIS — E11.65 UNCONTROLLED TYPE 2 DIABETES MELLITUS WITH HYPERGLYCEMIA (HCC): ICD-10-CM

## 2022-07-20 RX ORDER — CHOLECALCIFEROL (VITAMIN D3) 50 MCG
2 TABLET ORAL DAILY
Qty: 180 TABLET | Refills: 0 | Status: SHIPPED | OUTPATIENT
Start: 2022-07-20

## 2022-08-04 DIAGNOSIS — L29.9 PRURITUS OF SCALP: ICD-10-CM

## 2022-08-04 RX ORDER — KETOCONAZOLE 20 MG/ML
SHAMPOO TOPICAL
Qty: 120 ML | Refills: 0 | Status: SHIPPED | OUTPATIENT
Start: 2022-08-04

## 2022-08-09 ENCOUNTER — TELEPHONE (OUTPATIENT)
Dept: INTERNAL MEDICINE CLINIC | Facility: CLINIC | Age: 82
End: 2022-08-09

## 2022-08-09 ENCOUNTER — OFFICE VISIT (OUTPATIENT)
Dept: MULTI SPECIALTY CLINIC | Facility: CLINIC | Age: 82
End: 2022-08-09

## 2022-08-09 ENCOUNTER — APPOINTMENT (OUTPATIENT)
Dept: LAB | Facility: CLINIC | Age: 82
End: 2022-08-09
Payer: MEDICARE

## 2022-08-09 VITALS
SYSTOLIC BLOOD PRESSURE: 134 MMHG | TEMPERATURE: 97.2 F | BODY MASS INDEX: 30.26 KG/M2 | HEART RATE: 65 BPM | WEIGHT: 170.8 LBS | DIASTOLIC BLOOD PRESSURE: 69 MMHG

## 2022-08-09 DIAGNOSIS — E11.65 TYPE 2 DIABETES MELLITUS WITH HYPERGLYCEMIA, WITHOUT LONG-TERM CURRENT USE OF INSULIN (HCC): ICD-10-CM

## 2022-08-09 DIAGNOSIS — I10 ESSENTIAL HYPERTENSION: ICD-10-CM

## 2022-08-09 DIAGNOSIS — E11.65 TYPE 2 DIABETES MELLITUS WITH HYPERGLYCEMIA, WITHOUT LONG-TERM CURRENT USE OF INSULIN (HCC): Primary | ICD-10-CM

## 2022-08-09 DIAGNOSIS — E78.2 MIXED HYPERLIPIDEMIA: ICD-10-CM

## 2022-08-09 LAB
ANION GAP SERPL CALCULATED.3IONS-SCNC: 7 MMOL/L (ref 4–13)
BUN SERPL-MCNC: 49 MG/DL (ref 5–25)
CALCIUM SERPL-MCNC: 10.2 MG/DL (ref 8.3–10.1)
CHLORIDE SERPL-SCNC: 107 MMOL/L (ref 96–108)
CO2 SERPL-SCNC: 27 MMOL/L (ref 21–32)
CREAT SERPL-MCNC: 1.5 MG/DL (ref 0.6–1.3)
EST. AVERAGE GLUCOSE BLD GHB EST-MCNC: 189 MG/DL
GFR SERPL CREATININE-BSD FRML MDRD: 32 ML/MIN/1.73SQ M
GLUCOSE P FAST SERPL-MCNC: 179 MG/DL (ref 65–99)
HBA1C MFR BLD: 8.2 %
POTASSIUM SERPL-SCNC: 3.7 MMOL/L (ref 3.5–5.3)
SL AMB POCT HEMOGLOBIN AIC: 8.2 (ref ?–6.5)
SODIUM SERPL-SCNC: 141 MMOL/L (ref 135–147)

## 2022-08-09 PROCEDURE — 36415 COLL VENOUS BLD VENIPUNCTURE: CPT

## 2022-08-09 PROCEDURE — 83036 HEMOGLOBIN GLYCOSYLATED A1C: CPT

## 2022-08-09 PROCEDURE — 80048 BASIC METABOLIC PNL TOTAL CA: CPT

## 2022-08-09 RX ORDER — FLASH GLUCOSE SENSOR
KIT MISCELLANEOUS
Qty: 6 EACH | Refills: 1 | Status: SHIPPED | OUTPATIENT
Start: 2022-08-09 | End: 2022-09-14 | Stop reason: SDUPTHER

## 2022-08-09 RX ORDER — REPAGLINIDE 0.5 MG/1
0.5 TABLET ORAL
Qty: 90 TABLET | Refills: 1 | Status: SHIPPED | OUTPATIENT
Start: 2022-08-09

## 2022-08-09 NOTE — TELEPHONE ENCOUNTER
Patient came in office for an appointment with Endo when patient stated she would like an order to be placed for a Quad cane, which had been ordered in the past( can be found in med list) , but patient did not receive it  [facility]

## 2022-08-09 NOTE — PATIENT INSTRUCTIONS
DECREASE METFORMIN TO 1 TABLET DAILY WITH BREAKFAST ONLY  START PRANDIN 0 5 MG RIGHT BEFORE DINNER  SKIP DOSE OF PRANDIN IF SKIPPING MEAL OR EATING MEAL WITHOUT CARBOHYDRATES  Hypoglycemia instructions   Marv Mosher  8/9/2022  [id number]    Low Blood Sugar    Steps to treat low blood sugar  1  Test blood sugar if you have symptoms of low blood sugar:   Low Blood Sugar Symptoms:  o Sweaty  o Dizzy  o Rapid heartbeat  o Shaky  o Bad mood  o Hungry      2  Treat blood sugar less than 70 with 15 grams of fast-acting carbohydrate:   Examples of 15 grams Fast-Acting Carbohydrate:  o 4 oz juice  o 4 oz regular soda  o 3-4 glucose tablets (chew)  o 3-4 hard candies (chew)          3  Wait 15 minutes and test your blood sugar again     4   If blood sugar is less than 100, repeat steps 2-3     5  When your blood sugar is 100 or more, eat a snack if it will be longer than one hour until your next meal  The snack should be 15 grams of carbohydrate and a protein:   Examples of snacks:  o ½ sandwich  o 6 crackers with cheese  o Piece of fruit with cheese or peanut butter  o 6 crackers with peanut butter

## 2022-08-09 NOTE — PROGRESS NOTES
Patient Progress Note      CC: DM      Referring Provider  Dimple Ferraro, Do  [address]  [place],  [address]     History of Present Illness:   Fabian Stanford is a 80 y o  female with a history of type 2 diabetes with long term use of insulin  Diabetes course has been stable  Complications of DM: retinopathy, CVA, CKD  Denies recent illness or hospitalizations  Denies recent severe hypoglycemic or severe hyperglycemic episodes  Denies any issues with her current regimen  Home glucose monitoring: are performed regularly  She uses the [employer] but forgot to bring her   She reports she tries to keep her readings in the 130-140s but if she eats a bagel some days her BG will get over 200 mg/dl  Current regimen: Metformin 500 mg twice a day, Tradjenta 5 mg daily   Jardiance, Victoza and Toujeo were stopped by her PCP in the past per patient  compliant most of the time, denies any side effects from medications  Hypoglycemic episodes: No, rare  H/o of hypoglycemia causing hospitalization or Intervention such as glucagon injection or ambulance call : No  Hypoglycemia symptoms: cannot recall reading less than 70 mg/dl   Treatment of hypoglycemia: glucose tablets     Medic alert tag: recommended: Yes     Diet: 3 meals per day, 0-1 snack per day  Timing of meals is predictable  Diabetic diet compliance: she tries to follow a diet  Activity: Daily activity is predictable: Yes  She tries to use her stationary bike daily  Ophthamology: September 2021  Podiatry: foot exam UTD, November 2021     Has hypertension: on ACE inhibitor/ARB, compliant most of the time  Has hyperlipidemia: on statin - tolerating well, no myalgias  compliant most of the time, denies any side effects from medications    Thyroid disorders: No  History of pancreatitis: No    Patient Active Problem List   Diagnosis    Aortic valve regurgitation, nonrheumatic    Benign hypertension with CKD (chronic kidney disease) stage III  Chronic rhinitis    Midline cystocele    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, without long-term current use of insulin ([facility])    Non-rheumatic mitral regurgitation    Uterine procidentia    Anemia    Esophageal abnormality    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    Decreased hearing, bilateral    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[facility])    History of CVA with residual deficit    Mixed hyperlipidemia    Persistent proteinuria    Renal cyst    Ankle edema    Stage 3a chronic kidney disease ([facility])    Acute diastolic congestive heart failure ([facility])    Status post insertion of drug-eluting stent into left anterior descending (LAD) artery    Coronary artery disease involving native coronary artery of native heart without angina pectoris    NSTEMI (non-ST elevated myocardial infarction) [facility]    Essential hypertension    Asthma    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[facility])    Uncontrolled type 2 diabetes mellitus with hyperglycemia ([facility])    Urge urinary incontinence    Nocturia      Past Medical History:   Diagnosis Date    ACE-inhibitor cough     last assessed - [id number]    Asthma     Bilateral edema of lower extremity     last assessed - [id number]    Cardiac murmur     last assessed - [id number]    CKD (chronic kidney disease)     Diabetes mellitus ([facility] )     last assessed - [id number]    Esophageal dysphagia     Fatigue     last assessed - [id number]    Hyperlipidemia     Hypertension     Patellar bursitis of right knee     last assessed - [id number]    Personal history of other specified conditions     presenting hazards to health    Stroke [facility])     Stroke syndrome     Urinary frequency     UTI (urinary tract infection)       Past Surgical History:   Procedure Laterality Date    ME ESOPHAGOGASTRODUODENOSCOPY TRANSORAL DIAGNOSTIC N/A 4/5/2017    Procedure: ESOPHAGOGASTRODUODENOSCOPY (EGD);   Surgeon: Ellie Bianchi MD;  Location: [facility];  Service: Gastroenterology      Family History   Problem Relation Age of Onset    Heart disease Father     Hypertension Mother     Stroke Mother     Other Sister         1)Breast disorder; 2)Epilepsy   Hernando Lemme Migraines Sister         Migraine headaches    Osteoporosis Sister     Thyroid disease Sister     Coronary artery disease Sister         S/P triple vessel bypass    Osteoporosis Maternal Grandmother     Diabetes Son         Diabetes mellitus    Hypertension Son     Rheum arthritis Son         Rheumatic disease    Heart disease Family     No Known Problems Maternal Grandfather     No Known Problems Paternal Grandmother     No Known Problems Paternal Grandfather     Breast cancer Sister [de-identified]    No Known Problems Sister     No Known Problems Sister     No Known Problems Maternal Aunt     No Known Problems Maternal Aunt     No Known Problems Maternal Aunt     No Known Problems Paternal Aunt     No Known Problems Paternal Aunt     No Known Problems Son      Social History     Tobacco Use    Smoking status: Former Smoker     Packs/day: 3 00     Quit date:      Years since quittin 6    Smokeless tobacco: Former User    Tobacco comment: quit over 30 yrs ago; (quit in the , had smoked 3PPD for 20 years - per [employer])   Substance Use Topics    Alcohol use: Never     No Known Allergies      Current Outpatient Medications:     acetaminophen (TYLENOL) 650 mg CR tablet, Take 650 mg by mouth every 6 (six) hours as needed for mild pain, Disp: , Rfl:     albuterol (PROVENTIL HFA,VENTOLIN HFA) 90 mcg/act inhaler, INHALE 2 PUFFS EVERY 4 (FOUR) HOURS AS NEEDED FOR WHEEZING, Disp: 8 5 g, Rfl: 5    aspirin (ECOTRIN LOW STRENGTH) 81 mg EC tablet, Take 1 tablet (81 mg total) by mouth daily, Disp: 90 tablet, Rfl: 0    atorvastatin (LIPITOR) 40 mg tablet, TAKE 1 TABLET (40 MG TOTAL) BY MOUTH DAILY, Disp: 90 tablet, Rfl: 1    Blood Glucose Monitoring Suppl (FREESTYLE LITE) JAQUELIN by Does not apply route daily, Disp: 1 each, Rfl: 0    Breo Ellipta 100-25 MCG/INH inhaler, Inhale 1 puff daily, Disp: 60 blister, Rfl: 5    carvedilol (COREG) 6 25 mg tablet, TAKE 1 TABLET (6 25 MG TOTAL) BY MOUTH 2 (TWO) TIMES A DAY WITH MEALS, Disp: 60 tablet, Rfl: 5    clopidogrel (PLAVIX) 75 mg tablet, TAKE 2 TABS DAILY, Disp: 180 tablet, Rfl: 3    conjugated estrogens (PREMARIN) vaginal cream, Insert 0 5 g into the vagina 2 (two) times a week Insert twice weekly at bedtime, Disp: 30 g, Rfl: 1    Continuous Blood Gluc  (FreeStyle Naldo 14 Day [place]) JAQUELIN, Use to record blood glucose 4 times daily  Once fasting and three times daily before meals, Disp: 1 each, Rfl: 0    Continuous Blood Gluc Sensor (FreeStyle Naldo 14 Day Sensor) MISC, Use one sensor every 14 days, Disp: 6 each, Rfl: 1    D3 50 MCG (2000 UT) TABS, TAKE 2 TABLETS (4,000 UNITS TOTAL) BY MOUTH DAILY, Disp: 180 tablet, Rfl: 0    ferrous sulfate 324 (65 Fe) mg, Take 1 tablet (324 mg total) by mouth 2 (two) times a day before meals, Disp: 60 tablet, Rfl: 2    furosemide (LASIX) 40 mg tablet, TAKE 1 TABLET (40 MG TOTAL) BY MOUTH 2 (TWO) TIMES A DAY, Disp: 180 tablet, Rfl: 3    glucose blood (FREESTYLE LITE) test strip, TEST AS DIRECTED UP TO FOUR TIMES A DAY, Disp: 100 each, Rfl: 3    glucose monitoring kit (FREESTYLE) monitoring kit, Use 1 each as needed (Three time daily) Please check blood sugar 3 times daily  , Disp: 1 each, Rfl: 1    ketoconazole (NIZORAL) 2 % shampoo, APPLY TOPICALLY TO THE SCALP ONCE EVERY OTHER WEEK, Disp: 120 mL, Rfl: 0    Lancets (FREESTYLE) lancets, Use as instructed, Disp: 100 each, Rfl: 0    latanoprost (XALATAN) 0 005 % ophthalmic solution, , Disp: , Rfl:     losartan (COZAAR) 50 mg tablet, TAKE 1 TABLET DAILY, Disp: 90 tablet, Rfl: 1    metFORMIN (GLUCOPHAGE) 500 mg tablet, Take 1 tablet (500 mg total) by mouth daily with breakfast, Disp: 90 tablet, Rfl: 0    Misc   Devices (QUAD CANE) MISC, by Does not apply route daily, Disp: 1 each, Rfl: 0    montelukast (SINGULAIR) 10 mg tablet, TAKE 1 TABLET (10 MG TOTAL) BY MOUTH DAILY AT BEDTIME, Disp: 90 tablet, Rfl: 3    nitroglycerin (NITROSTAT) 0 4 mg SL tablet, PLACE 1 TABLET (0 4 MG TOTAL) UNDER THE TONGUE EVERY 5 (FIVE) MINUTES AS NEEDED FOR CHEST PAIN, Disp: 25 tablet, Rfl: 1    ranolazine (RANEXA) 500 mg 12 hr tablet, Take 1 tablet (500 mg total) by mouth every 12 (twelve) hours, Disp: 60 tablet, Rfl: 0    repaglinide (PRANDIN) 0 5 mg tablet, Take 1 tablet (0 5 mg total) by mouth daily before dinner (SKIP DOSE IF SKIPPING MEAL), Disp: 90 tablet, Rfl: 1    Tradjenta 5 MG TABS, TAKE 1 TABLET (5 MG) BY MOUTH DAILY, Disp: 90 tablet, Rfl: 1    Xarelto 2 5 MG tablet, TAKE 1 TABLET (2 5 MG TOTAL) BY MOUTH 2 (TWO) TIMES A DAY WITH MEALS, Disp: 180 tablet, Rfl: 1    estradiol (ESTRACE VAGINAL) 0 1 mg/g vaginal cream, Insert 2 g into the vagina 2 (two) times a week (Patient not taking: Reported on 7/1/2022), Disp: 42 5 g, Rfl: 3    Glucose-Vitamin C-Vitamin D (TRUEPLUS GLUCOSE ON THE GO) CHEW, CHEW 2 TABS AS NEEDED FOR BLOOD SUGAR LESS THAN 80 (Patient not taking: Reported on 8/9/2022), Disp: , Rfl:   Review of Systems   Constitutional: Negative for activity change, appetite change, fatigue and unexpected weight change  HENT: Negative for trouble swallowing  Eyes: Positive for visual disturbance  Respiratory: Negative for shortness of breath  Cardiovascular: Negative for chest pain and palpitations  Gastrointestinal: Negative for constipation and diarrhea  Endocrine: Negative for polydipsia and polyuria  Musculoskeletal: Positive for back pain (occasional)  Skin: Negative for wound  Neurological: Positive for numbness (occasional)  Psychiatric/Behavioral: Negative  Physical Exam:  Body mass index is 30 26 kg/m²    /69 (BP Location: Right arm, Patient Position: Sitting, Cuff Size: Standard)   Pulse 65   Temp (!) 97 2 °F (36 2 °C) (Temporal)   Wt 77 5 kg (170 lb 12 8 oz)   BMI 30 26 kg/m²    Wt Readings from Last 3 Encounters:   08/09/22 77 5 kg (170 lb 12 8 oz)   07/01/22 77 9 kg (171 lb 12 8 oz)   05/18/22 78 kg (172 lb)       Physical Exam  Vitals and nursing note reviewed  Constitutional:       Appearance: She is well-developed  HENT:      Head: Normocephalic  Eyes:      General: No scleral icterus  Pupils: Pupils are equal, round, and reactive to light  Neck:      Thyroid: No thyromegaly  Cardiovascular:      Rate and Rhythm: Normal rate and regular rhythm  Pulses:           Radial pulses are 2+ on the right side and 2+ on the left side  Heart sounds: Murmur heard  Pulmonary:      Effort: Pulmonary effort is normal  No respiratory distress  Breath sounds: Normal breath sounds  No wheezing  Musculoskeletal:      Cervical back: Neck supple  Skin:     General: Skin is warm and dry  Neurological:      Mental Status: She is alert  Patient's shoes and socks were not removed  Labs:   Component      Latest Ref Rng & Units 3/18/2022 6/29/2022   Sodium      136 - 145 mmol/L 141 140   Potassium      3 5 - 5 3 mmol/L 3 8 3 8   Chloride      100 - 108 mmol/L 108 107   CO2      21 - 32 mmol/L 28 24   Anion Gap      4 - 13 mmol/L 5 9   BUN      5 - 25 mg/dL 38 (H) 53 (H)   Creatinine      0 60 - 1 30 mg/dL 1 37 (H) 1 45 (H)   GLUCOSE FASTING      65 - 99 mg/dL 180 (H)    Calcium      8 3 - 10 1 mg/dL 9 5 9 2   eGFR      ml/min/1 73sq m 36 33   Glucose, Random      65 - 140 mg/dL  255 (H)   EXT Creatinine Urine      mg/dL  54 0   MICROALBUM ,U,RANDOM      0 0 - 20 0 mg/L  134 0 (H)   MICROALBUMIN/CREATININE RATIO      0 - 30 mg/g creatinine  248 (H)   Hemoglobin A1C      Normal 3 8-5 6%; PreDiabetic 5 7-6 4%;  Diabetic >=6 5%; Glycemic control for adults with diabetes <7 0% % 7 8 (H)    eAG, EST AVG Glucose      mg/dl 177    Vit D, 25-Hydroxy      30 0 - 100 0 ng/mL  54 1     Plan:    Luciana Wing was seen today for follow-up  Diagnoses and all orders for this visit:    Type 2 diabetes mellitus with hyperglycemia, without long-term current use of insulin ([facility])  HGA1C 8 2%  Worsened  Treatment regimen: decrease metformin to 1 tablet daily due to CKD  Discussed if GFR drops under 30, will need to stop metformin  Start Prandin 0 5 mg with largest meal / dinner  Skip dose of Prandin if skipping meal  Discussed SE, safety/efficacy  Episodes of hypoglycemia can lead to permanent disability and death  Discussed risks/complications associated with uncontrolled diabetes  Advised to adhere to diabetic diet, and recommended staying active/exercising routinely as tolerated  Keep carbohydrates consistent to limit blood glucose fluctuations  Advised to call if blood sugars less than 70 mg/dl or over 300 mg/dl  Check blood glucose 3+ times a day  Discussed symptoms and treatment of hypoglycemia  Discussed use of CGM to collect additional blood glucose data to reveal trends and patterns that can be used to optimize treatment plan  Recommended routine follow-up with podiatry and ophthalmology  Send log in 2 weeks  Ordered blood work to complete prior to next visit  -     Ambulatory Referral to Endocrinology  -     Hemoglobin A1C; Future  -     Basic metabolic panel; Future  -     POCT hemoglobin A1c  -     Continuous Blood Gluc Sensor (FreeStyle Naldo 14 Day Sensor) MISC; Use one sensor every 14 days  -     metFORMIN (GLUCOPHAGE) 500 mg tablet; Take 1 tablet (500 mg total) by mouth daily with breakfast  -     repaglinide (PRANDIN) 0 5 mg tablet; Take 1 tablet (0 5 mg total) by mouth daily before dinner (SKIP DOSE IF SKIPPING MEAL)    Essential hypertension  Blood pressure well controlled, continue current treatment   -     Basic metabolic panel; Future    Mixed hyperlipidemia  LDL previously 56  Continue statin therapy        Discussed with the patient diagnosis and treatment and all questions fully answered  She will call me if any problems arise  Counseled patient on diagnostic results, prognosis, risk and benefit of treatment options, instruction for management, importance of treatment compliance, risk factor reduction and impressions      Jacklyn Lock PA-C      [facility] ENDOCRINOLOGY

## 2022-08-10 ENCOUNTER — TELEPHONE (OUTPATIENT)
Dept: NEPHROLOGY | Facility: CLINIC | Age: 82
End: 2022-08-10

## 2022-08-10 NOTE — TELEPHONE ENCOUNTER
Spoke with Patient and schedule appointment for 2/13/23 with Dr Mitzi Durand in the [facility] location

## 2022-08-17 LAB
DME PARACHUTE DELIVERY DATE REQUESTED: NORMAL
DME PARACHUTE ITEM DESCRIPTION: NORMAL
DME PARACHUTE ORDER STATUS: NORMAL
DME PARACHUTE SUPPLIER NAME: NORMAL
DME PARACHUTE SUPPLIER PHONE: NORMAL

## 2022-08-17 NOTE — TELEPHONE ENCOUNTER
Tried to order quad cane through [employer]  I cannot sign for DME orders for medicare patients   Please have an attending complete this request

## 2022-09-02 DIAGNOSIS — I12.9 BENIGN HYPERTENSION WITH CKD (CHRONIC KIDNEY DISEASE) STAGE III (HCC): ICD-10-CM

## 2022-09-02 DIAGNOSIS — N18.30 BENIGN HYPERTENSION WITH CKD (CHRONIC KIDNEY DISEASE) STAGE III (HCC): ICD-10-CM

## 2022-09-05 RX ORDER — CARVEDILOL 6.25 MG/1
6.25 TABLET ORAL 2 TIMES DAILY WITH MEALS
Qty: 60 TABLET | Refills: 5 | Status: SHIPPED | OUTPATIENT
Start: 2022-09-05 | End: 2023-03-04

## 2022-09-14 DIAGNOSIS — E11.65 TYPE 2 DIABETES MELLITUS WITH HYPERGLYCEMIA, WITHOUT LONG-TERM CURRENT USE OF INSULIN (HCC): ICD-10-CM

## 2022-09-14 RX ORDER — FLASH GLUCOSE SENSOR
KIT MISCELLANEOUS
Qty: 6 EACH | Refills: 1 | Status: SHIPPED | OUTPATIENT
Start: 2022-09-14

## 2022-10-12 ENCOUNTER — APPOINTMENT (OUTPATIENT)
Dept: RADIOLOGY | Facility: HOSPITAL | Age: 82
End: 2022-10-12
Payer: MEDICARE

## 2022-10-12 ENCOUNTER — HOSPITAL ENCOUNTER (EMERGENCY)
Facility: HOSPITAL | Age: 82
Discharge: HOME | End: 2022-10-12
Attending: EMERGENCY MEDICINE
Payer: MEDICARE

## 2022-10-12 VITALS
OXYGEN SATURATION: 98 % | RESPIRATION RATE: 23 BRPM | TEMPERATURE: 98.1 F | SYSTOLIC BLOOD PRESSURE: 126 MMHG | DIASTOLIC BLOOD PRESSURE: 58 MMHG | HEART RATE: 55 BPM

## 2022-10-12 DIAGNOSIS — R07.9 CHEST PAIN: Primary | ICD-10-CM

## 2022-10-12 DIAGNOSIS — R06.00 DYSPNEA: ICD-10-CM

## 2022-10-12 LAB
ANION GAP SERPL CALCULATED.3IONS-SCNC: 5 MMOL/L (ref 4–13)
ATRIAL RATE: 65 BPM
BASOPHILS # BLD AUTO: 0.03 THOUSANDS/ΜL (ref 0–0.1)
BASOPHILS NFR BLD AUTO: 1 % (ref 0–1)
BUN SERPL-MCNC: 33 MG/DL (ref 5–25)
CALCIUM SERPL-MCNC: 9.4 MG/DL (ref 8.3–10.1)
CARDIAC TROPONIN I PNL SERPL HS: 25 NG/L
CHLORIDE SERPL-SCNC: 107 MMOL/L (ref 96–108)
CO2 SERPL-SCNC: 27 MMOL/L (ref 21–32)
CREAT SERPL-MCNC: 1.44 MG/DL (ref 0.6–1.3)
EOSINOPHIL # BLD AUTO: 0.1 THOUSAND/ΜL (ref 0–0.61)
EOSINOPHIL NFR BLD AUTO: 2 % (ref 0–6)
ERYTHROCYTE [DISTWIDTH] IN BLOOD BY AUTOMATED COUNT: 14.5 % (ref 11.6–15.1)
GFR SERPL CREATININE-BSD FRML MDRD: 33 ML/MIN/1.73SQ M
GLUCOSE SERPL-MCNC: 227 MG/DL (ref 65–140)
HCT VFR BLD AUTO: 32.6 % (ref 34.8–46.1)
HGB BLD-MCNC: 10.3 G/DL (ref 11.5–15.4)
IMM GRANULOCYTES # BLD AUTO: 0.02 THOUSAND/UL (ref 0–0.2)
IMM GRANULOCYTES NFR BLD AUTO: 0 % (ref 0–2)
LYMPHOCYTES # BLD AUTO: 0.71 THOUSANDS/ΜL (ref 0.6–4.47)
LYMPHOCYTES NFR BLD AUTO: 12 % (ref 14–44)
MCH RBC QN AUTO: 27.2 PG (ref 26.8–34.3)
MCHC RBC AUTO-ENTMCNC: 31.6 G/DL (ref 31.4–37.4)
MCV RBC AUTO: 86 FL (ref 82–98)
MONOCYTES # BLD AUTO: 0.34 THOUSAND/ΜL (ref 0.17–1.22)
MONOCYTES NFR BLD AUTO: 6 % (ref 4–12)
NEUTROPHILS # BLD AUTO: 4.57 THOUSANDS/ΜL (ref 1.85–7.62)
NEUTS SEG NFR BLD AUTO: 79 % (ref 43–75)
NRBC BLD AUTO-RTO: 0 /100 WBCS
P AXIS: 65 DEGREES
PLATELET # BLD AUTO: 231 THOUSANDS/UL (ref 149–390)
PMV BLD AUTO: 12.3 FL (ref 8.9–12.7)
POTASSIUM SERPL-SCNC: 4.6 MMOL/L (ref 3.5–5.3)
PR INTERVAL: 202 MS
QRS AXIS: -28 DEGREES
QRSD INTERVAL: 86 MS
QT INTERVAL: 436 MS
QTC INTERVAL: 453 MS
RBC # BLD AUTO: 3.79 MILLION/UL (ref 3.81–5.12)
SODIUM SERPL-SCNC: 139 MMOL/L (ref 135–147)
T WAVE AXIS: 81 DEGREES
VENTRICULAR RATE: 65 BPM
WBC # BLD AUTO: 5.77 THOUSAND/UL (ref 4.31–10.16)

## 2022-10-12 PROCEDURE — 93010 ELECTROCARDIOGRAM REPORT: CPT | Performed by: INTERNAL MEDICINE

## 2022-10-12 PROCEDURE — 71046 X-RAY EXAM CHEST 2 VIEWS: CPT

## 2022-10-12 PROCEDURE — 36415 COLL VENOUS BLD VENIPUNCTURE: CPT

## 2022-10-12 PROCEDURE — 99285 EMERGENCY DEPT VISIT HI MDM: CPT

## 2022-10-12 PROCEDURE — 80048 BASIC METABOLIC PNL TOTAL CA: CPT

## 2022-10-12 PROCEDURE — 85025 COMPLETE CBC W/AUTO DIFF WBC: CPT

## 2022-10-12 PROCEDURE — 96374 THER/PROPH/DIAG INJ IV PUSH: CPT

## 2022-10-12 PROCEDURE — 99285 EMERGENCY DEPT VISIT HI MDM: CPT | Performed by: EMERGENCY MEDICINE

## 2022-10-12 PROCEDURE — 93005 ELECTROCARDIOGRAM TRACING: CPT

## 2022-10-12 PROCEDURE — 84484 ASSAY OF TROPONIN QUANT: CPT

## 2022-10-12 RX ORDER — FUROSEMIDE 10 MG/ML
80 INJECTION INTRAMUSCULAR; INTRAVENOUS ONCE
Status: COMPLETED | OUTPATIENT
Start: 2022-10-12 | End: 2022-10-12

## 2022-10-12 RX ORDER — SODIUM CHLORIDE 9 MG/ML
3 INJECTION INTRAVENOUS
Status: DISCONTINUED | OUTPATIENT
Start: 2022-10-12 | End: 2022-10-12 | Stop reason: HOSPADM

## 2022-10-12 RX ADMIN — FUROSEMIDE 80 MG: 10 INJECTION, SOLUTION INTRAMUSCULAR; INTRAVENOUS at 15:41

## 2022-10-12 NOTE — DISCHARGE INSTRUCTIONS
You were seen for shortness of breath and chest discomfort  You were evaluated and may have a mild CHF exacerbation  You were given a dose of IV lasix  You should follow up with your primary care doctor for further management of your symptoms

## 2022-10-12 NOTE — ED PROCEDURE NOTE
Procedure  POC Cardiac US    Date/Time: 10/12/2022 2:31 PM  Performed by: Mary Jane Mann DO  Authorized by:  Mary Jane Mann DO     Patient location:  ED  Other Assisting Provider: Yes (comment) (Dr Rere Marshall)    Procedure details:     Exam Type:  Diagnostic    Indications: suspected volume depletion, chest pain and respiratory distress      Assessment / Evaluation for: cardiac function, pericardial effusion, inferior vena cava for fluid responsiveness, intravascular volume status and right heart strain (suspected pulmonary embolism)      Exam Type: initial exam      Image quality: limited diagnostic      Image availability:  Images available in PACS  Patient Details:     Cardiac Rhythm:  Regular    Mechanical ventilation: No    Cardiac findings:     Echo technique: limited 2D      Views obtained: parasternal long axis, parasternal short axis, subcostal and apical      Pericardial effusion: absent      Tamponade physiology: absent      Wall motion: normal      LV systolic function comment:  Slightly decreased     RV dilation: none    Pulmonary findings:     Left Lung Findings: left lung sliding      Right lung findings: right lung sliding      B-lines: 1 to [address] Phi Diaz DO  10/12/22 [id number]

## 2022-10-12 NOTE — ED PROVIDER NOTES
History  Chief Complaint   Patient presents with   • Chest Pain     Pain started Monday  Now pt says it is more of a "heaviness"  C/o SOB and leg weakness  Pain increased when laying down  70-year-old woman with relevant PMH HLD, HTN, T2DM, and stroke presents with chest pain and dyspnea  On Monday, she noted a substernal chest pain that did not radiate  There was no vomiting, diaphoresis, or dyspnea  Since then, the chest pain subsided, but she continues to have a chest heaviness  She is reporting some orthopnea  She does report compliance with her medications as prescribed  She has a nonproductive cough  No diarrhea or dysuria  Prior to Admission Medications   Prescriptions Last Dose Informant Patient Reported? Taking? Blood Glucose Monitoring Suppl (FREESTYLE LITE) JAQUELIN  Self No No   Sig: by Does not apply route daily   Breo Ellipta 100-25 MCG/INH inhaler  Self No No   Sig: Inhale 1 puff daily   Continuous Blood Gluc  (FreeStyle Naldo 14 Day [place]) JAQUELIN   No No   Sig: Use to record blood glucose 4 times daily  Once fasting and three times daily before meals   Continuous Blood Gluc Sensor (MoboTapStyle [place]alod 14 Day Sensor) MISC   No No   Sig: Use one sensor every 14 days   D3 50 MCG (2000 UT) TABS   No No   Sig: TAKE 2 TABLETS (4,000 UNITS TOTAL) BY MOUTH DAILY   Glucose-Vitamin C-Vitamin D (TRUEPLUS GLUCOSE ON THE GO) CHEW  Self Yes No   Sig: CHEW 2 TABS AS NEEDED FOR BLOOD SUGAR LESS THAN 80   Patient not taking: Reported on 8/9/2022   Lancets (FREESTYLE) lancets  Self No No   Sig: Use as instructed   Misc   Devices (QUAD CANE) MISC  Self No No   Sig: by Does not apply route daily   Tradjenta 5 MG TABS   No No   Sig: TAKE 1 TABLET (5 MG) BY MOUTH DAILY   Xarelto 2 5 MG tablet   No No   Sig: TAKE 1 TABLET (2 5 MG TOTAL) BY MOUTH 2 (TWO) TIMES A DAY WITH MEALS   acetaminophen (TYLENOL) 650 mg CR tablet  Self Yes No   Sig: Take 650 mg by mouth every 6 (six) hours as needed for mild pain albuterol (PROVENTIL HFA,VENTOLIN HFA) 90 mcg/act inhaler  Self No No   Sig: INHALE 2 PUFFS EVERY 4 (FOUR) HOURS AS NEEDED FOR WHEEZING   aspirin (ECOTRIN LOW STRENGTH) 81 mg EC tablet  Self No No   Sig: Take 1 tablet (81 mg total) by mouth daily   atorvastatin (LIPITOR) 40 mg tablet   No No   Sig: TAKE 1 TABLET (40 MG TOTAL) BY MOUTH DAILY   carvedilol (COREG) 6 25 mg tablet   No No   Sig: TAKE 1 TABLET (6 25 MG TOTAL) BY MOUTH 2 (TWO) TIMES A DAY WITH MEALS   clopidogrel (PLAVIX) 75 mg tablet  Self No No   Sig: TAKE 2 TABS DAILY   conjugated estrogens (PREMARIN) vaginal cream  Self No No   Sig: Insert 0 5 g into the vagina 2 (two) times a week Insert twice weekly at bedtime   estradiol (ESTRACE VAGINAL) 0 1 mg/g vaginal cream   No No   Sig: Insert 2 g into the vagina 2 (two) times a week   Patient not taking: Reported on 7/1/2022   ferrous sulfate 324 (65 Fe) mg  Self No No   Sig: Take 1 tablet (324 mg total) by mouth 2 (two) times a day before meals   furosemide (LASIX) 40 mg tablet  Self No No   Sig: TAKE 1 TABLET (40 MG TOTAL) BY MOUTH 2 (TWO) TIMES A DAY   glucose blood (FREESTYLE LITE) test strip  Self No No   Sig: TEST AS DIRECTED UP TO FOUR TIMES A DAY   glucose monitoring kit (FREESTYLE) monitoring kit   No No   Sig: Use 1 each as needed (Three time daily) Please check blood sugar 3 times daily     ketoconazole (NIZORAL) 2 % shampoo   No No   Sig: APPLY TOPICALLY TO THE SCALP ONCE EVERY OTHER WEEK   latanoprost (XALATAN) 0 005 % ophthalmic solution  Self Yes No   losartan (COZAAR) 50 mg tablet   No No   Sig: TAKE 1 TABLET DAILY   metFORMIN (GLUCOPHAGE) 500 mg tablet   No No   Sig: Take 1 tablet (500 mg total) by mouth daily with breakfast   montelukast (SINGULAIR) 10 mg tablet  Self No No   Sig: TAKE 1 TABLET (10 MG TOTAL) BY MOUTH DAILY AT BEDTIME   nitroglycerin (NITROSTAT) 0 4 mg SL tablet  Self No No   Sig: PLACE 1 TABLET (0 4 MG TOTAL) UNDER THE TONGUE EVERY 5 (FIVE) MINUTES AS NEEDED FOR CHEST PAIN ranolazine (RANEXA) 500 mg 12 hr tablet   No No   Sig: Take 1 tablet (500 mg total) by mouth every 12 (twelve) hours   repaglinide (PRANDIN) 0 5 mg tablet   No No   Sig: Take 1 tablet (0 5 mg total) by mouth daily before dinner (SKIP DOSE IF SKIPPING MEAL)      Facility-Administered Medications: None       Past Medical History:   Diagnosis Date   • ACE-inhibitor cough     last assessed - 29Dec2017   • Asthma    • Bilateral edema of lower extremity     last assessed - 21Aug2017   • Cardiac murmur     last assessed - 21Aug2017   • CKD (chronic kidney disease)    • Diabetes mellitus ([facility] [address] )     last assessed - [id number]   • Esophageal dysphagia    • Fatigue     last assessed - 21Aug2017   • Hyperlipidemia    • Hypertension    • Patellar bursitis of right knee     last assessed - [id number]   • Personal history of other specified conditions     presenting hazards to health   • Stroke [facility])    • Stroke syndrome    • Urinary frequency    • UTI (urinary tract infection)        Past Surgical History:   Procedure Laterality Date   • NE ESOPHAGOGASTRODUODENOSCOPY TRANSORAL DIAGNOSTIC N/A 4/5/2017    Procedure: ESOPHAGOGASTRODUODENOSCOPY (EGD); Surgeon: Steven Jovel MD;  Location: [facility];   Service: Gastroenterology       Family History   Problem Relation Age of Onset   • Heart disease Father    • Hypertension Mother    • Stroke Mother    • Other Sister         1)Breast disorder; 2)Epilepsy   • Migraines Sister         Migraine headaches   • Osteoporosis Sister    • Thyroid disease Sister    • Coronary artery disease Sister         S/P triple vessel bypass   • Osteoporosis Maternal Grandmother    • Diabetes Son         Diabetes mellitus   • Hypertension Son    • Rheum arthritis Son         Rheumatic disease   • Heart disease Family    • No Known Problems Maternal Grandfather    • No Known Problems Paternal Grandmother    • No Known Problems Paternal Grandfather    • Breast cancer Sister [de-identified]   • No Known Problems Sister    • No Known Problems Sister    • No Known Problems Maternal Aunt    • No Known Problems Maternal Aunt    • No Known Problems Maternal Aunt    • No Known Problems Paternal Aunt    • No Known Problems Paternal Aunt    • No Known Problems Son      I have reviewed and agree with the history as documented  E-Cigarette/Vaping   • E-Cigarette Use Never User      E-Cigarette/Vaping Substances   • Nicotine No    • THC No    • CBD No    • Flavoring No    • Other No    • Unknown No      Social History     Tobacco Use   • Smoking status: Former Smoker     Packs/day: 3 00     Quit date:      Years since quittin 8   • Smokeless tobacco: Former User   • Tobacco comment: quit over 30 yrs ago; (quit in the , had smoked 3PPD for 20 years - per [employer])   Vaping Use   • Vaping Use: Never used   Substance Use Topics   • Alcohol use: Never   • Drug use: Never        Review of Systems   Constitutional: Negative for chills and fever  HENT: Negative for ear pain and sore throat  Eyes: Negative for pain and visual disturbance  Respiratory: Positive for shortness of breath  Negative for cough and wheezing  Cardiovascular: Positive for chest pain  Negative for palpitations  Gastrointestinal: Negative for abdominal pain, constipation, diarrhea and vomiting  Genitourinary: Negative for dysuria, frequency, hematuria and vaginal bleeding  Musculoskeletal: Negative for arthralgias and back pain  Skin: Negative for color change and rash  Neurological: Negative for seizures, syncope and headaches  Psychiatric/Behavioral: Negative for agitation and confusion         Physical Exam  ED Triage Vitals [10/12/22 1352]   Temperature Pulse Respirations Blood Pressure SpO2   98 1 °F (36 7 °C) 69 18 135/64 100 %      Temp Source Heart Rate Source Patient Position - Orthostatic VS BP Location FiO2 (%)   Oral Monitor Lying Right arm --      Pain Score       --             Orthostatic Vital Signs  Vitals:    10/12/22 1352 10/12/22 1435 10/12/22 1530 10/12/22 1711   BP: 135/64 106/51 123/57 126/58   Pulse: 69 56 60 55   Patient Position - Orthostatic VS: Lying   Sitting       Physical Exam  Vitals and nursing note reviewed  Constitutional:       General: She is not in acute distress  Appearance: Normal appearance  She is well-developed  HENT:      Head: Normocephalic and atraumatic  Right Ear: External ear normal       Left Ear: External ear normal       Nose: Nose normal  No congestion  Cardiovascular:      Rate and Rhythm: Normal rate and regular rhythm  Pulmonary:      Effort: Pulmonary effort is normal  No respiratory distress  Breath sounds: Normal breath sounds  Abdominal:      Palpations: Abdomen is soft  Tenderness: There is no abdominal tenderness  There is no guarding or rebound  Musculoskeletal:         General: Normal range of motion  Cervical back: Normal range of motion and neck supple  Right lower leg: No edema  Left lower leg: No edema  Skin:     General: Skin is warm and dry  Neurological:      General: No focal deficit present  Mental Status: She is alert and oriented to person, place, and time  Sensory: No sensory deficit  Motor: No weakness     Psychiatric:         Mood and Affect: Mood normal          Behavior: Behavior normal          ED Medications  Medications   sodium chloride (PF) 0 9 % injection 3 mL (has no administration in time range)   furosemide (LASIX) injection 80 mg (80 mg Intravenous Given 10/12/22 1541)       Diagnostic Studies  Results Reviewed     Procedure Component Value Units Date/Time    HS Troponin I 4hr [[id number]]     Lab Status: No result Specimen: Blood     HS Troponin I 2hr [[id number]]     Lab Status: No result Specimen: Blood     HS Troponin 0hr (reflex protocol) [[id number]]  (Normal) Collected: 10/12/22 1437    Lab Status: Final result Specimen: Blood from Line, Venous Updated: 10/12/22 1535     hs TnI 0hr 25 ng/L Basic metabolic panel [[id number]]  (Abnormal) Collected: 10/12/22 1437    Lab Status: Final result Specimen: Blood from Line, Venous Updated: 10/12/22 1522     Sodium 139 mmol/L      Potassium 4 6 mmol/L      Chloride 107 mmol/L      CO2 27 mmol/L      ANION GAP 5 mmol/L      BUN 33 mg/dL      Creatinine 1 44 mg/dL      Glucose 227 mg/dL      Calcium 9 4 mg/dL      eGFR 33 ml/min/1 73sq m     Narrative:      [place] guidelines for Chronic Kidney Disease (CKD):   •  Stage 1 with normal or high GFR (GFR > 90 mL/min/1 73 square meters)  •  Stage 2 Mild CKD (GFR = 60-89 mL/min/1 73 square meters)  •  Stage 3A Moderate CKD (GFR = 45-59 mL/min/1 73 square meters)  •  Stage 3B Moderate CKD (GFR = 30-44 mL/min/1 73 square meters)  •  Stage 4 Severe CKD (GFR = 15-29 mL/min/1 73 square meters)  •  Stage 5 End Stage CKD (GFR <15 mL/min/1 73 square meters)  Note: GFR calculation is accurate only with a steady state creatinine    CBC and differential [[id number]]  (Abnormal) Collected: 10/12/22 1437    Lab Status: Final result Specimen: Blood from Line Updated: 10/12/22 1449     WBC 5 77 Thousand/uL      RBC 3 79 Million/uL      Hemoglobin 10 3 g/dL      Hematocrit 32 6 %      MCV 86 fL      MCH 27 2 pg      MCHC 31 6 g/dL      RDW 14 5 %      MPV 12 3 fL      Platelets 714 Thousands/uL      nRBC 0 /100 WBCs      Neutrophils Relative 79 %      Immat GRANS % 0 %      Lymphocytes Relative 12 %      Monocytes Relative 6 %      Eosinophils Relative 2 %      Basophils Relative 1 %      Neutrophils Absolute 4 57 Thousands/µL      Immature Grans Absolute 0 02 Thousand/uL      Lymphocytes Absolute 0 71 Thousands/µL      Monocytes Absolute 0 34 Thousand/µL      Eosinophils Absolute 0 10 Thousand/µL      Basophils Absolute 0 03 Thousands/µL                  XR chest 2 views   ED Interpretation by Dede Chun MD (10/12 1521)   Cardiomegaly  No acute cardiopulmonary disease  Procedures  Procedures      ED Course  ED Course as of 10/12/22 1927   Wed Oct 12, 2022   1411 This ECG was interpreted by me  The ECG demonstrates Normal sinus rhythm, normal intervals, normal axis, normal QRS, no acute ST changes present  Prior septal infarct       MDM  Number of Diagnoses or Management Options  Chest pain: new and requires workup  Dyspnea: new and requires workup  Diagnosis management comments: Presents with 1 week of chest discomfort, occasional chest pain, and orthopnea  No physical exam evidence of volume overload and chest radiograph without evidence of fluid accumulation  She was given a dose of 80 mg IV Lasix here, which she responded to  No swelling of the legs or pleuritic chest pain, so low concern for PE  Discussed admission with SOD who said there was no grounds for admission for CHF exacerbation  They recommended she continue taking her Lasix and will follow up with her 1 week outpatient  No ECG evidence of ischemia  Patient in agreement with plan and questions were answered  Verbalized understanding of return precautions  Portions or all of this note were generated using voice recognition software  Occasional wrong word or "sound a like" substitutions may have occurred due to the inherent limitations of voice recognition software  Please interpret any errors within the intended context of the whole sentence or idea  Disposition  Final diagnoses:   Chest pain   Dyspnea     Time reflects when diagnosis was documented in both MDM as applicable and the Disposition within this note     Time User Action Codes Description Comment    10/12/2022  6:00 PM Jerral Schwab Add [R07 9] Chest pain     10/12/2022  6:01 PM Jerral Schwab Add [R06 00] Dyspnea       ED Disposition     ED Disposition   Discharge    Condition   Stable    Date/Time   Wed Oct 12, 2022  6:00 PM    Comment   Donnie Trujillo discharge to home/self care                 Follow-up Information None         Discharge Medication List as of 10/12/2022  6:02 PM      CONTINUE these medications which have NOT CHANGED    Details   acetaminophen (TYLENOL) 650 mg CR tablet Take 650 mg by mouth every 6 (six) hours as needed for mild pain, Historical Med      albuterol (PROVENTIL HFA,VENTOLIN HFA) 90 mcg/act inhaler INHALE 2 PUFFS EVERY 4 (FOUR) HOURS AS NEEDED FOR WHEEZING, Starting Tue 12/7/2021, Normal      aspirin (ECOTRIN LOW STRENGTH) 81 mg EC tablet Take 1 tablet (81 mg total) by mouth daily, Starting Mon 6/28/2021, Normal      atorvastatin (LIPITOR) 40 mg tablet TAKE 1 TABLET (40 MG TOTAL) BY MOUTH DAILY, Starting Thu 8/11/2022, Until Tue 2/7/2023, Normal      Blood Glucose Monitoring Suppl (FREESTYLE LITE) JAQUELIN by Does not apply route daily, Starting Tue 4/17/2018, Until Tue 8/9/2022, Normal      Breo Ellipta 100-25 MCG/INH inhaler Inhale 1 puff daily, Starting Wed 6/16/2021, Normal      carvedilol (COREG) 6 25 mg tablet TAKE 1 TABLET (6 25 MG TOTAL) BY MOUTH 2 (TWO) TIMES A DAY WITH MEALS, Starting Mon 9/5/2022, Until Sat 3/4/2023, Normal      clopidogrel (PLAVIX) 75 mg tablet TAKE 2 TABS DAILY, Normal      conjugated estrogens (PREMARIN) vaginal cream Insert 0 5 g into the vagina 2 (two) times a week Insert twice weekly at bedtime, Starting Mon 10/25/2021, Normal      Continuous Blood Gluc  (FreeStyle Naldo 14 Day [place]) JAQUELIN Use to record blood glucose 4 times daily   Once fasting and three times daily before meals, Normal      Continuous Blood Gluc Sensor (FreeStyle Naldo 14 Day Sensor) MISC Use one sensor every 14 days, Normal      D3 50 MCG (2000 UT) TABS TAKE 2 TABLETS (4,000 UNITS TOTAL) BY MOUTH DAILY, Starting Wed 7/20/2022, Normal      estradiol (ESTRACE VAGINAL) 0 1 mg/g vaginal cream Insert 2 g into the vagina 2 (two) times a week, Starting Mon 4/4/2022, Normal      ferrous sulfate 324 (65 Fe) mg Take 1 tablet (324 mg total) by mouth 2 (two) times a day before meals, Starting Tue 6/1/2021, Normal      furosemide (LASIX) 40 mg tablet TAKE 1 TABLET (40 MG TOTAL) BY MOUTH 2 (TWO) TIMES A DAY, Starting Mon 1/10/2022, Normal      glucose blood (FREESTYLE LITE) test strip TEST AS DIRECTED UP TO FOUR TIMES A DAY, Normal      glucose monitoring kit (FREESTYLE) monitoring kit Use 1 each as needed (Three time daily) Please check blood sugar 3 times daily  , Starting Mon 5/9/2022, Normal      Glucose-Vitamin C-Vitamin D (TRUEPLUS GLUCOSE ON THE GO) CHEW CHEW 2 TABS AS NEEDED FOR BLOOD SUGAR LESS THAN 80, Historical Med      ketoconazole (NIZORAL) 2 % shampoo APPLY TOPICALLY TO THE SCALP ONCE EVERY OTHER WEEK, Normal      Lancets (FREESTYLE) lancets Use as instructed, Normal      latanoprost (XALATAN) 0 005 % ophthalmic solution Starting Tue 9/14/2021, Historical Med      losartan (COZAAR) 50 mg tablet TAKE 1 TABLET DAILY, Normal      metFORMIN (GLUCOPHAGE) 500 mg tablet Take 1 tablet (500 mg total) by mouth daily with breakfast, Starting Tue 8/9/2022, No Print      Misc  Devices (QUAD CANE) MISC by Does not apply route daily, Starting Mon 6/1/2020, Print      montelukast (SINGULAIR) 10 mg tablet TAKE 1 TABLET (10 MG TOTAL) BY MOUTH DAILY AT BEDTIME, Starting Mon 1/10/2022, Until Tue 8/9/2022, Normal      nitroglycerin (NITROSTAT) 0 4 mg SL tablet PLACE 1 TABLET (0 4 MG TOTAL) UNDER THE TONGUE EVERY 5 (FIVE) MINUTES AS NEEDED FOR CHEST PAIN, Starting Tue 12/7/2021, Normal      ranolazine (RANEXA) 500 mg 12 hr tablet Take 1 tablet (500 mg total) by mouth every 12 (twelve) hours, Starting Wed 5/26/2021, Until Tue 8/9/2022, Normal      repaglinide (PRANDIN) 0 5 mg tablet Take 1 tablet (0 5 mg total) by mouth daily before dinner (SKIP DOSE IF SKIPPING MEAL), Starting Tue 8/9/2022, Normal      Tradjenta 5 MG TABS TAKE 1 TABLET (5 MG) BY MOUTH DAILY, Normal      Xarelto 2 5 MG tablet TAKE 1 TABLET (2 5 MG TOTAL) BY MOUTH 2 (TWO) TIMES A DAY WITH MEALS, Normal           No discharge procedures on file      PDMP Review     None           ED Provider  Attending physically available and evaluated Pau Hurt  JONO managed the patient along with the ED Attending      Electronically Signed by         Juanis Zepeda MD  10/12/22 1929

## 2022-10-13 NOTE — QUICK NOTE
Contacted by Karlyne Councilman, MD about admission of Mrs Amaya  Patient presenting with 3 days of chest discomfort and worsening dyspnea on exertion  Per patient, she was having increased orthopnea 3 days ago, however improved last night  Patient reports missing dose of Lasix 2 days ago  Denies bilateral leg swelling, cough  On presentation, ECG unchanged from baseline, troponins negative  Chest x-ray wet read with no signs of pulmonary edema to my interpretation  Labs were reviewed, unchanged from baseline  On exam, patient euvolemic on exam, lungs clear to auscultation, no JVD no leg edema  Of note, patient received IV Lasix 80 mg once in the ED  After my evaluation, no need for inpatient admission at this time  Case discussed with patient, advised to continue Lasix 40 mg BID  Will set outpatient appointment with PCP within 1 week  If symptoms worsen, patient was advised to return to the ED

## 2022-10-13 NOTE — ED ATTENDING ATTESTATION
10/12/2022  IAsiya MD, saw and evaluated the patient  I have discussed the patient with the resident/non-physician practitioner and agree with the resident's/non-physician practitioner's findings, Plan of Care, and MDM as documented in the resident's/non-physician practitioner's note, except where noted  All available labs and Radiology studies were reviewed  I was present for key portions of any procedure(s) performed by the resident/non-physician practitioner and I was immediately available to provide assistance  At this point I agree with the current assessment done in the Emergency Department  I have conducted an independent evaluation of this patient a history and physical is as follows:    ED Course     40-year-old female presents with chest pain shortness of breath patient's scribe's pain as anterior chest heaviness with mild dyspnea symptoms have been present since Monday  Patient also states she has felt short of breath pain gets worse upon lying down but upon sitting up  Denies fevers chills or cough  Heart regular rate rhythm without murmurs  Lungs clear to auscultation bilaterally  Abdomen soft nontender nondistended normal bowel sounds  Extremities no edema  Impression:  Chest pain    Plan check cardiac evaluation chest x-ray serial troponins IV diuretics reassess anticipate admission        Critical Care Time  Procedures

## 2022-10-20 ENCOUNTER — OFFICE VISIT (OUTPATIENT)
Dept: PODIATRY | Facility: CLINIC | Age: 82
End: 2022-10-20
Payer: MEDICARE

## 2022-10-20 VITALS
HEIGHT: 63 IN | DIASTOLIC BLOOD PRESSURE: 72 MMHG | SYSTOLIC BLOOD PRESSURE: 126 MMHG | HEART RATE: 67 BPM | BODY MASS INDEX: 30.26 KG/M2

## 2022-10-20 DIAGNOSIS — I73.9 PERIPHERAL VASCULAR DISEASE, UNSPECIFIED (HCC): ICD-10-CM

## 2022-10-20 DIAGNOSIS — E11.40 TYPE 2 DIABETES MELLITUS WITH DIABETIC NEUROPATHY, UNSPECIFIED WHETHER LONG TERM INSULIN USE (HCC): Primary | ICD-10-CM

## 2022-10-20 DIAGNOSIS — B35.1 ONYCHOMYCOSIS: ICD-10-CM

## 2022-10-20 PROCEDURE — 99213 OFFICE O/P EST LOW 20 MIN: CPT | Performed by: PODIATRIST

## 2022-10-20 PROCEDURE — 11721 DEBRIDE NAIL 6 OR MORE: CPT | Performed by: PODIATRIST

## 2022-10-20 NOTE — PROGRESS NOTES
PATIENT:  Denton Zapata  1940    ASSESSMENT/PLAN:  1  Type 2 diabetes mellitus with diabetic neuropathy, unspecified whether long term insulin use (HCC)  VAS lower limb arterial duplex, complete bilateral   2  Peripheral vascular disease, unspecified (HCC)  VAS lower limb arterial duplex, complete bilateral   3  Onychomycosis  Debridement            Disease prevention and related risk factors of diabetes were identified and discussed  The patient was educated in proper foot wear for diabetics  Also educated in daily foot assessment and routine diabetic foot care  Reviewed the last blood work and the HbA1c was 8 2  Discussed the importance of controlling BS through diet and exercise  Reviewed arterial study  Recommended repeat test once a year  She was sent for arterial study in December  The patient will follow up in 10 weeks for further diabetic foot exam and care  PROCEDURE:  All mycotic toenails were reduced and debrided in length, width, and girth using a nail nipper and dremel  Patient tolerated procedure(s) well without complications  •     HPI:  Denton Zapata is a 80 y  o year old female seen for diabetic foot exam   BS has been better  She did not follow-up in our office since the last visit in November 2021  No acute pedal problems  No ulcer in her feet        PAST MEDICAL HISTORY:  Past Medical History:   Diagnosis Date   • ACE-inhibitor cough     last assessed - [id number]   • Asthma    • Bilateral edema of lower extremity     last assessed - [id number]   • Cardiac murmur     last assessed - [id number]   • CKD (chronic kidney disease)    • Diabetes mellitus ([facility] [address] )     last assessed - [id number]   • Esophageal dysphagia    • Fatigue     last assessed - [id number]   • Hyperlipidemia    • Hypertension    • Patellar bursitis of right knee     last assessed - [id number]   • Personal history of other specified conditions     presenting hazards to health   • Stroke [facility])    • Stroke syndrome • Urinary frequency    • UTI (urinary tract infection)        PAST SURGICAL HISTORY:  Past Surgical History:   Procedure Laterality Date   • [place] ESOPHAGOGASTRODUODENOSCOPY TRANSORAL DIAGNOSTIC N/A 4/5/2017    Procedure: ESOPHAGOGASTRODUODENOSCOPY (EGD); Surgeon: Maurice Hackett MD;  Location:  GI LAB; Service: Gastroenterology        ALLERGIES:  Patient has no known allergies  MEDICATIONS:  Current Outpatient Medications   Medication Sig Dispense Refill   • acetaminophen (TYLENOL) 650 mg CR tablet Take 650 mg by mouth every 6 (six) hours as needed for mild pain     • albuterol (PROVENTIL HFA,VENTOLIN HFA) 90 mcg/act inhaler INHALE 2 PUFFS EVERY 4 (FOUR) HOURS AS NEEDED FOR WHEEZING 8 5 g 5   • aspirin (ECOTRIN LOW STRENGTH) 81 mg EC tablet Take 1 tablet (81 mg total) by mouth daily 90 tablet 0   • atorvastatin (LIPITOR) 40 mg tablet TAKE 1 TABLET (40 MG TOTAL) BY MOUTH DAILY 90 tablet 3   • Breo Ellipta 100-25 MCG/INH inhaler Inhale 1 puff daily 60 blister 5   • carvedilol (COREG) 6 25 mg tablet TAKE 1 TABLET (6 25 MG TOTAL) BY MOUTH 2 (TWO) TIMES A DAY WITH MEALS 60 tablet 5   • clopidogrel (PLAVIX) 75 mg tablet TAKE 2 TABS DAILY 180 tablet 3   • conjugated estrogens (PREMARIN) vaginal cream Insert 0 5 g into the vagina 2 (two) times a week Insert twice weekly at bedtime 30 g 1   • Continuous Blood Gluc  (FreeStyle Naldo 14 Day [place]) [facility] Use to record blood glucose 4 times daily   Once fasting and three times daily before meals 1 each 0   • Continuous Blood Gluc Sensor (FreeStyle Naldo 14 Day Sensor) MISC Use one sensor every 14 days 6 each 1   • D3 50 MCG (2000 UT) TABS TAKE 2 TABLETS (4,000 UNITS TOTAL) BY MOUTH DAILY 180 tablet 0   • ferrous sulfate 324 (65 Fe) mg Take 1 tablet (324 mg total) by mouth 2 (two) times a day before meals 60 tablet 2   • furosemide (LASIX) 40 mg tablet TAKE 1 TABLET (40 MG TOTAL) BY MOUTH 2 (TWO) TIMES A  tablet 3   • glucose blood (FREESTYLE LITE) test strip TEST AS DIRECTED UP TO FOUR TIMES A  each 3   • glucose monitoring kit (FREESTYLE) monitoring kit Use 1 each as needed (Three time daily) Please check blood sugar 3 times daily  1 each 1   • ketoconazole (NIZORAL) 2 % shampoo APPLY TOPICALLY TO THE SCALP ONCE EVERY OTHER WEEK 120 mL 0   • Lancets (FREESTYLE) lancets Use as instructed 100 each 0   • latanoprost (XALATAN) 0 005 % ophthalmic solution      • losartan (COZAAR) 50 mg tablet TAKE 1 TABLET DAILY 90 tablet 1   • metFORMIN (GLUCOPHAGE) 500 mg tablet Take 1 tablet (500 mg total) by mouth daily with breakfast 90 tablet 0   • Misc  Devices (QUAD CANE) MISC by Does not apply route daily 1 each 0   • montelukast (SINGULAIR) 10 mg tablet TAKE 1 TABLET (10 MG TOTAL) BY MOUTH DAILY AT BEDTIME 90 tablet 3   • nitroglycerin (NITROSTAT) 0 4 mg SL tablet PLACE 1 TABLET (0 4 MG TOTAL) UNDER THE TONGUE EVERY 5 (FIVE) MINUTES AS NEEDED FOR CHEST PAIN 25 tablet 1   • repaglinide (PRANDIN) 0 5 mg tablet Take 1 tablet (0 5 mg total) by mouth daily before dinner (SKIP DOSE IF SKIPPING MEAL) 90 tablet 1   • Tradjenta 5 MG TABS TAKE 1 TABLET (5 MG) BY MOUTH DAILY 90 tablet 1   • Xarelto 2 5 MG tablet TAKE 1 TABLET (2 5 MG TOTAL) BY MOUTH 2 (TWO) TIMES A DAY WITH MEALS 180 tablet 1   • Blood Glucose Monitoring Suppl (FREESTYLE LITE) JAQUELIN by Does not apply route daily 1 each 0   • estradiol (ESTRACE VAGINAL) 0 1 mg/g vaginal cream Insert 2 g into the vagina 2 (two) times a week (Patient not taking: No sig reported) 42 5 g 3   • Glucose-Vitamin C-Vitamin D (TRUEPLUS GLUCOSE ON THE GO) CHEW CHEW 2 TABS AS NEEDED FOR BLOOD SUGAR LESS THAN 80 (Patient not taking: No sig reported)     • ranolazine (RANEXA) 500 mg 12 hr tablet Take 1 tablet (500 mg total) by mouth every 12 (twelve) hours 60 tablet 0     No current facility-administered medications for this visit  SOCIAL HISTORY:  Social History     Socioeconomic History   • Marital status:       Spouse name: None   • Number of children: 8   • Years of education: None   • Highest education level: None   Occupational History   • Occupation: Retired   Tobacco Use   • Smoking status: Former Smoker     Packs/day: 3 00     Quit date:      Years since quittin 8   • Smokeless tobacco: Former User   • Tobacco comment: quit over 30 yrs ago; (quit in the , had smoked 3PPD for 20 years - per [employer])   Vaping Use   • Vaping Use: Never used   Substance and Sexual Activity   • Alcohol use: Never   • Drug use: Never   • Sexual activity: Not Currently   Other Topics Concern   • None   Social History Narrative    Diet needs improvement    Does not exercise    No caffeine use     Social Determinants of Health     Financial Resource Strain: Low Risk    • Difficulty of Paying Living Expenses: Not hard at all   Food Insecurity: No Food Insecurity   • Worried About [address] in the Last Year: Never true   • Ran Out of Food in the Last Year: Never true   Transportation Needs: No Transportation Needs   • Lack of Transportation (Medical): No   • Lack of Transportation (Non-Medical): No   Physical Activity: Not on file   Stress: Not on file   Social Connections: Not on file   Intimate Partner Violence: Not on file   Housing Stability: Not on file        REVIEW OF SYSTEMS:  GENERAL: NAD, afebrile  HEART: No chest pain, or palpitation  RESPIRATORY:  No acute SOB or cough  GI: No Nausea, vomit or diarrhea  NEUROLOGIC: No syncope or acute weakness    PHYSICAL EXAM:  VASCULAR EXAM  Dorsalis pedis  Absent  Posterior tibial artery  absent  The patient has class findings with skin atrophy, lack of digital hair, and nail dystrophy  There is +1 lower extremity edema bilaterally  NEUROLOGIC EXAM  Sensation is intact to light touch  Sensation is intact to 10gm monofilament  Decreased vibratory sensation on her feet  No focal neurologic deficit  DERMATOLOGIC EXAM:   No ulcer or cellulitis noted  No abscess    The patient has hypertrophic toenails X 7 with discoloration, onycholysis, and subungal debris  No notable skin lesion  MUSCULOSKELETAL EXAM:   No acute joint pain  No acute joint swelling or redness  No acute musculoskeletal problem  Yohana noted  Diabetic Foot Exam    Patient's shoes and socks removed  Right Foot/Ankle   Right Foot Inspection  Skin Exam: skin intact and dry skin  No callus, no erythema, no maceration, no abnormal color, no pre-ulcer, no ulcer and no callus  Toe Exam: right toe deformity  No swelling, no tenderness and erythema    Sensory   Vibration: diminished  Proprioception: intact  Monofilament testing: intact    Vascular  Capillary refills: < 3 seconds  The right DP pulse is 0  The right PT pulse is 0  Left Foot/Ankle  Left Foot Inspection  Skin Exam: skin intact and dry skin  No erythema, no maceration, normal color, no pre-ulcer, no ulcer and no callus  Toe Exam: left toe deformity  No swelling, no tenderness and no erythema  Sensory   Vibration: diminished  Proprioception: intact  Monofilament testing: intact    Vascular  Capillary refills: < 3 seconds  The left DP pulse is 0  The left PT pulse is 0       Assign Risk Category  Deformity present  No loss of protective sensation  Weak pulses  Risk: 2

## 2022-11-01 DIAGNOSIS — J45.30 MILD PERSISTENT ASTHMA WITHOUT COMPLICATION: ICD-10-CM

## 2022-11-01 RX ORDER — FLUTICASONE FUROATE AND VILANTEROL TRIFENATATE 100; 25 UG/1; UG/1
POWDER RESPIRATORY (INHALATION)
Qty: 60 EACH | Refills: 2 | Status: SHIPPED | OUTPATIENT
Start: 2022-11-01

## 2022-12-07 ENCOUNTER — OFFICE VISIT (OUTPATIENT)
Dept: MULTI SPECIALTY CLINIC | Facility: CLINIC | Age: 82
End: 2022-12-07

## 2022-12-07 VITALS
TEMPERATURE: 97.3 F | HEART RATE: 58 BPM | WEIGHT: 179.2 LBS | BODY MASS INDEX: 31.74 KG/M2 | SYSTOLIC BLOOD PRESSURE: 152 MMHG | DIASTOLIC BLOOD PRESSURE: 75 MMHG

## 2022-12-07 DIAGNOSIS — I12.9 BENIGN HYPERTENSION WITH CKD (CHRONIC KIDNEY DISEASE) STAGE III (HCC): ICD-10-CM

## 2022-12-07 DIAGNOSIS — E78.2 MIXED HYPERLIPIDEMIA: ICD-10-CM

## 2022-12-07 DIAGNOSIS — N18.30 BENIGN HYPERTENSION WITH CKD (CHRONIC KIDNEY DISEASE) STAGE III (HCC): ICD-10-CM

## 2022-12-07 DIAGNOSIS — E11.65 UNCONTROLLED TYPE 2 DIABETES MELLITUS WITH HYPERGLYCEMIA (HCC): Primary | ICD-10-CM

## 2022-12-07 LAB — SL AMB POCT HEMOGLOBIN AIC: 7.1 (ref ?–6.5)

## 2022-12-07 NOTE — PROGRESS NOTES
Patient Progress Note      Chief Complaint   Patient presents with   • Follow-up      Referring Provider  Sue Dove,   [address]  [facility],  [address] [facility] [facility]     History of Present Illness:   Heidi Suero is a 80 y o  female with a history of type 2 diabetes with long term use of insulin  Diabetes course has been stable  Complications of DM: retinopathy, CVA, CKD  Denies recent illness or hospitalizations  Denies recent severe hypoglycemic or severe hyperglycemic episodes  Denies any issues with her current regimen  Home glucose monitoring: are performed regularly  She uses the [employer] but forgot to bring her   She reports her readings range from 100s-200s mg/dl  She states after meals BG may get over 200 mg/dl but it usually is down in the 100s mg/dl prior to her next meal      Current regimen: Metformin 500 mg a day, Tradjenta 5 mg daily, Prandin 0 5 mg with dinner  Jardiance, Victoza and Toujeo were stopped by her PCP in the past per patient  compliant most of the time, denies any side effects from medications  Hypoglycemic episodes: No, rare  H/o of hypoglycemia causing hospitalization or Intervention such as glucagon injection or ambulance call : No  Hypoglycemia symptoms: cannot recall reading less than 70 mg/dl   Treatment of hypoglycemia: glucose tablets     Medic alert tag: recommended: Yes     Diet: 3 meals per day, 0-1 snack per day  Timing of meals is predictable  Diabetic diet compliance: she tries to follow a diet  Activity: Daily activity is predictable: Yes  She tries to use her stationary bike 1-2 times a week  Ophthamology: March 2022  Podiatry: foot exam UTD, Oct 2022     Has hypertension: on ACE inhibitor/ARB, compliant most of the time  Has hyperlipidemia: on statin - tolerating well, no myalgias  compliant most of the time, denies any side effects from medications    Thyroid disorders: No  History of pancreatitis: No    Patient Active Problem List   Diagnosis • Aortic valve regurgitation, nonrheumatic   • Benign hypertension with CKD (chronic kidney disease) stage III   • Chronic rhinitis   • Midline cystocele   • 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, without long-term current use of insulin ([facility])   • Non-rheumatic mitral regurgitation   • Uterine procidentia   • Anemia   • Esophageal abnormality   • 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   • Decreased hearing, bilateral   • 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[facility])   • History of CVA with residual deficit   • Mixed hyperlipidemia   • Persistent proteinuria   • Renal cyst   • Ankle edema   • Stage 3a chronic kidney disease ([facility])   • Acute diastolic congestive heart failure ([facility])   • Status post insertion of drug-eluting stent into left anterior descending (LAD) artery   • Coronary artery disease involving native coronary artery of native heart without angina pectoris   • NSTEMI (non-ST elevated myocardial infarction) ([facility] [address] )   • Essential hypertension   • Asthma   • 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[facility])   • Uncontrolled type 2 diabetes mellitus with hyperglycemia ([facility] [address] )   • Urge urinary incontinence   • Nocturia      Past Medical History:   Diagnosis Date   • ACE-inhibitor cough     last assessed - [id number]   • Asthma    • Bilateral edema of lower extremity     last assessed - 21Aug2017   • Cardiac murmur     last assessed - 21Aug2017   • CKD (chronic kidney disease)    • Diabetes mellitus ([facility] [address] )     last assessed - [id number]   • Esophageal dysphagia    • Fatigue     last assessed - 21Aug2017   • Hyperlipidemia    • Hypertension    • Patellar bursitis of right knee     last assessed - [id number]   • Personal history of other specified conditions     presenting hazards to health   • Stroke [facility])    • Stroke syndrome    • Urinary frequency    • UTI (urinary tract infection)       Past Surgical History:   Procedure Laterality Date   • [place] ESOPHAGOGASTRODUODENOSCOPY TRANSORAL DIAGNOSTIC N/A 2017    Procedure: ESOPHAGOGASTRODUODENOSCOPY (EGD); Surgeon: Dianne Mullins MD;  Location: [facility];   Service: Gastroenterology      Family History   Problem Relation Age of Onset   • Heart disease Father    • Hypertension Mother    • Stroke Mother    • Other Sister         1)Breast disorder; 2)Epilepsy   • Migraines Sister         Migraine headaches   • Osteoporosis Sister    • Thyroid disease Sister    • Coronary artery disease Sister         S/P triple vessel bypass   • Osteoporosis Maternal Grandmother    • Diabetes Son         Diabetes mellitus   • Hypertension Son    • Rheum arthritis Son         Rheumatic disease   • Heart disease Family    • No Known Problems Maternal Grandfather    • No Known Problems Paternal Grandmother    • No Known Problems Paternal Grandfather    • Breast cancer Sister [de-identified]   • No Known Problems Sister    • No Known Problems Sister    • No Known Problems Maternal Aunt    • No Known Problems Maternal Aunt    • No Known Problems Maternal Aunt    • No Known Problems Paternal Aunt    • No Known Problems Paternal Aunt    • No Known Problems Son      Social History     Tobacco Use   • Smoking status: Former     Packs/day: 3 00     Types: Cigarettes     Quit date:      Years since quittin 9   • Smokeless tobacco: Former   • Tobacco comments:     quit over 30 yrs ago; (quit in the , had smoked 3PPD for 20 years - per [employer])   Substance Use Topics   • Alcohol use: Never     No Known Allergies      Current Outpatient Medications:   •  acetaminophen (TYLENOL) 650 mg CR tablet, Take 650 mg by mouth every 6 (six) hours as needed for mild pain, Disp: , Rfl:   •  albuterol (PROVENTIL HFA,VENTOLIN HFA) 90 mcg/act inhaler, INHALE 2 PUFFS EVERY 4 (FOUR) HOURS AS NEEDED FOR WHEEZING, Disp: 8 5 g, Rfl: 5  •  aspirin (ECOTRIN LOW STRENGTH) 81 mg EC tablet, Take 1 tablet (81 mg total) by mouth daily, Disp: 90 tablet, Rfl: 0  •  atorvastatin (LIPITOR) 40 mg tablet, TAKE 1 TABLET (40 MG TOTAL) BY MOUTH DAILY, Disp: 90 tablet, Rfl: 3  •  Blood Glucose Monitoring Suppl (FREESTYLE LITE) JAQUELIN, by Does not apply route daily, Disp: 1 each, Rfl: 0  •  Breo Ellipta 100-25 MCG/ACT inhaler, INHALE 1 PUFF DAILY, Disp: 60 each, Rfl: 2  •  carvedilol (COREG) 6 25 mg tablet, TAKE 1 TABLET (6 25 MG TOTAL) BY MOUTH 2 (TWO) TIMES A DAY WITH MEALS, Disp: 60 tablet, Rfl: 5  •  clopidogrel (PLAVIX) 75 mg tablet, TAKE 2 TABS DAILY, Disp: 180 tablet, Rfl: 3  •  conjugated estrogens (PREMARIN) vaginal cream, Insert 0 5 g into the vagina 2 (two) times a week Insert twice weekly at bedtime, Disp: 30 g, Rfl: 1  •  Continuous Blood Gluc  (FreeStyle Naldo 14 Day [place]) JAQUELIN, Use to record blood glucose 4 times daily  Once fasting and three times daily before meals, Disp: 1 each, Rfl: 0  •  Continuous Blood Gluc Sensor (FreeStyle Naldo 14 Day Sensor) MISC, Use one sensor every 14 days, Disp: 6 each, Rfl: 1  •  D3 50 MCG (2000 UT) TABS, TAKE 2 TABLETS (4,000 UNITS TOTAL) BY MOUTH DAILY, Disp: 180 tablet, Rfl: 0  •  ferrous sulfate 324 (65 Fe) mg, Take 1 tablet (324 mg total) by mouth 2 (two) times a day before meals, Disp: 60 tablet, Rfl: 2  •  furosemide (LASIX) 40 mg tablet, TAKE 1 TABLET (40 MG TOTAL) BY MOUTH 2 (TWO) TIMES A DAY, Disp: 180 tablet, Rfl: 3  •  glucose blood (FREESTYLE LITE) test strip, TEST AS DIRECTED UP TO FOUR TIMES A DAY, Disp: 100 each, Rfl: 3  •  glucose monitoring kit (FREESTYLE) monitoring kit, Use 1 each as needed (Three time daily) Please check blood sugar 3 times daily  , Disp: 1 each, Rfl: 1  •  ketoconazole (NIZORAL) 2 % shampoo, APPLY TOPICALLY TO THE SCALP ONCE EVERY OTHER WEEK, Disp: 120 mL, Rfl: 0  •  Lancets (FREESTYLE) lancets, Use as instructed, Disp: 100 each, Rfl: 0  •  latanoprost (XALATAN) 0 005 % ophthalmic solution, , Disp: , Rfl:   •  losartan (COZAAR) 50 mg tablet, TAKE 1 TABLET DAILY, Disp: 90 tablet, Rfl: 1  •  metFORMIN (GLUCOPHAGE) 500 mg tablet, Take 1 tablet (500 mg total) by mouth daily with breakfast, Disp: 90 tablet, Rfl: 0  •  Misc  Devices (QUAD CANE) MISC, by Does not apply route daily, Disp: 1 each, Rfl: 0  •  montelukast (SINGULAIR) 10 mg tablet, TAKE 1 TABLET (10 MG TOTAL) BY MOUTH DAILY AT BEDTIME, Disp: 90 tablet, Rfl: 3  •  nitroglycerin (NITROSTAT) 0 4 mg SL tablet, PLACE 1 TABLET (0 4 MG TOTAL) UNDER THE TONGUE EVERY 5 (FIVE) MINUTES AS NEEDED FOR CHEST PAIN, Disp: 25 tablet, Rfl: 1  •  ranolazine (RANEXA) 500 mg 12 hr tablet, Take 1 tablet (500 mg total) by mouth every 12 (twelve) hours, Disp: 60 tablet, Rfl: 0  •  repaglinide (PRANDIN) 0 5 mg tablet, Take 1 tablet (0 5 mg total) by mouth daily before dinner (SKIP DOSE IF SKIPPING MEAL), Disp: 90 tablet, Rfl: 1  •  Tradjenta 5 MG TABS, TAKE 1 TABLET (5 MG) BY MOUTH DAILY, Disp: 90 tablet, Rfl: 1  •  Xarelto 2 5 MG tablet, TAKE 1 TABLET (2 5 MG TOTAL) BY MOUTH 2 (TWO) TIMES A DAY WITH MEALS, Disp: 180 tablet, Rfl: 3  •  estradiol (ESTRACE VAGINAL) 0 1 mg/g vaginal cream, Insert 2 g into the vagina 2 (two) times a week (Patient not taking: Reported on 7/1/2022), Disp: 42 5 g, Rfl: 3  •  Glucose-Vitamin C-Vitamin D (TRUEPLUS GLUCOSE ON THE GO) CHEW, CHEW 2 TABS AS NEEDED FOR BLOOD SUGAR LESS THAN 80 (Patient not taking: Reported on 8/9/2022), Disp: , Rfl:   Review of Systems   Constitutional: Negative for activity change, appetite change, fatigue and unexpected weight change  HENT: Negative for trouble swallowing  Eyes: Negative for visual disturbance  Respiratory: Negative for shortness of breath  Cardiovascular: Negative for chest pain and palpitations  Gastrointestinal: Negative for constipation and diarrhea  Endocrine: Negative for polydipsia and polyuria  Musculoskeletal: Negative  Skin: Negative for wound  Neurological: Negative for numbness  Psychiatric/Behavioral: Negative  Physical Exam:  Body mass index is 31 74 kg/m²    /75 (BP Location: Right arm, Patient Position: Sitting, Cuff Size: Large)   Pulse 58   Temp (!) 97 3 °F (36 3 °C) (Temporal)   Wt 81 3 kg (179 lb 3 2 oz)   BMI 31 74 kg/m²    Wt Readings from Last 3 Encounters:   12/07/22 81 3 kg (179 lb 3 2 oz)   08/09/22 77 5 kg (170 lb 12 8 oz)   07/01/22 77 9 kg (171 lb 12 8 oz)       Physical Exam  Vitals and nursing note reviewed  Constitutional:       Appearance: She is well-developed  HENT:      Head: Normocephalic  Eyes:      General: No scleral icterus  Pupils: Pupils are equal, round, and reactive to light  Neck:      Thyroid: No thyromegaly  Cardiovascular:      Rate and Rhythm: Normal rate and regular rhythm  Pulses:           Radial pulses are 2+ on the right side and 2+ on the left side  Heart sounds: Murmur heard  Pulmonary:      Effort: Pulmonary effort is normal  No respiratory distress  Breath sounds: Normal breath sounds  No wheezing  Musculoskeletal:      Cervical back: Neck supple  Skin:     General: Skin is warm and dry  Neurological:      Mental Status: She is alert  Patient's shoes and socks were not removed  Labs:   Lab Results   Component Value Date    HGBA1C 8 2 (H) 08/09/2022     Component Ref Range & Units 10/12/22  2:37 PM 8/9/22 12:06 PM 6/29/22 11:18 AM 3/18/22 10:29 AM 11/29/21 12:03 PM 11/2/21 12:29 PM 7/15/21 12:31 PM   Sodium 135 - 147 mmol/L 139  141  140 R  141 R  141 R  140 R  139 R    Potassium 3 5 - 5 3 mmol/L 4 6  3 7  3 8  3 8  3 7  3 7  3 7    Comment: Moderately Hemolyzed;  Results May be Affected   Chloride 96 - 108 mmol/L 107  107  107 R  108 R  107 R  107 R  106 R    CO2 21 - 32 mmol/L 27  27  24  28  25  30  25    ANION GAP 4 - 13 mmol/L 5  7  9  5  9  3 Low   8    BUN 5 - 25 mg/dL 33 High   49 High   53 High   38 High   44 High   52 High   48 High     Creatinine 0 60 - 1 30 mg/dL 1 44 High   1 50 High  CM  1 45 High  CM  1 37 High  CM  1 45 High  CM  1 49 High  CM  1 35 High  CM    Comment: Standardized to IDMS reference method   Glucose 65 - 140 mg/dL 227 High    255 High  CM     199 High  CM    Comment: If the patient is fasting, the ADA then defines impaired fasting glucose as > 100 mg/dL and diabetes as > or equal to 123 mg/dL  Specimen collection should occur prior to Sulfasalazine administration due to the potential for falsely depressed results  Specimen collection should occur prior to Sulfapyridine administration due to the potential for falsely elevated results  Calcium 8 3 - 10 1 mg/dL 9 4  10 2 High   9 2  9 5  9 4  9 8  9 5    eGFR ml/min/1 73sq m 33             Plan:    Fern Nicholas was seen today for follow-up  Diagnoses and all orders for this visit:    Uncontrolled type 2 diabetes mellitus with hyperglycemia ([facility] [address] )  HGA1C 7 1%  Improved  Treatment regimen: continue current treatment  Advised to bring  in for download of CGM report  Will need to stop metformin if GFR falls below 30    Episodes of hypoglycemia can lead to permanent disability and death  Discussed risks/complications associated with uncontrolled diabetes  Advised to adhere to diabetic diet, and recommended staying active/exercising routinely as tolerated  Keep carbohydrates consistent to limit blood glucose fluctuations  Advised to call if blood sugars less than 70 mg/dl or over 300 mg/dl  Check blood glucose 3+ times a day  Discussed symptoms and treatment of hypoglycemia  Discussed use of CGM to collect additional blood glucose data to reveal trends and patterns that can be used to optimize treatment plan  Recommended routine follow-up with podiatry and ophthalmology  Ordered blood work to complete prior to next visit  -     Hemoglobin A1C; Future  -     Basic metabolic panel;  Future  -     POCT hemoglobin A1c    Benign hypertension with CKD (chronic kidney disease) stage III  Blood pressure is elevated  Advised to monitor BP at home and follow-up with nephrology if remaining elevated, at or above 140/90  For now continue current treatment  -     Basic metabolic panel; Future    Mixed hyperlipidemia  LDL previously 56  Continue statin therapy  Managed by PCP       Discussed with the patient diagnosis and treatment and all questions fully answered  She will call me if any problems arise  Counseled patient on diagnostic results, prognosis, risk and benefit of treatment options, instruction for management, importance of treatment compliance, risk factor reduction and impressions      [facility] ENDOCRINOLOGY

## 2022-12-07 NOTE — PATIENT INSTRUCTIONS
Hypoglycemia instructions   Benjamin Wren  12/7/2022  [id number]    Low Blood Sugar    Steps to treat low blood sugar  1  Test blood sugar if you have symptoms of low blood sugar:   Low Blood Sugar Symptoms:  o Sweaty  o Dizzy  o Rapid heartbeat  o Shaky  o Bad mood  o Hungry      2  Treat blood sugar less than 70 with 15 grams of fast-acting carbohydrate:   Examples of 15 grams Fast-Acting Carbohydrate:  o 4 oz juice  o 4 oz regular soda  o 3-4 glucose tablets (chew)  o 3-4 hard candies (chew)          3  Wait 15 minutes and test your blood sugar again     4   If blood sugar is less than 100, repeat steps 2-3     5  When your blood sugar is 100 or more, eat a snack if it will be longer than one hour until your next meal  The snack should be 15 grams of carbohydrate and a protein:   Examples of snacks:  o ½ sandwich  o 6 crackers with cheese  o Piece of fruit with cheese or peanut butter  o 6 crackers with peanut butter

## 2022-12-12 NOTE — TELEPHONE ENCOUNTER
Geno Cadena called back  She confirmed she is taking the nasal spray every day  She will go for the blood work hopefully tomorrow  ENT appointment was scheduled for 6/1/18  Thank you  stable

## 2022-12-19 DIAGNOSIS — E11.40 CONTROLLED TYPE 2 DIABETES MELLITUS WITH DIABETIC NEUROPATHY, WITHOUT LONG-TERM CURRENT USE OF INSULIN (HCC): ICD-10-CM

## 2022-12-21 RX ORDER — LINAGLIPTIN 5 MG/1
TABLET, FILM COATED ORAL
Qty: 90 TABLET | Refills: 1 | Status: SHIPPED | OUTPATIENT
Start: 2022-12-21

## 2022-12-28 ENCOUNTER — HOSPITAL ENCOUNTER (OUTPATIENT)
Dept: NON INVASIVE DIAGNOSTICS | Facility: CLINIC | Age: 82
Discharge: HOME | End: 2022-12-28

## 2022-12-28 DIAGNOSIS — I73.9 PERIPHERAL VASCULAR DISEASE, UNSPECIFIED (HCC): ICD-10-CM

## 2022-12-28 DIAGNOSIS — E11.40 TYPE 2 DIABETES MELLITUS WITH DIABETIC NEUROPATHY, UNSPECIFIED WHETHER LONG TERM INSULIN USE (HCC): ICD-10-CM

## 2023-01-12 DIAGNOSIS — I63.50 CEREBROVASCULAR ACCIDENT (CVA) OF PONTINE STRUCTURE (HCC): ICD-10-CM

## 2023-01-13 RX ORDER — CLOPIDOGREL BISULFATE 75 MG/1
TABLET ORAL
Qty: 180 TABLET | Refills: 3 | Status: SHIPPED | OUTPATIENT
Start: 2023-01-13

## 2023-01-13 NOTE — TELEPHONE ENCOUNTER
Refill provided  Please schedule medicare annual wellness visit with me when I have openings   She was last seen in our clinic 4/12/22

## 2023-01-26 DIAGNOSIS — E11.65 TYPE 2 DIABETES MELLITUS WITH HYPERGLYCEMIA, WITHOUT LONG-TERM CURRENT USE OF INSULIN (HCC): ICD-10-CM

## 2023-01-26 DIAGNOSIS — L29.9 PRURITUS OF SCALP: ICD-10-CM

## 2023-01-27 RX ORDER — KETOCONAZOLE 20 MG/ML
SHAMPOO TOPICAL
Qty: 120 ML | Refills: 0 | Status: SHIPPED | OUTPATIENT
Start: 2023-01-27

## 2023-01-30 RX ORDER — REPAGLINIDE 0.5 MG/1
0.5 TABLET ORAL
Qty: 90 TABLET | Refills: 1 | Status: SHIPPED | OUTPATIENT
Start: 2023-01-30

## 2023-02-06 ENCOUNTER — TELEPHONE (OUTPATIENT)
Dept: NEPHROLOGY | Facility: CLINIC | Age: 83
End: 2023-02-06

## 2023-02-06 NOTE — TELEPHONE ENCOUNTER
Called and spoke with Answering Machine to complete their bloodwork prior to their appointment on 02/13/23 with Dr Corbin Holland [place] [facility] [place]

## 2023-02-07 ENCOUNTER — TELEPHONE (OUTPATIENT)
Dept: INTERNAL MEDICINE CLINIC | Facility: CLINIC | Age: 83
End: 2023-02-07

## 2023-02-07 DIAGNOSIS — I21.4 NSTEMI (NON-ST ELEVATED MYOCARDIAL INFARCTION) (HCC): ICD-10-CM

## 2023-02-07 DIAGNOSIS — Z12.31 ENCOUNTER FOR SCREENING MAMMOGRAM FOR MALIGNANT NEOPLASM OF BREAST: Primary | ICD-10-CM

## 2023-02-07 NOTE — TELEPHONE ENCOUNTER
Patient called to get new script for mammogram     Last mammo done 2/7/22    Please prepare new script and call the patient to advise so that she can call Central Scheduling    thanks

## 2023-02-08 RX ORDER — NITROGLYCERIN 0.4 MG/1
0.4 TABLET SUBLINGUAL
Qty: 25 TABLET | Refills: 1 | Status: SHIPPED | OUTPATIENT
Start: 2023-02-08

## 2023-02-10 ENCOUNTER — TELEPHONE (OUTPATIENT)
Dept: NEPHROLOGY | Facility: CLINIC | Age: 83
End: 2023-02-10

## 2023-02-10 NOTE — TELEPHONE ENCOUNTER
Appointment Confirmation   Person confirmed appointment with  If not patient, name of the person Answ[employer] Machine    Date and time of appointment 2/13/23 12p    Patient acknowledged and will be at appointment? no    Did you advise the patient that they will need a urine sample if they are a new patient?  N/A    Did you advise the patient to bring their current medications for verification? (including any OTC) Yes    Additional Information

## 2023-02-13 ENCOUNTER — OFFICE VISIT (OUTPATIENT)
Dept: NEPHROLOGY | Facility: CLINIC | Age: 83
End: 2023-02-13

## 2023-02-13 VITALS
WEIGHT: 175 LBS | SYSTOLIC BLOOD PRESSURE: 124 MMHG | BODY MASS INDEX: 31.01 KG/M2 | HEIGHT: 63 IN | DIASTOLIC BLOOD PRESSURE: 62 MMHG

## 2023-02-13 DIAGNOSIS — N25.81 SECONDARY HYPERPARATHYROIDISM OF RENAL ORIGIN (HCC): ICD-10-CM

## 2023-02-13 DIAGNOSIS — N18.30 BENIGN HYPERTENSION WITH CKD (CHRONIC KIDNEY DISEASE) STAGE III (HCC): Primary | ICD-10-CM

## 2023-02-13 DIAGNOSIS — N18.32 STAGE 3B CHRONIC KIDNEY DISEASE (HCC): ICD-10-CM

## 2023-02-13 DIAGNOSIS — N28.1 RENAL CYST: ICD-10-CM

## 2023-02-13 DIAGNOSIS — R80.1 PERSISTENT PROTEINURIA: ICD-10-CM

## 2023-02-13 DIAGNOSIS — I12.9 BENIGN HYPERTENSION WITH CKD (CHRONIC KIDNEY DISEASE) STAGE III (HCC): Primary | ICD-10-CM

## 2023-02-13 DIAGNOSIS — E11.40 TYPE 2 DIABETES MELLITUS WITH DIABETIC NEUROPATHY, UNSPECIFIED WHETHER LONG TERM INSULIN USE (HCC): ICD-10-CM

## 2023-02-13 DIAGNOSIS — I50.42 CHRONIC COMBINED SYSTOLIC AND DIASTOLIC CONGESTIVE HEART FAILURE (HCC): ICD-10-CM

## 2023-02-13 NOTE — PROGRESS NOTES
NEPHROLOGY OUTPATIENT PROGRESS NOTE   Italo Teague 80 y o  female MRN: [id number]  DATE: 2/13/2023  Reason for visit:   Chief Complaint   Patient presents with   • Follow-up   • Chronic Kidney Disease     ASSESSMENT and PLAN:  CKD stage 3, baseline creatinine lately 1 4 to 1 5 since June 2021, 1 to 1 2 since March 2021, prior 0 8 to 1 0  -last serum creatinine 1 4 stable in 10/2022  No recent labs available since then  -CKD suspected to be secondary to long-term hypertension, uncontrolled diabetes, age-related nephron loss  -avoid nephrotoxins or NSAIDs  -renal ultrasound in 2021 shows normal size kidneys, normal echogenicity, no hydronephrosis or stones  -UA in June 2021 shows no proteinuria or hematuria, bland      Bilateral renal cyst on ultrasound  -right kidney showed mid to lower pole cyst with thick septation, no definite vascularity noted  -MRI abdomen in February 2020 shows no suspicious renal mass  Hermelinda Monahan showed 2 4 cm right mid pole cyst with septation, suboptimally evaluated in the absence of IV contrast   -renal ultrasound in July 2021 shows stable right-sided 2 cm septated cyst, stable simple cyst in left kidney    -She has not done repeat renal ultrasound since last visit, recommended to do this     Proteinuria  -last urine microalbumin/creatinine ratio 248 mg in June 2022     -repeat urine microalbumin/creatinine ratio before next visit  -currently on losartan as below    -suspect in the setting of long-term diabetes, hemoglobin A1c improved to 7 1 in 12/2022   -also has mild diabetic retinopathy in September 2021      Secondary hyperparathyroidism, last  in June 2022   continue vitamin-D 4000 units daily    Vitamin-D level 54    Check vitamin-D, PTH level before next visit      Hypertension  -blood pressure overall acceptable in the office today   Continue losartan 50 mg daily, Coreg, Lasix   -she has not brought BP machine to the office today   -salt restricted diet recommended     Trace ankle edema  -Overall improved and stable  She has gained about 5 pounds since last visit  -Remains on Lasix 40 mg b i d  Faithin Star    -echo in May 2021 shows EF 78%, grade 2 diastolic dysfunction, moderate pulmonary hypertension      Diagnoses and all orders for this visit:    Benign hypertension with CKD (chronic kidney disease) stage III  -     Basic metabolic panel; Future  -     CBC; Future  -     Phosphorus; Future  -     PTH, intact; Future  -     Microalbumin / creatinine urine ratio  -     Vitamin D 25 hydroxy; Future  -     Basic metabolic panel; Future  -     CBC; Future  -     PTH, intact; Future  -     Phosphorus; Future  -     Microalbumin / creatinine urine ratio; Future  -     Vitamin D 25 hydroxy; Future    Stage 3b chronic kidney disease ([facility] )  -     US kidney and bladder; Future  -     Basic metabolic panel; Future  -     CBC; Future  -     PTH, intact; Future  -     Phosphorus; Future  -     Microalbumin / creatinine urine ratio; Future  -     Vitamin D 25 hydroxy; Future    Persistent proteinuria  -     Microalbumin / creatinine urine ratio  -     Microalbumin / creatinine urine ratio; Future    Renal cyst  -     US kidney and bladder; Future    Secondary hyperparathyroidism of renal origin ([facility] )  -     PTH, intact; Future  -     Phosphorus; Future  -     Vitamin D 25 hydroxy; Future    Chronic combined systolic and diastolic congestive heart failure ([facility] )    Type 2 diabetes mellitus with diabetic neuropathy, unspecified whether long term insulin use ([facility] )          SUBJECTIVE / HPI:  [age] old female with medical issues of hypertension for 12 years, diabetes for 12 years, CVA in 2011, mild diabetic retinopathy,?  Gout, CKD stage 3 with baseline  lately 1 4 to 1 5 since mid 2021, 1 to 1 2 since March 2021, prior 0 8 to 1 0 who presents for regular follow up for CKD, proteinuria, hypertension management   Last serum creatinine overall stable at baseline in October 2022    She has not done any recent labs since then    Currently denies any urinary complaint  denies any nausea, vomiting or diarrhea   No recent NSAID use  denies any worsening dyspnea  She has gained about 5 pounds since last visit  No obvious history of autoimmune conditions   Denies any prior history of kidney stones  REVIEW OF SYSTEMS:  More than 10 point review of systems were obtained and discussed in length with the patient  Complete review of systems were negative / unremarkable except mentioned above  PHYSICAL EXAM:  Vitals:    02/13/23 1143 02/13/23 1210   BP: 120/60 124/62   BP Location: Right arm    Patient Position: Sitting    Cuff Size: Large    Weight: 79 4 kg (175 lb)    Height: 5' 3" (1 6 m)      Body mass index is 31 kg/m²  Physical Exam  Vitals reviewed  Constitutional:       Appearance: She is well-developed  HENT:      Head: Normocephalic and atraumatic  Right Ear: External ear normal       Left Ear: External ear normal    Eyes:      Conjunctiva/sclera: Conjunctivae normal    Cardiovascular:      Comments: Very trace ankle edema  Pulmonary:      Effort: Pulmonary effort is normal       Breath sounds: Normal breath sounds  No wheezing or rales  Abdominal:      General: Bowel sounds are normal  There is no distension  Palpations: Abdomen is soft  Tenderness: There is no abdominal tenderness  Musculoskeletal:         General: No deformity  Lymphadenopathy:      Cervical: No cervical adenopathy  Skin:     Findings: No rash  Neurological:      Mental Status: She is alert and oriented to person, place, and time     Psychiatric:         Behavior: Behavior normal          PAST MEDICAL HISTORY:  Past Medical History:   Diagnosis Date   • ACE-inhibitor cough     last assessed - 29Dec2017   • Asthma    • Bilateral edema of lower extremity     last assessed - 21Aug2017   • Cardiac murmur     last assessed - 21Aug2017   • CKD (chronic kidney disease)    • Diabetes mellitus ([facility] ) last assessed - [id number]   • Esophageal dysphagia    • Fatigue     last assessed - [id number]   • Hyperlipidemia    • Hypertension    • Patellar bursitis of right knee     last assessed - [id number]   • Personal history of other specified conditions     presenting hazards to health   • Stroke [facility])    • Stroke syndrome    • Urinary frequency    • UTI (urinary tract infection)        PAST SURGICAL HISTORY:  Past Surgical History:   Procedure Laterality Date   • WI ESOPHAGOGASTRODUODENOSCOPY TRANSORAL DIAGNOSTIC N/A 2017    Procedure: ESOPHAGOGASTRODUODENOSCOPY (EGD); Surgeon: Felix Sharif MD;  Location:  GI LAB;   Service: Gastroenterology       SOCIAL HISTORY:  Social History     Substance and Sexual Activity   Alcohol Use Never     Social History     Substance and Sexual Activity   Drug Use Never     Social History     Tobacco Use   Smoking Status Former   • Packs/day: 3 00   • Types: Cigarettes   • Quit date: 36   • Years since quittin 1   Smokeless Tobacco Former   Tobacco Comments    quit over 30 yrs ago; (quit in the , had smoked 3PPD for 20 years - per [employer])       FAMILY HISTORY:  Family History   Problem Relation Age of Onset   • Heart disease Father    • Hypertension Mother    • Stroke Mother    • Other Sister         1)Breast disorder; 2)Epilepsy   • Migraines Sister         Migraine headaches   • Osteoporosis Sister    • Thyroid disease Sister    • Coronary artery disease Sister         S/P triple vessel bypass   • Osteoporosis Maternal Grandmother    • Diabetes Son         Diabetes mellitus   • Hypertension Son    • Rheum arthritis Son         Rheumatic disease   • Heart disease Family    • No Known Problems Maternal Grandfather    • No Known Problems Paternal Grandmother    • No Known Problems Paternal Grandfather    • Breast cancer Sister [de-identified]   • No Known Problems Sister    • No Known Problems Sister    • No Known Problems Maternal Aunt    • No Known Problems Maternal Aunt • No Known Problems Maternal Aunt    • No Known Problems Paternal Aunt    • No Known Problems Paternal Aunt    • No Known Problems Son        MEDICATIONS:    Current Outpatient Medications:   •  acetaminophen (TYLENOL) 650 mg CR tablet, Take 650 mg by mouth every 6 (six) hours as needed for mild pain, Disp: , Rfl:   •  albuterol (PROVENTIL HFA,VENTOLIN HFA) 90 mcg/act inhaler, INHALE 2 PUFFS EVERY 4 (FOUR) HOURS AS NEEDED FOR WHEEZING, Disp: 8 5 g, Rfl: 5  •  aspirin (ECOTRIN LOW STRENGTH) 81 mg EC tablet, Take 1 tablet (81 mg total) by mouth daily, Disp: 90 tablet, Rfl: 0  •  atorvastatin (LIPITOR) 40 mg tablet, TAKE 1 TABLET (40 MG TOTAL) BY MOUTH DAILY, Disp: 90 tablet, Rfl: 3  •  Breo Ellipta 100-25 MCG/ACT inhaler, INHALE 1 PUFF DAILY, Disp: 60 each, Rfl: 2  •  carvedilol (COREG) 6 25 mg tablet, TAKE 1 TABLET (6 25 MG TOTAL) BY MOUTH 2 (TWO) TIMES A DAY WITH MEALS, Disp: 60 tablet, Rfl: 5  •  clopidogrel (PLAVIX) 75 mg tablet, TAKE 2 TABLETS DAILY, Disp: 180 tablet, Rfl: 3  •  conjugated estrogens (PREMARIN) vaginal cream, Insert 0 5 g into the vagina 2 (two) times a week Insert twice weekly at bedtime, Disp: 30 g, Rfl: 1  •  Continuous Blood Gluc  (FreeStyle Naldo 14 Day [place]) [facility], Use to record blood glucose 4 times daily   Once fasting and three times daily before meals, Disp: 1 each, Rfl: 0  •  Continuous Blood Gluc Sensor (FreeStyle Naldo 14 Day Sensor) MISC, Use one sensor every 14 days, Disp: 6 each, Rfl: 1  •  D3 50 MCG (2000 UT) TABS, TAKE 2 TABLETS (4,000 UNITS TOTAL) BY MOUTH DAILY, Disp: 180 tablet, Rfl: 0  •  ferrous sulfate 325 (65 Fe) mg tablet, TAKE 1 TABLET TWICE A DAY BEFORE MEALS, Disp: 180 tablet, Rfl: 3  •  furosemide (LASIX) 40 mg tablet, TAKE 1 TABLET (40 MG TOTAL) BY MOUTH 2 (TWO) TIMES A DAY, Disp: 180 tablet, Rfl: 3  •  ketoconazole (NIZORAL) 2 % shampoo, APPLY TOPICALLY TO THE SCALP ONCE EVERY OTHER WEEK, Disp: 120 mL, Rfl: 0  •  Lancets (FREESTYLE) lancets, Use as instructed, Disp: 100 each, Rfl: 0  •  latanoprost (XALATAN) 0 005 % ophthalmic solution, , Disp: , Rfl:   •  losartan (COZAAR) 50 mg tablet, TAKE 1 TABLET DAILY, Disp: 90 tablet, Rfl: 3  •  metFORMIN (GLUCOPHAGE) 500 mg tablet, TAKE 1 TABLET (500 MG TOTAL) BY MOUTH 2 (TWO) TIMES A DAY WITH MEALS (Patient taking differently: in the morning), Disp: 180 tablet, Rfl: 3  •  montelukast (SINGULAIR) 10 mg tablet, TAKE 1 TABLET (10 MG TOTAL) BY MOUTH DAILY AT BEDTIME, Disp: 90 tablet, Rfl: 3  •  nitroglycerin (NITROSTAT) 0 4 mg SL tablet, Place 1 tablet (0 4 mg total) under the tongue every 5 (five) minutes as needed for chest pain, Disp: 25 tablet, Rfl: 1  •  ranolazine (RANEXA) 500 mg 12 hr tablet, Take 1 tablet (500 mg total) by mouth every 12 (twelve) hours, Disp: 60 tablet, Rfl: 0  •  repaglinide (PRANDIN) 0 5 mg tablet, TAKE 1 TABLET (0 5 MG TOTAL) BY MOUTH DAILY BEFORE DINNER (SKIP DOSE IF SKIPPING MEAL), Disp: 90 tablet, Rfl: 1  •  Tradjenta 5 MG TABS, TAKE 1 TABLET (5 MG) BY MOUTH DAILY, Disp: 90 tablet, Rfl: 1  •  Xarelto 2 5 MG tablet, TAKE 1 TABLET (2 5 MG TOTAL) BY MOUTH 2 (TWO) TIMES A DAY WITH MEALS, Disp: 180 tablet, Rfl: 3  •  Blood Glucose Monitoring Suppl (FREESTYLE LITE) JAQUELIN, by Does not apply route daily, Disp: 1 each, Rfl: 0  •  estradiol (ESTRACE VAGINAL) 0 1 mg/g vaginal cream, Insert 2 g into the vagina 2 (two) times a week (Patient not taking: Reported on 7/1/2022), Disp: 42 5 g, Rfl: 3  •  glucose blood (FREESTYLE LITE) test strip, TEST AS DIRECTED UP TO FOUR TIMES A DAY, Disp: 100 each, Rfl: 3  •  glucose monitoring kit (FREESTYLE) monitoring kit, Use 1 each as needed (Three time daily) Please check blood sugar 3 times daily  , Disp: 1 each, Rfl: 1  •  Glucose-Vitamin C-Vitamin D (TRUEPLUS GLUCOSE ON THE GO) CHEW, CHEW 2 TABS AS NEEDED FOR BLOOD SUGAR LESS THAN 80 (Patient not taking: Reported on 8/9/2022), Disp: , Rfl:   •  Misc   Devices (QUAD CANE) MISC, by Does not apply route daily, Disp: 1 each, Rfl: 0    Lab Results:   Results for orders placed or performed in visit on 12/07/22   POCT hemoglobin A1c   Result Value Ref Range    Hemoglobin A1C 7 1 (A) 6 5

## 2023-02-27 DIAGNOSIS — I50.9 CHF (CONGESTIVE HEART FAILURE) (HCC): ICD-10-CM

## 2023-02-28 RX ORDER — FUROSEMIDE 40 MG/1
40 TABLET ORAL 2 TIMES DAILY
Qty: 180 TABLET | Refills: 3 | Status: SHIPPED | OUTPATIENT
Start: 2023-02-28

## 2023-03-01 ENCOUNTER — APPOINTMENT (OUTPATIENT)
Dept: LAB | Facility: CLINIC | Age: 83
End: 2023-03-01

## 2023-03-01 DIAGNOSIS — I10 ESSENTIAL HYPERTENSION: ICD-10-CM

## 2023-03-01 DIAGNOSIS — N18.30 BENIGN HYPERTENSION WITH CKD (CHRONIC KIDNEY DISEASE) STAGE III (HCC): ICD-10-CM

## 2023-03-01 DIAGNOSIS — I12.9 BENIGN HYPERTENSION WITH CKD (CHRONIC KIDNEY DISEASE) STAGE III (HCC): ICD-10-CM

## 2023-03-01 DIAGNOSIS — E11.65 TYPE 2 DIABETES MELLITUS WITH HYPERGLYCEMIA, WITHOUT LONG-TERM CURRENT USE OF INSULIN (HCC): ICD-10-CM

## 2023-03-01 DIAGNOSIS — N18.31 STAGE 3A CHRONIC KIDNEY DISEASE (HCC): ICD-10-CM

## 2023-03-01 DIAGNOSIS — R80.1 PERSISTENT PROTEINURIA: ICD-10-CM

## 2023-03-01 LAB
25(OH)D3 SERPL-MCNC: 53.9 NG/ML (ref 30–100)
ANION GAP SERPL CALCULATED.3IONS-SCNC: 4 MMOL/L (ref 4–13)
BUN SERPL-MCNC: 39 MG/DL (ref 5–25)
CALCIUM SERPL-MCNC: 9.9 MG/DL (ref 8.3–10.1)
CHLORIDE SERPL-SCNC: 110 MMOL/L (ref 96–108)
CO2 SERPL-SCNC: 25 MMOL/L (ref 21–32)
CREAT SERPL-MCNC: 1.48 MG/DL (ref 0.6–1.3)
CREAT UR-MCNC: 45.4 MG/DL
ERYTHROCYTE [DISTWIDTH] IN BLOOD BY AUTOMATED COUNT: 14.5 % (ref 11.6–15.1)
EST. AVERAGE GLUCOSE BLD GHB EST-MCNC: 163 MG/DL
GFR SERPL CREATININE-BSD FRML MDRD: 32 ML/MIN/1.73SQ M
GLUCOSE P FAST SERPL-MCNC: 128 MG/DL (ref 65–99)
HBA1C MFR BLD: 7.3 %
HCT VFR BLD AUTO: 37.3 % (ref 34.8–46.1)
HGB BLD-MCNC: 11.6 G/DL (ref 11.5–15.4)
MAGNESIUM SERPL-MCNC: 2.4 MG/DL (ref 1.6–2.6)
MCH RBC QN AUTO: 25.7 PG (ref 26.8–34.3)
MCHC RBC AUTO-ENTMCNC: 31.1 G/DL (ref 31.4–37.4)
MCV RBC AUTO: 83 FL (ref 82–98)
MICROALBUMIN UR-MCNC: 120 MG/L (ref 0–20)
MICROALBUMIN/CREAT 24H UR: 264 MG/G CREATININE (ref 0–30)
PHOSPHATE SERPL-MCNC: 3.4 MG/DL (ref 2.3–4.1)
PLATELET # BLD AUTO: 219 THOUSANDS/UL (ref 149–390)
PMV BLD AUTO: 11.9 FL (ref 8.9–12.7)
POTASSIUM SERPL-SCNC: 4 MMOL/L (ref 3.5–5.3)
PTH-INTACT SERPL-MCNC: 96.4 PG/ML (ref 18.4–80.1)
RBC # BLD AUTO: 4.52 MILLION/UL (ref 3.81–5.12)
SODIUM SERPL-SCNC: 139 MMOL/L (ref 135–147)
WBC # BLD AUTO: 5.77 THOUSAND/UL (ref 4.31–10.16)

## 2023-03-02 ENCOUNTER — TELEPHONE (OUTPATIENT)
Dept: NEPHROLOGY | Facility: CLINIC | Age: 83
End: 2023-03-02

## 2023-03-02 NOTE — TELEPHONE ENCOUNTER
----- [facility] 3/2/2023 12:05 PM EST -----  Please let patient know that updated BMP reviewed and kidney function stable with creatinine 1 48 stable electrolytes  No change in treatment   Followup as scheduled with Dr Rosita Fitzgerald

## 2023-03-02 NOTE — TELEPHONE ENCOUNTER
Called patient and left a voicemail stating the following:    Platients updated BMP reviewed and kidney function stable with creatinine 1 48, also stable electrolytes  No change in treatment  Follow up as scheduled with Dr Paty Maxwell    I asked the patient call back with further questions

## 2023-03-29 ENCOUNTER — HOSPITAL ENCOUNTER (OUTPATIENT)
Dept: RADIOLOGY | Facility: HOSPITAL | Age: 83
Discharge: HOME | End: 2023-03-29
Attending: INTERNAL MEDICINE

## 2023-03-29 DIAGNOSIS — N18.32 STAGE 3B CHRONIC KIDNEY DISEASE (HCC): ICD-10-CM

## 2023-03-29 DIAGNOSIS — N28.1 RENAL CYST: ICD-10-CM

## 2023-04-03 DIAGNOSIS — J30.1 NON-SEASONAL ALLERGIC RHINITIS DUE TO POLLEN: ICD-10-CM

## 2023-04-04 RX ORDER — MONTELUKAST SODIUM 10 MG/1
10 TABLET ORAL
Qty: 90 TABLET | Refills: 3 | Status: SHIPPED | OUTPATIENT
Start: 2023-04-04 | End: 2023-10-01

## 2023-04-05 ENCOUNTER — OFFICE VISIT (OUTPATIENT)
Dept: MULTI SPECIALTY CLINIC | Facility: CLINIC | Age: 83
End: 2023-04-05

## 2023-04-05 VITALS
HEIGHT: 63 IN | WEIGHT: 174 LBS | SYSTOLIC BLOOD PRESSURE: 112 MMHG | BODY MASS INDEX: 30.83 KG/M2 | TEMPERATURE: 98 F | DIASTOLIC BLOOD PRESSURE: 68 MMHG | HEART RATE: 103 BPM | OXYGEN SATURATION: 99 %

## 2023-04-05 DIAGNOSIS — E78.2 MIXED HYPERLIPIDEMIA: ICD-10-CM

## 2023-04-05 DIAGNOSIS — E11.40 CONTROLLED TYPE 2 DIABETES MELLITUS WITH DIABETIC NEUROPATHY, WITHOUT LONG-TERM CURRENT USE OF INSULIN (HCC): Primary | ICD-10-CM

## 2023-04-05 DIAGNOSIS — I10 ESSENTIAL HYPERTENSION: ICD-10-CM

## 2023-04-05 RX ORDER — REPAGLINIDE 0.5 MG/1
0.5 TABLET ORAL
Qty: 180 TABLET | Refills: 1 | Status: SHIPPED | OUTPATIENT
Start: 2023-04-05

## 2023-04-05 NOTE — PATIENT INSTRUCTIONS
Hypoglycemia instructions   Rosa Gaffney  4/5/2023  [id number]    Low Blood Sugar    Steps to treat low blood sugar  1  Test blood sugar if you have symptoms of low blood sugar:   Low Blood Sugar Symptoms:  o Sweaty  o Dizzy  o Rapid heartbeat  o Shaky  o Bad mood  o Hungry      2  Treat blood sugar less than 70 with 15 grams of fast-acting carbohydrate:   Examples of 15 grams Fast-Acting Carbohydrate:  o 4 oz juice  o 4 oz regular soda  o 3-4 glucose tablets (chew)  o 3-4 hard candies (chew)          3  Wait 15 minutes and test your blood sugar again     4   If blood sugar is less than 100, repeat steps 2-3     5  When your blood sugar is 100 or more, eat a snack if it will be longer than one hour until your next meal  The snack should be 15 grams of carbohydrate and a protein:   Examples of snacks:  o ½ sandwich  o 6 crackers with cheese  o Piece of fruit with cheese or peanut butter  o 6 crackers with peanut butter

## 2023-04-05 NOTE — PROGRESS NOTES
Patient Progress Note      CC: DM      Referring Provider  Ivory Sevilla, Do  [address]  [facility],  [address] [facility]     History of Present Illness:   Ritu Alcazar is a 80 y o  female with a history of type 2 diabetes without long term use of insulin  Diabetes course has been stable  Complications of DM: retinopathy, CVA, CKD  Denies recent illness or hospitalizations  Denies recent severe hypoglycemic or severe hyperglycemic episodes  Denies any issues with her current regimen  Home glucose monitoring: are performed regularly, using Naldo     Reviewed on reader  Average glucose 141 mg/dl   In target range 85%, above range 15%, below range 0%    Today BG is high at 276 mg/dl because she had 2 donuts and cereal for breakfast and has not yet taken her metformin and Tradjenta this morning  States she typically doesn't eat high carb / sugary foods  Current regimen: Metformin 500 mg a day, Tradjenta 5 mg daily, Prandin 0 5 mg with dinner  Jardiance, Victoza and Toujeo were stopped by her PCP in the past per patient  compliant most of the time, denies any side effects from medications  Hypoglycemic episodes: No, rare  H/o of hypoglycemia causing hospitalization or Intervention such as glucagon injection or ambulance call : No  Hypoglycemia symptoms: cannot recall reading less than 70 mg/dl   Treatment of hypoglycemia: glucose tablets     Medic alert tag: recommended: Yes     Diet: 3 meals per day, 1 snack per day  Timing of meals is predictable  Diabetic diet compliance: she tries to follow a diet  Activity: Daily activity is predictable: Yes  No routine exercise but she is active at home  Ophthamology: March 2022, advised to schedule an appointment   Podiatry: foot exam UTD, Oct 2022     Has hypertension: on ACE inhibitor/ARB, compliant most of the time  Has hyperlipidemia: on statin - tolerating well, no myalgias  compliant most of the time, denies any side effects from medications    Thyroid disorders: No  History of pancreatitis: No    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, without long-term current use of insulin ([facility])   • Non-rheumatic mitral regurgitation   • Uterine procidentia   • Anemia   • Esophageal abnormality   • 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   • Decreased hearing, bilateral   • 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[facility])   • History of CVA with residual deficit   • Mixed hyperlipidemia   • Persistent proteinuria   • Renal cyst   • Ankle edema   • Stage 3b chronic kidney disease ([facility])   • Acute diastolic congestive heart failure ([facility])   • Status post insertion of drug-eluting stent into left anterior descending (LAD) artery   • Coronary artery disease involving native coronary artery of native heart without angina pectoris   • NSTEMI (non-ST elevated myocardial infarction) ([facility] )   • Essential hypertension   • Asthma   • 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[facility] )   • Uncontrolled type 2 diabetes mellitus with hyperglycemia ([facility] )   • Urge urinary incontinence   • Nocturia   • Secondary hyperparathyroidism of renal origin [facility])      Past Medical History:   Diagnosis Date   • ACE-inhibitor cough     last assessed - 29Dec2017   • Asthma    • Bilateral edema of lower extremity     last assessed - 21Aug2017   • Cardiac murmur     last assessed - 21Aug2017   • CKD (chronic kidney disease)    • Diabetes mellitus ([facility] )     last assessed - [id number]   • Esophageal dysphagia    • Fatigue     last assessed - 21Aug2017   • Hyperlipidemia    • Hypertension    • Patellar bursitis of right knee     last assessed - [id number]   • Personal history of other specified conditions     presenting hazards to health   • Stroke [facility])    • Stroke syndrome    • Urinary frequency • UTI (urinary tract infection)       Past Surgical History:   Procedure Laterality Date   • LA ESOPHAGOGASTRODUODENOSCOPY TRANSORAL DIAGNOSTIC N/A 2017    Procedure: ESOPHAGOGASTRODUODENOSCOPY (EGD); Surgeon: Tone Flores MD;  Location: [facility];   Service: Gastroenterology      Family History   Problem Relation Age of Onset   • Heart disease Father    • Hypertension Mother    • Stroke Mother    • Other Sister         1)Breast disorder; 2)Epilepsy   • Migraines Sister         Migraine headaches   • Osteoporosis Sister    • Thyroid disease Sister    • Coronary artery disease Sister         S/P triple vessel bypass   • Osteoporosis Maternal Grandmother    • Diabetes Son         Diabetes mellitus   • Hypertension Son    • Rheum arthritis Son         Rheumatic disease   • Heart disease Family    • No Known Problems Maternal Grandfather    • No Known Problems Paternal Grandmother    • No Known Problems Paternal Grandfather    • Breast cancer Sister [de-identified]   • No Known Problems Sister    • No Known Problems Sister    • No Known Problems Maternal Aunt    • No Known Problems Maternal Aunt    • No Known Problems Maternal Aunt    • No Known Problems Paternal Aunt    • No Known Problems Paternal Aunt    • No Known Problems Son      Social History     Tobacco Use   • Smoking status: Former     Packs/day: 3 00     Types: Cigarettes     Quit date:      Years since quittin 2   • Smokeless tobacco: Former   • Tobacco comments:     quit over 30 yrs ago; (quit in the , had smoked 3PPD for 20 years - per [employer])   Substance Use Topics   • Alcohol use: Never     No Known Allergies      Current Outpatient Medications:   •  acetaminophen (TYLENOL) 650 mg CR tablet, Take 650 mg by mouth every 6 (six) hours as needed for mild pain, Disp: , Rfl:   •  albuterol (PROVENTIL HFA,VENTOLIN HFA) 90 mcg/act inhaler, INHALE 2 PUFFS EVERY 4 (FOUR) HOURS AS NEEDED FOR WHEEZING, Disp: 8 5 g, Rfl: 5  •  aspirin (ECOTRIN LOW STRENGTH) 81 mg EC tablet, Take 1 tablet (81 mg total) by mouth daily, Disp: 90 tablet, Rfl: 0  •  atorvastatin (LIPITOR) 40 mg tablet, TAKE 1 TABLET (40 MG TOTAL) BY MOUTH DAILY, Disp: 90 tablet, Rfl: 3  •  Blood Glucose Monitoring Suppl (FREESTYLE LITE) JAQUELIN, by Does not apply route daily, Disp: 1 each, Rfl: 0  •  Breo Ellipta 100-25 MCG/ACT inhaler, INHALE 1 PUFF DAILY, Disp: 60 each, Rfl: 2  •  carvedilol (COREG) 6 25 mg tablet, TAKE 1 TABLET (6 25 MG TOTAL) BY MOUTH 2 (TWO) TIMES A DAY WITH MEALS, Disp: 60 tablet, Rfl: 5  •  clopidogrel (PLAVIX) 75 mg tablet, TAKE 2 TABLETS DAILY, Disp: 180 tablet, Rfl: 3  •  conjugated estrogens (PREMARIN) vaginal cream, Insert 0 5 g into the vagina 2 (two) times a week Insert twice weekly at bedtime, Disp: 30 g, Rfl: 1  •  Continuous Blood Gluc  (FreeStyle Naldo 14 Day [place]) JAQUELIN, Use to record blood glucose 4 times daily  Once fasting and three times daily before meals, Disp: 1 each, Rfl: 0  •  Continuous Blood Gluc Sensor (FreeStyle Naldo 14 Day Sensor) MISC, Use one sensor every 14 days, Disp: 6 each, Rfl: 1  •  D3 50 MCG (2000 UT) TABS, TAKE 2 TABLETS (4,000 UNITS TOTAL) BY MOUTH DAILY, Disp: 180 tablet, Rfl: 3  •  ferrous sulfate 325 (65 Fe) mg tablet, TAKE 1 TABLET TWICE A DAY BEFORE MEALS, Disp: 180 tablet, Rfl: 3  •  furosemide (LASIX) 40 mg tablet, TAKE 1 TABLET (40 MG TOTAL) BY MOUTH 2 (TWO) TIMES A DAY, Disp: 180 tablet, Rfl: 3  •  glucose blood (FREESTYLE LITE) test strip, TEST AS DIRECTED UP TO FOUR TIMES A DAY, Disp: 100 each, Rfl: 3  •  glucose monitoring kit (FREESTYLE) monitoring kit, Use 1 each as needed (Three time daily) Please check blood sugar 3 times daily  , Disp: 1 each, Rfl: 1  •  ketoconazole (NIZORAL) 2 % shampoo, APPLY TOPICALLY TO THE SCALP ONCE EVERY OTHER WEEK, Disp: 120 mL, Rfl: 0  •  Lancets (FREESTYLE) lancets, Use as instructed, Disp: 100 each, Rfl: 0  •  latanoprost (XALATAN) 0 005 % ophthalmic solution, , Disp: , Rfl:   • losartan (COZAAR) 50 mg tablet, TAKE 1 TABLET DAILY, Disp: 90 tablet, Rfl: 3  •  Misc  Devices (QUAD CANE) MISC, by Does not apply route daily, Disp: 1 each, Rfl: 0  •  montelukast (SINGULAIR) 10 mg tablet, TAKE 1 TABLET (10 MG TOTAL) BY MOUTH DAILY AT BEDTIME, Disp: 90 tablet, Rfl: 3  •  nitroglycerin (NITROSTAT) 0 4 mg SL tablet, Place 1 tablet (0 4 mg total) under the tongue every 5 (five) minutes as needed for chest pain, Disp: 25 tablet, Rfl: 1  •  ranolazine (RANEXA) 500 mg 12 hr tablet, Take 1 tablet (500 mg total) by mouth every 12 (twelve) hours, Disp: 60 tablet, Rfl: 0  •  repaglinide (PRANDIN) 0 5 mg tablet, Take 1 tablet (0 5 mg total) by mouth 2 (two) times a day before meals (SKIP DOSE IF SKIPPING MEAL), Disp: 180 tablet, Rfl: 1  •  Tradjenta 5 MG TABS, TAKE 1 TABLET (5 MG) BY MOUTH DAILY, Disp: 90 tablet, Rfl: 1  •  estradiol (ESTRACE VAGINAL) 0 1 mg/g vaginal cream, Insert 2 g into the vagina 2 (two) times a week (Patient not taking: Reported on 7/1/2022), Disp: 42 5 g, Rfl: 3  •  Glucose-Vitamin C-Vitamin D (TRUEPLUS GLUCOSE ON THE GO) CHEW, CHEW 2 TABS AS NEEDED FOR BLOOD SUGAR LESS THAN 80 (Patient not taking: Reported on 8/9/2022), Disp: , Rfl:   •  Xarelto 2 5 MG tablet, TAKE 1 TABLET (2 5 MG TOTAL) BY MOUTH 2 (TWO) TIMES A DAY WITH MEALS, Disp: 180 tablet, Rfl: 3  Review of Systems   Constitutional: Negative for activity change, appetite change, fatigue and unexpected weight change  HENT: Negative for trouble swallowing  Eyes: Negative for visual disturbance  Respiratory: Negative for shortness of breath  Cardiovascular: Negative for chest pain and palpitations  Gastrointestinal: Negative for constipation and diarrhea  Endocrine: Negative for polydipsia and polyuria  Musculoskeletal: Negative  Skin: Negative for wound  Neurological: Negative for numbness  Psychiatric/Behavioral: Negative  Physical Exam:  Body mass index is 30 82 kg/m²    /68 (BP Location: Left "arm, Patient Position: Sitting, Cuff Size: Large)   Pulse 103   Temp 98 °F (36 7 °C) (Temporal)   Ht 5' 3\" (1 6 m)   Wt 78 9 kg (174 lb)   SpO2 99%   BMI 30 82 kg/m²    Wt Readings from Last 3 Encounters:   04/05/23 78 9 kg (174 lb)   02/13/23 79 4 kg (175 lb)   12/07/22 81 3 kg (179 lb 3 2 oz)       Physical Exam  Vitals and nursing note reviewed  Constitutional:       Appearance: She is well-developed  HENT:      Head: Normocephalic  Eyes:      General: No scleral icterus  Pupils: Pupils are equal, round, and reactive to light  Neck:      Thyroid: No thyromegaly  Cardiovascular:      Rate and Rhythm: Normal rate and regular rhythm  Pulses:           Radial pulses are 2+ on the right side and 2+ on the left side  Heart sounds: Murmur heard  Pulmonary:      Effort: Pulmonary effort is normal  No respiratory distress  Breath sounds: Normal breath sounds  No wheezing  Musculoskeletal:      Cervical back: Neck supple  Skin:     General: Skin is warm and dry  Neurological:      Mental Status: She is alert  Patient's shoes and socks were not removed  Labs:   Component      Latest Ref Rng & Units 8/9/2022 12/7/2022 3/1/2023   Sodium      135 - 147 mmol/L 141  139   Potassium      3 5 - 5 3 mmol/L 3 7  4 0   Chloride      96 - 108 mmol/L 107  110 (H)   CO2      21 - 32 mmol/L 27  25   Anion Gap      4 - 13 mmol/L 7  4   BUN      5 - 25 mg/dL 49 (H)  39 (H)   Creatinine      0 60 - 1 30 mg/dL 1 50 (H)  1 48 (H)   GLUCOSE FASTING      65 - 99 mg/dL 179 (H)  128 (H)   Calcium      8 3 - 10 1 mg/dL 10 2 (H)  9 9   eGFR      ml/min/1 73sq m 32  32   EXT Creatinine Urine      mg/dL   45 4   MICROALBUM ,U,RANDOM      0 0 - 20 0 mg/L   120 0 (H)   MICROALBUMIN/CREATININE RATIO      0 - 30 mg/g creatinine   264 (H)   Hemoglobin A1C      Normal 3 8-5 6%; PreDiabetic 5 7-6 4%;  Diabetic >=6 5%; Glycemic control for adults with diabetes <7 0% % 8 2 (H) 7 1 (A) 7 3 (H)   eAG, EST " AVG Glucose      mg/dl 189  163   Phosphorus      2 3 - 4 1 mg/dL   3 4   Vit D, 25-Hydroxy      30 0 - 100 0 ng/mL   53 9       Plan:    Dina Fitzgerald was seen today for follow-up  Diagnoses and all orders for this visit:    Controlled type 2 diabetes mellitus with diabetic neuropathy, without long-term current use of insulin ([facility])  HGA1C 7 3%  Worsened  Treatment regimen: stop metformin due to low GFR at 32  Increase Prandin 0 5 mg with breakfast and dinner; skip dose if skipping meal  Continue Tradjenta  Episodes of hypoglycemia can lead to permanent disability and death  Discussed risks/complications associated with uncontrolled diabetes  Advised to adhere to diabetic diet, and recommended staying active/exercising routinely as tolerated  Keep carbohydrates consistent to limit blood glucose fluctuations  Advised to call if blood sugars less than 70 mg/dl or over 300 mg/dl  Check blood glucose 1-2 times a day  Discussed symptoms and treatment of hypoglycemia  Discussed use of CGM to collect additional blood glucose data to reveal trends and patterns that can be used to optimize treatment plan  Recommended routine follow-up with podiatry and ophthalmology  Send log in 2 weeks  Ordered blood work to complete prior to next visit  -     Hemoglobin A1C; Future  -     Basic metabolic panel; Future  -     repaglinide (PRANDIN) 0 5 mg tablet; Take 1 tablet (0 5 mg total) by mouth 2 (two) times a day before meals (SKIP DOSE IF SKIPPING MEAL)    Essential hypertension  Blood pressure adequately controlled, continue current treatment   -     Basic metabolic panel; Future    Mixed hyperlipidemia  LDL previously 56  Continue statin therapy   Managed by PCP           Discussed with the patient diagnosis and treatment and all questions fully answered  She will call me if any problems arise      Counseled patient on diagnostic results, prognosis, risk and benefit of treatment options, instruction for management, importance of treatment compliance, risk factor reduction and impressions      [address] [facility] ENDOCRINOLOGY

## 2023-04-19 PROBLEM — I50.32 CHRONIC DIASTOLIC CONGESTIVE HEART FAILURE (HCC): Status: ACTIVE | Noted: 2023-04-19

## 2023-05-10 ENCOUNTER — APPOINTMENT (OUTPATIENT)
Dept: LAB | Facility: CLINIC | Age: 83
End: 2023-05-10

## 2023-05-10 DIAGNOSIS — I25.10 CORONARY ARTERY DISEASE INVOLVING NATIVE CORONARY ARTERY OF NATIVE HEART WITHOUT ANGINA PECTORIS: ICD-10-CM

## 2023-05-10 DIAGNOSIS — E78.2 MIXED HYPERLIPIDEMIA: ICD-10-CM

## 2023-05-10 LAB
CHOLEST SERPL-MCNC: 134 MG/DL
HDLC SERPL-MCNC: 55 MG/DL
LDLC SERPL CALC-MCNC: 60 MG/DL (ref 0–100)
TRIGL SERPL-MCNC: 96 MG/DL

## 2023-05-12 ENCOUNTER — HOSPITAL ENCOUNTER (OUTPATIENT)
Dept: NON INVASIVE DIAGNOSTICS | Facility: CLINIC | Age: 83
Discharge: HOME | End: 2023-05-12

## 2023-05-12 ENCOUNTER — TELEPHONE (OUTPATIENT)
Dept: CARDIOLOGY CLINIC | Facility: CLINIC | Age: 83
End: 2023-05-12

## 2023-05-12 VITALS — BODY MASS INDEX: 31.18 KG/M2 | WEIGHT: 176 LBS | HEIGHT: 63 IN

## 2023-05-12 VITALS
DIASTOLIC BLOOD PRESSURE: 58 MMHG | BODY MASS INDEX: 30.83 KG/M2 | HEART RATE: 63 BPM | WEIGHT: 174 LBS | HEIGHT: 63 IN | SYSTOLIC BLOOD PRESSURE: 130 MMHG

## 2023-05-12 DIAGNOSIS — I10 ESSENTIAL HYPERTENSION: ICD-10-CM

## 2023-05-12 DIAGNOSIS — Z95.5 STATUS POST INSERTION OF DRUG-ELUTING STENT INTO LEFT ANTERIOR DESCENDING (LAD) ARTERY: ICD-10-CM

## 2023-05-12 DIAGNOSIS — I25.10 CORONARY ARTERY DISEASE INVOLVING NATIVE CORONARY ARTERY OF NATIVE HEART WITHOUT ANGINA PECTORIS: ICD-10-CM

## 2023-05-12 DIAGNOSIS — I20.0 UNSTABLE ANGINA (HCC): Primary | ICD-10-CM

## 2023-05-12 DIAGNOSIS — I34.0 NON-RHEUMATIC MITRAL REGURGITATION: ICD-10-CM

## 2023-05-12 DIAGNOSIS — I50.32 CHRONIC DIASTOLIC CONGESTIVE HEART FAILURE (HCC): ICD-10-CM

## 2023-05-12 DIAGNOSIS — I35.1 AORTIC VALVE REGURGITATION, NONRHEUMATIC: ICD-10-CM

## 2023-05-12 LAB
AORTIC ROOT: 3 CM
APICAL FOUR CHAMBER EJECTION FRACTION: 45 %
AV PEAK GRADIENT: 13 MMHG
AV REGURGITATION PRESSURE HALF TIME: 592 MS
E WAVE DECELERATION TIME: 197 MS
FRACTIONAL SHORTENING: 33 (ref 28–44)
INTERVENTRICULAR SEPTUM IN DIASTOLE (PARASTERNAL SHORT AXIS VIEW): 1.3 CM
INTERVENTRICULAR SEPTUM: 1.3 CM (ref 0.6–1.1)
LAAS-AP2: 29.7 CM2
LAAS-AP4: 34.9 CM2
LEFT ATRIUM SIZE: 4.7 CM
LEFT INTERNAL DIMENSION IN SYSTOLE: 3.7 CM (ref 2.1–4)
LEFT VENTRICULAR INTERNAL DIMENSION IN DIASTOLE: 5.5 CM (ref 3.5–6)
LEFT VENTRICULAR POSTERIOR WALL IN END DIASTOLE: 1.3 CM
LEFT VENTRICULAR STROKE VOLUME: 89 ML
LVSV (TEICH): 89 ML
MAX HR PERCENT: 54 %
MAX HR: 75 BPM
MV E'TISSUE VEL-SEP: 5 CM/S
MV PEAK A VEL: 1.01 M/S
MV PEAK E VEL: 65 CM/S
MV STENOSIS PRESSURE HALF TIME: 57 MS
MV VALVE AREA P 1/2 METHOD: 3.86
NUC STRESS EJECTION FRACTION: 43 %
RATE PRESSURE PRODUCT: 6678
RIGHT ATRIUM AREA SYSTOLE A4C: 16.8 CM2
RIGHT VENTRICLE ID DIMENSION: 4.2 CM
SL CV AV DECELERATION TIME RETROGRADE: 2042 MS
SL CV AV PEAK GRADIENT RETROGRADE: 46 MMHG
SL CV LEFT ATRIUM LENGTH A2C: 6.3 CM
SL CV LV EF: 40
SL CV PED ECHO LEFT VENTRICLE DIASTOLIC VOLUME (MOD BIPLANE) 2D: 148 ML
SL CV PED ECHO LEFT VENTRICLE SYSTOLIC VOLUME (MOD BIPLANE) 2D: 59 ML
SL CV REST NUCLEAR ISOTOPE DOSE: 10.47 MCI
SL CV STRESS NUCLEAR ISOTOPE DOSE: 32.6 MCI
SL CV STRESS RECOVERY BP: NORMAL MMHG
SL CV STRESS RECOVERY HR: 70 BPM
SL CV STRESS RECOVERY O2 SAT: 98 %
STRESS ANGINA INDEX: 0
STRESS BASELINE BP: NORMAL MMHG
STRESS BASELINE HR: 56 BPM
STRESS O2 SAT REST: 99 %
STRESS PEAK HR: 63 BPM
STRESS POST ESTIMATED WORKLOAD: 1 METS
STRESS POST O2 SAT PEAK: 98 %
STRESS POST PEAK BP: 106 MMHG
TR MAX PG: 16 MMHG
TR PEAK VELOCITY: 2 M/S
TRICUSPID ANNULAR PLANE SYSTOLIC EXCURSION: 2 CM
TRICUSPID VALVE PEAK REGURGITATION VELOCITY: 2.01 M/S

## 2023-05-12 RX ORDER — REGADENOSON 0.08 MG/ML
0.4 INJECTION, SOLUTION INTRAVENOUS ONCE
Status: COMPLETED | OUTPATIENT
Start: 2023-05-12 | End: 2023-05-12

## 2023-05-12 RX ADMIN — REGADENOSON 0.4 MG: 0.08 INJECTION, SOLUTION INTRAVENOUS at 12:11

## 2023-05-12 NOTE — TELEPHONE ENCOUNTER
----- Message from Familia Gutierrez MD sent at 5/12/2023  3:08 PM EDT -----  Please call the patient regarding her abnormal result    Please set up for catheterization

## 2023-05-12 NOTE — LETTER
2023       Hema Osborne              : 1940        MRN: [id number]  [address] [place] [id number]       Procedure Name: LEFT HEART CATHETERIZATION    Procedure date: 23    Location: [facility]  Address: Isaak Aguirre, [address] [facility]      The hospital will contact you the day prior to your procedure, usually between 4PM - 6PM to instruct you on the time and place to report  If you do not hear from a [facility]'s  by 6PM the evening prior to your procedure, please contact the campus that you are scheduled at  [place] [address] [facility]: [address] , hospitals, [address] [facility] [phone]   [facility]: [address] [place] [address] [phone]    ? You may have nothing to eat or drink from midnight the night prior to your procedure  You may have a minimal amount of water with your morning medications  DO NOT stop taking Plavix or Aspirin unless advised otherwise  ? If your procedure is scheduled after 12:00 noon, you may have clear liquids until 8:00AM the morning of your procedure  Clear liquids are 7UP, Ginger Ale, Jello or broth  ? Arrange for a responsible person to drive you to and from the hospital     ? Please shower/bathe the night before your procedure and do not use powders or lotions  ? Please notify us if you have been admitted to the hospital within the past 30 days  ? Bring a list of daily medications, vitamins, minerals, herbals and nutritional supplements you take  Include dosage and time you take them each day  ? If packing an overnight bag, pack minimal clothing, you will be given hospital sleepwear  Do not bring money, valuables or jewelry  Wedding band is OK  ?  Have your Photo ID and Insurance cards with you         ? DO NOT take any diabetic medication, including insulin, the morning of the procedure  Oral diabetic medications may include: Glucophage, Prandin, Glyburide, Micronase, Avandia, Glocovance, Precose, Glynase y Starlix  ? You should continue to take your morning dose of heart and/or blood pressure medications with a sip of water UNLESS ADVISED OTHERWISE  Special Instructions:      Medication holds:   Furosemide - Hold morning of procedure  Tradjenta - Hold morning of procedure  Losartan - Hold day prior and morning of procedure  Xarelto - Hold night prior and morning of procedure      Labs to be done by 5/19/23:  CMP / CBC (fasting)         Thank you,   [address] Cardiology   [address] [facility], [address]  Ph: [phone]

## 2023-05-15 LAB
CHEST PAIN STATEMENT: NORMAL
MAX DIASTOLIC BP: 62 MMHG
MAX HEART RATE: 75 BPM
MAX PREDICTED HEART RATE: 138 BPM
MAX. SYSTOLIC BP: 122 MMHG
PROTOCOL NAME: NORMAL
REASON FOR TERMINATION: NORMAL
TARGET HR FORMULA: NORMAL
TEST INDICATION: NORMAL
TIME IN EXERCISE PHASE: NORMAL

## 2023-05-15 NOTE — TELEPHONE ENCOUNTER
Left voicemail on machine informing patient to call and schedule a Left Heart CATH procedure       Forrest Ellington

## 2023-05-16 NOTE — TELEPHONE ENCOUNTER
Returned call to patient and left voicemail to call back to schedule Left Heart Cath       ThanksForrest

## 2023-05-16 NOTE — TELEPHONE ENCOUNTER
Patient scheduled for Left Heart CATH on 6/1/23 at [facility] with Dr Emily Lentz  Instructions sent to patient through [address]  Also, mailed patient instructions per patient request     Patient aware of all general instructions  Medication holds:   Furosemide - Hold morning of procedure  Tradjenta - Hold morning of procedure  Losartan - Hold day prior and morning of procedure  Xarelto - Hold night prior and morning of procedure  Labs to be done by 5/19/23:  CMP / CBC (fasting)     Insurance: Sherren Pankaj     Please obtain auth       Thank you,  Rock Almonte

## 2023-05-16 NOTE — TELEPHONE ENCOUNTER
Left voicemail on machine informing patient to call and schedule a Left Heart CATH procedure       Thanks,  [place]erfield Shouts

## 2023-05-17 NOTE — TELEPHONE ENCOUNTER
Per Sanket Rodriguez [facility] online [employer], [id number] or [phone], does not require authorization

## 2023-05-19 ENCOUNTER — APPOINTMENT (OUTPATIENT)
Dept: LAB | Facility: CLINIC | Age: 83
End: 2023-05-19

## 2023-05-19 DIAGNOSIS — I12.9 BENIGN HYPERTENSION WITH CKD (CHRONIC KIDNEY DISEASE) STAGE III (HCC): ICD-10-CM

## 2023-05-19 DIAGNOSIS — N18.30 BENIGN HYPERTENSION WITH CKD (CHRONIC KIDNEY DISEASE) STAGE III (HCC): ICD-10-CM

## 2023-05-19 DIAGNOSIS — E11.65 UNCONTROLLED TYPE 2 DIABETES MELLITUS WITH HYPERGLYCEMIA (HCC): ICD-10-CM

## 2023-05-19 LAB
ALBUMIN SERPL BCP-MCNC: 3.7 G/DL (ref 3.5–5)
ALP SERPL-CCNC: 73 U/L (ref 46–116)
ALT SERPL W P-5'-P-CCNC: 33 U/L (ref 12–78)
ANION GAP SERPL CALCULATED.3IONS-SCNC: 4 MMOL/L (ref 4–13)
AST SERPL W P-5'-P-CCNC: 21 U/L (ref 5–45)
BASOPHILS # BLD AUTO: 0.05 THOUSANDS/ÂΜL (ref 0–0.1)
BASOPHILS NFR BLD AUTO: 1 % (ref 0–1)
BILIRUB SERPL-MCNC: 0.66 MG/DL (ref 0.2–1)
BUN SERPL-MCNC: 40 MG/DL (ref 5–25)
CALCIUM SERPL-MCNC: 9.6 MG/DL (ref 8.3–10.1)
CHLORIDE SERPL-SCNC: 110 MMOL/L (ref 96–108)
CO2 SERPL-SCNC: 26 MMOL/L (ref 21–32)
CREAT SERPL-MCNC: 1.24 MG/DL (ref 0.6–1.3)
EOSINOPHIL # BLD AUTO: 0.16 THOUSAND/ÂΜL (ref 0–0.61)
EOSINOPHIL NFR BLD AUTO: 3 % (ref 0–6)
ERYTHROCYTE [DISTWIDTH] IN BLOOD BY AUTOMATED COUNT: 14.7 % (ref 11.6–15.1)
EST. AVERAGE GLUCOSE BLD GHB EST-MCNC: 157 MG/DL
GFR SERPL CREATININE-BSD FRML MDRD: 40 ML/MIN/1.73SQ M
GLUCOSE P FAST SERPL-MCNC: 150 MG/DL (ref 65–99)
HBA1C MFR BLD: 7.1 %
HCT VFR BLD AUTO: 36.1 % (ref 34.8–46.1)
HGB BLD-MCNC: 11.1 G/DL (ref 11.5–15.4)
IMM GRANULOCYTES # BLD AUTO: 0.02 THOUSAND/UL (ref 0–0.2)
IMM GRANULOCYTES NFR BLD AUTO: 0 % (ref 0–2)
LYMPHOCYTES # BLD AUTO: 1.05 THOUSANDS/ÂΜL (ref 0.6–4.47)
LYMPHOCYTES NFR BLD AUTO: 18 % (ref 14–44)
MCH RBC QN AUTO: 26 PG (ref 26.8–34.3)
MCHC RBC AUTO-ENTMCNC: 30.7 G/DL (ref 31.4–37.4)
MCV RBC AUTO: 85 FL (ref 82–98)
MONOCYTES # BLD AUTO: 0.39 THOUSAND/ÂΜL (ref 0.17–1.22)
MONOCYTES NFR BLD AUTO: 7 % (ref 4–12)
NEUTROPHILS # BLD AUTO: 4.05 THOUSANDS/ÂΜL (ref 1.85–7.62)
NEUTS SEG NFR BLD AUTO: 71 % (ref 43–75)
NRBC BLD AUTO-RTO: 0 /100 WBCS
PLATELET # BLD AUTO: 199 THOUSANDS/UL (ref 149–390)
PMV BLD AUTO: 11.6 FL (ref 8.9–12.7)
POTASSIUM SERPL-SCNC: 3.6 MMOL/L (ref 3.5–5.3)
PROT SERPL-MCNC: 7.9 G/DL (ref 6.4–8.4)
RBC # BLD AUTO: 4.27 MILLION/UL (ref 3.81–5.12)
SODIUM SERPL-SCNC: 140 MMOL/L (ref 135–147)
WBC # BLD AUTO: 5.72 THOUSAND/UL (ref 4.31–10.16)

## 2023-05-23 DIAGNOSIS — J45.30 MILD PERSISTENT ASTHMA WITHOUT COMPLICATION: ICD-10-CM

## 2023-05-23 RX ORDER — FLUTICASONE FUROATE AND VILANTEROL TRIFENATATE 100; 25 UG/1; UG/1
POWDER RESPIRATORY (INHALATION)
Qty: 60 EACH | Refills: 2 | Status: SHIPPED | OUTPATIENT
Start: 2023-05-23

## 2023-05-24 NOTE — PROGRESS NOTES
5/25/2023    Lisa Shells  1940  [id number]        Assessment  -Renal cysts  -Bladder wall thickening    Discussion/Plan  Faith Dennis is a 80 y o  female who presents in consultation    1  Renal cysts, bladder wall thickening- urine dip in the office today identifies large presence of blood and appears cloudy  We will send for urinalysis with microscopic and culture  PVR assessment is 197 mL  We discussed the results of her recent renal ultrasound from 3/29/2023 which identified abnormal asymmetric bladder wall thickening  Given her history of CKD 3, would not recommend CT imaging with IV contrast   We discussed further testing with cystoscopy to further evaluate bladder given her significant smoking history and symptoms of cloudy urine and dysuria  Informed consent was provided and she is amenable with plan  Call with results of urine testing  She will follow-up with MD for cystoscopy to further evaluate bladder  Patient advised to call sooner with any questions or issues     -All questions answered, patient agree with plan     History of Present Illness  80 y o  female who presents in consultation today for evaluation of renal cyst and bladder wall thickening  She was referred by nephrology  Patient has history of CKD 3  She recently completed a renal ultrasound on 3/29/2023 identified calcified right renal cyst, slightly larger in size compared to prior imaging as well as asymmetric bladder wall thickening along posterior wall of bladder  Patient reports symptoms of dysuria and cloudy urine over the last few weeks  She denies any additional lower urinary tract symptoms, gross hematuria, or flank pain  She denies any prior urologic history, surgical intervention, or instrumentation  Patient states she was a chronic smoker, has an over 20 years smoking history and quit in the 1980s  She denies any strong family history of bladder or kidney malignancy  Patient anticoagulated    Patient had recently been prescribed vaginal Estrace, but states she is not using medication  Review of Systems  Review of Systems   Constitutional: Negative  HENT: Negative  Respiratory: Negative  Cardiovascular: Negative  Gastrointestinal: Negative  Genitourinary: Positive for dysuria  Negative for decreased urine volume, difficulty urinating, flank pain, frequency, hematuria and urgency  Musculoskeletal: Negative  Skin: Negative  Neurological: Negative  Psychiatric/Behavioral: Negative  Past Medical History  Past Medical History:   Diagnosis Date   • ACE-inhibitor cough     last assessed - 29Dec2017   • Asthma    • Bilateral edema of lower extremity     last assessed - 21Aug2017   • Cardiac murmur     last assessed - 21Aug2017   • CKD (chronic kidney disease)    • Diabetes mellitus ([facility] [address] )     last assessed - [id number]   • Esophageal dysphagia    • Fatigue     last assessed - 21Aug2017   • Hyperlipidemia    • Hypertension    • Patellar bursitis of right knee     last assessed - [id number]   • Personal history of other specified conditions     presenting hazards to health   • Stroke [facility])    • Stroke syndrome    • Urinary frequency    • UTI (urinary tract infection)        Past Social History  Past Surgical History:   Procedure Laterality Date   • TN ESOPHAGOGASTRODUODENOSCOPY TRANSORAL DIAGNOSTIC N/A 4/5/2017    Procedure: ESOPHAGOGASTRODUODENOSCOPY (EGD); Surgeon: Cheryl Worthington MD;  Location: [facility];   Service: Gastroenterology       Past Family History  Family History   Problem Relation Age of Onset   • Hypertension Mother    • Stroke Mother    • Heart disease Father    • Other Sister         1)Breast disorder; 2)Epilepsy   • Migraines Sister         Migraine headaches   • Osteoporosis Sister    • Thyroid disease Sister    • Coronary artery disease Sister         S/P triple vessel bypass   • Breast cancer Sister [de-identified]   • No Known Problems Sister    • No Known Problems Sister    • Osteoporosis Maternal Grandmother    • No Known Problems Maternal Grandfather    • No Known Problems Paternal Grandmother    • No Known Problems Paternal Grandfather    • Diabetes Son         Diabetes mellitus   • Hypertension Son    • Rheum arthritis Son         Rheumatic disease   • No Known Problems Son    • No Known Problems Son    • No Known Problems Son    • No Known Problems Son    • No Known Problems Son    • No Known Problems Maternal Aunt    • No Known Problems Maternal Aunt    • No Known Problems Maternal Aunt    • No Known Problems Paternal Aunt    • No Known Problems Paternal Aunt    • Heart disease Family        Past Social history  Social History     Socioeconomic History   • Marital status:       Spouse name: Not on file   • Number of children: 8   • Years of education: Not on file   • Highest education level: Not on file   Occupational History   • Occupation: Retired   Tobacco Use   • Smoking status: Former     Packs/day: 3 00     Types: Cigarettes     Quit date:      Years since quittin 4   • Smokeless tobacco: Former   • Tobacco comments:     quit over 30 yrs ago; (quit in the , had smoked 3PPD for 20 years - per [employer])   Vaping Use   • Vaping Use: Never used   Substance and Sexual Activity   • Alcohol use: Never   • Drug use: Never   • Sexual activity: Not Currently   Other Topics Concern   • Not on file   Social History Narrative    Diet needs improvement    Does not exercise    No caffeine use     Social Determinants of Health     Financial Resource Strain: Not on file   Food Insecurity: Not on file   Transportation Needs: Not on file   Physical Activity: Inactive (2021)    Exercise Vital Sign    • Days of Exercise per Week: 0 days    • Minutes of Exercise per Session: 0 min   Stress: Not on file   Social Connections: Socially Isolated (2021)    Social Connection and Isolation Panel [NHANES]    • Frequency of Communication with Friends and Family: Once a week • Frequency of Social Gatherings with Friends and Family: Once a week    • Attends Confucianist Services: 1 to 4 times per year    • Active Member of Clubs or Organizations: No    • Attends Club or Organization Meetings: Never    • Marital Status:    Intimate Partner Violence: Not on file   Housing Stability: Not on file       Current Medications  Current Outpatient Medications   Medication Sig Dispense Refill   • acetaminophen (TYLENOL) 650 mg CR tablet Take 650 mg by mouth every 6 (six) hours as needed for mild pain     • albuterol (PROVENTIL HFA,VENTOLIN HFA) 90 mcg/act inhaler INHALE 2 PUFFS EVERY 4 (FOUR) HOURS AS NEEDED FOR WHEEZING 8 5 g 5   • aspirin (ECOTRIN LOW STRENGTH) 81 mg EC tablet Take 1 tablet (81 mg total) by mouth daily 90 tablet 0   • atorvastatin (LIPITOR) 40 mg tablet TAKE 1 TABLET (40 MG TOTAL) BY MOUTH DAILY 90 tablet 3   • Blood Glucose Monitoring Suppl (FREESTYLE LITE) JAQUELIN by Does not apply route daily 1 each 0   • Breo Ellipta 100-25 MCG/ACT inhaler INHALE 1 PUFF DAILY 60 each 2   • carvedilol (COREG) 6 25 mg tablet TAKE 1 TABLET (6 25 MG TOTAL) BY MOUTH 2 (TWO) TIMES A DAY WITH MEALS 60 tablet 5   • clopidogrel (PLAVIX) 75 mg tablet TAKE 2 TABLETS DAILY 180 tablet 3   • conjugated estrogens (PREMARIN) vaginal cream Insert 0 5 g into the vagina 2 (two) times a week Insert twice weekly at bedtime 30 g 1   • Continuous Blood Gluc  (FreeStyle Naldo 14 Day Allred) JAQUELIN Use to record blood glucose 4 times daily   Once fasting and three times daily before meals 1 each 0   • Continuous Blood Gluc Sensor (FreeStyle Naldo 14 Day Sensor) MISC Use one sensor every 14 days 6 each 1   • D3 50 MCG (2000 UT) TABS TAKE 2 TABLETS (4,000 UNITS TOTAL) BY MOUTH DAILY 180 tablet 3   • estradiol (ESTRACE VAGINAL) 0 1 mg/g vaginal cream Insert 2 g into the vagina 2 (two) times a week (Patient not taking: Reported on 7/1/2022) 42 5 g 3   • ferrous sulfate 325 (65 Fe) mg tablet TAKE 1 TABLET TWICE A DAY BEFORE MEALS 180 tablet 3   • furosemide (LASIX) 40 mg tablet TAKE 1 TABLET (40 MG TOTAL) BY MOUTH 2 (TWO) TIMES A  tablet 3   • glucose blood (FREESTYLE LITE) test strip TEST AS DIRECTED UP TO FOUR TIMES A  each 3   • glucose monitoring kit (FREESTYLE) monitoring kit Use 1 each as needed (Three time daily) Please check blood sugar 3 times daily  1 each 1   • Glucose-Vitamin C-Vitamin D (TRUEPLUS GLUCOSE ON THE GO) CHEW CHEW 2 TABS AS NEEDED FOR BLOOD SUGAR LESS THAN 80 (Patient not taking: Reported on 8/9/2022)     • ketoconazole (NIZORAL) 2 % shampoo APPLY TOPICALLY TO THE SCALP ONCE EVERY OTHER WEEK 120 mL 0   • Lancets (FREESTYLE) lancets Use as instructed 100 each 0   • latanoprost (XALATAN) 0 005 % ophthalmic solution      • losartan (COZAAR) 50 mg tablet TAKE 1 TABLET DAILY 90 tablet 3   • Misc  Devices (QUAD CANE) MISC by Does not apply route daily 1 each 0   • montelukast (SINGULAIR) 10 mg tablet TAKE 1 TABLET (10 MG TOTAL) BY MOUTH DAILY AT BEDTIME 90 tablet 3   • nitroglycerin (NITROSTAT) 0 4 mg SL tablet Place 1 tablet (0 4 mg total) under the tongue every 5 (five) minutes as needed for chest pain 25 tablet 1   • ranolazine (RANEXA) 500 mg 12 hr tablet Take 1 tablet (500 mg total) by mouth every 12 (twelve) hours 60 tablet 0   • repaglinide (PRANDIN) 0 5 mg tablet Take 1 tablet (0 5 mg total) by mouth 2 (two) times a day before meals (SKIP DOSE IF SKIPPING MEAL) 180 tablet 1   • Tradjenta 5 MG TABS TAKE 1 TABLET (5 MG) BY MOUTH DAILY 90 tablet 1   • Xarelto 2 5 MG tablet TAKE 1 TABLET (2 5 MG TOTAL) BY MOUTH 2 (TWO) TIMES A DAY WITH MEALS 180 tablet 3     No current facility-administered medications for this visit  Allergies  No Known Allergies    Past medical history, social history, family history, medications and allergies were reviewed  Vitals  There were no vitals filed for this visit  Physical Exam  Physical Exam  Constitutional:       Appearance: Normal appearance  She is well-developed  HENT:      Head: Normocephalic  Eyes:      Pupils: Pupils are equal, round, and reactive to light  Pulmonary:      Effort: Pulmonary effort is normal    Abdominal:      Palpations: Abdomen is soft  Tenderness: There is no right CVA tenderness or left CVA tenderness  Musculoskeletal:         General: Normal range of motion  Cervical back: Normal range of motion  Comments: Ambulates with cane, gait unsteady   Skin:     General: Skin is warm and dry  Neurological:      General: No focal deficit present  Mental Status: She is alert and oriented to person, place, and time  Comments: forgetful   Psychiatric:         Mood and Affect: Mood normal          Behavior: Behavior normal          Thought Content: Thought content normal          Judgment: Judgment normal          Results    I have personally reviewed all pertinent lab results and reviewed with patient  Lab Results   Component Value Date    BUN 40 (H) 05/19/2023    CALCIUM 9 6 05/19/2023     (H) 05/19/2023    CO2 26 05/19/2023    CREATININE 1 24 05/19/2023    GLUCOSE 170 (H) 09/22/2015    K 3 6 05/19/2023     09/22/2015     Lab Results   Component Value Date    HCT 36 1 05/19/2023    HGB 11 1 (L) 05/19/2023    MCV 85 05/19/2023     05/19/2023    WBC 5 72 05/19/2023     No results found for this or any previous visit (from the past 1 hour(s))

## 2023-05-25 ENCOUNTER — OFFICE VISIT (OUTPATIENT)
Dept: UROLOGY | Facility: AMBULATORY SURGERY CENTER | Age: 83
End: 2023-05-25

## 2023-05-25 VITALS
WEIGHT: 176 LBS | SYSTOLIC BLOOD PRESSURE: 144 MMHG | RESPIRATION RATE: 18 BRPM | HEIGHT: 63 IN | BODY MASS INDEX: 31.18 KG/M2 | HEART RATE: 66 BPM | DIASTOLIC BLOOD PRESSURE: 80 MMHG | OXYGEN SATURATION: 98 %

## 2023-05-25 DIAGNOSIS — N32.89 BLADDER WALL THICKENING: ICD-10-CM

## 2023-05-25 DIAGNOSIS — N28.1 RENAL CYST: Primary | ICD-10-CM

## 2023-05-25 LAB
BACTERIA UR QL AUTO: ABNORMAL /HPF
BILIRUB UR QL STRIP: NEGATIVE
CLARITY UR: ABNORMAL
COLOR UR: YELLOW
GLUCOSE UR STRIP-MCNC: NEGATIVE MG/DL
HGB UR QL STRIP.AUTO: ABNORMAL
KETONES UR STRIP-MCNC: NEGATIVE MG/DL
LEUKOCYTE ESTERASE UR QL STRIP: ABNORMAL
NITRITE UR QL STRIP: POSITIVE
NON-SQ EPI CELLS URNS QL MICRO: ABNORMAL /HPF
PH UR STRIP.AUTO: 6 [PH]
POST-VOID RESIDUAL VOLUME, ML POC: 197 ML
PROT UR STRIP-MCNC: ABNORMAL MG/DL
RBC #/AREA URNS AUTO: ABNORMAL /HPF
SL AMB  POCT GLUCOSE, UA: NORMAL
SL AMB LEUKOCYTE ESTERASE,UA: NORMAL
SL AMB POCT BILIRUBIN,UA: NORMAL
SL AMB POCT BLOOD,UA: NORMAL
SL AMB POCT CLARITY,UA: NORMAL
SL AMB POCT COLOR,UA: YELLOW
SL AMB POCT KETONES,UA: NORMAL
SL AMB POCT NITRITE,UA: NORMAL
SL AMB POCT PH,UA: 5
SL AMB POCT SPECIFIC GRAVITY,UA: 1.01
SL AMB POCT URINE PROTEIN: NORMAL
SL AMB POCT UROBILINOGEN: 0.2
SP GR UR STRIP.AUTO: 1.01 (ref 1–1.03)
UROBILINOGEN UR STRIP-ACNC: <2 MG/DL
WBC #/AREA URNS AUTO: ABNORMAL /HPF
WBC CLUMPS # UR AUTO: PRESENT /UL

## 2023-05-25 NOTE — PATIENT INSTRUCTIONS
Cystoscopy   AMBULATORY CARE:   A cystoscopy  is a procedure to look inside your urethra and bladder using a cystoscope  A cystoscope is a small tube with a light and magnifying camera on the end  The procedure is used to diagnose and treat conditions of the bladder, urethra, or prostate  Your healthcare provider may also do a ureteroscopy during a cystoscopy  A ureteroscopy is a procedure to look inside your ureters and kidneys  Prepare for your cystoscopy: Your healthcare provider will talk to you about how to prepare for your procedure  He or she will tell you what medicines to take and not to take on the day of your procedure  You may need to stop taking medicines such as anticoagulants, aspirin, and ibuprofen several days before your procedure  Your provider may tell you stop eating after midnight the night before your procedure  You may be asked to drink a large amount of liquids before your procedure  Arrange for a ride home after your procedure  You will not be allowed to drive yourself home  During your cystoscopy: You may be given general anesthesia to keep you asleep and pain free during your procedure  Your healthcare provider may instead use local anesthesia  It is put into your urethra and bladder  You will not feel pain, but you may be able to feel some pressure during your procedure  With local anesthesia, you may feel burning or need to urinate when the cystoscope is put in and removed  If you are female, you will be placed on your back and your feet may be placed in stirrups  If you are male, you will be placed on your back or in a sitting position  The cystoscope will be placed through your urethra and into your bladder  The urologist will look at the walls of your urethra as the scope goes through to your bladder  Your bladder will be filled with liquid so your urologist can see the inside of your bladder more clearly   Tools may be inserted through the cystoscope to treat any problems in your urethra or bladder  Your provider may also take a sample of tissue and send it to a lab for tests  After your cystoscopy:  After you are fully awake, you will go home  You may see small amounts of blood in your urine  This is normal  It is also normal to have an increased need to urinate  You may also have burning or mild discomfort in your bladder or kidney area when you urinate  If you had general anesthesia, it may take at least 24 hours before you feel like your usual self  Risks of a cystoscopy: You may bleed more than expected or develop an infection  Your urethra, bladder, or ureters may get damaged during your procedure  You may have abdominal pain  Swelling caused by the cystoscopy may cause a blockage or slow urine flow  Seek care immediately if:   Your urine turns from pink to red, or you have clots in your urine  You cannot urinate and your bladder feels full  You have severe pain  Contact your healthcare provider or urologist if:   Your pain or burning during urination becomes worse or lasts longer than 1 day  Your urine stays pink for longer than 1 day  You have a fever and chills  You urinate less than usual, or still feel like you have to urinate after you use the bathroom  You have questions or concerns about your condition or care  Medicines: You may  be given any of the following:  Antibiotics  help treat or prevent a bacterial infection  Acetaminophen  decreases pain and fever  It is available without a doctor's order  Ask how much to take and how often to take it  Follow directions  Read the labels of all other medicines you are using to see if they also contain acetaminophen, or ask your doctor or pharmacist  Acetaminophen can cause liver damage if not taken correctly  Take your medicine as directed  Contact your healthcare provider if you think your medicine is not helping or if you have side effects   Tell your provider if you are allergic to any medicine  Keep a list of the medicines, vitamins, and herbs you take  Include the amounts, and when and why you take them  Bring the list or the pill bottles to follow-up visits  Carry your medicine list with you in case of an emergency  Self-care:   Drink liquids as directed  Your healthcare provider may recommend that you drink 6 to 8 eight-ounce cups of water every day for 2 days after your procedure  Apply a warm, damp washcloth over your urethral opening  This may help to relieve discomfort  Ask when you can return to regular daily activities  Your healthcare provider may recommend that you rest after your procedure  Do not have sex  until your healthcare provider tells you it is okay  Sex may increase your risk for a urinary tract infection  Follow up with your healthcare provider as directed:  Write down your questions so you remember to ask them during your visits  © Copyright [employer] 2022 Information is for End User's use only and may not be sold, redistributed or otherwise used for commercial purposes  The above information is an  only  It is not intended as medical advice for individual conditions or treatments  Talk to your doctor, nurse or pharmacist before following any medical regimen to see if it is safe and effective for you

## 2023-05-26 ENCOUNTER — TELEPHONE (OUTPATIENT)
Dept: UROLOGY | Facility: AMBULATORY SURGERY CENTER | Age: 83
End: 2023-05-26

## 2023-05-26 DIAGNOSIS — N39.0 URINARY TRACT INFECTION WITHOUT HEMATURIA, SITE UNSPECIFIED: Primary | ICD-10-CM

## 2023-05-26 RX ORDER — CEPHALEXIN 500 MG/1
500 CAPSULE ORAL EVERY 12 HOURS SCHEDULED
Qty: 10 CAPSULE | Refills: 0 | Status: SHIPPED | OUTPATIENT
Start: 2023-05-26 | End: 2023-05-30 | Stop reason: ALTCHOICE

## 2023-05-26 NOTE — TELEPHONE ENCOUNTER
----- Message from [id number] Vijay Marshall sent at 5/26/2023  3:10 PM EDT -----  Please inform patient results of urine culture were positive for infection, final culture and sensitivity pending  With upcoming weekend, prescription for Keflex sent to her pharmacy  We will call with final results

## 2023-05-26 NOTE — TELEPHONE ENCOUNTER
Spoke to patient and wanted to let her know to  her antibiotic Keflex at her pharmacy since her urine culture were positive for infection  When the final sensitivity and culture finalizes we will give her a call  Pt stated the next time we should send her a leftter instead with what is needed to be said and not call  Pt is aware of her results

## 2023-05-27 LAB
BACTERIA UR CULT: ABNORMAL
BACTERIA UR CULT: ABNORMAL

## 2023-05-30 ENCOUNTER — TELEPHONE (OUTPATIENT)
Dept: UROLOGY | Facility: AMBULATORY SURGERY CENTER | Age: 83
End: 2023-05-30

## 2023-05-30 DIAGNOSIS — N39.0 URINARY TRACT INFECTION WITHOUT HEMATURIA, SITE UNSPECIFIED: Primary | ICD-10-CM

## 2023-05-30 RX ORDER — SULFAMETHOXAZOLE AND TRIMETHOPRIM 800; 160 MG/1; MG/1
1 TABLET ORAL EVERY 12 HOURS SCHEDULED
Qty: 10 TABLET | Refills: 0 | Status: ON HOLD | OUTPATIENT
Start: 2023-05-30 | End: 2023-06-01 | Stop reason: SDUPTHER

## 2023-05-31 ENCOUNTER — TELEPHONE (OUTPATIENT)
Dept: UROLOGY | Facility: AMBULATORY SURGERY CENTER | Age: 83
End: 2023-05-31

## 2023-05-31 NOTE — TELEPHONE ENCOUNTER
----- Message from [id number] Vijay Marshall sent at 5/30/2023 11:06 AM EDT -----  Please inform patient antibiotic was switched to Bactrim based on final sensitivity  LMOVM per pt communication consent that new rx was sent to pharmacy based on final sensitivity results  Office number provided if any questions

## 2023-06-01 ENCOUNTER — HOSPITAL ENCOUNTER (OUTPATIENT)
Facility: HOSPITAL | Age: 83
Setting detail: OUTPATIENT SURGERY
Discharge: HOME | End: 2023-06-01
Attending: INTERNAL MEDICINE | Admitting: INTERNAL MEDICINE

## 2023-06-01 VITALS
OXYGEN SATURATION: 97 % | RESPIRATION RATE: 18 BRPM | SYSTOLIC BLOOD PRESSURE: 135 MMHG | HEART RATE: 65 BPM | DIASTOLIC BLOOD PRESSURE: 65 MMHG | TEMPERATURE: 97.7 F

## 2023-06-01 DIAGNOSIS — J31.0 CHRONIC RHINITIS: ICD-10-CM

## 2023-06-01 DIAGNOSIS — Z91.89 AT RISK FOR FLUID VOLUME OVERLOAD: ICD-10-CM

## 2023-06-01 DIAGNOSIS — E78.2 MIXED HYPERLIPIDEMIA: ICD-10-CM

## 2023-06-01 DIAGNOSIS — N18.32 STAGE 3B CHRONIC KIDNEY DISEASE (HCC): Primary | ICD-10-CM

## 2023-06-01 DIAGNOSIS — I20.0 UNSTABLE ANGINA (HCC): ICD-10-CM

## 2023-06-01 DIAGNOSIS — I21.4 NSTEMI (NON-ST ELEVATED MYOCARDIAL INFARCTION) (HCC): ICD-10-CM

## 2023-06-01 DIAGNOSIS — N39.0 URINARY TRACT INFECTION WITHOUT HEMATURIA, SITE UNSPECIFIED: ICD-10-CM

## 2023-06-01 DIAGNOSIS — N18.30 CKD (CHRONIC KIDNEY DISEASE) STAGE 3, GFR 30-59 ML/MIN (HCC): ICD-10-CM

## 2023-06-01 DIAGNOSIS — I10 ESSENTIAL HYPERTENSION: ICD-10-CM

## 2023-06-01 LAB
ANION GAP SERPL CALCULATED.3IONS-SCNC: 2 MMOL/L (ref 4–13)
BUN SERPL-MCNC: 42 MG/DL (ref 5–25)
CALCIUM SERPL-MCNC: 8.8 MG/DL (ref 8.3–10.1)
CHLORIDE SERPL-SCNC: 114 MMOL/L (ref 96–108)
CO2 SERPL-SCNC: 25 MMOL/L (ref 21–32)
CREAT SERPL-MCNC: 1.48 MG/DL (ref 0.6–1.3)
GFR SERPL CREATININE-BSD FRML MDRD: 32 ML/MIN/1.73SQ M
GLUCOSE P FAST SERPL-MCNC: 177 MG/DL (ref 65–99)
GLUCOSE SERPL-MCNC: 177 MG/DL (ref 65–140)
POTASSIUM SERPL-SCNC: 3.5 MMOL/L (ref 3.5–5.3)
SODIUM SERPL-SCNC: 141 MMOL/L (ref 135–147)

## 2023-06-01 RX ORDER — LIDOCAINE HYDROCHLORIDE 10 MG/ML
INJECTION, SOLUTION EPIDURAL; INFILTRATION; INTRACAUDAL; PERINEURAL CODE/TRAUMA/SEDATION MEDICATION
Status: DISCONTINUED | OUTPATIENT
Start: 2023-06-01 | End: 2023-06-01 | Stop reason: HOSPADM

## 2023-06-01 RX ORDER — LOSARTAN POTASSIUM 50 MG/1
50 TABLET ORAL DAILY
Refills: 0
Start: 2023-06-02

## 2023-06-01 RX ORDER — ASPIRIN 81 MG/1
TABLET, CHEWABLE ORAL
Status: DISCONTINUED
Start: 2023-06-01 | End: 2023-06-01 | Stop reason: HOSPADM

## 2023-06-01 RX ORDER — HEPARIN SODIUM 1000 [USP'U]/ML
INJECTION, SOLUTION INTRAVENOUS; SUBCUTANEOUS CODE/TRAUMA/SEDATION MEDICATION
Status: DISCONTINUED | OUTPATIENT
Start: 2023-06-01 | End: 2023-06-01 | Stop reason: HOSPADM

## 2023-06-01 RX ORDER — FUROSEMIDE 10 MG/ML
INJECTION INTRAMUSCULAR; INTRAVENOUS CODE/TRAUMA/SEDATION MEDICATION
Status: DISCONTINUED | OUTPATIENT
Start: 2023-06-01 | End: 2023-06-01 | Stop reason: HOSPADM

## 2023-06-01 RX ORDER — VERAPAMIL HYDROCHLORIDE 2.5 MG/ML
INJECTION, SOLUTION INTRAVENOUS CODE/TRAUMA/SEDATION MEDICATION
Status: DISCONTINUED | OUTPATIENT
Start: 2023-06-01 | End: 2023-06-01 | Stop reason: HOSPADM

## 2023-06-01 RX ORDER — TORSEMIDE 20 MG/1
60 TABLET ORAL DAILY
Qty: 90 TABLET | Refills: 1 | Status: SHIPPED | OUTPATIENT
Start: 2023-06-02

## 2023-06-01 RX ORDER — CLOPIDOGREL BISULFATE 75 MG/1
75 TABLET ORAL ONCE
Status: COMPLETED | OUTPATIENT
Start: 2023-06-01 | End: 2023-06-01

## 2023-06-01 RX ORDER — FENTANYL CITRATE 50 UG/ML
INJECTION, SOLUTION INTRAMUSCULAR; INTRAVENOUS CODE/TRAUMA/SEDATION MEDICATION
Status: DISCONTINUED | OUTPATIENT
Start: 2023-06-01 | End: 2023-06-01 | Stop reason: HOSPADM

## 2023-06-01 RX ORDER — ASPIRIN 81 MG/1
324 TABLET, CHEWABLE ORAL ONCE
Status: COMPLETED | OUTPATIENT
Start: 2023-06-01 | End: 2023-06-01

## 2023-06-01 RX ORDER — SODIUM CHLORIDE 9 MG/ML
50 INJECTION, SOLUTION INTRAVENOUS CONTINUOUS
Status: DISPENSED | OUTPATIENT
Start: 2023-06-01 | End: 2023-06-01

## 2023-06-01 RX ORDER — SULFAMETHOXAZOLE AND TRIMETHOPRIM 800; 160 MG/1; MG/1
1 TABLET ORAL DAILY
Qty: 5 TABLET | Refills: 0 | Status: SHIPPED | OUTPATIENT
Start: 2023-06-01 | End: 2023-06-06

## 2023-06-01 RX ORDER — NITROGLYCERIN 20 MG/100ML
INJECTION INTRAVENOUS CODE/TRAUMA/SEDATION MEDICATION
Status: DISCONTINUED | OUTPATIENT
Start: 2023-06-01 | End: 2023-06-01 | Stop reason: HOSPADM

## 2023-06-01 RX ORDER — MIDAZOLAM HYDROCHLORIDE 2 MG/2ML
INJECTION, SOLUTION INTRAMUSCULAR; INTRAVENOUS CODE/TRAUMA/SEDATION MEDICATION
Status: DISCONTINUED | OUTPATIENT
Start: 2023-06-01 | End: 2023-06-01 | Stop reason: HOSPADM

## 2023-06-01 RX ADMIN — CLOPIDOGREL BISULFATE 75 MG: 75 TABLET ORAL at 07:17

## 2023-06-01 RX ADMIN — ASPIRIN 81 MG CHEWABLE TABLET 324 MG: 81 TABLET CHEWABLE at 07:16

## 2023-06-01 RX ADMIN — SODIUM CHLORIDE 239.4 ML: 0.9 INJECTION, SOLUTION INTRAVENOUS at 07:25

## 2023-06-01 NOTE — CONSULTS
Consultation - Nephrology   Nicole Connelly 80 y o  female MRN: [id number]  Unit/Bed#: [facility] CATH LAB ROOM Encounter: [id number]      Assessment/Plan:  1  Chronic kidney disease, baseline creatinine 1 2-1 5 most recent creatinine 1 48 estimated GFR 32, follows with Dr Naveen Alfaro  2  Abnormal pharmacological nuclear stress test, anticipating cardiac catheterization  3  Recent UTI urine culture Serratia, okay to complete course of Bactrim, although would reduce dose to once daily, potassium stable at 3 5, creatinine within baseline at 1 48   4  Anemia of chronic kidney disease, previous hemoglobin at 11 1  5  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 recent hemoglobin A1c at 7 3, previous microalbumin to creatinine ratio 264  6  Calcified renal cysts with calcified septation, no internal vascularity  7  Bladder wall thickening, etiology unclear  Following closely with urology  Anticipated cystoscopy in the future  Plan:  · Overall renal function remains fairly stable with a creatinine of 1 48  · Continue with IV fluids pre and postcardiac catheterization, volume status appears stable  · Reduce Bactrim dose to once daily given borderline GFR of 32  · Okay to resume Lasix and losartan in the next 24 hours  · No other changes from nephrology standpoint    History of Present Illness   Physician Requesting Consult: Corry Davila MD  Reason for Consult / Principal Problem: Chronic kidney disease  HPI: Nicole Connelly is a 80y o  year old female who presents with elective cardiac catheterization    Patient is a 80-year-old female who presents for elective cardiac catheterization  Patient with apparent abnormal pharmacological nuclear stress test   Patient does complain of shortness of breath especially with exertion  However she does not relate any significant changes  Denies any chest pain or pressure  No reports of abdominal pain  Denies any significant or worsening lower extremity swelling      History obtained from chart review and the patient    Review of Systems   Respiratory: Positive for shortness of breath  Negative for chest tightness  Cardiovascular: Negative for chest pain and leg swelling  Gastrointestinal: Negative for abdominal pain, diarrhea, nausea and vomiting  Genitourinary: Negative for difficulty urinating  Neurological: Negative for dizziness, syncope and light-headedness         Pertinent findings of a 10 point review of systems noted above otherwise all others negative    Historical Information   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, without long-term current use of insulin ([facility])   • Non-rheumatic mitral regurgitation   • Uterine procidentia   • Anemia   • Esophageal abnormality   • 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   • Decreased hearing, bilateral   • 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[facility])   • History of CVA with residual deficit   • Mixed hyperlipidemia   • Persistent proteinuria   • Renal cyst   • Ankle edema   • Stage 3b chronic kidney disease ([facility])   • Acute diastolic congestive heart failure ([facility])   • Status post insertion of drug-eluting stent into left anterior descending (LAD) artery   • Coronary artery disease involving native coronary artery   • NSTEMI (non-ST elevated myocardial infarction) ([facility] [address] )   • Essential hypertension   • Asthma   • 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[facility])   • Uncontrolled type 2 diabetes mellitus with hyperglycemia ([facility])   • Urge urinary incontinence   • Nocturia   • Secondary hyperparathyroidism of renal origin ([facility] [address] )   • Chronic diastolic congestive heart failure ([facility])     Past Medical History:   Diagnosis Date   • ACE-inhibitor cough     last assessed - 29Dec2017   • Asthma    • Bilateral edema of lower extremity     last assessed - 2017   • Cardiac murmur     last assessed - 2017   • CKD (chronic kidney disease)    • Diabetes mellitus ([address] )     last assessed - [id number]   • Esophageal dysphagia    • Fatigue     last assessed - 2017   • Hyperlipidemia    • Hypertension    • Patellar bursitis of right knee     last assessed - [id number]   • Personal history of other specified conditions     presenting hazards to health   • Stroke [facility])    • Stroke syndrome    • Urinary frequency    • UTI (urinary tract infection)      Past Surgical History:   Procedure Laterality Date   • OR ESOPHAGOGASTRODUODENOSCOPY TRANSORAL DIAGNOSTIC N/A 2017    Procedure: ESOPHAGOGASTRODUODENOSCOPY (EGD); Surgeon: Severo Musca, MD;  Location: [facility];   Service: Gastroenterology     Social History   Social History     Substance and Sexual Activity   Alcohol Use Never     Social History     Substance and Sexual Activity   Drug Use Never     Social History     Tobacco Use   Smoking Status Former   • Packs/day: 3 00   • Types: Cigarettes   • Quit date: 36   • Years since quittin 4   Smokeless Tobacco Former   Tobacco Comments    quit over 30 yrs ago; (quit in the , had smoked 3PPD for 20 years - per [employer])     Family History   Problem Relation Age of Onset   • Hypertension Mother    • Stroke Mother    • Heart disease Father    • Other Sister         1)Breast disorder; 2)Epilepsy   • Migraines Sister         Migraine headaches   • Osteoporosis Sister    • Thyroid disease Sister    • Coronary artery disease Sister         S/P triple vessel bypass   • Breast cancer Sister [de-identified]   • No Known Problems Sister    • No Known Problems Sister    • Osteoporosis Maternal Grandmother    • No Known Problems Maternal Grandfather    • No Known Problems Paternal Grandmother    • No Known Problems Paternal Grandfather    • Diabetes Son         Diabetes mellitus   • Hypertension Son    • Rheum arthritis Son         Rheumatic disease   • No Known Problems Son    • No Known Problems Son    • No Known Problems Son    • No Known Problems Son    • No Known Problems Son    • No Known Problems Maternal Aunt    • No Known Problems Maternal Aunt    • No Known Problems Maternal Aunt    • No Known Problems Paternal Aunt    • No Known Problems Paternal Aunt    • Heart disease Family        Meds/Allergies   current meds:   Current Facility-Administered Medications   Medication Dose Route Frequency   • aspirin 81 mg chewable tablet **ADS Override Pull**       • sodium chloride 0 9 % bolus 239 4 mL  3 mL/kg Intravenous Once    Followed by   • sodium chloride 0 9 % infusion  50 mL/hr Intravenous Continuous   • sodium chloride 0 9 % infusion  50 mL/hr Intravenous Continuous       No Known Allergies      Objective   /62 (BP Location: Left arm)   Pulse 69   Temp 98 3 °F (36 8 °C)   Resp 18   No intake or output data in the 24 hours ending 06/01/23 1011    Current Weight:      Physical Exam  Constitutional:       Appearance: She is not ill-appearing  Eyes:      General: No scleral icterus  Cardiovascular:      Rate and Rhythm: Normal rate and regular rhythm  Pulmonary:      Effort: Pulmonary effort is normal       Breath sounds: Normal breath sounds  Abdominal:      General: There is no distension  Palpations: Abdomen is soft  Musculoskeletal:      Right lower leg: No edema  Left lower leg: No edema  Lymphadenopathy:      Cervical: No cervical adenopathy  Skin:     General: Skin is warm and dry  Coloration: Skin is not jaundiced  Findings: No rash  Neurological:      Mental Status: She is alert and oriented to person, place, and time             Lab Results:        Results from last 7 days   Lab Units 06/01/23  0725   BUN mg/dL 42*   CALCIUM mg/dL 8 8   CHLORIDE mmol/L 114*   CO2 mmol/L 25   CREATININE mg/dL 1 48*   POTASSIUM mmol/L 3 5

## 2023-06-01 NOTE — H&P
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), HFpEF, previous tobacco use with COPD, peripheral vascular disease, CKD stage IIIb  5- pharmacological nuclear stress test:      ECG 10/12/2022        Historical Information   Past Medical History:   Diagnosis Date   • ACE-inhibitor cough     last assessed - 29Dec2017   • Asthma    • Bilateral edema of lower extremity     last assessed - 21Aug2017   • Cardiac murmur     last assessed - 21Aug2017   • CKD (chronic kidney disease)    • Diabetes mellitus ([facility] [address] )     last assessed - [id number]   • Esophageal dysphagia    • Fatigue     last assessed - 21Aug2017   • Hyperlipidemia    • Hypertension    • Patellar bursitis of right knee     last assessed - [id number]   • Personal history of other specified conditions     presenting hazards to health   • Stroke [facility])    • Stroke syndrome    • Urinary frequency    • UTI (urinary tract infection)      Past Surgical History:   Procedure Laterality Date   • TX ESOPHAGOGASTRODUODENOSCOPY TRANSORAL DIAGNOSTIC N/A 4/5/2017    Procedure: ESOPHAGOGASTRODUODENOSCOPY (EGD); Surgeon: Marcia Mondragon MD;  Location:  GI LAB;   Service: Gastroenterology     Social History   Social History     Substance and Sexual Activity   Alcohol Use Never     Social History     Substance and Sexual Activity   Drug Use Never     Social History     Tobacco Use Smoking Status Former   • Packs/day: 3 00   • Types: Cigarettes   • Quit date: 36   • Years since quittin 4   Smokeless Tobacco Former   Tobacco Comments    quit over 30 yrs ago; (quit in the , had smoked 3PPD for 20 years - per [employer])     Family History:   Family History   Problem Relation Age of Onset   • Hypertension Mother    • Stroke Mother    • Heart disease Father    • Other Sister         1)Breast disorder; 2)Epilepsy   • Migraines Sister         Migraine headaches   • Osteoporosis Sister    • Thyroid disease Sister    • Coronary artery disease Sister         S/P triple vessel bypass   • Breast cancer Sister [de-identified]   • No Known Problems Sister    • No Known Problems Sister    • Osteoporosis Maternal Grandmother    • No Known Problems Maternal Grandfather    • No Known Problems Paternal Grandmother    • No Known Problems Paternal Grandfather    • Diabetes Son         Diabetes mellitus   • Hypertension Son    • Rheum arthritis Son         Rheumatic disease   • No Known Problems Son    • No Known Problems Son    • No Known Problems Son    • No Known Problems Son    • No Known Problems Son    • No Known Problems Maternal Aunt    • No Known Problems Maternal Aunt    • No Known Problems Maternal Aunt    • No Known Problems Paternal Aunt    • No Known Problems Paternal Aunt    • Heart disease Family        Meds/Allergies   all medications and allergies reviewed  No Known Allergies    Physical Exam  /62 (BP Location: Left arm)   Pulse 69   Temp 98 3 °F (36 8 °C)   Resp 18       GEN: Jennifer Amaya appears well, alert and oriented x 3, pleasant and cooperative   HEENT:  Normocephalic, atraumatic, anicteric, moist mucous membranes  NECK: No JVD  HEART: Regular rhythm, normal rate, normal S1 and S2, no murmur  LUNGS: Clear to auscultation bilaterally; respiration nonlabored   ABDOMEN:  Soft, no tenderness, no distention  EXTREMITIES: No edema  NEURO: no gross focal findings; cranial nerves grossly intact   SKIN:  Dry, intact, warm to touch  ACCESS: Normal right radial Shayne's, 1-2+ right femoral pulse      Previous Cath/PCI:  Results for orders placed during the hospital encounter of 21    Cardiac catheterization    Narrative  Kristie Salas  Invasive Cardiovascular Lab Complete Report    Patient: Nathanael Stephens  MR number: [id number]  Account number: [de-identified]  Study date: 2021  Gender: Female  : 1940  Height: 63 in  Weight: 166 1 lb  BSA: 1 79 mï¾²    Allergies: NO KNOWN ALLERGIES    Diagnostic Cardiologist:  Melody England DO  Interventional Cardiologist:  Melody England DO  Primary Physician:  Rebecca Morales PA-C    SUMMARY    CORONARY CIRCULATION:  Mid LAD: There was a 100 % stenosis, prior stent  Proximal RCA: There was a 10 % stenosis  Mid RCA: There was a 20 % stenosis at the site of a prior stent  1ST LESION INTERVENTIONS:  A balloon angioplasty procedure was performed on the 100 % lesion in the mid LAD  Following intervention there was a 80 % residual stenosis  CORONARY VESSELS:     --  The coronary circulation is right dominant  --  Mid LAD: There was a 100 % stenosis, prior stent  --  Proximal RCA: There was a 10 % stenosis  --  Mid RCA: There was a 20 % stenosis at the site of a prior stent  IMPRESSIONS:  Occluded stent  Disease beyond stented area into diagonal and mid LAD not amenable to PCI    RECOMMENDATIONS  cabg consult, if not candidate med rx  DISPOSITION:  The patient left the catheterization laboratory in stable condition  Prepared and signed by    Melody England DO  Signed 2021 10:15:38      Previous STRESS TEST:  Results for orders placed during the hospital encounter of 23    NM myocardial perfusion spect (rx stress and/or rest)    Interpretation Summary  •  Resting ECG: ECG is abnormal  PRWP, T wave inversion in aVL  The ECG shows normal sinus rhythm   Resting ECG shows no ST-segment deviation  •  Stress ECG: No ST deviation is noted  Arrhythmias during stress: rare PVCs  There were no arrhythmias during recovery  The ECG was negative for ischemia  The stress ECG is negative for ischemia after pharmacologic vasodilation, without reproduction of symptoms  •  Perfusion: Large primarily fixed anteroapical defect with minimal varun-infarct reversibility noted into the distal anterior aspect of the defect  •  Stress Function: Left ventricular function post-stress is abnormal  Global function is moderately reduced  Post-stress ejection fraction is 43 %  Anteroapical wall motion abnormality noted  Abnormal pharmacologic nuclear stress test with evidence of a large primarily fixed anteroapical defect noted with minimal varun-infarct ischemia into the distal anterior portion of the defect  The LVEF is 43%  ECHO:  Results for orders placed during the hospital encounter of 05/12/23    Echo complete w/ contrast if indicated    Interpretation Summary  •  Left Ventricle: Left ventricular cavity size is mildly dilated  Wall thickness is mildly increased  There is eccentric hypertrophy  The left ventricular ejection fraction is 40-45%  Systolic function is mild to moderately reduced  Akinesis/dyskinesis of the distal septal, mid/distal anterior wall and apical portion of the left ventricle  Diastolic function is normal   •  Left Atrium: The atrium is moderately dilated  •  Right Atrium: The atrium is mildly dilated  •  Atrial Septum: There is a large septal aneurysm  It is predominately within the right atrial cavity  There is no flow noted across this structure  •  Aortic Valve: There is mild regurgitation  •  Mitral Valve: There is mild annular calcification  There is mild to moderate regurgitation  •  Tricuspid Valve: There is mild regurgitation  The right ventricular systolic pressure is normal       SAIDA:  No results found for this or any previous visit        CMR:  No results found for this or any previous visit  Lab Results: I have personally reviewed pertinent lab results  Results from last 7 days   Lab Units 06/01/23  0725   BUN mg/dL 42*   CHLORIDE mmol/L 114*   CO2 mmol/L 25   CREATININE mg/dL 1 48*   POTASSIUM mmol/L 3 5                         Assessment/Plan     Assessment:  80 y o  female patient who presents for coronary angiography due to abnormal pharmacological nuclear stress test   She has been having dyspnea CCS II      1  Abnormal nuclear stress test  - Stress test as above  - 5/12 TTE: LVEF 45-50%, LVIDd 5 5 cm, mild LVH, mid-apical septal + distal anterior + apical severe hypokinesis-akinesis, G1 DD, normal RV size & function, IAS aneurysm, 1+ AI, 1+ MR, RVSP 21 mmHg  - LDL 60, A1c 7 1%, Cr 1 24, trop 25 in 2022  - Aspirin, clopidogrel, carvedilol 6 25 mg, furosemide 40 mg BID, losartan 50 mg, ranolazine 500 mg, rivaroxaban 2 5 mg BID      Plan:  1  Proceed with coronary angiography +/- PCI with LVEDP  Case discussed and reviewed with Dr Ignacio Garcia who agrees with my assessment and plan  Thank you for involving us in the care of your patient  Milena Bingham MD   Interventional Cardiology Fellow  PGY-7      ==========================================================================================    Epic/ Allscripts/Care Everywhere records reviewed: Yes    ** Please Note: Fluency DirectDictation voice to text software may have been used in the creation of this document   **

## 2023-06-01 NOTE — DISCHARGE INSTR - AVS FIRST PAGE
1  Please see the post cardiac catheterization dishcarge instructions  No heavy lifting, greater than 10 lbs  or strenuous  activity for 48 hrs  2 Remove band aid tomorrow  Shower and wash area- wrist gently with soap and water- beginning tomorrow  Rinse and pat dry  Apply new water seal band aid  Repeat this process for 5 days  No powders, creams lotions or antibiotic ointments  for 5 days  No tub baths, hot tubs or swimming for 5 days  3  Please call our office ([phone]) if you have any fever, redness, swelling, discharge from your wrist access site      4 No driving for 1 day

## 2023-06-02 ENCOUNTER — TELEPHONE (OUTPATIENT)
Dept: INTERNAL MEDICINE CLINIC | Facility: CLINIC | Age: 83
End: 2023-06-02

## 2023-06-02 ENCOUNTER — TELEPHONE (OUTPATIENT)
Dept: NEPHROLOGY | Facility: CLINIC | Age: 83
End: 2023-06-02

## 2023-06-02 DIAGNOSIS — N18.30 BENIGN HYPERTENSION WITH CKD (CHRONIC KIDNEY DISEASE) STAGE III (HCC): ICD-10-CM

## 2023-06-02 DIAGNOSIS — N39.41 URGE URINARY INCONTINENCE: ICD-10-CM

## 2023-06-02 DIAGNOSIS — I12.9 BENIGN HYPERTENSION WITH CKD (CHRONIC KIDNEY DISEASE) STAGE III (HCC): ICD-10-CM

## 2023-06-02 DIAGNOSIS — N18.32 STAGE 3B CHRONIC KIDNEY DISEASE (HCC): Primary | ICD-10-CM

## 2023-06-02 DIAGNOSIS — N81.11 MIDLINE CYSTOCELE: ICD-10-CM

## 2023-06-02 DIAGNOSIS — R80.1 PERSISTENT PROTEINURIA: ICD-10-CM

## 2023-06-02 DIAGNOSIS — N25.81 SECONDARY HYPERPARATHYROIDISM OF RENAL ORIGIN (HCC): ICD-10-CM

## 2023-06-02 DIAGNOSIS — I10 ESSENTIAL HYPERTENSION: ICD-10-CM

## 2023-06-02 DIAGNOSIS — N28.1 RENAL CYST: ICD-10-CM

## 2023-06-02 LAB
ATRIAL RATE: 67 BPM
P AXIS: 9 DEGREES
PR INTERVAL: 208 MS
QRS AXIS: -2 DEGREES
QRSD INTERVAL: 86 MS
QT INTERVAL: 440 MS
QTC INTERVAL: 464 MS
T WAVE AXIS: 84 DEGREES
VENTRICULAR RATE: 67 BPM

## 2023-06-02 NOTE — TELEPHONE ENCOUNTER
Called patient and was unable to reach  I then called the patients son Santa Luu and spoke with him asking to please let the patient know to have blood work in about one week  Santa Luu verbally understood and had no further questions for me  Labs have been added to the system

## 2023-06-02 NOTE — TELEPHONE ENCOUNTER
----- Message from Fareed Jacobs DO sent at 6/1/2023  3:50 PM EDT -----  Patient recently admitted for elective cardiac catheterization given abnormal nuclear stress test   Multivessel disease noted  No intervention  On cardiac catheterization note: noted to have elevated filling pressures  Recommended increasing diuretic dosing  Did not see prior to discharge  ?? Whether she would benefit from SGLT2 inhibitor  Patient currently is also on Bactrim given UTI  Recommend repeating BMP in 1 week  Thank you

## 2023-06-05 ENCOUNTER — APPOINTMENT (OUTPATIENT)
Dept: LAB | Facility: CLINIC | Age: 83
End: 2023-06-05
Payer: MEDICARE

## 2023-06-05 DIAGNOSIS — N18.32 STAGE 3B CHRONIC KIDNEY DISEASE (HCC): ICD-10-CM

## 2023-06-05 LAB
ANION GAP SERPL CALCULATED.3IONS-SCNC: 2 MMOL/L (ref 4–13)
BUN SERPL-MCNC: 37 MG/DL (ref 5–25)
CALCIUM SERPL-MCNC: 9.1 MG/DL (ref 8.3–10.1)
CHLORIDE SERPL-SCNC: 115 MMOL/L (ref 96–108)
CO2 SERPL-SCNC: 22 MMOL/L (ref 21–32)
CREAT SERPL-MCNC: 1.87 MG/DL (ref 0.6–1.3)
GFR SERPL CREATININE-BSD FRML MDRD: 24 ML/MIN/1.73SQ M
GLUCOSE P FAST SERPL-MCNC: 162 MG/DL (ref 65–99)
POTASSIUM SERPL-SCNC: 4.3 MMOL/L (ref 3.5–5.3)
SODIUM SERPL-SCNC: 139 MMOL/L (ref 135–147)

## 2023-06-05 PROCEDURE — 80048 BASIC METABOLIC PNL TOTAL CA: CPT

## 2023-06-05 PROCEDURE — 36415 COLL VENOUS BLD VENIPUNCTURE: CPT

## 2023-06-06 ENCOUNTER — TELEPHONE (OUTPATIENT)
Dept: OTHER | Facility: HOSPITAL | Age: 83
End: 2023-06-06

## 2023-06-06 DIAGNOSIS — N17.9 AKI (ACUTE KIDNEY INJURY) (HCC): Primary | ICD-10-CM

## 2023-06-06 NOTE — TELEPHONE ENCOUNTER
Please let her know creatinine has increased to 1 8 which could be secondary to recent cardiac cath on 6/1/2023 with contrast exposure and also use of Bactrim  Please have her hold losartan for time being  Please confirm that she has completed Bactrim  She should hold Lasix for next 2 days and then restart same lasix current dose  Would like her to have repeat BMP in 1 week to ensure creatinine improves

## 2023-06-07 DIAGNOSIS — N18.32 STAGE 3B CHRONIC KIDNEY DISEASE (HCC): Primary | ICD-10-CM

## 2023-06-07 DIAGNOSIS — I10 ESSENTIAL HYPERTENSION: ICD-10-CM

## 2023-06-07 NOTE — TELEPHONE ENCOUNTER
Pt is aware  Patient said has breathing problems and can't sleep well ather she had a cath  Dr Aly Ernandez patient is requesting a call back from you because is very concern  Phone # [phone]  Please advise

## 2023-06-07 NOTE — TELEPHONE ENCOUNTER
Spoke to patient regarding her concern of having chest discomfort  Since she recently had cardiac cath, I strongly recommended her to call cardiology to discuss this  She will be doing this today  Also re explained all the recommendations from below regarding holding losartan and Lasix and repeating BMP early next week  She verbalized understanding of my discussion with her

## 2023-06-09 ENCOUNTER — TELEPHONE (OUTPATIENT)
Dept: NEPHROLOGY | Facility: CLINIC | Age: 83
End: 2023-06-09

## 2023-06-09 NOTE — TELEPHONE ENCOUNTER
Pt called and wants to go over her medication list and instructions on what to take and when from Dr Padilla Lawrence with an MA

## 2023-06-09 NOTE — TELEPHONE ENCOUNTER
Called patient and went over Dr Chris Henning note form 6/7/23: Spoke to patient regarding her concern of having chest discomfort  Since she recently had cardiac cath, I strongly recommended her to call cardiology to discuss this  She will be doing this today      Also re explained all the recommendations from below regarding holding losartan and Lasix and repeating BMP early next week  She verbalized understanding of my discussion   Advised patient to call her cardiology Dr 's and patient said will call her cardiology Dr 's office  No further questions at time

## 2023-06-12 ENCOUNTER — TELEPHONE (OUTPATIENT)
Dept: NEPHROLOGY | Facility: CLINIC | Age: 83
End: 2023-06-12

## 2023-06-12 ENCOUNTER — APPOINTMENT (OUTPATIENT)
Dept: LAB | Facility: CLINIC | Age: 83
End: 2023-06-12
Payer: MEDICARE

## 2023-06-12 ENCOUNTER — OFFICE VISIT (OUTPATIENT)
Dept: CARDIOLOGY CLINIC | Facility: CLINIC | Age: 83
End: 2023-06-12
Payer: MEDICARE

## 2023-06-12 VITALS
BODY MASS INDEX: 31.73 KG/M2 | SYSTOLIC BLOOD PRESSURE: 122 MMHG | HEIGHT: 63 IN | DIASTOLIC BLOOD PRESSURE: 60 MMHG | HEART RATE: 57 BPM | OXYGEN SATURATION: 97 % | WEIGHT: 179.1 LBS

## 2023-06-12 DIAGNOSIS — N18.30 BENIGN HYPERTENSION WITH CKD (CHRONIC KIDNEY DISEASE) STAGE III (HCC): ICD-10-CM

## 2023-06-12 DIAGNOSIS — I12.9 BENIGN HYPERTENSION WITH CKD (CHRONIC KIDNEY DISEASE) STAGE III (HCC): ICD-10-CM

## 2023-06-12 DIAGNOSIS — E78.2 MIXED HYPERLIPIDEMIA: ICD-10-CM

## 2023-06-12 DIAGNOSIS — N17.9 AKI (ACUTE KIDNEY INJURY) (HCC): ICD-10-CM

## 2023-06-12 DIAGNOSIS — I25.10 CORONARY ARTERY DISEASE INVOLVING NATIVE HEART, UNSPECIFIED VESSEL OR LESION TYPE, UNSPECIFIED WHETHER ANGINA PRESENT: Primary | ICD-10-CM

## 2023-06-12 DIAGNOSIS — I10 PRIMARY HYPERTENSION: ICD-10-CM

## 2023-06-12 DIAGNOSIS — I10 ESSENTIAL HYPERTENSION: ICD-10-CM

## 2023-06-12 DIAGNOSIS — N18.32 STAGE 3B CHRONIC KIDNEY DISEASE (HCC): ICD-10-CM

## 2023-06-12 DIAGNOSIS — N18.4 CHRONIC RENAL DISEASE, STAGE IV (HCC): Primary | ICD-10-CM

## 2023-06-12 DIAGNOSIS — I50.42 CHRONIC COMBINED SYSTOLIC (CONGESTIVE) AND DIASTOLIC (CONGESTIVE) HEART FAILURE (HCC): ICD-10-CM

## 2023-06-12 DIAGNOSIS — I25.5 ISCHEMIC CARDIOMYOPATHY: ICD-10-CM

## 2023-06-12 PROBLEM — I74.3 EMBOLISM AND THROMBOSIS OF ARTERIES OF THE LOWER EXTREMITIES (HCC): Status: ACTIVE | Noted: 2023-06-12

## 2023-06-12 LAB
ANION GAP SERPL CALCULATED.3IONS-SCNC: 4 MMOL/L (ref 4–13)
BUN SERPL-MCNC: 33 MG/DL (ref 5–25)
CALCIUM SERPL-MCNC: 9.2 MG/DL (ref 8.3–10.1)
CHLORIDE SERPL-SCNC: 113 MMOL/L (ref 96–108)
CO2 SERPL-SCNC: 23 MMOL/L (ref 21–32)
CREAT SERPL-MCNC: 1.53 MG/DL (ref 0.6–1.3)
GFR SERPL CREATININE-BSD FRML MDRD: 31 ML/MIN/1.73SQ M
GLUCOSE P FAST SERPL-MCNC: 199 MG/DL (ref 65–99)
POTASSIUM SERPL-SCNC: 4.2 MMOL/L (ref 3.5–5.3)
SODIUM SERPL-SCNC: 140 MMOL/L (ref 135–147)

## 2023-06-12 PROCEDURE — 80048 BASIC METABOLIC PNL TOTAL CA: CPT

## 2023-06-12 PROCEDURE — 36415 COLL VENOUS BLD VENIPUNCTURE: CPT

## 2023-06-12 PROCEDURE — 99215 OFFICE O/P EST HI 40 MIN: CPT | Performed by: NURSE PRACTITIONER

## 2023-06-12 RX ORDER — TORSEMIDE 20 MG/1
60 TABLET ORAL DAILY
Qty: 252 TABLET | Refills: 3 | Status: SHIPPED | OUTPATIENT
Start: 2023-06-12

## 2023-06-12 NOTE — PATIENT INSTRUCTIONS
Maintain a 2 gram daily sodium diet and 1500 ml daily fluid restriction  Check daily weights  If you gained 3 pounds in one day, 5 pounds in one week, or experience worsening shortness of breath or increasing lower leg swelling  Please call the heart failure office at [phone]    Please bring a  list of your current medications and daily weights to the office visit

## 2023-06-12 NOTE — PROGRESS NOTES
Cardiology Follow Up    Tammy Osorio  1940  [id number]  [address] [facility] [address] [phone]    1  Coronary artery disease involving native heart, unspecified vessel or lesion type, unspecified whether angina present  torsemide (DEMADEX) 20 mg tablet      2  Chronic combined systolic (congestive) and diastolic (congestive) heart failure ([address] )        3  Ischemic cardiomyopathy        4  Primary hypertension        5  Mixed hyperlipidemia        6  Benign hypertension with CKD (chronic kidney disease) stage III            Interval History:   On 5/12/23 Zully Cohen underwent a Pharmacological nuclear stress test that was abnormal   TTE: LVEF 40-45%,LVIDd 5 5cm, LA mod dilated, RA mildly dilated, AV mild regurgitation, Mild to mod MR, mild TR  Ms Emily Zheng underwent a cardiac catheterization on 6/01/23 which showed 3 V CAD: LM mild diffuse disease Ostial LM 50% stenosis, Ost LAD 50% stenosis, Mild LAD 1 lesion 10% stenosis, 2nd Mid % stenosis lesion is Type C within the  stent with beatriz septal collateralization  Ost Cx, 50% stenosis, Mid RCA 20% stenosis, previous stent , RPDA 90% stenosis,   Moderately elevated LEDP  Creat 1 48, GFR 32      6/05/23 BUN 37, creat 1 87, GFR 24     Ms Emily Zheng presents to our office for a follow up visit  She admits to mid sternal chest discomfort with lying down at night and needs to sit back up for the discomfort to improved  + corey LE edema  She is not taking Torsemide as directed  According to Zully Cohen the pharmacy did not have Torsemide due to back order  She was given Lasix to hold her over  Weight 179 pounds in the office and 174 pounds at home  Medical Management   Primary Cardiologist Dr Flavia Keyes  CAD  Hx of KOJO x 4  Hypertension  Hyperlipidemia 5/10/23 , TG 96, HDL 55, LDL 60   Chronic HFpEF LVEF 55%, grade 2 DD, by TTE on 5/25/21 ?  Dry weight, 4/19/23 weight 174 pounds   CKD IIIb baseline creat 1 2 - 1 5  Followed by Dr Matthew Hatch   Renal cyst   Secondary Hyperparathyroidisms renal origin   DM2 HgbA1C 7 1 on 5/19/23   Hx of Tobacco abuse  COPD  PVD  Obesity   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, without long-term current use of insulin ([facility])   • Non-rheumatic mitral regurgitation   • Uterine procidentia   • Anemia   • Esophageal abnormality   • 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   • Decreased hearing, bilateral   • 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HCC)   • History of CVA with residual deficit   • Mixed hyperlipidemia   • Persistent proteinuria   • Renal cyst   • Ankle edema   • Stage 3b chronic kidney disease (HCC)   • Acute diastolic congestive heart failure ([facility])   • Status post insertion of drug-eluting stent into left anterior descending (LAD) artery   • Coronary artery disease involving native coronary artery   • NSTEMI (non-ST elevated myocardial infarction) ([facility] )   • Essential hypertension   • Asthma   • 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[facility] )   • Uncontrolled type 2 diabetes mellitus with hyperglycemia ([facility] )   • Urge urinary incontinence   • Nocturia   • Secondary hyperparathyroidism of renal origin ([facility] )   • Chronic diastolic congestive heart failure ([facility] )     Past Medical History:   Diagnosis Date   • ACE-inhibitor cough     last assessed - 29Dec2017   • Asthma    • Bilateral edema of lower extremity     last assessed - 21Aug2017   • Cardiac murmur     last assessed - 21Aug2017   • CKD (chronic kidney disease)    • Diabetes mellitus ([facility] )     last assessed - [id number]   • Esophageal dysphagia    • Fatigue     last assessed - 21Aug2017   • Hyperlipidemia    • Hypertension    • Patellar bursitis of right knee     last assessed -    • Personal history of other specified conditions     presenting hazards to health   • Stroke [facility])    • Stroke syndrome    • Urinary frequency    • UTI (urinary tract infection)      Social History     Socioeconomic History   • Marital status:      Spouse name: Not on file   • Number of children: 8   • Years of education: Not on file   • Highest education level: Not on file   Occupational History   • Occupation: Retired   Tobacco Use   • Smoking status: Former     Packs/day: 3 00     Types: Cigarettes     Quit date:      Years since quittin 4   • Smokeless tobacco: Former   • Tobacco comments:     quit over 30 yrs ago; (quit in the , had smoked 3PPD for 20 years - per [employer])   Vaping Use   • Vaping Use: Never used   Substance and Sexual Activity   • Alcohol use: Never   • Drug use: Never   • Sexual activity: Not Currently   Other Topics Concern   • Not on file   Social History Narrative    Diet needs improvement    Does not exercise    No caffeine use     Social Determinants of Health     Financial Resource Strain: Low Risk  (2022)    Overall Financial Resource Strain (CARDIA)    • Difficulty of Paying Living Expenses: Not hard at all   Food Insecurity: No Food Insecurity (2022)    Hunger Vital Sign    • Worried About Running Out of Food in the Last Year: Never true    • [address] in the Last Year: Never true   Transportation Needs: No Transportation Needs (2022)    PRAPARE - Transportation    • Lack of Transportation (Medical): No    • Lack of Transportation (Non-Medical):  No   Physical Activity: Inactive (2021)    Exercise Vital Sign    • Days of Exercise per Week: 0 days    • Minutes of Exercise per Session: 0 min   Stress: No Stress Concern Present (2021)    [address]    • Feeling of Stress : Not at all   Social Connections: Socially Isolated (4/1/2021)    Social Connection and Isolation Panel [NHANES]    • Frequency of Communication with Friends and Family: Once a week    • Frequency of Social Gatherings with Friends and Family: Once a week    • Attends Nondenominational Services: 1 to 4 times per year    • Active Member of Clubs or Organizations: No    • Attends Club or Organization Meetings: Never    • Marital Status:    Intimate Partner Violence: Not At Risk (4/1/2021)    Humiliation, Afraid, Rape, and Kick questionnaire    • Fear of Current or Ex-Partner: No    • Emotionally Abused: No    • Physically Abused: No    • Sexually Abused: No   Housing Stability: Not on file      Family History   Problem Relation Age of Onset   • Hypertension Mother    • Stroke Mother    • Heart disease Father    • Other Sister         1)Breast disorder; 2)Epilepsy   • Migraines Sister         Migraine headaches   • Osteoporosis Sister    • Thyroid disease Sister    • Coronary artery disease Sister         S/P triple vessel bypass   • Breast cancer Sister [de-identified]   • No Known Problems Sister    • No Known Problems Sister    • Osteoporosis Maternal Grandmother    • No Known Problems Maternal Grandfather    • No Known Problems Paternal Grandmother    • No Known Problems Paternal Grandfather    • Diabetes Son         Diabetes mellitus   • Hypertension Son    • Rheum arthritis Son         Rheumatic disease   • No Known Problems Son    • No Known Problems Son    • No Known Problems Son    • No Known Problems Son    • No Known Problems Son    • No Known Problems Maternal Aunt    • No Known Problems Maternal Aunt    • No Known Problems Maternal Aunt    • No Known Problems Paternal Aunt    • No Known Problems Paternal Aunt    • Heart disease Family      Past Surgical History:   Procedure Laterality Date   • CARDIAC CATHETERIZATION N/A 6/1/2023    Procedure: Cardiac Coronary Angiogram;  Surgeon: Harish Huerta MD;  Location: [facility];   Service: Cardiology   • CARDIAC CATHETERIZATION  6/1/2023    Procedure: Cardiac Left Heart Cath;  Surgeon: José Jean MD;  Location: [facility]; Service: Cardiology   • IA ESOPHAGOGASTRODUODENOSCOPY TRANSORAL DIAGNOSTIC N/A 4/5/2017    Procedure: ESOPHAGOGASTRODUODENOSCOPY (EGD); Surgeon: Ruel Amaya MD;  Location: [facility]; Service: Gastroenterology       Current Outpatient Medications:   •  acetaminophen (TYLENOL) 650 mg CR tablet, Take 650 mg by mouth every 6 (six) hours as needed for mild pain, Disp: , Rfl:   •  albuterol (PROVENTIL HFA,VENTOLIN HFA) 90 mcg/act inhaler, INHALE 2 PUFFS EVERY 4 (FOUR) HOURS AS NEEDED FOR WHEEZING, Disp: 8 5 g, Rfl: 5  •  atorvastatin (LIPITOR) 40 mg tablet, TAKE 1 TABLET (40 MG TOTAL) BY MOUTH DAILY, Disp: 90 tablet, Rfl: 3  •  Blood Glucose Monitoring Suppl (FREESTYLE LITE) JAQUELIN, by Does not apply route daily, Disp: 1 each, Rfl: 0  •  Breo Ellipta 100-25 MCG/ACT inhaler, INHALE 1 PUFF DAILY, Disp: 60 each, Rfl: 2  •  carvedilol (COREG) 6 25 mg tablet, TAKE 1 TABLET (6 25 MG TOTAL) BY MOUTH 2 (TWO) TIMES A DAY WITH MEALS, Disp: 60 tablet, Rfl: 5  •  clopidogrel (PLAVIX) 75 mg tablet, TAKE 2 TABLETS DAILY, Disp: 180 tablet, Rfl: 3  •  conjugated estrogens (PREMARIN) vaginal cream, Insert 0 5 g into the vagina 2 (two) times a week Insert twice weekly at bedtime, Disp: 30 g, Rfl: 1  •  Continuous Blood Gluc  (FreeStyle Naldo 14 Day [place]) JAQUELIN, Use to record blood glucose 4 times daily   Once fasting and three times daily before meals, Disp: 1 each, Rfl: 0  •  Continuous Blood Gluc Sensor (FreeStyle N[place]do 14 Day Sensor) MISC, Use one sensor every 14 days, Disp: 6 each, Rfl: 1  •  D3 50 MCG (2000 UT) TABS, TAKE 2 TABLETS (4,000 UNITS TOTAL) BY MOUTH DAILY, Disp: 180 tablet, Rfl: 3  •  estradiol (ESTRACE VAGINAL) 0 1 mg/g vaginal cream, Insert 2 g into the vagina 2 (two) times a week, Disp: 42 5 g, Rfl: 3  •  ferrous sulfate 325 (65 Fe) mg tablet, TAKE 1 TABLET TWICE A DAY BEFORE MEALS, Disp: 180 tablet, Rfl: 3  •  glucose blood (FREESTYLE LITE) test strip, TEST AS DIRECTED UP TO FOUR TIMES A DAY, Disp: 100 each, Rfl: 3  •  glucose monitoring kit (FREESTYLE) monitoring kit, Use 1 each as needed (Three time daily) Please check blood sugar 3 times daily  , Disp: 1 each, Rfl: 1  •  Glucose-Vitamin C-Vitamin D (TRUEPLUS GLUCOSE ON THE GO) CHEW, , Disp: , Rfl:   •  ketoconazole (NIZORAL) 2 % shampoo, APPLY TOPICALLY TO THE SCALP ONCE EVERY OTHER WEEK, Disp: 120 mL, Rfl: 0  •  Lancets (FREESTYLE) lancets, Use as instructed, Disp: 100 each, Rfl: 0  •  latanoprost (XALATAN) 0 005 % ophthalmic solution, , Disp: , Rfl:   •  losartan (COZAAR) 50 mg tablet, Take 1 tablet (50 mg total) by mouth daily Do not start before June 2, 2023 , Disp: , Rfl: 0  •  Misc   Devices (QUAD CANE) MISC, by Does not apply route daily, Disp: 1 each, Rfl: 0  •  montelukast (SINGULAIR) 10 mg tablet, TAKE 1 TABLET (10 MG TOTAL) BY MOUTH DAILY AT BEDTIME, Disp: 90 tablet, Rfl: 3  •  nitroglycerin (NITROSTAT) 0 4 mg SL tablet, Place 1 tablet (0 4 mg total) under the tongue every 5 (five) minutes as needed for chest pain, Disp: 25 tablet, Rfl: 1  •  ranolazine (RANEXA) 500 mg 12 hr tablet, Take 1 tablet (500 mg total) by mouth every 12 (twelve) hours, Disp: 60 tablet, Rfl: 0  •  repaglinide (PRANDIN) 0 5 mg tablet, Take 1 tablet (0 5 mg total) by mouth 2 (two) times a day before meals (SKIP DOSE IF SKIPPING MEAL), Disp: 180 tablet, Rfl: 1  •  rivaroxaban (Xarelto) 2 5 mg tablet, Restart tomorrow Do not start before June 2, 2023 , Disp: , Rfl: 0  •  torsemide (DEMADEX) 20 mg tablet, Take 3 tablets (60 mg total) by mouth daily Do not start before June 2, 2023 , Disp: 90 tablet, Rfl: 1  •  Tradjenta 5 MG TABS, TAKE 1 TABLET (5 MG) BY MOUTH DAILY, Disp: 90 tablet, Rfl: 1  No Known Allergies    Labs:  Appointment on 06/05/2023   Component Date Value   • Sodium 06/05/2023 139    • Potassium 06/05/2023 4 3    • Chloride 06/05/2023 115 (H)    • CO2 06/05/2023 22    • ANION GAP 06/05/2023 2 (L)    • BUN 06/05/2023 37 (H)    • Creatinine 06/05/2023 1 87 (H)    • Glucose, Fasting 06/05/2023 162 (H)    • Calcium 06/05/2023 9 1    • eGFR 06/05/2023 24    Admission on 06/01/2023, Discharged on 06/01/2023   Component Date Value   • Sodium 06/01/2023 141    • Potassium 06/01/2023 3 5    • Chloride 06/01/2023 114 (H)    • CO2 06/01/2023 25    • ANION GAP 06/01/2023 2 (L)    • BUN 06/01/2023 42 (H)    • Creatinine 06/01/2023 1 48 (H)    • Glucose 06/01/2023 177 (H)    • Glucose, Fasting 06/01/2023 177 (H)    • Calcium 06/01/2023 8 8    • eGFR 06/01/2023 32    • Ventricular Rate 06/01/2023 67    • Atrial Rate 06/01/2023 67    • SC Interval 06/01/2023 208    • QRSD Interval 06/01/2023 86    • QT Interval 06/01/2023 440    • QTC Interval 06/01/2023 464    • P Axis 06/01/2023 9    • QRS Axis 06/01/2023 -2    • T Wave Axis 06/01/2023 84    Office Visit on 05/25/2023   Component Date Value   • LEUKOCYTE ESTERASE,UA 05/25/2023 large    • NITRITE,UA 05/25/2023 -    • SL AMB POCT UROBILINOGEN 05/25/2023 0 2    • POCT URINE PROTEIN 05/25/2023 -    •  PH,UA 05/25/2023 5 0    • BLOOD,UA 05/25/2023 large    • SPECIFIC GRAVITY,UA 05/25/2023 1 015    • KETONES,UA 05/25/2023 -    • BILIRUBIN,UA 05/25/2023 -    • GLUCOSE, UA 05/25/2023 -    •  COLOR,UA 05/25/2023 yellow    • CLARITY,UA 05/25/2023 cloudy    • POST-VOID RESIDUAL VOLUM* 05/25/2023 197    • Color, UA 05/25/2023 Yellow    • Clarity, UA 05/25/2023 Extra Turbid    • Specific Gravity, UA 05/25/2023 1 012    • pH, UA 05/25/2023 6 0    • Leukocytes, UA 05/25/2023 Large (A)    • Nitrite, UA 05/25/2023 Positive (A)    • Protein, UA 05/25/2023 50 (1+) (A)    • Glucose, UA 05/25/2023 Negative    • Ketones, UA 05/25/2023 Negative    • Urobilinogen, UA 05/25/2023 <2 0    • Bilirubin, UA 05/25/2023 Negative    • Occult Blood, UA 05/25/2023 Small (A)    • RBC, UA 05/25/2023 10-20 (A)    • WBC, UA 05/25/2023 Innumerable (A)    • Epithelial Cells 05/25/2023 None Seen    • Bacteria, UA 05/25/2023 Moderate (A)    • WBC Clumps 05/25/2023 Present    • Urine Culture 05/25/2023 80,000-89,000 cfu/ml Serratia marcescens (A)    • Urine Culture 05/25/2023 <10,000 cfu/ml    Appointment on 05/19/2023   Component Date Value   • Hemoglobin A1C 05/19/2023 7 1 (H)    • EAG 05/19/2023 157    Hospital Outpatient Visit on 05/12/2023   Component Date Value   • Baseline HR 05/12/2023 56    • Baseline BP 05/12/2023 122/62    • O2 sat rest 05/12/2023 99    • Stress peak HR 05/12/2023 63    • Post peak BP 05/12/2023 106    • Rate Pressure Product 05/12/2023 6,678 0    • O2 sat peak 05/12/2023 98    • Recovery HR 05/12/2023 70    • Recovery BP 05/12/2023 116/64    • O2 sat recovery 05/12/2023 98    • Max HR 05/12/2023 75    • Max HR Percent 05/12/2023 54    • Estimated workload 05/12/2023 1 0    • Angina Index 05/12/2023 0    • Rest Nuclear Isotope Dose 05/12/2023 10 47    • Stress Nuclear Isotope D* 05/12/2023 32 60    • EF (%) 05/12/2023 43    • Protocol Name 05/12/2023 NEERU SIT    • Time In Exercise Phase 05/12/2023 00:03:00    • MAX  SYSTOLIC BP 55/91/3586 263    • Max Diastolic Bp 44/93/9528 62    • Max Heart Rate 05/12/2023 75    • Max Predicted Heart Rate 05/12/2023 138    • Reason for Termination 05/12/2023 TEST COMPLETE       • Test Indication 05/12/2023 Dyspnea    • Target Hr Formular 05/12/2023 (220 - Age)*100%    • Chest Pain Statement 05/12/2023 none    Telephone on 05/12/2023   Component Date Value   • Sodium 05/19/2023 140    • Potassium 05/19/2023 3 6    • Chloride 05/19/2023 110 (H)    • CO2 05/19/2023 26    • ANION GAP 05/19/2023 4    • BUN 05/19/2023 40 (H)    • Creatinine 05/19/2023 1 24    • Glucose, Fasting 05/19/2023 150 (H)    • Calcium 05/19/2023 9 6    • AST 05/19/2023 21    • ALT 05/19/2023 33    • Alkaline Phosphatase 05/19/2023 73    • Total Protein 05/19/2023 7 9    • Albumin 05/19/2023 3 7    • Total Bilirubin 05/19/2023 0 66    • eGFR 05/19/2023 40    • WBC 05/19/2023 5 72    • RBC 05/19/2023 4 27    • Hemoglobin 05/19/2023 11 1 (L)    • Hematocrit 05/19/2023 36 1    • MCV 05/19/2023 85    • MCH 05/19/2023 26 0 (L)    • MCHC 05/19/2023 30 7 (L)    • RDW 05/19/2023 14 7    • MPV 05/19/2023 11 6    • Platelets 24/25/0585 199    • nRBC 05/19/2023 0    • Neutrophils Relative 05/19/2023 71    • Immat GRANS % 05/19/2023 0    • Lymphocytes Relative 05/19/2023 18    • Monocytes Relative 05/19/2023 7    • Eosinophils Relative 05/19/2023 3    • Basophils Relative 05/19/2023 1    • Neutrophils Absolute 05/19/2023 4 05    • Immature Grans Absolute 05/19/2023 0 02    • Lymphocytes Absolute 05/19/2023 1 05    • Monocytes Absolute 05/19/2023 0 39    • Eosinophils Absolute 05/19/2023 0 16    • Basophils Absolute 05/19/2023 0 05    Hospital Outpatient Visit on 05/12/2023   Component Date Value   • A4C EF 05/12/2023 45    • LVIDd 05/12/2023 5 50    • LVIDS 05/12/2023 3 70    • IVSd 05/12/2023 1 30    • LVPWd 05/12/2023 1 30    • FS 05/12/2023 33    • MV E' Tissue Velocity Se* 05/12/2023 5    • E wave deceleration time 05/12/2023 197    • MV Peak E Kofi 05/12/2023 65    • MV Peak A Kofi 05/12/2023 1 01    • RVID d 05/12/2023 4 2    • Tricuspid annular plane * 05/12/2023 2 00    • LA size 05/12/2023 4 7    • LA length (A2C) 05/12/2023 6 30    • RAA A4C 05/12/2023 16 8    • AV peak gradient 05/12/2023 13    • AV Deceleration Time 05/12/2023 2,042    • AV regurgitation pressur* 05/12/2023 592    • MV stenosis pressure 1/2* 05/12/2023 57    • MV valve area p 1/2 meth* 05/12/2023 3 86    • TR Peak Kofi 05/12/2023 2 0    • Triscuspid Valve Regurgi* 05/12/2023 16 0    • Ao root 05/12/2023 3 00    • AV peak gradient 05/12/2023 46    • Tricuspid valve peak reg* 05/12/2023 2 01    • Left ventricular stroke * 05/12/2023 89 00    • IVS 05/12/2023 1 3    • LEFT VENTRICLE SYSTOLIC * 39/97/9041 59    • LV DIASTOLIC VOLUME (MOD* 50/00/8029 148    • Left Atrium Area-systoli* 05/12/2023 34 9    • Left Atrium Area-systoli* 05/12/2023 29 7    • LVSV, 2D 05/12/2023 89    • LV EF 05/12/2023 40    Appointment on 05/10/2023   Component Date Value   • Cholesterol 05/10/2023 134    • Triglycerides 05/10/2023 96    • HDL, Direct 05/10/2023 55    • LDL Calculated 05/10/2023 60      Imaging: Cardiac catheterization    Result Date: 6/1/2023  Narrative: •  3-vessel CAD •  Occluded mid LAD stent w/ faint septal collateralization •  Residual moderate lesions in ostial LMCA, LAD & LCx as well as mid small PDA stenosis (medical rx)  Widely patent RCA stent •  Moderately elevated LEDP       Review of Systems:  Review of Systems   Cardiovascular:        Chest discomfort with lying down at night relieved with sitting up    Musculoskeletal: Positive for arthralgias, gait problem and myalgias  All other systems reviewed and are negative  Physical Exam:  Physical Exam  Vitals reviewed  Constitutional:       Appearance: Normal appearance  Cardiovascular:      Rate and Rhythm: Normal rate and regular rhythm  Pulses: Normal pulses  Heart sounds: Normal heart sounds  Pulmonary:      Effort: Pulmonary effort is normal       Breath sounds: Normal breath sounds  Musculoskeletal:         General: Normal range of motion  Cervical back: Normal range of motion and neck supple  Right lower leg: Edema present  Left lower leg: Edema present  Comments: 2+ corey LE edema of ankle and lower tibial   Skin:     General: Skin is warm and dry  Capillary Refill: Capillary refill takes less than 2 seconds  Neurological:      General: No focal deficit present  Mental Status: She is alert and oriented to person, place, and time     Psychiatric:         Mood and Affect: Mood normal          Behavior: Behavior normal          Discussion/Summary:  # CAD: LM mild diffuse disease Ostial LM 50% stenosis, Ost LAD 50% stenosis, Mild LAD 1 lesion 10% stenosis, 2nd Mid % stenosis lesion is Type C within the  stent with beatriz septal collateralization  Ost Cx, 50% stenosis, Mid RCA 20% stenosis, previous stent , RPDA 90% stenosis,  Medical management continue on Coreg 6 25mg BID, Plavix 75gm daily, Losartan 50mg daily, Ranexa 500mg Q12 hours, Lipitor 40mg daily, Xarelto 2 5mg BID,  Heart Healthy diet   # ICM  LVEF 40-45%,LVIDd 5 5cm,CAD,  continue on Coreg 6 25mg BID and Losartan 50mg daily 2mg sodium diet and daily weights   # Chronic combine HF NYHA class III stage C- weight in the office 179 pounds and at home 174 pounds  HR and BP controlled, diminished breath sounds and 2+ corey LE tibial and ankle edema  The pharmacy was not able to fill Torsemide prescription due to medication on back order  She was given Lasix tabs to hold her until Torsemide comes in  I called the pharmacy today and they will get the Torsemide today or tomorrow  Continue on Losartan 50mg daily and Coreg 6 25mg BID  2gm sodium diet and daily weights     # Hypertension RUE sitting 118/52 controlled on Coreg 6 25mg BID, Losartan 50mg daily, DASH Diet    # Hyperlipidemia 5/10/23 , TG 96, HDL 55, LDL 60 continue on Lipitor 40mg daily Heart Healthy   # CKD IIIb baseline creat 1 2 - 1 5  Followed by Dr Asha Culver

## 2023-06-13 DIAGNOSIS — E11.40 CONTROLLED TYPE 2 DIABETES MELLITUS WITH DIABETIC NEUROPATHY, WITHOUT LONG-TERM CURRENT USE OF INSULIN (HCC): ICD-10-CM

## 2023-06-13 RX ORDER — LOSARTAN POTASSIUM 25 MG/1
25 TABLET ORAL DAILY
Qty: 30 TABLET | Refills: 5 | Status: SHIPPED | OUTPATIENT
Start: 2023-06-13

## 2023-06-13 NOTE — TELEPHONE ENCOUNTER
She can restart losartan but would recommend to start lower dose losartan 25 mg daily  I have sent updated prescription to the pharmacy      She should have repeat BMP in 1 month

## 2023-06-13 NOTE — TELEPHONE ENCOUNTER
Called patient and left a voicemail stating the following information:    Creatinine improved to 1 5  I asked the patient please call back with further questions

## 2023-06-14 RX ORDER — LINAGLIPTIN 5 MG/1
TABLET, FILM COATED ORAL
Qty: 90 TABLET | Refills: 1 | Status: SHIPPED | OUTPATIENT
Start: 2023-06-14

## 2023-06-14 NOTE — TELEPHONE ENCOUNTER
Called patient and left a voicemail stating the following information:    Patient can restart losartan but would recommend to start lower dose losartan 25 mg daily  Dr Matthew Hatch has sent updated prescription to the pharmacy  Patient should have repeat BMP in 1 month    I asked the patient please call back with further questions  Labs have been ordered and mailed to the patients home address

## 2023-07-06 DIAGNOSIS — J30.1 NON-SEASONAL ALLERGIC RHINITIS DUE TO POLLEN: ICD-10-CM

## 2023-07-06 DIAGNOSIS — E11.65 TYPE 2 DIABETES MELLITUS WITH HYPERGLYCEMIA, WITHOUT LONG-TERM CURRENT USE OF INSULIN (HCC): ICD-10-CM

## 2023-07-10 RX ORDER — ALBUTEROL SULFATE 90 UG/1
2 AEROSOL, METERED RESPIRATORY (INHALATION) EVERY 4 HOURS PRN
Qty: 8.5 G | Refills: 5 | Status: SHIPPED | OUTPATIENT
Start: 2023-07-10

## 2023-07-18 DIAGNOSIS — Z71.89 COORDINATION OF COMPLEX CARE: Primary | ICD-10-CM

## 2023-07-20 ENCOUNTER — PATIENT OUTREACH (OUTPATIENT)
Dept: INTERNAL MEDICINE CLINIC | Facility: CLINIC | Age: 83
End: 2023-07-20

## 2023-07-20 NOTE — PROGRESS NOTES
Outpatient Care Management Note:    Patient referred to outpatient nurse care management for chronic care management program (CCM). Chart reviewed chronic diastolic CHF, chronic renal disease, type 2 diabetes, HTN, HLD, NSTEMI, anemia, asthma. Patient follows with [facility]. Patient was scheduled with PCP office today for AWV and planned to meet with patient at that time but did not show to appointment. I called patient and no answer. Message left explaining my role and reason for outreach and CCM program. I requested a call back and left my contact number and PCP office number.

## 2023-07-24 ENCOUNTER — PATIENT OUTREACH (OUTPATIENT)
Dept: INTERNAL MEDICINE CLINIC | Facility: CLINIC | Age: 83
End: 2023-07-24

## 2023-07-24 NOTE — PROGRESS NOTES
Outpatient Care Management Note:       Patient referred to outpatient nurse care management for chronic care management program (CCM). Chart reviewed chronic diastolic CHF, chronic renal disease, type 2 diabetes, HTN, HLD, NSTEMI, anemia, asthma. Patient follows with [facility]. Patient was scheduled with PCP office today for AWV and planned to meet with patient at that time but did not show to appointment.      I called patient and no answer. Message left explaining my role and reason for outreach and CCM program. I requested a call back and left my contact number and PCP office number. Will send unable to reach CCM letter via [address] [place] [address].

## 2023-07-24 NOTE — LETTER
Zeke Delgadillo  [address] [place] [id number]    Dear Ms. Amaya,    [facility] would like to invite you to participate in our Chronic Care Management program.   Care Management is a team that includes your provider, nurse care manager,  care manager, and other members of your primary care office. This team is available to provide or set up resources that help to manage chronic conditions. This helps to reduce complications and improves quality of life. Some services that can be offered include disease management and cope with any stressors that may be affecting the ability to manage chronic conditions. Our goal is to ensure you have the tools and information needed to make healthcare decisions. We will pair you with a Care Manager who will reach out via telephone and help to create and achieve health goals for yourself. Please review the flyer on the Chronic Care Management program. If you wish to enroll in the program or have further questions, please call the office’s Care Manager at [phone]. The Care Managers are available M-F 8a to 4:30p. We are happy to assist you in becoming a healthier you. Sincerely,      Your Care Team                Chronic Care Management Program:  [facility] Chronic Care Management program is a way to ensure you are receiving the most comprehensive care possible. You will have a dedicated team that includes your Primary Care Provider, office staff, and care management staff who will work with you to develop a care plan that meets your health care goals. Chronic Care Management (CCM) is defined as the non-face-to-face services provided to Medicare recipients who have multiple (two or more), significant chronic conditions. Chronic conditions are ongoing medical problems that must be managed effectively in a partnership between you and your health care team to maintain the best possible health.   Examples of chronic conditions include but are not limited to: Arthritis High Blood Pressure   Asthma Heart Disease   Chronic Obstructive Pulmonary Disease (COPD) Obesity   Depression Osteoporosis   Diabetes      What is Chronic Care Management? By participating in a call each month, your physician and primary wellness team will carefully monitor and provide comprehensive care for your chronic health conditions in a thoughtful and personalized way. This call will be in addition to the care received during your regular office visits. Federal regulations now enable Medicare to pay for chronic care management. What are the benefits of Chronic Care Management? Enhanced access to your primary care team  Close monitoring of your medication  Personalized, comprehensive care plan for all of your health needs  Coordinated care by your primary care wellness team and other health providers, including care you may receive at other locations, such as the hospital, other care facilities or your home. What do you need to know before signing up? Based on your current insurance plan, this type of personalized care may require you to pay $8 or $9 (your Medicare co-pay amount) to your primary care practice each month that you receive chronic care management. The service is also subject to your Medicare deductible. You must also sign an agreement to receive this type of chronic care management. You may withdraw from this program at any time. [facility] provides comprehensive care, excellent service, along with the compassion and personal attention you deserve. Managing your chronic conditions is an important part of your health care, at any age.

## 2023-07-27 ENCOUNTER — OFFICE VISIT (OUTPATIENT)
Dept: PODIATRY | Facility: CLINIC | Age: 83
End: 2023-07-27
Payer: MEDICARE

## 2023-07-27 VITALS
HEIGHT: 63 IN | HEART RATE: 71 BPM | WEIGHT: 179 LBS | SYSTOLIC BLOOD PRESSURE: 149 MMHG | DIASTOLIC BLOOD PRESSURE: 83 MMHG | BODY MASS INDEX: 31.71 KG/M2

## 2023-07-27 DIAGNOSIS — I73.9 PERIPHERAL VASCULAR DISEASE, UNSPECIFIED (HCC): ICD-10-CM

## 2023-07-27 DIAGNOSIS — E11.40 TYPE 2 DIABETES MELLITUS WITH DIABETIC NEUROPATHY, UNSPECIFIED WHETHER LONG TERM INSULIN USE (HCC): Primary | ICD-10-CM

## 2023-07-27 DIAGNOSIS — B35.1 ONYCHOMYCOSIS: ICD-10-CM

## 2023-07-27 PROCEDURE — 99213 OFFICE O/P EST LOW 20 MIN: CPT | Performed by: PODIATRIST

## 2023-07-27 PROCEDURE — 11721 DEBRIDE NAIL 6 OR MORE: CPT | Performed by: PODIATRIST

## 2023-07-27 NOTE — PROGRESS NOTES
PATIENT:  Ricardo Stanford  1940    ASSESSMENT/PLAN:  1. Type 2 diabetes mellitus with diabetic neuropathy, unspecified whether long term insulin use ([address])        2. Peripheral vascular disease, unspecified ([address])        3. Onychomycosis  Debridement             Reviewed medical records. Reviewed arterial study. Disease prevention and related risk factors of diabetes were identified and discussed. The patient was educated in proper foot wear for diabetics. Also educated in daily foot assessment and routine diabetic foot care. Reviewed the last blood work and the HbA1c was 7.1. Discussed the importance of controlling BS through diet and exercise. The patient will follow up in 10 weeks for further diabetic foot exam and care. PROCEDURE:   All mycotic toenails were reduced and debrided in length, width, and girth using a nail nipper and dremel. Patient tolerated procedure(s) well without complications. •     HPI:  Ricardo Stanford is a 80 y. o.year old female seen for diabetic foot exam.  BS has been better. She did not follow-up in our office since the last visit in October last year. No acute pedal problems. No ulcer in her feet.       PAST MEDICAL HISTORY:  Past Medical History:   Diagnosis Date   • ACE-inhibitor cough     last assessed - [id number]   • Asthma    • Bilateral edema of lower extremity     last assessed - [id number]   • Cardiac murmur     last assessed - [id number]   • CKD (chronic kidney disease)    • Diabetes mellitus ([address])     last assessed - [id number]   • Esophageal dysphagia    • Fatigue     last assessed - [id number]   • Hyperlipidemia    • Hypertension    • Patellar bursitis of right knee     last assessed - [id number]   • Personal history of other specified conditions     presenting hazards to health   • Stroke [facility])    • Stroke syndrome    • Urinary frequency    • UTI (urinary tract infection)        PAST SURGICAL HISTORY:  Past Surgical History:   Procedure Laterality Date   • CARDIAC CATHETERIZATION N/A 6/1/2023    Procedure: Cardiac Coronary Angiogram;  Surgeon: Debi Guerrero MD;  Location:  CARDIAC CATH LAB; Service: Cardiology   • CARDIAC CATHETERIZATION  6/1/2023    Procedure: Cardiac Left Heart Cath;  Surgeon: Debi Guerrero MD;  Location:  CARDIAC CATH LAB; Service: Cardiology   • [place] ESOPHAGOGASTRODUODENOSCOPY TRANSORAL DIAGNOSTIC N/A 4/5/2017    Procedure: ESOPHAGOGASTRODUODENOSCOPY (EGD); Surgeon: Yuliana Alfaro MD;  Location:  GI LAB; Service: Gastroenterology        ALLERGIES:  Patient has no known allergies. MEDICATIONS:  Current Outpatient Medications   Medication Sig Dispense Refill   • acetaminophen (TYLENOL) 650 mg CR tablet Take 650 mg by mouth every 6 (six) hours as needed for mild pain     • albuterol (PROVENTIL HFA,VENTOLIN HFA) 90 mcg/act inhaler INHALE 2 PUFFS EVERY 4 (FOUR) HOURS AS NEEDED FOR WHEEZING 8.5 g 5   • Breo Ellipta 100-25 MCG/ACT inhaler INHALE 1 PUFF DAILY 60 each 2   • carvedilol (COREG) 6.25 mg tablet TAKE 1 TABLET (6.25 MG TOTAL) BY MOUTH 2 (TWO) TIMES A DAY WITH MEALS 60 tablet 5   • clopidogrel (PLAVIX) 75 mg tablet TAKE 2 TABLETS DAILY 180 tablet 3   • conjugated estrogens (PREMARIN) vaginal cream Insert 0.5 g into the vagina 2 (two) times a week Insert twice weekly at bedtime 30 g 1   • Continuous Blood Gluc  (FreeStyle Naldo 14 Day Adona) JAQUELIN Use to record blood glucose 4 times daily.  Once fasting and three times daily before meals 1 each 0   • Continuous Blood Gluc Sensor (FreeStyle Naldo 2 Sensor) MISC USE ONE SENSOR EVERY 14 DAYS 6 each 1   • D3 50 MCG (2000 UT) TABS TAKE 2 TABLETS (4,000 UNITS TOTAL) BY MOUTH DAILY 180 tablet 3   • estradiol (ESTRACE VAGINAL) 0.1 mg/g vaginal cream Insert 2 g into the vagina 2 (two) times a week 42.5 g 3   • ferrous sulfate 325 (65 Fe) mg tablet TAKE 1 TABLET TWICE A DAY BEFORE MEALS 180 tablet 3   • glucose blood (FREESTYLE LITE) test strip TEST AS DIRECTED UP TO FOUR TIMES A  each 3   • glucose monitoring kit (FREESTYLE) monitoring kit Use 1 each as needed (Three time daily) Please check blood sugar 3 times daily. 1 each 1   • ketoconazole (NIZORAL) 2 % shampoo APPLY TOPICALLY TO THE SCALP ONCE EVERY OTHER WEEK 120 mL 0   • Lancets (FREESTYLE) lancets Use as instructed 100 each 0   • latanoprost (XALATAN) 0.005 % ophthalmic solution      • losartan (COZAAR) 25 mg tablet Take 1 tablet (25 mg total) by mouth daily 30 tablet 5   • Misc. Devices (QUAD CANE) MISC by Does not apply route daily 1 each 0   • montelukast (SINGULAIR) 10 mg tablet TAKE 1 TABLET (10 MG TOTAL) BY MOUTH DAILY AT BEDTIME 90 tablet 3   • nitroglycerin (NITROSTAT) 0.4 mg SL tablet Place 1 tablet (0.4 mg total) under the tongue every 5 (five) minutes as needed for chest pain 25 tablet 1   • repaglinide (PRANDIN) 0.5 mg tablet Take 1 tablet (0.5 mg total) by mouth 2 (two) times a day before meals (SKIP DOSE IF SKIPPING MEAL) 180 tablet 1   • rivaroxaban (Xarelto) 2.5 mg tablet Restart tomorrow Do not start before June 2, 2023. (Patient taking differently: 2.5 mg 2 (two) times a day Restart tomorrow)  0   • torsemide (DEMADEX) 20 mg tablet Take 3 tablets (60 mg total) by mouth daily 252 tablet 3   • Tradjenta 5 MG TABS TAKE 1 TABLET (5 MG) BY MOUTH DAILY 90 tablet 1   • atorvastatin (LIPITOR) 40 mg tablet TAKE 1 TABLET (40 MG TOTAL) BY MOUTH DAILY 90 tablet 3   • Blood Glucose Monitoring Suppl (FREESTYLE LITE) JAQUELIN by Does not apply route daily 1 each 0   • Glucose-Vitamin C-Vitamin D (TRUEPLUS GLUCOSE ON THE GO) CHEW  (Patient not taking: Reported on 6/12/2023)     • ranolazine (RANEXA) 500 mg 12 hr tablet Take 1 tablet (500 mg total) by mouth every 12 (twelve) hours 60 tablet 0     No current facility-administered medications for this visit. SOCIAL HISTORY:  Social History     Socioeconomic History   • Marital status:       Spouse name: None   • Number of children: 8   • Years of education: None   • Highest education level: None   Occupational History   • Occupation: Retired   Tobacco Use   • Smoking status: Former     Packs/day: 3.00     Types: Cigarettes     Quit date:      Years since quittin.5   • Smokeless tobacco: Former   • Tobacco comments:     quit over 30 yrs ago; (quit in the , had smoked 3PPD for 20 years - per [employer])   Vaping Use   • Vaping Use: Never used   Substance and Sexual Activity   • Alcohol use: Never   • Drug use: Never   • Sexual activity: Not Currently   Other Topics Concern   • None   Social History Narrative    Diet needs improvement    Does not exercise    No caffeine use     Social Determinants of Health     Financial Resource Strain: Low Risk  (2022)    Overall Financial Resource Strain (CARDIA)    • Difficulty of Paying Living Expenses: Not hard at all   Food Insecurity: No Food Insecurity (2022)    Hunger Vital Sign    • Worried About Running Out of Food in the Last Year: Never true    • Ran Out of Food in the Last Year: Never true   Transportation Needs: No Transportation Needs (2022)    PRAPARE - Transportation    • Lack of Transportation (Medical): No    • Lack of Transportation (Non-Medical): No   Physical Activity: Inactive (2021)    Exercise Vital Sign    • Days of Exercise per Week: 0 days    • Minutes of Exercise per Session: 0 min   Stress: No Stress Concern Present (2021)    [facility]    • Feeling of Stress : Not at all   Social Connections: Socially Isolated (2021)    Social Connection and Isolation Panel [NHANES]    • Frequency of Communication with Friends and Family: Once a week    • Frequency of Social Gatherings with Friends and Family: Once a week    • Attends Yarsanism Services: 1 to 4 times per year    • Active Member of Clubs or Organizations: No    • Attends Club or Organization Meetings: Never    • Marital Status:     Intimate Partner Violence: Not At Risk (4/1/2021)    Humiliation, Afraid, Rape, and Kick questionnaire    • Fear of Current or Ex-Partner: No    • Emotionally Abused: No    • Physically Abused: No    • Sexually Abused: No   Housing Stability: Not on file        REVIEW OF SYSTEMS:  GENERAL: NAD, afebrile  HEART: No chest pain, or palpitation  RESPIRATORY:  No acute SOB or cough  GI: No Nausea, vomit or diarrhea  NEUROLOGIC: No syncope or acute weakness    PHYSICAL EXAM:  VASCULAR EXAM  Dorsalis pedis  Absent. Posterior tibial artery  absent. The patient has class findings with skin atrophy, lack of digital hair, and nail dystrophy. There is +1 lower extremity edema bilaterally. NEUROLOGIC EXAM  Sensation is intact to light touch. Sensation is intact to 10gm monofilament. Decreased vibratory sensation on her feet. No focal neurologic deficit. DERMATOLOGIC EXAM:   No ulcer or cellulitis noted. No abscess. The patient has hypertrophic toenails X 7 with discoloration, onycholysis, and subungal debris. No notable skin lesion. MUSCULOSKELETAL EXAM:   No acute joint pain. Diffuse ankle edema noted. No acute musculoskeletal problem. Hammertoe noted. Diabetic Foot Exam    Patient's shoes and socks removed. Right Foot/Ankle   Right Foot Inspection  Skin Exam: skin intact and dry skin. No callus, no erythema, no maceration, no abnormal color, no pre-ulcer, no ulcer and no callus. Toe Exam: right toe deformity. No swelling, no tenderness and erythema    Sensory   Vibration: diminished  Proprioception: intact  Monofilament testing: intact    Vascular  Capillary refills: < 3 seconds  The right DP pulse is 0. The right PT pulse is 0. Left Foot/Ankle  Left Foot Inspection  Skin Exam: skin intact and dry skin. No erythema, no maceration, normal color, no pre-ulcer, no ulcer and no callus. Toe Exam: left toe deformity. No swelling, no tenderness and no erythema.      Sensory   Vibration: diminished  Proprioception: intact  Monofilament testing: intact    Vascular  Capillary refills: < 3 seconds  The left DP pulse is 0. The left PT pulse is 0.      Assign Risk Category  Deformity present  No loss of protective sensation  Weak pulses  Risk: 2

## 2023-07-28 DIAGNOSIS — E11.65 UNCONTROLLED TYPE 2 DIABETES MELLITUS WITH HYPERGLYCEMIA (HCC): ICD-10-CM

## 2023-07-28 DIAGNOSIS — J30.1 NON-SEASONAL ALLERGIC RHINITIS DUE TO POLLEN: ICD-10-CM

## 2023-07-28 DIAGNOSIS — I10 ESSENTIAL HYPERTENSION: ICD-10-CM

## 2023-07-28 RX ORDER — MONTELUKAST SODIUM 10 MG/1
10 TABLET ORAL
Qty: 90 TABLET | Refills: 3 | Status: SHIPPED | OUTPATIENT
Start: 2023-07-28 | End: 2024-01-24

## 2023-07-28 RX ORDER — FLASH GLUCOSE SCANNING READER
EACH MISCELLANEOUS
Qty: 4 EACH | Refills: 3 | Status: SHIPPED | OUTPATIENT
Start: 2023-07-28

## 2023-07-28 RX ORDER — LOSARTAN POTASSIUM 25 MG/1
25 TABLET ORAL DAILY
Qty: 30 TABLET | Refills: 5 | Status: SHIPPED | OUTPATIENT
Start: 2023-07-28

## 2023-08-02 ENCOUNTER — APPOINTMENT (OUTPATIENT)
Dept: LAB | Facility: CLINIC | Age: 83
End: 2023-08-02
Payer: MEDICARE

## 2023-08-02 ENCOUNTER — OFFICE VISIT (OUTPATIENT)
Dept: MULTI SPECIALTY CLINIC | Facility: CLINIC | Age: 83
End: 2023-08-02

## 2023-08-02 VITALS
HEIGHT: 63 IN | SYSTOLIC BLOOD PRESSURE: 137 MMHG | HEART RATE: 60 BPM | BODY MASS INDEX: 30.3 KG/M2 | TEMPERATURE: 97.2 F | DIASTOLIC BLOOD PRESSURE: 72 MMHG | WEIGHT: 171 LBS

## 2023-08-02 DIAGNOSIS — N18.4 CHRONIC RENAL DISEASE, STAGE IV (HCC): ICD-10-CM

## 2023-08-02 DIAGNOSIS — N18.30 BENIGN HYPERTENSION WITH CKD (CHRONIC KIDNEY DISEASE) STAGE III (HCC): ICD-10-CM

## 2023-08-02 DIAGNOSIS — E11.40 CONTROLLED TYPE 2 DIABETES MELLITUS WITH DIABETIC NEUROPATHY, WITHOUT LONG-TERM CURRENT USE OF INSULIN (HCC): Primary | ICD-10-CM

## 2023-08-02 DIAGNOSIS — N25.81 SECONDARY HYPERPARATHYROIDISM OF RENAL ORIGIN (HCC): ICD-10-CM

## 2023-08-02 DIAGNOSIS — I10 ESSENTIAL HYPERTENSION: ICD-10-CM

## 2023-08-02 DIAGNOSIS — I12.9 BENIGN HYPERTENSION WITH CKD (CHRONIC KIDNEY DISEASE) STAGE III (HCC): ICD-10-CM

## 2023-08-02 DIAGNOSIS — E78.2 MIXED HYPERLIPIDEMIA: ICD-10-CM

## 2023-08-02 DIAGNOSIS — R80.1 PERSISTENT PROTEINURIA: ICD-10-CM

## 2023-08-02 DIAGNOSIS — N18.32 STAGE 3B CHRONIC KIDNEY DISEASE (HCC): ICD-10-CM

## 2023-08-02 DIAGNOSIS — E11.40 CONTROLLED TYPE 2 DIABETES MELLITUS WITH DIABETIC NEUROPATHY, WITHOUT LONG-TERM CURRENT USE OF INSULIN (HCC): ICD-10-CM

## 2023-08-02 LAB
25(OH)D3 SERPL-MCNC: 79.4 NG/ML (ref 30–100)
ANION GAP SERPL CALCULATED.3IONS-SCNC: 6 MMOL/L
BUN SERPL-MCNC: 50 MG/DL (ref 5–25)
CALCIUM SERPL-MCNC: 9.4 MG/DL (ref 8.3–10.1)
CHLORIDE SERPL-SCNC: 107 MMOL/L (ref 96–108)
CO2 SERPL-SCNC: 29 MMOL/L (ref 21–32)
CREAT SERPL-MCNC: 1.58 MG/DL (ref 0.6–1.3)
ERYTHROCYTE [DISTWIDTH] IN BLOOD BY AUTOMATED COUNT: 13.3 % (ref 11.6–15.1)
EST. AVERAGE GLUCOSE BLD GHB EST-MCNC: 169 MG/DL
GFR SERPL CREATININE-BSD FRML MDRD: 30 ML/MIN/1.73SQ M
GLUCOSE SERPL-MCNC: 187 MG/DL (ref 65–140)
HBA1C MFR BLD: 7.5 %
HCT VFR BLD AUTO: 38 % (ref 34.8–46.1)
HGB BLD-MCNC: 11.7 G/DL (ref 11.5–15.4)
MCH RBC QN AUTO: 25.3 PG (ref 26.8–34.3)
MCHC RBC AUTO-ENTMCNC: 30.8 G/DL (ref 31.4–37.4)
MCV RBC AUTO: 82 FL (ref 82–98)
PHOSPHATE SERPL-MCNC: 3 MG/DL (ref 2.3–4.1)
PLATELET # BLD AUTO: 179 THOUSANDS/UL (ref 149–390)
PMV BLD AUTO: 12.4 FL (ref 8.9–12.7)
POTASSIUM SERPL-SCNC: 3.2 MMOL/L (ref 3.5–5.3)
PTH-INTACT SERPL-MCNC: 103.6 PG/ML (ref 12–88)
RBC # BLD AUTO: 4.63 MILLION/UL (ref 3.81–5.12)
SL AMB POCT HEMOGLOBIN AIC: 7.5 (ref ?–6.5)
SODIUM SERPL-SCNC: 142 MMOL/L (ref 135–147)
WBC # BLD AUTO: 5.38 THOUSAND/UL (ref 4.31–10.16)

## 2023-08-02 PROCEDURE — 85027 COMPLETE CBC AUTOMATED: CPT

## 2023-08-02 PROCEDURE — 84100 ASSAY OF PHOSPHORUS: CPT

## 2023-08-02 PROCEDURE — 83036 HEMOGLOBIN GLYCOSYLATED A1C: CPT | Performed by: INTERNAL MEDICINE

## 2023-08-02 PROCEDURE — 80048 BASIC METABOLIC PNL TOTAL CA: CPT

## 2023-08-02 PROCEDURE — 83970 ASSAY OF PARATHORMONE: CPT

## 2023-08-02 PROCEDURE — 82306 VITAMIN D 25 HYDROXY: CPT

## 2023-08-02 PROCEDURE — 99213 OFFICE O/P EST LOW 20 MIN: CPT | Performed by: INTERNAL MEDICINE

## 2023-08-02 PROCEDURE — 83036 HEMOGLOBIN GLYCOSYLATED A1C: CPT

## 2023-08-02 PROCEDURE — 36415 COLL VENOUS BLD VENIPUNCTURE: CPT

## 2023-08-02 PROCEDURE — NC001 PR NO CHARGE: Performed by: INTERNAL MEDICINE

## 2023-08-02 NOTE — PROGRESS NOTES
[address]  Endocrinology OFFICE VISIT     PATIENT INFORMATION     Cyndy Guerrero   80 y.o. female   MRN: [id number]    ASSESSMENT/PLAN     1. Controlled type 2 diabetes mellitus with diabetic neuropathy, without long-term current use of insulin ([facility])  -     POCT hemoglobin A1c  - Patient previously on metformin, discontinued at last visit due to low GFR.   - Patient compliant with Prandin 0.5 mg BID and Tradjenta 5 mg daily.   - A1c in office today 7.5% from 7.1%. - Patient had difficulty with old CGM, in process of obtaining a new one. - She has been performing fingerstick glucose at home, however she has not been recording a log and does not know what those numbers were. She does recall occasional (once weekly) readings of 240. She denies hypoglycemia readings or events. - Given patient's age, will continue current regimen at this time   - Encouraged patient to keep and bring blood glucose logs at next visit    2. Benign hypertension with CKD (chronic kidney disease) stage III  - On ACE/ARB therapy    3. Mixed hyperlipidemia   - On statin therapy    HEALTH MAINTENANCE     Immunization History   Administered Date(s) Administered   • COVID-19 PFIZER VACCINE 0.3 ML IM 03/30/2021, 04/21/2021, 01/03/2022   • INFLUENZA 10/10/2017   • Influenza Quadrivalent, 6-35 Months IM 10/10/2017   • Influenza, high dose seasonal 0.7 mL 10/02/2019, 10/06/2020, 11/02/2021   • Pneumococcal Conjugate 13-Valent 02/19/2020   • Pneumococcal Polysaccharide PPV23 01/01/2008   • Tdap 03/11/2020         CHIEF COMPLAINT     Chief Complaint   Patient presents with   • Follow-up      HISTORY OF PRESENT ILLNESS     Ms. Neela Epstein is an 80-year-old female with past medical history of type 2 diabetes mellitus without long-term use of insulin. Patient last seen by endocrinology clinic on 04/05/2023. Patient does have diabetes mellitus complications including retinopathy, CVA, CKD.   Patient was using [place] [employer] [place] sensor and average glucose at that visit was 141. Her regimen at the time was metformin 500 mg daily, Tradjenta 5 mg daily, Prandin 0.5 mg with dinner. [facility], and [employer] but was discontinued in past.  For type 2 diabetes her A1c around that time was 7.3%. At that last visit her metformin was discontinued due to GFR around to below 30. Prandin was increased to 0.5 mg with breakfast and dinner. Her Romel Clifton was continued. She was continued on statin therapy and ACE/ARB therapy. Patient presents to clinic today with no acute complaints. She states she is compliant with her Prandin 0.5 mg with breakfast and dinner along with her Tradjenta. Patient states that she is trying to get a new kelsie sensor because she is having issues with compatibility with her old one. Because of this, she does not have a continuous glucose monitor on at this time. She has been using fingerstick glucose checks at home, however she is not recording these readings. She does not recall what her sugars typically are. She states that they are typically pretty good however she is unable to quantify what this means. She does report occasional readings as high as 240s but this happens approximately once a week if less frequently. Patient denies any hypoglycemic events or episodes. Also denies any hypoglycemic readings on fingersticks. Patient presently denies any fever, chills, nausea, vomiting, diarrhea constipation, chest pain, shortness of breath. No new or worsening complaints. REVIEW OF SYSTEMS     Review of Systems   Constitutional: Negative for chills, fatigue and fever. HENT: Negative for congestion, rhinorrhea and sore throat. Eyes: Negative for pain and redness. Respiratory: Negative for cough, shortness of breath and wheezing. Cardiovascular: Negative for chest pain, palpitations and leg swelling.    Gastrointestinal: Negative for abdominal distention, abdominal pain, constipation, nausea and vomiting. Endocrine: Negative for cold intolerance, heat intolerance and polydipsia. Genitourinary: Negative for difficulty urinating and dysuria. Musculoskeletal: Negative for arthralgias and back pain. Neurological: Negative for dizziness, syncope, weakness, light-headedness, numbness and headaches. Psychiatric/Behavioral: Negative for agitation, behavioral problems and confusion. OBJECTIVE     Vitals:    08/02/23 1107   BP: 137/72   BP Location: Right arm   Patient Position: Sitting   Cuff Size: Large   Pulse: 60   Temp: (!) 97.2 °F (36.2 °C)   TempSrc: Temporal   Weight: 77.6 kg (171 lb)   Height: 5' 3" (1.6 m)     Physical Exam  Constitutional:       Appearance: Normal appearance. Cardiovascular:      Rate and Rhythm: Normal rate and regular rhythm. Pulses: Normal pulses. Heart sounds: Normal heart sounds. No murmur heard. Pulmonary:      Effort: Pulmonary effort is normal. No respiratory distress. Breath sounds: Normal breath sounds. No wheezing, rhonchi or rales. Abdominal:      General: Abdomen is flat. Bowel sounds are normal. There is no distension. Palpations: Abdomen is soft. Tenderness: There is no abdominal tenderness. Musculoskeletal:      Right lower leg: No edema. Left lower leg: No edema. Skin:     General: Skin is warm and dry. Neurological:      Mental Status: She is alert and oriented to person, place, and time. Gait: Gait abnormal (walks with cane). Psychiatric:         Mood and Affect: Mood normal.         Behavior: Behavior normal.         Thought Content:  Thought content normal.       CURRENT MEDICATIONS     Current Outpatient Medications:   •  acetaminophen (TYLENOL) 650 mg CR tablet, Take 650 mg by mouth every 6 (six) hours as needed for mild pain, Disp: , Rfl:   •  albuterol (PROVENTIL HFA,VENTOLIN HFA) 90 mcg/act inhaler, INHALE 2 PUFFS EVERY 4 (FOUR) HOURS AS NEEDED FOR WHEEZING, Disp: 8.5 g, Rfl: 5  •  atorvastatin (LIPITOR) 40 mg tablet, TAKE 1 TABLET (40 MG TOTAL) BY MOUTH DAILY, Disp: 90 tablet, Rfl: 3  •  Blood Glucose Monitoring Suppl (FREESTYLE LITE) JAQUELIN, by Does not apply route daily, Disp: 1 each, Rfl: 0  •  Breo Ellipta 100-25 MCG/ACT inhaler, INHALE 1 PUFF DAILY, Disp: 60 each, Rfl: 2  •  carvedilol (COREG) 6.25 mg tablet, TAKE 1 TABLET (6.25 MG TOTAL) BY MOUTH 2 (TWO) TIMES A DAY WITH MEALS, Disp: 60 tablet, Rfl: 5  •  clopidogrel (PLAVIX) 75 mg tablet, TAKE 2 TABLETS DAILY, Disp: 180 tablet, Rfl: 3  •  conjugated estrogens (PREMARIN) vaginal cream, Insert 0.5 g into the vagina 2 (two) times a week Insert twice weekly at bedtime, Disp: 30 g, Rfl: 1  •  Continuous Blood Gluc  (FreeStyle Naldo 14 Day [place]) JAQUELIN, USE TO RECORD BLOOD GLUCOSE 4 TIMES DAILY. ONCE FASTING AND THREE TIMES DAILY BEFORE MEALS, Disp: 4 each, Rfl: 3  •  Continuous Blood Gluc Sensor (FreeStyle Naldo 2 Sensor) [facility], USE ONE SENSOR EVERY 14 DAYS, Disp: 6 each, Rfl: 1  •  D3 50 MCG (2000 UT) TABS, TAKE 2 TABLETS (4,000 UNITS TOTAL) BY MOUTH DAILY, Disp: 180 tablet, Rfl: 3  •  estradiol (ESTRACE VAGINAL) 0.1 mg/g vaginal cream, Insert 2 g into the vagina 2 (two) times a week, Disp: 42.5 g, Rfl: 3  •  ferrous sulfate 325 (65 Fe) mg tablet, TAKE 1 TABLET TWICE A DAY BEFORE MEALS, Disp: 180 tablet, Rfl: 3  •  glucose blood (FREESTYLE LITE) test strip, TEST AS DIRECTED UP TO FOUR TIMES A DAY, Disp: 100 each, Rfl: 3  •  glucose monitoring kit (FREESTYLE) monitoring kit, Use 1 each as needed (Three time daily) Please check blood sugar 3 times daily. , Disp: 1 each, Rfl: 1  •  ketoconazole (NIZORAL) 2 % shampoo, APPLY TOPICALLY TO THE SCALP ONCE EVERY OTHER WEEK, Disp: 120 mL, Rfl: 0  •  Lancets (FREESTYLE) lancets, Use as instructed, Disp: 100 each, Rfl: 0  •  latanoprost (XALATAN) 0.005 % ophthalmic solution, , Disp: , Rfl:   •  losartan (COZAAR) 25 mg tablet, TAKE 1 TABLET (25 MG TOTAL) BY MOUTH DAILY, Disp: 30 tablet, Rfl: 5  •  Misc. Devices (QUAD CANE) MISC, by Does not apply route daily, Disp: 1 each, Rfl: 0  •  montelukast (SINGULAIR) 10 mg tablet, TAKE 1 TABLET (10 MG TOTAL) BY MOUTH DAILY AT BEDTIME, Disp: 90 tablet, Rfl: 3  •  nitroglycerin (NITROSTAT) 0.4 mg SL tablet, Place 1 tablet (0.4 mg total) under the tongue every 5 (five) minutes as needed for chest pain, Disp: 25 tablet, Rfl: 1  •  ranolazine (RANEXA) 500 mg 12 hr tablet, Take 1 tablet (500 mg total) by mouth every 12 (twelve) hours, Disp: 60 tablet, Rfl: 0  •  repaglinide (PRANDIN) 0.5 mg tablet, Take 1 tablet (0.5 mg total) by mouth 2 (two) times a day before meals (SKIP DOSE IF SKIPPING MEAL), Disp: 180 tablet, Rfl: 1  •  rivaroxaban (Xarelto) 2.5 mg tablet, Restart tomorrow Do not start before June 2, 2023.  (Patient taking differently: 2.5 mg 2 (two) times a day Restart tomorrow), Disp: , Rfl: 0  •  torsemide (DEMADEX) 20 mg tablet, Take 3 tablets (60 mg total) by mouth daily, Disp: 252 tablet, Rfl: 3  •  Tradjenta 5 MG TABS, TAKE 1 TABLET (5 MG) BY MOUTH DAILY, Disp: 90 tablet, Rfl: 1  •  Glucose-Vitamin C-Vitamin D (TRUEPLUS GLUCOSE ON THE GO) CHEW, , Disp: , Rfl:     PAST MEDICAL & SURGICAL HISTORY     Past Medical History:   Diagnosis Date   • ACE-inhibitor cough     last assessed - 29Dec2017   • Asthma    • Bilateral edema of lower extremity     last assessed - 21Aug2017   • Cardiac murmur     last assessed - 21Aug2017   • CKD (chronic kidney disease)    • Diabetes mellitus ([address])     last assessed - [id number]   • Esophageal dysphagia    • Fatigue     last assessed - 21Aug2017   • Hyperlipidemia    • Hypertension    • Patellar bursitis of right knee     last assessed - [id number]   • Personal history of other specified conditions     presenting hazards to health   • Stroke [facility])    • Stroke syndrome    • Urinary frequency    • UTI (urinary tract infection)      Past Surgical History:   Procedure Laterality Date   • CARDIAC CATHETERIZATION N/A 6/1/2023 Procedure: Cardiac Coronary Angiogram;  Surgeon: Chris Ramesh MD;  Location: [facility]; Service: Cardiology   • CARDIAC CATHETERIZATION  2023    Procedure: Cardiac Left Heart Cath;  Surgeon: Chris Ramesh MD;  Location: [facility]; Service: Cardiology   • MS ESOPHAGOGASTRODUODENOSCOPY TRANSORAL DIAGNOSTIC N/A 2017    Procedure: ESOPHAGOGASTRODUODENOSCOPY (EGD); Surgeon: Albert De MD;  Location: [facility] GI [facility]; Service: Gastroenterology     SOCIAL & FAMILY HISTORY     Social History     Socioeconomic History   • Marital status:      Spouse name: Not on file   • Number of children: 8   • Years of education: Not on file   • Highest education level: Not on file   Occupational History   • Occupation: Retired   Tobacco Use   • Smoking status: Former     Packs/day: 3.00     Types: Cigarettes     Quit date:      Years since quittin.6   • Smokeless tobacco: Former   • Tobacco comments:     quit over 30 yrs ago; (quit in the , had smoked 3PPD for 20 years - per [employer])   Vaping Use   • Vaping Use: Never used   Substance and Sexual Activity   • Alcohol use: Never   • Drug use: Never   • Sexual activity: Not Currently   Other Topics Concern   • Not on file   Social History Narrative    Diet needs improvement    Does not exercise    No caffeine use     Social Determinants of Health     Financial Resource Strain: Low Risk  (2022)    Overall Financial Resource Strain (CARDIA)    • Difficulty of Paying Living Expenses: Not hard at all   Food Insecurity: No Food Insecurity (2022)    Hunger Vital Sign    • Worried About Running Out of Food in the Last Year: Never true    • [address] in the Last Year: Never true   Transportation Needs: No Transportation Needs (2022)    PRAPARE - Transportation    • Lack of Transportation (Medical): No    • Lack of Transportation (Non-Medical):  No   Physical Activity: Inactive (2021)    Exercise Vital Sign    • Days of Exercise per Week: 0 days    • Minutes of Exercise per Session: 0 min   Stress: No Stress Concern Present (2021)    [address]    • Feeling of Stress : Not at all   Social Connections: Socially Isolated (2021)    Social Connection and Isolation Panel [NHANES]    • Frequency of Communication with Friends and Family: Once a week    • Frequency of Social Gatherings with Friends and Family: Once a week    • Attends Worship Services: 1 to 4 times per year    • Active Member of Clubs or Organizations: No    • Attends Club or Organization Meetings: Never    • Marital Status:     Intimate Partner Violence: Not At Risk (2021)    Humiliation, Afraid, Rape, and Kick questionnaire    • Fear of Current or Ex-Partner: No    • Emotionally Abused: No    • Physically Abused: No    • Sexually Abused: No   Housing Stability: Not on file     Social History     Substance and Sexual Activity   Alcohol Use Never       Social History     Substance and Sexual Activity   Drug Use Never     Social History     Tobacco Use   Smoking Status Former   • Packs/day: 3.00   • Types: Cigarettes   • Quit date: 36   • Years since quittin.6   Smokeless Tobacco Former   Tobacco Comments    quit over 30 yrs ago; (quit in the , had smoked 3PPD for 20 years - per [employer])     Family History   Problem Relation Age of Onset   • Hypertension Mother    • Stroke Mother    • Heart disease Father    • Other Sister         1)Breast disorder; 2)Epilepsy   • Migraines Sister         Migraine headaches   • Osteoporosis Sister    • Thyroid disease Sister    • Coronary artery disease Sister         S/P triple vessel bypass   • Breast cancer Sister 80   • No Known Problems Sister    • No Known Problems Sister    • Osteoporosis Maternal Grandmother    • No Known Problems Maternal Grandfather    • No Known Problems Paternal Grandmother    • No Known Problems Paternal Grandfather    • Diabetes Son         Diabetes mellitus   • Hypertension Son    • Rheum arthritis Son         Rheumatic disease   • No Known Problems Son    • No Known Problems Son    • No Known Problems Son    • No Known Problems Son    • No Known Problems Son    • No Known Problems Maternal Aunt    • No Known Problems Maternal Aunt    • No Known Problems Maternal Aunt    • No Known Problems Paternal Aunt    • No Known Problems Paternal Aunt    • Heart disease Family      ==  Tivis Ahumada,   Internal Medicine Residency, PGY-3  [address] [place] [address]., [address], [address] [place] [address]  Office: [phone]  Fax: [phone]

## 2023-08-03 ENCOUNTER — TELEPHONE (OUTPATIENT)
Dept: NEPHROLOGY | Facility: CLINIC | Age: 83
End: 2023-08-03

## 2023-08-03 ENCOUNTER — HOSPITAL ENCOUNTER (EMERGENCY)
Facility: HOSPITAL | Age: 83
Discharge: HOME | End: 2023-08-03
Attending: EMERGENCY MEDICINE
Payer: MEDICARE

## 2023-08-03 ENCOUNTER — APPOINTMENT (EMERGENCY)
Dept: RADIOLOGY | Facility: HOSPITAL | Age: 83
End: 2023-08-03
Payer: MEDICARE

## 2023-08-03 VITALS
DIASTOLIC BLOOD PRESSURE: 66 MMHG | SYSTOLIC BLOOD PRESSURE: 141 MMHG | OXYGEN SATURATION: 97 % | RESPIRATION RATE: 17 BRPM | HEART RATE: 54 BPM | TEMPERATURE: 97.4 F

## 2023-08-03 DIAGNOSIS — R10.13 EPIGASTRIC PAIN: Primary | ICD-10-CM

## 2023-08-03 DIAGNOSIS — E87.6 HYPOKALEMIA: Primary | ICD-10-CM

## 2023-08-03 LAB
2HR DELTA HS TROPONIN: -5 NG/L
ALBUMIN SERPL BCP-MCNC: 3.5 G/DL (ref 3.5–5)
ALP SERPL-CCNC: 83 U/L (ref 46–116)
ALT SERPL W P-5'-P-CCNC: 31 U/L (ref 12–78)
ANION GAP SERPL CALCULATED.3IONS-SCNC: 6 MMOL/L
AST SERPL W P-5'-P-CCNC: 34 U/L (ref 5–45)
ATRIAL RATE: 57 BPM
BASOPHILS # BLD AUTO: 0.04 THOUSANDS/ÂΜL (ref 0–0.1)
BASOPHILS NFR BLD AUTO: 1 % (ref 0–1)
BILIRUB SERPL-MCNC: 0.49 MG/DL (ref 0.2–1)
BILIRUB UR QL STRIP: NEGATIVE
BUN SERPL-MCNC: 52 MG/DL (ref 5–25)
CALCIUM SERPL-MCNC: 9.1 MG/DL (ref 8.3–10.1)
CARDIAC TROPONIN I PNL SERPL HS: 37 NG/L
CARDIAC TROPONIN I PNL SERPL HS: 42 NG/L
CHLORIDE SERPL-SCNC: 107 MMOL/L (ref 96–108)
CLARITY UR: CLEAR
CO2 SERPL-SCNC: 27 MMOL/L (ref 21–32)
COLOR UR: COLORLESS
CREAT SERPL-MCNC: 1.6 MG/DL (ref 0.6–1.3)
EOSINOPHIL # BLD AUTO: 0.21 THOUSAND/ÂΜL (ref 0–0.61)
EOSINOPHIL NFR BLD AUTO: 3 % (ref 0–6)
ERYTHROCYTE [DISTWIDTH] IN BLOOD BY AUTOMATED COUNT: 13.4 % (ref 11.6–15.1)
GFR SERPL CREATININE-BSD FRML MDRD: 29 ML/MIN/1.73SQ M
GLUCOSE SERPL-MCNC: 166 MG/DL (ref 65–140)
GLUCOSE UR STRIP-MCNC: NEGATIVE MG/DL
HCT VFR BLD AUTO: 35.7 % (ref 34.8–46.1)
HGB BLD-MCNC: 11.1 G/DL (ref 11.5–15.4)
HGB UR QL STRIP.AUTO: NEGATIVE
IMM GRANULOCYTES # BLD AUTO: 0.02 THOUSAND/UL (ref 0–0.2)
IMM GRANULOCYTES NFR BLD AUTO: 0 % (ref 0–2)
KETONES UR STRIP-MCNC: NEGATIVE MG/DL
LEUKOCYTE ESTERASE UR QL STRIP: NEGATIVE
LIPASE SERPL-CCNC: 195 U/L (ref 73–393)
LYMPHOCYTES # BLD AUTO: 0.77 THOUSANDS/ÂΜL (ref 0.6–4.47)
LYMPHOCYTES NFR BLD AUTO: 13 % (ref 14–44)
MCH RBC QN AUTO: 25.4 PG (ref 26.8–34.3)
MCHC RBC AUTO-ENTMCNC: 31.1 G/DL (ref 31.4–37.4)
MCV RBC AUTO: 82 FL (ref 82–98)
MONOCYTES # BLD AUTO: 0.35 THOUSAND/ÂΜL (ref 0.17–1.22)
MONOCYTES NFR BLD AUTO: 6 % (ref 4–12)
NEUTROPHILS # BLD AUTO: 4.75 THOUSANDS/ÂΜL (ref 1.85–7.62)
NEUTS SEG NFR BLD AUTO: 77 % (ref 43–75)
NITRITE UR QL STRIP: NEGATIVE
NRBC BLD AUTO-RTO: 0 /100 WBCS
P AXIS: -2 DEGREES
PH UR STRIP.AUTO: 5 [PH]
PLATELET # BLD AUTO: 163 THOUSANDS/UL (ref 149–390)
PMV BLD AUTO: 11.8 FL (ref 8.9–12.7)
POTASSIUM SERPL-SCNC: 3.2 MMOL/L (ref 3.5–5.3)
PR INTERVAL: 218 MS
PROT SERPL-MCNC: 7.3 G/DL (ref 6.4–8.4)
PROT UR STRIP-MCNC: NEGATIVE MG/DL
QRS AXIS: -16 DEGREES
QRSD INTERVAL: 96 MS
QT INTERVAL: 488 MS
QTC INTERVAL: 474 MS
RBC # BLD AUTO: 4.37 MILLION/UL (ref 3.81–5.12)
SODIUM SERPL-SCNC: 140 MMOL/L (ref 135–147)
SP GR UR STRIP.AUTO: 1.01 (ref 1–1.03)
T WAVE AXIS: 90 DEGREES
UROBILINOGEN UR STRIP-ACNC: <2 MG/DL
VENTRICULAR RATE: 57 BPM
WBC # BLD AUTO: 6.14 THOUSAND/UL (ref 4.31–10.16)

## 2023-08-03 PROCEDURE — 36415 COLL VENOUS BLD VENIPUNCTURE: CPT | Performed by: EMERGENCY MEDICINE

## 2023-08-03 PROCEDURE — 74176 CT ABD & PELVIS W/O CONTRAST: CPT

## 2023-08-03 PROCEDURE — 85025 COMPLETE CBC W/AUTO DIFF WBC: CPT | Performed by: EMERGENCY MEDICINE

## 2023-08-03 PROCEDURE — 80053 COMPREHEN METABOLIC PANEL: CPT | Performed by: EMERGENCY MEDICINE

## 2023-08-03 PROCEDURE — 93010 ELECTROCARDIOGRAM REPORT: CPT | Performed by: INTERNAL MEDICINE

## 2023-08-03 PROCEDURE — 93005 ELECTROCARDIOGRAM TRACING: CPT

## 2023-08-03 PROCEDURE — 84484 ASSAY OF TROPONIN QUANT: CPT | Performed by: EMERGENCY MEDICINE

## 2023-08-03 PROCEDURE — 83690 ASSAY OF LIPASE: CPT | Performed by: EMERGENCY MEDICINE

## 2023-08-03 PROCEDURE — 81003 URINALYSIS AUTO W/O SCOPE: CPT | Performed by: EMERGENCY MEDICINE

## 2023-08-03 RX ORDER — POTASSIUM CHLORIDE 20 MEQ/1
20 TABLET, EXTENDED RELEASE ORAL DAILY
Qty: 30 TABLET | Refills: 2 | Status: SHIPPED | OUTPATIENT
Start: 2023-08-03

## 2023-08-03 RX ORDER — ONDANSETRON 2 MG/ML
4 INJECTION INTRAMUSCULAR; INTRAVENOUS ONCE
Status: COMPLETED | OUTPATIENT
Start: 2023-08-03 | End: 2023-08-03

## 2023-08-03 RX ORDER — MORPHINE SULFATE 4 MG/ML
4 INJECTION, SOLUTION INTRAMUSCULAR; INTRAVENOUS ONCE
Status: COMPLETED | OUTPATIENT
Start: 2023-08-03 | End: 2023-08-03

## 2023-08-03 RX ADMIN — MORPHINE SULFATE 4 MG: 4 INJECTION INTRAVENOUS at 08:39

## 2023-08-03 RX ADMIN — ONDANSETRON 4 MG: 2 INJECTION INTRAMUSCULAR; INTRAVENOUS at 08:38

## 2023-08-03 NOTE — ED PROVIDER NOTES
History  Chief Complaint   Patient presents with   • Abdominal Pain     Woke up with sharp left and right quadrant abdominal pain. Reports Nausea, vomiting and constipation       History provided by:  Patient  Abdominal Pain  Pain location:  RUQ and epigastric  Pain quality: aching and cramping    Pain radiates to:  Does not radiate  Pain severity:  Moderate  Onset quality:  Sudden  Timing:  Intermittent  Progression:  Waxing and waning  Context: awakening from sleep    Relieved by:  Nothing  Worsened by:  Nothing  Ineffective treatments:  None tried  Associated symptoms: constipation and nausea    Associated symptoms: no chest pain, no chills, no cough, no diarrhea, no dysuria, no fever, no shortness of breath and no sore throat        Prior to Admission Medications   Prescriptions Last Dose Informant Patient Reported? Taking? Blood Glucose Monitoring Suppl (FREESTYLE LITE) JAQUELIN  Self No No   Sig: by Does not apply route daily   Breo Ellipta 100-25 MCG/ACT inhaler  Self No No   Sig: INHALE 1 PUFF DAILY   Continuous Blood Gluc  (FreeStyle Naldo 14 Day [place]) JAQUELIN   No No   Sig: USE TO RECORD BLOOD GLUCOSE 4 TIMES DAILY. ONCE FASTING AND THREE TIMES DAILY BEFORE MEALS   Continuous Blood Gluc Sensor (FreeStyle Naldo 2 Sensor) MISC   No No   Sig: USE ONE SENSOR EVERY 14 DAYS   D3 50 MCG (2000 UT) TABS  Self No No   Sig: TAKE 2 TABLETS (4,000 UNITS TOTAL) BY MOUTH DAILY   Glucose-Vitamin C-Vitamin D (TRUEPLUS GLUCOSE ON THE GO) CHEW  Self Yes No   Patient not taking: Reported on 6/12/2023   Lancets (FREESTYLE) lancets  Self No No   Sig: Use as instructed   Misc.  Devices (QUAD CANE) MISC  Self No No   Sig: by Does not apply route daily   Tradjenta 5 MG TABS   No No   Sig: TAKE 1 TABLET (5 MG) BY MOUTH DAILY   acetaminophen (TYLENOL) 650 mg CR tablet  Self Yes No   Sig: Take 650 mg by mouth every 6 (six) hours as needed for mild pain   albuterol (PROVENTIL HFA,VENTOLIN HFA) 90 mcg/act inhaler   No No   Sig: INHALE 2 PUFFS EVERY 4 (FOUR) HOURS AS NEEDED FOR WHEEZING   atorvastatin (LIPITOR) 40 mg tablet  Self No No   Sig: TAKE 1 TABLET (40 MG TOTAL) BY MOUTH DAILY   carvedilol (COREG) 6.25 mg tablet  Self No No   Sig: TAKE 1 TABLET (6.25 MG TOTAL) BY MOUTH 2 (TWO) TIMES A DAY WITH MEALS   clopidogrel (PLAVIX) 75 mg tablet  Self No No   Sig: TAKE 2 TABLETS DAILY   conjugated estrogens (PREMARIN) vaginal cream  Self No No   Sig: Insert 0.5 g into the vagina 2 (two) times a week Insert twice weekly at bedtime   estradiol (ESTRACE VAGINAL) 0.1 mg/g vaginal cream  Self No No   Sig: Insert 2 g into the vagina 2 (two) times a week   ferrous sulfate 325 (65 Fe) mg tablet  Self No No   Sig: TAKE 1 TABLET TWICE A DAY BEFORE MEALS   glucose blood (FREESTYLE LITE) test strip  Self No No   Sig: TEST AS DIRECTED UP TO FOUR TIMES A DAY   glucose monitoring kit (FREESTYLE) monitoring kit  Self No No   Sig: Use 1 each as needed (Three time daily) Please check blood sugar 3 times daily. ketoconazole (NIZORAL) 2 % shampoo  Self No No   Sig: APPLY TOPICALLY TO THE SCALP ONCE EVERY OTHER WEEK   latanoprost (XALATAN) 0.005 % ophthalmic solution  Self Yes No   losartan (COZAAR) 25 mg tablet   No No   Sig: TAKE 1 TABLET (25 MG TOTAL) BY MOUTH DAILY   montelukast (SINGULAIR) 10 mg tablet   No No   Sig: TAKE 1 TABLET (10 MG TOTAL) BY MOUTH DAILY AT BEDTIME   nitroglycerin (NITROSTAT) 0.4 mg SL tablet  Self No No   Sig: Place 1 tablet (0.4 mg total) under the tongue every 5 (five) minutes as needed for chest pain   ranolazine (RANEXA) 500 mg 12 hr tablet  Self No No   Sig: Take 1 tablet (500 mg total) by mouth every 12 (twelve) hours   repaglinide (PRANDIN) 0.5 mg tablet  Self No No   Sig: Take 1 tablet (0.5 mg total) by mouth 2 (two) times a day before meals (SKIP DOSE IF SKIPPING MEAL)   rivaroxaban (Xarelto) 2.5 mg tablet  Self No No   Sig: Restart tomorrow Do not start before June 2, 2023.    Patient taking differently: 2.5 mg 2 (two) times a day Restart tomorrow   torsemide (DEMADEX) 20 mg tablet   No No   Sig: Take 3 tablets (60 mg total) by mouth daily      Facility-Administered Medications: None       Past Medical History:   Diagnosis Date   • ACE-inhibitor cough     last assessed - 29Dec2017   • Asthma    • Bilateral edema of lower extremity     last assessed - 21Aug2017   • Cardiac murmur     last assessed - 21Aug2017   • CKD (chronic kidney disease)    • Diabetes mellitus ([address])     last assessed - [id number]   • Esophageal dysphagia    • Fatigue     last assessed - 21Aug2017   • Hyperlipidemia    • Hypertension    • Patellar bursitis of right knee     last assessed - [id number]   • Personal history of other specified conditions     presenting hazards to health   • Stroke [facility])    • Stroke syndrome    • Urinary frequency    • UTI (urinary tract infection)        Past Surgical History:   Procedure Laterality Date   • CARDIAC CATHETERIZATION N/A 6/1/2023    Procedure: Cardiac Coronary Angiogram;  Surgeon: Lalitha Chapin MD;  Location: [facility]; Service: Cardiology   • CARDIAC CATHETERIZATION  6/1/2023    Procedure: Cardiac Left Heart Cath;  Surgeon: Lalitha Chapin MD;  Location: [facility]; Service: Cardiology   • MS ESOPHAGOGASTRODUODENOSCOPY TRANSORAL DIAGNOSTIC N/A 4/5/2017    Procedure: ESOPHAGOGASTRODUODENOSCOPY (EGD); Surgeon: Aditya Jovel MD;  Location: [facility] GI [facility];   Service: Gastroenterology       Family History   Problem Relation Age of Onset   • Hypertension Mother    • Stroke Mother    • Heart disease Father    • Other Sister         1)Breast disorder; 2)Epilepsy   • Migraines Sister         Migraine headaches   • Osteoporosis Sister    • Thyroid disease Sister    • Coronary artery disease Sister         S/P triple vessel bypass   • Breast cancer Sister 80   • No Known Problems Sister    • No Known Problems Sister    • Osteoporosis Maternal Grandmother    • No Known Problems Maternal Grandfather    • No Known Problems Paternal Grandmother    • No Known Problems Paternal Grandfather    • Diabetes Son         Diabetes mellitus   • Hypertension Son    • Rheum arthritis Son         Rheumatic disease   • No Known Problems Son    • No Known Problems Son    • No Known Problems Son    • No Known Problems Son    • No Known Problems Son    • No Known Problems Maternal Aunt    • No Known Problems Maternal Aunt    • No Known Problems Maternal Aunt    • No Known Problems Paternal Aunt    • No Known Problems Paternal Aunt    • Heart disease Family      I have reviewed and agree with the history as documented. E-Cigarette/Vaping   • E-Cigarette Use Never User      E-Cigarette/Vaping Substances   • Nicotine No    • THC No    • CBD No    • Flavoring No    • Other No    • Unknown No      Social History     Tobacco Use   • Smoking status: Former     Packs/day: 3.00     Types: Cigarettes     Quit date:      Years since quittin.6   • Smokeless tobacco: Former   • Tobacco comments:     quit over 30 yrs ago; (quit in the , had smoked 3PPD for 20 years - per [employer])   Vaping Use   • Vaping Use: Never used   Substance Use Topics   • Alcohol use: Never   • Drug use: Never       Review of Systems   Constitutional: Negative for chills and fever. HENT: Negative for rhinorrhea, sore throat and trouble swallowing. Eyes: Negative for pain. Respiratory: Negative for cough, shortness of breath, wheezing and stridor. Cardiovascular: Negative for chest pain and leg swelling. Gastrointestinal: Positive for abdominal pain, constipation and nausea. Negative for diarrhea. Endocrine: Negative for polyuria. Genitourinary: Negative for dysuria, flank pain and urgency. Musculoskeletal: Negative for joint swelling, myalgias and neck stiffness. Skin: Negative for rash. Allergic/Immunologic: Negative for immunocompromised state. Neurological: Negative for dizziness, syncope, weakness, numbness and headaches. Psychiatric/Behavioral: Negative for confusion and suicidal ideas. All other systems reviewed and are negative. Physical Exam  Physical Exam  Vitals and nursing note reviewed. Constitutional:       General: She is not in acute distress. Appearance: She is well-developed. HENT:      Head: Normocephalic and atraumatic. Eyes:      Conjunctiva/sclera: Conjunctivae normal.   Cardiovascular:      Rate and Rhythm: Normal rate and regular rhythm. Heart sounds: No murmur heard. Pulmonary:      Effort: Pulmonary effort is normal. No respiratory distress. Breath sounds: Normal breath sounds. Abdominal:      Palpations: Abdomen is soft. Tenderness: There is abdominal tenderness in the right upper quadrant and epigastric area. There is no right CVA tenderness, left CVA tenderness, guarding or rebound. Musculoskeletal:         General: No swelling. Cervical back: Neck supple. Skin:     General: Skin is warm and dry. Capillary Refill: Capillary refill takes less than 2 seconds. Neurological:      Mental Status: She is alert.    Psychiatric:         Mood and Affect: Mood normal.         Vital Signs  ED Triage Vitals   Temperature Pulse Respirations Blood Pressure SpO2   08/03/23 0815 08/03/23 0815 08/03/23 0815 08/03/23 0815 08/03/23 0815   (!) 97.4 °F (36.3 °C) 59 17 125/71 99 %      Temp src Heart Rate Source Patient Position - Orthostatic VS BP Location FiO2 (%)   -- 08/03/23 0845 08/03/23 0815 08/03/23 0815 --    Monitor Lying Right arm       Pain Score       08/03/23 0815       No Pain           Vitals:    08/03/23 1000 08/03/23 1100 08/03/23 1200 08/03/23 1230   BP: 134/65 125/56 136/65 141/66   Pulse: (!) 52 (!) 51 (!) 50 (!) 54   Patient Position - Orthostatic VS: Lying Lying Lying Lying         Visual Acuity      ED Medications  Medications   ondansetron (ZOFRAN) injection 4 mg (4 mg Intravenous Given 8/3/23 0838)   morphine injection 4 mg (4 mg Intravenous Given 8/3/23 0687)       Diagnostic Studies  Results Reviewed     Procedure Component Value Units Date/Time    HS Troponin I 2hr [[id number]]  (Normal) Collected: 08/03/23 1049    Lab Status: Final result Specimen: Blood from Arm, Left Updated: 08/03/23 1203     hs TnI 2hr 37 ng/L      Delta 2hr hsTnI -5 ng/L     UA w Reflex to Microscopic w Reflex to Culture [[id number]] Collected: 08/03/23 1102    Lab Status: Final result Specimen: Urine, Other Updated: 08/03/23 1115     Color, UA Colorless     Clarity, UA Clear     Specific Gravity, UA 1.009     pH, UA 5.0     Leukocytes, UA Negative     Nitrite, UA Negative     Protein, UA Negative mg/dl      Glucose, UA Negative mg/dl      Ketones, UA Negative mg/dl      Urobilinogen, UA <2.0 mg/dl      Bilirubin, UA Negative     Occult Blood, UA Negative    HS Troponin 0hr (reflex protocol) [[id number]]  (Normal) Collected: 08/03/23 0843    Lab Status: Final result Specimen: Blood from Arm, Left Updated: 08/03/23 0933     hs TnI 0hr 42 ng/L     CMP [[id number]]  (Abnormal) Collected: 08/03/23 0843    Lab Status: Final result Specimen: Blood from Arm, Left Updated: 08/03/23 0914     Sodium 140 mmol/L      Potassium 3.2 mmol/L      Chloride 107 mmol/L      CO2 27 mmol/L      ANION GAP 6 mmol/L      BUN 52 mg/dL      Creatinine 1.60 mg/dL      Glucose 166 mg/dL      Calcium 9.1 mg/dL      AST 34 U/L      ALT 31 U/L      Alkaline Phosphatase 83 U/L      Total Protein 7.3 g/dL      Albumin 3.5 g/dL      Total Bilirubin 0.49 mg/dL      eGFR 29 ml/min/1.73sq m     Narrative:      [place] guidelines for Chronic Kidney Disease (CKD):   •  Stage 1 with normal or high GFR (GFR > 90 mL/min/1.73 square meters)  •  Stage 2 Mild CKD (GFR = 60-89 mL/min/1.73 square meters)  •  Stage 3A Moderate CKD (GFR = 45-59 mL/min/1.73 square meters)  •  Stage 3B Moderate CKD (GFR = 30-44 mL/min/1.73 square meters)  •  Stage 4 Severe CKD (GFR = 15-29 mL/min/1.73 square meters)  •  Stage 5 End Stage CKD (GFR <15 mL/min/1.73 square meters)  Note: GFR calculation is accurate only with a steady state creatinine    Lipase [[id number]]  (Normal) Collected: 08/03/23 0843    Lab Status: Final result Specimen: Blood from Arm, Left Updated: 08/03/23 0914     Lipase 195 u/L     CBC and differential [[id number]]  (Abnormal) Collected: 08/03/23 0843    Lab Status: Final result Specimen: Blood from Arm, Left Updated: 08/03/23 0857     WBC 6.14 Thousand/uL      RBC 4.37 Million/uL      Hemoglobin 11.1 g/dL      Hematocrit 35.7 %      MCV 82 fL      MCH 25.4 pg      MCHC 31.1 g/dL      RDW 13.4 %      MPV 11.8 fL      Platelets 122 Thousands/uL      nRBC 0 /100 WBCs      Neutrophils Relative 77 %      Immat GRANS % 0 %      Lymphocytes Relative 13 %      Monocytes Relative 6 %      Eosinophils Relative 3 %      Basophils Relative 1 %      Neutrophils Absolute 4.75 Thousands/µL      Immature Grans Absolute 0.02 Thousand/uL      Lymphocytes Absolute 0.77 Thousands/µL      Monocytes Absolute 0.35 Thousand/µL      Eosinophils Absolute 0.21 Thousand/µL      Basophils Absolute 0.04 Thousands/µL                  CT abdomen pelvis wo contrast   Final Result by Ren Gautam MD (08/03 1233)      Normal caliber appendix. Colonic diverticulosis without evidence for acute diverticulitis.             Workstation performed: [id number]                    Procedures  ECG 12 Lead Documentation Only    Date/Time: 8/3/2023 8:30 AM    Performed by: Chas Stafford DO  Authorized by: Chas Stafford DO    ECG reviewed by me, the ED Provider: yes    Patient location:  ED  Previous ECG:     Previous ECG:  Compared to current    Similarity:  No change  Interpretation:     Interpretation: normal    Rate:     ECG rate assessment: normal    Rhythm:     Rhythm: sinus rhythm    Ectopy:     Ectopy: none    QRS:     QRS axis:  Normal    QRS intervals:  Normal  Conduction:     Conduction: normal    ST segments:     ST segments:  Normal  T waves:     T waves: normal               ED Course  ED Course as of 08/03/23 1521   Thu Aug 03, 2023   1212 Improvement of troponin at this point time. Urinalysis unremarkable. White blood cell count normal awaiting CAT scan results. SBIRT 20yo+    Flowsheet Row Most Recent Value   Initial Alcohol Screen: US AUDIT-C     1. How often do you have a drink containing alcohol? 0 Filed at: 08/03/2023 0818   2. How many drinks containing alcohol do you have on a typical day you are drinking? 0 Filed at: 08/03/2023 0818   3a. Male UNDER 65: How often do you have five or more drinks on one occasion? 0 Filed at: 08/03/2023 0818   3b. FEMALE Any Age, or MALE 65+: How often do you have 4 or more drinks on one occassion? 0 Filed at: 08/03/2023 0818   Audit-C Score 0 Filed at: 08/03/2023 8178   ROE: How many times in the past year have you. .. Used an illegal drug or used a prescription medication for non-medical reasons? Never Filed at: 08/03/2023 0818                    Medical Decision Making  Background: 80 y.o. female presents with chief complaint of RUQ  abdominal pain. Differential: gall stones, biliary colic,cholecystitis,  appendicitis,  mesenteric adenitis,  cystitis, pyelonephritis, ureterolithiasis, constipation, colitis, gastric ulcer, mesenteric ischemia, perforated viscous, non specific abdominal pain    Plan: cbc, cmp, trop, ekg, lipase,  ct scan, symptom control       CT scan findings. unremarkable at this time. Patient feeling improved no pain at this time. No vomiting and nausea is gone. D/w patient and family about the need for close f/u with pcp. Offered admission for the patient and she would like to be discharged home. Pt re-examined and evaluated after testing and treatment. Spoke with the patient and feeling improved and sxs have resolved. Will discharge home with close f/u with pcp and instructed to return to the ED if sxs worsen or continue.  Pt agrees with the plan for discharge and feels comfortable to go home with proper f/u. Advised to return for worsening or additional problems. Diagnostic tests were reviewed and questions answered. Diagnosis, care plan and treatment options were discussed. The patient understand instructions and will follow up as directed. Counseling:there was a  detailed discussion with the patient and/or guardian regarding: the historical points, exam findings, and any diagnostic results supporting the discharge diagnosis, lab results, radiology results, discharge instructions reviewed with patient and/or family/caregiver and understanding was verbalized. Instructions given to return to the emergency department if symptoms worsen or persist, or if there are any questions or concerns that arise at home. All labs reviewed and utilized in the medical decision making process    All radiology studies independently viewed by me and interpreted by the radiologist.        Amount and/or Complexity of Data Reviewed  Labs: ordered. Radiology: ordered. Risk  Prescription drug management. Disposition  Final diagnoses:   Epigastric pain     Time reflects when diagnosis was documented in both MDM as applicable and the Disposition within this note     Time User Action Codes Description Comment    8/3/2023 12:45 PM Belinda Menendez Add [R10.13] Epigastric pain       ED Disposition     ED Disposition   Discharge    Condition   Stable    Date/Time   u Aug 3, 2023 12:45 PM    Comment   Cyndy Guerrero discharge to home/self care.                Follow-up Information    None         Discharge Medication List as of 8/3/2023 12:45 PM      CONTINUE these medications which have NOT CHANGED    Details   acetaminophen (TYLENOL) 650 mg CR tablet Take 650 mg by mouth every 6 (six) hours as needed for mild pain, Historical Med      albuterol (PROVENTIL HFA,VENTOLIN HFA) 90 mcg/act inhaler INHALE 2 PUFFS EVERY 4 (FOUR) HOURS AS NEEDED FOR WHEEZING, Starting Mon 7/10/2023, Normal      atorvastatin (LIPITOR) 40 mg tablet TAKE 1 TABLET (40 MG TOTAL) BY MOUTH DAILY, Starting Thu 8/11/2022, Until Wed 8/2/2023, Normal      Blood Glucose Monitoring Suppl (FREESTYLE LITE) JAQUELIN by Does not apply route daily, Starting Tue 4/17/2018, Until Wed 8/2/2023, Normal      Breo Ellipta 100-25 MCG/ACT inhaler INHALE 1 PUFF DAILY, Normal      carvedilol (COREG) 6.25 mg tablet TAKE 1 TABLET (6.25 MG TOTAL) BY MOUTH 2 (TWO) TIMES A DAY WITH MEALS, Starting Fri 4/21/2023, Until Wed 10/18/2023, Normal      clopidogrel (PLAVIX) 75 mg tablet TAKE 2 TABLETS DAILY, Normal      conjugated estrogens (PREMARIN) vaginal cream Insert 0.5 g into the vagina 2 (two) times a week Insert twice weekly at bedtime, Starting Mon 10/25/2021, Normal      Continuous Blood Gluc  (FreeStyle Naldo 14 Day [place]) JAQUELIN USE TO RECORD BLOOD GLUCOSE 4 TIMES DAILY. ONCE FASTING AND THREE TIMES DAILY BEFORE MEALS, Normal      Continuous Blood Gluc Sensor (FreeStyle [place]al[place] 2 Sensor) MISC USE ONE SENSOR EVERY 14 DAYS, Normal      D3 50 MCG (2000 UT) TABS TAKE 2 TABLETS (4,000 UNITS TOTAL) BY MOUTH DAILY, Starting Mon 2/27/2023, Normal      estradiol (ESTRACE VAGINAL) 0.1 mg/g vaginal cream Insert 2 g into the vagina 2 (two) times a week, Starting Mon 4/4/2022, Normal      ferrous sulfate 325 (65 Fe) mg tablet TAKE 1 TABLET TWICE A DAY BEFORE MEALS, Normal      glucose blood (FREESTYLE LITE) test strip TEST AS DIRECTED UP TO FOUR TIMES A DAY, Normal      glucose monitoring kit (FREESTYLE) monitoring kit Use 1 each as needed (Three time daily) Please check blood sugar 3 times daily. , Starting Mon 5/9/2022, Normal      Glucose-Vitamin C-Vitamin D (TRUEPLUS GLUCOSE ON THE GO) CHEW Historical Med      ketoconazole (NIZORAL) 2 % shampoo APPLY TOPICALLY TO THE SCALP ONCE EVERY OTHER WEEK, Normal      Lancets (FREESTYLE) lancets Use as instructed, Normal      latanoprost (XALATAN) 0.005 % ophthalmic solution Starting Tue 9/14/2021, Historical Med      losartan (COZAAR) 25 mg tablet TAKE 1 TABLET (25 MG TOTAL) BY MOUTH DAILY, Starting Fri 7/28/2023, Normal      Misc. Devices (QUAD CANE) MISC by Does not apply route daily, Starting Mon 6/1/2020, Print      montelukast (SINGULAIR) 10 mg tablet TAKE 1 TABLET (10 MG TOTAL) BY MOUTH DAILY AT BEDTIME, Starting Fri 7/28/2023, Until Wed 1/24/2024, Normal      nitroglycerin (NITROSTAT) 0.4 mg SL tablet Place 1 tablet (0.4 mg total) under the tongue every 5 (five) minutes as needed for chest pain, Starting Wed 2/8/2023, Normal      ranolazine (RANEXA) 500 mg 12 hr tablet Take 1 tablet (500 mg total) by mouth every 12 (twelve) hours, Starting Wed 5/26/2021, Until Wed 8/2/2023, Normal      repaglinide (PRANDIN) 0.5 mg tablet Take 1 tablet (0.5 mg total) by mouth 2 (two) times a day before meals (SKIP DOSE IF SKIPPING MEAL), Starting Wed 4/5/2023, Normal      rivaroxaban (Xarelto) 2.5 mg tablet Restart tomorrow Do not start before June 2, 2023., No Print      torsemide (DEMADEX) 20 mg tablet Take 3 tablets (60 mg total) by mouth daily, Starting Mon 6/12/2023, Normal      Tradjenta 5 MG TABS TAKE 1 TABLET (5 MG) BY MOUTH DAILY, Normal             No discharge procedures on file.     PDMP Review     None          ED Provider  Electronically Signed by           Geno Carver DO  08/03/23 7014

## 2023-08-03 NOTE — TELEPHONE ENCOUNTER
Creatinine stable at baseline. Potassium 3.2 lower which is likely secondary to taking torsemide. I have prescribed potassium chloride 20 meq daily to the pharmacy. She should have repeat serum potassium level in 2 weeks before next office visit later this month.

## 2023-08-04 ENCOUNTER — TELEPHONE (OUTPATIENT)
Dept: CARDIOLOGY CLINIC | Facility: CLINIC | Age: 83
End: 2023-08-04

## 2023-08-04 ENCOUNTER — VBI (OUTPATIENT)
Dept: INTERNAL MEDICINE CLINIC | Facility: CLINIC | Age: 83
End: 2023-08-04

## 2023-08-04 NOTE — TELEPHONE ENCOUNTER
08/04/23 11:00 AM    Patient contacted post ED visit, first outreach attempt made. Message was left for patient to return a call to the [facility] Department at [facility]: Phone [phone]. Thank you.   Travis Story  [facility]

## 2023-08-04 NOTE — TELEPHONE ENCOUNTER
Patient called stated she was returning call.   Communicated that Josh Diana from [facility] Department had called & gave patient Nino Martínez phone #: [phone].

## 2023-08-04 NOTE — TELEPHONE ENCOUNTER
Left message with patient advising:Creatinine stable at baseline. Potassium 3.2 lower which is likely secondary to taking torsemide. I have prescribed potassium chloride 20 meq daily to the pharmacy. She should have repeat serum potassium level in 2 weeks before next office visit later this month. Labs were placed.

## 2023-08-07 ENCOUNTER — PATIENT OUTREACH (OUTPATIENT)
Dept: INTERNAL MEDICINE CLINIC | Facility: CLINIC | Age: 83
End: 2023-08-07

## 2023-08-07 NOTE — PROGRESS NOTES
Outpatient Care Management Note:      Patient referred to outpatient nurse care management for chronic care management program (CCM). No response from calls or letter sent. Will close at this time. Please re-consult as needed.

## 2023-08-08 NOTE — TELEPHONE ENCOUNTER
08/08/23 9:04 AM    Patient contacted post ED visit, [facility] department spoke with patient/caregiver and outreach was successful. Thank you.   Radha Isaacs  [facility]

## 2023-08-10 ENCOUNTER — OFFICE VISIT (OUTPATIENT)
Dept: CARDIOLOGY CLINIC | Facility: CLINIC | Age: 83
End: 2023-08-10
Payer: MEDICARE

## 2023-08-10 VITALS
HEART RATE: 60 BPM | WEIGHT: 172.6 LBS | SYSTOLIC BLOOD PRESSURE: 126 MMHG | DIASTOLIC BLOOD PRESSURE: 60 MMHG | OXYGEN SATURATION: 95 % | BODY MASS INDEX: 33.89 KG/M2 | HEIGHT: 60 IN

## 2023-08-10 DIAGNOSIS — I50.42 CHRONIC COMBINED SYSTOLIC AND DIASTOLIC CONGESTIVE HEART FAILURE (HCC): ICD-10-CM

## 2023-08-10 DIAGNOSIS — I25.118 CORONARY ARTERY DISEASE INVOLVING NATIVE CORONARY ARTERY OF NATIVE HEART WITH OTHER FORM OF ANGINA PECTORIS (HCC): ICD-10-CM

## 2023-08-10 DIAGNOSIS — I34.0 NON-RHEUMATIC MITRAL REGURGITATION: ICD-10-CM

## 2023-08-10 DIAGNOSIS — E78.2 MIXED HYPERLIPIDEMIA: ICD-10-CM

## 2023-08-10 DIAGNOSIS — I35.1 AORTIC VALVE REGURGITATION, NONRHEUMATIC: Primary | ICD-10-CM

## 2023-08-10 DIAGNOSIS — I10 ESSENTIAL HYPERTENSION: ICD-10-CM

## 2023-08-10 PROCEDURE — 99214 OFFICE O/P EST MOD 30 MIN: CPT | Performed by: INTERNAL MEDICINE

## 2023-08-10 NOTE — PROGRESS NOTES
Cardiology Follow Up    Gustavo Busby  1940  [id number]  [address] [facility] CATH LAB  [address]  [facility]  [phone]    1. Aortic valve regurgitation, nonrheumatic        2. Non-rheumatic mitral regurgitation        3. Essential hypertension        4. Chronic combined systolic and diastolic congestive heart failure ([address])        5. 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 Mixed hyperlipidemia            Interval History: Cardiology follow-up. Patient is clinically stable from the cardiac point of view, denies any chest pain or dyspnea. No orthopnea no PND. She is on a medium dose diuretic regimen. Compliant with low-sodium diet. Weight is stable, today is 172. 6. He does have advanced chronic kidney disease, stable, most recent creatinine 1.6, GFR in the 20s. Compliant with low-cholesterol diet, lipids this year [place] 134, HDL 55, LDL 60, she is on high intensity statin therapy. Denies any bleeding issues on full anticoagulation with a factor Xa inhibitor adjusted for renal function plus Plavix ambulation is limited because of her arthritis, she uses a cane. Denies any syncope, she sustained no falls.     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   • Decreased hearing, bilateral   • 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HCC)   • History of CVA with residual deficit   • Mixed hyperlipidemia   • Persistent proteinuria   • Renal cyst   • Ankle edema   • Stage 3b chronic kidney disease (HCC)   • Acute diastolic congestive heart failure ([facility])   • Status post insertion of drug-eluting stent into left anterior descending (LAD) artery   • Coronary artery disease involving native coronary artery   • NSTEMI (non-ST elevated myocardial infarction) ([address])   • Essential hypertension   • Asthma   • 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[facility])   • Uncontrolled type 2 diabetes mellitus with hyperglycemia ([facility])   • Urge urinary incontinence   • Nocturia   • Secondary hyperparathyroidism of renal origin ([address])   • Chronic diastolic congestive heart failure (HCC)   • Embolism and thrombosis of arteries of the lower extremities ([facility])   • Chronic renal disease, stage IV ([facility])     Past Medical History:   Diagnosis Date   • ACE-inhibitor cough     last assessed - [id number]   • Asthma    • Bilateral edema of lower extremity     last assessed - [id number]   • Cardiac murmur     last assessed - 2017   • CKD (chronic kidney disease)    • Diabetes mellitus ([address])     last assessed - [id number]   • Esophageal dysphagia    • Fatigue     last assessed - [id number]   • Hyperlipidemia    • Hypertension    • Patellar bursitis of right knee     last assessed - [id number]   • Personal history of other specified conditions     presenting hazards to health   • Stroke [facility])    • Stroke syndrome    • Urinary frequency    • UTI (urinary tract infection)      Social History     Socioeconomic History   • Marital status:       Spouse name: Not on file   • Number of children: 8   • Years of education: Not on file   • Highest education level: Not on file   Occupational History   • Occupation: Retired   Tobacco Use   • Smoking status: Former     Packs/day: 3.00     Types: Cigarettes     Quit date:      Years since quittin.6   • Smokeless tobacco: Former   • Tobacco comments:     quit over 30 yrs ago; (quit in the , had smoked 3PPD for 20 years - per [employer])   Vaping Use   • Vaping Use: Never used   Substance and Sexual Activity   • Alcohol use: Never   • Drug use: Never   • Sexual activity: Not Currently   Other Topics Concern   • Not on file   Social History Narrative    Diet needs improvement    Does not exercise    No caffeine use     Social Determinants of Health     Financial Resource Strain: Low Risk  (4/1/2022)    Overall Financial Resource Strain (CARDIA)    • Difficulty of Paying Living Expenses: Not hard at all   Food Insecurity: No Food Insecurity (4/1/2022)    Hunger Vital Sign    • Worried About Running Out of Food in the Last Year: Never true    • Ran Out of Food in the Last Year: Never true   Transportation Needs: No Transportation Needs (4/1/2022)    PRAPARE - Transportation    • Lack of Transportation (Medical): No    • Lack of Transportation (Non-Medical): No   Physical Activity: Inactive (4/1/2021)    Exercise Vital Sign    • Days of Exercise per Week: 0 days    • Minutes of Exercise per Session: 0 min   Stress: No Stress Concern Present (4/1/2021)    [facility]    • Feeling of Stress : Not at all   Social Connections: Socially Isolated (4/1/2021)    Social Connection and Isolation Panel [NHANES]    • Frequency of Communication with Friends and Family: Once a week    • Frequency of Social Gatherings with Friends and Family: Once a week    • Attends Ten[employer] Services: 1 to 4 times per year    • Active Member of Clubs or Organizations: No    • Attends Club or Organization Meetings: Never    • Marital Status:     Intimate Partner Violence: Not At Risk (4/1/2021)    Humiliation, Afraid, Rape, and Kick questionnaire    • Fear of Current or Ex-Partner: No    • Emotionally Abused: No    • Physically Abused: No    • Sexually Abused: No   Housing Stability: Not on file      Family History   Problem Relation Age of Onset   • Hypertension Mother    • Stroke Mother    • Heart disease Father    • Other Sister         1)Breast disorder; 2)Epilepsy   • Migraines Sister         Migraine headaches   • Osteoporosis Sister    • Thyroid disease Sister    • Coronary artery disease Sister         S/P triple vessel bypass   • Breast cancer Sister 80   • No Known Problems Sister    • No Known Problems Sister    • Osteoporosis Maternal Grandmother    • No Known Problems Maternal Grandfather    • No Known Problems Paternal Grandmother    • No Known Problems Paternal Grandfather    • Diabetes Son         Diabetes mellitus   • Hypertension Son    • Rheum arthritis Son         Rheumatic disease   • No Known Problems Son    • No Known Problems Son    • No Known Problems Son    • No Known Problems Son    • No Known Problems Son    • No Known Problems Maternal Aunt    • No Known Problems Maternal Aunt    • No Known Problems Maternal Aunt    • No Known Problems Paternal Aunt    • No Known Problems Paternal Aunt    • Heart disease Family      Past Surgical History:   Procedure Laterality Date   • CARDIAC CATHETERIZATION N/A 6/1/2023    Procedure: Cardiac Coronary Angiogram;  Surgeon: Anabela Hodge MD;  Location: [facility] CARDIAC CATH LAB; Service: Cardiology   • CARDIAC CATHETERIZATION  6/1/2023    Procedure: Cardiac Left Heart Cath;  Surgeon: Anabela Hodge MD;  Location: [facility] CARDIAC CATH LAB; Service: Cardiology   • TX ESOPHAGOGASTRODUODENOSCOPY TRANSORAL DIAGNOSTIC N/A 4/5/2017    Procedure: ESOPHAGOGASTRODUODENOSCOPY (EGD); Surgeon: Beata Urena MD;  Location: [facility] GI LAB;   Service: Gastroenterology       Current Outpatient Medications:   •  acetaminophen (TYLENOL) 650 mg CR tablet, Take 650 mg by mouth every 6 (six) hours as needed for mild pain, Disp: , Rfl:   •  albuterol (PROVENTIL HFA,VENTOLIN HFA) 90 mcg/act inhaler, INHALE 2 PUFFS EVERY 4 (FOUR) HOURS AS NEEDED FOR WHEEZING, Disp: 8.5 g, Rfl: 5  •  atorvastatin (LIPITOR) 40 mg tablet, TAKE 1 TABLET (40 MG TOTAL) BY MOUTH DAILY, Disp: 90 tablet, Rfl: 3  •  Breo Ellipta 100-25 MCG/ACT inhaler, INHALE 1 PUFF DAILY, Disp: 60 each, Rfl: 2  •  carvedilol (COREG) 6.25 mg tablet, TAKE 1 TABLET (6.25 MG TOTAL) BY MOUTH 2 (TWO) TIMES A DAY WITH MEALS, Disp: 60 tablet, Rfl: 5  •  clopidogrel (PLAVIX) 75 mg tablet, TAKE 2 TABLETS DAILY, Disp: 180 tablet, Rfl: 3  •  conjugated estrogens (PREMARIN) vaginal cream, Insert 0.5 g into the vagina 2 (two) times a week Insert twice weekly at bedtime, Disp: 30 g, Rfl: 1  •  Continuous Blood Gluc  (FreeStyle Naldo 14 Day [place]) [facility], USE TO RECORD BLOOD GLUCOSE 4 TIMES DAILY. ONCE FASTING AND THREE TIMES DAILY BEFORE MEALS, Disp: 4 each, Rfl: 3  •  Continuous Blood Gluc Sensor (FreeStyle Naldo 2 Sensor) [facility] [place] [place], USE ONE SENSOR EVERY 14 DAYS, Disp: 6 each, Rfl: 1  •  D3 50 MCG (2000 UT) TABS, TAKE 2 TABLETS (4,000 UNITS TOTAL) BY MOUTH DAILY, Disp: 180 tablet, Rfl: 3  •  nitroglycerin (NITROSTAT) 0.4 mg SL tablet, Place 1 tablet (0.4 mg total) under the tongue every 5 (five) minutes as needed for chest pain, Disp: 25 tablet, Rfl: 1  •  potassium chloride (K-DUR,KLOR-CON) 20 mEq tablet, Take 1 tablet (20 mEq total) by mouth daily, Disp: 30 tablet, Rfl: 2  •  ranolazine (RANEXA) 500 mg 12 hr tablet, Take 1 tablet (500 mg total) by mouth every 12 (twelve) hours, Disp: 60 tablet, Rfl: 0  •  repaglinide (PRANDIN) 0.5 mg tablet, Take 1 tablet (0.5 mg total) by mouth 2 (two) times a day before meals (SKIP DOSE IF SKIPPING MEAL), Disp: 180 tablet, Rfl: 1  •  rivaroxaban (Xarelto) 2.5 mg tablet, Restart tomorrow Do not start before June 2, 2023.  (Patient taking differently: 2.5 mg 2 (two) times a day Restart tomorrow), Disp: , Rfl: 0  •  torsemide (DEMADEX) 20 mg tablet, Take 3 tablets (60 mg total) by mouth daily, Disp: 252 tablet, Rfl: 3  •  Tradjenta 5 MG TABS, TAKE 1 TABLET (5 MG) BY MOUTH DAILY, Disp: 90 tablet, Rfl: 1  •  Blood Glucose Monitoring Suppl (FREESTYLE LITE) JAQUELIN, by Does not apply route daily, Disp: 1 each, Rfl: 0  •  estradiol (ESTRACE VAGINAL) 0.1 mg/g vaginal cream, Insert 2 g into the vagina 2 (two) times a week, Disp: 42.5 g, Rfl: 3  •  ferrous sulfate 325 (65 Fe) mg tablet, TAKE 1 TABLET TWICE A DAY BEFORE MEALS, Disp: 180 tablet, Rfl: 3  •  glucose blood (FREESTYLE LITE) test strip, TEST AS DIRECTED UP TO FOUR TIMES A DAY, Disp: 100 each, Rfl: 3  •  glucose monitoring kit (FREESTYLE) monitoring kit, Use 1 each as needed (Three time daily) Please check blood sugar 3 times daily. , Disp: 1 each, Rfl: 1  •  Glucose-Vitamin C-Vitamin D (TRUEPLUS GLUCOSE ON THE GO) CHEW, , Disp: , Rfl:   •  ketoconazole (NIZORAL) 2 % shampoo, APPLY TOPICALLY TO THE SCALP ONCE EVERY OTHER WEEK, Disp: 120 mL, Rfl: 0  •  Lancets (FREESTYLE) lancets, Use as instructed, Disp: 100 each, Rfl: 0  •  latanoprost (XALATAN) 0.005 % ophthalmic solution, , Disp: , Rfl:   •  losartan (COZAAR) 25 mg tablet, TAKE 1 TABLET (25 MG TOTAL) BY MOUTH DAILY, Disp: 30 tablet, Rfl: 5  •  Misc.  Devices (QUAD CANE) MISC, by Does not apply route daily, Disp: 1 each, Rfl: 0  •  montelukast (SINGULAIR) 10 mg tablet, TAKE 1 TABLET (10 MG TOTAL) BY MOUTH DAILY AT BEDTIME, Disp: 90 tablet, Rfl: 3  No Known Allergies    Labs:  Admission on 08/03/2023, Discharged on 08/03/2023   Component Date Value   • WBC 08/03/2023 6.14    • RBC 08/03/2023 4.37    • Hemoglobin 08/03/2023 11.1 (L)    • Hematocrit 08/03/2023 35.7    • MCV 08/03/2023 82    • MCH 08/03/2023 25.4 (L)    • MCHC 08/03/2023 31.1 (L)    • RDW 08/03/2023 13.4    • MPV 08/03/2023 11.8    • Platelets 84/80/1191 163    • nRBC 08/03/2023 0    • Neutrophils Relative 08/03/2023 77 (H)    • Immat GRANS % 08/03/2023 0    • Lymphocytes Relative 08/03/2023 13 (L)    • Monocytes Relative 08/03/2023 6    • Eosinophils Relative 08/03/2023 3    • Basophils Relative 08/03/2023 1    • Neutrophils Absolute 08/03/2023 4.75    • Immature Grans Absolute 08/03/2023 0.02    • Lymphocytes Absolute 08/03/2023 0.77    • Monocytes Absolute 08/03/2023 0.35    • Eosinophils Absolute 08/03/2023 0.21    • Basophils Absolute 08/03/2023 0.04    • Sodium 08/03/2023 140    • Potassium 08/03/2023 3.2 (L)    • Chloride 08/03/2023 107    • CO2 08/03/2023 27    • ANION GAP 08/03/2023 6    • BUN 08/03/2023 52 (H)    • Creatinine 08/03/2023 1.60 (H)    • Glucose 08/03/2023 166 (H)    • Calcium 08/03/2023 9.1    • AST 08/03/2023 34    • ALT 08/03/2023 31    • Alkaline Phosphatase 08/03/2023 83    • Total Protein 08/03/2023 7.3    • Albumin 08/03/2023 3.5    • Total Bilirubin 08/03/2023 0.49    • eGFR 08/03/2023 29    • Lipase 08/03/2023 195    • Ventricular Rate 08/03/2023 57    • Atrial Rate 08/03/2023 57    • IN Interval 08/03/2023 218    • QRSD Interval 08/03/2023 96    • QT Interval 08/03/2023 488    • QTC Interval 08/03/2023 474    • P Axis 08/03/2023 -2    • QRS [place] 08/03/2023 -16    • T Wave Axis 08/03/2023 90    • hs TnI 0hr 08/03/2023 42    • Color, UA 08/03/2023 Colorless    • Clarity, UA 08/03/2023 Clear    • Specific Gravity, UA 08/03/2023 1.009    • pH, UA 08/03/2023 5.0    • Leukocytes, UA 08/03/2023 Negative    • Nitrite, UA 08/03/2023 Negative    • Protein, UA 08/03/2023 Negative    • Glucose, UA 08/03/2023 Negative    • Ketones, UA 08/03/2023 Negative    • Urobilinogen, UA 08/03/2023 <2.0    • Bilirubin, UA 08/03/2023 Negative    • Occult Blood, UA 08/03/2023 Negative    • hs TnI 2hr 08/03/2023 37    • Delta 2hr hsTnI 08/03/2023 -5    Appointment on 08/02/2023   Component Date Value   • Sodium 08/02/2023 142    • Potassium 08/02/2023 3.2 (L)    • Chloride 08/02/2023 107    • CO2 08/02/2023 29    • ANION GAP 08/02/2023 6    • BUN 08/02/2023 50 (H)    • Creatinine 08/02/2023 1.58 (H)    • Glucose 08/02/2023 187 (H)    • Calcium 08/02/2023 9.4    • eGFR 08/02/2023 30    • WBC 08/02/2023 5.38    • RBC 08/02/2023 4.63    • Hemoglobin 08/02/2023 11.7    • Hematocrit 08/02/2023 38.0    • MCV 08/02/2023 82    • MCH 08/02/2023 25.3 (L)    • MCHC 08/02/2023 30.8 (L)    • RDW 08/02/2023 13.3    • Platelets 68/98/7516 179    • MPV 08/02/2023 12.4    • PTH 08/02/2023 103.6 (H)    • Phosphorus 08/02/2023 3.0    • Vit D, 25-Hydroxy 08/02/2023 79.4    • Hemoglobin A1C 08/02/2023 7.5 (H)    • EAG 08/02/2023 169    Office Visit on 08/02/2023   Component Date Value   • Hemoglobin A1C 08/02/2023 7.5 (A)    Appointment on 06/12/2023   Component Date Value   • Sodium 06/12/2023 140    • Potassium 06/12/2023 4.2    • Chloride 06/12/2023 113 (H)    • CO2 06/12/2023 23    • ANION GAP 06/12/2023 4    • BUN 06/12/2023 33 (H)    • Creatinine 06/12/2023 1.53 (H)    • Glucose, Fasting 06/12/2023 199 (H)    • Calcium 06/12/2023 9.2    • eGFR 06/12/2023 31    Appointment on 06/05/2023   Component Date Value   • Sodium 06/05/2023 139    • Potassium 06/05/2023 4.3    • Chloride 06/05/2023 115 (H)    • CO2 06/05/2023 22    • ANION GAP 06/05/2023 2 (L)    • BUN 06/05/2023 37 (H)    • Creatinine 06/05/2023 1.87 (H)    • Glucose, Fasting 06/05/2023 162 (H)    • Calcium 06/05/2023 9.1    • eGFR 06/05/2023 24    Admission on 06/01/2023, Discharged on 06/01/2023   Component Date Value   • Sodium 06/01/2023 141    • Potassium 06/01/2023 3.5    • Chloride 06/01/2023 114 (H)    • CO2 06/01/2023 25    • ANION GAP 06/01/2023 2 (L)    • BUN 06/01/2023 42 (H)    • Creatinine 06/01/2023 1.48 (H)    • Glucose 06/01/2023 177 (H)    • Glucose, Fasting 06/01/2023 177 (H)    • Calcium 06/01/2023 8.8    • eGFR 06/01/2023 32    • Ventricular Rate 06/01/2023 67    • Atrial Rate 06/01/2023 67    • FL Interval 06/01/2023 208    • QRSD Interval 06/01/2023 86    • QT Interval 06/01/2023 440    • QTC Interval 06/01/2023 464    • P Axis 06/01/2023 9    • QRS Axis 06/01/2023 -2    • T Wave Axis 06/01/2023 84    Office Visit on 05/25/2023   Component Date Value   • LEUKOCYTE ESTERASE,UA 05/25/2023 large    • NITRITE,UA 05/25/2023 -    • SL AMB POCT UROBILINOGEN 05/25/2023 0.2    • POCT URINE PROTEIN 05/25/2023 -    • PH,UA 05/25/2023 5.0    • BLOOD,UA 05/25/2023 large    • SPECIFIC GRAVITY,UA 05/25/2023 1.015    • KETONES,UA 05/25/2023 -    • BILIRUBIN,UA 05/25/2023 -    • GLUCOSE, UA 05/25/2023 -    •  COLOR,UA 05/25/2023 yellow    • CLARITY,UA 05/25/2023 cloudy    • POST-VOID RESIDUAL VOLUM* 05/25/2023 197    • Color, UA 05/25/2023 Yellow    • Clarity, UA 05/25/2023 Extra Turbid    • Specific Gravity, UA 05/25/2023 1.012    • pH, UA 05/25/2023 6.0    • Leukocytes, UA 05/25/2023 Large (A)    • Nitrite, UA 05/25/2023 Positive (A)    • Protein, UA 05/25/2023 50 (1+) (A)    • Glucose, UA 05/25/2023 Negative    • Ketones, UA 05/25/2023 Negative    • Urobilinogen, UA 05/25/2023 <2.0    • Bilirubin, UA 05/25/2023 Negative    • Occult Blood, UA 05/25/2023 Small (A)    • RBC, UA 05/25/2023 10-20 (A)    • WBC, UA 05/25/2023 Innumerable (A)    • Epithelial Cells 05/25/2023 None Seen    • Bacteria, UA 05/25/2023 Moderate (A)    • WBC Clumps 05/25/2023 Present    • Urine Culture 05/25/2023 80,000-89,000 cfu/ml Serratia marcescens (A)    • Urine Culture 05/25/2023 <10,000 cfu/ml    Appointment on 05/19/2023   Component Date Value   • Hemoglobin A1C 05/19/2023 7.1 (H)    • EAG 05/19/2023 [address] Outpatient Visit on 05/12/2023   Component Date Value   • Baseline HR 05/12/2023 56    • Baseline BP 05/12/2023 122/62    • O2 sat rest 05/12/2023 99    • Stress peak HR 05/12/2023 63    • Post peak BP 05/12/2023 106    • Rate Pressure Product 05/12/2023 6,678.0    • O2 sat peak 05/12/2023 98    • Recovery HR 05/12/2023 70    • Recovery BP 05/12/2023 116/64    • O2 sat recovery 05/12/2023 98    • Max HR 05/12/2023 75    • Max HR Percent 05/12/2023 54    • Estimated workload 05/12/2023 1.0    • Angina Index 05/12/2023 0    • Rest Nuclear Isotope Dose 05/12/2023 10.47    • Stress Nuclear Isotope D* 05/12/2023 32.60    • EF (%) 05/12/2023 43    • Protocol Name 05/12/2023 NEERU SIT    • Time In Exercise Phase 05/12/2023 00:03:00    • MAX.  SYSTOLIC BP 05/12/2023 962    • Max Diastolic Bp 31/62/1053 62    • Max Heart Rate 05/12/2023 75    • Max Predicted Heart Rate 05/12/2023 138    • Reason for Termination 05/12/2023 TEST COMPLETE. ..    • Test Indication 05/12/2023 Dyspnea    • Target Hr Formular 05/12/2023 (220 - Age)*100%    • Chest Pain Statement 05/12/2023 none    Telephone on 05/12/2023   Component Date Value   • Sodium 05/19/2023 140    • Potassium 05/19/2023 3.6    • Chloride 05/19/2023 110 (H)    • CO2 05/19/2023 26    • ANION GAP 05/19/2023 4    • BUN 05/19/2023 40 (H)    • Creatinine 05/19/2023 1.24    • Glucose, Fasting 05/19/2023 150 (H)    • Calcium 05/19/2023 9.6    • AST 05/19/2023 21    • ALT 05/19/2023 33    • Alkaline Phosphatase 05/19/2023 73    • Total Protein 05/19/2023 7.9    • Albumin 05/19/2023 3.7    • Total Bilirubin 05/19/2023 0.66    • eGFR 05/19/2023 40    • WBC 05/19/2023 5.72    • RBC 05/19/2023 4.27    • Hemoglobin 05/19/2023 11.1 (L)    • Hematocrit 05/19/2023 36.1    • MCV 05/19/2023 85    • MCH 05/19/2023 26.0 (L)    • MCHC 05/19/2023 30.7 (L)    • RDW 05/19/2023 14.7    • MPV 05/19/2023 11.6    • Platelets 88/12/2036 199    • nRBC 05/19/2023 0    • Neutrophils Relative 05/19/2023 71    • Immat GRANS % 05/19/2023 0    • Lymphocytes Relative 05/19/2023 18    • Monocytes Relative 05/19/2023 7    • Eosinophils Relative 05/19/2023 3    • Basophils Relative 05/19/2023 1    • Neutrophils Absolute 05/19/2023 4.05    • Immature Grans Absolute 05/19/2023 0.02    • Lymphocytes Absolute 05/19/2023 1.05    • Monocytes Absolute 05/19/2023 0.39    • Eosinophils Absolute 05/19/2023 0.16    • Basophils Absolute 05/19/2023 0.05    There may be more visits with results that are not included. Imaging: CT abdomen pelvis wo contrast    Result Date: 8/3/2023  Narrative: CT ABDOMEN AND PELVIS WITHOUT IV CONTRAST INDICATION:   abominal pain. COMPARISON:  None.  TECHNIQUE:  CT examination of the abdomen and pelvis was performed without intravenous contrast. Multiplanar 2D reformatted images were created from the source data. This examination, like all CT scans performed in the [facility], was performed utilizing techniques to minimize radiation dose exposure, including the use of iterative reconstruction and automated exposure control. Radiation dose length product (DLP) for this visit:  723.73 mGy-cm Enteric contrast was administered. FINDINGS: ABDOMEN LOWER CHEST:  No clinically significant abnormality identified in the visualized lower chest. LIVER/BILIARY TREE:  Unremarkable. GALLBLADDER:  No calcified gallstones. No pericholecystic inflammatory change. SPLEEN:  Unremarkable. PANCREAS:  Unremarkable. ADRENAL GLANDS:  Unremarkable. KIDNEYS/URETERS: There is a right renal cyst with internal calcification. This measures 1.8 x 1.6 cm. STOMACH AND BOWEL: There is colonic diverticulosis without evidence of acute diverticulitis. APPENDIX:  No findings to suggest appendicitis. ABDOMINOPELVIC CAVITY:  No ascites. No pneumoperitoneum. No lymphadenopathy. VESSELS:  Unremarkable for patient's age. PELVIS REPRODUCTIVE ORGANS: There is a pessary in place. URINARY BLADDER:  Unremarkable. ABDOMINAL WALL/INGUINAL REGIONS:  Unremarkable. OSSEOUS STRUCTURES: There are degenerative changes of the spine. Impression: Normal caliber appendix. Colonic diverticulosis without evidence for acute diverticulitis. Workstation performed: [id number]       Review of Systems:  Review of Systems   Constitutional: Negative for activity change, fatigue and unexpected weight change. HENT: Negative for hearing loss and nosebleeds. Eyes: Negative for visual disturbance. Respiratory: Negative for apnea, chest tightness, shortness of breath, wheezing and stridor. Cardiovascular: Positive for leg swelling. Negative for chest pain and palpitations. Gastrointestinal: Negative for abdominal pain, anal bleeding and blood in stool.    Endocrine: Negative for cold intolerance. Genitourinary: Negative for hematuria. Musculoskeletal: Positive for arthralgias and gait problem. Negative for myalgias. Skin: Negative for pallor and rash. Allergic/Immunologic: Negative for immunocompromised state. Neurological: Negative for dizziness, syncope, facial asymmetry, speech difficulty and weakness. Hematological: Does not bruise/bleed easily. Psychiatric/Behavioral: Negative for sleep disturbance. The patient is not nervous/anxious. Physical Exam:  Physical Exam  Vitals reviewed. Constitutional:       General: She is not in acute distress. Appearance: She is obese. She is not toxic-appearing or diaphoretic. Comments: Fragile appearing   Eyes:      General: No scleral icterus. Neck:      Vascular: No carotid bruit. Cardiovascular:      Rate and Rhythm: Normal rate and regular rhythm. Pulses: Normal pulses. Heart sounds: Normal heart sounds. No murmur heard. No friction rub. No gallop. Pulmonary:      Effort: Pulmonary effort is normal. No respiratory distress. Breath sounds: Normal breath sounds. No stridor. No wheezing, rhonchi or rales. Musculoskeletal:      Right lower leg: No edema. Left lower leg: No edema. Skin:     General: Skin is warm and dry. Capillary Refill: Capillary refill takes less than 2 seconds. Coloration: Skin is not jaundiced or pale. Findings: No bruising or erythema. Neurological:      Mental Status: She is alert and oriented to person, place, and time. Psychiatric:         Mood and Affect: Mood normal.         Discussion/Summary: Coronary artery disease, non-STEMI 2021. PTCA stent of the mid LAD and PTCA of the distal LAD. Catheterization few months later because of recurrent symptoms revealed occluded LAD stent. Previously turned down by cardiothoracic surgery because of poor targets. Crescendo symptoms with dyspnea earlier this year.   Stress test suggested anterior infarct with surrounding ischemia. Underwent catheterization. When diffuse disease left main with a proximal 50% lesion. 50% ostial LAD and 50% ostial circumflex, occluded mid LAD with septal collaterals. Stents in the RCA was patent, there was 90% PDA lesion not amenable for revascularization, with moderately increased left ventricular end-diastolic pressure. Echocardiogram revealed low normal left ventricular function ejection fraction of 50 to 55% with anteroapical severe hypokinesis, there was moderate mitral sufficiency and mild tricuspid recency with mildly increased pulmonary artery pressures as suggested by Doppler criteria. Clinically stable on current medical regimen with Ranexa and beta-blocker. She appears euvolemic on examination today. This note was completed in part utilizing [employer] direct voice recognition software. Grammatical errors, random word insertion, spelling mistakes, and incomplete sentences may be an occasional consequence of the system secondary to software limitations, ambient noise and hardware issues. At the time of dictation, efforts were made to edit, clarify and /or correct errors. Please read the chart carefully and recognize, using context, where substitutions have occurred. If you have any questions or concerns about the context, text or information contained within the body of this dictation, please contact myself, the provider, for further clarification.

## 2023-08-17 DIAGNOSIS — E11.40 CONTROLLED TYPE 2 DIABETES MELLITUS WITH DIABETIC NEUROPATHY, WITHOUT LONG-TERM CURRENT USE OF INSULIN (HCC): ICD-10-CM

## 2023-08-17 DIAGNOSIS — J45.30 MILD PERSISTENT ASTHMA WITHOUT COMPLICATION: ICD-10-CM

## 2023-08-17 DIAGNOSIS — D50.9 IRON DEFICIENCY ANEMIA, UNSPECIFIED IRON DEFICIENCY ANEMIA TYPE: ICD-10-CM

## 2023-08-17 RX ORDER — FLUTICASONE FUROATE AND VILANTEROL TRIFENATATE 100; 25 UG/1; UG/1
POWDER RESPIRATORY (INHALATION)
Qty: 60 EACH | Refills: 0 | Status: SHIPPED | OUTPATIENT
Start: 2023-08-17

## 2023-08-17 RX ORDER — FERROUS SULFATE 325(65) MG
TABLET ORAL
Qty: 180 TABLET | Refills: 0 | Status: SHIPPED | OUTPATIENT
Start: 2023-08-17

## 2023-08-17 RX ORDER — LINAGLIPTIN 5 MG/1
TABLET, FILM COATED ORAL
Qty: 90 TABLET | Refills: 0 | Status: SHIPPED | OUTPATIENT
Start: 2023-08-17

## 2023-08-21 ENCOUNTER — OFFICE VISIT (OUTPATIENT)
Dept: NEPHROLOGY | Facility: CLINIC | Age: 83
End: 2023-08-21
Payer: MEDICARE

## 2023-08-21 VITALS
BODY MASS INDEX: 33.77 KG/M2 | WEIGHT: 172 LBS | HEIGHT: 60 IN | SYSTOLIC BLOOD PRESSURE: 132 MMHG | DIASTOLIC BLOOD PRESSURE: 70 MMHG

## 2023-08-21 DIAGNOSIS — N28.1 RENAL CYST: ICD-10-CM

## 2023-08-21 DIAGNOSIS — I12.9 BENIGN HYPERTENSION WITH CKD (CHRONIC KIDNEY DISEASE) STAGE III (HCC): Primary | ICD-10-CM

## 2023-08-21 DIAGNOSIS — R80.1 PERSISTENT PROTEINURIA: ICD-10-CM

## 2023-08-21 DIAGNOSIS — E87.6 HYPOKALEMIA: ICD-10-CM

## 2023-08-21 DIAGNOSIS — E55.9 VITAMIN D DEFICIENCY: ICD-10-CM

## 2023-08-21 DIAGNOSIS — N25.81 SECONDARY HYPERPARATHYROIDISM OF RENAL ORIGIN (HCC): ICD-10-CM

## 2023-08-21 DIAGNOSIS — N18.32 STAGE 3B CHRONIC KIDNEY DISEASE (HCC): ICD-10-CM

## 2023-08-21 DIAGNOSIS — N18.30 BENIGN HYPERTENSION WITH CKD (CHRONIC KIDNEY DISEASE) STAGE III (HCC): Primary | ICD-10-CM

## 2023-08-21 PROCEDURE — 99214 OFFICE O/P EST MOD 30 MIN: CPT | Performed by: INTERNAL MEDICINE

## 2023-08-21 RX ORDER — CHOLECALCIFEROL (VITAMIN D3) 50 MCG
1 TABLET ORAL DAILY
Qty: 90 TABLET | Refills: 3 | Status: SHIPPED | OUTPATIENT
Start: 2023-08-21

## 2023-08-21 NOTE — PROGRESS NOTES
NEPHROLOGY OUTPATIENT PROGRESS NOTE   Artem Polk 80 y.o. female MRN: [id number]  DATE: 8/21/2023  Reason for visit:   Chief Complaint   Patient presents with   • Follow-up   • Chronic Kidney Disease     ASSESSMENT and PLAN:  CKD stage 3, baseline creatinine lately 1.4 to 1.5 since June 2021, 1 to 1.2 since March 2021, prior 0.8 to 1.0  -Recently creatinine had increased up to 1.8 in June 2023 suspected secondary to contrast exposure with cardiac cath, Bactrim use.    -Since then creatinine has improved with most recent 1.6 in August 2023. May have some progression of CKD as well. -CKD suspected to be secondary to long-term hypertension, uncontrolled diabetes, age-related nephron loss  -avoid nephrotoxins or NSAIDs. -renal ultrasound in 2023 shows normal-sized kidneys, normal echogenicity, no hydro or stones.    -UA in August 2023 bland without hematuria or proteinuria.     Bilateral renal cyst on ultrasound  -Renal ultrasound in March 2023 shows right kidney midpole cyst with calcified wall/septation. Mild increase in size, no solid internal vascularity. -MRI abdomen in February 2020 shows no suspicious renal mass.  Again showed 2.4 cm right mid pole cyst with septation, suboptimally evaluated in the absence of IV contrast.  -Was evaluated by urology, has upcoming appointment later this month. -Bladder wall thickening on most recent renal ultrasound which is asymmetric with lobulated posteriorly, neoplastic process could not be excluded, cystoscopy recommended by urology although patient is hesitant to proceed with this. She will be discussing this further during upcoming urology appointment.     Proteinuria  -last UACR 264 mg in March 2023.     -repeat UACR before next visit  -currently on losartan as below.   -suspect in the setting of long-term diabetes, hemoglobin A1c 7.5 in August 2023. -also has mild diabetic retinopathy in September 2021.     Hypokalemia, serum potassium 3.2, suspect secondary to ongoing diuretics. -Recently started potassium chloride 20 meq daily, continue same.  -Repeat serum potassium this week     Secondary hyperparathyroidism, last PTH stable 103 in August 2023. Last vitamin D level 79. Decrease vitamin D supplement from 4000 to 2000 units daily. Check vitamin-D, PTH level before next visit.     Hypertension  -blood pressure overall acceptable in the office today.  Continue losartan 50 mg daily, Coreg, torsemide.  -she has not brought BP machine to the office today.  -salt restricted diet recommended     Ankle edema, now resolved. Currently on torsemide 60 mg daily.  -Weight seems to be stable around 172 pounds at home.  -echo in May 2023 shows EF 40 to 25%, normal diastolic function, mild to moderate MR, normal IVC, no pericardial effusion.      Diagnoses and all orders for this visit:    Benign hypertension with CKD (chronic kidney disease) stage III  -     Basic metabolic panel; Future  -     CBC; Future  -     Phosphorus; Future  -     PTH, intact; Future  -     Albumin / creatinine urine ratio; Future  -     Vitamin D 25 hydroxy; Future  -     Magnesium; Future    Stage 3b chronic kidney disease ([address])  -     Basic metabolic panel; Future  -     CBC; Future  -     Phosphorus; Future  -     PTH, intact; Future  -     Albumin / creatinine urine ratio; Future  -     Vitamin D 25 hydroxy; Future  -     Magnesium; Future    Vitamin D deficiency  -     Cholecalciferol (D3) 50 MCG (2000 UT) TABS; Take 1 tablet (2,000 Units total) by mouth daily  -     Vitamin D 25 hydroxy; Future    Hypokalemia    Secondary hyperparathyroidism of renal origin ([address])  -     Phosphorus; Future  -     PTH, intact; Future  -     Vitamin D 25 hydroxy; Future    Renal cyst    Persistent proteinuria  -     Albumin / creatinine urine ratio;  Future          SUBJECTIVE / HPI:  Chungethelmartinez Kerr[place]lie female with medical issues of hypertension for 13 years, diabetes for 13 years, CVA in 2011, mild diabetic retinopathy,?  Gout, CKD stage 3 with baseline 1.4 to 1.5 since mid 2021, 1 to 1.2 since March 2021, prior 0.8 to 1.0 who presents for regular follow up for CKD, proteinuria, hypertension management.    Recently she had cardiac cath and since then creatinine had increased to 1.8 suspected secondary to contrast exposure, Bactrim use. Most recent creatinine stable 1.6. Her weight seems to be stable as mentioned above. Denies any leg edema. Denies any worsening dyspnea. Denies any new urinary complaint.  No recent NSAID use.     No obvious history of autoimmune conditions    REVIEW OF SYSTEMS:  More than 10 point review of systems were obtained and discussed in length with the patient. Complete review of systems were negative / unremarkable except mentioned above. PHYSICAL EXAM:  Vitals:    08/21/23 1137 08/21/23 1225   BP: 130/68 132/70   BP Location: Left arm    Patient Position: Sitting    Cuff Size: Standard    Weight: 78 kg (172 lb)    Height: 5' (1.524 m)      Body mass index is 33.59 kg/m². Physical Exam  Vitals reviewed. Constitutional:       Appearance: She is well-developed. HENT:      Head: Normocephalic and atraumatic. Right Ear: External ear normal.      Left Ear: External ear normal.   Eyes:      Conjunctiva/sclera: Conjunctivae normal.   Cardiovascular:      Comments: No leg edema in both legs  Pulmonary:      Effort: Pulmonary effort is normal.      Breath sounds: Normal breath sounds. No wheezing or rales. Abdominal:      General: Bowel sounds are normal. There is no distension. Palpations: Abdomen is soft. Tenderness: There is no abdominal tenderness. Musculoskeletal:         General: No deformity. Lymphadenopathy:      Cervical: No cervical adenopathy. Skin:     Findings: No rash. Neurological:      Mental Status: She is alert and oriented to person, place, and time.    Psychiatric:         Behavior: Behavior normal.         PAST MEDICAL HISTORY:  Past Medical History:   Diagnosis Date   • ACE-inhibitor cough     last assessed - [id number]   • Asthma    • Bilateral edema of lower extremity     last assessed - 2017   • Cardiac murmur     last assessed - 2017   • CKD (chronic kidney disease)    • Diabetes mellitus ([address] [facility])     last assessed - [id number]   • Esophageal dysphagia    • Fatigue     last assessed - 2017   • Hyperlipidemia    • Hypertension    • Patellar bursitis of right knee     last assessed - [id number]   • Personal history of other specified conditions     presenting hazards to health   • Stroke [facility])    • Stroke syndrome    • Urinary frequency    • UTI (urinary tract infection)        PAST SURGICAL HISTORY:  Past Surgical History:   Procedure Laterality Date   • CARDIAC CATHETERIZATION N/A 2023    Procedure: Cardiac Coronary Angiogram;  Surgeon: Bessie Harp MD;  Location: [facility]; Service: Cardiology   • CARDIAC CATHETERIZATION  2023    Procedure: Cardiac Left Heart Cath;  Surgeon: Bessie Harp MD;  Location: [facility]; Service: Cardiology   • VT ESOPHAGOGASTRODUODENOSCOPY TRANSORAL DIAGNOSTIC N/A 2017    Procedure: ESOPHAGOGASTRODUODENOSCOPY (EGD); Surgeon: Brenda Street MD;  Location: [facility];   Service: Gastroenterology       SOCIAL HISTORY:  Social History     Substance and Sexual Activity   Alcohol Use Never     Social History     Substance and Sexual Activity   Drug Use Never     Social History     Tobacco Use   Smoking Status Former   • Packs/day: 3.00   • Types: Cigarettes   • Quit date: 36   • Years since quittin.6   Smokeless Tobacco Former   Tobacco Comments    quit over 30 yrs ago; (quit in the , had smoked 3PPD for 20 years - per [employer])       FAMILY HISTORY:  Family History   Problem Relation Age of Onset   • Hypertension Mother    • Stroke Mother    • Heart disease Father    • Other Sister         1)Breast disorder; 2)Epilepsy   • Migraines Sister         Migraine headaches • Osteoporosis Sister    • Thyroid disease Sister    • Coronary artery disease Sister         S/P triple vessel bypass   • Breast cancer Sister 80   • No Known Problems Sister    • No Known Problems Sister    • Osteoporosis Maternal Grandmother    • No Known Problems Maternal Grandfather    • No Known Problems Paternal Grandmother    • No Known Problems Paternal Grandfather    • Diabetes Son         Diabetes mellitus   • Hypertension Son    • Rheum arthritis Son         Rheumatic disease   • No Known Problems Son    • No Known Problems Son    • No Known Problems Son    • No Known Problems Son    • No Known Problems Son    • No Known Problems Maternal Aunt    • No Known Problems Maternal Aunt    • No Known Problems Maternal Aunt    • No Known Problems Paternal Aunt    • No Known Problems Paternal Aunt    • Heart disease Family        MEDICATIONS:    Current Outpatient Medications:   •  acetaminophen (TYLENOL) 650 mg CR tablet, Take 650 mg by mouth every 6 (six) hours as needed for mild pain, Disp: , Rfl:   •  albuterol (PROVENTIL HFA,VENTOLIN HFA) 90 mcg/act inhaler, INHALE 2 PUFFS EVERY 4 (FOUR) HOURS AS NEEDED FOR WHEEZING, Disp: 8.5 g, Rfl: 5  •  atorvastatin (LIPITOR) 40 mg tablet, TAKE 1 TABLET (40 MG TOTAL) BY MOUTH DAILY, Disp: 90 tablet, Rfl: 3  •  Breo Ellipta 100-25 MCG/ACT inhaler, INHALE 1 PUFF DAILY, Disp: 60 each, Rfl: 0  •  carvedilol (COREG) 6.25 mg tablet, TAKE 1 TABLET (6.25 MG TOTAL) BY MOUTH 2 (TWO) TIMES A DAY WITH MEALS, Disp: 60 tablet, Rfl: 5  •  Cholecalciferol (D3) 50 MCG (2000 UT) TABS, Take 1 tablet (2,000 Units total) by mouth daily, Disp: 90 tablet, Rfl: 3  •  clopidogrel (PLAVIX) 75 mg tablet, TAKE 2 TABLETS DAILY, Disp: 180 tablet, Rfl: 3  •  conjugated estrogens (PREMARIN) vaginal cream, Insert 0.5 g into the vagina 2 (two) times a week Insert twice weekly at bedtime, Disp: 30 g, Rfl: 1  •  Continuous Blood Gluc  (FreeStyle Naldo 14 Day [place]) JAQUELIN, USE TO RECORD BLOOD GLUCOSE 4 TIMES DAILY. ONCE FASTING AND THREE TIMES DAILY BEFORE MEALS, Disp: 4 each, Rfl: 3  •  Continuous Blood Gluc Sensor (FreeStyle Naldo 2 Sensor) [facility], USE ONE SENSOR EVERY 14 DAYS, Disp: 6 each, Rfl: 1  •  estradiol (ESTRACE VAGINAL) 0.1 mg/g vaginal cream, Insert 2 g into the vagina 2 (two) times a week, Disp: 42.5 g, Rfl: 3  •  ferrous sulfate 325 (65 Fe) mg tablet, TAKE 1 TABLET TWICE A DAY BEFORE MEALS, Disp: 180 tablet, Rfl: 0  •  Glucose-Vitamin C-Vitamin D (TRUEPLUS GLUCOSE ON THE GO) CHEW, , Disp: , Rfl:   •  ketoconazole (NIZORAL) 2 % shampoo, APPLY TOPICALLY TO THE SCALP ONCE EVERY OTHER WEEK, Disp: 120 mL, Rfl: 0  •  latanoprost (XALATAN) 0.005 % ophthalmic solution, , Disp: , Rfl:   •  losartan (COZAAR) 25 mg tablet, TAKE 1 TABLET (25 MG TOTAL) BY MOUTH DAILY, Disp: 30 tablet, Rfl: 5  •  Misc. Devices (QUAD CANE) MISC, by Does not apply route daily, Disp: 1 each, Rfl: 0  •  montelukast (SINGULAIR) 10 mg tablet, TAKE 1 TABLET (10 MG TOTAL) BY MOUTH DAILY AT BEDTIME, Disp: 90 tablet, Rfl: 3  •  nitroglycerin (NITROSTAT) 0.4 mg SL tablet, Place 1 tablet (0.4 mg total) under the tongue every 5 (five) minutes as needed for chest pain, Disp: 25 tablet, Rfl: 1  •  potassium chloride (K-DUR,KLOR-CON) 20 mEq tablet, Take 1 tablet (20 mEq total) by mouth daily, Disp: 30 tablet, Rfl: 2  •  ranolazine (RANEXA) 500 mg 12 hr tablet, Take 1 tablet (500 mg total) by mouth every 12 (twelve) hours, Disp: 60 tablet, Rfl: 0  •  repaglinide (PRANDIN) 0.5 mg tablet, Take 1 tablet (0.5 mg total) by mouth 2 (two) times a day before meals (SKIP DOSE IF SKIPPING MEAL), Disp: 180 tablet, Rfl: 1  •  rivaroxaban (Xarelto) 2.5 mg tablet, Restart tomorrow Do not start before June 2, 2023.  (Patient taking differently: 2.5 mg 2 (two) times a day Restart tomorrow), Disp: , Rfl: 0  •  torsemide (DEMADEX) 20 mg tablet, Take 3 tablets (60 mg total) by mouth daily, Disp: 252 tablet, Rfl: 3  •  Tradjenta 5 MG TABS, TAKE 1 TABLET (5 MG) BY MOUTH DAILY, Disp: 90 tablet, Rfl: 0  •  Blood Glucose Monitoring Suppl (FREESTYLE LITE) JAQUELIN, by Does not apply route daily, Disp: 1 each, Rfl: 0  •  glucose blood (FREESTYLE LITE) test strip, TEST AS DIRECTED UP TO FOUR TIMES A DAY, Disp: 100 each, Rfl: 3  •  glucose monitoring kit (FREESTYLE) monitoring kit, Use 1 each as needed (Three time daily) Please check blood sugar 3 times daily. , Disp: 1 each, Rfl: 1  •  Lancets (FREESTYLE) lancets, Use as instructed, Disp: 100 each, Rfl: 0    Lab Results:   Creatinine 1.6, potassium 3.2

## 2023-08-24 DIAGNOSIS — I63.50 CEREBROVASCULAR ACCIDENT (CVA) OF PONTINE STRUCTURE (HCC): ICD-10-CM

## 2023-08-24 RX ORDER — CLOPIDOGREL BISULFATE 75 MG/1
TABLET ORAL
Qty: 180 TABLET | Refills: 3 | Status: SHIPPED | OUTPATIENT
Start: 2023-08-24

## 2023-08-25 ENCOUNTER — TELEPHONE (OUTPATIENT)
Age: 83
End: 2023-08-25

## 2023-08-25 NOTE — TELEPHONE ENCOUNTER
Pt called and asking for call back to discuss the reason for upcoming cysto and pt asking if we can go over recent imaging     Pt call [phone]

## 2023-08-28 ENCOUNTER — APPOINTMENT (OUTPATIENT)
Dept: LAB | Facility: CLINIC | Age: 83
End: 2023-08-28
Payer: MEDICARE

## 2023-08-28 DIAGNOSIS — E78.2 MIXED HYPERLIPIDEMIA: ICD-10-CM

## 2023-08-28 DIAGNOSIS — E87.6 HYPOKALEMIA: ICD-10-CM

## 2023-08-28 LAB
ANION GAP SERPL CALCULATED.3IONS-SCNC: 11 MMOL/L
BUN SERPL-MCNC: 54 MG/DL (ref 5–25)
CALCIUM SERPL-MCNC: 9.4 MG/DL (ref 8.4–10.2)
CHLORIDE SERPL-SCNC: 101 MMOL/L (ref 96–108)
CO2 SERPL-SCNC: 25 MMOL/L (ref 21–32)
CREAT SERPL-MCNC: 1.72 MG/DL (ref 0.6–1.3)
GFR SERPL CREATININE-BSD FRML MDRD: 27 ML/MIN/1.73SQ M
GLUCOSE SERPL-MCNC: 224 MG/DL (ref 65–140)
POTASSIUM SERPL-SCNC: 3.8 MMOL/L (ref 3.5–5.3)
SODIUM SERPL-SCNC: 137 MMOL/L (ref 135–147)

## 2023-08-28 PROCEDURE — 82570 ASSAY OF URINE CREATININE: CPT

## 2023-08-28 PROCEDURE — 36415 COLL VENOUS BLD VENIPUNCTURE: CPT

## 2023-08-28 PROCEDURE — 80048 BASIC METABOLIC PNL TOTAL CA: CPT

## 2023-08-28 PROCEDURE — 82043 UR ALBUMIN QUANTITATIVE: CPT

## 2023-08-28 RX ORDER — ATORVASTATIN CALCIUM 40 MG/1
40 TABLET, FILM COATED ORAL DAILY
Qty: 90 TABLET | Refills: 0 | Status: SHIPPED | OUTPATIENT
Start: 2023-08-28 | End: 2024-02-24

## 2023-08-29 ENCOUNTER — TELEPHONE (OUTPATIENT)
Dept: OTHER | Facility: HOSPITAL | Age: 83
End: 2023-08-29

## 2023-08-29 DIAGNOSIS — N18.4 CHRONIC RENAL DISEASE, STAGE IV (HCC): ICD-10-CM

## 2023-08-29 DIAGNOSIS — I12.9 BENIGN HYPERTENSION WITH CKD (CHRONIC KIDNEY DISEASE) STAGE III (HCC): ICD-10-CM

## 2023-08-29 DIAGNOSIS — N18.30 BENIGN HYPERTENSION WITH CKD (CHRONIC KIDNEY DISEASE) STAGE III (HCC): ICD-10-CM

## 2023-08-29 DIAGNOSIS — N28.1 RENAL CYST: ICD-10-CM

## 2023-08-29 DIAGNOSIS — N39.41 URGE URINARY INCONTINENCE: ICD-10-CM

## 2023-08-29 DIAGNOSIS — N25.81 SECONDARY HYPERPARATHYROIDISM OF RENAL ORIGIN (HCC): ICD-10-CM

## 2023-08-29 DIAGNOSIS — N18.32 STAGE 3B CHRONIC KIDNEY DISEASE (HCC): ICD-10-CM

## 2023-08-29 DIAGNOSIS — I10 ESSENTIAL HYPERTENSION: Primary | ICD-10-CM

## 2023-08-29 LAB
CREAT UR-MCNC: 92.4 MG/DL
MICROALBUMIN UR-MCNC: 12.5 MG/L
MICROALBUMIN/CREAT 24H UR: 14 MG/G CREATININE (ref 0–30)

## 2023-08-29 NOTE — TELEPHONE ENCOUNTER
Called patient and went over the following information:     Creatinine 1.7 slightly increased but still closer to recent values over last couple months. No changes for now. Would like her to have repeat BMP in 6 weeks. Patient verbally understood and had no further questions for me at this time. Labs have been ordered and mailed to the patients home address.

## 2023-08-29 NOTE — TELEPHONE ENCOUNTER
Creatinine 1.7 slightly increased but still closer to recent values over last couple months. No changes for now.   Would like her to have repeat BMP in 6 weeks

## 2023-08-29 NOTE — TELEPHONE ENCOUNTER
Spoke to patient and she would like to cancel cystoscopy as she does not feel the need for it and she is doing fine. Advised to contact the office with any further questions or concerns.

## 2023-08-31 DIAGNOSIS — J30.1 NON-SEASONAL ALLERGIC RHINITIS DUE TO POLLEN: ICD-10-CM

## 2023-09-01 LAB
LEFT EYE DIABETIC RETINOPATHY: POSITIVE
RIGHT EYE DIABETIC RETINOPATHY: NORMAL

## 2023-09-01 RX ORDER — ALBUTEROL SULFATE 90 UG/1
2 AEROSOL, METERED RESPIRATORY (INHALATION) EVERY 4 HOURS PRN
Qty: 8.5 G | Refills: 5 | Status: SHIPPED | OUTPATIENT
Start: 2023-09-01

## 2023-09-13 DIAGNOSIS — I25.10 CORONARY ARTERY DISEASE INVOLVING NATIVE HEART, UNSPECIFIED VESSEL OR LESION TYPE, UNSPECIFIED WHETHER ANGINA PRESENT: ICD-10-CM

## 2023-09-14 ENCOUNTER — OFFICE VISIT (OUTPATIENT)
Dept: INTERNAL MEDICINE CLINIC | Facility: CLINIC | Age: 83
End: 2023-09-14

## 2023-09-14 ENCOUNTER — PATIENT OUTREACH (OUTPATIENT)
Dept: INTERNAL MEDICINE CLINIC | Facility: CLINIC | Age: 83
End: 2023-09-14

## 2023-09-14 VITALS
SYSTOLIC BLOOD PRESSURE: 102 MMHG | BODY MASS INDEX: 33.2 KG/M2 | OXYGEN SATURATION: 96 % | DIASTOLIC BLOOD PRESSURE: 63 MMHG | WEIGHT: 170 LBS | TEMPERATURE: 98.1 F | HEART RATE: 76 BPM

## 2023-09-14 DIAGNOSIS — R93.41 ABNORMAL ULTRASOUND OF BLADDER: ICD-10-CM

## 2023-09-14 DIAGNOSIS — Z74.09 LIMITED MOBILITY: ICD-10-CM

## 2023-09-14 DIAGNOSIS — R26.2 AMBULATORY DYSFUNCTION: ICD-10-CM

## 2023-09-14 DIAGNOSIS — Z00.00 MEDICARE ANNUAL WELLNESS VISIT, SUBSEQUENT: Primary | ICD-10-CM

## 2023-09-14 LAB
DME PARACHUTE DELIVERY DATE REQUESTED: NORMAL
DME PARACHUTE ITEM DESCRIPTION: NORMAL
DME PARACHUTE ITEM DESCRIPTION: NORMAL
DME PARACHUTE ORDER STATUS: NORMAL
DME PARACHUTE SUPPLIER NAME: NORMAL
DME PARACHUTE SUPPLIER PHONE: NORMAL

## 2023-09-14 RX ORDER — TORSEMIDE 20 MG/1
60 TABLET ORAL DAILY
Qty: 252 TABLET | Refills: 3 | Status: SHIPPED | OUTPATIENT
Start: 2023-09-14

## 2023-09-14 NOTE — PROGRESS NOTES
Chronic Care Management Program Consent:    Patient informed of availability of Chronic Care Management services. The services will billed monthly by their Primary Care Provider only. Patient is informed there may be a monthly cost sharing associated with the Chronic Care Management services. Patient is aware that financial counseling is available to assist with any co-pay questions or concerns. Chronic Care Management services include:    • 24/7 access to care. • Comprehensive plan of care created by the provider. • Individualized care planning by the care manager(s). • Transitional care support. The patient is informed that they have the right to stop Chronic Care Management services at anytime. Patient consents to Chronic Care Management services? No  (Yes or No)    Patient informed that consent is needed only once unless the patient switches qualifying providers.

## 2023-09-14 NOTE — PATIENT INSTRUCTIONS
Medicare Preventive Visit Patient Instructions  Thank you for completing your Welcome to Medicare Visit or Medicare Annual Wellness Visit today. Your next wellness visit will be due in one year (9/14/2024). The screening/preventive services that you may require over the next 5-10 years are detailed below. Some tests may not apply to you based off risk factors and/or age. Screening tests ordered at today's visit but not completed yet may show as past due. Also, please note that scanned in results may not display below. Preventive Screenings:  Service Recommendations Previous Testing/Comments   Colorectal Cancer Screening  * Colonoscopy    * Fecal Occult Blood Test (FOBT)/Fecal Immunochemical Test (FIT)  * Fecal DNA/Cologuard Test  * Flexible Sigmoidoscopy Age: 43-73 years old   Colonoscopy: every 10 years (may be performed more frequently if at higher risk)  OR  FOBT/FIT: every 1 year  OR  Cologuard: every 3 years  OR  Sigmoidoscopy: every 5 years  Screening may be recommended earlier than age 39 if at higher risk for colorectal cancer. Also, an individualized decision between you and your healthcare provider will decide whether screening between the ages of 77-80 would be appropriate. Colonoscopy: Not on file  FOBT/FIT: Not on file  Cologuard: Not on file  Sigmoidoscopy: Not on file          Breast Cancer Screening Age: 36 years old  Frequency: every 1-2 years  Not required if history of left and right mastectomy Mammogram: 04/18/2023    Screening Current   Cervical Cancer Screening Between the ages of 21-29, pap smear recommended once every 3 years. Between the ages of 32-69, can perform pap smear with HPV co-testing every 5 years.    Recommendations may differ for women with a history of total hysterectomy, cervical cancer, or abnormal pap smears in past. Pap Smear: Not on file    Screening Not Indicated   Hepatitis C Screening Once for adults born between 1945 and 1965  More frequently in patients at high risk for Hepatitis C Hep C Antibody: Not on file        Diabetes Screening 1-2 times per year if you're at risk for diabetes or have pre-diabetes Fasting glucose: 199 mg/dL (6/12/2023)  A1C: 7.5 % (8/2/2023)  Screening Not Indicated  History Diabetes   Cholesterol Screening Once every 5 years if you don't have a lipid disorder. May order more often based on risk factors. Lipid panel: 05/10/2023    Screening Not Indicated  History Lipid Disorder     Other Preventive Screenings Covered by Medicare:  1. Abdominal Aortic Aneurysm (AAA) Screening: covered once if your at risk. You're considered to be at risk if you have a family history of AAA. 2. Lung Cancer Screening: covers low dose CT scan once per year if you meet all of the following conditions: (1) Age 48-67; (2) No signs or symptoms of lung cancer; (3) Current smoker or have quit smoking within the last 15 years; (4) You have a tobacco smoking history of at least 20 pack years (packs per day multiplied by number of years you smoked); (5) You get a written order from a healthcare provider. 3. Glaucoma Screening: covered annually if you're considered high risk: (1) You have diabetes OR (2) Family history of glaucoma OR (3)  aged 48 and older OR (3)  American aged 72 and older  3. Osteoporosis Screening: covered every 2 years if you meet one of the following conditions: (1) You're estrogen deficient and at risk for osteoporosis based off medical history and other findings; (2) Have a vertebral abnormality; (3) On glucocorticoid therapy for more than 3 months; (4) Have primary hyperparathyroidism; (5) On osteoporosis medications and need to assess response to drug therapy. · Last bone density test (DXA Scan): 02/07/2022.  5. HIV Screening: covered annually if you're between the age of 15-65. Also covered annually if you are younger than 13 and older than 72 with risk factors for HIV infection.  For pregnant patients, it is covered up to 3 times per pregnancy. Immunizations:  Immunization Recommendations   Influenza Vaccine Annual influenza vaccination during flu season is recommended for all persons aged >= 6 months who do not have contraindications   Pneumococcal Vaccine   * Pneumococcal conjugate vaccine = PCV13 (Prevnar 13), PCV15 (Vaxneuvance), PCV20 (Prevnar 20)  * Pneumococcal polysaccharide vaccine = PPSV23 (Pneumovax) Adults 20-63 years old: 1-3 doses may be recommended based on certain risk factors  Adults 72 years old: 1-2 doses may be recommended based off what pneumonia vaccine you previously received   Hepatitis B Vaccine 3 dose series if at intermediate or high risk (ex: diabetes, end stage renal disease, liver disease)   Tetanus (Td) Vaccine - COST NOT COVERED BY MEDICARE PART B Following completion of primary series, a booster dose should be given every 10 years to maintain immunity against tetanus. Td may also be given as tetanus wound prophylaxis. Tdap Vaccine - COST NOT COVERED BY MEDICARE PART B Recommended at least once for all adults. For pregnant patients, recommended with each pregnancy. Shingles Vaccine (Shingrix) - COST NOT COVERED BY MEDICARE PART B  2 shot series recommended in those aged 48 and above     Health Maintenance Due:  There are no preventive care reminders to display for this patient. Immunizations Due:      Topic Date Due   • COVID-19 Vaccine (4 - Pfizer series) 02/28/2022   • Influenza Vaccine (1) 09/01/2023     Advance Directives   What are advance directives? Advance directives are legal documents that state your wishes and plans for medical care. These plans are made ahead of time in case you lose your ability to make decisions for yourself. Advance directives can apply to any medical decision, such as the treatments you want, and if you want to donate organs. What are the types of advance directives? There are many types of advance directives, and each state has rules about how to use them. You may choose a combination of any of the following:  · Living will: This is a written record of the treatment you want. You can also choose which treatments you do not want, which to limit, and which to stop at a certain time. This includes surgery, medicine, IV fluid, and tube feedings. · Durable power of  for healthcare [facility]): This is a written record that states who you want to make healthcare choices for you when you are unable to make them for yourself. This person, called a proxy, is usually a family member or a friend. You may choose more than 1 proxy. · Do not resuscitate (DNR) order:  A DNR order is used in case your heart stops beating or you stop breathing. It is a request not to have certain forms of treatment, such as CPR. A DNR order may be included in other types of advance directives. · Medical directive: This covers the care that you want if you are in a coma, near death, or unable to make decisions for yourself. You can list the treatments you want for each condition. Treatment may include pain medicine, surgery, blood transfusions, dialysis, IV or tube feedings, and a ventilator (breathing machine). · Values history: This document has questions about your views, beliefs, and how you feel and think about life. This information can help others choose the care that you would choose. Why are advance directives important? An advance directive helps you control your care. Although spoken wishes may be used, it is better to have your wishes written down. Spoken wishes can be misunderstood, or not followed. Treatments may be given even if you do not want them. An advance directive may make it easier for your family to make difficult choices about your care. Fall Prevention    Fall prevention  includes ways to make your home and other areas safer. It also includes ways you can move more carefully to prevent a fall.  Health conditions that cause changes in your blood pressure, vision, or muscle strength and coordination may increase your risk for falls. Medicines may also increase your risk for falls if they make you dizzy, weak, or sleepy. Fall prevention tips:   · Stand or sit up slowly. · Use assistive devices as directed. · Wear shoes that fit well and have soles that . · Wear a personal alarm. · Stay active. · Manage your medical conditions. Home Safety Tips:  · Add items to prevent falls in the bathroom. · Keep paths clear. · Install bright lights in your home. · Keep items you use often on shelves within reach. · Paint or place reflective tape on the edges of your stairs. Urinary Incontinence   Urinary incontinence (UI)  is when you lose control of your bladder. UI develops because your bladder cannot store or empty urine properly. The 3 most common types of UI are stress incontinence, urge incontinence, or both. Medicines:   · May be given to help strengthen your bladder control. Report any side effects of medication to your healthcare provider. Do pelvic muscle exercises often:  Your pelvic muscles help you stop urinating. Squeeze these muscles tight for 5 seconds, then relax for 5 seconds. Gradually work up to squeezing for 10 seconds. Do 3 sets of 15 repetitions a day, or as directed. This will help strengthen your pelvic muscles and improve bladder control. Train your bladder:  Go to the bathroom at set times, such as every 2 hours, even if you do not feel the urge to go. You can also try to hold your urine when you feel the urge to go. For example, hold your urine for 5 minutes when you feel the urge to go. As that becomes easier, hold your urine for 10 minutes. Self-care:   · Keep a UI record. Write down how often you leak urine and how much you leak. Make a note of what you were doing when you leaked urine. · Drink liquids as directed. You may need to limit the amount of liquid you drink to help control your urine leakage.  Do not drink any liquid right before you go to bed. Limit or do not have drinks that contain caffeine or alcohol. · Prevent constipation. Eat a variety of high-fiber foods. Good examples are high-fiber cereals, beans, vegetables, and whole-grain breads. Walking is the best way to trigger your intestines to have a bowel movement. · Exercise regularly and maintain a healthy weight. Weight loss and exercise will decrease pressure on your bladder and help you control your leakage. · Use a catheter as directed  to help empty your bladder. A catheter is a tiny, plastic tube that is put into your bladder to drain your urine. · Go to behavior therapy as directed. Behavior therapy may be used to help you learn to control your urge to urinate. Weight Management   Why it is important to manage your weight:  Being overweight increases your risk of health conditions such as heart disease, high blood pressure, type 2 diabetes, and certain types of cancer. It can also increase your risk for osteoarthritis, sleep apnea, and other respiratory problems. Aim for a slow, steady weight loss. Even a small amount of weight loss can lower your risk of health problems. How to lose weight safely:  A safe and healthy way to lose weight is to eat fewer calories and get regular exercise. You can lose up about 1 pound a week by decreasing the number of calories you eat by 500 calories each day. Healthy meal plan for weight management:  A healthy meal plan includes a variety of foods, contains fewer calories, and helps you stay healthy. A healthy meal plan includes the following:  · Eat whole-grain foods more often. A healthy meal plan should contain fiber. Fiber is the part of grains, fruits, and vegetables that is not broken down by your body. Whole-grain foods are healthy and provide extra fiber in your diet. Some examples of whole-grain foods are whole-wheat breads and pastas, oatmeal, brown rice, and bulgur.   · Eat a variety of vegetables every day. Include dark, leafy greens such as spinach, kale, yuridia greens, and mustard greens. Eat yellow and orange vegetables such as carrots, sweet potatoes, and winter squash. · Eat a variety of fruits every day. Choose fresh or canned fruit (canned in its own juice or light syrup) instead of juice. Fruit juice has very little or no fiber. · Eat low-fat dairy foods. Drink fat-free (skim) milk or 1% milk. Eat fat-free yogurt and low-fat cottage cheese. Try low-fat cheeses such as mozzarella and other reduced-fat cheeses. · Choose meat and other protein foods that are low in fat. Choose beans or other legumes such as split peas or lentils. Choose fish, skinless poultry (chicken or turkey), or lean cuts of red meat (beef or pork). Before you cook meat or poultry, cut off any visible fat. · Use less fat and oil. Try baking foods instead of frying them. Add less fat, such as margarine, sour cream, regular salad dressing and mayonnaise to foods. Eat fewer high-fat foods. Some examples of high-fat foods include french fries, doughnuts, ice cream, and cakes. · Eat fewer sweets. Limit foods and drinks that are high in sugar. This includes candy, cookies, regular soda, and sweetened drinks. Exercise:  Exercise at least 30 minutes per day on most days of the week. Some examples of exercise include walking, biking, dancing, and swimming. You can also fit in more physical activity by taking the stairs instead of the elevator or parking farther away from stores. Ask your healthcare provider about the best exercise plan for you. © Copyright [employer] 2018 Information is for End User's use only and may not be sold, redistributed or otherwise used for commercial purposes.  All illustrations and images included in CareNotes® are the copyrighted property of [employer] or [address] [place] [address]

## 2023-09-14 NOTE — PROGRESS NOTES
Assessment and Plan:     Problem List Items Addressed This Visit       Medicare annual wellness visit, subsequent    -  Primary    Ambulatory dysfunction        Limited mobility      · Ambulates with cane at baseline  · Requesting rotarory walker for ambulation   · Order placed through parachute       Abnormal ultrasound of bladder      · Seen on renal bladder US: abnormal aymeterical bladder wall thickening with loculation, unable to rule out cancer. It was recommended patient have a follow up CT w contrast however, this is deferred due to patients CKD3B  · Incidental CT abd pelvis w/o contrast prefromed for abdominal pain ready bladder as unremarkable   · Nephrology referred patient to urology for cystoscopy with biopsy   · Patient had appointment scheduled 8/31 but cancelled this as she did not know what this was for   · Explained findings to the patient as well as reason for cysto, and possibltiy of cancer   · Patient would like to discuss with family prior to pursuing further tests   · She has urology office number if she changes her mind         Preventive health issues were discussed with patient, and age appropriate screening tests were ordered as noted in patient's After Visit Summary. Personalized health advice and appropriate referrals for health education or preventive services given if needed, as noted in patient's After Visit Summary. History of Present Illness:     Patient presents for a Medicare Wellness Visit  79 y/o F history of CAD, diabetes, hypertension, CKD 3, combined systolic diastolic heart failure, history of CVA, obesity who presents for MAW. Patient follows with Cardiology, Nephrology, Endocrinology in [facility] very regularly. Her a1c is 7.5%. She reports 2 falls in the past year which were mechanical. She did not hit her head or have LOC. She did not seek medical care.    Walks with a cane at baseline and is requesting a walker     Additionally discussed abnormal findings seen on renal bladder US which showed abnormal aymeterical bladder wall thickening with loculation, unable to rule out cancer. It was recommended patient have a follow up CT w contrast however, this is deferred due to patients CKD3B. Incidental CT abd pelvis w/o contrast prefromed for abdominal pain ready bladder as unremarkable Nephrology referred patient to urology for cystoscopy with biopsy. Patient had appointment scheduled 8/31 but cancelled this as she did not know what this was for. I explained findings to the patient as well as reason for cysto, and possibltiy of cancer. Patient would like to discuss with family prior to pursuing further tests. She has urology office number if she changes her mind    Patient feels well otherwise. She has not lost weight. She denies hematuria. She does have a remote history of smoking but quit in 1980s    No other complaints or concerns at this time  Patient is up-to-date on her mammography, colon cancer screening, cervical cancer screening. Patient Care Team:  Erasto Colón DO as PCP - General (Internal Medicine)  Martha Unger MD (Nephrology)     Review of Systems:     Review of Systems   Constitutional: Negative for chills and fever. HENT: Negative for ear pain and sore throat. Eyes: Negative for pain and visual disturbance. Respiratory: Negative for cough and shortness of breath. Cardiovascular: Negative for chest pain and palpitations. Gastrointestinal: Negative for abdominal pain and vomiting. Genitourinary: Negative for dysuria and hematuria. Musculoskeletal: Positive for gait problem. Negative for arthralgias and back pain. Skin: Negative for color change and rash. Neurological: Negative for seizures and syncope. All other systems reviewed and are negative.        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)   • History of CVA with residual deficit   • Mixed hyperlipidemia   • Persistent proteinuria   • Renal cyst   • Ankle edema   • Stage 3b chronic kidney disease ([facility])   • Acute diastolic congestive heart failure ([facility])   • Status post insertion of drug-eluting stent into left anterior descending (LAD) artery   • Coronary artery disease involving native coronary artery   • NSTEMI (non-ST elevated myocardial infarction) ([address])   • Essential hypertension   • Asthma   • 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[facility])   • Uncontrolled type 2 diabetes mellitus with hyperglycemia ([facility])   • Urge urinary incontinence   • Nocturia   • Secondary hyperparathyroidism of renal origin ([address])   • Chronic diastolic congestive heart failure ([facility])   • Embolism and thrombosis of arteries of the lower extremities ([facility])   • Chronic renal disease, stage IV ([facility])   • Vitamin D deficiency   • Hypokalemia      Past Medical and Surgical History:     Past Medical History:   Diagnosis Date   • ACE-inhibitor cough     last assessed - 29Dec2017   • Asthma    • Bilateral edema of lower extremity     last assessed - 21Aug2017   • Cardiac murmur     last assessed - 21Aug2017   • CKD (chronic kidney disease)    • Diabetes mellitus ([address])     last assessed - [id number]   • Esophageal dysphagia    • Fatigue     last assessed - 21Aug2017   • Hyperlipidemia    • Hypertension    • Patellar bursitis of right knee     last assessed - [id number]   • Personal history of other specified conditions     presenting hazards to health   • Stroke [facility])    • Stroke syndrome    • Urinary frequency    • UTI (urinary tract infection)      Past Surgical History:   Procedure Laterality Date   • CARDIAC CATHETERIZATION N/A 6/1/2023    Procedure: Cardiac Coronary Angiogram;  Surgeon: Mila Vallecillo MD;  Location: [facility]; Service: Cardiology   • CARDIAC CATHETERIZATION  6/1/2023    Procedure: Cardiac Left Heart Cath;  Surgeon: Mila Vallecillo MD;  Location: [facility]; Service: Cardiology   • OK ESOPHAGOGASTRODUODENOSCOPY TRANSORAL DIAGNOSTIC N/A 4/5/2017    Procedure: ESOPHAGOGASTRODUODENOSCOPY (EGD); Surgeon: Madeline Herman MD;  Location: [facility] GI [facility]; Service: Gastroenterology      Family History:     Family History   Problem Relation Age of Onset   • Hypertension Mother    • Stroke Mother    • Heart disease Father    • Other Sister         1)Breast disorder; 2)Epilepsy   • Migraines Sister         Migraine headaches   • Osteoporosis Sister    • Thyroid disease Sister    • Coronary artery disease Sister         S/P triple vessel bypass   • Breast cancer Sister 80   • No Known Problems Sister    • No Known Problems Sister    • Osteoporosis Maternal Grandmother    • No Known Problems Maternal Grandfather    • No Known Problems Paternal Grandmother    • No Known Problems Paternal Grandfather    • Diabetes Son         Diabetes mellitus   • Hypertension Son    • Rheum arthritis Son         Rheumatic disease   • No Known Problems Son    • No Known Problems Son    • No Known Problems Son    • No Known Problems Son    • No Known Problems Son    • No Known Problems Maternal Aunt    • No Known Problems Maternal Aunt    • No Known Problems Maternal Aunt    • No Known Problems Paternal Aunt    • No Known Problems Paternal Aunt    • Heart disease Family       Social History:     Social History     Socioeconomic History   • Marital status:       Spouse name: None   • Number of children: 8   • Years of education: None   • Highest education level: None   Occupational History   • Occupation: Retired   Tobacco Use   • Smoking status: Former     Packs/day: 3.00     Types: Cigarettes     Quit date:      Years since quittin.7   • Smokeless tobacco: Former   • Tobacco comments:     quit over 30 yrs ago; (quit in the , had smoked 3PPD for 20 years - per [employer])   Vaping Use   • Vaping Use: Never used   Substance and Sexual Activity   • Alcohol use: Never   • Drug use: Never   • Sexual activity: Not Currently   Other Topics Concern   • None   Social History Narrative    Diet needs improvement    Does not exercise    No caffeine use     Social Determinants of Health     Financial Resource Strain: Low Risk  (2023)    Overall Financial Resource Strain (CARDIA)    • Difficulty of Paying Living Expenses: Not very hard   Food Insecurity: No Food Insecurity (2023)    Hunger Vital Sign    • Worried About Running Out of Food in the Last Year: Never true    • Ran Out of Food in the Last Year: Never true   Transportation Needs: No Transportation Needs (2023)    PRAPARE - Transportation    • Lack of Transportation (Medical): No    • Lack of Transportation (Non-Medical): No   Physical Activity: Inactive (2021)    Exercise Vital Sign    • Days of Exercise per Week: 0 days    • Minutes of Exercise per Session: 0 min   Stress: No Stress Concern Present (2021)    [facility]    • Feeling of Stress : Not at all   Social Connections: Socially Isolated (2021)    Social Connection and Isolation Panel [NHANES]    • Frequency of Communication with Friends and Family: Once a week    • Frequency of Social Gatherings with Friends and Family: Once a week    • Attends [employer] Services: 1 to 4 times per year    • Active Member of Clubs or Organizations: No    • Attends Club or Organization Meetings: Never    • Marital Status:     Intimate Partner Violence: Not At Risk (2021)    Humiliation, Afraid, Rape, and Kick questionnaire    • Fear of Current or Ex-Partner: No    • Emotionally Abused: No    • Physically Abused: No    • Sexually Abused: No   Housing Stability: Not on file      Medications and Allergies:     Current Outpatient Medications   Medication Sig Dispense Refill   • acetaminophen (TYLENOL) 650 mg CR tablet Take 650 mg by mouth every 6 (six) hours as needed for mild pain     • albuterol (PROVENTIL HFA,VENTOLIN HFA) 90 mcg/act inhaler INHALE 2 PUFFS EVERY 4 (FOUR) HOURS AS NEEDED FOR WHEEZING 8.5 g 5   • atorvastatin (LIPITOR) 40 mg tablet TAKE 1 TABLET (40 MG TOTAL) BY MOUTH DAILY 90 tablet 0   • Blood Glucose Monitoring Suppl (FREESTYLE LITE) JAQUELIN by Does not apply route daily 1 each 0   • Breo Ellipta 100-25 MCG/ACT inhaler INHALE 1 PUFF DAILY 60 each 0   • carvedilol (COREG) 6.25 mg tablet TAKE 1 TABLET (6.25 MG TOTAL) BY MOUTH 2 (TWO) TIMES A DAY WITH MEALS 60 tablet 5   • Cholecalciferol (D3) 50 MCG (2000 UT) TABS Take 1 tablet (2,000 Units total) by mouth daily 90 tablet 3   • clopidogrel (PLAVIX) 75 mg tablet TAKE 2 TABLETS DAILY 180 tablet 3   • conjugated estrogens (PREMARIN) vaginal cream Insert 0.5 g into the vagina 2 (two) times a week Insert twice weekly at bedtime 30 g 1   • Continuous Blood Gluc  (FreeStyle Naldo 14 Day [place]) JAQUELIN USE TO RECORD BLOOD GLUCOSE 4 TIMES DAILY. ONCE FASTING AND THREE TIMES DAILY BEFORE MEALS 4 each 3   • Continuous Blood Gluc Sensor (FreeStyle Naldo 2 Sensor) MISC USE ONE SENSOR EVERY 14 DAYS 6 each 1   • estradiol (ESTRACE VAGINAL) 0.1 mg/g vaginal cream Insert 2 g into the vagina 2 (two) times a week 42.5 g 3   • ferrous sulfate 325 (65 Fe) mg tablet TAKE 1 TABLET TWICE A DAY BEFORE MEALS 180 tablet 0   • glucose blood (FREESTYLE LITE) test strip TEST AS DIRECTED UP TO FOUR TIMES A  each 3   • glucose monitoring kit (FREESTYLE) monitoring kit Use 1 each as needed (Three time daily) Please check blood sugar 3 times daily.  1 each 1   • Glucose-Vitamin C-Vitamin D (TRUEPLUS GLUCOSE ON THE GO) CHEW      • ketoconazole (NIZORAL) 2 % shampoo APPLY TOPICALLY TO THE SCALP ONCE EVERY OTHER WEEK 120 mL 0   • Lancets (FREESTYLE) lancets Use as instructed 100 each 0   • latanoprost (XALATAN) 0.005 % ophthalmic solution      • losartan (COZAAR) 25 mg tablet TAKE 1 TABLET (25 MG TOTAL) BY MOUTH DAILY 30 tablet 5   • Misc. Devices (QUAD CANE) MISC by Does not apply route daily 1 each 0   • montelukast (SINGULAIR) 10 mg tablet TAKE 1 TABLET (10 MG TOTAL) BY MOUTH DAILY AT BEDTIME 90 tablet 3   • nitroglycerin (NITROSTAT) 0.4 mg SL tablet Place 1 tablet (0.4 mg total) under the tongue every 5 (five) minutes as needed for chest pain 25 tablet 1   • ranolazine (RANEXA) 500 mg 12 hr tablet Take 1 tablet (500 mg total) by mouth every 12 (twelve) hours 60 tablet 0   • repaglinide (PRANDIN) 0.5 mg tablet Take 1 tablet (0.5 mg total) by mouth 2 (two) times a day before meals (SKIP DOSE IF SKIPPING MEAL) 180 tablet 1   • rivaroxaban (Xarelto) 2.5 mg tablet Restart tomorrow Do not start before June 2, 2023. (Patient taking differently: 2.5 mg 2 (two) times a day Restart tomorrow)  0   • torsemide (DEMADEX) 20 mg tablet TAKE 3 TABLETS (60 MG TOTAL) BY MOUTH DAILY 252 tablet 3   • Tradjenta 5 MG TABS TAKE 1 TABLET (5 MG) BY MOUTH DAILY 90 tablet 0   • potassium chloride (K-DUR,KLOR-CON) 20 mEq tablet Take 1 tablet (20 mEq total) by mouth daily 30 tablet 2     No current facility-administered medications for this visit.      No Known Allergies   Immunizations:     Immunization History   Administered Date(s) Administered   • COVID-19 PFIZER VACCINE 0.3 ML IM 03/30/2021, 04/21/2021, 01/03/2022   • INFLUENZA 10/10/2017   • Influenza Quadrivalent, 6-35 Months IM 10/10/2017   • Influenza, high dose seasonal 0.7 mL 10/02/2019, 10/06/2020, 11/02/2021   • Pneumococcal Conjugate 13-Valent 02/19/2020   • Pneumococcal Polysaccharide PPV23 01/01/2008   • Tdap 03/11/2020      Health Maintenance: There are no preventive care reminders to display for this patient. Topic Date Due   • COVID-19 Vaccine (4 - Pfizer series) 02/28/2022   • Influenza Vaccine (1) 09/01/2023      Medicare Screening Tests and Risk Assessments:     Hermila Zavala is here for her Subsequent Wellness visit. Health Risk Assessment:   Patient feels that their physical health rating is much better. Patient is very satisfied with their life. Eyesight was rated as same. Hearing was rated as same. Patient feels that their emotional and mental health rating is same. Patients states they are sometimes angry. Patient states they are sometimes unusually tired/fatigued. Pain experienced in the last 7 days has been none. Patient states that she has experienced no weight loss or gain in last 6 months. Fall Risk Screening: In the past year, patient has experienced: history of falling in past year    Number of falls: 2 or more  Injured during fall?: No    Feels unsteady when standing or walking?: Yes    Worried about falling?: No      Urinary Incontinence Screening:   Patient has leaked urine accidently in the last six months. Home Safety:  Patient does not have trouble with stairs inside or outside of their home. Patient has working smoke alarms and has working carbon monoxide detector. Home safety hazards include: none. Nutrition:   Current diet is Regular. Medications:   Patient is currently taking over-the-counter supplements. OTC medications include: see medication list. Patient is able to manage medications. Activities of Daily Living (ADLs)/Instrumental Activities of Daily Living (IADLs):   Walk and transfer into and out of bed and chair?: Yes  Dress and groom yourself?: Yes    Bathe or shower yourself?: Yes    Feed yourself?  Yes  Do your laundry/housekeeping?: Yes  Manage your money, pay your bills and track your expenses?: Yes  Make your own meals?: Yes    Do your own shopping?: Yes    Previous Hospitalizations:   Any hospitalizations or ED visits within the last 12 months?: No      PREVENTIVE SCREENINGS      Cardiovascular Screening:    General: Screening Not Indicated and History Lipid Disorder      Diabetes Screening:     General: Screening Not Indicated and History Diabetes      Breast Cancer Screening:     General: Screening Current      Cervical Cancer Screening:    General: Screening Not Indicated      Lung Cancer Screening:     General: Screening Not Indicated    Screening, Brief Intervention, and Referral to Treatment (SBIRT)    Screening  Typical number of drinks in a day: 0  Typical number of drinks in a week: 0  Interpretation: Low risk drinking behavior. No results found. Physical Exam:     /63 (BP Location: Right arm, Patient Position: Sitting, Cuff Size: Standard)   Pulse 76   Temp 98.1 °F (36.7 °C) (Temporal)   Wt 77.1 kg (170 lb)   SpO2 96%   BMI 33.20 kg/m²     Physical Exam  Vitals and nursing note reviewed. Constitutional:       General: She is not in acute distress. Appearance: She is well-developed. She is obese. HENT:      Head: Normocephalic and atraumatic. Eyes:      Conjunctiva/sclera: Conjunctivae normal.   Cardiovascular:      Rate and Rhythm: Normal rate and regular rhythm. Heart sounds: No murmur heard. Pulmonary:      Effort: Pulmonary effort is normal. No respiratory distress. Breath sounds: Normal breath sounds. Abdominal:      Palpations: Abdomen is soft. Tenderness: There is no abdominal tenderness. Musculoskeletal:         General: No swelling. Cervical back: Neck supple. Skin:     General: Skin is warm and dry. Capillary Refill: Capillary refill takes less than 2 seconds. Neurological:      Mental Status: She is alert.       Gait: Gait abnormal.   Psychiatric:         Mood and Affect: Mood normal.          Gisela Bustamante DO

## 2023-09-14 NOTE — PROGRESS NOTES
Outpatient Care Management Note:    Received message from patient yesterday afternoon requesting a return call regarding a letter/phone calls I received from her. Patient scheduled to come into office today 9/14/23. I met with patient after her appointment. I explained my role and chronic care management program. Patient was at office today for medicare annual wellness visit. She reports feeling well at this time. She is independent with ADL's and managing medication and appointments. She did request a rollator today and provider sent order via parachute. Patient declining need for further outreach. She has my contact number and PCP office number if needed.

## 2023-09-25 ENCOUNTER — HOSPITAL ENCOUNTER (EMERGENCY)
Facility: HOSPITAL | Age: 83
Discharge: HOME | End: 2023-09-25
Attending: STUDENT IN AN ORGANIZED HEALTH CARE EDUCATION/TRAINING PROGRAM
Payer: MEDICARE

## 2023-09-25 VITALS
TEMPERATURE: 98.3 F | OXYGEN SATURATION: 100 % | RESPIRATION RATE: 18 BRPM | SYSTOLIC BLOOD PRESSURE: 146 MMHG | HEART RATE: 56 BPM | DIASTOLIC BLOOD PRESSURE: 63 MMHG

## 2023-09-25 DIAGNOSIS — T19.2XXA FOREIGN BODY IN VAGINA, INITIAL ENCOUNTER: Primary | ICD-10-CM

## 2023-09-25 PROCEDURE — 99284 EMERGENCY DEPT VISIT MOD MDM: CPT

## 2023-09-25 PROCEDURE — 99284 EMERGENCY DEPT VISIT MOD MDM: CPT | Performed by: STUDENT IN AN ORGANIZED HEALTH CARE EDUCATION/TRAINING PROGRAM

## 2023-09-25 NOTE — ED PROVIDER NOTES
History  Chief Complaint   Patient presents with   • Foreign Body in Vagina     Pt reports having pessary in bladder and unable to remove it. Pt states having pessaries in the past but has never been unable to remove it herself. Patient is an 80-year-old female presenting for evaluation of a suspected foreign body in her vagina. She says that she has a pessary that she is able to place herself remove intermittently to clean. She has been attempting to remove it for the last week or so without ability to do so. She denies any pain or discomfort. She denies any urinary issues. She has been having normal bowel movements. She denies any vaginal bleeding or discharge. She is attempted to get into her OB/primary care but has been unable to do so. Prior to Admission Medications   Prescriptions Last Dose Informant Patient Reported? Taking? Blood Glucose Monitoring Suppl (FREESTYLE LITE) JAQUELIN  Self No No   Sig: by Does not apply route daily   Breo Ellipta 100-25 MCG/ACT inhaler   No No   Sig: INHALE 1 PUFF DAILY   Cholecalciferol (D3) 50 MCG (2000 UT) TABS   No No   Sig: Take 1 tablet (2,000 Units total) by mouth daily   Continuous Blood Gluc  (FreeStyle Naldo 14 Day [place]) JAQUELIN  Self No No   Sig: USE TO RECORD BLOOD GLUCOSE 4 TIMES DAILY. ONCE FASTING AND THREE TIMES DAILY BEFORE MEALS   Continuous Blood Gluc Sensor (FreeStyle Naldo 2 Sensor) MISC  Self No No   Sig: USE ONE SENSOR EVERY 14 DAYS   Glucose-Vitamin C-Vitamin D (TRUEPLUS GLUCOSE ON THE GO) CHEW  Self Yes No   Lancets (FREESTYLE) lancets  Self No No   Sig: Use as instructed   Misc.  Devices (QUAD CANE) MISC  Self No No   Sig: by Does not apply route daily   Tradjenta 5 MG TABS   No No   Sig: TAKE 1 TABLET (5 MG) BY MOUTH DAILY   acetaminophen (TYLENOL) 650 mg CR tablet  Self Yes No   Sig: Take 650 mg by mouth every 6 (six) hours as needed for mild pain   albuterol (PROVENTIL HFA,VENTOLIN HFA) 90 mcg/act inhaler   No No   Sig: INHALE 2 PUFFS EVERY 4 (FOUR) HOURS AS NEEDED FOR WHEEZING   atorvastatin (LIPITOR) 40 mg tablet   No No   Sig: TAKE 1 TABLET (40 MG TOTAL) BY MOUTH DAILY   carvedilol (COREG) 6.25 mg tablet  Self No No   Sig: TAKE 1 TABLET (6.25 MG TOTAL) BY MOUTH 2 (TWO) TIMES A DAY WITH MEALS   clopidogrel (PLAVIX) 75 mg tablet   No No   Sig: TAKE 2 TABLETS DAILY   conjugated estrogens (PREMARIN) vaginal cream  Self No No   Sig: Insert 0.5 g into the vagina 2 (two) times a week Insert twice weekly at bedtime   estradiol (ESTRACE VAGINAL) 0.1 mg/g vaginal cream  Self No No   Sig: Insert 2 g into the vagina 2 (two) times a week   ferrous sulfate 325 (65 Fe) mg tablet   No No   Sig: TAKE 1 TABLET TWICE A DAY BEFORE MEALS   glucose blood (FREESTYLE LITE) test strip  Self No No   Sig: TEST AS DIRECTED UP TO FOUR TIMES A DAY   glucose monitoring kit (FREESTYLE) monitoring kit  Self No No   Sig: Use 1 each as needed (Three time daily) Please check blood sugar 3 times daily.    ketoconazole (NIZORAL) 2 % shampoo  Self No No   Sig: APPLY TOPICALLY TO THE SCALP ONCE EVERY OTHER WEEK   latanoprost (XALATAN) 0.005 % ophthalmic solution  Self Yes No   losartan (COZAAR) 25 mg tablet  Self No No   Sig: TAKE 1 TABLET (25 MG TOTAL) BY MOUTH DAILY   montelukast (SINGULAIR) 10 mg tablet  Self No No   Sig: TAKE 1 TABLET (10 MG TOTAL) BY MOUTH DAILY AT BEDTIME   nitroglycerin (NITROSTAT) 0.4 mg SL tablet  Self No No   Sig: Place 1 tablet (0.4 mg total) under the tongue every 5 (five) minutes as needed for chest pain   potassium chloride (K-DUR,KLOR-CON) 20 mEq tablet  Self No No   Sig: Take 1 tablet (20 mEq total) by mouth daily   ranolazine (RANEXA) 500 mg 12 hr tablet  Self No No   Sig: Take 1 tablet (500 mg total) by mouth every 12 (twelve) hours   repaglinide (PRANDIN) 0.5 mg tablet  Self No No   Sig: Take 1 tablet (0.5 mg total) by mouth 2 (two) times a day before meals (SKIP DOSE IF SKIPPING MEAL)   rivaroxaban (Xarelto) 2.5 mg tablet  Self No No Sig: Restart tomorrow Do not start before June 2, 2023. Patient taking differently: 2.5 mg 2 (two) times a day Restart tomorrow   torsemide (DEMADEX) 20 mg tablet   No No   Sig: TAKE 3 TABLETS (60 MG TOTAL) BY MOUTH DAILY      Facility-Administered Medications: None       Past Medical History:   Diagnosis Date   • ACE-inhibitor cough     last assessed - 29Dec2017   • Asthma    • Bilateral edema of lower extremity     last assessed - 21Aug2017   • Cardiac murmur     last assessed - 21Aug2017   • CKD (chronic kidney disease)    • Diabetes mellitus ([address] )     last assessed - [id number]   • Esophageal dysphagia    • Fatigue     last assessed - 21Aug2017   • Hyperlipidemia    • Hypertension    • Patellar bursitis of right knee     last assessed - [id number]   • Personal history of other specified conditions     presenting hazards to health   • Stroke [facility])    • Stroke syndrome    • Urinary frequency    • UTI (urinary tract infection)        Past Surgical History:   Procedure Laterality Date   • CARDIAC CATHETERIZATION N/A 6/1/2023    Procedure: Cardiac Coronary Angiogram;  Surgeon: Shawnie Mcardle, MD;  Location: [facility]; Service: Cardiology   • CARDIAC CATHETERIZATION  6/1/2023    Procedure: Cardiac Left Heart Cath;  Surgeon: Shawnie Mcardle, MD;  Location: [facility]; Service: Cardiology   • MD ESOPHAGOGASTRODUODENOSCOPY TRANSORAL DIAGNOSTIC N/A 4/5/2017    Procedure: ESOPHAGOGASTRODUODENOSCOPY (EGD); Surgeon: Mercy Melara MD;  Location: [facility] GI [facility];   Service: Gastroenterology       Family History   Problem Relation Age of Onset   • Hypertension Mother    • Stroke Mother    • Heart disease Father    • Other Sister         1)Breast disorder; 2)Epilepsy   • Migraines Sister         Migraine headaches   • Osteoporosis Sister    • Thyroid disease Sister    • Coronary artery disease Sister         S/P triple vessel bypass   • Breast cancer Sister 80   • No Known Problems Sister    • No Known Problems Sister    • Osteoporosis Maternal Grandmother    • No Known Problems Maternal Grandfather    • No Known Problems Paternal Grandmother    • No Known Problems Paternal Grandfather    • Diabetes Son         Diabetes mellitus   • Hypertension Son    • Rheum arthritis Son         Rheumatic disease   • No Known Problems Son    • No Known Problems Son    • No Known Problems Son    • No Known Problems Son    • No Known Problems Son    • No Known Problems Maternal Aunt    • No Known Problems Maternal Aunt    • No Known Problems Maternal Aunt    • No Known Problems Paternal Aunt    • No Known Problems Paternal Aunt    • Heart disease Family      I have reviewed and agree with the history as documented. E-Cigarette/Vaping   • E-Cigarette Use Never User      E-Cigarette/Vaping Substances   • Nicotine No    • THC No    • CBD No    • Flavoring No    • Other No    • Unknown No      Social History     Tobacco Use   • Smoking status: Former     Packs/day: 3.00     Types: Cigarettes     Quit date:      Years since quittin.7   • Smokeless tobacco: Former   • Tobacco comments:     quit over 30 yrs ago; (quit in the , had smoked 3PPD for 20 years - per [employer])   Vaping Use   • Vaping Use: Never used   Substance Use Topics   • Alcohol use: Never   • Drug use: Never        Review of Systems   Constitutional: Negative for fever. Genitourinary: Negative for difficulty urinating, dysuria, hematuria, pelvic pain, vaginal bleeding, vaginal discharge and vaginal pain. Vaginal foreign body   All other systems reviewed and are negative.       Physical Exam  ED Triage Vitals [23 1518]   Temperature Pulse Respirations Blood Pressure SpO2   98.3 °F (36.8 °C) 56 18 146/63 100 %      Temp Source Heart Rate Source Patient Position - Orthostatic VS BP Location FiO2 (%)   Temporal Monitor Sitting Right arm --      Pain Score       No Pain             Orthostatic Vital Signs  Vitals:    23 1518   BP: 146/63 Pulse: 56   Patient Position - Orthostatic VS: Sitting       Physical Exam  Vitals and nursing note reviewed. Exam conducted with a chaperone present Axel Glass RN). Constitutional:       General: She is not in acute distress. Appearance: Normal appearance. She is not ill-appearing or toxic-appearing. HENT:      Head: Normocephalic and atraumatic. Right Ear: External ear normal.      Left Ear: External ear normal.      Nose: Nose normal.   Eyes:      General: No scleral icterus. Right eye: No discharge. Left eye: No discharge. Extraocular Movements: Extraocular movements intact. Conjunctiva/sclera: Conjunctivae normal.   Cardiovascular:      Rate and Rhythm: Normal rate. Heart sounds: Normal heart sounds. No murmur heard. No friction rub. No gallop. Pulmonary:      Effort: Pulmonary effort is normal. No respiratory distress. Breath sounds: Normal breath sounds. Abdominal:      General: Abdomen is flat. There is no distension. Palpations: Abdomen is soft. There is no mass. Tenderness: There is no abdominal tenderness. Genitourinary:     Comments: Normal external genitalia. Pessary visualized in vagina and extracted manually. Skin:     General: Skin is warm and dry. Neurological:      General: No focal deficit present. Mental Status: She is alert.          ED Medications  Medications - No data to display    Diagnostic Studies  Results Reviewed     None                 No orders to display         Procedures  Foreign Body - Orifice    Date/Time: 9/25/2023 6:03 PM    Performed by: Arlene Gonzalez DO  Authorized by: Arlene Gonzalez DO    Patient location:  ED  Other Assisting Provider: No    Consent:     Consent obtained:  Verbal    Consent given by:  Patient    Risks discussed:  Bleeding, damage to surrounding structures, incomplete removal, pain, need for surgical removal, infection and worsening of condition  Universal protocol:     Procedure explained and questions answered to patient or proxy's satisfaction: yes      Patient identity confirmed:  Verbally with patient  Location:     Location:  Vagina  Pre-procedure details:     Imaging:  None  Anesthesia (see MAR for exact dosages): Topical anesthetic:  None  Procedure details:     Localization method:  Direct visualization    Removal mechanism:  Manual extraction    Procedure complexity:  Simple    Foreign bodies recovered:  1    Intact foreign body removal: yes    Post-procedure details:     Patient tolerance of procedure: Tolerated well, no immediate complications          ED Course                             SBIRT 22yo+    Flowsheet Row Most Recent Value   Initial Alcohol Screen: US AUDIT-C     1. How often do you have a drink containing alcohol? 0 Filed at: 09/25/2023 1520   2. How many drinks containing alcohol do you have on a typical day you are drinking? 0 Filed at: 09/25/2023 1520   3a. Male UNDER 65: How often do you have five or more drinks on one occasion? 0 Filed at: 09/25/2023 1520   3b. FEMALE Any Age, or MALE 65+: How often do you have 4 or more drinks on one occassion? 0 Filed at: 09/25/2023 1520   Audit-C Score 0 Filed at: 09/25/2023 1520   ROE: How many times in the past year have you. .. Used an illegal drug or used a prescription medication for non-medical reasons? Never Filed at: 09/25/2023 1520                Medical Decision Making  Patient is an 26-year-old female presenting for evaluation of a foreign body in her vagina. Based on history evaluation, differential diagnosis includes but not limited to: Foreign body in vagina. Plan: manual extraction    Pessary manually extracted without issues. Patient asymptomatic after removal.  Stable for discharge home with primary care follow-up. Patient seems understand this plan and is agreeable. All questions answered. Patient discharged home with return precautions.     I independently reviewed this patient's chart. I considered her chronic medical conditions in my medical decision making. Disposition  Final diagnoses:   Foreign body in vagina, initial encounter     Time reflects when diagnosis was documented in both MDM as applicable and the Disposition within this note     Time User Action Codes Description Comment    9/25/2023  5:59 PM Cecille Garcia[address] 2XXA] Foreign body in vagina, initial encounter       ED Disposition     ED Disposition   Discharge    Condition   Stable    Date/Time   Mon Sep 25, 2023  5:59 PM    Comment   Ashish Humphrey discharge to home/self care.                Follow-up Information     Follow up With Specialties Details Why Contact Info    Onofre Bermudez MD Urogynecology Call   [address] [place] [address]  [phone]            Discharge Medication List as of 9/25/2023  6:02 PM      CONTINUE these medications which have NOT CHANGED    Details   acetaminophen (TYLENOL) 650 mg CR tablet Take 650 mg by mouth every 6 (six) hours as needed for mild pain, Historical Med      albuterol (PROVENTIL HFA,VENTOLIN HFA) 90 mcg/act inhaler INHALE 2 PUFFS EVERY 4 (FOUR) HOURS AS NEEDED FOR WHEEZING, Starting Fri 9/1/2023, Normal      atorvastatin (LIPITOR) 40 mg tablet TAKE 1 TABLET (40 MG TOTAL) BY MOUTH DAILY, Starting Mon 8/28/2023, Until Sat 2/24/2024, Normal      Blood Glucose Monitoring Suppl (FREESTYLE LITE) JAQUELIN by Does not apply route daily, Starting Tue 4/17/2018, Until Thu 9/14/2023, Normal      Breo Ellipta 100-25 MCG/ACT inhaler INHALE 1 PUFF DAILY, Normal      carvedilol (COREG) 6.25 mg tablet TAKE 1 TABLET (6.25 MG TOTAL) BY MOUTH 2 (TWO) TIMES A DAY WITH MEALS, Starting Fri 4/21/2023, Until Wed 10/18/2023, Normal      Cholecalciferol (D3) 50 MCG (2000 UT) TABS Take 1 tablet (2,000 Units total) by mouth daily, Starting Mon 8/21/2023, Normal      clopidogrel (PLAVIX) 75 mg tablet TAKE 2 TABLETS DAILY, Normal      conjugated estrogens (PREMARIN) vaginal cream Insert 0.5 g into the vagina 2 (two) times a week Insert twice weekly at bedtime, Starting Mon 10/25/2021, Normal      Continuous Blood Gluc  (FreeStyle Naldo 14 Day [place]) JAQUELIN USE TO RECORD BLOOD GLUCOSE 4 TIMES DAILY. ONCE FASTING AND THREE TIMES DAILY BEFORE MEALS, Normal      Continuous Blood Gluc Sensor (FreeStyle Naldo 2 Sensor) MISC USE ONE SENSOR EVERY 14 DAYS, Normal      estradiol (ESTRACE VAGINAL) 0.1 mg/g vaginal cream Insert 2 g into the vagina 2 (two) times a week, Starting Mon 4/4/2022, Normal      ferrous sulfate 325 (65 Fe) mg tablet TAKE 1 TABLET TWICE A DAY BEFORE MEALS, Normal      glucose blood (FREESTYLE LITE) test strip TEST AS DIRECTED UP TO FOUR TIMES A DAY, Normal      glucose monitoring kit (FREESTYLE) monitoring kit Use 1 each as needed (Three time daily) Please check blood sugar 3 times daily. , Starting Mon 5/9/2022, Normal      Glucose-Vitamin C-Vitamin D (TRUEPLUS GLUCOSE ON THE GO) CHEW Historical Med      ketoconazole (NIZORAL) 2 % shampoo APPLY TOPICALLY TO THE SCALP ONCE EVERY OTHER WEEK, Normal      Lancets (FREESTYLE) lancets Use as instructed, Normal      latanoprost (XALATAN) 0.005 % ophthalmic solution Starting Tue 9/14/2021, Historical Med      losartan (COZAAR) 25 mg tablet TAKE 1 TABLET (25 MG TOTAL) BY MOUTH DAILY, Starting Fri 7/28/2023, Normal      Misc.  Devices (QUAD CANE) MISC by Does not apply route daily, Starting Mon 6/1/2020, Print      montelukast (SINGULAIR) 10 mg tablet TAKE 1 TABLET (10 MG TOTAL) BY MOUTH DAILY AT BEDTIME, Starting Fri 7/28/2023, Until Wed 1/24/2024, Normal      nitroglycerin (NITROSTAT) 0.4 mg SL tablet Place 1 tablet (0.4 mg total) under the tongue every 5 (five) minutes as needed for chest pain, Starting Wed 2/8/2023, Normal      potassium chloride (K-DUR,KLOR-CON) 20 mEq tablet Take 1 tablet (20 mEq total) by mouth daily, Starting Thu 8/3/2023, Normal      ranolazine (RANEXA) 500 mg 12 hr tablet Take 1 tablet (500 mg total) by mouth every 12 (twelve) hours, Starting Wed 5/26/2021, Until Thu 9/14/2023, Normal      repaglinide (PRANDIN) 0.5 mg tablet Take 1 tablet (0.5 mg total) by mouth 2 (two) times a day before meals (SKIP DOSE IF SKIPPING MEAL), Starting Wed 4/5/2023, Normal      rivaroxaban (Xarelto) 2.5 mg tablet Restart tomorrow Do not start before June 2, 2023., No Print      torsemide (DEMADEX) 20 mg tablet TAKE 3 TABLETS (60 MG TOTAL) BY MOUTH DAILY, Starting Thu 9/14/2023, Normal      Tradjenta 5 MG TABS TAKE 1 TABLET (5 MG) BY MOUTH DAILY, Normal           No discharge procedures on file. PDMP Review     None           ED Provider  Attending physically available and evaluated Carmen Johnson. I managed the patient along with the ED Attending.     Electronically Signed by         Radha Bolaños DO  09/26/23 2038

## 2023-09-25 NOTE — ED ATTENDING ATTESTATION
Final Diagnoses:     1. Foreign body in vagina, initial encounter           I, [address], saw and evaluated the patient. All available labs and X-rays were ordered by me or the resident / non-physician and have been reviewed by myself. I discussed the patient with the resident / non-physician and agree with the resident's / non-physician practitioner's findings and plan as documented in the resident's / non-physician practicitioner's note, except where noted. At this point, I agree with the current assessment done in the ED. I was present during key portions of all procedures performed unless otherwise stated. Nursing Triage:     Chief Complaint   Patient presents with   • Foreign Body in Vagina     Pt reports having pessary in bladder and unable to remove it. Pt states having pessaries in the past but has never been unable to remove it herself. HPI:   This is a 80 y.o. female requesting removal of her pessary. Patient states that she has had a pessary for many years. She normally takes it out about once a week to clean. She states over the past week or 2 she has been unable to remove it. She now presents for further evaluation. She denies any new symptoms (vaginal discharge, pain, difficulty urinating). She attempted to get into her family doctor but has been unable to do so. No other complaints or concerns. ASSESSMENT + PLAN:   Clinical impression is malpositioned vaginal pessary. Presentation not consistent with cervicitis, PID. We will attempt to remove pessary. If unsuccessful will refer to urogynecology/GYN. Physical:   General: VS reviewed  Appears in NAD  awake, alert. Well-nourished, well-developed. Appears stated age. Speaking normally in full sentences. Head: Normocephalic, atraumatic  Eyes: EOM-I. No diplopia. No hyphema. No subconjunctival hemorrhages. Symmetrical lids. ENT: Atraumatic external nose and ears. MMM  No malocclusion. No stridor. Normal phonation. No drooling. Normal swallowing. Neck: No JVD. CV: No pallor noted  Lungs:   No tachypnea  No respiratory distress  Abd: soft nt nd no rebound/guarding  MSK:   FROM spontaneously  Skin: Dry, intact. Neuro: Awake, alert, GCS15, CN II-XII grossly intact. Motor grossly intact. Psychiatric/Behavioral: interacting normally; appropriate mood/affect.  Exam: deferred    Vitals:    23 1518   BP: 146/63   BP Location: Right arm   Pulse: 56   Resp: 18   Temp: 98.3 °F (36.8 °C)   TempSrc: Temporal   SpO2: 100%     - There are no obvious limitations to social determinants of care. - Nursing note reviewed. - Vitals reviewed. - Orders placed by myself and/or advanced practitioner / resident.    - Previous chart was reviewed  - No language barrier.   - History obtained from patient. - There are no limitations to the history obtained:         Past Medical:    has a past medical history of ACE-inhibitor cough, Asthma, Bilateral edema of lower extremity, Cardiac murmur, CKD (chronic kidney disease), Diabetes mellitus ([address]), Esophageal dysphagia, Fatigue, Hyperlipidemia, Hypertension, Patellar bursitis of right knee, Personal history of other specified conditions, Stroke [facility]), Stroke syndrome, Urinary frequency, and UTI (urinary tract infection). Past Surgical:    has a past surgical history that includes pr esophagogastroduodenoscopy transoral diagnostic (N/A, 2017); Cardiac catheterization (N/A, 2023); and Cardiac catheterization (2023).     Social:     Social History     Substance and Sexual Activity   Alcohol Use Never     Social History     Tobacco Use   Smoking Status Former   • Packs/day: 3.00   • Types: Cigarettes   • Quit date:    • Years since quittin.7   Smokeless Tobacco Former   Tobacco Comments    quit over 30 yrs ago; (quit in the , had smoked 3PPD for 20 years - per [employer])     Social History     Substance and Sexual Activity   Drug Use Never Echo:   Results for orders placed during the hospital encounter of 21    Echo complete with contrast if indicated    Narrative  [address] [facility] [address]  [address] [place] [address]  [phone]    Transthoracic Echocardiogram  2D, M-mode, Doppler, and Color Doppler    Study date:  25-May-2021    Patient: Anthony Castle  MR number: [id number]  Account number: [de-identified]  : 1940  Age: 80 years  Gender: Female  Status: Inpatient  Location: Bedside  Height: 63 in  Weight: 166 lb  BP: 125/ 69 mmHg    Indications: CAD. Diagnoses: I25.119 - Atherosclerotic heart disease of native coronary artery with unspecified angina pectoris    Sonographer:  Madina Quiroz RDCS  Primary Physician:  Ely Hutton PA-C  Referring Physician:  PEDRO LUIS Estevez  Group:  [facility]  Interpreting Physician:  Nilda cSott MD    SUMMARY    LEFT VENTRICLE:  Systolic function was normal. Ejection fraction was estimated to be 55 %. GLS was noted to be significantly decreased at -10.9%. There was dyskinesis of the apical anterior, mid inferoseptal, apical septal, apical lateral, and apical wall(s). Features were consistent with a pseudonormal left ventricular filling pattern, with concomitant abnormal relaxation and increased filling pressure (grade 2 diastolic dysfunction). Doppler parameters were consistent with high ventricular filling pressure. LEFT ATRIUM:  The atrium was dilated. 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 RIGHT ATRIUM:  The atrium was dilated. MITRAL VALVE:  There was moderate regurgitation. AORTIC VALVE:  There was mild regurgitation. TRICUSPID VALVE:  There was mild regurgitation. Estimated peak PA pressure was 55 mmHg. The findings suggest moderate pulmonary hypertension. HISTORY: PRIOR HISTORY: CVA. Hypertension. CKD. Diabetes. NSTEMI. Asthma. Former smoker. HLD.    PROCEDURE: The procedure was performed at the bedside. This was a routine study. The transthoracic approach was used. The study included complete 2D imaging, M-mode, complete spectral Doppler, and color Doppler. The heart rate was 68 bpm,  at the start of the study. Images were obtained from the parasternal, apical, subcostal, and suprasternal notch acoustic windows. Echocardiographic views were limited due to poor acoustic window availability and lung interference. This was  a technically difficult study. LEFT VENTRICLE: Size was normal. Systolic function was normal. Ejection fraction was estimated to be 55 %. GLS was noted to be significantly decreased at -10.9%. There was dyskinesis of the apical anterior, mid inferoseptal, apical septal,  apical lateral, and apical wall(s). Wall thickness was normal. DOPPLER: The transmitral flow pattern was normal. Features were consistent with a pseudonormal left ventricular filling pattern, with concomitant abnormal relaxation and  increased filling pressure (grade 2 diastolic dysfunction). Doppler parameters were consistent with high ventricular filling pressure. RIGHT VENTRICLE: The size was normal. Systolic function was normal. Wall thickness was normal.    LEFT ATRIUM: The atrium was dilated. 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 RIGHT ATRIUM: The atrium was dilated. MITRAL VALVE: Valve structure was normal. There was normal leaflet separation. DOPPLER: The transmitral velocity was within the normal range. There was no evidence for stenosis. There was moderate regurgitation. AORTIC VALVE: The valve was trileaflet. Leaflets exhibited normal thickness and normal cuspal separation. DOPPLER: Transaortic velocity was within the normal range. There was no evidence for stenosis. There was mild regurgitation. TRICUSPID VALVE: The valve structure was normal. There was normal leaflet separation. DOPPLER: The transtricuspid velocity was within the normal range. There was no evidence for stenosis. There was mild regurgitation. Estimated peak PA  pressure was 55 mmHg. The findings suggest moderate pulmonary hypertension. PULMONIC VALVE: Leaflets exhibited normal thickness, no calcification, and normal cuspal separation. DOPPLER: The transpulmonic velocity was within the normal range. There was trace regurgitation. PERICARDIUM: There was no pericardial effusion. The pericardium was normal in appearance. AORTA: The root exhibited normal size. SYSTEMIC VEINS: IVC: The inferior vena cava was normal in size. Respirophasic changes were normal.    SYSTEM MEASUREMENT TABLES    2D  %FS: 26.99 %  AVC: 459.17 ms  Ao Diam: 2.42 cm  EDV(Teich): 79.6 ml  EF(Teich): 52.99 %  ESV(Teich): 37.42 ml  HR_2Ch_Q: 63.38 BPM  IVSd: 1.34 cm  LA Area: 29.42 cm2  LA Diam: 4.05 cm  LVCO_2Ch_Q: 5.14 L/min  LVEDV MOD A4C: 129.77 ml  LVEF MOD A4C: 50.73 %  LVEF_2Ch_Q: 53.06 %  LVESV MOD A4C: 63.94 ml  LVIDd: 4.22 cm  LVIDs: 3.08 cm  LVLd A4C: 8.77 cm  LVLd_2Ch_Q: 8.32 cm  LVLs A4C: 8.14 cm  LVLs_2Ch_Q: 7.4 cm  LVPWd: 1.18 cm  LVSV_2Ch_Q: 81.13 ml  LVVED_2Ch_Q: 152.92 ml  LVVES_2Ch_Q: 71.79 ml  RA Area: 20.99 cm2  RVIDd: 3.71 cm  SV MOD A4C: 65.83 ml  SV(Teich): 42.17 ml    CW  AR Dec Sampson: 2.2 m/s2  AR Dec Time: 1639.55 ms  AR PHT: 475.47 ms  AR Vmax: 3.6 m/s  AR maxP.87 mmHg  AV Env. Ti: 312.08 ms  AV VTI: 34.42 cm  AV Vmax: 1.46 m/s  AV Vmean: 1.1 m/s  AV maxP.58 mmHg  AV meanP.34 mmHg  TR Vmax: 3.7 m/s  TR maxP.75 mmHg    MM  TAPSE: 2.04 cm    PW  E' Sept: 0.06 m/s  E/E' Sept: 16.45  LVOT Env. Ti: 430.07 ms  LVOT VTI: 22.09 cm  LVOT Vmax: 0.87 m/s  LVOT Vmean: 0.51 m/s  LVOT maxPG: 3.03 mmHg  LVOT meanP.32 mmHg  MV A Kofi: 0.67 m/s  MV Dec Sampson: 7.12 m/s2  MV DecT: 144.92 ms  MV E Kofi: 1.03 m/s  MV E/A Ratio: 1.54    [facility] Accredited Echocardiography Laboratory    Prepared and electronically signed by    Omid Mendoza MD  Signed [id number] 08:57:40    No results found for this or any previous visit. Cath:    Results for orders placed during the hospital encounter of 21    Cardiac catheterization    Narrative  [address]  [address] [facility]  [address]  [phone]    [facility]    Invasive Cardiovascular Lab Complete Report    Patient: Erinn Hutton  MR number: [id number]  Account number: [de-identified]  Study date: 2021  Gender: Female  : 1940  Height: 63 in  Weight: 166.1 lb  BSA: 1.79 mï¾²    Allergies: NO KNOWN ALLERGIES    Diagnostic Cardiologist:  Jennifer Apodaca DO  Interventional Cardiologist:  Jennifer Apodaca DO  Primary Physician:  Geo Tsang PA-C    SUMMARY    CORONARY CIRCULATION:  Mid LAD: There was a 100 % stenosis, prior stent. Proximal RCA: There was a 10 % stenosis. Mid RCA: There was a 20 % stenosis at the site of a prior stent. 1ST LESION INTERVENTIONS:  A balloon angioplasty procedure was performed on the 100 % lesion in the mid LAD. Following intervention there was a 80 % residual stenosis. INDICATIONS:  --  elev t    PROCEDURES PERFORMED    --  Left coronary angiography. --  Right coronary angiography. --  Inpatient. --  Mod Sedation Same Physician Initial 15min. --  Coronary Catheterization (w/o [facility]). --  Mod Sedation Same Physician Add 15min. --  PTCA, Single Coronary Artery. --  Intervention on mid LAD: balloon angioplasty. PROCEDURE: The risks and alternatives of the procedures and conscious sedation were explained to the patient and informed consent was obtained. The patient was brought to the cath lab and placed on the table. The planned puncture sites  were prepped and draped in the usual sterile fashion.    --  Left coronary artery angiography.  A catheter was advanced over a guidewire into the aorta and positioned in the left coronary artery ostium under fluoroscopic guidance. Angiography was performed. --  Right coronary artery angiography. A catheter was advanced over a guidewire into the aorta and positioned in the right coronary artery ostium under fluoroscopic guidance. Angiography was performed. --  Inpatient. --  Mod Sedation Same Physician Initial 15min. --  Coronary Catheterization (w/o [facility]). --  Mod Sedation Same Physician Add 15min. LESION INTERVENTION: A balloon angioplasty procedure was performed on the 100 % lesion in the mid LAD. Following intervention there was a 80 % residual stenosis. There was MISBAH 3 flow before the procedure and MISBAH 3 flow after the  procedure. --  Vessel setup was performed. A 6Fr. Launcher Ebu 3.5 guiding catheter was used to cannulate the vessel. --  Vessel setup was performed. A Runthrough NS 180cm wire was used to cross the lesion. --  Balloon angioplasty was performed, using a Mini Trek Rx 2.0 x 15mm balloon, with 3 inflations and a maximum inflation pressure of 12 raj. INTERVENTIONS:  --  PTCA, Single Coronary Artery. PROCEDURE COMPLETION: The patient tolerated the procedure well and was discharged from the cath lab. TIMING: Test started at 16:07. Test concluded at 16:30. HEMOSTASIS: The sheath was removed. The site was compressed with a Hemoband  device. Hemostasis was obtained. MEDICATIONS GIVEN: Fentanyl (1OOmcg/2 ml), 50 mcg, IV, at 16:03. Versed (2mg/2ml), 2 mg, IV, at 16:03. 1% Lidocaine, 1 ml, subcutaneously, at 16:07. Nitroglycerin (200mcg/ml), 200 mcg, at 16:08. Verapamil  (5mg/2ml), 2.5 mg, IV, at 16:08. Heparin 1000 units/ml, 4,000 units, IV, at 16:08. Heparin 1000 units/ml, 4,000 units, IV, at 16:13. CONTRAST GIVEN: 70 ml Omnipaque (350mg I /ml). RADIATION EXPOSURE: Fluoroscopy time: 7.72 min. CORONARY VESSELS:   --  The coronary circulation is right dominant. --  Mid LAD: There was a 100 % stenosis, prior stent. --  Proximal RCA: There was a 10 % stenosis.   --  Mid RCA: There was a 20 % stenosis at the site of a prior stent. IMPRESSIONS:  Occluded stent. Disease beyond stented area into diagonal and mid LAD not amenable to PCI    RECOMMENDATIONS  cabg consult, if not candidate med rx. DISPOSITION:  The patient left the catheterization laboratory in stable condition. Prepared and signed by    Anali Mattson DO  Signed 2021 10:15:38    Study diagram    Angiographic findings  Native coronary lesions:  ï¾·Mid LAD: Lesion 1: 100 % stenosis. ï¾·Proximal RCA: Lesion 1: 10 % stenosis. ï¾·Mid RCA: Lesion 1: 20 % stenosis, site of prior stent. Intervention results  Native coronary lesions:  ï¾·balloon angioplasty of the 100 % stenosis in mid LAD. 80 % residual stenosis. Hemodynamic tables    Pressures:  Baseline  Pressures:  - HR: 60  Pressures:  - Rhythm:  Pressures:  -- Aortic Pressure (S/D/M): 116/53/77    Outputs:  Baseline  Outputs:  -- CALCULATIONS: [age]  Outputs:  -- CALCULATIONS: Body Surface Area: 1.79  Outputs:  -- CALCULATIONS: Height in cm: 160.00  Outputs:  -- CALCULATIONS: Sex: Female  Outputs:  -- CALCULATIONS: Weight in k.50      Code Status: Prior  Advance Directive and Living Will:      Power of :    POLST:    Medications - No data to display  No orders to display     Orders Placed This Encounter   Procedures   • Foreign body - orifice     Labs Reviewed - No data to display  Time reflects when diagnosis was documented in both MDM as applicable and the Disposition within this note     Time User Action Codes Description Comment    2023  5:59 PM [place], [address] [place] [address]. 2XXA] Foreign body in vagina, initial encounter       ED Disposition     ED Disposition   Discharge    Condition   Stable    Date/Time   Mon Sep 25, 2023  5:59 PM    Comment   Johnnie Floyd discharge to home/self care.                Follow-up Information     Follow up With Specialties Details Why Richa Higgins MD Urogynecology Call [address] [facility]  [phone]          Discharge Medication List as of 9/25/2023  6:02 PM      CONTINUE these medications which have NOT CHANGED    Details   acetaminophen (TYLENOL) 650 mg CR tablet Take 650 mg by mouth every 6 (six) hours as needed for mild pain, Historical Med      albuterol (PROVENTIL HFA,VENTOLIN HFA) 90 mcg/act inhaler INHALE 2 PUFFS EVERY 4 (FOUR) HOURS AS NEEDED FOR WHEEZING, Starting Fri 9/1/2023, Normal      atorvastatin (LIPITOR) 40 mg tablet TAKE 1 TABLET (40 MG TOTAL) BY MOUTH DAILY, Starting Mon 8/28/2023, Until Sat 2/24/2024, Normal      Blood Glucose Monitoring Suppl (FREESTYLE LITE) JAQUELIN by Does not apply route daily, Starting Tue 4/17/2018, Until Thu 9/14/2023, Normal      Breo Ellipta 100-25 MCG/ACT inhaler INHALE 1 PUFF DAILY, Normal      carvedilol (COREG) 6.25 mg tablet TAKE 1 TABLET (6.25 MG TOTAL) BY MOUTH 2 (TWO) TIMES A DAY WITH MEALS, Starting Fri 4/21/2023, Until Wed 10/18/2023, Normal      Cholecalciferol (D3) 50 MCG (2000 UT) TABS Take 1 tablet (2,000 Units total) by mouth daily, Starting Mon 8/21/2023, Normal      clopidogrel (PLAVIX) 75 mg tablet TAKE 2 TABLETS DAILY, Normal      conjugated estrogens (PREMARIN) vaginal cream Insert 0.5 g into the vagina 2 (two) times a week Insert twice weekly at bedtime, Starting Mon 10/25/2021, Normal      Continuous Blood Gluc  (FreeStyle Naldo 14 Day [place]) JAQUELIN USE TO RECORD BLOOD GLUCOSE 4 TIMES DAILY.  ONCE FASTING AND THREE TIMES DAILY BEFORE MEALS, Normal      Continuous Blood Gluc Sensor (FreeStyle Naldo 2 Sensor) MISC USE ONE SENSOR EVERY 14 DAYS, Normal      estradiol (ESTRACE VAGINAL) 0.1 mg/g vaginal cream Insert 2 g into the vagina 2 (two) times a week, Starting Mon 4/4/2022, Normal      ferrous sulfate 325 (65 Fe) mg tablet TAKE 1 TABLET TWICE A DAY BEFORE MEALS, Normal      glucose blood (FREESTYLE LITE) test strip TEST AS DIRECTED UP TO FOUR TIMES A DAY, Normal      glucose monitoring kit (FREESTYLE) monitoring kit Use 1 each as needed (Three time daily) Please check blood sugar 3 times daily. , Starting Mon 5/9/2022, Normal      Glucose-Vitamin C-Vitamin D (TRUEPLUS GLUCOSE ON THE GO) CHEW Historical Med      ketoconazole (NIZORAL) 2 % shampoo APPLY TOPICALLY TO THE SCALP ONCE EVERY OTHER WEEK, Normal      Lancets (FREESTYLE) lancets Use as instructed, Normal      latanoprost (XALATAN) 0.005 % ophthalmic solution Starting Tue 9/14/2021, Historical Med      losartan (COZAAR) 25 mg tablet TAKE 1 TABLET (25 MG TOTAL) BY MOUTH DAILY, Starting Fri 7/28/2023, Normal      Misc. Devices (QUAD CANE) MISC by Does not apply route daily, Starting Mon 6/1/2020, Print      montelukast (SINGULAIR) 10 mg tablet TAKE 1 TABLET (10 MG TOTAL) BY MOUTH DAILY AT BEDTIME, Starting Fri 7/28/2023, Until Wed 1/24/2024, Normal      nitroglycerin (NITROSTAT) 0.4 mg SL tablet Place 1 tablet (0.4 mg total) under the tongue every 5 (five) minutes as needed for chest pain, Starting Wed 2/8/2023, Normal      potassium chloride (K-DUR,KLOR-CON) 20 mEq tablet Take 1 tablet (20 mEq total) by mouth daily, Starting Thu 8/3/2023, Normal      ranolazine (RANEXA) 500 mg 12 hr tablet Take 1 tablet (500 mg total) by mouth every 12 (twelve) hours, Starting Wed 5/26/2021, Until Thu 9/14/2023, Normal      repaglinide (PRANDIN) 0.5 mg tablet Take 1 tablet (0.5 mg total) by mouth 2 (two) times a day before meals (SKIP DOSE IF SKIPPING MEAL), Starting Wed 4/5/2023, Normal      rivaroxaban (Xarelto) 2.5 mg tablet Restart tomorrow Do not start before June 2, 2023., No Print      torsemide (DEMADEX) 20 mg tablet TAKE 3 TABLETS (60 MG TOTAL) BY MOUTH DAILY, Starting Thu 9/14/2023, Normal      Tradjenta 5 MG TABS TAKE 1 TABLET (5 MG) BY MOUTH DAILY, Normal           No discharge procedures on file. Prior to Admission Medications   Prescriptions Last Dose Informant Patient Reported? Taking?    Blood Glucose Monitoring Suppl (FREESTYLE LITE) JAQUELIN  Self No No   Sig: by Does not apply route daily   Breo Ellipta 100-25 MCG/ACT inhaler   No No   Sig: INHALE 1 PUFF DAILY   Cholecalciferol (D3) 50 MCG (2000 UT) TABS   No No   Sig: Take 1 tablet (2,000 Units total) by mouth daily   Continuous Blood Gluc  (FreeStyle Naldo 14 Day [place]) JAQUELIN  Self No No   Sig: USE TO RECORD BLOOD GLUCOSE 4 TIMES DAILY. ONCE FASTING AND THREE TIMES DAILY BEFORE MEALS   Continuous Blood Gluc Sensor (FreeStyle Naldo 2 Sensor) MISC  Self No No   Sig: USE ONE SENSOR EVERY 14 DAYS   Glucose-Vitamin C-Vitamin D (TRUEPLUS GLUCOSE ON THE GO) CHEW  Self Yes No   Lancets (FREESTYLE) lancets  Self No No   Sig: Use as instructed   Misc.  Devices (QUAD CANE) MISC  Self No No   Sig: by Does not apply route daily   Tradjenta 5 MG TABS   No No   Sig: TAKE 1 TABLET (5 MG) BY MOUTH DAILY   acetaminophen (TYLENOL) 650 mg CR tablet  Self Yes No   Sig: Take 650 mg by mouth every 6 (six) hours as needed for mild pain   albuterol (PROVENTIL HFA,VENTOLIN HFA) 90 mcg/act inhaler   No No   Sig: INHALE 2 PUFFS EVERY 4 (FOUR) HOURS AS NEEDED FOR WHEEZING   atorvastatin (LIPITOR) 40 mg tablet   No No   Sig: TAKE 1 TABLET (40 MG TOTAL) BY MOUTH DAILY   carvedilol (COREG) 6.25 mg tablet  Self No No   Sig: TAKE 1 TABLET (6.25 MG TOTAL) BY MOUTH 2 (TWO) TIMES A DAY WITH MEALS   clopidogrel (PLAVIX) 75 mg tablet   No No   Sig: TAKE 2 TABLETS DAILY   conjugated estrogens (PREMARIN) vaginal cream  Self No No   Sig: Insert 0.5 g into the vagina 2 (two) times a week Insert twice weekly at bedtime   estradiol (ESTRACE VAGINAL) 0.1 mg/g vaginal cream  Self No No   Sig: Insert 2 g into the vagina 2 (two) times a week   ferrous sulfate 325 (65 Fe) mg tablet   No No   Sig: TAKE 1 TABLET TWICE A DAY BEFORE MEALS   glucose blood (FREESTYLE LITE) test strip  Self No No   Sig: TEST AS DIRECTED UP TO FOUR TIMES A DAY   glucose monitoring kit (FREESTYLE) monitoring kit  Self No No   Sig: Use 1 each as needed (Three time daily) Please check blood sugar 3 times daily. ketoconazole (NIZORAL) 2 % shampoo  Self No No   Sig: APPLY TOPICALLY TO THE SCALP ONCE EVERY OTHER WEEK   latanoprost (XALATAN) 0.005 % ophthalmic solution  Self Yes No   losartan (COZAAR) 25 mg tablet  Self No No   Sig: TAKE 1 TABLET (25 MG TOTAL) BY MOUTH DAILY   montelukast (SINGULAIR) 10 mg tablet  Self No No   Sig: TAKE 1 TABLET (10 MG TOTAL) BY MOUTH DAILY AT BEDTIME   nitroglycerin (NITROSTAT) 0.4 mg SL tablet  Self No No   Sig: Place 1 tablet (0.4 mg total) under the tongue every 5 (five) minutes as needed for chest pain   potassium chloride (K-DUR,KLOR-CON) 20 mEq tablet  Self No No   Sig: Take 1 tablet (20 mEq total) by mouth daily   ranolazine (RANEXA) 500 mg 12 hr tablet  Self No No   Sig: Take 1 tablet (500 mg total) by mouth every 12 (twelve) hours   repaglinide (PRANDIN) 0.5 mg tablet  Self No No   Sig: Take 1 tablet (0.5 mg total) by mouth 2 (two) times a day before meals (SKIP DOSE IF SKIPPING MEAL)   rivaroxaban (Xarelto) 2.5 mg tablet  Self No No   Sig: Restart tomorrow Do not start before June 2, 2023. Patient taking differently: 2.5 mg 2 (two) times a day Restart tomorrow   torsemide (DEMADEX) 20 mg tablet   No No   Sig: TAKE 3 TABLETS (60 MG TOTAL) BY MOUTH DAILY      Facility-Administered Medications: None                        Portions of the record may have been created with voice recognition software. Occasional wrong word or "sound a like" substitutions may have occurred due to the inherent limitations of voice recognition software. Read the chart carefully and recognize, using context, where substitutions have occurred.     Electronically signed by:  Baldo Hutchinson

## 2023-09-25 NOTE — DISCHARGE INSTRUCTIONS
Your evaluation suggests that your symptoms are due to a foreign body in the vagina which we removed. Please follow up with the OBGYN that I attached. Return to the Emergency Department if you experience worsening or concerning symptoms. Thank you for choosing us for your care.

## 2023-10-02 DIAGNOSIS — I21.4 NSTEMI (NON-ST ELEVATED MYOCARDIAL INFARCTION) (HCC): ICD-10-CM

## 2023-10-02 RX ORDER — NITROGLYCERIN 0.4 MG/1
0.4 TABLET SUBLINGUAL
Qty: 25 TABLET | Refills: 1 | Status: SHIPPED | OUTPATIENT
Start: 2023-10-02

## 2023-10-05 ENCOUNTER — OFFICE VISIT (OUTPATIENT)
Dept: PODIATRY | Facility: CLINIC | Age: 83
End: 2023-10-05
Payer: MEDICARE

## 2023-10-05 VITALS
BODY MASS INDEX: 33.38 KG/M2 | SYSTOLIC BLOOD PRESSURE: 120 MMHG | WEIGHT: 170 LBS | HEART RATE: 74 BPM | HEIGHT: 60 IN | DIASTOLIC BLOOD PRESSURE: 69 MMHG

## 2023-10-05 DIAGNOSIS — B35.1 ONYCHOMYCOSIS: ICD-10-CM

## 2023-10-05 DIAGNOSIS — E11.40 TYPE 2 DIABETES MELLITUS WITH DIABETIC NEUROPATHY, UNSPECIFIED WHETHER LONG TERM INSULIN USE (HCC): Primary | ICD-10-CM

## 2023-10-05 DIAGNOSIS — I73.9 PERIPHERAL VASCULAR DISEASE, UNSPECIFIED (HCC): ICD-10-CM

## 2023-10-05 PROCEDURE — 11721 DEBRIDE NAIL 6 OR MORE: CPT | Performed by: PODIATRIST

## 2023-10-05 NOTE — PROGRESS NOTES
PATIENT:  Anthony Castle  1940    ASSESSMENT/PLAN:  1. Type 2 diabetes mellitus with diabetic neuropathy, unspecified whether long term insulin use ([address])        2. Peripheral vascular disease, unspecified ([address])        3. Onychomycosis  Debridement             Reviewed medical records. Reviewed arterial study. Disease prevention and related risk factors of diabetes were identified and discussed. The patient was educated in proper foot wear for diabetics. Also educated in daily foot assessment and routine diabetic foot care. The patient will follow up in 9 weeks for further diabetic foot exam and care. PROCEDURE:   All mycotic toenails were reduced and debrided in length, width, and girth using a nail nipper and dremel. Patient tolerated procedure(s) well without complications. •     HPI:  Anthony Castle is a 80 y. o.year old female seen for diabetic foot exam and care. She has class findings with DPN. BS is under control. No acute pedal problems. PAST MEDICAL HISTORY:  Past Medical History:   Diagnosis Date   • ACE-inhibitor cough     last assessed - [id number]   • Asthma    • Bilateral edema of lower extremity     last assessed - 21Aug2017   • Cardiac murmur     last assessed - 21Aug2017   • CKD (chronic kidney disease)    • Diabetes mellitus ([address])     last assessed - [id number]   • Esophageal dysphagia    • Fatigue     last assessed - [id number]   • Hyperlipidemia    • Hypertension    • Patellar bursitis of right knee     last assessed - [id number]   • Personal history of other specified conditions     presenting hazards to health   • Stroke [facility])    • Stroke syndrome    • Urinary frequency    • UTI (urinary tract infection)        PAST SURGICAL HISTORY:  Past Surgical History:   Procedure Laterality Date   • CARDIAC CATHETERIZATION N/A 6/1/2023    Procedure: Cardiac Coronary Angiogram;  Surgeon: Maryan Cid MD;  Location: [facility];   Service: Cardiology   • CARDIAC CATHETERIZATION 6/1/2023    Procedure: Cardiac Left Heart Cath;  Surgeon: Blossom Gavin MD;  Location:  CARDIAC CATH LAB; Service: Cardiology   • VA ESOPHAGOGASTRODUODENOSCOPY TRANSORAL DIAGNOSTIC N/A 4/5/2017    Procedure: ESOPHAGOGASTRODUODENOSCOPY (EGD); Surgeon: Matty Mayberry MD;  Location:  GI LAB; Service: Gastroenterology        ALLERGIES:  Patient has no known allergies. MEDICATIONS:  Current Outpatient Medications   Medication Sig Dispense Refill   • acetaminophen (TYLENOL) 650 mg CR tablet Take 650 mg by mouth every 6 (six) hours as needed for mild pain     • albuterol (PROVENTIL HFA,VENTOLIN HFA) 90 mcg/act inhaler INHALE 2 PUFFS EVERY 4 (FOUR) HOURS AS NEEDED FOR WHEEZING 8.5 g 5   • atorvastatin (LIPITOR) 40 mg tablet TAKE 1 TABLET (40 MG TOTAL) BY MOUTH DAILY 90 tablet 0   • Breo Ellipta 100-25 MCG/ACT inhaler INHALE 1 PUFF DAILY 60 each 0   • carvedilol (COREG) 6.25 mg tablet TAKE 1 TABLET (6.25 MG TOTAL) BY MOUTH 2 (TWO) TIMES A DAY WITH MEALS 60 tablet 5   • Cholecalciferol (D3) 50 MCG (2000 UT) TABS Take 1 tablet (2,000 Units total) by mouth daily 90 tablet 3   • clopidogrel (PLAVIX) 75 mg tablet TAKE 2 TABLETS DAILY 180 tablet 3   • conjugated estrogens (PREMARIN) vaginal cream Insert 0.5 g into the vagina 2 (two) times a week Insert twice weekly at bedtime 30 g 1   • Continuous Blood Gluc  (FreeStyle Naldo 14 Day [place]) JAQUELIN USE TO RECORD BLOOD GLUCOSE 4 TIMES DAILY. ONCE FASTING AND THREE TIMES DAILY BEFORE MEALS 4 each 3   • Continuous Blood Gluc Sensor (FreeStyle Naldo 2 Sensor) MISC USE ONE SENSOR EVERY 14 DAYS 6 each 1   • ferrous sulfate 325 (65 Fe) mg tablet TAKE 1 TABLET TWICE A DAY BEFORE MEALS 180 tablet 0   • glucose blood (FREESTYLE LITE) test strip TEST AS DIRECTED UP TO FOUR TIMES A  each 3   • glucose monitoring kit (FREESTYLE) monitoring kit Use 1 each as needed (Three time daily) Please check blood sugar 3 times daily.  1 each 1   • Glucose-Vitamin C-Vitamin D (Dormechelle Breeding GLUCOSE ON THE GO) CHEW      • ketoconazole (NIZORAL) 2 % shampoo APPLY TOPICALLY TO THE SCALP ONCE EVERY OTHER WEEK 120 mL 0   • Lancets (FREESTYLE) lancets Use as instructed 100 each 0   • latanoprost (XALATAN) 0.005 % ophthalmic solution      • losartan (COZAAR) 25 mg tablet TAKE 1 TABLET (25 MG TOTAL) BY MOUTH DAILY 30 tablet 5   • Misc. Devices (QUAD CANE) MISC by Does not apply route daily 1 each 0   • montelukast (SINGULAIR) 10 mg tablet TAKE 1 TABLET (10 MG TOTAL) BY MOUTH DAILY AT BEDTIME 90 tablet 3   • nitroglycerin (NITROSTAT) 0.4 mg SL tablet PLACE 1 TABLET (0.4 MG TOTAL) UNDER THE TONGUE EVERY 5 (FIVE) MINUTES AS NEEDED FOR CHEST PAIN 25 tablet 1   • potassium chloride (K-DUR,KLOR-CON) 20 mEq tablet Take 1 tablet (20 mEq total) by mouth daily 30 tablet 2   • repaglinide (PRANDIN) 0.5 mg tablet Take 1 tablet (0.5 mg total) by mouth 2 (two) times a day before meals (SKIP DOSE IF SKIPPING MEAL) 180 tablet 1   • rivaroxaban (Xarelto) 2.5 mg tablet Restart tomorrow Do not start before June 2, 2023. (Patient taking differently: 2.5 mg 2 (two) times a day Restart tomorrow)  0   • torsemide (DEMADEX) 20 mg tablet TAKE 3 TABLETS (60 MG TOTAL) BY MOUTH DAILY 252 tablet 3   • Tradjenta 5 MG TABS TAKE 1 TABLET (5 MG) BY MOUTH DAILY 90 tablet 0   • Blood Glucose Monitoring Suppl (FREESTYLE LITE) JAQUELIN by Does not apply route daily 1 each 0   • estradiol (ESTRACE VAGINAL) 0.1 mg/g vaginal cream Insert 2 g into the vagina 2 (two) times a week 42.5 g 3   • ranolazine (RANEXA) 500 mg 12 hr tablet Take 1 tablet (500 mg total) by mouth every 12 (twelve) hours 60 tablet 0     No current facility-administered medications for this visit. SOCIAL HISTORY:  Social History     Socioeconomic History   • Marital status:       Spouse name: None   • Number of children: 8   • Years of education: None   • Highest education level: None   Occupational History   • Occupation: Retired   Tobacco Use   • Smoking status: Former Packs/day: 3.00     Types: Cigarettes     Quit date:      Years since quittin.7   • Smokeless tobacco: Former   • Tobacco comments:     quit over 30 yrs ago; (quit in the , had smoked 3PPD for 20 years - per [employer])   Vaping Use   • Vaping Use: Never used   Substance and Sexual Activity   • Alcohol use: Never   • Drug use: Never   • Sexual activity: Not Currently   Other Topics Concern   • None   Social History Narrative    Diet needs improvement    Does not exercise    No caffeine use     Social Determinants of Health     Financial Resource Strain: Low Risk  (2023)    Overall Financial Resource Strain (CARDIA)    • Difficulty of Paying Living Expenses: Not very hard   Food Insecurity: No Food Insecurity (2023)    Hunger Vital Sign    • Worried About Running Out of Food in the Last Year: Never true    • Ran Out of Food in the Last Year: Never true   Transportation Needs: No Transportation Needs (2023)    PRAPARE - Transportation    • Lack of Transportation (Medical): No    • Lack of Transportation (Non-Medical): No   Physical Activity: Inactive (2021)    Exercise Vital Sign    • Days of Exercise per Week: 0 days    • Minutes of Exercise per Session: 0 min   Stress: No Stress Concern Present (2021)    [facility]    • Feeling of Stress : Not at all   Social Connections: Socially Isolated (2021)    Social Connection and Isolation Panel [NHANES]    • Frequency of Communication with Friends and Family: Once a week    • Frequency of Social Gatherings with Friends and Family: Once a week    • Attends Holiness Services: 1 to 4 times per year    • Active Member of Clubs or Organizations: No    • Attends Club or Organization Meetings: Never    • Marital Status:     Intimate Partner Violence: Not At Risk (2021)    Humiliation, Afraid, Rape, and Kick questionnaire    • Fear of Current or Ex-Partner: No • Emotionally Abused: No    • Physically Abused: No    • Sexually Abused: No   Housing Stability: Not on file        REVIEW OF SYSTEMS:  GENERAL: NAD, afebrile  HEART: No chest pain, or palpitation  RESPIRATORY:  No acute SOB or cough  GI: No Nausea, vomit or diarrhea  NEUROLOGIC: No syncope or acute weakness    PHYSICAL EXAM:  VASCULAR EXAM  Dorsalis pedis  Absent. Posterior tibial artery  absent. The patient has class findings with skin atrophy, lack of digital hair, and nail dystrophy. There is +1 lower extremity edema bilaterally. NEUROLOGIC EXAM  Sensation is intact to light touch. Sensation is intact to 10gm monofilament. No focal neurologic deficit. DERMATOLOGIC EXAM:   No ulcer or cellulitis noted. No abscess. The patient has hypertrophic toenails X 8 with discoloration, onycholysis, and subungal debris. MUSCULOSKELETAL EXAM:   No acute joint pain. Diffuse ankle edema noted. No acute musculoskeletal problem. Hammertoe noted.

## 2023-10-10 ENCOUNTER — APPOINTMENT (OUTPATIENT)
Dept: LAB | Facility: CLINIC | Age: 83
End: 2023-10-10
Payer: MEDICARE

## 2023-10-10 ENCOUNTER — TELEPHONE (OUTPATIENT)
Dept: OTHER | Facility: HOSPITAL | Age: 83
End: 2023-10-10

## 2023-10-10 DIAGNOSIS — N18.30 BENIGN HYPERTENSION WITH CKD (CHRONIC KIDNEY DISEASE) STAGE III (HCC): ICD-10-CM

## 2023-10-10 DIAGNOSIS — N39.41 URGE URINARY INCONTINENCE: ICD-10-CM

## 2023-10-10 DIAGNOSIS — N18.32 STAGE 3B CHRONIC KIDNEY DISEASE (HCC): ICD-10-CM

## 2023-10-10 DIAGNOSIS — N28.1 RENAL CYST: ICD-10-CM

## 2023-10-10 DIAGNOSIS — I12.9 BENIGN HYPERTENSION WITH CKD (CHRONIC KIDNEY DISEASE) STAGE III (HCC): ICD-10-CM

## 2023-10-10 DIAGNOSIS — I10 ESSENTIAL HYPERTENSION: ICD-10-CM

## 2023-10-10 DIAGNOSIS — N18.4 CHRONIC RENAL DISEASE, STAGE IV (HCC): ICD-10-CM

## 2023-10-10 DIAGNOSIS — N25.81 SECONDARY HYPERPARATHYROIDISM OF RENAL ORIGIN (HCC): ICD-10-CM

## 2023-10-10 LAB
ANION GAP SERPL CALCULATED.3IONS-SCNC: 10 MMOL/L
BUN SERPL-MCNC: 53 MG/DL (ref 5–25)
CALCIUM SERPL-MCNC: 9.6 MG/DL (ref 8.4–10.2)
CHLORIDE SERPL-SCNC: 100 MMOL/L (ref 96–108)
CO2 SERPL-SCNC: 31 MMOL/L (ref 21–32)
CREAT SERPL-MCNC: 1.4 MG/DL (ref 0.6–1.3)
GFR SERPL CREATININE-BSD FRML MDRD: 34 ML/MIN/1.73SQ M
GLUCOSE SERPL-MCNC: 135 MG/DL (ref 65–140)
POTASSIUM SERPL-SCNC: 3.7 MMOL/L (ref 3.5–5.3)
SODIUM SERPL-SCNC: 141 MMOL/L (ref 135–147)

## 2023-10-10 PROCEDURE — 80048 BASIC METABOLIC PNL TOTAL CA: CPT

## 2023-10-10 PROCEDURE — 36415 COLL VENOUS BLD VENIPUNCTURE: CPT

## 2023-10-17 ENCOUNTER — HOSPITAL ENCOUNTER (EMERGENCY)
Facility: HOSPITAL | Age: 83
Discharge: HOME | End: 2023-10-17
Attending: EMERGENCY MEDICINE
Payer: MEDICARE

## 2023-10-17 ENCOUNTER — APPOINTMENT (EMERGENCY)
Dept: RADIOLOGY | Facility: HOSPITAL | Age: 83
End: 2023-10-17
Payer: MEDICARE

## 2023-10-17 VITALS
HEART RATE: 70 BPM | SYSTOLIC BLOOD PRESSURE: 151 MMHG | OXYGEN SATURATION: 100 % | RESPIRATION RATE: 16 BRPM | DIASTOLIC BLOOD PRESSURE: 74 MMHG | TEMPERATURE: 97.1 F

## 2023-10-17 DIAGNOSIS — N39.0 UTI (URINARY TRACT INFECTION): ICD-10-CM

## 2023-10-17 DIAGNOSIS — R31.9 HEMATURIA: Primary | ICD-10-CM

## 2023-10-17 LAB
ALBUMIN SERPL BCP-MCNC: 3.8 G/DL (ref 3.5–5)
ALP SERPL-CCNC: 65 U/L (ref 34–104)
ALT SERPL W P-5'-P-CCNC: 19 U/L (ref 7–52)
ANION GAP SERPL CALCULATED.3IONS-SCNC: 7 MMOL/L
AST SERPL W P-5'-P-CCNC: 15 U/L (ref 13–39)
BACTERIA UR QL AUTO: ABNORMAL /HPF
BASOPHILS # BLD AUTO: 0.03 THOUSANDS/ÂΜL (ref 0–0.1)
BASOPHILS NFR BLD AUTO: 1 % (ref 0–1)
BILIRUB SERPL-MCNC: 0.45 MG/DL (ref 0.2–1)
BILIRUB UR QL STRIP: NEGATIVE
BUN SERPL-MCNC: 58 MG/DL (ref 5–25)
CALCIUM SERPL-MCNC: 9.2 MG/DL (ref 8.4–10.2)
CHLORIDE SERPL-SCNC: 104 MMOL/L (ref 96–108)
CLARITY UR: ABNORMAL
CO2 SERPL-SCNC: 29 MMOL/L (ref 21–32)
COLOR UR: ABNORMAL
CREAT SERPL-MCNC: 1.48 MG/DL (ref 0.6–1.3)
EOSINOPHIL # BLD AUTO: 0.14 THOUSAND/ÂΜL (ref 0–0.61)
EOSINOPHIL NFR BLD AUTO: 2 % (ref 0–6)
ERYTHROCYTE [DISTWIDTH] IN BLOOD BY AUTOMATED COUNT: 15.2 % (ref 11.6–15.1)
GFR SERPL CREATININE-BSD FRML MDRD: 32 ML/MIN/1.73SQ M
GLUCOSE SERPL-MCNC: 170 MG/DL (ref 65–140)
GLUCOSE UR STRIP-MCNC: NEGATIVE MG/DL
HCT VFR BLD AUTO: 34.5 % (ref 34.8–46.1)
HGB BLD-MCNC: 10.7 G/DL (ref 11.5–15.4)
HGB UR QL STRIP.AUTO: ABNORMAL
HYALINE CASTS #/AREA URNS LPF: ABNORMAL /LPF
IMM GRANULOCYTES # BLD AUTO: 0.02 THOUSAND/UL (ref 0–0.2)
IMM GRANULOCYTES NFR BLD AUTO: 0 % (ref 0–2)
KETONES UR STRIP-MCNC: NEGATIVE MG/DL
LEUKOCYTE ESTERASE UR QL STRIP: ABNORMAL
LIPASE SERPL-CCNC: 76 U/L (ref 11–82)
LYMPHOCYTES # BLD AUTO: 0.98 THOUSANDS/ÂΜL (ref 0.6–4.47)
LYMPHOCYTES NFR BLD AUTO: 17 % (ref 14–44)
MCH RBC QN AUTO: 25.4 PG (ref 26.8–34.3)
MCHC RBC AUTO-ENTMCNC: 31 G/DL (ref 31.4–37.4)
MCV RBC AUTO: 82 FL (ref 82–98)
MONOCYTES # BLD AUTO: 0.41 THOUSAND/ÂΜL (ref 0.17–1.22)
MONOCYTES NFR BLD AUTO: 7 % (ref 4–12)
MUCOUS THREADS UR QL AUTO: ABNORMAL
NEUTROPHILS # BLD AUTO: 4.19 THOUSANDS/ÂΜL (ref 1.85–7.62)
NEUTS SEG NFR BLD AUTO: 73 % (ref 43–75)
NITRITE UR QL STRIP: NEGATIVE
NON-SQ EPI CELLS URNS QL MICRO: ABNORMAL /HPF
NRBC BLD AUTO-RTO: 0 /100 WBCS
PH UR STRIP.AUTO: 5.5 [PH]
PLATELET # BLD AUTO: 167 THOUSANDS/UL (ref 149–390)
PMV BLD AUTO: 11.8 FL (ref 8.9–12.7)
POTASSIUM SERPL-SCNC: 3.5 MMOL/L (ref 3.5–5.3)
PROT SERPL-MCNC: 7 G/DL (ref 6.4–8.4)
PROT UR STRIP-MCNC: ABNORMAL MG/DL
RBC # BLD AUTO: 4.22 MILLION/UL (ref 3.81–5.12)
RBC #/AREA URNS AUTO: ABNORMAL /HPF
SODIUM SERPL-SCNC: 140 MMOL/L (ref 135–147)
SP GR UR STRIP.AUTO: 1.01 (ref 1–1.03)
UROBILINOGEN UR STRIP-ACNC: <2 MG/DL
WBC # BLD AUTO: 5.77 THOUSAND/UL (ref 4.31–10.16)
WBC #/AREA URNS AUTO: ABNORMAL /HPF
WBC CLUMPS # UR AUTO: PRESENT /UL

## 2023-10-17 PROCEDURE — 81001 URINALYSIS AUTO W/SCOPE: CPT | Performed by: EMERGENCY MEDICINE

## 2023-10-17 PROCEDURE — 74177 CT ABD & PELVIS W/CONTRAST: CPT

## 2023-10-17 PROCEDURE — G1004 CDSM NDSC: HCPCS

## 2023-10-17 PROCEDURE — 80053 COMPREHEN METABOLIC PANEL: CPT

## 2023-10-17 PROCEDURE — 99285 EMERGENCY DEPT VISIT HI MDM: CPT | Performed by: EMERGENCY MEDICINE

## 2023-10-17 PROCEDURE — 85025 COMPLETE CBC W/AUTO DIFF WBC: CPT

## 2023-10-17 PROCEDURE — 36415 COLL VENOUS BLD VENIPUNCTURE: CPT

## 2023-10-17 PROCEDURE — 99284 EMERGENCY DEPT VISIT MOD MDM: CPT

## 2023-10-17 PROCEDURE — 87077 CULTURE AEROBIC IDENTIFY: CPT | Performed by: EMERGENCY MEDICINE

## 2023-10-17 PROCEDURE — 83690 ASSAY OF LIPASE: CPT

## 2023-10-17 PROCEDURE — 87086 URINE CULTURE/COLONY COUNT: CPT | Performed by: EMERGENCY MEDICINE

## 2023-10-17 PROCEDURE — 87186 SC STD MICRODIL/AGAR DIL: CPT | Performed by: EMERGENCY MEDICINE

## 2023-10-17 RX ORDER — CEFUROXIME AXETIL 500 MG/1
500 TABLET ORAL EVERY 12 HOURS SCHEDULED
Qty: 14 TABLET | Refills: 0 | Status: SHIPPED | OUTPATIENT
Start: 2023-10-17 | End: 2023-10-24

## 2023-10-17 RX ORDER — CEFUROXIME AXETIL 250 MG/1
500 TABLET ORAL ONCE
Status: COMPLETED | OUTPATIENT
Start: 2023-10-17 | End: 2023-10-17

## 2023-10-17 RX ADMIN — IOHEXOL 100 ML: 350 INJECTION, SOLUTION INTRAVENOUS at 11:25

## 2023-10-17 RX ADMIN — CEFUROXIME AXETIL 500 MG: 250 TABLET, FILM COATED ORAL at 14:25

## 2023-10-17 NOTE — ED PROVIDER NOTES
History  Chief Complaint   Patient presents with    Blood in Urine     Patient having intermittent hematuria and frequency. Patient is an 59-year-old female with history of CKD, diabetes, hypertension, hyperlipidemia who presents for hematuria. Patient states for the past 4 days she has been having intermittent hematuria and urinary frequency. States that there is some burning when she pees but she otherwise denies any abdominal pain, bowel movement changes, vaginal bleeding or discharge, fevers, chills, nausea, or vomiting. Patient states that she has never had this before, however on chart review patient's recent PCP visit shows that she had a bladder ultrasound done which revealed asymmetric bladder wall thickening with concern for loculation and concern for mass. Patient was referred to urology but was lost to follow-up. Prior to Admission Medications   Prescriptions Last Dose Informant Patient Reported? Taking? Blood Glucose Monitoring Suppl (FREESTYLE LITE) JAQUELIN  Self No No   Sig: by Does not apply route daily   Breo Ellipta 100-25 MCG/ACT inhaler   No No   Sig: INHALE 1 PUFF DAILY   Cholecalciferol (D3) 50 MCG (2000 UT) TABS   No No   Sig: Take 1 tablet (2,000 Units total) by mouth daily   Continuous Blood Gluc  (FreeStyle Naldo 14 Day [place]) JAQUELIN  Self No No   Sig: USE TO RECORD BLOOD GLUCOSE 4 TIMES DAILY. ONCE FASTING AND THREE TIMES DAILY BEFORE MEALS   Continuous Blood Gluc Sensor (FreeStyle Naldo 2 Sensor) MISC  Self No No   Sig: USE ONE SENSOR EVERY 14 DAYS   Glucose-Vitamin C-Vitamin D (TRUEPLUS GLUCOSE ON THE GO) CHEW  Self Yes No   Lancets (FREESTYLE) lancets  Self No No   Sig: Use as instructed   Misc.  Devices (QUAD CANE) MISC  Self No No   Sig: by Does not apply route daily   Tradjenta 5 MG TABS   No No   Sig: TAKE 1 TABLET (5 MG) BY MOUTH DAILY   acetaminophen (TYLENOL) 650 mg CR tablet  Self Yes No   Sig: Take 650 mg by mouth every 6 (six) hours as needed for mild pain albuterol (PROVENTIL HFA,VENTOLIN HFA) 90 mcg/act inhaler   No No   Sig: INHALE 2 PUFFS EVERY 4 (FOUR) HOURS AS NEEDED FOR WHEEZING   atorvastatin (LIPITOR) 40 mg tablet   No No   Sig: TAKE 1 TABLET (40 MG TOTAL) BY MOUTH DAILY   carvedilol (COREG) 6.25 mg tablet  Self No No   Sig: TAKE 1 TABLET (6.25 MG TOTAL) BY MOUTH 2 (TWO) TIMES A DAY WITH MEALS   clopidogrel (PLAVIX) 75 mg tablet   No No   Sig: TAKE 2 TABLETS DAILY   conjugated estrogens (PREMARIN) vaginal cream  Self No No   Sig: Insert 0.5 g into the vagina 2 (two) times a week Insert twice weekly at bedtime   estradiol (ESTRACE VAGINAL) 0.1 mg/g vaginal cream  Self No No   Sig: Insert 2 g into the vagina 2 (two) times a week   ferrous sulfate 325 (65 Fe) mg tablet   No No   Sig: TAKE 1 TABLET TWICE A DAY BEFORE MEALS   glucose blood (FREESTYLE LITE) test strip  Self No No   Sig: TEST AS DIRECTED UP TO FOUR TIMES A DAY   glucose monitoring kit (FREESTYLE) monitoring kit  Self No No   Sig: Use 1 each as needed (Three time daily) Please check blood sugar 3 times daily.    ketoconazole (NIZORAL) 2 % shampoo  Self No No   Sig: APPLY TOPICALLY TO THE SCALP ONCE EVERY OTHER WEEK   latanoprost (XALATAN) 0.005 % ophthalmic solution  Self Yes No   losartan (COZAAR) 25 mg tablet  Self No No   Sig: TAKE 1 TABLET (25 MG TOTAL) BY MOUTH DAILY   montelukast (SINGULAIR) 10 mg tablet  Self No No   Sig: TAKE 1 TABLET (10 MG TOTAL) BY MOUTH DAILY AT BEDTIME   nitroglycerin (NITROSTAT) 0.4 mg SL tablet   No No   Sig: PLACE 1 TABLET (0.4 MG TOTAL) UNDER THE TONGUE EVERY 5 (FIVE) MINUTES AS NEEDED FOR CHEST PAIN   potassium chloride (K-DUR,KLOR-CON) 20 mEq tablet  Self No No   Sig: Take 1 tablet (20 mEq total) by mouth daily   ranolazine (RANEXA) 500 mg 12 hr tablet  Self No No   Sig: Take 1 tablet (500 mg total) by mouth every 12 (twelve) hours   repaglinide (PRANDIN) 0.5 mg tablet  Self No No   Sig: Take 1 tablet (0.5 mg total) by mouth 2 (two) times a day before meals (SKIP DOSE IF SKIPPING MEAL)   rivaroxaban (Xarelto) 2.5 mg tablet  Self No No   Sig: Restart tomorrow Do not start before June 2, 2023. Patient taking differently: 2.5 mg 2 (two) times a day Restart tomorrow   torsemide (DEMADEX) 20 mg tablet   No No   Sig: TAKE 3 TABLETS (60 MG TOTAL) BY MOUTH DAILY      Facility-Administered Medications: None       Past Medical History:   Diagnosis Date    ACE-inhibitor cough     last assessed - 29Dec2017    Asthma     Bilateral edema of lower extremity     last assessed - 21Aug2017    Cardiac murmur     last assessed - 21Aug2017    CKD (chronic kidney disease)     Diabetes mellitus ([address])     last assessed - [id number]    Esophageal dysphagia     Fatigue     last assessed - 21Aug2017    Hyperlipidemia     Hypertension     Patellar bursitis of right knee     last assessed - [id number]    Personal history of other specified conditions     presenting hazards to health    Stroke [facility])     Stroke syndrome     Urinary frequency     UTI (urinary tract infection)        Past Surgical History:   Procedure Laterality Date    CARDIAC CATHETERIZATION N/A 6/1/2023    Procedure: Cardiac Coronary Angiogram;  Surgeon: Nayan Reaves MD;  [facility]: [facility]; Service: Cardiology    CARDIAC CATHETERIZATION  6/1/2023    Procedure: Cardiac Left Heart Cath;  Surgeon: Nayan Reaves MD;  [facility]: [facility]; Service: Cardiology    TX ESOPHAGOGASTRODUODENOSCOPY TRANSORAL DIAGNOSTIC N/A 4/5/2017    Procedure: ESOPHAGOGASTRODUODENOSCOPY (EGD); Surgeon: Kala Pretty MD;  Location: [facility] GI LAB;   Service: Gastroenterology       Family History   Problem Relation Age of Onset    Hypertension Mother     Stroke Mother     Heart disease Father     Other Sister         1)Breast disorder; 2)Epilepsy    Migraines Sister         Migraine headaches    Osteoporosis Sister     Thyroid disease Sister     Coronary artery disease Sister         S/P triple vessel bypass    Breast cancer Sister 80    No Known Problems Sister     No Known Problems Sister     Osteoporosis Maternal Grandmother     No Known Problems Maternal Grandfather     No Known Problems Paternal Grandmother     No Known Problems Paternal Grandfather     Diabetes Son         Diabetes mellitus    Hypertension Son     Rheum arthritis Son         Rheumatic disease    No Known Problems Son     No Known Problems Son     No Known Problems Son     No Known Problems Son     No Known Problems Son     No Known Problems Maternal Aunt     No Known Problems Maternal Aunt     No Known Problems Maternal Aunt     No Known Problems Paternal Aunt     No Known Problems Paternal Aunt     Heart disease Family      I have reviewed and agree with the history as documented. E-Cigarette/Vaping    E-Cigarette Use Never User      E-Cigarette/Vaping Substances    Nicotine No     THC No     CBD No     Flavoring No     Other No     Unknown No      Social History     Tobacco Use    Smoking status: Former     Packs/day: 3.00     Types: Cigarettes     Quit date:      Years since quittin.8    Smokeless tobacco: Former    Tobacco comments:     quit over 30 yrs ago; (quit in the , had smoked 3PPD for 20 years - per [employer])   Vaping Use    Vaping Use: Never used   Substance Use Topics    Alcohol use: Never    Drug use: Never        Review of Systems   Constitutional:  Negative for chills and fever. HENT:  Negative for congestion, rhinorrhea and sore throat. Eyes:  Negative for pain and visual disturbance. Respiratory:  Negative for cough and shortness of breath. Cardiovascular:  Negative for chest pain and palpitations. Gastrointestinal:  Negative for abdominal pain, constipation, diarrhea, nausea and vomiting. Genitourinary:  Positive for dysuria, frequency and hematuria. Negative for flank pain, vaginal bleeding and vaginal discharge. Musculoskeletal:  Negative for back pain and neck pain. Skin:  Negative for color change and rash. Neurological:  Negative for weakness and numbness. All other systems reviewed and are negative. Physical Exam  ED Triage Vitals   Temperature Pulse Respirations Blood Pressure SpO2   10/17/23 0827 10/17/23 0826 10/17/23 0826 10/17/23 0826 10/17/23 0826   (!) 97.1 °F (36.2 °C) 67 22 152/70 100 %      Temp Source Heart Rate Source Patient Position - Orthostatic VS BP Location FiO2 (%)   10/17/23 0827 10/17/23 0826 10/17/23 0826 10/17/23 0826 --   Temporal Monitor Sitting Left arm       Pain Score       10/17/23 0826       No Pain             Orthostatic Vital Signs  Vitals:    10/17/23 0900 10/17/23 0930 10/17/23 1600 10/17/23 1700   BP: 132/66 122/59 126/59 151/74   Pulse: 64 68 69 70   Patient Position - Orthostatic VS: Sitting Sitting  Lying       Physical Exam  Vitals and nursing note reviewed. Constitutional:       General: She is not in acute distress. Appearance: Normal appearance. She is well-developed and normal weight. She is not ill-appearing, toxic-appearing or diaphoretic. HENT:      Head: Normocephalic and atraumatic. Right Ear: External ear normal.      Left Ear: External ear normal.      Nose: Nose normal. No congestion or rhinorrhea. Mouth/Throat:      Mouth: Mucous membranes are moist.      Pharynx: Oropharynx is clear. No oropharyngeal exudate or posterior oropharyngeal erythema. Eyes:      General: No scleral icterus. Right eye: No discharge. Left eye: No discharge. Extraocular Movements: Extraocular movements intact. Conjunctiva/sclera: Conjunctivae normal.      Pupils: Pupils are equal, round, and reactive to light. Cardiovascular:      Rate and Rhythm: Normal rate and regular rhythm. Pulses: Normal pulses. Heart sounds: Normal heart sounds. No murmur heard. No friction rub. No gallop. Pulmonary:      Effort: Pulmonary effort is normal. No respiratory distress. Breath sounds: Normal breath sounds. No stridor.  No wheezing, rhonchi or rales. Abdominal:      General: Abdomen is flat. Bowel sounds are normal. There is no distension. Palpations: Abdomen is soft. Tenderness: There is abdominal tenderness (Mild right upper quadrant). There is no right CVA tenderness, left CVA tenderness or guarding. Musculoskeletal:         General: No tenderness. Cervical back: Neck supple. Right lower leg: No edema. Left lower leg: No edema. Skin:     General: Skin is warm and dry. Capillary Refill: Capillary refill takes less than 2 seconds. Coloration: Skin is not jaundiced or pale. Neurological:      General: No focal deficit present. Mental Status: She is alert and oriented to person, place, and time.    Psychiatric:         Mood and Affect: Mood normal.         Behavior: Behavior normal.         ED Medications  Medications   iohexol (OMNIPAQUE) 350 MG/ML injection (MULTI-DOSE) 100 mL (100 mL Intravenous Given 10/17/23 1125)   cefuroxime (CEFTIN) tablet 500 mg (500 mg Oral Given 10/17/23 1425)       Diagnostic Studies  Results Reviewed       Procedure Component Value Units Date/Time    Lipase [[id number]]  (Normal) Collected: 10/17/23 0924    Lab Status: Final result Specimen: Blood from Arm, Right Updated: 10/17/23 1015     Lipase 76 u/L     Comprehensive metabolic panel [[id number]]  (Abnormal) Collected: 10/17/23 0924    Lab Status: Final result Specimen: Blood from Arm, Right Updated: 10/17/23 1015     Sodium 140 mmol/L      Potassium 3.5 mmol/L      Chloride 104 mmol/L      CO2 29 mmol/L      ANION GAP 7 mmol/L      BUN 58 mg/dL      Creatinine 1.48 mg/dL      Glucose 170 mg/dL      Calcium 9.2 mg/dL      AST 15 U/L      ALT 19 U/L      Alkaline Phosphatase 65 U/L      Total Protein 7.0 g/dL      Albumin 3.8 g/dL      Total Bilirubin 0.45 mg/dL      eGFR 32 ml/min/1.73sq m     Narrative:      [place] guidelines for Chronic Kidney Disease (CKD):     Stage 1 with normal or high GFR (GFR > 90 mL/min/1.73 square meters)    Stage 2 Mild CKD (GFR = 60-89 mL/min/1.73 square meters)    Stage 3A Moderate CKD (GFR = 45-59 mL/min/1.73 square meters)    Stage 3B Moderate CKD (GFR = 30-44 mL/min/1.73 square meters)    Stage 4 Severe CKD (GFR = 15-29 mL/min/1.73 square meters)    Stage 5 End Stage CKD (GFR <15 mL/min/1.73 square meters)  Note: GFR calculation is accurate only with a steady state creatinine    Urine Microscopic [[id number]]  (Abnormal) Collected: 10/17/23 0846    Lab Status: Final result Specimen: Urine, Other Updated: 10/17/23 0938     RBC, UA Innumerable /hpf      WBC, UA Innumerable /hpf      Epithelial Cells None Seen /hpf      Bacteria, UA Innumerable /hpf      MUCUS THREADS Occasional     Hyaline Casts, UA 3-5 /lpf      WBC Clumps Present    Urine culture [[id number]] Collected: 10/17/23 0846    Lab Status:  In process Specimen: Urine, Other Updated: 10/17/23 0938    CBC and differential [[id number]]  (Abnormal) Collected: 10/17/23 0924    Lab Status: Final result Specimen: Blood from Arm, Right Updated: 10/17/23 0938     WBC 5.77 Thousand/uL      RBC 4.22 Million/uL      Hemoglobin 10.7 g/dL      Hematocrit 34.5 %      MCV 82 fL      MCH 25.4 pg      MCHC 31.0 g/dL      RDW 15.2 %      MPV 11.8 fL      Platelets 489 Thousands/uL      nRBC 0 /100 WBCs      Neutrophils Relative 73 %      Immat GRANS % 0 %      Lymphocytes Relative 17 %      Monocytes Relative 7 %      Eosinophils Relative 2 %      Basophils Relative 1 %      Neutrophils Absolute 4.19 Thousands/µL      Immature Grans Absolute 0.02 Thousand/uL      Lymphocytes Absolute 0.98 Thousands/µL      Monocytes Absolute 0.41 Thousand/µL      Eosinophils Absolute 0.14 Thousand/µL      Basophils Absolute 0.03 Thousands/µL     UA w Reflex to Microscopic w Reflex to Culture [[id number]]  (Abnormal) Collected: 10/17/23 0846    Lab Status: Final result Specimen: Urine, Other Updated: 10/17/23 0904     Color, UA [place] Clarity, UA Extra Turbid     Specific Gravity, UA 1.008     pH, UA 5.5     Leukocytes, UA Large     Nitrite, UA Negative     Protein, UA 70 (1+) mg/dl      Glucose, UA Negative mg/dl      Ketones, UA Negative mg/dl      Urobilinogen, UA <2.0 mg/dl      Bilirubin, UA Negative     Occult Blood, UA Large                   CT abdomen pelvis with contrast   Final Result by Lupe Dumont MD (10/17 1151)            Procedures  Procedures      ED Course  ED Course as of 10/17/23 1745   Tue Oct 17, 2023   0939 Hemoglobin(!): 10.7  Slight decrease from baseline         CRAFFT      Flowsheet Row Most Recent Value   CRAFFT Initial Screen: During the past 12 months, did you:    1. Drink any alcohol (more than a few sips)? No Filed at: 10/17/2023 0842   2. Smoke any marijuana or hashish No Filed at: 10/17/2023 0842   3. Use anything else to get high? ("anything else" includes illegal drugs, over the counter and prescription drugs, and things that you sniff or 'loaiza')? No Filed at: 10/17/2023 6502                            SBIRT 20yo+      Flowsheet Row Most Recent Value   Initial Alcohol Screen: US AUDIT-C     1. How often do you have a drink containing alcohol? 0 Filed at: 10/17/2023 0843   2. How many drinks containing alcohol do you have on a typical day you are drinking? 0 Filed at: 10/17/2023 0843   3a. Male UNDER 65: How often do you have five or more drinks on one occasion? 0 Filed at: 10/17/2023 0843   3b. FEMALE Any Age, or MALE 65+: How often do you have 4 or more drinks on one occassion? 0 Filed at: 10/17/2023 0843   Audit-C Score 0 Filed at: 10/17/2023 9850   ROE: How many times in the past year have you. .. Used an illegal drug or used a prescription medication for non-medical reasons? Never Filed at: 10/17/2023 0161                  Medical Decision Making  Patient is an 58-year-old female with history of hypertension, hyperlipidemia, diabetes, and CKD presents for hematuria.     DDx includes not limited to UTI, bladder cancer, hemorrhagic cystitis. Patient also with right upper quadrant pain concerning for cholecystitis, cholelithiasis. Will obtain labs, CT imaging of the abdomen pelvis. I discussed with patient that we would want to do CBI to help control bleeding and potentially differentiate her cause of bleeding between infection and mass. Patient currently states that she does not want to do this and wants to discuss it with her family doctor and son. Patient's urine shows concern for infection, will start her on Ceftin. Her hemoglobin is marginally reduced from her baseline, no indication for transfusion. Her CT abdomen pelvis shows diffuse bladder wall thickening with most pronounced at the base which may be due to cystitis but urothelial neoplasm not excluded. I discussed these findings with the patient and after extensive shared decision making including her son on the phone, she agreed to [facility]. Patient's urine cleared after CBI, she was discharged with prescription for Ceftin and amatory referral to urology for further work-up of her hematuria. Return precautions given, all questions answered. Amount and/or Complexity of Data Reviewed  Labs: ordered. Decision-making details documented in ED Course. Radiology: ordered. Risk  Prescription drug management. Disposition  Final diagnoses:   Hematuria   UTI (urinary tract infection)     Time reflects when diagnosis was documented in both MDM as applicable and the Disposition within this note       Time User Action Codes Description Comment    10/17/2023  4:32 PM Mya Nip [R31.9] Hematuria     10/17/2023  4:32 PM Nicole Opitz Add [N39.0] UTI (urinary tract infection)           ED Disposition       ED Disposition   Discharge    Condition   Stable    Date/Time   Tue Oct 17, 2023 1653    Mann discharge to home/self care.                    Follow-up Information       Follow up With Specialties Details Why Contact Info Additional [address] [facility] Emergency Department Emergency Medicine Go to  If symptoms worsen or if you have any other specific concerns [address] [phone]  [facility] Emergency Department, [address], [place], [place], [address] [facility] Family Medicine Call today To make appointment for reevaluation if you do not have a PCP Sudha [phone]  [address], [address] [place], [place], [address] [facility] U[facility] Call today  [address] [facility] 119[address] [facility] [phone] 1141 [facility] [place], [address] [facility] 12, [place], [place], [place]            Discharge Medication List as of 10/17/2023  4:53 PM        START taking these medications    Details   cefuroxime (CEFTIN) 500 mg tablet Take 1 tablet (500 mg total) by mouth every 12 (twelve) hours for 7 days, Starting Tue 10/17/2023, Until Tue 10/24/2023, Normal           CONTINUE these medications which have NOT CHANGED    Details   acetaminophen (TYLENOL) 650 mg CR tablet Take 650 mg by mouth every 6 (six) hours as needed for mild pain, Historical Med      albuterol (PROVENTIL HFA,VENTOLIN HFA) 90 mcg/act inhaler INHALE 2 PUFFS EVERY 4 (FOUR) HOURS AS NEEDED FOR WHEEZING, Starting Fri 9/1/2023, Normal      atorvastatin (LIPITOR) 40 mg tablet TAKE 1 TABLET (40 MG TOTAL) BY MOUTH DAILY, Starting Mon 8/28/2023, Until Sat 2/24/2024, Normal      Blood Glucose Monitoring Suppl (FREESTYLE LITE) JAQUELIN by Does not apply route daily, Starting Tue 4/17/2018, Until Thu 9/14/2023, Normal      Breo Ellipta 100-25 MCG/ACT inhaler INHALE 1 PUFF DAILY, Normal      carvedilol (COREG) 6.25 mg tablet TAKE 1 TABLET (6.25 MG TOTAL) BY MOUTH 2 (TWO) TIMES A DAY WITH MEALS, Starting Fri 4/21/2023, Until Wed 10/18/2023, Normal      Cholecalciferol (D3) 50 MCG (2000 UT) TABS Take 1 tablet (2,000 Units total) by mouth daily, Starting Mon 8/21/2023, Normal      clopidogrel (PLAVIX) 75 mg tablet TAKE 2 TABLETS DAILY, Normal      conjugated estrogens (PREMARIN) vaginal cream Insert 0.5 g into the vagina 2 (two) times a week Insert twice weekly at bedtime, Starting Mon 10/25/2021, Normal      Continuous Blood Gluc  (FreeStyle Naldo 14 Day [place]) JAQUELIN USE TO RECORD BLOOD GLUCOSE 4 TIMES DAILY. ONCE FASTING AND THREE TIMES DAILY BEFORE MEALS, Normal      Continuous Blood Gluc Sensor (FreeStyle Naldo 2 Sensor) MISC USE ONE SENSOR EVERY 14 DAYS, Normal      estradiol (ESTRACE VAGINAL) 0.1 mg/g vaginal cream Insert 2 g into the vagina 2 (two) times a week, Starting Mon 4/4/2022, Normal      ferrous sulfate 325 (65 Fe) mg tablet TAKE 1 TABLET TWICE A DAY BEFORE MEALS, Normal      glucose blood (FREESTYLE LITE) test strip TEST AS DIRECTED UP TO FOUR TIMES A DAY, Normal      glucose monitoring kit (FREESTYLE) monitoring kit Use 1 each as needed (Three time daily) Please check blood sugar 3 times daily. , Starting Mon 5/9/2022, Normal      Glucose-Vitamin C-Vitamin D (TRUEPLUS GLUCOSE ON THE GO) CHEW Historical Med      ketoconazole (NIZORAL) 2 % shampoo APPLY TOPICALLY TO THE SCALP ONCE EVERY OTHER WEEK, Normal      Lancets (FREESTYLE) lancets Use as instructed, Normal      latanoprost (XALATAN) 0.005 % ophthalmic solution Starting Tue 9/14/2021, Historical Med      losartan (COZAAR) 25 mg tablet TAKE 1 TABLET (25 MG TOTAL) BY MOUTH DAILY, Starting Fri 7/28/2023, Normal      Misc.  Devices (QUAD CANE) MISC by Does not apply route daily, Starting Mon 6/1/2020, Print      montelukast (SINGULAIR) 10 mg tablet TAKE 1 TABLET (10 MG TOTAL) BY MOUTH DAILY AT BEDTIME, Starting Fri 7/28/2023, Until Wed 1/24/2024, Normal      nitroglycerin (NITROSTAT) 0.4 mg SL tablet PLACE 1 TABLET (0.4 MG TOTAL) UNDER THE TONGUE EVERY 5 (FIVE) MINUTES AS NEEDED FOR CHEST PAIN, Starting Mon 10/2/2023, Normal      potassium chloride (K-DUR,KLOR-CON) 20 mEq tablet Take 1 tablet (20 mEq total) by mouth daily, Starting Thu 8/3/2023, Normal      ranolazine (RANEXA) 500 mg 12 hr tablet Take 1 tablet (500 mg total) by mouth every 12 (twelve) hours, Starting Wed 5/26/2021, Until Thu 9/14/2023, Normal      repaglinide (PRANDIN) 0.5 mg tablet Take 1 tablet (0.5 mg total) by mouth 2 (two) times a day before meals (SKIP DOSE IF SKIPPING MEAL), Starting Wed 4/5/2023, Normal      rivaroxaban (Xarelto) 2.5 mg tablet Restart tomorrow Do not start before June 2, 2023., No Print      torsemide (DEMADEX) 20 mg tablet TAKE 3 TABLETS (60 MG TOTAL) BY MOUTH DAILY, Starting Thu 9/14/2023, Normal      Tradjenta 5 MG TABS TAKE 1 TABLET (5 MG) BY MOUTH DAILY, Normal               PDMP Review       None             ED Provider  Attending physically available and evaluated Ashish Humphrey. I managed the patient along with the ED Attending.     Electronically Signed by           Lakia Tao MD  10/17/23 4752

## 2023-10-17 NOTE — DISCHARGE INSTRUCTIONS
Please take Ceftin as prescribed. Please follow-up with urology to be reevaluated. Please return to the emergency department develop any new or concerning symptoms including lightheadedness, worsening blood in your urine, or abdominal pain.

## 2023-10-17 NOTE — ED NOTES
Pt does not want a novak irrigation without talking to Dr. Tammi Camarena. MD made aware.      Samy Mendieta RN  10/17/23 6296

## 2023-10-17 NOTE — ED ATTENDING ATTESTATION
10/17/2023  I, Yoli Mortensen DO, saw and evaluated the patient. I have discussed the patient with the resident/non-physician practitioner and agree with the resident's/non-physician practitioner's findings, Plan of Care, and MDM as documented in the resident's/non-physician practitioner's note, except where noted. All available labs and Radiology studies were reviewed. I was present for key portions of any procedure(s) performed by the resident/non-physician practitioner and I was immediately available to provide assistance. At this point I agree with the current assessment done in the Emergency Department. I have conducted an independent evaluation of this patient a history and physical is as follows:    59-year-old female presents with hematuria. Patient states started with blood in her urine, no history of this. Has mild discomfort when she urinates. No fevers. No nausea or vomiting. Denies history of similar. On exam-no acute distress, heart regular, no respiratory distress, abdomen soft with mild tenderness in the right upper quadrant. Plan-CT abdomen secondary to abdominal tenderness to rule out intra-abdominal pathology. We will check urine to rule out infection. Hematuria possibly secondary to hemorrhagic cystitis but also concern for neoplasm. Recommend three-way catheter and CBI.   Patient refusing this at this time so we will do labs and CT and reassess    ED Course         Critical Care Time  Procedures

## 2023-10-18 ENCOUNTER — TELEPHONE (OUTPATIENT)
Age: 83
End: 2023-10-18

## 2023-10-18 ENCOUNTER — VBI (OUTPATIENT)
Dept: INTERNAL MEDICINE CLINIC | Facility: CLINIC | Age: 83
End: 2023-10-18

## 2023-10-18 NOTE — TELEPHONE ENCOUNTER
Pt under care of: Sigrid Bourgeois     Last Seen: 5/25/23    Pt calling due to:    Patient calling in stating she was seen in ER on 10/17/23 for UTI and gross blood in urine. Patient was told to call urology office to get in for an apt. It looks like patient was supposed to have a cystoscopy and canceled due to feeling better. Please call patient back and advise wether she will need a normal f/u or a cysto scheduled again. Patient requesting call back.      Pt can be reached at: [phone]

## 2023-10-18 NOTE — TELEPHONE ENCOUNTER
10/18/23 10:01 AM    Patient contacted post ED visit, first outreach attempt made. Message was left for patient to return a call to the VBI Department at [facility]: Phone [phone]. Thank you.   [facility]  [facility]

## 2023-10-19 LAB — BACTERIA UR CULT: ABNORMAL

## 2023-10-19 NOTE — TELEPHONE ENCOUNTER
10/19/23 8:53 AM    Patient contacted post ED visit, VBI department spoke with patient/caregiver and outreach was successful. Thank you.   Gallito Sweeney  [facility]

## 2023-10-19 NOTE — TELEPHONE ENCOUNTER
10/19/23 8:52 AM    Patient contacted post ED visit, second outreach attempt made. Message was left for patient to return a call to the VBI Department at [facility]: Phone [phone]. Thank you.   Abdi Faulkner  [facility]

## 2023-10-24 ENCOUNTER — PROCEDURE VISIT (OUTPATIENT)
Dept: UROLOGY | Facility: AMBULATORY SURGERY CENTER | Age: 83
End: 2023-10-24
Payer: MEDICARE

## 2023-10-24 VITALS
RESPIRATION RATE: 18 BRPM | SYSTOLIC BLOOD PRESSURE: 130 MMHG | WEIGHT: 170 LBS | OXYGEN SATURATION: 96 % | HEART RATE: 70 BPM | HEIGHT: 60 IN | BODY MASS INDEX: 33.38 KG/M2 | DIASTOLIC BLOOD PRESSURE: 82 MMHG

## 2023-10-24 DIAGNOSIS — N39.0 URINARY TRACT INFECTION WITHOUT HEMATURIA, SITE UNSPECIFIED: Primary | ICD-10-CM

## 2023-10-24 DIAGNOSIS — N81.10 PELVIC ORGAN PROLAPSE QUANTIFICATION STAGE 4 CYSTOCELE: ICD-10-CM

## 2023-10-24 LAB
SL AMB  POCT GLUCOSE, UA: NORMAL
SL AMB LEUKOCYTE ESTERASE,UA: NORMAL
SL AMB POCT BILIRUBIN,UA: NORMAL
SL AMB POCT BLOOD,UA: NORMAL
SL AMB POCT CLARITY,UA: CLEAR
SL AMB POCT COLOR,UA: YELLOW
SL AMB POCT KETONES,UA: NORMAL
SL AMB POCT NITRITE,UA: NORMAL
SL AMB POCT PH,UA: 5
SL AMB POCT SPECIFIC GRAVITY,UA: 1.01
SL AMB POCT URINE PROTEIN: NORMAL
SL AMB POCT UROBILINOGEN: 0.2

## 2023-10-24 PROCEDURE — 52000 CYSTOURETHROSCOPY: CPT | Performed by: UROLOGY

## 2023-10-24 PROCEDURE — 81002 URINALYSIS NONAUTO W/O SCOPE: CPT | Performed by: UROLOGY

## 2023-10-24 NOTE — PROGRESS NOTES
Patient with history of recurrent UTI and bladder thickening. She was referred for cystoscopy about 6 months ago but declined as she was feeling well. She is developed recurrent UTI once again. She does have history of prolapse with pessary use however she went to the emergency department to have it removed for her as it was stuck. She has not replaced it. 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 Had to reduce it manually to identify the urethra. She was prepped with Hibiclens and a 16 [place] scope was placed. Bladder is quite full with some cloudy urine. There is trabeculation. There is no suspicious mass or lesion noted. After removal of the scope I reprepped and placed a straight cath to drain her bladder. I reduce the prolapse in order to completely drain her. Plan  We discussed that symptoms are likely due to the prolapse. She will need this reduced with pessary ideally. She could consider pelvic floor surgery if she wishes. Either way she will be referred back to urogynecology. She reported that she had seen Dr. Melvi Steele in the past for this and referral was placed.

## 2023-10-27 DIAGNOSIS — E87.6 HYPOKALEMIA: ICD-10-CM

## 2023-10-27 DIAGNOSIS — N18.30 BENIGN HYPERTENSION WITH CKD (CHRONIC KIDNEY DISEASE) STAGE III (HCC): ICD-10-CM

## 2023-10-27 DIAGNOSIS — I12.9 BENIGN HYPERTENSION WITH CKD (CHRONIC KIDNEY DISEASE) STAGE III (HCC): ICD-10-CM

## 2023-10-30 RX ORDER — POTASSIUM CHLORIDE 20 MEQ/1
TABLET, EXTENDED RELEASE ORAL
Qty: 30 TABLET | Refills: 2 | Status: SHIPPED | OUTPATIENT
Start: 2023-10-30

## 2023-10-30 RX ORDER — CARVEDILOL 6.25 MG/1
6.25 TABLET ORAL 2 TIMES DAILY WITH MEALS
Qty: 60 TABLET | Refills: 5 | Status: SHIPPED | OUTPATIENT
Start: 2023-10-30 | End: 2024-04-27

## 2023-11-08 NOTE — PROGRESS NOTES
OB/GYN VISIT  Yue Joe Jose Luis  11/10/2023  11:20 AM    Subjective:     Yvonne Carter is a 80 y.o. [id number] female who presents for pessary management    She follows with urology for history of recurrent UTI and bladder thickening and had a cysto in Oct 2023. She was previously followed by the Urog[facility] [place] at [facility] and had been using a ring and cube pessary in the past. She had to go to the ED after getting the pessary stuck and hasn't used it since. She is not interested in surgical management of prolapse. In the past when she was using her pessary she was changing it at home without difficulty. She typically coats it in vaginal estrogen before placement. Patient states she was a chronic smoker, has an over 20 years smoking history and quit in the 1980s. She reports that at the urology office they had recommended cystoscopy to check for bladder cancer and she plans on following up with them. Past Surgical History:   Procedure Laterality Date    CARDIAC CATHETERIZATION N/A 6/1/2023    Procedure: Cardiac Coronary Angiogram;  Surgeon: Domenico Jo MD;  [facility]; Service: Cardiology    CARDIAC CATHETERIZATION  6/1/2023    Procedure: Cardiac Left Heart Cath;  Surgeon: Domenico Jo MD;  [facility]; Service: Cardiology    [place] ESOPHAGOGASTRODUODENOSCOPY TRANSORAL DIAGNOSTIC N/A 4/5/2017    Procedure: ESOPHAGOGASTRODUODENOSCOPY (EGD); Surgeon: Phil Silva MD;  Location: [facility] GI [facility]; Service: Gastroenterology       Objective:    Vitals: Blood pressure 143/85, pulse 70, resp. rate 18, height 5' (1.524 m), weight 77.6 kg (171 lb), not currently breastfeeding. Body mass index is 33.4 kg/m². Physical Exam  Genitourinary:     Comments: Normal external female genitalia  Cystocele bulge -grade 3 noted which worsens when she bears down and coughs  Vagina is well estrogenized and mucosa as well as cervix is without lesion.   Easily reduced with no pain  Kegel strength 1 out of 5  No pain to palpation of pelvic floor muscles          Assessment/Plan:  Pessary Maintenance: Patient was happy with the #7 ring and did not want to try other styles of pessary today. New one will be ordered for her and she will come back once it is in stock. She prefers to take it out cleanse it and replace at home. Briefly discussed option for surgical management, she is happy with the pessary at this time. Vaginal estrogen refills ordered    Problem List Items Addressed This Visit    None  Visit Diagnoses       Cystocele, midline        Relevant Medications    estradiol (ESTRACE VAGINAL) 0.1 mg/g vaginal cream (Start on 11/13/2023)    Vaginal atrophy        Relevant Medications    estradiol (ESTRACE VAGINAL) 0.1 mg/g vaginal cream (Start on 11/13/2023)            D/w Dr. Lainey Escalona, DO  11/10/2023  11:20 AM        Please note that while the recent CURES Act permits medical records be visible for patient review, clinical documentation is intended for utilization by healthcare professionals. All test results are immediately available through [employer] as well, and we will review results at earliest opportunity and contact you with the appropriate urgency. If you have a question about something you see in your chart, please don't hesitate to ask about it at your next appointment.

## 2023-11-10 ENCOUNTER — OFFICE VISIT (OUTPATIENT)
Dept: OBGYN CLINIC | Facility: CLINIC | Age: 83
End: 2023-11-10

## 2023-11-10 VITALS
SYSTOLIC BLOOD PRESSURE: 143 MMHG | BODY MASS INDEX: 33.57 KG/M2 | WEIGHT: 171 LBS | HEIGHT: 60 IN | RESPIRATION RATE: 18 BRPM | HEART RATE: 70 BPM | DIASTOLIC BLOOD PRESSURE: 85 MMHG

## 2023-11-10 DIAGNOSIS — N81.11 CYSTOCELE, MIDLINE: ICD-10-CM

## 2023-11-10 DIAGNOSIS — N95.2 VAGINAL ATROPHY: ICD-10-CM

## 2023-11-10 PROCEDURE — 99213 OFFICE O/P EST LOW 20 MIN: CPT | Performed by: OBSTETRICS & GYNECOLOGY

## 2023-11-10 RX ORDER — ESTRADIOL 0.1 MG/G
2 CREAM VAGINAL 2 TIMES WEEKLY
Qty: 42.5 G | Refills: 3 | Status: SHIPPED | OUTPATIENT
Start: 2023-11-13

## 2023-11-10 NOTE — LETTER
November 10, 2023     Gisela Bustamante, [address]    Patient: Lynn Burgess   YOB: 1940   Date of Visit: 11/10/2023       Dear Dr. Missy Apodaca:    Thank you for referring Luis Marie to me for evaluation. Below are my notes for this consultation. If you have questions, please do not hesitate to call me. I look forward to following your patient along with you. Sincerely,        Saravanan Alcocer DO        CC: No Recipients    Saravanan Alcocer DO  11/10/2023 11:26 AM  Sign when Signing Visit  OB/GYN VISIT  Marie Tovar Jose Luis  11/10/2023  11:20 AM    Subjective:     Lynn Burgess is a 80 y.o. [id number] female who presents for pessary management    She follows with urology for history of recurrent UTI and bladder thickening and had a cysto in Oct 2023. She was previously followed by the Urogyn [place] at [facility] and had been using a ring and cube pessary in the past. She had to go to the ED after getting the pessary stuck and hasn't used it since. She is not interested in surgical management of prolapse. In the past when she was using her pessary she was changing it at home without difficulty. She typically coats it in vaginal estrogen before placement. Patient states she was a chronic smoker, has an over 20 years smoking history and quit in the 1980s. She reports that at the urology office they had recommended cystoscopy to check for bladder cancer and she plans on following up with them. Past Surgical History:   Procedure Laterality Date   • CARDIAC CATHETERIZATION N/A 6/1/2023    Procedure: Cardiac Coronary Angiogram;  Surgeon: Nayan Reaves MD;  [facility]; Service: Cardiology   • CARDIAC CATHETERIZATION  6/1/2023    Procedure: Cardiac Left Heart Cath;  Surgeon: Nayan Reaves MD;  [facility]: [facility];   Service: Cardiology   • [place] ESOPHAGOGASTRODUODENOSCOPY TRANSORAL DIAGNOSTIC N/A 4/5/2017    Procedure: ESOPHAGOGASTRODUODENOSCOPY (EGD); Surgeon: Maxx Suarez MD;  [facility]: [facility]; Service: Gastroenterology       Objective:    Vitals: Blood pressure 143/85, pulse 70, resp. rate 18, height 5' (1.524 m), weight 77.6 kg (171 lb), not currently breastfeeding. Body mass index is 33.4 kg/m². Physical Exam  Genitourinary:     Comments: Normal external female genitalia  Cystocele bulge -grade 3 noted which worsens when she bears down and coughs  Vagina is well estrogenized and mucosa as well as cervix is without lesion. Easily reduced with no pain  Kegel strength 1 out of 5  No pain to palpation of pelvic floor muscles          Assessment/Plan:  Pessary Maintenance: Patient was happy with the #7 ring and did not want to try other styles of pessary today. New one will be ordered for her and she will come back once it is in stock. She prefers to take it out cleanse it and replace at home. Briefly discussed option for surgical management, she is happy with the pessary at this time. Vaginal estrogen refills ordered    Problem List Items Addressed This Visit    None  Visit Diagnoses       Cystocele, midline        Relevant Medications    estradiol (ESTRACE VAGINAL) 0.1 mg/g vaginal cream (Start on 11/13/2023)    Vaginal atrophy        Relevant Medications    estradiol (ESTRACE VAGINAL) 0.1 mg/g vaginal cream (Start on 11/13/2023)            D/w Dr. Hema Richard, DO  11/10/2023  11:20 AM        Please note that while the recent CURES Act permits medical records be visible for patient review, clinical documentation is intended for utilization by healthcare professionals. All test results are immediately available through [employer] as well, and we will review results at earliest opportunity and contact you with the appropriate urgency. If you have a question about something you see in your chart, please don't hesitate to ask about it at your next appointment.

## 2023-11-11 DIAGNOSIS — E11.40 CONTROLLED TYPE 2 DIABETES MELLITUS WITH DIABETIC NEUROPATHY, WITHOUT LONG-TERM CURRENT USE OF INSULIN (HCC): ICD-10-CM

## 2023-11-11 DIAGNOSIS — E11.65 TYPE 2 DIABETES MELLITUS WITH HYPERGLYCEMIA, WITHOUT LONG-TERM CURRENT USE OF INSULIN (HCC): ICD-10-CM

## 2023-11-13 RX ORDER — LINAGLIPTIN 5 MG/1
TABLET, FILM COATED ORAL
Qty: 90 TABLET | Refills: 3 | Status: SHIPPED | OUTPATIENT
Start: 2023-11-13

## 2023-11-20 ENCOUNTER — TELEPHONE (OUTPATIENT)
Dept: OBGYN CLINIC | Facility: CLINIC | Age: 83
End: 2023-11-20

## 2023-11-20 NOTE — TELEPHONE ENCOUNTER
LM notifying pt pessary delivered to office. Pt to schedule appt for insertion. Call back number provided.

## 2023-11-24 DIAGNOSIS — N81.3 UTERINE PROCIDENTIA: Primary | ICD-10-CM

## 2023-11-24 RX ORDER — CONJUGATED ESTROGENS 0.62 MG/G
1 CREAM VAGINAL DAILY
Qty: 30 G | Refills: 0 | Status: SHIPPED | OUTPATIENT
Start: 2023-11-24 | End: 2023-11-24 | Stop reason: SDUPTHER

## 2023-11-24 RX ORDER — CONJUGATED ESTROGENS 0.62 MG/G
1 CREAM VAGINAL 2 TIMES WEEKLY
Qty: 30 G | Refills: 3 | Status: SHIPPED | OUTPATIENT
Start: 2023-11-27

## 2023-12-04 NOTE — PROGRESS NOTES
OB/GYN VISIT  Charisse Monahan  12/5/2023  3:40 PM    Subjective:     Charisse Monahan is a 80 y.o. [id number] female who presents for procedure visit. She is here for a pessary insertion. She had a new #7 ring ordered. She feels comfortable with pessary management at home. Past Surgical History:   Procedure Laterality Date   • CARDIAC CATHETERIZATION N/A 6/1/2023    Procedure: Cardiac Coronary Angiogram;  Surgeon: Josie Nevarez MD;  [facility]; Service: Cardiology   • CARDIAC CATHETERIZATION  6/1/2023    Procedure: Cardiac Left Heart Cath;  Surgeon: Josie Nevarez MD;  [facility]; Service: Cardiology   • [place] ESOPHAGOGASTRODUODENOSCOPY TRANSORAL DIAGNOSTIC N/A 4/5/2017    Procedure: ESOPHAGOGASTRODUODENOSCOPY (EGD); Surgeon: Abida Weeks MD;  [facility]: [facility]; Service: Gastroenterology       Objective:    Vitals: Blood pressure 124/70, pulse 77, resp. rate 18, height 5' (1.524 m), weight 77.7 kg (171 lb 6.4 oz), not currently breastfeeding. Body mass index is 33.47 kg/m². Pessary    Date/Time: 12/5/2023 3:40 PM    Performed by: Alonso King DO  Authorized by: Alonso King DO  Universal Protocol:  Consent: Written consent obtained. Consent given by: patient  Patient understanding: patient states understanding of the procedure being performed  Patient consent: the patient's understanding of the procedure matches consent given  Procedure consent: procedure consent matches procedure scheduled    Indication:     Indication for pessary: uterine prolapse and cystocele    Pre-procedure:     Pessary procedure type: Insertion  Problems:     Pessary complications: none    Procedure:     Pessary type:  Ring w/ support    Pessary size:  7    Patient tolerance of procedure: Tolerated well, no immediate complications  Comments:     Procedure comments:   While voiding, the pessary fell out  Replaced without complication  She prefers to do void trial at home and knows to report to ER if she cannot void  She has vaginal estrogen at home      D/w Dr. Dona Land, DO  12/5/2023  3:40 PM        Please note that while the recent CURES Act permits medical records be visible for patient review, clinical documentation is intended for utilization by healthcare professionals. All test results are immediately available through [employer] as well, and we will review results at earliest opportunity and contact you with the appropriate urgency. If you have a question about something you see in your chart, please don't hesitate to ask about it at your next appointment.

## 2023-12-05 ENCOUNTER — PROCEDURE VISIT (OUTPATIENT)
Dept: OBGYN CLINIC | Facility: CLINIC | Age: 83
End: 2023-12-05

## 2023-12-05 VITALS
WEIGHT: 171.4 LBS | SYSTOLIC BLOOD PRESSURE: 124 MMHG | DIASTOLIC BLOOD PRESSURE: 70 MMHG | HEIGHT: 60 IN | BODY MASS INDEX: 33.65 KG/M2 | HEART RATE: 77 BPM | RESPIRATION RATE: 18 BRPM

## 2023-12-05 DIAGNOSIS — N81.11 MIDLINE CYSTOCELE: Primary | ICD-10-CM

## 2023-12-05 PROCEDURE — 57160 INSERT PESSARY/OTHER DEVICE: CPT | Performed by: OBSTETRICS & GYNECOLOGY

## 2023-12-05 PROCEDURE — A4562 PESSARY, NON RUBBER,ANY TYPE: HCPCS | Performed by: OBSTETRICS & GYNECOLOGY

## 2023-12-06 ENCOUNTER — TELEPHONE (OUTPATIENT)
Dept: OBGYN CLINIC | Facility: CLINIC | Age: 83
End: 2023-12-06

## 2023-12-06 NOTE — TELEPHONE ENCOUNTER
Pt called to state that her pessary came out last night. Please call pt to advise of further instructions.  Thank you

## 2023-12-14 ENCOUNTER — TELEPHONE (OUTPATIENT)
Dept: INTERNAL MEDICINE CLINIC | Facility: CLINIC | Age: 83
End: 2023-12-14

## 2023-12-14 NOTE — TELEPHONE ENCOUNTER
Patient would like Dr Mcgowan to give her a call. I asked what for and she said she did not want to express to me, only wanted to speak with dr. Rodriguez  [phone]

## 2023-12-18 DIAGNOSIS — E78.2 MIXED HYPERLIPIDEMIA: ICD-10-CM

## 2023-12-19 RX ORDER — ATORVASTATIN CALCIUM 40 MG/1
40 TABLET, FILM COATED ORAL DAILY
Qty: 90 TABLET | Refills: 3 | Status: SHIPPED | OUTPATIENT
Start: 2023-12-19 | End: 2024-12-13

## 2023-12-20 NOTE — TELEPHONE ENCOUNTER
Patient returned call, per patient she has private concerns but would not like to disclose what they are to any medical staff unless it's  or a doctor. I made patient aware Dr. Mcgowan will not be in office until February. Per patient she would like to come in office sooner to speak to someone about her concerns. I scheduled patient with  Dr. Gross 12/28/23.    ELISEO

## 2023-12-21 DIAGNOSIS — E11.40 CONTROLLED TYPE 2 DIABETES MELLITUS WITH DIABETIC NEUROPATHY, WITHOUT LONG-TERM CURRENT USE OF INSULIN (HCC): ICD-10-CM

## 2023-12-22 RX ORDER — REPAGLINIDE 0.5 MG/1
0.5 TABLET ORAL
Qty: 180 TABLET | Refills: 1 | Status: SHIPPED | OUTPATIENT
Start: 2023-12-22

## 2023-12-28 ENCOUNTER — OFFICE VISIT (OUTPATIENT)
Dept: INTERNAL MEDICINE CLINIC | Facility: CLINIC | Age: 83
End: 2023-12-28

## 2023-12-28 VITALS
WEIGHT: 180 LBS | HEART RATE: 77 BPM | TEMPERATURE: 98.6 F | DIASTOLIC BLOOD PRESSURE: 86 MMHG | BODY MASS INDEX: 35.15 KG/M2 | SYSTOLIC BLOOD PRESSURE: 138 MMHG | OXYGEN SATURATION: 97 %

## 2023-12-28 DIAGNOSIS — I21.4 NSTEMI (NON-ST ELEVATED MYOCARDIAL INFARCTION) (HCC): ICD-10-CM

## 2023-12-28 DIAGNOSIS — I25.10 CORONARY ARTERY DISEASE INVOLVING NATIVE HEART, UNSPECIFIED VESSEL OR LESION TYPE, UNSPECIFIED WHETHER ANGINA PRESENT: ICD-10-CM

## 2023-12-28 DIAGNOSIS — E11.65 TYPE 2 DIABETES MELLITUS WITH HYPERGLYCEMIA, WITHOUT LONG-TERM CURRENT USE OF INSULIN (HCC): ICD-10-CM

## 2023-12-28 DIAGNOSIS — I50.43 ACUTE ON CHRONIC COMBINED SYSTOLIC AND DIASTOLIC CONGESTIVE HEART FAILURE (HCC): Primary | ICD-10-CM

## 2023-12-28 DIAGNOSIS — J45.30 MILD PERSISTENT ASTHMA WITHOUT COMPLICATION: ICD-10-CM

## 2023-12-28 DIAGNOSIS — J30.1 NON-SEASONAL ALLERGIC RHINITIS DUE TO POLLEN: ICD-10-CM

## 2023-12-28 LAB — SL AMB POCT HEMOGLOBIN AIC: 7.1 (ref ?–6.5)

## 2023-12-28 PROCEDURE — 83036 HEMOGLOBIN GLYCOSYLATED A1C: CPT | Performed by: STUDENT IN AN ORGANIZED HEALTH CARE EDUCATION/TRAINING PROGRAM

## 2023-12-28 PROCEDURE — 99214 OFFICE O/P EST MOD 30 MIN: CPT | Performed by: STUDENT IN AN ORGANIZED HEALTH CARE EDUCATION/TRAINING PROGRAM

## 2023-12-28 RX ORDER — NITROGLYCERIN 0.4 MG/1
0.4 TABLET SUBLINGUAL
Qty: 25 TABLET | Refills: 1 | Status: SHIPPED | OUTPATIENT
Start: 2023-12-28

## 2023-12-28 RX ORDER — FLUTICASONE FUROATE AND VILANTEROL TRIFENATATE 100; 25 UG/1; UG/1
1 POWDER RESPIRATORY (INHALATION) DAILY
Qty: 60 EACH | Refills: 5 | Status: SHIPPED | OUTPATIENT
Start: 2023-12-28 | End: 2024-12-22

## 2023-12-28 RX ORDER — BENZONATATE 200 MG/1
200 CAPSULE ORAL 3 TIMES DAILY PRN
Qty: 20 CAPSULE | Refills: 0 | Status: SHIPPED | OUTPATIENT
Start: 2023-12-28

## 2023-12-28 RX ORDER — SENNOSIDES 8.6 MG
650 CAPSULE ORAL EVERY 6 HOURS PRN
Qty: 90 TABLET | Refills: 3 | Status: SHIPPED | OUTPATIENT
Start: 2023-12-28

## 2023-12-28 RX ORDER — METOLAZONE 5 MG/1
5 TABLET ORAL EVERY OTHER DAY
Qty: 14 TABLET | Refills: 0 | Status: SHIPPED | OUTPATIENT
Start: 2023-12-28

## 2023-12-28 RX ORDER — ALBUTEROL SULFATE 90 UG/1
2 AEROSOL, METERED RESPIRATORY (INHALATION) EVERY 4 HOURS PRN
Qty: 8.5 G | Refills: 5 | Status: SHIPPED | OUTPATIENT
Start: 2023-12-28

## 2023-12-28 NOTE — ASSESSMENT & PLAN NOTE
Wt Readings from Last 3 Encounters:   12/28/23 81.6 kg (180 lb)   12/05/23 77.7 kg (171 lb 6.4 oz)   11/10/23 77.6 kg (171 lb)     does not follow with heart failure team, sees cards  continue torsemide, BB, and ace-i/arb  limit water and salt intake, counseled on 1800mL fluid restriction and <2g salt per day and their contribution to CHF symptoms  consider nutrition counseling/referral  baseline not on supplemental O2  goal: K>4, Mg >2, supplement as needed  - cardiac ROS positive for chest pressure/discomfort, fatigue, orthopnea, and paroxysmal nocturnal dyspnea    Added metolazone every other day for 14 doses, encouraged compliance with torsemide   Tessalon pearles, albuterol, and nitro refilled for symptom management  ER precautions given  CXR r/o PNA

## 2023-12-28 NOTE — PROGRESS NOTES
[facility]  INTERNAL MEDICINE OFFICE VISIT     PATIENT INFORMATION     Jennifer Amaya   83 y.o. female   MRN: [id number]    ASSESSMENT/PLAN     1. Acute on chronic combined systolic and diastolic congestive heart failure (HCC)  Assessment & Plan:  Wt Readings from Last 3 Encounters:   12/28/23 81.6 kg (180 lb)   12/05/23 77.7 kg (171 lb 6.4 oz)   11/10/23 77.6 kg (171 lb)     does not follow with heart failure team, sees cards  continue torsemide, BB, and ace-i/arb  limit water and salt intake, counseled on 1800mL fluid restriction and <2g salt per day and their contribution to CHF symptoms  consider nutrition counseling/referral  baseline not on supplemental O2  goal: K>4, Mg >2, supplement as needed  - cardiac ROS positive for chest pressure/discomfort, fatigue, orthopnea, and paroxysmal nocturnal dyspnea    Added metolazone every other day for 14 doses, encouraged compliance with torsemide   Tessalon pearles, albuterol, and nitro refilled for symptom management  ER precautions given  CXR r/o PNA              Orders:  -     Echo complete w/ contrast if indicated; Future; Expected date: 12/28/2023  -     XR chest pa & lateral; Future; Expected date: 12/28/2023  -     benzonatate (TESSALON) 200 MG capsule; Take 1 capsule (200 mg total) by mouth 3 (three) times a day as needed for cough  -     acetaminophen (TYLENOL) 650 mg CR tablet; Take 1 tablet (650 mg total) by mouth every 6 (six) hours as needed for mild pain  -     metolazone (ZAROXOLYN) 5 mg tablet; Take 1 tablet (5 mg total) by mouth every other day    2. Type 2 diabetes mellitus with hyperglycemia, without long-term current use of insulin ([facility])  Assessment & Plan:    Lab Results   Component Value Date    HGBA1C 7.1 (A) 12/28/2023     Currently at goal for A1c (<7%, <8% if >80)  Continue current regimen of repaglinide 0.5mg BID, linagliptin   Considering age andA1c, would recommend stopping sulfonureas and managing through  diet  not demonstrating signs of hypoglycemia  Continue Ace-i/arb  + on statin, continue/consider adding            repeat lipids q3yrs, due 2024  + following optho, next eye exam due 2024  + following podiatry, next foot exam due 2024  yearly micro albumin creatinine ratio is UTD, due 2024  Carb controlled diet, discussed healthy lifestyle modifications  consider DM nutrition referral      Orders:  -     POCT hemoglobin A1c    3. NSTEMI (non-ST elevated myocardial infarction) (HCC)  -     nitroglycerin (NITROSTAT) 0.4 mg SL tablet; Place 1 tablet (0.4 mg total) under the tongue every 5 (five) minutes as needed for chest pain    4. Non-seasonal allergic rhinitis due to pollen  -     albuterol (PROVENTIL HFA,VENTOLIN HFA) 90 mcg/act inhaler; Inhale 2 puffs every 4 (four) hours as needed for wheezing    5. Coronary artery disease involving native heart, unspecified vessel or lesion type, unspecified whether angina present    6. Mild persistent asthma without complication  -     Breo Ellipta 100-25 MCG/ACT inhaler; Inhale 1 puff daily Rinse mouth after use.          Schedule a follow-up appointment in 1 month for recheck cough with me.    HEALTH MAINTENANCE     Immunization History   Administered Date(s) Administered    COVID-19 PFIZER VACCINE 0.3 ML IM 03/30/2021, 04/21/2021, 01/03/2022    INFLUENZA 10/10/2017    Influenza Quadrivalent, 6-35 Months IM 10/10/2017    Influenza, high dose seasonal 0.7 mL 10/02/2019, 10/06/2020, 11/02/2021    Pneumococcal Conjugate 13-Valent 02/19/2020    Pneumococcal Polysaccharide PPV23 01/01/2008    Tdap 03/11/2020     Immunizations:  UTD  Screening:  UTD       CHIEF COMPLAINT     Chief Complaint   Patient presents with    Personal Problem     Patient requested to speak privately with the doctor        HISTORY OF PRESENT ILLNESS     Patient is a pleasant 83 year-old female with PMHx of DM2, combined CHF, NSTEMI s/p stent LAD, HTN, b/l LE thrombosis, 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 Coughing, drinking water, taking corecedin, cough getting better, but endorses orthopnea, needs to sleep on two pillows at night, which is new  Going to [employer], where sick contacts, but not feeling feverish or congested    +LE edema and compliance with medication    Patient recently ate macaroni and cheese, canned chicken noodle soup and canned tomato soup for holidays      REVIEW OF SYSTEMS     Review of Systems   Constitutional:  Positive for activity change, chills and fatigue. Negative for fever.   HENT:  Negative for congestion.    Respiratory:  Positive for cough, chest tightness and shortness of breath.    Cardiovascular:  Negative for palpitations.   Gastrointestinal:  Negative for abdominal pain, constipation, diarrhea, nausea and vomiting.   Musculoskeletal:  Positive for gait problem.   Neurological:  Negative for dizziness, light-headedness and headaches.     OBJECTIVE     Vitals:    12/28/23 1535   BP: 138/86   BP Location: Right arm   Patient Position: Sitting   Cuff Size: Standard   Pulse: 77   Temp: 98.6 °F (37 °C)   TempSrc: Temporal   SpO2: 97%   Weight: 81.6 kg (180 lb)     Physical Exam  Constitutional:       General: She is not in acute distress.     Appearance: She is well-developed. She is obese.   HENT:      Head: Normocephalic and atraumatic.      Nose: Nose normal. No congestion or rhinorrhea.      Mouth/Throat:      Mouth: Mucous membranes are moist.   Eyes:      General: No scleral icterus.     Conjunctiva/sclera: Conjunctivae normal.   Neck:      Thyroid: No thyromegaly.   Cardiovascular:      Rate and Rhythm: Normal rate and regular rhythm.      Heart sounds: Normal heart sounds. No murmur heard.     No friction rub. No gallop.   Pulmonary:      Effort: Pulmonary effort is normal. No respiratory distress.      Breath sounds: No stridor. Wheezing (mild LLL) present. No rales.      Comments: No egophony or whispered pectoriloquy  Mild  conversational dyspnea  Decreased air movement globally  No tactile fremitus   Abdominal:      General: Bowel sounds are normal. There is no distension.      Palpations: Abdomen is soft. There is no mass.      Tenderness: There is no abdominal tenderness. There is no guarding or rebound.   Musculoskeletal:         General: No deformity.      Cervical back: Neck supple.      Right lower leg: Edema (1+ pitting) present.      Left lower leg: Edema (1+ pitting) present.   Skin:     General: Skin is warm.      Capillary Refill: Capillary refill takes less than 2 seconds.      Findings: No erythema or rash.   Neurological:      Mental Status: She is alert and oriented to person, place, and time.   Psychiatric:         Behavior: Behavior normal.         Thought Content: Thought content normal.         Judgment: Judgment normal.         Pertinent Laboratory/Diagnostic Studies:  CBC:   Lab Results   Component Value Date/Time    WBC 5.77 10/17/2023 09:24 AM    WBC 5.84 05/04/2015 09:56 AM    RBC 4.22 10/17/2023 09:24 AM    RBC 4.44 05/04/2015 09:56 AM    HGB 10.7 (L) 10/17/2023 09:24 AM    HGB 11.6 05/04/2015 09:56 AM    HCT 34.5 (L) 10/17/2023 09:24 AM    HCT 35.2 05/04/2015 09:56 AM    MCV 82 10/17/2023 09:24 AM    MCV 79 (L) 05/04/2015 09:56 AM    MCH 25.4 (L) 10/17/2023 09:24 AM    MCH 26.1 (L) 05/04/2015 09:56 AM    MCHC 31.0 (L) 10/17/2023 09:24 AM    MCHC 33.0 05/04/2015 09:56 AM    RDW 15.2 (H) 10/17/2023 09:24 AM    RDW 13.6 05/04/2015 09:56 AM    MPV 11.8 10/17/2023 09:24 AM    MPV 11.3 05/04/2015 09:56 AM     10/17/2023 09:24 AM     05/04/2015 09:56 AM    NRBC 0 10/17/2023 09:24 AM    NEUTOPHILPCT 73 10/17/2023 09:24 AM    NEUTOPHILPCT 63 05/04/2015 09:56 AM    LYMPHOPCT 17 10/17/2023 09:24 AM    LYMPHOPCT 28 05/04/2015 09:56 AM    MONOPCT 7 10/17/2023 09:24 AM    MONOPCT 6 05/04/2015 09:56 AM    EOSPCT 2 10/17/2023 09:24 AM    EOSPCT 2 05/04/2015 09:56 AM    BASOPCT 1 10/17/2023 09:24 AM    BASOPCT 1  "05/04/2015 09:56 AM    NEUTROABS 4.19 10/17/2023 09:24 AM    NEUTROABS 3.68 05/04/2015 09:56 AM    LYMPHSABS 0.98 10/17/2023 09:24 AM    LYMPHSABS 1.64 05/04/2015 09:56 AM    MONOSABS 0.41 10/17/2023 09:24 AM    MONOSABS 0.35 05/04/2015 09:56 AM    EOSABS 0.14 10/17/2023 09:24 AM    EOSABS 0.12 05/04/2015 09:56 AM     Chemistry Profile:   Lab Results   Component Value Date/Time     09/22/2015 12:25 PM    K 3.5 10/17/2023 09:24 AM    K 3.6 09/22/2015 12:25 PM     10/17/2023 09:24 AM     09/22/2015 12:25 PM    CO2 29 10/17/2023 09:24 AM    CO2 27 09/22/2015 12:25 PM    ANIONGAP 13 09/22/2015 12:25 PM    BUN 58 (H) 10/17/2023 09:24 AM    BUN 29 (H) 09/22/2015 12:25 PM    CREATININE 1.48 (H) 10/17/2023 09:24 AM    CREATININE 0.96 09/22/2015 12:25 PM    GLUC 170 (H) 10/17/2023 09:24 AM    GLUF 199 (H) 06/12/2023 12:48 PM    GLUCOSE 170 (H) 09/22/2015 12:25 PM    CALCIUM 9.2 10/17/2023 09:24 AM    CALCIUM 9.3 09/22/2015 12:25 PM    CORRECTEDCA 9.8 03/25/2021 05:11 AM    MG 2.4 03/01/2023 11:40 AM    PHOS 3.0 08/02/2023 11:42 AM    AST 15 10/17/2023 09:24 AM    AST 10 09/22/2015 12:25 PM    ALT 19 10/17/2023 09:24 AM    ALT 23 09/22/2015 12:25 PM    ALKPHOS 65 10/17/2023 09:24 AM    ALKPHOS 94 09/22/2015 12:25 PM    PROT 8.1 09/22/2015 12:25 PM    BILITOT 0.80 09/22/2015 12:25 PM    EGFR 32 10/17/2023 09:24 AM         Lab Units 05/10/23  1341   HDL mg/dL 55   LDL CALC mg/dL 60               Invalid input(s): \"LABALBU\"      Lab Results   Component Value Date    PHOS 3.0 08/02/2023    PHOS 3.4 03/01/2023    PHOS 3.0 06/29/2022              0   Lab Value Date/Time    TROPONINI 10.30 (H) 05/25/2021 0448    TROPONINI 13.60 (H) 05/24/2021 2313    TROPONINI 11.50 (H) 05/24/2021 1839    TROPONINI 5.58 (H) 05/24/2021 1243    TROPONINI 5.21 (H) 05/24/2021 0947    TROPONINI 0.10 (H) 03/23/2021 2131    TROPONINI 0.11 (H) 03/23/2021 1838    TROPONINI 0.09 (H) 03/23/2021 1439    TROPONINI <0.02 10/01/2018 1331    " "TROPONINI <0.02 04/12/2018 0336    TROPONINI <0.02 04/12/2018 0015    TROPONINI <0.02 04/11/2018 2046    TROPONINI <0.02 04/11/2018 1436     ABG:No results found for: \"PHART\", \"ZOD1DTN\", \"PO2ART\", \"DLV7OIZ\", \"W8VCVXOG\", \"BEART\", \"SOURCE\"    CURRENT MEDICATIONS     Current Outpatient Medications:     acetaminophen (TYLENOL) 650 mg CR tablet, Take 1 tablet (650 mg total) by mouth every 6 (six) hours as needed for mild pain, Disp: 90 tablet, Rfl: 3    albuterol (PROVENTIL HFA,VENTOLIN HFA) 90 mcg/act inhaler, Inhale 2 puffs every 4 (four) hours as needed for wheezing, Disp: 8.5 g, Rfl: 5    benzonatate (TESSALON) 200 MG capsule, Take 1 capsule (200 mg total) by mouth 3 (three) times a day as needed for cough, Disp: 20 capsule, Rfl: 0    Breo Ellipta 100-25 MCG/ACT inhaler, Inhale 1 puff daily Rinse mouth after use., Disp: 60 each, Rfl: 5    metolazone (ZAROXOLYN) 5 mg tablet, Take 1 tablet (5 mg total) by mouth every other day, Disp: 14 tablet, Rfl: 0    nitroglycerin (NITROSTAT) 0.4 mg SL tablet, Place 1 tablet (0.4 mg total) under the tongue every 5 (five) minutes as needed for chest pain, Disp: 25 tablet, Rfl: 1    atorvastatin (LIPITOR) 40 mg tablet, TAKE 1 TABLET (40 MG TOTAL) BY MOUTH DAILY, Disp: 90 tablet, Rfl: 3    Blood Glucose Monitoring Suppl (FREESTYLE LITE) JAQUELIN, by Does not apply route daily, Disp: 1 each, Rfl: 0    carvedilol (COREG) 6.25 mg tablet, TAKE 1 TABLET (6.25 MG TOTAL) BY MOUTH 2 (TWO) TIMES A DAY WITH MEALS, Disp: 60 tablet, Rfl: 5    Cholecalciferol (D3) 50 MCG (2000 UT) TABS, Take 1 tablet (2,000 Units total) by mouth daily, Disp: 90 tablet, Rfl: 3    clopidogrel (PLAVIX) 75 mg tablet, TAKE 2 TABLETS DAILY, Disp: 180 tablet, Rfl: 3    Continuous Blood Gluc  (FreeStyle Naldo 14 Day [place]) JAQUELIN, USE TO RECORD BLOOD GLUCOSE 4 TIMES DAILY. ONCE FASTING AND THREE TIMES DAILY BEFORE MEALS, Disp: 4 each, Rfl: 3    Continuous Blood Gluc Sensor (FreeStyle Naldo 2 Sensor) MISC, USE ONE " SENSOR EVERY 14 DAYS, Disp: 6 each, Rfl: 1    estrogens, conjugated (Premarin) vaginal cream, Insert 1 g into the vagina 2 (two) times a week, Disp: 30 g, Rfl: 3    ferrous sulfate 325 (65 Fe) mg tablet, TAKE 1 TABLET TWICE A DAY BEFORE MEALS, Disp: 180 tablet, Rfl: 0    glucose blood (FREESTYLE LITE) test strip, TEST AS DIRECTED UP TO FOUR TIMES A DAY, Disp: 100 each, Rfl: 3    glucose monitoring kit (FREESTYLE) monitoring kit, Use 1 each as needed (Three time daily) Please check blood sugar 3 times daily., Disp: 1 each, Rfl: 1    Glucose-Vitamin C-Vitamin D (TRUEPLUS GLUCOSE ON THE GO) CHEW, , Disp: , Rfl:     ketoconazole (NIZORAL) 2 % shampoo, APPLY TOPICALLY TO THE SCALP ONCE EVERY OTHER WEEK, Disp: 120 mL, Rfl: 0    Lancets (FREESTYLE) lancets, Use as instructed, Disp: 100 each, Rfl: 0    latanoprost (XALATAN) 0.005 % ophthalmic solution, , Disp: , Rfl:     linaGLIPtin (Tradjenta) 5 MG TABS, TAKE 1 TABLET (5 MG) BY MOUTH DAILY, Disp: 90 tablet, Rfl: 3    losartan (COZAAR) 25 mg tablet, TAKE 1 TABLET (25 MG TOTAL) BY MOUTH DAILY, Disp: 30 tablet, Rfl: 5    Misc. Devices (QUAD CANE) MISC, by Does not apply route daily, Disp: 1 each, Rfl: 0    montelukast (SINGULAIR) 10 mg tablet, TAKE 1 TABLET (10 MG TOTAL) BY MOUTH DAILY AT BEDTIME, Disp: 90 tablet, Rfl: 3    potassium chloride (K-DUR,KLOR-CON) 20 mEq tablet, TAKE 1 TABLET (20 HASMUKH EQUIVALENT TOTAL) BY MOUTH DAILY, Disp: 30 tablet, Rfl: 2    ranolazine (RANEXA) 500 mg 12 hr tablet, Take 1 tablet (500 mg total) by mouth every 12 (twelve) hours, Disp: 60 tablet, Rfl: 0    repaglinide (PRANDIN) 0.5 mg tablet, TAKE 1 TABLET (0.5 MG TOTAL) BY MOUTH 2 (TWO) TIMES A DAY BEFORE MEALS (SKIP DOSE IF SKIPPING MEAL), Disp: 180 tablet, Rfl: 1    rivaroxaban (Xarelto) 2.5 mg tablet, TAKE 1 TABLET (2.5 MG TOTAL) BY MOUTH 2 (TWO) TIMES A DAY WITH MEALS, Disp: 180 tablet, Rfl: 1    torsemide (DEMADEX) 20 mg tablet, TAKE 3 TABLETS (60 MG TOTAL) BY MOUTH DAILY, Disp: 252 tablet,  Rfl: 3    PAST MEDICAL & SURGICAL HISTORY     Past Medical History:   Diagnosis Date    ACE-inhibitor cough     last assessed - 2017    Asthma     Bilateral edema of lower extremity     last assessed - 2017    Cardiac murmur     last assessed - 2017    CKD (chronic kidney disease)     Diabetes mellitus (HCC)     last assessed - 2017    Esophageal dysphagia     Fatigue     last assessed - 2017    Hyperlipidemia     Hypertension     Patellar bursitis of right knee     last assessed - 2016    Personal history of other specified conditions     presenting hazards to health    Stroke (HCC)     Stroke syndrome     Urinary frequency     UTI (urinary tract infection)      Past Surgical History:   Procedure Laterality Date    CARDIAC CATHETERIZATION N/A 2023    Procedure: Cardiac Coronary Angiogram;  Surgeon: Roe German MD;  Location: [facility];  Service: Cardiology    CARDIAC CATHETERIZATION  2023    Procedure: Cardiac Left Heart Cath;  Surgeon: Roe German MD;  Location: [facility];  Service: Cardiology    [place] ESOPHAGOGASTRODUODENOSCOPY TRANSORAL DIAGNOSTIC N/A 2017    Procedure: ESOPHAGOGASTRODUODENOSCOPY (EGD);  Surgeon: Cedric Huang MD;  Location: [facility];  Service: Gastroenterology     SOCIAL & FAMILY HISTORY     Social History     Socioeconomic History    Marital status:      Spouse name: Not on file    Number of children: 8    Years of education: Not on file    Highest education level: Not on file   Occupational History    Occupation: Retired   Tobacco Use    Smoking status: Former     Current packs/day: 0.00     Types: Cigarettes     Quit date:      Years since quittin.0    Smokeless tobacco: Former    Tobacco comments:     quit over 30 yrs ago; (quit in the , had smoked 3PPD for 20 years - per [employer])   Vaping Use    Vaping status: Never Used   Substance and Sexual Activity    Alcohol use: Never    Drug use: Never     Sexual activity: Not Currently   Other Topics Concern    Not on file   Social History Narrative    Diet needs improvement    Does not exercise    No caffeine use     Social Determinants of Health     Financial Resource Strain: Low Risk  (9/14/2023)    Overall Financial Resource Strain (CARDIA)     Difficulty of Paying Living Expenses: Not very hard   Food Insecurity: No Food Insecurity (9/14/2023)    Hunger Vital Sign     Worried About Running Out of Food in the Last Year: Never true     Ran Out of Food in the Last Year: Never true   Transportation Needs: No Transportation Needs (9/14/2023)    PRAPARE - Transportation     Lack of Transportation (Medical): No     Lack of Transportation (Non-Medical): No   Physical Activity: Inactive (4/1/2021)    Exercise Vital Sign     Days of Exercise per Week: 0 days     Minutes of Exercise per Session: 0 min   Stress: No Stress Concern Present (4/1/2021)    [employer] [place] [employer] - Occupational Stress Questionnaire     Feeling of Stress : Not at all   Social Connections: Socially Isolated (4/1/2021)    Social Connection and Isolation Panel [NHANES]     Frequency of Communication with Friends and Family: Once a week     Frequency of Social Gatherings with Friends and Family: Once a week     Attends Baptism Services: 1 to 4 times per year     Active Member of Clubs or Organizations: No     Attends Club or Organization Meetings: Never     Marital Status:    Intimate Partner Violence: Not At Risk (4/1/2021)    Humiliation, Afraid, Rape, and Kick questionnaire     Fear of Current or Ex-Partner: No     Emotionally Abused: No     Physically Abused: No     Sexually Abused: No   Housing Stability: Not on file     Social History     Substance and Sexual Activity   Alcohol Use Never       Social History     Substance and Sexual Activity   Drug Use Never     Social History     Tobacco Use   Smoking Status Former    Current packs/day: 0.00    Types: Cigarettes     Quit date:     Years since quittin.0   Smokeless Tobacco Former   Tobacco Comments    quit over 30 yrs ago; (quit in the , had smoked 3PPD for 20 years - per [employer])     Family History   Problem Relation Age of Onset    Hypertension Mother     Stroke Mother     Heart disease Father     Other Sister         1)Breast disorder; 2)Epilepsy    Migraines Sister         Migraine headaches    Osteoporosis Sister     Thyroid disease Sister     Coronary artery disease Sister         S/P triple vessel bypass    Breast cancer Sister 80    No Known Problems Sister     No Known Problems Sister     Osteoporosis Maternal Grandmother     No Known Problems Maternal Grandfather     No Known Problems Paternal Grandmother     No Known Problems Paternal Grandfather     Diabetes Son         Diabetes mellitus    Hypertension Son     Rheum arthritis Son         Rheumatic disease    No Known Problems Son     No Known Problems Son     No Known Problems Son     No Known Problems Son     No Known Problems Son     No Known Problems Maternal Aunt     No Known Problems Maternal Aunt     No Known Problems Maternal Aunt     No Known Problems Paternal Aunt     No Known Problems Paternal Aunt     Heart disease Family      ==  Carmen Molina DO    [facility]    [address], [address]  [place] [address]  Office: [phone]  Fax: [phone]

## 2023-12-28 NOTE — ASSESSMENT & PLAN NOTE
Lab Results   Component Value Date    HGBA1C 7.1 (A) 12/28/2023     Currently at goal for A1c (<7%, <8% if >80)  Continue current regimen of repaglinide 0.5mg BID, linagliptin   Considering age andA1c, would recommend stopping sulfonureas and managing through diet  not demonstrating signs of hypoglycemia  Continue Ace-i/arb  + on statin, continue/consider adding            repeat lipids q3yrs, due 2024  + following optho, next eye exam due 2024  + following podiatry, next foot exam due 2024  yearly micro albumin creatinine ratio is UTD, due 2024  Carb controlled diet, discussed healthy lifestyle modifications  consider DM nutrition referral

## 2024-01-06 NOTE — PROGRESS NOTES
OB/GYN VISIT  Jennifer Amaya  2024  3:27 PM    Subjective:     Jennifer Amaya is a 83 y.o.  female who presents for pessary follow up.  She was previously fitted for a #7 ring with support pessary which unfortunately fell out at home.  She has a grade 3 cystocele.  She is not interested in any sort of surgical management.  Prefers to care for her pessary including cleansing it and replacing it at home.  She reports that she does not does have prolapse while only bearing down but all the time, including while laying in bed.     Objective:    Vitals: Blood pressure 110/71, pulse 69, height 5' (1.524 m), weight 75.7 kg (166 lb 12.8 oz), not currently breastfeeding.Body mass index is 32.58 kg/m².      Assessment/Plan:  Problem List Items Addressed This Visit       Midline cystocele - Primary     Jennifer needs a bigger pessary, but unfortunately the #7 ring is the largest one we have in our fitting kit.  We will order a #8 and #9, and fit her for them.  I did offer referral to urogynecology, she has seen urogynecology in [place] before, they had fitted her for a cube which got stuck and she had to go to the emergency room for removal.  She is not interested in seeing urogynecology again.            Seen with Dr. Leandro Devine DO  2024  3:27 PM        Please note that while the recent CURES Act permits medical records be visible for patient review, clinical documentation is intended for utilization by healthcare professionals.  All test results are immediately available through [employer] as well, and we will review results at earliest opportunity and contact you with the appropriate urgency.  If you have a question about something you see in your chart, please don't hesitate to ask about it at your next appointment.

## 2024-01-08 ENCOUNTER — OFFICE VISIT (OUTPATIENT)
Dept: OBGYN CLINIC | Facility: CLINIC | Age: 84
End: 2024-01-08

## 2024-01-08 VITALS
WEIGHT: 166.8 LBS | BODY MASS INDEX: 32.75 KG/M2 | DIASTOLIC BLOOD PRESSURE: 71 MMHG | HEIGHT: 60 IN | SYSTOLIC BLOOD PRESSURE: 110 MMHG | HEART RATE: 69 BPM

## 2024-01-08 DIAGNOSIS — N81.11 MIDLINE CYSTOCELE: Primary | ICD-10-CM

## 2024-01-08 PROBLEM — N81.3 UTERINE PROCIDENTIA: Status: RESOLVED | Noted: 2018-02-28 | Resolved: 2024-01-08

## 2024-01-08 PROCEDURE — 99213 OFFICE O/P EST LOW 20 MIN: CPT | Performed by: OBSTETRICS & GYNECOLOGY

## 2024-01-08 NOTE — PATIENT INSTRUCTIONS
If you would like a second opinion:    Dr. Heriberto Rod [facility]   Phone Number Called: [phone] (home)       Call outcome: Left detailed message for patient. Informed to call back with any additional questions.    Message: TRISTAN.

## 2024-01-08 NOTE — ASSESSMENT & PLAN NOTE
Jennifer needs a bigger pessary, but unfortunately the #7 ring is the largest one we have in our fitting kit.  We will order a #8 and #9, and fit her for them.  I did offer referral to urogynecology, she has seen urogynecology in [place] before, they had fitted her for a cube which got stuck and she had to go to the emergency room for removal.  She is not interested in seeing urogynecology again.

## 2024-01-09 ENCOUNTER — DOCUMENTATION (OUTPATIENT)
Dept: OBGYN CLINIC | Facility: CLINIC | Age: 84
End: 2024-01-09

## 2024-01-09 NOTE — PROGRESS NOTES
Pessary ring size #8 & #9 with support ordered today 1/9/24. Items are non stock with estimated delivery of 2-4wks. Req # for [facility] order is [id number].

## 2024-01-23 ENCOUNTER — TELEPHONE (OUTPATIENT)
Dept: OBGYN CLINIC | Facility: CLINIC | Age: 84
End: 2024-01-23

## 2024-01-26 ENCOUNTER — HOSPITAL ENCOUNTER (OUTPATIENT)
Dept: NON INVASIVE DIAGNOSTICS | Facility: HOSPITAL | Age: 84
Discharge: HOME | End: 2024-01-26
Attending: STUDENT IN AN ORGANIZED HEALTH CARE EDUCATION/TRAINING PROGRAM
Payer: MEDICARE

## 2024-01-26 VITALS
BODY MASS INDEX: 32.59 KG/M2 | WEIGHT: 166 LBS | HEART RATE: 69 BPM | HEIGHT: 60 IN | SYSTOLIC BLOOD PRESSURE: 110 MMHG | DIASTOLIC BLOOD PRESSURE: 71 MMHG

## 2024-01-26 DIAGNOSIS — I50.43 ACUTE ON CHRONIC COMBINED SYSTOLIC AND DIASTOLIC CONGESTIVE HEART FAILURE (HCC): ICD-10-CM

## 2024-01-26 LAB
AORTIC ROOT: 3.4 CM
AORTIC VALVE MEAN VELOCITY: 9.3 M/S
APICAL FOUR CHAMBER EJECTION FRACTION: 40 %
ASCENDING AORTA: 3.9 CM
AV LVOT MEAN GRADIENT: 1 MMHG
AV LVOT PEAK GRADIENT: 2 MMHG
AV MEAN GRADIENT: 4 MMHG
AV PEAK GRADIENT: 8 MMHG
AV VELOCITY RATIO: 0.55
BSA FOR ECHO PROCEDURE: 1.72 M2
DOP CALC AO PEAK VEL: 1.43 M/S
DOP CALC AO VTI: 31.28 CM
DOP CALC LVOT PEAK VEL VTI: 17.26 CM
DOP CALC LVOT PEAK VEL: 0.78 M/S
E WAVE DECELERATION TIME: 238 MS
E/A RATIO: 2.51
FRACTIONAL SHORTENING: 14 (ref 28–44)
INTERVENTRICULAR SEPTUM IN DIASTOLE (PARASTERNAL SHORT AXIS VIEW): 1.3 CM
INTERVENTRICULAR SEPTUM: 1.3 CM (ref 0.6–1.1)
LAAS-AP2: 42 CM2
LAAS-AP4: 38 CM2
LEFT ATRIUM SIZE: 5 CM
LEFT ATRIUM VOLUME (MOD BIPLANE): 186 ML
LEFT ATRIUM VOLUME INDEX (MOD BIPLANE): 107.5 ML/M2
LEFT INTERNAL DIMENSION IN SYSTOLE: 4.2 CM (ref 2.1–4)
LEFT VENTRICLE DIASTOLIC VOLUME (MOD BIPLANE): 158 ML
LEFT VENTRICLE DIASTOLIC VOLUME INDEX (MOD BIPLANE): 91.9 ML/M2
LEFT VENTRICLE SYSTOLIC VOLUME (MOD BIPLANE): 100 ML
LEFT VENTRICLE SYSTOLIC VOLUME INDEX (MOD BIPLANE): 58.1 ML/M2
LEFT VENTRICULAR INTERNAL DIMENSION IN DIASTOLE: 4.9 CM (ref 3.5–6)
LEFT VENTRICULAR POSTERIOR WALL IN END DIASTOLE: 1 CM
LEFT VENTRICULAR STROKE VOLUME: 35 ML
LV EF: 37 %
LVSV (TEICH): 35 ML
MV PEAK A VEL: 0.39 M/S
MV PEAK E VEL: 98 CM/S
MV STENOSIS PRESSURE HALF TIME: 69 MS
MV VALVE AREA P 1/2 METHOD: 3.19
RA PRESSURE ESTIMATED: 8 MMHG
RIGHT VENTRICLE ID DIMENSION: 4.3 CM
RV PSP: 63 MMHG
SL CV LEFT ATRIUM LENGTH A2C: 7.1 CM
SL CV LV EF: 40
SL CV PED ECHO LEFT VENTRICLE DIASTOLIC VOLUME (MOD BIPLANE) 2D: 115 ML
SL CV PED ECHO LEFT VENTRICLE SYSTOLIC VOLUME (MOD BIPLANE) 2D: 80 ML
TR MAX PG: 55 MMHG
TR PEAK VELOCITY: 3.7 M/S
TRICUSPID ANNULAR PLANE SYSTOLIC EXCURSION: 1.7 CM
TRICUSPID VALVE PEAK REGURGITATION VELOCITY: 3.89 M/S

## 2024-01-26 PROCEDURE — 93306 TTE W/DOPPLER COMPLETE: CPT

## 2024-01-26 PROCEDURE — 93306 TTE W/DOPPLER COMPLETE: CPT | Performed by: INTERNAL MEDICINE

## 2024-02-08 ENCOUNTER — OFFICE VISIT (OUTPATIENT)
Dept: INTERNAL MEDICINE CLINIC | Facility: CLINIC | Age: 84
End: 2024-02-08

## 2024-02-08 VITALS
TEMPERATURE: 98 F | WEIGHT: 165 LBS | SYSTOLIC BLOOD PRESSURE: 114 MMHG | DIASTOLIC BLOOD PRESSURE: 64 MMHG | BODY MASS INDEX: 32.22 KG/M2 | HEART RATE: 71 BPM

## 2024-02-08 DIAGNOSIS — I50.43 ACUTE ON CHRONIC COMBINED SYSTOLIC AND DIASTOLIC CONGESTIVE HEART FAILURE (HCC): Primary | ICD-10-CM

## 2024-02-08 PROCEDURE — 99214 OFFICE O/P EST MOD 30 MIN: CPT | Performed by: STUDENT IN AN ORGANIZED HEALTH CARE EDUCATION/TRAINING PROGRAM

## 2024-02-08 NOTE — ASSESSMENT & PLAN NOTE
Wt Readings from Last 3 Encounters:   02/08/24 74.8 kg (165 lb)   01/26/24 75.3 kg (166 lb)   01/08/24 75.7 kg (166 lb 12.8 oz)     B/l clear to auscultation, no W/R/R    does not follow with heart failure team, sees cards  continue torsemide, BB, and ace-i/arb  limit water and salt intake, counseled on 1800mL fluid restriction and <2g salt per day and their contribution to consider nutrition counseling/referral  baseline not on supplemental O2  goal: K>4, Mg >2, supplement as needed       S/p metolazone every other day for 14 doses   Now having dry mouth   Rx for oral care, education of need to be slightly hypovolemic for optimization of heart, and encouraged small sips of water or use of ice chips in addition to oral care sponge lolipop (which can also be purchased OTC)  encouraged continued compliance with torsemide   ER precautions given  CXR never completed

## 2024-02-08 NOTE — PROGRESS NOTES
[facility]  INTERNAL MEDICINE OFFICE VISIT     PATIENT INFORMATION     Jennifer Amaya   83 y.o. female   MRN: [id number]    ASSESSMENT/PLAN     1. Acute on chronic combined systolic and diastolic congestive heart failure (HCC)  Assessment & Plan:  Wt Readings from Last 3 Encounters:   02/08/24 74.8 kg (165 lb)   01/26/24 75.3 kg (166 lb)   01/08/24 75.7 kg (166 lb 12.8 oz)     B/l clear to auscultation, no W/R/R    does not follow with heart failure team, sees cards  continue torsemide, BB, and ace-i/arb  limit water and salt intake, counseled on 1800mL fluid restriction and <2g salt per day and their contribution to consider nutrition counseling/referral  baseline not on supplemental O2  goal: K>4, Mg >2, supplement as needed       S/p metolazone every other day for 14 doses   Now having dry mouth   Rx for oral care, education of need to be slightly hypovolemic for optimization of heart, and encouraged small sips of water or use of ice chips in addition to oral care sponge lolipop (which can also be purchased OTC)  encouraged continued compliance with torsemide   ER precautions given  CXR never completed          Orders:  -     Oral Hygiene Products (Extended Term Oral Care System) KIT; Apply to the mouth or throat 2 (two) times a day  -     SODIUM FLUORIDE, DENTAL RINSE, 0.02 % SOLN; Apply 15 mL to the mouth or throat 2 (two) times a day          Schedule a follow-up appointment in 6months for MAWV with me.    HEALTH MAINTENANCE     Immunization History   Administered Date(s) Administered    COVID-19 PFIZER VACCINE 0.3 ML IM 03/30/2021, 04/21/2021, 01/03/2022    COVID-19 Pfizer Vac BIVALENT [place]-sucrose 12 Yr+ IM 11/15/2022    INFLUENZA 10/10/2017    Influenza Quadrivalent, 6-35 Months IM 10/10/2017    Influenza, high dose seasonal 0.7 mL 10/02/2019, 10/06/2020, 11/02/2021    Pneumococcal Conjugate 13-Valent 02/19/2020    Pneumococcal Polysaccharide PPV23 01/01/2008    Tdap  03/11/2020     Immunizations:  Defers flu  UTD  Screening:  UTD  BMI Counseling: Body mass index is 32.22 kg/m². The BMI is above normal. Nutrition recommendations include encouraging healthy choices of fruits and vegetables, decreasing fast food intake, limiting drinks that contain sugar, moderation in carbohydrate intake, increasing intake of lean protein and reducing intake of saturated and trans fat. Exercise recommendations include exercising 3-5 times per week. No pharmacotherapy was ordered. Rationale for BMI follow-up plan is due to patient being overweight or obese.         CHIEF COMPLAINT     Chief Complaint   Patient presents with    Follow-up     Follow up CHF      HISTORY OF PRESENT ILLNESS     Patient is a pleasant 83 year-old female with PMHx of combined CHF EF 40%, DM2, mild persistent asthma, HTN, b/l LE thrombosis, CKD compounded by anemia and vit D deficiency who presents today for follow up of acute CHF exacerbation. She was compliant with torsemide and metolazone. She is no longer coughing and feels much better. No dizziness, falls, lightheadedness, or syncope. Patient does complain of dry mouth, which we reviewed was likely 2/2 to diuresis and need to remain slightly dry in setting of CHF. Answered all questions to satisfaction      REVIEW OF SYSTEMS     Review of Systems   Constitutional:  Negative for fatigue and fever.   Respiratory:  Negative for cough, choking, shortness of breath and wheezing.    Cardiovascular:  Negative for chest pain and palpitations.   Gastrointestinal:  Negative for abdominal pain, constipation, diarrhea, nausea and vomiting.   Neurological:  Negative for dizziness, syncope and light-headedness.     OBJECTIVE     Vitals:    02/08/24 1417   BP: 114/64   BP Location: Right arm   Patient Position: Sitting   Cuff Size: Large   Pulse: 71   Temp: 98 °F (36.7 °C)   TempSrc: Temporal   Weight: 74.8 kg (165 lb)     Physical Exam  Constitutional:       General: She is not in  acute distress.     Appearance: She is well-developed.   HENT:      Head: Normocephalic and atraumatic.   Eyes:      General: No scleral icterus.     Conjunctiva/sclera: Conjunctivae normal.   Neck:      Thyroid: No thyromegaly.   Cardiovascular:      Rate and Rhythm: Normal rate and regular rhythm.      Heart sounds: Normal heart sounds. No murmur heard.     No friction rub. No gallop.   Pulmonary:      Effort: Pulmonary effort is normal. No respiratory distress.      Breath sounds: Normal breath sounds. No stridor. No wheezing or rales.   Abdominal:      General: Bowel sounds are normal. There is no distension.      Palpations: Abdomen is soft. There is no mass.      Tenderness: There is no abdominal tenderness. There is no guarding or rebound.   Musculoskeletal:         General: No deformity.      Cervical back: Neck supple.      Right lower leg: No edema.      Left lower leg: No edema.   Skin:     General: Skin is warm.      Capillary Refill: Capillary refill takes less than 2 seconds.      Findings: No erythema or rash.   Neurological:      Mental Status: She is alert and oriented to person, place, and time.   Psychiatric:         Behavior: Behavior normal.         Thought Content: Thought content normal.         Judgment: Judgment normal.       Pertinent Laboratory/Diagnostic Studies:  CBC:   Lab Results   Component Value Date/Time    WBC 5.77 10/17/2023 09:24 AM    WBC 5.84 05/04/2015 09:56 AM    RBC 4.22 10/17/2023 09:24 AM    RBC 4.44 05/04/2015 09:56 AM    HGB 10.7 (L) 10/17/2023 09:24 AM    HGB 11.6 05/04/2015 09:56 AM    HCT 34.5 (L) 10/17/2023 09:24 AM    HCT 35.2 05/04/2015 09:56 AM    MCV 82 10/17/2023 09:24 AM    MCV 79 (L) 05/04/2015 09:56 AM    MCH 25.4 (L) 10/17/2023 09:24 AM    MCH 26.1 (L) 05/04/2015 09:56 AM    MCHC 31.0 (L) 10/17/2023 09:24 AM    MCHC 33.0 05/04/2015 09:56 AM    RDW 15.2 (H) 10/17/2023 09:24 AM    RDW 13.6 05/04/2015 09:56 AM    MPV 11.8 10/17/2023 09:24 AM    MPV 11.3  "05/04/2015 09:56 AM     10/17/2023 09:24 AM     05/04/2015 09:56 AM    NRBC 0 10/17/2023 09:24 AM    NEUTOPHILPCT 73 10/17/2023 09:24 AM    NEUTOPHILPCT 63 05/04/2015 09:56 AM    LYMPHOPCT 17 10/17/2023 09:24 AM    LYMPHOPCT 28 05/04/2015 09:56 AM    MONOPCT 7 10/17/2023 09:24 AM    MONOPCT 6 05/04/2015 09:56 AM    EOSPCT 2 10/17/2023 09:24 AM    EOSPCT 2 05/04/2015 09:56 AM    BASOPCT 1 10/17/2023 09:24 AM    BASOPCT 1 05/04/2015 09:56 AM    NEUTROABS 4.19 10/17/2023 09:24 AM    NEUTROABS 3.68 05/04/2015 09:56 AM    LYMPHSABS 0.98 10/17/2023 09:24 AM    LYMPHSABS 1.64 05/04/2015 09:56 AM    MONOSABS 0.41 10/17/2023 09:24 AM    MONOSABS 0.35 05/04/2015 09:56 AM    EOSABS 0.14 10/17/2023 09:24 AM    EOSABS 0.12 05/04/2015 09:56 AM     Chemistry Profile:   Lab Results   Component Value Date/Time     09/22/2015 12:25 PM    K 3.5 10/17/2023 09:24 AM    K 3.6 09/22/2015 12:25 PM     10/17/2023 09:24 AM     09/22/2015 12:25 PM    CO2 29 10/17/2023 09:24 AM    CO2 27 09/22/2015 12:25 PM    ANIONGAP 13 09/22/2015 12:25 PM    BUN 58 (H) 10/17/2023 09:24 AM    BUN 29 (H) 09/22/2015 12:25 PM    CREATININE 1.48 (H) 10/17/2023 09:24 AM    CREATININE 0.96 09/22/2015 12:25 PM    GLUC 170 (H) 10/17/2023 09:24 AM    GLUF 199 (H) 06/12/2023 12:48 PM    GLUCOSE 170 (H) 09/22/2015 12:25 PM    CALCIUM 9.2 10/17/2023 09:24 AM    CALCIUM 9.3 09/22/2015 12:25 PM    CORRECTEDCA 9.8 03/25/2021 05:11 AM    MG 2.4 03/01/2023 11:40 AM    PHOS 3.0 08/02/2023 11:42 AM    AST 15 10/17/2023 09:24 AM    AST 10 09/22/2015 12:25 PM    ALT 19 10/17/2023 09:24 AM    ALT 23 09/22/2015 12:25 PM    ALKPHOS 65 10/17/2023 09:24 AM    ALKPHOS 94 09/22/2015 12:25 PM    PROT 8.1 09/22/2015 12:25 PM    BILITOT 0.80 09/22/2015 12:25 PM    EGFR 32 10/17/2023 09:24 AM         Lab Units 05/10/23  1341   HDL mg/dL 55   LDL CALC mg/dL 60               Invalid input(s): \"LABALBU\"      Lab Results   Component Value Date    PHOS 3.0 " "08/02/2023    PHOS 3.4 03/01/2023    PHOS 3.0 06/29/2022              0   Lab Value Date/Time    TROPONINI 10.30 (H) 05/25/2021 0448    TROPONINI 13.60 (H) 05/24/2021 2313    TROPONINI 11.50 (H) 05/24/2021 1839    TROPONINI 5.58 (H) 05/24/2021 1243    TROPONINI 5.21 (H) 05/24/2021 0947    TROPONINI 0.10 (H) 03/23/2021 2131    TROPONINI 0.11 (H) 03/23/2021 1838    TROPONINI 0.09 (H) 03/23/2021 1439    TROPONINI <0.02 10/01/2018 1331    TROPONINI <0.02 04/12/2018 0336    TROPONINI <0.02 04/12/2018 0015    TROPONINI <0.02 04/11/2018 2046    TROPONINI <0.02 04/11/2018 1436     ABG:No results found for: \"PHART\", \"SFM1HKQ\", \"PO2ART\", \"GOD5ITJ\", \"B4ALOLYD\", \"BEART\", \"SOURCE\"    CURRENT MEDICATIONS     Current Outpatient Medications:     Oral Hygiene Products (Extended Term Oral Care System) KIT, Apply to the mouth or throat 2 (two) times a day, Disp: 1 kit, Rfl: 1    SODIUM FLUORIDE, DENTAL RINSE, 0.02 % SOLN, Apply 15 mL to the mouth or throat 2 (two) times a day, Disp: 532 mL, Rfl: 1    acetaminophen (TYLENOL) 650 mg CR tablet, Take 1 tablet (650 mg total) by mouth every 6 (six) hours as needed for mild pain, Disp: 90 tablet, Rfl: 3    albuterol (PROVENTIL HFA,VENTOLIN HFA) 90 mcg/act inhaler, Inhale 2 puffs every 4 (four) hours as needed for wheezing, Disp: 8.5 g, Rfl: 5    atorvastatin (LIPITOR) 40 mg tablet, TAKE 1 TABLET (40 MG TOTAL) BY MOUTH DAILY, Disp: 90 tablet, Rfl: 3    benzonatate (TESSALON) 200 MG capsule, Take 1 capsule (200 mg total) by mouth 3 (three) times a day as needed for cough, Disp: 20 capsule, Rfl: 0    Blood Glucose Monitoring Suppl (FREESTYLE LITE) JAQUELIN, by Does not apply route daily, Disp: 1 each, Rfl: 0    Breo Ellipta 100-25 MCG/ACT inhaler, Inhale 1 puff daily Rinse mouth after use., Disp: 60 each, Rfl: 5    carvedilol (COREG) 6.25 mg tablet, TAKE 1 TABLET (6.25 MG TOTAL) BY MOUTH 2 (TWO) TIMES A DAY WITH MEALS, Disp: 60 tablet, Rfl: 5    Cholecalciferol (D3) 50 MCG (2000 UT) TABS, Take 1 " tablet (2,000 Units total) by mouth daily, Disp: 90 tablet, Rfl: 3    clopidogrel (PLAVIX) 75 mg tablet, TAKE 2 TABLETS DAILY, Disp: 180 tablet, Rfl: 3    Continuous Blood Gluc  (FreeStyle Naldo 14 Day [place]) JAQUELIN, USE TO RECORD BLOOD GLUCOSE 4 TIMES DAILY. ONCE FASTING AND THREE TIMES DAILY BEFORE MEALS, Disp: 4 each, Rfl: 3    Continuous Blood Gluc Sensor (FreeStyle Naldo 2 Sensor) MISC, USE ONE SENSOR EVERY 14 DAYS, Disp: 6 each, Rfl: 1    estrogens, conjugated (Premarin) vaginal cream, Insert 1 g into the vagina 2 (two) times a week, Disp: 30 g, Rfl: 3    ferrous sulfate 325 (65 Fe) mg tablet, TAKE 1 TABLET TWICE A DAY BEFORE MEALS, Disp: 180 tablet, Rfl: 0    glucose blood (FREESTYLE LITE) test strip, TEST AS DIRECTED UP TO FOUR TIMES A DAY, Disp: 100 each, Rfl: 3    glucose monitoring kit (FREESTYLE) monitoring kit, Use 1 each as needed (Three time daily) Please check blood sugar 3 times daily., Disp: 1 each, Rfl: 1    Glucose-Vitamin C-Vitamin D (TRUEPLUS GLUCOSE ON THE GO) CHEW, , Disp: , Rfl:     ketoconazole (NIZORAL) 2 % shampoo, APPLY TOPICALLY TO THE SCALP ONCE EVERY OTHER WEEK, Disp: 120 mL, Rfl: 0    Lancets (FREESTYLE) lancets, Use as instructed, Disp: 100 each, Rfl: 0    latanoprost (XALATAN) 0.005 % ophthalmic solution, , Disp: , Rfl:     linaGLIPtin (Tradjenta) 5 MG TABS, TAKE 1 TABLET (5 MG) BY MOUTH DAILY, Disp: 90 tablet, Rfl: 3    losartan (COZAAR) 25 mg tablet, TAKE 1 TABLET (25 MG TOTAL) BY MOUTH DAILY, Disp: 30 tablet, Rfl: 5    metolazone (ZAROXOLYN) 5 mg tablet, Take 1 tablet (5 mg total) by mouth every other day, Disp: 14 tablet, Rfl: 0    Misc. Devices (QUAD CANE) MISC, by Does not apply route daily, Disp: 1 each, Rfl: 0    montelukast (SINGULAIR) 10 mg tablet, TAKE 1 TABLET (10 MG TOTAL) BY MOUTH DAILY AT BEDTIME, Disp: 90 tablet, Rfl: 3    nitroglycerin (NITROSTAT) 0.4 mg SL tablet, Place 1 tablet (0.4 mg total) under the tongue every 5 (five) minutes as needed for chest  pain, Disp: 25 tablet, Rfl: 1    potassium chloride (K-DUR,KLOR-CON) 20 mEq tablet, TAKE 1 TABLET (20 HASMUKH EQUIVALENT TOTAL) BY MOUTH DAILY, Disp: 30 tablet, Rfl: 2    ranolazine (RANEXA) 500 mg 12 hr tablet, Take 1 tablet (500 mg total) by mouth every 12 (twelve) hours, Disp: 60 tablet, Rfl: 0    repaglinide (PRANDIN) 0.5 mg tablet, TAKE 1 TABLET (0.5 MG TOTAL) BY MOUTH 2 (TWO) TIMES A DAY BEFORE MEALS (SKIP DOSE IF SKIPPING MEAL), Disp: 180 tablet, Rfl: 1    rivaroxaban (Xarelto) 2.5 mg tablet, TAKE 1 TABLET (2.5 MG TOTAL) BY MOUTH 2 (TWO) TIMES A DAY WITH MEALS, Disp: 180 tablet, Rfl: 1    torsemide (DEMADEX) 20 mg tablet, TAKE 3 TABLETS (60 MG TOTAL) BY MOUTH DAILY, Disp: 252 tablet, Rfl: 3    PAST MEDICAL & SURGICAL HISTORY     Past Medical History:   Diagnosis Date    ACE-inhibitor cough     last assessed - 29Dec2017    Asthma     Bilateral edema of lower extremity     last assessed - 21Aug2017    Cardiac murmur     last assessed - 21Aug2017    CKD (chronic kidney disease)     Diabetes mellitus (HCC)     last assessed - 29Nov2017    Esophageal dysphagia     Fatigue     last assessed - 21Aug2017    Hyperlipidemia     Hypertension     Patellar bursitis of right knee     last assessed - 04Nov2016    Personal history of other specified conditions     presenting hazards to health    Stroke (HCC)     Stroke syndrome     Urinary frequency     UTI (urinary tract infection)      Past Surgical History:   Procedure Laterality Date    CARDIAC CATHETERIZATION N/A 6/1/2023    Procedure: Cardiac Coronary Angiogram;  Surgeon: Roe German MD;  Location: [facility];  Service: Cardiology    CARDIAC CATHETERIZATION  6/1/2023    Procedure: Cardiac Left Heart Cath;  Surgeon: Roe German MD;  Location: [facility];  Service: Cardiology    CO ESOPHAGOGASTRODUODENOSCOPY TRANSORAL DIAGNOSTIC N/A 4/5/2017    Procedure: ESOPHAGOGASTRODUODENOSCOPY (EGD);  Surgeon: Cedric Huang MD;  Location: [facility];  Service:  Gastroenterology     SOCIAL & FAMILY HISTORY     Social History     Socioeconomic History    Marital status:      Spouse name: Not on file    Number of children: 8    Years of education: Not on file    Highest education level: Not on file   Occupational History    Occupation: Retired   Tobacco Use    Smoking status: Former     Current packs/day: 0.00     Types: Cigarettes     Quit date:      Years since quittin.1    Smokeless tobacco: Former    Tobacco comments:     quit over 30 yrs ago; (quit in the , had smoked 3PPD for 20 years - per [employer])   Vaping Use    Vaping status: Never Used   Substance and Sexual Activity    Alcohol use: Never    Drug use: Never    Sexual activity: Not Currently   Other Topics Concern    Not on file   Social History Narrative    Diet needs improvement    Does not exercise    No caffeine use     Social Determinants of Health     Financial Resource Strain: Low Risk  (2024)    Overall Financial Resource Strain (CARDIA)     Difficulty of Paying Living Expenses: Not hard at all   Food Insecurity: No Food Insecurity (2024)    Hunger Vital Sign     Worried About Running Out of Food in the Last Year: Never true     Ran Out of Food in the Last Year: Never true   Transportation Needs: No Transportation Needs (2024)    PRAPARE - Transportation     Lack of Transportation (Medical): No     Lack of Transportation (Non-Medical): No   Physical Activity: Inactive (2021)    Exercise Vital Sign     Days of Exercise per Week: 0 days     Minutes of Exercise per Session: 0 min   Stress: No Stress Concern Present (2021)    [place] [employer] - Occupational Stress Questionnaire     Feeling of Stress : Not at all   Social Connections: Socially Isolated (2021)    Social Connection and Isolation Panel [NHANES]     Frequency of Communication with Friends and Family: Once a week     Frequency of Social Gatherings with Friends and Family: Once a  week     Attends [employer] Services: 1 to 4 times per year     Active Member of Clubs or Organizations: No     Attends Club or Organization Meetings: Never     Marital Status:    Intimate Partner Violence: Not At Risk (2021)    Humiliation, Afraid, Rape, and Kick questionnaire     Fear of Current or Ex-Partner: No     Emotionally Abused: No     Physically Abused: No     Sexually Abused: No   Housing Stability: Low Risk  (2024)    Housing Stability Vital Sign     Unable to Pay for Housing in the Last Year: No     Number of Places Lived in the Last Year: 1     Unstable Housing in the Last Year: No     Social History     Substance and Sexual Activity   Alcohol Use Never       Social History     Substance and Sexual Activity   Drug Use Never     Social History     Tobacco Use   Smoking Status Former    Current packs/day: 0.00    Types: Cigarettes    Quit date:     Years since quittin.1   Smokeless Tobacco Former   Tobacco Comments    quit over 30 yrs ago; (quit in the , had smoked 3PPD for 20 years - per [employer])     Family History   Problem Relation Age of Onset    Hypertension Mother     Stroke Mother     Heart disease Father     Other Sister         1)Breast disorder; 2)Epilepsy    Migraines Sister         Migraine headaches    Osteoporosis Sister     Thyroid disease Sister     Coronary artery disease Sister         S/P triple vessel bypass    Breast cancer Sister 80    No Known Problems Sister     No Known Problems Sister     Osteoporosis Maternal Grandmother     No Known Problems Maternal Grandfather     No Known Problems Paternal Grandmother     No Known Problems Paternal Grandfather     Diabetes Son         Diabetes mellitus    Hypertension Son     Rheum arthritis Son         Rheumatic disease    No Known Problems Son     No Known Problems Son     No Known Problems Son     No Known Problems Son     No Known Problems Son     No Known Problems Maternal Aunt     No Known Problems  Maternal Aunt     No Known Problems Maternal Aunt     No Known Problems Paternal Aunt     No Known Problems Paternal Aunt     Heart disease Family      ==  Carmen Molina DO    [facility]    [address], [address]  [place] [address]  Office: [phone]  Fax: [phone]

## 2024-02-14 DIAGNOSIS — E87.6 HYPOKALEMIA: ICD-10-CM

## 2024-02-14 DIAGNOSIS — I10 ESSENTIAL HYPERTENSION: ICD-10-CM

## 2024-02-15 RX ORDER — POTASSIUM CHLORIDE 20 MEQ/1
TABLET, EXTENDED RELEASE ORAL
Qty: 30 TABLET | Refills: 2 | Status: SHIPPED | OUTPATIENT
Start: 2024-02-15

## 2024-02-15 RX ORDER — LOSARTAN POTASSIUM 25 MG/1
25 TABLET ORAL DAILY
Qty: 30 TABLET | Refills: 5 | Status: SHIPPED | OUTPATIENT
Start: 2024-02-15

## 2024-02-21 PROBLEM — J06.9 VIRAL UPPER RESPIRATORY TRACT INFECTION: Status: RESOLVED | Noted: 2019-04-01 | Resolved: 2024-02-21

## 2024-02-22 ENCOUNTER — OFFICE VISIT (OUTPATIENT)
Dept: OBGYN CLINIC | Facility: CLINIC | Age: 84
End: 2024-02-22

## 2024-02-22 VITALS
HEART RATE: 87 BPM | RESPIRATION RATE: 18 BRPM | SYSTOLIC BLOOD PRESSURE: 121 MMHG | HEIGHT: 60 IN | WEIGHT: 165.8 LBS | BODY MASS INDEX: 32.55 KG/M2 | DIASTOLIC BLOOD PRESSURE: 76 MMHG

## 2024-02-22 DIAGNOSIS — Z46.89 ENCOUNTER FOR FITTING AND ADJUSTMENT OF PESSARY: Primary | ICD-10-CM

## 2024-02-22 PROCEDURE — 57160 INSERT PESSARY/OTHER DEVICE: CPT | Performed by: OBSTETRICS & GYNECOLOGY

## 2024-02-22 PROCEDURE — A4561 PESSARY RUBBER, ANY TYPE: HCPCS | Performed by: OBSTETRICS & GYNECOLOGY

## 2024-02-22 NOTE — PROGRESS NOTES
Pessary    Date/Time: 2/22/2024 1:30 PM    Performed by: Tawnya Gurrola MD  Authorized by: Tawnya Gurrola MD  Universal Protocol:  Procedure performed by: (Dr. Richards)  Consent: Verbal consent obtained. Written consent not obtained.  Risks and benefits: risks, benefits and alternatives were discussed  Consent given by: patient  Patient understanding: patient states understanding of the procedure being performed  Patient consent: the patient's understanding of the procedure matches consent given  Procedure consent: procedure consent matches procedure scheduled  Relevant documents: relevant documents present and verified  Test results: test results not available  Site marked: the operative site was not marked  Patient identity confirmed: verbally with patient    Indication:     Indication for pessary: cystocele    Pre-procedure:     Pessary procedure type:  Insertion  Problems:     Pessary complications: none    Procedure:     Pessary type:  Ring w/ support    Patient tolerance of procedure:  Tolerated well, no immediate complications  Comments:     Procedure comments:  Jennifer Amaya presents today for placement of a pessary.   Patient has a grade 3 cystocele and has for years.   Patient has previously had a pessary in place. She had a 7 ring that worked for many years until recently when it fell out.  Size 8 Ring with support was selected. Lubrication was applied. Patient was placed in dorsal lithotomy. Cystocele was reduced and ring was placed. It was felt to be seated behind the pelvic bone.   After procedure patient sat up and felt that it was in the proper place.   Patient was then instructed to void which she was able to do.   Patient wishes to do her own pessary care at home.   Instructed to follow-up in the office as needed.     Tawnya Gurrola MD  OBGYN PGY-2  2/22/2024 1:58 PM

## 2024-04-09 ENCOUNTER — TELEPHONE (OUTPATIENT)
Dept: CARDIOLOGY CLINIC | Facility: CLINIC | Age: 84
End: 2024-04-09

## 2024-04-15 DIAGNOSIS — I21.4 NSTEMI (NON-ST ELEVATED MYOCARDIAL INFARCTION) (HCC): ICD-10-CM

## 2024-04-16 RX ORDER — RIVAROXABAN 2.5 MG/1
TABLET, FILM COATED ORAL
Qty: 180 TABLET | Refills: 1 | Status: SHIPPED | OUTPATIENT
Start: 2024-04-16

## 2024-05-17 DIAGNOSIS — E87.6 HYPOKALEMIA: ICD-10-CM

## 2024-05-17 DIAGNOSIS — J30.1 NON-SEASONAL ALLERGIC RHINITIS DUE TO POLLEN: ICD-10-CM

## 2024-05-17 RX ORDER — POTASSIUM CHLORIDE 20 MEQ/1
TABLET, EXTENDED RELEASE ORAL
Qty: 30 TABLET | Refills: 5 | Status: SHIPPED | OUTPATIENT
Start: 2024-05-17

## 2024-05-21 RX ORDER — ALBUTEROL SULFATE 90 UG/1
2 AEROSOL, METERED RESPIRATORY (INHALATION) EVERY 4 HOURS PRN
Qty: 8.5 G | Refills: 5 | Status: SHIPPED | OUTPATIENT
Start: 2024-05-21

## 2024-05-25 DIAGNOSIS — I21.4 NSTEMI (NON-ST ELEVATED MYOCARDIAL INFARCTION) (HCC): ICD-10-CM

## 2024-05-28 RX ORDER — NITROGLYCERIN 0.4 MG/1
0.4 TABLET SUBLINGUAL
Qty: 25 TABLET | Refills: 1 | Status: SHIPPED | OUTPATIENT
Start: 2024-05-28

## 2024-05-29 ENCOUNTER — TELEPHONE (OUTPATIENT)
Dept: INTERNAL MEDICINE CLINIC | Facility: CLINIC | Age: 84
End: 2024-05-29

## 2024-05-29 NOTE — TELEPHONE ENCOUNTER
Spoke with patient on the phone. Introduced self and role. Patient stated she had a recent medical review with her insurance company. Per medical team with [facility], patient should only be taking 1 tablet daily, not 2 tablets of her Plavix. Patient aware at this time to continue taking her medications as prescribed by physician. Nurse will reach out to physician to review.     All questions/concerns answered at this time.     Please review and advise.

## 2024-05-29 NOTE — TELEPHONE ENCOUNTER
Received call from patient requesting to speak with Dr. Molina. Patient stating she had a medical review and wants to discuss one of the medications. Patient also requesting when her next appointment is with physician.     Attempted to reach patient at . Left voice message and office contact number for follow up.

## 2024-05-31 ENCOUNTER — TELEPHONE (OUTPATIENT)
Dept: OTHER | Facility: HOSPITAL | Age: 84
End: 2024-05-31

## 2024-05-31 ENCOUNTER — APPOINTMENT (OUTPATIENT)
Dept: LAB | Facility: CLINIC | Age: 84
End: 2024-05-31
Payer: MEDICARE

## 2024-05-31 DIAGNOSIS — R80.1 PERSISTENT PROTEINURIA: ICD-10-CM

## 2024-05-31 DIAGNOSIS — E55.9 VITAMIN D DEFICIENCY: ICD-10-CM

## 2024-05-31 DIAGNOSIS — N18.32 STAGE 3B CHRONIC KIDNEY DISEASE (HCC): ICD-10-CM

## 2024-05-31 DIAGNOSIS — N18.30 BENIGN HYPERTENSION WITH CKD (CHRONIC KIDNEY DISEASE) STAGE III (HCC): ICD-10-CM

## 2024-05-31 DIAGNOSIS — N25.81 HYPERPARATHYROIDISM, SECONDARY (HCC): Primary | ICD-10-CM

## 2024-05-31 DIAGNOSIS — N25.81 SECONDARY HYPERPARATHYROIDISM OF RENAL ORIGIN (HCC): ICD-10-CM

## 2024-05-31 DIAGNOSIS — I12.9 BENIGN HYPERTENSION WITH CKD (CHRONIC KIDNEY DISEASE) STAGE III (HCC): ICD-10-CM

## 2024-05-31 LAB
25(OH)D3 SERPL-MCNC: 53.3 NG/ML (ref 30–100)
ANION GAP SERPL CALCULATED.3IONS-SCNC: 7 MMOL/L (ref 4–13)
BUN SERPL-MCNC: 48 MG/DL (ref 5–25)
CALCIUM SERPL-MCNC: 8.9 MG/DL (ref 8.4–10.2)
CHLORIDE SERPL-SCNC: 108 MMOL/L (ref 96–108)
CO2 SERPL-SCNC: 27 MMOL/L (ref 21–32)
CREAT SERPL-MCNC: 1.51 MG/DL (ref 0.6–1.3)
ERYTHROCYTE [DISTWIDTH] IN BLOOD BY AUTOMATED COUNT: 16 % (ref 11.6–15.1)
GFR SERPL CREATININE-BSD FRML MDRD: 31 ML/MIN/1.73SQ M
GLUCOSE P FAST SERPL-MCNC: 162 MG/DL (ref 65–99)
HCT VFR BLD AUTO: 36 % (ref 34.8–46.1)
HGB BLD-MCNC: 11 G/DL (ref 11.5–15.4)
MAGNESIUM SERPL-MCNC: 2.1 MG/DL (ref 1.9–2.7)
MCH RBC QN AUTO: 24.8 PG (ref 26.8–34.3)
MCHC RBC AUTO-ENTMCNC: 30.6 G/DL (ref 31.4–37.4)
MCV RBC AUTO: 81 FL (ref 82–98)
PHOSPHATE SERPL-MCNC: 3.8 MG/DL (ref 2.3–4.1)
PLATELET # BLD AUTO: 171 THOUSANDS/UL (ref 149–390)
PMV BLD AUTO: 12.3 FL (ref 8.9–12.7)
POTASSIUM SERPL-SCNC: 3.8 MMOL/L (ref 3.5–5.3)
PTH-INTACT SERPL-MCNC: 167.8 PG/ML (ref 12–88)
RBC # BLD AUTO: 4.44 MILLION/UL (ref 3.81–5.12)
SODIUM SERPL-SCNC: 142 MMOL/L (ref 135–147)
WBC # BLD AUTO: 4.9 THOUSAND/UL (ref 4.31–10.16)

## 2024-05-31 PROCEDURE — 83970 ASSAY OF PARATHORMONE: CPT

## 2024-05-31 PROCEDURE — 80048 BASIC METABOLIC PNL TOTAL CA: CPT

## 2024-05-31 PROCEDURE — 84100 ASSAY OF PHOSPHORUS: CPT

## 2024-05-31 PROCEDURE — 82306 VITAMIN D 25 HYDROXY: CPT

## 2024-05-31 PROCEDURE — 85027 COMPLETE CBC AUTOMATED: CPT

## 2024-05-31 PROCEDURE — 83735 ASSAY OF MAGNESIUM: CPT

## 2024-05-31 PROCEDURE — 36415 COLL VENOUS BLD VENIPUNCTURE: CPT

## 2024-05-31 RX ORDER — CALCITRIOL 0.25 UG/1
0.25 CAPSULE, LIQUID FILLED ORAL 3 TIMES WEEKLY
Qty: 15 CAPSULE | Refills: 3 | Status: SHIPPED | OUTPATIENT
Start: 2024-05-31

## 2024-05-31 NOTE — TELEPHONE ENCOUNTER
Patient due for wce. Please call patient to schedule. Thank you.    Spoke with patient on the phone. Introduced self and role. Patient updated physician reviewed Plavix and recommending to continue current plan of Plavix BID and Xarelto. Patient aware to continue medication as prescribed and follow up with her cardiology team. All questions/concerns answered at this time.

## 2024-05-31 NOTE — TELEPHONE ENCOUNTER
Upon further review, looks like her complex cardiac history warrants the plavix BID and xarelto. She does follow with cardiology and ultimately, discontinuing or modifying her regimen is up to them, thus I will defer and ask for patient to call cardiology to discuss, thanks!

## 2024-05-31 NOTE — TELEPHONE ENCOUNTER
Please let her know creatinine remained stable.  PTH slowly increasing, I have prescribed calcitriol 0.25 mcg 3 times a week to the pharmacy.

## 2024-06-03 ENCOUNTER — APPOINTMENT (OUTPATIENT)
Dept: LAB | Facility: CLINIC | Age: 84
End: 2024-06-03
Payer: MEDICARE

## 2024-06-03 LAB
CREAT UR-MCNC: 73.6 MG/DL
MICROALBUMIN UR-MCNC: 188.4 MG/L
MICROALBUMIN/CREAT 24H UR: 256 MG/G CREATININE (ref 0–30)

## 2024-06-03 PROCEDURE — 82043 UR ALBUMIN QUANTITATIVE: CPT

## 2024-06-03 PROCEDURE — 82570 ASSAY OF URINE CREATININE: CPT

## 2024-06-03 NOTE — TELEPHONE ENCOUNTER
Called patient and left a voicemail stating the following information:    Patients creatinine remained stable. PTH slowly increasing, Dr. Rosado has prescribed calcitriol 0.25 mcg 3 times a week to the pharmacy.    I asked the patient please call back with further questions.

## 2024-06-04 ENCOUNTER — TELEPHONE (OUTPATIENT)
Dept: NEPHROLOGY | Facility: CLINIC | Age: 84
End: 2024-06-04

## 2024-06-04 NOTE — TELEPHONE ENCOUNTER
----- Message from PEDRO LUIS Higginbotham sent at 6/4/2024 11:44 AM EDT -----  Please let patient know that most recent urine studies  reviewed and reveals increase in her protein.  Ensure patient is taking her losartan.  Also patient require follow-up appointment Dr. Rosado please arrange  thanks

## 2024-06-04 NOTE — TELEPHONE ENCOUNTER
Called patient and left a voicemail going over the following information:    Please let patient know that most recent urine studies  reviewed and reveals increase in her protein.  Ensure patient is taking her losartan.  Also patient require follow-up appointment Dr. Rosado please arrange.    I asked the patient please call the office with further questions.

## 2024-06-05 NOTE — TELEPHONE ENCOUNTER
Patient returning call. Made aware:    Called patient and left a voicemail going over the following information:     Please let patient know that most recent urine studies  reviewed and reveals increase in her protein.  Ensure patient is taking her losartan.  Also patient require follow-up appointment Dr. Rosado please arrange.     I asked the patient please call the office with further questions.      Patient verbalized understanding and states she is taking losartan as prescribed.  Please assist in scheduling an appt. I could not find an opening until October. Thank you.

## 2024-06-06 DIAGNOSIS — E11.65 TYPE 2 DIABETES MELLITUS WITH HYPERGLYCEMIA, WITHOUT LONG-TERM CURRENT USE OF INSULIN (HCC): ICD-10-CM

## 2024-06-06 NOTE — TELEPHONE ENCOUNTER
Called pt and scheduled follow up on 7/3 with Dr Rosado at 4pm in the [facility]. Provided pt with address.

## 2024-06-19 DIAGNOSIS — E11.40 CONTROLLED TYPE 2 DIABETES MELLITUS WITH DIABETIC NEUROPATHY, WITHOUT LONG-TERM CURRENT USE OF INSULIN (HCC): ICD-10-CM

## 2024-06-19 DIAGNOSIS — J45.30 MILD PERSISTENT ASTHMA WITHOUT COMPLICATION: ICD-10-CM

## 2024-06-20 RX ORDER — REPAGLINIDE 0.5 MG/1
0.5 TABLET ORAL
Qty: 180 TABLET | Refills: 1 | Status: SHIPPED | OUTPATIENT
Start: 2024-06-20

## 2024-06-20 RX ORDER — FLUTICASONE FUROATE AND VILANTEROL TRIFENATATE 100; 25 UG/1; UG/1
1 POWDER RESPIRATORY (INHALATION) DAILY
Qty: 60 EACH | Refills: 5 | Status: SHIPPED | OUTPATIENT
Start: 2024-06-20 | End: 2025-06-15

## 2024-07-03 ENCOUNTER — OFFICE VISIT (OUTPATIENT)
Dept: NEPHROLOGY | Facility: CLINIC | Age: 84
End: 2024-07-03
Payer: MEDICARE

## 2024-07-03 VITALS
HEIGHT: 60 IN | DIASTOLIC BLOOD PRESSURE: 64 MMHG | SYSTOLIC BLOOD PRESSURE: 118 MMHG | BODY MASS INDEX: 32.2 KG/M2 | WEIGHT: 164 LBS

## 2024-07-03 DIAGNOSIS — R80.1 PERSISTENT PROTEINURIA: ICD-10-CM

## 2024-07-03 DIAGNOSIS — N25.81 SECONDARY HYPERPARATHYROIDISM OF RENAL ORIGIN (HCC): ICD-10-CM

## 2024-07-03 DIAGNOSIS — I12.9 BENIGN HYPERTENSION WITH CKD (CHRONIC KIDNEY DISEASE) STAGE III (HCC): Primary | ICD-10-CM

## 2024-07-03 DIAGNOSIS — E55.9 VITAMIN D DEFICIENCY: ICD-10-CM

## 2024-07-03 DIAGNOSIS — N18.30 BENIGN HYPERTENSION WITH CKD (CHRONIC KIDNEY DISEASE) STAGE III (HCC): Primary | ICD-10-CM

## 2024-07-03 DIAGNOSIS — N28.1 RENAL CYST: ICD-10-CM

## 2024-07-03 DIAGNOSIS — N18.32 STAGE 3B CHRONIC KIDNEY DISEASE (HCC): ICD-10-CM

## 2024-07-03 PROBLEM — E87.6 HYPOKALEMIA: Status: RESOLVED | Noted: 2023-08-21 | Resolved: 2024-07-03

## 2024-07-03 PROBLEM — I10 ESSENTIAL HYPERTENSION: Status: RESOLVED | Noted: 2021-05-24 | Resolved: 2024-07-03

## 2024-07-03 PROCEDURE — 99214 OFFICE O/P EST MOD 30 MIN: CPT | Performed by: INTERNAL MEDICINE

## 2024-07-03 NOTE — PROGRESS NOTES
NEPHROLOGY OUTPATIENT PROGRESS NOTE   Jennifer Amaya 83 y.o. female MRN: [id number]  DATE: 7/3/2024  Reason for visit:   Chief Complaint   Patient presents with    Follow-up    Chronic Kidney Disease     ASSESSMENT and PLAN:  CKD stage 3, baseline creatinine lately 1.4 to 1.5 since June 2021, 1 to 1.2 since March 2021, prior 0.8 to 1.0  -Last creatinine 1.5 in May 2024 stable at baseline.   -CKD suspected to be secondary to long-term hypertension, diabetes, age-related nephron loss  -avoid nephrotoxins or NSAIDs.  -renal ultrasound in 2023 shows normal-sized kidneys, normal echogenicity, no hydro or stones.    -UA in October 2023 shows no hematuria or proteinuria.      Bilateral renal cyst on ultrasound  -Renal ultrasound in March 2023 shows right kidney midpole cyst with calcified wall/septation.  Mild increase in size, no solid internal vascularity.  -MRI abdomen in February 2020 shows no suspicious renal mass.  Again showed 2.4 cm right mid pole cyst with septation, suboptimally evaluated in the absence of IV contrast.  -CT scan with IV contrast in October 2023 showed stable renal cyst.  -Follows with urology was evaluated in December 2023 with plan to monitor with repeat renal ultrasound.  Check repeat renal ultrasound before next visit.    Recurrent UTI  -Bladder wall thickening on renal ultrasound which is asymmetric with lobulated posteriorly, neoplastic process could not be excluded  -Status post cystoscopy in October 2023 which showed grade 4 pelvic floor prolapse, no suspicious mass or lesion noted.  -Currently has pessary being managed by OB/GYN.     Proteinuria  -Overall fluctuating, last UACR slightly increased to 256 mg in June 2024.     -repeat UACR before next visit  -currently on losartan 25 mg daily.  If proteinuria increasing, consider increasing losartan.  -suspect in the setting of long-term diabetes, hemoglobin A1c 7.1 in December 2023.  -also has mild diabetic retinopathy     Hypokalemia,  resolved, last serum potassium 3.8 in May 2024.   -Initially suspect secondary to diuresis  -Currently on potassium chloride 20 meq daily     Secondary hyperparathyroidism, last PTH slightly increased 167 in May 2024.  Last vitamin D level 53.  Continue vitamin D supplements 2000 units daily.  Recently started calcitriol 0.25 mcg 3 times a week.  Check vitamin-D, PTH level before next visit.     Hypertension  -blood pressure well-controlled in the office today.  Continue losartan 25 mg daily, Coreg, torsemide.  -she has not brought BP machine to the office today.  -salt restricted diet recommended     Ankle edema, Currently on torsemide 60 mg daily.  -she has lost about 6 pounds since last visit, still has mild ankle edema.  Recommend to take additional torsemide if has worsening leg edema or his weight gain greater than 2 to 3 pounds in 1 day.  -echo in January 2024 shows EF 40%, grade 2 diastolic dysfunction, moderate to severe MR.      Diagnoses and all orders for this visit:    Benign hypertension with CKD (chronic kidney disease) stage III  -     Albumin / creatinine urine ratio; Future  -     Basic metabolic panel; Future  -     CBC; Future  -     Phosphorus; Future  -     PTH, intact; Future  -     Vitamin D 25 hydroxy; Future    Stage 3b chronic kidney disease (HCC)  -     Albumin / creatinine urine ratio; Future  -     Basic metabolic panel; Future  -     CBC; Future  -     Phosphorus; Future  -     PTH, intact; Future  -     Vitamin D 25 hydroxy; Future  -     US kidney and bladder; Future    Persistent proteinuria  -     Albumin / creatinine urine ratio; Future    Renal cyst  -     US kidney and bladder; Future    Secondary hyperparathyroidism of renal origin (HCC)  -     Phosphorus; Future  -     PTH, intact; Future  -     Vitamin D 25 hydroxy; Future    Vitamin D deficiency  -     Vitamin D 25 hydroxy; Future          SUBJECTIVE / HPI:  Jennifer Amaya is a 83 y.o. female with medical issues of  hypertension for 13 years, diabetes for 13 years, CVA in 2011, mild diabetic retinopathy,?  Gout, CKD stage 3 with baseline 1.4 to 1.5 since mid 2021, 1 to 1.2 since March 2021, prior 0.8 to 1.0 who presents for regular follow up for CKD, proteinuria, hypertension management.      Overall feels well.  Last creatinine stable at baseline.  She has mild ankle edema but overall has lost some weight as mentioned above.  BP well-controlled in the office today.   Denies any worsening dyspnea.  Denies any new urinary complaint.  No recent NSAID use.     No obvious history of autoimmune conditions    REVIEW OF SYSTEMS:  More than 10 point review of systems were obtained and discussed in length with the patient. Complete review of systems were negative / unremarkable except mentioned above.     PHYSICAL EXAM:  Vitals:    07/03/24 1530   BP: 118/64   BP Location: Left arm   Patient Position: Sitting   Cuff Size: Adult   Weight: 74.4 kg (164 lb)   Height: 5' (1.524 m)     Body mass index is 32.03 kg/m².    Physical Exam  Vitals reviewed.   Constitutional:       Appearance: She is well-developed.   HENT:      Head: Normocephalic and atraumatic.      Right Ear: External ear normal.      Left Ear: External ear normal.   Eyes:      Conjunctiva/sclera: Conjunctivae normal.   Cardiovascular:      Comments: Mild ankle edema  Pulmonary:      Effort: Pulmonary effort is normal.      Breath sounds: Normal breath sounds. No wheezing or rales.   Abdominal:      General: Bowel sounds are normal. There is no distension.      Palpations: Abdomen is soft.      Tenderness: There is no abdominal tenderness.   Musculoskeletal:         General: No deformity.   Lymphadenopathy:      Cervical: No cervical adenopathy.   Skin:     Findings: No rash.   Neurological:      Mental Status: She is alert and oriented to person, place, and time.   Psychiatric:         Behavior: Behavior normal.         PAST MEDICAL HISTORY:  Past Medical History:   Diagnosis  Date    ACE-inhibitor cough     last assessed - 2017    Asthma     Bilateral edema of lower extremity     last assessed - 2017    Cardiac murmur     last assessed - 2017    CKD (chronic kidney disease)     Diabetes mellitus (HCC)     last assessed - 2017    Esophageal dysphagia     Fatigue     last assessed - 2017    Hyperlipidemia     Hypertension     Patellar bursitis of right knee     last assessed - 2016    Personal history of other specified conditions     presenting hazards to health    Stroke (HCC)     Stroke syndrome     Urinary frequency     UTI (urinary tract infection)        PAST SURGICAL HISTORY:  Past Surgical History:   Procedure Laterality Date    CARDIAC CATHETERIZATION N/A 2023    Procedure: Cardiac Coronary Angiogram;  Surgeon: Roe German MD;  Location: [facility];  Service: Cardiology    CARDIAC CATHETERIZATION  2023    Procedure: Cardiac Left Heart Cath;  Surgeon: Roe German MD;  Location: [facility];  Service: Cardiology    MA ESOPHAGOGASTRODUODENOSCOPY TRANSORAL DIAGNOSTIC N/A 2017    Procedure: ESOPHAGOGASTRODUODENOSCOPY (EGD);  Surgeon: Cedric Huang MD;  Location: [facility];  Service: Gastroenterology       SOCIAL HISTORY:  Social History     Substance and Sexual Activity   Alcohol Use Never     Social History     Substance and Sexual Activity   Drug Use Never     Social History     Tobacco Use   Smoking Status Former    Current packs/day: 0.00    Types: Cigarettes    Quit date:     Years since quittin.5   Smokeless Tobacco Former   Tobacco Comments    quit over 30 yrs ago; (quit in the , had smoked 3PPD for 20 years - per [employer])       FAMILY HISTORY:  Family History   Problem Relation Age of Onset    Hypertension Mother     Stroke Mother     Heart disease Father     Other Sister         1)Breast disorder; 2)Epilepsy    Migraines Sister         Migraine headaches    Osteoporosis Sister     Thyroid  disease Sister     Coronary artery disease Sister         S/P triple vessel bypass    Breast cancer Sister 80    No Known Problems Sister     No Known Problems Sister     Osteoporosis Maternal Grandmother     No Known Problems Maternal Grandfather     No Known Problems Paternal Grandmother     No Known Problems Paternal Grandfather     Diabetes Son         Diabetes mellitus    Hypertension Son     Rheum arthritis Son         Rheumatic disease    No Known Problems Son     No Known Problems Son     No Known Problems Son     No Known Problems Son     No Known Problems Son     No Known Problems Maternal Aunt     No Known Problems Maternal Aunt     No Known Problems Maternal Aunt     No Known Problems Paternal Aunt     No Known Problems Paternal Aunt     Heart disease Family        MEDICATIONS:    Current Outpatient Medications:     acetaminophen (TYLENOL) 650 mg CR tablet, Take 1 tablet (650 mg total) by mouth every 6 (six) hours as needed for mild pain, Disp: 90 tablet, Rfl: 3    albuterol (PROVENTIL HFA,VENTOLIN HFA) 90 mcg/act inhaler, INHALE 2 PUFFS EVERY 4 (FOUR) HOURS AS NEEDED FOR WHEEZING, Disp: 8.5 g, Rfl: 5    atorvastatin (LIPITOR) 40 mg tablet, TAKE 1 TABLET (40 MG TOTAL) BY MOUTH DAILY, Disp: 90 tablet, Rfl: 3    benzonatate (TESSALON) 200 MG capsule, Take 1 capsule (200 mg total) by mouth 3 (three) times a day as needed for cough, Disp: 20 capsule, Rfl: 0    Blood Glucose Monitoring Suppl (FREESTYLE LITE) JAQUELIN, by Does not apply route daily, Disp: 1 each, Rfl: 0    Breo Ellipta 100-25 MCG/ACT inhaler, INHALE 1 PUFF DAILY RINSE MOUTH AFTER USE., Disp: 60 each, Rfl: 5    calcitriol (ROCALTROL) 0.25 mcg capsule, Take 1 capsule (0.25 mcg total) by mouth 3 (three) times a week, Disp: 15 capsule, Rfl: 3    carvedilol (COREG) 6.25 mg tablet, TAKE 1 TABLET (6.25 MG TOTAL) BY MOUTH 2 (TWO) TIMES A DAY WITH MEALS, Disp: 60 tablet, Rfl: 5    Cholecalciferol (D3) 50 MCG (2000 UT) TABS, Take 1 tablet (2,000 Units  total) by mouth daily, Disp: 90 tablet, Rfl: 3    clopidogrel (PLAVIX) 75 mg tablet, TAKE 2 TABLETS DAILY, Disp: 180 tablet, Rfl: 3    Continuous Blood Gluc  (FreeStyle Naldo 14 Day [place]) JAQUELIN, USE TO RECORD BLOOD GLUCOSE 4 TIMES DAILY. ONCE FASTING AND THREE TIMES DAILY BEFORE MEALS, Disp: 4 each, Rfl: 3    Continuous Glucose Sensor (FreeStyle Naldo 2 Sensor) [facility], USE ONE SENSOR EVERY 14 DAYS, Disp: 6 each, Rfl: 1    estrogens, conjugated (Premarin) vaginal cream, Insert 1 g into the vagina 2 (two) times a week, Disp: 30 g, Rfl: 3    ferrous sulfate 325 (65 Fe) mg tablet, TAKE 1 TABLET TWICE A DAY BEFORE MEALS, Disp: 180 tablet, Rfl: 0    glucose blood (FREESTYLE LITE) test strip, TEST AS DIRECTED UP TO FOUR TIMES A DAY, Disp: 100 each, Rfl: 3    glucose monitoring kit (FREESTYLE) monitoring kit, Use 1 each as needed (Three time daily) Please check blood sugar 3 times daily., Disp: 1 each, Rfl: 1    Glucose-Vitamin C-Vitamin D (TRUEPLUS GLUCOSE ON THE GO) CHEW, , Disp: , Rfl:     ketoconazole (NIZORAL) 2 % shampoo, APPLY TOPICALLY TO THE SCALP ONCE EVERY OTHER WEEK, Disp: 120 mL, Rfl: 0    Lancets (FREESTYLE) lancets, Use as instructed, Disp: 100 each, Rfl: 0    latanoprost (XALATAN) 0.005 % ophthalmic solution, , Disp: , Rfl:     linaGLIPtin (Tradjenta) 5 MG TABS, TAKE 1 TABLET (5 MG) BY MOUTH DAILY, Disp: 90 tablet, Rfl: 3    losartan (COZAAR) 25 mg tablet, TAKE 1 TABLET (25 MG TOTAL) BY MOUTH DAILY, Disp: 30 tablet, Rfl: 5    Misc. Devices (QUAD CANE) MISC, by Does not apply route daily, Disp: 1 each, Rfl: 0    montelukast (SINGULAIR) 10 mg tablet, TAKE 1 TABLET (10 MG TOTAL) BY MOUTH DAILY AT BEDTIME, Disp: 90 tablet, Rfl: 3    nitroglycerin (NITROSTAT) 0.4 mg SL tablet, PLACE 1 TABLET (0.4 MG TOTAL) UNDER THE TONGUE EVERY 5 (FIVE) MINUTES AS NEEDED FOR CHEST PAIN, Disp: 25 tablet, Rfl: 1    Oral Hygiene Products (Extended Term Oral Care System) KIT, Apply to the mouth or throat 2 (two) times a day,  Disp: 1 kit, Rfl: 1    potassium chloride (Klor-Con M20) 20 mEq tablet, TAKE 1 TABLET (20 HASMUKH EQUIVALENT TOTAL) BY MOUTH DAILY, Disp: 30 tablet, Rfl: 5    ranolazine (RANEXA) 500 mg 12 hr tablet, Take 1 tablet (500 mg total) by mouth every 12 (twelve) hours, Disp: 60 tablet, Rfl: 0    repaglinide (PRANDIN) 0.5 mg tablet, TAKE 1 TABLET (0.5 MG TOTAL) BY MOUTH 2 (TWO) TIMES A DAY BEFORE MEALS (SKIP DOSE IF SKIPPING MEAL), Disp: 180 tablet, Rfl: 1    SODIUM FLUORIDE, DENTAL RINSE, 0.02 % SOLN, Apply 15 mL to the mouth or throat 2 (two) times a day, Disp: 532 mL, Rfl: 1    torsemide (DEMADEX) 20 mg tablet, TAKE 3 TABLETS (60 MG TOTAL) BY MOUTH DAILY, Disp: 252 tablet, Rfl: 3    Xarelto 2.5 MG tablet, TAKE 1 TABLET (2.5 MG TOTAL) BY MOUTH 2 (TWO) TIMES A DAY WITH MEALS, Disp: 180 tablet, Rfl: 1    metolazone (ZAROXOLYN) 5 mg tablet, Take 1 tablet (5 mg total) by mouth every other day, Disp: 14 tablet, Rfl: 0    Lab Results:   Results for orders placed or performed in visit on 05/31/24   Basic metabolic panel   Result Value Ref Range    Sodium 142 135 - 147 mmol/L    Potassium 3.8 3.5 - 5.3 mmol/L    Chloride 108 96 - 108 mmol/L    CO2 27 21 - 32 mmol/L    ANION GAP 7 4 - 13 mmol/L    BUN 48 (H) 5 - 25 mg/dL    Creatinine 1.51 (H) 0.60 - 1.30 mg/dL    Glucose, Fasting 162 (H) 65 - 99 mg/dL    Calcium 8.9 8.4 - 10.2 mg/dL    eGFR 31 ml/min/1.73sq m   CBC   Result Value Ref Range    WBC 4.90 4.31 - 10.16 Thousand/uL    RBC 4.44 3.81 - 5.12 Million/uL    Hemoglobin 11.0 (L) 11.5 - 15.4 g/dL    Hematocrit 36.0 34.8 - 46.1 %    MCV 81 (L) 82 - 98 fL    MCH 24.8 (L) 26.8 - 34.3 pg    MCHC 30.6 (L) 31.4 - 37.4 g/dL    RDW 16.0 (H) 11.6 - 15.1 %    Platelets 171 149 - 390 Thousands/uL    MPV 12.3 8.9 - 12.7 fL   Phosphorus   Result Value Ref Range    Phosphorus 3.8 2.3 - 4.1 mg/dL   PTH, intact   Result Value Ref Range    .8 (H) 12.0 - 88.0 pg/mL   Albumin / creatinine urine ratio   Result Value Ref Range    Creatinine,  Ur 73.6 Reference range not established. mg/dL    Albumin,U,Random 188.4 (H) <20.0 mg/L    Albumin Creat Ratio 256 (H) 0 - 30 mg/g creatinine   Vitamin D 25 hydroxy   Result Value Ref Range    Vit D, 25-Hydroxy 53.3 30.0 - 100.0 ng/mL   Magnesium   Result Value Ref Range    Magnesium 2.1 1.9 - 2.7 mg/dL

## 2024-07-11 ENCOUNTER — TELEPHONE (OUTPATIENT)
Dept: INTERNAL MEDICINE CLINIC | Facility: CLINIC | Age: 84
End: 2024-07-11

## 2024-07-11 NOTE — TELEPHONE ENCOUNTER
Received call from patient. Introduced self and role. Patient updated order for mammogram is done. Nurse mailing to patient copy of order. Patient's mammogram appointment is scheduled for October 2024.     Patient requesting name/number of physician that completed her cystoscopy. Patient provided Dr. Stewart office contact number.     All questions/concerns answered at this time.

## 2024-07-12 DIAGNOSIS — I21.4 NSTEMI (NON-ST ELEVATED MYOCARDIAL INFARCTION) (HCC): ICD-10-CM

## 2024-07-12 RX ORDER — NITROGLYCERIN 0.4 MG/1
0.4 TABLET SUBLINGUAL
Qty: 25 TABLET | Refills: 1 | Status: SHIPPED | OUTPATIENT
Start: 2024-07-12

## 2024-07-24 ENCOUNTER — TELEPHONE (OUTPATIENT)
Dept: INTERNAL MEDICINE CLINIC | Facility: CLINIC | Age: 84
End: 2024-07-24

## 2024-07-24 NOTE — TELEPHONE ENCOUNTER
Received call from patient requesting callback to discuss a question that she has. Attempted to reach patient at . Left voice message and office contact number for follow up call.

## 2024-07-31 ENCOUNTER — TELEPHONE (OUTPATIENT)
Age: 84
End: 2024-07-31

## 2024-07-31 NOTE — TELEPHONE ENCOUNTER
Caller: Jennifer     Doctor and/or Office: Guillermo/Clara     CB#: [phone]    Escalation: Care Patient called and started she needs a RX faxed to [facility] so she can get her shoes  Fax# [phone]

## 2024-07-31 NOTE — TELEPHONE ENCOUNTER
Henry to Jennifer KELLY does not have an order for diabetic shoe.I let her know  is on vacation and she should have the order in her chart by Tuesday.

## 2024-08-08 NOTE — TELEPHONE ENCOUNTER
Patient called back stated she did not hear anything about her script.  She asked that you fax it to the number below and let her know.  Thank you

## 2024-08-09 DIAGNOSIS — I73.9 PERIPHERAL VASCULAR DISEASE, UNSPECIFIED (HCC): ICD-10-CM

## 2024-08-09 DIAGNOSIS — E11.40 TYPE 2 DIABETES MELLITUS WITH DIABETIC NEUROPATHY, UNSPECIFIED WHETHER LONG TERM INSULIN USE (HCC): Primary | ICD-10-CM

## 2024-08-12 ENCOUNTER — OFFICE VISIT (OUTPATIENT)
Dept: CARDIOLOGY CLINIC | Facility: CLINIC | Age: 84
End: 2024-08-12
Payer: MEDICARE

## 2024-08-12 VITALS
BODY MASS INDEX: 28.53 KG/M2 | HEIGHT: 63 IN | SYSTOLIC BLOOD PRESSURE: 100 MMHG | DIASTOLIC BLOOD PRESSURE: 40 MMHG | WEIGHT: 161 LBS | HEART RATE: 63 BPM

## 2024-08-12 DIAGNOSIS — Z95.5 STATUS POST INSERTION OF DRUG-ELUTING STENT INTO LEFT ANTERIOR DESCENDING (LAD) ARTERY: ICD-10-CM

## 2024-08-12 DIAGNOSIS — I35.1 AORTIC VALVE REGURGITATION, NONRHEUMATIC: Primary | ICD-10-CM

## 2024-08-12 DIAGNOSIS — I34.0 NON-RHEUMATIC MITRAL REGURGITATION: ICD-10-CM

## 2024-08-12 DIAGNOSIS — E78.2 MIXED HYPERLIPIDEMIA: ICD-10-CM

## 2024-08-12 DIAGNOSIS — I50.42 CHRONIC COMBINED SYSTOLIC AND DIASTOLIC CONGESTIVE HEART FAILURE (HCC): ICD-10-CM

## 2024-08-12 DIAGNOSIS — I25.118 CORONARY ARTERY DISEASE INVOLVING NATIVE CORONARY ARTERY OF NATIVE HEART WITH OTHER FORM OF ANGINA PECTORIS (HCC): ICD-10-CM

## 2024-08-12 PROCEDURE — 93000 ELECTROCARDIOGRAM COMPLETE: CPT | Performed by: INTERNAL MEDICINE

## 2024-08-12 PROCEDURE — 99214 OFFICE O/P EST MOD 30 MIN: CPT | Performed by: INTERNAL MEDICINE

## 2024-08-12 NOTE — PROGRESS NOTES
Cardiology Follow Up    Jennifer Amaya  1940  [id number]  [facility]  [address] [id number]  [phone]    1. Aortic valve regurgitation, nonrheumatic        2. Non-rheumatic mitral regurgitation        3. Chronic combined systolic and diastolic congestive heart failure (HCC)        4. 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 Status post insertion of drug-eluting stent into left anterior descending (LAD) artery        6. Mixed hyperlipidemia            Interval History: Cardiology follow-up.  Patient is doing well from the cardiac  point of view.,  She does have mild dyspnea class I which is chronic, she ambulates with a slow pace with a cane.  She has no orthopnea no PND.  She is on low-dose diuretic regimen.  Denies any chest discomfort.  She has been compliant with low-salt diet, blood pressure has been well-controlled.  Today's is 100/40.  Denies any significant bleeding issues on full anticoagulation with a factor X inhibitor adjusted.  No function close Plavix.  Denies any focal neurological deficits denies any medication rest.  She does have advanced chronic kidney disease, creatinine 1.5 GFR in the 20s.  Compliant with low-cholesterol diet, lipids last checked 134, HDL 55, LDL 60.  On high intensity  statin therapy.    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    Decreased hearing, bilateral    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HCC)    History of CVA with  residual deficit    Mixed hyperlipidemia    Persistent proteinuria    Renal cyst    Ankle edema    Stage 3b chronic kidney disease ([facility])    Acute diastolic congestive heart failure ([facility])    Status post insertion of drug-eluting stent into left anterior descending (LAD) artery    Coronary artery disease involving native coronary artery    NSTEMI (non-ST elevated myocardial infarction) ([facility])    Asthma    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[facility])    Uncontrolled type 2 diabetes mellitus with hyperglycemia ([facility])    Urge urinary incontinence    Nocturia    Secondary hyperparathyroidism of renal origin ([facility])    Chronic diastolic congestive heart failure ([facility])    Embolism and thrombosis of arteries of the lower extremities ([facility])    Chronic renal disease, stage IV ([facility])    Vitamin D deficiency    Encounter for fitting and adjustment of pessary    Peripheral vascular disease, unspecified ([facility])    Type 2 diabetes mellitus with diabetic neuropathy, unspecified whether long term insulin use ([facility])     Past Medical History:   Diagnosis Date    ACE-inhibitor cough     last assessed - 29Dec2017    Asthma     Bilateral edema of lower extremity     last assessed - 21Aug2017    Cardiac murmur     last assessed - 21Aug2017    CKD (chronic kidney disease)     Diabetes mellitus ([facility])     last assessed - 29Nov2017    Esophageal dysphagia     Fatigue     last assessed - 21Aug2017    Hyperlipidemia     Hypertension     Patellar bursitis of right knee     last assessed - 04Nov2016    Personal history of other specified conditions     presenting hazards to health    Stroke ([facility])     Stroke syndrome     Urinary frequency     UTI (urinary tract infection)      Social History     Socioeconomic History    Marital status:      Spouse name: Not on file    Number of children: 8    Years of education: Not on file    Highest education level: Not on file   Occupational History    Occupation: Retired   Tobacco Use    Smoking  status: Former     Current packs/day: 0.00     Types: Cigarettes     Quit date:      Years since quittin.6    Smokeless tobacco: Former    Tobacco comments:     quit over 30 yrs ago; (quit in the , had smoked 3PPD for 20 years - per [employer])   Vaping Use    Vaping status: Never Used   Substance and Sexual Activity    Alcohol use: Never    Drug use: Never    Sexual activity: Not Currently   Other Topics Concern    Not on file   Social History Narrative    Diet needs improvement    Does not exercise    No caffeine use     Social Determinants of Health     Financial Resource Strain: Low Risk  (2024)    Overall Financial Resource Strain (CARDIA)     Difficulty of Paying Living Expenses: Not hard at all   Food Insecurity: No Food Insecurity (2024)    Hunger Vital Sign     Worried About Running Out of Food in the Last Year: Never true     Ran Out of Food in the Last Year: Never true   Transportation Needs: No Transportation Needs (2024)    PRAPARE - Transportation     Lack of Transportation (Medical): No     Lack of Transportation (Non-Medical): No   Physical Activity: Inactive (2021)    Exercise Vital Sign     Days of Exercise per Week: 0 days     Minutes of Exercise per Session: 0 min   Stress: No Stress Concern Present (2021)    [employer] - Occupational Stress Questionnaire     Feeling of Stress : Not at all   Social Connections: Socially Isolated (2021)    Social Connection and Isolation Panel [NHANES]     Frequency of Communication with Friends and Family: Once a week     Frequency of Social Gatherings with Friends and Family: Once a week     Attends Evangelical Services: 1 to 4 times per year     Active Member of Clubs or Organizations: No     Attends Club or Organization Meetings: Never     Marital Status:    Intimate Partner Violence: Not At Risk (2021)    Humiliation, Afraid, Rape, and Kick questionnaire     Fear of Current or  Ex-Partner: No     Emotionally Abused: No     Physically Abused: No     Sexually Abused: No   Housing Stability: Low Risk  (1/8/2024)    Housing Stability Vital Sign     Unable to Pay for Housing in the Last Year: No     Number of Times Moved in the Last Year: 1     Homeless in the Last Year: No      Family History   Problem Relation Age of Onset    Hypertension Mother     Stroke Mother     Heart disease Father     Other Sister         1)Breast disorder; 2)Epilepsy    Migraines Sister         Migraine headaches    Osteoporosis Sister     Thyroid disease Sister     Coronary artery disease Sister         S/P triple vessel bypass    Breast cancer Sister 80    No Known Problems Sister     No Known Problems Sister     Osteoporosis Maternal Grandmother     No Known Problems Maternal Grandfather     No Known Problems Paternal Grandmother     No Known Problems Paternal Grandfather     Diabetes Son         Diabetes mellitus    Hypertension Son     Rheum arthritis Son         Rheumatic disease    No Known Problems Son     No Known Problems Son     No Known Problems Son     No Known Problems Son     No Known Problems Son     No Known Problems Maternal Aunt     No Known Problems Maternal Aunt     No Known Problems Maternal Aunt     No Known Problems Paternal Aunt     No Known Problems Paternal Aunt     Heart disease Family      Past Surgical History:   Procedure Laterality Date    CARDIAC CATHETERIZATION N/A 6/1/2023    Procedure: Cardiac Coronary Angiogram;  Surgeon: Roe German MD;  Location: [facility];  Service: Cardiology    CARDIAC CATHETERIZATION  6/1/2023    Procedure: Cardiac Left Heart Cath;  Surgeon: Roe German MD;  Location: [facility];  Service: Cardiology    NH ESOPHAGOGASTRODUODENOSCOPY TRANSORAL DIAGNOSTIC N/A 4/5/2017    Procedure: ESOPHAGOGASTRODUODENOSCOPY (EGD);  Surgeon: Cedric Huang MD;  Location: [facility];  Service: Gastroenterology       Current Outpatient Medications:      acetaminophen (TYLENOL) 650 mg CR tablet, Take 1 tablet (650 mg total) by mouth every 6 (six) hours as needed for mild pain, Disp: 90 tablet, Rfl: 3    albuterol (PROVENTIL HFA,VENTOLIN HFA) 90 mcg/act inhaler, INHALE 2 PUFFS EVERY 4 (FOUR) HOURS AS NEEDED FOR WHEEZING, Disp: 8.5 g, Rfl: 5    atorvastatin (LIPITOR) 40 mg tablet, TAKE 1 TABLET (40 MG TOTAL) BY MOUTH DAILY, Disp: 90 tablet, Rfl: 3    benzonatate (TESSALON) 200 MG capsule, Take 1 capsule (200 mg total) by mouth 3 (three) times a day as needed for cough, Disp: 20 capsule, Rfl: 0    Blood Glucose Monitoring Suppl (FREESTYLE LITE) JAQUELIN, by Does not apply route daily, Disp: 1 each, Rfl: 0    Breo Ellipta 100-25 MCG/ACT inhaler, INHALE 1 PUFF DAILY RINSE MOUTH AFTER USE., Disp: 60 each, Rfl: 5    calcitriol (ROCALTROL) 0.25 mcg capsule, Take 1 capsule (0.25 mcg total) by mouth 3 (three) times a week, Disp: 15 capsule, Rfl: 3    carvedilol (COREG) 6.25 mg tablet, TAKE 1 TABLET (6.25 MG TOTAL) BY MOUTH 2 (TWO) TIMES A DAY WITH MEALS, Disp: 60 tablet, Rfl: 5    Cholecalciferol (D3) 50 MCG (2000 UT) TABS, Take 1 tablet (2,000 Units total) by mouth daily (Patient taking differently: Take 2 tablets by mouth daily), Disp: 90 tablet, Rfl: 3    clopidogrel (PLAVIX) 75 mg tablet, TAKE 2 TABLETS DAILY, Disp: 180 tablet, Rfl: 3    Continuous Blood Gluc  ([employer] Naldo 14 Day [place]) JAQUELIN, USE TO RECORD BLOOD GLUCOSE 4 TIMES DAILY. ONCE FASTING AND THREE TIMES DAILY BEFORE MEALS, Disp: 4 each, Rfl: 3    Continuous Glucose Sensor (FreeStyle Naldo 2 Sensor) [facility], USE ONE SENSOR EVERY 14 DAYS, Disp: 6 each, Rfl: 1    estrogens, conjugated (Premarin) vaginal cream, Insert 1 g into the vagina 2 (two) times a week, Disp: 30 g, Rfl: 3    ferrous sulfate 325 (65 Fe) mg tablet, TAKE 1 TABLET TWICE A DAY BEFORE MEALS, Disp: 180 tablet, Rfl: 0    glucose blood (FREESTYLE LITE) test strip, TEST AS DIRECTED UP TO FOUR TIMES A DAY, Disp: 100 each, Rfl: 3    glucose  monitoring kit (FREESTYLE) monitoring kit, Use 1 each as needed (Three time daily) Please check blood sugar 3 times daily., Disp: 1 each, Rfl: 1    Glucose-Vitamin C-Vitamin D (TRUEPLUS GLUCOSE ON THE GO) CHEW, , Disp: , Rfl:     Lancets (FREESTYLE) lancets, Use as instructed, Disp: 100 each, Rfl: 0    latanoprost (XALATAN) 0.005 % ophthalmic solution, , Disp: , Rfl:     linaGLIPtin (Tradjenta) 5 MG TABS, TAKE 1 TABLET (5 MG) BY MOUTH DAILY, Disp: 90 tablet, Rfl: 3    losartan (COZAAR) 25 mg tablet, TAKE 1 TABLET (25 MG TOTAL) BY MOUTH DAILY, Disp: 30 tablet, Rfl: 5    Misc. Devices (QUAD CANE) MISC, by Does not apply route daily, Disp: 1 each, Rfl: 0    montelukast (SINGULAIR) 10 mg tablet, TAKE 1 TABLET (10 MG TOTAL) BY MOUTH DAILY AT BEDTIME, Disp: 90 tablet, Rfl: 3    nitroglycerin (NITROSTAT) 0.4 mg SL tablet, PLACE 1 TABLET (0.4 MG TOTAL) UNDER THE TONGUE EVERY 5 (FIVE) MINUTES AS NEEDED FOR CHEST PAIN, Disp: 25 tablet, Rfl: 1    Oral Hygiene Products (Extended Term Oral Care System) KIT, Apply to the mouth or throat 2 (two) times a day, Disp: 1 kit, Rfl: 1    potassium chloride (Klor-Con M20) 20 mEq tablet, TAKE 1 TABLET (20 HASMUKH EQUIVALENT TOTAL) BY MOUTH DAILY, Disp: 30 tablet, Rfl: 5    ranolazine (RANEXA) 500 mg 12 hr tablet, Take 1 tablet (500 mg total) by mouth every 12 (twelve) hours, Disp: 60 tablet, Rfl: 0    repaglinide (PRANDIN) 0.5 mg tablet, TAKE 1 TABLET (0.5 MG TOTAL) BY MOUTH 2 (TWO) TIMES A DAY BEFORE MEALS (SKIP DOSE IF SKIPPING MEAL), Disp: 180 tablet, Rfl: 1    SODIUM FLUORIDE, DENTAL RINSE, 0.02 % SOLN, Apply 15 mL to the mouth or throat 2 (two) times a day, Disp: 532 mL, Rfl: 1    torsemide (DEMADEX) 20 mg tablet, TAKE 3 TABLETS (60 MG TOTAL) BY MOUTH DAILY, Disp: 252 tablet, Rfl: 3    Xarelto 2.5 MG tablet, TAKE 1 TABLET (2.5 MG TOTAL) BY MOUTH 2 (TWO) TIMES A DAY WITH MEALS, Disp: 180 tablet, Rfl: 1    ketoconazole (NIZORAL) 2 % shampoo, APPLY TOPICALLY TO THE SCALP ONCE EVERY OTHER  WEEK (Patient not taking: Reported on 8/12/2024), Disp: 120 mL, Rfl: 0    metolazone (ZAROXOLYN) 5 mg tablet, Take 1 tablet (5 mg total) by mouth every other day, Disp: 14 tablet, Rfl: 0  No Known Allergies    Labs:  Appointment on 05/31/2024   Component Date Value    Sodium 05/31/2024 142     Potassium 05/31/2024 3.8     Chloride 05/31/2024 108     CO2 05/31/2024 27     ANION GAP 05/31/2024 7     BUN 05/31/2024 48 (H)     Creatinine 05/31/2024 1.51 (H)     Glucose, Fasting 05/31/2024 162 (H)     Calcium 05/31/2024 8.9     eGFR 05/31/2024 31     WBC 05/31/2024 4.90     RBC 05/31/2024 4.44     Hemoglobin 05/31/2024 11.0 (L)     Hematocrit 05/31/2024 36.0     MCV 05/31/2024 81 (L)     MCH 05/31/2024 24.8 (L)     MCHC 05/31/2024 30.6 (L)     RDW 05/31/2024 16.0 (H)     Platelets 05/31/2024 171     MPV 05/31/2024 12.3     Phosphorus 05/31/2024 3.8     PTH 05/31/2024 167.8 (H)     Creatinine, Ur 06/03/2024 73.6     Albumin,U,Random 06/03/2024 188.4 (H)     Albumin Creat Ratio 06/03/2024 256 (H)     Vit D, 25-Hydroxy 05/31/2024 53.3     Magnesium 05/31/2024 2.1      Imaging: No results found.    Review of Systems:  Review of Systems   Constitutional:  Negative for activity change, diaphoresis, fatigue and fever.   HENT:  Negative for hearing loss and nosebleeds.    Eyes:  Negative for visual disturbance.   Respiratory:  Positive for shortness of breath. Negative for apnea, wheezing and stridor.    Cardiovascular:  Positive for leg swelling. Negative for chest pain and palpitations.   Gastrointestinal:  Negative for abdominal pain, anal bleeding and blood in stool.   Endocrine: Negative for cold intolerance.   Genitourinary:  Negative for hematuria.   Musculoskeletal:  Positive for arthralgias and gait problem. Negative for myalgias.   Skin:  Negative for pallor and rash.   Allergic/Immunologic: Negative for immunocompromised state.   Neurological:  Negative for dizziness, syncope, facial asymmetry, speech difficulty and  weakness.   Hematological:  Bruises/bleeds easily.   Psychiatric/Behavioral:  Negative for sleep disturbance. The patient is not nervous/anxious.        Physical Exam:  Physical Exam  Vitals reviewed.   Constitutional:       General: She is not in acute distress.     Appearance: Normal appearance. She is normal weight. She is not ill-appearing, toxic-appearing or diaphoretic.   Eyes:      General: No scleral icterus.  Neck:      Vascular: No carotid bruit.   Cardiovascular:      Rate and Rhythm: Normal rate and regular rhythm.      Heart sounds: Murmur (soft holosystolic murmur at the apex) heard.      No friction rub.   Pulmonary:      Effort: Pulmonary effort is normal. No respiratory distress.      Breath sounds: Normal breath sounds. No stridor. No wheezing, rhonchi or rales.   Musculoskeletal:      Right lower leg: No edema.      Left lower leg: No edema.   Skin:     General: Skin is warm and dry.      Capillary Refill: Capillary refill takes less than 2 seconds.      Coloration: Skin is not jaundiced or pale.      Findings: No bruising or erythema.   Neurological:      Mental Status: She is alert and oriented to person, place, and time.   Psychiatric:         Mood and Affect: Mood normal.         Discussion/Summary:   Coronary artery disease, non-STEMI 2021.  PTCA stent of the mid LAD and PTCA of the distal LAD.  Catheterization few months later because of recurrent symptoms revealed occluded LAD stent.  Previously turned down by cardiothoracic surgery because of poor targets.  Crescendo symptoms with dyspnea last year 2022, stress test suggested anterior infarct with surrounding ischemia.  Underwent catheterization.  When diffuse disease left main with a proximal 50% lesion.  50% ostial LAD and 50% ostial circumflex, occluded mid LAD with septal collaterals.  Stents in the RCA was patent, there was 90% PDA lesion not amenable for revascularization, with moderately increased left ventricular end-diastolic  pressure.  Echocardiogram revealed low normal left ventricular function ejection fraction of 50 to 55% with anteroapical severe hypokinesis, there was moderate mitral sufficiency and mild tricuspid recency with mildly increased pulmonary artery pressures as suggested by Doppler criteria.  Recent echocardiogram revealed worsening LV function ejection fraction of 40% with stage II diastolic function.  Anteroapical akinesis significant left atrial enlargement and moderate to severe mitral insufficiency.  There was mild tricuspid efficiency with moderate pulmonary hypertension suggested by the podiatrist.  Clinically stable on current medical regimen despite worsening valvular function.  Patient is not certain as to she wants to undergo invasive procedures or interventions.  Will consider transesophageal echocardiogram..  She appears euvolemic on examination today.       This note was completed in part utilizing [employer] direct voice recognition software.   Grammatical errors, random word insertion, spelling mistakes, and incomplete sentences may be an occasional consequence of the system secondary to software limitations, ambient noise and hardware issues. At the time of dictation, efforts were made to edit, clarify and /or correct errors.  Please read the chart carefully and recognize, using context, where substitutions have occurred.  If you have any questions or concerns about the context, text or information contained within the body of this dictation, please contact myself, the provider, for further clarification.

## 2024-09-03 DIAGNOSIS — I10 ESSENTIAL HYPERTENSION: ICD-10-CM

## 2024-09-03 DIAGNOSIS — I21.4 NSTEMI (NON-ST ELEVATED MYOCARDIAL INFARCTION) (HCC): ICD-10-CM

## 2024-09-04 RX ORDER — RIVAROXABAN 2.5 MG/1
TABLET, FILM COATED ORAL
Qty: 180 TABLET | Refills: 1 | Status: SHIPPED | OUTPATIENT
Start: 2024-09-04 | End: 2024-09-10

## 2024-09-04 RX ORDER — LOSARTAN POTASSIUM 25 MG/1
25 TABLET ORAL DAILY
Qty: 30 TABLET | Refills: 5 | Status: SHIPPED | OUTPATIENT
Start: 2024-09-04 | End: 2024-09-10

## 2024-09-10 DIAGNOSIS — I21.4 NSTEMI (NON-ST ELEVATED MYOCARDIAL INFARCTION) (HCC): ICD-10-CM

## 2024-09-10 DIAGNOSIS — I10 ESSENTIAL HYPERTENSION: ICD-10-CM

## 2024-09-10 DIAGNOSIS — I25.10 CORONARY ARTERY DISEASE INVOLVING NATIVE HEART, UNSPECIFIED VESSEL OR LESION TYPE, UNSPECIFIED WHETHER ANGINA PRESENT: ICD-10-CM

## 2024-09-10 RX ORDER — RIVAROXABAN 2.5 MG/1
TABLET, FILM COATED ORAL
Qty: 180 TABLET | Refills: 1 | Status: SHIPPED | OUTPATIENT
Start: 2024-09-10

## 2024-09-10 RX ORDER — LOSARTAN POTASSIUM 25 MG/1
25 TABLET ORAL DAILY
Qty: 30 TABLET | Refills: 5 | Status: SHIPPED | OUTPATIENT
Start: 2024-09-10

## 2024-09-11 RX ORDER — TORSEMIDE 20 MG/1
60 TABLET ORAL DAILY
Qty: 270 TABLET | Refills: 1 | Status: SHIPPED | OUTPATIENT
Start: 2024-09-11

## 2024-09-12 ENCOUNTER — OFFICE VISIT (OUTPATIENT)
Dept: PODIATRY | Facility: CLINIC | Age: 84
End: 2024-09-12
Payer: MEDICARE

## 2024-09-12 VITALS
BODY MASS INDEX: 28.53 KG/M2 | WEIGHT: 161 LBS | SYSTOLIC BLOOD PRESSURE: 100 MMHG | HEART RATE: 69 BPM | DIASTOLIC BLOOD PRESSURE: 61 MMHG | HEIGHT: 63 IN

## 2024-09-12 DIAGNOSIS — B35.1 ONYCHOMYCOSIS: ICD-10-CM

## 2024-09-12 DIAGNOSIS — I73.9 PERIPHERAL VASCULAR DISEASE, UNSPECIFIED (HCC): ICD-10-CM

## 2024-09-12 DIAGNOSIS — E11.40 TYPE 2 DIABETES MELLITUS WITH DIABETIC NEUROPATHY, UNSPECIFIED WHETHER LONG TERM INSULIN USE (HCC): Primary | ICD-10-CM

## 2024-09-12 PROCEDURE — 11721 DEBRIDE NAIL 6 OR MORE: CPT | Performed by: PODIATRIST

## 2024-09-12 PROCEDURE — 99213 OFFICE O/P EST LOW 20 MIN: CPT | Performed by: PODIATRIST

## 2024-09-12 NOTE — PROGRESS NOTES
PATIENT:  Jennifer Amaya  1940    ASSESSMENT/PLAN:  1. Type 2 diabetes mellitus with diabetic neuropathy, unspecified whether long term insulin use ([facility])        2. Peripheral vascular disease, unspecified ([facility])        3. Onychomycosis               Reviewed medical records.  Disease prevention and related risk factors of diabetes were identified and discussed.  The patient was educated in proper foot wear for diabetics.  Also educated in daily foot assessment and routine diabetic foot care.  Reviewed the last blood work and the HbA1c was 7.1. Discussed the importance of controlling BS through diet and exercise. The patient will follow up in 3 months for diabetic foot exam.       PROCEDURE:   All mycotic toenails were reduced and debrided in length, width, and girth using a nail nipper and dremel.  Patient tolerated procedure(s) well without complications.      HPI:  Jennifer Amaya is a 84 y.o.year old female seen for diabetic foot exam.  BS is under control.  She is not compliant about following up in the office.  She did not follow-up in our office since the last visit in October last year.  No acute pedal problems.  No ulcer in her feet.      PAST MEDICAL HISTORY:  Past Medical History:   Diagnosis Date    ACE-inhibitor cough     last assessed - 29Dec2017    Asthma     Bilateral edema of lower extremity     last assessed - 21Aug2017    Cardiac murmur     last assessed - 21Aug2017    CKD (chronic kidney disease)     Diabetes mellitus (HCC)     last assessed - 29Nov2017    Esophageal dysphagia     Fatigue     last assessed - 21Aug2017    Hyperlipidemia     Hypertension     Patellar bursitis of right knee     last assessed - 04Nov2016    Personal history of other specified conditions     presenting hazards to health    Stroke (HCC)     Stroke syndrome     Urinary frequency     UTI (urinary tract infection)        PAST SURGICAL HISTORY:  Past Surgical History:   Procedure Laterality Date    CARDIAC  CATHETERIZATION N/A 6/1/2023    Procedure: Cardiac Coronary Angiogram;  Surgeon: Roe German MD;  Location:  CARDIAC CATH LAB;  Service: Cardiology    CARDIAC CATHETERIZATION  6/1/2023    Procedure: Cardiac Left Heart Cath;  Surgeon: Roe German MD;  Location:  CARDIAC CATH LAB;  Service: Cardiology    VT ESOPHAGOGASTRODUODENOSCOPY TRANSORAL DIAGNOSTIC N/A 4/5/2017    Procedure: ESOPHAGOGASTRODUODENOSCOPY (EGD);  Surgeon: Cedric Huang MD;  Location:  GI LAB;  Service: Gastroenterology        ALLERGIES:  Patient has no known allergies.    MEDICATIONS:  Current Outpatient Medications   Medication Sig Dispense Refill    acetaminophen (TYLENOL) 650 mg CR tablet Take 1 tablet (650 mg total) by mouth every 6 (six) hours as needed for mild pain 90 tablet 3    albuterol (PROVENTIL HFA,VENTOLIN HFA) 90 mcg/act inhaler INHALE 2 PUFFS EVERY 4 (FOUR) HOURS AS NEEDED FOR WHEEZING 8.5 g 5    atorvastatin (LIPITOR) 40 mg tablet TAKE 1 TABLET (40 MG TOTAL) BY MOUTH DAILY 90 tablet 3    benzonatate (TESSALON) 200 MG capsule Take 1 capsule (200 mg total) by mouth 3 (three) times a day as needed for cough 20 capsule 0    Breo Ellipta 100-25 MCG/ACT inhaler INHALE 1 PUFF DAILY RINSE MOUTH AFTER USE. 60 each 5    calcitriol (ROCALTROL) 0.25 mcg capsule Take 1 capsule (0.25 mcg total) by mouth 3 (three) times a week 15 capsule 3    Cholecalciferol (D3) 50 MCG (2000 UT) TABS Take 1 tablet (2,000 Units total) by mouth daily (Patient taking differently: Take 2 tablets by mouth daily) 90 tablet 3    clopidogrel (PLAVIX) 75 mg tablet TAKE 2 TABLETS DAILY 180 tablet 3    Continuous Blood Gluc  (FreeStyle Naldo 14 Day [place]) JAQUELIN USE TO RECORD BLOOD GLUCOSE 4 TIMES DAILY. ONCE FASTING AND THREE TIMES DAILY BEFORE MEALS 4 each 3    Continuous Glucose Sensor (FreeStyle Naldo 2 Sensor) MISC USE ONE SENSOR EVERY 14 DAYS 6 each 1    estrogens, conjugated (Premarin) vaginal cream Insert 1 g into the vagina 2 (two) times a week  30 g 3    ferrous sulfate 325 (65 Fe) mg tablet TAKE 1 TABLET TWICE A DAY BEFORE MEALS 180 tablet 0    glucose blood (FREESTYLE LITE) test strip TEST AS DIRECTED UP TO FOUR TIMES A  each 3    glucose monitoring kit (FREESTYLE) monitoring kit Use 1 each as needed (Three time daily) Please check blood sugar 3 times daily. 1 each 1    Glucose-Vitamin C-Vitamin D (TRUEPLUS GLUCOSE ON THE GO) CHEW       Lancets (FREESTYLE) lancets Use as instructed 100 each 0    latanoprost (XALATAN) 0.005 % ophthalmic solution       linaGLIPtin (Tradjenta) 5 MG TABS TAKE 1 TABLET (5 MG) BY MOUTH DAILY 90 tablet 3    losartan (COZAAR) 25 mg tablet TAKE 1 TABLET (25 MG TOTAL) BY MOUTH DAILY 30 tablet 5    metolazone (ZAROXOLYN) 5 mg tablet Take 1 tablet (5 mg total) by mouth every other day 14 tablet 0    Misc. Devices (QUAD CANE) MISC by Does not apply route daily 1 each 0    nitroglycerin (NITROSTAT) 0.4 mg SL tablet PLACE 1 TABLET (0.4 MG TOTAL) UNDER THE TONGUE EVERY 5 (FIVE) MINUTES AS NEEDED FOR CHEST PAIN 25 tablet 1    Oral Hygiene Products (Extended Term Oral Care System) KIT Apply to the mouth or throat 2 (two) times a day 1 kit 1    potassium chloride (Klor-Con M20) 20 mEq tablet TAKE 1 TABLET (20 HASMUKH EQUIVALENT TOTAL) BY MOUTH DAILY 30 tablet 5    repaglinide (PRANDIN) 0.5 mg tablet TAKE 1 TABLET (0.5 MG TOTAL) BY MOUTH 2 (TWO) TIMES A DAY BEFORE MEALS (SKIP DOSE IF SKIPPING MEAL) 180 tablet 1    SODIUM FLUORIDE, DENTAL RINSE, 0.02 % SOLN Apply 15 mL to the mouth or throat 2 (two) times a day 532 mL 1    torsemide (DEMADEX) 20 mg tablet TAKE 3 TABLETS (60 MG TOTAL) BY MOUTH DAILY 270 tablet 1    Xarelto 2.5 MG tablet TAKE 1 TABLET (2.5 MG TOTAL) BY MOUTH 2 (TWO) TIMES A DAY WITH MEALS 180 tablet 1    Blood Glucose Monitoring Suppl (FREESTYLE LITE) JAQUELIN by Does not apply route daily 1 each 0    carvedilol (COREG) 6.25 mg tablet TAKE 1 TABLET (6.25 MG TOTAL) BY MOUTH 2 (TWO) TIMES A DAY WITH MEALS 60 tablet 5     ketoconazole (NIZORAL) 2 % shampoo APPLY TOPICALLY TO THE SCALP ONCE EVERY OTHER WEEK (Patient not taking: Reported on 2024) 120 mL 0    montelukast (SINGULAIR) 10 mg tablet TAKE 1 TABLET (10 MG TOTAL) BY MOUTH DAILY AT BEDTIME 90 tablet 3    ranolazine (RANEXA) 500 mg 12 hr tablet Take 1 tablet (500 mg total) by mouth every 12 (twelve) hours 60 tablet 0     No current facility-administered medications for this visit.       SOCIAL HISTORY:  Social History     Socioeconomic History    Marital status:      Spouse name: None    Number of children: 8    Years of education: None    Highest education level: None   Occupational History    Occupation: Retired   Tobacco Use    Smoking status: Former     Current packs/day: 0.00     Types: Cigarettes     Quit date:      Years since quittin.7    Smokeless tobacco: Former    Tobacco comments:     quit over 30 yrs ago; (quit in the , had smoked 3PPD for 20 years - per [employer])   Vaping Use    Vaping status: Never Used   Substance and Sexual Activity    Alcohol use: Never    Drug use: Never    Sexual activity: Not Currently   Other Topics Concern    None   Social History Narrative    Diet needs improvement    Does not exercise    No caffeine use     Social Determinants of Health     Financial Resource Strain: Low Risk  (2024)    Overall Financial Resource Strain (CARDIA)     Difficulty of Paying Living Expenses: Not hard at all   Food Insecurity: No Food Insecurity (2024)    Hunger Vital Sign     Worried About Running Out of Food in the Last Year: Never true     Ran Out of Food in the Last Year: Never true   Transportation Needs: No Transportation Needs (2024)    PRAPARE - Transportation     Lack of Transportation (Medical): No     Lack of Transportation (Non-Medical): No   Physical Activity: Inactive (2021)    Exercise Vital Sign     Days of Exercise per Week: 0 days     Minutes of Exercise per Session: 0 min   Stress: No Stress  Concern Present (4/1/2021)    [employer] - Occupational Stress Questionnaire     Feeling of Stress : Not at all   Social Connections: Socially Isolated (4/1/2021)    Social Connection and Isolation Panel [NHANES]     Frequency of Communication with Friends and Family: Once a week     Frequency of Social Gatherings with Friends and Family: Once a week     Attends [employer] Services: 1 to 4 times per year     Active Member of Clubs or Organizations: No     Attends Club or Organization Meetings: Never     Marital Status:    Intimate Partner Violence: Not At Risk (4/1/2021)    Humiliation, Afraid, Rape, and Kick questionnaire     Fear of Current or Ex-Partner: No     Emotionally Abused: No     Physically Abused: No     Sexually Abused: No   Housing Stability: Low Risk  (1/8/2024)    Housing Stability Vital Sign     Unable to Pay for Housing in the Last Year: No     Number of Times Moved in the Last Year: 1     Homeless in the Last Year: No        REVIEW OF SYSTEMS:  GENERAL: NAD, afebrile  HEART: No chest pain, or palpitation  RESPIRATORY:  No acute SOB or cough  GI: No Nausea, vomit or diarrhea  NEUROLOGIC: No syncope or acute weakness    PHYSICAL EXAM:  VASCULAR EXAM  Dorsalis pedis  Absent.  Posterior tibial artery  absent.  The patient has class findings with skin atrophy, lack of digital hair, and nail dystrophy.  There is +1 lower extremity edema bilaterally.      NEUROLOGIC EXAM  Sensation is intact to light touch.  Sensation is intact to 10gm monofilament.  Decreased vibratory sensation on her feet.  No focal neurologic deficit.          DERMATOLOGIC EXAM:   No ulcer or cellulitis noted.  No abscess.  The patient has hypertrophic toenails X 7 with discoloration, onycholysis, and subungal debris.  No notable skin lesion.      MUSCULOSKELETAL EXAM:   No acute joint pain.  Diffuse ankle edema noted.  No acute musculoskeletal problem.  Hammertoe noted.      Diabetic Foot  Exam    Patient's shoes and socks removed.    Right Foot/Ankle   Right Foot Inspection  Skin Exam: skin intact and dry skin. No callus, no erythema, no maceration, no abnormal color, no pre-ulcer, no ulcer and no callus.     Toe Exam: right toe deformity. No swelling, no tenderness and erythema    Sensory   Proprioception: intact  Monofilament testing: intact    Vascular  Capillary refills: < 3 seconds  The right DP pulse is 0. The right PT pulse is 0.     Left Foot/Ankle  Left Foot Inspection  Skin Exam: skin intact and dry skin. No erythema, no maceration, normal color, no pre-ulcer, no ulcer and no callus.     Toe Exam: left toe deformity. No swelling, no tenderness and no erythema.     Sensory   Proprioception: intact  Monofilament testing: intact    Vascular  Capillary refills: < 3 seconds  The left DP pulse is 0. The left PT pulse is 0.     Assign Risk Category  Deformity present  No loss of protective sensation  Weak pulses  Risk: 1

## 2024-09-19 ENCOUNTER — OFFICE VISIT (OUTPATIENT)
Dept: INTERNAL MEDICINE CLINIC | Facility: CLINIC | Age: 84
End: 2024-09-19

## 2024-09-19 ENCOUNTER — TELEPHONE (OUTPATIENT)
Dept: INTERNAL MEDICINE CLINIC | Facility: CLINIC | Age: 84
End: 2024-09-19

## 2024-09-19 VITALS
SYSTOLIC BLOOD PRESSURE: 119 MMHG | OXYGEN SATURATION: 96 % | BODY MASS INDEX: 29.9 KG/M2 | DIASTOLIC BLOOD PRESSURE: 59 MMHG | HEART RATE: 80 BPM | TEMPERATURE: 97.4 F | HEIGHT: 62 IN | WEIGHT: 162.5 LBS

## 2024-09-19 DIAGNOSIS — J45.30 MILD PERSISTENT ASTHMA WITHOUT COMPLICATION: ICD-10-CM

## 2024-09-19 DIAGNOSIS — E11.65 UNCONTROLLED TYPE 2 DIABETES MELLITUS WITH HYPERGLYCEMIA (HCC): ICD-10-CM

## 2024-09-19 DIAGNOSIS — E55.9 VITAMIN D DEFICIENCY: ICD-10-CM

## 2024-09-19 DIAGNOSIS — E11.65 TYPE 2 DIABETES MELLITUS WITH HYPERGLYCEMIA, WITHOUT LONG-TERM CURRENT USE OF INSULIN (HCC): Primary | ICD-10-CM

## 2024-09-19 DIAGNOSIS — Z00.00 MEDICARE ANNUAL WELLNESS VISIT, SUBSEQUENT: ICD-10-CM

## 2024-09-19 LAB — SL AMB POCT HEMOGLOBIN AIC: 6.9 (ref ?–6.5)

## 2024-09-19 PROCEDURE — G0439 PPPS, SUBSEQ VISIT: HCPCS | Performed by: STUDENT IN AN ORGANIZED HEALTH CARE EDUCATION/TRAINING PROGRAM

## 2024-09-19 PROCEDURE — 83036 HEMOGLOBIN GLYCOSYLATED A1C: CPT | Performed by: STUDENT IN AN ORGANIZED HEALTH CARE EDUCATION/TRAINING PROGRAM

## 2024-09-19 RX ORDER — FLASH GLUCOSE SCANNING READER
EACH MISCELLANEOUS
Qty: 4 EACH | Refills: 3 | Status: SHIPPED | OUTPATIENT
Start: 2024-09-19

## 2024-09-19 RX ORDER — FLUTICASONE FUROATE AND VILANTEROL TRIFENATATE 100; 25 UG/1; UG/1
1 POWDER RESPIRATORY (INHALATION) DAILY
Qty: 60 EACH | Refills: 5 | Status: SHIPPED | OUTPATIENT
Start: 2024-09-19 | End: 2025-09-14

## 2024-09-19 RX ORDER — CHOLECALCIFEROL (VITAMIN D3) 50 MCG
2 TABLET ORAL DAILY
Qty: 90 TABLET | Refills: 3 | Status: SHIPPED | OUTPATIENT
Start: 2024-09-19

## 2024-09-19 NOTE — ASSESSMENT & PLAN NOTE
"lives at home , feels safe at home  ADLs  6/6  IADLs 7/8 ( + food prep/cooking, + house keeping, + laundry,  +transportation, 0 finances, + medications, + communication/phone, +shopping)  + ACP documents in place        Two sons are POA   Declined filling out or looking over POLST and five wishes   Declined bringing in living will for copies   Declined overall SIC conversation and states her \"sons know exactly what she wants\"  confirmed medication compliance, refills as noted  continue mentally and physically engaging exercises and healthy diet and lifestyle             "

## 2024-09-19 NOTE — ASSESSMENT & PLAN NOTE
Lab Results   Component Value Date    HGBA1C 6.9 (A) 09/19/2024     See above A&P  Orders:    Continuous Glucose  (FreeStyle Naldo 14 Day [place]) JAQUELIN; Use to record blood glucose 4 times daily. Once fasting and three times daily before meals

## 2024-09-19 NOTE — TELEPHONE ENCOUNTER
I called patient to let her know she left her black sweater on the w/c. She was going to give us a call but didn't get a chance to see if it was here.    Patient will have one of her sons pick it up and will let us know when.

## 2024-09-19 NOTE — ASSESSMENT & PLAN NOTE
"  Lab Results   Component Value Date    HGBA1C 6.9 (A) 09/19/2024       Currently at goal for A1c (<7%, <8% if >80)  Continue current regimen of trajenta and prandin at current doses   Not demonstrating any signs of hypogylcemia   Re-educated patient on hypoglycemic symptoms and definition (values <60)  Continue Ace-i/arb  IS on statin, continue/consider adding            repeat lipids q3yrs, due 2025  IS following optho, next eye exam due 2025   Refused IRIS in office today as she will be seeing Dr. Gonzalez \"soon\"  IS following podiatry, next foot exam due 2025  yearly micro albumin creatinine ratio is UTD, due 2025  Carb controlled diet, discussed healthy lifestyle modifications  consider DM nutrition referral  CGM ordered  Continuous Glucose Monitor (CGM) Statement of Medical Necessity:  [] Patient administers 3 or more insulin injections per day or is using an insulin pump [] Evidence of unexplained severe hypoglycemia episodes requiring external assistance for recovery   [x] Patient self checks blood glucose 4 or more times per day [x] Patient has been hospitalized or has required paramedical treatment for low blood sugar   [] Patient's insulin treatment requires frequent adjustment by the patient based off blood sugar readings [] Day-to-day variations in work schedule, mealtimes and or activity level, which confound the degree of regimentation required to self-manage glycemia with multiple insulin injections   [x] History of hypoglycemic unawareness [] Poor glycemic control as evidence by 72 hour CGMS sensing trial   [] History of severe glycemic excursions (commonly associated with brittle diabetes, extreme insulin sensitivity, and/or very low insulin requirements) [] Has displayed multiple alterations in self-monitoring and insulin regimens to optimize care; completed comprehensive diabetes education; demonstrated ability to self-monitor blood glucose levels as recommended by a Physician; and is motivated to " achieve and maintain improved glycemic control   [] Recurring episodes of severe hypoglycemia [x] Demonstrates an understanding of technology and are motivated to use the device correctly and consistently, are expected to adhere to comprehensive diabetes treatment plan and are capable of using the device to recognize alerts and alarms          Orders:    POCT hemoglobin A1c

## 2024-09-19 NOTE — PROGRESS NOTES
"Ambulatory Visit  Name: Jennifer Amaya      : 1940      MRN: [id number]  Encounter Provider: Carmen Molina DO  Encounter Date: 2024   Encounter department: [facility]    Assessment & Plan  Type 2 diabetes mellitus with hyperglycemia, without long-term current use of insulin (HCC)    Lab Results   Component Value Date    HGBA1C 6.9 (A) 2024       Currently at goal for A1c (<7%, <8% if >80)  Continue current regimen of trajenta and prandin at current doses   Not demonstrating any signs of hypogylcemia   Re-educated patient on hypoglycemic symptoms and definition (values <60)  Continue Ace-i/arb  IS on statin, continue/consider adding            repeat lipids q3yrs, due   IS following optho, next eye exam due    Refused IRIS in office today as she will be seeing Dr. Gonzalez \"soon\"  IS following podiatry, next foot exam due   yearly micro albumin creatinine ratio is UTD, due   Carb controlled diet, discussed healthy lifestyle modifications  consider DM nutrition referral  CGM ordered  Continuous Glucose Monitor (CGM) Statement of Medical Necessity:  [] Patient administers 3 or more insulin injections per day or is using an insulin pump [] Evidence of unexplained severe hypoglycemia episodes requiring external assistance for recovery   [x] Patient self checks blood glucose 4 or more times per day [x] Patient has been hospitalized or has required paramedical treatment for low blood sugar   [] Patient's insulin treatment requires frequent adjustment by the patient based off blood sugar readings [] Day-to-day variations in work schedule, mealtimes and or activity level, which confound the degree of regimentation required to self-manage glycemia with multiple insulin injections   [x] History of hypoglycemic unawareness [] Poor glycemic control as evidence by 72 hour CGMS sensing trial   [] History of severe glycemic excursions (commonly associated with brittle " diabetes, extreme insulin sensitivity, and/or very low insulin requirements) [] Has displayed multiple alterations in self-monitoring and insulin regimens to optimize care; completed comprehensive diabetes education; demonstrated ability to self-monitor blood glucose levels as recommended by a Physician; and is motivated to achieve and maintain improved glycemic control   [] Recurring episodes of severe hypoglycemia [x] Demonstrates an understanding of technology and are motivated to use the device correctly and consistently, are expected to adhere to comprehensive diabetes treatment plan and are capable of using the device to recognize alerts and alarms          Orders:    POCT hemoglobin A1c    Mild persistent asthma without complication  CTABL and no recent exacerbations  Stable    Orders:    Breo Ellipta 100-25 MCG/ACT inhaler; Inhale 1 puff daily Rinse mouth after use.    Vitamin D deficiency  Vit D level WNL, repeat next year    Recommend 15 minutes per day of natural unfiltered sunlight  Continue Vit D 2,000U daily supplement  Encouraged Calcium supplement 1200 mg per day  Encourage weight loss and healthy lifestyle modifications   Encouraged diet of dark leafy greens  If extremely low or daily supplementation is not sufficient, would benefit from for 50,000U qweek [id number]      Orders:    Cholecalciferol (D3) 50 MCG (2000 UT) TABS; Take 2 tablets (4,000 Units total) by mouth daily    Uncontrolled type 2 diabetes mellitus with hyperglycemia (HCC)    Lab Results   Component Value Date    HGBA1C 6.9 (A) 09/19/2024     See above A&P  Orders:    Continuous Glucose  (FreeStyle Naldo 14 Day [place]) JAQUELIN; Use to record blood glucose 4 times daily. Once fasting and three times daily before meals    Medicare annual wellness visit, subsequent  lives at home , feels safe at home  ADLs  6/6  IADLs 7/8 ( + food prep/cooking, + house keeping, + laundry,  +transportation, 0 finances, + medications, +  "communication/phone, +shopping)  + ACP documents in place        Two sons are POA   Declined filling out or looking over POLST and five wishes   Declined bringing in living will for copies   Declined overall SIC conversation and states her \"sons know exactly what she wants\"  confirmed medication compliance, refills as noted  continue mentally and physically engaging exercises and healthy diet and lifestyle                Preventive health issues were discussed with patient, and age appropriate screening tests were ordered as noted in patient's After Visit Summary. Personalized health advice and appropriate referrals for health education or preventive services given if needed, as noted in patient's After Visit Summary.    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    Compliant with all meds and feels well. She is connected well to her family and states her sons treat her well. She has no acute complaints and a heal goal of \"getting through each day with help from Song\". She uses her cane consistently but no recent falls or issues with mobility impairing her quality of life       Patient Care Team:  Carmen Molina DO as PCP - General (Internal Medicine)  Jorden Rosado MD (Nephrology)    Review of Systems   Constitutional:  Negative for appetite change, diaphoresis and fever.   Eyes:  Negative for visual disturbance.   Respiratory:  Negative for shortness of breath.    Cardiovascular:  Negative for chest pain and palpitations.   Gastrointestinal:  Negative for constipation, diarrhea, nausea and vomiting.   Genitourinary:  Negative for difficulty urinating, dysuria, enuresis, frequency and hematuria.   Musculoskeletal:  Positive for gait problem (uses cane at baseline). Negative for arthralgias.   Neurological:  Negative for dizziness, light-headedness and headaches. "   Psychiatric/Behavioral:  Negative for dysphoric mood and sleep disturbance.      Medical History Reviewed by provider this encounter:  [facility]       Annual Wellness Visit Questionnaire   Jennifer is here for her Subsequent Wellness visit. Last Medicare Wellness visit information reviewed, patient interviewed, no change since last AWV.     Health Risk Assessment:   Patient rates overall health as good. Patient feels that their physical health rating is same. Patient is satisfied with their life. Eyesight was rated as same. Hearing was rated as same. Patient feels that their emotional and mental health rating is same. Patients states they are never, rarely angry. Patient states they are sometimes unusually tired/fatigued. Pain experienced in the last 7 days has been some. Patient's pain rating has been 3/10. Patient states that she has experienced no weight loss or gain in last 6 months.     Depression Screening:   PHQ-2 Score: 0      Fall Risk Screening:   In the past year, patient has experienced: no history of falling in past year      Urinary Incontinence Screening:   Patient has leaked urine accidently in the last six months.     Home Safety:  Patient does not have trouble with stairs inside or outside of their home. Patient has working smoke alarms and has working carbon monoxide detector. Home safety hazards include: none.     Nutrition:   Current diet is Diabetic and No Added Salt.     Medications:   Patient is not currently taking any over-the-counter supplements. Patient is able to manage medications.     Activities of Daily Living (ADLs)/Instrumental Activities of Daily Living (IADLs):   Walk and transfer into and out of bed and chair?: Yes  Dress and groom yourself?: Yes    Bathe or shower yourself?: Yes    Feed yourself? Yes  Do your laundry/housekeeping?: Yes  Manage your money, pay your bills and track your expenses?: Yes  Make your own meals?: No    Do your own shopping?: Yes    Previous Hospitalizations:    Any hospitalizations or ED visits within the last 12 months?: No      PREVENTIVE SCREENINGS      Cardiovascular Screening:    General: Screening Not Indicated and History Lipid Disorder      Diabetes Screening:     General: Screening Not Indicated and History Diabetes      Breast Cancer Screening:     General: Screening Current      Cervical Cancer Screening:    General: Screening Not Indicated      Lung Cancer Screening:     General: Screening Not Indicated    Screening, Brief Intervention, and Referral to Treatment (SBIRT)    Screening  Typical number of drinks in a day: 0  Typical number of drinks in a week: 0  Interpretation: Low risk drinking behavior.    Single Item Drug Screening:  How often have you used an illegal drug (including marijuana) or a prescription medication for non-medical reasons in the past year? never    Single Item Drug Screen Score: 0  Interpretation: Negative screen for possible drug use disorder    SDOH Risk Assessment  Social determinants of health (SDOH) risk assesment tool was completed. The tool at a minimum covered housing stability, food insecurity, transportation needs, and utility difficulty. Patient had at risk responses for the following SDOH domains: food insecurity.     Social Determinants of Health     Financial Resource Strain: Low Risk  (9/19/2024)    Overall Financial Resource Strain (CARDIA)     Difficulty of Paying Living Expenses: Not hard at all   Food Insecurity: Food Insecurity Present (9/19/2024)    Hunger Vital Sign     Worried About Running Out of Food in the Last Year: Sometimes true     Ran Out of Food in the Last Year: Sometimes true   Transportation Needs: No Transportation Needs (9/19/2024)    PRAPARE - Transportation     Lack of Transportation (Medical): No     Lack of Transportation (Non-Medical): No   Housing Stability: Low Risk  (9/19/2024)    Housing Stability Vital Sign     Unable to Pay for Housing in the Last Year: No     Number of Times Moved in  "the Last Year: 1     Homeless in the Last Year: No   Utilities: Not At Risk (9/19/2024)    [facility] Utilities     Threatened with loss of utilities: No     No results found.    Objective     /59 (BP Location: Right arm, Patient Position: Sitting, Cuff Size: Standard)   Pulse 80   Temp (!) 97.4 °F (36.3 °C) (Temporal)   Ht 5' 2\" (1.575 m)   Wt 73.7 kg (162 lb 8 oz)   SpO2 96%   BMI 29.72 kg/m²     Physical Exam  Constitutional:       General: She is not in acute distress.     Appearance: She is well-developed.   HENT:      Head: Normocephalic and atraumatic.      Right Ear: Tympanic membrane, ear canal and external ear normal. There is no impacted cerumen.      Left Ear: Tympanic membrane, ear canal and external ear normal.      Nose: Nose normal.      Mouth/Throat:      Mouth: Mucous membranes are moist.      Comments: Dentures in place  Eyes:      General: No scleral icterus.     Conjunctiva/sclera: Conjunctivae normal.      Pupils: Pupils are equal, round, and reactive to light.   Neck:      Thyroid: No thyromegaly.   Cardiovascular:      Rate and Rhythm: Normal rate and regular rhythm.      Heart sounds: Normal heart sounds. No murmur heard.     No friction rub. No gallop.   Pulmonary:      Effort: Pulmonary effort is normal. No respiratory distress.      Breath sounds: Normal breath sounds. No stridor. No wheezing or rales.   Abdominal:      General: Bowel sounds are normal. There is no distension.      Palpations: Abdomen is soft. There is no mass.      Tenderness: There is no abdominal tenderness. There is no guarding or rebound.   Musculoskeletal:         General: No deformity.      Cervical back: Neck supple. No tenderness.      Right lower leg: No edema.      Left lower leg: No edema.   Skin:     General: Skin is warm.      Capillary Refill: Capillary refill takes less than 2 seconds.      Findings: No erythema or rash.   Neurological:      Mental Status: She is alert.   Psychiatric:         Mood and " Affect: Mood normal.         Behavior: Behavior normal.         Thought Content: Thought content normal.         Judgment: Judgment normal.       Administrative Statements   I have spent a total time of 50 minutes in caring for this patient on the day of the visit/encounter including Prognosis, Risks and benefits of tx options, Instructions for management, Patient and family education, Importance of tx compliance, Risk factor reductions, Impressions, Counseling / Coordination of care, Documenting in the medical record, Reviewing / ordering tests, medicine, procedures  , Obtaining or reviewing history  , and Communicating with other healthcare professionals .

## 2024-09-19 NOTE — ASSESSMENT & PLAN NOTE
Vit D level WNL, repeat next year    Recommend 15 minutes per day of natural unfiltered sunlight  Continue Vit D 2,000U daily supplement  Encouraged Calcium supplement 1200 mg per day  Encourage weight loss and healthy lifestyle modifications   Encouraged diet of dark leafy greens  If extremely low or daily supplementation is not sufficient, would benefit from for 50,000U qweek [id number]      Orders:    Cholecalciferol (D3) 50 MCG (2000 UT) TABS; Take 2 tablets (4,000 Units total) by mouth daily

## 2024-09-19 NOTE — ASSESSMENT & PLAN NOTE
CTABL and no recent exacerbations  Stable    Orders:    Breo Ellipta 100-25 MCG/ACT inhaler; Inhale 1 puff daily Rinse mouth after use.

## 2024-10-08 ENCOUNTER — TELEPHONE (OUTPATIENT)
Age: 84
End: 2024-10-08

## 2024-10-08 NOTE — TELEPHONE ENCOUNTER
Pt calling in regards to appt on 10/10 with AP in [place].    Pt sts she has been referred to a female voiding dysfunction specialist with [facility] and is requesting a call back from clinical staff to see if this appt with SL is necessary for her to attend.     Pt call back- [phone]

## 2024-10-08 NOTE — TELEPHONE ENCOUNTER
I see an appointment for tomorrow for her OB/GYN for pessary maintenance.  I do not see any appointments for a voiding dysfunction specialist.  We can plan to discuss at her appointment on 10/10 to decide if she needs to go pursue that appointment.  I definitely recommend that she go for her appointment tomorrow at her OB/GYN.

## 2024-10-08 NOTE — TELEPHONE ENCOUNTER
Called and spoke with patient with patient's grandson in the background. Informed on AP's note. Confirmed follow up appt.

## 2024-10-09 ENCOUNTER — OFFICE VISIT (OUTPATIENT)
Dept: OBGYN CLINIC | Facility: CLINIC | Age: 84
End: 2024-10-09

## 2024-10-09 VITALS
HEIGHT: 62 IN | WEIGHT: 167 LBS | SYSTOLIC BLOOD PRESSURE: 122 MMHG | BODY MASS INDEX: 30.73 KG/M2 | DIASTOLIC BLOOD PRESSURE: 72 MMHG | HEART RATE: 74 BPM

## 2024-10-09 DIAGNOSIS — Z46.89 PESSARY MAINTENANCE: Primary | ICD-10-CM

## 2024-10-09 PROCEDURE — 99213 OFFICE O/P EST LOW 20 MIN: CPT | Performed by: OBSTETRICS & GYNECOLOGY

## 2024-10-09 NOTE — ASSESSMENT & PLAN NOTE
Pessary removed and cleaned today. Pessary felt to be in the correct location upon removal.  No evidence of infection on exam  Patient requests to place the pessary by herself at home   Instructed patient to call if she cannot place it or remove it. She understands that it should be removed and cleaned every 3 months.

## 2024-10-09 NOTE — PROGRESS NOTES
OB/GYN VISIT  Jennifer Amaya  10/9/2024  4:00 PM    ASSESSMENT / PLAN:    Jennifer Amaya is a 84 y.o.  female presenting for pessary maintenance.    Assessment & Plan  Pessary maintenance  Pessary removed and cleaned today. Pessary felt to be in the correct location upon removal.  No evidence of infection on exam  Patient requests to place the pessary by herself at home   Instructed patient to call if she cannot place it or remove it. She understands that it should be removed and cleaned every 3 months.         SUBJECTIVE:    Jennifer Amaya is a 84 y.o.  female presenting for pessary maintenance. She had a size 8 ring with support placed in February. Since, she reports she has not been able to remove it to clean on her own, which she was able to do in the past. She denies any urinary complaints including dysuria or hematuria. She denies any signs of infection including abnormal or foul smelling discharge, itching, burning, fever or chills.     She does not want to return to the office every 3 months for cleaning and prefers to do it herself. We discussed that it is important to have it cleaned q3mo so that she can avoid infection. Instructed to call office if she has any difficulty. She prefers to go to the emergency room if she is unable to remove it. Discussed again that she should call office if this is a concern.     Past Medical History:   Diagnosis Date    ACE-inhibitor cough     last assessed - 2017    Asthma     Bilateral edema of lower extremity     last assessed - 2017    Cardiac murmur     last assessed - 2017    CKD (chronic kidney disease)     Diabetes mellitus (HCC)     last assessed - 2017    Esophageal dysphagia     Fatigue     last assessed - 2017    Hyperlipidemia     Hypertension     Patellar bursitis of right knee     last assessed - 2016    Personal history of other specified conditions     presenting hazards to health    Stroke (HCC)     Stroke  "syndrome     Urinary frequency     UTI (urinary tract infection)        Past Surgical History:   Procedure Laterality Date    CARDIAC CATHETERIZATION N/A 6/1/2023    Procedure: Cardiac Coronary Angiogram;  Surgeon: Roe German MD;  Location: [facility];  Service: Cardiology    CARDIAC CATHETERIZATION  6/1/2023    Procedure: Cardiac Left Heart Cath;  Surgeon: Roe German MD;  Location: [facility];  Service: Cardiology    [place] ESOPHAGOGASTRODUODENOSCOPY TRANSORAL DIAGNOSTIC N/A 4/5/2017    Procedure: ESOPHAGOGASTRODUODENOSCOPY (EGD);  Surgeon: Cedric Huang MD;  Location: [facility];  Service: Gastroenterology       OBJECTIVE:    Vitals:  Blood pressure 122/72, pulse 74, height 5' 2\" (1.575 m), weight 75.8 kg (167 lb), not currently breastfeeding.Body mass index is 30.54 kg/m².    Physical Exam:    Physical Exam  Constitutional:       General: She is not in acute distress.  Genitourinary:      Genitourinary Comments: Normal external female genitalia  Pessary felt to be in correct position behind the pubic bone on exam  Physiologic discharge not concerning for infection    HENT:      Head: Normocephalic.      Nose: Nose normal.      Mouth/Throat:      Pharynx: Oropharynx is clear.   Eyes:      Conjunctiva/sclera: Conjunctivae normal.   Cardiovascular:      Rate and Rhythm: Normal rate.   Pulmonary:      Effort: Pulmonary effort is normal.   Abdominal:      Palpations: Abdomen is soft.      Tenderness: There is no abdominal tenderness.   Musculoskeletal:         General: No tenderness.      Right lower leg: No edema.      Left lower leg: No edema.   Neurological:      Mental Status: She is alert.   Skin:     General: Skin is warm and dry.      Coloration: Skin is not jaundiced.   Psychiatric:         Mood and Affect: Mood normal.         Behavior: Behavior normal.   Vitals reviewed.           Bonnie Anthony MD  10/9/2024  4:00 PM    "

## 2024-10-15 DIAGNOSIS — N25.81 HYPERPARATHYROIDISM, SECONDARY (HCC): ICD-10-CM

## 2024-10-16 RX ORDER — CALCITRIOL 0.25 UG/1
0.25 CAPSULE, LIQUID FILLED ORAL 3 TIMES WEEKLY
Qty: 15 CAPSULE | Refills: 3 | Status: SHIPPED | OUTPATIENT
Start: 2024-10-16

## 2024-10-19 PROBLEM — Z00.00 MEDICARE ANNUAL WELLNESS VISIT, SUBSEQUENT: Status: RESOLVED | Noted: 2024-09-19 | Resolved: 2024-10-19

## 2024-10-22 ENCOUNTER — HOSPITAL ENCOUNTER (OUTPATIENT)
Dept: RADIOLOGY | Age: 84
Discharge: HOME | End: 2024-10-22
Payer: MEDICARE

## 2024-10-22 DIAGNOSIS — Z12.31 ENCOUNTER FOR SCREENING MAMMOGRAM FOR MALIGNANT NEOPLASM OF BREAST: ICD-10-CM

## 2024-10-22 PROCEDURE — 77067 SCR MAMMO BI INCL CAD: CPT

## 2024-10-22 PROCEDURE — 77063 BREAST TOMOSYNTHESIS BI: CPT

## 2024-11-01 ENCOUNTER — TELEPHONE (OUTPATIENT)
Dept: NEPHROLOGY | Facility: CLINIC | Age: 84
End: 2024-11-01

## 2024-11-01 NOTE — TELEPHONE ENCOUNTER
Called pt and LVM stating she has been scheduled for a follow up appt on 1/24/25 at 3:30pm with Dr Rosado in the [place]. Advised pt to call office if she would like to reschedule. Appt card mailed to pt.

## 2024-11-08 ENCOUNTER — TELEPHONE (OUTPATIENT)
Dept: INTERNAL MEDICINE CLINIC | Facility: CLINIC | Age: 84
End: 2024-11-08

## 2024-11-08 NOTE — TELEPHONE ENCOUNTER
"Pt called the office asking to speak to Dr Gross. I informed her Dr Gross is not here at this time and however, I can take a message. Pt asking that Dr Gross call her back to discuss \"a device I need.\" Pt would not share anything other information with me.  "

## 2024-11-12 ENCOUNTER — TELEPHONE (OUTPATIENT)
Dept: INTERNAL MEDICINE CLINIC | Facility: CLINIC | Age: 84
End: 2024-11-12

## 2024-11-12 DIAGNOSIS — E11.65 UNCONTROLLED TYPE 2 DIABETES MELLITUS WITH HYPERGLYCEMIA (HCC): ICD-10-CM

## 2024-11-12 DIAGNOSIS — E11.65 TYPE 2 DIABETES MELLITUS WITH HYPERGLYCEMIA, WITHOUT LONG-TERM CURRENT USE OF INSULIN (HCC): ICD-10-CM

## 2024-11-12 RX ORDER — FLASH GLUCOSE SCANNING READER
EACH MISCELLANEOUS
Qty: 6 EACH | Refills: 11 | Status: SHIPPED | OUTPATIENT
Start: 2024-11-12

## 2024-11-12 NOTE — TELEPHONE ENCOUNTER
I called the patient, but no answer. I left a message on voicemail. I believe she needs another CGM (sensor and ) as that's the only device I see in my last office note with her. I placed refill and sent to pharmacy on file    Offered pt to message through [employer] [place] or call office; Office number given if any questions [phone]

## 2024-11-12 NOTE — TELEPHONE ENCOUNTER
Spoke with patient on the phone. Introduced self and role. Patient with concerns that she is having difficulty breathing while laying flat. Patient currently sitting up in her chair at home. Patient stated no concerns at the moment with her breathing. Patient stated she does get short of breath with exertion. Patient does not wear home oxygen, CPAP, or BIPAP. No complaints of pain. Patient is currently taking all her prescribed medications as ordered including Torsemide. Patient elevating her legs at rest in recliner. Per patient, her right leg is bigger than left. Patient stated she is not weighing herself daily. Encouraged patient to weigh daily in the morning and log results. Patient aware to contact office if she has a 2-3 lb weight gain in 24 hrs.     Patient has family support at home.     Patient requesting an appointment with Dr. Gross. Patient scheduled for Thursday 11/14 at 1530.     Patient aware if breathing becomes labored to call 911 immediately. Patient expressed understanding on the phone.     Discussed with patient completing small task at home with frequent rest breaks and also to sleep at a 45 degree angle or higher. Patient stated she is using pillows at night to help prop her up.

## 2024-11-12 NOTE — TELEPHONE ENCOUNTER
Patient called asking to schedule an appt with PCP. She stated that she is having trouble breathing. SOB at night when laying.     Clinical Mere took call to triage.

## 2024-11-14 ENCOUNTER — OFFICE VISIT (OUTPATIENT)
Dept: INTERNAL MEDICINE CLINIC | Facility: CLINIC | Age: 84
End: 2024-11-14

## 2024-11-14 VITALS
WEIGHT: 164 LBS | SYSTOLIC BLOOD PRESSURE: 114 MMHG | TEMPERATURE: 98.3 F | DIASTOLIC BLOOD PRESSURE: 63 MMHG | HEART RATE: 82 BPM | BODY MASS INDEX: 30 KG/M2 | OXYGEN SATURATION: 98 %

## 2024-11-14 DIAGNOSIS — N18.32 STAGE 3B CHRONIC KIDNEY DISEASE (HCC): ICD-10-CM

## 2024-11-14 DIAGNOSIS — Z23 ENCOUNTER FOR IMMUNIZATION: ICD-10-CM

## 2024-11-14 DIAGNOSIS — I50.43 ACUTE ON CHRONIC COMBINED SYSTOLIC AND DIASTOLIC CONGESTIVE HEART FAILURE (HCC): Primary | ICD-10-CM

## 2024-11-14 NOTE — PROGRESS NOTES
Name: Jennifer Amaya      : 1940      MRN: [id number]  Encounter Provider: Carmen Molina DO  Encounter Date: 2024   Encounter department: [facility]    Assessment & Plan  Encounter for immunization    Orders:    influenza vaccine, high-dose, PF 0.5 mL (Fluzone High Dose)    Acute on chronic combined systolic and diastolic congestive heart failure (HCC)  Wt Readings from Last 3 Encounters:   24 74.4 kg (164 lb)   10/09/24 75.8 kg (167 lb)   24 73.7 kg (162 lb 8 oz)     - cardiac ROS positive for dyspnea, fatigue, lower extremity edema, and orthopnea    echo 2024 demonstrates mitral and aortic regurg; EF 40%   May consider repeat next year as repeat echo would not generally  at this time  does follow with heart failure team, continue  continue torsemide, BB, and ace-i/arb   In setting of acute decompensation, added metolazone EOD x2 weeks  Counseled patient to take torsemide in morning however she states too many pills in morning and take  1.5 pills twice dailt (AM and by 3-4pm to avoid nocturia/sleep disturbance), which she is amenable to and able to repeat back  Strict ED precautions reviewed  Frequent Dietary indiscretion  Commonly eats cheese and crackers, canned beans if too tired to make a meal for herself  educated again on importance of limiting water and salt intake, counseled on 1800mL fluid restriction   and <2g   salt per day and their contribution to CHF symptoms  Consider meals on wheels for dietary adherence and ease of food access  consider nutrition counseling/referral  baseline not on supplemental O2 to maintain sats >92%   Patient requesting oxygen, however amenable to diuresis then 6 minute walk test for formal evaluation once optimized  goal: K>4, Mg >2, supplement as needed  Daily weights, note and please bring to next visit  Return to clinic 3-4 weeks  for recheck of symptoms  F/u BMP after diuresis to ensure no CAROL          Stage 3b chronic kidney disease (HCC)  Lab Results   Component Value Date    EGFR 31 05/31/2024    EGFR 32 10/17/2023    EGFR 34 10/10/2023    CREATININE 1.51 (H) 05/31/2024    CREATININE 1.48 (H) 10/17/2023    CREATININE 1.40 (H) 10/10/2023     Likely 2/2 HTN and or DM    Follows nephro outpatient  At increased risk contrast associated CAROL as DM, elderly, and CKD  Currently on Ace-i/arb, continue  continue BP control as noted above A/P  avoid NSAIDs (Ibuprofen, Motrin, Aleve, Advil, Naproxen, Celebrex, etc.) or other nephrotoxic agents  goal hgb 10  DM controlled            History of Present Illness     PMHx of combined CHF EF 40% c/b mitral and aortic regurg, HLD, , DM2, mild persistent asthma, HTN, CVA and b/l LE thrombosis (anticoagulated on Xarleto), NSTEMI, CKD4 c/b anemia and vit D deficiency,  presents for SOB.    She is sitting up in her chair at home during the day. SOB Began about 10 days ago. Does intermittently eat canned foods, does eat cheese. No chest pain, tightness, n/v/d/c; no cough, productive sputum, or lightheadedness    Patient stated no concerns at the moment with her breathing. Patient stated she does get short of breath with exertion. Patient does not wear home oxygen, CPAP, or BIPAP. No complaints of pain. Patient is currently taking all her prescribed medications as ordered including Torsemide. Patient elevating her legs at rest in recliner. Per patient, her right leg is bigger than left. Patient stated she is not weighing herself daily.    164lbs at home, usually around there; compliant with regimen no changes in meds; no recent illnesses, travel, steroids, unilateral leg pain, or redness.    Review of Systems   Constitutional:  Negative for diaphoresis and fever.   HENT:  Negative for congestion.    Eyes:  Negative for visual disturbance.   Respiratory:  Positive for shortness of breath. Negative for cough and chest tightness.    Cardiovascular:  Positive for leg swelling. Negative  for chest pain and palpitations.   Gastrointestinal:  Negative for abdominal pain, constipation, diarrhea, nausea and vomiting.   Neurological:  Negative for dizziness, syncope, light-headedness and headaches.   Psychiatric/Behavioral:  Positive for sleep disturbance (2/2 SOB). Negative for dysphoric mood. The patient is not nervous/anxious.      Past Medical History:   Diagnosis Date    ACE-inhibitor cough     last assessed - 29Dec2017    Asthma     Bilateral edema of lower extremity     last assessed - 21Aug2017    Cardiac murmur     last assessed - 21Aug2017    CKD (chronic kidney disease)     Diabetes mellitus (HCC)     last assessed - 29Nov2017    Esophageal dysphagia     Fatigue     last assessed - 21Aug2017    Hyperlipidemia     Hypertension     Patellar bursitis of right knee     last assessed - 04Nov2016    Personal history of other specified conditions     presenting hazards to health    Stroke (HCC)     Stroke syndrome     Urinary frequency     UTI (urinary tract infection)      Past Surgical History:   Procedure Laterality Date    CARDIAC CATHETERIZATION N/A 6/1/2023    Procedure: Cardiac Coronary Angiogram;  Surgeon: Roe German MD;  Location: [facility];  Service: Cardiology    CARDIAC CATHETERIZATION  6/1/2023    Procedure: Cardiac Left Heart Cath;  Surgeon: Roe German MD;  Location: [facility];  Service: Cardiology    SC ESOPHAGOGASTRODUODENOSCOPY TRANSORAL DIAGNOSTIC N/A 4/5/2017    Procedure: ESOPHAGOGASTRODUODENOSCOPY (EGD);  Surgeon: Cedric Huang MD;  Location: [facility];  Service: Gastroenterology     Family History   Problem Relation Age of Onset    Hypertension Mother     Stroke Mother     Heart disease Father     Other Sister         1)Breast disorder; 2)Epilepsy    Migraines Sister         Migraine headaches    Osteoporosis Sister     Thyroid disease Sister     Coronary artery disease Sister         S/P triple vessel bypass    Breast cancer Sister 80    No Known  Problems Sister     No Known Problems Sister     Osteoporosis Maternal Grandmother     No Known Problems Maternal Grandfather     No Known Problems Paternal Grandmother     No Known Problems Paternal Grandfather     Diabetes Son         Diabetes mellitus    Hypertension Son     Rheum arthritis Son         Rheumatic disease    No Known Problems Son     No Known Problems Son     No Known Problems Son     No Known Problems Son     No Known Problems Son     No Known Problems Maternal Aunt     No Known Problems Maternal Aunt     No Known Problems Maternal Aunt     No Known Problems Paternal Aunt     No Known Problems Paternal Aunt     Heart disease Family      Social History     Tobacco Use    Smoking status: Former     Current packs/day: 0.00     Types: Cigarettes     Quit date:      Years since quittin.9    Smokeless tobacco: Former    Tobacco comments:     quit over 30 yrs ago; (quit in the , had smoked 3PPD for 20 years - per [employer])   Vaping Use    Vaping status: Never Used   Substance and Sexual Activity    Alcohol use: Never    Drug use: Never    Sexual activity: Not Currently     Current Outpatient Medications on File Prior to Visit   Medication Sig    acetaminophen (TYLENOL) 650 mg CR tablet Take 1 tablet (650 mg total) by mouth every 6 (six) hours as needed for mild pain    albuterol (PROVENTIL HFA,VENTOLIN HFA) 90 mcg/act inhaler INHALE 2 PUFFS EVERY 4 (FOUR) HOURS AS NEEDED FOR WHEEZING    atorvastatin (LIPITOR) 40 mg tablet TAKE 1 TABLET (40 MG TOTAL) BY MOUTH DAILY    Breo Ellipta 100-25 MCG/ACT inhaler Inhale 1 puff daily Rinse mouth after use.    calcitriol (ROCALTROL) 0.25 mcg capsule TAKE 1 CAPSULE (0.25 MCG TOTAL) BY MOUTH 3 (THREE) TIMES A WEEK    carvedilol (COREG) 6.25 mg tablet TAKE 1 TABLET (6.25 MG TOTAL) BY MOUTH 2 (TWO) TIMES A DAY WITH MEALS    Cholecalciferol (D3) 50 MCG ( UT) TABS Take 2 tablets (4,000 Units total) by mouth daily    clopidogrel (PLAVIX) 75 mg tablet  TAKE 2 TABLETS DAILY    Continuous Glucose  (FreeStyle Naldo 14 Day Troy) JAQUELIN Use to record blood glucose 4 times daily. Once fasting and three times daily before meals    Continuous Glucose Sensor (FreeStyle Naldo 2 Sensor) MISC USE ONE SENSOR EVERY 14 DAYS    ferrous sulfate 325 (65 Fe) mg tablet TAKE 1 TABLET TWICE A DAY BEFORE MEALS    Glucose-Vitamin C-Vitamin D (TRUEPLUS GLUCOSE ON THE GO) CHEW     latanoprost (XALATAN) 0.005 % ophthalmic solution     linaGLIPtin (Tradjenta) 5 MG TABS TAKE 1 TABLET (5 MG) BY MOUTH DAILY    losartan (COZAAR) 25 mg tablet TAKE 1 TABLET (25 MG TOTAL) BY MOUTH DAILY    metolazone (ZAROXOLYN) 5 mg tablet Take 1 tablet (5 mg total) by mouth every other day    Misc. Devices (QUAD CANE) MISC by Does not apply route daily    montelukast (SINGULAIR) 10 mg tablet TAKE 1 TABLET (10 MG TOTAL) BY MOUTH DAILY AT BEDTIME    nitroglycerin (NITROSTAT) 0.4 mg SL tablet PLACE 1 TABLET (0.4 MG TOTAL) UNDER THE TONGUE EVERY 5 (FIVE) MINUTES AS NEEDED FOR CHEST PAIN    Oral Hygiene Products (Extended Term Oral Care System) KIT Apply to the mouth or throat 2 (two) times a day    potassium chloride (Klor-Con M20) 20 mEq tablet TAKE 1 TABLET (20 HASMUKH EQUIVALENT TOTAL) BY MOUTH DAILY    ranolazine (RANEXA) 500 mg 12 hr tablet Take 1 tablet (500 mg total) by mouth every 12 (twelve) hours    repaglinide (PRANDIN) 0.5 mg tablet TAKE 1 TABLET (0.5 MG TOTAL) BY MOUTH 2 (TWO) TIMES A DAY BEFORE MEALS (SKIP DOSE IF SKIPPING MEAL)    SODIUM FLUORIDE, DENTAL RINSE, 0.02 % SOLN Apply 15 mL to the mouth or throat 2 (two) times a day    torsemide (DEMADEX) 20 mg tablet TAKE 3 TABLETS (60 MG TOTAL) BY MOUTH DAILY    Xarelto 2.5 MG tablet TAKE 1 TABLET (2.5 MG TOTAL) BY MOUTH 2 (TWO) TIMES A DAY WITH MEALS     No Known Allergies  Immunization History   Administered Date(s) Administered    COVID-19 PFIZER VACCINE 0.3 ML IM 03/30/2021, 04/21/2021, 01/03/2022    COVID-19 Pfizer Vac BIVALENT [place]-sucrose 12  Yr+ IM 11/15/2022    INFLUENZA 10/10/2017, 11/15/2022    Influenza Quadrivalent, 6-35 Months IM 10/10/2017    Influenza Split High Dose Preservative Free IM 11/14/2024    Influenza, high dose seasonal 0.7 mL 10/02/2019, 10/06/2020, 11/02/2021    Pneumococcal Conjugate 13-Valent 02/19/2020    Pneumococcal Polysaccharide PPV23 01/01/2008    Tdap 03/11/2020     Objective   /63 (BP Location: Left arm, Patient Position: Sitting, Cuff Size: Standard)   Pulse 82   Temp 98.3 °F (36.8 °C) (Temporal)   Wt 74.4 kg (164 lb)   SpO2 98%   BMI 30.00 kg/m²     Physical Exam  Constitutional:       General: She is not in acute distress.     Appearance: She is well-developed. She is ill-appearing (chronic).   HENT:      Head: Normocephalic and atraumatic.   Eyes:      General: No scleral icterus.     Conjunctiva/sclera: Conjunctivae normal.   Neck:      Thyroid: No thyromegaly.      Comments: Mild supraclavicular JVD at 90*  Cardiovascular:      Rate and Rhythm: Normal rate and regular rhythm.      Heart sounds: Murmur heard.      No friction rub. No gallop.   Pulmonary:      Effort: Pulmonary effort is normal. No respiratory distress.      Breath sounds: No stridor. Rales (b/l LL) present. No wheezing or rhonchi.   Chest:      Chest wall: No tenderness.   Abdominal:      General: Bowel sounds are normal. There is no distension.      Palpations: Abdomen is soft. There is no mass.      Tenderness: There is no abdominal tenderness. There is no guarding or rebound.   Musculoskeletal:         General: No deformity.      Cervical back: Neck supple.      Right lower leg: Edema (2+ pitting) present.      Left lower leg: Edema (1+ pitting) present.   Skin:     General: Skin is warm.      Capillary Refill: Capillary refill takes less than 2 seconds.      Findings: No erythema or rash.   Neurological:      Mental Status: She is alert and oriented to person, place, and time.   Psychiatric:         Mood and Affect: Mood normal.          Behavior: Behavior normal.         Thought Content: Thought content normal.         Judgment: Judgment normal.       Administrative Statements   I have spent a total time of 50 minutes in caring for this patient on the day of the visit/encounter including Prognosis, Risks and benefits of tx options, Instructions for management, Patient and family education, Importance of tx compliance, Risk factor reductions, Impressions, Counseling / Coordination of care, Documenting in the medical record, Reviewing / ordering tests, medicine, procedures  , Obtaining or reviewing history  , and Communicating with other healthcare professionals .

## 2024-11-14 NOTE — PATIENT INSTRUCTIONS
Take metolzone (aka zaroxolyn) every other day for 2 weeks   If swelling significantly decreases sooner than 2 weeks, may stop metolazone early  Take torsemide 1.5 pills in morning, take other 1.5 pills by 3 pm so not up peeing all night  Think about getting shingles vaccine next visit  Take and write down daily weights to bring to next appointment

## 2024-11-15 NOTE — ASSESSMENT & PLAN NOTE
Lab Results   Component Value Date    EGFR 31 05/31/2024    EGFR 32 10/17/2023    EGFR 34 10/10/2023    CREATININE 1.51 (H) 05/31/2024    CREATININE 1.48 (H) 10/17/2023    CREATININE 1.40 (H) 10/10/2023     Likely 2/2 HTN and or DM    Follows nephro outpatient  At increased risk contrast associated CAROL as DM, elderly, and CKD  Currently on Ace-i/arb, continue  continue BP control as noted above A/P  avoid NSAIDs (Ibuprofen, Motrin, Aleve, Advil, Naproxen, Celebrex, etc.) or other nephrotoxic agents  goal hgb 10  DM controlled

## 2024-11-15 NOTE — ASSESSMENT & PLAN NOTE
Wt Readings from Last 3 Encounters:   11/14/24 74.4 kg (164 lb)   10/09/24 75.8 kg (167 lb)   09/19/24 73.7 kg (162 lb 8 oz)     - cardiac ROS positive for dyspnea, fatigue, lower extremity edema, and orthopnea    echo 1/2024 demonstrates mitral and aortic regurg; EF 40%   May consider repeat next year as repeat echo would not generally  at this time  does follow with heart failure team, continue  continue torsemide, BB, and ace-i/arb   In setting of acute decompensation, added metolazone EOD x2 weeks  Counseled patient to take torsemide in morning however she states too many pills in morning and take  1.5 pills twice dailt (AM and by 3-4pm to avoid nocturia/sleep disturbance), which she is amenable to and able to repeat back  Strict ED precautions reviewed  Frequent Dietary indiscretion  Commonly eats cheese and crackers, canned beans if too tired to make a meal for herself  educated again on importance of limiting water and salt intake, counseled on 1800mL fluid restriction   and <2g   salt per day and their contribution to CHF symptoms  Consider meals on wheels for dietary adherence and ease of food access  consider nutrition counseling/referral  baseline not on supplemental O2 to maintain sats >92%   Patient requesting oxygen, however amenable to diuresis then 6 minute walk test for formal evaluation once optimized  goal: K>4, Mg >2, supplement as needed  Daily weights, note and please bring to next visit  Return to clinic 3-4 weeks  for recheck of symptoms  F/u BMP after diuresis to ensure no CAROL

## 2024-11-16 DIAGNOSIS — E87.6 HYPOKALEMIA: ICD-10-CM

## 2024-11-18 DIAGNOSIS — I50.43 ACUTE ON CHRONIC COMBINED SYSTOLIC AND DIASTOLIC CONGESTIVE HEART FAILURE (HCC): ICD-10-CM

## 2024-11-18 RX ORDER — POTASSIUM CHLORIDE 1500 MG/1
TABLET, EXTENDED RELEASE ORAL
Qty: 30 TABLET | Refills: 0 | Status: SHIPPED | OUTPATIENT
Start: 2024-11-18

## 2024-11-18 NOTE — TELEPHONE ENCOUNTER
Patient saw Dr Molina on the 14th and she is waiting for these presciptions to be sent to the [facility] pharmacy. Was suppose to be done the day of the appointment.

## 2024-11-18 NOTE — PATIENT INSTRUCTIONS
On her visit today we reviewed your recent emergency room visit  Your urine test did show positive for infection as well as some blood in her urine  You do confirm that you are no longer noticing any urinary symptoms and no blood or darker urine at this time  We did review however that you have only been taking 1 antibiotic tablet daily and should be taking this 3 times a day  You will take the remainder of the prescription as prescribed  I provided you with a script to get a follow-up urine test next week to make sure that the infection is cleared and also that you no longer have any blood in your urine  You deny any chest pain today and are following up with cardiac rehab as scheduled  You are also scheduled to follow-up with the nephrologist for your chronic kidney disease as well 
VIEW ALL

## 2024-11-19 RX ORDER — METOLAZONE 5 MG/1
5 TABLET ORAL EVERY OTHER DAY
Qty: 14 TABLET | Refills: 0 | Status: SHIPPED | OUTPATIENT
Start: 2024-11-19

## 2024-11-20 ENCOUNTER — TELEPHONE (OUTPATIENT)
Dept: INTERNAL MEDICINE CLINIC | Facility: CLINIC | Age: 84
End: 2024-11-20

## 2024-11-20 DIAGNOSIS — J30.1 NON-SEASONAL ALLERGIC RHINITIS DUE TO POLLEN: ICD-10-CM

## 2024-11-20 NOTE — TELEPHONE ENCOUNTER
Folder Color: Blue     Name of Form: [facility]     Form to be filled out by: Jesse     Form to be Faxed: [phone]     Patient made aware of 10 day business policy.

## 2024-11-21 RX ORDER — ALBUTEROL SULFATE 90 UG/1
2 INHALANT RESPIRATORY (INHALATION) EVERY 4 HOURS PRN
Qty: 8.5 G | Refills: 5 | Status: SHIPPED | OUTPATIENT
Start: 2024-11-21

## 2024-11-26 ENCOUNTER — OFFICE VISIT (OUTPATIENT)
Dept: UROLOGY | Facility: AMBULATORY SURGERY CENTER | Age: 84
End: 2024-11-26

## 2024-11-26 VITALS
OXYGEN SATURATION: 98 % | WEIGHT: 164 LBS | HEART RATE: 44 BPM | DIASTOLIC BLOOD PRESSURE: 64 MMHG | SYSTOLIC BLOOD PRESSURE: 120 MMHG | BODY MASS INDEX: 30.18 KG/M2 | HEIGHT: 62 IN

## 2024-11-26 DIAGNOSIS — N39.0 FREQUENT UTI: ICD-10-CM

## 2024-11-26 DIAGNOSIS — N28.1 RENAL CYST: Primary | ICD-10-CM

## 2024-11-26 NOTE — ASSESSMENT & PLAN NOTE
Patient previously seen by [facility] for renal cysts and bladder wall thickening on imaging  Cysto compelted 10/24/2023 due to reccurent UTI and bladder thickening  Grade 4 pelvic prolapse with large protusion vagina. Cysto negative for lesions masses  Pessary was done to help prolapse and saw urogyn     Imagin18 SASCHA:  Minimally complex right inferior pole renal cyst, correlate with nonemergent outpatient renal ultrasound in 6 months.     19 SASCHA:  Bilateral renal cysts including a right mid to lower pole cyst with thick   septation. While definite vascularity is not identified, this lesion   should be further evaluated with dedicated renal protocol CT or MRI.     20 MRI abd:  Multiple right renal cysts again identified including a 2.4 x 1.6 cm right midpole cyst with a septation that doesn't appear particularly thickened however is suboptimally evaluated in the absence of intravenous contrast. Recommend continued annual ultrasound surveillance.     21 SASCHA:  Stable 2 cm septated cyst in the midportion of the right kidney. Otherwise stable bilateral renal cysts.     CT A/P 8/3/23:  There is a right renal cyst with internal calcification. This measures 1.8 x 1.6 cm    CT A/P 10/17/23,  which showed Stable renal cysts. No hydronephrosis.

## 2024-11-26 NOTE — PROGRESS NOTES
Name: Jennifer Amaya      : 1940      MRN: [id number]  Encounter Provider: PEDRO LUIS Byrd  Encounter Date: 2024   Encounter department: [facility] [place]  :  Assessment & Plan  Renal cyst  Patient previously seen by [facility] for renal cysts and bladder wall thickening on imaging  Cysto compelted 10/24/2023 due to reccurent UTI and bladder thickening  Grade 4 pelvic prolapse with large protusion vagina. Cysto negative for lesions masses  Pessary was done to help prolapse and saw urogyn     Imagin18 SASCHA:  Minimally complex right inferior pole renal cyst, correlate with nonemergent outpatient renal ultrasound in 6 months.     19 SASCHA:  Bilateral renal cysts including a right mid to lower pole cyst with thick   septation. While definite vascularity is not identified, this lesion   should be further evaluated with dedicated renal protocol CT or MRI.     20 MRI abd:  Multiple right renal cysts again identified including a 2.4 x 1.6 cm right midpole cyst with a septation that doesn't appear particularly thickened however is suboptimally evaluated in the absence of intravenous contrast. Recommend continued annual ultrasound surveillance.     21 SASCHA:  Stable 2 cm septated cyst in the midportion of the right kidney. Otherwise stable bilateral renal cysts.     CT A/P 8/3/23:  There is a right renal cyst with internal calcification. This measures 1.8 x 1.6 cm    CT A/P 10/17/23,  which showed Stable renal cysts. No hydronephrosis.   Frequent UTI  -Most recemt UTI was 10/2023 and reports none since then. She has been adequately hydrating.                  History of Present Illness   Jennifer Amaya is a 84 y.o. female who presents no recent UTI since 2023. Patient overall feels good denies any hematuria, flank pain, dysuria,  urgency or frequency. Denies kidney, bladder stones.   Review of Systems   Constitutional:  Negative for chills and fever.   HENT:   "Negative for ear pain and sore throat.    Eyes:  Negative for pain and visual disturbance.   Respiratory:  Negative for cough and shortness of breath.    Cardiovascular:  Negative for chest pain and palpitations.   Gastrointestinal:  Negative for abdominal pain and vomiting.   Genitourinary:  Negative for dysuria and hematuria.   Musculoskeletal:  Negative for arthralgias and back pain.   Skin:  Negative for color change and rash.   Neurological:  Negative for seizures and syncope.   All other systems reviewed and are negative.         Objective   /64 (BP Location: Left arm, Patient Position: Sitting, Cuff Size: Standard)   Pulse (!) 44   Ht 5' 2\" (1.575 m)   Wt 74.4 kg (164 lb)   SpO2 98%   BMI 30.00 kg/m²     Physical Exam  Vitals and nursing note reviewed.   Constitutional:       General: She is not in acute distress.     Appearance: She is well-developed.   HENT:      Head: Normocephalic and atraumatic.   Eyes:      Conjunctiva/sclera: Conjunctivae normal.   Cardiovascular:      Rate and Rhythm: Normal rate and regular rhythm.      Heart sounds: No murmur heard.  Pulmonary:      Effort: Pulmonary effort is normal. No respiratory distress.      Breath sounds: Normal breath sounds.   Abdominal:      Palpations: Abdomen is soft.      Tenderness: There is no abdominal tenderness.   Musculoskeletal:         General: No swelling.      Cervical back: Neck supple.   Skin:     General: Skin is warm and dry.      Capillary Refill: Capillary refill takes less than 2 seconds.   Neurological:      Mental Status: She is alert.   Psychiatric:         Mood and Affect: Mood normal.          Results  No results found for: \"PSA\"  Lab Results   Component Value Date    GLUCOSE 170 (H) 09/22/2015    CALCIUM 8.9 05/31/2024     09/22/2015    K 3.8 05/31/2024    CO2 27 05/31/2024     05/31/2024    BUN 48 (H) 05/31/2024    CREATININE 1.51 (H) 05/31/2024     Lab Results   Component Value Date    WBC 4.90 05/31/2024 "    HGB 11.0 (L) 05/31/2024    HCT 36.0 05/31/2024    MCV 81 (L) 05/31/2024     05/31/2024       Office Urine Dip  No results found for this or any previous visit (from the past hour).]

## 2024-12-02 DIAGNOSIS — I50.43 ACUTE ON CHRONIC COMBINED SYSTOLIC AND DIASTOLIC CONGESTIVE HEART FAILURE (HCC): ICD-10-CM

## 2024-12-03 RX ORDER — METOLAZONE 5 MG/1
5 TABLET ORAL EVERY OTHER DAY
Qty: 14 TABLET | Refills: 0 | Status: SHIPPED | OUTPATIENT
Start: 2024-12-03 | End: 2024-12-10 | Stop reason: SDUPTHER

## 2024-12-05 ENCOUNTER — RA CDI HCC (OUTPATIENT)
Dept: OTHER | Facility: HOSPITAL | Age: 84
End: 2024-12-05

## 2024-12-05 NOTE — PROGRESS NOTES
HCC coding opportunities          Chart Reviewed number of suggestions sent to Provider: 2     Patients Insurance     Medicare Insurance: [employer]          E11.22:Type 2 diabetes mellitus with diabetic chronic kidney disease (HCC)     As per ICD 10 coding guidelines, DM & CKD are presumed to have a causal-effect relationship unless documented as unrelated please review and assess if applicable    I13.0: Hypertensive heart and chronic kidney disease with heart failure and stage 1 through stage 4 chronic kidney disease, or unspecified chronic kidney disease (HCC)    Per ICD 10 CM coding guidelines the classification presumes a causal relationship between hypertension and heart involvement and between CKD unless the documentation clearly states the conditions are unrelated

## 2024-12-09 ENCOUNTER — TELEPHONE (OUTPATIENT)
Dept: INTERNAL MEDICINE CLINIC | Facility: CLINIC | Age: 84
End: 2024-12-09

## 2024-12-09 NOTE — TELEPHONE ENCOUNTER
Pt called to report she still awaiting two medications that were supposed to be sent during 11/14 appt. She reports she received the other two of the 4 refills. I informed Jennifer someone form clinical will give her a callback to assist in squaring away what medications she does in fact need refills of and not, as she was a bit confused. Please call pt when able.

## 2024-12-10 ENCOUNTER — OFFICE VISIT (OUTPATIENT)
Dept: INTERNAL MEDICINE CLINIC | Facility: CLINIC | Age: 84
End: 2024-12-10

## 2024-12-10 VITALS
BODY MASS INDEX: 29.08 KG/M2 | TEMPERATURE: 97.3 F | WEIGHT: 158 LBS | HEART RATE: 87 BPM | SYSTOLIC BLOOD PRESSURE: 115 MMHG | DIASTOLIC BLOOD PRESSURE: 65 MMHG | HEIGHT: 62 IN

## 2024-12-10 DIAGNOSIS — Z23 ENCOUNTER FOR IMMUNIZATION: Primary | ICD-10-CM

## 2024-12-10 DIAGNOSIS — I50.43 ACUTE ON CHRONIC COMBINED SYSTOLIC AND DIASTOLIC CONGESTIVE HEART FAILURE (HCC): ICD-10-CM

## 2024-12-10 DIAGNOSIS — N18.30 BENIGN HYPERTENSION WITH CKD (CHRONIC KIDNEY DISEASE) STAGE III (HCC): ICD-10-CM

## 2024-12-10 DIAGNOSIS — I63.50 CEREBROVASCULAR ACCIDENT (CVA) OF PONTINE STRUCTURE (HCC): ICD-10-CM

## 2024-12-10 DIAGNOSIS — I21.4 NSTEMI (NON-ST ELEVATED MYOCARDIAL INFARCTION) (HCC): ICD-10-CM

## 2024-12-10 DIAGNOSIS — I12.9 BENIGN HYPERTENSION WITH CKD (CHRONIC KIDNEY DISEASE) STAGE III (HCC): ICD-10-CM

## 2024-12-10 DIAGNOSIS — E11.40 CONTROLLED TYPE 2 DIABETES MELLITUS WITH DIABETIC NEUROPATHY, WITHOUT LONG-TERM CURRENT USE OF INSULIN (HCC): ICD-10-CM

## 2024-12-10 DIAGNOSIS — J30.1 NON-SEASONAL ALLERGIC RHINITIS DUE TO POLLEN: ICD-10-CM

## 2024-12-10 RX ORDER — METOLAZONE 5 MG/1
5 TABLET ORAL EVERY OTHER DAY
Qty: 15 TABLET | Refills: 1 | Status: SHIPPED | OUTPATIENT
Start: 2024-12-10 | End: 2025-02-08

## 2024-12-10 RX ORDER — CARVEDILOL 6.25 MG/1
6.25 TABLET ORAL 2 TIMES DAILY WITH MEALS
Qty: 60 TABLET | Refills: 5 | Status: SHIPPED | OUTPATIENT
Start: 2024-12-10 | End: 2025-06-08

## 2024-12-10 RX ORDER — CLOPIDOGREL BISULFATE 75 MG/1
75 TABLET ORAL DAILY
Qty: 180 TABLET | Refills: 3 | Status: SHIPPED | OUTPATIENT
Start: 2024-12-10

## 2024-12-10 RX ORDER — LINAGLIPTIN 5 MG/1
5 TABLET, FILM COATED ORAL DAILY
Qty: 30 TABLET | Refills: 2 | Status: SHIPPED | OUTPATIENT
Start: 2024-12-10 | End: 2025-03-10

## 2024-12-10 RX ORDER — RANOLAZINE 500 MG/1
500 TABLET, EXTENDED RELEASE ORAL EVERY 12 HOURS SCHEDULED
Qty: 60 TABLET | Refills: 0 | Status: SHIPPED | OUTPATIENT
Start: 2024-12-10 | End: 2025-01-09

## 2024-12-10 RX ORDER — MONTELUKAST SODIUM 10 MG/1
10 TABLET ORAL
Qty: 30 TABLET | Refills: 5 | Status: SHIPPED | OUTPATIENT
Start: 2024-12-10 | End: 2025-06-08

## 2024-12-10 RX ORDER — REPAGLINIDE 0.5 MG/1
0.5 TABLET ORAL
Qty: 180 TABLET | Refills: 1 | Status: SHIPPED | OUTPATIENT
Start: 2024-12-10

## 2024-12-11 NOTE — ASSESSMENT & PLAN NOTE
Wt Readings from Last 3 Encounters:   12/10/24 71.7 kg (158 lb)   11/26/24 74.4 kg (164 lb)   11/14/24 74.4 kg (164 lb)     Patient presents for follow up of acute exacerbation of HFrEF  Was seen in office a month ago with significant R>L pedal edema and shortness of breath with JVD on examination  Was advised to take metolazone 5 mg once every other day for 2 weeks and present for follow up  Patient did follow the recommendations     Presents today with significantly improved shortness of breath and only mild residual pedal edema on right leg and resolution of edema on left leg  Weight down from 164 lb ti 158 lb  On exam no rales, no JVD    Plan  Continue torsemide 30 mg BID  Start metolazone 5 mg once every other day  Continue other prior medications as is  Follow up in 3 months for annual physical with PCP  Orders:    metolazone (ZAROXOLYN) 5 mg tablet; Take 1 tablet (5 mg total) by mouth every other day

## 2024-12-11 NOTE — PROGRESS NOTES
Name: Jennifer Amaya      : 1940      MRN: [id number]  Encounter Provider: Renzo Rosales MD  Encounter Date: 12/10/2024   Encounter department: [facility]    Assessment & Plan  Acute on chronic combined systolic and diastolic congestive heart failure (HCC)  Wt Readings from Last 3 Encounters:   12/10/24 71.7 kg (158 lb)   24 74.4 kg (164 lb)   24 74.4 kg (164 lb)     Patient presents for follow up of acute exacerbation of HFrEF  Was seen in office a month ago with significant R>L pedal edema and shortness of breath with JVD on examination  Was advised to take metolazone 5 mg once every other day for 2 weeks and present for follow up  Patient did follow the recommendations     Presents today with significantly improved shortness of breath and only mild residual pedal edema on right leg and resolution of edema on left leg  Weight down from 164 lb ti 158 lb  On exam no rales, no JVD    Plan  Continue torsemide 30 mg BID  Start metolazone 5 mg once every other day  Continue other prior medications as is  Follow up in 3 months for annual physical with PCP  Orders:    metolazone (ZAROXOLYN) 5 mg tablet; Take 1 tablet (5 mg total) by mouth every other day    Cerebrovascular accident (CVA) of pontine structure (HCC)  Patient on clopidogrel 150 mg once daily; unclear on rationale for double dose  Will switch to regular dose  Discussed with patient    Orders:    clopidogrel (PLAVIX) 75 mg tablet; Take 1 tablet (75 mg total) by mouth daily    Encounter for immunization    Orders:    Zoster Vaccine Recombinant IM    Health Maintenance  Immunizations : shingrix administered today; discuss covid/RSV next visit  Screenings : uptodate on age appropriate screening recommendations    History of Present Illness     Jennifer Amaya is a 84/F who presents for follow up on HFrEF exacerbation as outlined above.       Review of Systems   Constitutional:  Negative for chills and fever.    HENT:  Negative for ear pain and sore throat.    Eyes:  Negative for pain and visual disturbance.   Respiratory:  Positive for shortness of breath (mild as baseline; signficantly improved since last visit). Negative for cough.    Cardiovascular:  Negative for chest pain and palpitations.   Gastrointestinal:  Negative for abdominal pain and vomiting.   Genitourinary:  Negative for dysuria and hematuria.   Musculoskeletal:  Negative for arthralgias and back pain.   Skin:  Negative for color change and rash.   Neurological:  Negative for seizures and syncope.   All other systems reviewed and are negative.    Past Medical History:   Diagnosis Date    ACE-inhibitor cough     last assessed - 29Dec2017    Asthma     Bilateral edema of lower extremity     last assessed - 21Aug2017    Cardiac murmur     last assessed - 21Aug2017    CKD (chronic kidney disease)     Diabetes mellitus (HCC)     last assessed - 29Nov2017    Esophageal dysphagia     Fatigue     last assessed - 21Aug2017    Hyperlipidemia     Hypertension     Patellar bursitis of right knee     last assessed - 04Nov2016    Personal history of other specified conditions     presenting hazards to health    Stroke ([facility])     Stroke syndrome     Urinary frequency     UTI (urinary tract infection)      Past Surgical History:   Procedure Laterality Date    CARDIAC CATHETERIZATION N/A 6/1/2023    Procedure: Cardiac Coronary Angiogram;  Surgeon: Roe German MD;  Location: [facility];  Service: Cardiology    CARDIAC CATHETERIZATION  6/1/2023    Procedure: Cardiac Left Heart Cath;  Surgeon: Roe German MD;  Location: [facility];  Service: Cardiology    NH ESOPHAGOGASTRODUODENOSCOPY TRANSORAL DIAGNOSTIC N/A 4/5/2017    Procedure: ESOPHAGOGASTRODUODENOSCOPY (EGD);  Surgeon: Cedric Huang MD;  Location: [facility];  Service: Gastroenterology     Family History   Problem Relation Age of Onset    Hypertension Mother     Stroke Mother     Heart disease  Father     Other Sister         1)Breast disorder; 2)Epilepsy    Migraines Sister         Migraine headaches    Osteoporosis Sister     Thyroid disease Sister     Coronary artery disease Sister         S/P triple vessel bypass    Breast cancer Sister 80    No Known Problems Sister     No Known Problems Sister     Osteoporosis Maternal Grandmother     No Known Problems Maternal Grandfather     No Known Problems Paternal Grandmother     No Known Problems Paternal Grandfather     Diabetes Son         Diabetes mellitus    Hypertension Son     Rheum arthritis Son         Rheumatic disease    No Known Problems Son     No Known Problems Son     No Known Problems Son     No Known Problems Son     No Known Problems Son     No Known Problems Maternal Aunt     No Known Problems Maternal Aunt     No Known Problems Maternal Aunt     No Known Problems Paternal Aunt     No Known Problems Paternal Aunt     Heart disease Family      Social History     Tobacco Use    Smoking status: Former     Current packs/day: 0.00     Types: Cigarettes     Quit date: 1980     Years since quittin.9    Smokeless tobacco: Former    Tobacco comments:     quit over 30 yrs ago; (quit in the , had smoked 3PPD for 20 years - per [employer])   Vaping Use    Vaping status: Never Used   Substance and Sexual Activity    Alcohol use: Never    Drug use: Never    Sexual activity: Not Currently     Current Outpatient Medications on File Prior to Visit   Medication Sig    acetaminophen (TYLENOL) 650 mg CR tablet Take 1 tablet (650 mg total) by mouth every 6 (six) hours as needed for mild pain    albuterol (PROVENTIL HFA,VENTOLIN HFA) 90 mcg/act inhaler INHALE 2 PUFFS EVERY 4 (FOUR) HOURS AS NEEDED FOR WHEEZING    atorvastatin (LIPITOR) 40 mg tablet TAKE 1 TABLET (40 MG TOTAL) BY MOUTH DAILY    Breo Ellipta 100-25 MCG/ACT inhaler Inhale 1 puff daily Rinse mouth after use.    calcitriol (ROCALTROL) 0.25 mcg capsule TAKE 1 CAPSULE (0.25 MCG TOTAL) BY MOUTH  3 (THREE) TIMES A WEEK    Cholecalciferol (D3) 50 MCG (2000 UT) TABS Take 2 tablets (4,000 Units total) by mouth daily    Continuous Glucose  (FreeStyle Naldo 14 Day [place]) JAQUELIN Use to record blood glucose 4 times daily. Once fasting and three times daily before meals    Continuous Glucose Sensor (FreeStyle Naldo 2 Sensor) MISC USE ONE SENSOR EVERY 14 DAYS    ferrous sulfate 325 (65 Fe) mg tablet TAKE 1 TABLET TWICE A DAY BEFORE MEALS    Glucose-Vitamin C-Vitamin D (TRUEPLUS GLUCOSE ON THE GO) CHEW     latanoprost (XALATAN) 0.005 % ophthalmic solution     losartan (COZAAR) 25 mg tablet TAKE 1 TABLET (25 MG TOTAL) BY MOUTH DAILY    Misc. Devices (QUAD CANE) MISC by Does not apply route daily    nitroglycerin (NITROSTAT) 0.4 mg SL tablet PLACE 1 TABLET (0.4 MG TOTAL) UNDER THE TONGUE EVERY 5 (FIVE) MINUTES AS NEEDED FOR CHEST PAIN    Oral Hygiene Products (Extended Term Oral Care System) KIT Apply to the mouth or throat 2 (two) times a day    potassium chloride (Klor-Con M20) 20 mEq tablet TAKE 1 TABLET (20 HASMUKH EQUIVALENT TOTAL) BY MOUTH DAILY    SODIUM FLUORIDE, DENTAL RINSE, 0.02 % SOLN Apply 15 mL to the mouth or throat 2 (two) times a day    torsemide (DEMADEX) 20 mg tablet TAKE 3 TABLETS (60 MG TOTAL) BY MOUTH DAILY    Xarelto 2.5 MG tablet TAKE 1 TABLET (2.5 MG TOTAL) BY MOUTH 2 (TWO) TIMES A DAY WITH MEALS     No Known Allergies  Immunization History   Administered Date(s) Administered    COVID-19 PFIZER VACCINE 0.3 ML IM 03/30/2021, 04/21/2021, 01/03/2022    COVID-19 Pfizer Vac BIVALENT [place]-sucrose 12 Yr+ IM 11/15/2022    INFLUENZA 10/10/2017, 11/15/2022    Influenza Quadrivalent, 6-35 Months IM 10/10/2017    Influenza Split High Dose Preservative Free IM 11/14/2024    Influenza, high dose seasonal 0.7 mL 10/02/2019, 10/06/2020, 11/02/2021    Pneumococcal Conjugate 13-Valent 02/19/2020    Pneumococcal Polysaccharide PPV23 01/01/2008    Tdap 03/11/2020    Zoster Vaccine Recombinant 12/10/2024  "    Objective   /65 (BP Location: Right arm, Patient Position: Sitting, Cuff Size: Adult)   Pulse 87   Temp (!) 97.3 °F (36.3 °C) (Temporal)   Ht 5' 2\" (1.575 m)   Wt 71.7 kg (158 lb)   BMI 28.90 kg/m²     Physical Exam  Vitals and nursing note reviewed.   Constitutional:       General: She is not in acute distress.     Appearance: She is well-developed.   HENT:      Head: Normocephalic and atraumatic.   Eyes:      Conjunctiva/sclera: Conjunctivae normal.   Neck:      Comments: No JVD  Cardiovascular:      Rate and Rhythm: Normal rate and regular rhythm.      Heart sounds: No murmur heard.  Pulmonary:      Effort: Pulmonary effort is normal. No respiratory distress.      Breath sounds: Normal breath sounds. No wheezing or rales.   Abdominal:      Palpations: Abdomen is soft.      Tenderness: There is no abdominal tenderness.   Musculoskeletal:      Cervical back: Neck supple.      Right lower leg: Edema (trace) present.      Left lower leg: No edema.   Skin:     General: Skin is warm and dry.      Capillary Refill: Capillary refill takes less than 2 seconds.   Neurological:      Mental Status: She is alert.   Psychiatric:         Mood and Affect: Mood normal.       Renzo Rosales MD  PGY-3, Internal Medicine  "

## 2025-01-03 ENCOUNTER — HOSPITAL ENCOUNTER (OUTPATIENT)
Dept: RADIOLOGY | Age: 85
Discharge: HOME | End: 2025-01-03
Payer: MEDICARE

## 2025-01-03 DIAGNOSIS — N18.32 STAGE 3B CHRONIC KIDNEY DISEASE (HCC): ICD-10-CM

## 2025-01-03 DIAGNOSIS — N28.1 RENAL CYST: ICD-10-CM

## 2025-01-03 PROCEDURE — 76775 US EXAM ABDO BACK WALL LIM: CPT

## 2025-01-08 ENCOUNTER — RESULTS FOLLOW-UP (OUTPATIENT)
Dept: NEPHROLOGY | Facility: CLINIC | Age: 85
End: 2025-01-08

## 2025-01-08 NOTE — TELEPHONE ENCOUNTER
Please let her know that kidney ultrasound shows complex kidney cyst which will plan to monitor with repeat renal ultrasound in the future.  I have sent a message to urology to discuss this since she already follows with urology.  I would also recommend for her to reach out to urology to discuss kidney ultrasound findings regarding kidney cyst and if any different intervention needed.

## 2025-01-08 NOTE — TELEPHONE ENCOUNTER
Spoke to pt regarding the following message per Dr. Rosado     Please let her know that kidney ultrasound shows complex kidney cyst which will plan to monitor with repeat renal ultrasound in the future.  I have sent a message to urology to discuss this since she already follows with urology.  I would also recommend for her to reach out to urology to discuss kidney ultrasound findings regarding kidney cyst and if any different intervention needed.       Pt verbalized understanding.

## 2025-01-09 ENCOUNTER — TELEPHONE (OUTPATIENT)
Dept: NEPHROLOGY | Facility: CLINIC | Age: 85
End: 2025-01-09

## 2025-01-09 NOTE — TELEPHONE ENCOUNTER
Discussed with urology with plan to eventually repeat renal ultrasound in the future to monitor renal cyst.

## 2025-01-09 NOTE — TELEPHONE ENCOUNTER
----- Message from PEDRO LUIS Byrd sent at 1/9/2025  8:38 AM EST -----  Thanks! Nothing different on my end, just repeat US in 1 year to ensure stability.  ----- Message -----  From: Jorden Rosado MD  Sent: 1/8/2025   3:01 PM EST  To: PEDRO LUIS Byrd    She had her repeat kidney ultrasound in 1/2025 which shows Bosniak 2F kidney cyst.  Wanted to let you know if you feel she needs anything different from your side other than monitoring her with repeat renal ultrasound in the future.    Thanks  Jorden

## 2025-01-24 ENCOUNTER — OFFICE VISIT (OUTPATIENT)
Dept: NEPHROLOGY | Facility: CLINIC | Age: 85
End: 2025-01-24
Payer: MEDICARE

## 2025-01-24 VITALS
HEART RATE: 66 BPM | OXYGEN SATURATION: 99 % | WEIGHT: 147 LBS | SYSTOLIC BLOOD PRESSURE: 134 MMHG | DIASTOLIC BLOOD PRESSURE: 72 MMHG | BODY MASS INDEX: 26.89 KG/M2

## 2025-01-24 DIAGNOSIS — E87.6 HYPOKALEMIA: ICD-10-CM

## 2025-01-24 DIAGNOSIS — N25.81 SECONDARY HYPERPARATHYROIDISM OF RENAL ORIGIN (HCC): ICD-10-CM

## 2025-01-24 DIAGNOSIS — N18.32 STAGE 3B CHRONIC KIDNEY DISEASE (HCC): ICD-10-CM

## 2025-01-24 DIAGNOSIS — I12.9 BENIGN HYPERTENSION WITH CKD (CHRONIC KIDNEY DISEASE) STAGE III (HCC): Primary | ICD-10-CM

## 2025-01-24 DIAGNOSIS — E55.9 VITAMIN D DEFICIENCY: ICD-10-CM

## 2025-01-24 DIAGNOSIS — N18.30 BENIGN HYPERTENSION WITH CKD (CHRONIC KIDNEY DISEASE) STAGE III (HCC): Primary | ICD-10-CM

## 2025-01-24 DIAGNOSIS — N28.1 RENAL CYST: ICD-10-CM

## 2025-01-24 DIAGNOSIS — R80.1 PERSISTENT PROTEINURIA: ICD-10-CM

## 2025-01-24 DIAGNOSIS — M25.473 ANKLE EDEMA: ICD-10-CM

## 2025-01-24 PROBLEM — N18.4 CHRONIC RENAL DISEASE, STAGE IV (HCC): Status: RESOLVED | Noted: 2023-06-12 | Resolved: 2025-01-24

## 2025-01-24 PROCEDURE — 99214 OFFICE O/P EST MOD 30 MIN: CPT | Performed by: INTERNAL MEDICINE

## 2025-01-24 RX ORDER — METOLAZONE 5 MG/1
TABLET ORAL
Qty: 12 TABLET | Refills: 3 | Status: SHIPPED | OUTPATIENT
Start: 2025-01-24

## 2025-01-24 NOTE — ASSESSMENT & PLAN NOTE
Lab Results   Component Value Date    EGFR 31 05/31/2024    EGFR 32 10/17/2023    EGFR 34 10/10/2023    CREATININE 1.51 (H) 05/31/2024    CREATININE 1.48 (H) 10/17/2023    CREATININE 1.40 (H) 10/10/2023   CKD stage 3, baseline creatinine lately 1.4 to 1.5 since June 2021, 1 to 1.2 since March 2021, prior 0.8 to 1.0  -Last creatinine 1.5 in May 2024 stable at baseline.  No repeat labs available since then.  Advised patient to get BMP now  -CKD suspected to be secondary to long-term hypertension, diabetes, age-related nephron loss  -avoid nephrotoxins or NSAIDs.  -renal ultrasound in 2023 shows normal-sized kidneys, normal echogenicity, no hydro or stones.    -UA in October 2023 shows no hematuria or proteinuria.    Hypertension  -blood pressure slightly improving on repeat check in the office today.  Continue losartan 25 mg daily, Coreg, torsemide.  -she has not brought BP machine to the office today.  -salt restricted diet recommended

## 2025-01-24 NOTE — ASSESSMENT & PLAN NOTE
-This seems to have much improved.  Recently her weight has increased up to 164 pounds as per PCP note which now seems to be overall much improving 152 pounds in the office today.  Her home weight seems to be around 153 pounds.    -echo in January 2024 shows EF 40%, grade 2 diastolic dysfunction, moderate to severe MR.    -Recently she was recommended to take torsemide 60 mg daily and also metolazone was added 5 mg every other day.  -Given overall improvement in weight, advised to decrease metolazone to 5 mg weekly.  Clinically seems euvolemic.

## 2025-01-24 NOTE — PROGRESS NOTES
NEPHROLOGY OUTPATIENT PROGRESS NOTE   Jennifer Amaya 84 y.o. female MRN: [id number]  DATE: 1/24/2025  Reason for visit:   Chief Complaint   Patient presents with    Follow-up    Chronic Kidney Disease     ASSESSMENT and PLAN:  Assessment & Plan  Benign hypertension with CKD (chronic kidney disease) stage III  Lab Results   Component Value Date    EGFR 31 05/31/2024    EGFR 32 10/17/2023    EGFR 34 10/10/2023    CREATININE 1.51 (H) 05/31/2024    CREATININE 1.48 (H) 10/17/2023    CREATININE 1.40 (H) 10/10/2023   CKD stage 3, baseline creatinine lately 1.4 to 1.5 since June 2021, 1 to 1.2 since March 2021, prior 0.8 to 1.0  -Last creatinine 1.5 in May 2024 stable at baseline.  No repeat labs available since then.  Advised patient to get BMP now  -CKD suspected to be secondary to long-term hypertension, diabetes, age-related nephron loss  -avoid nephrotoxins or NSAIDs.  -renal ultrasound in 2023 shows normal-sized kidneys, normal echogenicity, no hydro or stones.    -UA in October 2023 shows no hematuria or proteinuria.    Hypertension  -blood pressure slightly improving on repeat check in the office today.  Continue losartan 25 mg daily, Coreg, torsemide.  -she has not brought BP machine to the office today.  -salt restricted diet recommended  Stage 3b chronic kidney disease (HCC)  Lab Results   Component Value Date    EGFR 31 05/31/2024    EGFR 32 10/17/2023    EGFR 34 10/10/2023    CREATININE 1.51 (H) 05/31/2024    CREATININE 1.48 (H) 10/17/2023    CREATININE 1.40 (H) 10/10/2023     Secondary hyperparathyroidism of renal origin (HCC)  Secondary hyperparathyroidism, last PTH slightly increased 167 in May 2024.  Last vitamin D level 53.  Continue vitamin D supplements 2000 units daily. calcitriol 0.25 mcg 3 times a week.  Check vitamin-D, PTH level now and again before next visit.  Renal cyst  Bilateral renal cyst on ultrasound  -Renal ultrasound in March 2023 shows right kidney midpole cyst with calcified  wall/septation.  Mild increase in size, no solid internal vascularity.  -MRI abdomen in February 2020 shows no suspicious renal mass.  Again showed 2.4 cm right mid pole cyst with septation, suboptimally evaluated in the absence of IV contrast.  -CT scan with IV contrast in October 2023 showed stable renal cyst.  -Renal ultrasound in January 2025 shows right kidney complex renal cyst Bosniak 2F with minimal enlargement.  Discussed with urology prior with plan to do repeat renal ultrasound in 1 year.  Will give prescription for repeat renal ultrasound next visit.    Recurrent UTI  -Status post cystoscopy in October 2023 which showed grade 4 pelvic floor prolapse, no suspicious mass or lesion noted.  -Currently has pessary being managed by OB/GYN  -No recent UTI episodes as per patient.  Persistent proteinuria  Proteinuria  -Overall fluctuating, last UACR slightly increased to 256 mg in June 2024.  No repeat UACR available since then.     -repeat UACR now and again before next visit  -currently on losartan 25 mg daily.  If proteinuria not improving, consider increasing losartan.  -suspect in the setting of long-term diabetes, hemoglobin A1c 7.1 in December 2023.  -also has mild diabetic retinopathy  Ankle edema  -This seems to have much improved.  Recently her weight has increased up to 164 pounds as per PCP note which now seems to be overall much improving 152 pounds in the office today.  Her home weight seems to be around 153 pounds.    -echo in January 2024 shows EF 40%, grade 2 diastolic dysfunction, moderate to severe MR.    -Recently she was recommended to take torsemide 60 mg daily and also metolazone was added 5 mg every other day.  -Given overall improvement in weight, advised to decrease metolazone to 5 mg weekly.  Clinically seems euvolemic.  Hypokalemia  Hypokalemia, resolved, last serum potassium 3.8 in May 2024.  No repeat labs available since then.  Check BMP now.  -Initially suspect secondary to  diuresis  -Currently on potassium chloride 20 meq daily    Diagnoses and all orders for this visit:    Benign hypertension with CKD (chronic kidney disease) stage III  -     Albumin / creatinine urine ratio; Future  -     Basic metabolic panel; Future  -     CBC; Future  -     Phosphorus; Future  -     PTH, intact; Future  -     Vitamin D 25 hydroxy; Future    Stage 3b chronic kidney disease (HCC)  -     Albumin / creatinine urine ratio; Future  -     Basic metabolic panel; Future  -     CBC; Future  -     Phosphorus; Future  -     PTH, intact; Future  -     Vitamin D 25 hydroxy; Future    Vitamin D deficiency  -     Phosphorus; Future  -     PTH, intact; Future  -     Vitamin D 25 hydroxy; Future    Secondary hyperparathyroidism of renal origin (HCC)  -     Phosphorus; Future  -     PTH, intact; Future  -     Vitamin D 25 hydroxy; Future    Renal cyst    Persistent proteinuria  -     Albumin / creatinine urine ratio; Future    Ankle edema  -     metolazone (ZAROXOLYN) 5 mg tablet; Take 1 tablet weekly and take extra if has weight gain >5 lbs in 3 days or worsening leg swelling.    Hypokalemia          SUBJECTIVE / HPI:  Jennifer Amaya is a 84 y.o. female with medical issues of hypertension for 14 years, diabetes for 14 years, CVA in 2011, mild diabetic retinopathy,?  Gout, CKD stage 3 with baseline 1.4 to 1.5 since mid 2021, 1 to 1.2 since March 2021, prior 0.8 to 1.0 who presents for regular follow up for CKD, proteinuria, hypertension management.       Overall feels well.  Unfortunately she has not done any repeat labs since last office visit.  Due to volume overload, worsening leg edema, weight gain recently metolazone was added with overall much improvement in her weight.  Her leg edema has improved.  BP acceptable slightly improving on repeat check.   Denies any worsening dyspnea.  Denies any new urinary complaint.  No recent NSAID use.     No obvious history of autoimmune conditions    REVIEW OF SYSTEMS:  More  than 10 point review of systems were obtained and discussed in length with the patient. Complete review of systems were negative / unremarkable except mentioned above.     PHYSICAL EXAM:  Vitals:    01/24/25 1519 01/24/25 1543   BP: 140/70 134/72   BP Location: Left arm    Patient Position: Sitting    Cuff Size: Adult    Pulse: 66    SpO2: 99%    Weight: 66.7 kg (147 lb)      Body mass index is 26.89 kg/m².    Physical Exam  Vitals reviewed.   Constitutional:       Appearance: She is well-developed.   HENT:      Head: Normocephalic and atraumatic.      Right Ear: External ear normal.      Left Ear: External ear normal.   Eyes:      Conjunctiva/sclera: Conjunctivae normal.   Cardiovascular:      Comments: No significant edema in legs  Pulmonary:      Effort: Pulmonary effort is normal.      Breath sounds: Normal breath sounds. No wheezing or rales.   Abdominal:      General: Bowel sounds are normal. There is no distension.      Palpations: Abdomen is soft.      Tenderness: There is no abdominal tenderness.   Musculoskeletal:         General: No deformity.   Lymphadenopathy:      Cervical: No cervical adenopathy.   Skin:     Findings: No rash.   Neurological:      Mental Status: She is alert and oriented to person, place, and time.   Psychiatric:         Behavior: Behavior normal.         PAST MEDICAL HISTORY:  Past Medical History:   Diagnosis Date    ACE-inhibitor cough     last assessed - 29Dec2017    Asthma     Bilateral edema of lower extremity     last assessed - 21Aug2017    Cardiac murmur     last assessed - 21Aug2017    CKD (chronic kidney disease)     Diabetes mellitus (HCC)     last assessed - 29Nov2017    Esophageal dysphagia     Fatigue     last assessed - 21Aug2017    Hyperlipidemia     Hypertension     Patellar bursitis of right knee     last assessed - 04Nov2016    Personal history of other specified conditions     presenting hazards to health    Stroke (HCC)     Stroke syndrome     Urinary frequency      UTI (urinary tract infection)        PAST SURGICAL HISTORY:  Past Surgical History:   Procedure Laterality Date    CARDIAC CATHETERIZATION N/A 2023    Procedure: Cardiac Coronary Angiogram;  Surgeon: Roe German MD;  Location: [facility];  Service: Cardiology    CARDIAC CATHETERIZATION  2023    Procedure: Cardiac Left Heart Cath;  Surgeon: Roe German MD;  Location:  CARDIAC CATH LAB;  Service: Cardiology    [place] ESOPHAGOGASTRODUODENOSCOPY TRANSORAL DIAGNOSTIC N/A 2017    Procedure: ESOPHAGOGASTRODUODENOSCOPY (EGD);  Surgeon: Cedric Huang MD;  Location:  GI LAB;  Service: Gastroenterology       SOCIAL HISTORY:  Social History     Substance and Sexual Activity   Alcohol Use Never     Social History     Substance and Sexual Activity   Drug Use Never     Social History     Tobacco Use   Smoking Status Former    Current packs/day: 0.00    Types: Cigarettes    Quit date: 1980    Years since quittin.0   Smokeless Tobacco Former   Tobacco Comments    quit over 30 yrs ago; (quit in the , had smoked 3PPD for 20 years - per [employer])       FAMILY HISTORY:  Family History   Problem Relation Age of Onset    Hypertension Mother     Stroke Mother     Heart disease Father     Other Sister         1)Breast disorder; 2)Epilepsy    Migraines Sister         Migraine headaches    Osteoporosis Sister     Thyroid disease Sister     Coronary artery disease Sister         S/P triple vessel bypass    Breast cancer Sister 80    No Known Problems Sister     No Known Problems Sister     Osteoporosis Maternal Grandmother     No Known Problems Maternal Grandfather     No Known Problems Paternal Grandmother     No Known Problems Paternal Grandfather     Diabetes Son         Diabetes mellitus    Hypertension Son     Rheum arthritis Son         Rheumatic disease    No Known Problems Son     No Known Problems Son     No Known Problems Son     No Known Problems Son     No Known Problems Son     No Known  Problems Maternal Aunt     No Known Problems Maternal Aunt     No Known Problems Maternal Aunt     No Known Problems Paternal Aunt     No Known Problems Paternal Aunt     Heart disease Family        MEDICATIONS:    Current Outpatient Medications:     acetaminophen (TYLENOL) 650 mg CR tablet, Take 1 tablet (650 mg total) by mouth every 6 (six) hours as needed for mild pain, Disp: 90 tablet, Rfl: 3    albuterol (PROVENTIL HFA,VENTOLIN HFA) 90 mcg/act inhaler, INHALE 2 PUFFS EVERY 4 (FOUR) HOURS AS NEEDED FOR WHEEZING, Disp: 8.5 g, Rfl: 5    atorvastatin (LIPITOR) 40 mg tablet, TAKE 1 TABLET (40 MG TOTAL) BY MOUTH DAILY, Disp: 90 tablet, Rfl: 3    Breo Ellipta 100-25 MCG/ACT inhaler, Inhale 1 puff daily Rinse mouth after use., Disp: 60 each, Rfl: 5    calcitriol (ROCALTROL) 0.25 mcg capsule, TAKE 1 CAPSULE (0.25 MCG TOTAL) BY MOUTH 3 (THREE) TIMES A WEEK, Disp: 15 capsule, Rfl: 3    carvedilol (COREG) 6.25 mg tablet, Take 1 tablet (6.25 mg total) by mouth 2 (two) times a day with meals, Disp: 60 tablet, Rfl: 5    Cholecalciferol (D3) 50 MCG (2000 UT) TABS, Take 2 tablets (4,000 Units total) by mouth daily, Disp: 90 tablet, Rfl: 3    clopidogrel (PLAVIX) 75 mg tablet, Take 1 tablet (75 mg total) by mouth daily, Disp: 180 tablet, Rfl: 3    Continuous Glucose  (FreeStyle Naldo 14 Day [place]) JAQUELIN, Use to record blood glucose 4 times daily. Once fasting and three times daily before meals, Disp: 6 each, Rfl: 11    Continuous Glucose Sensor (FreeStyle Naldo 2 Sensor) [facility] [place], USE ONE SENSOR EVERY 14 DAYS, Disp: 6 each, Rfl: 11    ferrous sulfate 325 (65 Fe) mg tablet, TAKE 1 TABLET TWICE A DAY BEFORE MEALS, Disp: 180 tablet, Rfl: 0    Glucose-Vitamin C-Vitamin D (TRUEPLUS GLUCOSE ON THE GO) CHEW, , Disp: , Rfl:     latanoprost (XALATAN) 0.005 % ophthalmic solution, , Disp: , Rfl:     linaGLIPtin (Tradjenta) 5 MG TABS, Take 5 mg by mouth in the morning, Disp: 30 tablet, Rfl: 2    losartan (COZAAR) 25 mg tablet, TAKE 1  TABLET (25 MG TOTAL) BY MOUTH DAILY, Disp: 30 tablet, Rfl: 5    metolazone (ZAROXOLYN) 5 mg tablet, Take 1 tablet weekly and take extra if has weight gain >5 lbs in 3 days or worsening leg swelling., Disp: 12 tablet, Rfl: 3    Misc. Devices (QUAD CANE) MISC, by Does not apply route daily, Disp: 1 each, Rfl: 0    montelukast (SINGULAIR) 10 mg tablet, Take 1 tablet (10 mg total) by mouth daily at bedtime, Disp: 30 tablet, Rfl: 5    nitroglycerin (NITROSTAT) 0.4 mg SL tablet, PLACE 1 TABLET (0.4 MG TOTAL) UNDER THE TONGUE EVERY 5 (FIVE) MINUTES AS NEEDED FOR CHEST PAIN, Disp: 25 tablet, Rfl: 1    Oral Hygiene Products (Extended Term Oral Care System) KIT, Apply to the mouth or throat 2 (two) times a day, Disp: 1 kit, Rfl: 1    potassium chloride (Klor-Con M20) 20 mEq tablet, TAKE 1 TABLET (20 HASMUKH EQUIVALENT TOTAL) BY MOUTH DAILY, Disp: 30 tablet, Rfl: 0    ranolazine (RANEXA) 500 mg 12 hr tablet, Take 1 tablet (500 mg total) by mouth every 12 (twelve) hours, Disp: 60 tablet, Rfl: 0    repaglinide (PRANDIN) 0.5 mg tablet, Take 1 tablet (0.5 mg total) by mouth 2 (two) times a day before meals (SKIP DOSE IF SKIPPING MEAL), Disp: 180 tablet, Rfl: 1    SODIUM FLUORIDE, DENTAL RINSE, 0.02 % SOLN, Apply 15 mL to the mouth or throat 2 (two) times a day, Disp: 532 mL, Rfl: 1    torsemide (DEMADEX) 20 mg tablet, TAKE 3 TABLETS (60 MG TOTAL) BY MOUTH DAILY, Disp: 270 tablet, Rfl: 1    Xarelto 2.5 MG tablet, TAKE 1 TABLET (2.5 MG TOTAL) BY MOUTH 2 (TWO) TIMES A DAY WITH MEALS, Disp: 180 tablet, Rfl: 1    Lab Results:   Results for orders placed or performed in visit on 09/19/24   POCT hemoglobin A1c    Collection Time: 09/19/24  2:31 PM   Result Value Ref Range    Hemoglobin A1C 6.9 (A) <=6.5

## 2025-01-24 NOTE — ASSESSMENT & PLAN NOTE
Wt Readings from Last 3 Encounters:   01/24/25 66.7 kg (147 lb)   12/10/24 71.7 kg (158 lb)   11/26/24 74.4 kg (164 lb)

## 2025-01-24 NOTE — ASSESSMENT & PLAN NOTE
Hypokalemia, resolved, last serum potassium 3.8 in May 2024.  No repeat labs available since then.  Check BMP now.  -Initially suspect secondary to diuresis  -Currently on potassium chloride 20 meq daily

## 2025-01-24 NOTE — ASSESSMENT & PLAN NOTE
Secondary hyperparathyroidism, last PTH slightly increased 167 in May 2024.  Last vitamin D level 53.  Continue vitamin D supplements 2000 units daily. calcitriol 0.25 mcg 3 times a week.  Check vitamin-D, PTH level now and again before next visit.

## 2025-01-24 NOTE — ASSESSMENT & PLAN NOTE
Lab Results   Component Value Date    EGFR 31 05/31/2024    EGFR 32 10/17/2023    EGFR 34 10/10/2023    CREATININE 1.51 (H) 05/31/2024    CREATININE 1.48 (H) 10/17/2023    CREATININE 1.40 (H) 10/10/2023

## 2025-01-24 NOTE — PATIENT INSTRUCTIONS
-Decrease metolazone to 5 mg 1 tablet once a week, continue to monitor daily weight and if you have weight gain greater than 5 pounds in 2 to 3 days or has worsening leg swelling, please take extra metolazone and call back to office.   -check blood and urine test now and again before next visit.

## 2025-01-24 NOTE — ASSESSMENT & PLAN NOTE
Bilateral renal cyst on ultrasound  -Renal ultrasound in March 2023 shows right kidney midpole cyst with calcified wall/septation.  Mild increase in size, no solid internal vascularity.  -MRI abdomen in February 2020 shows no suspicious renal mass.  Again showed 2.4 cm right mid pole cyst with septation, suboptimally evaluated in the absence of IV contrast.  -CT scan with IV contrast in October 2023 showed stable renal cyst.  -Renal ultrasound in January 2025 shows right kidney complex renal cyst Bosniak 2F with minimal enlargement.  Discussed with urology prior with plan to do repeat renal ultrasound in 1 year.  Will give prescription for repeat renal ultrasound next visit.    Recurrent UTI  -Status post cystoscopy in October 2023 which showed grade 4 pelvic floor prolapse, no suspicious mass or lesion noted.  -Currently has pessary being managed by OB/GYN  -No recent UTI episodes as per patient.

## 2025-01-24 NOTE — ASSESSMENT & PLAN NOTE
Proteinuria  -Overall fluctuating, last UACR slightly increased to 256 mg in June 2024.  No repeat UACR available since then.     -repeat UACR now and again before next visit  -currently on losartan 25 mg daily.  If proteinuria not improving, consider increasing losartan.  -suspect in the setting of long-term diabetes, hemoglobin A1c 7.1 in December 2023.  -also has mild diabetic retinopathy   Render In Strict Bullet Format?: No Initiate Treatment: Tretinoin cream before bed (avoid eye & mouth)\\n Detail Level: Zone

## 2025-01-25 DIAGNOSIS — M25.473 ANKLE EDEMA: ICD-10-CM

## 2025-01-25 DIAGNOSIS — E87.6 HYPOKALEMIA: ICD-10-CM

## 2025-01-27 RX ORDER — POTASSIUM CHLORIDE 1500 MG/1
TABLET, EXTENDED RELEASE ORAL
Qty: 30 TABLET | Refills: 4 | Status: SHIPPED | OUTPATIENT
Start: 2025-01-27

## 2025-01-30 ENCOUNTER — OFFICE VISIT (OUTPATIENT)
Dept: PODIATRY | Facility: CLINIC | Age: 85
End: 2025-01-30
Payer: MEDICARE

## 2025-01-30 VITALS — HEIGHT: 62 IN | WEIGHT: 147 LBS | BODY MASS INDEX: 27.05 KG/M2

## 2025-01-30 DIAGNOSIS — E11.40 TYPE 2 DIABETES MELLITUS WITH DIABETIC NEUROPATHY, UNSPECIFIED WHETHER LONG TERM INSULIN USE (HCC): Primary | ICD-10-CM

## 2025-01-30 DIAGNOSIS — I73.9 PERIPHERAL VASCULAR DISEASE, UNSPECIFIED (HCC): ICD-10-CM

## 2025-01-30 PROCEDURE — 99213 OFFICE O/P EST LOW 20 MIN: CPT | Performed by: PODIATRIST

## 2025-01-30 NOTE — PROGRESS NOTES
PATIENT:  Jennifer Amaya  1940    ASSESSMENT/PLAN:  1. Type 2 diabetes mellitus with diabetic neuropathy, unspecified whether long term insulin use ([facility])        2. Peripheral vascular disease, unspecified ([facility])               Reviewed medical records.  Disease prevention and related risk factors of diabetes were identified and discussed.  The patient was educated in proper foot wear for diabetics.  Also educated in daily foot assessment and routine diabetic foot care.  Nails debrided.  Reviewed the last blood work and the HbA1c was 6.9. Discussed the importance of controlling BS through diet and exercise. The patient will follow up in 3 months for diabetic foot exam.         HPI:  Jennifer Amaya is a 84 y.o.year old female seen for diabetic foot exam.  BS is under control.  No acute pedal problems.  No ulcer in her feet.      PAST MEDICAL HISTORY:  Past Medical History:   Diagnosis Date    ACE-inhibitor cough     last assessed - 29Dec2017    Asthma     Bilateral edema of lower extremity     last assessed - 21Aug2017    Cardiac murmur     last assessed - 21Aug2017    CKD (chronic kidney disease)     Diabetes mellitus (HCC)     last assessed - 29Nov2017    Esophageal dysphagia     Fatigue     last assessed - 21Aug2017    Hyperlipidemia     Hypertension     Patellar bursitis of right knee     last assessed - 04Nov2016    Personal history of other specified conditions     presenting hazards to health    Stroke ([facility])     Stroke syndrome     Urinary frequency     UTI (urinary tract infection)        PAST SURGICAL HISTORY:  Past Surgical History:   Procedure Laterality Date    CARDIAC CATHETERIZATION N/A 6/1/2023    Procedure: Cardiac Coronary Angiogram;  Surgeon: Roe German MD;  Location: [facility];  Service: Cardiology    CARDIAC CATHETERIZATION  6/1/2023    Procedure: Cardiac Left Heart Cath;  Surgeon: Roe German MD;  Location: [facility];  Service: Cardiology    MA  ESOPHAGOGASTRODUODENOSCOPY TRANSORAL DIAGNOSTIC N/A 4/5/2017    Procedure: ESOPHAGOGASTRODUODENOSCOPY (EGD);  Surgeon: Cedric Huang MD;  Location: [facility];  Service: Gastroenterology        ALLERGIES:  Patient has no known allergies.    MEDICATIONS:  Current Outpatient Medications   Medication Sig Dispense Refill    acetaminophen (TYLENOL) 650 mg CR tablet Take 1 tablet (650 mg total) by mouth every 6 (six) hours as needed for mild pain 90 tablet 3    albuterol (PROVENTIL HFA,VENTOLIN HFA) 90 mcg/act inhaler INHALE 2 PUFFS EVERY 4 (FOUR) HOURS AS NEEDED FOR WHEEZING 8.5 g 5    Breo Ellipta 100-25 MCG/ACT inhaler Inhale 1 puff daily Rinse mouth after use. 60 each 5    calcitriol (ROCALTROL) 0.25 mcg capsule TAKE 1 CAPSULE (0.25 MCG TOTAL) BY MOUTH 3 (THREE) TIMES A WEEK 15 capsule 3    carvedilol (COREG) 6.25 mg tablet Take 1 tablet (6.25 mg total) by mouth 2 (two) times a day with meals 60 tablet 5    Cholecalciferol (D3) 50 MCG (2000 UT) TABS Take 2 tablets (4,000 Units total) by mouth daily 90 tablet 3    clopidogrel (PLAVIX) 75 mg tablet Take 1 tablet (75 mg total) by mouth daily 180 tablet 3    Continuous Glucose  (FreeStyle Naldo 14 Day [place]) JAQUELIN Use to record blood glucose 4 times daily. Once fasting and three times daily before meals 6 each 11    Continuous Glucose Sensor (FreeStyle Naldo 2 Sensor) MISC USE ONE SENSOR EVERY 14 DAYS 6 each 11    ferrous sulfate 325 (65 Fe) mg tablet TAKE 1 TABLET TWICE A DAY BEFORE MEALS 180 tablet 0    Glucose-Vitamin C-Vitamin D (TRUEPLUS GLUCOSE ON THE GO) CHEW       latanoprost (XALATAN) 0.005 % ophthalmic solution       linaGLIPtin (Tradjenta) 5 MG TABS Take 5 mg by mouth in the morning 30 tablet 2    losartan (COZAAR) 25 mg tablet TAKE 1 TABLET (25 MG TOTAL) BY MOUTH DAILY 30 tablet 5    metolazone (ZAROXOLYN) 5 mg tablet Take 1 tablet weekly and take extra if has weight gain >5 lbs in 3 days or worsening leg swelling. 12 tablet 3    Misc. Devices (QUAD  CANE) MISC by Does not apply route daily 1 each 0    montelukast (SINGULAIR) 10 mg tablet Take 1 tablet (10 mg total) by mouth daily at bedtime 30 tablet 5    nitroglycerin (NITROSTAT) 0.4 mg SL tablet PLACE 1 TABLET (0.4 MG TOTAL) UNDER THE TONGUE EVERY 5 (FIVE) MINUTES AS NEEDED FOR CHEST PAIN 25 tablet 1    Oral Hygiene Products (Extended Term Oral Care System) KIT Apply to the mouth or throat 2 (two) times a day 1 kit 1    potassium chloride (Klor-Con M20) 20 mEq tablet TAKE 1 TABLET (20 HASMUKH EQUIVALENT TOTAL) BY MOUTH DAILY 30 tablet 4    repaglinide (PRANDIN) 0.5 mg tablet Take 1 tablet (0.5 mg total) by mouth 2 (two) times a day before meals (SKIP DOSE IF SKIPPING MEAL) 180 tablet 1    SODIUM FLUORIDE, DENTAL RINSE, 0.02 % SOLN Apply 15 mL to the mouth or throat 2 (two) times a day 532 mL 1    torsemide (DEMADEX) 20 mg tablet TAKE 3 TABLETS (60 MG TOTAL) BY MOUTH DAILY 270 tablet 1    Xarelto 2.5 MG tablet TAKE 1 TABLET (2.5 MG TOTAL) BY MOUTH 2 (TWO) TIMES A DAY WITH MEALS 180 tablet 1    atorvastatin (LIPITOR) 40 mg tablet TAKE 1 TABLET (40 MG TOTAL) BY MOUTH DAILY 90 tablet 3    ranolazine (RANEXA) 500 mg 12 hr tablet Take 1 tablet (500 mg total) by mouth every 12 (twelve) hours 60 tablet 0     No current facility-administered medications for this visit.       SOCIAL HISTORY:  Social History     Socioeconomic History    Marital status:      Spouse name: Not on file    Number of children: 8    Years of education: Not on file    Highest education level: Not on file   Occupational History    Occupation: Retired   Tobacco Use    Smoking status: Former     Current packs/day: 0.00     Types: Cigarettes     Quit date:      Years since quittin.1    Smokeless tobacco: Former    Tobacco comments:     quit over 30 yrs ago; (quit in the , had smoked 3PPD for 20 years - per [employer])   Vaping Use    Vaping status: Never Used   Substance and Sexual Activity    Alcohol use: Never    Drug use:  Never    Sexual activity: Not Currently   Other Topics Concern    Not on file   Social History Narrative    Diet needs improvement    Does not exercise    No caffeine use     Social Drivers of Health     Financial Resource Strain: Low Risk  (9/19/2024)    Overall Financial Resource Strain (CARDIA)     Difficulty of Paying Living Expenses: Not hard at all   Food Insecurity: Food Insecurity Present (9/19/2024)    Nursing - Inadequate Food Risk Classification     Worried About Running Out of Food in the Last Year: Sometimes true     Ran Out of Food in the Last Year: Sometimes true     Ran Out of Food in the Last Year: Not on file   Transportation Needs: No Transportation Needs (9/19/2024)    PRAPARE - Transportation     Lack of Transportation (Medical): No     Lack of Transportation (Non-Medical): No   Physical Activity: Inactive (4/1/2021)    Exercise Vital Sign     Days of Exercise per Week: 0 days     Minutes of Exercise per Session: 0 min   Stress: No Stress Concern Present (4/1/2021)    [employer] - Occupational Stress Questionnaire     Feeling of Stress : Not at all   Social Connections: Socially Isolated (4/1/2021)    Social Connection and Isolation Panel [NHANES]     Frequency of Communication with Friends and Family: Once a week     Frequency of Social Gatherings with Friends and Family: Once a week     Attends [employer] Services: 1 to 4 times per year     Active Member of Clubs or Organizations: No     Attends Club or Organization Meetings: Never     Marital Status:    Intimate Partner Violence: Not At Risk (4/1/2021)    Humiliation, Afraid, Rape, and Kick questionnaire     Fear of Current or Ex-Partner: No     Emotionally Abused: No     Physically Abused: No     Sexually Abused: No   Housing Stability: Low Risk  (9/19/2024)    Housing Stability Vital Sign     Unable to Pay for Housing in the Last Year: No     Number of Times Moved in the Last Year: 1     Homeless in the  Last Year: No        REVIEW OF SYSTEMS:  GENERAL: NAD, afebrile  HEART: No chest pain, or palpitation  RESPIRATORY:  No acute SOB or cough  GI: No Nausea, vomit or diarrhea  NEUROLOGIC: No syncope or acute weakness    PHYSICAL EXAM:  VASCULAR EXAM  Dorsalis pedis  Absent.  Posterior tibial artery  absent.  The patient has class findings with skin atrophy, lack of digital hair, and nail dystrophy.  There is +1 lower extremity edema bilaterally.      NEUROLOGIC EXAM  Sensation is intact to light touch.  Sensation is intact to 10gm monofilament.  Decreased vibratory sensation on her feet.  No focal neurologic deficit.          DERMATOLOGIC EXAM:   No ulcer or cellulitis noted.  No abscess.  The patient has hypertrophic toenails X 7 with discoloration, onycholysis, and subungal debris.  No notable skin lesion.      MUSCULOSKELETAL EXAM:   No acute joint pain.  Diffuse ankle edema noted.  No acute musculoskeletal problem.  Hammertoe noted.      Diabetic Foot Exam    Patient's shoes and socks removed.    Right Foot/Ankle   Right Foot Inspection  Skin Exam: skin intact and dry skin. No callus, no erythema, no maceration, no abnormal color, no pre-ulcer, no ulcer and no callus.     Toe Exam: right toe deformity. No swelling, no tenderness and erythema    Sensory   Proprioception: intact  Monofilament testing: intact    Vascular  Capillary refills: < 3 seconds  The right DP pulse is 0. The right PT pulse is 0.     Left Foot/Ankle  Left Foot Inspection  Skin Exam: skin intact and dry skin. No erythema, no maceration, normal color, no pre-ulcer, no ulcer and no callus.     Toe Exam: left toe deformity. No swelling, no tenderness and no erythema.     Sensory   Proprioception: intact  Monofilament testing: intact    Vascular  Capillary refills: < 3 seconds  The left DP pulse is 0. The left PT pulse is 0.     Assign Risk Category  Deformity present  No loss of protective sensation  Weak pulses  Risk: 1

## 2025-02-04 RX ORDER — METOLAZONE 5 MG/1
5 TABLET ORAL EVERY OTHER DAY
Qty: 15 TABLET | OUTPATIENT
Start: 2025-02-04

## 2025-02-18 DIAGNOSIS — I21.4 NSTEMI (NON-ST ELEVATED MYOCARDIAL INFARCTION) (HCC): ICD-10-CM

## 2025-02-19 DIAGNOSIS — E78.2 MIXED HYPERLIPIDEMIA: ICD-10-CM

## 2025-02-19 RX ORDER — ATORVASTATIN CALCIUM 40 MG/1
40 TABLET, FILM COATED ORAL DAILY
Qty: 90 TABLET | Refills: 3 | Status: SHIPPED | OUTPATIENT
Start: 2025-02-19 | End: 2026-02-14

## 2025-02-19 RX ORDER — NITROGLYCERIN 0.4 MG/1
0.4 TABLET SUBLINGUAL
Qty: 25 TABLET | Refills: 1 | Status: SHIPPED | OUTPATIENT
Start: 2025-02-19

## 2025-02-19 NOTE — TELEPHONE ENCOUNTER
Received call from patient's pharmacy requesting a refill of her Atorvastatin.     Script sent to pharmacy for refill.

## 2025-02-24 ENCOUNTER — OFFICE VISIT (OUTPATIENT)
Dept: CARDIOLOGY CLINIC | Facility: CLINIC | Age: 85
End: 2025-02-24
Payer: MEDICARE

## 2025-02-24 VITALS
DIASTOLIC BLOOD PRESSURE: 56 MMHG | HEART RATE: 56 BPM | WEIGHT: 155.9 LBS | OXYGEN SATURATION: 97 % | SYSTOLIC BLOOD PRESSURE: 104 MMHG | BODY MASS INDEX: 28.51 KG/M2

## 2025-02-24 DIAGNOSIS — I34.0 NON-RHEUMATIC MITRAL REGURGITATION: ICD-10-CM

## 2025-02-24 DIAGNOSIS — I35.1 AORTIC VALVE REGURGITATION, NONRHEUMATIC: ICD-10-CM

## 2025-02-24 DIAGNOSIS — I25.118 CORONARY ARTERY DISEASE INVOLVING NATIVE CORONARY ARTERY OF NATIVE HEART WITH OTHER FORM OF ANGINA PECTORIS (HCC): ICD-10-CM

## 2025-02-24 DIAGNOSIS — I50.32 CHRONIC DIASTOLIC CONGESTIVE HEART FAILURE (HCC): Primary | ICD-10-CM

## 2025-02-24 DIAGNOSIS — Z95.5 STATUS POST INSERTION OF DRUG-ELUTING STENT INTO LEFT ANTERIOR DESCENDING (LAD) ARTERY: ICD-10-CM

## 2025-02-24 DIAGNOSIS — E78.2 MIXED HYPERLIPIDEMIA: ICD-10-CM

## 2025-02-24 DIAGNOSIS — I50.43 ACUTE ON CHRONIC COMBINED SYSTOLIC AND DIASTOLIC CONGESTIVE HEART FAILURE (HCC): ICD-10-CM

## 2025-02-24 PROCEDURE — 99214 OFFICE O/P EST MOD 30 MIN: CPT | Performed by: INTERNAL MEDICINE

## 2025-02-24 NOTE — PROGRESS NOTES
Cardiology Follow Up    Jennifer Amaya  1940  [id number]  [facility] CARDIAC CATH LAB  [address] [address]  [phone]    1. Chronic diastolic congestive heart failure (HCC)        2. Aortic valve regurgitation, nonrheumatic        3. Non-rheumatic mitral regurgitation        4. 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 Status post insertion of drug-eluting stent into left anterior descending (LAD) artery        6. Mixed hyperlipidemia        7. Acute on chronic combined systolic and diastolic congestive heart failure (HCC)            Interval History: Cardiology follow-up.  Patient was recently found to have worsening heart failure, she has responded to aggressive diuretic regimen currently on high-dose torsemide plus as needed metolazone.  Significant improvement of symptoms, she does have dyspnea class II with minimal ambulation's.  Possibly worse then that, currently no orthopnea no PND.  She does have angina class II responsive to nitroglycerin.  Patient is also on Ranexa therapy.  Denies any bleeding issues on full anticoagulation with a factor Xa inhibitor plus Plavix, adjusted for age and renal function.  Last creatinine last year 1.5, GFR in the 30s.  Denies syncope or presyncope.  Compliant with low-sodium diet, compliant with low-cholesterol diet, I do not have a recent lipid profile LDL from over 2 years ago was 60 with an HDL 55.    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[facility])    History of CVA with residual deficit    Mixed hyperlipidemia    Persistent proteinuria    Renal cyst    Ankle edema    Stage 3b chronic kidney disease ([facility])    Acute diastolic congestive heart failure ([facility])    Status post insertion of drug-eluting stent into left anterior descending (LAD) artery    Coronary artery disease involving native coronary artery    NSTEMI (non-ST elevated myocardial infarction) ([facility])    Asthma    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[facility])    Uncontrolled type 2 diabetes mellitus with hyperglycemia ([facility])    Urge urinary incontinence    Nocturia    Secondary hyperparathyroidism of renal origin ([facility])    Chronic diastolic congestive heart failure ([facility])    Embolism and thrombosis of arteries of the lower extremities ([facility])    Vitamin D deficiency    Hypokalemia    Encounter for fitting and adjustment of pessary    Peripheral vascular disease, unspecified ([facility])    Type 2 diabetes mellitus with diabetic neuropathy, unspecified whether long term insulin use ([facility])    Pessary maintenance    Encounter for immunization     Past Medical History:   Diagnosis Date    ACE-inhibitor cough     last assessed - 29Dec2017    Asthma     Bilateral edema of lower extremity     last assessed - 21Aug2017    Cardiac murmur     last assessed - 21Aug2017    CKD (chronic kidney disease)     Diabetes mellitus ([facility])     last assessed - 29Nov2017    Esophageal dysphagia     Fatigue     last assessed - 21Aug2017    Hyperlipidemia     Hypertension     Patellar bursitis of right knee     last assessed - 04Nov2016    Personal history of other specified conditions     presenting hazards to health    Stroke ([facility])     Stroke syndrome     Urinary frequency     UTI (urinary tract infection)      Social History     Socioeconomic History    Marital status:      Spouse name: Not on file    Number of children: 8    Years of  education: Not on file    Highest education level: Not on file   Occupational History    Occupation: Retired   Tobacco Use    Smoking status: Former     Current packs/day: 0.00     Types: Cigarettes     Quit date:      Years since quittin.1    Smokeless tobacco: Former    Tobacco comments:     quit over 30 yrs ago; (quit in the , had smoked 3PPD for 20 years - per [employer])   Vaping Use    Vaping status: Never Used   Substance and Sexual Activity    Alcohol use: Never    Drug use: Never    Sexual activity: Not Currently   Other Topics Concern    Not on file   Social History Narrative    Diet needs improvement    Does not exercise    No caffeine use     Social Drivers of Health     Financial Resource Strain: Low Risk  (2024)    Overall Financial Resource Strain (CARDIA)     Difficulty of Paying Living Expenses: Not hard at all   Food Insecurity: Food Insecurity Present (2024)    Nursing - Inadequate Food Risk Classification     Worried About Running Out of Food in the Last Year: Sometimes true     Ran Out of Food in the Last Year: Sometimes true     Ran Out of Food in the Last Year: Not on file   Transportation Needs: No Transportation Needs (2024)    PRAPARE - Transportation     Lack of Transportation (Medical): No     Lack of Transportation (Non-Medical): No   Physical Activity: Inactive (2021)    Exercise Vital Sign     Days of Exercise per Week: 0 days     Minutes of Exercise per Session: 0 min   Stress: No Stress Concern Present (2021)    [place] [employer] - Occupational Stress Questionnaire     Feeling of Stress : Not at all   Social Connections: Socially Isolated (2021)    Social Connection and Isolation Panel [NHANES]     Frequency of Communication with Friends and Family: Once a week     Frequency of Social Gatherings with Friends and Family: Once a week     Attends Sikh Services: 1 to 4 times per year     Active Member of Clubs or  Organizations: No     Attends Club or Organization Meetings: Never     Marital Status:    Intimate Partner Violence: Not At Risk (4/1/2021)    Humiliation, Afraid, Rape, and Kick questionnaire     Fear of Current or Ex-Partner: No     Emotionally Abused: No     Physically Abused: No     Sexually Abused: No   Housing Stability: Low Risk  (9/19/2024)    Housing Stability Vital Sign     Unable to Pay for Housing in the Last Year: No     Number of Times Moved in the Last Year: 1     Homeless in the Last Year: No      Family History   Problem Relation Age of Onset    Hypertension Mother     Stroke Mother     Heart disease Father     Other Sister         1)Breast disorder; 2)Epilepsy    Migraines Sister         Migraine headaches    Osteoporosis Sister     Thyroid disease Sister     Coronary artery disease Sister         S/P triple vessel bypass    Breast cancer Sister 80    No Known Problems Sister     No Known Problems Sister     Osteoporosis Maternal Grandmother     No Known Problems Maternal Grandfather     No Known Problems Paternal Grandmother     No Known Problems Paternal Grandfather     Diabetes Son         Diabetes mellitus    Hypertension Son     Rheum arthritis Son         Rheumatic disease    No Known Problems Son     No Known Problems Son     No Known Problems Son     No Known Problems Son     No Known Problems Son     No Known Problems Maternal Aunt     No Known Problems Maternal Aunt     No Known Problems Maternal Aunt     No Known Problems Paternal Aunt     No Known Problems Paternal Aunt     Heart disease Family      Past Surgical History:   Procedure Laterality Date    CARDIAC CATHETERIZATION N/A 6/1/2023    Procedure: Cardiac Coronary Angiogram;  Surgeon: Roe German MD;  Location: [facility] CARDIAC CATH LAB;  Service: Cardiology    CARDIAC CATHETERIZATION  6/1/2023    Procedure: Cardiac Left Heart Cath;  Surgeon: Roe German MD;  Location: [facility] CARDIAC CATH LAB;  Service: Cardiology    [place]  ESOPHAGOGASTRODUODENOSCOPY TRANSORAL DIAGNOSTIC N/A 4/5/2017    Procedure: ESOPHAGOGASTRODUODENOSCOPY (EGD);  Surgeon: Cedric Huang MD;  Location:  GI LAB;  Service: Gastroenterology       Current Outpatient Medications:     acetaminophen (TYLENOL) 650 mg CR tablet, Take 1 tablet (650 mg total) by mouth every 6 (six) hours as needed for mild pain, Disp: 90 tablet, Rfl: 3    albuterol (PROVENTIL HFA,VENTOLIN HFA) 90 mcg/act inhaler, INHALE 2 PUFFS EVERY 4 (FOUR) HOURS AS NEEDED FOR WHEEZING, Disp: 8.5 g, Rfl: 5    atorvastatin (LIPITOR) 40 mg tablet, Take 1 tablet (40 mg total) by mouth daily, Disp: 90 tablet, Rfl: 3    Breo Ellipta 100-25 MCG/ACT inhaler, Inhale 1 puff daily Rinse mouth after use., Disp: 60 each, Rfl: 5    calcitriol (ROCALTROL) 0.25 mcg capsule, TAKE 1 CAPSULE (0.25 MCG TOTAL) BY MOUTH 3 (THREE) TIMES A WEEK, Disp: 15 capsule, Rfl: 3    carvedilol (COREG) 6.25 mg tablet, Take 1 tablet (6.25 mg total) by mouth 2 (two) times a day with meals, Disp: 60 tablet, Rfl: 5    Cholecalciferol (D3) 50 MCG (2000 UT) TABS, Take 2 tablets (4,000 Units total) by mouth daily, Disp: 90 tablet, Rfl: 3    clopidogrel (PLAVIX) 75 mg tablet, Take 1 tablet (75 mg total) by mouth daily, Disp: 180 tablet, Rfl: 3    Continuous Glucose  (FreeStyle Naldo 14 Day [place]) JAQUELIN, Use to record blood glucose 4 times daily. Once fasting and three times daily before meals, Disp: 6 each, Rfl: 11    Continuous Glucose Sensor (FreeStyle Naldo 2 Sensor) [facility], USE ONE SENSOR EVERY 14 DAYS, Disp: 6 each, Rfl: 11    ferrous sulfate 325 (65 Fe) mg tablet, TAKE 1 TABLET TWICE A DAY BEFORE MEALS, Disp: 180 tablet, Rfl: 0    glipiZIDE POWD, , Disp: , Rfl:     Glucose-Vitamin C-Vitamin D (TRUEPLUS GLUCOSE ON THE GO) CHEW, , Disp: , Rfl:     latanoprost (XALATAN) 0.005 % ophthalmic solution, , Disp: , Rfl:     linaGLIPtin (Tradjenta) 5 MG TABS, Take 5 mg by mouth in the morning, Disp: 30 tablet, Rfl: 2    losartan (COZAAR) 25 mg  tablet, TAKE 1 TABLET (25 MG TOTAL) BY MOUTH DAILY, Disp: 30 tablet, Rfl: 5    metolazone (ZAROXOLYN) 5 mg tablet, Take 1 tablet weekly and take extra if has weight gain >5 lbs in 3 days or worsening leg swelling., Disp: 12 tablet, Rfl: 3    Misc. Devices (QUAD CANE) MISC, by Does not apply route daily, Disp: 1 each, Rfl: 0    montelukast (SINGULAIR) 10 mg tablet, Take 1 tablet (10 mg total) by mouth daily at bedtime, Disp: 30 tablet, Rfl: 5    nitroglycerin (NITROSTAT) 0.4 mg SL tablet, PLACE 1 TABLET (0.4 MG TOTAL) UNDER THE TONGUE EVERY 5 (FIVE) MINUTES AS NEEDED FOR CHEST PAIN, Disp: 25 tablet, Rfl: 1    Oral Hygiene Products (Extended Term Oral Care System) KIT, Apply to the mouth or throat 2 (two) times a day, Disp: 1 kit, Rfl: 1    potassium chloride (Klor-Con M20) 20 mEq tablet, TAKE 1 TABLET (20 HASMUKH EQUIVALENT TOTAL) BY MOUTH DAILY, Disp: 30 tablet, Rfl: 4    ranolazine (RANEXA) 500 mg 12 hr tablet, Take 1 tablet (500 mg total) by mouth every 12 (twelve) hours, Disp: 60 tablet, Rfl: 0    repaglinide (PRANDIN) 0.5 mg tablet, Take 1 tablet (0.5 mg total) by mouth 2 (two) times a day before meals (SKIP DOSE IF SKIPPING MEAL), Disp: 180 tablet, Rfl: 1    SODIUM FLUORIDE, DENTAL RINSE, 0.02 % SOLN, Apply 15 mL to the mouth or throat 2 (two) times a day, Disp: 532 mL, Rfl: 1    torsemide (DEMADEX) 20 mg tablet, TAKE 3 TABLETS (60 MG TOTAL) BY MOUTH DAILY, Disp: 270 tablet, Rfl: 1    Xarelto 2.5 MG tablet, TAKE 1 TABLET (2.5 MG TOTAL) BY MOUTH 2 (TWO) TIMES A DAY WITH MEALS, Disp: 180 tablet, Rfl: 1    Metformin HCl POWD, , Disp: , Rfl:   No Known Allergies    Labs:  Office Visit on 09/19/2024   Component Date Value    Hemoglobin A1C 09/19/2024 6.9 (A)      Imaging: No results found.    Review of Systems:  Review of Systems   Constitutional:  Positive for activity change and fatigue.   HENT:  Negative for hearing loss and nosebleeds.    Respiratory:  Positive for chest tightness and shortness of breath. Negative  for apnea, wheezing and stridor.    Cardiovascular:  Positive for chest pain and leg swelling. Negative for palpitations.   Gastrointestinal:  Negative for abdominal pain, anal bleeding and blood in stool.   Endocrine: Negative for cold intolerance.   Genitourinary:  Negative for hematuria.   Musculoskeletal:  Positive for arthralgias, back pain and gait problem. Negative for myalgias.   Skin:  Negative for pallor and rash.   Allergic/Immunologic: Negative for immunocompromised state.   Neurological:  Positive for weakness. Negative for dizziness and syncope.   Hematological:  Bruises/bleeds easily.   Psychiatric/Behavioral:  Negative for sleep disturbance.        Physical Exam:  Physical Exam  Vitals reviewed.   Constitutional:       General: She is not in acute distress.     Appearance: Normal appearance. She is normal weight. She is not ill-appearing, toxic-appearing or diaphoretic.   Eyes:      General: No scleral icterus.  Neck:      Vascular: No carotid bruit.   Cardiovascular:      Rate and Rhythm: Normal rate and regular rhythm.      Heart sounds: Murmur (2.4 holosystolic murmur at the apex, systolic ejection murmur at the base) heard.      No friction rub. No gallop.   Pulmonary:      Effort: Pulmonary effort is normal. No respiratory distress.      Breath sounds: No stridor. No wheezing, rhonchi or rales.   Musculoskeletal:      Right lower leg: No edema.      Left lower leg: No edema.   Skin:     General: Skin is warm and dry.      Capillary Refill: Capillary refill takes less than 2 seconds.      Coloration: Skin is not jaundiced or pale.      Findings: No bruising or erythema.   Neurological:      Mental Status: She is alert and oriented to person, place, and time.   Psychiatric:         Mood and Affect: Mood normal.         Discussion/Summary:  Coronary artery disease, non-STEMI 2021.  PTCA stent of the mid LAD and PTCA of the distal LAD.  Catheterization few months later because of recurrent symptoms  revealed occluded LAD stent.  Previously turned down by cardiothoracic surgery because of poor targets.  Crescendo symptoms with dyspnea last year 2022, stress test suggested anterior infarct with surrounding ischemia.  Underwent catheterization.  When diffuse disease left main with a proximal 50% lesion.  50% ostial LAD and 50% ostial circumflex, occluded mid LAD with septal collaterals.  Stents in the RCA was patent, there was 90% PDA lesion not amenable for revascularization, with moderately increased left ventricular end-diastolic pressure.  Echocardiogram revealed low normal left ventricular function ejection fraction of 50 to 55% with anteroapical severe hypokinesis, there was moderate mitral sufficiency and mild tricuspid recency with mildly increased pulmonary artery pressures as suggested by Doppler criteria.  echocardiogram 2024, revealed worsening LV function ejection fraction of 40% with stage II diastolic function.  Anteroapical akinesis significant left atrial enlargement and moderate to severe mitral insufficiency.  There was mild tricuspid efficiency with moderate pulmonary hypertension suggested by Doppler criteria.  Acute heart failure as described above, improved with aggressive diuretic regimen.  She needs basic blood work, will repeat lipid profile as well.  Pharmacological stress test and echocardiogram will be ordered as well.    Patient is not certain as to she wants to undergo invasive procedures or interventions.  .  She appears euvolemic on examination today.     This note was completed in part utilizing [employer] direct voice recognition software.   Grammatical errors, random word insertion, spelling mistakes, and incomplete sentences may be an occasional consequence of the system secondary to software limitations, ambient noise and hardware issues. At the time of dictation, efforts were made to edit, clarify and /or correct errors.  Please read the chart carefully and recognize,  using context, where substitutions have occurred.  If you have any questions or concerns about the context, text or information contained within the body of this dictation, please contact myself, the provider, for further clarification.

## 2025-02-25 NOTE — PROGRESS NOTES
Name: Jennifer Amaya      : 1940      MRN: [id number]  Encounter Provider: PEDRO LUIS Patel  Encounter Date: 2025   Encounter department: [facility] UROLOGY [place]  :  Assessment & Plan  Renal cyst  -Complex right renal cyst, Bosniak 2F, with minimal continuous enlargement since . No new suspicious features.   -Previous CT scan with contrast  demonstrated stable renal cysts and diffuse bladder wall thickening most pronounced at base, neoplasm could not be excluded. She did have a follow up cystoscopy 10/24/2023 which demonstrated Grade 4 pelvic prolapse. Cysto negative for lesions masses. She was referred back to urogyn with continued use of pessary.  -I discussed with the patient Bosniak scoring and continuous surveillance imaging of complex cysts with Bosniak I and II no follow up required. IIF follow up imaging to assess progression and III and IV counseling and management to take into consideration baseline CKD, co-morbidities and life expectancy.   - We will repeat US in 6 months. I did discuss with the patient she should obtain her updated lab work to assess kidney function as her last BMP was completed in May of 2024 which showed evidence of decreased kidney function with a creatinine of 1.51 and GFR of 31. I explained to the patient should she need further imaging with a CT scan based on her kidney function she may not be a great candidate for contrast. She states understanding.     Orders:    US kidney and bladder; Future        History of Present Illness   Jennifer Amaya is a 84 y.o. female who presents for follow up of renal cysts. She has been following the cysts with imaging since 2018. Her most recent imaging is a renal and bladder US in January of this year which demonstrated right renal cyst with solitary 3mm nonvascular calcified septation classified as Bosniak 2F, and smaller simple appearing cyst on right. The complex cyst has had minimal enlargement since   and has no new suspicious features.   Today pt states that she is feeling well. She denies any current symptoms, no pain. Denies any visible blood in the urine.     Imaging History:  7/2018 [facility]: Minimally complex right inferior pole renal cyst    12/2019 [facility]:Bilateral renal cysts including a right mid to lower pole cyst with thick   septation. While definite vascularity is not identified, this lesion   should be further evaluated with dedicated renal protocol CT or MRI.     2/2020 MRI abd: Multiple right renal cysts again identified including a 2.4 x 1.6 cm right midpole cyst with a septation that doesn't appear particularly thickened however is suboptimally evaluated in the absence of intravenous contrast. Recommend continued annual ultrasound surveillance.     7/2021 [facility]:Stable 2 cm septated cyst in the midportion of the right kidney. Otherwise stable bilateral renal cysts.     8/2023 CT A/P :There is a right renal cyst with internal calcification. This measures 1.8 x 1.6 cm     10/2023 CT A/P,  which showed Stable renal cysts. No hydronephrosis.     1/3/25 [facility]: Renal cysts, the largest 2.6 x 2.4 x 2.4 cm, interpolar with a solitary 3 mm nonvascular calcified septation (previously 2.4 x 2 x 2.5 cm), second largest 1.7 x 1.5 x 1.3 cm, simple appearing.    Review of Systems   Constitutional:  Negative for chills and fever.   Genitourinary:  Negative for dysuria, frequency, hematuria, pelvic pain and urgency.          Objective   There were no vitals taken for this visit.    Physical Exam  Constitutional:       General: She is not in acute distress.     Appearance: She is not toxic-appearing.   HENT:      Head: Normocephalic and atraumatic.   Eyes:      Conjunctiva/sclera: Conjunctivae normal.   Cardiovascular:      Rate and Rhythm: Normal rate.   Pulmonary:      Effort: Pulmonary effort is normal.   Abdominal:      Palpations: Abdomen is soft.   Skin:     General: Skin is warm and dry.   Neurological:      Mental  "Status: She is alert and oriented to person, place, and time.   Psychiatric:         Mood and Affect: Mood normal.         Thought Content: Thought content normal.          Results   No results found for: \"PSA\"  Lab Results   Component Value Date    GLUCOSE 170 (H) 09/22/2015    CALCIUM 8.9 05/31/2024     09/22/2015    K 3.8 05/31/2024    CO2 27 05/31/2024     05/31/2024    BUN 48 (H) 05/31/2024    CREATININE 1.51 (H) 05/31/2024     Lab Results   Component Value Date    WBC 4.90 05/31/2024    HGB 11.0 (L) 05/31/2024    HCT 36.0 05/31/2024    MCV 81 (L) 05/31/2024     05/31/2024       Office Urine Dip  No results found for this or any previous visit (from the past hour).      "

## 2025-02-25 NOTE — ASSESSMENT & PLAN NOTE
-Complex right renal cyst, Bosniak 2F, with minimal continuous enlargement since 2023. No new suspicious features.   -Previous CT scan with contrast 2023 demonstrated stable renal cysts and diffuse bladder wall thickening most pronounced at base, neoplasm could not be excluded. She did have a follow up cystoscopy 10/24/2023 which demonstrated Grade 4 pelvic prolapse. Cysto negative for lesions masses. She was referred back to urogyn with continued use of pessary.  -I discussed with the patient Bosniak scoring and continuous surveillance imaging of complex cysts with Bosniak I and II no follow up required. IIF follow up imaging to assess progression and III and IV counseling and management to take into consideration baseline CKD, co-morbidities and life expectancy.   - We will repeat US in 6 months. I did discuss with the patient she should obtain her updated lab work to assess kidney function as her last BMP was completed in May of 2024 which showed evidence of decreased kidney function with a creatinine of 1.51 and GFR of 31. I explained to the patient should she need further imaging with a CT scan based on her kidney function she may not be a great candidate for contrast. She states understanding.     Orders:    US kidney and bladder; Future

## 2025-02-27 ENCOUNTER — OFFICE VISIT (OUTPATIENT)
Dept: UROLOGY | Facility: AMBULATORY SURGERY CENTER | Age: 85
End: 2025-02-27
Payer: MEDICARE

## 2025-02-27 VITALS
WEIGHT: 156 LBS | OXYGEN SATURATION: 97 % | DIASTOLIC BLOOD PRESSURE: 60 MMHG | BODY MASS INDEX: 28.71 KG/M2 | HEIGHT: 62 IN | HEART RATE: 64 BPM | SYSTOLIC BLOOD PRESSURE: 120 MMHG

## 2025-02-27 DIAGNOSIS — N28.1 RENAL CYST: Primary | ICD-10-CM

## 2025-02-27 PROCEDURE — 99213 OFFICE O/P EST LOW 20 MIN: CPT

## 2025-02-28 DIAGNOSIS — I21.4 NSTEMI (NON-ST ELEVATED MYOCARDIAL INFARCTION) (HCC): ICD-10-CM

## 2025-03-03 RX ORDER — RIVAROXABAN 2.5 MG/1
TABLET, FILM COATED ORAL
Qty: 180 TABLET | Refills: 1 | Status: SHIPPED | OUTPATIENT
Start: 2025-03-03

## 2025-03-04 DIAGNOSIS — E11.40 CONTROLLED TYPE 2 DIABETES MELLITUS WITH DIABETIC NEUROPATHY, WITHOUT LONG-TERM CURRENT USE OF INSULIN (HCC): ICD-10-CM

## 2025-03-04 DIAGNOSIS — N25.81 HYPERPARATHYROIDISM, SECONDARY (HCC): ICD-10-CM

## 2025-03-04 RX ORDER — CALCITRIOL 0.25 UG/1
0.25 CAPSULE, LIQUID FILLED ORAL 3 TIMES WEEKLY
Qty: 15 CAPSULE | Refills: 3 | Status: SHIPPED | OUTPATIENT
Start: 2025-03-05

## 2025-03-06 RX ORDER — LINAGLIPTIN 5 MG/1
TABLET, FILM COATED ORAL
Qty: 30 TABLET | Refills: 2 | Status: SHIPPED | OUTPATIENT
Start: 2025-03-06

## 2025-03-08 DIAGNOSIS — J45.30 MILD PERSISTENT ASTHMA WITHOUT COMPLICATION: ICD-10-CM

## 2025-03-10 ENCOUNTER — RA CDI HCC (OUTPATIENT)
Dept: OTHER | Facility: HOSPITAL | Age: 85
End: 2025-03-10

## 2025-03-10 RX ORDER — FLUTICASONE FUROATE AND VILANTEROL TRIFENATATE 100; 25 UG/1; UG/1
1 POWDER RESPIRATORY (INHALATION) DAILY
Qty: 60 EACH | Refills: 5 | Status: SHIPPED | OUTPATIENT
Start: 2025-03-10 | End: 2026-03-05

## 2025-03-10 NOTE — PROGRESS NOTES
HCC coding opportunities          Chart Reviewed number of suggestions sent to Provider: 3     Patients Insurance     Medicare Insurance: [employer]          1) E11.22:Type 2 diabetes mellitus with diabetic chronic kidney disease (HCC)      As per ICD 10 coding guidelines, DM & CKD are presumed to have a causal-effect relationship unless documented as unrelated please review and assess if applicable     2) I13.0: Hypertensive heart and chronic kidney disease with heart failure and stage 1 through stage 4 chronic kidney disease, or unspecified chronic kidney disease (HCC)     Per ICD 10 CM coding guidelines the classification presumes a causal relationship between hypertension and heart involvement and between CKD unless the documentation clearly states the conditions are unrelated    3) E11.51: Type 2 diabetes mellitus with diabetic peripheral angiopathy without gangrene [E11.51]    As per ICD 10 coding guidelines, DM & PVD are presumed to have a causal-effect relationship unless documented as unrelated please review and assess if applicable.

## 2025-03-13 ENCOUNTER — HOSPITAL ENCOUNTER (OUTPATIENT)
Dept: NON INVASIVE DIAGNOSTICS | Facility: HOSPITAL | Age: 85
Discharge: HOME | End: 2025-03-13
Attending: INTERNAL MEDICINE
Payer: MEDICARE

## 2025-03-13 VITALS
SYSTOLIC BLOOD PRESSURE: 120 MMHG | BODY MASS INDEX: 28.71 KG/M2 | WEIGHT: 156 LBS | HEART RATE: 64 BPM | DIASTOLIC BLOOD PRESSURE: 60 MMHG | HEIGHT: 62 IN

## 2025-03-13 DIAGNOSIS — I25.118 CORONARY ARTERY DISEASE INVOLVING NATIVE CORONARY ARTERY OF NATIVE HEART WITH OTHER FORM OF ANGINA PECTORIS (HCC): ICD-10-CM

## 2025-03-13 DIAGNOSIS — I50.43 ACUTE ON CHRONIC COMBINED SYSTOLIC AND DIASTOLIC CONGESTIVE HEART FAILURE (HCC): ICD-10-CM

## 2025-03-13 DIAGNOSIS — I35.1 AORTIC VALVE REGURGITATION, NONRHEUMATIC: ICD-10-CM

## 2025-03-13 DIAGNOSIS — I34.0 NON-RHEUMATIC MITRAL REGURGITATION: ICD-10-CM

## 2025-03-13 LAB
AORTIC ROOT: 3.5 CM
AORTIC VALVE MEAN VELOCITY: 10.5 M/S
ASCENDING AORTA: 3.6 CM
AV AREA BY CONTINUOUS VTI: 1.8 CM2
AV AREA PEAK VELOCITY: 1.8 CM2
AV LVOT MEAN GRADIENT: 1 MMHG
AV LVOT PEAK GRADIENT: 2 MMHG
AV MEAN PRESS GRAD SYS DOP V1V2: 5 MMHG
AV ORIFICE AREA US: 1.8 CM2
AV PEAK GRADIENT: 8 MMHG
AV REGURGITATION PRESSURE HALF TIME: 535 MS
AV VELOCITY RATIO: 0.52
AV VMAX SYS DOP: 1.42 M/S
BSA FOR ECHO PROCEDURE: 1.72 M2
DOP CALC AO VTI: 32.1 CM
DOP CALC LVOT AREA: 3.46 CM2
DOP CALC LVOT CARDIAC INDEX: 1.94 L/MIN/M2
DOP CALC LVOT CARDIAC OUTPUT: 3.34 L/MIN
DOP CALC LVOT DIAMETER: 2.1 CM
DOP CALC LVOT PEAK VEL VTI: 16.67 CM
DOP CALC LVOT PEAK VEL: 0.74 M/S
DOP CALC LVOT STROKE INDEX: 33.7 ML/M2
DOP CALC LVOT STROKE VOLUME: 57.71 CM3
DOP CALC MV VTI: 31.79 CM
E WAVE DECELERATION TIME: 162 MS
E/A RATIO: 1.67
FRACTIONAL SHORTENING: 12 (ref 28–44)
INTERVENTRICULAR SEPTUM IN DIASTOLE (PARASTERNAL SHORT AXIS VIEW): 1.5 CM
INTERVENTRICULAR SEPTUM: 1.5 CM (ref 0.6–1.1)
LAAS-AP2: 31.2 CM2
LAAS-AP4: 35.8 CM2
LEFT ATRIUM SIZE: 4.7 CM
LEFT ATRIUM VOLUME (MOD BIPLANE): 140 ML
LEFT ATRIUM VOLUME INDEX (MOD BIPLANE): 81.4 ML/M2
LEFT INTERNAL DIMENSION IN SYSTOLE: 4.6 CM (ref 2.1–4)
LEFT VENTRICLE DIASTOLIC VOLUME (MOD BIPLANE): 186 ML
LEFT VENTRICLE DIASTOLIC VOLUME INDEX (MOD BIPLANE): 108.1 ML/M2
LEFT VENTRICLE SYSTOLIC VOLUME (MOD BIPLANE): 101 ML
LEFT VENTRICLE SYSTOLIC VOLUME INDEX (MOD BIPLANE): 58.7 ML/M2
LEFT VENTRICULAR INTERNAL DIMENSION IN DIASTOLE: 5.2 CM (ref 3.5–6)
LEFT VENTRICULAR POSTERIOR WALL IN END DIASTOLE: 1.4 CM
LEFT VENTRICULAR STROKE VOLUME: 36 ML
LV EF BIPLANE MOD: 46 %
LV EF US.2D.A4C+ESTIMATED: 42 %
LVSV (TEICH): 36 ML
MV E'TISSUE VEL-LAT: 10 CM/S
MV E'TISSUE VEL-SEP: 4 CM/S
MV EROA: 0.2 CM2
MV MEAN GRADIENT: 1 MMHG
MV PEAK A VEL: 0.54 M/S
MV PEAK E VEL: 90 CM/S
MV PEAK GRADIENT: 5 MMHG
MV REGURGITANT VOLUME: 29 ML
MV STENOSIS PRESSURE HALF TIME: 47 MS
MV VALVE AREA BY CONTINUITY EQUATION: 1.82 CM2
MV VALVE AREA P 1/2 METHOD: 4.68
PISA MRMAX VEL: 0.3 M/S
PISA RADIUS: 0.6 CM
RA PRESSURE ESTIMATED: 15 MMHG
RIGHT ATRIUM AREA SYSTOLE A4C: 15.2 CM2
RIGHT VENTRICLE ID DIMENSION: 4.4 CM
RV PSP: 44 MMHG
SL CV AV DECELERATION TIME RETROGRADE: 1844 MS
SL CV AV PEAK GRADIENT RETROGRADE: 44 MMHG
SL CV LEFT ATRIUM LENGTH A2C: 6.9 CM
SL CV LV EF: 42
SL CV PED ECHO LEFT VENTRICLE DIASTOLIC VOLUME (MOD BIPLANE) 2D: 131 ML
SL CV PED ECHO LEFT VENTRICLE SYSTOLIC VOLUME (MOD BIPLANE) 2D: 95 ML
TR MAX PG: 29 MMHG
TR PEAK VELOCITY: 2.7 M/S
TRICUSPID ANNULAR PLANE SYSTOLIC EXCURSION: 2 CM
TRICUSPID VALVE PEAK REGURGITATION VELOCITY: 2.69 M/S

## 2025-03-13 PROCEDURE — 93306 TTE W/DOPPLER COMPLETE: CPT | Performed by: INTERNAL MEDICINE

## 2025-03-13 PROCEDURE — 93306 TTE W/DOPPLER COMPLETE: CPT

## 2025-03-14 ENCOUNTER — HOSPITAL ENCOUNTER (OUTPATIENT)
Dept: NON INVASIVE DIAGNOSTICS | Facility: CLINIC | Age: 85
Discharge: HOME | End: 2025-03-14
Payer: MEDICARE

## 2025-03-14 DIAGNOSIS — I50.43 ACUTE ON CHRONIC COMBINED SYSTOLIC AND DIASTOLIC CONGESTIVE HEART FAILURE (HCC): ICD-10-CM

## 2025-03-14 DIAGNOSIS — Z95.5 STATUS POST INSERTION OF DRUG-ELUTING STENT INTO LEFT ANTERIOR DESCENDING (LAD) ARTERY: ICD-10-CM

## 2025-03-14 DIAGNOSIS — I25.118 CORONARY ARTERY DISEASE INVOLVING NATIVE CORONARY ARTERY OF NATIVE HEART WITH OTHER FORM OF ANGINA PECTORIS (HCC): ICD-10-CM

## 2025-03-14 LAB
CHEST PAIN STATEMENT: NORMAL
MAX DIASTOLIC BP: 50 MMHG
MAX HR PERCENT: 53 %
MAX HR: 73 BPM
MAX PREDICTED HEART RATE: 136 BPM
PROTOCOL NAME: NORMAL
RATE PRESSURE PRODUCT: 5700
REASON FOR TERMINATION: NORMAL
SL CV REST NUCLEAR ISOTOPE DOSE: 10.2 MCI
SL CV STRESS NUCLEAR ISOTOPE DOSE: 32.1 MCI
SL CV STRESS RECOVERY BP: NORMAL MMHG
SL CV STRESS RECOVERY HR: 60 BPM
SL CV STRESS RECOVERY O2 SAT: 98 %
SPECT HRT GATED+EF W RNC IV: 41 %
STRESS ANGINA INDEX: 0
STRESS BASELINE BP: NORMAL MMHG
STRESS BASELINE HR: 51 BPM
STRESS O2 SAT REST: 94 %
STRESS PEAK HR: 57 BPM
STRESS POST ESTIMATED WORKLOAD: 1 METS
STRESS POST EXERCISE DUR MIN: 3 MIN
STRESS POST EXERCISE DUR SEC: 0 SEC
STRESS POST O2 SAT PEAK: 99 %
STRESS POST PEAK BP: 100 MMHG
STRESS POST PEAK HR: 73 BPM
STRESS POST PEAK SYSTOLIC BP: 112 MMHG
STRESS/REST PERFUSION RATIO: 0.96
TARGET HR FORMULA: NORMAL
TEST INDICATION: NORMAL

## 2025-03-14 PROCEDURE — 93016 CV STRESS TEST SUPVJ ONLY: CPT | Performed by: INTERNAL MEDICINE

## 2025-03-14 PROCEDURE — 93018 CV STRESS TEST I&R ONLY: CPT | Performed by: INTERNAL MEDICINE

## 2025-03-14 PROCEDURE — A9502 TC99M TETROFOSMIN: HCPCS

## 2025-03-14 PROCEDURE — 78452 HT MUSCLE IMAGE SPECT MULT: CPT | Performed by: INTERNAL MEDICINE

## 2025-03-14 PROCEDURE — 93017 CV STRESS TEST TRACING ONLY: CPT

## 2025-03-14 PROCEDURE — 78452 HT MUSCLE IMAGE SPECT MULT: CPT

## 2025-03-14 RX ORDER — REGADENOSON 0.08 MG/ML
0.4 INJECTION, SOLUTION INTRAVENOUS ONCE
Status: COMPLETED | OUTPATIENT
Start: 2025-03-14 | End: 2025-03-14

## 2025-03-14 RX ADMIN — REGADENOSON 0.4 MG: 0.08 INJECTION, SOLUTION INTRAVENOUS at 13:23

## 2025-03-17 DIAGNOSIS — I10 ESSENTIAL HYPERTENSION: ICD-10-CM

## 2025-03-18 LAB
CHEST PAIN STATEMENT: NORMAL
MAX DIASTOLIC BP: 50 MMHG
MAX PREDICTED HEART RATE: 136 BPM
PROTOCOL NAME: NORMAL
REASON FOR TERMINATION: NORMAL
STRESS POST EXERCISE DUR MIN: 3 MIN
STRESS POST EXERCISE DUR SEC: 0 SEC
STRESS POST PEAK HR: 73 BPM
STRESS POST PEAK SYSTOLIC BP: 112 MMHG
TARGET HR FORMULA: NORMAL
TEST INDICATION: NORMAL

## 2025-03-18 RX ORDER — LOSARTAN POTASSIUM 25 MG/1
25 TABLET ORAL DAILY
Qty: 30 TABLET | Refills: 5 | Status: SHIPPED | OUTPATIENT
Start: 2025-03-18

## 2025-03-19 DIAGNOSIS — I25.10 CORONARY ARTERY DISEASE INVOLVING NATIVE HEART, UNSPECIFIED VESSEL OR LESION TYPE, UNSPECIFIED WHETHER ANGINA PRESENT: ICD-10-CM

## 2025-03-19 PROBLEM — Z00.00 ANNUAL PHYSICAL EXAM: Status: ACTIVE | Noted: 2025-03-19

## 2025-03-19 NOTE — ASSESSMENT & PLAN NOTE
- pt counseled on risks associated with excess weight  and it's contribution to other health issues  Counseled pt on weight fluctuations also 2/2 water retention, medication compliance and chronic medical conditions   -advise portion control, healthy food choices, drinking water instead of juice/soda  - advise beginning light exercise program (i.e. Walking 30min 4-5x/wk) or consider gym membership  - advise keeping food diary to help identify problem foods/times/stressors  -consider nutrition referral  - pt advised that weight loss of ~ 1lb/wk is a good goal and not to become frustrated if loss is slower  -deferring adipex vs topimax, though adipex less cost prohibitive 2/2 age and increased side effect profile  -pt A1c 7.0, thus does qualify for GLP1+/DPP4-I however weight mostly fluctuates from water, thus risk of weight loss, age, and chronic medical conditions outweights benefits at this time  -as BMI < 35, does not qualify for bariatric surgery at this time  though would benefit from referral if refractory weight loss to conservative measures

## 2025-03-19 NOTE — PATIENT INSTRUCTIONS
"Patient Education     Routine physical for adults    DO NOT TAKE ANY WATER PILLS AFTER 3 PM, TRY TAKING LAST WATER PILL BY 2 PM    THINK ABOUT BUBBLE PACKING (MAY HAVE TO SWITCH TO [facility] IF [facility] DOES NOT DO IT--WOULD BE FOR FREE AND FREE HOME DELIVERY)   The Basics   Written by the doctors and editors at [facility]   What is a physical? -- A physical is a routine visit, or \"check-up,\" with your doctor. You might also hear it called a \"wellness visit\" or \"preventive visit.\"  During each visit, the doctor will:   Ask about your physical and mental health   Ask about your habits, behaviors, and lifestyle   Do an exam   Give you vaccines if needed   Talk to you about any medicines you take   Give advice about your health   Answer your questions  Getting regular check-ups is an important part of taking care of your health. It can help your doctor find and treat any problems you have. But it's also important for preventing health problems.  A routine physical is different from a \"sick visit.\" A sick visit is when you see a doctor because of a health concern or problem. Since physicals are scheduled ahead of time, you can think about what you want to ask the doctor.  How often should I get a physical? -- It depends on your age and health. In general, for people age 21 years and older:   If you are younger than 50 years, you might be able to get a physical every 3 years.   If you are 50 years or older, your doctor might recommend a physical every year.  If you have an ongoing health condition, like diabetes or high blood pressure, your doctor will probably want to see you more often.  What happens during a physical? -- In general, each visit will include:   Physical exam - The doctor or nurse will check your height, weight, heart rate, and blood pressure. They will also look at your eyes and ears. They will ask about how you are feeling and whether you have any symptoms that bother you.   " "Medicines - It's a good idea to bring a list of all the medicines you take to each doctor visit. Your doctor will talk to you about your medicines and answer any questions. Tell them if you are having any side effects that bother you. You should also tell them if you are having trouble paying for any of your medicines.   Habits and behaviors - This includes:   Your diet   Your exercise habits   Whether you smoke, drink alcohol, or use drugs   Whether you are sexually active   Whether you feel safe at home  Your doctor will talk to you about things you can do to improve your health and lower your risk of health problems. They will also offer help and support. For example, if you want to quit smoking, they can give you advice and might prescribe medicines. If you want to improve your diet or get more physical activity, they can help you with this, too.   Lab tests, if needed - The tests you get will depend on your age and situation. For example, your doctor might want to check your:   Cholesterol   Blood sugar   Iron level   Vaccines - The recommended vaccines will depend on your age, health, and what vaccines you already had. Vaccines are very important because they can prevent certain serious or deadly infections.   Discussion of screening - \"Screening\" means checking for diseases or other health problems before they cause symptoms. Your doctor can recommend screening based on your age, risk, and preferences. This might include tests to check for:   Cancer, such as breast, prostate, cervical, ovarian, colorectal, prostate, lung, or skin cancer   Sexually transmitted infections, such as chlamydia and gonorrhea   Mental health conditions like depression and anxiety  Your doctor will talk to you about the different types of screening tests. They can help you decide which screenings to have. They can also explain what the results might mean.   Answering questions - The physical is a good time to ask the doctor or nurse " questions about your health. If needed, they can refer you to other doctors or specialists, too.  Adults older than 65 years often need other care, too. As you get older, your doctor will talk to you about:   How to prevent falling at home   Hearing or vision tests   Memory testing   How to take your medicines safely   Making sure that you have the help and support you need at home  All topics are updated as new evidence becomes available and our peer review process is complete.  This topic retrieved from [employer] on: May 02, 2024.  Topic 813444 Version 1.0  Release: 32.4.3 - C32.122  © 2024 [employer] Inc. and/or its affiliates. All rights reserved.  Consumer Information Use and Disclaimer   Disclaimer: This generalized information is a limited summary of diagnosis, treatment, and/or medication information. It is not meant to be comprehensive and should be used as a tool to help the user understand and/or assess potential diagnostic and treatment options. It does NOT include all information about conditions, treatments, medications, side effects, or risks that may apply to a specific patient. It is not intended to be medical advice or a substitute for the medical advice, diagnosis, or treatment of a health care provider based on the health care provider's examination and assessment of a patient's specific and unique circumstances. Patients must speak with a health care provider for complete information about their health, medical questions, and treatment options, including any risks or benefits regarding use of medications. This information does not endorse any treatments or medications as safe, effective, or approved for treating a specific patient. Up[employer] Inc. and its affiliates disclaim any warranty or liability relating to this information or the use thereof.The use of this information is governed by the Terms of Use, available at https://www.[employer].com/en/know/clinical-effectiveness-terms. 2024©  [employer] and its affiliates and/or licensors. All rights reserved.  Copyright   © 2024 [employer] and/or its affiliates. All rights reserved.

## 2025-03-19 NOTE — ASSESSMENT & PLAN NOTE
Lab Results   Component Value Date    HGBA1C 7.0 (A) 03/20/2025       Currently at goal for A1c (<7%, <8% if >80)  Continue current regimen of trajenta and prandin at current doses   Not demonstrating any signs of hypogylcemia   Re-educated patient on hypoglycemic symptoms and definition (values <60)  Continue Ace-i/arb  IS on statin, continue/consider adding            repeat lipids q3yrs, due 2025  IS following optho, next eye exam due 2025   Eye appointment 3/19/2025 per pt, confirmed by chart checking   IS following podiatry, foot exam completed in office today  yearly micro albumin creatinine ratio is NOT UTD, ordered  Carb controlled diet, discussed healthy lifestyle modifications  consider DM nutrition referral  CGM ordered, which pt has been using, though ran out    Continuous Glucose Monitor (CGM) Statement of Medical Necessity:  [] Patient administers 3 or more insulin injections per day or is using an insulin pump [] Evidence of unexplained severe hypoglycemia episodes requiring external assistance for recovery   [x] Patient self checks blood glucose 4 or more times per day [x] Patient has been hospitalized or has required paramedical treatment for low blood sugar   [] Patient's insulin treatment requires frequent adjustment by the patient based off blood sugar readings [] Day-to-day variations in work schedule, mealtimes and or activity level, which confound the degree of regimentation required to self-manage glycemia with multiple insulin injections   [x] History of hypoglycemic unawareness [] Poor glycemic control as evidence by 72 hour CGMS sensing trial   [] History of severe glycemic excursions (commonly associated with brittle diabetes, extreme insulin sensitivity, and/or very low insulin requirements) [] Has displayed multiple alterations in self-monitoring and insulin regimens to optimize care; completed comprehensive diabetes education; demonstrated ability to self-monitor blood glucose levels  as recommended by a Physician; and is motivated to achieve and maintain improved glycemic control   [] Recurring episodes of severe hypoglycemia [x] Demonstrates an understanding of technology and are motivated to use the device correctly and consistently, are expected to adhere to comprehensive diabetes treatment plan and are capable of using the device to recognize alerts and alarms       Orders:    POCT hemoglobin A1c    Comprehensive metabolic panel; Future    Albumin / creatinine urine ratio; Future    CBC and differential; Future    Lipid Panel with Direct LDL reflex; Future    Ambulatory referral to chronic care management; Future    Continuous Glucose Sensor (FreeStyle Naldo 2 Sensor) MISC; USE ONE SENSOR EVERY 14 DAYS

## 2025-03-19 NOTE — ASSESSMENT & PLAN NOTE
Lab Results   Component Value Date    EGFR 31 05/31/2024    EGFR 32 10/17/2023    EGFR 34 10/10/2023    CREATININE 1.51 (H) 05/31/2024    CREATININE 1.48 (H) 10/17/2023    CREATININE 1.40 (H) 10/10/2023     Likely 2/2 HTN and or DM    Phosphorous, magnesium, vit D, PTH, and cbc pending    Consider phos binder if >4.6  Avoid contrast dye and nephrotoxic agents when possible such as NSAIDS  At increased risk contrast associated CAROL as DM, elderly, and CKD  currently IS seeing nephrology, Dr. mendoza  Currently on Ace-i/arb, continue  continue BP control as noted htn A/P  avoid NSAIDs (Ibuprofen, Motrin, Aleve, Advil, Naproxen, Celebrex, etc.) or other nephrotoxic agents  goal hgb 10  DM control, see above A/P    Orders:    Comprehensive metabolic panel; Future    Albumin / creatinine urine ratio; Future    CBC and differential; Future    Ambulatory referral to chronic care management; Future

## 2025-03-19 NOTE — ASSESSMENT & PLAN NOTE
Wt Readings from Last 3 Encounters:   03/20/25 69.9 kg (154 lb)   03/13/25 70.8 kg (156 lb)   02/27/25 70.8 kg (156 lb)     - cardiac ROS negative  -EF 42% 3/2025 with mitral, aortic and tricuspid regurg and aortic stenosis  continue diuretic, BB, and ace-i/arb   Metolazone weekly and PRN edema or weight gain >5 lbs  limit water and salt intake, counseled on 1800mL fluid restriction and <2g salt per day and their contribution to CHF symptoms  consider nutrition counseling/referral  baseline NOT on supplemental O2 to maintain sats >92%  goal: K>4, Mg >2, supplement as needed  - Daily weights        Orders:    Ambulatory referral to chronic care management; Future

## 2025-03-19 NOTE — PROGRESS NOTES
Adult Annual Physical  Name: Jennifer Amaya      : 1940      MRN: [id number]  Encounter Provider: Carmen Molina DO  Encounter Date: 3/20/2025   Encounter department: [facility]    Assessment & Plan  Type 2 diabetes mellitus with hyperglycemia, without long-term current use of insulin (HCC)    Lab Results   Component Value Date    HGBA1C 7.0 (A) 2025       Currently at goal for A1c (<7%, <8% if >80)  Continue current regimen of trajenta and prandin at current doses   Not demonstrating any signs of hypogylcemia   Re-educated patient on hypoglycemic symptoms and definition (values <60)  Continue Ace-i/arb  IS on statin, continue/consider adding            repeat lipids q3yrs, due   IS following optho, next eye exam due    Eye appointment 3/19/2025 per pt, confirmed by chart checking   IS following podiatry, foot exam completed in office today  yearly micro albumin creatinine ratio is NOT UTD, ordered  Carb controlled diet, discussed healthy lifestyle modifications  consider DM nutrition referral  CGM ordered, which pt has been using, though ran out    Continuous Glucose Monitor (CGM) Statement of Medical Necessity:  [] Patient administers 3 or more insulin injections per day or is using an insulin pump [] Evidence of unexplained severe hypoglycemia episodes requiring external assistance for recovery   [x] Patient self checks blood glucose 4 or more times per day [x] Patient has been hospitalized or has required paramedical treatment for low blood sugar   [] Patient's insulin treatment requires frequent adjustment by the patient based off blood sugar readings [] Day-to-day variations in work schedule, mealtimes and or activity level, which confound the degree of regimentation required to self-manage glycemia with multiple insulin injections   [x] History of hypoglycemic unawareness [] Poor glycemic control as evidence by 72 hour CGMS sensing trial   [] History of  severe glycemic excursions (commonly associated with brittle diabetes, extreme insulin sensitivity, and/or very low insulin requirements) [] Has displayed multiple alterations in self-monitoring and insulin regimens to optimize care; completed comprehensive diabetes education; demonstrated ability to self-monitor blood glucose levels as recommended by a Physician; and is motivated to achieve and maintain improved glycemic control   [] Recurring episodes of severe hypoglycemia [x] Demonstrates an understanding of technology and are motivated to use the device correctly and consistently, are expected to adhere to comprehensive diabetes treatment plan and are capable of using the device to recognize alerts and alarms       Orders:    POCT hemoglobin A1c    Comprehensive metabolic panel; Future    Albumin / creatinine urine ratio; Future    CBC and differential; Future    Lipid Panel with Direct LDL reflex; Future    Ambulatory referral to chronic care management; Future    Continuous Glucose Sensor (FreeStyle Naldo 2 Sensor) MISC; USE ONE SENSOR EVERY 14 DAYS    Stage 3b chronic kidney disease (HCC)  Lab Results   Component Value Date    EGFR 31 05/31/2024    EGFR 32 10/17/2023    EGFR 34 10/10/2023    CREATININE 1.51 (H) 05/31/2024    CREATININE 1.48 (H) 10/17/2023    CREATININE 1.40 (H) 10/10/2023     Likely 2/2 HTN and or DM    Phosphorous, magnesium, vit D, PTH, and cbc pending    Consider phos binder if >4.6  Avoid contrast dye and nephrotoxic agents when possible such as NSAIDS  At increased risk contrast associated CAROL as DM, elderly, and CKD  currently IS seeing nephrology, Dr. mendoza  Currently on Ace-i/arb, continue  continue BP control as noted htn A/P  avoid NSAIDs (Ibuprofen, Motrin, Aleve, Advil, Naproxen, Celebrex, etc.) or other nephrotoxic agents  goal hgb 10  DM control, see above A/P    Orders:    Comprehensive metabolic panel; Future    Albumin / creatinine urine ratio; Future    CBC and  differential; Future    Ambulatory referral to chronic care management; Future    Annual physical exam       BMI 28.0-28.9,adult  - pt counseled on risks associated with excess weight  and it's contribution to other health issues  Counseled pt on weight fluctuations also 2/2 water retention, medication compliance and chronic medical conditions   -advise portion control, healthy food choices, drinking water instead of juice/soda  - advise beginning light exercise program (i.e. Walking 30min 4-5x/wk) or consider gym membership  - advise keeping food diary to help identify problem foods/times/stressors  -consider nutrition referral  - pt advised that weight loss of ~ 1lb/wk is a good goal and not to become frustrated if loss is slower  -deferring adipex vs topimax, though adipex less cost prohibitive 2/2 age and increased side effect profile  -pt A1c 7.0, thus does qualify for GLP1+/DPP4-I however weight mostly fluctuates from water, thus risk of weight loss, age, and chronic medical conditions outweights benefits at this time  -as BMI < 35, does not qualify for bariatric surgery at this time  though would benefit from referral if refractory weight loss to conservative measures           Combined systolic and diastolic congestive heart failure, unspecified HF chronicity (HCC)  Wt Readings from Last 3 Encounters:   03/20/25 69.9 kg (154 lb)   03/13/25 70.8 kg (156 lb)   02/27/25 70.8 kg (156 lb)     - cardiac ROS negative  -EF 42% 3/2025 with mitral, aortic and tricuspid regurg and aortic stenosis  continue diuretic, BB, and ace-i/arb   Metolazone weekly and PRN edema or weight gain >5 lbs  limit water and salt intake, counseled on 1800mL fluid restriction and <2g salt per day and their contribution to CHF symptoms  consider nutrition counseling/referral  baseline NOT on supplemental O2 to maintain sats >92%  goal: K>4, Mg >2, supplement as needed  - Daily weights        Orders:    Ambulatory referral to chronic care  management; Future    Benign hypertension with CKD (chronic kidney disease) stage III  Lab Results   Component Value Date    EGFR 31 05/31/2024    EGFR 32 10/17/2023    EGFR 34 10/10/2023    CREATININE 1.51 (H) 05/31/2024    CREATININE 1.48 (H) 10/17/2023    CREATININE 1.40 (H) 10/10/2023     renal ultrasound in 2023 shows normal-sized kidneys, normal echogenicity, no hydro or stones.    -UA in October 2023 shows no hematuria or proteinuria.     continue current regimen:  losartan 25 mg daily, Coreg, torsemide  continue smoking cessation/abstinence  reccommended low salt diet (<2g per day)  counseled on continuing healthy lifestyle modifications  Reviewed jan 2025 kidney U/S with patient and answered all questions for satisfaction         Uncontrolled type 2 diabetes mellitus with hyperglycemia (HCC)    Lab Results   Component Value Date    HGBA1C 7.0 (A) 03/20/2025       Orders:    Continuous Glucose  (FreeStyle Naldo 14 Day [place]) JAQUELIN; Use to record blood glucose 4 times daily. Once fasting and three times daily before meals    NSTEMI (non-ST elevated myocardial infarction) (HCC)    Orders:    ranolazine (RANEXA) 500 mg 12 hr tablet; Take 1 tablet (500 mg total) by mouth every 12 (twelve) hours    Encounter for osteoporosis screening in asymptomatic postmenopausal patient    Orders:    DXA bone density spine hip and pelvis; Future      Preventive Screenings:  - Diabetes Screening: has diabetes, screening up-to-date, risks/benefits discussed and orders placed  - Cholesterol Screening: has hyperlipidemia and orders placed   - Hepatitis C screening: screening up-to-date   - HIV screening: screening up-to-date   - Cervical cancer screening: screening not indicated   - Breast cancer screening: screening up-to-date   - Colon cancer screening: screening up-to-date   - Lung cancer screening: screening not indicated   - Prostate cancer screening: screening not indicated   - Osteoporosis screening: orders placed  and risks/benefits discussed     Immunizations:  - Immunizations due: Zoster (Shingrix)    Counseling/Anticipatory Guidance:  - Alcohol: discussed moderation in alcohol intake and recommendations for healthy alcohol use.   - Drug use: discussed harms of illicit drug use and how it can negatively impact mental/physical health.   - Tobacco use: discussed harms of tobacco use and management options for quitting.   - Dental health: discussed importance of regular tooth brushing, flossing, and dental visits.   - Sexual health: discussed sexually transmitted diseases, partner selection, use of condoms, avoidance of unintended pregnancy, and contraceptive alternatives.   - Diet: discussed recommendations for a healthy/well-balanced diet.   - Exercise: the importance of regular exercise/physical activity was discussed. Recommend exercise 3-5 times per week for at least 30 minutes.   - Injury prevention: discussed safety/seat belts, safety helmets, smoke detectors, carbon monoxide detectors, and smoking near bedding or upholstery.     Patient would like to start bubble packing and home delivery  Personally spoke with Pritesh at pharmacy, home delivery can be arranged; no bubble packing available, but does make trays for patietns along with home deliveries of said weekly trays   Pritesh to follow up with pt and son for home delivery of trays vs bottles for ease of use and continued compliance    Reviewed need for dexa and labs     BMI Counseling: Body mass index is 26.43 kg/m². The BMI is above normal. Nutrition recommendations include decreasing portion sizes, encouraging healthy choices of fruits and vegetables, consuming healthier snacks, limiting drinks that contain sugar, moderation in carbohydrate intake, increasing intake of lean protein and reducing intake of cholesterol. Exercise recommendations include exercising 3-5 times per week. No pharmacotherapy was ordered. Rationale for BMI follow-up plan is due to patient being  overweight or obese.     Depression Screening and Follow-up Plan: Patient was screened for depression during today's encounter. They screened negative with a PHQ-2 score of 0.          History of Present Illness     Adult Annual Physical:  Patient presents for annual physical. 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 No blood when wiping or after straining    Using cane at all times along with closed toed/ supportive footwear    No longer drives.     Diet and Physical Activity:  - Diet/Nutrition: no special diet.  - Exercise: no formal exercise.    Depression Screening:  - PHQ-2 Score: 0    General Health:  - Sleep: sleeps poorly and 7-8 hours of sleep on average. wakes up frequently to urinate  - Hearing: decreased hearing bilateral ears.  - Vision: most recent eye exam < 1 year ago, no vision problems and wears glasses.  - Dental: brushes teeth twice daily and no dental visits for > 1 year.    /GYN Health:    - Menopause: postmenopausal.   - History of STDs: no    Review of Systems   Constitutional:  Negative for chills, fatigue and fever.   Eyes:  Negative for visual disturbance.   Respiratory:  Negative for cough and chest tightness.    Cardiovascular:  Negative for chest pain and palpitations.   Gastrointestinal:  Negative for abdominal pain, constipation, diarrhea, nausea and vomiting.   Musculoskeletal:  Negative for arthralgias and back pain.   Allergic/Immunologic: Negative for environmental allergies.   Neurological:  Negative for dizziness, syncope and headaches.   Psychiatric/Behavioral:  Negative for dysphoric mood and sleep disturbance. The patient is not nervous/anxious.      Current Outpatient Medications on File Prior to Visit   Medication  Sig Dispense Refill    acetaminophen (TYLENOL) 650 mg CR tablet Take 1 tablet (650 mg total) by mouth every 6 (six) hours as needed for mild pain 90 tablet 3    albuterol (PROVENTIL HFA,VENTOLIN HFA) 90 mcg/act inhaler INHALE 2 PUFFS EVERY 4 (FOUR) HOURS AS NEEDED FOR WHEEZING 8.5 g 5    atorvastatin (LIPITOR) 40 mg tablet Take 1 tablet (40 mg total) by mouth daily 90 tablet 3    Breo Ellipta 100-25 MCG/ACT inhaler INHALE 1 PUFF DAILY RINSE MOUTH AFTER USE. 60 each 5    calcitriol (ROCALTROL) 0.25 mcg capsule TAKE 1 CAPSULE (0.25 MCG TOTAL) BY MOUTH 3 (THREE) TIMES A WEEK 15 capsule 3    carvedilol (COREG) 6.25 mg tablet Take 1 tablet (6.25 mg total) by mouth 2 (two) times a day with meals 60 tablet 5    Cholecalciferol (D3) 50 MCG (2000 UT) TABS Take 2 tablets (4,000 Units total) by mouth daily 90 tablet 3    clopidogrel (PLAVIX) 75 mg tablet Take 1 tablet (75 mg total) by mouth daily 180 tablet 3    ferrous sulfate 325 (65 Fe) mg tablet TAKE 1 TABLET TWICE A DAY BEFORE MEALS 180 tablet 0    Glucose-Vitamin C-Vitamin D (TRUEPLUS GLUCOSE ON THE GO) CHEW       latanoprost (XALATAN) 0.005 % ophthalmic solution       losartan (COZAAR) 25 mg tablet TAKE 1 TABLET (25 MG TOTAL) BY MOUTH DAILY 30 tablet 5    metolazone (ZAROXOLYN) 5 mg tablet Take 1 tablet weekly and take extra if has weight gain >5 lbs in 3 days or worsening leg swelling. 12 tablet 3    Misc. Devices (QUAD CANE) MISC by Does not apply route daily 1 each 0    montelukast (SINGULAIR) 10 mg tablet Take 1 tablet (10 mg total) by mouth daily at bedtime 30 tablet 5    nitroglycerin (NITROSTAT) 0.4 mg SL tablet PLACE 1 TABLET (0.4 MG TOTAL) UNDER THE TONGUE EVERY 5 (FIVE) MINUTES AS NEEDED FOR CHEST PAIN 25 tablet 1    Oral Hygiene Products (Extended Term Oral Care System) KIT Apply to the mouth or throat 2 (two) times a day 1 kit 1    potassium chloride (Klor-Con M20) 20 mEq tablet TAKE 1 TABLET (20 HASMUKH EQUIVALENT TOTAL) BY MOUTH DAILY 30 tablet 4     "repaglinide (PRANDIN) 0.5 mg tablet Take 1 tablet (0.5 mg total) by mouth 2 (two) times a day before meals (SKIP DOSE IF SKIPPING MEAL) 180 tablet 1    SODIUM FLUORIDE, DENTAL RINSE, 0.02 % SOLN Apply 15 mL to the mouth or throat 2 (two) times a day 532 mL 1    torsemide (DEMADEX) 20 mg tablet TAKE 3 TABLETS (60 MG TOTAL) BY MOUTH DAILY 270 tablet 1    Tradjenta 5 MG TABS TAKE 1 TABLET (5 MG) BY MOUTH IN THE MORNING 30 tablet 2    Xarelto 2.5 MG tablet TAKE 1 TABLET (2.5 MG TOTAL) BY MOUTH 2 (TWO) TIMES A DAY WITH MEALS 180 tablet 1    [DISCONTINUED] Continuous Glucose  (FreeStyle Naldo 14 Day [place]) JAQUELIN Use to record blood glucose 4 times daily. Once fasting and three times daily before meals 6 each 11    [DISCONTINUED] Continuous Glucose Sensor (FreeStyle Naldo 2 Sensor) MISC USE ONE SENSOR EVERY 14 DAYS 6 each 11    [DISCONTINUED] glipiZIDE POWD       [DISCONTINUED] Metformin HCl POWD  (Patient not taking: Reported on 2025)      [DISCONTINUED] ranolazine (RANEXA) 500 mg 12 hr tablet Take 1 tablet (500 mg total) by mouth every 12 (twelve) hours 60 tablet 0     No current facility-administered medications on file prior to visit.      Social History     Tobacco Use    Smoking status: Former     Current packs/day: 0.00     Types: Cigarettes     Quit date:      Years since quittin.2    Smokeless tobacco: Former    Tobacco comments:     quit over 30 yrs ago; (quit in the , had smoked 3PPD for 20 years - per [employer])   Vaping Use    Vaping status: Never Used   Substance and Sexual Activity    Alcohol use: Never    Drug use: Never    Sexual activity: Not Currently       Objective   /65 (BP Location: Right arm, Patient Position: Sitting, Cuff Size: Large)   Pulse (!) 53   Temp 97.7 °F (36.5 °C) (Temporal)   Ht 5' 4\" (1.626 m)   Wt 69.9 kg (154 lb)   SpO2 94%   BMI 26.43 kg/m²     Physical Exam  Constitutional:       General: She is not in acute distress.     Appearance: She is " well-developed. She is ill-appearing (chronic).   HENT:      Head: Normocephalic and atraumatic.   Eyes:      General: No scleral icterus.     Conjunctiva/sclera: Conjunctivae normal.   Neck:      Thyroid: No thyromegaly.      Comments: Mild supraclavicular JVD at 90*  Cardiovascular:      Rate and Rhythm: Normal rate and regular rhythm.      Pulses: Pulses are weak.           Dorsalis pedis pulses are 1+ on the right side and 1+ on the left side.      Heart sounds: Murmur heard.      No friction rub. No gallop.   Pulmonary:      Effort: Pulmonary effort is normal. No respiratory distress.      Breath sounds: No stridor. No wheezing, rhonchi or rales (b/l LL).   Chest:      Chest wall: No tenderness.   Abdominal:      General: Bowel sounds are normal. There is no distension.      Palpations: Abdomen is soft. There is no mass.      Tenderness: There is no abdominal tenderness. There is no guarding or rebound.   Musculoskeletal:         General: No deformity.      Cervical back: Neck supple.      Right lower leg: No edema.      Left lower leg: No edema.        Feet:    Feet:      Right foot:      Skin integrity: No ulcer, skin breakdown, erythema, warmth, callus or dry skin.      Left foot:      Skin integrity: No ulcer, skin breakdown, erythema, warmth, callus or dry skin.   Skin:     General: Skin is warm.      Capillary Refill: Capillary refill takes less than 2 seconds.      Findings: No erythema or rash.   Neurological:      Mental Status: She is alert and oriented to person, place, and time.   Psychiatric:         Mood and Affect: Mood normal.         Behavior: Behavior normal.         Thought Content: Thought content normal.         Judgment: Judgment normal.      Comments: Mild confusion and mild difficulty word finding         Diabetic Foot Exam    Patient's shoes and socks removed.    Right Foot/Ankle   Right Foot Inspection  Skin Exam: skin normal and skin intact. No dry skin, no warmth, no callus, no  erythema, no maceration, no abnormal color, no pre-ulcer, no ulcer and no callus.     Toe Exam: ROM and strength within normal limits.     Sensory   Monofilament testing: diminished    Vascular  Capillary refills: < 3 seconds  The right DP pulse is 1+.     Left Foot/Ankle  Left Foot Inspection  Skin Exam: skin normal and skin intact. No dry skin, no warmth, no erythema, no maceration, normal color, no pre-ulcer, no ulcer and no callus.     Toe Exam: ROM and strength within normal limits.     Sensory   Monofilament testing: diminished    Vascular  Capillary refills: < 3 seconds  The left DP pulse is 1+.     Assign Risk Category  No deformity present  Loss of protective sensation  Weak pulses  Risk: 2          Administrative Statements   I have spent a total time of 30 minutes in caring for this patient on the day of the visit/encounter including Prognosis, Risks and benefits of tx options, Instructions for management, Patient and family education, Importance of tx compliance, Risk factor reductions, Impressions, Counseling / Coordination of care, Documenting in the medical record, Reviewing/placing orders in the medical record (including tests, medications, and/or procedures), Obtaining or reviewing history  , and Communicating with other healthcare professionals .

## 2025-03-19 NOTE — ASSESSMENT & PLAN NOTE
Lab Results   Component Value Date    EGFR 31 05/31/2024    EGFR 32 10/17/2023    EGFR 34 10/10/2023    CREATININE 1.51 (H) 05/31/2024    CREATININE 1.48 (H) 10/17/2023    CREATININE 1.40 (H) 10/10/2023     renal ultrasound in 2023 shows normal-sized kidneys, normal echogenicity, no hydro or stones.    -UA in October 2023 shows no hematuria or proteinuria.     continue current regimen:  losartan 25 mg daily, Coreg, torsemide  continue smoking cessation/abstinence  reccommended low salt diet (<2g per day)  counseled on continuing healthy lifestyle modifications  Reviewed jan 2025 kidney U/S with patient and answered all questions for satisfaction

## 2025-03-20 ENCOUNTER — OFFICE VISIT (OUTPATIENT)
Dept: INTERNAL MEDICINE CLINIC | Facility: CLINIC | Age: 85
End: 2025-03-20

## 2025-03-20 VITALS
SYSTOLIC BLOOD PRESSURE: 115 MMHG | WEIGHT: 154 LBS | HEART RATE: 53 BPM | HEIGHT: 64 IN | TEMPERATURE: 97.7 F | DIASTOLIC BLOOD PRESSURE: 65 MMHG | BODY MASS INDEX: 26.29 KG/M2 | OXYGEN SATURATION: 94 %

## 2025-03-20 DIAGNOSIS — I21.4 NSTEMI (NON-ST ELEVATED MYOCARDIAL INFARCTION) (HCC): ICD-10-CM

## 2025-03-20 DIAGNOSIS — N18.30 BENIGN HYPERTENSION WITH CKD (CHRONIC KIDNEY DISEASE) STAGE III (HCC): ICD-10-CM

## 2025-03-20 DIAGNOSIS — I50.40 COMBINED SYSTOLIC AND DIASTOLIC CONGESTIVE HEART FAILURE, UNSPECIFIED HF CHRONICITY (HCC): ICD-10-CM

## 2025-03-20 DIAGNOSIS — E11.65 UNCONTROLLED TYPE 2 DIABETES MELLITUS WITH HYPERGLYCEMIA (HCC): ICD-10-CM

## 2025-03-20 DIAGNOSIS — Z78.0 ENCOUNTER FOR OSTEOPOROSIS SCREENING IN ASYMPTOMATIC POSTMENOPAUSAL PATIENT: ICD-10-CM

## 2025-03-20 DIAGNOSIS — Z00.00 ANNUAL PHYSICAL EXAM: Primary | ICD-10-CM

## 2025-03-20 DIAGNOSIS — Z13.820 ENCOUNTER FOR OSTEOPOROSIS SCREENING IN ASYMPTOMATIC POSTMENOPAUSAL PATIENT: ICD-10-CM

## 2025-03-20 DIAGNOSIS — I12.9 BENIGN HYPERTENSION WITH CKD (CHRONIC KIDNEY DISEASE) STAGE III (HCC): ICD-10-CM

## 2025-03-20 DIAGNOSIS — N18.32 STAGE 3B CHRONIC KIDNEY DISEASE (HCC): ICD-10-CM

## 2025-03-20 DIAGNOSIS — E11.65 TYPE 2 DIABETES MELLITUS WITH HYPERGLYCEMIA, WITHOUT LONG-TERM CURRENT USE OF INSULIN (HCC): ICD-10-CM

## 2025-03-20 LAB — SL AMB POCT HEMOGLOBIN AIC: 7 (ref ?–6.5)

## 2025-03-20 PROCEDURE — 83036 HEMOGLOBIN GLYCOSYLATED A1C: CPT | Performed by: STUDENT IN AN ORGANIZED HEALTH CARE EDUCATION/TRAINING PROGRAM

## 2025-03-20 PROCEDURE — 99397 PER PM REEVAL EST PAT 65+ YR: CPT | Performed by: STUDENT IN AN ORGANIZED HEALTH CARE EDUCATION/TRAINING PROGRAM

## 2025-03-20 RX ORDER — TORSEMIDE 20 MG/1
60 TABLET ORAL DAILY
Qty: 270 TABLET | Refills: 1 | Status: SHIPPED | OUTPATIENT
Start: 2025-03-20

## 2025-03-20 RX ORDER — RANOLAZINE 500 MG/1
500 TABLET, EXTENDED RELEASE ORAL EVERY 12 HOURS SCHEDULED
Qty: 60 TABLET | Refills: 0 | Status: SHIPPED | OUTPATIENT
Start: 2025-03-20 | End: 2025-04-19

## 2025-03-20 RX ORDER — FLASH GLUCOSE SCANNING READER
EACH MISCELLANEOUS
Qty: 6 EACH | Refills: 11 | Status: SHIPPED | OUTPATIENT
Start: 2025-03-20

## 2025-03-20 NOTE — ASSESSMENT & PLAN NOTE
Orders:    ranolazine (RANEXA) 500 mg 12 hr tablet; Take 1 tablet (500 mg total) by mouth every 12 (twelve) hours

## 2025-03-20 NOTE — ASSESSMENT & PLAN NOTE
Lab Results   Component Value Date    HGBA1C 7.0 (A) 03/20/2025       Orders:    Continuous Glucose  (FreeStyle Naldo 14 Day [place]) JAQUELIN; Use to record blood glucose 4 times daily. Once fasting and three times daily before meals

## 2025-03-21 ENCOUNTER — PATIENT OUTREACH (OUTPATIENT)
Dept: FAMILY MEDICINE CLINIC | Facility: CLINIC | Age: 85
End: 2025-03-21

## 2025-03-21 NOTE — PROGRESS NOTES
Patient identified for CCM billing.    I called the patient but received voicemail.  Message was left asking for a return call.  I will continue further outreach.    Chart reviewed.

## 2025-03-25 ENCOUNTER — PATIENT OUTREACH (OUTPATIENT)
Dept: FAMILY MEDICINE CLINIC | Facility: CLINIC | Age: 85
End: 2025-03-25

## 2025-03-25 NOTE — PROGRESS NOTES
I called the patient, introduced my role and the CCM program which she declines.  I asked her to call back if she changes her mind.       Chronic Care Management Program Consent:    Patient informed of availability of Chronic Care Management services. The services will billed monthly by their Primary Care Provider only. Patient is informed there may be a monthly cost sharing associated with the Chronic Care Management services. Patient is aware that financial counseling is available to assist with any co-pay questions or concerns.    Chronic Care Management services include:    24/7 access to care.  Comprehensive plan of care created by the provider.  Individualized care planning by the care manager(s).  Transitional care support.    The patient is informed that they have the right to stop Chronic Care Management services at anytime.       Patient consents to Chronic Care Management services? no      Patient informed that consent is needed only once unless the patient switches qualifying providers.

## 2025-04-04 DIAGNOSIS — D50.9 IRON DEFICIENCY ANEMIA, UNSPECIFIED IRON DEFICIENCY ANEMIA TYPE: ICD-10-CM

## 2025-04-04 RX ORDER — FERROUS SULFATE 325(65) MG
1 TABLET ORAL
Qty: 180 TABLET | Refills: 3 | Status: SHIPPED | OUTPATIENT
Start: 2025-04-04

## 2025-04-04 NOTE — TELEPHONE ENCOUNTER
Received call from patient requesting medication refills. Introduced self and role. Patient stated she does need refill of her ferrous sulfate. She believes that she has all her other medications.     Patient aware that she has to contact Dr. Gonzalez for a refill of her eye drops.     Patient stated [facility] is getting her medications ready for her. They are going to provide a type of bubble pack. Her son is going to pharmacy to review with pharmacist.     Patient updated if she needs any other refills to contact office and spell out name of medication. Not numbers. Patient expressed understanding on the phone. All questions/concerns answered at this time.     Script for ferrous sulfate sent to pharmacy for refill.

## 2025-04-17 DIAGNOSIS — I21.4 NSTEMI (NON-ST ELEVATED MYOCARDIAL INFARCTION) (HCC): ICD-10-CM

## 2025-04-17 RX ORDER — RANOLAZINE 500 MG/1
500 TABLET, EXTENDED RELEASE ORAL 2 TIMES DAILY
Qty: 60 TABLET | Refills: 0 | Status: SHIPPED | OUTPATIENT
Start: 2025-04-17

## 2025-05-05 ENCOUNTER — OFFICE VISIT (OUTPATIENT)
Dept: PODIATRY | Facility: CLINIC | Age: 85
End: 2025-05-05
Payer: MEDICARE

## 2025-05-05 VITALS — BODY MASS INDEX: 26.29 KG/M2 | HEIGHT: 64 IN | WEIGHT: 154 LBS

## 2025-05-05 DIAGNOSIS — E11.40 TYPE 2 DIABETES MELLITUS WITH DIABETIC NEUROPATHY, UNSPECIFIED WHETHER LONG TERM INSULIN USE (HCC): Primary | ICD-10-CM

## 2025-05-05 DIAGNOSIS — B35.1 ONYCHOMYCOSIS: ICD-10-CM

## 2025-05-05 DIAGNOSIS — I73.9 PERIPHERAL VASCULAR DISEASE, UNSPECIFIED (HCC): ICD-10-CM

## 2025-05-05 PROCEDURE — 11721 DEBRIDE NAIL 6 OR MORE: CPT | Performed by: PODIATRIST

## 2025-05-05 NOTE — PROGRESS NOTES
PATIENT:  Jennifer Amaya  1940    ASSESSMENT/PLAN:  1. Type 2 diabetes mellitus with diabetic neuropathy, unspecified whether long term insulin use (HCC)        2. Peripheral vascular disease, unspecified (HCC)        3. Onychomycosis               Reviewed medical records.  Disease prevention and related risk factors of diabetes were identified and discussed.  The patient was educated in proper foot wear for diabetics. Educated in daily foot assessment and routine diabetic foot care. The patient will follow up in 3 months for diabetic foot exam.       PROCEDURE:   All mycotic toenails were reduced and debrided in length, width, and girth using a nail nipper and dremel.  Patient tolerated procedure(s) well without complications.      HPI:  Jennifer Amaya is a 84 y.o.year old female seen for diabetic foot exam.  BS is under control.   No acute pedal problems.       PAST MEDICAL HISTORY:  Past Medical History:   Diagnosis Date    ACE-inhibitor cough     last assessed - 29Dec2017    Asthma     Bilateral edema of lower extremity     last assessed - 21Aug2017    Cardiac murmur     last assessed - 21Aug2017    CKD (chronic kidney disease)     Diabetes mellitus (HCC)     last assessed - 29Nov2017    Esophageal dysphagia     Fatigue     last assessed - 21Aug2017    Hyperlipidemia     Hypertension     Patellar bursitis of right knee     last assessed - 04Nov2016    Personal history of other specified conditions     presenting hazards to health    Stroke ([facility])     Stroke syndrome     Urinary frequency     UTI (urinary tract infection)        PAST SURGICAL HISTORY:  Past Surgical History:   Procedure Laterality Date    CARDIAC CATHETERIZATION N/A 6/1/2023    Procedure: Cardiac Coronary Angiogram;  Surgeon: Roe German MD;  Location: [facility];  Service: Cardiology    CARDIAC CATHETERIZATION  6/1/2023    Procedure: Cardiac Left Heart Cath;  Surgeon: Roe German MD;  Location: [facility];  Service:  Cardiology    ME ESOPHAGOGASTRODUODENOSCOPY TRANSORAL DIAGNOSTIC N/A 4/5/2017    Procedure: ESOPHAGOGASTRODUODENOSCOPY (EGD);  Surgeon: Cedric Huang MD;  Location: [facility];  Service: Gastroenterology        ALLERGIES:  Patient has no known allergies.    MEDICATIONS:  Current Outpatient Medications   Medication Sig Dispense Refill    acetaminophen (TYLENOL) 650 mg CR tablet Take 1 tablet (650 mg total) by mouth every 6 (six) hours as needed for mild pain 90 tablet 3    albuterol (PROVENTIL HFA,VENTOLIN HFA) 90 mcg/act inhaler INHALE 2 PUFFS EVERY 4 (FOUR) HOURS AS NEEDED FOR WHEEZING 8.5 g 5    atorvastatin (LIPITOR) 40 mg tablet Take 1 tablet (40 mg total) by mouth daily 90 tablet 3    Breo Ellipta 100-25 MCG/ACT inhaler INHALE 1 PUFF DAILY RINSE MOUTH AFTER USE. 60 each 5    calcitriol (ROCALTROL) 0.25 mcg capsule TAKE 1 CAPSULE (0.25 MCG TOTAL) BY MOUTH 3 (THREE) TIMES A WEEK 15 capsule 3    carvedilol (COREG) 6.25 mg tablet Take 1 tablet (6.25 mg total) by mouth 2 (two) times a day with meals 60 tablet 5    Cholecalciferol (D3) 50 MCG (2000 UT) TABS Take 2 tablets (4,000 Units total) by mouth daily 90 tablet 3    clopidogrel (PLAVIX) 75 mg tablet Take 1 tablet (75 mg total) by mouth daily 180 tablet 3    Continuous Glucose  (FreeStyle Naldo 14 Day [place]) JAQUELIN Use to record blood glucose 4 times daily. Once fasting and three times daily before meals 6 each 11    Continuous Glucose Sensor (FreeStyle Naldo 2 Sensor) MISC USE ONE SENSOR EVERY 14 DAYS 6 each 11    ferrous sulfate 325 (65 Fe) mg tablet Take 1 tablet (325 mg total) by mouth 2 (two) times a day before meals 180 tablet 3    Glucose-Vitamin C-Vitamin D (TRUEPLUS GLUCOSE ON THE GO) CHEW       latanoprost (XALATAN) 0.005 % ophthalmic solution       losartan (COZAAR) 25 mg tablet TAKE 1 TABLET (25 MG TOTAL) BY MOUTH DAILY 30 tablet 5    metolazone (ZAROXOLYN) 5 mg tablet Take 1 tablet weekly and take extra if has weight gain >5 lbs in 3 days or  worsening leg swelling. 12 tablet 3    Misc. Devices (QUAD CANE) MISC by Does not apply route daily 1 each 0    montelukast (SINGULAIR) 10 mg tablet Take 1 tablet (10 mg total) by mouth daily at bedtime 30 tablet 5    nitroglycerin (NITROSTAT) 0.4 mg SL tablet PLACE 1 TABLET (0.4 MG TOTAL) UNDER THE TONGUE EVERY 5 (FIVE) MINUTES AS NEEDED FOR CHEST PAIN 25 tablet 1    Oral Hygiene Products (Extended Term Oral Care System) KIT Apply to the mouth or throat 2 (two) times a day 1 kit 1    potassium chloride (Klor-Con M20) 20 mEq tablet TAKE 1 TABLET (20 HASMUKH EQUIVALENT TOTAL) BY MOUTH DAILY 30 tablet 4    ranolazine (RANEXA) 500 mg 12 hr tablet TAKE 1 TABLET (500 MG TOTAL) BY MOUTH EVERY 12 (TWELVE) HOURS 60 tablet 0    repaglinide (PRANDIN) 0.5 mg tablet Take 1 tablet (0.5 mg total) by mouth 2 (two) times a day before meals (SKIP DOSE IF SKIPPING MEAL) 180 tablet 1    SODIUM FLUORIDE, DENTAL RINSE, 0.02 % SOLN Apply 15 mL to the mouth or throat 2 (two) times a day 532 mL 1    torsemide (DEMADEX) 20 mg tablet TAKE 3 TABLETS (60 MG TOTAL) BY MOUTH DAILY 270 tablet 1    Tradjenta 5 MG TABS TAKE 1 TABLET (5 MG) BY MOUTH IN THE MORNING 30 tablet 2    Xarelto 2.5 MG tablet TAKE 1 TABLET (2.5 MG TOTAL) BY MOUTH 2 (TWO) TIMES A DAY WITH MEALS 180 tablet 1     No current facility-administered medications for this visit.       SOCIAL HISTORY:  Social History     Socioeconomic History    Marital status:      Spouse name: None    Number of children: 8    Years of education: None    Highest education level: None   Occupational History    Occupation: Retired   Tobacco Use    Smoking status: Former     Current packs/day: 0.00     Types: Cigarettes     Quit date:      Years since quittin.3    Smokeless tobacco: Former    Tobacco comments:     quit over 30 yrs ago; (quit in the , had smoked 3PPD for 20 years - per [employer])   Vaping Use    Vaping status: Never Used   Substance and Sexual Activity    Alcohol  use: Never    Drug use: Never    Sexual activity: Not Currently   Other Topics Concern    None   Social History Narrative    Diet needs improvement    Does not exercise    No caffeine use     Social Drivers of Health     Financial Resource Strain: Low Risk  (3/20/2025)    Overall Financial Resource Strain (CARDIA)     Difficulty of Paying Living Expenses: Not hard at all   Food Insecurity: No Food Insecurity (3/20/2025)    Hunger Vital Sign     Worried About Running Out of Food in the Last Year: Never true     Ran Out of Food in the Last Year: Never true   Transportation Needs: No Transportation Needs (3/20/2025)    PRAPARE - Transportation     Lack of Transportation (Medical): No     Lack of Transportation (Non-Medical): No   Physical Activity: Inactive (4/1/2021)    Exercise Vital Sign     Days of Exercise per Week: 0 days     Minutes of Exercise per Session: 0 min   Stress: No Stress Concern Present (4/1/2021)    [employer] [place] [employer] - Occupational Stress Questionnaire     Feeling of Stress : Not at all   Social Connections: Socially Isolated (4/1/2021)    Social Connection and Isolation Panel [NHANES]     Frequency of Communication with Friends and Family: Once a week     Frequency of Social Gatherings with Friends and Family: Once a week     Attends Christian Services: 1 to 4 times per year     Active Member of Clubs or Organizations: No     Attends Club or Organization Meetings: Never     Marital Status:    Intimate Partner Violence: Not At Risk (4/1/2021)    Humiliation, Afraid, Rape, and Kick questionnaire     Fear of Current or Ex-Partner: No     Emotionally Abused: No     Physically Abused: No     Sexually Abused: No   Housing Stability: Low Risk  (3/20/2025)    Housing Stability Vital Sign     Unable to Pay for Housing in the Last Year: No     Number of Times Moved in the Last Year: 1     Homeless in the Last Year: No        REVIEW OF SYSTEMS:  GENERAL: NAD, afebrile  HEART: No  chest pain, or palpitation  RESPIRATORY:  No acute SOB or cough  GI: No Nausea, vomit or diarrhea  NEUROLOGIC: No syncope or acute weakness    PHYSICAL EXAM:  VASCULAR EXAM  Dorsalis pedis  Absent.  Posterior tibial artery  absent.  The patient has class findings with skin atrophy, lack of digital hair, and nail dystrophy.  There is +1 lower extremity edema bilaterally.      NEUROLOGIC EXAM  Sensation is intact to light touch.  Sensation is intact to 10gm monofilament.  Decreased vibratory sensation on her feet.  No focal neurologic deficit.          DERMATOLOGIC EXAM:   No ulcer or cellulitis noted.  No abscess.  The patient has hypertrophic toenails X 7 with discoloration, onycholysis, and subungal debris.  No notable skin lesion.      MUSCULOSKELETAL EXAM:   No acute joint pain.  Diffuse ankle edema noted.  No acute musculoskeletal problem.  Hammertoe noted.

## 2025-05-17 DIAGNOSIS — I21.4 NSTEMI (NON-ST ELEVATED MYOCARDIAL INFARCTION) (HCC): ICD-10-CM

## 2025-05-19 RX ORDER — RANOLAZINE 500 MG/1
500 TABLET, EXTENDED RELEASE ORAL 2 TIMES DAILY
Qty: 60 TABLET | Refills: 3 | Status: SHIPPED | OUTPATIENT
Start: 2025-05-19

## 2025-05-27 DIAGNOSIS — E11.40 CONTROLLED TYPE 2 DIABETES MELLITUS WITH DIABETIC NEUROPATHY, WITHOUT LONG-TERM CURRENT USE OF INSULIN (HCC): ICD-10-CM

## 2025-05-28 RX ORDER — LINAGLIPTIN 5 MG/1
TABLET, FILM COATED ORAL
Qty: 30 TABLET | Refills: 2 | Status: SHIPPED | OUTPATIENT
Start: 2025-05-28

## 2025-06-05 DIAGNOSIS — N18.30 BENIGN HYPERTENSION WITH CKD (CHRONIC KIDNEY DISEASE) STAGE III (HCC): ICD-10-CM

## 2025-06-05 DIAGNOSIS — I12.9 BENIGN HYPERTENSION WITH CKD (CHRONIC KIDNEY DISEASE) STAGE III (HCC): ICD-10-CM

## 2025-06-05 DIAGNOSIS — J30.1 NON-SEASONAL ALLERGIC RHINITIS DUE TO POLLEN: ICD-10-CM

## 2025-06-05 DIAGNOSIS — E11.40 CONTROLLED TYPE 2 DIABETES MELLITUS WITH DIABETIC NEUROPATHY, WITHOUT LONG-TERM CURRENT USE OF INSULIN (HCC): ICD-10-CM

## 2025-06-05 RX ORDER — CARVEDILOL 6.25 MG/1
6.25 TABLET ORAL 2 TIMES DAILY WITH MEALS
Qty: 180 TABLET | Refills: 3 | Status: SHIPPED | OUTPATIENT
Start: 2025-06-05 | End: 2025-12-02

## 2025-06-05 RX ORDER — REPAGLINIDE 0.5 MG/1
0.5 TABLET ORAL
Qty: 180 TABLET | Refills: 3 | Status: SHIPPED | OUTPATIENT
Start: 2025-06-05

## 2025-06-05 RX ORDER — MONTELUKAST SODIUM 10 MG/1
10 TABLET ORAL
Qty: 90 TABLET | Refills: 3 | Status: SHIPPED | OUTPATIENT
Start: 2025-06-05 | End: 2025-12-02

## 2025-06-05 RX ORDER — ALBUTEROL SULFATE 90 UG/1
2 INHALANT RESPIRATORY (INHALATION) EVERY 4 HOURS PRN
Qty: 8.5 G | Refills: 5 | Status: SHIPPED | OUTPATIENT
Start: 2025-06-05 | End: 2025-06-15

## 2025-06-14 ENCOUNTER — HOSPITAL ENCOUNTER (INPATIENT)
Facility: HOSPITAL | Age: 85
LOS: 1 days | Discharge: HOME | End: 2025-06-15
Attending: EMERGENCY MEDICINE | Admitting: INTERNAL MEDICINE
Payer: MEDICARE

## 2025-06-14 ENCOUNTER — APPOINTMENT (EMERGENCY)
Dept: RADIOLOGY | Facility: HOSPITAL | Age: 85
End: 2025-06-14
Payer: MEDICARE

## 2025-06-14 DIAGNOSIS — R06.00 DYSPNEA: ICD-10-CM

## 2025-06-14 DIAGNOSIS — J30.1 NON-SEASONAL ALLERGIC RHINITIS DUE TO POLLEN: ICD-10-CM

## 2025-06-14 DIAGNOSIS — I50.31 ACUTE DIASTOLIC CONGESTIVE HEART FAILURE (HCC): ICD-10-CM

## 2025-06-14 DIAGNOSIS — J45.909 ASTHMA: ICD-10-CM

## 2025-06-14 DIAGNOSIS — I50.9 CHF (CONGESTIVE HEART FAILURE) (HCC): ICD-10-CM

## 2025-06-14 DIAGNOSIS — R05.9 COUGH: Primary | ICD-10-CM

## 2025-06-14 DIAGNOSIS — E11.9 DM (DIABETES MELLITUS) (HCC): ICD-10-CM

## 2025-06-14 DIAGNOSIS — I10 HTN (HYPERTENSION): ICD-10-CM

## 2025-06-14 DIAGNOSIS — E78.5 HLD (HYPERLIPIDEMIA): ICD-10-CM

## 2025-06-14 LAB
2HR DELTA HS TROPONIN: -2 NG/L
4HR DELTA HS TROPONIN: 2 NG/L
ALBUMIN SERPL BCG-MCNC: 3.9 G/DL (ref 3.5–5)
ALP SERPL-CCNC: 68 U/L (ref 34–104)
ALT SERPL W P-5'-P-CCNC: 21 U/L (ref 7–52)
ANION GAP SERPL CALCULATED.3IONS-SCNC: 11 MMOL/L (ref 4–13)
AST SERPL W P-5'-P-CCNC: 19 U/L (ref 13–39)
BASOPHILS # BLD AUTO: 0.03 THOUSANDS/ÂΜL (ref 0–0.1)
BASOPHILS NFR BLD AUTO: 1 % (ref 0–1)
BILIRUB SERPL-MCNC: 0.78 MG/DL (ref 0.2–1)
BUN SERPL-MCNC: 48 MG/DL (ref 5–25)
CALCIUM SERPL-MCNC: 9.4 MG/DL (ref 8.4–10.2)
CARDIAC TROPONIN I PNL SERPL HS: 45 NG/L (ref ?–50)
CARDIAC TROPONIN I PNL SERPL HS: 47 NG/L (ref ?–50)
CARDIAC TROPONIN I PNL SERPL HS: 49 NG/L (ref ?–50)
CHLORIDE SERPL-SCNC: 102 MMOL/L (ref 96–108)
CHOLEST SERPL-MCNC: 103 MG/DL (ref ?–200)
CO2 SERPL-SCNC: 27 MMOL/L (ref 21–32)
CREAT SERPL-MCNC: 2.08 MG/DL (ref 0.6–1.3)
EOSINOPHIL # BLD AUTO: 0.08 THOUSAND/ÂΜL (ref 0–0.61)
EOSINOPHIL NFR BLD AUTO: 1 % (ref 0–6)
ERYTHROCYTE [DISTWIDTH] IN BLOOD BY AUTOMATED COUNT: 15.1 % (ref 11.6–15.1)
FLUAV RNA RESP QL NAA+PROBE: NEGATIVE
FLUBV RNA RESP QL NAA+PROBE: NEGATIVE
GFR SERPL CREATININE-BSD FRML MDRD: 21 ML/MIN/1.73SQ M
GLUCOSE SERPL-MCNC: 142 MG/DL (ref 65–140)
HCT VFR BLD AUTO: 36.1 % (ref 34.8–46.1)
HDLC SERPL-MCNC: 40 MG/DL
HGB BLD-MCNC: 11.3 G/DL (ref 11.5–15.4)
IMM GRANULOCYTES # BLD AUTO: 0.02 THOUSAND/UL (ref 0–0.2)
IMM GRANULOCYTES NFR BLD AUTO: 0 % (ref 0–2)
LDLC SERPL CALC-MCNC: 50 MG/DL (ref 0–100)
LYMPHOCYTES # BLD AUTO: 0.61 THOUSANDS/ÂΜL (ref 0.6–4.47)
LYMPHOCYTES NFR BLD AUTO: 10 % (ref 14–44)
MCH RBC QN AUTO: 25.9 PG (ref 26.8–34.3)
MCHC RBC AUTO-ENTMCNC: 31.3 G/DL (ref 31.4–37.4)
MCV RBC AUTO: 83 FL (ref 82–98)
MONOCYTES # BLD AUTO: 0.46 THOUSAND/ÂΜL (ref 0.17–1.22)
MONOCYTES NFR BLD AUTO: 8 % (ref 4–12)
NEUTROPHILS # BLD AUTO: 4.76 THOUSANDS/ÂΜL (ref 1.85–7.62)
NEUTS SEG NFR BLD AUTO: 80 % (ref 43–75)
NRBC BLD AUTO-RTO: 0 /100 WBCS
PLATELET # BLD AUTO: 180 THOUSANDS/UL (ref 149–390)
PMV BLD AUTO: 11.6 FL (ref 8.9–12.7)
POTASSIUM SERPL-SCNC: 4.2 MMOL/L (ref 3.5–5.3)
PROT SERPL-MCNC: 7.3 G/DL (ref 6.4–8.4)
RBC # BLD AUTO: 4.36 MILLION/UL (ref 3.81–5.12)
RSV RNA RESP QL NAA+PROBE: NEGATIVE
SARS-COV-2 RNA RESP QL NAA+PROBE: NEGATIVE
SODIUM SERPL-SCNC: 140 MMOL/L (ref 135–147)
TRIGL SERPL-MCNC: 65 MG/DL (ref ?–150)
TSH SERPL DL<=0.05 MIU/L-ACNC: 1.04 UIU/ML (ref 0.45–4.5)
WBC # BLD AUTO: 5.96 THOUSAND/UL (ref 4.31–10.16)

## 2025-06-14 PROCEDURE — 99285 EMERGENCY DEPT VISIT HI MDM: CPT

## 2025-06-14 PROCEDURE — 36415 COLL VENOUS BLD VENIPUNCTURE: CPT

## 2025-06-14 PROCEDURE — 71046 X-RAY EXAM CHEST 2 VIEWS: CPT

## 2025-06-14 PROCEDURE — 85025 COMPLETE CBC W/AUTO DIFF WBC: CPT

## 2025-06-14 PROCEDURE — 84484 ASSAY OF TROPONIN QUANT: CPT

## 2025-06-14 PROCEDURE — 80061 LIPID PANEL: CPT

## 2025-06-14 PROCEDURE — 93005 ELECTROCARDIOGRAM TRACING: CPT

## 2025-06-14 PROCEDURE — 99285 EMERGENCY DEPT VISIT HI MDM: CPT | Performed by: EMERGENCY MEDICINE

## 2025-06-14 PROCEDURE — 84443 ASSAY THYROID STIM HORMONE: CPT

## 2025-06-14 PROCEDURE — 0241U HB NFCT DS VIR RESP RNA 4 TRGT: CPT

## 2025-06-14 PROCEDURE — 80053 COMPREHEN METABOLIC PANEL: CPT

## 2025-06-14 PROCEDURE — 99223 1ST HOSP IP/OBS HIGH 75: CPT | Performed by: INTERNAL MEDICINE

## 2025-06-14 RX ORDER — ALBUTEROL SULFATE 0.83 MG/ML
5 SOLUTION RESPIRATORY (INHALATION) ONCE
Status: COMPLETED | OUTPATIENT
Start: 2025-06-14 | End: 2025-06-14

## 2025-06-14 RX ORDER — INSULIN LISPRO 100 [IU]/ML
1-5 INJECTION, SOLUTION INTRAVENOUS; SUBCUTANEOUS
Status: DISCONTINUED | OUTPATIENT
Start: 2025-06-15 | End: 2025-06-15 | Stop reason: HOSPADM

## 2025-06-14 RX ORDER — IPRATROPIUM BROMIDE AND ALBUTEROL SULFATE 2.5; .5 MG/3ML; MG/3ML
3 SOLUTION RESPIRATORY (INHALATION) EVERY 8 HOURS
Status: DISCONTINUED | OUTPATIENT
Start: 2025-06-14 | End: 2025-06-15

## 2025-06-14 RX ORDER — ATORVASTATIN CALCIUM 40 MG/1
40 TABLET, FILM COATED ORAL DAILY
Status: DISCONTINUED | OUTPATIENT
Start: 2025-06-15 | End: 2025-06-15 | Stop reason: HOSPADM

## 2025-06-14 RX ORDER — TORSEMIDE 20 MG/1
60 TABLET ORAL DAILY
Status: DISCONTINUED | OUTPATIENT
Start: 2025-06-15 | End: 2025-06-15 | Stop reason: HOSPADM

## 2025-06-14 RX ORDER — FLUTICASONE FUROATE AND VILANTEROL 100; 25 UG/1; UG/1
1 POWDER RESPIRATORY (INHALATION) DAILY
Status: DISCONTINUED | OUTPATIENT
Start: 2025-06-15 | End: 2025-06-15 | Stop reason: HOSPADM

## 2025-06-14 RX ORDER — LOSARTAN POTASSIUM 25 MG/1
25 TABLET ORAL DAILY
Status: DISCONTINUED | OUTPATIENT
Start: 2025-06-15 | End: 2025-06-15 | Stop reason: HOSPADM

## 2025-06-14 RX ORDER — CARVEDILOL 6.25 MG/1
6.25 TABLET ORAL 2 TIMES DAILY WITH MEALS
Status: DISCONTINUED | OUTPATIENT
Start: 2025-06-15 | End: 2025-06-15 | Stop reason: HOSPADM

## 2025-06-14 RX ORDER — RIVAROXABAN 2.5 MG/1
2.5 TABLET, FILM COATED ORAL 2 TIMES DAILY WITH MEALS
Status: DISCONTINUED | OUTPATIENT
Start: 2025-06-15 | End: 2025-06-15 | Stop reason: HOSPADM

## 2025-06-14 RX ORDER — NITROGLYCERIN 0.4 MG/1
0.4 TABLET SUBLINGUAL
Status: DISCONTINUED | OUTPATIENT
Start: 2025-06-14 | End: 2025-06-15 | Stop reason: HOSPADM

## 2025-06-14 RX ORDER — RANOLAZINE 500 MG/1
500 TABLET, EXTENDED RELEASE ORAL 2 TIMES DAILY
Status: DISCONTINUED | OUTPATIENT
Start: 2025-06-14 | End: 2025-06-15 | Stop reason: HOSPADM

## 2025-06-14 RX ORDER — MONTELUKAST SODIUM 10 MG/1
10 TABLET ORAL
Status: DISCONTINUED | OUTPATIENT
Start: 2025-06-14 | End: 2025-06-15 | Stop reason: HOSPADM

## 2025-06-14 RX ORDER — ALBUTEROL SULFATE 90 UG/1
2 INHALANT RESPIRATORY (INHALATION) EVERY 4 HOURS PRN
Status: DISCONTINUED | OUTPATIENT
Start: 2025-06-14 | End: 2025-06-15 | Stop reason: HOSPADM

## 2025-06-14 RX ORDER — METOLAZONE 5 MG/1
5 TABLET ORAL DAILY
Status: DISCONTINUED | OUTPATIENT
Start: 2025-06-15 | End: 2025-06-15 | Stop reason: HOSPADM

## 2025-06-14 RX ORDER — ACETAMINOPHEN 325 MG/1
975 TABLET ORAL EVERY 8 HOURS PRN
Status: DISCONTINUED | OUTPATIENT
Start: 2025-06-14 | End: 2025-06-15 | Stop reason: HOSPADM

## 2025-06-14 RX ORDER — CLOPIDOGREL BISULFATE 75 MG/1
75 TABLET ORAL DAILY
Status: DISCONTINUED | OUTPATIENT
Start: 2025-06-15 | End: 2025-06-15 | Stop reason: HOSPADM

## 2025-06-14 RX ORDER — POLYETHYLENE GLYCOL 3350 17 G/17G
17 POWDER, FOR SOLUTION ORAL DAILY
Status: DISCONTINUED | OUTPATIENT
Start: 2025-06-15 | End: 2025-06-15 | Stop reason: HOSPADM

## 2025-06-14 RX ADMIN — IPRATROPIUM BROMIDE AND ALBUTEROL SULFATE 3 ML: 2.5; .5 SOLUTION RESPIRATORY (INHALATION) at 22:06

## 2025-06-14 RX ADMIN — RANOLAZINE 500 MG: 500 TABLET, FILM COATED, EXTENDED RELEASE ORAL at 22:05

## 2025-06-14 RX ADMIN — ALBUTEROL SULFATE 5 MG: 2.5 SOLUTION RESPIRATORY (INHALATION) at 16:09

## 2025-06-14 RX ADMIN — FUROSEMIDE 120 MG: 10 INJECTION, SOLUTION INTRAVENOUS at 23:10

## 2025-06-14 RX ADMIN — MONTELUKAST 10 MG: 10 TABLET, FILM COATED ORAL at 22:05

## 2025-06-14 RX ADMIN — IPRATROPIUM BROMIDE 0.5 MG: 0.5 SOLUTION RESPIRATORY (INHALATION) at 16:09

## 2025-06-14 NOTE — ED ATTENDING ATTESTATION
6/14/2025  I, Thomas Thakkar MD, saw and evaluated the patient. I have discussed the patient with the resident/non-physician practitioner and agree with the resident's/non-physician practitioner's findings, Plan of Care, and MDM as documented in the resident's/non-physician practitioner's note, except where noted. All available labs and Radiology studies were reviewed.  I was present for key portions of any procedure(s) performed by the resident/non-physician practitioner and I was immediately available to provide assistance.       At this point I agree with the current assessment done in the Emergency Department.  I have conducted an independent evaluation of this patient a history and physical is as follows:  Cough  white sputum   no cp or pressure no fever   no leg pain or  chronic  swelling right leg from prior injury many years ago  Has DUNAWAY   h/o chf     cva     on xaralto   no h/o dvt or pe  no risk    Prior history of asthma  former smoker no current treatment  H/o CAD  coronary disease   LAD stent   Office been present for approximately 2 weeks  Exam the patient is in no acute distress her vital signs are stable she is febrile afebrile she is coughing  Neck no JVD lungs no crackles or rhonchi noted  Heart regular no murmurs abdomen soft   Nontender extremities no calf tenderness noted  Impression cough mild dyspnea on exertion  Cardiac workup chest x-ray will give neb  ED Course     EKG shows normal sinus rhythm with first-degree AV block 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

## 2025-06-14 NOTE — ED PROVIDER NOTES
Time reflects when diagnosis was documented in both MDM as applicable and the Disposition within this note       Time User Action Codes Description Comment    6/14/2025  5:12 PM Krivchenia, Yoan Add [R05.9] Cough     6/14/2025  5:12 PM Krivchenia, Yoan Add [R06.00] Dyspnea     6/14/2025  5:12 PM Krivchenia, Yoan Add [I50.9] CHF (congestive heart failure) ([facility])     6/14/2025  5:12 PM Krivchenia, Yoan Add [I10] HTN (hypertension)     6/14/2025  5:12 PM Krivchenia, Yoan Add [E78.5] HLD (hyperlipidemia)     6/14/2025  5:12 PM Krivchenia, Yoan Add [E11.9] DM (diabetes mellitus) ([facility])     6/14/2025  5:12 PM Krivchenia, Yoan Add [J45.909] Asthma     6/14/2025  8:54 PM Padilla Gonzalez Add [I50.31] Acute diastolic congestive heart failure (HCC)           ED Disposition       ED Disposition   Admit    Condition   Stable    Date/Time   Sat Jun 14, 2025  5:12 PM    Comment   --             Assessment & Plan       Medical Decision Making  84-year-old female with history of hypertension, lipidemia, type 2 diabetes, CHF, CAD, asthma, history of CVA, pulmonary artery aneurysm presenting today for evaluation of 2 weeks of cough productive of whitish sputum associated with dyspnea on exertion and rhinorrhea.    Differential: Bronchitis, URI, pneumonia, ACS, pleural effusion, pneumothorax, CHF    Plan, will obtain cardiac workup.  Will plan to treat with DuoNeb and reevaluate.    Patient reports mild improvement after DuoNeb.  Patient's workup remarkable for CAROL and elevated troponin.  Chest x-ray, interpretation showing cardiomegaly but otherwise unremarkable.  Given CAROL and elevated troponin, will plan to admit for further evaluation for possible cardiac etiology for shortness of breath cough.     Amount and/or Complexity of Data Reviewed  Labs: ordered. Decision-making details documented in ED Course.  Radiology: ordered.    Risk  Prescription drug management.  Decision regarding hospitalization.        ED Course as of 06/14/25  2153   Sat Jun 14, 2025   1553 This EKG interpreted by me. Rate 60, rhythm normal sinus, left axis, intervals 1st degree AVB, no acute ST or T wave changes     1626 Hemoglobin(!): 11.3  Baseline over the past year around 10-11   1652 hs TnI 0hr: 47  Has been elevated in the past, will obtain delta troponin   1711 Creatinine(!): 2.08  Baseline around 1.5-1.4 from labs obtained about a year ago       Medications   acetaminophen (TYLENOL) tablet 975 mg (has no administration in time range)   polyethylene glycol (MIRALAX) packet 17 g (has no administration in time range)   albuterol (PROVENTIL HFA,VENTOLIN HFA) inhaler 2 puff (has no administration in time range)   atorvastatin (LIPITOR) tablet 40 mg (has no administration in time range)   Fluticasone Furoate-Vilanterol 100-25 mcg/actuation 1 puff (has no administration in time range)   carvedilol (COREG) tablet 6.25 mg (has no administration in time range)   clopidogrel (PLAVIX) tablet 75 mg (has no administration in time range)   losartan (COZAAR) tablet 25 mg (has no administration in time range)   metolazone (ZAROXOLYN) tablet 5 mg (has no administration in time range)   montelukast (SINGULAIR) tablet 10 mg (has no administration in time range)   nitroglycerin (NITROSTAT) SL tablet 0.4 mg (has no administration in time range)   ranolazine (RANEXA) 12 hr tablet 500 mg (has no administration in time range)   torsemide (DEMADEX) tablet 60 mg ( Oral Held Dose 6/18/25 0900)   rivaroxaban (XARELTO) tablet 2.5 mg (has no administration in time range)   insulin lispro (HumALOG/ADMELOG) 100 units/mL subcutaneous injection 1-5 Units (has no administration in time range)   ipratropium-albuterol (DUO-NEB) 0.5-2.5 mg/3 mL inhalation solution 3 mL (has no administration in time range)   furosemide (LASIX) 120 mg in dextrose 5 % 50 mL IVPB (has no administration in time range)   albuterol inhalation solution 5 mg (5 mg Nebulization Given 6/14/25 1609)   ipratropium (ATROVENT) 0.02  "% inhalation solution 0.5 mg (0.5 mg Nebulization Given 6/14/25 8308)       ED Risk Strat Scores                    No data recorded                            History of Present Illness       Chief Complaint   Patient presents with    Cough     Patient reports \"bad cough\" that brings up white phlegm. Reports it's been going on for weeks.        Past Medical History[1]   Past Surgical History[2]   Family History[3]   Social History[4]   E-Cigarette/Vaping    E-Cigarette Use Never User       E-Cigarette/Vaping Substances    Nicotine No     THC No     CBD No     Flavoring No     Other No     Unknown No       I have reviewed and agree with the history as documented.     Patient is an 84-year-old female with history of hypertension, hyperlipidemia, type 2 diabetes, CHF, CAD, asthma, history of CVA, and pulmonary artery aneurysm presents today for evaluation of a cough.  Patient states that she reports a \"bad cough\" and 1 for the last 2 weeks.  She says she thinks he got sick from someone at [employer] on 6/1 and notes that on that day she started coughing up white sputum into the trash can.  She says has been going on since then and has associated dyspnea on exertion.  She was endorsing some rhinorrhea but denies any congestion, fevers, sore throat, chest pain, palpitations, acute leg swelling, abdominal pain, nausea or vomiting.  She denies any DVT or PE risk factors.        Review of Systems   Constitutional:  Negative for chills and fever.   HENT:  Positive for rhinorrhea. Negative for congestion and sore throat.    Eyes:  Negative for pain and visual disturbance.   Respiratory:  Positive for cough and shortness of breath.    Cardiovascular:  Negative for chest pain and palpitations.   Gastrointestinal:  Negative for abdominal pain, constipation, diarrhea, nausea and vomiting.   Genitourinary:  Negative for dysuria and hematuria.   Musculoskeletal:  Negative for back pain and neck pain.   Skin:  Negative for color change " and rash.   Neurological:  Negative for weakness and numbness.   All other systems reviewed and are negative.          Objective       ED Triage Vitals   Temperature Pulse Blood Pressure Respirations SpO2 Patient Position - Orthostatic VS   06/14/25 1410 06/14/25 1408 06/14/25 1408 06/14/25 1408 06/14/25 1408 06/14/25 1408   97.6 °F (36.4 °C) 80 155/67 19 99 % Sitting      Temp Source Heart Rate Source BP Location FiO2 (%) Pain Score    06/14/25 1410 06/14/25 1408 06/14/25 1408 -- 06/14/25 1409    Temporal Monitor Right arm  No Pain      Vitals      Date and Time Temp Pulse SpO2 Resp BP Pain Score FACES Pain Rating User   06/14/25 2018 -- 72 100 % 18 159/61 -- -- PS   06/14/25 1628 -- 54 100 % 20 165/117 -- -- SRH   06/14/25 1410 97.6 °F (36.4 °C) -- -- -- -- -- -- TW   06/14/25 1409 -- -- -- -- -- No Pain -- TW   06/14/25 1408 -- 80 99 % 19 155/67 -- -- TW            Physical Exam  Vitals and nursing note reviewed.   Constitutional:       General: She is not in acute distress.     Appearance: Normal appearance. She is well-developed. She is not ill-appearing, toxic-appearing or diaphoretic.   HENT:      Head: Normocephalic and atraumatic.      Right Ear: External ear normal.      Left Ear: External ear normal.      Nose: Nose normal. No congestion or rhinorrhea.      Mouth/Throat:      Mouth: Mucous membranes are moist.      Pharynx: Oropharynx is clear.     Eyes:      General: No scleral icterus.        Right eye: No discharge.         Left eye: No discharge.      Conjunctiva/sclera: Conjunctivae normal.       Cardiovascular:      Rate and Rhythm: Normal rate and regular rhythm.      Pulses: Normal pulses.      Heart sounds: Normal heart sounds. No murmur heard.     No friction rub. No gallop.   Pulmonary:      Effort: Pulmonary effort is normal. No respiratory distress.      Breath sounds: Normal breath sounds. No stridor. No wheezing, rhonchi or rales.      Comments: Tachypnea on exam after ambulating from the  bathroom  Abdominal:      General: Abdomen is flat. There is no distension.      Palpations: Abdomen is soft.      Tenderness: There is no abdominal tenderness. There is no guarding.     Musculoskeletal:         General: Normal range of motion.      Cervical back: Normal range of motion and neck supple.      Right lower leg: Edema (Chronic and unchanged per patient) present.      Left lower leg: No edema.     Skin:     General: Skin is warm and dry.      Capillary Refill: Capillary refill takes less than 2 seconds.      Coloration: Skin is not jaundiced or pale.     Neurological:      General: No focal deficit present.      Mental Status: She is alert and oriented to person, place, and time.     Psychiatric:         Mood and Affect: Mood normal.         Behavior: Behavior normal.         Results Reviewed       Procedure Component Value Units Date/Time    TSH, 3rd generation [[id number]] Collected: 06/14/25 2017    Lab Status: In process Specimen: Blood from Arm, Left Updated: 06/14/25 2119    Albumin / creatinine urine ratio [[id number]]     Lab Status: No result Specimen: Urine     Lipid Panel with Direct LDL reflex [[id number]]     Lab Status: No result Specimen: Blood     HS Troponin I 4hr [[id number]]  (Normal) Collected: 06/14/25 2017    Lab Status: Final result Specimen: Blood from Arm, Left Updated: 06/14/25 2053     hs TnI 4hr 49 ng/L      Delta 4hr hsTnI 2 ng/L     COVID/FLU/RSV [[id number]] Collected: 06/14/25 2017    Lab Status: In process Specimen: Nares from Nose Updated: 06/14/25 2024    HS Troponin I 2hr [[id number]]  (Normal) Collected: 06/14/25 1820    Lab Status: Final result Specimen: Blood from Arm, Left Updated: 06/14/25 1858     hs TnI 2hr 45 ng/L      Delta 2hr hsTnI -2 ng/L     Comprehensive metabolic panel [[id number]]  (Abnormal) Collected: 06/14/25 1608    Lab Status: Final result Specimen: Blood from Arm, Left Updated: 06/14/25 1709     Sodium 140 mmol/L      Potassium 4.2 mmol/L      Chloride  102 mmol/L      CO2 27 mmol/L      ANION GAP 11 mmol/L      BUN 48 mg/dL      Creatinine 2.08 mg/dL      Glucose 142 mg/dL      Calcium 9.4 mg/dL      AST 19 U/L      ALT 21 U/L      Alkaline Phosphatase 68 U/L      Total Protein 7.3 g/dL      Albumin 3.9 g/dL      Total Bilirubin 0.78 mg/dL      eGFR 21 ml/min/1.73sq m     Narrative:      National Kid[employer] Disease Foundation guidelines for Chronic Kidney Disease (CKD):     Stage 1 with normal or high GFR (GFR > 90 mL/min/1.73 square meters)    Stage 2 Mild CKD (GFR = 60-89 mL/min/1.73 square meters)    Stage 3A Moderate CKD (GFR = 45-59 mL/min/1.73 square meters)    Stage 3B Moderate CKD (GFR = 30-44 mL/min/1.73 square meters)    Stage 4 Severe CKD (GFR = 15-29 mL/min/1.73 square meters)    Stage 5 End Stage CKD (GFR <15 mL/min/1.73 square meters)  Note: GFR calculation is accurate only with a steady state creatinine    HS Troponin 0hr (reflex protocol) [[id number]]  (Normal) Collected: 06/14/25 1608    Lab Status: Final result Specimen: Blood from Arm, Left Updated: 06/14/25 1645     hs TnI 0hr 47 ng/L     CBC and differential [[id number]]  (Abnormal) Collected: 06/14/25 1608    Lab Status: Final result Specimen: Blood from Arm, Left Updated: 06/14/25 1618     WBC 5.96 Thousand/uL      RBC 4.36 Million/uL      Hemoglobin 11.3 g/dL      Hematocrit 36.1 %      MCV 83 fL      MCH 25.9 pg      MCHC 31.3 g/dL      RDW 15.1 %      MPV 11.6 fL      Platelets 180 Thousands/uL      nRBC 0 /100 WBCs      Segmented % 80 %      Immature Grans % 0 %      Lymphocytes % 10 %      Monocytes % 8 %      Eosinophils Relative 1 %      Basophils Relative 1 %      Absolute Neutrophils 4.76 Thousands/µL      Absolute Immature Grans 0.02 Thousand/uL      Absolute Lymphocytes 0.61 Thousands/µL      Absolute Monocytes 0.46 Thousand/µL      Eosinophils Absolute 0.08 Thousand/µL      Basophils Absolute 0.03 Thousands/µL             XR chest 2 views    (Results Pending)       Procedures    ED  Medication and Procedure Management   Prior to Admission Medications   Prescriptions Last Dose Informant Patient Reported? Taking?   Breo Ellipta 100-25 MCG/ACT inhaler   No No   Sig: INHALE 1 PUFF DAILY RINSE MOUTH AFTER USE.   Cholecalciferol (D3) 50 MCG (2000 UT) TABS  Self No No   Sig: Take 2 tablets (4,000 Units total) by mouth daily   Continuous Glucose  (FreeStyle Naldo 14 Day [place]) JAQUELIN   No No   Sig: Use to record blood glucose 4 times daily. Once fasting and three times daily before meals   Continuous Glucose Sensor (FreeStyle Naldo 2 Sensor) MISC   No No   Sig: USE ONE SENSOR EVERY 14 DAYS   Glucose-Vitamin C-Vitamin D (TRUEPLUS GLUCOSE ON THE GO) CHEW  Self Yes No   Misc. Devices (QUAD CANE) MISC  Self No No   Sig: by Does not apply route daily   Oral Hygiene Products (Extended Term Oral Care System) KIT  Self No No   Sig: Apply to the mouth or throat 2 (two) times a day   SODIUM FLUORIDE, DENTAL RINSE, 0.02 % SOLN  Self No No   Sig: Apply 15 mL to the mouth or throat 2 (two) times a day   Tradjenta 5 MG TABS   No No   Sig: TAKE 1 TABLET (5 MG) BY MOUTH IN THE MORNING   Xarelto 2.5 MG tablet   No No   Sig: TAKE 1 TABLET (2.5 MG TOTAL) BY MOUTH 2 (TWO) TIMES A DAY WITH MEALS   acetaminophen (TYLENOL) 650 mg CR tablet  Self No No   Sig: Take 1 tablet (650 mg total) by mouth every 6 (six) hours as needed for mild pain   albuterol (PROVENTIL HFA,VENTOLIN HFA) 90 mcg/act inhaler   No No   Sig: INHALE 2 PUFFS EVERY 4 (FOUR) HOURS AS NEEDED FOR WHEEZING   atorvastatin (LIPITOR) 40 mg tablet  Self No No   Sig: Take 1 tablet (40 mg total) by mouth daily   calcitriol (ROCALTROL) 0.25 mcg capsule   No No   Sig: TAKE 1 CAPSULE (0.25 MCG TOTAL) BY MOUTH 3 (THREE) TIMES A WEEK   carvedilol (COREG) 6.25 mg tablet   No No   Sig: TAKE 1 TABLET (6.25 MG TOTAL) BY MOUTH 2 (TWO) TIMES A DAY WITH MEALS   clopidogrel (PLAVIX) 75 mg tablet  Self No No   Sig: Take 1 tablet (75 mg total) by mouth daily   ferrous  sulfate 325 (65 Fe) mg tablet   No No   Sig: Take 1 tablet (325 mg total) by mouth 2 (two) times a day before meals   latanoprost (XALATAN) 0.005 % ophthalmic solution  Self Yes No   losartan (COZAAR) 25 mg tablet   No No   Sig: TAKE 1 TABLET (25 MG TOTAL) BY MOUTH DAILY   metolazone (ZAROXOLYN) 5 mg tablet  Self No No   Sig: Take 1 tablet weekly and take extra if has weight gain >5 lbs in 3 days or worsening leg swelling.   montelukast (SINGULAIR) 10 mg tablet   No No   Sig: TAKE 1 TABLET (10 MG TOTAL) BY MOUTH DAILY AT BEDTIME   nitroglycerin (NITROSTAT) 0.4 mg SL tablet  Self No No   Sig: PLACE 1 TABLET (0.4 MG TOTAL) UNDER THE TONGUE EVERY 5 (FIVE) MINUTES AS NEEDED FOR CHEST PAIN   potassium chloride (Klor-Con M20) 20 mEq tablet  Self No No   Sig: TAKE 1 TABLET (20 HASMUKH EQUIVALENT TOTAL) BY MOUTH DAILY   ranolazine (RANEXA) 500 mg 12 hr tablet   No No   Sig: TAKE 1 TABLET (500 MG TOTAL) BY MOUTH EVERY 12 (TWELVE) HOURS   repaglinide (PRANDIN) 0.5 mg tablet   No No   Sig: TAKE 1 TABLET (0.5 MG TOTAL) BY MOUTH 2 (TWO) TIMES A DAY BEFORE MEALS (SKIP DOSE IF SKIPPING MEAL)   torsemide (DEMADEX) 20 mg tablet   No No   Sig: TAKE 3 TABLETS (60 MG TOTAL) BY MOUTH DAILY      Facility-Administered Medications: None     Patient's Medications   Discharge Prescriptions    No medications on file     No discharge procedures on file.  ED SEPSIS DOCUMENTATION   Time reflects when diagnosis was documented in both MDM as applicable and the Disposition within this note       Time User Action Codes Description Comment    6/14/2025  5:12 PM KrivcheniaDesireeh Add [R05.9] Cough     6/14/2025  5:12 PM KrblakeiaYoan Add [R06.00] Dyspnea     6/14/2025  5:12 PM Krivcheleia Yoan Add [I50.9] CHF (congestive heart failure) (HCC)     6/14/2025  5:12 PM Krivchenia Yoan Add [I10] HTN (hypertension)     6/14/2025  5:12 PM KrivDesiree alegreh Add [E78.5] HLD (hyperlipidemia)     6/14/2025  5:12 PM Yoan Britton Add [E11.9] DM (diabetes  mellitus) ([facility])     6/14/2025  5:12 PM Yoan Britton Add [J45.909] Asthma     6/14/2025  8:54 PM Padilla Gonzalez Add [I50.31] Acute diastolic congestive heart failure ([facility])                      [1]   Past Medical History:  Diagnosis Date    ACE-inhibitor cough     last assessed - 29Dec2017    Asthma     Bilateral edema of lower extremity     last assessed - 21Aug2017    Cardiac murmur     last assessed - 21Aug2017    CKD (chronic kidney disease)     Diabetes mellitus (HCC)     last assessed - 29Nov2017    Esophageal dysphagia     Fatigue     last assessed - 21Aug2017    Hyperlipidemia     Hypertension     Patellar bursitis of right knee     last assessed - 04Nov2016    Personal history of other specified conditions     presenting hazards to health    Stroke ([facility])     Stroke syndrome     Urinary frequency     UTI (urinary tract infection)    [2]   Past Surgical History:  Procedure Laterality Date    CARDIAC CATHETERIZATION N/A 6/1/2023    Procedure: Cardiac Coronary Angiogram;  Surgeon: Roe German MD;  Location: [facility];  Service: Cardiology    CARDIAC CATHETERIZATION  6/1/2023    Procedure: Cardiac Left Heart Cath;  Surgeon: Roe German MD;  Location: [facility];  Service: Cardiology    MI ESOPHAGOGASTRODUODENOSCOPY TRANSORAL DIAGNOSTIC N/A 4/5/2017    Procedure: ESOPHAGOGASTRODUODENOSCOPY (EGD);  Surgeon: Cedric Huang MD;  Location: [facility];  Service: Gastroenterology   [3]   Family History  Problem Relation Name Age of Onset    Hypertension Mother      Stroke Mother      Heart disease Father      Other Sister          1)Breast disorder; 2)Epilepsy    Migraines Sister          Migraine headaches    Osteoporosis Sister      Thyroid disease Sister      Coronary artery disease Sister          S/P triple vessel bypass    Breast cancer Sister triston 80    No Known Problems Sister      No Known Problems Sister      Osteoporosis Maternal Grandmother      No Known Problems Maternal  Grandfather      No Known Problems Paternal Grandmother      No Known Problems Paternal Grandfather      Diabetes Son          Diabetes mellitus    Hypertension Son      Rheum arthritis Son          Rheumatic disease    No Known Problems Son      No Known Problems Son      No Known Problems Son      No Known Problems Son      No Known Problems Son      No Known Problems Maternal Aunt      No Known Problems Maternal Aunt      No Known Problems Maternal Aunt      No Known Problems Paternal Aunt      No Known Problems Paternal Aunt      Heart disease Family     [4]   Social History  Tobacco Use    Smoking status: Former     Current packs/day: 0.00     Types: Cigarettes     Quit date:      Years since quittin.4    Smokeless tobacco: Former    Tobacco comments:     quit over 30 yrs ago; (quit in the , had smoked 3PPD for 20 years - per [employer])   Vaping Use    Vaping status: Never Used   Substance Use Topics    Alcohol use: Never    Drug use: Never        Yoan Britton MD  [phone]

## 2025-06-15 VITALS
HEART RATE: 60 BPM | OXYGEN SATURATION: 98 % | RESPIRATION RATE: 20 BRPM | TEMPERATURE: 97.6 F | DIASTOLIC BLOOD PRESSURE: 57 MMHG | SYSTOLIC BLOOD PRESSURE: 117 MMHG

## 2025-06-15 LAB
ANION GAP SERPL CALCULATED.3IONS-SCNC: 7 MMOL/L (ref 4–13)
ATRIAL RATE: 60 BPM
BASOPHILS # BLD AUTO: 0.03 THOUSANDS/ÂΜL (ref 0–0.1)
BASOPHILS NFR BLD AUTO: 1 % (ref 0–1)
BUN SERPL-MCNC: 48 MG/DL (ref 5–25)
CALCIUM SERPL-MCNC: 9.1 MG/DL (ref 8.4–10.2)
CHLORIDE SERPL-SCNC: 103 MMOL/L (ref 96–108)
CO2 SERPL-SCNC: 30 MMOL/L (ref 21–32)
CREAT SERPL-MCNC: 1.97 MG/DL (ref 0.6–1.3)
CREAT UR-MCNC: 44.2 MG/DL
EOSINOPHIL # BLD AUTO: 0.11 THOUSAND/ÂΜL (ref 0–0.61)
EOSINOPHIL NFR BLD AUTO: 2 % (ref 0–6)
ERYTHROCYTE [DISTWIDTH] IN BLOOD BY AUTOMATED COUNT: 15.1 % (ref 11.6–15.1)
GFR SERPL CREATININE-BSD FRML MDRD: 22 ML/MIN/1.73SQ M
GLUCOSE SERPL-MCNC: 105 MG/DL (ref 65–140)
GLUCOSE SERPL-MCNC: 109 MG/DL (ref 65–140)
GLUCOSE SERPL-MCNC: 98 MG/DL (ref 65–140)
HCT VFR BLD AUTO: 32.7 % (ref 34.8–46.1)
HGB BLD-MCNC: 10.2 G/DL (ref 11.5–15.4)
IMM GRANULOCYTES # BLD AUTO: 0.02 THOUSAND/UL (ref 0–0.2)
IMM GRANULOCYTES NFR BLD AUTO: 0 % (ref 0–2)
LYMPHOCYTES # BLD AUTO: 0.69 THOUSANDS/ÂΜL (ref 0.6–4.47)
LYMPHOCYTES NFR BLD AUTO: 12 % (ref 14–44)
MAGNESIUM SERPL-MCNC: 2.1 MG/DL (ref 1.9–2.7)
MCH RBC QN AUTO: 25.7 PG (ref 26.8–34.3)
MCHC RBC AUTO-ENTMCNC: 31.2 G/DL (ref 31.4–37.4)
MCV RBC AUTO: 82 FL (ref 82–98)
MICROALBUMIN UR-MCNC: 53.8 MG/L
MICROALBUMIN/CREAT 24H UR: 122 MG/G CREATININE (ref 0–30)
MONOCYTES # BLD AUTO: 0.5 THOUSAND/ÂΜL (ref 0.17–1.22)
MONOCYTES NFR BLD AUTO: 8 % (ref 4–12)
NEUTROPHILS # BLD AUTO: 4.59 THOUSANDS/ÂΜL (ref 1.85–7.62)
NEUTS SEG NFR BLD AUTO: 77 % (ref 43–75)
NRBC BLD AUTO-RTO: 0 /100 WBCS
P AXIS: 68 DEGREES
PLATELET # BLD AUTO: 158 THOUSANDS/UL (ref 149–390)
PMV BLD AUTO: 11.3 FL (ref 8.9–12.7)
POTASSIUM SERPL-SCNC: 3.4 MMOL/L (ref 3.5–5.3)
PR INTERVAL: 234 MS
QRS AXIS: -36 DEGREES
QRSD INTERVAL: 98 MS
QT INTERVAL: 458 MS
QTC INTERVAL: 458 MS
RBC # BLD AUTO: 3.97 MILLION/UL (ref 3.81–5.12)
SODIUM SERPL-SCNC: 140 MMOL/L (ref 135–147)
T WAVE AXIS: 85 DEGREES
VENTRICULAR RATE: 60 BPM
WBC # BLD AUTO: 5.94 THOUSAND/UL (ref 4.31–10.16)

## 2025-06-15 PROCEDURE — 80048 BASIC METABOLIC PNL TOTAL CA: CPT

## 2025-06-15 PROCEDURE — 36415 COLL VENOUS BLD VENIPUNCTURE: CPT

## 2025-06-15 PROCEDURE — 83735 ASSAY OF MAGNESIUM: CPT

## 2025-06-15 PROCEDURE — 94640 AIRWAY INHALATION TREATMENT: CPT

## 2025-06-15 PROCEDURE — 82948 REAGENT STRIP/BLOOD GLUCOSE: CPT

## 2025-06-15 PROCEDURE — 82043 UR ALBUMIN QUANTITATIVE: CPT

## 2025-06-15 PROCEDURE — 85025 COMPLETE CBC W/AUTO DIFF WBC: CPT

## 2025-06-15 PROCEDURE — 94760 N-INVAS EAR/PLS OXIMETRY 1: CPT

## 2025-06-15 PROCEDURE — 94664 DEMO&/EVAL PT USE INHALER: CPT

## 2025-06-15 PROCEDURE — 82570 ASSAY OF URINE CREATININE: CPT

## 2025-06-15 PROCEDURE — 99238 HOSP IP/OBS DSCHRG MGMT 30/<: CPT | Performed by: INTERNAL MEDICINE

## 2025-06-15 PROCEDURE — 93010 ELECTROCARDIOGRAM REPORT: CPT | Performed by: INTERNAL MEDICINE

## 2025-06-15 RX ORDER — ALBUTEROL SULFATE 90 UG/1
2 INHALANT RESPIRATORY (INHALATION) EVERY 4 HOURS PRN
Qty: 8.5 G | Refills: 5 | Status: SHIPPED | OUTPATIENT
Start: 2025-06-15

## 2025-06-15 RX ORDER — POTASSIUM CHLORIDE 1500 MG/1
40 TABLET, EXTENDED RELEASE ORAL ONCE
Status: COMPLETED | OUTPATIENT
Start: 2025-06-15 | End: 2025-06-15

## 2025-06-15 RX ORDER — LEVALBUTEROL INHALATION SOLUTION 1.25 MG/3ML
1.25 SOLUTION RESPIRATORY (INHALATION)
Status: DISCONTINUED | OUTPATIENT
Start: 2025-06-15 | End: 2025-06-15 | Stop reason: HOSPADM

## 2025-06-15 RX ADMIN — METOLAZONE 5 MG: 5 TABLET ORAL at 09:21

## 2025-06-15 RX ADMIN — POTASSIUM CHLORIDE 40 MEQ: 1500 TABLET, EXTENDED RELEASE ORAL at 09:31

## 2025-06-15 RX ADMIN — POTASSIUM CHLORIDE 40 MEQ: 1500 TABLET, EXTENDED RELEASE ORAL at 08:27

## 2025-06-15 RX ADMIN — LEVALBUTEROL HYDROCHLORIDE 1.25 MG: 1.25 SOLUTION RESPIRATORY (INHALATION) at 07:56

## 2025-06-15 RX ADMIN — ATORVASTATIN CALCIUM 40 MG: 40 TABLET, FILM COATED ORAL at 08:27

## 2025-06-15 RX ADMIN — CARVEDILOL 6.25 MG: 6.25 TABLET, FILM COATED ORAL at 09:21

## 2025-06-15 RX ADMIN — RIVAROXABAN 2.5 MG: 2.5 TABLET, FILM COATED ORAL at 09:21

## 2025-06-15 RX ADMIN — LOSARTAN POTASSIUM 25 MG: 25 TABLET, FILM COATED ORAL at 08:27

## 2025-06-15 RX ADMIN — CLOPIDOGREL BISULFATE 75 MG: 75 TABLET, FILM COATED ORAL at 08:27

## 2025-06-15 RX ADMIN — IPRATROPIUM BROMIDE 0.5 MG: 0.5 SOLUTION RESPIRATORY (INHALATION) at 07:56

## 2025-06-15 RX ADMIN — RANOLAZINE 500 MG: 500 TABLET, FILM COATED, EXTENDED RELEASE ORAL at 08:27

## 2025-06-15 NOTE — ASSESSMENT & PLAN NOTE
"Lab Results   Component Value Date    HGBA1C 7.0 (A) 03/20/2025   No results for input(s): \"POCGLU\" in the last 72 hours.  Blood Sugar Average: Last 72 hrs:  PTA trajenta 5mg daily and prandin 0.5mg BID before meals   Plan  Hold home glycemic agents  Consisten carb diet  SSI  "
"NSTEMI w/ PCI 2021, with reccurent thrombosis   Stress test 3/25 - \"There is a left ventricular perfusion defect that is large in size with severe reduction in uptake present in the mid anterior wall extending to the entire LV apex that is partially reversible.\" EF 41%  ECHO 3/25 - EF 42%, moderate concentric hypertrophy, systolic function moderately reduced, moderate global hypokinesis with regional variation  Lipitor 40mg  Xarelto 2.5mg BID  Plavix 75mg daily  NTG 0.4mg SL PRN  Ranexa 500 mg Q12  Plan  Continue statin, Xarelto, Plavix, nitroglycerin and Ranexa  Potassium > 4 , Magnesium >2  "
"Patient states that she reports a \"bad cough\"  for the last 2 weeks. She says she thinks he got sick from someone at [facility] on 6/1 and notes that on that day she started coughing up white sputum into the trash can.  Endorses dyspnea on exertion, rhinorrhea.  Denies any chest pain, shortness of breath, palpitations, headaches, vision changes, nausea, vomiting, diarrhea.  Patient says that this does feel like asthma exacerbation she has had in the past.  In the ER she received albuterol and ipratropium with improvement in her symptoms.  Flu/COVID/RSV panel pending.  White blood cell 5.96.   CMP notable for creatinine of 2.08 and BUN 48,  Troponin 47>45>49   Temperature 97.6 pulse 54 respiratory rate 20 blood pressure 165/117 and 100% on room air. CXR showed no acute cardiopulmonary disease.  EKG sinus rhythm with first-degree AV block. Patient had stacked breaths on a few occasions while taking her history.  While in the ED, patient received 1 x IV Lasix 120 mg.  COVID/flu/RSV was negative  Ddx: Asthma exacerbation v.  CHF exacerbation v.  Viral respiratory illness  Most likely multifactorial given patient reporting sick contact 2 weeks ago and history of CHF and asthma exacerbations in the past. Stacked breaths could indicate asthma exacerbation. Unlikely to be PNA in setting of patient being afebrile.    Plan  Continue butyryl inhaler as needed for wheezing  Continue torsemide 60 mg daily at home  Continue metolazone  "
"Patient states that she reports a \"bad cough\"  for the last 2 weeks. She says she thinks he got sick from someone at [facility] on 6/1 and notes that on that day she started coughing up white sputum into the trash can.  Endorses dyspnea on exertion, rhinorrhea.  Denies any chest pain, shortness of breath, palpitations, headaches, vision changes, nausea, vomiting, diarrhea.  Patient says that this does feel like asthma exacerbation she has had in the past.  In the ER she received albuterol and ipratropium with improvement in her symptoms.  Flu/COVID/RSV panel pending.  White blood cell 5.96.   CMP notable for creatinine of 2.08 and BUN 48,  Troponin 47>45>49   Temperature 97.6 pulse 54 respiratory rate 20 blood pressure 165/117 and 100% on room air. CXR per my read-cardiomegaly possible consolidation in the left lower lobe/left medial lobe possible vascular congestion.  EKG sinus rhythm with first-degree AV block. Patient had stacked breaths on a few occasions while taking her history.  Ddx: Asthma exacerbation v.  CHF exacerbation v.  Viral respiratory illness  Most likely multifactorial given patient reporting sick contact 2 weeks ago and history of CHF and asthma exacerbations in the past. Stacked breaths could indicate asthma exacerbation. Unlikely to be PNA in setting of patient being afebrile.  Plan  Oxygen to keep SpO2 greater 92%  Follow-up on respiratory panel  Diuresis  DuoNeb nebulizers  Follow-up on chest x-ray  Monitor for fevers or leukocytosis.  Lasix 120mg IV x 1  Continue metolazone  "
"Patient states that she reports a \"bad cough\"  for the last 2 weeks. She says she thinks he got sick from someone at [facility] on 6/1 and notes that on that day she started coughing up white sputum into the trash can.  Endorses dyspnea on exertion, rhinorrhea.  Denies any chest pain, shortness of breath, palpitations, headaches, vision changes, nausea, vomiting, diarrhea.  Patient says that this does feel like asthma exacerbation she has had in the past.  In the ER she received albuterol and ipratropium with improvement in her symptoms.  Flu/COVID/RSV panel pending.  White blood cell 5.96.   CMP notable for creatinine of 2.08 and BUN 48,  Troponin 47>45>49   Temperature 97.6 pulse 54 respiratory rate 20 blood pressure 165/117 and 100% on room air. CXR per my read-cardiomegaly possible consolidation in the left lower lobe/left medial lobe possible vascular congestion.  EKG sinus rhythm with first-degree AV block. Patient had stacked breaths on a few occasions while taking her history.  Ddx: Asthma exacerbation v.  CHF exacerbation v.  Viral respiratory illness  Most likely multifactorial given patient reporting sick contact 2 weeks ago and history of CHF and asthma exacerbations in the past. Stacked breaths could indicate asthma exacerbation. Unlikely to be PNA in setting of patient being afebrile.  Plan  Oxygen to keep SpO2 greater 92%  Follow-up on respiratory panel  Diuresis  DuoNeb nebulizers  Follow-up on chest x-ray  Monitor for fevers or leukocytosis.  Lasix 120mg IV x 1  Continue metolazone  "
Albuterol 2 puffs Q4 PRN and breo ellipta 1 puff daily  Plan  Continue albuterol and beo ellipta   
Albuterol 2 puffs Q4 PRN and breo ellipta 1 puff daily  Plan  Duoneb nebs  Continue albuterol and beo ellipta   
Albuterol 2 puffs Q4 PRN and breo ellipta 1 puff daily  Plan  Duoneb nebs  Continue albuterol and beo ellipta   
Home losartan 25 mg daily, Coreg 6.25mg BID , torsemide 60mg daily, metolazone weekly/prn for weight gain of>5lbs  
Home losartan 25 mg daily, Coreg 6.25mg BID , torsemide 60mg daily, metolazone weekly/prn for weight gain of>5lbs  Plan  Continue losartan and coreg  
Home losartan 25 mg daily, Coreg 6.25mg BID , torsemide 60mg daily, metolazone weekly/prn for weight gain of>5lbs  Plan  Continue losartan and coreg  
Lab Results   Component Value Date    EGFR 21 06/14/2025    EGFR 31 05/31/2024    EGFR 32 10/17/2023    CREATININE 2.08 (H) 06/14/2025    CREATININE 1.51 (H) 05/31/2024    CREATININE 1.48 (H) 10/17/2023   Baseline appears to be 1.5 -1.7  Ddx: Worsening kidney function due to progression of CKD vs. CAROL on CKD  Plan  Avoid nephrotoxic agents  Avoid hypotension  Trend Creatinine  Albumin/Creatine ratio  
Lab Results   Component Value Date    EGFR 22 06/15/2025    EGFR 21 06/14/2025    EGFR 31 05/31/2024    CREATININE 1.97 (H) 06/15/2025    CREATININE 2.08 (H) 06/14/2025    CREATININE 1.51 (H) 05/31/2024   Baseline appears to be 1.5 -1.7  Ddx: Worsening kidney function due to progression of CKD vs. CAROL on CKD  Plan  Avoid nephrotoxic agents  Avoid hypotension  Trend Creatinine  Albumin/Creatine ratio  
Lab Results   Component Value Date    EGFR 22 06/15/2025    EGFR 21 06/14/2025    EGFR 31 05/31/2024    CREATININE 1.97 (H) 06/15/2025    CREATININE 2.08 (H) 06/14/2025    CREATININE 1.51 (H) 05/31/2024   Baseline appears to be 1.5 -1.7  Ddx: Worsening kidney function due to progression of CKD vs. CAROL on CKD  Plan  Avoid nephrotoxic agents  Avoid hypotension  Trend Creatinine  Albumin/Creatine ratio  
Lab Results   Component Value Date    HGBA1C 7.0 (A) 03/20/2025     Recent Labs     06/15/25  0629   POCGLU 98     Blood Sugar Average: Last 72 hrs:  PTA trajenta 5mg daily and prandin 0.5mg BID before meals   Plan  Hold home glycemic agents  Consisten carb diet  SSI  
Lab Results   Component Value Date    HGBA1C 7.0 (A) 03/20/2025     Recent Labs     06/15/25  0629 06/15/25  0823   POCGLU 98 105     Blood Sugar Average: Last 72 hrs:  PTA trajenta 5mg daily and prandin 0.5mg BID before meals   
PTA Lipitor 40mg qhs  Plan  Lipid Panel  Continue lipitor  
Wt Readings from Last 3 Encounters:   05/05/25 69.9 kg (154 lb)   03/20/25 69.9 kg (154 lb)   03/13/25 70.8 kg (156 lb)   Management as above under hypertension and coronary artery disease  Plan  Daily weights  Strict I/Os  
(2) assistive person

## 2025-06-15 NOTE — DISCHARGE INSTR - AVS FIRST PAGE
Recommended follow-ups  Please follow-up with your PCP in 1 week upon discharge    Medications  Continue using albuterol inhaler as needed for wheezing  Continue taking torsemide 60 mg daily    Please call your PCP office or go to emergency department if you have worsening symptoms including wheezing, cough, dyspnea, fever, chills, chest tightness, or lightheadedness.

## 2025-06-15 NOTE — H&P
"H&P - Internal Medicine   Name: Jennifer Amaya 84 y.o. female I MRN: [id number]  Unit/Bed#: Z5HB I Date of Admission: 6/14/2025   Date of Service: 6/14/2025 I Hospital Day: 0     Assessment & Plan  SOB (shortness of breath)  Patient states that she reports a \"bad cough\"  for the last 2 weeks. She says she thinks he got sick from someone at [facility] on 6/1 and notes that on that day she started coughing up white sputum into the trash can.  Endorses dyspnea on exertion, rhinorrhea.  Denies any chest pain, shortness of breath, palpitations, headaches, vision changes, nausea, vomiting, diarrhea.  Patient says that this does feel like asthma exacerbation she has had in the past.  In the ER she received albuterol and ipratropium with improvement in her symptoms.  Flu/COVID/RSV panel pending.  White blood cell 5.96.   CMP notable for creatinine of 2.08 and BUN 48,  Troponin 47>45>49   Temperature 97.6 pulse 54 respiratory rate 20 blood pressure 165/117 and 100% on room air. CXR per my read-cardiomegaly possible consolidation in the left lower lobe/left medial lobe possible vascular congestion.  EKG sinus rhythm with first-degree AV block. Patient had stacked breaths on a few occasions while taking her history.  Ddx: Asthma exacerbation v.  CHF exacerbation v.  Viral respiratory illness  Most likely multifactorial given patient reporting sick contact 2 weeks ago and history of CHF and asthma exacerbations in the past. Stacked breaths could indicate asthma exacerbation. Unlikely to be PNA in setting of patient being afebrile.  Plan  Oxygen to keep SpO2 greater 92%  Follow-up on respiratory panel  Diuresis  DuoNeb nebulizers  Follow-up on chest x-ray  Monitor for fevers or leukocytosis.  Lasix 120mg IV x 1  Continue metolazone  HTN (hypertension)  Home losartan 25 mg daily, Coreg 6.25mg BID , torsemide 60mg daily, metolazone weekly/prn for weight gain of>5lbs  Plan  Continue losartan and coreg  Stage 3b chronic kidney disease " "([facility])  Lab Results   Component Value Date    EGFR 21 06/14/2025    EGFR 31 05/31/2024    EGFR 32 10/17/2023    CREATININE 2.08 (H) 06/14/2025    CREATININE 1.51 (H) 05/31/2024    CREATININE 1.48 (H) 10/17/2023   Baseline appears to be 1.5 -1.7  Ddx: Worsening kidney function due to progression of CKD vs. CAROL on CKD  Plan  Avoid nephrotoxic agents  Avoid hypotension  Trend Creatinine  Albumin/Creatine ratio  Coronary artery disease involving native coronary artery  NSTEMI w/ PCI 2021, with reccurent thrombosis   Stress test 3/25 - \"There is a left ventricular perfusion defect that is large in size with severe reduction in uptake present in the mid anterior wall extending to the entire LV apex that is partially reversible.\" EF 41%  ECHO 3/25 - EF 42%, moderate concentric hypertrophy, systolic function moderately reduced, moderate global hypokinesis with regional variation  Lipitor 40mg  Xarelto 2.5mg BID  Plavix 75mg daily  NTG 0.4mg SL PRN  Ranexa 500 mg Q12  Plan  Continue statin, Xarelto, Plavix, nitroglycerin and Ranexa  Potassium > 4 , Magnesium >2  CHF (congestive heart failure) ([facility])  Wt Readings from Last 3 Encounters:   05/05/25 69.9 kg (154 lb)   03/20/25 69.9 kg (154 lb)   03/13/25 70.8 kg (156 lb)   Management as above under hypertension and coronary artery disease  Plan  Daily weights  Strict I/Os  Asthma  Albuterol 2 puffs Q4 PRN and breo ellipta 1 puff daily  Plan  Duoneb nebs  Continue albuterol and beo ellipta   Type 2 diabetes mellitus with hyperglycemia, without long-term current use of insulin ([facility])  Lab Results   Component Value Date    HGBA1C 7.0 (A) 03/20/2025   No results for input(s): \"POCGLU\" in the last 72 hours.  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 Patient states that she reports a \"bad cough\" for the last 2 weeks. She says she thinks he got sick from someone at [facility] on 6/1 and notes that on that day she started coughing up white sputum into the trash can. Symptoms persisting since then and has associated dyspnea on exertion. She was endorsing some rhinorrhea but denies any congestion, fevers, sore throat, chest pain, palpitations, acute leg swelling, abdominal pain, nausea or vomiting. She denies any DVT or PE risk factors. She lives with her son who helps her with some ADLs, patient independently feeds and bathes.     In the ER she received albuterol and ipratropium with improvement in her symptoms.  Flu/COVID/RSV panel pending.  White blood cell 5.96.   CMP notable for creatinine of 2.08 and BUN 48,  Troponin 47>45>49   Temperature 97.6 pulse 54 respiratory rate 20 blood pressure 165/117 and 100% on room air. CXR per my read-cardiomegaly possible consolidation in the left lower lobe/left medial lobe possible vascular congestion.  EKG sinus rhythm with first-degree AV block.    Review of Systems   Constitutional: Negative.    HENT:  Positive for congestion and rhinorrhea. Negative for ear discharge, ear pain, hearing loss, sinus pressure, sinus pain, sneezing, sore throat, tinnitus, trouble swallowing and voice change.    Eyes: Negative.    Respiratory:  Positive for cough and shortness of breath. Negative for apnea, choking, chest tightness, wheezing and stridor.    Cardiovascular:  Positive for leg swelling. Negative for chest pain and palpitations.   Gastrointestinal: Negative.    Endocrine: Negative.    Genitourinary:  " Positive for dysuria. Negative for decreased urine volume, hematuria and urgency.   Musculoskeletal:  Negative for arthralgias, back pain and joint swelling.   Skin: Negative.    Allergic/Immunologic: Negative.    Neurological: Negative.    Hematological: Negative.    Psychiatric/Behavioral: Negative.       Historical Information   Past Medical History[1]  Past Surgical History[2]  Social History[3]  E-Cigarette/Vaping    E-Cigarette Use Never User      E-Cigarette/Vaping Substances    Nicotine No     THC No     CBD No     Flavoring No     Other No     Unknown No      Family History[4]    Objective :  Temp:  [97.6 °F (36.4 °C)] 97.6 °F (36.4 °C)  HR:  [54-80] 72  BP: (155-165)/() 159/61  Resp:  [18-20] 18  SpO2:  [99 %-100 %] 100 %  O2 Device: None (Room air)    Intake & Output:  I/O       None          Weights:        There is no height or weight on file to calculate BMI.  Weight (last 2 days)       None          Physical Exam  Vitals reviewed.   Constitutional:       General: She is not in acute distress.     Appearance: Normal appearance. She is not ill-appearing, toxic-appearing or diaphoretic.   HENT:      Head: Normocephalic and atraumatic.     Eyes:      Pupils: Pupils are equal, round, and reactive to light.       Cardiovascular:      Rate and Rhythm: Normal rate and regular rhythm.      Pulses: Normal pulses.      Heart sounds: Normal heart sounds.   Pulmonary:      Effort: Pulmonary effort is normal.      Breath sounds: Examination of the right-lower field reveals decreased breath sounds. Examination of the left-lower field reveals decreased breath sounds. Decreased breath sounds present.      Comments: Stacked breaths noted  Abdominal:      General: There is no distension.      Palpations: Abdomen is soft. There is no mass.      Tenderness: There is no abdominal tenderness. There is no guarding or rebound.      Hernia: No hernia is present.     Musculoskeletal:      Left lower leg: Edema present.       Comments: Edema noted to BLEs. Patient reports that is her baseline. R mildly > L, no calf tenderness or warmth noted.     Skin:     General: Skin is warm and dry.      Capillary Refill: Capillary refill takes less than 2 seconds.     Neurological:      General: No focal deficit present.      Mental Status: She is alert and oriented to person, place, and time.     Psychiatric:         Mood and Affect: Mood normal.         Behavior: Behavior normal.         Thought Content: Thought content normal.         Judgment: Judgment normal.           Lab Results: I have reviewed the following results:  Recent Labs     06/14/25  1608 06/14/25  1820   WBC 5.96  --    HGB 11.3*  --    HCT 36.1  --      --    SODIUM 140  --    K 4.2  --      --    CO2 27  --    BUN 48*  --    CREATININE 2.08*  --    GLUC 142*  --    AST 19  --    ALT 21  --    ALB 3.9  --    TBILI 0.78  --    ALKPHOS 68  --    HSTNI0 47  --    HSTNI2  --  45     Micro:  Lab Results   Component Value Date    URINECX >100,000 cfu/ml Klebsiella pneumoniae (A) 10/17/2023    URINECX 80,000-89,000 cfu/ml Serratia marcescens (A) 05/25/2023    URINECX <10,000 cfu/ml 05/25/2023       Imaging Results Review: I reviewed radiology reports from this admission including: chest xray.  Other Study Results Review: EKG was reviewed.     Currently Ordered Meds:   Current Facility-Administered Medications:     acetaminophen (TYLENOL) tablet 975 mg, Q8H PRN    albuterol (PROVENTIL HFA,VENTOLIN HFA) inhaler 2 puff, Q4H PRN    [START ON 6/15/2025] atorvastatin (LIPITOR) tablet 40 mg, Daily    [START ON 6/15/2025] carvedilol (COREG) tablet 6.25 mg, BID With Meals    [START ON 6/15/2025] clopidogrel (PLAVIX) tablet 75 mg, Daily    [START ON 6/15/2025] Fluticasone Furoate-Vilanterol 100-25 mcg/actuation 1 puff, Daily    furosemide (LASIX) 120 mg in dextrose 5 % 50 mL IVPB, Once    [START ON 6/15/2025] insulin lispro (HumALOG/ADMELOG) 100 units/mL subcutaneous injection 1-5  "Units, TID AC **AND** [START ON 6/15/2025] Fingerstick Glucose (POCT), TID AC    ipratropium-albuterol (DUO-NEB) 0.5-2.5 mg/3 mL inhalation solution 3 mL, Q8H    [START ON 6/15/2025] losartan (COZAAR) tablet 25 mg, Daily    [START ON 6/15/2025] metolazone (ZAROXOLYN) tablet 5 mg, Daily    montelukast (SINGULAIR) tablet 10 mg, HS    nitroglycerin (NITROSTAT) SL tablet 0.4 mg, Q5 Min PRN    [START ON 6/15/2025] polyethylene glycol (MIRALAX) packet 17 g, Daily    ranolazine (RANEXA) 12 hr tablet 500 mg, BID    [START ON 6/15/2025] rivaroxaban (XARELTO) tablet 2.5 mg, BID With Meals    [Held by provider] torsemide (DEMADEX) tablet 60 mg, Daily  VTE Pharmacologic Prophylaxis: VTE covered by:  [START ON 6/15/2025] rivaroxaban, Oral     VTE Mechanical Prophylaxis: sequential compression device    Administrative Statements     Portions of the record may have been created with voice recognition software.  Occasional wrong word or \"sound a like\" substitutions may have occurred due to the inherent limitations of voice recognition software.  Read the chart carefully and recognize, using context, where substitutions have occurred.  ==  Padilla Gonzalez MD  [facility]  Internal Medicine Residency PGY-1         [1]   Past Medical History:  Diagnosis Date    ACE-inhibitor cough     last assessed - 29Dec2017    Asthma     Bilateral edema of lower extremity     last assessed - 21Aug2017    Cardiac murmur     last assessed - 21Aug2017    CKD (chronic kidney disease)     Diabetes mellitus (HCC)     last assessed - 29Nov2017    Esophageal dysphagia     Fatigue     last assessed - 21Aug2017    Hyperlipidemia     Hypertension     Patellar bursitis of right knee     last assessed - 04Nov2016    Personal history of other specified conditions     presenting hazards to health    Stroke (HCC)     Stroke syndrome     Urinary frequency     UTI (urinary tract infection)    [2]   Past Surgical History:  Procedure " Laterality Date    CARDIAC CATHETERIZATION N/A 2023    Procedure: Cardiac Coronary Angiogram;  Surgeon: Roe German MD;  Location:  CARDIAC CATH LAB;  Service: Cardiology    CARDIAC CATHETERIZATION  2023    Procedure: Cardiac Left Heart Cath;  Surgeon: Roe German MD;  Location:  CARDIAC CATH LAB;  Service: Cardiology    HI ESOPHAGOGASTRODUODENOSCOPY TRANSORAL DIAGNOSTIC N/A 2017    Procedure: ESOPHAGOGASTRODUODENOSCOPY (EGD);  Surgeon: Cedric Huang MD;  Location:  GI LAB;  Service: Gastroenterology   [3]   Social History  Tobacco Use    Smoking status: Former     Current packs/day: 0.00     Types: Cigarettes     Quit date:      Years since quittin.4    Smokeless tobacco: Former    Tobacco comments:     quit over 30 yrs ago; (quit in the , had smoked 3PPD for 20 years - per [employer])   Vaping Use    Vaping status: Never Used   Substance and Sexual Activity    Alcohol use: Never    Drug use: Never    Sexual activity: Not Currently   [4]   Family History  Problem Relation Name Age of Onset    Hypertension Mother      Stroke Mother      Heart disease Father      Other Sister          1)Breast disorder; 2)Epilepsy    Migraines Sister          Migraine headaches    Osteoporosis Sister      Thyroid disease Sister      Coronary artery disease Sister          S/P triple vessel bypass    Breast cancer Sister [place] 80    No Known Problems Sister      No Known Problems Sister      Osteoporosis Maternal Grandmother      No Known Problems Maternal Grandfather      No Known Problems Paternal Grandmother      No Known Problems Paternal Grandfather      Diabetes Son          Diabetes mellitus    Hypertension Son      Rheum arthritis Son          Rheumatic disease    No Known Problems Son      No Known Problems Son      No Known Problems Son      No Known Problems Son      No Known Problems Son      No Known Problems Maternal Aunt      No Known Problems Maternal Aunt      No Known  Problems Maternal Aunt      No Known Problems Paternal Aunt      No Known Problems Paternal Aunt      Heart disease Family

## 2025-06-15 NOTE — DISCHARGE SUMMARY
"Discharge Summary - Internal Medicine   Name: Jennifer Amaya 84 y.o. female I MRN: [id number]  Unit/Bed#: ED 17 I Date of Admission: 6/14/2025   Date of Service: 6/15/2025 I Hospital Day: 1    Assessment & Plan  SOB (shortness of breath)  Patient states that she reports a \"bad cough\"  for the last 2 weeks. She says she thinks he got sick from someone at [employer] on 6/1 and notes that on that day she started coughing up white sputum into the trash can.  Endorses dyspnea on exertion, rhinorrhea.  Denies any chest pain, shortness of breath, palpitations, headaches, vision changes, nausea, vomiting, diarrhea.  Patient says that this does feel like asthma exacerbation she has had in the past.  In the ER she received albuterol and ipratropium with improvement in her symptoms.  Flu/COVID/RSV panel pending.  White blood cell 5.96.   CMP notable for creatinine of 2.08 and BUN 48,  Troponin 47>45>49   Temperature 97.6 pulse 54 respiratory rate 20 blood pressure 165/117 and 100% on room air. CXR showed no acute cardiopulmonary disease.  EKG sinus rhythm with first-degree AV block. Patient had stacked breaths on a few occasions while taking her history.  While in the ED, patient received 1 x IV Lasix 120 mg.  COVID/flu/RSV was negative  Ddx: Asthma exacerbation v.  CHF exacerbation v.  Viral respiratory illness  Most likely multifactorial given patient reporting sick contact 2 weeks ago and history of CHF and asthma exacerbations in the past. Stacked breaths could indicate asthma exacerbation. Unlikely to be PNA in setting of patient being afebrile.    Plan  Continue butyryl inhaler as needed for wheezing  Continue torsemide 60 mg daily at home  Continue metolazone  HTN (hypertension)  Home losartan 25 mg daily, Coreg 6.25mg BID , torsemide 60mg daily, metolazone weekly/prn for weight gain of>5lbs  Stage 3b chronic kidney disease (HCC)  Lab Results   Component Value Date    EGFR 22 06/15/2025    EGFR 21 06/14/2025    EGFR 31 " "05/31/2024    CREATININE 1.97 (H) 06/15/2025    CREATININE 2.08 (H) 06/14/2025    CREATININE 1.51 (H) 05/31/2024   Baseline appears to be 1.5 -1.7  Ddx: Worsening kidney function due to progression of CKD vs. CAROL on CKD  Plan  Avoid nephrotoxic agents  Avoid hypotension  Trend Creatinine  Albumin/Creatine ratio  Coronary artery disease involving native coronary artery  NSTEMI w/ PCI 2021, with reccurent thrombosis   Stress test 3/25 - \"There is a left ventricular perfusion defect that is large in size with severe reduction in uptake present in the mid anterior wall extending to the entire LV apex that is partially reversible.\" EF 41%  ECHO 3/25 - EF 42%, moderate concentric hypertrophy, systolic function moderately reduced, moderate global hypokinesis with regional variation  Lipitor 40mg  Xarelto 2.5mg BID  Plavix 75mg daily  NTG 0.4mg SL PRN  Ranexa 500 mg Q12  Plan  Continue statin, Xarelto, Plavix, nitroglycerin and Ranexa  Potassium > 4 , Magnesium >2  CHF (congestive heart failure) (Formerly [facility])  Wt Readings from Last 3 Encounters:   05/05/25 69.9 kg (154 lb)   03/20/25 69.9 kg (154 lb)   03/13/25 70.8 kg (156 lb)   Management as above under hypertension and coronary artery disease  Plan  Daily weights  Strict I/Os  Asthma  Albuterol 2 puffs Q4 PRN and breo ellipta 1 puff daily  Plan  Continue albuterol and beo ellipta   Type 2 diabetes mellitus with hyperglycemia, without long-term current use of insulin ([facility])  Lab Results   Component Value Date    HGBA1C 7.0 (A) 03/20/2025     Recent Labs     06/15/25  0629 06/15/25  0823   POCGLU 98 105     Blood Sugar Average: Last 72 hrs:  PTA trajenta 5mg daily and prandin 0.5mg BID before meals   Mixed hyperlipidemia  PTA Lipitor 40mg qhs  Plan  Lipid Panel  Continue lipitor    Admission Date: 6/14/2025 1517  Discharge Date: 06/15/25  Admitting Diagnosis: Cough [R05.9]  Discharge Diagnosis:   Medical Problems       Resolved Problems  Date Reviewed: 5/5/2025   None         HPI: " "Per Dr. 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 Patient states that she reports a \"bad cough\" for the last 2 weeks. She says she thinks he got sick from someone at [employer] on 6/1 and notes that on that day she started coughing up white sputum into the trash can. Symptoms persisting since then and has associated dyspnea on exertion. She was endorsing some rhinorrhea but denies any congestion, fevers, sore throat, chest pain, palpitations, acute leg swelling, abdominal pain, nausea or vomiting. She denies any DVT or PE risk factors. She lives with her son who helps her with some ADLs, patient independently feeds and bathes.      In the ER she received albuterol and ipratropium with improvement in her symptoms.  Flu/COVID/RSV panel pending.  White blood cell 5.96.   CMP notable for creatinine of 2.08 and BUN 48,  Troponin 47>45>49   Temperature 97.6 pulse 54 respiratory rate 20 blood pressure 165/117 and 100% on room air. CXR per my read-cardiomegaly possible consolidation in the left lower lobe/left medial lobe possible vascular congestion.  EKG sinus rhythm with first-degree AV block.\"     Procedures Performed: No orders of the defined types were placed in this encounter.      Summary of Hospital Course:  Patient seen and examined at bedside.  Patient states that she continues to have a cough productive with white sputum.  She also reports resolution of her wheezing and dyspnea after receiving DuoNebs and IV Lasix 120 mg.  She denies fever, chills, chest tightness, palpitations, abdominal pain.  She feels like she is at her baseline. She states that she feels comfortable and safe with getting discharged today.    Significant Findings, Care, Treatment and Services Provided: None    Complications: None    Discharge Day Visit / " Exam:   /57   Pulse 60   Temp 97.6 °F (36.4 °C) (Temporal)   Resp 20   SpO2 98%   Physical Exam  Vitals and nursing note reviewed.   Constitutional:       General: She is not in acute distress.     Appearance: Normal appearance. She is well-developed. She is not ill-appearing or toxic-appearing.   HENT:      Head: Normocephalic and atraumatic.     Eyes:      Conjunctiva/sclera: Conjunctivae normal.       Cardiovascular:      Rate and Rhythm: Normal rate and regular rhythm.      Heart sounds: No murmur heard.     No friction rub. No gallop.   Pulmonary:      Effort: Pulmonary effort is normal. No respiratory distress.      Breath sounds: Normal breath sounds. No wheezing, rhonchi or rales.   Abdominal:      General: There is no distension.      Palpations: Abdomen is soft.      Tenderness: There is no abdominal tenderness.     Musculoskeletal:         General: No swelling.      Cervical back: Neck supple.     Skin:     General: Skin is warm and dry.     Neurological:      Mental Status: She is alert and oriented to person, place, and time.     Psychiatric:         Mood and Affect: Mood normal.         Behavior: Behavior normal.          Condition at Discharge: stable       Discharge instructions/Information to patient and family:   See After Visit Summary (AVS) for information provided to patient and family.      Provisions for Follow-Up Care:  See after visit summary for information related to follow-up care and any pertinent home health orders.      PCP: Carmen Molina DO    Disposition: Home    Planned Readmission: No     Discharge Medications:  See after visit summary for reconciled discharge medications provided to patient and family.      Discharge Statement:  I have spent a total time of 30 minutes in caring for this patient on the day of the visit/encounter. .

## 2025-06-15 NOTE — PROGRESS NOTES
INTERNAL MEDICINE RESIDENCY SENIOR ADMISSION NOTE     Name: Jennifer Amaya   Age & Sex: 84 y.o. female   MRN: [id number]  Unit/Bed#: Z5HB   Encounter: [id number]  Primary Care Provider: Carmen Molina DO    Admit to team: SOD Team A    Patient seen and examined. Reviewed H&P per Dr. Gonzalez . Agree with the assessment and plan with any exception/addition as noted below:    Principal Problem:    SOB (shortness of breath)  Active Problems:    Type 2 diabetes mellitus with hyperglycemia, without long-term current use of insulin (HCC)    Mixed hyperlipidemia    Stage 3b chronic kidney disease (HCC)    Coronary artery disease involving native coronary artery    HTN (hypertension)    Asthma    SOB (shortness of breath)  Patient presenting for 2 weeks of shortness of breath with sputum production and cough   Denies any other flulike symptoms, negative leukocytosis, on room air  Improved with DuoNebs in the ED  Chest x-ray shows no obvious consolidation or significant pulmonary edema or effusions  ECHO 3/25 - EF 42%, moderate concentric hypertrophy, systolic function moderately reduced, moderate global hypokinesis with regional variation  EKG sinus rhythm with first-degree AV block.    Oxygen to keep SpO2 greater 92%  Follow-up on respiratory panel  Will give Lasix 120 x 1, continue home torsemide based on clinical response  DuoNeb nebulizers  Follow-up on chest x-ray official read  Monitor for fevers or leukocytosis.  DuoNebs  PT OT    HTN (hypertension)  Home losartan 25 mg daily, Coreg 6.25mg BID     Continue metolazone  Continue losartan and coreg    Stage 3b chronic kidney disease (HCC)        Lab Results   Component Value Date     EGFR 21 06/14/2025     EGFR 31 05/31/2024     EGFR 32 10/17/2023     CREATININE 2.08 (H) 06/14/2025     CREATININE 1.51 (H) 05/31/2024     CREATININE 1.48 (H) 10/17/2023   Baseline appears to be 1.5 -1.7  Ddx: Worsening kidney function due to progression of CKD vs. CAROL on CKD  Plan  Avoid  "nephrotoxic agents  Avoid hypotension  Trend Creatinine  Albumin/Creatine ratio    Coronary artery disease involving native coronary artery  NSTEMI w/ PCI 2021, with reccurent thrombosis   Stress test 3/25 - \"There is a left ventricular perfusion defect that is large in size with severe reduction in uptake present in the mid anterior wall extending to the entire LV apex that is partially reversible.\" EF 41%  ECHO 3/25 - EF 42%, moderate concentric hypertrophy, systolic function moderately reduced, moderate global hypokinesis with regional variation    Continue home statin, Xarelto, Plavix, nitroglycerin and Ranexa    CHF (congestive heart failure) (Former[facility])  ECHO 3/25 - EF 42%, moderate concentric hypertrophy, systolic function moderately reduced, moderate global hypokinesis with regional variation  Patient complaining of shortness of breath with rales on exam    Will give 1 dose of Lasix 120 and reassess urine output  Based on symptomatic improvement will restart home torsemide 60 and metolazone  Daily weights  Strict I/Os    Asthma  Albuterol 2 puffs Q4 PRN and breo ellipta 1 puff daily    Duoneb nebs  Continue beo ellipta     Type 2 diabetes mellitus with hyperglycemia, without long-term current use of insulin (Former[facility])  A1c of 7  PTA trajenta 5mg daily and prandin 0.5mg BID before meals     Consisten carb diet  SSI    Mixed hyperlipidemia  PTA Lipitor 40mg qhs    Lipid Panel  Continue lipitor      Code Status: Level 1 - Full Code  Admission Status: INPATIENT   Disposition: Patient requires Med/Surg  Expected Length of Stay: 24-48     The patient is 84 old female past medical history of hypertension, hyperlipidemia type 2 diabetes, CAD asthma, history of CVA, pulmonary artery aneurysm presented for 2-week history of cough, short of breath, white sputum production.  She denies any flulike symptoms.  She denies any worsening lower extremity edema.  She states she has not had symptoms like this prior.  She denies any " fevers, chills, nausea, vomiting, diarrhea.  She states she is constipated as well.  She states she lives at home with her son who helps with IADLs but is independent of ADLs.    Upon arrival to ED patient was afebrile pulse of 80, respirate of 19, blood pressure 135/67 on room air.  CMP revealed elevated creatinine at 2.08, troponin initially of 40 7 repeat of 45, and 4-hour troponin of 49.  CBC grossly unremarkable.  She was given DuoNebs in the ED and admitted for further evaluation.    Physical Exam  Vitals and nursing note reviewed.   Constitutional:       General: She is not in acute distress.     Appearance: Normal appearance. She is well-developed. She is not ill-appearing or toxic-appearing.   HENT:      Head: Normocephalic and atraumatic.     Eyes:      Conjunctiva/sclera: Conjunctivae normal.       Cardiovascular:      Rate and Rhythm: Normal rate and regular rhythm.      Heart sounds: No murmur heard.  Pulmonary:      Effort: Pulmonary effort is normal. No respiratory distress.      Breath sounds: Rales present. No wheezing.   Abdominal:      General: There is no distension.      Palpations: Abdomen is soft.      Tenderness: There is no abdominal tenderness. There is no guarding.     Musculoskeletal:      Cervical back: Neck supple.      Right lower leg: Edema present.      Left lower leg: Edema present.     Skin:     General: Skin is warm and dry.     Neurological:      Mental Status: She is alert and oriented to person, place, and time.     Psychiatric:         Mood and Affect: Mood normal.         Behavior: Behavior normal.

## 2025-06-15 NOTE — RESPIRATORY THERAPY NOTE
RT Protocol Note  Jennifer Amaya 84 y.o. female MRN: [id number]  Unit/Bed#: ED 17 Encounter: [id number]    Assessment    Principal Problem:    SOB (shortness of breath)  Active Problems:    Type 2 diabetes mellitus with hyperglycemia, without long-term current use of insulin (HCC)    Mixed hyperlipidemia    Stage 3b chronic kidney disease (HCC)    Coronary artery disease involving native coronary artery    HTN (hypertension)    Asthma    CHF (congestive heart failure) (HCC)      Home Pulmonary Medications:  yes   06/15/25 0100   Respiratory Protocol   Protocol Initiated? Yes   Protocol Selection Respiratory   Language Barrier? No   Medical & Social History Reviewed? Yes   Diagnostic Studies Reviewed? Yes   Physical Assessment Performed? Yes   Respiratory Plan Mild Distress pathway   Respiratory Assessment   Assessment Type Assess only   General Appearance Awake;Alert   Respiratory Pattern Shallow;Dyspnea at rest   Chest Assessment Chest expansion symmetrical   Bilateral Breath Sounds Diminished   R Breath Sounds Expiratory wheezes   L Breath Sounds Crackles   Cough Strong   Resp Comments Assessment of Respiratory protocol. Patient came to ED short of breath andwith cough x 2 wks.Currently BS Diminish scattered few wheezes and crackles.. Xrays shows possible consolidation in the LLL/LML and possible vascular congestion. 97% on R/A. Patient said she has a hx of Asthma but did not take anything for that in years.Patient Negative for Covid, Flu, RSV. Patient has proventil inhaler but does not really need it she said. Will order Xopenex/Atrovent TID and Albuterol prn.            Past Medical History[1]  Social History[1]    Subjective         Objective    Physical Exam:   Assessment Type: Assess only  General Appearance: Awake, Alert  Respiratory Pattern: Shallow, Dyspnea at rest  Chest Assessment: Chest expansion symmetrical  Bilateral Breath Sounds: Diminished  R Breath Sounds: Expiratory wheezes  L Breath Sounds:  Crackles  Cough: Strong    Vitals:  Blood pressure 130/63, pulse 60, temperature 97.6 °F (36.4 °C), temperature source Temporal, resp. rate 20, SpO2 96%, not currently breastfeeding.          Imaging and other studies:           Plan    Respiratory Plan: Mild Distress pathway        Resp Comments: (P) Assessment of Respiratory protocol. Patient came to ED short of breath andwith cough x 2 wks.Currently BS Diminish scattered few wheezes and crackles.. Xrays shows possible consolidation in the LLL/LML and possible vascular congestion. 97% on R/A. Patient said she has a hx of Asthma but did not take anything for that in years.Patient Negative for Covid, Flu, RSV. Patient has proventil inhaler but does not really need it she said. Will order Xopenex/Atrovent TID and Albuterol prn.        [1]   Past Medical History:  Diagnosis Date    ACE-inhibitor cough     last assessed - 2017    Asthma     Bilateral edema of lower extremity     last assessed - 2017    Cardiac murmur     last assessed - 2017    CKD (chronic kidney disease)     Diabetes mellitus (HCC)     last assessed - 2017    Esophageal dysphagia     Fatigue     last assessed - 2017    Hyperlipidemia     Hypertension     Patellar bursitis of right knee     last assessed - 2016    Personal history of other specified conditions     presenting hazards to health    Stroke (HCC)     Stroke syndrome     Urinary frequency     UTI (urinary tract infection)    [1]   Social History  Socioeconomic History    Marital status:     Number of children: 8   Occupational History    Occupation: Retired   Tobacco Use    Smoking status: Former     Current packs/day: 0.00     Types: Cigarettes     Quit date:      Years since quittin.4    Smokeless tobacco: Former    Tobacco comments:     quit over 30 yrs ago; (quit in the , had smoked 3PPD for 20 years - per [employer])   Vaping Use    Vaping status: Never Used   Substance and Sexual  Activity    Alcohol use: Never    Drug use: Never    Sexual activity: Not Currently   Social History Narrative    Diet needs improvement    Does not exercise    No caffeine use     Social Drivers of Health     Financial Resource Strain: Low Risk  (3/20/2025)    Overall Financial Resource Strain (CARDIA)     Difficulty of Paying Living Expenses: Not hard at all   Food Insecurity: No Food Insecurity (3/20/2025)    Nursing - Inadequate Food Risk Classification     Worried About Running Out of Food in the Last Year: Never true     Ran Out of Food in the Last Year: Never true   Transportation Needs: No Transportation Needs (3/20/2025)    PRAPARE - Transportation     Lack of Transportation (Medical): No     Lack of Transportation (Non-Medical): No   Physical Activity: Inactive (4/1/2021)    Exercise Vital Sign     Days of Exercise per Week: 0 days     Minutes of Exercise per Session: 0 min   Stress: No Stress Concern Present (4/1/2021)    [employer] [place] [employer] - Occupational Stress Questionnaire     Feeling of Stress : Not at all   Social Connections: Socially Isolated (4/1/2021)    Social Connection and Isolation Panel     Frequency of Communication with Friends and Family: Once a week     Frequency of Social Gatherings with Friends and Family: Once a week     Attends [employer] Services: 1 to 4 times per year     Active Member of Clubs or Organizations: No     Attends Club or Organization Meetings: Never     Marital Status:    Intimate Partner Violence: Not At Risk (4/1/2021)    Humiliation, Afraid, Rape, and Kick questionnaire     Fear of Current or Ex-Partner: No     Emotionally Abused: No     Physically Abused: No     Sexually Abused: No   Housing Stability: Low Risk  (3/20/2025)    Housing Stability Vital Sign     Unable to Pay for Housing in the Last Year: No     Number of Times Moved in the Last Year: 1     Homeless in the Last Year: No

## 2025-06-16 ENCOUNTER — TRANSITIONAL CARE MANAGEMENT (OUTPATIENT)
Dept: INTERNAL MEDICINE CLINIC | Facility: CLINIC | Age: 85
End: 2025-06-16

## 2025-06-16 NOTE — UTILIZATION REVIEW
"Initial Clinical Review    Admission: Date/Time/Statement:   Admission Orders (From admission, onward)       Ordered        06/14/25 1923  INPATIENT ADMISSION  Once                          Orders Placed This Encounter   Procedures    INPATIENT ADMISSION     Standing Status:   Standing     Number of Occurrences:   1     Level of Care:   Med Surg [16]     Estimated length of stay:   More than 2 Midnights     Certification:   I certify that inpatient services are medically necessary for this patient for a duration of greater than two midnights. See H&P and MD Progress Notes for additional information about the patient's course of treatment.     ED Arrival Information       Expected   6/14/2025     Arrival   6/14/2025 14:03    Acuity   Urgent              Means of arrival   Walk-In    Escorted by   Family Member    Service   Hospitalist    Admission type   Emergency              Arrival complaint   Cough             Chief Complaint   Patient presents with    Cough     Patient reports \"bad cough\" that brings up white phlegm. Reports it's been going on for weeks.        Initial Presentation: 84 y.o. female with PMHx including HTN, HLD, T2DM, CHF, CAD, asthma, CVA with left sided weakness, pulmonary artery aneurysm who presented on 6/14/25 to ED due to cough for 2 weeks, white sputum and associated dyspnea on exertion after being exposed to sick person. In the ED she received albuterol and ipratropium with improvement in her symptoms.  Flu/COVID/RSV panel pending.  White blood cell 5.96. CMP notable for creatinine of 2.08 and BUN 48,  Troponin 47>45>49   Temperature 97.6 pulse 54 respiratory rate 20 blood pressure 165/117 and 100% on room air. CXR cardiomegaly possible consolidation in the left lower lobe/left medial lobe possible vascular congestion.  EKG sinus rhythm with first-degree AV block.    Plan:  Admit Inpatient status Dx Shortness of Breath:   med surg, Duoneb txs, monitor resp status, O2 sats, supplemental O2 " .  [facility] [place]  [facility]  Cardiac transplant Clinic visit    Date of visit: 8/18/2017  Patient Name: Ifeanyi Overton  Medical Record Number: [id number]  YOB: 1936   Primary Physician: Marcus Mcclure MD    CC:    Chief Complaint   Patient presents with   • Heart Transplant       SUBJECTIVE     HISTORY OF PRESENT ILLNESS:  Ifeanyi Overton is a 80 year old male who presents to the clinic with the following CV history:    6/2000: s/p orthotopic heart transplant    Immunosuppressed on tacrolimus and mycophenolate. Also treated on valsartan 160 mg daily and verapamil 180 mg twice a day.    Since Last Visit:  He is unaccompanied    He presents to clinic today stating he is doing well. He denies any SOB at rest, with ADLs or activities. 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 Denies any regular exercise however. He denies any chest pain, palpitations, dizziness or lightheadedness, orthopnea, PND, lower extremity swelling or abdominal bloating. He has developed more fatigue and is concerned about this. He has not had a coronary study for a long time and we will do a coronary study as per protocol. He is compliant with all outpatient clinical care. He had biopsy of a suspicious lesion on his back which revealed a melanoma and he is to have a wide resection soon. He also needs cataract surgery and that is to be done in the near future.    Refuses colonoscopy.    He is currently a New York Heart Association class [x] 1 [] 2 [] 3 [] 4    TRANSPLANT MEDICATIONS  [x]CELLEPT [] CYCLOSPORIN [] MYFORTIC [x] PROGRAF  []PREDNISONE []RAPAMYCIN   [x] ASA [x] STATIN    BLOOD PRESSURE MEDICATIONS   [] BB [] ACEi [x] ARB [x] CCB [] MRA [] Nitrate  [] Hydralazine [] DIG [] Diuretic    DEVICES   [] PPM     Frequency / Description   []  YES    [x]  NO Orthopnea:    []  YES    [x]  NO PND:   []  YES    [x]  NO Pedal edema:   []  YES    [x]  NO Abd  Swelling:   []  YES    [x]  NO Hosp:   []  YES    [x]  NO ER Visit:   []  YES    [x]  NO Weight gain:  []  YES    [x]  NO  Fever:   []  YES    [x]  NO  Chills:   []  YES    [x]  NO  Cough:   []  YES    [x]  NO  Diarrhea:   []  YES    [x]  NO  Headache:  .  []  YES    [x]  NO  Tremors:  []  YES    [x]  NO  Mood Swing:   []  YES    [x]  NO  Bleeding:    []  YES    [x]  NO  Bruising:     []  YES    [x]  NO  Skin Lesion:  []  YES    [x]  NO Other:    COMPLIANCE   Description   [x]  YES    []  NO Med:   [x]  YES    []  NO Diet:    REVIEW OF SYSTEMS:  6 point review of systems was completed and is negative except as stated above.    MEDICATIONS  Outpatient Prescriptions Marked as Taking for the 8/18/17 encounter (Office Visit) with Ifeanyi Perez, DO   Medication Sig Dispense Refill   • latanoprost (XALATAN) 0.005 % ophthalmic solution Instill 1 drop into both eyes at bedtime as directed 5 mL 6   • verapamil (CALAN SR, ISOPTIN SR) 180 MG CR tablet Take 1 tablet by mouth 2 times daily. 180 tablet 0   • levothyroxine (SYNTHROID, LEVOTHROID) 50 MCG tablet Take 1 tablet by mouth daily. 90 tablet 3   • cephALEXin (KEFLEX) 500 MG capsule Take 4 capsules one hour before your surgical appointment 4 capsule 0   • hydroCORTisone (CORTIZONE) 2.5 % cream Apply to skin fold irritation twice daily with 2% ketoconazole cream until clear, then as needed. 56 g 4   • ketoconazole (NIZORAL) 2 % cream Apply to skin fold irritation twice daily with 2-1/2% hydrocortisone cream until clear, then as needed. 60 g 4   • mycophenolate (CELLCEPT) 500 MG tablet Take 2 tablets by mouth 2 times daily. 360 tablet 3   • tacrolimus (PROGRAF) 0.5 MG capsule TAKE 1 CAPSULE BY MOUTH TWICE DAILY 180 capsule 3   • allopurinol (ZYLOPRIM) 100 MG tablet Take 2 tablets by mouth daily. 60 tablet 11   • valsartan (DIOVAN) 160 MG tablet Take 1 tablet by mouth daily. 90 tablet 3   • Magnesium 400 MG Cap Take 400 mg by mouth 2 times daily.     • VITAMIN D,  prn. Give diuresis, monitor for fever or leukocytosis, continue home meds. Monitor blood sugars and start SSI.     Date: 6/15   Day 2:  Per Internal Med DC Note: Pulmonary effort is normal. No respiratory distress. Normal breath sounds. No wheezing, rhonchi or rales. Patient states that she continues to have a cough productive with white sputum.  She also reports resolution of her wheezing and dyspnea after receiving DuoNebs and IV Lasix 120 mg.  She denies fever, chills, chest tightness, palpitations, abdominal pain.  She feels like she is at her baseline. She states that she feels comfortable and safe with getting discharged today.     ED Treatment-Medication Administration from 06/14/2025 1403 to 06/16/2025 0824         Date/Time Order Dose Route Action     06/14/2025 1609 albuterol inhalation solution 5 mg 5 mg Nebulization Given     06/14/2025 1609 ipratropium (ATROVENT) 0.02 % inhalation solution 0.5 mg 0.5 mg Nebulization Given     06/15/2025 0827 atorvastatin (LIPITOR) tablet 40 mg 40 mg Oral Given     06/15/2025 0921 carvedilol (COREG) tablet 6.25 mg 6.25 mg Oral Given     06/15/2025 0827 clopidogrel (PLAVIX) tablet 75 mg 75 mg Oral Given     06/15/2025 0827 losartan (COZAAR) tablet 25 mg 25 mg Oral Given     06/15/2025 0921 metolazone (ZAROXOLYN) tablet 5 mg 5 mg Oral Given     06/14/2025 2205 montelukast (SINGULAIR) tablet 10 mg 10 mg Oral Given     06/14/2025 2205 ranolazine (RANEXA) 12 hr tablet 500 mg 500 mg Oral Given     06/15/2025 0827 ranolazine (RANEXA) 12 hr tablet 500 mg 500 mg Oral Given     06/15/2025 0900 torsemide (DEMADEX) tablet 60 mg -- Oral Held Dose     06/15/2025 0921 rivaroxaban (XARELTO) tablet 2.5 mg 2.5 mg Oral Given     06/14/2025 2206 ipratropium-albuterol (DUO-NEB) 0.5-2.5 mg/3 mL inhalation solution 3 mL 3 mL Nebulization Given     06/14/2025 0350 furosemide (LASIX) 120 mg in dextrose 5 % 50 mL IVPB 120 mg Intravenous New Bag     06/15/2025 8468 levalbuterol (XOPENEX)  CHOLECALCIFEROL, PO Take 2,000 Int'l Units by mouth daily.     • Calcium-Vitamin D 500-125 MG-UNIT TABS Take by mouth 2 times daily. Takes 2 tabs in the AM & 1 tab in PM for 1500 mg QD     • polyethylene glycol (MIRALAX) powder Take 17 g by mouth as needed.       • Omeprazole (PRILOSEC) 20 MG OR CPDR 1 CAPSULE DAILY (dinner time) 0 0   • Aspirin 325 MG OR TABS 1 TABLET DAILY 0 0   • Multiple Vitamin (DAILY MULTIVITAMIN) OR TABS 1 TABLET DAILY 0 0     ALLERGIES:   Allergen Reactions   • Dopamine Hcl Other (See Comments)     Fired defibrillator   • Sulfa Antibiotics Other (See Comments)     Child - Unknown         OBJECTIVE      PHYSICAL EXAMINATION:  Vitals:    Visit Vitals  /88   Pulse 81   Temp 97.8 °F (36.6 °C) (Oral)   Ht 5' 11\" (1.803 m)   Wt 92.3 kg   SpO2 97%   BMI 28.38 kg/m²      BMI: Body mass index is 28.38 kg/m².  Constitutional: well nourished mature  male in no acute distress.  Skin: Warm, dry, intact, no lesions.  HEENT: Normocephalic, atraumatic. Oral mucous membranes moist, EOMs intact.  Neck: Supple, trachea midline, RAP< 5 cm, positive HJR  Cardiovascular: Normal S1, S2. regular rhythm at a rate 80. Murmur: none. negativeS3.  Respiratory: Anterior/posterior lung sounds Clear  to auscultation bilaterally.   Abdomen: Soft, nontender, negative hepatomegaly and normal bowel sounds.  Musculoskeletal/Extremities: CRT <3, no clubbing, no cyanosis, no edema.  Neurological: No focal neurological deficits and speech normal. Sensation grossly intact.  Psych: Alert and oriented to person, place and time.    LABORATORY:  Lab Results   Component Value Date    POTASSIUM 4.2 08/18/2017    SODIUM 140 08/18/2017    CO2 29 08/18/2017    CHLORIDE 105 08/18/2017    BUN 36 (H) 08/18/2017    CREATININE 1.39 (H) 08/18/2017    GLUCOSE 119 (H) 08/18/2017    CALCIUM 9.5 08/18/2017    WBC 7.6 08/18/2017    RBC 4.15 (L) 08/18/2017    HCT 38.2 (L) 08/18/2017    HGB 12.5 (L) 08/18/2017     08/18/2017    TSH  inhalation solution 1.25 mg 1.25 mg Nebulization Given     06/15/2025 0756 ipratropium (ATROVENT) 0.02 % inhalation solution 0.5 mg 0.5 mg Nebulization Given     06/15/2025 0827 potassium chloride (Klor-Con M20) CR tablet 40 mEq 40 mEq Oral Given     06/15/2025 0931 potassium chloride (Klor-Con M20) CR tablet 40 mEq 40 mEq Oral Given          Medications 06/14 06/15   albuterol inhalation solution 5 mg  Dose: 5 mg  Freq: Once Route: NEBULIZATION  Start: 06/14/25 1600 End: 06/14/25 1609    1609         atorvastatin (LIPITOR) tablet 40 mg  Dose: 40 mg  Freq: Daily Route: PO  Start: 06/15/25 0900 End: 06/15/25 1253     0827     1253-D/C'd      carvedilol (COREG) tablet 6.25 mg  Dose: 6.25 mg  Freq: 2 times daily with meals Route: PO  Start: 06/15/25 0730 End: 06/15/25 1253   Admin Instructions:      Order specific questions:        0921     3803-D/C'd      clopidogrel (PLAVIX) tablet 75 mg  Dose: 75 mg  Freq: Daily Route: PO  Start: 06/15/25 0900 End: 06/15/25 1253   Admin Instructions:        0881 8073-D/C'd      Fluticasone Furoate-Vilanterol 100-25 mcg/actuation 1 puff  Dose: 1 puff  Freq: Daily Route: IN  Start: 06/15/25 0900 End: 06/15/25 1253   Admin Instructions:        (5489) 9148-D/C'd      furosemide (LASIX) 120 mg in dextrose 5 % 50 mL IVPB  Dose: 120 mg  Freq: Once Route: IV  Last Dose: Stopped (06/15/25 0004)  Start: 06/14/25 2115 End: 06/15/25 0004   Admin Instructions:      Order specific questions:       0558 [C]      7916         insulin lispro (HumALOG/ADMELOG) 100 units/mL subcutaneous injection 1-5 Units  Dose: 1-5 Units  Freq: 3 times daily before meals Route: SC  Start: 06/15/25 0700 End: 06/15/25 1253   Admin Instructions:        (0403) 8402-D/C'd      ipratropium (ATROVENT) 0.02 % inhalation solution 0.5 mg  Dose: 0.5 mg  Freq: 3 times daily (RESP) Route: NEBULIZATION  Start: 06/15/25 0800 End: 06/15/25 1253     0756     1253-D/C'd      ipratropium (ATROVENT) 0.02 % inhalation  3.153 08/18/2017    CHOLESTEROL 142 08/18/2017    HDL 44 08/18/2017    CHOHDL 3.2 08/18/2017    TRIGLYCERIDE 185 (H) 08/18/2017    CALCLDL 61 08/18/2017    INR 1.1 03/16/2015    PSA 1.78 08/26/2016       DIAGNOSTICS:  The following diagnostics were reviewed:  Cardiac   7/7/2016 ECHO  Left ventricular ejection fraction, 64 %.  Normal left ventricular cavity size. Mildly increased left ventricular wall thickness.  Normal left ventricular systolic function. No regional wall motion abnormalities.    7/7/2016 Lexiscan  INTERPRETATION:  1.  Negative Lexiscan walking stress EKG, maximum heart rate 99 from 75.  2.  Nonspecific flushing but no angina, no chest pain.  3.  Sinus rhythm.  4.  Scan reported by Nuclear Medicine.    3/12/2015 Lexiscan  INTERPRETATION:  1. Negative Lexiscan EKG, maximum heart rate 100 from resting 78.  2. No angina.  3. Sinus rhythm.  4. Scan reported by nuclear medicine.    4/10/2014 ECHO  Left ventricular ejection fraction, 57 %.  Normal left ventricular cavity size. Mildly increased left ventricular wall thickness.  Normal left ventricular systolic function. No regional wall motion abnormalities.  Grade II/IV diastolic dysfunction, moderately elevated filling pressures.  Mild pulmonary hypertension, RVSP 38.9 mmHg. Normal right ventricular size and systolic function.  Left atrial appearance consistent with cardiac transplantation.    ASSESSMENT/PLAN:    OHTX:Normal allograft function. CAV 0   Continue goal directed immunotherapy    - FK goal 3-9   Continue protocol directed allograft surveillance    Hypertension: Controlled. On verapamil and valsartan    Melanoma on back to do wide excision per Dr. Mota.    CKD 3: stable. Followed by nephrology  GFR Estimate, Non  (no units)   Date Value   08/18/2017 48     Dyslipidemia: Controlled on atorvastatin   CHOLESTEROL (mg/dL)   Date Value   08/18/2017 142     HDL (mg/dL)   Date Value   08/18/2017 44     CHOL/HDL (no units)   Date  solution 0.5 mg  Dose: 0.5 mg  Freq: Once Route: NEBULIZATION  Start: 06/14/25 1600 End: 06/14/25 1609    1609         ipratropium-albuterol (DUO-NEB) 0.5-2.5 mg/3 mL inhalation solution 3 mL  Dose: 3 mL  Freq: Every 8 hours Route: NEBULIZATION  Start: 06/14/25 2115 End: 06/15/25 0115    2206      0115-D/C'd      levalbuterol (XOPENEX) inhalation solution 1.25 mg  Dose: 1.25 mg  Freq: 3 times daily (RESP) Route: NEBULIZATION  Start: 06/15/25 0800 End: 06/15/25 1253     0756     1253-D/C'd      losartan (COZAAR) tablet 25 mg  Dose: 25 mg  Freq: Daily Route: PO  Start: 06/15/25 0900 End: 06/15/25 1253   Order specific questions:        0827     1253-D/C'd      metolazone (ZAROXOLYN) tablet 5 mg  Dose: 5 mg  Freq: Daily Route: PO  Start: 06/15/25 0900 End: 06/15/25 1253   Admin Instructions:      Order specific questions:        0921     1253-D/C'd      montelukast (SINGULAIR) tablet 10 mg  Dose: 10 mg  Freq: Daily at bedtime Route: PO  Start: 06/14/25 2200 End: 06/15/25 1253    2205      1253-D/C'd      polyethylene glycol (MIRALAX) packet 17 g  Dose: 17 g  Freq: Daily Route: PO  Start: 06/15/25 0900 End: 06/15/25 1253   Admin Instructions:        (0803)     1253-D/C'd       potassium chloride (Klor-Con M20) CR tablet 40 mEq  Dose: 40 mEq  Freq: Once Route: PO  Start: 06/15/25 0715 End: 06/15/25 0827   Admin Instructions:        0827        Followed by   potassium chloride (Klor-Con M20) CR tablet 40 mEq  Dose: 40 mEq  Freq: Once Route: PO  Start: 06/15/25 0900 End: 06/15/25 0931   Admin Instructions:        0931        ranolazine (RANEXA) 12 hr tablet 500 mg  Dose: 500 mg  Freq: 2 times daily Route: PO  Start: 06/14/25 2115 End: 06/15/25 1253   Admin Instructions:       2205      0827     1253-D/C'd      rivaroxaban (XARELTO) tablet 2.5 mg  Dose: 2.5 mg  Freq: 2 times daily with meals Route: PO  Start: 06/15/25 0730 End: 06/15/25 1253   Admin Instructions:      Order specific questions:        0921 [C]     1253-D/C'd       torsemide (DEMADEX) tablet 60 mg  Dose: 60 mg  Freq: Daily Route: PO  Start: 06/15/25 0900 End: 06/15/25 1253   Order specific questions:       2102 0900     1253     1253-D/C'd      Legend:       [id number]/0606/0706/0806/0906/1006/1106/1206/1306/1406/15        Continuous Meds none    PRN Meds Sorted by Name  for Jennifer Amaya as of 06/06/25 through 6/15/25  Legend:       Medications 06/14 06/15   acetaminophen (TYLENOL) tablet 975 mg  Dose: 975 mg  Freq: Every 8 hours PRN Route: PO  PRN Reasons: mild pain,headaches,fever  Indications of Use: FEVER,HEADACHE,MILD PAIN  Start: 06/14/25 2054 End: 06/15/25 1253     1253-D/C'd      albuterol (PROVENTIL HFA,VENTOLIN HFA) inhaler 2 puff  Dose: 2 puff  Freq: Every 4 hours PRN Route: IN  PRN Reason: wheezing  Start: 06/14/25 2102 End: 06/15/25 1253   Admin Instructions:        1253-D/C'd      nitroglycerin (NITROSTAT) SL tablet 0.4 mg  Dose: 0.4 mg  Freq: Every 5 minutes PRN Route: SL  PRN Reason: chest pain  Start: 06/14/25 2102 End: 06/15/25 1253   Admin Instructions:        1253-D/C'd        ED Triage Vitals   Temperature Pulse Respirations Blood Pressure SpO2 Pain Score   06/14/25 1410 06/14/25 1408 06/14/25 1408 06/14/25 1408 06/14/25 1408 06/14/25 1409   97.6 °F (36.4 °C) 80 19 155/67 99 % No Pain     Weight (last 2 days) before discharge       None            Vital Signs (last 3 days) before discharge       Date/Time Temp Pulse Resp BP MAP (mmHg) SpO2 O2 Device Patient Position - Orthostatic VS Pain    06/15/25 1000 -- 60 20 117/57 82 98 % -- -- --    06/15/25 0800 -- 58 17 140/63 90 100 % None (Room air) -- --    06/15/25 0758 -- -- -- -- -- 99 % -- -- --    06/15/25 0700 -- -- -- -- -- -- -- -- No Pain    06/15/25 0600 -- 64 18 143/65 93 96 % None (Room air) Lying --    06/15/25 0400 -- 56 22 122/63 88 96 % None (Room air) Lying --    06/15/25 0330 -- 62 20 121/58 84 98 % None (Room air) Lying --    06/15/25 0145 -- 58 22 116/59 81 98 % None (Room  Value   08/18/2017 3.2     TRIGLYCERIDE (mg/dL)   Date Value   08/18/2017 185 (H)     CALCULATED LDL (mg/dL)   Date Value   08/18/2017 61       Follow-up:  Clinic: Coronary arteriogram for annual testing Scumaci indications alternatives and risks in detail  Testing: per protocol for annual testing     Visit coordinated by: Ms Wellington RN.     Patient understands he can return sooner if any issues should arise.   Patient is cleared for cataract surgery and revision of melanoma site.    ROBBY Perez DO [facility]  Advanced Heart Failure/ Transplant  8/18/2017     Patient called about cataract surgery and we reaffirmed that he is able for that surgery and we will all him up as needed.  Indications are unchanged from former note and his status is stable.  .  ROBBY Perez DO [facility]  Advanced Heart Failure/ Transplant  8/23/2017                                    air) Lying --    06/15/25 0015 -- 60 20 130/63 90 96 % None (Room air) Lying --    06/14/25 2315 -- 70 22 126/64 89 98 % None (Room air) Lying --    06/14/25 2018 -- 72 18 159/61 -- 100 % None (Room air) Sitting --    06/14/25 1628 -- 54 20 165/117 -- 100 % Aerosol mask Sitting --    06/14/25 1532 -- -- -- -- -- -- None (Room air) -- --    06/14/25 1410 97.6 °F (36.4 °C) -- -- -- -- -- -- -- --    06/14/25 1409 -- -- -- -- -- -- -- -- No Pain    06/14/25 1408 -- 80 19 155/67 -- 99 % None (Room air) Sitting --              Pertinent Labs/Diagnostic Test Results:   Radiology:  XR chest 2 views   Final Interpretation by Brianne Peralta MD (06/15 0834)      No acute cardiopulmonary disease.            Workstation performed: [id number]           Cardiology:  ECG 12 lead   Final Result by Racheal Barkley MD (06/15 0933)   Sinus rhythm with 1st degree A-V block with Premature supraventricular    complexes   Left axis deviation   Cannot rule out Anterior infarct , age undetermined   Abnormal ECG   When compared with ECG of 03-Aug-2023 08:17,   Premature supraventricular complexes are now Present   Confirmed by Racheal Barkley ([id number]) on 6/15/2025 9:55:54 AM        GI:  No orders to display       Results from last 7 days   Lab Units 06/14/25  2017   SARS-COV-2  Negative     Results from last 7 days   Lab Units 06/15/25  0602 06/14/25  1608   WBC Thousand/uL 5.94 5.96   HEMOGLOBIN g/dL 10.2* 11.3*   HEMATOCRIT % 32.7* 36.1   PLATELETS Thousands/uL 158 180   TOTAL NEUT ABS Thousands/µL 4.59 4.76         Results from last 7 days   Lab Units 06/15/25  0602 06/14/25  1608   SODIUM mmol/L 140 140   POTASSIUM mmol/L 3.4* 4.2   CHLORIDE mmol/L 103 102   CO2 mmol/L 30 27   ANION GAP mmol/L 7 11   BUN mg/dL 48* 48*   CREATININE mg/dL 1.97* 2.08*   EGFR ml/min/1.73sq m 22 21   CALCIUM mg/dL 9.1 9.4   MAGNESIUM mg/dL 2.1  --      Results from last 7 days   Lab Units 06/14/25  1608   AST U/L 19   ALT U/L 21   ALK PHOS U/L 68    TOTAL PROTEIN g/dL 7.3   ALBUMIN g/dL 3.9   TOTAL BILIRUBIN mg/dL 0.78     Results from last 7 days   Lab Units 06/15/25  0823 06/15/25  0629   POC GLUCOSE mg/dl 105 98     Results from last 7 days   Lab Units 06/15/25  0602 06/14/25  1608   GLUCOSE RANDOM mg/dL 109 142*     Results from last 7 days   Lab Units 06/14/25  2017 06/14/25  1820 06/14/25  1608   HS TNI 0HR ng/L  --   --  47   HS TNI 2HR ng/L  --  45  --    HSTNI D2 ng/L  --  -2  --    HS TNI 4HR ng/L 49  --   --    HSTNI D4 ng/L 2  --   --      Results from last 7 days   Lab Units 06/14/25 2017   TSH 3RD GENERATON uIU/mL 1.038       Results from last 7 days   Lab Units 06/15/25  0131   CREATININE UR mg/dL 44.2     Results from last 7 days   Lab Units 06/14/25 2017   INFLUENZA A PCR  Negative   INFLUENZA B PCR  Negative   RSV PCR  Negative     Past Medical History[1]  Present on Admission:   Type 2 diabetes mellitus with hyperglycemia, without long-term current use of insulin (HCC)   Mixed hyperlipidemia   Stage 3b chronic kidney disease (HCC)   Coronary artery disease involving native coronary artery   Asthma   SOB (shortness of breath)   HTN (hypertension)   CHF (congestive heart failure) (formerly [facility])      Admitting Diagnosis: Cough [R05.9]  Age/Sex: 84 y.o. female    Network Utilization Review Department  ATTENTION: Please call with any questions or concerns to [phone] and carefully listen to the prompts so that you are directed to the right person. All voicemails are confidential.   For Discharge needs, contact Care Management DC Support Team at [phone] opt. [phone]  Send all requests for admission clinical reviews, approved or denied determinations and any other requests to dedicated fax number below belonging to the campus where the patient is receiving treatment. List of dedicated fax numbers for the Facilities:  FACILITY NAME UR FAX NUMBER   ADMISSION DENIALS (Administrative/Medical Necessity) [phone]   DISCHARGE SUPPORT TEAM (NETWORK)  [phone]   PARENT CHILD HEALTH (Maternity/NICU/Pediatrics) [phone]   [facility] [phone]   [facility] [phone]   [facility] [phone]   [facility] [phone]   [facility] [phone]   [facility] [phone]   [facility] [phone]   [facility] [phone]   [facility] [phone]   [facility] [phone]   General acute hospital [phone]   [facility] [phone]                   [1]   Past Medical History:  Diagnosis Date    ACE-inhibitor cough     last assessed - 29Dec2017    Asthma     Bilateral edema of lower extremity     last assessed - 21Aug2017    Cardiac murmur     last assessed - 21Aug2017    CKD (chronic kidney disease)     Diabetes mellitus (HCC)     last assessed - 29Nov2017    Esophageal dysphagia     Fatigue     last assessed - 21Aug2017    Hyperlipidemia     Hypertension     Patellar bursitis of right knee     last assessed - 04Nov2016    Personal history of other specified conditions     presenting hazards to health    Stroke (HCC)     Stroke syndrome     Urinary frequency     UTI (urinary tract infection)

## 2025-06-16 NOTE — UTILIZATION REVIEW
NOTIFICATION OF ADMISSION DISCHARGE   This is a Notification of Discharge from [facility]. Please be advised that this patient has been discharge from our facility. Below you will find the admission and discharge date and time including the patient’s disposition.   UTILIZATION REVIEW CONTACT:  Utilization Review Assistants  Network Utilization Review Department  Phone: [phone] x carefully listen to the prompts. All voicemails are confidential.  Email: [email or web address]     ADMISSION INFORMATION  PRESENTATION DATE: 6/14/2025  3:17 PM  OBERVATION ADMISSION DATE: N/A  INPATIENT ADMISSION DATE: 6/14/25  7:23 PM   DISCHARGE DATE: 6/15/2025 10:53 AM   DISPOSITION:Home/Self Care    Network Utilization Review Department  ATTENTION: Please call with any questions or concerns to [phone] and carefully listen to the prompts so that you are directed to the right person. All voicemails are confidential.   For Discharge needs, contact Care Management DC Support Team at [phone] opt. 2  Send all requests for admission clinical reviews, approved or denied determinations and any other requests to dedicated fax number below belonging to the campus where the patient is receiving treatment. List of dedicated fax numbers for the Facilities:  FACILITY NAME UR FAX NUMBER   ADMISSION DENIALS (Administrative/Medical Necessity) [phone]   DISCHARGE SUPPORT TEAM ([facility]) [phone]   PARENT CHILD HEALTH (Maternity/NICU/Pediatrics) [phone]   [facility] [phone]   [facility] [phone]   [facility] [phone]   [facility] [phone]   [facility] [phone]   [facility] [phone]   [facility] [phone]   [facility] [phone]   [facility]  [facility] [phone]   [facility] [phone]   [facility] [phone]   [facility] [phone]

## 2025-06-17 ENCOUNTER — OFFICE VISIT (OUTPATIENT)
Dept: INTERNAL MEDICINE CLINIC | Facility: CLINIC | Age: 85
End: 2025-06-17

## 2025-06-17 VITALS
SYSTOLIC BLOOD PRESSURE: 108 MMHG | DIASTOLIC BLOOD PRESSURE: 55 MMHG | BODY MASS INDEX: 26.12 KG/M2 | WEIGHT: 153 LBS | HEART RATE: 65 BPM | TEMPERATURE: 98 F | HEIGHT: 64 IN

## 2025-06-17 DIAGNOSIS — J30.1 NON-SEASONAL ALLERGIC RHINITIS DUE TO POLLEN: ICD-10-CM

## 2025-06-17 DIAGNOSIS — Z76.89 ENCOUNTER FOR SUPPORT AND COORDINATION OF TRANSITION OF CARE: Primary | ICD-10-CM

## 2025-06-17 RX ORDER — MONTELUKAST SODIUM 10 MG/1
10 TABLET ORAL
Qty: 180 TABLET | Refills: 0 | Status: SHIPPED | OUTPATIENT
Start: 2025-06-17 | End: 2025-12-14

## 2025-06-17 NOTE — PROGRESS NOTES
Transition of Care Visit:  Name: Jennifer Amaya      : 1940      MRN: [id number]  Encounter Provider: Jillian Murphy MD  Encounter Date: 2025   Encounter department: [facility]    Assessment & Plan  Encounter for support and coordination of transition of care  Non-seasonal allergic rhinitis due to pollen      Orders:    montelukast (SINGULAIR) 10 mg tablet; Take 1 tablet (10 mg total) by mouth daily at bedtime         History of Present Illness     Transitional Care Management Review:   Jennifer Amaya is a 84 y.o. female here for TCM follow up.     Patient was admitted to the hospital from  - 6/15 due to persisting cough and increased shortness of breath.  Suspected that symptoms were secondary to asthma exacerbation versus CHF exacerbation versus viral illness.  COVID/flu/RSV negative.  She improved with DuoNebs and breathing treatments during her hospitalization.  For her CHF she takes torsemide 60 mg daily and metolazone.  During her hospitalization, patient received 120 mg dose of torsemide and then was transition back to her home regimen.  She also has albuterol and Breo Ellipta inhalers at home.  Since her discharge, patient states her cough has improved.  She utilized her albuterol inhaler today and has not had any coughing since then.  She has also been utilizing a humidifier at home which has improved her symptoms.  Overall she is feeling better.  Will refill her Singulair prescription.  Patient provided with return and hospital precautions.  Is due for her Medicare annual wellness visit which is scheduled in September.    During the TCM phone call patient stated:  TCM Call (since 6/3/2025)       Date and time call was made  2025  2:24 PM    Hospital care reviewed  Records reviewed    Patient was hospitialized at  [facility]    Date of Admission  25    Date of discharge  06/15/25    Diagnosis  Cough +8 more R05.9    Disposition   "Home    Were the patients medications reviewed and updated  Yes          TCM Call (since 6/3/2025)       Post hospital issues  None    Scheduled for follow up?  Yes    Did you obtain your prescribed medications  Yes    Do you need help managing your prescriptions or medications  No    Is transportation to your appointment needed  No    I have advised the patient to call PCP with any new or worsening symptoms  NIMO HARRIS MA            Review of Systems   Constitutional:  Negative for chills and fever.   HENT:  Negative for ear pain and sore throat.    Eyes:  Negative for pain and visual disturbance.   Respiratory:  Positive for cough. Negative for chest tightness, shortness of breath and wheezing.    Cardiovascular:  Negative for chest pain, palpitations and leg swelling.   Gastrointestinal:  Negative for abdominal pain and vomiting.   Genitourinary:  Negative for dysuria and hematuria.   Musculoskeletal:  Negative for arthralgias and back pain.   Skin:  Negative for color change and rash.   Neurological:  Negative for seizures and syncope.   All other systems reviewed and are negative.    Objective   /55 (BP Location: Left arm, Patient Position: Sitting, Cuff Size: Adult)   Pulse 65   Temp 98 °F (36.7 °C) (Temporal)   Ht 5' 4\" (1.626 m)   Wt 69.4 kg (153 lb)   BMI 26.26 kg/m²     Physical Exam  Vitals and nursing note reviewed.   Constitutional:       General: She is not in acute distress.     Appearance: She is not ill-appearing or diaphoretic.   HENT:      Head: Normocephalic and atraumatic.      Nose: No congestion.     Eyes:      Conjunctiva/sclera: Conjunctivae normal.       Cardiovascular:      Rate and Rhythm: Regular rhythm.      Heart sounds: Murmur heard.   Pulmonary:      Effort: No respiratory distress.      Breath sounds: Normal breath sounds.   Abdominal:      General: There is no distension.      Palpations: Abdomen is soft.      Tenderness: There is no abdominal tenderness. "     Musculoskeletal:         General: Normal range of motion.      Cervical back: Normal range of motion.      Right lower leg: No edema.      Left lower leg: No edema.     Skin:     General: Skin is warm.      Capillary Refill: Capillary refill takes less than 2 seconds.     Neurological:      Mental Status: She is alert and oriented to person, place, and time. Mental status is at baseline.     Psychiatric:         Mood and Affect: Mood normal.         Medications have been reviewed by provider in current encounter        Jillian Murphy MD, MPH  [facility] Internal Medicine Residency PGY-3

## 2025-06-27 ENCOUNTER — NURSE TRIAGE (OUTPATIENT)
Dept: OTHER | Facility: OTHER | Age: 85
End: 2025-06-27

## 2025-06-27 ENCOUNTER — TELEPHONE (OUTPATIENT)
Dept: INTERNAL MEDICINE CLINIC | Facility: CLINIC | Age: 85
End: 2025-06-27

## 2025-06-27 NOTE — TELEPHONE ENCOUNTER
"REASON FOR CONVERSATION: Cough    SYMPTOMS: Continuous cough, white sputum, intermittent exertional dyspnea    OTHER HEALTH INFORMATION: Requesting nebulizer, medication    PROTOCOL DISPOSITION: Go to ED Now (or PCP Triage)    CARE ADVICE PROVIDED: Per on-call provider, patient will not likely be able to get a nebulizer over the weekend. If symptoms are worsening, should go to the Emergency Department for evaluation and treatment    PRACTICE FOLLOW-UP: Patient looking to have a nebulizer for home; please follow up to see if still needed; amenable to ED at this time.      Reason for Disposition   Patient sounds very sick or weak to the triager    Answer Assessment - Initial Assessment Questions  1. ONSET: \"When did the cough begin?\"     6/14 was seen the Emergency Department, then was seen in the office on 6/17    2. SEVERITY: \"How bad is the cough today?\"         Continuous cough    3. SPUTUM: \"Describe the color of your sputum\" (e.g., none, dry cough; clear, white, yellow, green)    Just white    4. HEMOPTYSIS: \"Are you coughing up any blood?\" If Yes, ask: \"How much?\" (e.g., flecks, streaks, tablespoons, etc.)        Denies    5. DIFFICULTY BREATHING: \"Are you having difficulty breathing?\" If Yes, ask: \"How bad is it?\" (e.g., mild, moderate, severe)         Some exertional dyspnea    6. FEVER: \"Do you have a fever?\" If Yes, ask: \"What is your temperature, how was it measured, and when did it start?\"    Denies    7. CARDIAC HISTORY: \"Do you have any history of heart disease?\" (e.g., heart attack, congestive heart failure)         Patient had history of angina     8. LUNG HISTORY: \"Do you have any history of lung disease?\"  (e.g., pulmonary embolus, asthma, emphysema)        History of asthma    9. PE RISK FACTORS: \"Do you have a history of blood clots?\" (or: recent major surgery, recent prolonged travel, bedridden)        Denies    10. OTHER SYMPTOMS: \"Do you have any other symptoms?\" (e.g., runny nose, wheezing, " "chest pain)          Runny nose, but no chest pain    11. TRAVEL: \"Have you traveled out of the country in the last month?\" (e.g., travel history, exposures)          Denies    Patient requesting nebulizer machine / medication.    Protocols used: Cough - Acute Productive-Adult-AH    "

## 2025-06-28 ENCOUNTER — HOSPITAL ENCOUNTER (EMERGENCY)
Facility: HOSPITAL | Age: 85
Discharge: HOME | End: 2025-06-28
Attending: EMERGENCY MEDICINE | Admitting: EMERGENCY MEDICINE
Payer: MEDICARE

## 2025-06-28 ENCOUNTER — APPOINTMENT (EMERGENCY)
Dept: RADIOLOGY | Facility: HOSPITAL | Age: 85
End: 2025-06-28
Payer: MEDICARE

## 2025-06-28 VITALS
SYSTOLIC BLOOD PRESSURE: 118 MMHG | OXYGEN SATURATION: 99 % | RESPIRATION RATE: 16 BRPM | DIASTOLIC BLOOD PRESSURE: 56 MMHG | HEART RATE: 54 BPM | TEMPERATURE: 98.4 F

## 2025-06-28 DIAGNOSIS — R05.9 COUGH: Primary | ICD-10-CM

## 2025-06-28 DIAGNOSIS — J45.901 ASTHMA EXACERBATION: ICD-10-CM

## 2025-06-28 LAB
2HR DELTA HS TROPONIN: -4 NG/L
ALBUMIN SERPL BCG-MCNC: 3.9 G/DL (ref 3.5–5)
ALP SERPL-CCNC: 55 U/L (ref 34–104)
ALT SERPL W P-5'-P-CCNC: 15 U/L (ref 7–52)
ANION GAP SERPL CALCULATED.3IONS-SCNC: 8 MMOL/L (ref 4–13)
AST SERPL W P-5'-P-CCNC: 15 U/L (ref 13–39)
ATRIAL RATE: 55 BPM
BASOPHILS # BLD AUTO: 0.03 THOUSANDS/ÂΜL (ref 0–0.1)
BASOPHILS NFR BLD AUTO: 1 % (ref 0–1)
BILIRUB SERPL-MCNC: 0.79 MG/DL (ref 0.2–1)
BNP SERPL-MCNC: 916 PG/ML (ref 0–100)
BUN SERPL-MCNC: 61 MG/DL (ref 5–25)
CALCIUM SERPL-MCNC: 9.5 MG/DL (ref 8.4–10.2)
CARDIAC TROPONIN I PNL SERPL HS: 55 NG/L (ref ?–50)
CARDIAC TROPONIN I PNL SERPL HS: 59 NG/L (ref ?–50)
CHLORIDE SERPL-SCNC: 101 MMOL/L (ref 96–108)
CO2 SERPL-SCNC: 31 MMOL/L (ref 21–32)
CREAT SERPL-MCNC: 2 MG/DL (ref 0.6–1.3)
EOSINOPHIL # BLD AUTO: 0.07 THOUSAND/ÂΜL (ref 0–0.61)
EOSINOPHIL NFR BLD AUTO: 1 % (ref 0–6)
ERYTHROCYTE [DISTWIDTH] IN BLOOD BY AUTOMATED COUNT: 14.6 % (ref 11.6–15.1)
GFR SERPL CREATININE-BSD FRML MDRD: 22 ML/MIN/1.73SQ M
GLUCOSE SERPL-MCNC: 133 MG/DL (ref 65–140)
HCT VFR BLD AUTO: 34.7 % (ref 34.8–46.1)
HGB BLD-MCNC: 10.7 G/DL (ref 11.5–15.4)
IMM GRANULOCYTES # BLD AUTO: 0.02 THOUSAND/UL (ref 0–0.2)
IMM GRANULOCYTES NFR BLD AUTO: 0 % (ref 0–2)
LYMPHOCYTES # BLD AUTO: 0.58 THOUSANDS/ÂΜL (ref 0.6–4.47)
LYMPHOCYTES NFR BLD AUTO: 10 % (ref 14–44)
MAGNESIUM SERPL-MCNC: 2.1 MG/DL (ref 1.9–2.7)
MCH RBC QN AUTO: 25.4 PG (ref 26.8–34.3)
MCHC RBC AUTO-ENTMCNC: 30.8 G/DL (ref 31.4–37.4)
MCV RBC AUTO: 82 FL (ref 82–98)
MONOCYTES # BLD AUTO: 0.55 THOUSAND/ÂΜL (ref 0.17–1.22)
MONOCYTES NFR BLD AUTO: 9 % (ref 4–12)
NEUTROPHILS # BLD AUTO: 4.83 THOUSANDS/ÂΜL (ref 1.85–7.62)
NEUTS SEG NFR BLD AUTO: 79 % (ref 43–75)
NRBC BLD AUTO-RTO: 0 /100 WBCS
P AXIS: 59 DEGREES
PLATELET # BLD AUTO: 163 THOUSANDS/UL (ref 149–390)
PMV BLD AUTO: 11.6 FL (ref 8.9–12.7)
POTASSIUM SERPL-SCNC: 3.7 MMOL/L (ref 3.5–5.3)
PR INTERVAL: 250 MS
PROT SERPL-MCNC: 7.1 G/DL (ref 6.4–8.4)
QRS AXIS: -22 DEGREES
QRSD INTERVAL: 92 MS
QT INTERVAL: 466 MS
QTC INTERVAL: 445 MS
RBC # BLD AUTO: 4.22 MILLION/UL (ref 3.81–5.12)
SODIUM SERPL-SCNC: 140 MMOL/L (ref 135–147)
T WAVE AXIS: 87 DEGREES
VENTRICULAR RATE: 55 BPM
WBC # BLD AUTO: 6.08 THOUSAND/UL (ref 4.31–10.16)

## 2025-06-28 PROCEDURE — 99285 EMERGENCY DEPT VISIT HI MDM: CPT

## 2025-06-28 PROCEDURE — 83735 ASSAY OF MAGNESIUM: CPT | Performed by: EMERGENCY MEDICINE

## 2025-06-28 PROCEDURE — 84484 ASSAY OF TROPONIN QUANT: CPT | Performed by: EMERGENCY MEDICINE

## 2025-06-28 PROCEDURE — 71045 X-RAY EXAM CHEST 1 VIEW: CPT

## 2025-06-28 PROCEDURE — 85025 COMPLETE CBC W/AUTO DIFF WBC: CPT | Performed by: EMERGENCY MEDICINE

## 2025-06-28 PROCEDURE — 36415 COLL VENOUS BLD VENIPUNCTURE: CPT | Performed by: EMERGENCY MEDICINE

## 2025-06-28 PROCEDURE — 93005 ELECTROCARDIOGRAM TRACING: CPT

## 2025-06-28 PROCEDURE — 83880 ASSAY OF NATRIURETIC PEPTIDE: CPT | Performed by: EMERGENCY MEDICINE

## 2025-06-28 PROCEDURE — 99285 EMERGENCY DEPT VISIT HI MDM: CPT | Performed by: EMERGENCY MEDICINE

## 2025-06-28 PROCEDURE — 94640 AIRWAY INHALATION TREATMENT: CPT

## 2025-06-28 PROCEDURE — 80053 COMPREHEN METABOLIC PANEL: CPT | Performed by: EMERGENCY MEDICINE

## 2025-06-28 RX ORDER — GUAIFENESIN/DEXTROMETHORPHAN 100-10MG/5
10 SYRUP ORAL ONCE
Status: COMPLETED | OUTPATIENT
Start: 2025-06-28 | End: 2025-06-28

## 2025-06-28 RX ORDER — ALBUTEROL SULFATE 0.83 MG/ML
2.5 SOLUTION RESPIRATORY (INHALATION) EVERY 6 HOURS PRN
Qty: 75 ML | Refills: 0 | Status: SHIPPED | OUTPATIENT
Start: 2025-06-28 | End: 2025-06-30 | Stop reason: SDUPTHER

## 2025-06-28 RX ORDER — IPRATROPIUM BROMIDE AND ALBUTEROL SULFATE 2.5; .5 MG/3ML; MG/3ML
3 SOLUTION RESPIRATORY (INHALATION) ONCE
Status: COMPLETED | OUTPATIENT
Start: 2025-06-28 | End: 2025-06-28

## 2025-06-28 RX ORDER — PREDNISONE 10 MG/1
TABLET ORAL
Qty: 30 TABLET | Refills: 0 | Status: SHIPPED | OUTPATIENT
Start: 2025-06-29 | End: 2025-07-11

## 2025-06-28 RX ADMIN — IPRATROPIUM BROMIDE AND ALBUTEROL SULFATE 3 ML: .5; 3 SOLUTION RESPIRATORY (INHALATION) at 18:30

## 2025-06-28 RX ADMIN — GUAIFENESIN AND DEXTROMETHORPHAN 10 ML: 20; 200 SYRUP ORAL at 18:30

## 2025-06-28 NOTE — ED PROVIDER NOTES
Time reflects when diagnosis was documented in both MDM as applicable and the Disposition within this note       Time User Action Codes Description Comment    6/28/2025  9:04 PM Mariella Stevenson Add [R05.9] Cough     6/28/2025  9:04 PM Mariella Stevenson Add [J45.901] Asthma exacerbation           ED Disposition       ED Disposition   Discharge    Condition   Stable    Date/Time   Sat Jun 28, 2025  9:04 PM    Comment   Jennifer PASCUAL Shade discharge to home/self care.                   Assessment & Plan       Medical Decision Making  Differential diagnosis includes but is not limited to viral syndrome, pneumonia, CHF exacerbation, exacerbation of asthma, chronic cough, less likely ACS    Problems Addressed:  Asthma exacerbation: acute illness or injury  Cough: acute illness or injury    Amount and/or Complexity of Data Reviewed  Independent Historian: caregiver  External Data Reviewed: labs, radiology, ECG and notes.  Labs: ordered. Decision-making details documented in ED Course.  Radiology: ordered and independent interpretation performed.     Details: Chest x-ray within normal limits no pneumonia no volume overload  ECG/medicine tests: ordered and independent interpretation performed. Decision-making details documented in ED Course.    Risk  OTC drugs.  Prescription drug management.  Decision regarding hospitalization.  Risk Details: Discussed with patient and family member admitting patient to hospital they do not want that at this time they state that they were admitted in [place] did not feel that she made much improvement that she just would like steroids and a nebulizer.  Discussed with them the troponin level 59 down to 55 she is running close to 50 in the past.  They do not think that this is her heart she denies any chest pain.  They understand the risks of going home if there is a cardiac event happening        ED Course as of 06/28/25 2126   Sat Jun 28, 2025 2103 HS Troponin I 2hr(!)   2103 hs TnI 2hr(!):  55  Initial troponin 59-second Trope 55 patient is always around 50 patient has no true chest pain states she does not want to stay in the hospital team because she is interested in getting a prednisone taper, nebulizer equipment, and pulmonary medicine referral.  Patient's family members with her and he is in agreement with this.       Medications   dextromethorphan-guaiFENesin (ROBITUSSIN DM) oral syrup 10 mL (10 mL Oral Given 6/28/25 1830)   ipratropium-albuterol (DUO-NEB) 0.5-2.5 mg/3 mL inhalation solution 3 mL (3 mL Nebulization Given 6/28/25 1830)       ED Risk Strat Scores                    (ISAR) Identification of Seniors at Risk  Before the illness or injury that brought you to the Emergency, did you need someone to help you on a regular basis?: 0  In the last 24 hours, have you needed more help than usual?: 0  Have you been hospitalized for one or more nights during the past 6 months?: 0  In general, do you see well?: 1  In general, do you have serious problems with your memory?: 0  Do you take more than three different medications every day?: 0  ISAR Score: 1            SBIRT 20yo+      Flowsheet Row Most Recent Value   Initial Alcohol Screen: US AUDIT-C     1. How often do you have a drink containing alcohol? 0 Filed at: 06/28/2025 1751   2. How many drinks containing alcohol do you have on a typical day you are drinking?  0 Filed at: 06/28/2025 1751   3a. Male UNDER 65: How often do you have five or more drinks on one occasion? 0 Filed at: 06/28/2025 1751   3b. FEMALE Any Age, or MALE 65+: How often do you have 4 or more drinks on one occassion? 0 Filed at: 06/28/2025 1751   Audit-C Score 0 Filed at: 06/28/2025 1751   ROE: How many times in the past year have you...    Used an illegal drug or used a prescription medication for non-medical reasons? Never Filed at: 06/28/2025 1751                            History of Present Illness       Chief Complaint   Patient presents with    Cough     Pt states  1 week hx of productive cough, slight sob, denies n/v/d/, chest pain or fever. States she was seen at [facility] last week.        Past Medical History[1]   Past Surgical History[2]   Family History[3]   Social History[4]   E-Cigarette/Vaping    E-Cigarette Use Never User       E-Cigarette/Vaping Substances    Nicotine No     THC No     CBD No     Flavoring No     Other No     Unknown No       I have reviewed and agree with the history as documented.     84-year-old female comes in for evaluation of cough.  Patient recently admitted at [facility] for similar.  Was felt to be multifactorial COPD/CHF/acute viral bronchitis.  Patient states despite being admitted her cough has not gotten any better.  No adjustment to her medications other than the addition of Singulair      History provided by:  Patient   used: No    Cough  Cough characteristics:  Non-productive  Sputum characteristics:  Nondescript  Severity:  Moderate  Onset quality:  Gradual  Duration:  3 weeks  Timing:  Constant  Progression:  Worsening  Chronicity:  Recurrent  Smoker: Former smoker.    Context: upper respiratory infection and weather changes    Associated symptoms: shortness of breath and wheezing    Associated symptoms: no chest pain, no chills, no ear pain, no fever, no myalgias, no rash and no sore throat        Review of Systems   Constitutional:  Negative for chills and fever.   HENT:  Negative for ear pain and sore throat.    Eyes:  Negative for pain and visual disturbance.   Respiratory:  Positive for cough, shortness of breath and wheezing.    Cardiovascular:  Negative for chest pain and palpitations.   Gastrointestinal:  Negative for abdominal pain and vomiting.   Genitourinary:  Negative for dysuria and hematuria.   Musculoskeletal:  Negative for arthralgias, back pain and myalgias.   Skin:  Negative for color change and rash.   Neurological:  Negative for seizures and syncope.   All other systems  reviewed and are negative.          Objective       ED Triage Vitals [06/28/25 1749]   Temperature Pulse Blood Pressure Respirations SpO2 Patient Position - Orthostatic VS   98.4 °F (36.9 °C) 57 101/57 17 100 % Lying      Temp Source Heart Rate Source BP Location FiO2 (%) Pain Score    Oral Monitor Left arm -- --      Vitals      Date and Time Temp Pulse SpO2 Resp BP Pain Score FACES Pain Rating User   06/28/25 2030 -- 54 99 % 16 118/56 -- -- KH   06/28/25 1749 -- 57 Simultaneous filing. User may not have seen previous data. 100 % Simultaneous filing. User may not have seen previous data. 17 Simultaneous filing. User may not have seen previous data. 101/57 Simultaneous filing. User may not have seen previous data. -- -- DB   06/28/25 1749 98.4 °F (36.9 °C) -- -- -- -- -- -- PH            Physical Exam  Vitals and nursing note reviewed.   Constitutional:       Appearance: Normal appearance. She is well-developed. She is not toxic-appearing.   HENT:      Head: Normocephalic and atraumatic.      Right Ear: Tympanic membrane and external ear normal.      Left Ear: Tympanic membrane and external ear normal.      Nose: Nose normal. No nasal deformity or rhinorrhea.      Mouth/Throat:      Dentition: Normal dentition.      Pharynx: Uvula midline.     Eyes:      General: Lids are normal.         Right eye: No discharge.         Left eye: No discharge.      Conjunctiva/sclera: Conjunctivae normal.      Pupils: Pupils are equal, round, and reactive to light.     Neck:      Vascular: No carotid bruit or JVD.      Trachea: Trachea normal.     Cardiovascular:      Rate and Rhythm: Normal rate and regular rhythm. No extrasystoles are present.     Chest Wall: PMI is not displaced.      Pulses: Normal pulses.   Pulmonary:      Effort: Pulmonary effort is normal. No accessory muscle usage or respiratory distress.      Breath sounds: Wheezing (Mild scattered) present. No rhonchi or rales.   Abdominal:      General: Bowel sounds are  normal.      Palpations: Abdomen is soft. Abdomen is not rigid. There is no mass.      Tenderness: There is no abdominal tenderness. There is no guarding or rebound.     Musculoskeletal:      Right shoulder: No swelling, deformity or bony tenderness. Normal range of motion.      Cervical back: Normal range of motion and neck supple. No deformity, tenderness or bony tenderness.   Lymphadenopathy:      Cervical: No cervical adenopathy.     Skin:     General: Skin is warm and dry.      Findings: No rash.     Neurological:      Mental Status: She is alert and oriented to person, place, and time.      GCS: GCS eye subscore is 4. GCS verbal subscore is 5. GCS motor subscore is 6.      Cranial Nerves: No cranial nerve deficit.      Sensory: No sensory deficit.      Deep Tendon Reflexes: Reflexes are normal and symmetric.     Psychiatric:         Speech: Speech normal.         Behavior: Behavior normal.         Results Reviewed       Procedure Component Value Units Date/Time    HS Troponin I 2hr [[id number]]  (Abnormal) Collected: 06/28/25 2030    Lab Status: Final result Specimen: Blood from Arm, Right Updated: 06/28/25 2058     hs TnI 2hr 55 ng/L      Delta 2hr hsTnI -4 ng/L     HS Troponin I 4hr [[id number]]     Lab Status: No result Specimen: Blood     B-Type Natriuretic Peptide(BNP) [[id number]]  (Abnormal) Collected: 06/28/25 1829    Lab Status: Final result Specimen: Blood from Arm, Right Updated: 06/28/25 1905      pg/mL     HS Troponin 0hr (reflex protocol) [[id number]]  (Abnormal) Collected: 06/28/25 1829    Lab Status: Final result Specimen: Blood from Arm, Right Updated: 06/28/25 1904     hs TnI 0hr 59 ng/L     Comprehensive metabolic panel [[id number]]  (Abnormal) Collected: 06/28/25 1829    Lab Status: Final result Specimen: Blood from Arm, Right Updated: 06/28/25 1901     Sodium 140 mmol/L      Potassium 3.7 mmol/L      Chloride 101 mmol/L      CO2 31 mmol/L      ANION GAP 8 mmol/L      BUN 61 mg/dL       Creatinine 2.00 mg/dL      Glucose 133 mg/dL      Calcium 9.5 mg/dL      AST 15 U/L      ALT 15 U/L      Alkaline Phosphatase 55 U/L      Total Protein 7.1 g/dL      Albumin 3.9 g/dL      Total Bilirubin 0.79 mg/dL      eGFR 22 ml/min/1.73sq m     Narrative:      National Kidney Disease Foundation guidelines for Chronic Kidney Disease (CKD):     Stage 1 with normal or high GFR (GFR > 90 mL/min/1.73 square meters)    Stage 2 Mild CKD (GFR = 60-89 mL/min/1.73 square meters)    Stage 3A Moderate CKD (GFR = 45-59 mL/min/1.73 square meters)    Stage 3B Moderate CKD (GFR = 30-44 mL/min/1.73 square meters)    Stage 4 Severe CKD (GFR = 15-29 mL/min/1.73 square meters)    Stage 5 End Stage CKD (GFR <15 mL/min/1.73 square meters)  Note: GFR calculation is accurate only with a steady state creatinine    Magnesium [[id number]]  (Normal) Collected: 06/28/25 1829    Lab Status: Final result Specimen: Blood from Arm, Right Updated: 06/28/25 1901     Magnesium 2.1 mg/dL     CBC and differential [[id number]]  (Abnormal) Collected: 06/28/25 1829    Lab Status: Final result Specimen: Blood from Arm, Right Updated: 06/28/25 1841     WBC 6.08 Thousand/uL      RBC 4.22 Million/uL      Hemoglobin 10.7 g/dL      Hematocrit 34.7 %      MCV 82 fL      MCH 25.4 pg      MCHC 30.8 g/dL      RDW 14.6 %      MPV 11.6 fL      Platelets 163 Thousands/uL      nRBC 0 /100 WBCs      Segmented % 79 %      Immature Grans % 0 %      Lymphocytes % 10 %      Monocytes % 9 %      Eosinophils Relative 1 %      Basophils Relative 1 %      Absolute Neutrophils 4.83 Thousands/µL      Absolute Immature Grans 0.02 Thousand/uL      Absolute Lymphocytes 0.58 Thousands/µL      Absolute Monocytes 0.55 Thousand/µL      Eosinophils Absolute 0.07 Thousand/µL      Basophils Absolute 0.03 Thousands/µL             XR chest 1 view portable    (Results Pending)       ECG 12 Lead Documentation Only    Date/Time: 6/28/2025 7:03 PM    Performed by: Mariella Stevenson,  DO  Authorized by: Mariella Stevenson DO    Patient location:  ED  Previous ECG:     Previous ECG:  Compared to current    Similarity:  No change    Comparison to cardiac monitor: Yes    Rhythm:     Rhythm: sinus bradycardia    Comments:      First-degree AV block      ED Medication and Procedure Management   Prior to Admission Medications   Prescriptions Last Dose Informant Patient Reported? Taking?   Breo Ellipta 100-25 MCG/ACT inhaler   No Yes   Sig: INHALE 1 PUFF DAILY RINSE MOUTH AFTER USE.   Cholecalciferol (D3) 50 MCG (2000 UT) TABS  Self No Yes   Sig: Take 2 tablets (4,000 Units total) by mouth daily   Continuous Glucose Sensor (FreeStyle Naldo 2 Sensor) MISC   No Yes   Sig: USE ONE SENSOR EVERY 14 DAYS   Glucose-Vitamin C-Vitamin D (TRUEPLUS GLUCOSE ON THE GO) CHEW  Self Yes Yes   Misc. Devices (QUAD CANE) MISC  Self No No   Sig: by Does not apply route daily   Oral Hygiene Products (Extended Term Oral Care System) KIT  Self No Yes   Sig: Apply to the mouth or throat 2 (two) times a day   SODIUM FLUORIDE, DENTAL RINSE, 0.02 % SOLN  Self No Yes   Sig: Apply 15 mL to the mouth or throat 2 (two) times a day   Tradjenta 5 MG TABS   No Yes   Sig: TAKE 1 TABLET (5 MG) BY MOUTH IN THE MORNING   Xarelto 2.5 MG tablet   No Yes   Sig: TAKE 1 TABLET (2.5 MG TOTAL) BY MOUTH 2 (TWO) TIMES A DAY WITH MEALS   acetaminophen (TYLENOL) 650 mg CR tablet  Self No Yes   Sig: Take 1 tablet (650 mg total) by mouth every 6 (six) hours as needed for mild pain   albuterol (PROVENTIL HFA,VENTOLIN HFA) 90 mcg/act inhaler   No Yes   Sig: Inhale 2 puffs every 4 (four) hours as needed for wheezing or shortness of breath   atorvastatin (LIPITOR) 40 mg tablet  Self No Yes   Sig: Take 1 tablet (40 mg total) by mouth daily   calcitriol (ROCALTROL) 0.25 mcg capsule   No Yes   Sig: TAKE 1 CAPSULE (0.25 MCG TOTAL) BY MOUTH 3 (THREE) TIMES A WEEK   carvedilol (COREG) 6.25 mg tablet   No Yes   Sig: TAKE 1 TABLET (6.25 MG TOTAL) BY MOUTH 2 (TWO)  TIMES A DAY WITH MEALS   clopidogrel (PLAVIX) 75 mg tablet  Self No Yes   Sig: Take 1 tablet (75 mg total) by mouth daily   ferrous sulfate 325 (65 Fe) mg tablet   No Yes   Sig: Take 1 tablet (325 mg total) by mouth 2 (two) times a day before meals   latanoprost (XALATAN) 0.005 % ophthalmic solution  Self Yes Yes   losartan (COZAAR) 25 mg tablet   No Yes   Sig: TAKE 1 TABLET (25 MG TOTAL) BY MOUTH DAILY   metolazone (ZAROXOLYN) 5 mg tablet  Self No Yes   Sig: Take 1 tablet weekly and take extra if has weight gain >5 lbs in 3 days or worsening leg swelling.   montelukast (SINGULAIR) 10 mg tablet   No Yes   Sig: Take 1 tablet (10 mg total) by mouth daily at bedtime   nitroglycerin (NITROSTAT) 0.4 mg SL tablet  Self No Yes   Sig: PLACE 1 TABLET (0.4 MG TOTAL) UNDER THE TONGUE EVERY 5 (FIVE) MINUTES AS NEEDED FOR CHEST PAIN   potassium chloride (Klor-Con M20) 20 mEq tablet  Self No Yes   Sig: TAKE 1 TABLET (20 HASMUKH EQUIVALENT TOTAL) BY MOUTH DAILY   ranolazine (RANEXA) 500 mg 12 hr tablet   No Yes   Sig: TAKE 1 TABLET (500 MG TOTAL) BY MOUTH EVERY 12 (TWELVE) HOURS   repaglinide (PRANDIN) 0.5 mg tablet   No Yes   Sig: TAKE 1 TABLET (0.5 MG TOTAL) BY MOUTH 2 (TWO) TIMES A DAY BEFORE MEALS (SKIP DOSE IF SKIPPING MEAL)   torsemide (DEMADEX) 20 mg tablet   No Yes   Sig: TAKE 3 TABLETS (60 MG TOTAL) BY MOUTH DAILY      Facility-Administered Medications: None     Patient's Medications   Discharge Prescriptions    ALBUTEROL (2.5 MG/3 ML) 0.083 % NEBULIZER SOLUTION    Take 3 mL (2.5 mg total) by nebulization every 6 (six) hours as needed for wheezing or shortness of breath       Start Date: 6/28/2025 End Date: --       Order Dose: 2.5 mg       Quantity: 75 mL    Refills: 0    PREDNISONE 10 MG TABLET    Take 4 tablets (40 mg total) by mouth daily for 3 days, THEN 3 tablets (30 mg total) daily for 3 days, THEN 2 tablets (20 mg total) daily for 3 days, THEN 1 tablet (10 mg total) daily for 3 days. Do not start before June 29,  2025.       Start Date: 6/29/2025 End Date: 7/11/2025       Order Dose: --       Quantity: 30 tablet    Refills: 0     Outpatient Discharge Orders   Nebulizer     ED SEPSIS DOCUMENTATION   Time reflects when diagnosis was documented in both MDM as applicable and the Disposition within this note       Time User Action Codes Description Comment    6/28/2025  9:04 PM Mariella Stevenson Add [R05.9] Cough     6/28/2025  9:04 PM Mariella Stevenson Add [J45.901] Asthma exacerbation                      [1]   Past Medical History:  Diagnosis Date    ACE-inhibitor cough     last assessed - 29Dec2017    Asthma     Bilateral edema of lower extremity     last assessed - 21Aug2017    Cardiac murmur     last assessed - 21Aug2017    CKD (chronic kidney disease)     Diabetes mellitus (HCC)     last assessed - 29Nov2017    Esophageal dysphagia     Fatigue     last assessed - 21Aug2017    Hyperlipidemia     Hypertension     Patellar bursitis of right knee     last assessed - 04Nov2016    Personal history of other specified conditions     presenting hazards to health    Stroke (HCC)     Stroke syndrome     Urinary frequency     UTI (urinary tract infection)    [2]   Past Surgical History:  Procedure Laterality Date    CARDIAC CATHETERIZATION N/A 6/1/2023    Procedure: Cardiac Coronary Angiogram;  Surgeon: Roe German MD;  Location: [facility];  Service: Cardiology    CARDIAC CATHETERIZATION  6/1/2023    Procedure: Cardiac Left Heart Cath;  Surgeon: Roe German MD;  Location: [facility];  Service: Cardiology    [place] ESOPHAGOGASTRODUODENOSCOPY TRANSORAL DIAGNOSTIC N/A 4/5/2017    Procedure: ESOPHAGOGASTRODUODENOSCOPY (EGD);  Surgeon: Cedric Huang MD;  Location: [facility];  Service: Gastroenterology   [3]   Family History  Problem Relation Name Age of Onset    Hypertension Mother      Stroke Mother      Heart disease Father      Other Sister          1)Breast disorder; 2)Epilepsy    Migraines Sister          Migraine  headaches    Osteoporosis Sister      Thyroid disease Sister      Coronary artery disease Sister          S/P triple vessel bypass    Breast cancer Sister triston 80    No Known Problems Sister      No Known Problems Sister      Osteoporosis Maternal Grandmother      No Known Problems Maternal Grandfather      No Known Problems Paternal Grandmother      No Known Problems Paternal Grandfather      Diabetes Son          Diabetes mellitus    Hypertension Son      Rheum arthritis Son          Rheumatic disease    No Known Problems Son      No Known Problems Son      No Known Problems Son      No Known Problems Son      No Known Problems Son      No Known Problems Maternal Aunt      No Known Problems Maternal Aunt      No Known Problems Maternal Aunt      No Known Problems Paternal Aunt      No Known Problems Paternal Aunt      Heart disease Family     [4]   Social History  Tobacco Use    Smoking status: Former     Current packs/day: 0.00     Types: Cigarettes     Quit date:      Years since quittin.5    Smokeless tobacco: Former    Tobacco comments:     quit over 30 yrs ago; (quit in the , had smoked 3PPD for 20 years - per [employer])   Vaping Use    Vaping status: Never Used   Substance Use Topics    Alcohol use: Never    Drug use: Never        Mariella Stevenson,   [phone]

## 2025-06-29 NOTE — PROGRESS NOTES
Name: Jennifer Amaya      : 1940      MRN: [id number]  Encounter Provider: Edwige Thomason MD  Encounter Date: 2025   Encounter department: [facility]  :  Assessment & Plan  Pessary maintenance  - Pessary removed, cleaned, and replaced without issue  - Jennifer attempted to remove pessary, but confirmed that she was unable to reach. Discussed importance of routine cleaning/maintenance every 3 months. She expressed understanding and plans to follow-up in the office for maintenance from now on.   - RTO in 3 months       History of Present Illness   HPI  Jennifer Amaya is a 84 y.o. female who presents for pessary maintenance. Initially had size 8 ring w/support placed in February. She returned to the office in October, as she had been unable to remove it on her own. Importance of regular q3mo removal/cleaning was discussed at that time, but she preferred to remove/clean it by herself at home. Today, she reports that she has not been able to remove the pessary since her last visit. She denies any issues with it, she just knew it needed to be cleaned and presents today for follow-up.      Review of Systems   All other systems reviewed and are negative.    Objective   There were no vitals taken for this visit.     Physical Exam  Constitutional:       Appearance: Normal appearance.   Pulmonary:      Effort: Pulmonary effort is normal.   Genitourinary:     Comments: Atrophic introitus  Sterile speculum exam performed after pessary removal: vagina without lesions, lacerations, erosions, erythema. Stage 2 apical prolapse noted.   Pessary removed, clean, and replaced without issue.    Skin:     General: Skin is warm and dry.     Neurological:      Mental Status: She is alert.       D/w Dr. Garza.     Edwige Thomason MD  OBGYN PGY3

## 2025-06-30 ENCOUNTER — VBI (OUTPATIENT)
Dept: INTERNAL MEDICINE CLINIC | Facility: CLINIC | Age: 85
End: 2025-06-30

## 2025-06-30 DIAGNOSIS — R05.9 COUGH: ICD-10-CM

## 2025-06-30 LAB
ATRIAL RATE: 55 BPM
P AXIS: 59 DEGREES
PR INTERVAL: 250 MS
QRS AXIS: -22 DEGREES
QRSD INTERVAL: 92 MS
QT INTERVAL: 466 MS
QTC INTERVAL: 445 MS
T WAVE AXIS: 87 DEGREES
VENTRICULAR RATE: 55 BPM

## 2025-06-30 PROCEDURE — 93010 ELECTROCARDIOGRAM REPORT: CPT | Performed by: INTERNAL MEDICINE

## 2025-06-30 RX ORDER — ALBUTEROL SULFATE 0.83 MG/ML
2.5 SOLUTION RESPIRATORY (INHALATION) EVERY 6 HOURS PRN
Qty: 75 ML | Refills: 0 | Status: SHIPPED | OUTPATIENT
Start: 2025-06-30

## 2025-06-30 NOTE — TELEPHONE ENCOUNTER
06/30/25 2:59 PM    Patient contacted post ED visit, [facility] department spoke with patient/caregiver and outreach was successful.    Thank you.  Thomas Finch MA  [facility]

## 2025-07-01 ENCOUNTER — OFFICE VISIT (OUTPATIENT)
Dept: OBGYN CLINIC | Facility: CLINIC | Age: 85
End: 2025-07-01

## 2025-07-01 VITALS
DIASTOLIC BLOOD PRESSURE: 62 MMHG | SYSTOLIC BLOOD PRESSURE: 112 MMHG | BODY MASS INDEX: 24.75 KG/M2 | HEART RATE: 58 BPM | HEIGHT: 64 IN | WEIGHT: 145 LBS

## 2025-07-01 DIAGNOSIS — Z46.89 ENCOUNTER FOR FITTING AND ADJUSTMENT OF PESSARY: ICD-10-CM

## 2025-07-01 DIAGNOSIS — Z46.89 PESSARY MAINTENANCE: Primary | ICD-10-CM

## 2025-07-01 PROCEDURE — 99213 OFFICE O/P EST LOW 20 MIN: CPT | Performed by: OBSTETRICS & GYNECOLOGY

## 2025-07-01 NOTE — ASSESSMENT & PLAN NOTE
- Pessary removed, cleaned, and replaced without issue  - Jennifer attempted to remove pessary, but confirmed that she was unable to reach. Discussed importance of routine cleaning/maintenance every 3 months. She expressed understanding and plans to follow-up in the office for maintenance from now on.   - RTO in 3 months

## 2025-07-01 NOTE — TELEPHONE ENCOUNTER
Sent in for new nebulizer machine to [facility] [facility] along with albuterol neb solution reorder

## 2025-07-10 DIAGNOSIS — E87.6 HYPOKALEMIA: ICD-10-CM

## 2025-07-10 RX ORDER — POTASSIUM CHLORIDE 1500 MG/1
20 TABLET, EXTENDED RELEASE ORAL DAILY
Qty: 30 TABLET | Refills: 4 | Status: SHIPPED | OUTPATIENT
Start: 2025-07-10

## 2025-07-23 ENCOUNTER — RA CDI HCC (OUTPATIENT)
Dept: OTHER | Facility: HOSPITAL | Age: 85
End: 2025-07-23

## 2025-07-24 DIAGNOSIS — I21.4 NSTEMI (NON-ST ELEVATED MYOCARDIAL INFARCTION) (HCC): ICD-10-CM

## 2025-07-25 RX ORDER — NITROGLYCERIN 0.4 MG/1
0.4 TABLET SUBLINGUAL
Qty: 25 TABLET | Refills: 1 | Status: SHIPPED | OUTPATIENT
Start: 2025-07-25

## 2025-07-28 PROBLEM — R05.9 COUGH: Status: RESOLVED | Noted: 2025-06-28 | Resolved: 2025-07-28

## 2025-08-05 ENCOUNTER — OFFICE VISIT (OUTPATIENT)
Dept: INTERNAL MEDICINE CLINIC | Facility: CLINIC | Age: 85
End: 2025-08-05

## 2025-08-05 VITALS
BODY MASS INDEX: 25.44 KG/M2 | TEMPERATURE: 98.1 F | OXYGEN SATURATION: 98 % | HEART RATE: 63 BPM | SYSTOLIC BLOOD PRESSURE: 121 MMHG | HEIGHT: 64 IN | DIASTOLIC BLOOD PRESSURE: 68 MMHG | WEIGHT: 149 LBS

## 2025-08-05 DIAGNOSIS — R31.0 GROSS HEMATURIA: ICD-10-CM

## 2025-08-05 DIAGNOSIS — J45.40 MODERATE PERSISTENT ASTHMA WITHOUT COMPLICATION: ICD-10-CM

## 2025-08-05 DIAGNOSIS — I50.43 ACUTE ON CHRONIC COMBINED SYSTOLIC AND DIASTOLIC CONGESTIVE HEART FAILURE (HCC): ICD-10-CM

## 2025-08-05 DIAGNOSIS — I10 PRIMARY HYPERTENSION: Primary | ICD-10-CM

## 2025-08-05 PROBLEM — R31.29 MICROSCOPIC HEMATURIA: Status: ACTIVE | Noted: 2025-08-05

## 2025-08-05 LAB
SL AMB  POCT GLUCOSE, UA: NEGATIVE
SL AMB LEUKOCYTE ESTERASE,UA: 15
SL AMB POCT BILIRUBIN,UA: NEGATIVE
SL AMB POCT BLOOD,UA: NEGATIVE
SL AMB POCT CLARITY,UA: CLEAR
SL AMB POCT COLOR,UA: YELLOW
SL AMB POCT KETONES,UA: NEGATIVE
SL AMB POCT NITRITE,UA: POSITIVE
SL AMB POCT PH,UA: 6
SL AMB POCT SPECIFIC GRAVITY,UA: 1.01
SL AMB POCT URINE PROTEIN: NEGATIVE
SL AMB POCT UROBILINOGEN: 0.2

## 2025-08-05 PROCEDURE — 99215 OFFICE O/P EST HI 40 MIN: CPT | Performed by: STUDENT IN AN ORGANIZED HEALTH CARE EDUCATION/TRAINING PROGRAM

## 2025-08-05 PROCEDURE — G2211 COMPLEX E/M VISIT ADD ON: HCPCS | Performed by: STUDENT IN AN ORGANIZED HEALTH CARE EDUCATION/TRAINING PROGRAM

## 2025-08-05 PROCEDURE — 81003 URINALYSIS AUTO W/O SCOPE: CPT | Performed by: STUDENT IN AN ORGANIZED HEALTH CARE EDUCATION/TRAINING PROGRAM

## 2025-08-07 DIAGNOSIS — I21.4 NSTEMI (NON-ST ELEVATED MYOCARDIAL INFARCTION) (HCC): ICD-10-CM

## 2025-08-07 DIAGNOSIS — I25.10 CORONARY ARTERY DISEASE INVOLVING NATIVE HEART, UNSPECIFIED VESSEL OR LESION TYPE, UNSPECIFIED WHETHER ANGINA PRESENT: ICD-10-CM

## 2025-08-07 RX ORDER — RIVAROXABAN 2.5 MG/1
TABLET, FILM COATED ORAL
Qty: 180 TABLET | Refills: 1 | Status: SHIPPED | OUTPATIENT
Start: 2025-08-07

## 2025-08-08 RX ORDER — TORSEMIDE 20 MG/1
60 TABLET ORAL DAILY
Qty: 270 TABLET | Refills: 1 | Status: SHIPPED | OUTPATIENT
Start: 2025-08-08

## 2025-08-21 ENCOUNTER — TELEPHONE (OUTPATIENT)
Dept: NEPHROLOGY | Facility: CLINIC | Age: 85
End: 2025-08-21

## (undated) DEVICE — GLIDESHEATH BASIC HYDROPHILIC COATED INTRODUCER SHEATH: Brand: GLIDESHEATH

## (undated) DEVICE — THE VASC BAND HEMOSTAT IS A COMPRESSION DEVICE TO ASSIST HEMOSTASIS OF ARTERIAL, VENOUS AND HEMODIALYSIS PERCUTANEOUS ACCESS SITES.: Brand: VASC BAND™ HEMOSTAT

## (undated) DEVICE — CATH GUIDE LAUNCHER 5FR JR 4.0

## (undated) DEVICE — DGW .035 FC J3MM 260CM TEF: Brand: EMERALD

## (undated) DEVICE — GUIDEWIRE WHOLEY HI TORQUE INTERM MOD J .035 145CM

## (undated) DEVICE — CATH GUIDE LAUNCHER 6FR EBU 3.5